# Patient Record
Sex: MALE | Race: OTHER | HISPANIC OR LATINO | Employment: FULL TIME | URBAN - METROPOLITAN AREA
[De-identification: names, ages, dates, MRNs, and addresses within clinical notes are randomized per-mention and may not be internally consistent; named-entity substitution may affect disease eponyms.]

---

## 2017-04-05 PROCEDURE — 99283 EMERGENCY DEPT VISIT LOW MDM: CPT

## 2017-04-06 ENCOUNTER — HOSPITAL ENCOUNTER (EMERGENCY)
Facility: HOSPITAL | Age: 27
Discharge: HOME | End: 2017-04-06
Attending: EMERGENCY MEDICINE
Payer: COMMERCIAL

## 2017-04-06 VITALS
DIASTOLIC BLOOD PRESSURE: 91 MMHG | SYSTOLIC BLOOD PRESSURE: 142 MMHG | TEMPERATURE: 98 F | HEIGHT: 66 IN | RESPIRATION RATE: 18 BRPM | HEART RATE: 102 BPM | WEIGHT: 270 LBS | BODY MASS INDEX: 43.39 KG/M2 | OXYGEN SATURATION: 97 %

## 2017-04-06 DIAGNOSIS — S20.211A RIB CONTUSION, RIGHT, INITIAL ENCOUNTER: Primary | ICD-10-CM

## 2017-04-06 DIAGNOSIS — R52 PAIN: ICD-10-CM

## 2017-04-06 RX ORDER — KETOROLAC TROMETHAMINE 10 MG/1
10 TABLET, FILM COATED ORAL EVERY 12 HOURS PRN
Qty: 6 TABLET | Refills: 0 | Status: SHIPPED | OUTPATIENT
Start: 2017-04-06 | End: 2017-04-09

## 2017-04-06 RX ORDER — MELOXICAM 7.5 MG/1
7.5 TABLET ORAL
Status: DISCONTINUED | OUTPATIENT
Start: 2017-04-06 | End: 2017-04-06 | Stop reason: HOSPADM

## 2017-04-06 NOTE — ED PROVIDER NOTES
"Encounter Date: 4/5/2017    SCRIBE #1 NOTE: I, Rissa Menjivar, am scribing for, and in the presence of,  Daniel Samuel MD. I have scribed the following portions of the note - Other sections scribed: HPI, ROS.       History     Chief Complaint   Patient presents with    Rib Pain     Right sided rib pain after falling down the stairs 1 1/2 weeks ago. Reportedly bruised and pain initially, now pain worsened on right rib side.     Review of patient's allergies indicates:  No Known Allergies  HPI Comments: CC: Chest Wall Pain    HPI: 27 year old male with a PMHx of DM, encounter for blood transfusion, and a perineal abscess presents to the ED c/o acute, severe (8/10) right sided chest wall pain. Patient reports he was carrying a box down the stairs 1 1/2 weeks ago when he fell down the stairs. Patient states his "skin bruised up", but the chest wall pain has not improved. Pain is exacerbated with deep breaths. Patient reports treating with Tylenol Extra Strength. Patient notes he occasionally smokes. Patient otherwise denies headache, nausea and other symptoms.     The history is provided by the patient. No  was used.     Past Medical History:   Diagnosis Date    Diabetes mellitus     Encounter for blood transfusion     Perineal abscess 8/1/2014     Past Surgical History:   Procedure Laterality Date    ORTHOPEDIC SURGERY       Family History   Problem Relation Age of Onset    Diabetes Father     Diabetes Paternal Grandmother     Diabetes Paternal Grandfather      Social History   Substance Use Topics    Smoking status: Former Smoker     Packs/day: 0.50     Years: 9.00     Quit date: 7/1/2013    Smokeless tobacco: None    Alcohol use 1.2 oz/week     2 Cans of beer per week     Review of Systems   Constitutional: Negative for chills and fever.   HENT: Negative for ear pain, rhinorrhea and sore throat.    Eyes: Negative for pain.   Respiratory: Negative for cough.    Cardiovascular: " Negative for chest pain.   Gastrointestinal: Negative for abdominal pain, diarrhea, nausea and vomiting.   Genitourinary: Negative for dysuria.   Musculoskeletal: Negative for back pain.        (+) Chest Wall Pain (right sided)   Skin: Negative for rash.   Neurological: Negative for headaches.       Physical Exam   Initial Vitals   BP Pulse Resp Temp SpO2   04/05/17 2307 04/05/17 2307 04/05/17 2307 04/05/17 2307 04/05/17 2307   161/96 109 20 98.2 °F (36.8 °C) 97 %     Physical Exam    Vitals reviewed.  Constitutional: He appears well-developed and well-nourished.   HENT:   Head: Normocephalic and atraumatic.   Eyes: EOM are normal. Pupils are equal, round, and reactive to light.   Neck: Normal range of motion. Neck supple.   Cardiovascular: Normal rate, regular rhythm, normal heart sounds and intact distal pulses.   Pulmonary/Chest: Breath sounds normal. No respiratory distress. He has no wheezes. He has no rhonchi. He has no rales. He exhibits tenderness and bony tenderness.       Abdominal: Soft. Bowel sounds are normal.   Musculoskeletal: Normal range of motion.   Neurological: He is alert and oriented to person, place, and time.   Skin: Skin is warm and dry.   Psychiatric: He has a normal mood and affect.         ED Course   Procedures  Labs Reviewed - No data to display       X-Rays:   Independently Interpreted Readings:   Other Readings:  No sign of acute fracture. No Pnumothorax.     Medical Decision Making:   History:   Old Medical Records: I decided to obtain old medical records.  Differential Diagnosis:   Rib fracture, contusion, zoster    No sign of fracture of rib. No pneumothorax. Likely rib contusion. Skin is nl and without rash.   Supportive care and PCP follow up.  The results and physical exam findings were reviewed with the patient. Pt agrees with assessment, disposition and treatment plan and has no further questions or complaints at this time.    IVANIA Samuel M.D. 1:18 AM 4/6/2017               Scribe Attestation:   Scribe #1: I performed the above scribed service and the documentation accurately describes the services I performed. I attest to the accuracy of the note.    Attending Attestation:           Physician Attestation for Scribe:  Physician Attestation Statement for Scribe #1: I, Daniel Samuel MD, reviewed documentation, as scribed by Rissa Menjivar in my presence, and it is both accurate and complete.                 ED Course     Clinical Impression:   The primary encounter diagnosis was Rib contusion, right, initial encounter. A diagnosis of Pain was also pertinent to this visit.          Daniel Samuel MD  04/06/17 0118

## 2017-04-06 NOTE — DISCHARGE INSTRUCTIONS
Bruises (Contusions)    A contusion is a bruise. A bruise happens when a blow to your body doesn't break the skin but does break blood vessels beneath the skin. Blood leaking from the broken vessels causes redness and swelling. As it heals, your bruise is likely to turn colors like purple, green, and yellow. This is normal. The bruise should fade in 2 or 3 weeks.  Factors that make you more likely to bruise  Almost everyone bruises now and then. Certain people do bruise more easily than others. You're more prone to bruising as you get older. That's because blood vessels become more fragile with age. You're also more likely to bruise if you have a clotting disorder such as hemophilia or take medications that reduce clotting, including aspirin.  When to go to the emergency room (ER)  Bruises almost always heal on their own without special treatment. But for some people, a bad bruise can be serious. Seek medical care if you:  · Have a clotting disorder such as hemophilia.  · Have cirrhosis or other serious liver disease.  · Take blood-thinning medications such as warfarin (Coumadin).  What to expect in the ER  A doctor will examine your bruise and ask about any health conditions you have. In some cases, you may have a test to check how well your blood clots. Other treatment will depend on your needs.  Follow-up care  Sometimes a bruise gets worse instead of better. It may become larger and more swollen. This can occur when your body walls off a small pool of blood under the skin (hematoma). In very rare cases, your doctor may need to drain excess blood from the area.  Tip:  Apply an ice pack or bag of frozen peas to a bruise (keep a thin cloth between the cold source and your skin). This can help reduce redness and swelling.   Date Last Reviewed: 11/30/2014  © 1744-9570 [employer]. [address] [place] [address], [place] [address]. All rights reserved. This information is not intended as a substitute for  professional medical care. Always follow your healthcare professional's instructions.          Chest Contusion    A contusion is a bruise to the skin, muscle, or ribs. It may cause pain, tenderness, and swelling. It may turn the skin purple until it heals. Contusions take a few days to a few weeks to heal.  Home care  Follow these guidelines when caring for yourself at home:  · Rest. Dont do any heavy lifting or strenuous activity. Dont do any activity that causes pain.  · Put an ice pack on the injured area. Do this for 20 minutes every 1 to 2 hours the first day. You can make an ice pack by wrapping a plastic bag of ice cubes in a thin towel. Continue to use the ice pack 3 to 4 times a day for the next 2 days. Then use the ice pack as needed to ease pain and swelling.  · After 1 to 2 days you may put a warm compress on the area. Do this for 10 minutes several times a day. A warm compress is a clean cloth thats damp with warm water.  · Hold a pillow to the affected area when you cough. This will help ease pain.  · You may use acetaminophen or ibuprofen to control pain, unless another pain medicine was prescribed. If you have chronic liver or kidney disease, talk with your health care provider before using these medicines. Also talk with your provider if youve had a stomach ulcer or GI bleeding.  Follow-up care  Follow up with your health care provider during the next week, or as advised.  When to seek medical advice  Call your health care provider right away if any of these occur:  · Shortness of breath, difficulty breathing, or breathing fast  · Chest pain gets worse when you breathe  · Severe pain that comes on suddenly or lasts more than an hour  · Dizziness, weakness, or fainting  · New abdominal pain or abdominal pain that gets worse  ·  Fever of 101ºF (38.3ºC) or higher, or as directed by your health care provider  Date Last Reviewed: 2/15/2015  © 6819-5715 [employer]. [address] [place] [address]  Road, [place] [address]. All rights reserved. This information is not intended as a substitute for professional medical care. Always follow your healthcare professional's instructions.

## 2017-04-06 NOTE — ED AVS SNAPSHOT
[facility]  [place] [address]               Doe Woodall   2017 12:40 AM   ED    Description:  Male : 1990   Department:  [facility]           Your Care was Coordinated By:     Provider Role From To    Daniel Samuel MD Attending Provider 17 0045 --      Reason for Visit     Rib Pain           Diagnoses this Visit        Comments    Rib contusion, right, initial encounter    -  Primary     Pain           ED Disposition     ED Disposition Condition Comment    Discharge             To Do List           Follow-up Information     Follow up with [facility].    Specialty:  Emergency Medicine    Why:  As needed, If symptoms worsen    Contact information:    [place] [id number] [phone]       These Medications        Disp Refills Start End    ketorolac (TORADOL) 10 mg tablet 6 tablet 0 2017    Take 1 tablet (10 mg total) by mouth every 12 (twelve) hours as needed for Pain. - Oral      [facility]     [facility] Care [facility] -  Assistance  Unless otherwise directed by your provider, please contact [facility], our nurse care line that is available for  assistance.     Registered nurses in the [facility] provide: appointment scheduling, clinical advisement, health education, and other advisory services.  Call: [phone] (toll free)               Medications           START taking these NEW medications        Refills    ketorolac (TORADOL) 10 mg tablet 0    Sig: Take 1 tablet (10 mg total) by mouth every 12 (twelve) hours as needed for Pain.    Class: Print    Route: Oral      These medications were administered today        Dose Freq    meloxicam tablet 7.5 mg 7.5 mg ED 1 Time    Sig: Take 1 tablet (7.5 mg total) by mouth ED 1 Time.    Class: Normal    Route: Oral           Verify that the below list of  "medications is an accurate representation of the medications you are currently taking.  If none reported, the list may be blank. If incorrect, please contact your healthcare provider. Carry this list with you in case of emergency.           Current Medications     blood sugar diagnostic Strp Check blood glucose in AM then three times per day prior to meals.   Can check in evening prior to bed.    blood-glucose meter kit Use as instructed    cyclobenzaprine (FLEXERIL) 10 MG tablet     famotidine (PEPCID) 20 MG tablet     insulin aspart (NOVOLOG) 100 unit/mL injection Inject 10 Units into the skin 3 (three) times daily before meals.    insulin detemir (LEVEMIR) 100 unit/mL (3 mL) InPn pen Inject 30 Units into the skin every evening.    insulin syringe,safetyneedle 1 mL 29 x 1/2" Syrg 1 Syringe by Misc.(Non-Drug; Combo Route) route 4 (four) times daily.    ketorolac (TORADOL) 10 mg tablet Take 1 tablet (10 mg total) by mouth every 12 (twelve) hours as needed for Pain.    lancets 32 gauge Misc 1 lancet by Misc.(Non-Drug; Combo Route) route 4 (four) times daily.    meloxicam (MOBIC) 15 MG tablet     meloxicam tablet 7.5 mg Take 1 tablet (7.5 mg total) by mouth ED 1 Time.    tramadol (ULTRAM) 50 mg tablet Take 1 tablet (50 mg total) by mouth every 6 (six) hours as needed for Pain.           Clinical Reference Information           Your Vitals Were     BP Pulse Temp Resp Height Weight    142/91 (BP Location: Right arm, Patient Position: Sitting, BP Method: Automatic) 102 98.2 °F (36.8 °C) (Oral) 18 5' 6" (1.676 m) 122.5 kg (270 lb)    SpO2 BMI             97% 43.58 kg/m2         Allergies as of 4/6/2017     No Known Allergies      Immunizations Administered on Date of Encounter - 4/6/2017     None      ED Micro, Lab, POCT     None      ED Imaging Orders     Start Ordered       Status Ordering Provider    04/05/17 2991 04/05/17 5182  X-Ray Ribs 2 View Right  1 time imaging      Final result         Discharge Instructions  "        Bruises (Contusions)    A contusion is a bruise. A bruise happens when a blow to your body doesn't break the skin but does break blood vessels beneath the skin. Blood leaking from the broken vessels causes redness and swelling. As it heals, your bruise is likely to turn colors like purple, green, and yellow. This is normal. The bruise should fade in 2 or 3 weeks.  Factors that make you more likely to bruise  Almost everyone bruises now and then. Certain people do bruise more easily than others. You're more prone to bruising as you get older. That's because blood vessels become more fragile with age. You're also more likely to bruise if you have a clotting disorder such as hemophilia or take medications that reduce clotting, including aspirin.  When to go to the emergency room (ER)  Bruises almost always heal on their own without special treatment. But for some people, a bad bruise can be serious. Seek medical care if you:  · Have a clotting disorder such as hemophilia.  · Have cirrhosis or other serious liver disease.  · Take blood-thinning medications such as warfarin (Coumadin).  What to expect in the ER  A doctor will examine your bruise and ask about any health conditions you have. In some cases, you may have a test to check how well your blood clots. Other treatment will depend on your needs.  Follow-up care  Sometimes a bruise gets worse instead of better. It may become larger and more swollen. This can occur when your body walls off a small pool of blood under the skin (hematoma). In very rare cases, your doctor may need to drain excess blood from the area.  Tip:  Apply an ice pack or bag of frozen peas to a bruise (keep a thin cloth between the cold source and your skin). This can help reduce redness and swelling.   Date Last Reviewed: 11/30/2014  © 7298-3425 [employer]. [address] [place] [address], [place] [address]. All rights reserved. This information is not intended as a substitute for  professional medical care. Always follow your healthcare professional's instructions.          Chest Contusion    A contusion is a bruise to the skin, muscle, or ribs. It may cause pain, tenderness, and swelling. It may turn the skin purple until it heals. Contusions take a few days to a few weeks to heal.  Home care  Follow these guidelines when caring for yourself at home:  · Rest. Dont do any heavy lifting or strenuous activity. Dont do any activity that causes pain.  · Put an ice pack on the injured area. Do this for 20 minutes every 1 to 2 hours the first day. You can make an ice pack by wrapping a plastic bag of ice cubes in a thin towel. Continue to use the ice pack 3 to 4 times a day for the next 2 days. Then use the ice pack as needed to ease pain and swelling.  · After 1 to 2 days you may put a warm compress on the area. Do this for 10 minutes several times a day. A warm compress is a clean cloth thats damp with warm water.  · Hold a pillow to the affected area when you cough. This will help ease pain.  · You may use acetaminophen or ibuprofen to control pain, unless another pain medicine was prescribed. If you have chronic liver or kidney disease, talk with your health care provider before using these medicines. Also talk with your provider if youve had a stomach ulcer or GI bleeding.  Follow-up care  Follow up with your health care provider during the next week, or as advised.  When to seek medical advice  Call your health care provider right away if any of these occur:  · Shortness of breath, difficulty breathing, or breathing fast  · Chest pain gets worse when you breathe  · Severe pain that comes on suddenly or lasts more than an hour  · Dizziness, weakness, or fainting  · New abdominal pain or abdominal pain that gets worse  ·  Fever of 101ºF (38.3ºC) or higher, or as directed by your health care provider  Date Last Reviewed: 2/15/2015  © 1268-9636 [employer]. [address] [place] [address]  Road, [place] [address]. All rights reserved. This information is not intended as a substitute for professional medical care. Always follow your healthcare professional's instructions.          [facility] Sign-Up     Activating your [facility] account is as easy as 1-2-3!     1) Visit [email or web address], select Sign Up Now, enter this activation code and your date of birth, then select Next.  [email or web address]  Expires: 5/21/2017 12:49 AM      2) Create a username and password to use when you visit [facility] in the future and select a security question in case you lose your password and select Next.    3) Enter your e-mail address and click Sign Up!    Additional Information  If you have questions, please e-mail [email or web address] or call [phone] to talk to our [facility] staff. Remember, [facility] is NOT to be used for urgent needs. For medical emergencies, dial 911.         Smoking Cessation     If you would like to quit smoking:   You may be eligible for free services if you are a [place] resident and started smoking cigarettes before September 1, 1988.  Call the Smoking Cessation Trust ([facility]) toll free at (806) 903-9240 or (906) 411-9832.   Call 1-800-QUIT-NOW if you do not meet the above criteria.   Contact us via email: [email or web address]   View our website for more information: [email or web address]         [facility] complies with applicable Federal civil rights laws and does not discriminate on the basis of race, color, national origin, age, disability, or sex.        Language Assistance Services     ATTENTION: Language assistance services are available, free of charge. Please call [phone].      ATENCIÓN: Si habla daniel, tiene a wick disposición servicios gratuitos de asistencia lingüística. Llame al [phone].     CHÚ Ý: N?u b?n nói Ti?ng Vi?t, có các d?ch v? h? tr? ngôn ng? mi?n phí dành cho b?n. G?i s? [phone].

## 2017-04-06 NOTE — ED TRIAGE NOTES
Pt presents to ED c/o R side rib pain after falling down stairs over a week ago. Rates pain 5/10. States has been taking Tylenol with no relief.

## 2018-06-24 ENCOUNTER — HOSPITAL ENCOUNTER (EMERGENCY)
Facility: HOSPITAL | Age: 28
Discharge: HOME | End: 2018-06-24
Attending: EMERGENCY MEDICINE
Payer: MEDICAID

## 2018-06-24 VITALS
HEART RATE: 109 BPM | DIASTOLIC BLOOD PRESSURE: 76 MMHG | BODY MASS INDEX: 41.65 KG/M2 | WEIGHT: 250 LBS | SYSTOLIC BLOOD PRESSURE: 138 MMHG | TEMPERATURE: 98 F | HEIGHT: 65 IN | OXYGEN SATURATION: 98 % | RESPIRATION RATE: 16 BRPM

## 2018-06-24 DIAGNOSIS — G89.29 CHRONIC RIGHT-SIDED LOW BACK PAIN WITH RIGHT-SIDED SCIATICA: Primary | ICD-10-CM

## 2018-06-24 DIAGNOSIS — M54.41 CHRONIC RIGHT-SIDED LOW BACK PAIN WITH RIGHT-SIDED SCIATICA: Primary | ICD-10-CM

## 2018-06-24 LAB
BASOPHILS # BLD AUTO: 0.05 K/UL
BASOPHILS NFR BLD: 0.5 %
BUN SERPL-MCNC: 8 MG/DL (ref 6–30)
CHLORIDE SERPL-SCNC: 102 MMOL/L (ref 95–110)
CREAT SERPL-MCNC: 0.7 MG/DL (ref 0.5–1.4)
CRP SERPL-MCNC: 5.5 MG/L
DIFFERENTIAL METHOD: ABNORMAL
EOSINOPHIL # BLD AUTO: 0.1 K/UL
EOSINOPHIL NFR BLD: 0.9 %
ERYTHROCYTE [DISTWIDTH] IN BLOOD BY AUTOMATED COUNT: 12.3 %
ERYTHROCYTE [SEDIMENTATION RATE] IN BLOOD BY WESTERGREN METHOD: 11 MM/HR
GLUCOSE SERPL-MCNC: 240 MG/DL (ref 70–110)
HCT VFR BLD AUTO: 53.2 %
HCT VFR BLD CALC: 54 %PCV (ref 36–54)
HGB BLD-MCNC: 17.6 G/DL
IMM GRANULOCYTES # BLD AUTO: 0.02 K/UL
IMM GRANULOCYTES NFR BLD AUTO: 0.2 %
LYMPHOCYTES # BLD AUTO: 3.7 K/UL
LYMPHOCYTES NFR BLD: 36.5 %
MCH RBC QN AUTO: 31.3 PG
MCHC RBC AUTO-ENTMCNC: 33.1 G/DL
MCV RBC AUTO: 95 FL
MONOCYTES # BLD AUTO: 0.9 K/UL
MONOCYTES NFR BLD: 8.5 %
NEUTROPHILS # BLD AUTO: 5.5 K/UL
NEUTROPHILS NFR BLD: 53.4 %
NRBC BLD-RTO: 0 /100 WBC
PLATELET # BLD AUTO: 257 K/UL
PMV BLD AUTO: 9.6 FL
POC IONIZED CALCIUM: 1.13 MMOL/L (ref 1.06–1.42)
POC TCO2 (MEASURED): 25 MMOL/L (ref 23–29)
POTASSIUM BLD-SCNC: 4.2 MMOL/L (ref 3.5–5.1)
RBC # BLD AUTO: 5.63 M/UL
SAMPLE: ABNORMAL
SODIUM BLD-SCNC: 139 MMOL/L (ref 136–145)
WBC # BLD AUTO: 10.23 K/UL

## 2018-06-24 PROCEDURE — 85025 COMPLETE CBC W/AUTO DIFF WBC: CPT

## 2018-06-24 PROCEDURE — 86140 C-REACTIVE PROTEIN: CPT

## 2018-06-24 PROCEDURE — 99284 EMERGENCY DEPT VISIT MOD MDM: CPT | Mod: 25

## 2018-06-24 PROCEDURE — 85651 RBC SED RATE NONAUTOMATED: CPT

## 2018-06-24 PROCEDURE — 63600175 PHARM REV CODE 636 W HCPCS: Performed by: STUDENT IN AN ORGANIZED HEALTH CARE EDUCATION/TRAINING PROGRAM

## 2018-06-24 PROCEDURE — 96372 THER/PROPH/DIAG INJ SC/IM: CPT

## 2018-06-24 PROCEDURE — 99284 EMERGENCY DEPT VISIT MOD MDM: CPT | Mod: ,,, | Performed by: EMERGENCY MEDICINE

## 2018-06-24 PROCEDURE — 25000003 PHARM REV CODE 250: Performed by: STUDENT IN AN ORGANIZED HEALTH CARE EDUCATION/TRAINING PROGRAM

## 2018-06-24 RX ORDER — KETOROLAC TROMETHAMINE 30 MG/ML
10 INJECTION, SOLUTION INTRAMUSCULAR; INTRAVENOUS
Status: COMPLETED | OUTPATIENT
Start: 2018-06-24 | End: 2018-06-24

## 2018-06-24 RX ORDER — ACETAMINOPHEN 500 MG
1000 TABLET ORAL EVERY 8 HOURS PRN
Refills: 0 | COMMUNITY
Start: 2018-06-24 | End: 2018-06-29

## 2018-06-24 RX ORDER — METFORMIN HYDROCHLORIDE 1000 MG/1
1000 TABLET ORAL 3 TIMES DAILY
COMMUNITY
End: 2018-07-19

## 2018-06-24 RX ORDER — IBUPROFEN 600 MG/1
600 TABLET ORAL EVERY 6 HOURS PRN
Refills: 0
Start: 2018-06-24 | End: 2018-06-29

## 2018-06-24 RX ORDER — LIDOCAINE 50 MG/G
1 PATCH TOPICAL DAILY
Qty: 5 PATCH | Refills: 0 | Status: SHIPPED | OUTPATIENT
Start: 2018-06-24 | End: 2019-10-09

## 2018-06-24 RX ORDER — DEXAMETHASONE SODIUM PHOSPHATE 4 MG/ML
8 INJECTION, SOLUTION INTRA-ARTICULAR; INTRALESIONAL; INTRAMUSCULAR; INTRAVENOUS; SOFT TISSUE
Status: COMPLETED | OUTPATIENT
Start: 2018-06-24 | End: 2018-06-24

## 2018-06-24 RX ORDER — ACETAMINOPHEN 500 MG
1000 TABLET ORAL
Status: COMPLETED | OUTPATIENT
Start: 2018-06-24 | End: 2018-06-24

## 2018-06-24 RX ADMIN — ACETAMINOPHEN 1000 MG: 500 TABLET ORAL at 03:06

## 2018-06-24 RX ADMIN — KETOROLAC TROMETHAMINE 10 MG: 30 INJECTION, SOLUTION INTRAMUSCULAR at 03:06

## 2018-06-24 RX ADMIN — DEXAMETHASONE SODIUM PHOSPHATE 8 MG: 4 INJECTION, SOLUTION INTRAMUSCULAR; INTRAVENOUS at 03:06

## 2018-06-24 NOTE — ED PROVIDER NOTES
Encounter Date: 6/24/2018       History     Chief Complaint   Patient presents with    Back Pain     for 5 days     28-year-old male with past medical history of diabetes as well as chronic intermittent low back pain presents with right-sided low back pain ×5 days.  Patient denies any inciting event or trauma.  Reports that on Wednesday began to have right low back pain radiating to the posterior leg above the knee.  Denies any other pains or injuries.  Denies numbness or paresthesias.  Denies bowel or bladder difficulty, perineal anesthesia.  Has been self treating with over-the-counter analgesics.            Review of patient's allergies indicates:  No Known Allergies  Past Medical History:   Diagnosis Date    Diabetes mellitus     Encounter for blood transfusion     Perineal abscess 8/1/2014     Past Surgical History:   Procedure Laterality Date    ORTHOPEDIC SURGERY       Family History   Problem Relation Age of Onset    Diabetes Father     Diabetes Paternal Grandmother     Diabetes Paternal Grandfather      Social History   Substance Use Topics    Smoking status: Former Smoker     Packs/day: 0.50     Years: 9.00     Quit date: 7/1/2013    Smokeless tobacco: Never Used    Alcohol use 1.2 oz/week     2 Cans of beer per week     Review of Systems   Constitutional: Negative for fever.   HENT: Negative for sore throat.    Respiratory: Negative for shortness of breath.    Cardiovascular: Negative for chest pain.   Gastrointestinal: Negative for nausea.   Genitourinary: Negative for dysuria.   Musculoskeletal: Negative for back pain.   Skin: Negative for rash.   Neurological: Negative for weakness.   Hematological: Does not bruise/bleed easily.       Physical Exam     Initial Vitals [06/24/18 1451]   BP Pulse Resp Temp SpO2   138/76 109 16 98.1 °F (36.7 °C) 98 %      MAP       --         Physical Exam    Constitutional: He appears well-developed and well-nourished.   HENT:   Head: Normocephalic and  atraumatic.   Eyes: Conjunctivae and EOM are normal.   Neck: Normal range of motion. No tracheal deviation present.   Cardiovascular: Normal rate and intact distal pulses.   Pulmonary/Chest: No respiratory distress.   Abdominal: Soft. He exhibits no distension.   Musculoskeletal: Normal range of motion. He exhibits no edema.   Mild right low back tenderness, tender over right buttock  Area of soft tissue over right lateral lumbar spine which could represent fluid collection or local swelling  Strength 5 out of 5 throughout  Sensation intact to light touch throughout  Lower extremity reflexes 2+ throughout  Negative upper motor neuron signs   Neurological: He is alert and oriented to person, place, and time.   Skin: Skin is warm and dry.   Psychiatric: He has a normal mood and affect. His behavior is normal. Judgment and thought content normal.         ED Course   Procedures  Labs Reviewed   CBC W/ AUTO DIFFERENTIAL - Abnormal; Notable for the following:        Result Value    MCH 31.3 (*)     All other components within normal limits   SEDIMENTATION RATE - Abnormal; Notable for the following:     Sed Rate 11 (*)     All other components within normal limits   ISTAT PROCEDURE - Abnormal; Notable for the following:     POC Glucose 240 (*)     All other components within normal limits   C-REACTIVE PROTEIN          Imaging Results          CT Lumbar Spine Without Contrast (Final result)  Result time 06/24/18 18:01:55    Final result by Britton Otero MD (06/24/18 18:01:55)                 Impression:      Congenital lumbar spinal stenosis with moderate spinal canal narrowing from L2-L5.    Lower lumbar minimal spondylosis as above.    Electronically signed by resident: Sen Aranda  Date:    06/24/2018  Time:    17:19    Electronically signed by: Britton Otero MD  Date:    06/24/2018  Time:    18:01             Narrative:    EXAMINATION:  CT LUMBAR SPINE WITHOUT CONTRAST    CLINICAL HISTORY:  low back  pain;    TECHNIQUE:  Low-dose axial, sagittal and coronal reformations are obtained through the lumbar spine.  Contrast was not administered.    COMPARISON:  None.    FINDINGS:  Bones are well mineralized.  Straightening of the usual lumbar lordosis, likely related to positioning or muscle strain.  Vertebral body heights are well-maintained.No fractures are identified.  No significant listhesis or destructive osseous process.  Slight loss of disc height at the with mild endplate changes and facet arthrosis L5-S1 resulting in minimal bilateral neural foraminal narrowing.    Spinal canal is narrowed throughout the lumbar spine ranging from 8-10 mm in AP diameter consistent with congenital lumbar spinal stenosis.  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  There is moderate spinal canal stenosis from L2-L5, with the superior aspect of L3 being the most significant level measuring approximately 8 mm.    The remaining visualized paravertebral structures demonstrate no pathology.                                       APC / Resident Notes:   28-year-old male with right sided low back pain with radicular symptoms, atraumatic.  We'll treat symptomatically with IM dexamethasone, Toradol, as well as oral Tylenol.  As there is an area on the right lateral low back with irregular soft tissues/possible fluid, we will obtain CT lumbar spine.       CT lumbar negative for any acute process.  No concern for Soft tissue fluid collection or mass.  Stable for discharge              Clinical Impression:   The encounter diagnosis was Chronic right-sided low back pain with right-sided sciatica.

## 2018-06-24 NOTE — ED NOTES
Patient identifiers verified and correct for Mr Woodall  C/C: Left buttock pain radiating down leg  APPEARANCE: awake and alert in NAD.  SKIN: warm, dry and intact. No breakdown or bruising.  MUSCULOSKELETAL: Patient moving all extremities spontaneously, no obvious swelling or deformities noted. Ambulates independently.  RESPIRATORY: Denies shortness of breath.Respirations unlabored.   CARDIAC: Denies CP, 2+ distal pulses; no peripheral edema  ABDOMEN: S/ND/NT, Denies nausea  : voids spontaneously, denies difficulty  Neurologic: AAO x 4; follows commands equal strength in all extremities; denies numbness/tingling. Denies dizziness Positive weakness,

## 2018-06-24 NOTE — ED TRIAGE NOTES
Patient states sharp pain to right buttock, no injury. Onset Thursday with gradual worsening. ALso states he fell with pain to left hand 5th finger. Attempted mult pain meds

## 2018-06-26 ENCOUNTER — HOSPITAL ENCOUNTER (EMERGENCY)
Facility: HOSPITAL | Age: 28
Discharge: HOME | End: 2018-06-26
Attending: EMERGENCY MEDICINE
Payer: MEDICAID

## 2018-06-26 VITALS
WEIGHT: 250 LBS | HEIGHT: 65 IN | TEMPERATURE: 98 F | DIASTOLIC BLOOD PRESSURE: 96 MMHG | RESPIRATION RATE: 16 BRPM | SYSTOLIC BLOOD PRESSURE: 134 MMHG | BODY MASS INDEX: 41.65 KG/M2 | OXYGEN SATURATION: 98 % | HEART RATE: 98 BPM

## 2018-06-26 DIAGNOSIS — M79.604 LEG PAIN, POSTERIOR, RIGHT: Primary | ICD-10-CM

## 2018-06-26 PROCEDURE — 63600175 PHARM REV CODE 636 W HCPCS: Performed by: STUDENT IN AN ORGANIZED HEALTH CARE EDUCATION/TRAINING PROGRAM

## 2018-06-26 PROCEDURE — 96372 THER/PROPH/DIAG INJ SC/IM: CPT

## 2018-06-26 PROCEDURE — 99283 EMERGENCY DEPT VISIT LOW MDM: CPT | Mod: 25

## 2018-06-26 PROCEDURE — 99283 EMERGENCY DEPT VISIT LOW MDM: CPT | Mod: ,,, | Performed by: EMERGENCY MEDICINE

## 2018-06-26 PROCEDURE — 25000003 PHARM REV CODE 250: Performed by: STUDENT IN AN ORGANIZED HEALTH CARE EDUCATION/TRAINING PROGRAM

## 2018-06-26 RX ORDER — KETOROLAC TROMETHAMINE 30 MG/ML
30 INJECTION, SOLUTION INTRAMUSCULAR; INTRAVENOUS
Status: COMPLETED | OUTPATIENT
Start: 2018-06-26 | End: 2018-06-26

## 2018-06-26 RX ORDER — CYCLOBENZAPRINE HCL 10 MG
10 TABLET ORAL 3 TIMES DAILY PRN
Qty: 15 TABLET | Refills: 0 | Status: SHIPPED | OUTPATIENT
Start: 2018-06-26 | End: 2018-07-01

## 2018-06-26 RX ORDER — ACETAMINOPHEN 325 MG/1
650 TABLET ORAL
Status: COMPLETED | OUTPATIENT
Start: 2018-06-26 | End: 2018-06-26

## 2018-06-26 RX ORDER — NAPROXEN 500 MG/1
500 TABLET ORAL 2 TIMES DAILY WITH MEALS
Qty: 20 TABLET | Refills: 0 | Status: SHIPPED | OUTPATIENT
Start: 2018-06-26 | End: 2018-11-18

## 2018-06-26 RX ADMIN — KETOROLAC TROMETHAMINE 30 MG: 30 INJECTION, SOLUTION INTRAMUSCULAR at 08:06

## 2018-06-26 RX ADMIN — ACETAMINOPHEN 650 MG: 325 TABLET, FILM COATED ORAL at 08:06

## 2018-06-26 NOTE — ED NOTES
LOC: The patient is awake, alert and aware of environment with an appropriate affect, the patient is oriented x 3 and speaking appropriately.  APPEARANCE: Patient resting comfortably and in no acute distress, patient is clean and well groomed, patient's clothing is properly fastened.  SKIN: The skin is warm and dry, patient has normal skin turgor and moist mucus membranes, skin intact, no breakdown or brusing noted.  MUSKULOSKELETAL: Patient moving all extremities well, no obvious swelling or deformities noted.  RESPIRATORY: Airway is open and patent, respirations are spontaneous, patient has a normal effort and rate. Breath sounds are clear and equal bilaterally.  CARDIAC: Normal heart sounds. No peripheral edema.  ABDOMEN: Soft and non tender to palpation, no distention noted. Bowel sounds present.

## 2018-06-26 NOTE — ED PROVIDER NOTES
Encounter Date: 6/26/2018    SCRIBE #1 NOTE: I, Haley Ahn, am scribing for, and in the presence of,  Dr. Brooks. I have scribed the following portions of the note - the Resident attestation.       History     Chief Complaint   Patient presents with    Leg Pain     patient was here on sunday for same thing, right leg pain from hip to knee     Doe Woodall is a 28 y.o. M with IDDM type II, as well as chronic intermittent low back pain presents with right-sided low back pain ×7 days.      Patient was seen here for the same issue 2 days ago. His account has not changes since that time, he reports persistence of his pain. Patient denies any inciting event or trauma. Reports that on Wednesday of last week he began to have right low back pain radiating to the posterior leg above the knee.  Denies any other pains or injuries.  Denies numbness or paresthesias.  Denies bowel or bladder difficulty, perineal anesthesia.  Has been self treating with over-the-counter analgesics.  \    Patient denies CP/SOB, N/V/D, F/C, denies arthralgia, numbness/tingling/weakness or pain otherwise.             Review of patient's allergies indicates:  No Known Allergies  Past Medical History:   Diagnosis Date    Diabetes mellitus     Encounter for blood transfusion     Perineal abscess 8/1/2014     Past Surgical History:   Procedure Laterality Date    ORTHOPEDIC SURGERY       Family History   Problem Relation Age of Onset    Diabetes Father     Diabetes Paternal Grandmother     Diabetes Paternal Grandfather      Social History   Substance Use Topics    Smoking status: Former Smoker     Packs/day: 0.50     Years: 9.00     Quit date: 7/1/2013    Smokeless tobacco: Never Used    Alcohol use 1.2 oz/week     2 Cans of beer per week     Review of Systems   Constitutional: Negative for activity change.   HENT: Negative for voice change.    Eyes: Negative for visual disturbance.   Respiratory: Negative for cough, chest tightness  and shortness of breath.    Cardiovascular: Negative for chest pain, palpitations and leg swelling.   Gastrointestinal: Negative for abdominal distention, abdominal pain, nausea and vomiting.   Genitourinary: Negative for dysuria, flank pain and hematuria.   Musculoskeletal: Negative for arthralgias.        R leg pain from R thigh to R knee   Skin: Negative for color change.   Neurological: Negative for dizziness, facial asymmetry, weakness, light-headedness and numbness.       Physical Exam     Initial Vitals [06/26/18 0737]   BP Pulse Resp Temp SpO2   (!) 134/96 98 16 98.1 °F (36.7 °C) 98 %      MAP       --         Physical Exam    Vitals reviewed.  Constitutional: He appears well-developed and well-nourished. No distress.   HENT:   Head: Normocephalic and atraumatic.   Eyes: EOM are normal. No scleral icterus.   Neck: Normal range of motion.   Cardiovascular: Normal rate, regular rhythm and normal heart sounds.   No murmur heard.  Pulmonary/Chest: Breath sounds normal. No stridor. No respiratory distress. He has no wheezes. He has no rales.   Abdominal: Soft. Bowel sounds are normal. He exhibits no distension. There is no tenderness.   Musculoskeletal: Normal range of motion. He exhibits no edema or tenderness.   Mild right SI joint tenderness, no midline tenderness  Strength 5 out of 5 throughout  Sensation intact to light touch throughout  Lower extremity reflexes 2+ throughout  Negative upper motor neuron signs    Neurological: He is alert and oriented to person, place, and time. He has normal strength and normal reflexes. He displays normal reflexes. No cranial nerve deficit or sensory deficit.   Skin: Skin is warm and dry.   Psychiatric: He has a normal mood and affect. Thought content normal.         ED Course   Procedures  Labs Reviewed - No data to display       Imaging Results    None                APC / Resident Notes:   28 y.o. presents with acute on chronic R leg and R low back pain. Was treated  here 2 days ago for similar complaint, was given Toradol, dexamethasone, and tylenol which relieved his pain temporarily. He c/o no new symptoms, denies neuro deficits. Exam unremarkable. Will administer Toradol, tylenol, and prescribe flexeril. 8:14 AM        Scribe Attestation:   Scribe #1: I performed the above scribed service and the documentation accurately describes the services I performed. I attest to the accuracy of the note.    Attending Attestation:   Physician Attestation Statement for Resident:  As the supervising MD   Physician Attestation Statement: I have personally seen and examined this patient.   I agree with the above history. -: 28 y.o. male presents with right sided sciatica. No midline lumbar tenderness, fevers, or IVDA, I doubt epidural abscess. Patient's strength intact in bilateral lower extremities, no saddle anesthesia, no urinary or fecal incontinence, I doubt cauda equina. Patient counseled on symptomatic treatment. Scripts provided for NSAIDs and antispasmodics.    As the supervising MD I agree with the above PE.    As the supervising MD I agree with the above treatment, course, plan, and disposition.          Physician Attestation for Scribe:      Comments: I, Dr. Ramses Brooks, personally performed the services described in this documentation. All medical record entries made by the scribe were at my direction and in my presence.  I have reviewed the chart and agree that the record reflects my personal performance and is accurate and complete. Ramses Brooks MD.  1:16 PM 06/26/2018                 Clinical Impression:   The encounter diagnosis was Leg pain, posterior, right.      Disposition:   Disposition: Discharged  Condition: Stable                        Ramses Brooks MD  06/26/18 1316

## 2018-06-26 NOTE — ED TRIAGE NOTES
Patient complains of right leg pain that has been hurting since he was here Sunday, patient states his pain has not been relieved

## 2018-07-04 ENCOUNTER — HOSPITAL ENCOUNTER (OUTPATIENT)
Facility: HOSPITAL | Age: 28
Discharge: HOME | End: 2018-07-07
Attending: EMERGENCY MEDICINE | Admitting: NEUROLOGICAL SURGERY
Payer: MEDICAID

## 2018-07-04 DIAGNOSIS — M48.061 SPINAL STENOSIS OF LUMBAR REGION, UNSPECIFIED WHETHER NEUROGENIC CLAUDICATION PRESENT: ICD-10-CM

## 2018-07-04 DIAGNOSIS — M51.36 BULGE OF LUMBAR DISC WITHOUT MYELOPATHY: ICD-10-CM

## 2018-07-04 DIAGNOSIS — M54.41 LOW BACK PAIN WITH RIGHT-SIDED SCIATICA: Primary | ICD-10-CM

## 2018-07-04 DIAGNOSIS — M54.41 ACUTE RIGHT-SIDED LOW BACK PAIN WITH RIGHT-SIDED SCIATICA: ICD-10-CM

## 2018-07-04 LAB
BUN SERPL-MCNC: 17 MG/DL (ref 6–30)
CHLORIDE SERPL-SCNC: 101 MMOL/L (ref 95–110)
CREAT SERPL-MCNC: 0.5 MG/DL (ref 0.5–1.4)
GLUCOSE SERPL-MCNC: 295 MG/DL (ref 70–110)
HCT VFR BLD CALC: 51 %PCV (ref 36–54)
POC IONIZED CALCIUM: 1.08 MMOL/L (ref 1.06–1.42)
POC TCO2 (MEASURED): 24 MMOL/L (ref 23–29)
POTASSIUM BLD-SCNC: 4.3 MMOL/L (ref 3.5–5.1)
SAMPLE: ABNORMAL
SODIUM BLD-SCNC: 135 MMOL/L (ref 136–145)

## 2018-07-04 PROCEDURE — 82962 GLUCOSE BLOOD TEST: CPT | Mod: 59

## 2018-07-04 PROCEDURE — 63600175 PHARM REV CODE 636 W HCPCS: Performed by: EMERGENCY MEDICINE

## 2018-07-04 PROCEDURE — 96375 TX/PRO/DX INJ NEW DRUG ADDON: CPT

## 2018-07-04 PROCEDURE — 99284 EMERGENCY DEPT VISIT MOD MDM: CPT | Mod: ,,, | Performed by: EMERGENCY MEDICINE

## 2018-07-04 PROCEDURE — 96372 THER/PROPH/DIAG INJ SC/IM: CPT | Mod: 59

## 2018-07-04 PROCEDURE — 99283 EMERGENCY DEPT VISIT LOW MDM: CPT | Mod: 25

## 2018-07-04 PROCEDURE — 96374 THER/PROPH/DIAG INJ IV PUSH: CPT

## 2018-07-04 RX ORDER — KETOROLAC TROMETHAMINE 30 MG/ML
10 INJECTION, SOLUTION INTRAMUSCULAR; INTRAVENOUS
Status: COMPLETED | OUTPATIENT
Start: 2018-07-04 | End: 2018-07-04

## 2018-07-04 RX ORDER — DEXAMETHASONE SODIUM PHOSPHATE 4 MG/ML
8 INJECTION, SOLUTION INTRA-ARTICULAR; INTRALESIONAL; INTRAMUSCULAR; INTRAVENOUS; SOFT TISSUE
Status: COMPLETED | OUTPATIENT
Start: 2018-07-04 | End: 2018-07-04

## 2018-07-04 RX ADMIN — KETOROLAC TROMETHAMINE 10 MG: 30 INJECTION, SOLUTION INTRAMUSCULAR at 11:07

## 2018-07-04 RX ADMIN — DEXAMETHASONE SODIUM PHOSPHATE 8 MG: 4 INJECTION, SOLUTION INTRAMUSCULAR; INTRAVENOUS at 11:07

## 2018-07-05 ENCOUNTER — ANESTHESIA EVENT (OUTPATIENT)
Dept: SURGERY | Facility: HOSPITAL | Age: 28
End: 2018-07-05
Payer: MEDICAID

## 2018-07-05 PROBLEM — M51.36 BULGE OF LUMBAR DISC WITHOUT MYELOPATHY: Status: ACTIVE | Noted: 2018-07-05

## 2018-07-05 LAB
ABO + RH BLD: NORMAL
ANION GAP SERPL CALC-SCNC: 11 MMOL/L
BASOPHILS # BLD AUTO: 0.01 K/UL
BASOPHILS NFR BLD: 0.1 %
BLD GP AB SCN CELLS X3 SERPL QL: NORMAL
BUN SERPL-MCNC: 21 MG/DL
CALCIUM SERPL-MCNC: 9.4 MG/DL
CHLORIDE SERPL-SCNC: 102 MMOL/L
CO2 SERPL-SCNC: 22 MMOL/L
CREAT SERPL-MCNC: 0.8 MG/DL
DIFFERENTIAL METHOD: ABNORMAL
EOSINOPHIL # BLD AUTO: 0 K/UL
EOSINOPHIL NFR BLD: 0 %
ERYTHROCYTE [DISTWIDTH] IN BLOOD BY AUTOMATED COUNT: 12.2 %
EST. GFR  (AFRICAN AMERICAN): >60 ML/MIN/1.73 M^2
EST. GFR  (NON AFRICAN AMERICAN): >60 ML/MIN/1.73 M^2
GLUCOSE SERPL-MCNC: 412 MG/DL
HCT VFR BLD AUTO: 50.1 %
HGB BLD-MCNC: 17.3 G/DL
IMM GRANULOCYTES # BLD AUTO: 0.05 K/UL
IMM GRANULOCYTES NFR BLD AUTO: 0.5 %
INR PPP: 1
LYMPHOCYTES # BLD AUTO: 1.2 K/UL
LYMPHOCYTES NFR BLD: 11.4 %
MCH RBC QN AUTO: 31.8 PG
MCHC RBC AUTO-ENTMCNC: 34.5 G/DL
MCV RBC AUTO: 92 FL
MONOCYTES # BLD AUTO: 0.1 K/UL
MONOCYTES NFR BLD: 0.5 %
NEUTROPHILS # BLD AUTO: 9.6 K/UL
NEUTROPHILS NFR BLD: 87.5 %
NRBC BLD-RTO: 0 /100 WBC
PLATELET # BLD AUTO: 290 K/UL
PMV BLD AUTO: 10 FL
POCT GLUCOSE: 286 MG/DL (ref 70–110)
POCT GLUCOSE: 298 MG/DL (ref 70–110)
POCT GLUCOSE: 338 MG/DL (ref 70–110)
POCT GLUCOSE: 354 MG/DL (ref 70–110)
POCT GLUCOSE: 429 MG/DL (ref 70–110)
POTASSIUM SERPL-SCNC: 4.8 MMOL/L
PROTHROMBIN TIME: 10.7 SEC
RBC # BLD AUTO: 5.44 M/UL
SODIUM SERPL-SCNC: 135 MMOL/L
WBC # BLD AUTO: 10.91 K/UL

## 2018-07-05 PROCEDURE — 25000003 PHARM REV CODE 250: Performed by: PHYSICIAN ASSISTANT

## 2018-07-05 PROCEDURE — 63600175 PHARM REV CODE 636 W HCPCS: Performed by: PHYSICIAN ASSISTANT

## 2018-07-05 PROCEDURE — 99205 OFFICE O/P NEW HI 60 MIN: CPT | Mod: ,,, | Performed by: PHYSICIAN ASSISTANT

## 2018-07-05 PROCEDURE — G0378 HOSPITAL OBSERVATION PER HR: HCPCS

## 2018-07-05 PROCEDURE — 80048 BASIC METABOLIC PNL TOTAL CA: CPT

## 2018-07-05 PROCEDURE — 25000003 PHARM REV CODE 250: Performed by: STUDENT IN AN ORGANIZED HEALTH CARE EDUCATION/TRAINING PROGRAM

## 2018-07-05 PROCEDURE — 86901 BLOOD TYPING SEROLOGIC RH(D): CPT

## 2018-07-05 PROCEDURE — 85025 COMPLETE CBC W/AUTO DIFF WBC: CPT

## 2018-07-05 PROCEDURE — 85610 PROTHROMBIN TIME: CPT

## 2018-07-05 RX ORDER — IBUPROFEN 200 MG
16 TABLET ORAL
Status: DISCONTINUED | OUTPATIENT
Start: 2018-07-05 | End: 2018-07-07 | Stop reason: HOSPADM

## 2018-07-05 RX ORDER — HYDROCODONE BITARTRATE AND ACETAMINOPHEN 5; 325 MG/1; MG/1
1 TABLET ORAL EVERY 4 HOURS PRN
Status: DISCONTINUED | OUTPATIENT
Start: 2018-07-05 | End: 2018-07-07 | Stop reason: HOSPADM

## 2018-07-05 RX ORDER — DOCUSATE SODIUM 100 MG/1
100 CAPSULE, LIQUID FILLED ORAL 2 TIMES DAILY
Status: DISCONTINUED | OUTPATIENT
Start: 2018-07-05 | End: 2018-07-07 | Stop reason: HOSPADM

## 2018-07-05 RX ORDER — SODIUM CHLORIDE 9 MG/ML
INJECTION, SOLUTION INTRAVENOUS CONTINUOUS
Status: DISCONTINUED | OUTPATIENT
Start: 2018-07-05 | End: 2018-07-06

## 2018-07-05 RX ORDER — INSULIN ASPART 100 [IU]/ML
1-10 INJECTION, SOLUTION INTRAVENOUS; SUBCUTANEOUS
Status: DISCONTINUED | OUTPATIENT
Start: 2018-07-05 | End: 2018-07-07 | Stop reason: HOSPADM

## 2018-07-05 RX ORDER — IBUPROFEN 200 MG
24 TABLET ORAL
Status: DISCONTINUED | OUTPATIENT
Start: 2018-07-05 | End: 2018-07-07 | Stop reason: HOSPADM

## 2018-07-05 RX ORDER — SODIUM CHLORIDE 9 MG/ML
INJECTION, SOLUTION INTRAVENOUS CONTINUOUS
Status: DISCONTINUED | OUTPATIENT
Start: 2018-07-05 | End: 2018-07-05

## 2018-07-05 RX ORDER — MORPHINE SULFATE 4 MG/ML
2 INJECTION, SOLUTION INTRAMUSCULAR; INTRAVENOUS EVERY 4 HOURS PRN
Status: DISCONTINUED | OUTPATIENT
Start: 2018-07-05 | End: 2018-07-07 | Stop reason: HOSPADM

## 2018-07-05 RX ORDER — GLUCAGON 1 MG
1 KIT INJECTION
Status: DISCONTINUED | OUTPATIENT
Start: 2018-07-05 | End: 2018-07-07 | Stop reason: HOSPADM

## 2018-07-05 RX ORDER — MIRTAZAPINE 15 MG/1
15 TABLET, ORALLY DISINTEGRATING ORAL NIGHTLY PRN
Status: DISCONTINUED | OUTPATIENT
Start: 2018-07-05 | End: 2018-07-07 | Stop reason: HOSPADM

## 2018-07-05 RX ORDER — METHOCARBAMOL 750 MG/1
750 TABLET, FILM COATED ORAL EVERY 8 HOURS PRN
Status: DISCONTINUED | OUTPATIENT
Start: 2018-07-05 | End: 2018-07-07 | Stop reason: HOSPADM

## 2018-07-05 RX ORDER — PANTOPRAZOLE SODIUM 40 MG/1
40 TABLET, DELAYED RELEASE ORAL DAILY
Status: DISCONTINUED | OUTPATIENT
Start: 2018-07-05 | End: 2018-07-07 | Stop reason: HOSPADM

## 2018-07-05 RX ORDER — CEFAZOLIN SODIUM 1 G/3ML
2 INJECTION, POWDER, FOR SOLUTION INTRAMUSCULAR; INTRAVENOUS
Status: COMPLETED | OUTPATIENT
Start: 2018-07-06 | End: 2018-07-06

## 2018-07-05 RX ADMIN — METHOCARBAMOL 750 MG: 750 TABLET ORAL at 05:07

## 2018-07-05 RX ADMIN — MORPHINE SULFATE 2 MG: 4 INJECTION INTRAVENOUS at 10:07

## 2018-07-05 RX ADMIN — MIRTAZAPINE 15 MG: 15 TABLET, ORALLY DISINTEGRATING ORAL at 11:07

## 2018-07-05 RX ADMIN — HYDROCODONE BITARTRATE AND ACETAMINOPHEN 1 TABLET: 5; 325 TABLET ORAL at 08:07

## 2018-07-05 RX ADMIN — SODIUM CHLORIDE: 0.9 INJECTION, SOLUTION INTRAVENOUS at 03:07

## 2018-07-05 RX ADMIN — HYDROCODONE BITARTRATE AND ACETAMINOPHEN 1 TABLET: 5; 325 TABLET ORAL at 03:07

## 2018-07-05 RX ADMIN — MORPHINE SULFATE 2 MG: 4 INJECTION INTRAVENOUS at 06:07

## 2018-07-05 RX ADMIN — MORPHINE SULFATE 2 MG: 4 INJECTION INTRAVENOUS at 02:07

## 2018-07-05 RX ADMIN — INSULIN ASPART 8 UNITS: 100 INJECTION, SOLUTION INTRAVENOUS; SUBCUTANEOUS at 12:07

## 2018-07-05 RX ADMIN — INSULIN ASPART 5 UNITS: 100 INJECTION, SOLUTION INTRAVENOUS; SUBCUTANEOUS at 02:07

## 2018-07-05 RX ADMIN — DOCUSATE SODIUM 100 MG: 100 CAPSULE, LIQUID FILLED ORAL at 09:07

## 2018-07-05 RX ADMIN — PANTOPRAZOLE SODIUM 40 MG: 40 TABLET, DELAYED RELEASE ORAL at 09:07

## 2018-07-05 RX ADMIN — HYDROCODONE BITARTRATE AND ACETAMINOPHEN 1 TABLET: 5; 325 TABLET ORAL at 09:07

## 2018-07-05 RX ADMIN — SODIUM CHLORIDE: 0.9 INJECTION, SOLUTION INTRAVENOUS at 10:07

## 2018-07-05 RX ADMIN — INSULIN ASPART 3 UNITS: 100 INJECTION, SOLUTION INTRAVENOUS; SUBCUTANEOUS at 10:07

## 2018-07-05 RX ADMIN — MORPHINE SULFATE 2 MG: 4 INJECTION INTRAVENOUS at 12:07

## 2018-07-05 RX ADMIN — DOCUSATE SODIUM 100 MG: 100 CAPSULE, LIQUID FILLED ORAL at 08:07

## 2018-07-05 RX ADMIN — INSULIN ASPART 6 UNITS: 100 INJECTION, SOLUTION INTRAVENOUS; SUBCUTANEOUS at 05:07

## 2018-07-05 NOTE — ED NOTES
Pain: lower back pain 9/10 non radiating x 3 weeks.     Psychosocial: Patient is calm and cooperative. Patients insight and judgement are appropriate to situation. Appears clean, well maintained, with clothing appropriate to environment. No evidence of delusions, hallucinations, or psychosis.    Neuro: Eyes open spontaneously. Awake, alert, oriented x 4. Speech clear and appropriate. Tolerating saliva secretions well. Able to follow commands, demonstrating ability to actively and appropriately communicate within context of current conversation. Symmetrical facial muscles. Moving all extremities well with no noted weakness. Adequate muscle tone present. Movement is purposeful. No evidence of impaired sensation. Responds to external stimuli with appropriate reflexes.     Airway: Bilateral chest rise and fall. RR regular and non-labored. Air entry patent and clear x 5 lobes of the lungs. No crepitus or subcutaneous emphysema noted on palpation.     Circulatory: Skin warm, dry, and pink. Apical and radial pulses strong and regular. Capillary refill/skin blanching less than 3 seconds to distal of 4 extremities. Placed on CM in NSR without ectopy.    Abdomen: Abdomen obese, soft and non-distended. Positive normo-active bowel sounds x 4 quadrants.     Urinary: Patient reports routine urination without pain, frequency, or urgency. Voids independently. Reports urine appears lola/yellow in color.    Extremities: No redness, heat, swelling, deformity, or pain.     Skin: Intact with no bruising/discolorations noted.

## 2018-07-05 NOTE — ED TRIAGE NOTES
28 yr old male pt presents to the ed with lower back pain for 3 weeks. Pt Denies chest pain sob or nausea and is aox3.

## 2018-07-05 NOTE — PLAN OF CARE
FELIZ following for DC needs. SW in communication with CM.    Patient scheduled for the OR on Friday.     Celestina Ocampo LMSW  [facility]  [id number]
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
## 2018-07-05 NOTE — ANESTHESIA PREPROCEDURE EVALUATION
"Pre-operative evaluation for Procedure(s) (LRB):  DECOMPRESSION, SPINE, LUMBAR, MINIMALLY INVASIVE L3-4 (Right)    Doe Woodall is a 28 y.o. male with PMH of poorly controlled insulin-dependent T2DM (last A1c 13.0 in 2014)  who presents with LBP with RLE radiculopathy and weakness. MRI L-spine revealed severe lumbar stenosis L3-L4. Patient scheduled for the above procedure.    The patient complains of progressively worsening back & leg pain for the past two weeks.  He has noticed RLE weakness for approximately one week.  He is able to ambulate, but has had multiple episodes of his RLE giving out.  He denies any saddle anesthesia, B/B dysfunction, or LLE pain or weakness.    LDA: 18G PIV R hand    Prev airway: None documented    Drips: 0.9% NaCl infusion 100ml/hr continuous    Patient Active Problem List   Diagnosis    Diabetes mellitus    Diabetes mellitus type 1 with complications    Perineal abscess    Bulge of lumbar disc without myelopathy    Low back pain with right-sided sciatica       Review of patient's allergies indicates:  No Known Allergies     No current facility-administered medications on file prior to encounter.      Current Outpatient Prescriptions on File Prior to Encounter   Medication Sig Dispense Refill    blood sugar diagnostic Strp Check blood glucose in AM then three times per day prior to meals.   Can check in evening prior to bed. 100 each 3    blood-glucose meter kit Use as instructed 1 each 0    cyclobenzaprine (FLEXERIL) 10 MG tablet   1    famotidine (PEPCID) 20 MG tablet   1    insulin aspart (NOVOLOG) 100 unit/mL injection Inject 10 Units into the skin 3 (three) times daily before meals. 10 mL 6    insulin detemir (LEVEMIR) 100 unit/mL (3 mL) InPn pen Inject 30 Units into the skin every evening. 10 mL 6    insulin syringe,safetyneedle 1 mL 29 x 1/2" Syrg 1 Syringe by Misc.(Non-Drug; Combo Route) route 4 (four) times daily. 100 Syringe 3    lancets 32 gauge Misc 1 " lancet by Misc.(Non-Drug; Combo Route) route 4 (four) times daily. 100 each 3    lidocaine (LIDODERM) 5 % Place 1 patch onto the skin once daily. Remove & Discard patch within 12 hours or as directed by MD.  Apply over right low back 5 patch 0    meloxicam (MOBIC) 15 MG tablet   1    metFORMIN (GLUCOPHAGE) 1000 MG tablet Take 1,000 mg by mouth 3 (three) times daily.      naproxen (NAPROSYN) 500 MG tablet Take 1 tablet (500 mg total) by mouth 2 (two) times daily with meals. 20 tablet 0    tramadol (ULTRAM) 50 mg tablet Take 1 tablet (50 mg total) by mouth every 6 (six) hours as needed for Pain. 24 tablet 1       Past Surgical History:   Procedure Laterality Date    ORTHOPEDIC SURGERY         Social History     Social History    Marital status:      Spouse name: N/A    Number of children: N/A    Years of education: N/A     Occupational History    Not on file.     Social History Main Topics    Smoking status: Former Smoker     Packs/day: 0.50     Years: 9.00     Quit date: 2013    Smokeless tobacco: Never Used    Alcohol use 1.2 oz/week     2 Cans of beer per week    Drug use: No    Sexual activity: Yes     Partners: Female     Birth control/ protection: None     Other Topics Concern    Not on file     Social History Narrative    No narrative on file         Vital Signs Range (Last 24H):  Temp:  [36.6 °C (97.9 °F)-36.9 °C (98.4 °F)]   Pulse:  []   Resp:  [16-18]   BP: (136-165)/(85-92)   SpO2:  [93 %-100 %]       CBC:   Recent Labs      18   2326  18   0608   WBC   --   10.91   RBC   --   5.44   HGB   --   17.3   HCT  51  50.1   PLT   --   290   MCV   --   92   MCH   --   31.8*   MCHC   --   34.5       CMP:   Recent Labs      18   0608   NA  135*   K  4.8   CL  102   CO2  22*   BUN  21*   CREATININE  0.8   GLU  412*   CALCIUM  9.4       INR  Recent Labs      18   0607   INR  1.0           Diagnostic Studies:      EK2014  Vent. Rate : 102 BPM     Atrial  Rate : 102 BPM     P-R Int : 136 ms          QRS Dur : 094 ms      QT Int : 350 ms       P-R-T Axes : 026 017 022 degrees     QTc Int : 456 ms    Sinus tachycardia  Otherwise normal ECG  No previous ECGs available  Confirmed by Andrea Day MD ([id number]) on 8/3/2014 10:48:47 PM    Referred By: JESSIKA MANE           Confirmed By:Andrea Day MD    MRI L spine 7/4/18  Moderate diffuse disc bulge and ligamentum flavum hypertrophy resulting in severe spinal canal stenosis at L3-L4.    Congenital lumbar spinal stenosis with moderate spinal canal narrowing from L2 through L5.      Anesthesia Evaluation    I have reviewed the Patient Summary Reports.    I have reviewed the Nursing Notes.   I have reviewed the Medications.     Review of Systems  Anesthesia Hx:  No problems with previous Anesthesia Denies Hx of Anesthetic complications  History of prior surgery of interest to airway management or planning: (wound debridement) Denies Family Hx of Anesthesia complications.   Denies Personal Hx of Anesthesia complications.   Social:  Former Smoker, Social Alcohol Use 3 py history, quit 5 months ago.    Hematology/Oncology:  Hematology Normal   Oncology Normal     EENT/Dental:EENT/Dental Normal   Cardiovascular:  Cardiovascular Normal     Pulmonary:  Pulmonary Normal    Renal/:  Renal/ Normal     Hepatic/GI:  Hepatic/GI Normal    Musculoskeletal:  Spine Disorders: lumbar    Neurological:   Neuromuscular Disease,    Endocrine:   Diabetes, poorly controlled, type 2, using insulin    Psych:  Psychiatric Normal           Physical Exam  General:  Obesity, Well nourished    Airway/Jaw/Neck:  Airway Findings: Mouth Opening: Normal Tongue: Normal  General Airway Assessment: Adult  Mallampati: II  TM Distance: Normal, at least 6 cm  Jaw/Neck Findings:  Neck ROM: Normal ROM  Neck Findings: Normal    Eyes/Ears/Nose:  EYES/EARS/NOSE FINDINGS: Normal   Dental:  Dental Findings:    Chest/Lungs:  Chest/Lungs Findings: Clear to auscultation      Heart/Vascular:  Heart Findings: Rate: Normal  Rhythm: Regular Rhythm  Sounds: Normal  Heart murmur: negative Vascular Findings: Normal    Abdomen:  Abdomen Findings: Normal    Musculoskeletal:  Musculoskeletal Findings: Normal   Skin:  Skin Findings: Normal    Mental Status:  Mental Status Findings:  Cooperative, Alert and Oriented         Anesthesia Plan  Type of Anesthesia, risks & benefits discussed:  Anesthesia Type:  general  Patient's Preference: General  Intra-op Monitoring Plan: standard ASA monitors  Intra-op Monitoring Plan Comments:   Post Op Pain Control Plan: multimodal analgesia, IV/PO Opioids PRN and per primary service following discharge from PACU  Post Op Pain Control Plan Comments:   Induction:   IV  Beta Blocker:  Patient is not currently on a Beta-Blocker (No further documentation required).       Informed Consent: Patient understands risks and agrees with Anesthesia plan.  Questions answered. Anesthesia consent signed with patient.  ASA Score: 2     Day of Surgery Review of History & Physical:    H&P update referred to the surgeon.     Anesthesia Plan Notes: Discussed plan for general endotracheal anesthesia, pt understands and agrees with plan        Ready For Surgery From Anesthesia Perspective.

## 2018-07-05 NOTE — HPI
Mr. Woodall is a 28yoM with PMHx DMII who presents to the ED for LBP with RLE radiculopathy and weakness.  MRI Lspine was done, which revealed severe lumbar stenosis, for which neurosurgery is consulted.  The patient complains of progressively worsening back & leg pain for the past two weeks.  He has noticed RLE weakness for approximately one week.  He is able to ambulate, but has had multiple episodes of his RLE giving out.  He denies any saddle anesthesia, B/B dysfunction, or LLE pain or weakness.  He has been seen in the ED a few times for this, given flexeril, mobic, lidocaine patches, etc, without any significant relief.  He denies any antiplatelet or anticoagulant use.

## 2018-07-05 NOTE — ASSESSMENT & PLAN NOTE
Mr. Woodall is a 28yoM with PMHx DMII who presents with LBP, RLE weakness & radiculopathy, found to have severe spinal canal stenosis at L3-4  -Admit to observation  -NPO  -q4h neuro checks  -Pain control with PRN norco, morphine for breakthrough, PRN robaxin  -Potential lumbar decompression today  -Will discuss with Dr. Cervantes

## 2018-07-05 NOTE — CONSULTS
"[facility]  Neurosurgery  H&P    Consults  Subjective:     Chief Complaint/Reason for Admission: Lumbar stenosis    History of Present Illness: Mr. Woodall is a 28yoM with PMHx DMII who presents to the ED for LBP with RLE radiculopathy and weakness.  MRI Lspine was done, which revealed severe lumbar stenosis, for which neurosurgery is consulted.  The patient complains of progressively worsening back & leg pain for the past two weeks.  He has noticed RLE weakness for approximately one week.  He is able to ambulate, but has had multiple episodes of his RLE giving out.  He denies any saddle anesthesia, B/B dysfunction, or LLE pain or weakness.  He has been seen in the ED a few times for this, given flexeril, mobic, lidocaine patches, etc, without any significant relief.  He denies any antiplatelet or anticoagulant use.    Prescriptions Prior to Admission   Medication Sig Dispense Refill Last Dose    blood sugar diagnostic Strp Check blood glucose in AM then three times per day prior to meals.   Can check in evening prior to bed. 100 each 3 Unknown    blood-glucose meter kit Use as instructed 1 each 0     cyclobenzaprine (FLEXERIL) 10 MG tablet   1 Unknown    famotidine (PEPCID) 20 MG tablet   1 Unknown    insulin aspart (NOVOLOG) 100 unit/mL injection Inject 10 Units into the skin 3 (three) times daily before meals. 10 mL 6 Past Week    insulin detemir (LEVEMIR) 100 unit/mL (3 mL) InPn pen Inject 30 Units into the skin every evening. 10 mL 6     insulin syringe,safetyneedle 1 mL 29 x 1/2" Syrg 1 Syringe by Misc.(Non-Drug; Combo Route) route 4 (four) times daily. 100 Syringe 3 Past Week    lancets 32 gauge Misc 1 lancet by Misc.(Non-Drug; Combo Route) route 4 (four) times daily. 100 each 3 Unknown    lidocaine (LIDODERM) 5 % Place 1 patch onto the skin once daily. Remove & Discard patch within 12 hours or as directed by MD.  Apply over right low back 5 patch 0 6/26/2018    meloxicam (MOBIC) " 15 MG tablet   1 Unknown    metFORMIN (GLUCOPHAGE) 1000 MG tablet Take 1,000 mg by mouth 3 (three) times daily.   6/24/2018    naproxen (NAPROSYN) 500 MG tablet Take 1 tablet (500 mg total) by mouth 2 (two) times daily with meals. 20 tablet 0     tramadol (ULTRAM) 50 mg tablet Take 1 tablet (50 mg total) by mouth every 6 (six) hours as needed for Pain. 24 tablet 1 Unknown       Review of patient's allergies indicates:  No Known Allergies    Past Medical History:   Diagnosis Date    Diabetes mellitus     Encounter for blood transfusion     Perineal abscess 8/1/2014     Past Surgical History:   Procedure Laterality Date    ORTHOPEDIC SURGERY       Family History     Problem Relation (Age of Onset)    Diabetes Father, Paternal Grandmother, Paternal Grandfather        Social History Main Topics    Smoking status: Former Smoker     Packs/day: 0.50     Years: 9.00     Quit date: 7/1/2013    Smokeless tobacco: Never Used    Alcohol use 1.2 oz/week     2 Cans of beer per week    Drug use: No    Sexual activity: Yes     Partners: Female     Birth control/ protection: None     Review of Systems   Constitutional: no fever or chills  Eyes: no visual changes  ENT: no nasal congestion or sore throat  Respiratory: no cough or shortness of breath  Cardiovascular: no chest pain or palpitations  Gastrointestinal: no nausea or vomiting, tolerating diet  Genitourinary: no hematuria or dysuria  Integument/Breast: no rash or pruritis  Hematologic/Lymphatic: no easy bruising or lymphadenopathy  Musculoskeletal: positive for back & leg pain  Neurological: no seizures or tremors, positive for paresthesias & weakness  Behavioral/Psych: no auditory or visual hallucinations  Endocrine: no heat or cold intolerance    Objective:     Weight: 114 kg (251 lb 5.2 oz)  Body mass index is 41.82 kg/m².  Vital Signs (Most Recent):  Temp: 98.4 °F (36.9 °C) (07/05/18 0332)  Pulse: 95 (07/05/18 0400)  Resp: 18 (07/05/18 0332)  BP: 136/85  (07/05/18 0332)  SpO2: (!) 93 % (07/05/18 0332) Vital Signs (24h Range):  Temp:  [98.4 °F (36.9 °C)] 98.4 °F (36.9 °C)  Pulse:  [] 95  Resp:  [18] 18  SpO2:  [93 %-100 %] 93 %  BP: (136-165)/(85-92) 136/85          Neurosurgery Physical Exam   General: well developed, well nourished, no distress  Head: normocephalic, atraumatic  Neurologic: Alert and oriented. Thought content appropriate  GCS: Motor: 6/Verbal: 5/Eyes: 4 GCS Total: 15  Mental Status: Awake, Alert, Oriented x 4  Language: No aphasia  Speech: No dysarthria  Cranial nerves: face symmetric, tongue midline, CN II-XII grossly intact.   Eyes: pupils equal, round, reactive to light with accommodation, EOMI  Pulmonary: normal respirations, not labored, no accessory muscles used  Abdomen: soft, non-distended, not tender to palpation  Sensory: intact to light touch throughout  Motor Strength: Moves all extremities spontaneously with good tone.  Full strength upper and lower extremities. No abnormal movements seen.     Strength  Deltoids Triceps Biceps Wrist Extension Wrist Flexion Hand    Upper: R 5/5 5/5 5/5 5/5 5/5 5/5    L 5/5 5/5 5/5 5/5 5/5 5/5     Iliopsoas Quadriceps Knee  Flexion Tibialis  anterior Gastro- cnemius EHL   Lower: R 5/5 5/5 5/5 5/5 5/5 5/5    L 5/5 5/5 5/5 5/5 5/5 5/5     Pronator Drift: no drift noted  Finger-to-nose: Intact bilaterally  Lopez: absent  Clonus: absent  Babinski: absent  Pulses: 2+ and symmetric radial and dorsalis pedis  Skin: warm, dry and intact, no rashes        Significant Labs:  No results for input(s): GLU, NA, K, CL, CO2, BUN, CREATININE, CALCIUM, MG in the last 48 hours.    Recent Labs  Lab 07/04/18  2326   HCT 51     No results for input(s): LABPT, INR, APTT in the last 48 hours.  Microbiology Results (last 7 days)     ** No results found for the last 168 hours. **          Significant Diagnostics:  I personally reviewed MRI Lspine & agree with the findings: Moderate diffuse disc bulge and ligamentum  flavum hypertrophy resulting in severe spinal canal stenosis at L3-L4.    Congenital lumbar spinal stenosis with moderate spinal canal narrowing from L2 through L5.    Assessment/Plan:     Bulge of lumbar disc without myelopathy    Mr. Woodall is a 28yoM with PMHx DMII who presents with LBP, RLE weakness & radiculopathy, found to have severe spinal canal stenosis at L3-4  -Admit to observation  -Will plan for MIS Lami L3-4 on Friday  -q4h neuro checks  -Pain control with PRN norco, morphine for breakthrough, PRN robaxin  -Discussed with Dr. Helen Francis PAMarvinC  Neurosurgery  [facility]

## 2018-07-05 NOTE — PLAN OF CARE
Problem: Fall Risk (Adult)  Goal: Identify Related Risk Factors and Signs and Symptoms  Related risk factors and signs and symptoms are identified upon initiation of Human Response Clinical Practice Guideline (CPG)   Outcome: Ongoing (interventions implemented as appropriate)  Fall precautions maintained, call bell within reach, VSS, bed in lowest position, no distress noted, pain management provided, will continue to monitor.

## 2018-07-05 NOTE — PLAN OF CARE
CM met with patient and his girlfriend this am to obtain discharge planning assessment. Patient is scheduled to go to surgery tomorrow 7/6/18. Planned discharge is home with family - Plan (A) or home - Plan (B).    PCP:  Primary Doctor No     Payor:  Payor: MEDICAID / Plan: HE[place]Y BLUE [place] / Product Type: Managed Medicaid /      Pharmacy:    [id number] - [place], [place] - [address] AT [facility]  [address] [place] [address]  Phone: [phone] Fax: [phone]       07/05/18 1110   Discharge Assessment   Assessment Type Discharge Planning Assessment   Confirmed/corrected address and phone number on facesheet? Yes   Assessment information obtained from? Patient   Communicated expected length of stay with patient/caregiver no   Prior to hospitilization cognitive status: Alert/Oriented   Prior to hospitalization functional status: Independent   Current cognitive status: Alert/Oriented   Current Functional Status: Independent   Lives With significant other   Able to Return to Prior Arrangements yes   Is patient able to care for self after discharge? Yes   Who are your caregiver(s) and their phone number(s)? Aminta Prince - girlfriend [phone]   Patient's perception of discharge disposition home or selfcare   Readmission Within The Last 30 Days no previous admission in last 30 days   Patient currently being followed by outpatient case management? No   Patient currently receives any other outside agency services? No   Equipment Currently Used at Home none   Do you have any problems affording any of your prescribed medications? No   Is the patient taking medications as prescribed? yes   Does the patient have transportation home? Yes   Transportation Available family or friend will provide   Does the patient receive services at the [facility]? No   Discharge Plan A Home with family   Discharge Plan B Home   Patient/Family In  Agreement With Plan yes

## 2018-07-05 NOTE — SUBJECTIVE & OBJECTIVE
"Prescriptions Prior to Admission   Medication Sig Dispense Refill Last Dose    blood sugar diagnostic Strp Check blood glucose in AM then three times per day prior to meals.   Can check in evening prior to bed. 100 each 3 Unknown    blood-glucose meter kit Use as instructed 1 each 0     cyclobenzaprine (FLEXERIL) 10 MG tablet   1 Unknown    famotidine (PEPCID) 20 MG tablet   1 Unknown    insulin aspart (NOVOLOG) 100 unit/mL injection Inject 10 Units into the skin 3 (three) times daily before meals. 10 mL 6 Past Week    insulin detemir (LEVEMIR) 100 unit/mL (3 mL) InPn pen Inject 30 Units into the skin every evening. 10 mL 6     insulin syringe,safetyneedle 1 mL 29 x 1/2" Syrg 1 Syringe by Misc.(Non-Drug; Combo Route) route 4 (four) times daily. 100 Syringe 3 Past Week    lancets 32 gauge Misc 1 lancet by Misc.(Non-Drug; Combo Route) route 4 (four) times daily. 100 each 3 Unknown    lidocaine (LIDODERM) 5 % Place 1 patch onto the skin once daily. Remove & Discard patch within 12 hours or as directed by MD.  Apply over right low back 5 patch 0 6/26/2018    meloxicam (MOBIC) 15 MG tablet   1 Unknown    metFORMIN (GLUCOPHAGE) 1000 MG tablet Take 1,000 mg by mouth 3 (three) times daily.   6/24/2018    naproxen (NAPROSYN) 500 MG tablet Take 1 tablet (500 mg total) by mouth 2 (two) times daily with meals. 20 tablet 0     tramadol (ULTRAM) 50 mg tablet Take 1 tablet (50 mg total) by mouth every 6 (six) hours as needed for Pain. 24 tablet 1 Unknown       Review of patient's allergies indicates:  No Known Allergies    Past Medical History:   Diagnosis Date    Diabetes mellitus     Encounter for blood transfusion     Perineal abscess 8/1/2014     Past Surgical History:   Procedure Laterality Date    ORTHOPEDIC SURGERY       Family History     Problem Relation (Age of Onset)    Diabetes Father, Paternal Grandmother, Paternal Grandfather        Social History Main Topics    Smoking status: Former Smoker     " Packs/day: 0.50     Years: 9.00     Quit date: 7/1/2013    Smokeless tobacco: Never Used    Alcohol use 1.2 oz/week     2 Cans of beer per week    Drug use: No    Sexual activity: Yes     Partners: Female     Birth control/ protection: None     Review of Systems   Constitutional: no fever or chills  Eyes: no visual changes  ENT: no nasal congestion or sore throat  Respiratory: no cough or shortness of breath  Cardiovascular: no chest pain or palpitations  Gastrointestinal: no nausea or vomiting, tolerating diet  Genitourinary: no hematuria or dysuria  Integument/Breast: no rash or pruritis  Hematologic/Lymphatic: no easy bruising or lymphadenopathy  Musculoskeletal: positive for back & leg pain  Neurological: no seizures or tremors, positive for paresthesias & weakness  Behavioral/Psych: no auditory or visual hallucinations  Endocrine: no heat or cold intolerance    Objective:     Weight: 114 kg (251 lb 5.2 oz)  Body mass index is 41.82 kg/m².  Vital Signs (Most Recent):  Temp: 98.4 °F (36.9 °C) (07/05/18 0332)  Pulse: 95 (07/05/18 0400)  Resp: 18 (07/05/18 0332)  BP: 136/85 (07/05/18 0332)  SpO2: (!) 93 % (07/05/18 0332) Vital Signs (24h Range):  Temp:  [98.4 °F (36.9 °C)] 98.4 °F (36.9 °C)  Pulse:  [] 95  Resp:  [18] 18  SpO2:  [93 %-100 %] 93 %  BP: (136-165)/(85-92) 136/85          Neurosurgery Physical Exam   General: well developed, well nourished, no distress  Head: normocephalic, atraumatic  Neurologic: Alert and oriented. Thought content appropriate  GCS: Motor: 6/Verbal: 5/Eyes: 4 GCS Total: 15  Mental Status: Awake, Alert, Oriented x 4  Language: No aphasia  Speech: No dysarthria  Cranial nerves: face symmetric, tongue midline, CN II-XII grossly intact.   Eyes: pupils equal, round, reactive to light with accommodation, EOMI  Pulmonary: normal respirations, not labored, no accessory muscles used  Abdomen: soft, non-distended, not tender to palpation  Sensory: intact to light touch  throughout  Motor Strength: Moves all extremities spontaneously with good tone.  Full strength upper and lower extremities. No abnormal movements seen.     Strength  Deltoids Triceps Biceps Wrist Extension Wrist Flexion Hand    Upper: R 5/5 5/5 5/5 5/5 5/5 5/5    L 5/5 5/5 5/5 5/5 5/5 5/5     Iliopsoas Quadriceps Knee  Flexion Tibialis  anterior Gastro- cnemius EHL   Lower: R 5/5 5/5 5/5 5/5 5/5 5/5    L 5/5 5/5 5/5 5/5 5/5 5/5     Pronator Drift: no drift noted  Finger-to-nose: Intact bilaterally  Lopez: absent  Clonus: absent  Babinski: absent  Pulses: 2+ and symmetric radial and dorsalis pedis  Skin: warm, dry and intact, no rashes        Significant Labs:  No results for input(s): GLU, NA, K, CL, CO2, BUN, CREATININE, CALCIUM, MG in the last 48 hours.    Recent Labs  Lab 07/04/18  2326   HCT 51     No results for input(s): LABPT, INR, APTT in the last 48 hours.  Microbiology Results (last 7 days)     ** No results found for the last 168 hours. **          Significant Diagnostics:  I personally reviewed MRI Lspine & agree with the findings: Moderate diffuse disc bulge and ligamentum flavum hypertrophy resulting in severe spinal canal stenosis at L3-L4.    Congenital lumbar spinal stenosis with moderate spinal canal narrowing from L2 through L5.

## 2018-07-06 ENCOUNTER — ANESTHESIA (OUTPATIENT)
Dept: SURGERY | Facility: HOSPITAL | Age: 28
End: 2018-07-06
Payer: MEDICAID

## 2018-07-06 LAB
ANION GAP SERPL CALC-SCNC: 11 MMOL/L
BASOPHILS # BLD AUTO: 0.04 K/UL
BASOPHILS NFR BLD: 0.3 %
BUN SERPL-MCNC: 19 MG/DL
CALCIUM SERPL-MCNC: 8.7 MG/DL
CHLORIDE SERPL-SCNC: 103 MMOL/L
CO2 SERPL-SCNC: 21 MMOL/L
CREAT SERPL-MCNC: 0.7 MG/DL
DIFFERENTIAL METHOD: ABNORMAL
EOSINOPHIL # BLD AUTO: 0.1 K/UL
EOSINOPHIL NFR BLD: 0.4 %
ERYTHROCYTE [DISTWIDTH] IN BLOOD BY AUTOMATED COUNT: 12.3 %
EST. GFR  (AFRICAN AMERICAN): >60 ML/MIN/1.73 M^2
EST. GFR  (NON AFRICAN AMERICAN): >60 ML/MIN/1.73 M^2
GLUCOSE SERPL-MCNC: 324 MG/DL
HCT VFR BLD AUTO: 43.9 %
HGB BLD-MCNC: 15 G/DL
IMM GRANULOCYTES # BLD AUTO: 0.04 K/UL
IMM GRANULOCYTES NFR BLD AUTO: 0.3 %
LYMPHOCYTES # BLD AUTO: 4 K/UL
LYMPHOCYTES NFR BLD: 30.3 %
MCH RBC QN AUTO: 31.7 PG
MCHC RBC AUTO-ENTMCNC: 34.2 G/DL
MCV RBC AUTO: 93 FL
MONOCYTES # BLD AUTO: 1.3 K/UL
MONOCYTES NFR BLD: 10.1 %
NEUTROPHILS # BLD AUTO: 7.7 K/UL
NEUTROPHILS NFR BLD: 58.6 %
NRBC BLD-RTO: 0 /100 WBC
PLATELET # BLD AUTO: 256 K/UL
PMV BLD AUTO: 9.9 FL
POCT GLUCOSE: 176 MG/DL (ref 70–110)
POCT GLUCOSE: 246 MG/DL (ref 70–110)
POTASSIUM SERPL-SCNC: 3.8 MMOL/L
RBC # BLD AUTO: 4.73 M/UL
SODIUM SERPL-SCNC: 135 MMOL/L
WBC # BLD AUTO: 13.12 K/UL

## 2018-07-06 PROCEDURE — 63047 LAM FACETEC & FORAMOT LUMBAR: CPT | Mod: ,,, | Performed by: NEUROLOGICAL SURGERY

## 2018-07-06 PROCEDURE — 82962 GLUCOSE BLOOD TEST: CPT | Performed by: NEUROLOGICAL SURGERY

## 2018-07-06 PROCEDURE — 25000003 PHARM REV CODE 250: Performed by: NURSE ANESTHETIST, CERTIFIED REGISTERED

## 2018-07-06 PROCEDURE — D9220A PRA ANESTHESIA: Mod: CRNA,,, | Performed by: NURSE ANESTHETIST, CERTIFIED REGISTERED

## 2018-07-06 PROCEDURE — 36000710: Performed by: NEUROLOGICAL SURGERY

## 2018-07-06 PROCEDURE — 25000003 PHARM REV CODE 250: Performed by: PHYSICIAN ASSISTANT

## 2018-07-06 PROCEDURE — 63600175 PHARM REV CODE 636 W HCPCS: Performed by: NURSE ANESTHETIST, CERTIFIED REGISTERED

## 2018-07-06 PROCEDURE — C1729 CATH, DRAINAGE: HCPCS | Performed by: NEUROLOGICAL SURGERY

## 2018-07-06 PROCEDURE — 63600175 PHARM REV CODE 636 W HCPCS: Performed by: ANESTHESIOLOGY

## 2018-07-06 PROCEDURE — 63600175 PHARM REV CODE 636 W HCPCS: Performed by: NEUROLOGICAL SURGERY

## 2018-07-06 PROCEDURE — 27201423 OPTIME MED/SURG SUP & DEVICES STERILE SUPPLY: Performed by: NEUROLOGICAL SURGERY

## 2018-07-06 PROCEDURE — 37000009 HC ANESTHESIA EA ADD 15 MINS: Performed by: NEUROLOGICAL SURGERY

## 2018-07-06 PROCEDURE — 36000711: Performed by: NEUROLOGICAL SURGERY

## 2018-07-06 PROCEDURE — 71000033 HC RECOVERY, INTIAL HOUR: Performed by: NEUROLOGICAL SURGERY

## 2018-07-06 PROCEDURE — 63600175 PHARM REV CODE 636 W HCPCS: Performed by: PHYSICIAN ASSISTANT

## 2018-07-06 PROCEDURE — 80048 BASIC METABOLIC PNL TOTAL CA: CPT

## 2018-07-06 PROCEDURE — 85025 COMPLETE CBC W/AUTO DIFF WBC: CPT

## 2018-07-06 PROCEDURE — 25000003 PHARM REV CODE 250: Performed by: NEUROLOGICAL SURGERY

## 2018-07-06 PROCEDURE — 71000039 HC RECOVERY, EACH ADD'L HOUR: Performed by: NEUROLOGICAL SURGERY

## 2018-07-06 PROCEDURE — S0028 INJECTION, FAMOTIDINE, 20 MG: HCPCS | Performed by: NURSE ANESTHETIST, CERTIFIED REGISTERED

## 2018-07-06 PROCEDURE — D9220A PRA ANESTHESIA: Mod: ANES,,, | Performed by: ANESTHESIOLOGY

## 2018-07-06 PROCEDURE — G0378 HOSPITAL OBSERVATION PER HR: HCPCS

## 2018-07-06 PROCEDURE — 36415 COLL VENOUS BLD VENIPUNCTURE: CPT

## 2018-07-06 PROCEDURE — 37000008 HC ANESTHESIA 1ST 15 MINUTES: Performed by: NEUROLOGICAL SURGERY

## 2018-07-06 RX ORDER — ONDANSETRON 2 MG/ML
INJECTION INTRAMUSCULAR; INTRAVENOUS
Status: DISCONTINUED | OUTPATIENT
Start: 2018-07-06 | End: 2018-07-06

## 2018-07-06 RX ORDER — CEFAZOLIN SODIUM 1 G/3ML
INJECTION, POWDER, FOR SOLUTION INTRAMUSCULAR; INTRAVENOUS
Status: DISCONTINUED | OUTPATIENT
Start: 2018-07-06 | End: 2018-07-06

## 2018-07-06 RX ORDER — FAMOTIDINE 10 MG/ML
INJECTION INTRAVENOUS
Status: DISCONTINUED | OUTPATIENT
Start: 2018-07-06 | End: 2018-07-06

## 2018-07-06 RX ORDER — METHYLPREDNISOLONE ACETATE 40 MG/ML
INJECTION, SUSPENSION INTRA-ARTICULAR; INTRALESIONAL; INTRAMUSCULAR; SOFT TISSUE
Status: DISCONTINUED | OUTPATIENT
Start: 2018-07-06 | End: 2018-07-06 | Stop reason: HOSPADM

## 2018-07-06 RX ORDER — ACETAMINOPHEN 10 MG/ML
INJECTION, SOLUTION INTRAVENOUS
Status: DISCONTINUED | OUTPATIENT
Start: 2018-07-06 | End: 2018-07-06

## 2018-07-06 RX ORDER — KETAMINE HCL IN 0.9 % NACL 50 MG/5 ML
SYRINGE (ML) INTRAVENOUS
Status: DISCONTINUED | OUTPATIENT
Start: 2018-07-06 | End: 2018-07-06

## 2018-07-06 RX ORDER — ROCURONIUM BROMIDE 10 MG/ML
INJECTION, SOLUTION INTRAVENOUS
Status: DISCONTINUED | OUTPATIENT
Start: 2018-07-06 | End: 2018-07-06

## 2018-07-06 RX ORDER — HYDROMORPHONE HYDROCHLORIDE 1 MG/ML
0.2 INJECTION, SOLUTION INTRAMUSCULAR; INTRAVENOUS; SUBCUTANEOUS EVERY 5 MIN PRN
Status: DISCONTINUED | OUTPATIENT
Start: 2018-07-06 | End: 2018-07-06 | Stop reason: HOSPADM

## 2018-07-06 RX ORDER — PHENYLEPHRINE HYDROCHLORIDE 10 MG/ML
INJECTION INTRAVENOUS
Status: DISCONTINUED | OUTPATIENT
Start: 2018-07-06 | End: 2018-07-06

## 2018-07-06 RX ORDER — FENTANYL CITRATE 50 UG/ML
INJECTION, SOLUTION INTRAMUSCULAR; INTRAVENOUS
Status: DISCONTINUED | OUTPATIENT
Start: 2018-07-06 | End: 2018-07-06

## 2018-07-06 RX ORDER — LIDOCAINE HCL/PF 100 MG/5ML
SYRINGE (ML) INTRAVENOUS
Status: DISCONTINUED | OUTPATIENT
Start: 2018-07-06 | End: 2018-07-06

## 2018-07-06 RX ORDER — FENTANYL CITRATE 50 UG/ML
25 INJECTION, SOLUTION INTRAMUSCULAR; INTRAVENOUS EVERY 5 MIN PRN
Status: DISCONTINUED | OUTPATIENT
Start: 2018-07-06 | End: 2018-07-06 | Stop reason: HOSPADM

## 2018-07-06 RX ORDER — SODIUM CHLORIDE 0.9 % (FLUSH) 0.9 %
3 SYRINGE (ML) INJECTION
Status: DISCONTINUED | OUTPATIENT
Start: 2018-07-06 | End: 2018-07-07 | Stop reason: HOSPADM

## 2018-07-06 RX ORDER — SODIUM CHLORIDE 9 MG/ML
INJECTION, SOLUTION INTRAVENOUS CONTINUOUS
Status: DISCONTINUED | OUTPATIENT
Start: 2018-07-06 | End: 2018-07-07 | Stop reason: HOSPADM

## 2018-07-06 RX ORDER — LABETALOL HYDROCHLORIDE 5 MG/ML
10 INJECTION, SOLUTION INTRAVENOUS EVERY 10 MIN PRN
Status: DISCONTINUED | OUTPATIENT
Start: 2018-07-06 | End: 2018-07-07 | Stop reason: HOSPADM

## 2018-07-06 RX ORDER — ONDANSETRON 2 MG/ML
4 INJECTION INTRAMUSCULAR; INTRAVENOUS DAILY PRN
Status: DISCONTINUED | OUTPATIENT
Start: 2018-07-06 | End: 2018-07-06 | Stop reason: HOSPADM

## 2018-07-06 RX ORDER — PROPOFOL 10 MG/ML
VIAL (ML) INTRAVENOUS
Status: DISCONTINUED | OUTPATIENT
Start: 2018-07-06 | End: 2018-07-06

## 2018-07-06 RX ORDER — GLYCOPYRROLATE 0.2 MG/ML
INJECTION INTRAMUSCULAR; INTRAVENOUS
Status: DISCONTINUED | OUTPATIENT
Start: 2018-07-06 | End: 2018-07-06

## 2018-07-06 RX ORDER — HEPARIN SODIUM 5000 [USP'U]/ML
5000 INJECTION, SOLUTION INTRAVENOUS; SUBCUTANEOUS EVERY 8 HOURS
Status: DISCONTINUED | OUTPATIENT
Start: 2018-07-07 | End: 2018-07-07 | Stop reason: HOSPADM

## 2018-07-06 RX ORDER — LIDOCAINE HYDROCHLORIDE AND EPINEPHRINE 10; 10 MG/ML; UG/ML
INJECTION, SOLUTION INFILTRATION; PERINEURAL
Status: DISCONTINUED | OUTPATIENT
Start: 2018-07-06 | End: 2018-07-06 | Stop reason: HOSPADM

## 2018-07-06 RX ORDER — MIDAZOLAM HYDROCHLORIDE 1 MG/ML
INJECTION, SOLUTION INTRAMUSCULAR; INTRAVENOUS
Status: DISCONTINUED | OUTPATIENT
Start: 2018-07-06 | End: 2018-07-06

## 2018-07-06 RX ORDER — BACITRACIN 50000 [IU]/1
INJECTION, POWDER, FOR SOLUTION INTRAMUSCULAR
Status: DISCONTINUED | OUTPATIENT
Start: 2018-07-06 | End: 2018-07-06 | Stop reason: HOSPADM

## 2018-07-06 RX ADMIN — HYDROMORPHONE HYDROCHLORIDE 0.2 MG: 1 INJECTION, SOLUTION INTRAMUSCULAR; INTRAVENOUS; SUBCUTANEOUS at 08:07

## 2018-07-06 RX ADMIN — MIDAZOLAM HYDROCHLORIDE 2 MG: 1 INJECTION, SOLUTION INTRAMUSCULAR; INTRAVENOUS at 04:07

## 2018-07-06 RX ADMIN — ONDANSETRON 4 MG: 2 INJECTION INTRAMUSCULAR; INTRAVENOUS at 06:07

## 2018-07-06 RX ADMIN — HYDROCODONE BITARTRATE AND ACETAMINOPHEN 1 TABLET: 5; 325 TABLET ORAL at 08:07

## 2018-07-06 RX ADMIN — MORPHINE SULFATE 2 MG: 4 INJECTION INTRAVENOUS at 12:07

## 2018-07-06 RX ADMIN — METHOCARBAMOL 750 MG: 750 TABLET ORAL at 01:07

## 2018-07-06 RX ADMIN — PHENYLEPHRINE HYDROCHLORIDE 100 MCG: 10 INJECTION INTRAVENOUS at 06:07

## 2018-07-06 RX ADMIN — PROPOFOL 20 MG: 10 INJECTION, EMULSION INTRAVENOUS at 06:07

## 2018-07-06 RX ADMIN — MORPHINE SULFATE 2 MG: 4 INJECTION INTRAVENOUS at 08:07

## 2018-07-06 RX ADMIN — ROCURONIUM BROMIDE 50 MG: 10 INJECTION, SOLUTION INTRAVENOUS at 04:07

## 2018-07-06 RX ADMIN — FENTANYL CITRATE 25 MCG: 50 INJECTION INTRAMUSCULAR; INTRAVENOUS at 09:07

## 2018-07-06 RX ADMIN — ACETAMINOPHEN 1000 MG: 10 INJECTION, SOLUTION INTRAVENOUS at 05:07

## 2018-07-06 RX ADMIN — CEFAZOLIN 1 G: 330 INJECTION, POWDER, FOR SOLUTION INTRAMUSCULAR; INTRAVENOUS at 05:07

## 2018-07-06 RX ADMIN — FENTANYL CITRATE 50 MCG: 50 INJECTION, SOLUTION INTRAMUSCULAR; INTRAVENOUS at 06:07

## 2018-07-06 RX ADMIN — SODIUM CHLORIDE, SODIUM GLUCONATE, SODIUM ACETATE, POTASSIUM CHLORIDE, MAGNESIUM CHLORIDE, SODIUM PHOSPHATE, DIBASIC, AND POTASSIUM PHOSPHATE: .53; .5; .37; .037; .03; .012; .00082 INJECTION, SOLUTION INTRAVENOUS at 05:07

## 2018-07-06 RX ADMIN — FAMOTIDINE 20 MG: 10 INJECTION, SOLUTION INTRAVENOUS at 04:07

## 2018-07-06 RX ADMIN — Medication 20 MG: at 05:07

## 2018-07-06 RX ADMIN — MORPHINE SULFATE 2 MG: 4 INJECTION INTRAVENOUS at 11:07

## 2018-07-06 RX ADMIN — PROPOFOL 50 MG: 10 INJECTION, EMULSION INTRAVENOUS at 06:07

## 2018-07-06 RX ADMIN — DOCUSATE SODIUM 100 MG: 100 CAPSULE, LIQUID FILLED ORAL at 08:07

## 2018-07-06 RX ADMIN — PROPOFOL 50 MG: 10 INJECTION, EMULSION INTRAVENOUS at 04:07

## 2018-07-06 RX ADMIN — LIDOCAINE HYDROCHLORIDE 100 MG: 20 INJECTION, SOLUTION INTRAVENOUS at 06:07

## 2018-07-06 RX ADMIN — LIDOCAINE HYDROCHLORIDE 100 MG: 20 INJECTION, SOLUTION INTRAVENOUS at 04:07

## 2018-07-06 RX ADMIN — Medication 10 MG: at 06:07

## 2018-07-06 RX ADMIN — INSULIN ASPART 2 UNITS: 100 INJECTION, SOLUTION INTRAVENOUS; SUBCUTANEOUS at 08:07

## 2018-07-06 RX ADMIN — ROCURONIUM BROMIDE 20 MG: 10 INJECTION, SOLUTION INTRAVENOUS at 06:07

## 2018-07-06 RX ADMIN — GLYCOPYRROLATE 0.2 MG: 0.2 INJECTION, SOLUTION INTRAMUSCULAR; INTRAVENOUS at 04:07

## 2018-07-06 RX ADMIN — FENTANYL CITRATE 200 MCG: 50 INJECTION, SOLUTION INTRAMUSCULAR; INTRAVENOUS at 04:07

## 2018-07-06 RX ADMIN — PROPOFOL 150 MG: 10 INJECTION, EMULSION INTRAVENOUS at 04:07

## 2018-07-06 RX ADMIN — CEFAZOLIN 2 G: 330 INJECTION, POWDER, FOR SOLUTION INTRAMUSCULAR; INTRAVENOUS at 06:07

## 2018-07-06 NOTE — ASSESSMENT & PLAN NOTE
Mr. Woodall is a 28yoM with PMHx DMII who presents with LBP, RLE weakness & radiculopathy, found to have severe spinal canal stenosis at L3-4  -Admit to observation  -NPO  -q4h neuro checks  -Pain control with PRN norco, morphine for breakthrough, PRN robaxin  -OR today with Dr. Cervantes for L4-5 MIS laminectomy

## 2018-07-06 NOTE — NURSING TRANSFER
Nursing Transfer Note      7/6/2018     Transfer To: Surgery, 2nd floor    Transfer via wheelchair    Transfer with family    Transported by transporter    Medicines sent: n/a    Chart send with patient: Yes    Notified: spouse    Patient reassessed at: 1457    Upon arrival to floor: cardiac monitor applied, patient oriented to room, call bell in reach and bed in lowest position

## 2018-07-06 NOTE — HOSPITAL COURSE
7/4: presented to [facility] ED for RLE weakness, LBP, and RLE pain. MRI showed severe L3-4 stenosis   7/5: Plan for OR tomorrow with Dr. Cervantes for MIS L3-4 laminectomy   7/6: Exam stable. OR today with Dr. Cervantes   7/7: POD1 patient states that radiculopathy improved, wound dry and intact

## 2018-07-06 NOTE — PLAN OF CARE
Problem: Patient Care Overview  Goal: Plan of Care Review  Outcome: Ongoing (interventions implemented as appropriate)  Plan of care reviewed with pt. Pt voiced understanding. Pt AAOx4. Pt denies any c/o during the shift. No apparent distress noted. NPO since midnight. Pain controlled with PRN meds. Fall precautions maintained. Bed in lowest position, and locked. Call light within reach and advised to call for assistance. Side rails x 2 and slip resistant socks on at this time.

## 2018-07-06 NOTE — PLAN OF CARE
SW following for DC needs. SW in communication with CM.    Needs to be determined.    Celestina Ocampo, ZELDA  [facility]  [id number]

## 2018-07-06 NOTE — PROGRESS NOTES
[facility]  Neurosurgery  Progress Note    Subjective:     History of Present Illness: Mr. Woodall is a 28yoM with PMHx DMII who presents to the ED for LBP with RLE radiculopathy and weakness.  MRI Lspine was done, which revealed severe lumbar stenosis, for which neurosurgery is consulted.  The patient complains of progressively worsening back & leg pain for the past two weeks.  He has noticed RLE weakness for approximately one week.  He is able to ambulate, but has had multiple episodes of his RLE giving out.  He denies any saddle anesthesia, B/B dysfunction, or LLE pain or weakness.  He has been seen in the ED a few times for this, given flexeril, mobic, lidocaine patches, etc, without any significant relief.  He denies any antiplatelet or anticoagulant use.    Post-Op Info:  Procedure(s) (LRB):  DECOMPRESSION, SPINE, LUMBAR, MINIMALLY INVASIVE L3-4 (Right)   Day of Surgery     Interval History: Pt resting comfortably in bed. NAEON. Denies F/C, N/V. Further denies any change in his sxs, reporting 5/10 RLE pain. Now NPO awaiting surgery.     Medications:  Continuous Infusions:   sodium chloride 0.9% 100 mL/hr at 07/05/18 6017     Scheduled Meds:   docusate sodium  100 mg Oral BID    pantoprazole  40 mg Oral Daily     PRN Meds:dextrose 50%, dextrose 50%, glucagon (human recombinant), glucose, glucose, glucose, HYDROcodone-acetaminophen, insulin aspart U-100, methocarbamol, mirtazapine, morphine     Review of Systems  Objective:     Weight: 114 kg (251 lb 5.2 oz)  Body mass index is 41.82 kg/m².  Vital Signs (Most Recent):  Temp: 98.1 °F (36.7 °C) (07/06/18 1236)  Pulse: 89 (07/06/18 1236)  Resp: 18 (07/06/18 1236)  BP: 130/80 (07/06/18 1236)  SpO2: 95 % (07/06/18 1236) Vital Signs (24h Range):  Temp:  [97.1 °F (36.2 °C)-98.2 °F (36.8 °C)] 98.1 °F (36.7 °C)  Pulse:  [] 89  Resp:  [16-18] 18  SpO2:  [92 %-95 %] 95 %  BP: (110-143)/(63-94) 130/80        Neurosurgery Physical Exam   General:  well developed, well nourished, no distress  Neurologic: Alert and oriented. Thought content appropriate.  GCS: Motor: 6/Verbal: 5/Eyes: 4 GCS Total: 15  Cranial nerves: II-XII grossly intact  Neck: supple, without obvious masses or lesions  Skin: grossly intact in all 4 extremities without obvious rashes or lesions  Motor Strength: 5/5 BUE, 5/5 LLE, 4/5 RLE  Sensory: intact to light touch B/L UE and LE  Lopez's - Negative       Ankle Clonus - positive on right         Babinski  - Negative       Significant Labs:    Recent Labs  Lab 07/05/18  0608 07/06/18  0430   * 324*   * 135*   K 4.8 3.8    103   CO2 22* 21*   BUN 21* 19   CREATININE 0.8 0.7   CALCIUM 9.4 8.7       Recent Labs  Lab 07/04/18  2326 07/05/18  0608 07/06/18  0430   WBC  --  10.91 13.12*   HGB  --  17.3 15.0   HCT 51 50.1 43.9   PLT  --  290 256       Recent Labs  Lab 07/05/18  0607   INR 1.0     Microbiology Results (last 7 days)     ** No results found for the last 168 hours. **        Significant Diagnostics:  I have personally reviewed imaging and agree with the findings.     MRI lumbar spine (7/4/18)  Severe L3-4 stenosis     Assessment/Plan:     * Bulge of lumbar disc without myelopathy    Mr. Woodall is a 28yoM with PMHx DMII who presents with LBP, RLE weakness & radiculopathy, found to have severe spinal canal stenosis at L3-4  -Admit to observation  -NPO  -q4h neuro checks  -Pain control with PRN norco, morphine for breakthrough, PRN robaxin  -OR today with Dr. Cervantes for L4-5 MIS laminectomy             Holli Shea PAMIKE  Neurosurgery  [facility]

## 2018-07-06 NOTE — SUBJECTIVE & OBJECTIVE
Interval History: Pt resting comfortably in bed. AJAY. Denies F/C, N/V. Further denies any change in his sxs, reporting 5/10 RLE pain. Now NPO awaiting surgery.     Medications:  Continuous Infusions:   sodium chloride 0.9% 100 mL/hr at 07/05/18 2257     Scheduled Meds:   docusate sodium  100 mg Oral BID    pantoprazole  40 mg Oral Daily     PRN Meds:dextrose 50%, dextrose 50%, glucagon (human recombinant), glucose, glucose, glucose, HYDROcodone-acetaminophen, insulin aspart U-100, methocarbamol, mirtazapine, morphine     Review of Systems  Objective:     Weight: 114 kg (251 lb 5.2 oz)  Body mass index is 41.82 kg/m².  Vital Signs (Most Recent):  Temp: 98.1 °F (36.7 °C) (07/06/18 1236)  Pulse: 89 (07/06/18 1236)  Resp: 18 (07/06/18 1236)  BP: 130/80 (07/06/18 1236)  SpO2: 95 % (07/06/18 1236) Vital Signs (24h Range):  Temp:  [97.1 °F (36.2 °C)-98.2 °F (36.8 °C)] 98.1 °F (36.7 °C)  Pulse:  [] 89  Resp:  [16-18] 18  SpO2:  [92 %-95 %] 95 %  BP: (110-143)/(63-94) 130/80        Neurosurgery Physical Exam   General: well developed, well nourished, no distress  Neurologic: Alert and oriented. Thought content appropriate.  GCS: Motor: 6/Verbal: 5/Eyes: 4 GCS Total: 15  Cranial nerves: II-XII grossly intact  Neck: supple, without obvious masses or lesions  Skin: grossly intact in all 4 extremities without obvious rashes or lesions  Motor Strength: 5/5 BUE, 5/5 LLE, 4/5 RLE  Sensory: intact to light touch B/L UE and LE  Lopez's - Negative       Ankle Clonus - positive on right         Babinski  - Negative       Significant Labs:    Recent Labs  Lab 07/05/18  0608 07/06/18  0430   * 324*   * 135*   K 4.8 3.8    103   CO2 22* 21*   BUN 21* 19   CREATININE 0.8 0.7   CALCIUM 9.4 8.7       Recent Labs  Lab 07/04/18  2326 07/05/18  0608 07/06/18  0430   WBC  --  10.91 13.12*   HGB  --  17.3 15.0   HCT 51 50.1 43.9   PLT  --  290 256       Recent Labs  Lab 07/05/18  0607   INR 1.0     Microbiology  Results (last 7 days)     ** No results found for the last 168 hours. **        Significant Diagnostics:  I have personally reviewed imaging and agree with the findings.     MRI lumbar spine (7/4/18)  Severe L3-4 stenosis

## 2018-07-06 NOTE — PLAN OF CARE
Problem: Fall Risk (Adult)  Goal: Identify Related Risk Factors and Signs and Symptoms  Related risk factors and signs and symptoms are identified upon initiation of Human Response Clinical Practice Guideline (CPG)   Outcome: Ongoing (interventions implemented as appropriate)  Fall precautions maintained, call bell within reach, VSS, bed in lowest position, no distress noted. Will continue to monitor.

## 2018-07-07 VITALS
SYSTOLIC BLOOD PRESSURE: 112 MMHG | RESPIRATION RATE: 17 BRPM | DIASTOLIC BLOOD PRESSURE: 78 MMHG | BODY MASS INDEX: 41.87 KG/M2 | HEART RATE: 96 BPM | TEMPERATURE: 99 F | WEIGHT: 251.31 LBS | HEIGHT: 65 IN | OXYGEN SATURATION: 98 %

## 2018-07-07 LAB
ANION GAP SERPL CALC-SCNC: 9 MMOL/L
BASOPHILS # BLD AUTO: 0.03 K/UL
BASOPHILS NFR BLD: 0.2 %
BUN SERPL-MCNC: 15 MG/DL
CALCIUM SERPL-MCNC: 8.1 MG/DL
CHLORIDE SERPL-SCNC: 105 MMOL/L
CO2 SERPL-SCNC: 23 MMOL/L
CREAT SERPL-MCNC: 0.7 MG/DL
DIFFERENTIAL METHOD: ABNORMAL
EOSINOPHIL # BLD AUTO: 0 K/UL
EOSINOPHIL NFR BLD: 0.1 %
ERYTHROCYTE [DISTWIDTH] IN BLOOD BY AUTOMATED COUNT: 12.4 %
EST. GFR  (AFRICAN AMERICAN): >60 ML/MIN/1.73 M^2
EST. GFR  (NON AFRICAN AMERICAN): >60 ML/MIN/1.73 M^2
GLUCOSE SERPL-MCNC: 309 MG/DL
HCT VFR BLD AUTO: 42.8 %
HGB BLD-MCNC: 14.2 G/DL
IMM GRANULOCYTES # BLD AUTO: 0.03 K/UL
IMM GRANULOCYTES NFR BLD AUTO: 0.2 %
LYMPHOCYTES # BLD AUTO: 3 K/UL
LYMPHOCYTES NFR BLD: 23.9 %
MCH RBC QN AUTO: 31.6 PG
MCHC RBC AUTO-ENTMCNC: 33.2 G/DL
MCV RBC AUTO: 95 FL
MONOCYTES # BLD AUTO: 0.9 K/UL
MONOCYTES NFR BLD: 6.9 %
NEUTROPHILS # BLD AUTO: 8.6 K/UL
NEUTROPHILS NFR BLD: 68.7 %
NRBC BLD-RTO: 0 /100 WBC
PLATELET # BLD AUTO: 243 K/UL
PMV BLD AUTO: 10 FL
POCT GLUCOSE: 212 MG/DL (ref 70–110)
POCT GLUCOSE: 214 MG/DL (ref 70–110)
POTASSIUM SERPL-SCNC: 4.1 MMOL/L
RBC # BLD AUTO: 4.49 M/UL
SODIUM SERPL-SCNC: 137 MMOL/L
WBC # BLD AUTO: 12.53 K/UL

## 2018-07-07 PROCEDURE — G8979 MOBILITY GOAL STATUS: HCPCS | Mod: CI

## 2018-07-07 PROCEDURE — 36415 COLL VENOUS BLD VENIPUNCTURE: CPT

## 2018-07-07 PROCEDURE — G8987 SELF CARE CURRENT STATUS: HCPCS | Mod: CJ

## 2018-07-07 PROCEDURE — G0378 HOSPITAL OBSERVATION PER HR: HCPCS

## 2018-07-07 PROCEDURE — 63600175 PHARM REV CODE 636 W HCPCS: Performed by: STUDENT IN AN ORGANIZED HEALTH CARE EDUCATION/TRAINING PROGRAM

## 2018-07-07 PROCEDURE — 63600175 PHARM REV CODE 636 W HCPCS: Performed by: PHYSICIAN ASSISTANT

## 2018-07-07 PROCEDURE — G8980 MOBILITY D/C STATUS: HCPCS | Mod: CK

## 2018-07-07 PROCEDURE — G8988 SELF CARE GOAL STATUS: HCPCS | Mod: CI

## 2018-07-07 PROCEDURE — 85025 COMPLETE CBC W/AUTO DIFF WBC: CPT

## 2018-07-07 PROCEDURE — 97165 OT EVAL LOW COMPLEX 30 MIN: CPT

## 2018-07-07 PROCEDURE — 80048 BASIC METABOLIC PNL TOTAL CA: CPT

## 2018-07-07 PROCEDURE — 25000003 PHARM REV CODE 250: Performed by: PHYSICIAN ASSISTANT

## 2018-07-07 PROCEDURE — 97161 PT EVAL LOW COMPLEX 20 MIN: CPT

## 2018-07-07 PROCEDURE — G8978 MOBILITY CURRENT STATUS: HCPCS | Mod: CK

## 2018-07-07 RX ORDER — METHOCARBAMOL 500 MG/1
500 TABLET, FILM COATED ORAL 4 TIMES DAILY
Qty: 40 TABLET | Refills: 0 | Status: SHIPPED | OUTPATIENT
Start: 2018-07-07 | End: 2018-07-17

## 2018-07-07 RX ORDER — OXYCODONE AND ACETAMINOPHEN 7.5; 325 MG/1; MG/1
1 TABLET ORAL EVERY 6 HOURS PRN
Qty: 30 TABLET | Refills: 0 | Status: SHIPPED | OUTPATIENT
Start: 2018-07-07 | End: 2018-11-18

## 2018-07-07 RX ADMIN — MORPHINE SULFATE 2 MG: 4 INJECTION INTRAVENOUS at 02:07

## 2018-07-07 RX ADMIN — MORPHINE SULFATE 2 MG: 4 INJECTION INTRAVENOUS at 08:07

## 2018-07-07 RX ADMIN — MORPHINE SULFATE 2 MG: 4 INJECTION INTRAVENOUS at 03:07

## 2018-07-07 RX ADMIN — HYDROCODONE BITARTRATE AND ACETAMINOPHEN 1 TABLET: 5; 325 TABLET ORAL at 12:07

## 2018-07-07 RX ADMIN — HYDROCODONE BITARTRATE AND ACETAMINOPHEN 1 TABLET: 5; 325 TABLET ORAL at 05:07

## 2018-07-07 RX ADMIN — HEPARIN SODIUM 5000 UNITS: 5000 INJECTION, SOLUTION INTRAVENOUS; SUBCUTANEOUS at 05:07

## 2018-07-07 RX ADMIN — PANTOPRAZOLE SODIUM 40 MG: 40 TABLET, DELAYED RELEASE ORAL at 08:07

## 2018-07-07 RX ADMIN — INSULIN ASPART 4 UNITS: 100 INJECTION, SOLUTION INTRAVENOUS; SUBCUTANEOUS at 12:07

## 2018-07-07 RX ADMIN — HEPARIN SODIUM 5000 UNITS: 5000 INJECTION, SOLUTION INTRAVENOUS; SUBCUTANEOUS at 01:07

## 2018-07-07 RX ADMIN — DOCUSATE SODIUM 100 MG: 100 CAPSULE, LIQUID FILLED ORAL at 08:07

## 2018-07-07 NOTE — PLAN OF CARE
Problem: Patient Care Overview  Goal: Plan of Care Review  Poc reviewed with pt. And significant other including glycemic management, medication education, pain control, fall/safety precautions, and infection prevention.  Pt. And significant other verbalized understanding.  Pt. Free from falls this shift, vitals remain stable, and all d/c instructions given and explained to pt.

## 2018-07-07 NOTE — PLAN OF CARE
Pt AAOx4. Complaints of moderate pain to right lower back surgical site. PRN PO and IV pain medication administered as ordered. Dressing to back remains CDI. Denies numbness or tingling. Tolerating sips of water. VSS. Safety maintained.

## 2018-07-07 NOTE — PT/OT/SLP EVAL
Physical Therapy Evaluation    Patient Name:  Doe Woodall   MRN:  [id number]    Recommendations:     Discharge Recommendations:  home, outpatient PT   Discharge Equipment Recommendations: none   Barriers to discharge: None    Assessment:     Doe Woodall is a 28 y.o. male admitted with a medical diagnosis of Bulge of lumbar disc without myelopathy, s/p minimally invasive L3-4 decompression.  He presents with the following impairments/functional limitations:  weakness, impaired endurance, impaired functional mobilty, gait instability, impaired self care skills, pain, orthopedic precautions. Pt tolerated OOB activity well, with no adverse effects. Pt able to ambulate ~50 ft with no AD, SBA for safety. Anticipating pt to progress well during therapy course; recommending pt discharge home with OP PT.  Pt would benefit from continued PT intervention to address listed deficits and maximize return to PLOF.     Rehab Prognosis: good; patient would benefit from acute skilled PT services to address these deficits and reach maximum level of function.      Recent Surgery: Procedure(s) (LRB):  DECOMPRESSION, SPINE, LUMBAR, MINIMALLY INVASIVE L3-4 (Right) 1 Day Post-Op    Plan:     During this hospitalization, patient to be seen 3 x/week to address the above listed problems via therapeutic activities, gait training, therapeutic exercises  · Plan of Care Expires:  08/06/18   Plan of Care Reviewed with: patient    Subjective     Communicated with RN prior to session.  Patient found supine upon PT entry to room, agreeable to evaluation.      Chief Complaint: pain  Patient comments/goals: return home  Pain/Comfort:  · Pain Rating 1: 6/10  · Location 1: back  · Pain Addressed 1: Reposition, Distraction  · Pain Rating Post-Intervention 1: 6/10    Patients cultural, spiritual, Worship conflicts given the current situation: no conflicts    Patient History:     Living Environment: Pt lives with girlfriend in first floor apt with  "no LINSEY; bathroom has tub/shower   Prior Level of Function: independent with mobility and ADLs, however, pt reports walking was a "struggle" and distances were limited 2/2 pain.   DME owned: none  Caregiver Assistance: assistance from girlfriend as needed     Additional roles/responsibilities:   · Driving: yes  · Working: works at restaurant     Objective:     Patient found with: telemetry     General Precautions: Standard, fall   Orthopedic Precautions:spinal precautions   Braces: N/A     Exams:  · Postural Exam:  Patient presented with the following abnormalities:    · -       Rounded shoulders  · Sensation:    · -       Intact  · RLE ROM: WFL  · RLE Strength: 4-/5  (pt appeared limited 2/2 pain)   · LLE ROM: WFL  · LLE Strength: WFL    Functional Mobility:       Bed Mobility  · Rolling: to right with SBA, cueing for log rolling   · Supine to sit: SBA with cueing for log rolling     · Sit to supine: SBA       Transfers · Sit <> Stand: SBA from EOB x 1 trial, no AD      Gait  · Distance: ~50 ft   · Assistance level: no AD, SBA for safety    · Gait Deviations: decreased gabriela, decreased step length, decreased weight bearing on RLE, decreased stride length; step-through gait pattern             AM-PAC 6 CLICK MOBILITY  Total Score:19       Therapeutic Activities and Exercises:  Pt educated on:  - role of PT and POC/goals for therapy  - safety with mobility and fall risk   - log rolling   - spinal precautions      Pt verbalized understanding and expressed no further concerns/questions.     Patient left HOB elevated with all lines intact, call button in reach, RN notified and girlfriend present.    GOALS:    Physical Therapy Goals        Problem: Physical Therapy Goal    Goal Priority Disciplines Outcome Goal Variances Interventions   Physical Therapy Goal     PT/OT, PT Ongoing (interventions implemented as appropriate)     Description:  Goals to be met by: 7/14/18    Patient will increase functional independence with " mobility by performin. Supine to sit with Modified [place]  2. Sit to supine with Modified [place]  3. Sit to stand transfer with Modified [place]  4. Gait  x 200 feet with Supervision without AD.   5. Lower extremity exercise program x15 reps per handout, with supervision                      History:     Past Medical History:   Diagnosis Date    Diabetes mellitus     Encounter for blood transfusion     Perineal abscess 2014       Past Surgical History:   Procedure Laterality Date    ORTHOPEDIC SURGERY         Clinical Decision Making:     Low complexity evaluation:   · Stable clinical presentation   · 1-2 personal factors/comorbidities affecting treatment   · Examining and addressing 3+ functional deficits, activity limitations, and participation restrictions    Time Tracking:     PT Received On: 18  PT Start Time: 08     PT Stop Time: 08  PT Total Time (min): 13 min     Billable Minutes: Evaluation 13 min    Molly Blue PT, DPT   2018  Pager: [phone]

## 2018-07-07 NOTE — ANESTHESIA POSTPROCEDURE EVALUATION
"Anesthesia Post Evaluation    Patient: Doe Woodall    Procedure(s) Performed: Procedure(s) (LRB):  DECOMPRESSION, SPINE, LUMBAR, MINIMALLY INVASIVE L3-4 (Right)    Final Anesthesia Type: general  Patient location during evaluation: PACU  Patient participation: Yes- Able to Participate  Level of consciousness: awake and alert  Post-procedure vital signs: reviewed and stable  Pain management: adequate  Airway patency: patent  PONV status at discharge: No PONV  Anesthetic complications: no      Cardiovascular status: blood pressure returned to baseline  Respiratory status: unassisted  Hydration status: euvolemic  Follow-up not needed.        Visit Vitals  /61   Pulse 88   Temp 36.9 °C (98.4 °F) (Temporal)   Resp 16   Ht 5' 5" (1.651 m)   Wt 114 kg (251 lb 5.2 oz)   SpO2 (!) 91%   BMI 41.82 kg/m²       Pain/Joseph Score: Pain Assessment Performed: Yes (7/6/2018  8:45 PM)  Presence of Pain: complains of pain/discomfort (7/6/2018  8:45 PM)  Pain Rating Prior to Med Admin: 9 (7/6/2018  9:30 PM)  Pain Rating Post Med Admin: 4 (7/5/2018 11:50 PM)  Joseph Score: 9 (7/6/2018  8:45 PM)      "

## 2018-07-07 NOTE — PROGRESS NOTES
Update called to ANNA Watts on 7th floor. ANNA Maddox to assume care of patient in PACU (report given) and transfer patient to 728B when appropriate. Safety maintained.

## 2018-07-07 NOTE — PLAN OF CARE
Problem: Physical Therapy Goal  Goal: Physical Therapy Goal  Goals to be met by: 18    Patient will increase functional independence with mobility by performin. Supine to sit with Modified [place]  2. Sit to supine with Modified [place]  3. Sit to stand transfer with Modified [place]  4. Gait  x 200 feet with Supervision without AD.   5. Lower extremity exercise program x15 reps per handout, with supervision    Outcome: Ongoing (interventions implemented as appropriate)  PT evaluation completed- see note for details. Goals established and POC initiated.     Molly Blue, PT, DPT   2018  Pager: [phone]

## 2018-07-07 NOTE — ASSESSMENT & PLAN NOTE
Mr. Woodall is a 28yoM with PMHx DMII who presents with LBP, RLE weakness & radiculopathy, found to have severe spinal canal stenosis at L3-4  -ok for discharge  -will follow up with Dr Cervantse in 2 weeks  -prescription pain medications prescribed  -refrain from heavy lifting until follow up  -all questions answered from patient prior to discharge

## 2018-07-07 NOTE — PROGRESS NOTES
Pt. D/c to home AA&Ox4, nad noted, and denies pain.  All d/c instructions given and explained to pt. Including follow up appt.'s, medication orders, prescriptions, and diabetic education.  Pt. And significant other verbalized understanding.  IV line removed with catheter intact.

## 2018-07-07 NOTE — PLAN OF CARE
Problem: Occupational Therapy Goal  Goal: Occupational Therapy Goal  Outcome: Ongoing (interventions implemented as appropriate)  Initial eval completed   POC implemented and goals established     Rosiesa YANET Bentley, OTR/L  [phone]  7/7/2018

## 2018-07-07 NOTE — PT/OT/SLP EVAL
Occupational Therapy   Evaluation    Name: Doe Woodall  MRN: [id number]  Admitting Diagnosis:  Bulge of lumbar disc without myelopathy 1 Day Post-Op    Recommendations:     Discharge Recommendations: outpatient PT  Discharge Equipment Recommendations:  none  Barriers to discharge:  None    History:     Occupational Profile:  Living Environment: Pt reports he lives with his girlfriend in a 1st floor apartment with no LINSEY. Pt has a tub/shower combo with no DME present. PTA, pt was working in the restaurant business and driving.   Previous level of function: PTA, pt required assistance for LB dressing occasionally and (I) with all other ADLs. PTA, pt was (I) <> mod (I) occasionally using SPC for longer distances as needed.   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     Past Medical History:   Diagnosis Date    Diabetes mellitus     Encounter for blood transfusion     Perineal abscess 8/1/2014       Past Surgical History:   Procedure Laterality Date    ORTHOPEDIC SURGERY         Subjective     Chief Complaint: lower back pain   Patient/Family stated goals: to return home   Communicated with: RN prior to session. Pt found supine in bed with girlfriend present. Pt agreeable to therapy session.   Pain/Comfort:  · Pain Rating 1: 6/10  · Location 1: back  · Pain Addressed 1: Reposition, Distraction  · Pain Rating Post-Intervention 1: 6/10    Patients cultural, spiritual, Amish conflicts given the current situation: none stated     Objective:     Patient found with: telemetry    General Precautions: Standard, fall   Orthopedic Precautions:spinal precautions   Braces: N/A     Occupational Performance:    Bed Mobility:    · Patient completed Supine to Sit with stand by assistance and with min verbal cues for log rolling technique   · Patient completed Sit to Supine with SBA and min verbal  cues for log rolling technique     Functional Mobility/Transfers:  · Patient completed Sit <> Stand Transfer from EOB with stand by assistance  with  no assistive device   · Functional Mobility: Pt performed functional mobility simulating household distances (~50ft) SBA using no AD. See PT note for further assessment of gait.    Activities of Daily Living:  · Upper Body Dressing: minimum assistance for line management to don gown like robe while seated EOB  · Lower Body Dressing: set-up A  Pt able to bring B LEs into figure 4 position while seated EOB in order to don/doff B  socks and maintain spinal precautions    Cognitive/Visual Perceptual:  Cognitive/Psychosocial Skills:     -       Oriented to: Person, Place, Time and Situation   -       Follows Commands/attention:Follows multistep  commands  -       Communication: clear/fluent  -       Memory: No Deficits noted  -       Safety awareness/insight to disability: intact   -       Mood/Affect/Coping skills/emotional control: Appropriate to situation  Visual/Perceptual:      -Impaired  acuity    Physical Exam:  Balance:    - Static sit: (S)  -  Dynamic sit: SBA   - Static standing: SBA   - Dynamic Standing: SBA  <> CGA   *  Postural examination/scapula alignment:    -       No postural abnormalities identified  Skin integrity: Visible skin intact  Edema:  Mild B UEs  Sensation:    -       Intact  light/touch BUEs  Dominant hand:    -       right   Upper Extremity Range of Motion:     -       Right Upper Extremity: WFL  -       Left Upper Extremity: WFL  Upper Extremity Strength:    -       Right Upper Extremity: WFL  -       Left Upper Extremity: WFL   Strength:    -       Right Upper Extremity: WFL  -       Left Upper Extremity: WFL  Fine Motor Coordination:    -       Intact  Left hand thumb/finger opposition skills and Right hand thumb/finger opposition skills  Gross motor coordination:   WFL for B UEs    Patient left HOB elevated with all lines intact,  "call button in reach and girlfriend  present    [facility] 6 Click:  [facility] Total Score: 20    Treatment & Education:  Pt educated by therapist on:   - Pt educated on role of OT, POC, and goals for therapy.    -  Patient educated on log rolling technique and back protection techniques( no bending, lifting, twisting)   - Educated pt on being appropriate to transfer with nsg and PCT for safety   - Time provided for therapeutic counseling and discussion of health disposition.   - Importance of OOB ax's with staff member assistance and sitting OOB majority of day.   - Pt completed ADLs and functional mobility for treatment session as noted above   - Pt verbalized understanding. Pt expressed no further concerns/questions.  - whiteboard updated   Education:    Assessment:     Doe Woodall is a 28 y.o. male with a medical diagnosis of Bulge of lumbar disc without myelopathy.  He presents with performance deficits affecting function are weakness, impaired endurance, impaired self care skills, impaired functional mobilty, gait instability, impaired balance, pain, orthopedic precautions.  Pt will continue to benefit from skilled OT in order to improve return to PLOF.     Rehab Prognosis:  good; patient would benefit from acute skilled OT services to address these deficits and reach maximum level of function.         Clinical Decision Makin.  OT Low:  "Pt evaluation falls under low complexity for evaluation coding due to performance deficits noted in 1-3 areas as stated above and 0 co-morbities affecting current functional status. Data obtained from problem focused assessments. No modifications or assistance was required for completion of evaluation. Only brief occupational profile and history review completed."     Plan:     Patient to be seen 3 x/week to address the above listed problems via self-care/home management, therapeutic activities, therapeutic exercises  · Plan of Care Expires: 18  · Plan of Care " Reviewed with: patient    This Plan of care has been discussed with the patient who was involved in its development and understands and is in agreement with the identified goals and treatment plan    GOALS:    Occupational Therapy Goals        Problem: Occupational Therapy Goal    Goal Priority Disciplines Outcome Interventions   Occupational Therapy Goal     OT, PT/OT Ongoing (interventions implemented as appropriate)    Description:  Goals to be  met by: 7/21/18     Patient will increase functional independence with ADLs by performing:    UE Dressing with Modified [place].  LE Dressing with Modified [place].  Grooming while standing at sink with Supervision.  Toileting from toilet with Supervision for hygiene and clothing management.   Toilet transfer to toilet with Supervision.                      Time Tracking:     OT Date of Treatment: 07/07/18  OT Start Time: 0809  OT Stop Time: 0822  OT Total Time (min): 13 min    Billable Minutes:Evaluation 13    Rosie Bentley OT  7/7/2018

## 2018-07-07 NOTE — NURSING
Rec'd pt back to room 728B. Pt drowsy, but easily aroused. VSS. Incision site c/d/i. Will cont to monitor.

## 2018-07-07 NOTE — DISCHARGE SUMMARY
[facility]  Neurosurgery  Discharge Summary      Patient Name: Doe Woodall  MRN: [id number]  Admission Date: 7/4/2018  Hospital Length of Stay: 0 days  Discharge Date and Time:  07/07/2018 2:49 PM  Attending Physician: Cristian Cervantes MD   Discharging Provider: Hitesh Man MD  Primary Care Provider: Primary Doctor No    HPI:   Mr. Woodall is a 28yoM with PMHx DMII who presents to the ED for LBP with RLE radiculopathy and weakness.  MRI Lspine was done, which revealed severe lumbar stenosis, for which neurosurgery is consulted.  The patient complains of progressively worsening back & leg pain for the past two weeks.  He has noticed RLE weakness for approximately one week.  He is able to ambulate, but has had multiple episodes of his RLE giving out.  He denies any saddle anesthesia, B/B dysfunction, or LLE pain or weakness.  He has been seen in the ED a few times for this, given flexeril, mobic, lidocaine patches, etc, without any significant relief.  He denies any antiplatelet or anticoagulant use.    Procedure(s) (LRB):  DECOMPRESSION, SPINE, LUMBAR, MINIMALLY INVASIVE L3-4 (Right)     Hospital Course: 7/4: presented to [facility] ED for RLE weakness, LBP, and RLE pain. MRI showed severe L3-4 stenosis   7/5: Plan for OR tomorrow with Dr. Cervantes for MIS L3-4 laminectomy   7/6: Exam stable. OR today with Dr. Cervantes   7/7: POD1 patient states that radiculopathy improved, wound dry and intact    Consults:   Consults         Status Ordering Provider     Inpatient consult to Neurosurgery  Once     Provider:  (Not yet assigned)    Acknowledged ELIJAH ROBERT          Significant Diagnostic Studies: Labs:   BMP:     Recent Labs  Lab 07/06/18  0430 07/07/18  0448   * 309*   * 137   K 3.8 4.1    105   CO2 21* 23   BUN 19 15   CREATININE 0.7 0.7   CALCIUM 8.7 8.1*   , CMP     Recent Labs  Lab 07/06/18  0430 07/07/18  0448   * 137   K 3.8 4.1    105   CO2 21* 23    * 309*   BUN 19 15   CREATININE 0.7 0.7   CALCIUM 8.7 8.1*   ANIONGAP 11 9   ESTGFRAFRICA >60.0 >60.0   EGFRNONAA >60.0 >60.0    and CBC     Recent Labs  Lab 07/06/18  0430 07/07/18  0448   WBC 13.12* 12.53   HGB 15.0 14.2   HCT 43.9 42.8    243       Pending Diagnostic Studies:     None        Final Active Diagnoses:    Diagnosis Date Noted POA    PRINCIPAL PROBLEM:  Bulge of lumbar disc without myelopathy [M51.26] 07/05/2018 Yes    Low back pain with right-sided sciatica [M54.41]  Unknown      Problems Resolved During this Admission:    Diagnosis Date Noted Date Resolved POA      Discharged Condition: stable    Disposition: Home or Self Care    Follow Up:  Follow-up Information     [facility] - Neuro[facility] 7th Fl In 2 weeks.    Specialty:  Neurosurgery  Why:  For wound re-check  Contact information:  [address]  [facility] [address] [phone]  Additional information:  7th Floor               Patient Instructions:     Diet Adult Regular     Notify your health care provider if you experience any of the following:  increased confusion or weakness     Notify your health care provider if you experience any of the following:  persistent dizziness, light-headedness, or visual disturbances     Notify your health care provider if you experience any of the following:  worsening rash     Notify your health care provider if you experience any of the following:  severe persistent headache     Notify your health care provider if you experience any of the following:  difficulty breathing or increased cough     Notify your health care provider if you experience any of the following:  redness, tenderness, or signs of infection (pain, swelling, redness, odor or green/yellow discharge around incision site)     Notify your health care provider if you experience any of the following:  severe uncontrolled pain     Notify your health care provider if you experience any of the following:   "persistent nausea and vomiting or diarrhea     Notify your health care provider if you experience any of the following:  temperature >100.4     Remove dressing in 24 hours     Activity as tolerated       Medications:  Reconciled Home Medications:      Medication List      START taking these medications    methocarbamol 500 MG Tab  Commonly known as:  ROBAXIN  Take 1 tablet (500 mg total) by mouth 4 (four) times daily. for 10 days     oxyCODONE-acetaminophen 7.5-325 mg per tablet  Commonly known as:  PERCOCET  Take 1 tablet by mouth every 6 (six) hours as needed for Pain.        CONTINUE taking these medications    blood sugar diagnostic Strp  Check blood glucose in AM then three times per day prior to meals.  Can check in evening prior to bed.     blood-glucose meter kit  Use as instructed     cyclobenzaprine 10 MG tablet  Commonly known as:  FLEXERIL     famotidine 20 MG tablet  Commonly known as:  PEPCID     insulin aspart U-100 100 unit/mL injection  Commonly known as:  NOVOLOG  Inject 10 Units into the skin 3 (three) times daily before meals.     insulin detemir U-100 100 unit/mL (3 mL) Inpn pen  Commonly known as:  LEVEMIR FLEXTOUCH  Inject 30 Units into the skin every evening.     insulin syringe,safetyneedle 1 mL 29 gauge x 1/2" Syrg  1 Syringe by Misc.(Non-Drug; Combo Route) route 4 (four) times daily.     lancets 32 gauge Misc  1 lancet by Misc.(Non-Drug; Combo Route) route 4 (four) times daily.     lidocaine 5 %  Commonly known as:  LIDODERM  Place 1 patch onto the skin once daily. Remove & Discard patch within 12 hours or as directed by MD.  Apply over right low back     meloxicam 15 MG tablet  Commonly known as:  MOBIC     metFORMIN 1000 MG tablet  Commonly known as:  GLUCOPHAGE  Take 1,000 mg by mouth 3 (three) times daily.     naproxen 500 MG tablet  Commonly known as:  NAPROSYN  Take 1 tablet (500 mg total) by mouth 2 (two) times daily with meals.     traMADol 50 mg tablet  Commonly known as:  " ULTRAM  Take 1 tablet (50 mg total) by mouth every 6 (six) hours as needed for Pain.            Hitesh Man MD  Neurosurgery  [facility]

## 2018-07-07 NOTE — TRANSFER OF CARE
"Anesthesia Transfer of Care Note    Patient: Doe Woodall    Procedure(s) Performed: Procedure(s) (LRB):  DECOMPRESSION, SPINE, LUMBAR, MINIMALLY INVASIVE L3-4 (Right)    Patient location: PACU    Anesthesia Type: general    Transport from OR: Transported from OR on 6-10 L/min O2 by face mask with adequate spontaneous ventilation    Post pain: adequate analgesia    Post assessment: no apparent anesthetic complications    Post vital signs: stable    Level of consciousness: awake    Nausea/Vomiting: no nausea/vomiting    Complications: none    Transfer of care protocol was followed      Last vitals:   Visit Vitals  /62 (BP Location: Left arm, Patient Position: Lying)   Pulse 86   Temp 36.6 °C (97.9 °F) (Temporal)   Resp 20   Ht 5' 5" (1.651 m)   Wt 114 kg (251 lb 5.2 oz)   SpO2 98%   BMI 41.82 kg/m²     "

## 2018-07-07 NOTE — PLAN OF CARE
Problem: Occupational Therapy Goal  Goal: Occupational Therapy Goal  Goals to be  met by: 7/21/18     Patient will increase functional independence with ADLs by performing:    UE Dressing with Modified [place].  LE Dressing with Modified [place].  Grooming while standing at sink with Supervision.  Toileting from toilet with Supervision for hygiene and clothing management.   Toilet transfer to toilet with Supervision.    Outcome: Ongoing (interventions implemented as appropriate)  Initial eval completed   POC implemented and goals established     Rosie Bentley, OTR/L  [phone]  7/7/2018

## 2018-07-09 PROBLEM — M54.41 LOW BACK PAIN WITH RIGHT-SIDED SCIATICA: Status: ACTIVE | Noted: 2018-07-09

## 2018-07-09 NOTE — PLAN OF CARE
Patient discharged home. The patient does not have any home needs. Family provided transportation home. Neurosurgery clinic to schedule follow up appointment.     07/09/18 0911   Final Note   Assessment Type Final Discharge Note   Discharge Disposition Home   Hospital Follow Up  Appt(s) scheduled? (Neurosurgery clinic to schedule follow up appointment.)   Discharge plans and expectations educations in teach back method with documentation complete? Yes

## 2018-07-10 NOTE — OP NOTE
DATE OF SURGERY: 7/6/18    PREOPERATIVE DIAGNOSIS:  1. Herniated intervertebral disc, L3-4, with severe central and right lateral recess stenosis  2. Lumbar radiculopathy with right leg weakness and axial back pain    POSTOPERATIVE DIAGNOSIS:  Same.    PROCEDURE PERFORMED:  1. L3 decompressive laminectomy and right L3-4 microdiscectomy from MIS approach  2. Use of intraoperative microscope  3. Use of intraoperative flouroscopy  4. Use of neuromonitoring with free run EMG    SURGEON: Cristian Cervantes M.D.    ASSISTANT: Kenneth Mendez M.D.    ANESTHESIA: GETA    ESTIMATED BLOOD LOSS: Minimal    COMPLICATIONS: None    DRAINS: None    SPECIMENS SENT: None    FINDINGS: Acute disc fragment    INDICATIONS:    28M with acute axial back pain, radicular right leg pain and right leg weakness. Imaging revealed a large right paracentral disc herniation at L3-4 causing severe central and right lateral recess stenosis. Because of his right leg weakness and the degree of stenosis, my recommendation was for a right L3-4 microdiscectomy.    Risks, benefits, alternatives, indications and methods were reviewed in detail and the patient wished to proceed. All questions were answered. No guarantees about the results of the procedure were made.    PROCEDURE:    The patient was brought to the operating room where eh was intubated and placed under general anesthesia without difficulty.  All lines were placed. He was placed prone onto a Rafal frame with appropriate padding of all pressure points.  AP and lateral x-ray were used to localize to the L3-4 disc space and to plan a right paramedian incision. The skin was marked and the area was prepped and draped in the usual sterile fashion. A timeout was performed prior to the procedure. Ten mL of Lidocaine with Epinephrine was injected into the skin.     A linear paramedian incision was made with a 10 blade.  Bovie electric cautery was used to open the fascia. Sequential dilators were docked  onto the right L3 hemilamina.  A tubular retractor was placed and docked onto the operative table.  The microscope was brought into the field for microdissection.  Soft tissues were cleared with Bovie electrocautery to expose the right L3 hemilamina and the medial L3-4 facet.  A bilateral L3 decompressive laminectomy was then performed with a combination of the high-speed drill and Kerrison punches.  The ligamentum flavum was removed with curettes and Kerrison punches to expose the thecal sac and traversing L4 nerve root.  The thecal sac was retracted medially with a nerve root retractor and epidural veins were cauterized and divided.  The L3-4 disc space then came into view and an annulotomy was performed with a 15 blade.  Disc material was removed with pituitary rongeurs.  The plane below the posterior longitudinal ligament was then explored with a down-going curette and a Coin probe to find the sequestered disc fragment.  The disc fragment was identified and mobilized into the L3-4 disc space and removed with a pituitary rongeur.  The shoulder of the L4 nerve root was then explored with a Coin probe and found to be adequately decompressed.      The wound was copiously irrigated with sterile normal saline and a dilute Betadine solution.  Depo-Medrol was placed over the thecal sac.  The microscope was taken out of the field.  Hemostasis was achieved with bipolar electrocautery.  1 g of vancomycin powder was placed into the wound. The fascia was closed in a watertight fashion with a running UR6 needle.  The soft tissues were closed in layers.  A 4-0 Monocryl subcuticular stitch was placed.  Dermabond was placed at the skin.    The patient appeared to tolerate the procedure well from a hemodynamic and neuro monitoring standpoint.  I was present for all critical portions of the case.  At the end of the case all counts are correct.  The patient was repositioned supine onto the hospital bed where he was extubated  and allowed to emerge from anesthesia without difficulty.

## 2018-07-12 NOTE — PT/OT/SLP DISCHARGE
Physical Therapy Discharge Summary    Name: Doe Woodall  MRN: [id number]   Principal Problem: Bulge of lumbar disc without myelopathy     Patient Discharged from acute Physical Therapy on 18.  Please refer to prior PT noted date on 18 for functional status.     Assessment:     Patient has not met goals.    Objective:     GOALS:    Physical Therapy Goals        Problem: Physical Therapy Goal    Goal Priority Disciplines Outcome Goal Variances Interventions   Physical Therapy Goal     PT/OT, PT Ongoing (interventions implemented as appropriate)     Description:  Goals to be met by: 18    Patient will increase functional independence with mobility by performin. Supine to sit with Modified [place]  2. Sit to supine with Modified [place]  3. Sit to stand transfer with Modified [place]  4. Gait  x 200 feet with Supervision without AD.   5. Lower extremity exercise program x15 reps per handout, with supervision                      Reasons for Discontinuation of Therapy Services  Pt discharged home.     Plan:     Patient Discharged to: Home with OP PT recommended.     Molly Blue, PT  2018

## 2018-07-16 ENCOUNTER — TELEPHONE (OUTPATIENT)
Dept: NEUROSURGERY | Facility: CLINIC | Age: 28
End: 2018-07-16

## 2018-07-16 NOTE — TELEPHONE ENCOUNTER
----- Message from Eliezer Aguilar sent at 7/16/2018  1:36 PM CDT -----  Contact: Pt. [phone]  Needs Advice    Reason for call:  The patient would like to speak to someone regarding scheduling his Post-Op appointment     Communication Preference:PHONE     Additional Information:

## 2018-07-16 NOTE — TELEPHONE ENCOUNTER
----- Message from Niki Coelho RN sent at 7/16/2018  1:49 PM CDT -----  Hey yall. So this patient needs a 2 week wound check on 7/19 or 7/20, however there are no PA appointments available until Wednesday the following week. Would either of you be available to see him?

## 2018-07-19 ENCOUNTER — TELEPHONE (OUTPATIENT)
Dept: NEUROSURGERY | Facility: CLINIC | Age: 28
End: 2018-07-19

## 2018-07-19 ENCOUNTER — HOSPITAL ENCOUNTER (EMERGENCY)
Facility: HOSPITAL | Age: 28
Discharge: HOME | End: 2018-07-19
Attending: EMERGENCY MEDICINE
Payer: MEDICAID

## 2018-07-19 VITALS
WEIGHT: 250 LBS | TEMPERATURE: 99 F | HEART RATE: 112 BPM | RESPIRATION RATE: 18 BRPM | DIASTOLIC BLOOD PRESSURE: 79 MMHG | HEIGHT: 65 IN | SYSTOLIC BLOOD PRESSURE: 133 MMHG | BODY MASS INDEX: 41.65 KG/M2 | OXYGEN SATURATION: 95 %

## 2018-07-19 DIAGNOSIS — M54.50 LOW BACK PAIN: ICD-10-CM

## 2018-07-19 PROCEDURE — 99283 EMERGENCY DEPT VISIT LOW MDM: CPT

## 2018-07-19 PROCEDURE — 99283 EMERGENCY DEPT VISIT LOW MDM: CPT | Mod: ,,, | Performed by: PHYSICIAN ASSISTANT

## 2018-07-19 PROCEDURE — 25000003 PHARM REV CODE 250: Performed by: PHYSICIAN ASSISTANT

## 2018-07-19 RX ORDER — DIAZEPAM 5 MG/1
5 TABLET ORAL
Status: COMPLETED | OUTPATIENT
Start: 2018-07-19 | End: 2018-07-19

## 2018-07-19 RX ORDER — METHYLPREDNISOLONE 4 MG/1
TABLET ORAL
Qty: 1 PACKAGE | Refills: 0 | Status: SHIPPED | OUTPATIENT
Start: 2018-07-19 | End: 2018-08-09

## 2018-07-19 RX ORDER — KETOROLAC TROMETHAMINE 30 MG/ML
15 INJECTION, SOLUTION INTRAMUSCULAR; INTRAVENOUS
Status: DISCONTINUED | OUTPATIENT
Start: 2018-07-19 | End: 2018-07-19

## 2018-07-19 RX ORDER — ORPHENADRINE CITRATE 30 MG/ML
60 INJECTION INTRAMUSCULAR; INTRAVENOUS
Status: DISCONTINUED | OUTPATIENT
Start: 2018-07-19 | End: 2018-07-19

## 2018-07-19 RX ORDER — METHOCARBAMOL 750 MG/1
1500 TABLET, FILM COATED ORAL
Status: COMPLETED | OUTPATIENT
Start: 2018-07-19 | End: 2018-07-19

## 2018-07-19 RX ORDER — NAPROXEN 500 MG/1
500 TABLET ORAL
Status: COMPLETED | OUTPATIENT
Start: 2018-07-19 | End: 2018-07-19

## 2018-07-19 RX ORDER — METHOCARBAMOL 500 MG/1
500 TABLET, FILM COATED ORAL 4 TIMES DAILY
COMMUNITY
End: 2019-10-09

## 2018-07-19 RX ADMIN — METHOCARBAMOL 1500 MG: 750 TABLET ORAL at 01:07

## 2018-07-19 RX ADMIN — NAPROXEN 500 MG: 500 TABLET ORAL at 01:07

## 2018-07-19 RX ADMIN — DIAZEPAM 5 MG: 5 TABLET ORAL at 02:07

## 2018-07-19 NOTE — ED PROVIDER NOTES
Encounter Date: 7/19/2018       History     Chief Complaint   Patient presents with    Back Pain     Pt states that he had spinal surgery on July 6. States that he was moving a street barricade and felt pain. Pt states that he spoke with his MD who ktold him to come in and get evaluated     28-year-old male presents to the ER for evaluation of back pain. The patient had an L3-L4 laminectomy performed on July 6 with Neurosurgery.  He was moving some barricade tonight and felt sharp stabbing pain in the lower back with radiation to his lower extremity.  He denies any loss of bowel or bladder.  He denies any saddle anesthesia.           Review of patient's allergies indicates:  No Known Allergies  Past Medical History:   Diagnosis Date    Diabetes mellitus     Encounter for blood transfusion     Perineal abscess 8/1/2014     Past Surgical History:   Procedure Laterality Date    DECOMPRESSION OF LUMBAR SPINE USING MINIMALLY INVASIVE TECHNIQUE Right 7/6/2018    Procedure: DECOMPRESSION, SPINE, LUMBAR, MINIMALLY INVASIVE L3-4;  Surgeon: Cristian Cervantes MD;  Location: [facility] OR [address] [place] [address];  Service: Neurosurgery;  Laterality: Right;    ORTHOPEDIC SURGERY       Family History   Problem Relation Age of Onset    Diabetes Father     Diabetes Paternal Grandmother     Diabetes Paternal Grandfather      Social History   Substance Use Topics    Smoking status: Former Smoker     Packs/day: 0.50     Years: 9.00     Quit date: 7/1/2013    Smokeless tobacco: Never Used    Alcohol use 1.2 oz/week     2 Cans of beer per week     Review of Systems   Constitutional: Negative for fever.   HENT: Negative for sore throat.    Respiratory: Negative for shortness of breath.    Cardiovascular: Negative for chest pain.   Gastrointestinal: Negative for nausea.   Genitourinary: Negative for dysuria.   Musculoskeletal: Positive for back pain.   Skin: Negative for rash.   Neurological: Negative for weakness.   Hematological: Does not  bruise/bleed easily.       Physical Exam     Initial Vitals [07/19/18 0034]   BP Pulse Resp Temp SpO2   133/79 (!) 112 18 99 °F (37.2 °C) 95 %      MAP       --         Physical Exam    Constitutional: Vital signs are normal. He appears well-developed and well-nourished.   HENT:   Head: Normocephalic and atraumatic.   Eyes: Conjunctivae are normal.   Cardiovascular: Normal rate and regular rhythm.   Abdominal: Soft. Normal appearance and bowel sounds are normal.   Musculoskeletal: Normal range of motion.   Stress sensation and range of motion of the lower extremities equal bilaterally   Neurological: He is alert and oriented to person, place, and time.   No acute neuro deficits   Skin: Skin is warm and intact.   Psychiatric: He has a normal mood and affect. His speech is normal and behavior is normal. Cognition and memory are normal.         ED Course   Procedures  Labs Reviewed - No data to display       Imaging Results          X-Ray Lumbar Spine Ap And Lateral (Final result)  Result time 07/19/18 02:09:01    Final result by Alvin Brown MD (07/19/18 02:09:01)                 Impression:      No acute lumbar fracture.    Additional findings as above.      Electronically signed by: Alvin Brown MD  Date:    07/19/2018  Time:    02:09             Narrative:    EXAMINATION:  XR LUMBAR SPINE AP AND LATERAL    CLINICAL HISTORY:  Low back pain, prior surgery, new or progressive sx;Low back pain    TECHNIQUE:  AP, lateral and spot images were performed of the lumbar spine.    COMPARISON:  CT lumbar spine June 24, 2018.  MRI lumbar spine July 4, 2018.    FINDINGS:  Alignment: Straightening of normal lumbar lordosis.    Vertebrae: Vertebral body heights are maintained.  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    Discs and facets: Mild narrowing of L5-S1 disc space, unchanged.  Facet joints are unremarkable.    Miscellaneous: No  additional findings.                                 Medical Decision Making:   ED Management:  27 yo M with low back pain and radiculopathy.   No acute findings on x-ray  Pain not improved with naproxen and Robaxin  I will give p.o. Valium.   Case discussed with neurosurgery on call, given the lack of acute concerning physical exam findings the patient will be discharged home with a Medrol Dosepak and clinic follow-up                      Clinical Impression:   The encounter diagnosis was Low back pain.      Disposition:   Disposition: Discharged  Condition: Stable                        Michael Patel PA-C  07/19/18 0243

## 2018-07-19 NOTE — ED TRIAGE NOTES
Pt states that he had spinal surgery on July 6. States that he was moving a barricade and felt pain. States he spoke with Surgeon who told him to to come to ED to get evaluated. Pt complains of back pain 8/10

## 2018-08-16 ENCOUNTER — OFFICE VISIT (OUTPATIENT)
Dept: NEUROSURGERY | Facility: CLINIC | Age: 28
End: 2018-08-16
Payer: MEDICAID

## 2018-08-16 VITALS
WEIGHT: 255.38 LBS | HEART RATE: 105 BPM | SYSTOLIC BLOOD PRESSURE: 141 MMHG | TEMPERATURE: 99 F | BODY MASS INDEX: 42.5 KG/M2 | DIASTOLIC BLOOD PRESSURE: 71 MMHG

## 2018-08-16 DIAGNOSIS — Z98.890 H/O MICRODISCECTOMY: Primary | ICD-10-CM

## 2018-08-16 PROCEDURE — 99999 PR PBB SHADOW E&M-EST. PATIENT-LVL III: CPT | Mod: PBBFAC,,, | Performed by: NEUROLOGICAL SURGERY

## 2018-08-16 PROCEDURE — 99213 OFFICE O/P EST LOW 20 MIN: CPT | Mod: PBBFAC | Performed by: NEUROLOGICAL SURGERY

## 2018-08-16 PROCEDURE — 99024 POSTOP FOLLOW-UP VISIT: CPT | Mod: ,,, | Performed by: NEUROLOGICAL SURGERY

## 2018-08-16 NOTE — PROGRESS NOTES
"CHIEF COMPLAINT:  Post op evaluation 6 weeks after L3 laminectomy and L3/4 microdiscectomy    I, Marcelo Guardado, attest that this documentation has been prepared under the direction and in the presence of Cristian Cervantes MD.    HPI:  Doe Woodall is a 28 y.o.  male, who presents today for a 6 week post op evaluation. Pt is s/p L3 laminectomy and L3/4 microdiscectomy on 2018. Pt reports no significant back pain. His right leg pain has resolved and his strength is improving. He states that the RLE pain resolved 10 days following surgery. He notes intermittent right leg weakness saying that it "goes dead" intermittently while walking. Pt has been performing physical therapy exercises on his own.       Review of patient's allergies indicates:  No Known Allergies    Past Medical History:   Diagnosis Date    Diabetes mellitus     Encounter for blood transfusion     Perineal abscess 2014     Past Surgical History:   Procedure Laterality Date    ORTHOPEDIC SURGERY       Family History   Problem Relation Age of Onset    Diabetes Father     Diabetes Paternal Grandmother     Diabetes Paternal Grandfather      Social History     Tobacco Use    Smoking status: Former Smoker     Packs/day: 0.50     Years: 9.00     Pack years: 4.50     Last attempt to quit: 2013     Years since quittin.1    Smokeless tobacco: Former User   Substance Use Topics    Alcohol use: Yes     Alcohol/week: 1.2 oz     Types: 2 Cans of beer per week    Drug use: No        Review of Systems   HENT: Negative.    Eyes: Negative.    Respiratory: Negative.    Cardiovascular: Negative.    Gastrointestinal: Negative.    Endocrine: Negative.    Genitourinary: Negative.    Musculoskeletal: Negative for back pain, gait problem and neck pain.   Skin: Negative.    Allergic/Immunologic: Negative.    Neurological: Positive for weakness (intermittent RLE). Negative for light-headedness, numbness and headaches.   Hematological: Negative.  "   Psychiatric/Behavioral: Negative.        OBJECTIVE:   Vital Signs:  Temp: 98.8 °F (37.1 °C) (08/16/18 1536)  Pulse: 105 (08/16/18 1536)  BP: (!) 141/71 (08/16/18 1536)    Physical Exam:    Vital signs: All nursing notes and vital signs reviewed -- afebrile, vital signs stable.  Constitutional: Patient sitting comfortably in chair. Appears well developed and well nourished.  Skin: Exposed areas are intact without abnormal markings, rashes or other lesions. Incision is well healed.   HEENT: Normocephalic. Normal conjunctivae.  Cardiovascular: Normal rate and regular rhythm.  Respiratory: Chest wall rises and falls symmetrically, without signs of respiratory distress.  Abdomen: Soft and non-tender.  Extremities: Warm and without edema. Calves supple, non-tender.  Psych/Behavior: Normal affect.    Neurological:    Mental status: Alert and oriented. Conversational and appropriate.       Cranial Nerves: Grossly intact.     Motor:    Upper:  Deltoids Triceps Biceps WE WF     R 5/5 5/5 5/5 5/5 5/5 5/5    L 5/5 5/5 5/5 5/5 5/5 5/5      Lower:  HF KE KF DF PF EHL    R 5/5 5/5 5/5 5/5 5/5 5/5    L 5/5 5/5 5/5 5/5 5/5 5/5     Sensory: Intact sensation to light touch in all extremities. Romberg negative.    Reflexes:          DTR: 2+ symmetrically throughout.     Lopez's: Negative.     Babinski's: Negative.     Clonus: Negative.    Cerebellar: Finger-to-nose and rapid alternating movements normal. Gait stable, fluid.    Spine:    Posture: Head well aligned over pelvis in front and side views.  No focal or global spinal deformity visible on inspection. Shoulders and hips even. No obvious leg length discrepancy. No scapula winging.    Bending: Full ROM with forward, back and lateral bending. No rib prominence with forward bend.    Cervical:      ROM: Full with flexion, extension, lateral rotation and ear-to-shoulder bend.      Midline TTP: Negative.     Spurling's test: Negative.     Lhermitte's: Negative.    Thoracic:      Midline TTP: Negative    Lumbar:     Midline TTP: Negative     Straight Leg Test: Negative     Crossed Straight Leg Test: Negative     Sciatic notch tenderness: Negative.    Other:     SI joint TTP: Negative.     Greater trochanter TTP: Negative.     Tenderness with external/internal hip rotation: Negative.    Diagnostic Results:  All imaging was independently reviewed by me.    No new imaging for my review.      ASSESSMENT/PLAN:     Doe Woodall is a 29 y/o male s/p right L3/4 microdiscectomy. Pt is clinically improving from surgery. There's resolution of the right lower extremity radiculopathy but he still has mild weakness in the right leg. He is doing physical therapy exercises at home. He does not have any significant back pain and is ambulating without difficulty. I recommend he follow up in 6 months or sooner if symptoms worsen.    The patient understands and agrees with the plan of care. All questions were answered.     1. RTC 6 months       I, Dr. Cristian Cervantes personally performed the services described in this documentation. All medical record entries made by the scribe, Marcelo Guardado, were at my direction and in my presence.  I have reviewed the chart and agree that the record reflects my personal performance and is accurate and complete.      Cristian Cervantes M.D.  Department of Neurosurgery  [facility]      .

## 2018-11-17 ENCOUNTER — HOSPITAL ENCOUNTER (EMERGENCY)
Facility: HOSPITAL | Age: 28
Discharge: HOME | End: 2018-11-17
Attending: EMERGENCY MEDICINE
Payer: MEDICAID

## 2018-11-17 VITALS
HEART RATE: 110 BPM | OXYGEN SATURATION: 95 % | WEIGHT: 253 LBS | RESPIRATION RATE: 18 BRPM | SYSTOLIC BLOOD PRESSURE: 136 MMHG | HEIGHT: 65 IN | BODY MASS INDEX: 42.15 KG/M2 | DIASTOLIC BLOOD PRESSURE: 86 MMHG | TEMPERATURE: 98 F

## 2018-11-17 DIAGNOSIS — Q55.64 CONCEALED PENIS: Primary | ICD-10-CM

## 2018-11-17 DIAGNOSIS — S31.20XA OPEN WOUND OF PENIS, INITIAL ENCOUNTER: ICD-10-CM

## 2018-11-17 LAB
BACTERIA #/AREA URNS AUTO: ABNORMAL /HPF
BILIRUB UR QL STRIP: NEGATIVE
C TRACH DNA SPEC QL NAA+PROBE: NOT DETECTED
CLARITY UR REFRACT.AUTO: CLEAR
COLOR UR AUTO: YELLOW
GLUCOSE UR QL STRIP: ABNORMAL
HGB UR QL STRIP: NEGATIVE
HYALINE CASTS UR QL AUTO: 0 /LPF
KETONES UR QL STRIP: ABNORMAL
LEUKOCYTE ESTERASE UR QL STRIP: ABNORMAL
MICROSCOPIC COMMENT: ABNORMAL
N GONORRHOEA DNA SPEC QL NAA+PROBE: NOT DETECTED
NITRITE UR QL STRIP: NEGATIVE
PH UR STRIP: 5 [PH] (ref 5–8)
PROT UR QL STRIP: ABNORMAL
RBC #/AREA URNS AUTO: 1 /HPF (ref 0–4)
SP GR UR STRIP: >=1.03 (ref 1–1.03)
SQUAMOUS #/AREA URNS AUTO: 1 /HPF
URN SPEC COLLECT METH UR: ABNORMAL
WBC #/AREA URNS AUTO: 6 /HPF (ref 0–5)
YEAST UR QL AUTO: ABNORMAL

## 2018-11-17 PROCEDURE — 99284 EMERGENCY DEPT VISIT MOD MDM: CPT | Mod: ,,, | Performed by: EMERGENCY MEDICINE

## 2018-11-17 PROCEDURE — 99284 EMERGENCY DEPT VISIT MOD MDM: CPT

## 2018-11-17 PROCEDURE — 81001 URINALYSIS AUTO W/SCOPE: CPT

## 2018-11-17 PROCEDURE — 87491 CHLMYD TRACH DNA AMP PROBE: CPT

## 2018-11-17 RX ORDER — NYSTATIN 100000 [USP'U]/G
POWDER TOPICAL 2 TIMES DAILY
Qty: 60 G | Refills: 0 | Status: SHIPPED | OUTPATIENT
Start: 2018-11-17 | End: 2019-10-09

## 2018-11-17 RX ORDER — METFORMIN HYDROCHLORIDE 500 MG/1
500 TABLET ORAL 2 TIMES DAILY WITH MEALS
Status: ON HOLD | COMMUNITY
End: 2020-08-03

## 2018-11-17 NOTE — DISCHARGE INSTRUCTIONS
Please follow up with urology to address chronic issues regarding penile abrasions. Pleased keep the area clean and dry. Apply the Nystatin powder twice daily. Avoid having sex until issue resolves. Return to the ED with any new or worsneing symptoms.

## 2018-11-17 NOTE — ED NOTES
Discharge papers given and explained to patient by MD. All questions answered at this time. Ambulated out of ED without complications. VSS and NAD noted.

## 2018-11-17 NOTE — ED TRIAGE NOTES
29 y/o male c/o cuts on penis after having sex last week.       LOC: The patient is awake, alert, aware of environment with an appropriate affect. Oriented x3, speaking appropriately  APPEARANCE: Pt resting comfortably, in no acute distress, pt is clean and well groomed, clothing properly fastened.  SKIN: Skin warm, dry and intact, normal skin turgor, moist mucus membranes  RESPIRATORY: Airway is open and patent, respirations are spontaneous, even and unlabored, normal effort and rate  MUSCULOSKELETAL: No obvious deformities.

## 2018-11-17 NOTE — ED PROVIDER NOTES
"Encounter Date: 11/17/2018    SCRIBE #1 NOTE: I, Debbie Mccoy, am scribing for, and in the presence of,  Dr. Mai. I have scribed the following portions of the note - the APC attestation.       History     Chief Complaint   Patient presents with    Check Penis     early this week had sex and now developing "cuts" around the foreskin     28 y.o. M with PMH significant with DMII presents to the ED with chief complaint of penile wound x 4 days. He reports intercourse with his wife x 4 days with open wounds of penis following. He states this happens frequently following intercourse but usually resolves with OTC neosporin. He reports today because the wounds have not improved with neosporin. He is monogamous with his wife. He and his wife have both been treated for chlamydia greater than 10 years ago, in previous relationships. Examination reveals a concealed patient with excoriated wounds along shaft of penis and mild erythema. No penile discharge from meatus. He does reports dysuria but only from urine coming in contact with excoriated area along penile shaft. He denies fever, chills, nausea, vomiting,urgency, frequency, penile discharge, CP or SOB          Review of patient's allergies indicates:  No Known Allergies  Past Medical History:   Diagnosis Date    Diabetes mellitus     Encounter for blood transfusion     Perineal abscess 8/1/2014     Past Surgical History:   Procedure Laterality Date    DEBRIDEMENT-WOUND Left 8/1/2014    Performed by Shannon Simpson MD at [facility] OR    DECOMPRESSION OF LUMBAR SPINE USING MINIMALLY INVASIVE TECHNIQUE Right 7/6/2018    Procedure: DECOMPRESSION, SPINE, LUMBAR, MINIMALLY INVASIVE L3-4;  Surgeon: Cristian Cervantes MD;  Location: [facility] OR [address] [place] [address];  Service: Neurosurgery;  Laterality: Right;    DECOMPRESSION, SPINE, LUMBAR, MINIMALLY INVASIVE L3-4 Right 7/6/2018    Performed by Cristian Cervantes MD at [facility] OR [address] [place] [address]    ORTHOPEDIC SURGERY       Family History   Problem " Relation Age of Onset    Diabetes Father     Diabetes Paternal Grandmother     Diabetes Paternal Grandfather      Social History     Tobacco Use    Smoking status: Former Smoker     Packs/day: 0.50     Years: 9.00     Pack years: 4.50     Last attempt to quit: 2013     Years since quittin.3    Smokeless tobacco: Former User   Substance Use Topics    Alcohol use: Yes     Alcohol/week: 1.2 oz     Types: 2 Cans of beer per week    Drug use: No     Review of Systems   Constitutional: Negative for chills, fatigue, fever and unexpected weight change.   HENT: Negative for congestion.    Respiratory: Negative for shortness of breath.    Cardiovascular: Negative for chest pain.   Gastrointestinal: Negative for abdominal distention, abdominal pain and anal bleeding.   Genitourinary: Positive for genital sores (excoriated wounds of penile shaft) and penile pain. Negative for decreased urine volume, difficulty urinating, discharge, dysuria, enuresis, flank pain, frequency, hematuria, penile swelling, scrotal swelling, testicular pain and urgency.   Musculoskeletal: Negative for arthralgias and myalgias.   Skin: Negative for pallor and rash.   Neurological: Positive for weakness. Negative for dizziness, seizures and headaches.   Hematological: Negative for adenopathy.   Psychiatric/Behavioral: The patient is not nervous/anxious.    All other systems reviewed and are negative.      Physical Exam     Initial Vitals [18 0903]   BP Pulse Resp Temp SpO2   136/86 110 18 98 °F (36.7 °C) 95 %      MAP       --         Physical Exam    Nursing note and vitals reviewed.  Constitutional: He appears well-developed and well-nourished. No distress.   HENT:   Head: Normocephalic and atraumatic.   Eyes: Conjunctivae and EOM are normal. Pupils are equal, round, and reactive to light.   Neck: Normal range of motion.   Cardiovascular: Normal rate, regular rhythm and normal heart sounds.   Pulmonary/Chest: Breath sounds normal.  No respiratory distress. He has no wheezes. He has no rales.   Abdominal: Soft. Bowel sounds are normal. There is no tenderness. A hernia is present. Hernia confirmed negative in the right inguinal area and confirmed negative in the left inguinal area.   Genitourinary: Testes normal. Circumcised. Penile erythema and penile tenderness present. No phimosis, paraphimosis or hypospadias. No discharge found.   Genitourinary Comments: Concealed penis with excoriated wound along shaft, circumferentially. White (yeast like)exudate over entire penis.    Musculoskeletal: Normal range of motion.   Lymphadenopathy: No inguinal adenopathy noted on the right or left side.   Neurological: He is alert and oriented to person, place, and time.   Skin: Skin is warm and dry. No rash noted.   Psychiatric: He has a normal mood and affect.         ED Course   Procedures  Labs Reviewed   URINALYSIS, REFLEX TO URINE CULTURE - Abnormal; Notable for the following components:       Result Value    Specific Gravity, UA >=1.030 (*)     Protein, UA 1+ (*)     Glucose, UA 3+ (*)     Ketones, UA 3+ (*)     Leukocytes, UA Trace (*)     All other components within normal limits    Narrative:     Preferred Collection Type->Urine, Clean Catch   URINALYSIS MICROSCOPIC - Abnormal; Notable for the following components:    WBC, UA 6 (*)     All other components within normal limits    Narrative:     Preferred Collection Type->Urine, Clean Catch   C. TRACHOMATIS/N. GONORRHOEAE BY AMP DNA          Imaging Results    None          Medical Decision Making:   Initial Assessment:   28 y.o. M with PMH significant for DM!!. Presents to the ED with chief complaint of penile pain x 4 days followign intercourse with is wife. He reports this issue occurs frequently following intercourse. He denies penile discharge or exposed to STD. Only [id number] active with his wife.     PE reveals a concealed penis with excoriated wounds along shaft, circumferentially.      Urinalysis - trace leukocytes, 1+ protein, 3+ sugar, 3+ ketones. 6 WBC on urine micro.  Given that this is unlikely to be a clean catch secondary to concealed penis, discussed with patient that in the absence of urinary symptoms, we will not treat him for a UTI. Urine GC/Chlamydia sent, patient understands someone will call him with results of abnormal.     Recommend follow up with Urology to discuss chronic issue with painful penile wounds following intercourse. Prescribed Nystatin powder secondary to white yeast like film covering the shaft of the penis. Return to ED protocol reviewed with patient, patient voiced understanding.     I discussed the care of this patient with my supervising MD, Dr Mai.   Differential Diagnosis:   Traumatic wound of penis, STD, UTI  Clinical Tests:   Lab Tests: Reviewed and Ordered            Scribe Attestation:   Scribe #1: I performed the above scribed service and the documentation accurately describes the services I performed. I attest to the accuracy of the note.    Attending Attestation:     Physician Attestation Statement for NP/PA:   I have conducted a face to face encounter with this patient in addition to the NP/PA, due to Medical Complexity    Other NP/PA Attestation Additions:    History of Present Illness: 28 y.o. male with PMHx of type 2 diabetes and buried penis presents with penile pain. Patient reports he has lesions that occurr after intercourse, which typically resolved with neosporin; however, current lesions persist. He reports feeling feverish yesterday, but better today. Denies changes in urination or testicular pain.    Physical Exam: Well appearing. Abdomen soft and nontender. No inguinal lymphadenopathy appreciated, yeast like substance appearance near inguinal folds. Hidden penis is easily retractable: however, region very moist and tender. Excoriations noted near shaft and head of penis with additional yeast like discharge.   Medical Decision  Making: Patient is monogamous with his wife, denies STDs. Excoriations likely exacerbated by moist inguinal region,.Will prescribe nystatin powder and advise prompt follow up with Urology.                       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 A diagnosis of Open wound of penis, initial encounter was also pertinent to this visit.      Disposition:   Disposition: Discharged                        PIETER Leslie  11/17/18 7616

## 2018-11-18 ENCOUNTER — HOSPITAL ENCOUNTER (EMERGENCY)
Facility: HOSPITAL | Age: 28
Discharge: HOME | End: 2018-11-18
Attending: EMERGENCY MEDICINE
Payer: MEDICAID

## 2018-11-18 VITALS
RESPIRATION RATE: 20 BRPM | OXYGEN SATURATION: 98 % | WEIGHT: 253 LBS | TEMPERATURE: 99 F | SYSTOLIC BLOOD PRESSURE: 137 MMHG | HEART RATE: 112 BPM | BODY MASS INDEX: 42.15 KG/M2 | DIASTOLIC BLOOD PRESSURE: 76 MMHG | HEIGHT: 65 IN

## 2018-11-18 DIAGNOSIS — Q55.64 CONCEALED PENIS: ICD-10-CM

## 2018-11-18 DIAGNOSIS — L08.9 SKIN INFECTION: Primary | ICD-10-CM

## 2018-11-18 PROCEDURE — 99284 EMERGENCY DEPT VISIT MOD MDM: CPT | Mod: ,,, | Performed by: PHYSICIAN ASSISTANT

## 2018-11-18 PROCEDURE — 99284 EMERGENCY DEPT VISIT MOD MDM: CPT

## 2018-11-18 RX ORDER — FLUCONAZOLE 150 MG/1
150 TABLET ORAL DAILY
Qty: 1 TABLET | Refills: 0 | Status: SHIPPED | OUTPATIENT
Start: 2018-11-18 | End: 2018-11-19

## 2018-11-18 RX ORDER — CEPHALEXIN 500 MG/1
500 CAPSULE ORAL EVERY 8 HOURS
Qty: 21 CAPSULE | Refills: 0 | Status: SHIPPED | OUTPATIENT
Start: 2018-11-18 | End: 2018-11-25

## 2018-11-18 RX ORDER — HYDROCODONE BITARTRATE AND ACETAMINOPHEN 5; 325 MG/1; MG/1
1 TABLET ORAL
Qty: 5 TABLET | Refills: 0 | Status: SHIPPED | OUTPATIENT
Start: 2018-11-18 | End: 2019-10-09

## 2018-11-18 NOTE — DISCHARGE INSTRUCTIONS
You are being treated for a skin infection of the penis. Please take all medications as prescribed (Keflex and Diflucan). Please follow up with Urology this week. Return to the ED with any new or worsening symptoms.

## 2018-11-18 NOTE — ED TRIAGE NOTES
"Patient states he was seen in ED yesterday for same blisters on penis, was told to clean and apply power, states blisters "much worse" today, unable to clean at home. States fever to 102 last night. Tylenol prior to admit to ED,   "

## 2018-11-18 NOTE — ED NOTES
Patient identifiers verified and correct for Mr Woodall  C/C:Yeast infection penis  APPEARANCE: awake and alert in NAD.  SKIN: warm, dry and intact. No breakdown or bruising.  MUSCULOSKELETAL: Patient moving all extremities spontaneously, no obvious swelling or deformities noted. Ambulates independently.  RESPIRATORY: Denies shortness of breath.Respirations unlabored. Positive temp last night  CARDIAC: Denies CP, 2+ distal pulses; no peripheral edema  ABDOMEN: S/ND/NT, Denies nausea  : voids spontaneously,  white discharge on skin and around penis, pain with urination  Neurologic: AAO x 4; follows commands equal strength in all extremities; denies numbness/tingling. Denies dizziness Denies weakness

## 2018-11-18 NOTE — ED PROVIDER NOTES
Encounter Date: 11/18/2018    SCRIBE #1 NOTE: I, Rob Elam, am scribing for, and in the presence of,  Dr. Pau MD. I have scribed the following portions of the note - the APC attestation.       History     Chief Complaint   Patient presents with    Fever     Seen yesterday for blisters to penis     28 y.o. M with PMH significant for DMII and obesity. He was seen in the ED yesterday for excoriations of the penis. He presents today secondary to fever at home of 102.5F, did not take anything to treat the fever. He is currently afebrile, NAD. Patient states his condition is unchanged other than fever x 1 of 102.5F at home and he has plans to leave for [place] in 3 days and does not want his condition to worsen. On exam, he does have increased erythema and pain with examination. Excoriations still presents. GC/Chlamydia from yesterday was negative. Wife states she is being treated for yeast infection, and secondary to recent sexual activity, will treat patient with Diflucan. Denies chills, N/V, chest pain, dysuria, penile discharge, urgency, frequency, and SOB.           Review of patient's allergies indicates:  No Known Allergies  Past Medical History:   Diagnosis Date    Diabetes mellitus     Encounter for blood transfusion     Perineal abscess 8/1/2014     Past Surgical History:   Procedure Laterality Date    DEBRIDEMENT-WOUND Left 8/1/2014    Performed by [facility] at [facility] OR    DECOMPRESSION OF LUMBAR SPINE USING MINIMALLY INVASIVE TECHNIQUE Right 7/6/2018    Procedure: DECOMPRESSION, SPINE, LUMBAR, MINIMALLY INVASIVE L3-4;  Surgeon: Cristian Cervantes MD;  Location: [facility] OR [address] [place] [address];  Service: Neurosurgery;  Laterality: Right;    DECOMPRESSION, SPINE, LUMBAR, MINIMALLY INVASIVE L3-4 Right 7/6/2018    Performed by Cristian Ceravntes MD at [facility] OR [address] [place] [address]    ORTHOPEDIC SURGERY       Family History   Problem Relation Age of Onset    Diabetes Father     Diabetes Paternal Grandmother     Diabetes  Paternal Grandfather      Social History     Tobacco Use    Smoking status: Former Smoker     Packs/day: 0.50     Years: 9.00     Pack years: 4.50     Last attempt to quit: 2013     Years since quittin.3    Smokeless tobacco: Former User   Substance Use Topics    Alcohol use: Yes     Alcohol/week: 1.2 oz     Types: 2 Cans of beer per week     Comment: daily, 12 pack daily    Drug use: Yes     Types: Marijuana, Cocaine     Review of Systems   Constitutional: Negative for activity change, chills, diaphoresis, fatigue and fever.   HENT: Negative for congestion, ear pain and rhinorrhea.    Eyes: Negative for pain.   Respiratory: Negative for apnea, cough and shortness of breath.    Cardiovascular: Negative for chest pain and palpitations.   Gastrointestinal: Negative for abdominal pain, diarrhea, nausea and vomiting.   Genitourinary: Positive for penile pain. Negative for decreased urine volume, discharge, dysuria, enuresis, flank pain, frequency, hematuria, penile swelling, scrotal swelling and urgency.   Musculoskeletal: Negative for arthralgias, back pain, joint swelling and myalgias.   Skin: Positive for wound (excoriations on shaft of penis). Negative for rash.   Neurological: Negative for dizziness, weakness and headaches.   Hematological: Negative for adenopathy.   Psychiatric/Behavioral: Negative for agitation and confusion. The patient is nervous/anxious.        Physical Exam     Initial Vitals [18 0907]   BP Pulse Resp Temp SpO2   137/76 (!) 112 20 99.2 °F (37.3 °C) 98 %      MAP       --         Physical Exam    Nursing note and vitals reviewed.  Constitutional: He appears well-developed and well-nourished. He is not diaphoretic. No distress.   HENT:   Head: Normocephalic and atraumatic.   Eyes: Conjunctivae and EOM are normal. Pupils are equal, round, and reactive to light.   Neck: Normal range of motion.   Cardiovascular: Normal rate, regular rhythm and normal heart sounds.    Pulmonary/Chest: Breath sounds normal. No respiratory distress. He has no wheezes. He has no rales.   Abdominal: Soft. Bowel sounds are normal. There is no tenderness. Hernia confirmed negative in the right inguinal area and confirmed negative in the left inguinal area.   Genitourinary: Testes normal. Circumcised. Penile erythema present. No phimosis. No discharge found.   Genitourinary Comments: Concealed penis with erythema and excoriations along the shaft, circumferentially. Serous drainage form wounds.    Musculoskeletal: Normal range of motion.   Neurological: He is alert and oriented to person, place, and time.   Skin: Skin is warm and dry. No rash noted.   Psychiatric: He has a normal mood and affect.         ED Course   Procedures  Labs Reviewed - No data to display       Imaging Results    None          Medical Decision Making:   History:   Old Medical Records: I decided to obtain old medical records.  Initial Assessment:   28 y.o. M with PMH of DMII and obesity reports to the ED with chief complaint of fever x 1 at home (102.5F) which he did not treat with medication and the fever resolved. He is currently afebrile, VSS. Examination grossly unchanged from yesterday, other than increased erythema of penis shaft and increased pain with examination. Wife also mentions she is being treated for yeast infection and given recent sexual activity, will treat patient with Diflucan. Denies chills, N/V, chest pain, dysuria, penile discharge, urgency, frequency, and SOB.     Discussed with patient that we will treat him with Keflex and Diflucan. Encourage to take Tylenol for fever. Can continue topical Nystatin powder as prescribed yesterday. Urged patient to schedule follow up with his Urologist ASAP. Return to ED protocol reviewed with the patient, patient voiced understanding.     I discussed the care of this patient with my supervising MD.    Differential Diagnosis:   Cellulitis of penile skin, penile  candidiasis, penile trauma            Scribe Attestation:   Scribe #1: I performed the above scribed service and the documentation accurately describes the services I performed. I attest to the accuracy of the note.    Attending Attestation:     Physician Attestation Statement for NP/PA:   I discussed this assessment and plan of this patient with the NP/PA, but I did not personally examine the patient. The face to face encounter was performed by the NP/PA.    Other NP/PA Attestation Additions:    History of Present Illness: 28 y.o. male seen yesterday in the ED for excoriations surrounding the penis that appeared mildly infected, presenting today with reported fever of 102.5 F. In the ED, patient is afebrile; however, PA reports that his skin is more erythematous. Pt denies cough or other URI sx, denies N/V/abd pain.     Medical Decision Making: UA yesterday is remarkable primarily for trace leukocytes and 3+ ketones. No overwhelming evidence for urinary infection; however, concern for skin infection given exam. Will start Keflex. Wife stated that she also had yeast injection during sexual relations with patient. Will provide diflucan as well.                     Clinical Impression:   The primary encounter diagnosis was Skin infection. A diagnosis of Concealed penis was also pertinent to this visit.      Disposition:   Disposition: Discharged                        PIETER Leslie  11/18/18 3831       Jyoti Mai MD  12/03/18 3702

## 2019-05-11 ENCOUNTER — HOSPITAL ENCOUNTER (EMERGENCY)
Facility: HOSPITAL | Age: 29
Discharge: HOME | End: 2019-05-12
Attending: EMERGENCY MEDICINE
Payer: MEDICAID

## 2019-05-11 DIAGNOSIS — H18.822 CORNEAL ABRASION OF LEFT EYE DUE TO CONTACT LENS: Primary | ICD-10-CM

## 2019-05-11 PROCEDURE — 25000003 PHARM REV CODE 250: Performed by: EMERGENCY MEDICINE

## 2019-05-11 PROCEDURE — 99284 PR EMERGENCY DEPT VISIT,LEVEL IV: ICD-10-PCS | Mod: ,,, | Performed by: EMERGENCY MEDICINE

## 2019-05-11 PROCEDURE — 99284 EMERGENCY DEPT VISIT MOD MDM: CPT

## 2019-05-11 PROCEDURE — 99284 EMERGENCY DEPT VISIT MOD MDM: CPT | Mod: ,,, | Performed by: EMERGENCY MEDICINE

## 2019-05-11 PROCEDURE — 63600175 PHARM REV CODE 636 W HCPCS: Performed by: EMERGENCY MEDICINE

## 2019-05-11 RX ORDER — PROPARACAINE HYDROCHLORIDE 5 MG/ML
1 SOLUTION/ DROPS OPHTHALMIC
Status: COMPLETED | OUTPATIENT
Start: 2019-05-11 | End: 2019-05-11

## 2019-05-11 RX ADMIN — FLUORESCEIN SODIUM AND BENOXINATE HYDROCHLORIDE 1 DROP: 4; 2.5 SOLUTION OPHTHALMIC at 11:05

## 2019-05-11 RX ADMIN — PROPARACAINE HYDROCHLORIDE 1 DROP: 5 SOLUTION/ DROPS OPHTHALMIC at 10:05

## 2019-05-12 VITALS
WEIGHT: 260 LBS | BODY MASS INDEX: 43.32 KG/M2 | HEART RATE: 100 BPM | HEIGHT: 65 IN | SYSTOLIC BLOOD PRESSURE: 125 MMHG | TEMPERATURE: 98 F | OXYGEN SATURATION: 98 % | RESPIRATION RATE: 18 BRPM | DIASTOLIC BLOOD PRESSURE: 77 MMHG

## 2019-05-12 PROBLEM — H02.59 FLOPPY EYELID SYNDROME: Status: ACTIVE | Noted: 2019-05-12

## 2019-05-12 PROBLEM — H57.8A9 SENSATION OF FOREIGN BODY IN EYE: Status: ACTIVE | Noted: 2019-05-12

## 2019-05-12 PROBLEM — E11.3299 MILD NONPROLIFERATIVE DIABETIC RETINOPATHY WITHOUT MACULAR EDEMA ASSOCIATED WITH TYPE 2 DIABETES MELLITUS: Status: ACTIVE | Noted: 2019-05-12

## 2019-05-12 RX ORDER — MOXIFLOXACIN 5 MG/ML
1 SOLUTION/ DROPS OPHTHALMIC 3 TIMES DAILY
Qty: 3 ML | Refills: 0 | Status: SHIPPED | OUTPATIENT
Start: 2019-05-12 | End: 2019-10-09

## 2019-05-12 RX ORDER — TRAMADOL HYDROCHLORIDE 50 MG/1
50 TABLET ORAL EVERY 6 HOURS PRN
Qty: 12 TABLET | Refills: 0 | Status: SHIPPED | OUTPATIENT
Start: 2019-05-12 | End: 2019-05-22

## 2019-05-12 NOTE — ASSESSMENT & PLAN NOTE
-Patient reports CL remain in left eye.   -Eyelids everted, fornix swept with qtip + wek stephy and sprayed with 2 bottles of BSS. Conjunctiva wiped with wek cell thoroughly. fluro placed and no CL staining on exam.   -No CL to be found. No changes in FBS with administration of proparacaine which would be expected.   -Eye may be irritated to excessive rubbing at this time.   -Recommend PFAT PRN.    -CL holiday.   -Will have staff member contact patient on Monday to see if follow up is necessary; otherwise f/u PRN.   -Patient contact: [phone]

## 2019-05-12 NOTE — ED PROVIDER NOTES
"Encounter Date: 5/11/2019    SCRIBE #1 NOTE: I, Sen Early, am scribing for, and in the presence of,  Dr. Marroquin. I have scribed the entire note.     I, Dr. Galdino Marroquin, personally performed the services described in this documentation. All medical record entries made by the scribe were at my direction and in my presence.  I have reviewed the chart and agree that the record reflects my personal performance and is accurate and complete.  Galdino Marroquin MD.  12:32 AM 05/12/2019      History     Chief Complaint   Patient presents with    Eye Pain     Pt reports to ED with c/o contact stuck in L eye. Pt reports pain to L eye and states "It's making my R eye blurry." Pt reports he hasn't removed contacts in months. No redness noted     Time patient was seen by the provider: 11:09 PM      The patient is a 29 y.o. male with co-morbidities including: diabetes mellitus and a perineal abscess, who presents to the ED with a complaint of eye pain. Patient has not removed his contacts for a few months and states his contact is stuck in his left eye. States that his right eye is becoming blurry due to this.       The history is provided by the patient.     Review of patient's allergies indicates:  No Known Allergies  Past Medical History:   Diagnosis Date    Diabetes mellitus     Encounter for blood transfusion     Perineal abscess 8/1/2014     Past Surgical History:   Procedure Laterality Date    DEBRIDEMENT-WOUND Left 8/1/2014    Per[facility] OR    DECOMPRESSION, SPINE, LUMBAR, MINIMALLY INVASIVE L3-4 Right 7/6/2018    Performed by Cristian Cervantes MD at [facility] OR [address] [place] [address]    ORTHOPEDIC SURGERY       Family History   Problem Relation Age of Onset    Diabetes Father     Diabetes Paternal Grandmother     Diabetes Paternal Grandfather      Social History     Tobacco Use    Smoking status: Former Smoker     Packs/day: 0.50     Years: 9.00     Pack years: 4.50     Last attempt to quit: 7/1/2013     " Years since quittin.8    Smokeless tobacco: Former User   Substance Use Topics    Alcohol use: Yes     Alcohol/week: 1.2 oz     Types: 2 Cans of beer per week     Comment: daily, 12 pack daily    Drug use: Yes     Types: Marijuana, Cocaine     Review of Systems   Constitutional: Negative for chills and fever.   HENT: Negative for ear pain and rhinorrhea.    Eyes: Positive for pain and visual disturbance.   Respiratory: Negative for chest tightness and shortness of breath.    Cardiovascular: Negative for chest pain and leg swelling.   Gastrointestinal: Negative for nausea and vomiting.   Genitourinary: Negative for dysuria and hematuria.   Musculoskeletal: Negative for back pain and neck pain.   Neurological: Negative for light-headedness and headaches.   Psychiatric/Behavioral: Negative for behavioral problems and confusion.       Physical Exam     Initial Vitals [19]   BP Pulse Resp Temp SpO2   129/77 103 18 97.5 °F (36.4 °C) 97 %      MAP       --         Physical Exam    Nursing note and vitals reviewed.  Constitutional: He appears well-developed and well-nourished. No distress.   HENT:   Head: Normocephalic and atraumatic.   Nose: Nose normal.   20/40 vision in left and right eyes.    Eyes: Conjunctivae and EOM are normal. Pupils are equal, round, and reactive to light. Right eye exhibits no discharge. Left eye exhibits no discharge. No scleral icterus.   Neck: Normal range of motion. Neck supple.   Cardiovascular: Normal rate, regular rhythm, normal heart sounds and intact distal pulses.   Pulmonary/Chest: Breath sounds normal.   Abdominal: Soft. Bowel sounds are normal. He exhibits no distension and no mass. There is no tenderness. There is no rebound and no guarding.   Genitourinary: Prostate normal and penis normal.   Musculoskeletal: Normal range of motion.   Neurological: He is alert and oriented to person, place, and time. He has normal strength and normal reflexes.   Skin: Skin is warm  and dry. Capillary refill takes less than 2 seconds.   Psychiatric: He has a normal mood and affect. His behavior is normal. Judgment and thought content normal.         ED Course   Procedures  Labs Reviewed - No data to display       Imaging Results    None          Medical Decision Making:   History:   Old Medical Records: I decided to obtain old medical records.  Independently Interpreted Test(s):   I have ordered and independently interpreted X-rays - see prior notes.  I have ordered and independently interpreted EKG Reading(s) - see prior notes  ED Management:  I was unable to visualize any obvious foreign body, and because of patient's persistent claim that he believed contact was in his eye Ophthalmology was consulted.  Ophthalmology ultimately also agreed that there is no sign of foreign body, but also stated that they would follow up with the patient Monday morning to assess for any continued symptoms.  The patient will be started on a short course of ophthalmologic antibiotic eyedrops.  He was given instructions to return for any acute negative worsening symptoms and verbalized understanding of this medical plan.            Scribe Attestation:   Scribe #1: I performed the above scribed service and the documentation accurately describes the services I performed. I attest to the accuracy of the note.               Clinical Impression:       ICD-10-CM ICD-9-CM   1. Corneal abrasion of left eye due to contact lens H18.822 371.82                                Mervin Marroquin MD  06/09/19 0702

## 2019-05-12 NOTE — ASSESSMENT & PLAN NOTE
-Patient reports CL remain in left eye.   -Eyelids everted, fornix swept with qtip + wek stephy and sprayed with 2 bottles of BSS. Conjunctiva wiped with wek cell thoroughly. fluro placed and no CL staining on exam.   -No CL to be found. No changes in FBS with administration of proparacaine which would be expected.   -Eye may be irritated to excessive rubbing at this time.   -Recommend erythromycin QID X 3 days or AT QID PRN.   -CL holiday. CL hygiene education given.   -Will have staff member contact patient on Monday to see if follow up is necessary; otherwise f/u PRN.   -Patient contact: [phone]

## 2019-05-12 NOTE — HPI
Doe Woodall is a 29 y.o. male with     PMH of DM, HTN  Past Ocular History of myopia, none  Past Ocular Surgery of none  Family Ocular History of none    presenting with reported retained CL. Patient report that was rubbing your left eye tonight and felt CL go superior. Tried to wash CL out. Tried multiple times to rub eye as well to get CL eye. Unable to remove CL. Patient with FBS, irritation and redness. however it is not improved with proparacaine administration. Patient denies flashes, floaters, scotomas. Patient denies double vision, pain with eye movement, double vision, photosensitivity. Patient denies burning,  Patient sleeps in CL. Last took out lens 1 month ago. Lens made for weekly use.

## 2019-05-12 NOTE — ED NOTES
Patient identifiers for Doe Woodall checked and correct.  LOC: Patient is awake, alert, and aware of environment with an appropriate affect. Patient is oriented x 3 and speaking appropriately.  APPEARANCE: Patient resting comfortably and in no acute distress. Patient is clean and well groomed, patient's clothing is properly fastened.  SKIN: The skin is warm and dry. Patient has normal skin turgor and moist mucus membrances. Skin is intact; no bruising or breakdown noted.  MUSKULOSKELETAL: Patient is moving all extremities well, no obvious deformities noted. Pulses intact.   RESPIRATORY: Airway is open and patent. Respirations are spontaneous and non-labored with normal effort and rate.  CARDIAC: Patient has a normal rate and rhythm. No peripheral edema noted. Capillary refill < 3 seconds.  ABDOMEN: No distention noted. Bowel sounds active in all 4 quadrants. Soft and non-tender upon palpation.  NEUROLOGICAL: PERRL. Facial expression is symmetrical. Hand grasps are equal bilaterally. Normal sensation in all extremities when touched with finger.  Allergies reported: Review of patient's allergies indicates:  No Known Allergies

## 2019-05-12 NOTE — ED TRIAGE NOTES
Pt states that about an hour ago he was rubbing his left eye and his contact got loose and is stuck in his eye. Pt c/o pain to left eye and blurry vision.

## 2019-05-12 NOTE — CONSULTS
[facility]  Ophthalmology  Consult Note    Patient Name: Doe Woodall  MRN: [id number]  Admission Date: 5/11/2019  Hospital Length of Stay: 0 days  Attending Provider: Mervin Marroquin MD   Primary Care Physician: Primary Doctor No  Principal Problem:<principal problem not specified>    Inpatient consult to Ophthalmology  Consult performed by: Kristin Allen MD  Consult ordered by: Mervin Marroquin MD        Subjective:     Chief Complaint:  FB sensation      HPI:   Doe Woodall is a 29 y.o. male with     PMH of DM, HTN  Past Ocular History of myopia, none  Past Ocular Surgery of none  Family Ocular History of none    presenting with reported retained CL. Patient report that was rubbing your left eye tonight and felt CL go superior. Tried to wash CL out. Tried multiple times to rub eye as well to get CL eye. Unable to remove CL. Patient with FBS, irritation and redness. however it is not improved with proparacaine administration. Patient denies flashes, floaters, scotomas. Patient denies double vision, pain with eye movement, double vision, photosensitivity. Patient denies burning,  Patient sleeps in CL. Last took out lens 1 month ago. Lens made for weekly use.       No new subjective & objective note has been filed under this hospital service since the last note was generated.      Slit Lamp and Fundus Exam     External Exam       Right Left    External Normal Normal          Slit Lamp Exam       Right Left    Lids/Lashes floppy eye lids  floppy eye lids, able to push eyelids back ~ 1 inch.    Conjunctiva/Sclera White and quiet White and quiet    Cornea Clear Clear    Anterior Chamber Deep and quiet Deep and quiet    Iris Round and reactive Round and reactive    Lens Clear Clear    Vitreous Normal Normal    Eyelid everted without FB. Fornix swept multiple times with q tip as well as wek stephy without any CL to be found. Speculum placed in eye, no CL seen on CL exam. BSS sprayed into both  fornix without any changes of eye symptoms.           Fundus Exam       Right Left    Disc Normal Normal    C/D Ratio 0.3 0.3    Macula Normal Normal    Vessels Normal Normal    Periphery one DBH infratemporal one DBH superior               Assessment and Plan:     Floppy eyelid syndrome  -Patient reports that he is supposed to be worked up for DIMA. Recommend to do that.     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   -BS control per PCP.   -Will need a yearly dilated exam.     Sensation of foreign body in eye  -Patient reports CL remain in left eye.   -Eyelids everted, fornix swept with qtip + wek stephy and sprayed with 2 bottles of BSS. Conjunctiva wiped with wek cell thoroughly. fluro placed and no CL staining on exam.   -No CL to be found. No changes in FBS with administration of proparacaine which would be expected.   -Eye may be irritated to excessive rubbing at this time.   -Recommend erythromycin QID X 3 days or AT QID PRN.   -CL holiday. CL hygiene education given.   -Will have staff member contact patient on Monday to see if follow up is necessary; otherwise f/u PRN.   -Patient contact: [phone]      Thank you for your consult. I will sign off. Please contact us if you have any additional questions.    Kristin Allen MD  Ophthalmology  [facility]

## 2019-05-13 ENCOUNTER — TELEPHONE (OUTPATIENT)
Dept: OPHTHALMOLOGY | Facility: CLINIC | Age: 29
End: 2019-05-13

## 2019-05-14 ENCOUNTER — TELEPHONE (OUTPATIENT)
Dept: OPHTHALMOLOGY | Facility: CLINIC | Age: 29
End: 2019-05-14

## 2019-10-09 ENCOUNTER — HOSPITAL ENCOUNTER (EMERGENCY)
Facility: HOSPITAL | Age: 29
Discharge: HOME | End: 2019-10-09
Attending: EMERGENCY MEDICINE
Payer: MEDICAID

## 2019-10-09 VITALS
HEART RATE: 97 BPM | TEMPERATURE: 99 F | HEIGHT: 65 IN | BODY MASS INDEX: 43.32 KG/M2 | DIASTOLIC BLOOD PRESSURE: 85 MMHG | SYSTOLIC BLOOD PRESSURE: 125 MMHG | RESPIRATION RATE: 14 BRPM | WEIGHT: 260 LBS | OXYGEN SATURATION: 97 %

## 2019-10-09 DIAGNOSIS — R05.9 COUGH: ICD-10-CM

## 2019-10-09 PROCEDURE — 27100098 HC SPACER

## 2019-10-09 PROCEDURE — 94640 AIRWAY INHALATION TREATMENT: CPT

## 2019-10-09 PROCEDURE — 99284 EMERGENCY DEPT VISIT MOD MDM: CPT | Mod: 25

## 2019-10-09 PROCEDURE — 25000242 PHARM REV CODE 250 ALT 637 W/ HCPCS: Performed by: EMERGENCY MEDICINE

## 2019-10-09 PROCEDURE — 99284 PR EMERGENCY DEPT VISIT,LEVEL IV: ICD-10-PCS | Mod: ,,, | Performed by: EMERGENCY MEDICINE

## 2019-10-09 PROCEDURE — 99284 EMERGENCY DEPT VISIT MOD MDM: CPT | Mod: ,,, | Performed by: EMERGENCY MEDICINE

## 2019-10-09 RX ORDER — BENZONATATE 100 MG/1
100 CAPSULE ORAL 3 TIMES DAILY PRN
Qty: 20 CAPSULE | Refills: 0 | Status: SHIPPED | OUTPATIENT
Start: 2019-10-09 | End: 2019-10-19

## 2019-10-09 RX ORDER — ALBUTEROL SULFATE 90 UG/1
2 AEROSOL, METERED RESPIRATORY (INHALATION) EVERY 6 HOURS PRN
Status: DISCONTINUED | OUTPATIENT
Start: 2019-10-09 | End: 2019-10-09 | Stop reason: HOSPADM

## 2019-10-09 RX ADMIN — ALBUTEROL SULFATE 2 PUFF: 90 AEROSOL, METERED RESPIRATORY (INHALATION) at 12:10

## 2019-10-09 NOTE — DISCHARGE INSTRUCTIONS
Please try an over-the-counter daily allergy medicine.    Do not drive or operate heavy machinery after taking the codeine-guaifenesin - do not combine this medication with anything else that makes you sleepy. Codeine is chemically addictive - please be careful about how much you are taking and only take it if you need it.    If you experience any new or worsening symptoms, please seek additional medical attention.

## 2019-10-09 NOTE — ED TRIAGE NOTES
To the ED for c/o cough for past 2 months.  Cough is productive, bloddy at times and yellow others.

## 2019-10-09 NOTE — ED PROVIDER NOTES
Encounter Date: 10/9/2019       History     Chief Complaint   Patient presents with    Cough     x 2 months - productive      HPI     Pt is a 28 yo male w/h/o DM-II, who presents for cough.    Duration: 2 months, constant, daily  Quality: yellow-phlegm, non-bloody  Severity: mild  Radiation: n/a  Improved by: nothing  Worsened by: when lays flat at night  Associated with: wheezing   Similar presentation? no  Sick contacts/travel? no    Works in kitchen in restaurant. Used to smoke heavily a few months ago, now just one every few days. Prior marijuana smoker as well. Worked in a [employer] 4-5 yrs ago. No travel. No night sweats. No weight loss. Not on an ACE-I. No incarceration. No orthopnea, MACKENZIE, weight gain, leg swelling or pain.    Denies fever, chills, CP, SOB, myalgias.       Review of patient's allergies indicates:  No Known Allergies  Past Medical History:   Diagnosis Date    Diabetes mellitus     Encounter for blood transfusion     Perineal abscess 2014     Past Surgical History:   Procedure Laterality Date    DECOMPRESSION OF LUMBAR SPINE USING MINIMALLY INVASIVE TECHNIQUE Right 2018    Procedure: DECOMPRESSION, SPINE, LUMBAR, MINIMALLY INVASIVE L3-4;  Surgeon: Cristian Cervantes MD;  Location: [facility] OR [address] [place] [address];  Service: Neurosurgery;  Laterality: Right;    ORTHOPEDIC SURGERY       Family History   Problem Relation Age of Onset    Diabetes Father     Diabetes Paternal Grandmother     Diabetes Paternal Grandfather      Social History     Tobacco Use    Smoking status: Former Smoker     Packs/day: 0.50     Years: 9.00     Pack years: 4.50     Last attempt to quit: 2013     Years since quittin.2    Smokeless tobacco: Former User   Substance Use Topics    Alcohol use: Yes     Alcohol/week: 2.0 standard drinks     Types: 2 Cans of beer per week     Comment: daily, 12 pack daily    Drug use: Yes     Types: Marijuana, Cocaine     Review of Systems   Respiratory: Positive for cough and  wheezing.    All other systems reviewed and are negative.           Physical Exam     Initial Vitals [10/09/19 1053]   BP Pulse Resp Temp SpO2   (!) 133/97 99 18 98.8 °F (37.1 °C) 99 %      MAP       --         Physical Exam     Appearance: No acute distress.  Skin: No rashes seen.  Good turgor.  No abrasions.  No ecchymoses.  Eyes: No conjunctival injection.  EOMI.  PERRL  ENT: Oropharynx clear, except for mild erythema to the posterior oropharynx with overlying draining mucus  Neck: no JVD  Chest: Clear to auscultation bilaterally.  Good air movement.  Slight expiratory wheezes.  No rhonchi.  Cardiovascular: Regular rate and rhythm.  No murmurs. No gallops. No rubs. No LE edema or ttp  Abdomen: Soft.  Not distended.  Nontender.  No guarding.  No rebound. No Masses  Musculoskeletal: Good range of motion all joints.  No deformities.  Neck supple.     Neurologic: Equal strength in upper and lower extremities bilaterally.  Normal sensation.  No facial droop.  Normal speech.    Mental Status:  Alert and oriented x 3.  Appropriate, conversant.        ED Course   Procedures  Labs Reviewed - No data to display       Imaging Results          X-Ray Chest PA And Lateral (Final result)  Result time 10/09/19 11:44:30    Final result by Maria Dolores Lemus MD (10/09/19 11:44:30)                 Impression:      No source for cough identified.      Electronically signed by: Maria Dolores Lemus MD  Date:    10/09/2019  Time:    11:44             Narrative:    EXAMINATION:  XR CHEST PA AND LATERAL    CLINICAL HISTORY:  Cough    TECHNIQUE:  PA and lateral views of the chest were performed.    COMPARISON:  Right rib detail views 04/05/2017.    FINDINGS:  Lung volumes are small with mild elevation of the right hemidiaphragm; appearance is similar to the right rib details on 04/05/2017.  This may reflect the patient's large body habitus.  If there is concern for restrictive lung disease or intra-abdominal process, CT might provide further  information.    I detect no convincing evidence of focal pulmonary disease, pleural fluid, lymph node enlargement, cardiac decompensation, pneumothorax, pneumomediastinum, pneumoperitoneum or significant osseous abnormality.                                 Medical Decision Making:   ED Management:  30 yo male w/h/o DM-II, presents to ED for 2 month h/o cough. No risk factors or s/s for TB. VSS, afeb. Exam most c/w post-nasal drip v possible mild bronchospasm. Gave albuterol HFA in ED. Advised otc allergy med eg zyrtec/allegra. RX for tessalon perles and diabetic tussin for no more than three days. Pt instructed to not drive, operate heavy machinery, or perform any tasks that require unaltered state. Instructed to not combine with anything else that makes the patient drowsy. Advised that opioids are chemically addictive and should only be taken if needed. Pt confirms understanding of all of the above and agrees to comply.    Discussed results, diagnosis, and treatment plan with pt; advised close follow-up with PCP. Pt confirms understanding and ability to comply.                          Clinical Impression:       ICD-10-CM ICD-9-CM   1. Cough R05 786.2                                Kimmy Grover MD  10/09/19 0948

## 2019-10-09 NOTE — ED NOTES
LOC: The patient is awake, alert, and oriented to place, time, situation. Affect is appropriate.  Speech is appropriate and clear.     APPEARANCE: Patient resting uncomfortably, productive cough for 2 months,  in no acute distress.  Patient is clean and well groomed.    SKIN: The skin is warm and dry; color consistent with ethnicity.  Patient has normal skin turgor and moist mucus membranes.  Skin intact; no breakdown or bruising noted.     MUSCULOSKELETAL: Patient moving upper and lower extremities without difficulty.  Denies weakness.     RESPIRATORY: Airway is open and patent. Respirations spontaneous, and non-labored.  Patient has an increased  effort and rate.  No accessory muscle use noted. Productive bloody  cough.     CARDIAC:  No peripheral edema noted. No complaints of chest pain.      ABDOMEN: Soft and non tender to palpation.  No distention noted.     NEUROLOGIC: Eyes open spontaneously.  Behavior appropriate to situation.  Follows commands; facial expression symmetrical.  Purposeful motor response noted; normal sensation in all extremities.

## 2019-10-22 ENCOUNTER — HOSPITAL ENCOUNTER (EMERGENCY)
Facility: HOSPITAL | Age: 29
Discharge: HOME | End: 2019-10-22
Attending: EMERGENCY MEDICINE
Payer: MEDICAID

## 2019-10-22 VITALS
HEIGHT: 65 IN | HEART RATE: 100 BPM | WEIGHT: 254 LBS | BODY MASS INDEX: 42.32 KG/M2 | DIASTOLIC BLOOD PRESSURE: 96 MMHG | RESPIRATION RATE: 18 BRPM | TEMPERATURE: 99 F | SYSTOLIC BLOOD PRESSURE: 159 MMHG | OXYGEN SATURATION: 96 %

## 2019-10-22 DIAGNOSIS — R05.9 COUGH: Primary | ICD-10-CM

## 2019-10-22 DIAGNOSIS — J02.9 SORE THROAT: ICD-10-CM

## 2019-10-22 PROCEDURE — 99284 EMERGENCY DEPT VISIT MOD MDM: CPT

## 2019-10-22 PROCEDURE — 99284 PR EMERGENCY DEPT VISIT,LEVEL IV: ICD-10-PCS | Mod: ,,, | Performed by: PHYSICIAN ASSISTANT

## 2019-10-22 PROCEDURE — 99284 EMERGENCY DEPT VISIT MOD MDM: CPT | Mod: ,,, | Performed by: PHYSICIAN ASSISTANT

## 2019-10-22 RX ORDER — LORATADINE 10 MG/1
10 TABLET ORAL DAILY
Qty: 60 TABLET | Refills: 0 | Status: ON HOLD | OUTPATIENT
Start: 2019-10-22 | End: 2020-08-03

## 2019-10-22 RX ORDER — PROMETHAZINE HYDROCHLORIDE AND DEXTROMETHORPHAN HYDROBROMIDE 6.25; 15 MG/5ML; MG/5ML
5 SYRUP ORAL EVERY 6 HOURS PRN
Qty: 180 ML | Refills: 0 | Status: SHIPPED | OUTPATIENT
Start: 2019-10-22 | End: 2019-11-01

## 2019-10-22 RX ORDER — BENZONATATE 100 MG/1
100 CAPSULE ORAL 3 TIMES DAILY PRN
Qty: 20 CAPSULE | Refills: 0 | Status: SHIPPED | OUTPATIENT
Start: 2019-10-22 | End: 2019-11-01

## 2019-10-22 NOTE — ED TRIAGE NOTES
Pt states he was seen here two weeks ago for sore throat. Pt states it has gotten worse and pt is not coughing up mucus with blood in it. Pt denies any fever. Pt reports night sweats.

## 2019-10-22 NOTE — ED NOTES
Patient identifiers verified and correct for [facility]  LOC: The patient is awake, alert and aware of environment with an appropriate affect, the patient is oriented x 3 and speaking appropriately.   APPEARANCE: Patient appears comfortable and in no acute distress, patient is clean and well groomed.  SKIN: The skin is warm and dry, color consistent with ethnicity, patient has normal skin turgor and moist mucus membranes, skin intact, no breakdown or bruising noted.   MUSCULOSKELETAL: Patient moving all extremities spontaneously, no swelling noted.  RESPIRATORY: Airway is open and patent, respirations are spontaneous, patient has a normal effort and rate, no accessory muscle use noted, Pt states coughing up mucous with blood   CARDIAC: . Patient has a normal rate and regular rhythm, no edema noted, capillary refill < 3 seconds.   GASTRO: Soft and non tender to palpation, no distention noted, normoactive bowel sounds present in all four quadrants. Pt states bowel movements have been regular.  : Pt denies any pain or frequency with urination.  NEURO: Pt opens eyes spontaneously, behavior appropriate to situation, follows commands, facial expression symmetrical, bilateral hand grasp equal and even, purposeful motor response noted, normal sensation in all extremities when touched with a finger.

## 2019-10-22 NOTE — DISCHARGE INSTRUCTIONS
Please take new medication as directed. Please make sure to follow up with your PCP for your scheduled appointment to discuss today's Emergency Department visit and for further evaluation and management. Please return to the Emergency Department if your symptoms worsen or you develop any additional concerning symptoms.

## 2019-10-24 NOTE — ED PROVIDER NOTES
Encounter Date: 10/22/2019       History     Chief Complaint   Patient presents with    URI     coughing up  yellow with blood streaks,    Sore Throat     Mr Woodall is a 28 yo male patient with PMHx of DM that presents to the ED for evaluation of persistent cough and sore throat. Pt was evaluated in ED and on 10/9/19 for same symptoms and discharged with prescriptions of tessalon and guaifenesin codeine syrup. Pt states that symptoms have not improved with medications. Reports associated sore throat and post nasal drip. Cough is productive with yellow sputum and reports that sometimes there are little streaks of red blood. Onset of symptoms was a few months ago. Wife is also being evaluated for same symptoms.         Review of patient's allergies indicates:  No Known Allergies  Past Medical History:   Diagnosis Date    Diabetes mellitus     Encounter for blood transfusion     Perineal abscess 2014     Past Surgical History:   Procedure Laterality Date    DECOMPRESSION OF LUMBAR SPINE USING MINIMALLY INVASIVE TECHNIQUE Right 2018    Procedure: DECOMPRESSION, SPINE, LUMBAR, MINIMALLY INVASIVE L3-4;  Surgeon: Cristian Cervantes MD;  Location: [facility] OR [address] [place] [address];  Service: Neurosurgery;  Laterality: Right;    ORTHOPEDIC SURGERY       Family History   Problem Relation Age of Onset    Diabetes Father     Diabetes Paternal Grandmother     Diabetes Paternal Grandfather      Social History     Tobacco Use    Smoking status: Former Smoker     Packs/day: 0.50     Years: 9.00     Pack years: 4.50     Last attempt to quit: 2013     Years since quittin.3    Smokeless tobacco: Former User   Substance Use Topics    Alcohol use: Yes     Alcohol/week: 2.0 standard drinks     Types: 2 Cans of beer per week     Comment: daily, 12 pack daily    Drug use: Not Currently     Review of Systems   Constitutional: Negative for chills and fever.   HENT: Positive for congestion, postnasal drip and sore throat.    Eyes:  Negative for visual disturbance.   Respiratory: Positive for cough. Negative for shortness of breath.    Cardiovascular: Negative for chest pain.   Gastrointestinal: Negative for abdominal pain, diarrhea, nausea and vomiting.   Genitourinary: Negative for dysuria, flank pain and frequency.   Musculoskeletal: Negative for back pain and neck pain.   Neurological: Negative for dizziness, weakness, light-headedness and headaches.   Hematological: Does not bruise/bleed easily.   Psychiatric/Behavioral: Negative for confusion.       Physical Exam     Initial Vitals [10/22/19 1733]   BP Pulse Resp Temp SpO2   (!) 159/96 100 18 99 °F (37.2 °C) 96 %      MAP       --         Physical Exam    Constitutional: He appears well-developed and well-nourished. He is Obese . He is cooperative.  Non-toxic appearance. No distress.   HENT:   Head: Normocephalic and atraumatic.   Right Ear: Tympanic membrane normal.   Left Ear: Tympanic membrane normal.   Nose: No rhinorrhea. Right sinus exhibits no maxillary sinus tenderness and no frontal sinus tenderness. Left sinus exhibits no maxillary sinus tenderness and no frontal sinus tenderness.   Mouth/Throat: Uvula is midline and mucous membranes are normal. No oropharyngeal exudate, posterior oropharyngeal edema, posterior oropharyngeal erythema or tonsillar abscesses.   Neck: Normal range of motion. Neck supple.   Cardiovascular: Normal rate, S1 normal and S2 normal.   Pulmonary/Chest: Effort normal. No respiratory distress. He has no decreased breath sounds. He has no wheezes. He has no rhonchi. He has no rales.   Abdominal: Soft. There is no tenderness.   Musculoskeletal: Normal range of motion.   Neurological: He is alert and oriented to person, place, and time.   Skin: Skin is warm, dry and intact.         ED Course   Procedures  Labs Reviewed - No data to display       Imaging Results    None          Medical Decision Making:   ED Management:  Hemodynamically stable. Non-toxic and in  no acute distress. Symptoms most likely due to allergies.CXR obtained two weeks ago without acute process. Will discharge with prescriptions of claritin, tessalon, promethazine dm for symptomatic relief. F/u with PCP. Pt verbalizes understanding and is agreeable with plan. Return instructions given.                      Clinical Impression:       ICD-10-CM ICD-9-CM   1. Cough R05 786.2   2. Sore throat J02.9 462         Disposition:   Disposition: Discharged  Condition: Stable                        Andreas Zamudio PA-C  10/24/19 0123

## 2020-02-03 ENCOUNTER — HOSPITAL ENCOUNTER (EMERGENCY)
Facility: HOSPITAL | Age: 30
Discharge: HOME | End: 2020-02-03
Attending: EMERGENCY MEDICINE
Payer: MEDICAID

## 2020-02-03 VITALS
WEIGHT: 250 LBS | HEART RATE: 98 BPM | SYSTOLIC BLOOD PRESSURE: 163 MMHG | HEIGHT: 65 IN | TEMPERATURE: 98 F | RESPIRATION RATE: 16 BRPM | BODY MASS INDEX: 41.65 KG/M2 | DIASTOLIC BLOOD PRESSURE: 96 MMHG | OXYGEN SATURATION: 96 %

## 2020-02-03 DIAGNOSIS — F10.10 ALCOHOL ABUSE: ICD-10-CM

## 2020-02-03 DIAGNOSIS — M25.511 RIGHT SHOULDER PAIN: ICD-10-CM

## 2020-02-03 DIAGNOSIS — S00.83XA CONTUSION OF FACE, INITIAL ENCOUNTER: ICD-10-CM

## 2020-02-03 DIAGNOSIS — S40.011A CONTUSION OF RIGHT SHOULDER, INITIAL ENCOUNTER: Primary | ICD-10-CM

## 2020-02-03 PROCEDURE — 99284 PR EMERGENCY DEPT VISIT,LEVEL IV: ICD-10-PCS | Mod: ,,, | Performed by: EMERGENCY MEDICINE

## 2020-02-03 PROCEDURE — 99284 EMERGENCY DEPT VISIT MOD MDM: CPT | Mod: ,,, | Performed by: EMERGENCY MEDICINE

## 2020-02-03 PROCEDURE — 99284 EMERGENCY DEPT VISIT MOD MDM: CPT | Mod: 25

## 2020-02-03 RX ORDER — NAPROXEN 500 MG/1
500 TABLET ORAL 2 TIMES DAILY WITH MEALS
Qty: 30 TABLET | Refills: 0 | Status: ON HOLD | OUTPATIENT
Start: 2020-02-03 | End: 2020-08-03

## 2020-02-03 NOTE — DISCHARGE INSTRUCTIONS
You need to discuss your alcohol use with your primary care doctor.  There may be medications that need changing because of your drinking.      If your shoulder is still causing the pain in 1 week from now, you should follow-up with an orthopedist.    Return to the ED for worsening in any way.

## 2020-02-03 NOTE — ED NOTES
Patient identifiers for Doe Woodall 29 y.o. male checked and correct.  Chief Complaint   Patient presents with    Fall     was drinking alcohol friday, fell pain to r shoulder , hit r side of head small headache     Past Medical History:   Diagnosis Date    Diabetes mellitus     Encounter for blood transfusion     Perineal abscess 8/1/2014     Allergies reported: Review of patient's allergies indicates:  No Known Allergies      LOC: Patient is awake, alert, and aware of environment with an appropriate affect. Patient is oriented x 4 and speaking appropriately.  APPEARANCE: Patient resting comfortably and in no acute distress. Patient is clean and well groomed, patient's clothing is properly fastened.  HEENT: Denies visual changes. States tenderness to right side of head.   SKIN: The skin is warm and dry. Patient has normal skin turgor and moist mucus membranes.   MUSKULOSKELETAL: Patient is moving all extremities well, no obvious deformities noted. Pulses intact. Reports fall on Friday after drinking. Thinks he fell as he was going up the stairs. Fell on right shoulder and hit side of head. Pain rating to shoulder 7/10. Limited ROM to extremity. Ambulatory by self.  RESPIRATORY: Airway is open and patent. Respirations are spontaneous and non-labored with normal effort and rate. Denies SOB.   CARDIAC: Patient has a normal rate and rhythm. No peripheral edema noted. Denies chest pain.   ABDOMEN: No distention noted. Soft and non-tender upon palpation. Denies nausea and vomiting.   NEUROLOGICAL: PERRL. Facial expression is symmetrical. Hand grasps are equal bilaterally. Normal sensation in all extremities when touched with finger. Reports headache. Pain rating 5/10. States feeling light-headed.

## 2020-02-03 NOTE — ED PROVIDER NOTES
Encounter Date: 2/3/2020    SCRIBE #1 NOTE: I, April Robles, am scribing for, and in the presence of,  Cynthia Whitfield MD. I have scribed the entire note.       History     Chief Complaint   Patient presents with    Fall     was drinking alcohol friday, fell pain to r shoulder , hit r side of head small headache     Time patient was seen by the provider: 1:45 PM      The patient is a 29 y.o. male with co-morbidities including: DM, who presents to the ED with a complaint of right shoulder pain s/p unwitnessed fall 3 days ago. Patient reports he was intoxicated when he was walking up the stairs and he fell. He reports he is unable to recall the event. Endorses light headedness, right sided neck pain and headache. He reports numbness to the right side of his neck with certain movements. Denies decrease in appetite. However, he reports he has not been eating as much secondary to pain when he chews. His wife reports he came into the room crying complaining that his head was hurting. Reports recently seeing bruising to his area of pain. Patient reports he was given naprosyn without relief of symptoms. Patient reports falling before due to intoxication. He reports he drinks ETOH daily. Denies chest pain, shortness of breath, or nausea. Patient is right handed.     The history is provided by the patient and the spouse.     Review of patient's allergies indicates:  No Known Allergies  Past Medical History:   Diagnosis Date    Diabetes mellitus     Encounter for blood transfusion     Perineal abscess 8/1/2014     Past Surgical History:   Procedure Laterality Date    DECOMPRESSION OF LUMBAR SPINE USING MINIMALLY INVASIVE TECHNIQUE Right 7/6/2018    Procedure: DECOMPRESSION, SPINE, LUMBAR, MINIMALLY INVASIVE L3-4;  Surgeon: Cristian Cervantes MD;  Location: [facility] OR [address] [place] [address];  Service: Neurosurgery;  Laterality: Right;    ORTHOPEDIC SURGERY       Family History   Problem Relation Age of Onset    Diabetes Father      Diabetes Paternal Grandmother     Diabetes Paternal Grandfather      Social History     Tobacco Use    Smoking status: Former Smoker     Packs/day: 0.50     Years: 9.00     Pack years: 4.50     Last attempt to quit: 2013     Years since quittin.5    Smokeless tobacco: Former User   Substance Use Topics    Alcohol use: Yes     Alcohol/week: 2.0 standard drinks     Types: 2 Cans of beer per week     Comment: daily, 12 pack daily    Drug use: Not Currently     Review of Systems   Constitutional: Negative for fever.   HENT: Negative for sore throat.    Eyes: Negative for visual disturbance.   Respiratory: Negative for shortness of breath.    Cardiovascular: Negative for chest pain.   Gastrointestinal: Negative for abdominal pain.   Genitourinary: Negative for dysuria.   Musculoskeletal: Positive for neck pain.        +right shoulder pain   Skin: Negative for rash.   Neurological: Positive for headaches.   Hematological: Does not bruise/bleed easily.       Physical Exam     Initial Vitals [20 1316]   BP Pulse Resp Temp SpO2   (!) 163/96 98 16 98.2 °F (36.8 °C) 96 %      MAP       --         Physical Exam    Constitutional: He appears well-developed and well-nourished. No distress.   HENT:   Head: Normocephalic and atraumatic.   Right Ear: External ear normal.   Left Ear: External ear normal.   Mouth/Throat: Oropharynx is clear and moist.   Erythema and edema over the right side of the face, just anteriorly to the ear and over the angle of the mandible, this area was tender to palpation. Clear TM, bilaterally.    Eyes: EOM are normal. Pupils are equal, round, and reactive to light.   Neck: Normal range of motion. Neck supple.   Cardiovascular: Normal rate, regular rhythm and normal heart sounds. Exam reveals no gallop and no friction rub.    No murmur heard.  Pulmonary/Chest: Breath sounds normal. No respiratory distress. He has no wheezes. He has no rhonchi. He has no rales.   Abdominal: Soft. He  exhibits no distension. There is no tenderness.   Musculoskeletal: Normal range of motion. He exhibits tenderness.   No midline, C spine tenderness. There is tenderness of the right upper trapezius. There is ecchymosis over the right shoulder, laterally. Tenderness of the shoulder, superiorly extending to the AC joint. Patient resist AB duction above 90 degrees, secondary to pain. Distally and neurovascularly intact. Back has no vertebral tenderness and no signs of trauma.    Neurological: He is alert and oriented to person, place, and time. He has normal strength. No cranial nerve deficit or sensory deficit.   Skin: Skin is warm and dry.   Psychiatric: His behavior is normal. Thought content normal.         ED Course   Procedures  Labs Reviewed - No data to display       Imaging Results          CT Maxillofacial Without Contrast (Final result)  Result time 02/03/20 15:15:09    Final result by Ilir Avila MD (02/03/20 15:15:09)                 Impression:      No acute abnormality.      Electronically signed by: Ilir Avila  Date:    02/03/2020  Time:    15:15             Narrative:    EXAMINATION:  CT MAXILLOFACIAL WITHOUT CONTRAST    CLINICAL HISTORY:  Facial fracture(s);    TECHNIQUE:  Low dose axial images, sagittal and coronal reformations were obtained through the face.  Contrast was not administered.    COMPARISON:  None    FINDINGS:  No acute facial fractures.  Nasal bones, zygomatic arches, orbits and mandible are adequately maintained.  Paranasal sinuses are clear.    No soft tissue mass or fluid collection.                               X-Ray Shoulder Trauma Right (Final result)  Result time 02/03/20 15:04:56    Final result by Ramiro Michelle Jr., MD (02/03/20 15:04:56)                 Impression:      Mild degenerative change.      Electronically signed by: Ramiro Michelle MD  Date:    02/03/2020  Time:    15:04             Narrative:    EXAMINATION:  XR SHOULDER TRAUMA 3 VIEW RIGHT    CLINICAL  HISTORY:  Pain in right shoulder    TECHNIQUE:  Three or four views of the right shoulder were performed.    COMPARISON:  None    FINDINGS:  Minimal narrowing at the AC joint.  No subluxation.  Lungs are hypoaerated.                              X-Rays:   Independently Interpreted Readings:   Other Readings:  X-ray right shoulder: mild degenerative changes. No acute fracture or dislocation.     Medical Decision Making:   History:   Old Medical Records: I decided to obtain old medical records.  Old Records Summarized: records from clinic visits.       <> Summary of Records: Last visit in our system was at [facility] for follow up for pneumonia in mid November.   Initial Assessment:   29 y.o. M complaining of a fall while intoxicated. Patient reports that he drinks regularly and this has occurred before. I did discuss with the patient that he may have a problem with drinking and to discuss this with his PCP. I am concerned for the possibility of a facial bone fracture. Will obtain CT max face. Also concern about right shoulder fracture vs dislocation vs contusion vs strain. Will xray.   Independently Interpreted Test(s):   I have ordered and independently interpreted X-rays - see prior notes.  Clinical Tests:   Radiological Study: Ordered and Reviewed            Scribe Attestation:   Scribe #1: I performed the above scribed service and the documentation accurately describes the services I performed. I attest to the accuracy of the note.    Attending Attestation:           Physician Attestation for Scribe:      Comments: I, Dr. Cynthia Whitfield, personally performed the services described in this documentation. All medical record entries made by the scribe were at my direction and in my presence.  I have reviewed the chart and agree that the record reflects my personal performance and is accurate and complete. Cynthia Whitfield MD.        Attending ED Notes:   I have independently reviewed the x-ray of his shoulder which  shows no acute fracture dislocation.  CT of the maxillary facial bones shows no fracture.  I do not feel a CT of the head is necessary given the delayed presentation and the normal neurologic exam.  Patient was given instructions that he may need follow-up with Orthopedics should his shoulder continue to have limited range of motion 1 week from now.  He was given a prescription for naproxen.                        Clinical Impression:       ICD-10-CM ICD-9-CM   1. Contusion of right shoulder, initial encounter S40.011A 923.00   2. Right shoulder pain M25.511 719.41   3. Contusion of face, initial encounter S00.83XA 920   4. Alcohol abuse F10.10 305.00         Disposition:   Disposition: Discharged                     Cynthia Whitfield MD  02/04/20 9069

## 2020-08-02 ENCOUNTER — NURSE TRIAGE (OUTPATIENT)
Dept: ADMINISTRATIVE | Facility: CLINIC | Age: 30
End: 2020-08-02

## 2020-08-02 ENCOUNTER — HOSPITAL ENCOUNTER (INPATIENT)
Facility: HOSPITAL | Age: 30
LOS: 3 days | Discharge: HOME | DRG: 638 | End: 2020-08-05
Attending: EMERGENCY MEDICINE | Admitting: EMERGENCY MEDICINE
Payer: MEDICAID

## 2020-08-02 DIAGNOSIS — F10.920 ALCOHOLIC INTOXICATION WITHOUT COMPLICATION: Primary | ICD-10-CM

## 2020-08-02 DIAGNOSIS — F19.94 SUBSTANCE INDUCED MOOD DISORDER: ICD-10-CM

## 2020-08-02 DIAGNOSIS — E10.10 DIABETIC KETOACIDOSIS WITHOUT COMA ASSOCIATED WITH TYPE 1 DIABETES MELLITUS: ICD-10-CM

## 2020-08-02 DIAGNOSIS — E11.10 DKA (DIABETIC KETOACIDOSES): ICD-10-CM

## 2020-08-02 DIAGNOSIS — R07.9 CHEST PAIN: ICD-10-CM

## 2020-08-02 DIAGNOSIS — T14.91XA SUICIDE ATTEMPT: ICD-10-CM

## 2020-08-02 DIAGNOSIS — F41.9 ANXIETY DISORDER, UNSPECIFIED TYPE: ICD-10-CM

## 2020-08-02 DIAGNOSIS — F10.20 ALCOHOL USE DISORDER, SEVERE, DEPENDENCE: ICD-10-CM

## 2020-08-02 PROBLEM — F10.129 ALCOHOL ABUSE WITH INTOXICATION: Status: ACTIVE | Noted: 2020-08-02

## 2020-08-02 LAB
ALBUMIN SERPL BCP-MCNC: 3.3 G/DL (ref 3.5–5.2)
ALLENS TEST: ABNORMAL
ALP SERPL-CCNC: 144 U/L (ref 55–135)
ALT SERPL W/O P-5'-P-CCNC: 163 U/L (ref 10–44)
AMPHET+METHAMPHET UR QL: NEGATIVE
ANION GAP SERPL CALC-SCNC: 14 MMOL/L (ref 8–16)
ANION GAP SERPL CALC-SCNC: 17 MMOL/L (ref 8–16)
ANION GAP SERPL CALC-SCNC: 9 MMOL/L (ref 8–16)
APAP SERPL-MCNC: <3 UG/ML (ref 10–20)
AST SERPL-CCNC: 141 U/L (ref 10–40)
B-OH-BUTYR BLD STRIP-SCNC: 0.6 MMOL/L (ref 0–0.5)
BACTERIA #/AREA URNS AUTO: NORMAL /HPF
BARBITURATES UR QL SCN>200 NG/ML: NEGATIVE
BASOPHILS # BLD AUTO: 0.02 K/UL (ref 0–0.2)
BASOPHILS NFR BLD: 0.4 % (ref 0–1.9)
BENZODIAZ UR QL SCN>200 NG/ML: NEGATIVE
BILIRUB SERPL-MCNC: 0.4 MG/DL (ref 0.1–1)
BILIRUB UR QL STRIP: NEGATIVE
BUN SERPL-MCNC: 6 MG/DL (ref 6–20)
BUN SERPL-MCNC: 7 MG/DL (ref 6–20)
BUN SERPL-MCNC: 8 MG/DL (ref 6–20)
BZE UR QL SCN: NEGATIVE
CALCIUM SERPL-MCNC: 7.7 MG/DL (ref 8.7–10.5)
CALCIUM SERPL-MCNC: 7.7 MG/DL (ref 8.7–10.5)
CALCIUM SERPL-MCNC: 7.9 MG/DL (ref 8.7–10.5)
CANNABINOIDS UR QL SCN: NEGATIVE
CHLORIDE SERPL-SCNC: 102 MMOL/L (ref 95–110)
CHLORIDE SERPL-SCNC: 106 MMOL/L (ref 95–110)
CHLORIDE SERPL-SCNC: 107 MMOL/L (ref 95–110)
CLARITY UR REFRACT.AUTO: CLEAR
CO2 SERPL-SCNC: 18 MMOL/L (ref 23–29)
CO2 SERPL-SCNC: 19 MMOL/L (ref 23–29)
CO2 SERPL-SCNC: 24 MMOL/L (ref 23–29)
COLOR UR AUTO: ABNORMAL
CREAT SERPL-MCNC: 0.6 MG/DL (ref 0.5–1.4)
CREAT SERPL-MCNC: 0.7 MG/DL (ref 0.5–1.4)
CREAT SERPL-MCNC: 0.8 MG/DL (ref 0.5–1.4)
CREAT UR-MCNC: 28 MG/DL (ref 23–375)
DELSYS: ABNORMAL
DIFFERENTIAL METHOD: ABNORMAL
EOSINOPHIL # BLD AUTO: 0 K/UL (ref 0–0.5)
EOSINOPHIL NFR BLD: 0.9 % (ref 0–8)
ERYTHROCYTE [DISTWIDTH] IN BLOOD BY AUTOMATED COUNT: 11.9 % (ref 11.5–14.5)
EST. GFR  (AFRICAN AMERICAN): >60 ML/MIN/1.73 M^2
EST. GFR  (NON AFRICAN AMERICAN): >60 ML/MIN/1.73 M^2
ESTIMATED AVG GLUCOSE: 352 MG/DL (ref 68–131)
ETHANOL SERPL-MCNC: 311 MG/DL
GLUCOSE SERPL-MCNC: 194 MG/DL (ref 70–110)
GLUCOSE SERPL-MCNC: 324 MG/DL (ref 70–110)
GLUCOSE SERPL-MCNC: 527 MG/DL (ref 70–110)
GLUCOSE UR QL STRIP: ABNORMAL
HBA1C MFR BLD HPLC: 13.9 % (ref 4–5.6)
HCO3 UR-SCNC: 26.7 MMOL/L (ref 24–28)
HCT VFR BLD AUTO: 46.1 % (ref 40–54)
HGB BLD-MCNC: 15.7 G/DL (ref 14–18)
HGB UR QL STRIP: NEGATIVE
HYALINE CASTS UR QL AUTO: 0 /LPF
IMM GRANULOCYTES # BLD AUTO: 0.02 K/UL (ref 0–0.04)
IMM GRANULOCYTES NFR BLD AUTO: 0.4 % (ref 0–0.5)
INR PPP: 0.9 (ref 0.8–1.2)
KETONES UR QL STRIP: ABNORMAL
LACTATE SERPL-SCNC: 2.1 MMOL/L (ref 0.5–2.2)
LACTATE SERPL-SCNC: 2.7 MMOL/L (ref 0.5–2.2)
LEUKOCYTE ESTERASE UR QL STRIP: ABNORMAL
LYMPHOCYTES # BLD AUTO: 2.2 K/UL (ref 1–4.8)
LYMPHOCYTES NFR BLD: 46.7 % (ref 18–48)
MAGNESIUM SERPL-MCNC: 2.1 MG/DL (ref 1.6–2.6)
MCH RBC QN AUTO: 33.8 PG (ref 27–31)
MCHC RBC AUTO-ENTMCNC: 34.1 G/DL (ref 32–36)
MCV RBC AUTO: 99 FL (ref 82–98)
METHADONE UR QL SCN>300 NG/ML: NEGATIVE
MICROSCOPIC COMMENT: NORMAL
MODE: ABNORMAL
MONOCYTES # BLD AUTO: 0.4 K/UL (ref 0.3–1)
MONOCYTES NFR BLD: 9 % (ref 4–15)
NEUTROPHILS # BLD AUTO: 2 K/UL (ref 1.8–7.7)
NEUTROPHILS NFR BLD: 42.6 % (ref 38–73)
NITRITE UR QL STRIP: NEGATIVE
NRBC BLD-RTO: 0 /100 WBC
OPIATES UR QL SCN: NEGATIVE
PCO2 BLDA: 47.3 MMHG (ref 35–45)
PCP UR QL SCN>25 NG/ML: NEGATIVE
PH SMN: 7.36 [PH] (ref 7.35–7.45)
PH UR STRIP: 6 [PH] (ref 5–8)
PHOSPHATE SERPL-MCNC: 3.4 MG/DL (ref 2.7–4.5)
PLATELET # BLD AUTO: 192 K/UL (ref 150–350)
PMV BLD AUTO: 10.3 FL (ref 9.2–12.9)
PO2 BLDA: 57 MMHG (ref 40–60)
POC BE: 1 MMOL/L
POC SATURATED O2: 88 % (ref 95–100)
POC TCO2: 28 MMOL/L (ref 24–29)
POCT GLUCOSE: 182 MG/DL (ref 70–110)
POCT GLUCOSE: 184 MG/DL (ref 70–110)
POCT GLUCOSE: 190 MG/DL (ref 70–110)
POCT GLUCOSE: 192 MG/DL (ref 70–110)
POCT GLUCOSE: 193 MG/DL (ref 70–110)
POCT GLUCOSE: 215 MG/DL (ref 70–110)
POCT GLUCOSE: 325 MG/DL (ref 70–110)
POCT GLUCOSE: 330 MG/DL (ref 70–110)
POTASSIUM SERPL-SCNC: 3.4 MMOL/L (ref 3.5–5.1)
POTASSIUM SERPL-SCNC: 3.6 MMOL/L (ref 3.5–5.1)
POTASSIUM SERPL-SCNC: 4 MMOL/L (ref 3.5–5.1)
PROCALCITONIN SERPL IA-MCNC: 0.14 NG/ML
PROT SERPL-MCNC: 6.5 G/DL (ref 6–8.4)
PROT UR QL STRIP: ABNORMAL
PROTHROMBIN TIME: 9.9 SEC (ref 9–12.5)
RBC # BLD AUTO: 4.65 M/UL (ref 4.6–6.2)
RBC #/AREA URNS AUTO: 1 /HPF (ref 0–4)
SAMPLE: ABNORMAL
SARS-COV-2 RDRP RESP QL NAA+PROBE: NEGATIVE
SITE: ABNORMAL
SODIUM SERPL-SCNC: 137 MMOL/L (ref 136–145)
SODIUM SERPL-SCNC: 139 MMOL/L (ref 136–145)
SODIUM SERPL-SCNC: 140 MMOL/L (ref 136–145)
SP GR UR STRIP: 1.03 (ref 1–1.03)
SQUAMOUS #/AREA URNS AUTO: 1 /HPF
TOXICOLOGY INFORMATION: NORMAL
TROPONIN I SERPL DL<=0.01 NG/ML-MCNC: <0.006 NG/ML (ref 0–0.03)
TSH SERPL DL<=0.005 MIU/L-ACNC: 1.39 UIU/ML (ref 0.4–4)
URN SPEC COLLECT METH UR: ABNORMAL
WBC # BLD AUTO: 4.65 K/UL (ref 3.9–12.7)
WBC #/AREA URNS AUTO: 3 /HPF (ref 0–5)
YEAST UR QL AUTO: NORMAL

## 2020-08-02 PROCEDURE — 96361 HYDRATE IV INFUSION ADD-ON: CPT

## 2020-08-02 PROCEDURE — 85025 COMPLETE CBC W/AUTO DIFF WBC: CPT

## 2020-08-02 PROCEDURE — 83605 ASSAY OF LACTIC ACID: CPT | Mod: 91

## 2020-08-02 PROCEDURE — 99223 PR INITIAL HOSPITAL CARE,LEVL III: ICD-10-PCS | Mod: ,,, | Performed by: INTERNAL MEDICINE

## 2020-08-02 PROCEDURE — 80053 COMPREHEN METABOLIC PANEL: CPT

## 2020-08-02 PROCEDURE — 84443 ASSAY THYROID STIM HORMONE: CPT

## 2020-08-02 PROCEDURE — 85610 PROTHROMBIN TIME: CPT

## 2020-08-02 PROCEDURE — 80048 BASIC METABOLIC PNL TOTAL CA: CPT | Mod: 91

## 2020-08-02 PROCEDURE — 81001 URINALYSIS AUTO W/SCOPE: CPT

## 2020-08-02 PROCEDURE — 25000003 PHARM REV CODE 250: Performed by: STUDENT IN AN ORGANIZED HEALTH CARE EDUCATION/TRAINING PROGRAM

## 2020-08-02 PROCEDURE — 99285 EMERGENCY DEPT VISIT HI MDM: CPT | Mod: 25

## 2020-08-02 PROCEDURE — 25000003 PHARM REV CODE 250: Performed by: EMERGENCY MEDICINE

## 2020-08-02 PROCEDURE — 82010 KETONE BODYS QUAN: CPT

## 2020-08-02 PROCEDURE — 80320 DRUG SCREEN QUANTALCOHOLS: CPT

## 2020-08-02 PROCEDURE — 99285 EMERGENCY DEPT VISIT HI MDM: CPT | Mod: ,,, | Performed by: EMERGENCY MEDICINE

## 2020-08-02 PROCEDURE — 96365 THER/PROPH/DIAG IV INF INIT: CPT

## 2020-08-02 PROCEDURE — 99285 PR EMERGENCY DEPT VISIT,LEVEL V: ICD-10-PCS | Mod: ,,, | Performed by: EMERGENCY MEDICINE

## 2020-08-02 PROCEDURE — S5010 5% DEXTROSE AND 0.45% SALINE: HCPCS | Performed by: STUDENT IN AN ORGANIZED HEALTH CARE EDUCATION/TRAINING PROGRAM

## 2020-08-02 PROCEDURE — 96366 THER/PROPH/DIAG IV INF ADDON: CPT

## 2020-08-02 PROCEDURE — 80307 DRUG TEST PRSMV CHEM ANLYZR: CPT

## 2020-08-02 PROCEDURE — 84484 ASSAY OF TROPONIN QUANT: CPT

## 2020-08-02 PROCEDURE — 82803 BLOOD GASES ANY COMBINATION: CPT

## 2020-08-02 PROCEDURE — U0002 COVID-19 LAB TEST NON-CDC: HCPCS

## 2020-08-02 PROCEDURE — 63600175 PHARM REV CODE 636 W HCPCS: Performed by: STUDENT IN AN ORGANIZED HEALTH CARE EDUCATION/TRAINING PROGRAM

## 2020-08-02 PROCEDURE — 99223 1ST HOSP IP/OBS HIGH 75: CPT | Mod: ,,, | Performed by: INTERNAL MEDICINE

## 2020-08-02 PROCEDURE — 84145 PROCALCITONIN (PCT): CPT

## 2020-08-02 PROCEDURE — 80329 ANALGESICS NON-OPIOID 1 OR 2: CPT

## 2020-08-02 PROCEDURE — 83735 ASSAY OF MAGNESIUM: CPT

## 2020-08-02 PROCEDURE — 63600175 PHARM REV CODE 636 W HCPCS: Performed by: EMERGENCY MEDICINE

## 2020-08-02 PROCEDURE — 99900035 HC TECH TIME PER 15 MIN (STAT)

## 2020-08-02 PROCEDURE — 84100 ASSAY OF PHOSPHORUS: CPT

## 2020-08-02 PROCEDURE — 96366 THER/PROPH/DIAG IV INF ADDON: CPT | Performed by: EMERGENCY MEDICINE

## 2020-08-02 PROCEDURE — 82962 GLUCOSE BLOOD TEST: CPT

## 2020-08-02 PROCEDURE — 96365 THER/PROPH/DIAG IV INF INIT: CPT | Performed by: EMERGENCY MEDICINE

## 2020-08-02 PROCEDURE — 20600001 HC STEP DOWN PRIVATE ROOM

## 2020-08-02 PROCEDURE — 36415 COLL VENOUS BLD VENIPUNCTURE: CPT

## 2020-08-02 PROCEDURE — 83036 HEMOGLOBIN GLYCOSYLATED A1C: CPT

## 2020-08-02 RX ORDER — POTASSIUM CHLORIDE 20 MEQ/1
60 TABLET, EXTENDED RELEASE ORAL
Status: COMPLETED | OUTPATIENT
Start: 2020-08-02 | End: 2020-08-02

## 2020-08-02 RX ORDER — THIAMINE HCL 100 MG
100 TABLET ORAL DAILY
Status: DISCONTINUED | OUTPATIENT
Start: 2020-08-02 | End: 2020-08-05 | Stop reason: HOSPADM

## 2020-08-02 RX ORDER — SODIUM CHLORIDE 450 MG/100ML
INJECTION, SOLUTION INTRAVENOUS CONTINUOUS
Status: DISCONTINUED | OUTPATIENT
Start: 2020-08-02 | End: 2020-08-02

## 2020-08-02 RX ORDER — SODIUM CHLORIDE 0.9 % (FLUSH) 0.9 %
10 SYRINGE (ML) INJECTION
Status: DISCONTINUED | OUTPATIENT
Start: 2020-08-02 | End: 2020-08-05 | Stop reason: HOSPADM

## 2020-08-02 RX ORDER — DEXTROSE MONOHYDRATE AND SODIUM CHLORIDE 5; .45 G/100ML; G/100ML
INJECTION, SOLUTION INTRAVENOUS CONTINUOUS
Status: DISCONTINUED | OUTPATIENT
Start: 2020-08-02 | End: 2020-08-03

## 2020-08-02 RX ORDER — SODIUM CHLORIDE AND POTASSIUM CHLORIDE 150; 900 MG/100ML; MG/100ML
1000 INJECTION, SOLUTION INTRAVENOUS
Status: COMPLETED | OUTPATIENT
Start: 2020-08-02 | End: 2020-08-02

## 2020-08-02 RX ORDER — DEXTROSE MONOHYDRATE 100 MG/ML
1000 INJECTION, SOLUTION INTRAVENOUS
Status: DISCONTINUED | OUTPATIENT
Start: 2020-08-02 | End: 2020-08-02

## 2020-08-02 RX ORDER — DEXTROSE MONOHYDRATE 100 MG/ML
1000 INJECTION, SOLUTION INTRAVENOUS
Status: DISCONTINUED | OUTPATIENT
Start: 2020-08-02 | End: 2020-08-05 | Stop reason: HOSPADM

## 2020-08-02 RX ORDER — GLUCAGON 1 MG
1 KIT INJECTION
Status: DISCONTINUED | OUTPATIENT
Start: 2020-08-02 | End: 2020-08-03

## 2020-08-02 RX ORDER — ENOXAPARIN SODIUM 100 MG/ML
40 INJECTION SUBCUTANEOUS EVERY 24 HOURS
Status: DISCONTINUED | OUTPATIENT
Start: 2020-08-02 | End: 2020-08-03

## 2020-08-02 RX ORDER — ONDANSETRON 2 MG/ML
4 INJECTION INTRAMUSCULAR; INTRAVENOUS EVERY 8 HOURS PRN
Status: DISCONTINUED | OUTPATIENT
Start: 2020-08-02 | End: 2020-08-05 | Stop reason: HOSPADM

## 2020-08-02 RX ORDER — PROMETHAZINE HYDROCHLORIDE 25 MG/1
25 TABLET ORAL EVERY 6 HOURS PRN
Status: DISCONTINUED | OUTPATIENT
Start: 2020-08-02 | End: 2020-08-05 | Stop reason: HOSPADM

## 2020-08-02 RX ORDER — FOLIC ACID 1 MG/1
1 TABLET ORAL DAILY
Status: DISCONTINUED | OUTPATIENT
Start: 2020-08-02 | End: 2020-08-05 | Stop reason: HOSPADM

## 2020-08-02 RX ORDER — IPRATROPIUM BROMIDE AND ALBUTEROL SULFATE 2.5; .5 MG/3ML; MG/3ML
3 SOLUTION RESPIRATORY (INHALATION) EVERY 4 HOURS PRN
Status: DISCONTINUED | OUTPATIENT
Start: 2020-08-02 | End: 2020-08-05 | Stop reason: HOSPADM

## 2020-08-02 RX ORDER — IBUPROFEN 200 MG
24 TABLET ORAL
Status: DISCONTINUED | OUTPATIENT
Start: 2020-08-02 | End: 2020-08-03

## 2020-08-02 RX ORDER — ACETAMINOPHEN 325 MG/1
650 TABLET ORAL EVERY 8 HOURS PRN
Status: DISCONTINUED | OUTPATIENT
Start: 2020-08-02 | End: 2020-08-05 | Stop reason: HOSPADM

## 2020-08-02 RX ORDER — IBUPROFEN 200 MG
16 TABLET ORAL
Status: DISCONTINUED | OUTPATIENT
Start: 2020-08-02 | End: 2020-08-03

## 2020-08-02 RX ORDER — DIAZEPAM 5 MG/1
5 TABLET ORAL EVERY 8 HOURS
Status: DISCONTINUED | OUTPATIENT
Start: 2020-08-02 | End: 2020-08-05

## 2020-08-02 RX ORDER — DIAZEPAM 5 MG/1
10 TABLET ORAL EVERY 6 HOURS PRN
Status: DISCONTINUED | OUTPATIENT
Start: 2020-08-02 | End: 2020-08-05 | Stop reason: HOSPADM

## 2020-08-02 RX ADMIN — DEXTROSE MONOHYDRATE AND SODIUM CHLORIDE: 5; .45 INJECTION, SOLUTION INTRAVENOUS at 08:08

## 2020-08-02 RX ADMIN — SODIUM CHLORIDE 5 UNITS/HR  (ORDERING DOSE ONLY,MUST KEEP AS DOSE RANGE.ONLY CHANGE IF INITIAL DOSE EXCEEDS SUGGESTED RANGE): 9 INJECTION, SOLUTION INTRAVENOUS at 05:08

## 2020-08-02 RX ADMIN — THERA TABS 1 TABLET: TAB at 04:08

## 2020-08-02 RX ADMIN — DIAZEPAM 5 MG: 5 TABLET ORAL at 09:08

## 2020-08-02 RX ADMIN — SODIUM CHLORIDE 1000 ML: 0.9 INJECTION, SOLUTION INTRAVENOUS at 06:08

## 2020-08-02 RX ADMIN — SODIUM CHLORIDE: 0.45 INJECTION, SOLUTION INTRAVENOUS at 07:08

## 2020-08-02 RX ADMIN — SODIUM CHLORIDE 10 UNITS/HR  (ORDERING DOSE ONLY,MUST KEEP AS DOSE RANGE.ONLY CHANGE IF INITIAL DOSE EXCEEDS SUGGESTED RANGE): 9 INJECTION, SOLUTION INTRAVENOUS at 03:08

## 2020-08-02 RX ADMIN — SODIUM CHLORIDE AND POTASSIUM CHLORIDE 1000 ML: .9; .15 SOLUTION INTRAVENOUS at 02:08

## 2020-08-02 RX ADMIN — Medication 100 MG: at 04:08

## 2020-08-02 RX ADMIN — ENOXAPARIN SODIUM 40 MG: 40 INJECTION SUBCUTANEOUS at 05:08

## 2020-08-02 RX ADMIN — SODIUM CHLORIDE 2.5 UNITS/HR  (ORDERING DOSE ONLY,MUST KEEP AS DOSE RANGE.ONLY CHANGE IF INITIAL DOSE EXCEEDS SUGGESTED RANGE): 9 INJECTION, SOLUTION INTRAVENOUS at 07:08

## 2020-08-02 RX ADMIN — POTASSIUM CHLORIDE 60 MEQ: 1500 TABLET, EXTENDED RELEASE ORAL at 02:08

## 2020-08-02 RX ADMIN — SODIUM CHLORIDE 1000 ML: 0.9 INJECTION, SOLUTION INTRAVENOUS at 02:08

## 2020-08-02 RX ADMIN — FOLIC ACID 1 MG: 1 TABLET ORAL at 04:08

## 2020-08-02 NOTE — ED NOTES
Pt presented to the ED, calm and cooperative. Pt endorses feelings of depression and SI's with a plan to use a shotgun. Pt checked for contraband and changed into paper scrubs. Pt speech is slow and slurred. Pt denies HI/AVH's. Pt denies any previous psychiatric admissions. Pt belongings 1 shirt, shorts, and sandals secured in belongings closet. 1 wallet with $605.00 dollars ID, and cell phone secured in ED safe. Pt is disheveled and unclean. Pt thought process linear but slow. Pt stated he had 2 tall boy beers and 1 pint of whiskey last night. Pt denies illegal substance use and tobacco use. Pt instructed on the PEC process, he verbalizes understanding. Pt lying on the stretcher, sitter outside of room, DVC maintained.

## 2020-08-02 NOTE — ED PROVIDER NOTES
"Encounter Date: 2020       History     Chief Complaint   Patient presents with    Hyperglycemia      mg/dl per EMS    Suicidal    Alcohol Intoxication     HPI   This is a 30-year-old man with a history of depression, diabetes, and self-reported history of hypertension who presents with acute suicidal ideation, alcohol intoxication and elevated blood sugar.  He reports he does not take any of his medications because they gave him diarrhea, though he would like to try different medications if possible.  He cannot recall the last time he had insulin.  Today he was found by EMS at the wheel of his vehicle in the middle of a residential street, asleep, without any obvious trauma.  He reports drinking alcohol today.  He reports that he has had chronic suicidal ideation, but will not disclose the plan, when ask if he is worried that he would act on his suicidal thoughts, he reports there is a 50/50 chance that I will."  Patient reports increased stress as he is expecting a child this year.    Review of patient's allergies indicates:  No Known Allergies  Past Medical History:   Diagnosis Date    Diabetes mellitus     Encounter for blood transfusion     Perineal abscess 2014     Past Surgical History:   Procedure Laterality Date    DECOMPRESSION OF LUMBAR SPINE USING MINIMALLY INVASIVE TECHNIQUE Right 2018    Procedure: DECOMPRESSION, SPINE, LUMBAR, MINIMALLY INVASIVE L3-4;  Surgeon: Cristian Cervantes MD;  Location: [facility] OR [address] [place] [address];  Service: Neurosurgery;  Laterality: Right;    ORTHOPEDIC SURGERY       Family History   Problem Relation Age of Onset    Diabetes Father     Diabetes Paternal Grandmother     Diabetes Paternal Grandfather      Social History     Tobacco Use    Smoking status: Former Smoker     Packs/day: 0.50     Years: 9.00     Pack years: 4.50     Quit date: 2013     Years since quittin.0    Smokeless tobacco: Former User   Substance Use Topics    Alcohol use: Yes     " "Alcohol/week: 2.0 standard drinks     Types: 2 Cans of beer per week     Comment: daily, 12 pack daily    Drug use: Not Currently     Review of Systems   Constitutional: Negative for chills, diaphoresis, fatigue and fever.   HENT: Negative for congestion, rhinorrhea and sore throat.    Eyes: Negative for visual disturbance.   Respiratory: Negative for cough, chest tightness and shortness of breath.    Cardiovascular: Negative for chest pain.   Gastrointestinal: Negative for abdominal pain, blood in stool, constipation, diarrhea and vomiting.   Endocrine: Positive for polydipsia.   Genitourinary: Negative for dysuria, hematuria and urgency.   Musculoskeletal: Negative for back pain.   Skin: Negative for rash.   Neurological: Negative for seizures and syncope.   Hematological: Does not bruise/bleed easily.   Psychiatric/Behavioral: Positive for dysphoric mood, sleep disturbance and suicidal ideas. Negative for agitation and hallucinations. The patient is nervous/anxious.        Physical Exam     Initial Vitals [08/02/20 1137]   BP Pulse Resp Temp SpO2   130/80 94 15 98.2 °F (36.8 °C) 98 %      MAP       --         Physical Exam  Gen: AxOx3, NAD, well nourished, tearful when discussing his family/expected child  Eye: sclera anicteric, EOMI, no conjunctivitis, no periorbital edema  ENT: NCAT, OP clear, neck supple with FROM, no stridor  CVS: RRR, no m/r/g, distal pulses intact/symmetric  Pulm: CTAB, no wheezes, rales or rhonchi, no increased work of breathing  Abd: soft, nontender, nondistended, no organomegaly, no CVAT  Ext: no edema, lesions, rashes, or deformity  Neuro: GCS15, moving all extremities, gait intact  Psych:  Tearful, well groomed, has a NO" tattoo under right eye  ED Course   Procedures  Labs Reviewed   CBC W/ AUTO DIFFERENTIAL - Abnormal; Notable for the following components:       Result Value    Mean Corpuscular Volume 99 (*)     Mean Corpuscular Hemoglobin 33.8 (*)     All other components within " normal limits   COMPREHENSIVE METABOLIC PANEL - Abnormal; Notable for the following components:    Potassium 3.4 (*)     CO2 18 (*)     Glucose 527 (*)     Calcium 7.9 (*)     Albumin 3.3 (*)     Alkaline Phosphatase 144 (*)      (*)      (*)     Anion Gap 17 (*)     All other components within normal limits   URINALYSIS, REFLEX TO URINE CULTURE - Abnormal; Notable for the following components:    Protein, UA 1+ (*)     Glucose, UA 3+ (*)     Ketones, UA Trace (*)     Leukocytes, UA Trace (*)     All other components within normal limits    Narrative:     Specimen Source->Urine   ALCOHOL,MEDICAL (ETHANOL) - Abnormal; Notable for the following components:    Alcohol, Medical, Serum 311 (*)     All other components within normal limits   ACETAMINOPHEN LEVEL - Abnormal; Notable for the following components:    Acetaminophen (Tylenol), Serum <3.0 (*)     All other components within normal limits   BETA - HYDROXYBUTYRATE, SERUM - Abnormal; Notable for the following components:    Beta-Hydroxybutyrate 0.6 (*)     All other components within normal limits   ISTAT PROCEDURE - Abnormal; Notable for the following components:    POC PCO2 47.3 (*)     POC SATURATED O2 88 (*)     All other components within normal limits   TSH   DRUG SCREEN PANEL, URINE EMERGENCY    Narrative:     Specimen Source->Urine   SARS-COV-2 RNA AMPLIFICATION, QUAL   URINALYSIS MICROSCOPIC    Narrative:     Specimen Source->Urine   POCT GLUCOSE MONITORING CONTINUOUS          Imaging Results    None          Medical Decision Making:   History:   I obtained history from: EMS provider.  Old Medical Records: I decided to obtain old medical records.  Old Records Summarized: records from clinic visits.  Initial Assessment:   This is a 30-year-old man with history of diabetes, hypertension and depression who presents with hyperglycemia, suicidal ideation and alcohol intoxication.  He reports the alcohol is healing he that helps him with his  insomnia.  Differential is broad, concerned about diabetic ketoacidosis [place] is hyperosmolar hyperglycemia, dehydration, acute kidney injury, liver dysfunction/alcoholic hepatitis, and we will obtain labs, including beta hydroxybutyrate and VBG, alcohol level, and given his uncertainty about whether not he can control his suicidal impulses, I have placed a pec.  Clinical Tests:   Lab Tests: Ordered and Reviewed  Radiological Study: Ordered and Reviewed  ED Management:  Patient's labs are consistent with diabetic ketoacidosis, he has an anion gap, low bicarb.  I have initiated insulin drip, he is also relatively hypokalemic, and I have repleted this orally and IV given that he will be on an insulin infusion.  His alcohol level is also profoundly elevated, he is not yet showing signs of alcohol withdrawal syndrome, but we will monitor him closely for development of this.  Given these issues, I do not think the patient can be medically cleared, he will be brought in to the hospital for further management.                                 Clinical Impression:       ICD-10-CM ICD-9-CM   1. Alcoholic intoxication without complication  F10.920 305.00   2. Diabetic ketoacidosis without coma associated with type 1 diabetes mellitus  E10.10 250.11   3. DKA (diabetic ketoacidoses)  E11.10 250.10             ED Disposition Condition    Observation                           Em Song MD  08/02/20 0372
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
## 2020-08-02 NOTE — ED NOTES
Pt placed on tele box [id number] verified NSR 91. PT belongings brought upstairs from ems closet. Valuables remained locked in safe and ticket provided for pt to obtain from ed upon discharge.

## 2020-08-02 NOTE — TELEPHONE ENCOUNTER
Pt's wife calling to see if she can visit  in the hospital. Provided her with the number for [facility].    Reason for Disposition   [1] Caller is not with the adult (patient) AND [2] probable NON-URGENT symptoms    Additional Information   Negative: [1] Caller is not with the adult (patient) AND [2] reporting urgent symptoms   Negative: Lab result questions   Negative: Medication questions   Negative: Caller can't be reached by phone   Negative: Caller has already spoken to PCP or another triager   Negative: RN needs further essential information from caller in order to complete triage   Negative: Requesting regular office appointment   Negative: [1] Caller requesting NON-URGENT health information AND [2] PCP's office is the best resource   Negative: Health Information question, no triage required and triager able to answer question   Negative: General information question, no triage required and triager able to answer question   Negative: Question about upcoming scheduled test, no triage required and triager able to answer question    Protocols used: INFORMATION ONLY CALL-A-
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
## 2020-08-02 NOTE — NURSING
Pt arrived to floor from ED. Pt AAOx4, VSS, denies pain. Pt connected to continuous cardiac monitoring, pulse ox, and BP. PEC form placed in pt's chart. Pt's cash is locked in safe in ED. Ticket placed in pt's chart. Pt denies current suicidal thoughts. Pt states that earlier today he had a panic attack and thought about shooting himself with a gun that he has at home. Pt came to ED instead. Pt states that he is better now and wants to go home to his pregnant wife. Will continue to monitor.

## 2020-08-03 PROBLEM — G47.09 OTHER INSOMNIA: Chronic | Status: ACTIVE | Noted: 2020-08-03

## 2020-08-03 PROBLEM — R45.851 SUICIDAL IDEATION: Status: ACTIVE | Noted: 2020-08-02

## 2020-08-03 PROBLEM — E87.6 HYPOKALEMIA: Status: ACTIVE | Noted: 2020-08-03

## 2020-08-03 PROBLEM — R74.01 ELEVATED TRANSAMINASE LEVEL: Status: ACTIVE | Noted: 2020-08-03

## 2020-08-03 PROBLEM — E66.01 SEVERE OBESITY (BMI >= 40): Status: ACTIVE | Noted: 2020-08-03

## 2020-08-03 PROBLEM — F10.229 ALCOHOL INTOXICATION WITH MODERATE OR SEVERE USE DISORDER: Status: ACTIVE | Noted: 2020-08-02

## 2020-08-03 PROBLEM — E83.51 HYPOCALCEMIA: Status: ACTIVE | Noted: 2020-08-03

## 2020-08-03 LAB
ALBUMIN SERPL BCP-MCNC: 3.1 G/DL (ref 3.5–5.2)
ALP SERPL-CCNC: 131 U/L (ref 55–135)
ALT SERPL W/O P-5'-P-CCNC: 158 U/L (ref 10–44)
ANION GAP SERPL CALC-SCNC: 10 MMOL/L (ref 8–16)
ANION GAP SERPL CALC-SCNC: 7 MMOL/L (ref 8–16)
ANION GAP SERPL CALC-SCNC: 8 MMOL/L (ref 8–16)
ANION GAP SERPL CALC-SCNC: 8 MMOL/L (ref 8–16)
ANION GAP SERPL CALC-SCNC: 9 MMOL/L (ref 8–16)
AST SERPL-CCNC: 140 U/L (ref 10–40)
BASOPHILS # BLD AUTO: 0.03 K/UL (ref 0–0.2)
BASOPHILS NFR BLD: 0.5 % (ref 0–1.9)
BILIRUB SERPL-MCNC: 0.8 MG/DL (ref 0.1–1)
BUN SERPL-MCNC: 5 MG/DL (ref 6–20)
BUN SERPL-MCNC: 5 MG/DL (ref 6–20)
BUN SERPL-MCNC: 6 MG/DL (ref 6–20)
BUN SERPL-MCNC: 7 MG/DL (ref 6–20)
BUN SERPL-MCNC: 7 MG/DL (ref 6–20)
CALCIUM SERPL-MCNC: 7.8 MG/DL (ref 8.7–10.5)
CALCIUM SERPL-MCNC: 7.9 MG/DL (ref 8.7–10.5)
CALCIUM SERPL-MCNC: 8.2 MG/DL (ref 8.7–10.5)
CALCIUM SERPL-MCNC: 8.6 MG/DL (ref 8.7–10.5)
CALCIUM SERPL-MCNC: 9.1 MG/DL (ref 8.7–10.5)
CHLORIDE SERPL-SCNC: 101 MMOL/L (ref 95–110)
CHLORIDE SERPL-SCNC: 101 MMOL/L (ref 95–110)
CHLORIDE SERPL-SCNC: 102 MMOL/L (ref 95–110)
CHLORIDE SERPL-SCNC: 106 MMOL/L (ref 95–110)
CHLORIDE SERPL-SCNC: 99 MMOL/L (ref 95–110)
CO2 SERPL-SCNC: 23 MMOL/L (ref 23–29)
CO2 SERPL-SCNC: 24 MMOL/L (ref 23–29)
CO2 SERPL-SCNC: 25 MMOL/L (ref 23–29)
CO2 SERPL-SCNC: 25 MMOL/L (ref 23–29)
CO2 SERPL-SCNC: 26 MMOL/L (ref 23–29)
CREAT SERPL-MCNC: 0.6 MG/DL (ref 0.5–1.4)
CREAT SERPL-MCNC: 0.7 MG/DL (ref 0.5–1.4)
DIFFERENTIAL METHOD: ABNORMAL
EOSINOPHIL # BLD AUTO: 0 K/UL (ref 0–0.5)
EOSINOPHIL NFR BLD: 0.7 % (ref 0–8)
ERYTHROCYTE [DISTWIDTH] IN BLOOD BY AUTOMATED COUNT: 12.1 % (ref 11.5–14.5)
EST. GFR  (AFRICAN AMERICAN): >60 ML/MIN/1.73 M^2
EST. GFR  (NON AFRICAN AMERICAN): >60 ML/MIN/1.73 M^2
GLUCOSE SERPL-MCNC: 210 MG/DL (ref 70–110)
GLUCOSE SERPL-MCNC: 231 MG/DL (ref 70–110)
GLUCOSE SERPL-MCNC: 231 MG/DL (ref 70–110)
GLUCOSE SERPL-MCNC: 250 MG/DL (ref 70–110)
GLUCOSE SERPL-MCNC: 261 MG/DL (ref 70–110)
GLUCOSE SERPL-MCNC: 282 MG/DL (ref 70–110)
GLUCOSE SERPL-MCNC: 285 MG/DL (ref 70–110)
HCT VFR BLD AUTO: 42.9 % (ref 40–54)
HGB BLD-MCNC: 14.9 G/DL (ref 14–18)
IMM GRANULOCYTES # BLD AUTO: 0.02 K/UL (ref 0–0.04)
IMM GRANULOCYTES NFR BLD AUTO: 0.4 % (ref 0–0.5)
LACTATE SERPL-SCNC: 1 MMOL/L (ref 0.5–2.2)
LACTATE SERPL-SCNC: 1.3 MMOL/L (ref 0.5–2.2)
LYMPHOCYTES # BLD AUTO: 2 K/UL (ref 1–4.8)
LYMPHOCYTES NFR BLD: 34.6 % (ref 18–48)
MAGNESIUM SERPL-MCNC: 1.8 MG/DL (ref 1.6–2.6)
MCH RBC QN AUTO: 33.7 PG (ref 27–31)
MCHC RBC AUTO-ENTMCNC: 34.7 G/DL (ref 32–36)
MCV RBC AUTO: 97 FL (ref 82–98)
MONOCYTES # BLD AUTO: 0.7 K/UL (ref 0.3–1)
MONOCYTES NFR BLD: 13 % (ref 4–15)
NEUTROPHILS # BLD AUTO: 2.9 K/UL (ref 1.8–7.7)
NEUTROPHILS NFR BLD: 50.8 % (ref 38–73)
NRBC BLD-RTO: 0 /100 WBC
PHOSPHATE SERPL-MCNC: 2.3 MG/DL (ref 2.7–4.5)
PLATELET # BLD AUTO: 160 K/UL (ref 150–350)
PMV BLD AUTO: 10.3 FL (ref 9.2–12.9)
POCT GLUCOSE: 179 MG/DL (ref 70–110)
POCT GLUCOSE: 197 MG/DL (ref 70–110)
POCT GLUCOSE: 204 MG/DL (ref 70–110)
POCT GLUCOSE: 205 MG/DL (ref 70–110)
POCT GLUCOSE: 210 MG/DL (ref 70–110)
POCT GLUCOSE: 210 MG/DL (ref 70–110)
POCT GLUCOSE: 219 MG/DL (ref 70–110)
POCT GLUCOSE: 219 MG/DL (ref 70–110)
POCT GLUCOSE: 223 MG/DL (ref 70–110)
POCT GLUCOSE: 236 MG/DL (ref 70–110)
POCT GLUCOSE: 240 MG/DL (ref 70–110)
POCT GLUCOSE: 253 MG/DL (ref 70–110)
POCT GLUCOSE: 271 MG/DL (ref 70–110)
POCT GLUCOSE: 271 MG/DL (ref 70–110)
POCT GLUCOSE: 272 MG/DL (ref 70–110)
POCT GLUCOSE: 276 MG/DL (ref 70–110)
POCT GLUCOSE: 286 MG/DL (ref 70–110)
POCT GLUCOSE: 302 MG/DL (ref 70–110)
POCT GLUCOSE: 304 MG/DL (ref 70–110)
POTASSIUM SERPL-SCNC: 3.4 MMOL/L (ref 3.5–5.1)
POTASSIUM SERPL-SCNC: 3.5 MMOL/L (ref 3.5–5.1)
POTASSIUM SERPL-SCNC: 3.8 MMOL/L (ref 3.5–5.1)
POTASSIUM SERPL-SCNC: 4 MMOL/L (ref 3.5–5.1)
PROT SERPL-MCNC: 6.1 G/DL (ref 6–8.4)
RBC # BLD AUTO: 4.42 M/UL (ref 4.6–6.2)
SODIUM SERPL-SCNC: 132 MMOL/L (ref 136–145)
SODIUM SERPL-SCNC: 135 MMOL/L (ref 136–145)
SODIUM SERPL-SCNC: 136 MMOL/L (ref 136–145)
SODIUM SERPL-SCNC: 136 MMOL/L (ref 136–145)
SODIUM SERPL-SCNC: 139 MMOL/L (ref 136–145)
WBC # BLD AUTO: 5.63 K/UL (ref 3.9–12.7)

## 2020-08-03 PROCEDURE — 80053 COMPREHEN METABOLIC PANEL: CPT

## 2020-08-03 PROCEDURE — 20600001 HC STEP DOWN PRIVATE ROOM

## 2020-08-03 PROCEDURE — 85025 COMPLETE CBC W/AUTO DIFF WBC: CPT

## 2020-08-03 PROCEDURE — 90792 PSYCH DIAG EVAL W/MED SRVCS: CPT | Mod: AF,HB,, | Performed by: PSYCHIATRY & NEUROLOGY

## 2020-08-03 PROCEDURE — 97161 PT EVAL LOW COMPLEX 20 MIN: CPT

## 2020-08-03 PROCEDURE — 36415 COLL VENOUS BLD VENIPUNCTURE: CPT

## 2020-08-03 PROCEDURE — 25000003 PHARM REV CODE 250: Performed by: STUDENT IN AN ORGANIZED HEALTH CARE EDUCATION/TRAINING PROGRAM

## 2020-08-03 PROCEDURE — 90792 PR PSYCHIATRIC DIAGNOSTIC EVALUATION W/MEDICAL SERVICES: ICD-10-PCS | Mod: AF,HB,, | Performed by: PSYCHIATRY & NEUROLOGY

## 2020-08-03 PROCEDURE — 83605 ASSAY OF LACTIC ACID: CPT | Mod: 91

## 2020-08-03 PROCEDURE — 83605 ASSAY OF LACTIC ACID: CPT

## 2020-08-03 PROCEDURE — C9399 UNCLASSIFIED DRUGS OR BIOLOG: HCPCS | Performed by: STUDENT IN AN ORGANIZED HEALTH CARE EDUCATION/TRAINING PROGRAM

## 2020-08-03 PROCEDURE — 63600175 PHARM REV CODE 636 W HCPCS: Performed by: STUDENT IN AN ORGANIZED HEALTH CARE EDUCATION/TRAINING PROGRAM

## 2020-08-03 PROCEDURE — 80048 BASIC METABOLIC PNL TOTAL CA: CPT | Mod: 91

## 2020-08-03 PROCEDURE — 99233 SBSQ HOSP IP/OBS HIGH 50: CPT | Mod: ,,, | Performed by: INTERNAL MEDICINE

## 2020-08-03 PROCEDURE — 99233 PR SUBSEQUENT HOSPITAL CARE,LEVL III: ICD-10-PCS | Mod: ,,, | Performed by: INTERNAL MEDICINE

## 2020-08-03 PROCEDURE — S5010 5% DEXTROSE AND 0.45% SALINE: HCPCS | Performed by: STUDENT IN AN ORGANIZED HEALTH CARE EDUCATION/TRAINING PROGRAM

## 2020-08-03 PROCEDURE — 83735 ASSAY OF MAGNESIUM: CPT

## 2020-08-03 PROCEDURE — 80048 BASIC METABOLIC PNL TOTAL CA: CPT

## 2020-08-03 PROCEDURE — 84100 ASSAY OF PHOSPHORUS: CPT

## 2020-08-03 PROCEDURE — 63600175 PHARM REV CODE 636 W HCPCS: Performed by: INTERNAL MEDICINE

## 2020-08-03 PROCEDURE — 97165 OT EVAL LOW COMPLEX 30 MIN: CPT

## 2020-08-03 RX ORDER — CALCIUM CARBONATE 500(1250)
500 TABLET ORAL ONCE
Status: COMPLETED | OUTPATIENT
Start: 2020-08-03 | End: 2020-08-03

## 2020-08-03 RX ORDER — ENOXAPARIN SODIUM 100 MG/ML
40 INJECTION SUBCUTANEOUS EVERY 12 HOURS
Status: DISCONTINUED | OUTPATIENT
Start: 2020-08-03 | End: 2020-08-05 | Stop reason: HOSPADM

## 2020-08-03 RX ORDER — IBUPROFEN 200 MG
16 TABLET ORAL
Status: DISCONTINUED | OUTPATIENT
Start: 2020-08-03 | End: 2020-08-05 | Stop reason: HOSPADM

## 2020-08-03 RX ORDER — IBUPROFEN 200 MG
24 TABLET ORAL
Status: DISCONTINUED | OUTPATIENT
Start: 2020-08-03 | End: 2020-08-05 | Stop reason: HOSPADM

## 2020-08-03 RX ORDER — POTASSIUM CHLORIDE 20 MEQ/1
40 TABLET, EXTENDED RELEASE ORAL ONCE
Status: COMPLETED | OUTPATIENT
Start: 2020-08-03 | End: 2020-08-03

## 2020-08-03 RX ORDER — INSULIN ASPART 100 [IU]/ML
1-10 INJECTION, SOLUTION INTRAVENOUS; SUBCUTANEOUS
Status: DISCONTINUED | OUTPATIENT
Start: 2020-08-03 | End: 2020-08-05 | Stop reason: HOSPADM

## 2020-08-03 RX ORDER — TALC
9 POWDER (GRAM) TOPICAL NIGHTLY PRN
Status: DISCONTINUED | OUTPATIENT
Start: 2020-08-03 | End: 2020-08-05 | Stop reason: HOSPADM

## 2020-08-03 RX ORDER — POTASSIUM CHLORIDE 750 MG/1
30 CAPSULE, EXTENDED RELEASE ORAL
Status: COMPLETED | OUTPATIENT
Start: 2020-08-03 | End: 2020-08-03

## 2020-08-03 RX ORDER — INSULIN ASPART 100 [IU]/ML
16 INJECTION, SOLUTION INTRAVENOUS; SUBCUTANEOUS
Status: DISCONTINUED | OUTPATIENT
Start: 2020-08-03 | End: 2020-08-05

## 2020-08-03 RX ORDER — GLUCAGON 1 MG
1 KIT INJECTION
Status: DISCONTINUED | OUTPATIENT
Start: 2020-08-03 | End: 2020-08-05 | Stop reason: HOSPADM

## 2020-08-03 RX ORDER — ACETAMINOPHEN 500 MG
1000 TABLET ORAL 3 TIMES DAILY PRN
COMMUNITY
End: 2023-01-01 | Stop reason: CLARIF

## 2020-08-03 RX ADMIN — INSULIN ASPART 16 UNITS: 100 INJECTION, SOLUTION INTRAVENOUS; SUBCUTANEOUS at 05:08

## 2020-08-03 RX ADMIN — DIAZEPAM 10 MG: 5 TABLET ORAL at 12:08

## 2020-08-03 RX ADMIN — POTASSIUM CHLORIDE 30 MEQ: 750 CAPSULE, EXTENDED RELEASE ORAL at 08:08

## 2020-08-03 RX ADMIN — DIAZEPAM 5 MG: 5 TABLET ORAL at 05:08

## 2020-08-03 RX ADMIN — CALCIUM 500 MG: 500 TABLET ORAL at 02:08

## 2020-08-03 RX ADMIN — INSULIN DETEMIR 24 UNITS: 100 INJECTION, SOLUTION SUBCUTANEOUS at 03:08

## 2020-08-03 RX ADMIN — DEXTROSE MONOHYDRATE AND SODIUM CHLORIDE: 5; .45 INJECTION, SOLUTION INTRAVENOUS at 06:08

## 2020-08-03 RX ADMIN — ENOXAPARIN SODIUM 40 MG: 40 INJECTION SUBCUTANEOUS at 09:08

## 2020-08-03 RX ADMIN — Medication 100 MG: at 08:08

## 2020-08-03 RX ADMIN — THERA TABS 1 TABLET: TAB at 08:08

## 2020-08-03 RX ADMIN — DIAZEPAM 5 MG: 5 TABLET ORAL at 09:08

## 2020-08-03 RX ADMIN — DIAZEPAM 5 MG: 5 TABLET ORAL at 02:08

## 2020-08-03 RX ADMIN — INSULIN ASPART 8 UNITS: 100 INJECTION, SOLUTION INTRAVENOUS; SUBCUTANEOUS at 05:08

## 2020-08-03 RX ADMIN — INSULIN ASPART 6 UNITS: 100 INJECTION, SOLUTION INTRAVENOUS; SUBCUTANEOUS at 09:08

## 2020-08-03 RX ADMIN — FOLIC ACID 1 MG: 1 TABLET ORAL at 08:08

## 2020-08-03 RX ADMIN — POTASSIUM CHLORIDE 40 MEQ: 1500 TABLET, EXTENDED RELEASE ORAL at 05:08

## 2020-08-03 NOTE — PROGRESS NOTES
Pharmacist Renal Dose Adjustment Note    Doe Woodall is a 30 y.o. male being treated with the medication enoxaparin     Patient Data:    Vital Signs (Most Recent):  Temp: 98.2 °F (36.8 °C) (08/03/20 1150)  Pulse: 87 (08/03/20 1150)  Resp: 19 (08/03/20 1150)  BP: (!) 169/83 (08/03/20 1150)  SpO2: 96 % (08/03/20 1150)   Vital Signs (72h Range):  Temp:  [98 °F (36.7 °C)-98.8 °F (37.1 °C)]   Pulse:  [86-97]   Resp:  [15-24]   BP: (114-173)/(66-97)   SpO2:  [95 %-98 %]      Recent Labs   Lab 08/03/20  0044 08/03/20  0531 08/03/20  0850   CREATININE 0.6 0.6  0.6 0.7     Serum creatinine: 0.7 mg/dL 08/03/20 0850  Estimated creatinine clearance: 183.6 mL/min    Medication:enoxaparin 40 mg QD will be changed to medication: enoxaparin 40 mg BID   BMI: 44   Pharmacist's Name: Lou Espinal  Pharmacist's Extension: [phone]
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
## 2020-08-03 NOTE — ASSESSMENT & PLAN NOTE
Recently consumed 1 pint whiskey and multiple beers. He says this is his normal consumption and only on the weekends. He also refers that alcohol is the only thing that helps him sleep at [facility].  Not currently exhibiting withdrawal symptoms.  Toxicology: + alcohol 311, drug screen negative.     Plan  - Thiamine 100mg daily  - Folic acid 1mg daily  - Multivitamin tablet daily  - Diazepam 5mg q8  - As no current symptoms, no need for CIWA   - Monitor for symptoms of withdrawal eg tremors, agitation, hallucinations   - Psych consult in AM

## 2020-08-03 NOTE — DISCHARGE INSTRUCTIONS
[facility] Outpatient Clinics  - [facility]: [address], [phone]  - [facility]: [address] [facility], [phone]  - [facility]: [address], [place], [phone]  - [facility]: [address] [facility], [phone]  - [facility] ([facility]): [address], [phone]  - [facility] ([facility]): [address] [facility] [address], [phone]  - [facility] ([place]): [phone]  - [facility] [phone]     \[facility] [place] [facility]\"" and Private Outpatient Clinics  - [facility]: [facility] [address] 4th floor, [phone]  - [facility]: [address] [facility] [facility] ([facility], 3rd Floor), [facility], [phone]  - [facility]: [address] [facility], [phone], [phone]  - Counts include 234 beds at the [facility], [facility]: [address], [address], [phone]    Outpatient Group Therapy  - Cancer Support Group: [facility], [address] [facility], [place] [address], [phone]  - Depression/Bipolar Support [place]: [facility], [address], [place], 7:30pm 1st & 3rd Tuesdays in cafeteria, [phone]  - Heart Transplant Support Group: [facility], 3rd Wednesday, 7th floor conference room, Shanna Luis, [phone]  - [facility] [place] [facility] ([place]): [address], [place], [phone]  - [facility]: [facility] [place] [address], Yamilet Denny, [phone]    Outpatient Drug Rehab   - [facility]: [facility], 4th Floor, [place], [phone]  - Alcoholics Anonymous: [facility], 24 Hr Hotline:910.172.7946, Main: [phone]  - [place] [facility]: [address], [phone], Tuesdays at 10am, Doroteo Dunaway (ext. [phone])  - [facility]: [address], [phone]  - [facility]: [address], [phone]  - [facility]: [address], [phone]    Inpatient Drug Rehab  - [facility]: [address] [facility], [phone]   - [facility]: [address] [facility], [phone]   - [facility]: [address], [place], [phone]  - [facility] Program: [phone]  - [facility] <females only> ([employer]): [address], [phone], ext [phone], Moni Alston  - [facility] <Fee based>: [address] [place], [phone]    Crisis  - [facility] Hotline, [phone]  - National Suicide Prevention Crisis Hotline: 499.855.5619

## 2020-08-03 NOTE — CONSULTS
Nutrition-Related Diabetes Education      Time Spent: 15 minutes     Learners: Patient (sitter at bedside)     Current HbA1c: 13.9    Is patient aware of their A1c and their goal A1c? Yes    Nutrition Education with handouts: MyPlate, CHO counting     Comments: Pt reports he has been educated in the past on diabetic diet. Provided and explained handout detailing sources of carbohydrates, appropriate serving sizes, and the plate method for meal planning. Pt voiced understanding. All questions and concerns answered.     Barriers to Learning: None identified     Follow up: Yes    Please consult as needed.    Thank you!  CORBY Caro, LDN

## 2020-08-03 NOTE — ASSESSMENT & PLAN NOTE
ASSESSMENT     Doe Woodall is a 30 y.o. male with a past psychiatric history of alcohol use disorder, suicidal ideation and self reported depression, who [place] [facility] [place] .    IMPRESSION  Suicidal Ideation, chronic  Alcohol Use Disorder  R/o substance induced mood disorder  R/o panic disorder    RECOMMENDATION(S)      1. Scheduled Medication(s):  --valium taper    2. PRN Medication(s):  --none    3.  Monitor:  Please obtain daily EKG to monitor QTc    4. Legal Status/Precaution(s):  Recommend/continue PEC/CEC as patient is in imminent danger of hurting self or others and is gravely disabled due to a psychiatric illness.  OR   Patient does not meet criteria for PEC or inpatient psychiatric admission at this time. Recommend to rescind PEC if one was placed. Patient is not currently an imminent danger to self or others and is not gravely disabled due to a psychiatric illness.    5. Capacity:  Pt continues to have waxing and waning of symptoms and continues to refuse treatment at times. Therefore pt currently does NOT have medical decision making capacity due to Delirium. Please contact next of kin for making medical decisions currently.    6. Other:  CAM ICU - negative  DELIRIUM BEHAVIOR MANAGEMENT   PLEASE utilize CHEMICAL restraints with PRN meds first for agitation. Minimize use of PHYSICAL restraints   Keep window shades open and room lit during day and room dim at night in order to promote normal sleep-wake cycles   Encourage family at bedside. [place] patient often to situation, location, date   Continue to Limit or Discontinue use of Narcotics, Benzos and Anti-cholinergic medications as they may worsen delirium   Continue medical workup for causative etiology of Delirium

## 2020-08-03 NOTE — ASSESSMENT & PLAN NOTE
On admission, was concerned for DKA due to elevated BG, elevated Anion gap and decreased bicarb.   On VBG ph 7.359, HCO3 wnl, PCO2 47.3  Patient refers that he is not taking any medications for DM due to diarrhea, specially metformin. Poor compliance with medication and f/u. He states that when checks his glucose it usually runs in the 500s. At the moment, DKA seems unlikely.     Labs: Glucose 527 now POCT glucose 190  Serum beta hydroxybutyrate 0.6, anion gap 17 now 14  VBG shows pH 7.369 and bicarb 26    Plan:  - Insulin drip  - Dextrose 5% and NaCl 0.45% infusion  - Replace K+ Mg Phos and other e- as needed  - Monitor glucose, BMP q4
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
## 2020-08-03 NOTE — SUBJECTIVE & OBJECTIVE
"Past Medical History:   Diagnosis Date    Diabetes mellitus     Encounter for blood transfusion     Perineal abscess 2014       Past Surgical History:   Procedure Laterality Date    DECOMPRESSION OF LUMBAR SPINE USING MINIMALLY INVASIVE TECHNIQUE Right 2018    Procedure: DECOMPRESSION, SPINE, LUMBAR, MINIMALLY INVASIVE L3-4;  Surgeon: Cristian Cervantes MD;  Location: [facility] OR [address] [place] [address];  Service: Neurosurgery;  Laterality: Right;    ORTHOPEDIC SURGERY         Review of patient's allergies indicates:  No Known Allergies    No current facility-administered medications on file prior to encounter.      Current Outpatient Medications on File Prior to Encounter   Medication Sig    blood sugar diagnostic Strp Check blood glucose in AM then three times per day prior to meals.   Can check in evening prior to bed.    blood-glucose meter kit Use as instructed    insulin syringe,safetyneedle 1 mL 29 x 1/2" Syrg 1 Syringe by Misc.(Non-Drug; Combo Route) route 4 (four) times daily.    lancets 32 gauge Misc 1 lancet by Misc.(Non-Drug; Combo Route) route 4 (four) times daily.    liraglutide 0.6 mg/0.1 mL, 18 mg/3 mL, subq PNIJ (VICTOZA 3-ROSALINDA) 0.6 mg/0.1 mL (18 mg/3 mL) PnIj Inject 0.6 mg into the skin once daily.    loratadine (CLARITIN) 10 mg tablet Take 1 tablet (10 mg total) by mouth once daily.    metFORMIN (GLUCOPHAGE) 500 MG tablet Take 500 mg by mouth 2 (two) times daily with meals.    naproxen (NAPROSYN) 500 MG tablet Take 1 tablet (500 mg total) by mouth 2 (two) times daily with meals. Take every 12 hours for the first five days around the clock then every 12 hours as needed.     Family History     Problem Relation (Age of Onset)    Diabetes Father, Paternal Grandmother, Paternal Grandfather        Tobacco Use    Smoking status: Former Smoker     Packs/day: 0.50     Years: 9.00     Pack years: 4.50     Quit date: 2013     Years since quittin.0    Smokeless tobacco: Former User   Substance and " Sexual Activity    Alcohol use: Yes     Alcohol/week: 2.0 standard drinks     Types: 2 Cans of beer per week     Comment: use to drink 12 pack daily, but now drinks only on weekends     Drug use: Not Currently    Sexual activity: Yes     Partners: Female     Birth control/protection: None     Review of Systems   Constitutional: Negative.    HENT: Negative.    Eyes: Negative.    Respiratory: Negative.    Cardiovascular: Negative.    Gastrointestinal: Positive for diarrhea.   Endocrine: Negative for polydipsia and polyuria.   Genitourinary: Negative.    Musculoskeletal: Positive for neck pain.   Skin: Negative.    Neurological: Positive for headaches.   Psychiatric/Behavioral: Positive for behavioral problems, confusion and suicidal ideas (Unclear ). The patient is nervous/anxious.      Objective:     Vital Signs (Most Recent):  Temp: 98.2 °F (36.8 °C) (08/02/20 1915)  Pulse: 96 (08/02/20 1915)  Resp: 18 (08/02/20 1915)  BP: 114/77 (08/02/20 1915)  SpO2: 96 % (08/02/20 1915) Vital Signs (24h Range):  Temp:  [98.2 °F (36.8 °C)-98.8 °F (37.1 °C)] 98.2 °F (36.8 °C)  Pulse:  [91-96] 96  Resp:  [15-20] 18  SpO2:  [95 %-98 %] 96 %  BP: (114-130)/(68-80) 114/77     Weight: 120 kg (264 lb 7.1 oz)  Body mass index is 44.01 kg/m².    Physical Exam  Constitutional:       Appearance: Normal appearance. He is obese.   HENT:      Head: Normocephalic.      Nose: Nose normal.      Mouth/Throat:      Mouth: Mucous membranes are moist.   Eyes:      Extraocular Movements: Extraocular movements intact.      Pupils: Pupils are equal, round, and reactive to light.   Cardiovascular:      Rate and Rhythm: Normal rate and regular rhythm.   Pulmonary:      Effort: Pulmonary effort is normal.      Breath sounds: Normal breath sounds.   Abdominal:      General: Bowel sounds are normal.      Palpations: Abdomen is soft.   Skin:     General: Skin is warm.   Neurological:      General: No focal deficit present.      Mental Status: He is alert.    Psychiatric:      Comments: Patient calm and denies suicide ideation on the moment.            CRANIAL NERVES     CN III, IV, VI   Pupils are equal, round, and reactive to light.       Significant Labs: All pertinent labs within the past 24 hours have been reviewed.    Significant Imaging: I have reviewed and interpreted all pertinent imaging results/findings within the past 24 hours.

## 2020-08-03 NOTE — PLAN OF CARE
08/03/20 1421   Post-Acute Status   Post-Acute Authorization Placement  (IP Psych)   Post-Acute Placement Status Awaiting Internal Medical Clearance   Discharge Delays None known at this time
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
## 2020-08-03 NOTE — NURSING
Spoke to team regarding d/c of insulin orders. MD states to allow pt to eat and give detemir, then stop insulin and D5 2 hours after. Pt will then be transitioned to accuchecks ACHS. Pt currently on diabetic diet. Dietary called and lunch order placed. Awaiting food at this time. Will continue to monitor.

## 2020-08-03 NOTE — PT/OT/SLP EVAL
Occupational Therapy   Evaluation and Discharge Note    Name: Doe Woodall  MRN: [id number]  Admitting Diagnosis:  DKA (diabetic ketoacidoses)      Recommendations:     Discharge Recommendations: home  Discharge Equipment Recommendations:  none  Barriers to discharge:  None    Assessment:     Doe Woodall is a 30 y.o. male with a medical diagnosis of DKA (diabetic ketoacidoses). At this time, patient is functioning at their prior level of function and does not require further acute OT services.     Plan:     During this hospitalization, patient does not require further acute OT services.  Please re-consult if situation changes.    · Plan of Care Reviewed with: patient    Subjective     Chief Complaint: Back pain ( at baseline-- hx of back sx)  Patient/Family Comments/goals: Return home     Occupational Profile:  Living Environment: pt lives with wife; 2SH with threshold to enter and 10-12 steps to 2nd floor with B railing; bathroom contains tub-shower combo with no DME   Previous level of function: PTA, pt reports being independent with ADL and functional mobility   Roles and Routines: Home/community dweller, drives, works  Equipment Used at home:  none(owns SPC)  Assistance upon Discharge: Pt will have assistance from family     Pain/Comfort:  · Pain Rating 1: 5/10  · Location - Orientation 1: lower  · Location 1: back  · Pain Addressed 1: Distraction    Patients cultural, spiritual, Hindu conflicts given the current situation: no    Objective:     Communicated with: RN prior to session.  Patient found supine with blood pressure cuff, telemetry, pulse ox (continuous)(sitter at bedside) upon OT entry to room.    General Precautions: Standard,     Orthopedic Precautions:N/A   Braces: N/A     Occupational Performance:    Bed Mobility:    · Patient completed Rolling/Turning to Right with independence  · Patient completed Supine to Sit with independence  · Patient completed Sit to Supine with  independence    Functional Mobility/Transfers:  · Patient completed Sit <> Stand Transfer with independence  with  no assistive device   · Functional Mobility: Pt completed functional mobility within room with independence and no AD. She tolerated well with no LOB or SOB.     Cognitive/Visual Perceptual:  Cognitive/Psychosocial Skills:     -       Oriented to: Person, Place, Time and Situation   -       Follows Commands/attention:Follows multistep  commands  -       Communication: clear/fluent  -       Safety awareness/insight to disability: intact   -       Mood/Affect/Coping skills/emotional control: Appropriate to situation  Visual/Perceptual:      -Intact     Physical Exam:  Postural examination/scapula alignment:    -       Rounded shoulders  Skin integrity: Visible skin intact  Edema:  None noted  Dominant hand:    -       RUE  Upper Extremity Range of Motion:     -       Right Upper Extremity: WFL  -       Left Upper Extremity: WFL  Upper Extremity Strength:    -       Right Upper Extremity: WFL 5/5  -       Left Upper Extremity: WFL  5/5   Strength:    -       Right Upper Extremity: WFL  -       Left Upper Extremity: WFL    AMPAC 6 Click ADL:  AMPAC Total Score: 24    Treatment & Education:  -Pt edu on OT role/POC  -Importance of OOB activity with staff assistance  -Safety during functional t/f and mobility  - White board updated  - Multiple self care tasks completed-- as noted above  - All questions/concerns answered within OT scope of practice  Education:    Patient left supine with all lines intact, call button in reach and RN notified    GOALS:   Multidisciplinary Problems     Occupational Therapy Goals     Not on file          Multidisciplinary Problems (Resolved)        Problem: Occupational Therapy Goal    Goal Priority Disciplines Outcome Interventions   Occupational Therapy Goal   (Resolved)     OT, PT/OT Met    Description:  Pt is currently performing self care tasks, functional mobility & t/fs  at level requiring no physical assist. Functional strength and balance are appropriate. IPOT services are not recommended at this time .                        History:     Past Medical History:   Diagnosis Date    Diabetes mellitus     Encounter for blood transfusion     Perineal abscess 8/1/2014       Past Surgical History:   Procedure Laterality Date    DECOMPRESSION OF LUMBAR SPINE USING MINIMALLY INVASIVE TECHNIQUE Right 7/6/2018    Procedure: DECOMPRESSION, SPINE, LUMBAR, MINIMALLY INVASIVE L3-4;  Surgeon: Cristian Cervantes MD;  Location: [facility] OR [address] [place] [address];  Service: Neurosurgery;  Laterality: Right;    ORTHOPEDIC SURGERY         Time Tracking:     OT Date of Treatment: 08/03/20  OT Start Time: 1048  OT Stop Time: 1102  OT Total Time (min): 14 min    Billable Minutes:Evaluation 14    Isa Jain OT  8/3/2020

## 2020-08-03 NOTE — MEDICAL/APP STUDENT
Subjective:       Patient ID: Doe Woodall is a 30 y.o. male.    Chief Complaint: Hyperglycemia ( mg/dl per EMS), Suicidal, and Alcohol Intoxication    HPI   Doe Woodall is a 30 year old man with uncontrolled T2DM (Hba1c 13.9% on admit), depression, alcohol use disorder, who presented on 8/2 with alcohol intoxication, suicidal ideations, and panic attacks. Day 1.     Per ED notes, the patient was found by EMS at the wheel of his car in the middle of a residential street, intoxicated and asleep. He had drank several beers and 1 pint of whiskey the night before. He disclosed to EMS that he has chronic suicidal ideation, and a 50/50 chance of acting on his plan. However, upon arrival to the ED the patient was calm and cooperative but slurring his words. He was placed under PEC.      In the ED the patient claimed that he had a panic attack overnight and clarified that he does not have any current suicidal ideation, but that in the past he owned a shotgun and had contemplated shooting himself but never did. His recent stressors include expecting a child with his partner later this year.   Vitals were stable, glucose 527, serum beta-hydroxybutyrate 0.6, anion gap 17, K+ 3.4.   Urinalysis 1+ protein, 3+ glucose, trace ketones, trace leukocytes.  VBG showed pH 7.359, pCO2 47.3, HCO3 26.7   The patient was started on an insulin drip, IV fluids, and K+ replacement, admitted to hospital medicine.      Regarding his T2DM, there was initial concern for DKA, however the patient denies symptoms of DKA including increased thirst/urination from baseline, abdominal pain, nausea or vomiting. He reports that he has not taken his diabetes medications (metformin, liraglutide, insulin) in 6 months because they all gave him diarrhea, and that his PCP checks his glucose regularly and it is usually >500.       Hospital Course  8/2: Insulin drip + dextrose, NPO. Started diazepam folate thiamine multivitamin in light of heavy  alcohol use. CIWA q4 but no notes from nursing yet.      Interval History  Patient reports no more suicidal ideations, denies tremors hallucinations or anxiety. He reports insomnia, which is chronic, but worse during hospitalization. He requested a sleep study to assess for sleep apnea, which he claims he has been worked up for before.      Review of Systems   Constitutional: Negative.    Respiratory: Negative.    Cardiovascular: Negative.    Gastrointestinal: Negative.    Genitourinary: Negative.    Musculoskeletal: Positive for neck stiffness (mild neck stiffness). Negative for neck pain.   Neurological: Negative for tremors.   Psychiatric/Behavioral: Negative for decreased concentration and hallucinations. The patient is not nervous/anxious.         Reports chronic insomnia but worse during admission           Objective:       Vital Signs (Most Recent):  Temp: 98.2 °F (36.8 °C) (08/03/20 1150)  Pulse: 87 (08/03/20 1150)  Resp: 19 (08/03/20 1150)  BP: (!) 169/83 (08/03/20 1150)  SpO2: 96 % (08/03/20 1150) Vital Signs (24h Range):  Temp:  [98 °F (36.7 °C)-98.8 °F (37.1 °C)] 98.2 °F (36.8 °C)  Pulse:  [86-97] 87  Resp:  [16-24] 19  SpO2:  [95 %-98 %] 96 %  BP: (114-173)/(66-97) 169/83     Weight: 120 kg (264 lb 7.1 oz)  Body mass index is 44.01 kg/m².    Intake/Output Summary (Last 24 hours) at 8/3/2020 1210  Last data filed at 8/3/2020 0554  Gross per 24 hour   Intake 1266.67 ml   Output 800 ml   Net 466.67 ml      Physical Exam  Constitutional:       General: He is not in acute distress.     Appearance: He is obese.   HENT:      Mouth/Throat:      Mouth: Mucous membranes are moist.   Eyes:      Pupils: Pupils are equal, round, and reactive to light.   Cardiovascular:      Rate and Rhythm: Normal rate and regular rhythm.      Heart sounds: No murmur.   Pulmonary:      Effort: Pulmonary effort is normal.      Breath sounds: Normal breath sounds.   Abdominal:      Palpations: Abdomen is soft.      Tenderness: There  is no abdominal tenderness.   Skin:     General: Skin is warm and dry.   Neurological:      General: No focal deficit present.      Mental Status: He is alert and oriented to person, place, and time.   Psychiatric:         Mood and Affect: Mood normal.         Behavior: Behavior normal.         Thought Content: Thought content normal.         Judgment: Judgment normal.         CBC  Recent Labs     08/02/20  1200 08/03/20  0531   WBC 4.65 5.63   HGB 15.7 14.9   HCT 46.1 42.9    160     Chemistries  Recent Labs     08/02/20  1200 08/03/20  0531    136  136   K 3.4* 3.5  3.5    102  102   CO2 18* 25  25   BUN 8 7  7   CREATININE 0.8 0.6  0.6   CALCIUM 7.9* 7.8*  7.8*   PROT 6.5 6.1   ALBUMIN 3.3* 3.1*   BILITOT 0.4 0.8   ALKPHOS 144* 131   * 158*   * 140*   MG 2.1 1.8   PHOS 3.4 2.3*        Recent Labs     08/03/20  0531 08/03/20  0850   ANIONGAP 9  9 8       Recent Labs     08/03/20  0257 08/03/20  0401 08/03/20  0503 08/03/20  0609 08/03/20  0656 08/03/20  0800 08/03/20  0903 08/03/20  1012 08/03/20  1110 08/03/20  1205   POCTGLUCOSE 204* 205* 210* 219* 223* 219* 240* 236* 253* 286*       Recent Labs     08/02/20  1937 08/03/20  0043 08/03/20  0531   LACTATE 2.1 1.3 1.0       HbA1c 8/2: 13.9%    Alcohol serum 8/2: 311     Urine drug screen 8/2:  Benzodiazepines   Negative    Methadone metabolites   Negative    Cocaine (Metab.)   Negative    Opiate Scrn, Ur   Negative    Barbiturate Screen, Ur   Negative    Amphetamine Screen, Ur   Negative    THC   Negative    Phencyclidine   Negative          Chest Xray 8/2:  Impression:     No convincing radiographic evidence of pneumonia on this limited single view, noting that early/mild viral pneumonia may be radiographically occult.  Specifically, no focal consolidation.      Assessment:       Doe Woodall is a 30 year old male with uncontrolled T2DM, depression, and panic disorder admitted for suicidal ideations and  hyperglycemia.      1. Alcoholic intoxication without complication    2. Diabetic ketoacidosis without coma associated with type 1 diabetes mellitus    3. DKA (diabetic ketoacidoses)    4. Chest pain    5. Suicide attempt        Plan:       Suicidal Ideation  Stable, no current suicidal ideation, no recent suicide attempts, not a harm to self or others, not gravely disabled due to psychiatric illness. In the past reports he had a plan to kill himself with a shotgun but never went through with it.  - PEC order - per psych consult, does not meet criteria for PEC so can be rescinded today  - Psych to provide opt resources for therapy and rehab       Hyperglycemia  On admission, was concerned for DKA, however patient does not meet criteria for DKA or HHS.  Glucose 527 on admit, now >200  Serum beta hydroxybutyrate 0.6, anion gap 17 now 8  VBG on admit pH 7.369 and bicarb 26  - Insulin drip 12U/hr - will start transition to basal bolus today    86 units from drip over 24hrs, more than expected by weight calculation   --> 24hr levemir bid, 16 novolog tid   First dose levemir today with food, turn off drip after 2hrs. Check glucose hourly  - Dextrose 5% and NaCl 0.45% infusion  - Replace K+ Mg Phos and other e- as needed  - Monitor glucose, BMP q4  - Diabetic diet  - Dietician/nutrition consult        Alcohol use disorder  Prior to admission consumed 1 pint whiskey and multiple beers. Not currently exhibiting withdrawal symptoms.  - Thiamine 100mg daily  - Folic acid 1mg daily  - Multivitamin tablet daily  - Diazepam 5mg q8 - per Psych consult, continue diazepam 5pm bid for 8 doses. Patient will receive 4th dose tonight. Dc with remaining doses.  - Monitor for symptoms of withdrawal eg tremors, agitation, hallucinations   - Would benefit from inpatient rehab but not interested -          Insomnia  Patient reports he has longstanding insomnia that has worsened since admission.  - Already on diazepam - add melatonin 9mg  prn  - Outpatient sleep study referral       Discharge planning  - Patient lives with family, likely dc home tomorrow   - dc PEC, discharge only conditional on insuline basal bolus titration        Lamar Nieto  MS4, IM Team 5

## 2020-08-03 NOTE — PLAN OF CARE
[id number] - [place], [place] - [address] AT [facility] [address]  [address]  [facility] [address]  Phone: [phone] Fax: [phone]     Payor: MEDICAID / Plan: [place] / Product Type: Managed Medicaid /           08/03/20 1354   Discharge Assessment   Assessment Type Discharge Planning Assessment   Confirmed/corrected address and phone number on facesheet? Yes   Assessment information obtained from? Patient   Prior to hospitilization cognitive status: Alert/Oriented   Prior to hospitalization functional status: Independent   Current cognitive status: Alert/Oriented   Current Functional Status: Independent   Lives With significant other   Able to Return to Prior Arrangements yes   Is patient able to care for self after discharge? Unable to determine at this time (comments)   Who are your caregiver(s) and their phone number(s)? Aminta Prince (s.o) [phone]   Patient's perception of discharge disposition home or selfcare   Readmission Within the Last 30 Days no previous admission in last 30 days   Patient currently being followed by outpatient case management? No   Patient currently receives any other outside agency services? No   Equipment Currently Used at Home none   Is the patient taking medications as prescribed? yes   Does the patient have transportation home? Yes   Transportation Anticipated family or friend will provide   Does the patient receive services at the [facility]? No   Discharge Plan A Psychiatric hospital   Discharge Plan B Home with family   DME Needed Upon Discharge  none   Patient/Family in Agreement with Plan yes

## 2020-08-03 NOTE — H&P
"[facility] Hospital Medicine  History & Physical    Patient Name: Doe Woodall  MRN: [id number]  Admission Date: 8/2/2020  Attending Physician: Natasha Garza MD   Primary Care Provider: [facility] Medicine Team: [facility] HOSP MED 5 Brenda Barrow MD     Patient information was obtained from patient, EMS personnel and ER records.     Subjective:     Principal Problem:DKA (diabetic ketoacidoses)    Chief Complaint:   Chief Complaint   Patient presents with    Hyperglycemia      mg/dl per EMS    Suicidal    Alcohol Intoxication        HPI: 29 y/o M with history T2DM with medication non compliance and history of alcohol abuse is brought by ED after being pulled by the police for what he describes as a panic attack this morning. As per patient, he was getting breakfast and suddenly started to get anxious, with increased palpitations, mild SOB, lightheadedness and feeling as if he was going to pass out. He does not recall details of what happened but he refers that he was saying "I want to die". When addressed about it, patient denies suicidal ideation, he admits that he is under stress but not plan to harm. He does not own fire weapons currently.     As per ED notes: the patient was found by EMS at the wheel of his car in the middle of a residential street, intoxicated and asleep. He had drank several beers and 1 pint of whiskey the night before. He disclosed to EMS that he has chronic suicidal ideation, and a 50/50 chance of acting on his plan. However, upon arrival to the ED the patient was calm and cooperative but slurring his words. He was placed under PEC.     Upon arrival patient with , AG 17, CO2 18 and ED concerned for DKA. Patient denies HA, nausea, vomiting, polydipsia, polyuria, abdominal pain, weakness, SOB. He was started on Insulin drip with potassium replacement and IVF.   ED also concerned for alcohol withdrawal and monitored closely.  " "        Past Medical History:   Diagnosis Date    Diabetes mellitus     Encounter for blood transfusion     Perineal abscess 2014       Past Surgical History:   Procedure Laterality Date    DECOMPRESSION OF LUMBAR SPINE USING MINIMALLY INVASIVE TECHNIQUE Right 2018    Procedure: DECOMPRESSION, SPINE, LUMBAR, MINIMALLY INVASIVE L3-4;  Surgeon: Cristian Cervantes MD;  Location: [facility] OR [address] [place] [address];  Service: Neurosurgery;  Laterality: Right;    ORTHOPEDIC SURGERY         Review of patient's allergies indicates:  No Known Allergies    No current facility-administered medications on file prior to encounter.      Current Outpatient Medications on File Prior to Encounter   Medication Sig    blood sugar diagnostic Strp Check blood glucose in AM then three times per day prior to meals.   Can check in evening prior to bed.    blood-glucose meter kit Use as instructed    insulin syringe,safetyneedle 1 mL 29 x 1/2" Syrg 1 Syringe by Misc.(Non-Drug; Combo Route) route 4 (four) times daily.    lancets 32 gauge Misc 1 lancet by Misc.(Non-Drug; Combo Route) route 4 (four) times daily.    liraglutide 0.6 mg/0.1 mL, 18 mg/3 mL, subq PNIJ (VICTOZA 3-ROSALINDA) 0.6 mg/0.1 mL (18 mg/3 mL) PnIj Inject 0.6 mg into the skin once daily.    loratadine (CLARITIN) 10 mg tablet Take 1 tablet (10 mg total) by mouth once daily.    metFORMIN (GLUCOPHAGE) 500 MG tablet Take 500 mg by mouth 2 (two) times daily with meals.    naproxen (NAPROSYN) 500 MG tablet Take 1 tablet (500 mg total) by mouth 2 (two) times daily with meals. Take every 12 hours for the first five days around the clock then every 12 hours as needed.     Family History     Problem Relation (Age of Onset)    Diabetes Father, Paternal Grandmother, Paternal Grandfather        Tobacco Use    Smoking status: Former Smoker     Packs/day: 0.50     Years: 9.00     Pack years: 4.50     Quit date: 2013     Years since quittin.0    Smokeless tobacco: Former User   Substance " and Sexual Activity    Alcohol use: Yes     Alcohol/week: 2.0 standard drinks     Types: 2 Cans of beer per week     Comment: use to drink 12 pack daily, but now drinks only on weekends     Drug use: Not Currently    Sexual activity: Yes     Partners: Female     Birth control/protection: None     Review of Systems   Constitutional: Negative.    HENT: Negative.    Eyes: Negative.    Respiratory: Negative.    Cardiovascular: Negative.    Gastrointestinal: Positive for diarrhea.   Endocrine: Negative for polydipsia and polyuria.   Genitourinary: Negative.    Musculoskeletal: Positive for neck pain.   Skin: Negative.    Neurological: Positive for headaches.   Psychiatric/Behavioral: Positive for behavioral problems, confusion and suicidal ideas (Unclear ). The patient is nervous/anxious.      Objective:     Vital Signs (Most Recent):  Temp: 98.2 °F (36.8 °C) (08/02/20 1915)  Pulse: 96 (08/02/20 1915)  Resp: 18 (08/02/20 1915)  BP: 114/77 (08/02/20 1915)  SpO2: 96 % (08/02/20 1915) Vital Signs (24h Range):  Temp:  [98.2 °F (36.8 °C)-98.8 °F (37.1 °C)] 98.2 °F (36.8 °C)  Pulse:  [91-96] 96  Resp:  [15-20] 18  SpO2:  [95 %-98 %] 96 %  BP: (114-130)/(68-80) 114/77     Weight: 120 kg (264 lb 7.1 oz)  Body mass index is 44.01 kg/m².    Physical Exam  Constitutional:       Appearance: Normal appearance. He is obese.   HENT:      Head: Normocephalic.      Nose: Nose normal.      Mouth/Throat:      Mouth: Mucous membranes are moist.   Eyes:      Extraocular Movements: Extraocular movements intact.      Pupils: Pupils are equal, round, and reactive to light.   Cardiovascular:      Rate and Rhythm: Normal rate and regular rhythm.   Pulmonary:      Effort: Pulmonary effort is normal.      Breath sounds: Normal breath sounds.   Abdominal:      General: Bowel sounds are normal.      Palpations: Abdomen is soft.   Skin:     General: Skin is warm.   Neurological:      General: No focal deficit present.      Mental Status: He is  alert.   Psychiatric:      Comments: Patient calm and denies suicide ideation on the moment.            CRANIAL NERVES     CN III, IV, VI   Pupils are equal, round, and reactive to light.       Significant Labs: All pertinent labs within the past 24 hours have been reviewed.    Significant Imaging: I have reviewed and interpreted all pertinent imaging results/findings within the past 24 hours.    Assessment/Plan:     * DKA (diabetic ketoacidoses)  On admission, was concerned for DKA due to elevated BG, elevated Anion gap and decreased bicarb.   On VBG ph 7.359, HCO3 wnl, PCO2 47.3  Patient refers that he is not taking any medications for DM due to diarrhea, specially metformin. Poor compliance with medication and f/u. He states that when checks his glucose it usually runs in the 500s. At the moment, DKA seems unlikely.     Labs: Glucose 527 now POCT glucose 190  Serum beta hydroxybutyrate 0.6, anion gap 17 now 14  VBG shows pH 7.369 and bicarb 26    Plan:  - Insulin drip  - Dextrose 5% and NaCl 0.45% infusion  - Replace K+ Mg Phos and other e- as needed  - Monitor glucose, BMP q4      Suicide attempt  During evaluation patient stable, no current suicidal ideation, no recent suicide attempts. In the past reports he had a plan to kill himself with a shotgun but never went through with it.He currently does not own fire arm.     Plan:   - PEC order   - Inpatient psych consult in AM         Alcohol abuse with intoxication  Recently consumed 1 pint whiskey and multiple beers. He says this is his normal consumption and only on the weekends. He also refers that alcohol is the only thing that helps him sleep at nigh.  Not currently exhibiting withdrawal symptoms.  Toxicology: + alcohol 311, drug screen negative.     Plan  - Thiamine 100mg daily  - Folic acid 1mg daily  - Multivitamin tablet daily  - Diazepam 5mg q8  - As no current symptoms, no need for CIWA   - Monitor for symptoms of withdrawal eg tremors, agitation,  hallucinations   - Psych consult in AM      VTE Risk Mitigation (From admission, onward)         Ordered     enoxaparin injection 40 mg  Every 24 hours      08/02/20 1534     IP VTE HIGH RISK PATIENT  Once      08/02/20 1534     Place sequential compression device  Until discontinued      08/02/20 1534     Place ROBY hose  Until discontinued      08/02/20 1336                   Brenda Barrow MD  Department of Hospital Medicine   [facility]

## 2020-08-03 NOTE — PLAN OF CARE
Pt AAOx4, VSS, denies pain. Pt independent, up to bathroom. On room air, SPO2 WNL. PEC d/c'ed today. Denies SI/HI. Insulin gtt d/c'ed and pt transitioned to novolog and detemir. Pt took shower this evening independently. Belongings returned to pt. Will continue to monitor glucose.

## 2020-08-03 NOTE — PLAN OF CARE
Problem: Adult Inpatient Plan of Care  Goal: Plan of Care Review  Outcome: Ongoing, Progressing     Problem: Diabetes Comorbidity  Goal: Blood Glucose Level Within Desired Range  Outcome: Ongoing, Progressing     POC reviewed at bedside. Questions and concerns addressed. Remains on Insulin gtt with blood sugar checked q1hr. Med with Valium for increased anxiety during night. Safety maintained. Bed in low and locked position. Call light within reach. Side rails up x2. Frequent rounds. Wearing blue paper scrubs. Sitter at bedside at all times.

## 2020-08-03 NOTE — PLAN OF CARE
Problem: Physical Therapy Goal  Goal: Physical Therapy Goal  Description: The patient is near his functional baseline. He is safe to ambulate with supervision due to PEC status. He is safe to return home with no therapy needs. Discharge acute PT at this time.   Mattie Parsons, PT  8/3/2020   Outcome: Met

## 2020-08-03 NOTE — PT/OT/SLP EVAL
Physical Therapy Evaluation and Discharge Note    Patient Name:  Doe Woodall   MRN:  [id number]    Recommendations:     Discharge Recommendations:  home   Discharge Equipment Recommendations: none   Barriers to discharge: None    Assessment:     Doe Woodall is a 30 y.o. male admitted with a medical diagnosis of DKA (diabetic ketoacidoses).  At this time, patient is functioning at their prior level of function and does not require further acute PT services.     Recent Surgery: * No surgery found *      Plan:     During this hospitalization, patient does not require further acute PT services.  Please re-consult if situation changes.      Subjective     Chief Complaint: Soreness low back  Patient/Family Comments/goals: return home  Pain/Comfort:  · Pain Rating 1: (low back pain, chronic, did not rate)  · Location - Orientation 1: lower  · Location 1: back  · Pain Addressed 1: Distraction  · Pain Rating Post-Intervention 1: (remained)    Patients cultural, spiritual, Sikhism conflicts given the current situation: no    Living Environment:  He lives with his wife in a 2 SH (bed and bath on 2nd floor, 10-12 steps JULIO CÉSAR HR); tub-shower combo.  Prior to admission, patients level of function was independent.  Equipment used at home: none(owns SPC).  DME owned (not currently used): single point cane.  Upon discharge, patient will have assistance from wife.    Objective:     Communicated with RN prior to session.  Patient found HOB elevated with blood pressure cuff, bed alarm, peripheral IV, pulse ox (continuous), telemetry upon PT entry to room. Sitter present.      General Precautions: Standard, (PEC)   Orthopedic Precautions:N/A   Braces: N/A       Exams:    Cognitive Exam  Patient is A&O x4 and follows 100% of one -step commands    Fine Motor Coordination   -       WFL     Postural Exam Patient presented with the following abnormalities:    -       Rounded shoulders  -       Forward head  -       Kyphosis    Sensation    -       Light touch intact JULIO CÉSAR LE   Skin Integrity/Edema     -       Skin integrity: visibly intact  -       Edema: NA   R LE ROM WFL   R LE Strength 4/5 hip flexion, knee ext/flex, and ankle DF/PF   L LE ROM WFL   L LE Strength  4/5 hip flexion, knee ext/flex, and ankle DF/PF        Balance          Static Sitting independent    Dynamic Sitting independent    Static Standing independent    Dynamic Standing supervision assistance  Tandem stance JULIO CÉSAR, head turns vertical/horizontal WFL               Functional Mobility:    Bed Mobility  Supine to Sit on the R side:  independent   Sit to supine: independent    Transfers Sit to Stand:  independent   Gait  Gait Distance: 120 ft with no AD  Assistance Level: supervision assistance   Description: decreased R step length, wide JAIDEN, decreased gait speed          AM-PAC 6 CLICK MOBILITY  Total Score:24       Therapeutic Activities and Exercises:   Patient is safe to ambulate with supervision assistance due to PEC.  Patient educated on role of therapy, goals of session, benefits of out of bed mobility. Patient agreeable to mobilize with therapy.  Discussed PT plan of care during hospitalization- discharge acute PT, patient encouraged to ambulate with SV 3x/day, sit up in chair 3x/day to prevent deconditioning. Patient educated that they need to call for assistance to mobilize out of bed. Whiteboard updated as appropriate. Patient educated on how their diagnosis impacts their mobility within PT scope of practice.     AM-PAC 6 CLICK MOBILITY  Total Score:24     Patient left HOB elevated with all lines intact, call button in reach and sitter present.    GOALS:   Multidisciplinary Problems     Physical Therapy Goals     Not on file          Multidisciplinary Problems (Resolved)        Problem: Physical Therapy Goal    Goal Priority Disciplines Outcome Goal Variances Interventions   Physical Therapy Goal   (Resolved)     PT, PT/OT Met     Description: The patient is  near his functional baseline. He is safe to ambulate with supervision due to PEC status. He is safe to return home with no therapy needs. Discharge acute PT at this time.   Mattie Parsons, PT  8/3/2020                    History:     Past Medical History:   Diagnosis Date    Diabetes mellitus     Encounter for blood transfusion     Perineal abscess 8/1/2014       Past Surgical History:   Procedure Laterality Date    DECOMPRESSION OF LUMBAR SPINE USING MINIMALLY INVASIVE TECHNIQUE Right 7/6/2018    Procedure: DECOMPRESSION, SPINE, LUMBAR, MINIMALLY INVASIVE L3-4;  Surgeon: Cristian Cervantes MD;  Location: [address];  Service: Neurosurgery;  Laterality: Right;    ORTHOPEDIC SURGERY         Time Tracking:     PT Received On: 08/03/20  PT Start Time: 1048     PT Stop Time: 1102  PT Total Time (min): 14 min     Billable Minutes: Evaluation 10      Mattie Parsons, PT  08/03/2020

## 2020-08-03 NOTE — PLAN OF CARE
Problem: Occupational Therapy Goal  Goal: Occupational Therapy Goal  Description:  Pt is currently performing self care tasks, functional mobility & t/fs at level requiring no physical assist. Functional strength and balance are appropriate. IPOT services are not recommended at this time .       Outcome: Met   Evaluation completed. Pt with no acute OT needs.  Isa Jain, OT  8/3/2020

## 2020-08-03 NOTE — MEDICAL/APP STUDENT
History & Physical  [facility] MED 5    SUBJECTIVE:   Chief Complaint/Reason for Admission: Panic attacks    History of Present Illness:  Doe Woodall is a 30 year old man with uncontrolled T2DM (Hba1c 13.9% on admit), depression, alcohol use disorder, who presented on 8/2 with alcohol intoxication, suicidal ideations, and panic attacks.    Per ED notes, the patient was found by EMS at the wheel of his car in the middle of a residential street, intoxicated and asleep. He had drank several beers and 1 pint of whiskey the night before. He disclosed to EMS that he has chronic suicidal ideation, and a 50/50 chance of acting on his plan. However, upon arrival to the ED the patient was calm and cooperative but slurring his words. He was placed under PEC.     In the ED the patient claimed that he had a panic attack overnight and clarified that he does not have any current suicidal ideation, but that in the past he owned a shotgun and had contemplated shooting himself but never did. His recent stressors include expecting a child with his partner later this year.   Vitals were stable, glucose 527, serum beta-hydroxybutyrate 0.6, anion gap 17, K+ 3.4.   Urinalysis 1+ protein, 3+ glucose, trace ketones, trace leukocytes.  VBG showed pH 7.359, pCO2 47.3, HCO3 26.7   The patient was started on an insulin drip, IV fluids, and K+ replacement, admitted to hospital medicine.     Regarding his T2DM, there was initial concern for DKA, however the patient does not meet lab criteria for DKA or HHS, and he denies symptoms of DKA including increased thirst/urination from baseline, abdominal pain, nausea or vomiting. He reports that he has not taken his diabetes medications (metformin, liraglutide, insulin) in 6 months because metformin gave him diarrhea, and that his PCP checks his glucose regularly and it is usually >500.       PMHx  T2DM  Stopped taking medications (metformin, liraglutide and insulin 6 months ago, reporting  diarrhea.  Hba1c 13.9%.     Depression  The patient has never been formally diagnosed with depression, anxiety, panic attacks or other psychiatric illnesses, but he states he often gets anxiety/feelings of panic, though never to the extent of this admission. He admits to frequently having suicidal ideations, but has never acted upon them. The currently denies any suicidal ideation.    PSHx  Spinal surgery for bulging disc 2018    Family Hx  Diabetes in father's side of family    Social Hx  Ex smoker, quit many years ago  Currently drinks >2 beers every night, but drinks more on weekends. Prior to presentation, had 1 pint of whiskey on Saturday night  Recreational drug use: marijuana, no IVDU  Has previously been incarcerated for >9 months, currently on probation. Reports that he is HIV HepC TB -ve.         Old chart was reviewed.    Past Medical History:   Diagnosis Date    Diabetes mellitus     Encounter for blood transfusion     Perineal abscess 2014       Past Surgical History:   Procedure Laterality Date    DECOMPRESSION OF LUMBAR SPINE USING MINIMALLY INVASIVE TECHNIQUE Right 2018    Procedure: DECOMPRESSION, SPINE, LUMBAR, MINIMALLY INVASIVE L3-4;  Surgeon: Cristian Cervantes MD;  Location: [facility] OR [address] [place] [address];  Service: Neurosurgery;  Laterality: Right;    ORTHOPEDIC SURGERY        Family History   Problem Relation Age of Onset    Diabetes Father     Diabetes Paternal Grandmother     Diabetes Paternal Grandfather        Social History     Tobacco Use    Smoking status: Former Smoker     Packs/day: 0.50     Years: 9.00     Pack years: 4.50     Quit date: 2013     Years since quittin.0    Smokeless tobacco: Former User   Substance Use Topics    Alcohol use: Yes     Alcohol/week: 2.0 standard drinks     Types: 2 Cans of beer per week     Comment: use to drink 12 pack daily, but now drinks only on weekends     Drug use: Not Currently      Review of patient's allergies indicates:  No Known  "Allergies    No current facility-administered medications on file prior to encounter.      Current Outpatient Medications on File Prior to Encounter   Medication Sig Dispense Refill    blood sugar diagnostic Strp Check blood glucose in AM then three times per day prior to meals.   Can check in evening prior to bed. 100 each 3    blood-glucose meter kit Use as instructed 1 each 0    insulin syringe,safetyneedle 1 mL 29 x 1/2" Syrg 1 Syringe by Misc.(Non-Drug; Combo Route) route 4 (four) times daily. 100 Syringe 3    lancets 32 gauge Misc 1 lancet by Misc.(Non-Drug; Combo Route) route 4 (four) times daily. 100 each 3    liraglutide 0.6 mg/0.1 mL, 18 mg/3 mL, subq PNIJ (VICTOZA 3-ROSALINDA) 0.6 mg/0.1 mL (18 mg/3 mL) PnIj Inject 0.6 mg into the skin once daily.      loratadine (CLARITIN) 10 mg tablet Take 1 tablet (10 mg total) by mouth once daily. 60 tablet 0    metFORMIN (GLUCOPHAGE) 500 MG tablet Take 500 mg by mouth 2 (two) times daily with meals.      naproxen (NAPROSYN) 500 MG tablet Take 1 tablet (500 mg total) by mouth 2 (two) times daily with meals. Take every 12 hours for the first five days around the clock then every 12 hours as needed. 30 tablet 0        Review of Systems:  Review of Systems   Constitutional: Negative for chills, fever and malaise/fatigue.   Eyes: Negative.    Respiratory: Negative.    Cardiovascular: Negative.    Gastrointestinal: Negative for abdominal pain, diarrhea, nausea and vomiting.   Genitourinary: Positive for frequency (polyuria at baseline).   Musculoskeletal: Negative.    Neurological: Negative for tremors.   Endo/Heme/Allergies: Positive for polydipsia (polydipsia at baseline).   Psychiatric/Behavioral: Positive for depression, substance abuse and suicidal ideas. The patient is nervous/anxious.         OBJECTIVE:     Vital Signs (Most Recent)   Temp: 98.8 °F (37.1 °C) (08/02/20 1851)  Pulse: 91 (08/02/20 1851)  Resp: 20 (08/02/20 1851)  BP: 120/70 (08/02/20 1851)  SpO2: 95 " % (08/02/20 1851)  Body mass index is 44.01 kg/m².      Physical Exam:  Physical Exam   Constitutional: He is oriented to person, place, and time. No distress.   Eyes: Pupils are equal, round, and reactive to light.   Cardiovascular: Normal rate and regular rhythm.   Pulmonary/Chest: Breath sounds normal.   Abdominal: Soft. There is no abdominal tenderness.   Musculoskeletal:         General: No edema.   Neurological: He is alert and oriented to person, place, and time.   Skin: Skin is warm and dry.   Psychiatric: Mood and affect normal.       Laboratory:  CBC/Anemia Labs: Coags:    Recent Labs   Lab 08/02/20  1200   WBC 4.65   HGB 15.7   HCT 46.1      MCV 99*   RDW 11.9    Recent Labs   Lab 08/02/20  1200   INR 0.9        Chemistries: VBG:   Recent Labs   Lab 08/02/20  1200 08/02/20  1546    139   K 3.4* 4.0    106   CO2 18* 19*   BUN 8 6   CREATININE 0.8 0.7   CALCIUM 7.9* 7.7*   PROT 6.5  --    BILITOT 0.4  --    ALKPHOS 144*  --    *  --    *  --    MG 2.1  --    PHOS 3.4  --        HbA1c 8/2: 13.9%      Recent Labs     08/02/20  1546   LACTATE 2.7*    Recent Labs   Lab 08/02/20  1417   PH 7.359   PCO2 47.3*   PO2 57   HCO3 26.7   POCSATURATED 88*   BE 1     Recent Labs     08/02/20  1200 08/02/20  1546   ANIONGAP 17* 14            Alcohol serum 8/2: 311    Urine drug screen 8/2:  Benzodiazepines  Negative    Methadone metabolites  Negative    Cocaine (Metab.)  Negative    Opiate Scrn, Ur  Negative    Barbiturate Screen, Ur  Negative    Amphetamine Screen, Ur  Negative    THC  Negative    Phencyclidine  Negative        Chest Xray 8/2:  Impression:     No convincing radiographic evidence of pneumonia on this limited single view, noting that early/mild viral pneumonia may be radiographically occult.  Specifically, no focal consolidation.      ASSESSMENT/PLAN:     Doe Woodall is a 30 year old male with uncontrolled T2DM, depression, and panic disorder admitted for suicidal  ideations and hyperglycemia.    Suicidal Ideation  Stable, no current suicidal ideation, no recent suicide attempts. In the past reports he had a plan to kill himself with a shotgun but never went through with it.  - PEC order   - Inpatient psych consult in AM      Hyperglycemia  On admission, was concerned for DKA, however patient does not meet criteria for DKA or HHS.  Glucose 527 now POCT glucose 190  Serum beta hydroxybutyrate 0.6, anion gap 17 now 14  VBG shows pH 7.369 and bicarb 26  - Insulin drip  - Dextrose 5% and NaCl 0.45% infusion  - Replace K+ Mg Phos and other e- as needed  - Monitor glucose, BMP q4      Alcohol use disorder  Recently consumed 1 pint whiskey and multiple beers. Not currently exhibiting withdrawal symptoms.  - Thiamine 100mg daily  - Folic acid 1mg daily  - Multivitamin tablet daily  - Diazepam 5mg q8  - As no current symptoms, no need for CIWA   - Monitor for symptoms of withdrawal eg tremors, agitation, hallucinations   - Psych consult in AM      Discharge planning  - Patient lives with family, likely dc home      Lamar Nieto  MS4, IM Team 5

## 2020-08-04 PROBLEM — F19.94 SUBSTANCE INDUCED MOOD DISORDER: Status: ACTIVE | Noted: 2020-08-04

## 2020-08-04 PROBLEM — F10.20 ALCOHOL USE DISORDER, SEVERE, DEPENDENCE: Status: ACTIVE | Noted: 2020-08-04

## 2020-08-04 LAB
ALBUMIN SERPL BCP-MCNC: 3 G/DL (ref 3.5–5.2)
ALP SERPL-CCNC: 141 U/L (ref 55–135)
ALT SERPL W/O P-5'-P-CCNC: 199 U/L (ref 10–44)
ANION GAP SERPL CALC-SCNC: 8 MMOL/L (ref 8–16)
AST SERPL-CCNC: 241 U/L (ref 10–40)
BASOPHILS # BLD AUTO: 0.05 K/UL (ref 0–0.2)
BASOPHILS NFR BLD: 1 % (ref 0–1.9)
BILIRUB SERPL-MCNC: 0.9 MG/DL (ref 0.1–1)
BUN SERPL-MCNC: 8 MG/DL (ref 6–20)
CALCIUM SERPL-MCNC: 8.4 MG/DL (ref 8.7–10.5)
CHLORIDE SERPL-SCNC: 104 MMOL/L (ref 95–110)
CO2 SERPL-SCNC: 24 MMOL/L (ref 23–29)
CREAT SERPL-MCNC: 0.6 MG/DL (ref 0.5–1.4)
DIFFERENTIAL METHOD: ABNORMAL
EOSINOPHIL # BLD AUTO: 0.1 K/UL (ref 0–0.5)
EOSINOPHIL NFR BLD: 1.1 % (ref 0–8)
ERYTHROCYTE [DISTWIDTH] IN BLOOD BY AUTOMATED COUNT: 12.1 % (ref 11.5–14.5)
EST. GFR  (AFRICAN AMERICAN): >60 ML/MIN/1.73 M^2
EST. GFR  (NON AFRICAN AMERICAN): >60 ML/MIN/1.73 M^2
GLUCOSE SERPL-MCNC: 251 MG/DL (ref 70–110)
HCT VFR BLD AUTO: 46.8 % (ref 40–54)
HGB BLD-MCNC: 16.2 G/DL (ref 14–18)
IMM GRANULOCYTES # BLD AUTO: 0.01 K/UL (ref 0–0.04)
IMM GRANULOCYTES NFR BLD AUTO: 0.2 % (ref 0–0.5)
LYMPHOCYTES # BLD AUTO: 2 K/UL (ref 1–4.8)
LYMPHOCYTES NFR BLD: 37.7 % (ref 18–48)
MAGNESIUM SERPL-MCNC: 2 MG/DL (ref 1.6–2.6)
MCH RBC QN AUTO: 33.6 PG (ref 27–31)
MCHC RBC AUTO-ENTMCNC: 34.6 G/DL (ref 32–36)
MCV RBC AUTO: 97 FL (ref 82–98)
MONOCYTES # BLD AUTO: 0.6 K/UL (ref 0.3–1)
MONOCYTES NFR BLD: 12.3 % (ref 4–15)
NEUTROPHILS # BLD AUTO: 2.5 K/UL (ref 1.8–7.7)
NEUTROPHILS NFR BLD: 47.7 % (ref 38–73)
NRBC BLD-RTO: 0 /100 WBC
PHOSPHATE SERPL-MCNC: 3.6 MG/DL (ref 2.7–4.5)
PLATELET # BLD AUTO: 172 K/UL (ref 150–350)
PMV BLD AUTO: 10.3 FL (ref 9.2–12.9)
POCT GLUCOSE: 218 MG/DL (ref 70–110)
POCT GLUCOSE: 236 MG/DL (ref 70–110)
POCT GLUCOSE: 320 MG/DL (ref 70–110)
POTASSIUM SERPL-SCNC: 4 MMOL/L (ref 3.5–5.1)
PROT SERPL-MCNC: 6.2 G/DL (ref 6–8.4)
RBC # BLD AUTO: 4.82 M/UL (ref 4.6–6.2)
SODIUM SERPL-SCNC: 136 MMOL/L (ref 136–145)
WBC # BLD AUTO: 5.22 K/UL (ref 3.9–12.7)

## 2020-08-04 PROCEDURE — 83735 ASSAY OF MAGNESIUM: CPT

## 2020-08-04 PROCEDURE — C9399 UNCLASSIFIED DRUGS OR BIOLOG: HCPCS | Performed by: STUDENT IN AN ORGANIZED HEALTH CARE EDUCATION/TRAINING PROGRAM

## 2020-08-04 PROCEDURE — 25000003 PHARM REV CODE 250: Performed by: STUDENT IN AN ORGANIZED HEALTH CARE EDUCATION/TRAINING PROGRAM

## 2020-08-04 PROCEDURE — 99232 SBSQ HOSP IP/OBS MODERATE 35: CPT | Mod: ,,, | Performed by: INTERNAL MEDICINE

## 2020-08-04 PROCEDURE — 99232 PR SUBSEQUENT HOSPITAL CARE,LEVL II: ICD-10-PCS | Mod: ,,, | Performed by: INTERNAL MEDICINE

## 2020-08-04 PROCEDURE — 85025 COMPLETE CBC W/AUTO DIFF WBC: CPT

## 2020-08-04 PROCEDURE — 36415 COLL VENOUS BLD VENIPUNCTURE: CPT

## 2020-08-04 PROCEDURE — 84100 ASSAY OF PHOSPHORUS: CPT

## 2020-08-04 PROCEDURE — 80053 COMPREHEN METABOLIC PANEL: CPT

## 2020-08-04 PROCEDURE — 86706 HEP B SURFACE ANTIBODY: CPT

## 2020-08-04 PROCEDURE — 63600175 PHARM REV CODE 636 W HCPCS: Performed by: INTERNAL MEDICINE

## 2020-08-04 PROCEDURE — 20600001 HC STEP DOWN PRIVATE ROOM

## 2020-08-04 PROCEDURE — 80074 ACUTE HEPATITIS PANEL: CPT

## 2020-08-04 PROCEDURE — 63600175 PHARM REV CODE 636 W HCPCS: Performed by: STUDENT IN AN ORGANIZED HEALTH CARE EDUCATION/TRAINING PROGRAM

## 2020-08-04 RX ADMIN — Medication 100 MG: at 08:08

## 2020-08-04 RX ADMIN — DIAZEPAM 5 MG: 5 TABLET ORAL at 09:08

## 2020-08-04 RX ADMIN — DIAZEPAM 5 MG: 5 TABLET ORAL at 02:08

## 2020-08-04 RX ADMIN — INSULIN DETEMIR 24 UNITS: 100 INJECTION, SOLUTION SUBCUTANEOUS at 08:08

## 2020-08-04 RX ADMIN — FOLIC ACID 1 MG: 1 TABLET ORAL at 08:08

## 2020-08-04 RX ADMIN — INSULIN ASPART 2 UNITS: 100 INJECTION, SOLUTION INTRAVENOUS; SUBCUTANEOUS at 09:08

## 2020-08-04 RX ADMIN — THERA TABS 1 TABLET: TAB at 08:08

## 2020-08-04 RX ADMIN — INSULIN DETEMIR 24 UNITS: 100 INJECTION, SOLUTION SUBCUTANEOUS at 09:08

## 2020-08-04 RX ADMIN — ENOXAPARIN SODIUM 40 MG: 40 INJECTION SUBCUTANEOUS at 09:08

## 2020-08-04 RX ADMIN — INSULIN ASPART 16 UNITS: 100 INJECTION, SOLUTION INTRAVENOUS; SUBCUTANEOUS at 07:08

## 2020-08-04 RX ADMIN — INSULIN ASPART 16 UNITS: 100 INJECTION, SOLUTION INTRAVENOUS; SUBCUTANEOUS at 04:08

## 2020-08-04 RX ADMIN — INSULIN ASPART 4 UNITS: 100 INJECTION, SOLUTION INTRAVENOUS; SUBCUTANEOUS at 04:08

## 2020-08-04 RX ADMIN — INSULIN ASPART 4 UNITS: 100 INJECTION, SOLUTION INTRAVENOUS; SUBCUTANEOUS at 07:08

## 2020-08-04 RX ADMIN — DIAZEPAM 5 MG: 5 TABLET ORAL at 05:08

## 2020-08-04 RX ADMIN — ENOXAPARIN SODIUM 40 MG: 40 INJECTION SUBCUTANEOUS at 08:08

## 2020-08-04 RX ADMIN — INSULIN ASPART 16 UNITS: 100 INJECTION, SOLUTION INTRAVENOUS; SUBCUTANEOUS at 11:08

## 2020-08-04 RX ADMIN — INSULIN ASPART 8 UNITS: 100 INJECTION, SOLUTION INTRAVENOUS; SUBCUTANEOUS at 11:08

## 2020-08-04 NOTE — ASSESSMENT & PLAN NOTE
-Acute hepatitis panel was ordered to R/O any acute etiologies vs chronic alcohol use  -AST/ALT: 241/199 uptrending

## 2020-08-04 NOTE — PLAN OF CARE
POC reviewed with pt. VSS. Afebrile. AAOx4. BG checked before meals. Insulin given as needed. Safety checks performed. Call light and bedside table placed within reach. Pt resting comfortably in chair.

## 2020-08-04 NOTE — PLAN OF CARE
The patient is lying in bed soundly asleep at this time. Easily awakened. No s/s of acute distress. Patient is showing SR on the telemetry monitor. Patient is independent of ADLs and ambulates without problem. BG monitored AC/HS. Call light within reach, bed locked and in low position.

## 2020-08-04 NOTE — ASSESSMENT & PLAN NOTE
Recently consumed 1 pint whiskey and multiple beers. He says this is his normal consumption and only on the weekends. He also refers that alcohol is the only thing that helps him sleep at night. Not currently exhibiting withdrawal symptoms.  Toxicology: + alcohol 311, drug screen negative.     Plan  -Thiamine 100mg daily  -Folic acid 1mg daily  -Multivitamin tablet daily  - Valium taper for alcohol withdrawal: 10mg x4 doses followed by 5mg BID x 8 doses. Will receive 4th dose of 5 mg Valium 4/3/2020.  - As no current symptoms, no need for CIWA   - Monitor for symptoms of withdrawal eg tremors, agitation, hallucinations   -Not interested in inpatient rehab at this time. Psych will provide patient with outpatient resources for rehabilitation.

## 2020-08-04 NOTE — ASSESSMENT & PLAN NOTE
On admission, was concerned for DKA due to elevated BG, elevated Anion gap and decreased bicarb.   On VBG ph 7.359, HCO3 wnl, PCO2 47.3  Patient refers that he is not taking any medications for DM due to diarrhea, specially metformin. Poor compliance with medication and f/u. He states that when checks his glucose it usually runs in the 500s. At the moment, DKA seems unlikely.     Labs: Glucose 527 now POCT glucose 190  Serum beta hydroxybutyrate 0.6, anion gap 17 now 14  VBG shows pH 7.369 and bicarb 26    Plan:  -Replace K+ Mg Phos and other e- as needed  - Monitor glucose, BMP q4  -DC insulin gtt. Will start on levemir 24 units & novolog 16 units tid (7/3/2020)  -Placed on moderate sliding scale  -Placed on diabetic diet  -Dietician/nutrition consult

## 2020-08-04 NOTE — ASSESSMENT & PLAN NOTE
During evaluation patient stable, no current suicidal ideation, no recent suicide attempts. In the past reports he had a plan to kill himself with a shotgun but never went through with it.He currently does not own fire arm.     Plan:   - PEC order: Does not meet criteria for PEC order. Removed 8/3/2020 as per psych recommendations.  -Follow up with Psych outpatient

## 2020-08-04 NOTE — PROGRESS NOTES
"[facility] Hospital Medicine  Progress Note    Patient Name: Doe Woodall  MRN: [id number]  Patient Class: IP- Inpatient   Admission Date: 8/2/2020  Length of Stay: 1 days  Attending Physician: Natasha Garza MD  Primary Care Provider: Primary Doctor [facility] Medicine Team: [facility] [place] HOSP MED 5 Makenzie Urena MD    Subjective:     Principal Problem:DKA (diabetic ketoacidoses)        HPI:  29 y/o M with history T2DM with medication non compliance and history of alcohol abuse is brought by ED after being pulled by the police for what he describes as a panic attack this morning. As per patient, he was getting breakfast and suddenly started to get anxious, with increased palpitations, mild SOB, lightheadedness and feeling as if he was going to pass out. He does not recall details of what happened but he refers that he was saying "I want to die". When addressed about it, patient denies suicidal ideation, he admits that he is under stress but not plan to harm. He does not own fire weapons currently.     As per ED notes: the patient was found by EMS at the wheel of his car in the middle of a residential street, intoxicated and asleep. He had drank several beers and 1 pint of whiskey the night before. He disclosed to EMS that he has chronic suicidal ideation, and a 50/50 chance of acting on his plan. However, upon arrival to the ED the patient was calm and cooperative but slurring his words. He was placed under PEC.     Upon arrival patient with , AG 17, CO2 18 and ED concerned for DKA. Patient denies HA, nausea, vomiting, polydipsia, polyuria, abdominal pain, weakness, SOB. He was started on Insulin drip with potassium replacement and IVF.   ED also concerned for alcohol withdrawal and monitored closely.          Overview/Hospital Course:  8/2: Insulin drip + dextrose, NPO. Started diazepam folate thiamine multivitamin in light of heavy alcohol use. CIWA q4 but no notes from " nursing yet.    8/3: DC insulin gtt. Started on basal bolus. PEC order discontinued.    Interval History:   Pt reports trouble sleeping that has been worsening. Not of acute onset. Requested a sleep study. He denies seizures, hallucinations, anxiety, N &V.       Review of Systems   Constitutional: Negative for chills and fever.   HENT: Negative for congestion.    Respiratory: Negative for choking and shortness of breath.    Cardiovascular: Negative for chest pain.   Neurological: Negative for dizziness.   Psychiatric/Behavioral: Positive for sleep disturbance.     Objective:     Vital Signs (Most Recent):  Temp: 97.4 °F (36.3 °C) (08/03/20 1958)  Pulse: 84 (08/03/20 1958)  Resp: 18 (08/03/20 1958)  BP: 133/79 (08/03/20 1958)  SpO2: 96 % (08/03/20 1958) Vital Signs (24h Range):  Temp:  [97.4 °F (36.3 °C)-98.2 °F (36.8 °C)] 97.4 °F (36.3 °C)  Pulse:  [84-97] 84  Resp:  [16-24] 18  SpO2:  [95 %-99 %] 96 %  BP: (133-173)/(66-97) 133/79     Weight: 120 kg (264 lb 7.1 oz)  Body mass index is 44.01 kg/m².    Intake/Output Summary (Last 24 hours) at 8/3/2020 2010  Last data filed at 8/3/2020 0554  Gross per 24 hour   Intake 1266.67 ml   Output 800 ml   Net 466.67 ml      Physical Exam  Constitutional:       Appearance: Normal appearance.   HENT:      Head: Normocephalic and atraumatic.   Neck:      Musculoskeletal: Neck rigidity present.   Cardiovascular:      Rate and Rhythm: Normal rate and regular rhythm.      Pulses: Normal pulses.      Heart sounds: Normal heart sounds.   Pulmonary:      Effort: Pulmonary effort is normal.      Breath sounds: Normal breath sounds.   Abdominal:      General: Abdomen is flat. Bowel sounds are normal.      Palpations: Abdomen is soft.   Skin:     Comments: No edema present   Neurological:      Mental Status: He is alert and oriented to person, place, and time.         Significant Labs:   BMP:   Recent Labs   Lab 08/03/20  0531  08/03/20  1536   *  231*   < > 282*     136    < > 135*   K 3.5  3.5   < > 3.8     102   < > 102   CO2 25  25   < > 24   BUN 7  7   < > 5*   CREATININE 0.6  0.6   < > 0.7   CALCIUM 7.8*  7.8*   < > 8.6*   MG 1.8  --   --     < > = values in this interval not displayed.     CBC:   Recent Labs   Lab 08/02/20  1200 08/03/20  0531   WBC 4.65 5.63   HGB 15.7 14.9   HCT 46.1 42.9    160     Urine Culture: No results for input(s): LABURIN in the last 48 hours.    Significant Imaging: I have reviewed and interpreted all pertinent imaging results/findings within the past 24 hours.      Assessment/Plan:      * DKA (diabetic ketoacidoses)  On admission, was concerned for DKA due to elevated BG, elevated Anion gap and decreased bicarb.   On VBG ph 7.359, HCO3 wnl, PCO2 47.3  Patient refers that he is not taking any medications for DM due to diarrhea, specially metformin. Poor compliance with medication and f/u. He states that when checks his glucose it usually runs in the 500s. At the moment, DKA seems unlikely.     Labs: Glucose 527 now POCT glucose 190  Serum beta hydroxybutyrate 0.6, anion gap 17 now 14  VBG shows pH 7.369 and bicarb 26    Plan:  -Replace K+ Mg Phos and other e- as needed  - Monitor glucose, BMP q4  -DC insulin gtt. Will start on levemir 24 units & novolog 16 units tid (7/3/2020)  -Placed on moderate sliding scale  -Placed on diabetic diet  -Dietician/nutrition consult        Other insomnia  Has worsened since admission.  - Melatonin 9 mg PRN ordered  - Outpatient sleep study referral       Alcohol intoxication with moderate or severe use disorder  Recently consumed 1 pint whiskey and multiple beers. He says this is his normal consumption and only on the weekends. He also refers that alcohol is the only thing that helps him sleep at night. Not currently exhibiting withdrawal symptoms.  Toxicology: + alcohol 311, drug screen negative.     Plan  -Thiamine 100mg daily  -Folic acid 1mg daily  -Multivitamin tablet daily  - Valium taper for  alcohol withdrawal: 10mg x4 doses followed by 5mg BID x 8 doses. Will receive 4th dose of 5 mg Valium 4/3/2020.  - As no current symptoms, no need for CIWA   - Monitor for symptoms of withdrawal eg tremors, agitation, hallucinations   -Not interested in inpatient rehab at this time. Psych will provide patient with outpatient resources for rehabilitation.      Suicidal ideation  During evaluation patient stable, no current suicidal ideation, no recent suicide attempts. In the past reports he had a plan to kill himself with a shotgun but never went through with it.He currently does not own fire arm.     Plan:   - PEC order: Does not meet criteria for PEC order. Removed 8/3/2020 as per psych recommendations.           VTE Risk Mitigation (From admission, onward)         Ordered     enoxaparin injection 40 mg  Every 12 hours      08/03/20 1153     IP VTE HIGH RISK PATIENT  Once      08/02/20 1534     Place sequential compression device  Until discontinued      08/02/20 1534     Place ROBY hose  Until discontinued      08/02/20 1336                      Makenzie Urena MD  Department of Hospital Medicine   [facility]

## 2020-08-04 NOTE — PROGRESS NOTES
"[facility] Hospital Medicine  Progress Note    Patient Name: Doe Woodall  MRN: [id number]  Patient Class: IP- Inpatient   Admission Date: 8/2/2020  Length of Stay: 2 days  Attending Physician: Natasha Garza MD  Primary Care Provider: Primary Doctor [facility] Medicine Team: [facility] [place] [facility]OSP MED 5 Makenzie Urena MD    Subjective:     Principal Problem:DKA (diabetic ketoacidoses)        HPI:  29 y/o M with history T2DM with medication non compliance and history of alcohol abuse is brought by ED after being pulled by the police for what he describes as a panic attack this morning. As per patient, he was getting breakfast and suddenly started to get anxious, with increased palpitations, mild SOB, lightheadedness and feeling as if he was going to pass out. He does not recall details of what happened but he refers that he was saying "I want to die". When addressed about it, patient denies suicidal ideation, he admits that he is under stress but not plan to harm. He does not own fire weapons currently.     As per ED notes: the patient was found by EMS at the wheel of his car in the middle of a residential street, intoxicated and asleep. He had drank several beers and 1 pint of whiskey the night before. He disclosed to EMS that he has chronic suicidal ideation, and a 50/50 chance of acting on his plan. However, upon arrival to the ED the patient was calm and cooperative but slurring his words. He was placed under PEC.     Upon arrival patient with , AG 17, CO2 18 and ED concerned for DKA. Patient denies HA, nausea, vomiting, polydipsia, polyuria, abdominal pain, weakness, SOB. He was started on Insulin drip with potassium replacement and IVF.   ED also concerned for alcohol withdrawal and monitored closely.          Overview/Hospital Course:  8/2: Insulin drip + dextrose, NPO. Started diazepam folate thiamine multivitamin in light of heavy alcohol use. CIWA q4 but no notes from " nursing yet.    8/3: DC insulin gtt. Started on basal bolus. PEC order discontinued.    Interval History:     Patient denies seizures,tremors, hallucinations, N &V. He was able to sleep last night. Vital signs WNL.  Review of Systems   Constitutional: Negative for chills and fever.   Respiratory: Negative for chest tightness and shortness of breath.    Cardiovascular: Negative for chest pain and leg swelling.   Psychiatric/Behavioral: Negative for agitation, confusion, hallucinations and suicidal ideas. The patient is not nervous/anxious.      Objective:     Vital Signs (Most Recent):  Temp: 98.3 °F (36.8 °C) (08/04/20 1547)  Pulse: 93 (08/04/20 1547)  Resp: (!) 23 (08/04/20 1547)  BP: (!) 147/85 (08/04/20 1547)  SpO2: 95 % (08/04/20 1547) Vital Signs (24h Range):  Temp:  [97.4 °F (36.3 °C)-98.6 °F (37 °C)] 98.3 °F (36.8 °C)  Pulse:  [83-97] 93  Resp:  [18-23] 23  SpO2:  [92 %-96 %] 95 %  BP: (125-153)/(76-91) 147/85     Weight: 120 kg (264 lb 7.1 oz)  Body mass index is 44.01 kg/m².    Intake/Output Summary (Last 24 hours) at 8/4/2020 1814  Last data filed at 8/4/2020 0500  Gross per 24 hour   Intake --   Output 1376 ml   Net -1376 ml      Physical Exam  Vitals signs and nursing note reviewed.   Constitutional:       Appearance: Normal appearance.   HENT:      Head: Normocephalic and atraumatic.   Cardiovascular:      Rate and Rhythm: Normal rate and regular rhythm.      Pulses: Normal pulses.      Heart sounds: Normal heart sounds.   Pulmonary:      Effort: Pulmonary effort is normal.      Breath sounds: Normal breath sounds.   Abdominal:      General: Abdomen is flat. Bowel sounds are normal.      Palpations: Abdomen is soft.   Skin:     Comments: No edema present   Neurological:      Mental Status: He is alert and oriented to person, place, and time.         Significant Labs:   CBC:   Recent Labs   Lab 08/03/20  0531 08/04/20  0722   WBC 5.63 5.22   HGB 14.9 16.2   HCT 42.9 46.8    172     CMP:   Recent  Labs   Lab 08/03/20  0531  08/03/20  1536 08/03/20  1952 08/04/20  0722     136   < > 135* 135* 136   K 3.5  3.5   < > 3.8 4.0 4.0     102   < > 102 101 104   CO2 25  25   < > 24 26 24   *  231*   < > 282* 285* 251*   BUN 7  7   < > 5* 6 8   CREATININE 0.6  0.6   < > 0.7 0.7 0.6   CALCIUM 7.8*  7.8*   < > 8.6* 9.1 8.4*   PROT 6.1  --   --   --  6.2   ALBUMIN 3.1*  --   --   --  3.0*   BILITOT 0.8  --   --   --  0.9   ALKPHOS 131  --   --   --  141*   *  --   --   --  241*   *  --   --   --  199*   ANIONGAP 9  9   < > 9 8 8   EGFRNONAA >60.0  >60.0   < > >60.0 >60.0 >60.0    < > = values in this interval not displayed.     All pertinent labs within the past 24 hours have been reviewed.    Significant Imaging: I have reviewed and interpreted all pertinent imaging results/findings within the past 24 hours.      Assessment/Plan:      * DKA (diabetic ketoacidoses)  On admission, was concerned for DKA due to elevated BG, elevated Anion gap and decreased bicarb.   On VBG ph 7.359, HCO3 wnl, PCO2 47.3  Patient refers that he is not taking any medications for DM due to diarrhea, specially metformin. Poor compliance with medication and f/u. He states that when checks his glucose it usually runs in the 500s. At the moment, DKA seems unlikely.     Labs: Glucose 527 now POCT glucose 190  Serum beta hydroxybutyrate 0.6, anion gap 17 now 14  VBG shows pH 7.369 and bicarb 26    Plan:  - Monitor glucose, BMP q4  -Insulin gtt dc (8/3/2020). Levemir 24 units & novolog 16 units tid were started (7/3/2020). Pt required 3 doses of the moderate sliding scale insulin wt meals.  -BG's yesterday 236-271.   - Continue current insulin regimen and continue monitoring glucose levels  - Will continue to hold  home meds (metformin, liraglutide)                 Other insomnia  Chronic  - Melatonin 9 mg PRN ordered  - Outpatient sleep study referral       Elevated transaminase level    -Acute hepatitis  panel was ordered to R/O any acute etiologies vs chronic alcohol use  -AST/ALT: 241/199 uptrending          Alcohol intoxication with moderate or severe use disorder  Recently consumed 1 pint whiskey and multiple beers. He says this is his normal consumption and only on the weekends. He also refers that alcohol is the only thing that helps him sleep at night. Not currently exhibiting withdrawal symptoms.  Toxicology: + alcohol 311, drug screen negative.     Plan  -Thiamine 100mg daily  -Folic acid 1mg daily  -Multivitamin tablet daily  - Valium taper for alcohol withdrawal: 10mg x4 doses followed by 5mg BID x 8 doses. Last dose 8/8/20  - As no current symptoms, no need for CIWA   - Monitor for symptoms of withdrawal eg tremors, agitation, hallucinations   -Not interested in inpatient rehab at this time. Psych provided patient with outpatient resources for rehabilitation.                    Suicidal ideation  During evaluation patient stable, no current suicidal ideation, no recent suicide attempts. In the past reports he had a plan to kill himself with a shotgun but never went through with it.He currently does not own fire arm.     Plan:   - PEC order: Does not meet criteria for PEC order. Removed 8/3/2020 as per psych recommendations.  -Follow up with Psych outpatient           VTE Risk Mitigation (From admission, onward)         Ordered     enoxaparin injection 40 mg  Every 12 hours      08/03/20 1153     IP VTE HIGH RISK PATIENT  Once      08/02/20 1534     Place sequential compression device  Until discontinued      08/02/20 1534                      Makenzie Urena MD  Department of Hospital Medicine   [facility] [address]

## 2020-08-04 NOTE — HOSPITAL COURSE
8/2: Insulin drip + dextrose, NPO. Started diazepam folate thiamine multivitamin in light of heavy alcohol use. CIWA q4 but no notes from nursing yet.    8/3: DC insulin gtt. Started on basal bolus. PEC order discontinued.

## 2020-08-04 NOTE — ASSESSMENT & PLAN NOTE
On admission, was concerned for DKA due to elevated BG, elevated Anion gap and decreased bicarb.   On VBG ph 7.359, HCO3 wnl, PCO2 47.3  Patient refers that he is not taking any medications for DM due to diarrhea, specially metformin. Poor compliance with medication and f/u. He states that when checks his glucose it usually runs in the 500s. At the moment, DKA seems unlikely.     Labs: Glucose 527 now POCT glucose 190  Serum beta hydroxybutyrate 0.6, anion gap 17 now 14  VBG shows pH 7.369 and bicarb 26    Plan:  - Monitor glucose, BMP q4  -Insulin gtt dc (8/3/2020). Levemir 24 units & novolog 16 units tid were started (7/3/2020). Pt required 3 doses of the moderate sliding scale insulin wt meals.  -BG's yesterday 236-271.   - Continue current insulin regimen and continue monitoring glucose levels  - Will continue to hold  home meds (metformin, liraglutide)

## 2020-08-04 NOTE — MEDICAL/APP STUDENT
Subjective:       Patient ID: Doe Woodall is a 30 y.o. male.    Chief Complaint: Suicidal ideation, alcohol intoxication  HPI   Doe Woodall is a 30 year old man with uncontrolled T2DM (Hba1c 13.9% on admit), depression, alcohol use disorder, who presented on 8/2 with alcohol intoxication, suicidal ideations, and panic attacks. Day 2.     Per ED notes, the patient was found by EMS at the wheel of his car in the middle of a residential street, intoxicated and asleep. He had several beers and 1 pint of whiskey the night before. He disclosed to EMS that he has chronic suicidal ideation, and a 50/50 chance of acting on his plan. However, upon arrival to the ED the patient was calm and cooperative but slurring his words. He was placed under PEC.      In the ED the patient claimed that he had a panic attack overnight and clarified that he does not have any current suicidal ideation, but that in the past he owned a shotgun and had contemplated shooting himself but never did. His recent stressors include expecting a child with his partner later this year.   Vitals were stable, glucose 527, serum beta-hydroxybutyrate 0.6, anion gap 17, K+ 3.4.   Urinalysis 1+ protein, 3+ glucose, trace ketones, trace leukocytes.  VBG showed pH 7.359, pCO2 47.3, HCO3 26.7   The patient was started on an insulin drip, IV fluids, and K+ replacement, admitted to hospital medicine. Watched closely due to concern for alcohol intoxication vs withdrawal.      Regarding his T2DM, there was initial concern for DKA, however the patient denies symptoms of DKA including increased thirst/urination from baseline, abdominal pain, nausea or vomiting. He reports that he has not taken his diabetes medications (metformin, liraglutide, insulin) in 6 months because they all gave him diarrhea, and that his PCP checks his glucose regularly and it is usually >500.         Hospital Course  Psych consulted, patient deemed no longer dangerous to himself or  others, PEC removed 8/3. No signs of alcohol withdrawal however throughout admission on diazepam 5mg tid.         Interval History  Patient reports he feels the same as yesterday. Insomnia improved, no withdrawal symptoms.      Review of Systems   Constitutional: Negative.    HENT: Negative.    Eyes: Negative.    Respiratory: Negative.    Cardiovascular: Negative.    Gastrointestinal: Negative.    Genitourinary: Negative.    Neurological: Negative.  Negative for tremors.   Psychiatric/Behavioral: Negative for agitation, behavioral problems, hallucinations and suicidal ideas. The patient is not nervous/anxious.          Objective:       Vital Signs (Most Recent):  Temp: 98.4 °F (36.9 °C) (08/04/20 1100)  Pulse: 97 (08/04/20 1100)  Resp: 18 (08/04/20 1100)  BP: (!) 149/84 (08/04/20 1100)  SpO2: 95 % (08/04/20 1100) Vital Signs (24h Range):  Temp:  [97.4 °F (36.3 °C)-98.6 °F (37 °C)] 98.4 °F (36.9 °C)  Pulse:  [83-97] 97  Resp:  [18-20] 18  SpO2:  [92 %-99 %] 95 %  BP: (125-153)/(76-97) 149/84     Weight: 120 kg (264 lb 7.1 oz)  Body mass index is 44.01 kg/m².    Intake/Output Summary (Last 24 hours) at 8/4/2020 1254  Last data filed at 8/4/2020 0500  Gross per 24 hour   Intake --   Output 1376 ml   Net -1376 ml     Physical Exam  Cardiovascular:      Rate and Rhythm: Normal rate and regular rhythm.      Heart sounds: Normal heart sounds.   Abdominal:      Palpations: Abdomen is soft.   Neurological:      Mental Status: He is alert and oriented to person, place, and time.   Psychiatric:         Mood and Affect: Mood normal.         Behavior: Behavior normal.         Thought Content: Thought content normal.         Judgment: Judgment normal.         CBC  Recent Labs     08/02/20  1200 08/03/20  0531 08/04/20  0722   WBC 4.65 5.63 5.22   HGB 15.7 14.9 16.2   HCT 46.1 42.9 46.8    160 172     CMP  Recent Labs     08/03/20  1536 08/03/20  1952 08/04/20  0722   * 135* 136   K 3.8 4.0 4.0    101 104   MG   --   --  2.0   PHOS  --   --  3.6   CALCIUM 8.6* 9.1 8.4*   BUN 5* 6 8   CREATININE 0.7 0.7 0.6   ALBUMIN  --   --  3.0*   BILITOT  --   --  0.9   ALKPHOS  --   --  141*   ALT  --   --  199*   AST  --   --  241*       Recent Labs     08/03/20  0609 08/03/20  0656 08/03/20  0800 08/03/20  0903 08/03/20  1012 08/03/20  1110 08/03/20  1205 08/03/20  1301 08/03/20  1417 08/03/20  1505 08/03/20  1606 08/03/20  1706 08/03/20  2043 08/04/20  0707 08/04/20  1130   POCTGLUCOSE 219* 223* 219* 240* 236* 253* 286* 302* 276* 272* 271* 304* 271* 236* 320*       Recent Labs     08/02/20  1937 08/03/20  0043 08/03/20  0531   LACTATE 2.1 1.3 1.0        HbA1c 8/2: 13.9%     Alcohol serum 8/2: 311         Assessment:         Doe Woodall is a 30 year old male with uncontrolled T2DM, depression, and panic disorder admitted for suicidal ideations and hyperglycemia.    1. Alcoholic intoxication without complication    2. Diabetic ketoacidosis without coma associated with type 1 diabetes mellitus    3. DKA (diabetic ketoacidoses)    4. Chest pain    5. Suicide attempt    6. Substance induced mood disorder    7. Alcohol use disorder, severe, dependence        Plan:       Suicidal ideation  Resolved, PEC removed 8/3  - Follow up with psych opt resources    Hyperglycemia  Improving.  8/3 stopped insulin gtt, started detemir 24U bid (one dose only) and aspart 16U tid, + SSI 4-8U with meals  Glucose consistently >200, this morning 236.  - Continue current insulin regimen, monitor glucose hourly  - q4 BMP  - No plan to resume home meds (metformin, liraglutide) as patient complains of diarrhea    Alcohol use disorder  Stable, no signs of withdrawal  - Finish diazepam dose 5mg tid, last 8/5 morning  - Follow up with pysch opt resources for alcohol dependence    Elevated liver function panel  AST ALT yesterday 140 158, today 241 199.  DDx chronic alcohol use, delayed response to alcohol dale, NAFLD, hepatitis. Patient has a history of  incarceration, though claims he has been tested for HIV TB HCV and was negative.  - Acute hepatitis panel + Hep B surface Ab     Discharge planning  - Dc home, wife will pick him up tomorrow      Lamarjazmín Nieto  ]MS4, IM Team 5

## 2020-08-04 NOTE — SUBJECTIVE & OBJECTIVE
Interval History:   Pt reports trouble sleeping that has been worsening. Not of acute onset. Requested a sleep study. He denies seizures, hallucinations, anxiety, N &V.       Review of Systems   Constitutional: Negative for chills and fever.   HENT: Negative for congestion.    Respiratory: Negative for choking and shortness of breath.    Cardiovascular: Negative for chest pain.   Neurological: Negative for dizziness.   Psychiatric/Behavioral: Positive for sleep disturbance.     Objective:     Vital Signs (Most Recent):  Temp: 97.4 °F (36.3 °C) (08/03/20 1958)  Pulse: 84 (08/03/20 1958)  Resp: 18 (08/03/20 1958)  BP: 133/79 (08/03/20 1958)  SpO2: 96 % (08/03/20 1958) Vital Signs (24h Range):  Temp:  [97.4 °F (36.3 °C)-98.2 °F (36.8 °C)] 97.4 °F (36.3 °C)  Pulse:  [84-97] 84  Resp:  [16-24] 18  SpO2:  [95 %-99 %] 96 %  BP: (133-173)/(66-97) 133/79     Weight: 120 kg (264 lb 7.1 oz)  Body mass index is 44.01 kg/m².    Intake/Output Summary (Last 24 hours) at 8/3/2020 2010  Last data filed at 8/3/2020 0554  Gross per 24 hour   Intake 1266.67 ml   Output 800 ml   Net 466.67 ml      Physical Exam  Constitutional:       Appearance: Normal appearance.   HENT:      Head: Normocephalic and atraumatic.   Neck:      Musculoskeletal: Neck rigidity present.   Cardiovascular:      Rate and Rhythm: Normal rate and regular rhythm.      Pulses: Normal pulses.      Heart sounds: Normal heart sounds.   Pulmonary:      Effort: Pulmonary effort is normal.      Breath sounds: Normal breath sounds.   Abdominal:      General: Abdomen is flat. Bowel sounds are normal.      Palpations: Abdomen is soft.   Skin:     Comments: No edema present   Neurological:      Mental Status: He is alert and oriented to person, place, and time.         Significant Labs:   BMP:   Recent Labs   Lab 08/03/20  0531  08/03/20  1536   *  231*   < > 282*     136   < > 135*   K 3.5  3.5   < > 3.8     102   < > 102   CO2 25  25   < > 24   BUN 7   7   < > 5*   CREATININE 0.6  0.6   < > 0.7   CALCIUM 7.8*  7.8*   < > 8.6*   MG 1.8  --   --     < > = values in this interval not displayed.     CBC:   Recent Labs   Lab 08/02/20  1200 08/03/20  0531   WBC 4.65 5.63   HGB 15.7 14.9   HCT 46.1 42.9    160     Urine Culture: No results for input(s): LABURIN in the last 48 hours.    Significant Imaging: I have reviewed and interpreted all pertinent imaging results/findings within the past 24 hours.

## 2020-08-04 NOTE — PHARMACY MED REC
"Admission Medication Reconciliation - Pharmacy Consult Note    The home medication history was taken by Liz Marks, pharmacy technician.  Based on information gathered and subsequent review by the clinical pharmacist, the items below may need attention.    You may go to "Admission" then "Reconcile Home Medications" tabs to review and/or act upon these items.        No issues noted with the medication reconciliation.        Potential issues to be addressed PRIOR TO DISCHARG  o Please provide new Rx's  for all medications you are planing to d/c him on. I have set up bedside delivery.   o Patient lacks understanding of therapy- non compliant with home medications. States doesn't take anything for his DM.     Please address this information as you see fit.  Feel free to contact us if you have any questions or require assistance.  Lou Espinal  EXT [phone]   .    .            "

## 2020-08-04 NOTE — ASSESSMENT & PLAN NOTE
Recently consumed 1 pint whiskey and multiple beers. He says this is his normal consumption and only on the weekends. He also refers that alcohol is the only thing that helps him sleep at night. Not currently exhibiting withdrawal symptoms.  Toxicology: + alcohol 311, drug screen negative.     Plan  -Thiamine 100mg daily  -Folic acid 1mg daily  -Multivitamin tablet daily  - Valium taper for alcohol withdrawal: 10mg x4 doses followed by 5mg BID x 8 doses. Last dose 8/8/20  - As no current symptoms, no need for CIWA   - Monitor for symptoms of withdrawal eg tremors, agitation, hallucinations   -Not interested in inpatient rehab at this time. Psych provided patient with outpatient resources for rehabilitation.

## 2020-08-04 NOTE — SUBJECTIVE & OBJECTIVE
Interval History:     Patient denies seizures,tremors, hallucinations, N &V. He was able to sleep last night. Vital signs WNL.  Review of Systems   Constitutional: Negative for chills and fever.   Respiratory: Negative for chest tightness and shortness of breath.    Cardiovascular: Negative for chest pain and leg swelling.   Psychiatric/Behavioral: Negative for agitation, confusion, hallucinations and suicidal ideas. The patient is not nervous/anxious.      Objective:     Vital Signs (Most Recent):  Temp: 98.3 °F (36.8 °C) (08/04/20 1547)  Pulse: 93 (08/04/20 1547)  Resp: (!) 23 (08/04/20 1547)  BP: (!) 147/85 (08/04/20 1547)  SpO2: 95 % (08/04/20 1547) Vital Signs (24h Range):  Temp:  [97.4 °F (36.3 °C)-98.6 °F (37 °C)] 98.3 °F (36.8 °C)  Pulse:  [83-97] 93  Resp:  [18-23] 23  SpO2:  [92 %-96 %] 95 %  BP: (125-153)/(76-91) 147/85     Weight: 120 kg (264 lb 7.1 oz)  Body mass index is 44.01 kg/m².    Intake/Output Summary (Last 24 hours) at 8/4/2020 1814  Last data filed at 8/4/2020 0500  Gross per 24 hour   Intake --   Output 1376 ml   Net -1376 ml      Physical Exam  Vitals signs and nursing note reviewed.   Constitutional:       Appearance: Normal appearance.   HENT:      Head: Normocephalic and atraumatic.   Cardiovascular:      Rate and Rhythm: Normal rate and regular rhythm.      Pulses: Normal pulses.      Heart sounds: Normal heart sounds.   Pulmonary:      Effort: Pulmonary effort is normal.      Breath sounds: Normal breath sounds.   Abdominal:      General: Abdomen is flat. Bowel sounds are normal.      Palpations: Abdomen is soft.   Skin:     Comments: No edema present   Neurological:      Mental Status: He is alert and oriented to person, place, and time.         Significant Labs:   CBC:   Recent Labs   Lab 08/03/20  0531 08/04/20  0722   WBC 5.63 5.22   HGB 14.9 16.2   HCT 42.9 46.8    172     CMP:   Recent Labs   Lab 08/03/20  0531  08/03/20  1536 08/03/20  1952 08/04/20  0722     136    < > 135* 135* 136   K 3.5  3.5   < > 3.8 4.0 4.0     102   < > 102 101 104   CO2 25  25   < > 24 26 24   *  231*   < > 282* 285* 251*   BUN 7  7   < > 5* 6 8   CREATININE 0.6  0.6   < > 0.7 0.7 0.6   CALCIUM 7.8*  7.8*   < > 8.6* 9.1 8.4*   PROT 6.1  --   --   --  6.2   ALBUMIN 3.1*  --   --   --  3.0*   BILITOT 0.8  --   --   --  0.9   ALKPHOS 131  --   --   --  141*   *  --   --   --  241*   *  --   --   --  199*   ANIONGAP 9  9   < > 9 8 8   EGFRNONAA >60.0  >60.0   < > >60.0 >60.0 >60.0    < > = values in this interval not displayed.     All pertinent labs within the past 24 hours have been reviewed.    Significant Imaging: I have reviewed and interpreted all pertinent imaging results/findings within the past 24 hours.

## 2020-08-04 NOTE — ASSESSMENT & PLAN NOTE
During evaluation patient stable, no current suicidal ideation, no recent suicide attempts. In the past reports he had a plan to kill himself with a shotgun but never went through with it.He currently does not own fire arm.     Plan:   - PEC order: Does not meet criteria for PEC order. Removed 8/3/2020 as per psych recommendations.

## 2020-08-05 VITALS
RESPIRATION RATE: 19 BRPM | BODY MASS INDEX: 44.06 KG/M2 | OXYGEN SATURATION: 97 % | HEIGHT: 65 IN | SYSTOLIC BLOOD PRESSURE: 123 MMHG | TEMPERATURE: 98 F | DIASTOLIC BLOOD PRESSURE: 80 MMHG | WEIGHT: 264.44 LBS | HEART RATE: 93 BPM

## 2020-08-05 PROBLEM — E87.6 HYPOKALEMIA: Status: RESOLVED | Noted: 2020-08-03 | Resolved: 2020-08-05

## 2020-08-05 PROBLEM — E83.51 HYPOCALCEMIA: Status: RESOLVED | Noted: 2020-08-03 | Resolved: 2020-08-05

## 2020-08-05 PROBLEM — F10.229 ALCOHOL INTOXICATION WITH MODERATE OR SEVERE USE DISORDER: Status: RESOLVED | Noted: 2020-08-02 | Resolved: 2020-08-05

## 2020-08-05 PROBLEM — E11.10 DKA (DIABETIC KETOACIDOSES): Status: RESOLVED | Noted: 2020-08-02 | Resolved: 2020-08-05

## 2020-08-05 PROBLEM — R45.851 SUICIDAL IDEATION: Status: RESOLVED | Noted: 2020-08-02 | Resolved: 2020-08-05

## 2020-08-05 LAB
ALBUMIN SERPL BCP-MCNC: 3.1 G/DL (ref 3.5–5.2)
ALP SERPL-CCNC: 149 U/L (ref 55–135)
ALT SERPL W/O P-5'-P-CCNC: 280 U/L (ref 10–44)
ANION GAP SERPL CALC-SCNC: 8 MMOL/L (ref 8–16)
AST SERPL-CCNC: 312 U/L (ref 10–40)
BASOPHILS # BLD AUTO: 0.04 K/UL (ref 0–0.2)
BASOPHILS NFR BLD: 0.6 % (ref 0–1.9)
BILIRUB SERPL-MCNC: 0.9 MG/DL (ref 0.1–1)
BUN SERPL-MCNC: 9 MG/DL (ref 6–20)
CALCIUM SERPL-MCNC: 9 MG/DL (ref 8.7–10.5)
CHLORIDE SERPL-SCNC: 102 MMOL/L (ref 95–110)
CO2 SERPL-SCNC: 28 MMOL/L (ref 23–29)
CREAT SERPL-MCNC: 0.8 MG/DL (ref 0.5–1.4)
DIFFERENTIAL METHOD: ABNORMAL
EOSINOPHIL # BLD AUTO: 0.1 K/UL (ref 0–0.5)
EOSINOPHIL NFR BLD: 1.3 % (ref 0–8)
ERYTHROCYTE [DISTWIDTH] IN BLOOD BY AUTOMATED COUNT: 12 % (ref 11.5–14.5)
EST. GFR  (AFRICAN AMERICAN): >60 ML/MIN/1.73 M^2
EST. GFR  (NON AFRICAN AMERICAN): >60 ML/MIN/1.73 M^2
GLUCOSE SERPL-MCNC: 199 MG/DL (ref 70–110)
HAV IGM SERPL QL IA: NEGATIVE
HBV CORE IGM SERPL QL IA: NEGATIVE
HBV SURFACE AB SER-ACNC: NEGATIVE M[IU]/ML
HBV SURFACE AG SERPL QL IA: NEGATIVE
HCT VFR BLD AUTO: 49.4 % (ref 40–54)
HCV AB SERPL QL IA: NEGATIVE
HGB BLD-MCNC: 16.9 G/DL (ref 14–18)
IMM GRANULOCYTES # BLD AUTO: 0.03 K/UL (ref 0–0.04)
IMM GRANULOCYTES NFR BLD AUTO: 0.5 % (ref 0–0.5)
LYMPHOCYTES # BLD AUTO: 2.8 K/UL (ref 1–4.8)
LYMPHOCYTES NFR BLD: 43.9 % (ref 18–48)
MAGNESIUM SERPL-MCNC: 2 MG/DL (ref 1.6–2.6)
MCH RBC QN AUTO: 34.1 PG (ref 27–31)
MCHC RBC AUTO-ENTMCNC: 34.2 G/DL (ref 32–36)
MCV RBC AUTO: 100 FL (ref 82–98)
MONOCYTES # BLD AUTO: 0.7 K/UL (ref 0.3–1)
MONOCYTES NFR BLD: 11.3 % (ref 4–15)
NEUTROPHILS # BLD AUTO: 2.7 K/UL (ref 1.8–7.7)
NEUTROPHILS NFR BLD: 42.4 % (ref 38–73)
NRBC BLD-RTO: 0 /100 WBC
PHOSPHATE SERPL-MCNC: 5.1 MG/DL (ref 2.7–4.5)
PLATELET # BLD AUTO: 181 K/UL (ref 150–350)
PMV BLD AUTO: 10.2 FL (ref 9.2–12.9)
POCT GLUCOSE: 201 MG/DL (ref 70–110)
POCT GLUCOSE: 309 MG/DL (ref 70–110)
POTASSIUM SERPL-SCNC: 4.2 MMOL/L (ref 3.5–5.1)
PROT SERPL-MCNC: 6.5 G/DL (ref 6–8.4)
RBC # BLD AUTO: 4.96 M/UL (ref 4.6–6.2)
SODIUM SERPL-SCNC: 138 MMOL/L (ref 136–145)
WBC # BLD AUTO: 6.38 K/UL (ref 3.9–12.7)

## 2020-08-05 PROCEDURE — 25000003 PHARM REV CODE 250: Performed by: STUDENT IN AN ORGANIZED HEALTH CARE EDUCATION/TRAINING PROGRAM

## 2020-08-05 PROCEDURE — 63600175 PHARM REV CODE 636 W HCPCS: Performed by: STUDENT IN AN ORGANIZED HEALTH CARE EDUCATION/TRAINING PROGRAM

## 2020-08-05 PROCEDURE — 84100 ASSAY OF PHOSPHORUS: CPT

## 2020-08-05 PROCEDURE — 63600175 PHARM REV CODE 636 W HCPCS: Performed by: INTERNAL MEDICINE

## 2020-08-05 PROCEDURE — 99239 HOSP IP/OBS DSCHRG MGMT >30: CPT | Mod: ,,, | Performed by: INTERNAL MEDICINE

## 2020-08-05 PROCEDURE — 36415 COLL VENOUS BLD VENIPUNCTURE: CPT

## 2020-08-05 PROCEDURE — 83735 ASSAY OF MAGNESIUM: CPT

## 2020-08-05 PROCEDURE — 80053 COMPREHEN METABOLIC PANEL: CPT

## 2020-08-05 PROCEDURE — 85025 COMPLETE CBC W/AUTO DIFF WBC: CPT

## 2020-08-05 PROCEDURE — 99239 PR HOSPITAL DISCHARGE DAY,>30 MIN: ICD-10-PCS | Mod: ,,, | Performed by: INTERNAL MEDICINE

## 2020-08-05 RX ORDER — LANCING DEVICE
EACH MISCELLANEOUS
Qty: 1 EACH | Refills: 0 | Status: ON HOLD | OUTPATIENT
Start: 2020-08-05 | End: 2022-01-01 | Stop reason: HOSPADM

## 2020-08-05 RX ORDER — INSULIN ASPART 100 [IU]/ML
16 INJECTION, SOLUTION INTRAVENOUS; SUBCUTANEOUS 3 TIMES DAILY
Qty: 32 SYRINGE | Refills: 0 | Status: CANCELLED | OUTPATIENT
Start: 2020-08-05 | End: 2021-08-05

## 2020-08-05 RX ORDER — LANOLIN ALCOHOL/MO/W.PET/CERES
100 CREAM (GRAM) TOPICAL DAILY
Qty: 14 TABLET | Refills: 1 | Status: CANCELLED | OUTPATIENT
Start: 2020-08-06

## 2020-08-05 RX ORDER — INSULIN ASPART 100 [IU]/ML
18 INJECTION, SOLUTION INTRAVENOUS; SUBCUTANEOUS
Qty: 15 ML | Refills: 3 | Status: SHIPPED | OUTPATIENT
Start: 2020-08-05 | End: 2021-02-23 | Stop reason: SDUPTHER

## 2020-08-05 RX ORDER — PEN NEEDLE, DIABETIC 30 GX3/16"
1 NEEDLE, DISPOSABLE MISCELLANEOUS
Qty: 200 EACH | Refills: 11 | Status: ON HOLD | OUTPATIENT
Start: 2020-08-05 | End: 2022-01-01 | Stop reason: HOSPADM

## 2020-08-05 RX ORDER — INSULIN ASPART 100 [IU]/ML
18 INJECTION, SOLUTION INTRAVENOUS; SUBCUTANEOUS
Status: DISCONTINUED | OUTPATIENT
Start: 2020-08-05 | End: 2020-08-05 | Stop reason: HOSPADM

## 2020-08-05 RX ORDER — LANOLIN ALCOHOL/MO/W.PET/CERES
100 CREAM (GRAM) TOPICAL DAILY
Qty: 30 TABLET | Refills: 2 | Status: SHIPPED | OUTPATIENT
Start: 2020-08-06 | End: 2020-11-04

## 2020-08-05 RX ORDER — FOLIC ACID 1 MG/1
1 TABLET ORAL DAILY
Qty: 14 TABLET | Refills: 1 | Status: CANCELLED | OUTPATIENT
Start: 2020-08-06 | End: 2021-08-06

## 2020-08-05 RX ORDER — BLOOD-GLUCOSE CONTROL, NORMAL
1 EACH MISCELLANEOUS
Qty: 200 EACH | Refills: 11 | Status: ON HOLD | OUTPATIENT
Start: 2020-08-05 | End: 2022-01-01 | Stop reason: HOSPADM

## 2020-08-05 RX ORDER — DEXTROSE 4 G
TABLET,CHEWABLE ORAL
Qty: 1 EACH | Refills: 0 | Status: SHIPPED | OUTPATIENT
Start: 2020-08-05 | End: 2023-01-01 | Stop reason: CLARIF

## 2020-08-05 RX ADMIN — INSULIN DETEMIR 24 UNITS: 100 INJECTION, SOLUTION SUBCUTANEOUS at 08:08

## 2020-08-05 RX ADMIN — THERA TABS 1 TABLET: TAB at 08:08

## 2020-08-05 RX ADMIN — INSULIN ASPART 16 UNITS: 100 INJECTION, SOLUTION INTRAVENOUS; SUBCUTANEOUS at 07:08

## 2020-08-05 RX ADMIN — FOLIC ACID 1 MG: 1 TABLET ORAL at 08:08

## 2020-08-05 RX ADMIN — INSULIN ASPART 8 UNITS: 100 INJECTION, SOLUTION INTRAVENOUS; SUBCUTANEOUS at 11:08

## 2020-08-05 RX ADMIN — ENOXAPARIN SODIUM 40 MG: 40 INJECTION SUBCUTANEOUS at 08:08

## 2020-08-05 RX ADMIN — DIAZEPAM 5 MG: 5 TABLET ORAL at 05:08

## 2020-08-05 RX ADMIN — INSULIN ASPART 2 UNITS: 100 INJECTION, SOLUTION INTRAVENOUS; SUBCUTANEOUS at 07:08

## 2020-08-05 RX ADMIN — Medication 100 MG: at 08:08

## 2020-08-05 RX ADMIN — INSULIN ASPART 18 UNITS: 100 INJECTION, SOLUTION INTRAVENOUS; SUBCUTANEOUS at 11:08

## 2020-08-05 NOTE — PLAN OF CARE
08/05/20 1531   Final Note   Assessment Type Final Discharge Note   Anticipated Discharge Disposition Home   What phone number can be called within the next 1-3 days to see how you are doing after discharge? [phone]   Hospital Follow Up  Appt(s) scheduled?   (Patient was given a list of [facility] locations where he can establish care with a PCP.)   Right Care Referral Info   Post Acute Recommendation No Care   Post-Acute Status   Post-Acute Authorization Other   Other Status No Post-Acute Service Needs   Discharge Delays None known at this time
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
## 2020-08-05 NOTE — PLAN OF CARE
Reviewed plan of care with patient. Pt repeated understanding. AAOx4. Calm and cooperative. Ciway negative. Pt resting comfortably with no issue during the night See doc flow sheets for further information. Will continue to monitor.

## 2020-08-06 ENCOUNTER — PATIENT OUTREACH (OUTPATIENT)
Dept: ADMINISTRATIVE | Facility: CLINIC | Age: 30
End: 2020-08-06

## 2020-08-06 DIAGNOSIS — E10.8 DIABETES MELLITUS TYPE 1 WITH COMPLICATIONS: Primary | ICD-10-CM

## 2020-08-06 LAB — POCT GLUCOSE: 222 MG/DL (ref 70–110)

## 2020-08-06 NOTE — PATIENT INSTRUCTIONS
Type 1 Diabetes and Your Child: Preventing Diabetic Ketoacidosis (DKA)     Know when to call your child's healthcare provider.     Diabetic ketoacidosis (DKA) is a serious complication of diabetes. It can lead to coma or death. A child with DKA has:  · High blood sugar (hyperglycemia)  · An imbalance of chemicals in the blood (metabolic acidosis)  · High levels of ketones in the blood and urine  Ketones are the waste product when the body breaks down fat for energy. This happens when there isn't enough insulin and the body isn't able to use sugar (glucose). Ketones can build up in the blood and then in the urine. Ketosis is a warning sign of DKA.  DKA is more common in children with type 1 diabetes. But, it can also occur in children with type 2 diabetes. DKA is a medical emergency.  If your child has high ketones and symptoms of DKA described below, call 911 or take him or her to the hospital emergency department.  What are the causes of DKA?  The most common causes of DKA are:  · Missing a dose of insulin  · Illness (flu, cold, or infection)  · An insulin pump that is not working properly  · Insulin that has  or has not been stored properly  What are the symptoms of DKA?  If your child has high ketones in the blood or urine and symptoms of DKA, call 911 or go to the hospital emergency department. Symptoms of DKA include:  · Nausea  · Vomiting  · Fruity-smelling breath  · Stomach cramps  · Very dark urine or no urine in 6 hours  · Fast breathing  · Thirst or very dry mouth  · Drowsiness, confusion, or unresponsiveness   When to check for ketones  Check for ketones in your child's urine or blood as instructed by his or her healthcare provider. In general, check for ketones when your child has any of the above symptoms, or has:  · Blood sugar above 250 mg/dL.  · Diarrhea or vomiting.  · Fever of 100.4°F (38°C) or higher, or as directed by your child's healthcare provider.  How to check for ketones  Ask your  childs healthcare provider to show you how to check for ketones at home. Ketone testing is most often done with urine test strips. Some blood glucose meters may also be used to check for ketones in the blood. Ask your child's healthcare provider for more information.  · For babies or toddlers. You can put a cotton ball in your child's diaper to absorb urine. Then, put the moist cotton ball onto a test strip to check for ketones. Follow the directions on the test strip package.  · For older children. Follow the directions on the test strip package.  Preventing DKA  DKA can usually be prevented. The best way to do this is to give your child insulin as directed. Be sure to follow your childs treatment plan as given to you by the healthcare provider. When your childs blood sugar is high, treat him or her right away. Remember that your childs blood sugar can be harder to manage when he or she is sick. To be safe, check your childs blood sugar more often when he or she is sick. Ask the healthcare provider for sick-day guidelines. This includes learning to adjust your childs insulin dose safely. And always keep a sick-day box available. This box should include:  · Ketone strips  · Thermometer  · Cans of soup  · Crackers  · Juice with or without sugar  · Flavored gelatin with and without sugar  · Frozen juice bars with and without sugar (in the freezer)  Be sure to check the expiration dates sick-day box items once a month. Replace items as needed.  Call your childs healthcare provider  Contact your child's healthcare provider right away if:  · You're not sure how much insulin to give when your child is sick.  · Your child's blood sugar is higher than usual or over 250 mg/dL and doesn't go down after getting insulin.  · Your child's blood sugar level is lower than usual or less than 70 mg/dL.  · Your child's blood or urine has ketones.  If you test your child for ketones and think he or she has ketoacidosis, call  911 or take him or her to the hospital emergency department right away.   Resources  For more information about diabetes, visit these websites:  · American Diabetes Association www.diabetes.org  · Children with Diabetes www.childrenwithdiabetes.com  · Juvenile Diabetes Research Foundation www.jdrf.org  · American Association of Diabetes Educators www.aadenet.org  · American Association of Clinical Endocrinologists www.aace.com  · National Diabetes Information Clearinghouse www.diabetes.niddk.nih.gov    © 2000-2020 [employer]. [address] [place] [address]. All rights reserved. This information is not intended as a substitute for professional medical care. Always follow your healthcare professional's instructions.        Diabetic Ketoacidosis  Diabetic ketoacidosis (DKA) is a serious problem that can happen in people with diabetes. DKA should be treated as a medical emergency. This is because it can lead to coma or death. If you have the symptoms of DKA, get medical help right away.  DKA happens more often in people with type 1 diabetes. But it can happen in people with type 2 diabetes. It can also happen in women with diabetes during pregnancy. This is often known as gestational diabetes.  DKA happens when insulin levels are too low. Without enough insulin, sugar (glucose) cant get to the cells of your body. The glucose stays in the blood. This causes high blood glucose (hyperglycemia). Without glucose, your body breaks down stored fat for energy. When this happens, acids called ketones are released into the blood. This is known as ketosis. High levels of ketones (ketoacidosis) can be harmful to you. Hyperglycemia and ketoacidosis can also cause serious problems in the blood and your body, such as:  · Low levels of potassium (hypokalemia)  · Swelling inside the brain (cerebral edema)  · Fluid in the lungs (pulmonary edema)  · Damage to kidneys or other organs  What causes diabetic  ketoacidosis?  In people with diabetes, DKA is most often caused by too little insulin in the body. It is also caused by:  · Poor management of diabetes  · Infections like a urinary tract infection or pneumonia  · Serious health problems, such as a heart attack  · Reactions to certain prescribed medicines and illicit drugs  Symptoms of diabetic ketoacidosis  DKA most often happens slowly over time. But it can worsen in a few hours if you are vomiting. The first symptoms are:  · Thirst and dry mouth  · Urinating a lot  · Belly pain  · Nausea or vomiting  · Breath that smells fruity (from the ketones)  Over time, these symptoms may happen:  · Dry or flushed skin  · Nausea and vomiting  · Loss of appetite  · Weight loss  · Belly pain  · Trouble breathing  · Trouble thinking or confusion  · Feeling very tired or weak. This can lead to coma.  How is diabetic ketoacidosis diagnosed?  Your healthcare provider will ask about your medical history. He or she will give you a physical exam. You may also have these tests:  · Blood tests to check your glucose levels  · Blood tests to check your electrolytes, such as potassium and sodium  · Urine test to check for ketones  These tests are done to check for DKA, and monitor it over time.  How is diabetic ketoacidosis treated?  DKA needs treatment right away in the hospital. Treatment includes:  · Insulin. This is the main type of treatment. Insulin allows the cells to use the glucose in the blood. This lowers the levels of both blood glucose and ketones.  · Fluids and electrolytes. These are given through a vein (IV). Fluids are replaced and abnormal electrolyte levels are corrected.  · Other medicines. These may be given to treat an illness that caused DKA. For example, antibiotics may be given to treat a urinary tract infection that caused DKA.  Preventing diabetic ketoacidosis  To help prevent DKA, make sure you:  · Take all of your medicines for diabetes exactly as prescribed.  This includes insulin.  · Check your blood glucose levels exactly as instructed.  · Be especially careful when you are sick with an illness or an infection. Take extra care to follow diabetes care instructions. Check your blood glucose more often.  · Do not exercise when your blood sugar is high and you have ketones in your urine.   · Check your urine ketone levels if told to do so. This is done with a urine test strip. Ask your healthcare provider how often to check your urine.     When to call your healthcare provider  Call your healthcare provider right away if you:  · Have symptoms of DKA  · Have very high blood glucose levels  · Are getting sick with another illness     © 4341-9296 [employer]. [address] [place] [address], [place] [address]. All rights reserved. This information is not intended as a substitute for professional medical care. Always follow your healthcare professional's instructions.

## 2020-08-13 ENCOUNTER — CARE AT HOME (OUTPATIENT)
Dept: HOME HEALTH SERVICES | Facility: CLINIC | Age: 30
End: 2020-08-13
Payer: MEDICAID

## 2020-08-13 VITALS
HEART RATE: 68 BPM | TEMPERATURE: 97 F | SYSTOLIC BLOOD PRESSURE: 111 MMHG | DIASTOLIC BLOOD PRESSURE: 70 MMHG | RESPIRATION RATE: 16 BRPM

## 2020-08-13 DIAGNOSIS — E10.8 DIABETES MELLITUS TYPE 1 WITH COMPLICATIONS: ICD-10-CM

## 2020-08-13 PROCEDURE — 99495 TCM SERVICES (MODERATE COMPLEXITY): ICD-10-PCS | Mod: S$GLB,,, | Performed by: NURSE PRACTITIONER

## 2020-08-13 PROCEDURE — 99495 TRANSJ CARE MGMT MOD F2F 14D: CPT | Mod: S$GLB,,, | Performed by: NURSE PRACTITIONER

## 2020-08-13 NOTE — PATIENT INSTRUCTIONS
[facility] to schedule follow-up visit with patient as needed.  - Continue all medications, treatments and therapies as ordered.   - Follow all instructions, recommendations as discussed.  - Maintain Safety Precautions at all times.  - Attend all medical appointments as scheduled.  - For worsening symptoms: call Primary Care Physician or Nurse Practitioner.  - For emergencies, call 911 or immediately report to the nearest emergency room.  - Limit Risks of environmental exposure to coronavirus as discussed including: social distancing, hand hygiene, and limiting departures from the home for necessities only.

## 2020-08-13 NOTE — PROGRESS NOTES
"[facility]  Transition of Care Home Visit    Visit Date: 8/13/2020  Encounter Provider: Elio Joseph NP  PCP:  Primary Doctor No    PRESENTING HISTORY      Patient ID: Doe Woodall 30 y.o. male.    Consult Requested By:  Dr. Makenzie Pabon Va*  Reason for Consult:  Transitional Care Coordination    Chief Complaint: Transitional Care     HPI:  29 y/o M with history T2DM with medication non compliance and history of alcohol abuse is brought by ED after being pulled by the police for what he describes as a panic attack this morning. As per patient, he was getting breakfast and suddenly started to get anxious, with increased palpitations, mild SOB, lightheadedness and feeling as if he was going to pass out. He does not recall details of what happened but he refers that he was saying "I want to die". When addressed about it, patient denies suicidal ideation, he admits that he is under stress but not plan to harm. He does not own fire weapons currently.      As per ED notes: the patient was found by EMS at the wheel of his car in the middle of a residential street, intoxicated and asleep. He had drank several beers and 1 pint of whiskey the night before. He disclosed to EMS that he has chronic suicidal ideation, and a 50/50 chance of acting on his plan. However, upon arrival to the ED the patient was calm and cooperative but slurring his words. He was placed under PEC.      Upon arrival patient with , AG 17, CO2 18 and ED concerned for DKA. Patient denies HA, nausea, vomiting, polydipsia, polyuria, abdominal pain, weakness, SOB. He was started on Insulin drip with potassium replacement and IVF.   ED also concerned for alcohol withdrawal and monitored closely.       Overview/Hospital Course:  8/2: Insulin drip + dextrose, NPO. Started diazepam folate thiamine multivitamin in light of heavy alcohol use. CIWA q4 but no notes from nursing yet.     8/3: DC insulin gtt. Started on basal bolus. " "PEC order discontinued    Admit Date: 08/02/20  Discharge Date: 08/05/20  TCC Referral Date: 08/06/20  _____________________________________________    Today:  Mr. Doe Woodall is a 30 y.o. male is being seen and examined at home today for transitional care visit to the home environment post-discharge from inpatient hospitalization encounter described above. Doe presents at less than baseline state of health as reported by patient and caregiver. Upon my arrival he was in bed asleep on the second floor of his townhouse, wif is in accompaniment. She reports that he had "too much to drink last night". Post-discharge, he had been "doing good for about a week." The patient is aroused from his sleep after multiple attempts by his wife. VSS. Denies any acute issues, concerns or complaints to address on today's visit. Reports taking all medications as prescribed. He has no upcoming appointments scheduled in the [facility] system. I assisted his wife to make an appointment to establish care with the [facility] due to his Medicaid provider status. She has ample transportation to get his there as scheduled. No other needs identified at this time. Risks of environmental exposure to coronavirus discussed including: social distancing, hand hygiene, and limiting departures from the home for necessities only. Reports understanding and willingness to comply.      Review of Systems   Unable to perform ROS: Psychiatric disorder (collateral info from spouse)   Psychiatric/Behavioral: Positive for behavioral problems (per wife see HPI). Negative for hallucinations (denies), self-injury (denies intent) and suicidal ideas (denies ).     Assessments:  · Environmental: single story home, no steps to enter, adequate lighting and temeprture control  · Functional Status: Independent with ADL's/IADL's, ambulates with assistance of a cane/walker, continent of bowel and bladder  · Safety: Fall Precautions, COVID " Precautions/Social Distancing/Mask Use  · Nutritional: Adequate  · Home Health: NA  · DME/Supplies: none    Ethical / Legal: Advance Care Planning   Capacity to make medical decisions:  yes, Conflict no  · Surrogate decision maker:  Name Aminta Woodall, Relationship: wife  · Advance Directives:  non  · HCPOA: none  · [place]:  Signed this visit  · Code Status:  full    Advanced Care Directives, HCPOA and [place]Post forms left in the home for family review, discussion and signing with instructions to return upon their next provider encounter for inclusion to the medical record.     PAST HISTORY:     Past Medical History:   Diagnosis Date    Diabetes mellitus     Encounter for blood transfusion     Other insomnia 8/3/2020    Perineal abscess 2014       Past Surgical History:   Procedure Laterality Date    DECOMPRESSION OF LUMBAR SPINE USING MINIMALLY INVASIVE TECHNIQUE Right 2018    Procedure: DECOMPRESSION, SPINE, LUMBAR, MINIMALLY INVASIVE L3-4;  Surgeon: Cristian Cervantes MD;  Location: [facility] OR [address] [place] [address];  Service: Neurosurgery;  Laterality: Right;    ORTHOPEDIC SURGERY         Family History   Problem Relation Age of Onset    Diabetes Father     Diabetes Paternal Grandmother     Diabetes Paternal Grandfather        Social History     Socioeconomic History    Marital status:      Spouse name: Not on file    Number of children: Not on file    Years of education: Not on file    Highest education level: Not on file   Occupational History    Not on file   Social Needs    Financial resource strain: Not on file    Food insecurity     Worry: Not on file     Inability: Not on file    Transportation needs     Medical: Not on file     Non-medical: Not on file   Tobacco Use    Smoking status: Former Smoker     Packs/day: 0.50     Years: 9.00     Pack years: 4.50     Quit date: 2013     Years since quittin.1    Smokeless tobacco: Former User   Substance and Sexual Activity    Alcohol use: Yes  "    Alcohol/week: 2.0 standard drinks     Types: 2 Cans of beer per week     Comment: use to drink 12 pack daily, but now drinks only on weekends     Drug use: Not Currently    Sexual activity: Yes     Partners: Female     Birth control/protection: None   Lifestyle    Physical activity     Days per week: Not on file     Minutes per session: Not on file    Stress: Not on file   Relationships    Social connections     Talks on phone: Not on file     Gets together: Not on file     Attends Hoahaoism service: Not on file     Active member of club or organization: Not on file     Attends meetings of clubs or organizations: Not on file     Relationship status: Not on file   Other Topics Concern    Not on file   Social History Narrative    Not on file       MEDICATIONS & ALLERGIES:     Current Outpatient Medications on File Prior to Visit   Medication Sig Dispense Refill    acetaminophen (TYLENOL) 500 MG tablet Take 1,000 mg by mouth 3 (three) times daily as needed for Pain.      blood sugar diagnostic Strp 1 strip by Misc.(Non-Drug; Combo Route) route 4 (four) times daily with meals and nightly. 200 strip 11    blood-glucose meter Misc Use as instructed 1 each 0    diphenhydramine HCl (UNISOM SLEEPGELS ORAL) Take 1 capsule by mouth every evening.      insulin aspart U-100 (NOVOLOG) 100 unit/mL (3 mL) InPn pen Inject 18 Units into the skin 3 (three) times daily with meals. 15 mL 3    insulin detemir U-100 (LEVEMIR FLEXTOUCH) 100 unit/mL (3 mL) SubQ InPn pen Inject 26 Units into the skin 2 (two) times daily. 15 mL 3    lancets 30 gauge Misc 1 lancet by Misc.(Non-Drug; Combo Route) route 4 (four) times daily with meals and nightly. 200 each 11    lancing device Misc Use as instructed 1 each 0    pen needle, diabetic 31 gauge x 5/16" Ndle 1 each by Misc.(Non-Drug; Combo Route) route 4 (four) times daily with meals and nightly. 200 each 11    thiamine 100 MG tablet Take 1 tablet (100 mg total) by mouth once " daily. 30 tablet 2     No current facility-administered medications on file prior to visit.         Review of patient's allergies indicates:  No Known Allergies    OBJECTIVE:     Vital Signs:  There were no vitals filed for this visit.  There is no height or weight on file to calculate BMI.       Physical Exam  Constitutional:       General: He is sleeping. He is not in acute distress.     Appearance: He is well-developed and well-groomed. He is obese. He is ill-appearing. He is not toxic-appearing.   Neck:      Musculoskeletal: Full passive range of motion without pain.   Cardiovascular:      Rate and Rhythm: Normal rate and regular rhythm.      Heart sounds: Normal heart sounds.   Pulmonary:      Effort: Pulmonary effort is normal. No accessory muscle usage or respiratory distress.      Breath sounds: Normal breath sounds.   Abdominal:      General: Bowel sounds are normal.      Palpations: Abdomen is soft.   Musculoskeletal:      Right lower leg: No edema.      Left lower leg: No edema.   Skin:     General: Skin is warm.      Capillary Refill: Capillary refill takes less than 2 seconds.   Neurological:      Mental Status: He is oriented to person, place, and time and easily aroused.   Psychiatric:         Attention and Perception: He is inattentive.         Behavior: Behavior is uncooperative.         Thought Content: Thought content does not include homicidal or suicidal ideation. Thought content does not include homicidal or suicidal plan.         Cognition and Memory: Cognition is impaired. Memory is impaired. He exhibits impaired recent memory and impaired remote memory.         Judgment: Judgment is inappropriate.      Comments: Intoxicated/hungover  Denies SI/HI  Full Code per inquiry         Laboratory  Lab Results   Component Value Date    WBC 6.38 08/05/2020    HGB 16.9 08/05/2020    HCT 49.4 08/05/2020     (H) 08/05/2020     08/05/2020     Lab Results   Component Value Date    INR 0.9  08/02/2020    INR 1.0 07/05/2018     Lab Results   Component Value Date    HGBA1C 13.9 (H) 08/02/2020     No results for input(s): POCTGLUCOSE in the last 72 hours.    TRANSITION OF CARE:     Family and/or Caretaker present at visit?  Yes.  Diagnostic tests reviewed/disposition: No diagnosic tests pending after this hospitalization.  Disease/illness education: Importance of Compliance with all prescribed medications and treatments, COVID Precautions/Social Distancing/Mask Use  Home health/community services discussion/referrals: Patient does not have home health established from hospital visit.  They do not need home health.  If needed, we will set up home health for the patient.   Establishment or re-establishment of referral orders for community resources: No other necessary community resources.   Discussion with other health care providers: No discussion with other health care providers necessary.     Transition of Care Visit:  I have reviewed and updated the history and problem list. I have reconciled the medication list. I have discussed the hospitalization and current medical issues, prognosis and plans with the patient/family.     I spent more than 50% of time discussing the care with the patient/family. Total Face-to-Face Encounter: 60 minutes.    Medications Reconciliation:   I have reconciled the patient's home medications and discharge medications with the patient/family. I have updated all changes. Refer to After-Visit Medication List.    Discharge plans, follow-up instructions, future appointments, provider contact information, indicators to seek emergency treatment and encouragement to call for any questions, concerns or clarification of the patient's plan of care explained to patient and/or caregiver(s), whom confirm understanding of provided information and endorse willingness to comply.     ASSESSMENT & PLAN:     HIGH RISK CONDITION(S):  Patient has a condition that poses threat to life and bodily  "function: Psychiatric Illness with threat to self and others    There are no diagnoses linked to this encounter.    Encounter for Support and Coordination of Transition of Care   - [facility] Nurse Practitioner to schedule home visit with patient PRN.    Were controlled substances prescribed?  No    Instructions for the patient:    Scheduled Follow-up :  Future Appointments   Date Time Provider Department Center   8/13/2020  1:00 PM Elio Abrams NP [id number]       After Visit Medication List :     Medication List          Accurate as of August 13, 2020  9:46 AM. If you have any questions, ask your nurse or doctor.            CONTINUE taking these medications    acetaminophen 500 MG tablet  Commonly known as: TYLENOL     BD ULTRA-FINE SHORT PEN NEEDLE 31 gauge x 5/16" Ndle  Generic drug: pen needle, diabetic  1 each by Misc.(Non-Drug; Combo Route) route 4 (four) times daily with meals and nightly.     lancing device Misc  Use as instructed     LEVEMIR FLEXTOUCH U-100 INSULN 100 unit/mL (3 mL) Inpn pen  Generic drug: insulin detemir U-100  Inject 26 Units into the skin 2 (two) times daily.     NovoLOG Flexpen U-100 Insulin 100 unit/mL (3 mL) Inpn pen  Generic drug: insulin aspart U-100  Inject 18 Units into the skin 3 (three) times daily with meals.     thiamine 100 MG tablet  Take 1 tablet (100 mg total) by mouth once daily.     TRUE METRIX GLUCOSE METER Misc  Generic drug: blood-glucose meter  Use as instructed     TRUE METRIX GLUCOSE TEST STRIP Strp  Generic drug: blood sugar diagnostic  1 strip by Misc.(Non-Drug; Combo Route) route 4 (four) times daily with meals and nightly.     TRUEPLUS LANCETS 30 gauge Misc  Generic drug: lancets  1 lancet by Misc.(Non-Drug; Combo Route) route 4 (four) times daily with meals and nightly.     UNISOM SLEEPGELS ORAL            Patient consent was obtained prior to treatment on this visit.    Attestation: Screening criteria to assess the level of the " patient's risk for infection with COVID-19 as recommended by the CDC at the time of the above documented home visit concluded appropriateness to proceed. Universal precautions were maintained at all times, including provider use of >60% alcohol gel hand  immediately prior to entry and upon departing the patient's home as well as cleaning of equipment used in home visit with antibacterial/germicidal disposable wipes.     Signature:      Elio Joseph, MSN, APRN, FNP-C  [facility]    With the consent of the patient and/or caregiver(s), an additional 20 minutes included a comprehensive discussion regarding Advanced Care Planning. Sources of emotional and spiritual support were identified and encouraged for involvement to assist in finalization of decisions regarding the patient's goals of care (Living Will). Topics discussed include statutory guidelines of the [facility] to determine/delineate the legal surrogate medical decision maker should the patient be deemed incompetent to self-direct medical care (next of kin vs.POA) and the required documentation to ensure legal validity thereof (Advanced Directives/[place] Post).

## 2020-08-23 PROBLEM — F41.9 ANXIETY DISORDER: Status: ACTIVE | Noted: 2020-08-23

## 2020-08-27 NOTE — SUBJECTIVE & OBJECTIVE
Review of Systems  Objective:     Vital Signs (Most Recent):  Temp: 98.3 °F (36.8 °C) (08/05/20 1613)  Pulse: 93 (08/05/20 1613)  Resp: 19 (08/05/20 1613)  BP: 123/80 (08/05/20 1613)  SpO2: 97 % (08/05/20 1613) Vital Signs (24h Range):        Weight: 120 kg (264 lb 7.1 oz)  Body mass index is 44.01 kg/m².  No intake or output data in the 24 hours ending 08/27/20 0748   Physical Exam  Constitutional:       General: He is not in acute distress.     Appearance: He is well-developed. He is obese.   HENT:      Head: Normocephalic and atraumatic.      Mouth/Throat:      Mouth: Mucous membranes are moist.   Eyes:      Conjunctiva/sclera: Conjunctivae normal.      Pupils: Pupils are equal, round, and reactive to light.   Neck:      Musculoskeletal: Normal range of motion and neck supple.   Cardiovascular:      Rate and Rhythm: Normal rate and regular rhythm.      Heart sounds: Normal heart sounds. No murmur. No friction rub. No gallop.    Pulmonary:      Effort: Pulmonary effort is normal.      Breath sounds: Normal breath sounds. No wheezing or rales.   Abdominal:      General: Bowel sounds are normal.      Palpations: Abdomen is soft. There is no mass.      Tenderness: There is no abdominal tenderness. There is no guarding.   Musculoskeletal: Normal range of motion.   Lymphadenopathy:      Cervical: No cervical adenopathy.   Neurological:      Mental Status: He is alert and oriented to person, place, and time.   Psychiatric:         Mood and Affect: Mood normal.         Behavior: Behavior normal.         Thought Content: Thought content normal.         Judgment: Judgment normal.

## 2020-08-27 NOTE — DISCHARGE SUMMARY
"[facility] Hospital Medicine  Discharge Summary      Patient Name: Doe Woodall  MRN: [id number]  Admission Date: 8/2/2020  Hospital Length of Stay: 3 days  Discharge Date and Time: 8/5/2020  4:46 PM  Attending Physician: Katie att. providers found   Discharging Provider: Rajiv Hull MD  Primary Care Provider: Primary Doctor No  [facility] Medicine Team: [facility] HOSP MED 5 Rajiv Hull MD    HPI:   29 y/o M with history T2DM with medication non compliance and history of alcohol abuse is brought by ED after being pulled by the police for what he describes as a panic attack this morning. As per patient, he was getting breakfast and suddenly started to get anxious, with increased palpitations, mild SOB, lightheadedness and feeling as if he was going to pass out. He does not recall details of what happened but he refers that he was saying "I want to die". When addressed about it, patient denies suicidal ideation, he admits that he is under stress but not plan to harm. He does not own fire weapons currently.     As per ED notes: the patient was found by EMS at the wheel of his car in the middle of a residential street, intoxicated and asleep. He had drank several beers and 1 pint of whiskey the night before. He disclosed to EMS that he has chronic suicidal ideation, and a 50/50 chance of acting on his plan. However, upon arrival to the ED the patient was calm and cooperative but slurring his words. He was placed under PEC.     Upon arrival patient with , AG 17, CO2 18 and ED concerned for DKA. Patient denies HA, nausea, vomiting, polydipsia, polyuria, abdominal pain, weakness, SOB. He was started on Insulin drip with potassium replacement and IVF.   ED also concerned for alcohol withdrawal and monitored closely.          * No surgery found *      Hospital Course:   Patient admitted to hospital medicine for DKA. PEC'ed in the ED due to some expressed suicidal ideation. Started on an " insulin gtt and a benzo taper for withdrawal symptoms. Transitioned to basal-bolus insulin on hospital day 2 and PEC was rescinded. Patient no longer endorsed suicidal ideation. Discussed community resources regarding treatment for alcohol abuse with patient. Insulin needs titrated while inpatient. Hemoglobin A1c on admit 13. Price checked and patient's insulin was free of charge, supplies and insulin delivered to bedside on day of discharge. Patient completed benzo taper prior to discharge. Follow up arranged at [facility] prior to discharge. He was discharged on 8/5/2020 in good condition.      Review of Systems  Objective:     Vital Signs (Most Recent):  Temp: 98.3 °F (36.8 °C) (08/05/20 1613)  Pulse: 93 (08/05/20 1613)  Resp: 19 (08/05/20 1613)  BP: 123/80 (08/05/20 1613)  SpO2: 97 % (08/05/20 1613) Vital Signs (24h Range):        Weight: 120 kg (264 lb 7.1 oz)  Body mass index is 44.01 kg/m².  No intake or output data in the 24 hours ending 08/27/20 0748   Physical Exam  Constitutional:       General: He is not in acute distress.     Appearance: He is well-developed. He is obese.   HENT:      Head: Normocephalic and atraumatic.      Mouth/Throat:      Mouth: Mucous membranes are moist.   Eyes:      Conjunctiva/sclera: Conjunctivae normal.      Pupils: Pupils are equal, round, and reactive to light.   Neck:      Musculoskeletal: Normal range of motion and neck supple.   Cardiovascular:      Rate and Rhythm: Normal rate and regular rhythm.      Heart sounds: Normal heart sounds. No murmur. No friction rub. No gallop.    Pulmonary:      Effort: Pulmonary effort is normal.      Breath sounds: Normal breath sounds. No wheezing or rales.   Abdominal:      General: Bowel sounds are normal.      Palpations: Abdomen is soft. There is no mass.      Tenderness: There is no abdominal tenderness. There is no guarding.   Musculoskeletal: Normal range of motion.   Lymphadenopathy:      Cervical: No cervical  adenopathy.   Neurological:      Mental Status: He is alert and oriented to person, place, and time.   Psychiatric:         Mood and Affect: Mood normal.         Behavior: Behavior normal.         Thought Content: Thought content normal.         Judgment: Judgment normal.           Consults:   Consults (From admission, onward)        Status Ordering Provider     Inpatient consult to Psychiatry  Once     Provider:  (Not yet assigned)    Completed DELMAR MORTON     Inpatient consult to Psychiatry  Once     Provider:  (Not yet assigned)    Completed CESAR ZHANG     Inpatient consult to Registered Dietitian/Nutritionist  Once     Provider:  (Not yet assigned)    Completed DELMAR MORTON          No new Assessment & Plan notes have been filed under this hospital service since the last note was generated.  Service: Hospital Medicine    Final Active Diagnoses:    Diagnosis Date Noted POA    Anxiety disorder [F41.9] 08/23/2020 Yes    Substance induced mood disorder [F19.94] 08/04/2020 Yes    Alcohol use disorder, severe, dependence [F10.20] 08/04/2020 Yes    Elevated transaminase level [R74.0] 08/03/2020 Yes    Severe obesity (BMI >= 40) [E66.01] 08/03/2020 Yes    Other insomnia [G47.09] 08/03/2020 Yes     Chronic    Type 2 diabetes mellitus with hyperglycemia [E11.65] 08/01/2014 Yes      Problems Resolved During this Admission:    Diagnosis Date Noted Date Resolved POA    PRINCIPAL PROBLEM:  DKA (diabetic ketoacidoses) [E11.10] 08/02/2020 08/05/2020 Yes    Hypokalemia [E87.6] 08/03/2020 08/05/2020 Yes    Hypocalcemia [E83.51] 08/03/2020 08/05/2020 Yes    Suicidal ideation [R45.851] 08/02/2020 08/05/2020 Not Applicable    Alcohol intoxication with moderate or severe use disorder [F10.229] 08/02/2020 08/05/2020 Yes       Discharged Condition: good    Disposition: Home or Self Care    Follow Up:  Follow-up Information     [facility]    Why: To establish with Primary Care  provider  Contact information:  [place] [id number]  [phone]                 Patient Instructions:      Diet diabetic     Notify your health care provider if you experience any of the following:  temperature >100.4     Notify your health care provider if you experience any of the following:  persistent nausea and vomiting or diarrhea     Activity as tolerated       Significant Diagnostic Studies: Labs:   CMP  Sodium   Date Value Ref Range Status   08/05/2020 138 136 - 145 mmol/L Final     Potassium   Date Value Ref Range Status   08/05/2020 4.2 3.5 - 5.1 mmol/L Final     Chloride   Date Value Ref Range Status   08/05/2020 102 95 - 110 mmol/L Final     CO2   Date Value Ref Range Status   08/05/2020 28 23 - 29 mmol/L Final     Glucose   Date Value Ref Range Status   08/05/2020 199 (H) 70 - 110 mg/dL Final     BUN, Bld   Date Value Ref Range Status   08/05/2020 9 6 - 20 mg/dL Final     Creatinine   Date Value Ref Range Status   08/05/2020 0.8 0.5 - 1.4 mg/dL Final     Calcium   Date Value Ref Range Status   08/05/2020 9.0 8.7 - 10.5 mg/dL Final     Total Protein   Date Value Ref Range Status   08/05/2020 6.5 6.0 - 8.4 g/dL Final     Albumin   Date Value Ref Range Status   08/05/2020 3.1 (L) 3.5 - 5.2 g/dL Final     Total Bilirubin   Date Value Ref Range Status   08/05/2020 0.9 0.1 - 1.0 mg/dL Final     Comment:     For infants and newborns, interpretation of results should be based  on gestational age, weight and in agreement with clinical  observations.  Premature Infant recommended reference ranges:  Up to 24 hours.............<8.0 mg/dL  Up to 48 hours............<12.0 mg/dL  3-5 days..................<15.0 mg/dL  6-29 days.................<15.0 mg/dL       Alkaline Phosphatase   Date Value Ref Range Status   08/05/2020 149 (H) 55 - 135 U/L Final     AST   Date Value Ref Range Status   08/05/2020 312 (H) 10 - 40 U/L Final     ALT   Date Value Ref Range Status   08/05/2020 280 (H) 10 - 44 U/L  "Final     Anion Gap   Date Value Ref Range Status   08/05/2020 8 8 - 16 mmol/L Final     eGFR if    Date Value Ref Range Status   08/05/2020 >60.0 >60 mL/min/1.73 m^2 Final     eGFR if non    Date Value Ref Range Status   08/05/2020 >60.0 >60 mL/min/1.73 m^2 Final     Comment:     Calculation used to obtain the estimated glomerular filtration  rate (eGFR) is the CKD-EPI equation.           Recent Labs   Lab 08/02/20  1200   HGBA1C 13.9*       Pending Diagnostic Studies:     None         Medications:  Reconciled Home Medications:      Medication List      START taking these medications    BD ULTRA-FINE SHORT PEN NEEDLE 31 gauge x 5/16" Ndle  Generic drug: pen needle, diabetic  1 each by Misc.(Non-Drug; Combo Route) route 4 (four) times daily with meals and nightly.     lancing device Misc  Use as instructed     LEVEMIR FLEXTOUCH U-100 INSULN 100 unit/mL (3 mL) Inpn pen  Generic drug: insulin detemir U-100  Inject 26 Units into the skin 2 (two) times daily.     NovoLOG Flexpen U-100 Insulin 100 unit/mL (3 mL) Inpn pen  Generic drug: insulin aspart U-100  Inject 18 Units into the skin 3 (three) times daily with meals.     thiamine 100 MG tablet  Take 1 tablet (100 mg total) by mouth once daily.     TRUE METRIX GLUCOSE METER Misc  Generic drug: blood-glucose meter  Use as instructed     TRUE METRIX GLUCOSE TEST STRIP Strp  Generic drug: blood sugar diagnostic  1 strip by Misc.(Non-Drug; Combo Route) route 4 (four) times daily with meals and nightly.     TRUEPLUS LANCETS 30 gauge Misc  Generic drug: lancets  1 lancet by Misc.(Non-Drug; Combo Route) route 4 (four) times daily with meals and nightly.        CONTINUE taking these medications    acetaminophen 500 MG tablet  Commonly known as: TYLENOL  Take 1,000 mg by mouth 3 (three) times daily as needed for Pain.     UNISOM SLEEPGELS ORAL  Take 1 capsule by mouth every evening.            Indwelling Lines/Drains at time of discharge: "   Lines/Drains/Airways     None                 Time spent on the discharge of patient: 45 minutes  Patient was seen and examined on the date of discharge and determined to be suitable for discharge.          Rajiv Hull MD  Department of Hospital Medicine  [facility]

## 2020-12-30 NOTE — PLAN OF CARE
Pt to be d/c home with family. Medications brought to bedside. D/c instructions given to Pt. PIV removed. Personal belongings with Pt. All questions answered. Will continue to monitor.    Griseofulvin Pregnancy And Lactation Text: This medication is Pregnancy Category X and is known to cause serious birth defects. It is unknown if this medication is excreted in breast milk but breast feeding should be avoided.

## 2021-02-23 ENCOUNTER — HOSPITAL ENCOUNTER (EMERGENCY)
Facility: HOSPITAL | Age: 31
Discharge: HOME | End: 2021-02-23
Attending: EMERGENCY MEDICINE
Payer: MEDICAID

## 2021-02-23 VITALS
TEMPERATURE: 98 F | WEIGHT: 250 LBS | DIASTOLIC BLOOD PRESSURE: 99 MMHG | BODY MASS INDEX: 40.18 KG/M2 | OXYGEN SATURATION: 96 % | RESPIRATION RATE: 18 BRPM | HEIGHT: 66 IN | SYSTOLIC BLOOD PRESSURE: 162 MMHG | HEART RATE: 97 BPM

## 2021-02-23 DIAGNOSIS — R73.9 HYPERGLYCEMIA: ICD-10-CM

## 2021-02-23 DIAGNOSIS — L03.031 PARONYCHIA OF GREAT TOE, RIGHT: ICD-10-CM

## 2021-02-23 DIAGNOSIS — B30.9 ACUTE VIRAL CONJUNCTIVITIS OF LEFT EYE: Primary | ICD-10-CM

## 2021-02-23 LAB
ALBUMIN SERPL BCP-MCNC: 3.2 G/DL (ref 3.5–5.2)
ALLENS TEST: ABNORMAL
ALP SERPL-CCNC: 227 U/L (ref 55–135)
ALT SERPL W/O P-5'-P-CCNC: 152 U/L (ref 10–44)
ANION GAP SERPL CALC-SCNC: 12 MMOL/L (ref 8–16)
AST SERPL-CCNC: 88 U/L (ref 10–40)
B-OH-BUTYR BLD STRIP-SCNC: 1 MMOL/L (ref 0–0.5)
BACTERIA #/AREA URNS AUTO: NORMAL /HPF
BASOPHILS # BLD AUTO: 0.04 K/UL (ref 0–0.2)
BASOPHILS NFR BLD: 0.6 % (ref 0–1.9)
BILIRUB SERPL-MCNC: 1 MG/DL (ref 0.1–1)
BILIRUB UR QL STRIP: NEGATIVE
BUN SERPL-MCNC: 13 MG/DL (ref 6–20)
CALCIUM SERPL-MCNC: 8.6 MG/DL (ref 8.7–10.5)
CHLORIDE SERPL-SCNC: 96 MMOL/L (ref 95–110)
CLARITY UR REFRACT.AUTO: CLEAR
CO2 SERPL-SCNC: 24 MMOL/L (ref 23–29)
COLOR UR AUTO: YELLOW
CREAT SERPL-MCNC: 0.8 MG/DL (ref 0.5–1.4)
DELSYS: ABNORMAL
DIFFERENTIAL METHOD: ABNORMAL
EOSINOPHIL # BLD AUTO: 0.1 K/UL (ref 0–0.5)
EOSINOPHIL NFR BLD: 1.2 % (ref 0–8)
ERYTHROCYTE [DISTWIDTH] IN BLOOD BY AUTOMATED COUNT: 11.9 % (ref 11.5–14.5)
EST. GFR  (AFRICAN AMERICAN): >60 ML/MIN/1.73 M^2
EST. GFR  (NON AFRICAN AMERICAN): >60 ML/MIN/1.73 M^2
GLUCOSE SERPL-MCNC: 593 MG/DL (ref 70–110)
GLUCOSE UR QL STRIP: ABNORMAL
HCO3 UR-SCNC: 30 MMOL/L (ref 24–28)
HCT VFR BLD AUTO: 44.2 % (ref 40–54)
HGB BLD-MCNC: 15.6 G/DL (ref 14–18)
HGB UR QL STRIP: NEGATIVE
HYALINE CASTS UR QL AUTO: 0 /LPF
IMM GRANULOCYTES # BLD AUTO: 0.03 K/UL (ref 0–0.04)
IMM GRANULOCYTES NFR BLD AUTO: 0.4 % (ref 0–0.5)
KETONES UR QL STRIP: ABNORMAL
LEUKOCYTE ESTERASE UR QL STRIP: NEGATIVE
LYMPHOCYTES # BLD AUTO: 2.1 K/UL (ref 1–4.8)
LYMPHOCYTES NFR BLD: 30.4 % (ref 18–48)
MCH RBC QN AUTO: 34.4 PG (ref 27–31)
MCHC RBC AUTO-ENTMCNC: 35.3 G/DL (ref 32–36)
MCV RBC AUTO: 97 FL (ref 82–98)
MICROSCOPIC COMMENT: NORMAL
MODE: ABNORMAL
MONOCYTES # BLD AUTO: 0.8 K/UL (ref 0.3–1)
MONOCYTES NFR BLD: 11.1 % (ref 4–15)
NEUTROPHILS # BLD AUTO: 3.8 K/UL (ref 1.8–7.7)
NEUTROPHILS NFR BLD: 56.3 % (ref 38–73)
NITRITE UR QL STRIP: NEGATIVE
NRBC BLD-RTO: 0 /100 WBC
PCO2 BLDA: 46.9 MMHG (ref 35–45)
PH SMN: 7.42 [PH] (ref 7.35–7.45)
PH UR STRIP: 6 [PH] (ref 5–8)
PLATELET # BLD AUTO: 166 K/UL (ref 150–350)
PMV BLD AUTO: 10.8 FL (ref 9.2–12.9)
PO2 BLDA: 45 MMHG (ref 40–60)
POC BE: 5 MMOL/L
POC SATURATED O2: 81 % (ref 95–100)
POC TCO2: 31 MMOL/L (ref 24–29)
POCT GLUCOSE: 365 MG/DL (ref 70–110)
POCT GLUCOSE: >500 MG/DL (ref 70–110)
POTASSIUM SERPL-SCNC: 4.1 MMOL/L (ref 3.5–5.1)
PROT SERPL-MCNC: 6.8 G/DL (ref 6–8.4)
PROT UR QL STRIP: ABNORMAL
RBC # BLD AUTO: 4.54 M/UL (ref 4.6–6.2)
RBC #/AREA URNS AUTO: 1 /HPF (ref 0–4)
SAMPLE: ABNORMAL
SITE: ABNORMAL
SODIUM SERPL-SCNC: 132 MMOL/L (ref 136–145)
SP GR UR STRIP: >=1.03 (ref 1–1.03)
SQUAMOUS #/AREA URNS AUTO: 0 /HPF
URN SPEC COLLECT METH UR: ABNORMAL
WBC # BLD AUTO: 6.78 K/UL (ref 3.9–12.7)
WBC #/AREA URNS AUTO: 2 /HPF (ref 0–5)
YEAST UR QL AUTO: NORMAL

## 2021-02-23 PROCEDURE — 86703 HIV-1/HIV-2 1 RESULT ANTBDY: CPT

## 2021-02-23 PROCEDURE — 99900035 HC TECH TIME PER 15 MIN (STAT)

## 2021-02-23 PROCEDURE — 86803 HEPATITIS C AB TEST: CPT

## 2021-02-23 PROCEDURE — 99284 PR EMERGENCY DEPT VISIT,LEVEL IV: ICD-10-PCS | Mod: 25,,, | Performed by: PHYSICIAN ASSISTANT

## 2021-02-23 PROCEDURE — 10060 I&D ABSCESS SIMPLE/SINGLE: CPT | Mod: T5

## 2021-02-23 PROCEDURE — 10060 PR DRAIN SKIN ABSCESS SIMPLE: ICD-10-PCS | Mod: ,,, | Performed by: PHYSICIAN ASSISTANT

## 2021-02-23 PROCEDURE — 96361 HYDRATE IV INFUSION ADD-ON: CPT

## 2021-02-23 PROCEDURE — 85025 COMPLETE CBC W/AUTO DIFF WBC: CPT

## 2021-02-23 PROCEDURE — 82803 BLOOD GASES ANY COMBINATION: CPT

## 2021-02-23 PROCEDURE — 96360 HYDRATION IV INFUSION INIT: CPT | Mod: 59

## 2021-02-23 PROCEDURE — 80053 COMPREHEN METABOLIC PANEL: CPT

## 2021-02-23 PROCEDURE — 10060 I&D ABSCESS SIMPLE/SINGLE: CPT | Mod: ,,, | Performed by: PHYSICIAN ASSISTANT

## 2021-02-23 PROCEDURE — 99284 EMERGENCY DEPT VISIT MOD MDM: CPT | Mod: 25,,, | Performed by: PHYSICIAN ASSISTANT

## 2021-02-23 PROCEDURE — 82962 GLUCOSE BLOOD TEST: CPT

## 2021-02-23 PROCEDURE — 25000003 PHARM REV CODE 250: Performed by: PHYSICIAN ASSISTANT

## 2021-02-23 PROCEDURE — 81001 URINALYSIS AUTO W/SCOPE: CPT

## 2021-02-23 PROCEDURE — 82010 KETONE BODYS QUAN: CPT

## 2021-02-23 PROCEDURE — 99284 EMERGENCY DEPT VISIT MOD MDM: CPT | Mod: 25

## 2021-02-23 RX ORDER — LIDOCAINE HYDROCHLORIDE 10 MG/ML
10 INJECTION INFILTRATION; PERINEURAL ONCE
Status: COMPLETED | OUTPATIENT
Start: 2021-02-23 | End: 2021-02-23

## 2021-02-23 RX ORDER — INSULIN ASPART 100 [IU]/ML
18 INJECTION, SOLUTION INTRAVENOUS; SUBCUTANEOUS
Qty: 15 ML | Refills: 3 | Status: ON HOLD | OUTPATIENT
Start: 2021-02-23 | End: 2022-01-08 | Stop reason: HOSPADM

## 2021-02-23 RX ORDER — SULFAMETHOXAZOLE AND TRIMETHOPRIM 800; 160 MG/1; MG/1
1 TABLET ORAL 2 TIMES DAILY
Qty: 14 TABLET | Refills: 0 | Status: SHIPPED | OUTPATIENT
Start: 2021-02-23 | End: 2021-03-02

## 2021-02-23 RX ADMIN — LIDOCAINE HYDROCHLORIDE 10 ML: 10 INJECTION, SOLUTION INFILTRATION; PERINEURAL at 06:02

## 2021-02-23 RX ADMIN — SODIUM CHLORIDE 1000 ML: 0.9 INJECTION, SOLUTION INTRAVENOUS at 06:02

## 2021-02-24 LAB
HCV AB SERPL QL IA: NEGATIVE
HIV 1+2 AB+HIV1 P24 AG SERPL QL IA: NEGATIVE

## 2021-04-12 ENCOUNTER — PATIENT MESSAGE (OUTPATIENT)
Dept: RESEARCH | Facility: HOSPITAL | Age: 31
End: 2021-04-12

## 2021-07-01 ENCOUNTER — PATIENT MESSAGE (OUTPATIENT)
Dept: ADMINISTRATIVE | Facility: OTHER | Age: 31
End: 2021-07-01

## 2021-07-21 NOTE — NURSING
POCT Glucose 192.    Start antibiotic  Give probiotic  Tylenol or Motrin as needed for pain or fever  Encourage fluids  Follow up with PCP if no improvement  Go to ER with worsening symptoms  Otitis Media in Children   WHAT YOU NEED TO KNOW:   Otitis media is an ear infection  Your child may have an ear infection in one or both ears  Your child may get an ear infection when his eustachian tubes become swollen or blocked  Eustachian tubes drain fluid away from the middle ear  Your child may have a buildup of fluid and pressure in his ear when he has an ear infection  The ear may become infected by germs, which grow easily in the fluid trapped behind the eardrum  DISCHARGE INSTRUCTIONS:   Return to the emergency department if:   · You see blood or pus draining from your child's ear  · Your child seems confused or cannot stay awake  · Your child has a stiff neck, headache, and a fever  Contact your child's healthcare provider if:   · Your child has a fever  · Your child is still not eating or drinking 24 hours after he takes his medicine  · Your child has pain behind his ear or when you move his earlobe  · Your child's ear is sticking out from his head  · Your child still has signs and symptoms of an ear infection 48 hours after he takes his medicine  · You have questions or concerns about your child's condition or care  Medicines:   · Medicines  may be given to decrease your child's pain or fever, or to treat an infection caused by bacteria  · Do not give aspirin to children under 25years of age  Your child could develop Reye syndrome if he takes aspirin  Reye syndrome can cause life-threatening brain and liver damage  Check your child's medicine labels for aspirin, salicylates, or oil of wintergreen  · Give your child's medicine as directed  Contact your child's healthcare provider if you think the medicine is not working as expected   Tell him or her if your child is allergic to any medicine  Keep a current list of the medicines, vitamins, and herbs your child takes  Include the amounts, and when, how, and why they are taken  Bring the list or the medicines in their containers to follow-up visits  Carry your child's medicine list with you in case of an emergency  Care for your child at home:   · Prop your child's head and chest up  while he sleeps  This may decrease his ear pressure and pain  Ask your child's healthcare provider how to safely prop your child's head and chest up  · Have your child lie with his infected ear facing down  to allow excess fluid to drain from his ear  · Use ice or heat  to help decrease your child's ear pain  Ask which of these is best for your child, and use as directed  · Ask about ways to keep water out of your child's ears  when he bathes or swims  Prevent otitis media:   · Wash your and your child's hands often  to help prevent the spread of germs  Encourage everyone in your house to wash their hands with soap and water after they use the bathroom, after they change a diaper, and before they prepare or eat food  · Keep your child away from people who are ill, such as sick playmates  Germs spread easily and quickly in  centers  · If possible, breastfeed your baby  Your baby may be less likely to get an ear infection if he is   · Do not give your child a bottle while he is lying down  This may cause liquid from his sinuses to leak into his eustachian tube  · Keep your child away from people who smoke  · Vaccinate your child  Ask your child's healthcare provider about the shots your child needs  Follow up with your child's healthcare provider as directed:  Write down your questions so you remember to ask them during your child's visits  © 2017 [address] Information is for End User's use only and may not be sold, redistributed or otherwise used for commercial purposes   All illustrations and images included in CareNotes® are the copyrighted property of A [employer] M [employer]  or [employer]  The above information is an  only  It is not intended as medical advice for individual conditions or treatments  Talk to your doctor, nurse or pharmacist before following any medical regimen to see if it is safe and effective for you

## 2021-11-16 ENCOUNTER — HOSPITAL ENCOUNTER (EMERGENCY)
Facility: HOSPITAL | Age: 31
Discharge: HOME | End: 2021-11-16
Attending: EMERGENCY MEDICINE
Payer: MEDICAID

## 2021-11-16 VITALS
RESPIRATION RATE: 16 BRPM | HEIGHT: 65 IN | SYSTOLIC BLOOD PRESSURE: 148 MMHG | HEART RATE: 96 BPM | TEMPERATURE: 99 F | OXYGEN SATURATION: 95 % | BODY MASS INDEX: 41.65 KG/M2 | DIASTOLIC BLOOD PRESSURE: 94 MMHG | WEIGHT: 250 LBS

## 2021-11-16 DIAGNOSIS — L02.91 ABSCESS: Primary | ICD-10-CM

## 2021-11-16 DIAGNOSIS — L03.90 CELLULITIS, UNSPECIFIED CELLULITIS SITE: ICD-10-CM

## 2021-11-16 PROCEDURE — 25000003 PHARM REV CODE 250: Performed by: PHYSICIAN ASSISTANT

## 2021-11-16 PROCEDURE — 99283 EMERGENCY DEPT VISIT LOW MDM: CPT

## 2021-11-16 RX ORDER — CEPHALEXIN 250 MG/1
250 CAPSULE ORAL 4 TIMES DAILY
Qty: 28 CAPSULE | Refills: 0 | Status: SHIPPED | OUTPATIENT
Start: 2021-11-16 | End: 2021-11-23

## 2021-11-16 RX ORDER — MUPIROCIN 20 MG/G
1 OINTMENT TOPICAL
Status: COMPLETED | OUTPATIENT
Start: 2021-11-16 | End: 2021-11-16

## 2021-11-16 RX ORDER — CEPHALEXIN 500 MG/1
500 CAPSULE ORAL
Status: COMPLETED | OUTPATIENT
Start: 2021-11-16 | End: 2021-11-16

## 2021-11-16 RX ADMIN — MUPIROCIN 22 G: 20 OINTMENT TOPICAL at 11:11

## 2021-11-16 RX ADMIN — CEPHALEXIN 500 MG: 500 CAPSULE ORAL at 11:11

## 2021-11-17 ENCOUNTER — HOSPITAL ENCOUNTER (EMERGENCY)
Facility: HOSPITAL | Age: 31
Discharge: HOME | End: 2021-11-17
Attending: EMERGENCY MEDICINE
Payer: MEDICAID

## 2021-11-17 VITALS
SYSTOLIC BLOOD PRESSURE: 152 MMHG | DIASTOLIC BLOOD PRESSURE: 104 MMHG | WEIGHT: 250 LBS | HEART RATE: 89 BPM | RESPIRATION RATE: 16 BRPM | HEIGHT: 65 IN | OXYGEN SATURATION: 96 % | BODY MASS INDEX: 41.65 KG/M2 | TEMPERATURE: 98 F

## 2021-11-17 DIAGNOSIS — R60.0 FACIAL EDEMA: ICD-10-CM

## 2021-11-17 DIAGNOSIS — L02.91 ABSCESS: Primary | ICD-10-CM

## 2021-11-17 PROCEDURE — 99283 EMERGENCY DEPT VISIT LOW MDM: CPT

## 2021-11-17 RX ORDER — CLINDAMYCIN HYDROCHLORIDE 150 MG/1
300 CAPSULE ORAL 4 TIMES DAILY
Qty: 56 CAPSULE | Refills: 0 | Status: SHIPPED | OUTPATIENT
Start: 2021-11-17 | End: 2021-11-24

## 2022-01-01 ENCOUNTER — PATIENT OUTREACH (OUTPATIENT)
Dept: ADMINISTRATIVE | Facility: OTHER | Age: 32
End: 2022-01-01
Payer: MEDICAID

## 2022-01-01 ENCOUNTER — NURSE TRIAGE (OUTPATIENT)
Dept: ADMINISTRATIVE | Facility: CLINIC | Age: 32
End: 2022-01-01
Payer: MEDICAID

## 2022-01-01 ENCOUNTER — HOSPITAL ENCOUNTER (EMERGENCY)
Facility: HOSPITAL | Age: 32
Discharge: PSYCHIATRIC OR SUBSTANCE USE FACILITY | End: 2022-11-20
Attending: EMERGENCY MEDICINE
Payer: MEDICAID

## 2022-01-01 ENCOUNTER — PATIENT MESSAGE (OUTPATIENT)
Dept: ADMINISTRATIVE | Facility: CLINIC | Age: 32
End: 2022-01-01
Payer: MEDICAID

## 2022-01-01 ENCOUNTER — PATIENT OUTREACH (OUTPATIENT)
Dept: ADMINISTRATIVE | Facility: CLINIC | Age: 32
End: 2022-01-01
Payer: MEDICAID

## 2022-01-01 ENCOUNTER — ANESTHESIA (OUTPATIENT)
Dept: SURGERY | Facility: HOSPITAL | Age: 32
DRG: 853 | End: 2022-01-01
Payer: MEDICAID

## 2022-01-01 ENCOUNTER — TELEPHONE (OUTPATIENT)
Dept: UROLOGY | Facility: CLINIC | Age: 32
End: 2022-01-01
Payer: MEDICAID

## 2022-01-01 ENCOUNTER — TELEPHONE (OUTPATIENT)
Dept: UROLOGY | Facility: CLINIC | Age: 32
End: 2022-01-01

## 2022-01-01 ENCOUNTER — PATIENT MESSAGE (OUTPATIENT)
Dept: SURGERY | Facility: HOSPITAL | Age: 32
End: 2022-01-01
Payer: MEDICAID

## 2022-01-01 ENCOUNTER — HOSPITAL ENCOUNTER (INPATIENT)
Facility: HOSPITAL | Age: 32
LOS: 4 days | Discharge: HOME | DRG: 871 | End: 2022-10-06
Attending: EMERGENCY MEDICINE | Admitting: INTERNAL MEDICINE
Payer: MEDICAID

## 2022-01-01 ENCOUNTER — HOSPITAL ENCOUNTER (EMERGENCY)
Facility: HOSPITAL | Age: 32
Discharge: HOME | End: 2022-12-07
Attending: INTERNAL MEDICINE
Payer: MEDICAID

## 2022-01-01 ENCOUNTER — TELEPHONE (OUTPATIENT)
Dept: UROLOGY | Facility: HOSPITAL | Age: 32
End: 2022-01-01
Payer: MEDICAID

## 2022-01-01 ENCOUNTER — OFFICE VISIT (OUTPATIENT)
Dept: UROLOGY | Facility: CLINIC | Age: 32
End: 2022-01-01
Payer: MEDICAID

## 2022-01-01 ENCOUNTER — ANESTHESIA EVENT (OUTPATIENT)
Dept: SURGERY | Facility: HOSPITAL | Age: 32
DRG: 853 | End: 2022-01-01
Payer: MEDICAID

## 2022-01-01 ENCOUNTER — HOSPITAL ENCOUNTER (INPATIENT)
Facility: HOSPITAL | Age: 32
LOS: 15 days | Discharge: HOME HEALTH | DRG: 853 | End: 2022-09-16
Attending: EMERGENCY MEDICINE | Admitting: EMERGENCY MEDICINE
Payer: MEDICAID

## 2022-01-01 VITALS
HEART RATE: 90 BPM | OXYGEN SATURATION: 100 % | DIASTOLIC BLOOD PRESSURE: 91 MMHG | RESPIRATION RATE: 18 BRPM | WEIGHT: 216 LBS | TEMPERATURE: 99 F | BODY MASS INDEX: 35.94 KG/M2 | SYSTOLIC BLOOD PRESSURE: 154 MMHG

## 2022-01-01 VITALS
TEMPERATURE: 98 F | HEIGHT: 65 IN | SYSTOLIC BLOOD PRESSURE: 144 MMHG | OXYGEN SATURATION: 99 % | BODY MASS INDEX: 36.1 KG/M2 | DIASTOLIC BLOOD PRESSURE: 90 MMHG | WEIGHT: 216.69 LBS | HEART RATE: 80 BPM | RESPIRATION RATE: 18 BRPM

## 2022-01-01 VITALS
OXYGEN SATURATION: 94 % | WEIGHT: 261 LBS | HEART RATE: 81 BPM | SYSTOLIC BLOOD PRESSURE: 125 MMHG | BODY MASS INDEX: 41.95 KG/M2 | HEIGHT: 66 IN | TEMPERATURE: 98 F | DIASTOLIC BLOOD PRESSURE: 84 MMHG | RESPIRATION RATE: 18 BRPM

## 2022-01-01 VITALS
WEIGHT: 203 LBS | HEART RATE: 98 BPM | SYSTOLIC BLOOD PRESSURE: 123 MMHG | DIASTOLIC BLOOD PRESSURE: 71 MMHG | OXYGEN SATURATION: 100 % | HEIGHT: 66 IN | BODY MASS INDEX: 32.62 KG/M2 | RESPIRATION RATE: 17 BRPM | TEMPERATURE: 98 F

## 2022-01-01 VITALS — BODY MASS INDEX: 34.87 KG/M2 | HEIGHT: 66 IN | WEIGHT: 217 LBS

## 2022-01-01 DIAGNOSIS — L03.314 CELLULITIS OF GROIN: ICD-10-CM

## 2022-01-01 DIAGNOSIS — L03.314 CELLULITIS OF GROIN: Primary | ICD-10-CM

## 2022-01-01 DIAGNOSIS — N49.2 SCROTAL ABSCESS: Primary | ICD-10-CM

## 2022-01-01 DIAGNOSIS — R45.851 SUICIDAL IDEATION: Primary | ICD-10-CM

## 2022-01-01 DIAGNOSIS — E13.65 OTHER SPECIFIED DIABETES MELLITUS WITH HYPERGLYCEMIA, WITH LONG-TERM CURRENT USE OF INSULIN: ICD-10-CM

## 2022-01-01 DIAGNOSIS — F32.A DEPRESSION WITH SUICIDAL IDEATION: ICD-10-CM

## 2022-01-01 DIAGNOSIS — E11.69 TYPE 2 DIABETES MELLITUS WITH OTHER SPECIFIED COMPLICATION, UNSPECIFIED WHETHER LONG TERM INSULIN USE: ICD-10-CM

## 2022-01-01 DIAGNOSIS — A41.9 SEPSIS, DUE TO UNSPECIFIED ORGANISM, UNSPECIFIED WHETHER ACUTE ORGAN DYSFUNCTION PRESENT: ICD-10-CM

## 2022-01-01 DIAGNOSIS — K85.90 ACUTE PANCREATITIS, UNSPECIFIED COMPLICATION STATUS, UNSPECIFIED PANCREATITIS TYPE: ICD-10-CM

## 2022-01-01 DIAGNOSIS — Z79.4 OTHER SPECIFIED DIABETES MELLITUS WITH HYPERGLYCEMIA, WITH LONG-TERM CURRENT USE OF INSULIN: ICD-10-CM

## 2022-01-01 DIAGNOSIS — E87.6 HYPOKALEMIA: ICD-10-CM

## 2022-01-01 DIAGNOSIS — L02.91 ABSCESS: ICD-10-CM

## 2022-01-01 DIAGNOSIS — E83.42 HYPOMAGNESEMIA: ICD-10-CM

## 2022-01-01 DIAGNOSIS — R42 ORTHOSTATIC DIZZINESS: ICD-10-CM

## 2022-01-01 DIAGNOSIS — R42 LIGHTHEADED: ICD-10-CM

## 2022-01-01 DIAGNOSIS — R45.851 DEPRESSION WITH SUICIDAL IDEATION: ICD-10-CM

## 2022-01-01 DIAGNOSIS — R79.89 HIGH SERUM LACTIC ACID: ICD-10-CM

## 2022-01-01 DIAGNOSIS — F10.929 ALCOHOLIC INTOXICATION WITH COMPLICATION: ICD-10-CM

## 2022-01-01 DIAGNOSIS — R07.9 CHEST PAIN: ICD-10-CM

## 2022-01-01 DIAGNOSIS — E66.01 SEVERE OBESITY WITH BODY MASS INDEX (BMI) OF 36.0 TO 36.9 WITH SERIOUS COMORBIDITY: Primary | ICD-10-CM

## 2022-01-01 DIAGNOSIS — B99.9 INFECTION: ICD-10-CM

## 2022-01-01 DIAGNOSIS — I10 PRIMARY HYPERTENSION: ICD-10-CM

## 2022-01-01 DIAGNOSIS — R06.02 SHORTNESS OF BREATH: ICD-10-CM

## 2022-01-01 DIAGNOSIS — Z00.8 MEDICAL CLEARANCE FOR PSYCHIATRIC ADMISSION: ICD-10-CM

## 2022-01-01 DIAGNOSIS — E86.0 DEHYDRATION: Primary | ICD-10-CM

## 2022-01-01 DIAGNOSIS — D64.9 ANEMIA, UNSPECIFIED TYPE: ICD-10-CM

## 2022-01-01 DIAGNOSIS — R73.9 HYPERGLYCEMIA: ICD-10-CM

## 2022-01-01 DIAGNOSIS — N49.2 ABSCESS OF SCROTUM: ICD-10-CM

## 2022-01-01 LAB
ABO + RH BLD: NORMAL
ALBUMIN SERPL BCP-MCNC: 1.7 G/DL (ref 3.5–5.2)
ALBUMIN SERPL BCP-MCNC: 1.8 G/DL (ref 3.5–5.2)
ALBUMIN SERPL BCP-MCNC: 1.9 G/DL (ref 3.5–5.2)
ALBUMIN SERPL BCP-MCNC: 2 G/DL (ref 3.5–5.2)
ALBUMIN SERPL BCP-MCNC: 2.2 G/DL (ref 3.5–5.2)
ALBUMIN SERPL BCP-MCNC: 2.2 G/DL (ref 3.5–5.2)
ALBUMIN SERPL BCP-MCNC: 2.3 G/DL (ref 3.5–5.2)
ALBUMIN SERPL BCP-MCNC: 2.5 G/DL (ref 3.5–5.2)
ALBUMIN SERPL BCP-MCNC: 2.5 G/DL (ref 3.5–5.2)
ALBUMIN SERPL BCP-MCNC: 2.6 G/DL (ref 3.5–5.2)
ALBUMIN SERPL BCP-MCNC: 2.7 G/DL (ref 3.5–5.2)
ALBUMIN SERPL BCP-MCNC: 2.8 G/DL (ref 3.5–5.2)
ALBUMIN SERPL BCP-MCNC: 2.8 G/DL (ref 3.5–5.2)
ALBUMIN SERPL BCP-MCNC: 3.1 G/DL (ref 3.5–5.2)
ALLENS TEST: ABNORMAL
ALLENS TEST: NORMAL
ALP SERPL-CCNC: 294 U/L (ref 55–135)
ALP SERPL-CCNC: 297 U/L (ref 55–135)
ALP SERPL-CCNC: 311 U/L (ref 55–135)
ALP SERPL-CCNC: 312 U/L (ref 55–135)
ALP SERPL-CCNC: 323 U/L (ref 55–135)
ALP SERPL-CCNC: 327 U/L (ref 55–135)
ALP SERPL-CCNC: 328 U/L (ref 55–135)
ALP SERPL-CCNC: 337 U/L (ref 55–135)
ALP SERPL-CCNC: 349 U/L (ref 55–135)
ALP SERPL-CCNC: 361 U/L (ref 55–135)
ALP SERPL-CCNC: 370 U/L (ref 55–135)
ALP SERPL-CCNC: 370 U/L (ref 55–135)
ALP SERPL-CCNC: 384 U/L (ref 55–135)
ALP SERPL-CCNC: 407 U/L (ref 55–135)
ALP SERPL-CCNC: 411 U/L (ref 55–135)
ALP SERPL-CCNC: 423 U/L (ref 55–135)
ALT SERPL W/O P-5'-P-CCNC: 19 U/L (ref 10–44)
ALT SERPL W/O P-5'-P-CCNC: 24 U/L (ref 10–44)
ALT SERPL W/O P-5'-P-CCNC: 26 U/L (ref 10–44)
ALT SERPL W/O P-5'-P-CCNC: 27 U/L (ref 10–44)
ALT SERPL W/O P-5'-P-CCNC: 32 U/L (ref 10–44)
ALT SERPL W/O P-5'-P-CCNC: 32 U/L (ref 10–44)
ALT SERPL W/O P-5'-P-CCNC: 35 U/L (ref 10–44)
ALT SERPL W/O P-5'-P-CCNC: 35 U/L (ref 10–44)
ALT SERPL W/O P-5'-P-CCNC: 38 U/L (ref 10–44)
ALT SERPL W/O P-5'-P-CCNC: 42 U/L (ref 10–44)
ALT SERPL W/O P-5'-P-CCNC: 43 U/L (ref 10–44)
ALT SERPL W/O P-5'-P-CCNC: 45 U/L (ref 10–44)
ALT SERPL W/O P-5'-P-CCNC: 47 U/L (ref 10–44)
ALT SERPL W/O P-5'-P-CCNC: 61 U/L (ref 10–44)
AMPHET+METHAMPHET UR QL: NEGATIVE
AMPHET+METHAMPHET UR QL: NEGATIVE
ANION GAP SERPL CALC-SCNC: 10 MMOL/L (ref 8–16)
ANION GAP SERPL CALC-SCNC: 11 MMOL/L (ref 8–16)
ANION GAP SERPL CALC-SCNC: 12 MMOL/L (ref 8–16)
ANION GAP SERPL CALC-SCNC: 13 MMOL/L (ref 8–16)
ANION GAP SERPL CALC-SCNC: 14 MMOL/L (ref 8–16)
ANION GAP SERPL CALC-SCNC: 17 MMOL/L (ref 8–16)
ANION GAP SERPL CALC-SCNC: 23 MMOL/L (ref 8–16)
ANION GAP SERPL CALC-SCNC: 5 MMOL/L (ref 8–16)
ANION GAP SERPL CALC-SCNC: 7 MMOL/L (ref 8–16)
ANION GAP SERPL CALC-SCNC: 7 MMOL/L (ref 8–16)
ANION GAP SERPL CALC-SCNC: 8 MMOL/L (ref 8–16)
ANION GAP SERPL CALC-SCNC: 8 MMOL/L (ref 8–16)
ANION GAP SERPL CALC-SCNC: 9 MMOL/L (ref 8–16)
APAP SERPL-MCNC: <3 UG/ML (ref 10–20)
APTT BLDCRRT: 26.8 SEC (ref 21–32)
AST SERPL-CCNC: 22 U/L (ref 10–40)
AST SERPL-CCNC: 26 U/L (ref 10–40)
AST SERPL-CCNC: 27 U/L (ref 10–40)
AST SERPL-CCNC: 31 U/L (ref 10–40)
AST SERPL-CCNC: 38 U/L (ref 10–40)
AST SERPL-CCNC: 42 U/L (ref 10–40)
AST SERPL-CCNC: 46 U/L (ref 10–40)
AST SERPL-CCNC: 52 U/L (ref 10–40)
AST SERPL-CCNC: 54 U/L (ref 10–40)
AST SERPL-CCNC: 57 U/L (ref 10–40)
AST SERPL-CCNC: 59 U/L (ref 10–40)
AST SERPL-CCNC: 60 U/L (ref 10–40)
AST SERPL-CCNC: 64 U/L (ref 10–40)
AST SERPL-CCNC: 66 U/L (ref 10–40)
AST SERPL-CCNC: 68 U/L (ref 10–40)
AST SERPL-CCNC: 72 U/L (ref 10–40)
B-OH-BUTYR BLD STRIP-SCNC: 0.2 MMOL/L (ref 0–0.5)
B-OH-BUTYR BLD STRIP-SCNC: 1.5 MMOL/L (ref 0–0.5)
BACTERIA #/AREA URNS HPF: ABNORMAL /HPF
BACTERIA #/AREA URNS HPF: ABNORMAL /HPF
BACTERIA #/AREA URNS HPF: NORMAL /HPF
BACTERIA BLD CULT: NORMAL
BACTERIA SPEC AEROBE CULT: ABNORMAL
BACTERIA SPEC ANAEROBE CULT: NORMAL
BACTERIA UR CULT: NO GROWTH
BARBITURATES UR QL SCN>200 NG/ML: NEGATIVE
BARBITURATES UR QL SCN>200 NG/ML: NEGATIVE
BASOPHILS # BLD AUTO: 0.02 K/UL (ref 0–0.2)
BASOPHILS # BLD AUTO: 0.04 K/UL (ref 0–0.2)
BASOPHILS # BLD AUTO: 0.05 K/UL (ref 0–0.2)
BASOPHILS # BLD AUTO: 0.06 K/UL (ref 0–0.2)
BASOPHILS # BLD AUTO: 0.07 K/UL (ref 0–0.2)
BASOPHILS # BLD AUTO: 0.07 K/UL (ref 0–0.2)
BASOPHILS # BLD AUTO: 0.08 K/UL (ref 0–0.2)
BASOPHILS # BLD AUTO: 0.09 K/UL (ref 0–0.2)
BASOPHILS # BLD AUTO: 0.09 K/UL (ref 0–0.2)
BASOPHILS # BLD AUTO: 0.11 K/UL (ref 0–0.2)
BASOPHILS NFR BLD: 0.2 % (ref 0–1.9)
BASOPHILS NFR BLD: 0.2 % (ref 0–1.9)
BASOPHILS NFR BLD: 0.3 % (ref 0–1.9)
BASOPHILS NFR BLD: 0.4 % (ref 0–1.9)
BASOPHILS NFR BLD: 0.5 % (ref 0–1.9)
BASOPHILS NFR BLD: 0.6 % (ref 0–1.9)
BASOPHILS NFR BLD: 0.7 % (ref 0–1.9)
BASOPHILS NFR BLD: 0.8 % (ref 0–1.9)
BASOPHILS NFR BLD: 1 % (ref 0–1.9)
BENZODIAZ UR QL SCN>200 NG/ML: NEGATIVE
BENZODIAZ UR QL SCN>200 NG/ML: NEGATIVE
BILIRUB SERPL-MCNC: 0.3 MG/DL (ref 0.1–1)
BILIRUB SERPL-MCNC: 0.4 MG/DL (ref 0.1–1)
BILIRUB SERPL-MCNC: 0.5 MG/DL (ref 0.1–1)
BILIRUB SERPL-MCNC: 0.6 MG/DL (ref 0.1–1)
BILIRUB SERPL-MCNC: 1.2 MG/DL (ref 0.1–1)
BILIRUB SERPL-MCNC: 1.3 MG/DL (ref 0.1–1)
BILIRUB SERPL-MCNC: 1.4 MG/DL (ref 0.1–1)
BILIRUB SERPL-MCNC: 2.1 MG/DL (ref 0.1–1)
BILIRUB UR QL STRIP: NEGATIVE
BLD GP AB SCN CELLS X3 SERPL QL: NORMAL
BNP SERPL-MCNC: 41 PG/ML (ref 0–99)
BNP SERPL-MCNC: <10 PG/ML (ref 0–99)
BUN SERPL-MCNC: 11 MG/DL (ref 6–20)
BUN SERPL-MCNC: 12 MG/DL (ref 6–20)
BUN SERPL-MCNC: 13 MG/DL (ref 6–20)
BUN SERPL-MCNC: 15 MG/DL (ref 6–20)
BUN SERPL-MCNC: 15 MG/DL (ref 6–20)
BUN SERPL-MCNC: 17 MG/DL (ref 6–20)
BUN SERPL-MCNC: 17 MG/DL (ref 6–20)
BUN SERPL-MCNC: 18 MG/DL (ref 6–20)
BUN SERPL-MCNC: 21 MG/DL (ref 6–20)
BUN SERPL-MCNC: 21 MG/DL (ref 6–20)
BUN SERPL-MCNC: 22 MG/DL (ref 6–20)
BUN SERPL-MCNC: 22 MG/DL (ref 6–20)
BUN SERPL-MCNC: 23 MG/DL (ref 6–20)
BUN SERPL-MCNC: 24 MG/DL (ref 6–20)
BUN SERPL-MCNC: 24 MG/DL (ref 6–20)
BUN SERPL-MCNC: 6 MG/DL (ref 6–20)
BUN SERPL-MCNC: 7 MG/DL (ref 6–20)
BUN SERPL-MCNC: 8 MG/DL (ref 6–20)
BUN SERPL-MCNC: 9 MG/DL (ref 6–20)
BZE UR QL SCN: NEGATIVE
BZE UR QL SCN: NEGATIVE
CALCIUM SERPL-MCNC: 7.9 MG/DL (ref 8.7–10.5)
CALCIUM SERPL-MCNC: 8 MG/DL (ref 8.7–10.5)
CALCIUM SERPL-MCNC: 8.2 MG/DL (ref 8.7–10.5)
CALCIUM SERPL-MCNC: 8.3 MG/DL (ref 8.7–10.5)
CALCIUM SERPL-MCNC: 8.3 MG/DL (ref 8.7–10.5)
CALCIUM SERPL-MCNC: 8.4 MG/DL (ref 8.7–10.5)
CALCIUM SERPL-MCNC: 8.5 MG/DL (ref 8.7–10.5)
CALCIUM SERPL-MCNC: 8.5 MG/DL (ref 8.7–10.5)
CALCIUM SERPL-MCNC: 8.6 MG/DL (ref 8.7–10.5)
CALCIUM SERPL-MCNC: 8.7 MG/DL (ref 8.7–10.5)
CALCIUM SERPL-MCNC: 8.8 MG/DL (ref 8.7–10.5)
CALCIUM SERPL-MCNC: 9.1 MG/DL (ref 8.7–10.5)
CALCIUM SERPL-MCNC: 9.1 MG/DL (ref 8.7–10.5)
CANNABINOIDS UR QL SCN: NEGATIVE
CANNABINOIDS UR QL SCN: NEGATIVE
CHLORIDE SERPL-SCNC: 100 MMOL/L (ref 95–110)
CHLORIDE SERPL-SCNC: 101 MMOL/L (ref 95–110)
CHLORIDE SERPL-SCNC: 103 MMOL/L (ref 95–110)
CHLORIDE SERPL-SCNC: 104 MMOL/L (ref 95–110)
CHLORIDE SERPL-SCNC: 104 MMOL/L (ref 95–110)
CHLORIDE SERPL-SCNC: 105 MMOL/L (ref 95–110)
CHLORIDE SERPL-SCNC: 105 MMOL/L (ref 95–110)
CHLORIDE SERPL-SCNC: 106 MMOL/L (ref 95–110)
CHLORIDE SERPL-SCNC: 107 MMOL/L (ref 95–110)
CHLORIDE SERPL-SCNC: 107 MMOL/L (ref 95–110)
CHLORIDE SERPL-SCNC: 108 MMOL/L (ref 95–110)
CHLORIDE SERPL-SCNC: 110 MMOL/L (ref 95–110)
CHLORIDE SERPL-SCNC: 110 MMOL/L (ref 95–110)
CHLORIDE SERPL-SCNC: 111 MMOL/L (ref 95–110)
CHLORIDE SERPL-SCNC: 112 MMOL/L (ref 95–110)
CHLORIDE SERPL-SCNC: 90 MMOL/L (ref 95–110)
CHLORIDE SERPL-SCNC: 93 MMOL/L (ref 95–110)
CHLORIDE SERPL-SCNC: 96 MMOL/L (ref 95–110)
CHLORIDE SERPL-SCNC: 97 MMOL/L (ref 95–110)
CHLORIDE SERPL-SCNC: 97 MMOL/L (ref 95–110)
CHLORIDE SERPL-SCNC: 99 MMOL/L (ref 95–110)
CK SERPL-CCNC: 143 U/L (ref 20–200)
CK SERPL-CCNC: 48 U/L (ref 20–200)
CLARITY UR: ABNORMAL
CLARITY UR: CLEAR
CLARITY UR: CLEAR
CO2 SERPL-SCNC: 15 MMOL/L (ref 23–29)
CO2 SERPL-SCNC: 16 MMOL/L (ref 23–29)
CO2 SERPL-SCNC: 17 MMOL/L (ref 23–29)
CO2 SERPL-SCNC: 18 MMOL/L (ref 23–29)
CO2 SERPL-SCNC: 18 MMOL/L (ref 23–29)
CO2 SERPL-SCNC: 19 MMOL/L (ref 23–29)
CO2 SERPL-SCNC: 19 MMOL/L (ref 23–29)
CO2 SERPL-SCNC: 20 MMOL/L (ref 23–29)
CO2 SERPL-SCNC: 21 MMOL/L (ref 23–29)
CO2 SERPL-SCNC: 22 MMOL/L (ref 23–29)
CO2 SERPL-SCNC: 22 MMOL/L (ref 23–29)
CO2 SERPL-SCNC: 23 MMOL/L (ref 23–29)
CO2 SERPL-SCNC: 24 MMOL/L (ref 23–29)
CO2 SERPL-SCNC: 24 MMOL/L (ref 23–29)
CO2 SERPL-SCNC: 25 MMOL/L (ref 23–29)
CO2 SERPL-SCNC: 28 MMOL/L (ref 23–29)
CO2 SERPL-SCNC: 29 MMOL/L (ref 23–29)
COLOR UR: COLORLESS
COLOR UR: YELLOW
COLOR UR: YELLOW
CREAT SERPL-MCNC: 0.7 MG/DL (ref 0.5–1.4)
CREAT SERPL-MCNC: 1 MG/DL (ref 0.5–1.4)
CREAT SERPL-MCNC: 1.1 MG/DL (ref 0.5–1.4)
CREAT SERPL-MCNC: 1.2 MG/DL (ref 0.5–1.4)
CREAT SERPL-MCNC: 1.4 MG/DL (ref 0.5–1.4)
CREAT SERPL-MCNC: 1.5 MG/DL (ref 0.5–1.4)
CREAT SERPL-MCNC: 1.5 MG/DL (ref 0.5–1.4)
CREAT SERPL-MCNC: 1.7 MG/DL (ref 0.5–1.4)
CREAT SERPL-MCNC: 1.7 MG/DL (ref 0.5–1.4)
CREAT SERPL-MCNC: 1.9 MG/DL (ref 0.5–1.4)
CREAT SERPL-MCNC: 2.1 MG/DL (ref 0.5–1.4)
CREAT SERPL-MCNC: 2.4 MG/DL (ref 0.5–1.4)
CREAT SERPL-MCNC: 2.9 MG/DL (ref 0.5–1.4)
CREAT SERPL-MCNC: 3.1 MG/DL (ref 0.5–1.4)
CREAT SERPL-MCNC: 3.2 MG/DL (ref 0.5–1.4)
CREAT SERPL-MCNC: 3.3 MG/DL (ref 0.5–1.4)
CREAT SERPL-MCNC: 3.5 MG/DL (ref 0.5–1.4)
CREAT SERPL-MCNC: 3.6 MG/DL (ref 0.5–1.4)
CREAT UR-MCNC: 114.7 MG/DL (ref 23–375)
CREAT UR-MCNC: 19.2 MG/DL (ref 23–375)
CREAT UR-MCNC: 24.6 MG/DL (ref 23–375)
CTP QC/QA: YES
D DIMER PPP IA.FEU-MCNC: 0.79 MG/L FEU
DELSYS: ABNORMAL
DELSYS: NORMAL
DIFFERENTIAL METHOD: ABNORMAL
EOSINOPHIL # BLD AUTO: 0 K/UL (ref 0–0.5)
EOSINOPHIL # BLD AUTO: 0.1 K/UL (ref 0–0.5)
EOSINOPHIL # BLD AUTO: 0.2 K/UL (ref 0–0.5)
EOSINOPHIL # BLD AUTO: 0.3 K/UL (ref 0–0.5)
EOSINOPHIL # BLD AUTO: 0.3 K/UL (ref 0–0.5)
EOSINOPHIL NFR BLD: 0.1 % (ref 0–8)
EOSINOPHIL NFR BLD: 0.2 % (ref 0–8)
EOSINOPHIL NFR BLD: 0.6 % (ref 0–8)
EOSINOPHIL NFR BLD: 0.6 % (ref 0–8)
EOSINOPHIL NFR BLD: 0.7 % (ref 0–8)
EOSINOPHIL NFR BLD: 0.8 % (ref 0–8)
EOSINOPHIL NFR BLD: 1 % (ref 0–8)
EOSINOPHIL NFR BLD: 1 % (ref 0–8)
EOSINOPHIL NFR BLD: 1.1 % (ref 0–8)
EOSINOPHIL NFR BLD: 1.2 % (ref 0–8)
EOSINOPHIL NFR BLD: 1.3 % (ref 0–8)
EOSINOPHIL NFR BLD: 1.4 % (ref 0–8)
EOSINOPHIL NFR BLD: 1.7 % (ref 0–8)
EOSINOPHIL NFR BLD: 1.7 % (ref 0–8)
EOSINOPHIL NFR BLD: 1.9 % (ref 0–8)
EOSINOPHIL NFR BLD: 2.2 % (ref 0–8)
EOSINOPHIL NFR BLD: 2.4 % (ref 0–8)
EOSINOPHIL NFR BLD: 2.5 % (ref 0–8)
EOSINOPHIL NFR BLD: 2.6 % (ref 0–8)
EOSINOPHIL NFR BLD: 2.9 % (ref 0–8)
ERYTHROCYTE [DISTWIDTH] IN BLOOD BY AUTOMATED COUNT: 11.3 % (ref 11.5–14.5)
ERYTHROCYTE [DISTWIDTH] IN BLOOD BY AUTOMATED COUNT: 11.3 % (ref 11.5–14.5)
ERYTHROCYTE [DISTWIDTH] IN BLOOD BY AUTOMATED COUNT: 11.5 % (ref 11.5–14.5)
ERYTHROCYTE [DISTWIDTH] IN BLOOD BY AUTOMATED COUNT: 11.7 % (ref 11.5–14.5)
ERYTHROCYTE [DISTWIDTH] IN BLOOD BY AUTOMATED COUNT: 11.9 % (ref 11.5–14.5)
ERYTHROCYTE [DISTWIDTH] IN BLOOD BY AUTOMATED COUNT: 12 % (ref 11.5–14.5)
ERYTHROCYTE [DISTWIDTH] IN BLOOD BY AUTOMATED COUNT: 12.1 % (ref 11.5–14.5)
ERYTHROCYTE [DISTWIDTH] IN BLOOD BY AUTOMATED COUNT: 12.1 % (ref 11.5–14.5)
ERYTHROCYTE [DISTWIDTH] IN BLOOD BY AUTOMATED COUNT: 12.2 % (ref 11.5–14.5)
ERYTHROCYTE [DISTWIDTH] IN BLOOD BY AUTOMATED COUNT: 12.2 % (ref 11.5–14.5)
ERYTHROCYTE [DISTWIDTH] IN BLOOD BY AUTOMATED COUNT: 12.3 % (ref 11.5–14.5)
ERYTHROCYTE [DISTWIDTH] IN BLOOD BY AUTOMATED COUNT: 12.4 % (ref 11.5–14.5)
ERYTHROCYTE [DISTWIDTH] IN BLOOD BY AUTOMATED COUNT: 12.5 % (ref 11.5–14.5)
ERYTHROCYTE [DISTWIDTH] IN BLOOD BY AUTOMATED COUNT: 12.8 % (ref 11.5–14.5)
ERYTHROCYTE [DISTWIDTH] IN BLOOD BY AUTOMATED COUNT: 12.9 % (ref 11.5–14.5)
ERYTHROCYTE [DISTWIDTH] IN BLOOD BY AUTOMATED COUNT: 13.2 % (ref 11.5–14.5)
EST. GFR  (NO RACE VARIABLE): 22 ML/MIN/1.73 M^2
EST. GFR  (NO RACE VARIABLE): 23 ML/MIN/1.73 M^2
EST. GFR  (NO RACE VARIABLE): 24 ML/MIN/1.73 M^2
EST. GFR  (NO RACE VARIABLE): 25 ML/MIN/1.73 M^2
EST. GFR  (NO RACE VARIABLE): 26 ML/MIN/1.73 M^2
EST. GFR  (NO RACE VARIABLE): 28 ML/MIN/1.73 M^2
EST. GFR  (NO RACE VARIABLE): 36 ML/MIN/1.73 M^2
EST. GFR  (NO RACE VARIABLE): 42 ML/MIN/1.73 M^2
EST. GFR  (NO RACE VARIABLE): 47 ML/MIN/1.73 M^2
EST. GFR  (NO RACE VARIABLE): 54 ML/MIN/1.73 M^2
EST. GFR  (NO RACE VARIABLE): 54 ML/MIN/1.73 M^2
EST. GFR  (NO RACE VARIABLE): >60 ML/MIN/1.73 M^2
ESTIMATED AVG GLUCOSE: 223 MG/DL (ref 68–131)
ETHANOL SERPL-MCNC: 198 MG/DL
ETHANOL SERPL-MCNC: <10 MG/DL
FOLATE SERPL-MCNC: 8.6 NG/ML (ref 4–24)
GLUCOSE SERPL-MCNC: 103 MG/DL (ref 70–110)
GLUCOSE SERPL-MCNC: 107 MG/DL (ref 70–110)
GLUCOSE SERPL-MCNC: 117 MG/DL (ref 70–110)
GLUCOSE SERPL-MCNC: 136 MG/DL (ref 70–110)
GLUCOSE SERPL-MCNC: 154 MG/DL (ref 70–110)
GLUCOSE SERPL-MCNC: 163 MG/DL (ref 70–110)
GLUCOSE SERPL-MCNC: 175 MG/DL (ref 70–110)
GLUCOSE SERPL-MCNC: 186 MG/DL (ref 70–110)
GLUCOSE SERPL-MCNC: 275 MG/DL (ref 70–110)
GLUCOSE SERPL-MCNC: 283 MG/DL (ref 70–110)
GLUCOSE SERPL-MCNC: 289 MG/DL (ref 70–110)
GLUCOSE SERPL-MCNC: 295 MG/DL (ref 70–110)
GLUCOSE SERPL-MCNC: 354 MG/DL (ref 70–110)
GLUCOSE SERPL-MCNC: 358 MG/DL (ref 70–110)
GLUCOSE SERPL-MCNC: 385 MG/DL (ref 70–110)
GLUCOSE SERPL-MCNC: 467 MG/DL (ref 70–110)
GLUCOSE SERPL-MCNC: 492 MG/DL (ref 70–110)
GLUCOSE SERPL-MCNC: 554 MG/DL (ref 70–110)
GLUCOSE SERPL-MCNC: 73 MG/DL (ref 70–110)
GLUCOSE SERPL-MCNC: 79 MG/DL (ref 70–110)
GLUCOSE SERPL-MCNC: 84 MG/DL (ref 70–110)
GLUCOSE SERPL-MCNC: 85 MG/DL (ref 70–110)
GLUCOSE SERPL-MCNC: 87 MG/DL (ref 70–110)
GLUCOSE SERPL-MCNC: 95 MG/DL (ref 70–110)
GLUCOSE SERPL-MCNC: 98 MG/DL (ref 70–110)
GLUCOSE UR QL STRIP: ABNORMAL
GRAM STN SPEC: NORMAL
GRAM STN SPEC: NORMAL
HBA1C MFR BLD: 9.4 % (ref 4–5.6)
HCO3 UR-SCNC: 20.6 MMOL/L (ref 24–28)
HCO3 UR-SCNC: 24.4 MMOL/L (ref 24–28)
HCO3 UR-SCNC: 28.7 MMOL/L (ref 24–28)
HCO3 UR-SCNC: 29 MMOL/L (ref 24–28)
HCT VFR BLD AUTO: 25.6 % (ref 40–54)
HCT VFR BLD AUTO: 27.4 % (ref 40–54)
HCT VFR BLD AUTO: 27.6 % (ref 40–54)
HCT VFR BLD AUTO: 27.9 % (ref 40–54)
HCT VFR BLD AUTO: 28.2 % (ref 40–54)
HCT VFR BLD AUTO: 28.4 % (ref 40–54)
HCT VFR BLD AUTO: 29 % (ref 40–54)
HCT VFR BLD AUTO: 29.5 % (ref 40–54)
HCT VFR BLD AUTO: 29.9 % (ref 40–54)
HCT VFR BLD AUTO: 30.7 % (ref 40–54)
HCT VFR BLD AUTO: 31.1 % (ref 40–54)
HCT VFR BLD AUTO: 31.6 % (ref 40–54)
HCT VFR BLD AUTO: 33.9 % (ref 40–54)
HCT VFR BLD AUTO: 34.2 % (ref 40–54)
HCT VFR BLD AUTO: 34.6 % (ref 40–54)
HCT VFR BLD AUTO: 35.2 % (ref 40–54)
HCT VFR BLD AUTO: 36.6 % (ref 40–54)
HCT VFR BLD AUTO: 36.9 % (ref 40–54)
HCT VFR BLD AUTO: 37 % (ref 40–54)
HCT VFR BLD AUTO: 38.2 % (ref 40–54)
HCT VFR BLD AUTO: 38.7 % (ref 40–54)
HCT VFR BLD AUTO: 42.8 % (ref 40–54)
HGB BLD-MCNC: 10 G/DL (ref 14–18)
HGB BLD-MCNC: 10 G/DL (ref 14–18)
HGB BLD-MCNC: 10.2 G/DL (ref 14–18)
HGB BLD-MCNC: 10.2 G/DL (ref 14–18)
HGB BLD-MCNC: 10.3 G/DL (ref 14–18)
HGB BLD-MCNC: 10.5 G/DL (ref 14–18)
HGB BLD-MCNC: 10.6 G/DL (ref 14–18)
HGB BLD-MCNC: 10.8 G/DL (ref 14–18)
HGB BLD-MCNC: 11.7 G/DL (ref 14–18)
HGB BLD-MCNC: 11.7 G/DL (ref 14–18)
HGB BLD-MCNC: 11.9 G/DL (ref 14–18)
HGB BLD-MCNC: 12.5 G/DL (ref 14–18)
HGB BLD-MCNC: 12.7 G/DL (ref 14–18)
HGB BLD-MCNC: 13.1 G/DL (ref 14–18)
HGB BLD-MCNC: 13.4 G/DL (ref 14–18)
HGB BLD-MCNC: 15.6 G/DL (ref 14–18)
HGB BLD-MCNC: 8.9 G/DL (ref 14–18)
HGB BLD-MCNC: 9.6 G/DL (ref 14–18)
HGB BLD-MCNC: 9.8 G/DL (ref 14–18)
HGB BLD-MCNC: 9.9 G/DL (ref 14–18)
HGB UR QL STRIP: ABNORMAL
HGB UR QL STRIP: ABNORMAL
HGB UR QL STRIP: NEGATIVE
HYALINE CASTS #/AREA URNS LPF: 0 /LPF
HYALINE CASTS #/AREA URNS LPF: 0 /LPF
HYALINE CASTS #/AREA URNS LPF: 4 /LPF
IMM GRANULOCYTES # BLD AUTO: 0.03 K/UL (ref 0–0.04)
IMM GRANULOCYTES # BLD AUTO: 0.04 K/UL (ref 0–0.04)
IMM GRANULOCYTES # BLD AUTO: 0.04 K/UL (ref 0–0.04)
IMM GRANULOCYTES # BLD AUTO: 0.05 K/UL (ref 0–0.04)
IMM GRANULOCYTES # BLD AUTO: 0.08 K/UL (ref 0–0.04)
IMM GRANULOCYTES # BLD AUTO: 0.13 K/UL (ref 0–0.04)
IMM GRANULOCYTES # BLD AUTO: 0.15 K/UL (ref 0–0.04)
IMM GRANULOCYTES # BLD AUTO: 0.15 K/UL (ref 0–0.04)
IMM GRANULOCYTES # BLD AUTO: 0.16 K/UL (ref 0–0.04)
IMM GRANULOCYTES # BLD AUTO: 0.18 K/UL (ref 0–0.04)
IMM GRANULOCYTES # BLD AUTO: 0.21 K/UL (ref 0–0.04)
IMM GRANULOCYTES # BLD AUTO: 0.22 K/UL (ref 0–0.04)
IMM GRANULOCYTES # BLD AUTO: 0.23 K/UL (ref 0–0.04)
IMM GRANULOCYTES # BLD AUTO: 0.23 K/UL (ref 0–0.04)
IMM GRANULOCYTES # BLD AUTO: 0.24 K/UL (ref 0–0.04)
IMM GRANULOCYTES # BLD AUTO: 0.25 K/UL (ref 0–0.04)
IMM GRANULOCYTES # BLD AUTO: 0.3 K/UL (ref 0–0.04)
IMM GRANULOCYTES # BLD AUTO: 0.33 K/UL (ref 0–0.04)
IMM GRANULOCYTES # BLD AUTO: 0.33 K/UL (ref 0–0.04)
IMM GRANULOCYTES # BLD AUTO: 0.39 K/UL (ref 0–0.04)
IMM GRANULOCYTES NFR BLD AUTO: 0.3 % (ref 0–0.5)
IMM GRANULOCYTES NFR BLD AUTO: 0.4 % (ref 0–0.5)
IMM GRANULOCYTES NFR BLD AUTO: 0.5 % (ref 0–0.5)
IMM GRANULOCYTES NFR BLD AUTO: 0.5 % (ref 0–0.5)
IMM GRANULOCYTES NFR BLD AUTO: 0.6 % (ref 0–0.5)
IMM GRANULOCYTES NFR BLD AUTO: 0.9 % (ref 0–0.5)
IMM GRANULOCYTES NFR BLD AUTO: 0.9 % (ref 0–0.5)
IMM GRANULOCYTES NFR BLD AUTO: 1 % (ref 0–0.5)
IMM GRANULOCYTES NFR BLD AUTO: 1.3 % (ref 0–0.5)
IMM GRANULOCYTES NFR BLD AUTO: 1.6 % (ref 0–0.5)
IMM GRANULOCYTES NFR BLD AUTO: 1.7 % (ref 0–0.5)
IMM GRANULOCYTES NFR BLD AUTO: 1.8 % (ref 0–0.5)
IMM GRANULOCYTES NFR BLD AUTO: 2.1 % (ref 0–0.5)
IMM GRANULOCYTES NFR BLD AUTO: 2.2 % (ref 0–0.5)
IMM GRANULOCYTES NFR BLD AUTO: 2.4 % (ref 0–0.5)
IMM GRANULOCYTES NFR BLD AUTO: 2.7 % (ref 0–0.5)
INR PPP: 1.1 (ref 0.8–1.2)
IRON SERPL-MCNC: 66 UG/DL (ref 45–160)
KETONES UR QL STRIP: NEGATIVE
LACTATE SERPL-SCNC: 1.3 MMOL/L (ref 0.5–2.2)
LACTATE SERPL-SCNC: 2.1 MMOL/L (ref 0.5–2.2)
LACTATE SERPL-SCNC: 2.2 MMOL/L (ref 0.5–2.2)
LACTATE SERPL-SCNC: 2.7 MMOL/L (ref 0.5–2.2)
LACTATE SERPL-SCNC: 4 MMOL/L (ref 0.5–2.2)
LACTATE SERPL-SCNC: 9.6 MMOL/L (ref 0.5–2.2)
LDH SERPL L TO P-CCNC: 1.79 MMOL/L (ref 0.5–2.2)
LDH SERPL L TO P-CCNC: 9.11 MMOL/L (ref 0.5–2.2)
LEUKOCYTE ESTERASE UR QL STRIP: ABNORMAL
LEUKOCYTE ESTERASE UR QL STRIP: ABNORMAL
LEUKOCYTE ESTERASE UR QL STRIP: NEGATIVE
LIPASE SERPL-CCNC: 36 U/L (ref 4–60)
LIPASE SERPL-CCNC: 98 U/L (ref 4–60)
LYMPHOCYTES # BLD AUTO: 1.5 K/UL (ref 1–4.8)
LYMPHOCYTES # BLD AUTO: 1.8 K/UL (ref 1–4.8)
LYMPHOCYTES # BLD AUTO: 1.9 K/UL (ref 1–4.8)
LYMPHOCYTES # BLD AUTO: 1.9 K/UL (ref 1–4.8)
LYMPHOCYTES # BLD AUTO: 2 K/UL (ref 1–4.8)
LYMPHOCYTES # BLD AUTO: 2.1 K/UL (ref 1–4.8)
LYMPHOCYTES # BLD AUTO: 2.1 K/UL (ref 1–4.8)
LYMPHOCYTES # BLD AUTO: 2.2 K/UL (ref 1–4.8)
LYMPHOCYTES # BLD AUTO: 2.3 K/UL (ref 1–4.8)
LYMPHOCYTES # BLD AUTO: 2.3 K/UL (ref 1–4.8)
LYMPHOCYTES # BLD AUTO: 2.4 K/UL (ref 1–4.8)
LYMPHOCYTES # BLD AUTO: 2.7 K/UL (ref 1–4.8)
LYMPHOCYTES # BLD AUTO: 2.8 K/UL (ref 1–4.8)
LYMPHOCYTES # BLD AUTO: 3 K/UL (ref 1–4.8)
LYMPHOCYTES # BLD AUTO: 3.2 K/UL (ref 1–4.8)
LYMPHOCYTES NFR BLD: 10.7 % (ref 18–48)
LYMPHOCYTES NFR BLD: 11 % (ref 18–48)
LYMPHOCYTES NFR BLD: 11.5 % (ref 18–48)
LYMPHOCYTES NFR BLD: 13.9 % (ref 18–48)
LYMPHOCYTES NFR BLD: 14.8 % (ref 18–48)
LYMPHOCYTES NFR BLD: 15 % (ref 18–48)
LYMPHOCYTES NFR BLD: 15.2 % (ref 18–48)
LYMPHOCYTES NFR BLD: 15.7 % (ref 18–48)
LYMPHOCYTES NFR BLD: 16.3 % (ref 18–48)
LYMPHOCYTES NFR BLD: 16.7 % (ref 18–48)
LYMPHOCYTES NFR BLD: 17.5 % (ref 18–48)
LYMPHOCYTES NFR BLD: 17.6 % (ref 18–48)
LYMPHOCYTES NFR BLD: 19.3 % (ref 18–48)
LYMPHOCYTES NFR BLD: 19.6 % (ref 18–48)
LYMPHOCYTES NFR BLD: 19.9 % (ref 18–48)
LYMPHOCYTES NFR BLD: 22.6 % (ref 18–48)
LYMPHOCYTES NFR BLD: 24.9 % (ref 18–48)
LYMPHOCYTES NFR BLD: 25.1 % (ref 18–48)
LYMPHOCYTES NFR BLD: 28.4 % (ref 18–48)
LYMPHOCYTES NFR BLD: 29.9 % (ref 18–48)
LYMPHOCYTES NFR BLD: 31.3 % (ref 18–48)
LYMPHOCYTES NFR BLD: 37 % (ref 18–48)
MAGNESIUM SERPL-MCNC: 1.5 MG/DL (ref 1.6–2.6)
MAGNESIUM SERPL-MCNC: 1.6 MG/DL (ref 1.6–2.6)
MAGNESIUM SERPL-MCNC: 1.7 MG/DL (ref 1.6–2.6)
MAGNESIUM SERPL-MCNC: 1.8 MG/DL (ref 1.6–2.6)
MCH RBC QN AUTO: 31.6 PG (ref 27–31)
MCH RBC QN AUTO: 31.9 PG (ref 27–31)
MCH RBC QN AUTO: 33.8 PG (ref 27–31)
MCH RBC QN AUTO: 33.8 PG (ref 27–31)
MCH RBC QN AUTO: 34 PG (ref 27–31)
MCH RBC QN AUTO: 34.1 PG (ref 27–31)
MCH RBC QN AUTO: 34.2 PG (ref 27–31)
MCH RBC QN AUTO: 34.7 PG (ref 27–31)
MCH RBC QN AUTO: 34.9 PG (ref 27–31)
MCH RBC QN AUTO: 35 PG (ref 27–31)
MCH RBC QN AUTO: 35 PG (ref 27–31)
MCH RBC QN AUTO: 35.1 PG (ref 27–31)
MCH RBC QN AUTO: 35.1 PG (ref 27–31)
MCH RBC QN AUTO: 35.2 PG (ref 27–31)
MCH RBC QN AUTO: 35.2 PG (ref 27–31)
MCH RBC QN AUTO: 35.3 PG (ref 27–31)
MCHC RBC AUTO-ENTMCNC: 33.2 G/DL (ref 32–36)
MCHC RBC AUTO-ENTMCNC: 33.8 G/DL (ref 32–36)
MCHC RBC AUTO-ENTMCNC: 33.9 G/DL (ref 32–36)
MCHC RBC AUTO-ENTMCNC: 34.1 G/DL (ref 32–36)
MCHC RBC AUTO-ENTMCNC: 34.2 G/DL (ref 32–36)
MCHC RBC AUTO-ENTMCNC: 34.4 G/DL (ref 32–36)
MCHC RBC AUTO-ENTMCNC: 34.5 G/DL (ref 32–36)
MCHC RBC AUTO-ENTMCNC: 34.5 G/DL (ref 32–36)
MCHC RBC AUTO-ENTMCNC: 34.8 G/DL (ref 32–36)
MCHC RBC AUTO-ENTMCNC: 34.9 G/DL (ref 32–36)
MCHC RBC AUTO-ENTMCNC: 35.1 G/DL (ref 32–36)
MCHC RBC AUTO-ENTMCNC: 35.4 G/DL (ref 32–36)
MCHC RBC AUTO-ENTMCNC: 35.5 G/DL (ref 32–36)
MCHC RBC AUTO-ENTMCNC: 35.8 G/DL (ref 32–36)
MCHC RBC AUTO-ENTMCNC: 36.1 G/DL (ref 32–36)
MCHC RBC AUTO-ENTMCNC: 36.4 G/DL (ref 32–36)
MCHC RBC AUTO-ENTMCNC: 36.5 G/DL (ref 32–36)
MCHC RBC AUTO-ENTMCNC: 37 G/DL (ref 32–36)
MCV RBC AUTO: 100 FL (ref 82–98)
MCV RBC AUTO: 100 FL (ref 82–98)
MCV RBC AUTO: 101 FL (ref 82–98)
MCV RBC AUTO: 102 FL (ref 82–98)
MCV RBC AUTO: 103 FL (ref 82–98)
MCV RBC AUTO: 103 FL (ref 82–98)
MCV RBC AUTO: 87 FL (ref 82–98)
MCV RBC AUTO: 89 FL (ref 82–98)
MCV RBC AUTO: 95 FL (ref 82–98)
MCV RBC AUTO: 96 FL (ref 82–98)
MCV RBC AUTO: 97 FL (ref 82–98)
MCV RBC AUTO: 98 FL (ref 82–98)
MCV RBC AUTO: 99 FL (ref 82–98)
METHADONE UR QL SCN>300 NG/ML: NEGATIVE
METHADONE UR QL SCN>300 NG/ML: NEGATIVE
MICROSCOPIC COMMENT: ABNORMAL
MICROSCOPIC COMMENT: ABNORMAL
MICROSCOPIC COMMENT: NORMAL
MONOCYTES # BLD AUTO: 0.7 K/UL (ref 0.3–1)
MONOCYTES # BLD AUTO: 0.8 K/UL (ref 0.3–1)
MONOCYTES # BLD AUTO: 0.8 K/UL (ref 0.3–1)
MONOCYTES # BLD AUTO: 1 K/UL (ref 0.3–1)
MONOCYTES # BLD AUTO: 1.1 K/UL (ref 0.3–1)
MONOCYTES # BLD AUTO: 1.2 K/UL (ref 0.3–1)
MONOCYTES # BLD AUTO: 1.3 K/UL (ref 0.3–1)
MONOCYTES # BLD AUTO: 1.7 K/UL (ref 0.3–1)
MONOCYTES # BLD AUTO: 1.8 K/UL (ref 0.3–1)
MONOCYTES # BLD AUTO: 1.9 K/UL (ref 0.3–1)
MONOCYTES # BLD AUTO: 2 K/UL (ref 0.3–1)
MONOCYTES # BLD AUTO: 2.5 K/UL (ref 0.3–1)
MONOCYTES NFR BLD: 10 % (ref 4–15)
MONOCYTES NFR BLD: 10.3 % (ref 4–15)
MONOCYTES NFR BLD: 10.4 % (ref 4–15)
MONOCYTES NFR BLD: 10.7 % (ref 4–15)
MONOCYTES NFR BLD: 11.8 % (ref 4–15)
MONOCYTES NFR BLD: 12.1 % (ref 4–15)
MONOCYTES NFR BLD: 12.3 % (ref 4–15)
MONOCYTES NFR BLD: 12.8 % (ref 4–15)
MONOCYTES NFR BLD: 14.3 % (ref 4–15)
MONOCYTES NFR BLD: 14.3 % (ref 4–15)
MONOCYTES NFR BLD: 14.4 % (ref 4–15)
MONOCYTES NFR BLD: 15.2 % (ref 4–15)
MONOCYTES NFR BLD: 15.5 % (ref 4–15)
MONOCYTES NFR BLD: 16.8 % (ref 4–15)
MONOCYTES NFR BLD: 17.6 % (ref 4–15)
MONOCYTES NFR BLD: 5.7 % (ref 4–15)
MONOCYTES NFR BLD: 8.4 % (ref 4–15)
MONOCYTES NFR BLD: 8.6 % (ref 4–15)
MONOCYTES NFR BLD: 8.7 % (ref 4–15)
MONOCYTES NFR BLD: 9 % (ref 4–15)
MONOCYTES NFR BLD: 9.4 % (ref 4–15)
MONOCYTES NFR BLD: 9.4 % (ref 4–15)
NEUTROPHILS # BLD AUTO: 10 K/UL (ref 1.8–7.7)
NEUTROPHILS # BLD AUTO: 10.4 K/UL (ref 1.8–7.7)
NEUTROPHILS # BLD AUTO: 10.9 K/UL (ref 1.8–7.7)
NEUTROPHILS # BLD AUTO: 11.1 K/UL (ref 1.8–7.7)
NEUTROPHILS # BLD AUTO: 11.2 K/UL (ref 1.8–7.7)
NEUTROPHILS # BLD AUTO: 12.6 K/UL (ref 1.8–7.7)
NEUTROPHILS # BLD AUTO: 14.1 K/UL (ref 1.8–7.7)
NEUTROPHILS # BLD AUTO: 3.3 K/UL (ref 1.8–7.7)
NEUTROPHILS # BLD AUTO: 3.7 K/UL (ref 1.8–7.7)
NEUTROPHILS # BLD AUTO: 4.7 K/UL (ref 1.8–7.7)
NEUTROPHILS # BLD AUTO: 5.1 K/UL (ref 1.8–7.7)
NEUTROPHILS # BLD AUTO: 6.1 K/UL (ref 1.8–7.7)
NEUTROPHILS # BLD AUTO: 6.2 K/UL (ref 1.8–7.7)
NEUTROPHILS # BLD AUTO: 6.8 K/UL (ref 1.8–7.7)
NEUTROPHILS # BLD AUTO: 6.9 K/UL (ref 1.8–7.7)
NEUTROPHILS # BLD AUTO: 7 K/UL (ref 1.8–7.7)
NEUTROPHILS # BLD AUTO: 7.2 K/UL (ref 1.8–7.7)
NEUTROPHILS # BLD AUTO: 7.3 K/UL (ref 1.8–7.7)
NEUTROPHILS # BLD AUTO: 7.5 K/UL (ref 1.8–7.7)
NEUTROPHILS # BLD AUTO: 7.8 K/UL (ref 1.8–7.7)
NEUTROPHILS # BLD AUTO: 8.9 K/UL (ref 1.8–7.7)
NEUTROPHILS # BLD AUTO: 9.7 K/UL (ref 1.8–7.7)
NEUTROPHILS NFR BLD: 45.4 % (ref 38–73)
NEUTROPHILS NFR BLD: 52 % (ref 38–73)
NEUTROPHILS NFR BLD: 52.2 % (ref 38–73)
NEUTROPHILS NFR BLD: 55.5 % (ref 38–73)
NEUTROPHILS NFR BLD: 60.5 % (ref 38–73)
NEUTROPHILS NFR BLD: 62.8 % (ref 38–73)
NEUTROPHILS NFR BLD: 63.2 % (ref 38–73)
NEUTROPHILS NFR BLD: 63.6 % (ref 38–73)
NEUTROPHILS NFR BLD: 63.8 % (ref 38–73)
NEUTROPHILS NFR BLD: 65 % (ref 38–73)
NEUTROPHILS NFR BLD: 65.5 % (ref 38–73)
NEUTROPHILS NFR BLD: 67.2 % (ref 38–73)
NEUTROPHILS NFR BLD: 67.6 % (ref 38–73)
NEUTROPHILS NFR BLD: 68.9 % (ref 38–73)
NEUTROPHILS NFR BLD: 70.4 % (ref 38–73)
NEUTROPHILS NFR BLD: 70.6 % (ref 38–73)
NEUTROPHILS NFR BLD: 72.5 % (ref 38–73)
NEUTROPHILS NFR BLD: 72.6 % (ref 38–73)
NEUTROPHILS NFR BLD: 73 % (ref 38–73)
NEUTROPHILS NFR BLD: 73.1 % (ref 38–73)
NEUTROPHILS NFR BLD: 76.3 % (ref 38–73)
NEUTROPHILS NFR BLD: 76.8 % (ref 38–73)
NITRITE UR QL STRIP: NEGATIVE
NRBC BLD-RTO: 0 /100 WBC
OPIATES UR QL SCN: ABNORMAL
OPIATES UR QL SCN: NEGATIVE
PCO2 BLDA: 37.3 MMHG (ref 35–45)
PCO2 BLDA: 38.3 MMHG (ref 35–45)
PCO2 BLDA: 41.2 MMHG (ref 35–45)
PCO2 BLDA: 42.7 MMHG (ref 35–45)
PCP UR QL SCN>25 NG/ML: NEGATIVE
PCP UR QL SCN>25 NG/ML: NEGATIVE
PH SMN: 7.35 [PH] (ref 7.35–7.45)
PH SMN: 7.41 [PH] (ref 7.35–7.45)
PH SMN: 7.43 [PH] (ref 7.35–7.45)
PH SMN: 7.46 [PH] (ref 7.35–7.45)
PH UR STRIP: 6 [PH] (ref 5–8)
PH UR STRIP: 7 [PH] (ref 5–8)
PH UR STRIP: 7 [PH] (ref 5–8)
PHOSPHATE SERPL-MCNC: 1.7 MG/DL (ref 2.7–4.5)
PHOSPHATE SERPL-MCNC: 2.4 MG/DL (ref 2.7–4.5)
PHOSPHATE SERPL-MCNC: 2.8 MG/DL (ref 2.7–4.5)
PHOSPHATE SERPL-MCNC: 3.1 MG/DL (ref 2.7–4.5)
PHOSPHATE SERPL-MCNC: 3.2 MG/DL (ref 2.7–4.5)
PLATELET # BLD AUTO: 102 K/UL (ref 150–450)
PLATELET # BLD AUTO: 127 K/UL (ref 150–450)
PLATELET # BLD AUTO: 132 K/UL (ref 150–450)
PLATELET # BLD AUTO: 145 K/UL (ref 150–450)
PLATELET # BLD AUTO: 207 K/UL (ref 150–450)
PLATELET # BLD AUTO: 209 K/UL (ref 150–450)
PLATELET # BLD AUTO: 219 K/UL (ref 150–450)
PLATELET # BLD AUTO: 227 K/UL (ref 150–450)
PLATELET # BLD AUTO: 231 K/UL (ref 150–450)
PLATELET # BLD AUTO: 236 K/UL (ref 150–450)
PLATELET # BLD AUTO: 265 K/UL (ref 150–450)
PLATELET # BLD AUTO: 279 K/UL (ref 150–450)
PLATELET # BLD AUTO: 281 K/UL (ref 150–450)
PLATELET # BLD AUTO: 282 K/UL (ref 150–450)
PLATELET # BLD AUTO: 284 K/UL (ref 150–450)
PLATELET # BLD AUTO: 299 K/UL (ref 150–450)
PLATELET # BLD AUTO: 300 K/UL (ref 150–450)
PLATELET # BLD AUTO: 340 K/UL (ref 150–450)
PLATELET # BLD AUTO: 342 K/UL (ref 150–450)
PLATELET # BLD AUTO: 364 K/UL (ref 150–450)
PLATELET # BLD AUTO: 371 K/UL (ref 150–450)
PLATELET # BLD AUTO: 419 K/UL (ref 150–450)
PLATELET BLD QL SMEAR: ABNORMAL
PMV BLD AUTO: 10 FL (ref 9.2–12.9)
PMV BLD AUTO: 10.3 FL (ref 9.2–12.9)
PMV BLD AUTO: 10.4 FL (ref 9.2–12.9)
PMV BLD AUTO: 10.9 FL (ref 9.2–12.9)
PMV BLD AUTO: 8.9 FL (ref 9.2–12.9)
PMV BLD AUTO: 9.2 FL (ref 9.2–12.9)
PMV BLD AUTO: 9.3 FL (ref 9.2–12.9)
PMV BLD AUTO: 9.3 FL (ref 9.2–12.9)
PMV BLD AUTO: 9.4 FL (ref 9.2–12.9)
PMV BLD AUTO: 9.5 FL (ref 9.2–12.9)
PMV BLD AUTO: 9.5 FL (ref 9.2–12.9)
PMV BLD AUTO: 9.6 FL (ref 9.2–12.9)
PMV BLD AUTO: 9.7 FL (ref 9.2–12.9)
PMV BLD AUTO: 9.7 FL (ref 9.2–12.9)
PMV BLD AUTO: 9.8 FL (ref 9.2–12.9)
PMV BLD AUTO: 9.9 FL (ref 9.2–12.9)
PMV BLD AUTO: 9.9 FL (ref 9.2–12.9)
PO2 BLDA: 29 MMHG (ref 40–60)
PO2 BLDA: 38 MMHG (ref 40–60)
PO2 BLDA: 42 MMHG (ref 40–60)
PO2 BLDA: 65 MMHG (ref 40–60)
POC BE: -4 MMOL/L
POC BE: 0 MMOL/L
POC BE: 4 MMOL/L
POC BE: 5 MMOL/L
POC MOLECULAR INFLUENZA A AGN: NEGATIVE
POC MOLECULAR INFLUENZA B AGN: NEGATIVE
POC SATURATED O2: 58 % (ref 95–100)
POC SATURATED O2: 70 % (ref 95–100)
POC SATURATED O2: 79 % (ref 95–100)
POC SATURATED O2: 93 % (ref 95–100)
POC TCO2: 22 MMOL/L (ref 24–29)
POC TCO2: 26 MMOL/L (ref 24–29)
POC TCO2: 30 MMOL/L (ref 24–29)
POC TCO2: 30 MMOL/L (ref 24–29)
POCT GLUCOSE: 101 MG/DL (ref 70–110)
POCT GLUCOSE: 103 MG/DL (ref 70–110)
POCT GLUCOSE: 104 MG/DL (ref 70–110)
POCT GLUCOSE: 105 MG/DL (ref 70–110)
POCT GLUCOSE: 106 MG/DL (ref 70–110)
POCT GLUCOSE: 109 MG/DL (ref 70–110)
POCT GLUCOSE: 110 MG/DL (ref 70–110)
POCT GLUCOSE: 111 MG/DL (ref 70–110)
POCT GLUCOSE: 117 MG/DL (ref 70–110)
POCT GLUCOSE: 119 MG/DL (ref 70–110)
POCT GLUCOSE: 120 MG/DL (ref 70–110)
POCT GLUCOSE: 121 MG/DL (ref 70–110)
POCT GLUCOSE: 131 MG/DL (ref 70–110)
POCT GLUCOSE: 132 MG/DL (ref 70–110)
POCT GLUCOSE: 133 MG/DL (ref 70–110)
POCT GLUCOSE: 137 MG/DL (ref 70–110)
POCT GLUCOSE: 137 MG/DL (ref 70–110)
POCT GLUCOSE: 143 MG/DL (ref 70–110)
POCT GLUCOSE: 145 MG/DL (ref 70–110)
POCT GLUCOSE: 147 MG/DL (ref 70–110)
POCT GLUCOSE: 148 MG/DL (ref 70–110)
POCT GLUCOSE: 150 MG/DL (ref 70–110)
POCT GLUCOSE: 155 MG/DL (ref 70–110)
POCT GLUCOSE: 155 MG/DL (ref 70–110)
POCT GLUCOSE: 158 MG/DL (ref 70–110)
POCT GLUCOSE: 162 MG/DL (ref 70–110)
POCT GLUCOSE: 169 MG/DL (ref 70–110)
POCT GLUCOSE: 170 MG/DL (ref 70–110)
POCT GLUCOSE: 179 MG/DL (ref 70–110)
POCT GLUCOSE: 184 MG/DL (ref 70–110)
POCT GLUCOSE: 184 MG/DL (ref 70–110)
POCT GLUCOSE: 185 MG/DL (ref 70–110)
POCT GLUCOSE: 187 MG/DL (ref 70–110)
POCT GLUCOSE: 187 MG/DL (ref 70–110)
POCT GLUCOSE: 188 MG/DL (ref 70–110)
POCT GLUCOSE: 191 MG/DL (ref 70–110)
POCT GLUCOSE: 194 MG/DL (ref 70–110)
POCT GLUCOSE: 198 MG/DL (ref 70–110)
POCT GLUCOSE: 198 MG/DL (ref 70–110)
POCT GLUCOSE: 207 MG/DL (ref 70–110)
POCT GLUCOSE: 208 MG/DL (ref 70–110)
POCT GLUCOSE: 208 MG/DL (ref 70–110)
POCT GLUCOSE: 209 MG/DL (ref 70–110)
POCT GLUCOSE: 216 MG/DL (ref 70–110)
POCT GLUCOSE: 222 MG/DL (ref 70–110)
POCT GLUCOSE: 223 MG/DL (ref 70–110)
POCT GLUCOSE: 224 MG/DL (ref 70–110)
POCT GLUCOSE: 226 MG/DL (ref 70–110)
POCT GLUCOSE: 229 MG/DL (ref 70–110)
POCT GLUCOSE: 236 MG/DL (ref 70–110)
POCT GLUCOSE: 243 MG/DL (ref 70–110)
POCT GLUCOSE: 261 MG/DL (ref 70–110)
POCT GLUCOSE: 264 MG/DL (ref 70–110)
POCT GLUCOSE: 266 MG/DL (ref 70–110)
POCT GLUCOSE: 267 MG/DL (ref 70–110)
POCT GLUCOSE: 268 MG/DL (ref 70–110)
POCT GLUCOSE: 270 MG/DL (ref 70–110)
POCT GLUCOSE: 272 MG/DL (ref 70–110)
POCT GLUCOSE: 276 MG/DL (ref 70–110)
POCT GLUCOSE: 279 MG/DL (ref 70–110)
POCT GLUCOSE: 280 MG/DL (ref 70–110)
POCT GLUCOSE: 282 MG/DL (ref 70–110)
POCT GLUCOSE: 285 MG/DL (ref 70–110)
POCT GLUCOSE: 289 MG/DL (ref 70–110)
POCT GLUCOSE: 293 MG/DL (ref 70–110)
POCT GLUCOSE: 300 MG/DL (ref 70–110)
POCT GLUCOSE: 312 MG/DL (ref 70–110)
POCT GLUCOSE: 320 MG/DL (ref 70–110)
POCT GLUCOSE: 324 MG/DL (ref 70–110)
POCT GLUCOSE: 327 MG/DL (ref 70–110)
POCT GLUCOSE: 335 MG/DL (ref 70–110)
POCT GLUCOSE: 364 MG/DL (ref 70–110)
POCT GLUCOSE: 371 MG/DL (ref 70–110)
POCT GLUCOSE: 376 MG/DL (ref 70–110)
POCT GLUCOSE: 393 MG/DL (ref 70–110)
POCT GLUCOSE: 438 MG/DL (ref 70–110)
POCT GLUCOSE: 462 MG/DL (ref 70–110)
POCT GLUCOSE: 463 MG/DL (ref 70–110)
POCT GLUCOSE: 49 MG/DL (ref 70–110)
POCT GLUCOSE: 50 MG/DL (ref 70–110)
POCT GLUCOSE: 53 MG/DL (ref 70–110)
POCT GLUCOSE: 65 MG/DL (ref 70–110)
POCT GLUCOSE: 66 MG/DL (ref 70–110)
POCT GLUCOSE: 68 MG/DL (ref 70–110)
POCT GLUCOSE: 71 MG/DL (ref 70–110)
POCT GLUCOSE: 79 MG/DL (ref 70–110)
POCT GLUCOSE: 82 MG/DL (ref 70–110)
POCT GLUCOSE: 84 MG/DL (ref 70–110)
POCT GLUCOSE: 86 MG/DL (ref 70–110)
POCT GLUCOSE: 93 MG/DL (ref 70–110)
POCT GLUCOSE: 95 MG/DL (ref 70–110)
POCT GLUCOSE: 96 MG/DL (ref 70–110)
POCT GLUCOSE: >500 MG/DL (ref 70–110)
POTASSIUM SERPL-SCNC: 2.2 MMOL/L (ref 3.5–5.1)
POTASSIUM SERPL-SCNC: 2.6 MMOL/L (ref 3.5–5.1)
POTASSIUM SERPL-SCNC: 2.9 MMOL/L (ref 3.5–5.1)
POTASSIUM SERPL-SCNC: 3 MMOL/L (ref 3.5–5.1)
POTASSIUM SERPL-SCNC: 3.1 MMOL/L (ref 3.5–5.1)
POTASSIUM SERPL-SCNC: 3.2 MMOL/L (ref 3.5–5.1)
POTASSIUM SERPL-SCNC: 3.3 MMOL/L (ref 3.5–5.1)
POTASSIUM SERPL-SCNC: 3.5 MMOL/L (ref 3.5–5.1)
POTASSIUM SERPL-SCNC: 3.6 MMOL/L (ref 3.5–5.1)
POTASSIUM SERPL-SCNC: 3.7 MMOL/L (ref 3.5–5.1)
POTASSIUM SERPL-SCNC: 3.8 MMOL/L (ref 3.5–5.1)
POTASSIUM SERPL-SCNC: 3.8 MMOL/L (ref 3.5–5.1)
POTASSIUM SERPL-SCNC: 4 MMOL/L (ref 3.5–5.1)
POTASSIUM SERPL-SCNC: 4.2 MMOL/L (ref 3.5–5.1)
POTASSIUM SERPL-SCNC: 4.5 MMOL/L (ref 3.5–5.1)
PROCALCITONIN SERPL IA-MCNC: 0.21 NG/ML
PROCALCITONIN SERPL IA-MCNC: 5.7 NG/ML
PROT SERPL-MCNC: 5.4 G/DL (ref 6–8.4)
PROT SERPL-MCNC: 5.5 G/DL (ref 6–8.4)
PROT SERPL-MCNC: 5.8 G/DL (ref 6–8.4)
PROT SERPL-MCNC: 5.8 G/DL (ref 6–8.4)
PROT SERPL-MCNC: 6.1 G/DL (ref 6–8.4)
PROT SERPL-MCNC: 6.4 G/DL (ref 6–8.4)
PROT SERPL-MCNC: 6.4 G/DL (ref 6–8.4)
PROT SERPL-MCNC: 6.5 G/DL (ref 6–8.4)
PROT SERPL-MCNC: 6.5 G/DL (ref 6–8.4)
PROT SERPL-MCNC: 6.6 G/DL (ref 6–8.4)
PROT SERPL-MCNC: 6.8 G/DL (ref 6–8.4)
PROT SERPL-MCNC: 6.9 G/DL (ref 6–8.4)
PROT SERPL-MCNC: 7 G/DL (ref 6–8.4)
PROT SERPL-MCNC: 7.8 G/DL (ref 6–8.4)
PROT UR QL STRIP: ABNORMAL
PROTHROMBIN TIME: 11.5 SEC (ref 9–12.5)
RBC # BLD AUTO: 2.55 M/UL (ref 4.6–6.2)
RBC # BLD AUTO: 2.75 M/UL (ref 4.6–6.2)
RBC # BLD AUTO: 2.8 M/UL (ref 4.6–6.2)
RBC # BLD AUTO: 2.83 M/UL (ref 4.6–6.2)
RBC # BLD AUTO: 2.85 M/UL (ref 4.6–6.2)
RBC # BLD AUTO: 2.9 M/UL (ref 4.6–6.2)
RBC # BLD AUTO: 2.96 M/UL (ref 4.6–6.2)
RBC # BLD AUTO: 2.97 M/UL (ref 4.6–6.2)
RBC # BLD AUTO: 3.02 M/UL (ref 4.6–6.2)
RBC # BLD AUTO: 3.07 M/UL (ref 4.6–6.2)
RBC # BLD AUTO: 3.09 M/UL (ref 4.6–6.2)
RBC # BLD AUTO: 3.11 M/UL (ref 4.6–6.2)
RBC # BLD AUTO: 3.43 M/UL (ref 4.6–6.2)
RBC # BLD AUTO: 3.44 M/UL (ref 4.6–6.2)
RBC # BLD AUTO: 3.44 M/UL (ref 4.6–6.2)
RBC # BLD AUTO: 3.64 M/UL (ref 4.6–6.2)
RBC # BLD AUTO: 3.71 M/UL (ref 4.6–6.2)
RBC # BLD AUTO: 3.83 M/UL (ref 4.6–6.2)
RBC # BLD AUTO: 3.86 M/UL (ref 4.6–6.2)
RBC # BLD AUTO: 3.92 M/UL (ref 4.6–6.2)
RBC # BLD AUTO: 4.14 M/UL (ref 4.6–6.2)
RBC # BLD AUTO: 4.44 M/UL (ref 4.6–6.2)
RBC #/AREA URNS HPF: 1 /HPF (ref 0–4)
RBC #/AREA URNS HPF: 5 /HPF (ref 0–4)
RBC #/AREA URNS HPF: 8 /HPF (ref 0–4)
SAMPLE: ABNORMAL
SAMPLE: NORMAL
SARS-COV-2 RDRP RESP QL NAA+PROBE: NEGATIVE
SATURATED IRON: 21 % (ref 20–50)
SITE: ABNORMAL
SITE: NORMAL
SODIUM SERPL-SCNC: 128 MMOL/L (ref 136–145)
SODIUM SERPL-SCNC: 129 MMOL/L (ref 136–145)
SODIUM SERPL-SCNC: 131 MMOL/L (ref 136–145)
SODIUM SERPL-SCNC: 133 MMOL/L (ref 136–145)
SODIUM SERPL-SCNC: 133 MMOL/L (ref 136–145)
SODIUM SERPL-SCNC: 134 MMOL/L (ref 136–145)
SODIUM SERPL-SCNC: 135 MMOL/L (ref 136–145)
SODIUM SERPL-SCNC: 136 MMOL/L (ref 136–145)
SODIUM SERPL-SCNC: 136 MMOL/L (ref 136–145)
SODIUM SERPL-SCNC: 137 MMOL/L (ref 136–145)
SODIUM SERPL-SCNC: 138 MMOL/L (ref 136–145)
SODIUM SERPL-SCNC: 139 MMOL/L (ref 136–145)
SODIUM SERPL-SCNC: 140 MMOL/L (ref 136–145)
SODIUM SERPL-SCNC: 140 MMOL/L (ref 136–145)
SODIUM SERPL-SCNC: 141 MMOL/L (ref 136–145)
SODIUM UR-SCNC: 46 MMOL/L (ref 20–250)
SP GR UR STRIP: 1.01 (ref 1–1.03)
SP GR UR STRIP: 1.02 (ref 1–1.03)
SP GR UR STRIP: 1.02 (ref 1–1.03)
SQUAMOUS #/AREA URNS HPF: 0 /HPF
SQUAMOUS #/AREA URNS HPF: 6 /HPF
TOTAL IRON BINDING CAPACITY: 308 UG/DL (ref 250–450)
TOXICOLOGY INFORMATION: ABNORMAL
TOXICOLOGY INFORMATION: NORMAL
TRANSFERRIN SERPL-MCNC: 208 MG/DL (ref 200–375)
TROPONIN I SERPL DL<=0.01 NG/ML-MCNC: 0.01 NG/ML (ref 0–0.03)
TROPONIN I SERPL DL<=0.01 NG/ML-MCNC: <0.006 NG/ML (ref 0–0.03)
TSH SERPL DL<=0.005 MIU/L-ACNC: 0.76 UIU/ML (ref 0.4–4)
URN SPEC COLLECT METH UR: ABNORMAL
UROBILINOGEN UR STRIP-ACNC: NEGATIVE EU/DL
VANCOMYCIN SERPL-MCNC: 11.3 UG/ML
VANCOMYCIN SERPL-MCNC: 19.1 UG/ML
VANCOMYCIN SERPL-MCNC: 22.4 UG/ML
VANCOMYCIN SERPL-MCNC: 7.6 UG/ML
VANCOMYCIN TROUGH SERPL-MCNC: 19.1 UG/ML (ref 10–22)
VANCOMYCIN TROUGH SERPL-MCNC: 25.7 UG/ML (ref 10–22)
VIT B12 SERPL-MCNC: 806 PG/ML (ref 210–950)
WBC # BLD AUTO: 10.29 K/UL (ref 3.9–12.7)
WBC # BLD AUTO: 10.76 K/UL (ref 3.9–12.7)
WBC # BLD AUTO: 11.08 K/UL (ref 3.9–12.7)
WBC # BLD AUTO: 11.12 K/UL (ref 3.9–12.7)
WBC # BLD AUTO: 11.29 K/UL (ref 3.9–12.7)
WBC # BLD AUTO: 11.31 K/UL (ref 3.9–12.7)
WBC # BLD AUTO: 11.36 K/UL (ref 3.9–12.7)
WBC # BLD AUTO: 12.41 K/UL (ref 3.9–12.7)
WBC # BLD AUTO: 12.7 K/UL (ref 3.9–12.7)
WBC # BLD AUTO: 13.33 K/UL (ref 3.9–12.7)
WBC # BLD AUTO: 14.15 K/UL (ref 3.9–12.7)
WBC # BLD AUTO: 14.53 K/UL (ref 3.9–12.7)
WBC # BLD AUTO: 14.66 K/UL (ref 3.9–12.7)
WBC # BLD AUTO: 15.3 K/UL (ref 3.9–12.7)
WBC # BLD AUTO: 15.33 K/UL (ref 3.9–12.7)
WBC # BLD AUTO: 17.41 K/UL (ref 3.9–12.7)
WBC # BLD AUTO: 18.46 K/UL (ref 3.9–12.7)
WBC # BLD AUTO: 6.26 K/UL (ref 3.9–12.7)
WBC # BLD AUTO: 8.08 K/UL (ref 3.9–12.7)
WBC # BLD AUTO: 8.09 K/UL (ref 3.9–12.7)
WBC # BLD AUTO: 9.09 K/UL (ref 3.9–12.7)
WBC # BLD AUTO: 9.44 K/UL (ref 3.9–12.7)
WBC #/AREA URNS HPF: 10 /HPF (ref 0–5)
WBC #/AREA URNS HPF: 2 /HPF (ref 0–5)
WBC #/AREA URNS HPF: 28 /HPF (ref 0–5)
WBC CLUMPS URNS QL MICRO: ABNORMAL
YEAST URNS QL MICRO: ABNORMAL
YEAST URNS QL MICRO: ABNORMAL
YEAST URNS QL MICRO: NORMAL

## 2022-01-01 PROCEDURE — 80048 BASIC METABOLIC PNL TOTAL CA: CPT | Performed by: STUDENT IN AN ORGANIZED HEALTH CARE EDUCATION/TRAINING PROGRAM

## 2022-01-01 PROCEDURE — 63600175 PHARM REV CODE 636 W HCPCS: Performed by: UROLOGY

## 2022-01-01 PROCEDURE — 63600175 PHARM REV CODE 636 W HCPCS: Performed by: INTERNAL MEDICINE

## 2022-01-01 PROCEDURE — 63600175 PHARM REV CODE 636 W HCPCS

## 2022-01-01 PROCEDURE — 99232 SBSQ HOSP IP/OBS MODERATE 35: CPT | Mod: ,,, | Performed by: UROLOGY

## 2022-01-01 PROCEDURE — 84100 ASSAY OF PHOSPHORUS: CPT | Performed by: INTERNAL MEDICINE

## 2022-01-01 PROCEDURE — 99024 POSTOP FOLLOW-UP VISIT: CPT | Mod: ,,, | Performed by: UROLOGY

## 2022-01-01 PROCEDURE — 25000003 PHARM REV CODE 250: Performed by: UROLOGY

## 2022-01-01 PROCEDURE — 36415 COLL VENOUS BLD VENIPUNCTURE: CPT | Performed by: HOSPITALIST

## 2022-01-01 PROCEDURE — 93005 ELECTROCARDIOGRAM TRACING: CPT

## 2022-01-01 PROCEDURE — 99900035 HC TECH TIME PER 15 MIN (STAT)

## 2022-01-01 PROCEDURE — 21400001 HC TELEMETRY ROOM

## 2022-01-01 PROCEDURE — 83605 ASSAY OF LACTIC ACID: CPT | Performed by: HOSPITALIST

## 2022-01-01 PROCEDURE — 80053 COMPREHEN METABOLIC PANEL: CPT | Performed by: HOSPITALIST

## 2022-01-01 PROCEDURE — 36415 COLL VENOUS BLD VENIPUNCTURE: CPT | Performed by: STUDENT IN AN ORGANIZED HEALTH CARE EDUCATION/TRAINING PROGRAM

## 2022-01-01 PROCEDURE — 25000003 PHARM REV CODE 250: Performed by: STUDENT IN AN ORGANIZED HEALTH CARE EDUCATION/TRAINING PROGRAM

## 2022-01-01 PROCEDURE — 25000003 PHARM REV CODE 250: Performed by: HOSPITALIST

## 2022-01-01 PROCEDURE — S0030 INJECTION, METRONIDAZOLE: HCPCS | Performed by: STUDENT IN AN ORGANIZED HEALTH CARE EDUCATION/TRAINING PROGRAM

## 2022-01-01 PROCEDURE — 80202 ASSAY OF VANCOMYCIN: CPT | Performed by: INTERNAL MEDICINE

## 2022-01-01 PROCEDURE — 93010 ELECTROCARDIOGRAM REPORT: CPT | Mod: ,,, | Performed by: INTERNAL MEDICINE

## 2022-01-01 PROCEDURE — 63600175 PHARM REV CODE 636 W HCPCS: Performed by: HOSPITALIST

## 2022-01-01 PROCEDURE — 63600175 PHARM REV CODE 636 W HCPCS: Performed by: EMERGENCY MEDICINE

## 2022-01-01 PROCEDURE — 99232 PR SUBSEQUENT HOSPITAL CARE,LEVL II: ICD-10-PCS | Mod: ,,, | Performed by: UROLOGY

## 2022-01-01 PROCEDURE — 87040 BLOOD CULTURE FOR BACTERIA: CPT

## 2022-01-01 PROCEDURE — 25000003 PHARM REV CODE 250: Performed by: PHYSICIAN ASSISTANT

## 2022-01-01 PROCEDURE — 99024 PR POST-OP FOLLOW-UP VISIT: ICD-10-PCS | Mod: ,,, | Performed by: UROLOGY

## 2022-01-01 PROCEDURE — 36000707: Performed by: UROLOGY

## 2022-01-01 PROCEDURE — 80202 ASSAY OF VANCOMYCIN: CPT | Performed by: HOSPITALIST

## 2022-01-01 PROCEDURE — 96365 THER/PROPH/DIAG IV INF INIT: CPT

## 2022-01-01 PROCEDURE — 99213 OFFICE O/P EST LOW 20 MIN: CPT | Mod: PBBFAC | Performed by: UROLOGY

## 2022-01-01 PROCEDURE — 94761 N-INVAS EAR/PLS OXIMETRY MLT: CPT

## 2022-01-01 PROCEDURE — 83605 ASSAY OF LACTIC ACID: CPT | Performed by: STUDENT IN AN ORGANIZED HEALTH CARE EDUCATION/TRAINING PROGRAM

## 2022-01-01 PROCEDURE — 85025 COMPLETE CBC W/AUTO DIFF WBC: CPT | Performed by: STUDENT IN AN ORGANIZED HEALTH CARE EDUCATION/TRAINING PROGRAM

## 2022-01-01 PROCEDURE — 63700000 PHARM REV CODE 250 ALT 637 W/O HCPCS: Performed by: UROLOGY

## 2022-01-01 PROCEDURE — 25000003 PHARM REV CODE 250

## 2022-01-01 PROCEDURE — 99233 PR SUBSEQUENT HOSPITAL CARE,LEVL III: ICD-10-PCS | Mod: ,,, | Performed by: UROLOGY

## 2022-01-01 PROCEDURE — 63600175 PHARM REV CODE 636 W HCPCS: Performed by: STUDENT IN AN ORGANIZED HEALTH CARE EDUCATION/TRAINING PROGRAM

## 2022-01-01 PROCEDURE — 84466 ASSAY OF TRANSFERRIN: CPT | Performed by: INTERNAL MEDICINE

## 2022-01-01 PROCEDURE — 85025 COMPLETE CBC W/AUTO DIFF WBC: CPT | Performed by: INTERNAL MEDICINE

## 2022-01-01 PROCEDURE — 83605 ASSAY OF LACTIC ACID: CPT | Mod: 91

## 2022-01-01 PROCEDURE — 99233 SBSQ HOSP IP/OBS HIGH 50: CPT | Mod: ,,, | Performed by: INTERNAL MEDICINE

## 2022-01-01 PROCEDURE — 80053 COMPREHEN METABOLIC PANEL: CPT

## 2022-01-01 PROCEDURE — 96375 TX/PRO/DX INJ NEW DRUG ADDON: CPT

## 2022-01-01 PROCEDURE — 99223 PR INITIAL HOSPITAL CARE,LEVL III: ICD-10-PCS | Mod: ,,, | Performed by: INTERNAL MEDICINE

## 2022-01-01 PROCEDURE — 99233 PR SUBSEQUENT HOSPITAL CARE,LEVL III: ICD-10-PCS | Mod: ,,, | Performed by: INTERNAL MEDICINE

## 2022-01-01 PROCEDURE — 99291 CRITICAL CARE FIRST HOUR: CPT

## 2022-01-01 PROCEDURE — 25000003 PHARM REV CODE 250: Performed by: NURSE PRACTITIONER

## 2022-01-01 PROCEDURE — 84484 ASSAY OF TROPONIN QUANT: CPT | Performed by: EMERGENCY MEDICINE

## 2022-01-01 PROCEDURE — 81000 URINALYSIS NONAUTO W/SCOPE: CPT

## 2022-01-01 PROCEDURE — 25000003 PHARM REV CODE 250: Performed by: INTERNAL MEDICINE

## 2022-01-01 PROCEDURE — 36415 COLL VENOUS BLD VENIPUNCTURE: CPT | Performed by: INTERNAL MEDICINE

## 2022-01-01 PROCEDURE — 99223 PR INITIAL HOSPITAL CARE,LEVL III: ICD-10-PCS | Mod: ,,, | Performed by: STUDENT IN AN ORGANIZED HEALTH CARE EDUCATION/TRAINING PROGRAM

## 2022-01-01 PROCEDURE — 99024 POSTOP FOLLOW-UP VISIT: CPT | Mod: ,,, | Performed by: STUDENT IN AN ORGANIZED HEALTH CARE EDUCATION/TRAINING PROGRAM

## 2022-01-01 PROCEDURE — 36415 COLL VENOUS BLD VENIPUNCTURE: CPT | Performed by: UROLOGY

## 2022-01-01 PROCEDURE — 1160F PR REVIEW ALL MEDS BY PRESCRIBER/CLIN PHARMACIST DOCUMENTED: ICD-10-PCS | Mod: CPTII,,, | Performed by: UROLOGY

## 2022-01-01 PROCEDURE — 83605 ASSAY OF LACTIC ACID: CPT

## 2022-01-01 PROCEDURE — 25500020 PHARM REV CODE 255: Performed by: EMERGENCY MEDICINE

## 2022-01-01 PROCEDURE — 63700000 PHARM REV CODE 250 ALT 637 W/O HCPCS: Performed by: STUDENT IN AN ORGANIZED HEALTH CARE EDUCATION/TRAINING PROGRAM

## 2022-01-01 PROCEDURE — 11000001 HC ACUTE MED/SURG PRIVATE ROOM

## 2022-01-01 PROCEDURE — 3008F BODY MASS INDEX DOCD: CPT | Mod: CPTII,,, | Performed by: UROLOGY

## 2022-01-01 PROCEDURE — 27000221 HC OXYGEN, UP TO 24 HOURS

## 2022-01-01 PROCEDURE — 80053 COMPREHEN METABOLIC PANEL: CPT | Performed by: INTERNAL MEDICINE

## 2022-01-01 PROCEDURE — 96365 THER/PROPH/DIAG IV INF INIT: CPT | Mod: 59

## 2022-01-01 PROCEDURE — 83735 ASSAY OF MAGNESIUM: CPT | Mod: 91 | Performed by: INTERNAL MEDICINE

## 2022-01-01 PROCEDURE — 83880 ASSAY OF NATRIURETIC PEPTIDE: CPT | Performed by: EMERGENCY MEDICINE

## 2022-01-01 PROCEDURE — 85025 COMPLETE CBC W/AUTO DIFF WBC: CPT | Performed by: HOSPITALIST

## 2022-01-01 PROCEDURE — 99223 1ST HOSP IP/OBS HIGH 75: CPT | Mod: ,,, | Performed by: UROLOGY

## 2022-01-01 PROCEDURE — 87075 CULTR BACTERIA EXCEPT BLOOD: CPT | Performed by: UROLOGY

## 2022-01-01 PROCEDURE — 99223 1ST HOSP IP/OBS HIGH 75: CPT | Mod: ,,, | Performed by: STUDENT IN AN ORGANIZED HEALTH CARE EDUCATION/TRAINING PROGRAM

## 2022-01-01 PROCEDURE — 81000 URINALYSIS NONAUTO W/SCOPE: CPT | Mod: 59 | Performed by: EMERGENCY MEDICINE

## 2022-01-01 PROCEDURE — 1160F RVW MEDS BY RX/DR IN RCRD: CPT | Mod: CPTII,,, | Performed by: UROLOGY

## 2022-01-01 PROCEDURE — 83735 ASSAY OF MAGNESIUM: CPT | Performed by: INTERNAL MEDICINE

## 2022-01-01 PROCEDURE — 96366 THER/PROPH/DIAG IV INF ADDON: CPT | Mod: 59

## 2022-01-01 PROCEDURE — 99285 EMERGENCY DEPT VISIT HI MDM: CPT | Mod: 25

## 2022-01-01 PROCEDURE — 82962 GLUCOSE BLOOD TEST: CPT

## 2022-01-01 PROCEDURE — 83735 ASSAY OF MAGNESIUM: CPT | Performed by: EMERGENCY MEDICINE

## 2022-01-01 PROCEDURE — 85025 COMPLETE CBC W/AUTO DIFF WBC: CPT | Performed by: UROLOGY

## 2022-01-01 PROCEDURE — 96360 HYDRATION IV INFUSION INIT: CPT

## 2022-01-01 PROCEDURE — 87502 INFLUENZA DNA AMP PROBE: CPT

## 2022-01-01 PROCEDURE — 93010 EKG 12-LEAD: ICD-10-PCS | Mod: ,,, | Performed by: INTERNAL MEDICINE

## 2022-01-01 PROCEDURE — 85610 PROTHROMBIN TIME: CPT | Performed by: EMERGENCY MEDICINE

## 2022-01-01 PROCEDURE — 80053 COMPREHEN METABOLIC PANEL: CPT | Performed by: EMERGENCY MEDICINE

## 2022-01-01 PROCEDURE — 82550 ASSAY OF CK (CPK): CPT | Performed by: INTERNAL MEDICINE

## 2022-01-01 PROCEDURE — 3008F PR BODY MASS INDEX (BMI) DOCUMENTED: ICD-10-PCS | Mod: CPTII,,, | Performed by: UROLOGY

## 2022-01-01 PROCEDURE — 83735 ASSAY OF MAGNESIUM: CPT | Performed by: STUDENT IN AN ORGANIZED HEALTH CARE EDUCATION/TRAINING PROGRAM

## 2022-01-01 PROCEDURE — 25000003 PHARM REV CODE 250: Performed by: EMERGENCY MEDICINE

## 2022-01-01 PROCEDURE — 87147 CULTURE TYPE IMMUNOLOGIC: CPT | Performed by: UROLOGY

## 2022-01-01 PROCEDURE — 99205 PR OFFICE/OUTPT VISIT, NEW, LEVL V, 60-74 MIN: ICD-10-PCS | Mod: AF,HB,95, | Performed by: PSYCHIATRY & NEUROLOGY

## 2022-01-01 PROCEDURE — 82607 VITAMIN B-12: CPT | Performed by: INTERNAL MEDICINE

## 2022-01-01 PROCEDURE — 96367 TX/PROPH/DG ADDL SEQ IV INF: CPT

## 2022-01-01 PROCEDURE — 82077 ASSAY SPEC XCP UR&BREATH IA: CPT | Performed by: EMERGENCY MEDICINE

## 2022-01-01 PROCEDURE — 82010 KETONE BODYS QUAN: CPT | Performed by: EMERGENCY MEDICINE

## 2022-01-01 PROCEDURE — U0002 COVID-19 LAB TEST NON-CDC: HCPCS | Performed by: EMERGENCY MEDICINE

## 2022-01-01 PROCEDURE — 99999 PR PBB SHADOW E&M-EST. PATIENT-LVL III: CPT | Mod: PBBFAC,,, | Performed by: UROLOGY

## 2022-01-01 PROCEDURE — 20000000 HC ICU ROOM

## 2022-01-01 PROCEDURE — 96361 HYDRATE IV INFUSION ADD-ON: CPT

## 2022-01-01 PROCEDURE — 36000706: Performed by: UROLOGY

## 2022-01-01 PROCEDURE — 94760 N-INVAS EAR/PLS OXIMETRY 1: CPT

## 2022-01-01 PROCEDURE — 84300 ASSAY OF URINE SODIUM: CPT | Performed by: STUDENT IN AN ORGANIZED HEALTH CARE EDUCATION/TRAINING PROGRAM

## 2022-01-01 PROCEDURE — 82570 ASSAY OF URINE CREATININE: CPT | Performed by: STUDENT IN AN ORGANIZED HEALTH CARE EDUCATION/TRAINING PROGRAM

## 2022-01-01 PROCEDURE — 1159F PR MEDICATION LIST DOCUMENTED IN MEDICAL RECORD: ICD-10-PCS | Mod: CPTII,,, | Performed by: UROLOGY

## 2022-01-01 PROCEDURE — 82550 ASSAY OF CK (CPK): CPT | Performed by: HOSPITALIST

## 2022-01-01 PROCEDURE — 80048 BASIC METABOLIC PNL TOTAL CA: CPT | Performed by: INTERNAL MEDICINE

## 2022-01-01 PROCEDURE — 87635 SARS-COV-2 COVID-19 AMP PRB: CPT | Performed by: EMERGENCY MEDICINE

## 2022-01-01 PROCEDURE — 83690 ASSAY OF LIPASE: CPT | Performed by: EMERGENCY MEDICINE

## 2022-01-01 PROCEDURE — 3046F PR MOST RECENT HEMOGLOBIN A1C LEVEL > 9.0%: ICD-10-PCS | Mod: CPTII,,, | Performed by: UROLOGY

## 2022-01-01 PROCEDURE — 99024 PR POST-OP FOLLOW-UP VISIT: ICD-10-PCS | Mod: ,,, | Performed by: STUDENT IN AN ORGANIZED HEALTH CARE EDUCATION/TRAINING PROGRAM

## 2022-01-01 PROCEDURE — 99233 SBSQ HOSP IP/OBS HIGH 50: CPT | Mod: ,,, | Performed by: UROLOGY

## 2022-01-01 PROCEDURE — 80307 DRUG TEST PRSMV CHEM ANLYZR: CPT | Performed by: EMERGENCY MEDICINE

## 2022-01-01 PROCEDURE — 82800 BLOOD PH: CPT

## 2022-01-01 PROCEDURE — 84100 ASSAY OF PHOSPHORUS: CPT | Performed by: EMERGENCY MEDICINE

## 2022-01-01 PROCEDURE — 80048 BASIC METABOLIC PNL TOTAL CA: CPT | Mod: XB | Performed by: STUDENT IN AN ORGANIZED HEALTH CARE EDUCATION/TRAINING PROGRAM

## 2022-01-01 PROCEDURE — 3046F HEMOGLOBIN A1C LEVEL >9.0%: CPT | Mod: CPTII,,, | Performed by: UROLOGY

## 2022-01-01 PROCEDURE — 87086 URINE CULTURE/COLONY COUNT: CPT

## 2022-01-01 PROCEDURE — 63600175 PHARM REV CODE 636 W HCPCS: Performed by: ANESTHESIOLOGY

## 2022-01-01 PROCEDURE — 82803 BLOOD GASES ANY COMBINATION: CPT

## 2022-01-01 PROCEDURE — 80048 BASIC METABOLIC PNL TOTAL CA: CPT | Performed by: UROLOGY

## 2022-01-01 PROCEDURE — 84443 ASSAY THYROID STIM HORMONE: CPT | Performed by: EMERGENCY MEDICINE

## 2022-01-01 PROCEDURE — 83735 ASSAY OF MAGNESIUM: CPT | Mod: 91 | Performed by: EMERGENCY MEDICINE

## 2022-01-01 PROCEDURE — 96366 THER/PROPH/DIAG IV INF ADDON: CPT

## 2022-01-01 PROCEDURE — 99233 PR SUBSEQUENT HOSPITAL CARE,LEVL III: ICD-10-PCS | Mod: ,,, | Performed by: STUDENT IN AN ORGANIZED HEALTH CARE EDUCATION/TRAINING PROGRAM

## 2022-01-01 PROCEDURE — 83036 HEMOGLOBIN GLYCOSYLATED A1C: CPT | Performed by: INTERNAL MEDICINE

## 2022-01-01 PROCEDURE — 86850 RBC ANTIBODY SCREEN: CPT | Performed by: EMERGENCY MEDICINE

## 2022-01-01 PROCEDURE — 99232 SBSQ HOSP IP/OBS MODERATE 35: CPT | Mod: ,,, | Performed by: INTERNAL MEDICINE

## 2022-01-01 PROCEDURE — 96374 THER/PROPH/DIAG INJ IV PUSH: CPT

## 2022-01-01 PROCEDURE — 99999 PR PBB SHADOW E&M-EST. PATIENT-LVL III: ICD-10-PCS | Mod: PBBFAC,,, | Performed by: UROLOGY

## 2022-01-01 PROCEDURE — 80143 DRUG ASSAY ACETAMINOPHEN: CPT | Performed by: EMERGENCY MEDICINE

## 2022-01-01 PROCEDURE — 87040 BLOOD CULTURE FOR BACTERIA: CPT | Performed by: EMERGENCY MEDICINE

## 2022-01-01 PROCEDURE — 82010 KETONE BODYS QUAN: CPT

## 2022-01-01 PROCEDURE — 85025 COMPLETE CBC W/AUTO DIFF WBC: CPT | Performed by: EMERGENCY MEDICINE

## 2022-01-01 PROCEDURE — 63600175 PHARM REV CODE 636 W HCPCS: Performed by: NURSE PRACTITIONER

## 2022-01-01 PROCEDURE — 85025 COMPLETE CBC W/AUTO DIFF WBC: CPT

## 2022-01-01 PROCEDURE — D9220A PRA ANESTHESIA: Mod: CRNA,,, | Performed by: STUDENT IN AN ORGANIZED HEALTH CARE EDUCATION/TRAINING PROGRAM

## 2022-01-01 PROCEDURE — 99232 PR SUBSEQUENT HOSPITAL CARE,LEVL II: ICD-10-PCS | Mod: ,,, | Performed by: INTERNAL MEDICINE

## 2022-01-01 PROCEDURE — 83605 ASSAY OF LACTIC ACID: CPT | Mod: 91 | Performed by: EMERGENCY MEDICINE

## 2022-01-01 PROCEDURE — 96376 TX/PRO/DX INJ SAME DRUG ADON: CPT

## 2022-01-01 PROCEDURE — 55100 DRAINAGE OF SCROTUM ABSCESS: CPT | Mod: ,,, | Performed by: UROLOGY

## 2022-01-01 PROCEDURE — 99291 CRITICAL CARE FIRST HOUR: CPT | Mod: 25

## 2022-01-01 PROCEDURE — 85379 FIBRIN DEGRADATION QUANT: CPT | Performed by: EMERGENCY MEDICINE

## 2022-01-01 PROCEDURE — 71000033 HC RECOVERY, INTIAL HOUR: Performed by: UROLOGY

## 2022-01-01 PROCEDURE — 82746 ASSAY OF FOLIC ACID SERUM: CPT | Performed by: INTERNAL MEDICINE

## 2022-01-01 PROCEDURE — 4010F ACE/ARB THERAPY RXD/TAKEN: CPT | Mod: CPTII,,, | Performed by: UROLOGY

## 2022-01-01 PROCEDURE — 37000008 HC ANESTHESIA 1ST 15 MINUTES: Performed by: UROLOGY

## 2022-01-01 PROCEDURE — D9220A PRA ANESTHESIA: ICD-10-PCS | Mod: CRNA,,, | Performed by: STUDENT IN AN ORGANIZED HEALTH CARE EDUCATION/TRAINING PROGRAM

## 2022-01-01 PROCEDURE — 96372 THER/PROPH/DIAG INJ SC/IM: CPT | Performed by: EMERGENCY MEDICINE

## 2022-01-01 PROCEDURE — 85730 THROMBOPLASTIN TIME PARTIAL: CPT | Performed by: EMERGENCY MEDICINE

## 2022-01-01 PROCEDURE — 99205 OFFICE O/P NEW HI 60 MIN: CPT | Mod: AF,HB,95, | Performed by: PSYCHIATRY & NEUROLOGY

## 2022-01-01 PROCEDURE — D9220A PRA ANESTHESIA: Mod: ANES,,, | Performed by: ANESTHESIOLOGY

## 2022-01-01 PROCEDURE — 55100 PR DRAINAGE SCROTAL WALL ABSCESS: ICD-10-PCS | Mod: ,,, | Performed by: UROLOGY

## 2022-01-01 PROCEDURE — 84145 PROCALCITONIN (PCT): CPT

## 2022-01-01 PROCEDURE — D9220A PRA ANESTHESIA: ICD-10-PCS | Mod: ANES,,, | Performed by: ANESTHESIOLOGY

## 2022-01-01 PROCEDURE — 1159F MED LIST DOCD IN RCRD: CPT | Mod: CPTII,,, | Performed by: UROLOGY

## 2022-01-01 PROCEDURE — 82010 KETONE BODYS QUAN: CPT | Performed by: PHYSICIAN ASSISTANT

## 2022-01-01 PROCEDURE — 99223 PR INITIAL HOSPITAL CARE,LEVL III: ICD-10-PCS | Mod: ,,, | Performed by: UROLOGY

## 2022-01-01 PROCEDURE — 71000039 HC RECOVERY, EACH ADD'L HOUR: Performed by: UROLOGY

## 2022-01-01 PROCEDURE — 99223 1ST HOSP IP/OBS HIGH 75: CPT | Mod: ,,, | Performed by: INTERNAL MEDICINE

## 2022-01-01 PROCEDURE — 84145 PROCALCITONIN (PCT): CPT | Performed by: EMERGENCY MEDICINE

## 2022-01-01 PROCEDURE — 80048 BASIC METABOLIC PNL TOTAL CA: CPT | Mod: XB | Performed by: EMERGENCY MEDICINE

## 2022-01-01 PROCEDURE — 99233 SBSQ HOSP IP/OBS HIGH 50: CPT | Mod: ,,, | Performed by: STUDENT IN AN ORGANIZED HEALTH CARE EDUCATION/TRAINING PROGRAM

## 2022-01-01 PROCEDURE — 96368 THER/DIAG CONCURRENT INF: CPT | Mod: 59

## 2022-01-01 PROCEDURE — 4010F PR ACE/ARB THEARPY RXD/TAKEN: ICD-10-PCS | Mod: CPTII,,, | Performed by: UROLOGY

## 2022-01-01 PROCEDURE — 84100 ASSAY OF PHOSPHORUS: CPT | Mod: 91 | Performed by: EMERGENCY MEDICINE

## 2022-01-01 PROCEDURE — 37000009 HC ANESTHESIA EA ADD 15 MINS: Performed by: UROLOGY

## 2022-01-01 PROCEDURE — 51798 US URINE CAPACITY MEASURE: CPT

## 2022-01-01 PROCEDURE — 87205 SMEAR GRAM STAIN: CPT | Performed by: UROLOGY

## 2022-01-01 PROCEDURE — 81001 URINALYSIS AUTO W/SCOPE: CPT | Mod: PBBFAC | Performed by: UROLOGY

## 2022-01-01 PROCEDURE — 87070 CULTURE OTHR SPECIMN AEROBIC: CPT | Performed by: UROLOGY

## 2022-01-01 RX ORDER — METRONIDAZOLE 500 MG/1
500 TABLET ORAL EVERY 8 HOURS
Status: DISCONTINUED | OUTPATIENT
Start: 2022-01-01 | End: 2022-01-01

## 2022-01-01 RX ORDER — SODIUM CHLORIDE 9 MG/ML
INJECTION, SOLUTION INTRAVENOUS CONTINUOUS
Status: DISCONTINUED | OUTPATIENT
Start: 2022-01-01 | End: 2022-01-01

## 2022-01-01 RX ORDER — LORAZEPAM 2 MG/ML
2 INJECTION INTRAMUSCULAR EVERY 4 HOURS PRN
Status: DISCONTINUED | OUTPATIENT
Start: 2022-01-01 | End: 2022-01-01 | Stop reason: HOSPADM

## 2022-01-01 RX ORDER — ACETAMINOPHEN 325 MG/1
650 TABLET ORAL EVERY 4 HOURS PRN
Status: DISCONTINUED | OUTPATIENT
Start: 2022-01-01 | End: 2022-01-01 | Stop reason: HOSPADM

## 2022-01-01 RX ORDER — ENOXAPARIN SODIUM 100 MG/ML
30 INJECTION SUBCUTANEOUS EVERY 24 HOURS
Status: DISCONTINUED | OUTPATIENT
Start: 2022-01-01 | End: 2022-01-01

## 2022-01-01 RX ORDER — MUPIROCIN 20 MG/G
OINTMENT TOPICAL 2 TIMES DAILY
Status: DISPENSED | OUTPATIENT
Start: 2022-01-01 | End: 2022-01-01

## 2022-01-01 RX ORDER — ENOXAPARIN SODIUM 100 MG/ML
40 INJECTION SUBCUTANEOUS EVERY 24 HOURS
Status: DISCONTINUED | OUTPATIENT
Start: 2022-01-01 | End: 2022-01-01 | Stop reason: HOSPADM

## 2022-01-01 RX ORDER — HYDROCODONE BITARTRATE AND ACETAMINOPHEN 7.5; 325 MG/1; MG/1
1 TABLET ORAL EVERY 6 HOURS PRN
Status: DISCONTINUED | OUTPATIENT
Start: 2022-01-01 | End: 2022-01-01 | Stop reason: HOSPADM

## 2022-01-01 RX ORDER — HEPARIN SODIUM 5000 [USP'U]/ML
5000 INJECTION, SOLUTION INTRAVENOUS; SUBCUTANEOUS EVERY 8 HOURS
Status: DISCONTINUED | OUTPATIENT
Start: 2022-01-01 | End: 2022-01-01

## 2022-01-01 RX ORDER — LANOLIN ALCOHOL/MO/W.PET/CERES
100 CREAM (GRAM) TOPICAL DAILY
Status: DISCONTINUED | OUTPATIENT
Start: 2022-01-01 | End: 2022-01-01 | Stop reason: HOSPADM

## 2022-01-01 RX ORDER — TALC
6 POWDER (GRAM) TOPICAL NIGHTLY PRN
Status: DISCONTINUED | OUTPATIENT
Start: 2022-01-01 | End: 2022-01-01 | Stop reason: HOSPADM

## 2022-01-01 RX ORDER — ONDANSETRON 2 MG/ML
4 INJECTION INTRAMUSCULAR; INTRAVENOUS
Status: COMPLETED | OUTPATIENT
Start: 2022-01-01 | End: 2022-01-01

## 2022-01-01 RX ORDER — MAG HYDROX/ALUMINUM HYD/SIMETH 200-200-20
30 SUSPENSION, ORAL (FINAL DOSE FORM) ORAL 4 TIMES DAILY PRN
Status: DISCONTINUED | OUTPATIENT
Start: 2022-01-01 | End: 2022-01-01 | Stop reason: HOSPADM

## 2022-01-01 RX ORDER — MORPHINE SULFATE 4 MG/ML
6 INJECTION, SOLUTION INTRAMUSCULAR; INTRAVENOUS
Status: DISCONTINUED | OUTPATIENT
Start: 2022-01-01 | End: 2022-01-01

## 2022-01-01 RX ORDER — POTASSIUM CHLORIDE 20 MEQ/1
40 TABLET, EXTENDED RELEASE ORAL ONCE
Status: COMPLETED | OUTPATIENT
Start: 2022-01-01 | End: 2022-01-01

## 2022-01-01 RX ORDER — IPRATROPIUM BROMIDE AND ALBUTEROL SULFATE 2.5; .5 MG/3ML; MG/3ML
3 SOLUTION RESPIRATORY (INHALATION) EVERY 4 HOURS PRN
Status: DISCONTINUED | OUTPATIENT
Start: 2022-01-01 | End: 2022-01-01 | Stop reason: HOSPADM

## 2022-01-01 RX ORDER — FOLIC ACID 1 MG/1
1 TABLET ORAL DAILY
Status: DISCONTINUED | OUTPATIENT
Start: 2022-01-01 | End: 2022-01-01 | Stop reason: HOSPADM

## 2022-01-01 RX ORDER — OXYCODONE HYDROCHLORIDE 10 MG/1
10 TABLET ORAL EVERY 6 HOURS PRN
Qty: 16 TABLET | Refills: 0 | Status: ON HOLD | OUTPATIENT
Start: 2022-01-01 | End: 2022-01-01 | Stop reason: HOSPADM

## 2022-01-01 RX ORDER — SIMETHICONE 80 MG
1 TABLET,CHEWABLE ORAL 4 TIMES DAILY PRN
Status: DISCONTINUED | OUTPATIENT
Start: 2022-01-01 | End: 2022-01-01 | Stop reason: HOSPADM

## 2022-01-01 RX ORDER — DIAZEPAM 5 MG/1
5 TABLET ORAL NIGHTLY
Status: COMPLETED | OUTPATIENT
Start: 2022-01-01 | End: 2022-01-01

## 2022-01-01 RX ORDER — MORPHINE SULFATE 4 MG/ML
2 INJECTION, SOLUTION INTRAMUSCULAR; INTRAVENOUS ONCE
Status: COMPLETED | OUTPATIENT
Start: 2022-01-01 | End: 2022-01-01

## 2022-01-01 RX ORDER — ONDANSETRON 2 MG/ML
4 INJECTION INTRAMUSCULAR; INTRAVENOUS EVERY 8 HOURS PRN
Status: DISCONTINUED | OUTPATIENT
Start: 2022-01-01 | End: 2022-01-01 | Stop reason: HOSPADM

## 2022-01-01 RX ORDER — GLUCAGON 1 MG
1 KIT INJECTION
Status: DISCONTINUED | OUTPATIENT
Start: 2022-01-01 | End: 2022-01-01 | Stop reason: HOSPADM

## 2022-01-01 RX ORDER — SODIUM CHLORIDE 0.9 % (FLUSH) 0.9 %
10 SYRINGE (ML) INJECTION EVERY 8 HOURS PRN
Status: DISCONTINUED | OUTPATIENT
Start: 2022-01-01 | End: 2022-01-01 | Stop reason: HOSPADM

## 2022-01-01 RX ORDER — MORPHINE SULFATE 4 MG/ML
4 INJECTION, SOLUTION INTRAMUSCULAR; INTRAVENOUS EVERY 4 HOURS PRN
Status: COMPLETED | OUTPATIENT
Start: 2022-01-01 | End: 2022-01-01

## 2022-01-01 RX ORDER — SODIUM CHLORIDE 9 MG/ML
1000 INJECTION, SOLUTION INTRAVENOUS
Status: COMPLETED | OUTPATIENT
Start: 2022-01-01 | End: 2022-01-01

## 2022-01-01 RX ORDER — POTASSIUM CHLORIDE 7.45 MG/ML
10 INJECTION INTRAVENOUS
Status: COMPLETED | OUTPATIENT
Start: 2022-01-01 | End: 2022-01-01

## 2022-01-01 RX ORDER — AMLODIPINE BESYLATE 10 MG/1
10 TABLET ORAL DAILY
Qty: 30 TABLET | Refills: 0 | Status: SHIPPED | OUTPATIENT
Start: 2022-01-01 | End: 2023-01-01 | Stop reason: CLARIF

## 2022-01-01 RX ORDER — CLONIDINE HYDROCHLORIDE 0.1 MG/1
0.1 TABLET ORAL EVERY 4 HOURS PRN
Status: DISCONTINUED | OUTPATIENT
Start: 2022-01-01 | End: 2022-01-01 | Stop reason: HOSPADM

## 2022-01-01 RX ORDER — CLINDAMYCIN PHOSPHATE 150 MG/ML
600 INJECTION, SOLUTION INTRAVENOUS
Status: DISCONTINUED | OUTPATIENT
Start: 2022-01-01 | End: 2022-01-01 | Stop reason: DRUGHIGH

## 2022-01-01 RX ORDER — PROCHLORPERAZINE EDISYLATE 5 MG/ML
5 INJECTION INTRAMUSCULAR; INTRAVENOUS EVERY 6 HOURS PRN
Status: DISCONTINUED | OUTPATIENT
Start: 2022-01-01 | End: 2022-01-01 | Stop reason: HOSPADM

## 2022-01-01 RX ORDER — SODIUM CHLORIDE AND POTASSIUM CHLORIDE 150; 900 MG/100ML; MG/100ML
INJECTION, SOLUTION INTRAVENOUS CONTINUOUS
Status: DISCONTINUED | OUTPATIENT
Start: 2022-01-01 | End: 2022-01-01 | Stop reason: HOSPADM

## 2022-01-01 RX ORDER — CLINDAMYCIN HYDROCHLORIDE 150 MG/1
300 CAPSULE ORAL EVERY 6 HOURS
Status: DISCONTINUED | OUTPATIENT
Start: 2022-01-01 | End: 2022-01-01

## 2022-01-01 RX ORDER — MICONAZOLE NITRATE 2 %
POWDER (GRAM) TOPICAL 2 TIMES DAILY
Status: DISCONTINUED | OUTPATIENT
Start: 2022-01-01 | End: 2022-01-01

## 2022-01-01 RX ORDER — INSULIN ASPART 100 [IU]/ML
0-5 INJECTION, SOLUTION INTRAVENOUS; SUBCUTANEOUS
Status: DISCONTINUED | OUTPATIENT
Start: 2022-01-01 | End: 2022-01-01 | Stop reason: HOSPADM

## 2022-01-01 RX ORDER — INSULIN ASPART 100 [IU]/ML
5 INJECTION, SOLUTION INTRAVENOUS; SUBCUTANEOUS
Status: DISCONTINUED | OUTPATIENT
Start: 2022-01-01 | End: 2022-01-01

## 2022-01-01 RX ORDER — CEFEPIME HYDROCHLORIDE 1 G/50ML
2 INJECTION, SOLUTION INTRAVENOUS
Status: DISCONTINUED | OUTPATIENT
Start: 2022-01-01 | End: 2022-01-01

## 2022-01-01 RX ORDER — FENTANYL CITRATE 50 UG/ML
25 INJECTION, SOLUTION INTRAMUSCULAR; INTRAVENOUS EVERY 5 MIN PRN
Status: COMPLETED | OUTPATIENT
Start: 2022-01-01 | End: 2022-01-01

## 2022-01-01 RX ORDER — HYDROMORPHONE HYDROCHLORIDE 1 MG/ML
0.5 INJECTION, SOLUTION INTRAMUSCULAR; INTRAVENOUS; SUBCUTANEOUS EVERY 4 HOURS PRN
Status: DISCONTINUED | OUTPATIENT
Start: 2022-01-01 | End: 2022-01-01

## 2022-01-01 RX ORDER — OXYCODONE AND ACETAMINOPHEN 5; 325 MG/1; MG/1
1 TABLET ORAL
Status: COMPLETED | OUTPATIENT
Start: 2022-01-01 | End: 2022-01-01

## 2022-01-01 RX ORDER — DIPHENHYDRAMINE HYDROCHLORIDE 50 MG/ML
25 INJECTION INTRAMUSCULAR; INTRAVENOUS EVERY 6 HOURS PRN
Status: DISCONTINUED | OUTPATIENT
Start: 2022-01-01 | End: 2022-01-01 | Stop reason: HOSPADM

## 2022-01-01 RX ORDER — ACETAMINOPHEN 325 MG/1
650 TABLET ORAL EVERY 6 HOURS PRN
Status: DISCONTINUED | OUTPATIENT
Start: 2022-01-01 | End: 2022-01-01 | Stop reason: HOSPADM

## 2022-01-01 RX ORDER — IBUPROFEN 200 MG
16 TABLET ORAL
Status: DISCONTINUED | OUTPATIENT
Start: 2022-01-01 | End: 2022-01-01 | Stop reason: HOSPADM

## 2022-01-01 RX ORDER — SODIUM CHLORIDE 0.9 % (FLUSH) 0.9 %
10 SYRINGE (ML) INJECTION EVERY 12 HOURS PRN
Status: DISCONTINUED | OUTPATIENT
Start: 2022-01-01 | End: 2022-01-01 | Stop reason: HOSPADM

## 2022-01-01 RX ORDER — FENTANYL CITRATE 50 UG/ML
INJECTION, SOLUTION INTRAMUSCULAR; INTRAVENOUS
Status: DISCONTINUED | OUTPATIENT
Start: 2022-01-01 | End: 2022-01-01

## 2022-01-01 RX ORDER — AMLODIPINE BESYLATE 5 MG/1
10 TABLET ORAL DAILY
Status: DISCONTINUED | OUTPATIENT
Start: 2022-01-01 | End: 2022-01-01 | Stop reason: HOSPADM

## 2022-01-01 RX ORDER — MIDAZOLAM HYDROCHLORIDE 1 MG/ML
INJECTION, SOLUTION INTRAMUSCULAR; INTRAVENOUS
Status: DISCONTINUED | OUTPATIENT
Start: 2022-01-01 | End: 2022-01-01

## 2022-01-01 RX ORDER — CLINDAMYCIN PHOSPHATE 600 MG/50ML
600 INJECTION, SOLUTION INTRAVENOUS
Status: DISCONTINUED | OUTPATIENT
Start: 2022-01-01 | End: 2022-01-01

## 2022-01-01 RX ORDER — POTASSIUM CHLORIDE 20 MEQ/1
60 TABLET, EXTENDED RELEASE ORAL ONCE
Status: COMPLETED | OUTPATIENT
Start: 2022-01-01 | End: 2022-01-01

## 2022-01-01 RX ORDER — LIDOCAINE HYDROCHLORIDE 10 MG/ML
10 INJECTION INFILTRATION; PERINEURAL
Status: COMPLETED | OUTPATIENT
Start: 2022-01-01 | End: 2022-01-01

## 2022-01-01 RX ORDER — AMOXICILLIN AND CLAVULANATE POTASSIUM 875; 125 MG/1; MG/1
1 TABLET, FILM COATED ORAL EVERY 12 HOURS
Qty: 28 TABLET | Refills: 0 | Status: SHIPPED | OUTPATIENT
Start: 2022-01-01 | End: 2022-01-01

## 2022-01-01 RX ORDER — HYDROMORPHONE HYDROCHLORIDE 2 MG/ML
0.5 INJECTION, SOLUTION INTRAMUSCULAR; INTRAVENOUS; SUBCUTANEOUS EVERY 6 HOURS PRN
Status: DISCONTINUED | OUTPATIENT
Start: 2022-01-01 | End: 2022-01-01

## 2022-01-01 RX ORDER — FENTANYL CITRATE 50 UG/ML
25 INJECTION, SOLUTION INTRAMUSCULAR; INTRAVENOUS EVERY 5 MIN PRN
Status: DISCONTINUED | OUTPATIENT
Start: 2022-01-01 | End: 2022-01-01 | Stop reason: HOSPADM

## 2022-01-01 RX ORDER — IBUPROFEN 200 MG
24 TABLET ORAL
Status: DISCONTINUED | OUTPATIENT
Start: 2022-01-01 | End: 2022-01-01 | Stop reason: HOSPADM

## 2022-01-01 RX ORDER — HYDROCODONE BITARTRATE AND ACETAMINOPHEN 5; 325 MG/1; MG/1
1 TABLET ORAL EVERY 6 HOURS PRN
Qty: 20 TABLET | Refills: 0 | Status: SHIPPED | OUTPATIENT
Start: 2022-01-01 | End: 2022-01-01 | Stop reason: SDUPTHER

## 2022-01-01 RX ORDER — INSULIN ASPART 100 [IU]/ML
1-10 INJECTION, SOLUTION INTRAVENOUS; SUBCUTANEOUS
Status: DISCONTINUED | OUTPATIENT
Start: 2022-01-01 | End: 2022-01-01 | Stop reason: HOSPADM

## 2022-01-01 RX ORDER — MORPHINE SULFATE 4 MG/ML
4 INJECTION, SOLUTION INTRAMUSCULAR; INTRAVENOUS
Status: COMPLETED | OUTPATIENT
Start: 2022-01-01 | End: 2022-01-01

## 2022-01-01 RX ORDER — SODIUM CHLORIDE 0.9 % (FLUSH) 0.9 %
3 SYRINGE (ML) INJECTION
Status: DISCONTINUED | OUTPATIENT
Start: 2022-01-01 | End: 2022-01-01 | Stop reason: HOSPADM

## 2022-01-01 RX ORDER — MORPHINE SULFATE 4 MG/ML
2 INJECTION, SOLUTION INTRAMUSCULAR; INTRAVENOUS EVERY 4 HOURS PRN
Status: DISCONTINUED | OUTPATIENT
Start: 2022-01-01 | End: 2022-01-01

## 2022-01-01 RX ORDER — HYDROMORPHONE HYDROCHLORIDE 2 MG/ML
0.2 INJECTION, SOLUTION INTRAMUSCULAR; INTRAVENOUS; SUBCUTANEOUS EVERY 5 MIN PRN
Status: DISCONTINUED | OUTPATIENT
Start: 2022-01-01 | End: 2022-01-01 | Stop reason: HOSPADM

## 2022-01-01 RX ORDER — NALOXONE HCL 0.4 MG/ML
0.02 VIAL (ML) INJECTION
Status: DISCONTINUED | OUTPATIENT
Start: 2022-01-01 | End: 2022-01-01 | Stop reason: HOSPADM

## 2022-01-01 RX ORDER — MAGNESIUM SULFATE 1 G/100ML
1 INJECTION INTRAVENOUS ONCE
Status: COMPLETED | OUTPATIENT
Start: 2022-01-01 | End: 2022-01-01

## 2022-01-01 RX ORDER — MUPIROCIN 20 MG/G
OINTMENT TOPICAL 2 TIMES DAILY
Status: DISCONTINUED | OUTPATIENT
Start: 2022-01-01 | End: 2022-01-01 | Stop reason: HOSPADM

## 2022-01-01 RX ORDER — HYDROCODONE BITARTRATE AND ACETAMINOPHEN 5; 325 MG/1; MG/1
1 TABLET ORAL EVERY 6 HOURS PRN
Qty: 28 TABLET | Refills: 0 | Status: ON HOLD | OUTPATIENT
Start: 2022-01-01 | End: 2022-01-01 | Stop reason: HOSPADM

## 2022-01-01 RX ORDER — CLONIDINE HYDROCHLORIDE 0.1 MG/1
0.1 TABLET ORAL EVERY 4 HOURS PRN
Status: DISCONTINUED | OUTPATIENT
Start: 2022-01-01 | End: 2022-01-01

## 2022-01-01 RX ORDER — FLUCONAZOLE 100 MG/1
100 TABLET ORAL DAILY
Status: DISCONTINUED | OUTPATIENT
Start: 2022-01-01 | End: 2022-01-01

## 2022-01-01 RX ORDER — HYDROCODONE BITARTRATE AND ACETAMINOPHEN 5; 325 MG/1; MG/1
1 TABLET ORAL EVERY 6 HOURS PRN
Status: DISCONTINUED | OUTPATIENT
Start: 2022-01-01 | End: 2022-01-01

## 2022-01-01 RX ORDER — HYDROCODONE BITARTRATE AND ACETAMINOPHEN 10; 325 MG/1; MG/1
1 TABLET ORAL EVERY 6 HOURS PRN
Status: DISCONTINUED | OUTPATIENT
Start: 2022-01-01 | End: 2022-01-01

## 2022-01-01 RX ORDER — HYDROCODONE BITARTRATE AND ACETAMINOPHEN 5; 325 MG/1; MG/1
1 TABLET ORAL EVERY 4 HOURS PRN
Status: DISCONTINUED | OUTPATIENT
Start: 2022-01-01 | End: 2022-01-01 | Stop reason: HOSPADM

## 2022-01-01 RX ORDER — AMOXICILLIN 250 MG
1 CAPSULE ORAL 2 TIMES DAILY PRN
Status: DISCONTINUED | OUTPATIENT
Start: 2022-01-01 | End: 2022-01-01 | Stop reason: HOSPADM

## 2022-01-01 RX ORDER — ENOXAPARIN SODIUM 100 MG/ML
30 INJECTION SUBCUTANEOUS EVERY 24 HOURS
Status: DISCONTINUED | OUTPATIENT
Start: 2022-01-01 | End: 2022-01-01 | Stop reason: HOSPADM

## 2022-01-01 RX ORDER — POLYETHYLENE GLYCOL 3350 17 G/17G
17 POWDER, FOR SOLUTION ORAL DAILY
Status: DISCONTINUED | OUTPATIENT
Start: 2022-01-01 | End: 2022-01-01 | Stop reason: HOSPADM

## 2022-01-01 RX ORDER — METHOCARBAMOL 500 MG/1
1000 TABLET, FILM COATED ORAL
Status: COMPLETED | OUTPATIENT
Start: 2022-01-01 | End: 2022-01-01

## 2022-01-01 RX ORDER — OXYCODONE HYDROCHLORIDE 5 MG/1
10 TABLET ORAL EVERY 6 HOURS PRN
Status: DISCONTINUED | OUTPATIENT
Start: 2022-01-01 | End: 2022-01-01 | Stop reason: HOSPADM

## 2022-01-01 RX ORDER — DIAZEPAM 5 MG/1
5 TABLET ORAL EVERY 8 HOURS
Status: DISCONTINUED | OUTPATIENT
Start: 2022-01-01 | End: 2022-01-01

## 2022-01-01 RX ORDER — MAGNESIUM SULFATE HEPTAHYDRATE 40 MG/ML
2 INJECTION, SOLUTION INTRAVENOUS ONCE
Status: COMPLETED | OUTPATIENT
Start: 2022-01-01 | End: 2022-01-01

## 2022-01-01 RX ORDER — INSULIN ASPART 100 [IU]/ML
8 INJECTION, SOLUTION INTRAVENOUS; SUBCUTANEOUS
Status: DISCONTINUED | OUTPATIENT
Start: 2022-01-01 | End: 2022-01-01 | Stop reason: HOSPADM

## 2022-01-01 RX ORDER — METRONIDAZOLE 500 MG/100ML
500 INJECTION, SOLUTION INTRAVENOUS
Status: DISCONTINUED | OUTPATIENT
Start: 2022-01-01 | End: 2022-01-01 | Stop reason: HOSPADM

## 2022-01-01 RX ORDER — MORPHINE SULFATE 4 MG/ML
1 INJECTION, SOLUTION INTRAMUSCULAR; INTRAVENOUS EVERY 4 HOURS PRN
Status: DISCONTINUED | OUTPATIENT
Start: 2022-01-01 | End: 2022-01-01

## 2022-01-01 RX ORDER — INSULIN ASPART 100 [IU]/ML
7 INJECTION, SOLUTION INTRAVENOUS; SUBCUTANEOUS 3 TIMES DAILY
Qty: 15 ML | Refills: 0 | Status: SHIPPED | OUTPATIENT
Start: 2022-01-01 | End: 2023-01-01 | Stop reason: CLARIF

## 2022-01-01 RX ORDER — LANOLIN ALCOHOL/MO/W.PET/CERES
400 CREAM (GRAM) TOPICAL ONCE
Status: COMPLETED | OUTPATIENT
Start: 2022-01-01 | End: 2022-01-01

## 2022-01-01 RX ORDER — HYDROMORPHONE HYDROCHLORIDE 1 MG/ML
1 INJECTION, SOLUTION INTRAMUSCULAR; INTRAVENOUS; SUBCUTANEOUS EVERY 4 HOURS PRN
Status: DISCONTINUED | OUTPATIENT
Start: 2022-01-01 | End: 2022-01-01

## 2022-01-01 RX ORDER — LIDOCAINE HYDROCHLORIDE 20 MG/ML
INJECTION INTRAVENOUS
Status: DISCONTINUED | OUTPATIENT
Start: 2022-01-01 | End: 2022-01-01

## 2022-01-01 RX ORDER — HYDROMORPHONE HYDROCHLORIDE 2 MG/ML
1 INJECTION, SOLUTION INTRAMUSCULAR; INTRAVENOUS; SUBCUTANEOUS
Status: DISCONTINUED | OUTPATIENT
Start: 2022-01-01 | End: 2022-01-01 | Stop reason: HOSPADM

## 2022-01-01 RX ORDER — INSULIN ASPART 100 [IU]/ML
2 INJECTION, SOLUTION INTRAVENOUS; SUBCUTANEOUS
Status: DISCONTINUED | OUTPATIENT
Start: 2022-01-01 | End: 2022-01-01

## 2022-01-01 RX ORDER — SODIUM CHLORIDE 0.9 % (FLUSH) 0.9 %
10 SYRINGE (ML) INJECTION
Status: DISCONTINUED | OUTPATIENT
Start: 2022-01-01 | End: 2022-01-01 | Stop reason: HOSPADM

## 2022-01-01 RX ORDER — KETAMINE HYDROCHLORIDE 100 MG/ML
INJECTION, SOLUTION INTRAMUSCULAR; INTRAVENOUS
Status: DISCONTINUED | OUTPATIENT
Start: 2022-01-01 | End: 2022-01-01

## 2022-01-01 RX ORDER — ENOXAPARIN SODIUM 100 MG/ML
40 INJECTION SUBCUTANEOUS EVERY 24 HOURS
Status: DISCONTINUED | OUTPATIENT
Start: 2022-01-01 | End: 2022-01-01

## 2022-01-01 RX ORDER — AMOXICILLIN AND CLAVULANATE POTASSIUM 875; 125 MG/1; MG/1
1 TABLET, FILM COATED ORAL EVERY 12 HOURS
Qty: 60 TABLET | Refills: 0 | Status: SHIPPED | OUTPATIENT
Start: 2022-01-01 | End: 2023-01-01 | Stop reason: CLARIF

## 2022-01-01 RX ORDER — OXYCODONE HYDROCHLORIDE 5 MG/1
10 TABLET ORAL EVERY 6 HOURS PRN
Status: DISCONTINUED | OUTPATIENT
Start: 2022-01-01 | End: 2022-01-01

## 2022-01-01 RX ORDER — HYDROMORPHONE HYDROCHLORIDE 1 MG/ML
1 INJECTION, SOLUTION INTRAMUSCULAR; INTRAVENOUS; SUBCUTANEOUS EVERY 6 HOURS PRN
Status: DISCONTINUED | OUTPATIENT
Start: 2022-01-01 | End: 2022-01-01

## 2022-01-01 RX ORDER — PROPOFOL 10 MG/ML
VIAL (ML) INTRAVENOUS
Status: DISCONTINUED | OUTPATIENT
Start: 2022-01-01 | End: 2022-01-01

## 2022-01-01 RX ADMIN — SODIUM CHLORIDE 1000 ML: 0.9 INJECTION, SOLUTION INTRAVENOUS at 06:12

## 2022-01-01 RX ADMIN — HYDROMORPHONE HYDROCHLORIDE 1 MG: 2 INJECTION, SOLUTION INTRAMUSCULAR; INTRAVENOUS; SUBCUTANEOUS at 09:09

## 2022-01-01 RX ADMIN — MORPHINE SULFATE 2 MG: 4 INJECTION INTRAVENOUS at 05:09

## 2022-01-01 RX ADMIN — AMPICILLIN SODIUM AND SULBACTAM SODIUM 3 G: 2; 1 INJECTION, POWDER, FOR SOLUTION INTRAMUSCULAR; INTRAVENOUS at 11:09

## 2022-01-01 RX ADMIN — CLINDAMYCIN IN 5 PERCENT DEXTROSE 600 MG: 12 INJECTION, SOLUTION INTRAVENOUS at 05:09

## 2022-01-01 RX ADMIN — INSULIN ASPART 2 UNITS: 100 INJECTION, SOLUTION INTRAVENOUS; SUBCUTANEOUS at 06:11

## 2022-01-01 RX ADMIN — INSULIN ASPART 1 UNITS: 100 INJECTION, SOLUTION INTRAVENOUS; SUBCUTANEOUS at 08:09

## 2022-01-01 RX ADMIN — POTASSIUM CHLORIDE 40 MEQ: 1500 TABLET, EXTENDED RELEASE ORAL at 09:09

## 2022-01-01 RX ADMIN — MORPHINE SULFATE 2 MG: 4 INJECTION INTRAVENOUS at 12:09

## 2022-01-01 RX ADMIN — INSULIN DETEMIR 40 UNITS: 100 INJECTION, SOLUTION SUBCUTANEOUS at 08:09

## 2022-01-01 RX ADMIN — METHOCARBAMOL 1000 MG: 500 TABLET ORAL at 11:11

## 2022-01-01 RX ADMIN — METRONIDAZOLE 500 MG: 500 INJECTION, SOLUTION INTRAVENOUS at 12:10

## 2022-01-01 RX ADMIN — MAGNESIUM SULFATE HEPTAHYDRATE 2 G: 40 INJECTION, SOLUTION INTRAVENOUS at 05:10

## 2022-01-01 RX ADMIN — MUPIROCIN: 20 OINTMENT TOPICAL at 09:09

## 2022-01-01 RX ADMIN — AMLODIPINE BESYLATE 10 MG: 5 TABLET ORAL at 08:10

## 2022-01-01 RX ADMIN — THIAMINE HCL TAB 100 MG 100 MG: 100 TAB at 08:09

## 2022-01-01 RX ADMIN — FOLIC ACID 1 MG: 1 TABLET ORAL at 09:09

## 2022-01-01 RX ADMIN — CLINDAMYCIN IN 5 PERCENT DEXTROSE 600 MG: 12 INJECTION, SOLUTION INTRAVENOUS at 06:09

## 2022-01-01 RX ADMIN — INSULIN ASPART 4 UNITS: 100 INJECTION, SOLUTION INTRAVENOUS; SUBCUTANEOUS at 04:09

## 2022-01-01 RX ADMIN — THERA TABS 1 TABLET: TAB at 08:09

## 2022-01-01 RX ADMIN — MUPIROCIN: 20 OINTMENT TOPICAL at 08:09

## 2022-01-01 RX ADMIN — CEFTRIAXONE 2 G: 2 INJECTION, POWDER, FOR SOLUTION INTRAMUSCULAR; INTRAVENOUS at 05:10

## 2022-01-01 RX ADMIN — CLINDAMYCIN IN 5 PERCENT DEXTROSE 600 MG: 12 INJECTION, SOLUTION INTRAVENOUS at 02:09

## 2022-01-01 RX ADMIN — CLINDAMYCIN HYDROCHLORIDE 300 MG: 150 CAPSULE ORAL at 05:09

## 2022-01-01 RX ADMIN — METRONIDAZOLE 500 MG: 500 INJECTION, SOLUTION INTRAVENOUS at 04:10

## 2022-01-01 RX ADMIN — SODIUM CHLORIDE: 0.9 INJECTION, SOLUTION INTRAVENOUS at 12:09

## 2022-01-01 RX ADMIN — INSULIN ASPART 2 UNITS: 100 INJECTION, SOLUTION INTRAVENOUS; SUBCUTANEOUS at 12:09

## 2022-01-01 RX ADMIN — MAGNESIUM SULFATE HEPTAHYDRATE 1 G: 10 INJECTION, SOLUTION INTRAVENOUS at 06:10

## 2022-01-01 RX ADMIN — HYDROCODONE BITARTRATE AND ACETAMINOPHEN 1 TABLET: 5; 325 TABLET ORAL at 07:09

## 2022-01-01 RX ADMIN — AMLODIPINE BESYLATE 10 MG: 5 TABLET ORAL at 11:09

## 2022-01-01 RX ADMIN — MORPHINE SULFATE 2 MG: 4 INJECTION INTRAVENOUS at 09:09

## 2022-01-01 RX ADMIN — INSULIN ASPART 2 UNITS: 100 INJECTION, SOLUTION INTRAVENOUS; SUBCUTANEOUS at 04:10

## 2022-01-01 RX ADMIN — Medication 6 MG: at 11:09

## 2022-01-01 RX ADMIN — MORPHINE SULFATE 2 MG: 4 INJECTION INTRAVENOUS at 06:09

## 2022-01-01 RX ADMIN — CEFEPIME HYDROCHLORIDE 2 G: 2 INJECTION, SOLUTION INTRAVENOUS at 05:09

## 2022-01-01 RX ADMIN — FENTANYL CITRATE 50 MCG: 50 INJECTION, SOLUTION INTRAMUSCULAR; INTRAVENOUS at 11:09

## 2022-01-01 RX ADMIN — MORPHINE SULFATE 2 MG: 4 INJECTION INTRAVENOUS at 03:09

## 2022-01-01 RX ADMIN — SODIUM CHLORIDE: 0.9 INJECTION, SOLUTION INTRAVENOUS at 09:09

## 2022-01-01 RX ADMIN — HYDROMORPHONE HYDROCHLORIDE 0.5 MG: 2 INJECTION INTRAMUSCULAR; INTRAVENOUS; SUBCUTANEOUS at 06:09

## 2022-01-01 RX ADMIN — SODIUM CHLORIDE: 0.9 INJECTION, SOLUTION INTRAVENOUS at 07:09

## 2022-01-01 RX ADMIN — CEFTRIAXONE 2 G: 2 INJECTION, SOLUTION INTRAVENOUS at 03:09

## 2022-01-01 RX ADMIN — CEFTRIAXONE 2 G: 2 INJECTION, POWDER, FOR SOLUTION INTRAMUSCULAR; INTRAVENOUS at 06:10

## 2022-01-01 RX ADMIN — INSULIN ASPART 2 UNITS: 100 INJECTION, SOLUTION INTRAVENOUS; SUBCUTANEOUS at 08:10

## 2022-01-01 RX ADMIN — HYDROMORPHONE HYDROCHLORIDE 1 MG: 1 INJECTION, SOLUTION INTRAMUSCULAR; INTRAVENOUS; SUBCUTANEOUS at 03:10

## 2022-01-01 RX ADMIN — HYDROMORPHONE HYDROCHLORIDE 1 MG: 2 INJECTION, SOLUTION INTRAMUSCULAR; INTRAVENOUS; SUBCUTANEOUS at 10:09

## 2022-01-01 RX ADMIN — MICONAZOLE NITRATE: 20 POWDER TOPICAL at 08:09

## 2022-01-01 RX ADMIN — SODIUM CHLORIDE: 0.9 INJECTION, SOLUTION INTRAVENOUS at 01:09

## 2022-01-01 RX ADMIN — ENOXAPARIN SODIUM 40 MG: 100 INJECTION SUBCUTANEOUS at 04:10

## 2022-01-01 RX ADMIN — MICONAZOLE NITRATE: 20 POWDER TOPICAL at 09:09

## 2022-01-01 RX ADMIN — SODIUM CHLORIDE AND POTASSIUM CHLORIDE: 9; 1.49 INJECTION, SOLUTION INTRAVENOUS at 02:10

## 2022-01-01 RX ADMIN — ENOXAPARIN SODIUM 30 MG: 30 INJECTION SUBCUTANEOUS at 04:09

## 2022-01-01 RX ADMIN — THIAMINE HCL TAB 100 MG 100 MG: 100 TAB at 09:10

## 2022-01-01 RX ADMIN — MUPIROCIN: 20 OINTMENT TOPICAL at 08:10

## 2022-01-01 RX ADMIN — METRONIDAZOLE 500 MG: 500 TABLET ORAL at 02:09

## 2022-01-01 RX ADMIN — SODIUM CHLORIDE 1000 ML: 0.9 INJECTION, SOLUTION INTRAVENOUS at 09:11

## 2022-01-01 RX ADMIN — VANCOMYCIN HYDROCHLORIDE 2500 MG: 10 INJECTION, POWDER, LYOPHILIZED, FOR SOLUTION INTRAVENOUS at 06:10

## 2022-01-01 RX ADMIN — AMLODIPINE BESYLATE 10 MG: 5 TABLET ORAL at 09:09

## 2022-01-01 RX ADMIN — POTASSIUM BICARBONATE 20 MEQ: 391 TABLET, EFFERVESCENT ORAL at 10:10

## 2022-01-01 RX ADMIN — IOHEXOL 100 ML: 350 INJECTION, SOLUTION INTRAVENOUS at 06:10

## 2022-01-01 RX ADMIN — MAGNESIUM SULFATE HEPTAHYDRATE 1 G: 10 INJECTION, SOLUTION INTRAVENOUS at 10:10

## 2022-01-01 RX ADMIN — INSULIN ASPART 1 UNITS: 100 INJECTION, SOLUTION INTRAVENOUS; SUBCUTANEOUS at 10:10

## 2022-01-01 RX ADMIN — THERA TABS 1 TABLET: TAB at 08:10

## 2022-01-01 RX ADMIN — MORPHINE SULFATE 1 MG: 4 INJECTION INTRAVENOUS at 05:09

## 2022-01-01 RX ADMIN — CEFTRIAXONE 2 G: 2 INJECTION, SOLUTION INTRAVENOUS at 05:09

## 2022-01-01 RX ADMIN — FLUCONAZOLE 100 MG: 100 TABLET ORAL at 05:09

## 2022-01-01 RX ADMIN — DAPTOMYCIN 630 MG: 350 INJECTION, POWDER, LYOPHILIZED, FOR SOLUTION INTRAVENOUS at 08:09

## 2022-01-01 RX ADMIN — FOLIC ACID 1 MG: 1 TABLET ORAL at 09:10

## 2022-01-01 RX ADMIN — PROPOFOL 150 MCG/KG/MIN: 10 INJECTION, EMULSION INTRAVENOUS at 11:09

## 2022-01-01 RX ADMIN — HYDROMORPHONE HYDROCHLORIDE 1 MG: 1 INJECTION, SOLUTION INTRAMUSCULAR; INTRAVENOUS; SUBCUTANEOUS at 10:10

## 2022-01-01 RX ADMIN — HYDROMORPHONE HYDROCHLORIDE 0.2 MG: 2 INJECTION INTRAMUSCULAR; INTRAVENOUS; SUBCUTANEOUS at 02:09

## 2022-01-01 RX ADMIN — INSULIN ASPART 5 UNITS: 100 INJECTION, SOLUTION INTRAVENOUS; SUBCUTANEOUS at 05:10

## 2022-01-01 RX ADMIN — VANCOMYCIN HYDROCHLORIDE 1750 MG: 500 INJECTION, POWDER, LYOPHILIZED, FOR SOLUTION INTRAVENOUS at 12:09

## 2022-01-01 RX ADMIN — FLUCONAZOLE 100 MG: 100 TABLET ORAL at 08:09

## 2022-01-01 RX ADMIN — MORPHINE SULFATE 1 MG: 4 INJECTION INTRAVENOUS at 01:09

## 2022-01-01 RX ADMIN — POTASSIUM BICARBONATE 40 MEQ: 391 TABLET, EFFERVESCENT ORAL at 08:12

## 2022-01-01 RX ADMIN — Medication 400 MG: at 05:10

## 2022-01-01 RX ADMIN — FOLIC ACID 1 MG: 1 TABLET ORAL at 10:10

## 2022-01-01 RX ADMIN — HYDROCODONE BITARTRATE AND ACETAMINOPHEN 1 TABLET: 5; 325 TABLET ORAL at 05:09

## 2022-01-01 RX ADMIN — SODIUM CHLORIDE 1000 ML: 0.9 INJECTION, SOLUTION INTRAVENOUS at 07:11

## 2022-01-01 RX ADMIN — INSULIN ASPART 8 UNITS: 100 INJECTION, SOLUTION INTRAVENOUS; SUBCUTANEOUS at 08:10

## 2022-01-01 RX ADMIN — SODIUM CHLORIDE AND POTASSIUM CHLORIDE: 9; 1.49 INJECTION, SOLUTION INTRAVENOUS at 09:10

## 2022-01-01 RX ADMIN — POTASSIUM CHLORIDE 60 MEQ: 1500 TABLET, EXTENDED RELEASE ORAL at 01:09

## 2022-01-01 RX ADMIN — HYDROMORPHONE HYDROCHLORIDE 0.5 MG: 2 INJECTION INTRAMUSCULAR; INTRAVENOUS; SUBCUTANEOUS at 01:09

## 2022-01-01 RX ADMIN — AMPICILLIN SODIUM AND SULBACTAM SODIUM 3 G: 2; 1 INJECTION, POWDER, FOR SOLUTION INTRAMUSCULAR; INTRAVENOUS at 05:09

## 2022-01-01 RX ADMIN — POTASSIUM CHLORIDE 10 MEQ: 7.46 INJECTION, SOLUTION INTRAVENOUS at 11:10

## 2022-01-01 RX ADMIN — THIAMINE HCL TAB 100 MG 100 MG: 100 TAB at 09:09

## 2022-01-01 RX ADMIN — INSULIN DETEMIR 30 UNITS: 100 INJECTION, SOLUTION SUBCUTANEOUS at 08:09

## 2022-01-01 RX ADMIN — METRONIDAZOLE 500 MG: 500 INJECTION, SOLUTION INTRAVENOUS at 03:10

## 2022-01-01 RX ADMIN — MORPHINE SULFATE 2 MG: 4 INJECTION INTRAVENOUS at 02:09

## 2022-01-01 RX ADMIN — ENOXAPARIN SODIUM 40 MG: 100 INJECTION SUBCUTANEOUS at 05:10

## 2022-01-01 RX ADMIN — FOLIC ACID 1 MG: 1 TABLET ORAL at 08:09

## 2022-01-01 RX ADMIN — HYDROMORPHONE HYDROCHLORIDE 1 MG: 1 INJECTION, SOLUTION INTRAMUSCULAR; INTRAVENOUS; SUBCUTANEOUS at 01:10

## 2022-01-01 RX ADMIN — SODIUM CHLORIDE 1000 ML: 0.9 INJECTION, SOLUTION INTRAVENOUS at 07:10

## 2022-01-01 RX ADMIN — INSULIN ASPART 3 UNITS: 100 INJECTION, SOLUTION INTRAVENOUS; SUBCUTANEOUS at 08:09

## 2022-01-01 RX ADMIN — HYDROCODONE BITARTRATE AND ACETAMINOPHEN 1 TABLET: 5; 325 TABLET ORAL at 08:09

## 2022-01-01 RX ADMIN — INSULIN DETEMIR 10 UNITS: 100 INJECTION, SOLUTION SUBCUTANEOUS at 09:10

## 2022-01-01 RX ADMIN — FENTANYL CITRATE 25 MCG: 50 INJECTION, SOLUTION INTRAMUSCULAR; INTRAVENOUS at 12:09

## 2022-01-01 RX ADMIN — METRONIDAZOLE 500 MG: 500 TABLET ORAL at 05:09

## 2022-01-01 RX ADMIN — SODIUM CHLORIDE: 0.9 INJECTION, SOLUTION INTRAVENOUS at 02:09

## 2022-01-01 RX ADMIN — FLUCONAZOLE 100 MG: 100 TABLET ORAL at 09:09

## 2022-01-01 RX ADMIN — INSULIN ASPART 5 UNITS: 100 INJECTION, SOLUTION INTRAVENOUS; SUBCUTANEOUS at 12:10

## 2022-01-01 RX ADMIN — HYDROCODONE BITARTRATE AND ACETAMINOPHEN 1 TABLET: 5; 325 TABLET ORAL at 03:09

## 2022-01-01 RX ADMIN — HYDROMORPHONE HYDROCHLORIDE 1 MG: 1 INJECTION, SOLUTION INTRAMUSCULAR; INTRAVENOUS; SUBCUTANEOUS at 08:10

## 2022-01-01 RX ADMIN — HYDROMORPHONE HYDROCHLORIDE 1 MG: 1 INJECTION, SOLUTION INTRAMUSCULAR; INTRAVENOUS; SUBCUTANEOUS at 11:10

## 2022-01-01 RX ADMIN — ONDANSETRON 4 MG: 2 INJECTION INTRAMUSCULAR; INTRAVENOUS at 08:11

## 2022-01-01 RX ADMIN — INSULIN DETEMIR 40 UNITS: 100 INJECTION, SOLUTION SUBCUTANEOUS at 09:09

## 2022-01-01 RX ADMIN — MORPHINE SULFATE 1 MG: 4 INJECTION INTRAVENOUS at 10:09

## 2022-01-01 RX ADMIN — CLINDAMYCIN HYDROCHLORIDE 300 MG: 150 CAPSULE ORAL at 12:09

## 2022-01-01 RX ADMIN — THERA TABS 1 TABLET: TAB at 09:09

## 2022-01-01 RX ADMIN — MORPHINE SULFATE 4 MG: 4 INJECTION INTRAVENOUS at 12:09

## 2022-01-01 RX ADMIN — HYDROMORPHONE HYDROCHLORIDE 1 MG: 1 INJECTION, SOLUTION INTRAMUSCULAR; INTRAVENOUS; SUBCUTANEOUS at 07:10

## 2022-01-01 RX ADMIN — HYDROCODONE BITARTRATE AND ACETAMINOPHEN 1 TABLET: 5; 325 TABLET ORAL at 09:09

## 2022-01-01 RX ADMIN — MORPHINE SULFATE 2 MG: 4 INJECTION INTRAVENOUS at 10:09

## 2022-01-01 RX ADMIN — OXYCODONE AND ACETAMINOPHEN 1 TABLET: 5; 325 TABLET ORAL at 08:11

## 2022-01-01 RX ADMIN — THIAMINE HCL TAB 100 MG 100 MG: 100 TAB at 08:10

## 2022-01-01 RX ADMIN — THERA TABS 1 TABLET: TAB at 09:10

## 2022-01-01 RX ADMIN — CEFTRIAXONE 2 G: 2 INJECTION, SOLUTION INTRAVENOUS at 04:09

## 2022-01-01 RX ADMIN — OXYCODONE 10 MG: 5 TABLET ORAL at 08:10

## 2022-01-01 RX ADMIN — SODIUM CHLORIDE AND POTASSIUM CHLORIDE: 9; 1.49 INJECTION, SOLUTION INTRAVENOUS at 11:10

## 2022-01-01 RX ADMIN — MORPHINE SULFATE 4 MG: 4 INJECTION INTRAVENOUS at 02:09

## 2022-01-01 RX ADMIN — SODIUM CHLORIDE: 0.9 INJECTION, SOLUTION INTRAVENOUS at 11:09

## 2022-01-01 RX ADMIN — HYDROMORPHONE HYDROCHLORIDE 1 MG: 2 INJECTION, SOLUTION INTRAMUSCULAR; INTRAVENOUS; SUBCUTANEOUS at 07:09

## 2022-01-01 RX ADMIN — HYDROCODONE BITARTRATE AND ACETAMINOPHEN 1 TABLET: 5; 325 TABLET ORAL at 06:09

## 2022-01-01 RX ADMIN — INSULIN ASPART 3 UNITS: 100 INJECTION, SOLUTION INTRAVENOUS; SUBCUTANEOUS at 12:10

## 2022-01-01 RX ADMIN — CEFEPIME HYDROCHLORIDE 2 G: 2 INJECTION, SOLUTION INTRAVENOUS at 07:09

## 2022-01-01 RX ADMIN — INSULIN ASPART 2 UNITS: 100 INJECTION, SOLUTION INTRAVENOUS; SUBCUTANEOUS at 05:10

## 2022-01-01 RX ADMIN — FOLIC ACID 1 MG: 1 TABLET ORAL at 08:10

## 2022-01-01 RX ADMIN — INSULIN ASPART 4 UNITS: 100 INJECTION, SOLUTION INTRAVENOUS; SUBCUTANEOUS at 08:10

## 2022-01-01 RX ADMIN — SODIUM CHLORIDE, SODIUM LACTATE, POTASSIUM CHLORIDE, AND CALCIUM CHLORIDE 3402 ML: .6; .31; .03; .02 INJECTION, SOLUTION INTRAVENOUS at 11:09

## 2022-01-01 RX ADMIN — CLINDAMYCIN IN 5 PERCENT DEXTROSE 600 MG: 12 INJECTION, SOLUTION INTRAVENOUS at 10:09

## 2022-01-01 RX ADMIN — THIAMINE HCL TAB 100 MG 100 MG: 100 TAB at 10:10

## 2022-01-01 RX ADMIN — MORPHINE SULFATE 1 MG: 4 INJECTION INTRAVENOUS at 09:09

## 2022-01-01 RX ADMIN — OXYCODONE 10 MG: 5 TABLET ORAL at 11:10

## 2022-01-01 RX ADMIN — AMLODIPINE BESYLATE 10 MG: 5 TABLET ORAL at 05:10

## 2022-01-01 RX ADMIN — HYDROMORPHONE HYDROCHLORIDE 0.5 MG: 2 INJECTION INTRAMUSCULAR; INTRAVENOUS; SUBCUTANEOUS at 12:09

## 2022-01-01 RX ADMIN — INSULIN ASPART 1 UNITS: 100 INJECTION, SOLUTION INTRAVENOUS; SUBCUTANEOUS at 09:09

## 2022-01-01 RX ADMIN — POTASSIUM CHLORIDE 10 MEQ: 7.46 INJECTION, SOLUTION INTRAVENOUS at 06:10

## 2022-01-01 RX ADMIN — DAPTOMYCIN 630 MG: 350 INJECTION, POWDER, LYOPHILIZED, FOR SOLUTION INTRAVENOUS at 07:09

## 2022-01-01 RX ADMIN — INSULIN ASPART 5 UNITS: 100 INJECTION, SOLUTION INTRAVENOUS; SUBCUTANEOUS at 08:10

## 2022-01-01 RX ADMIN — HYDROMORPHONE HYDROCHLORIDE 1 MG: 1 INJECTION, SOLUTION INTRAMUSCULAR; INTRAVENOUS; SUBCUTANEOUS at 02:10

## 2022-01-01 RX ADMIN — AMPICILLIN SODIUM AND SULBACTAM SODIUM 3 G: 2; 1 INJECTION, POWDER, FOR SOLUTION INTRAMUSCULAR; INTRAVENOUS at 04:09

## 2022-01-01 RX ADMIN — CEFEPIME HYDROCHLORIDE 2 G: 2 INJECTION, SOLUTION INTRAVENOUS at 06:09

## 2022-01-01 RX ADMIN — HYDROMORPHONE HYDROCHLORIDE 1 MG: 2 INJECTION, SOLUTION INTRAMUSCULAR; INTRAVENOUS; SUBCUTANEOUS at 03:09

## 2022-01-01 RX ADMIN — SODIUM CHLORIDE: 0.9 INJECTION, SOLUTION INTRAVENOUS at 05:09

## 2022-01-01 RX ADMIN — ENOXAPARIN SODIUM 30 MG: 30 INJECTION SUBCUTANEOUS at 05:09

## 2022-01-01 RX ADMIN — LIDOCAINE HYDROCHLORIDE 10 ML: 10 INJECTION, SOLUTION INFILTRATION; PERINEURAL at 10:09

## 2022-01-01 RX ADMIN — Medication 6 MG: at 10:09

## 2022-01-01 RX ADMIN — HYDROMORPHONE HYDROCHLORIDE 1 MG: 2 INJECTION, SOLUTION INTRAMUSCULAR; INTRAVENOUS; SUBCUTANEOUS at 05:09

## 2022-01-01 RX ADMIN — HYDROMORPHONE HYDROCHLORIDE 1 MG: 2 INJECTION, SOLUTION INTRAMUSCULAR; INTRAVENOUS; SUBCUTANEOUS at 11:09

## 2022-01-01 RX ADMIN — POTASSIUM BICARBONATE 20 MEQ: 391 TABLET, EFFERVESCENT ORAL at 06:10

## 2022-01-01 RX ADMIN — HYDROCODONE BITARTRATE AND ACETAMINOPHEN 1 TABLET: 5; 325 TABLET ORAL at 02:09

## 2022-01-01 RX ADMIN — KETAMINE HYDROCHLORIDE 20 MG: 100 INJECTION, SOLUTION, CONCENTRATE INTRAMUSCULAR; INTRAVENOUS at 12:09

## 2022-01-01 RX ADMIN — METRONIDAZOLE 500 MG: 500 INJECTION, SOLUTION INTRAVENOUS at 09:10

## 2022-01-01 RX ADMIN — CEFTRIAXONE 2 G: 2 INJECTION, SOLUTION INTRAVENOUS at 02:09

## 2022-01-01 RX ADMIN — SODIUM CHLORIDE: 0.9 INJECTION, SOLUTION INTRAVENOUS at 03:09

## 2022-01-01 RX ADMIN — LIDOCAINE HYDROCHLORIDE 100 MG: 20 INJECTION, SOLUTION INTRAVENOUS at 11:09

## 2022-01-01 RX ADMIN — INSULIN ASPART 3 UNITS: 100 INJECTION, SOLUTION INTRAVENOUS; SUBCUTANEOUS at 09:09

## 2022-01-01 RX ADMIN — INSULIN ASPART 6 UNITS: 100 INJECTION, SOLUTION INTRAVENOUS; SUBCUTANEOUS at 06:09

## 2022-01-01 RX ADMIN — POTASSIUM BICARBONATE 20 MEQ: 391 TABLET, EFFERVESCENT ORAL at 04:09

## 2022-01-01 RX ADMIN — DIAZEPAM 5 MG: 5 TABLET ORAL at 08:09

## 2022-01-01 RX ADMIN — HYDROCODONE BITARTRATE AND ACETAMINOPHEN 1 TABLET: 5; 325 TABLET ORAL at 04:09

## 2022-01-01 RX ADMIN — ENOXAPARIN SODIUM 40 MG: 100 INJECTION SUBCUTANEOUS at 05:09

## 2022-01-01 RX ADMIN — MORPHINE SULFATE 1 MG: 4 INJECTION INTRAVENOUS at 06:09

## 2022-01-01 RX ADMIN — POTASSIUM CHLORIDE 40 MEQ: 1500 TABLET, EXTENDED RELEASE ORAL at 08:10

## 2022-01-01 RX ADMIN — CLINDAMYCIN HYDROCHLORIDE 300 MG: 150 CAPSULE ORAL at 11:09

## 2022-01-01 RX ADMIN — MUPIROCIN: 20 OINTMENT TOPICAL at 09:10

## 2022-01-01 RX ADMIN — POTASSIUM CHLORIDE 10 MEQ: 7.46 INJECTION, SOLUTION INTRAVENOUS at 03:10

## 2022-01-01 RX ADMIN — SODIUM CHLORIDE AND POTASSIUM CHLORIDE: 9; 1.49 INJECTION, SOLUTION INTRAVENOUS at 12:10

## 2022-01-01 RX ADMIN — MIDAZOLAM HYDROCHLORIDE 2 MG: 1 INJECTION, SOLUTION INTRAMUSCULAR; INTRAVENOUS at 11:09

## 2022-01-01 RX ADMIN — MORPHINE SULFATE 1 MG: 4 INJECTION INTRAVENOUS at 08:09

## 2022-01-01 RX ADMIN — INSULIN ASPART 8 UNITS: 100 INJECTION, SOLUTION INTRAVENOUS; SUBCUTANEOUS at 04:10

## 2022-01-01 RX ADMIN — VANCOMYCIN HYDROCHLORIDE 1750 MG: 500 INJECTION, POWDER, LYOPHILIZED, FOR SOLUTION INTRAVENOUS at 11:09

## 2022-01-01 RX ADMIN — MORPHINE SULFATE 1 MG: 4 INJECTION INTRAVENOUS at 11:09

## 2022-01-01 RX ADMIN — Medication 6 MG: at 09:09

## 2022-01-01 RX ADMIN — ACETAMINOPHEN 650 MG: 325 TABLET ORAL at 04:09

## 2022-01-01 RX ADMIN — POTASSIUM CHLORIDE 40 MEQ: 1500 TABLET, EXTENDED RELEASE ORAL at 05:10

## 2022-01-01 RX ADMIN — SODIUM CHLORIDE, SODIUM LACTATE, POTASSIUM CHLORIDE, AND CALCIUM CHLORIDE: .6; .31; .03; .02 INJECTION, SOLUTION INTRAVENOUS at 11:09

## 2022-01-01 RX ADMIN — ENOXAPARIN SODIUM 30 MG: 30 INJECTION SUBCUTANEOUS at 06:09

## 2022-01-01 RX ADMIN — DIPHENHYDRAMINE HYDROCHLORIDE 25 MG: 50 INJECTION INTRAMUSCULAR; INTRAVENOUS at 09:09

## 2022-01-01 RX ADMIN — Medication 6 MG: at 12:10

## 2022-01-01 RX ADMIN — HYDROMORPHONE HYDROCHLORIDE 1 MG: 2 INJECTION, SOLUTION INTRAMUSCULAR; INTRAVENOUS; SUBCUTANEOUS at 01:09

## 2022-01-01 RX ADMIN — MORPHINE SULFATE 2 MG: 4 INJECTION INTRAVENOUS at 08:09

## 2022-01-01 RX ADMIN — HYDROMORPHONE HYDROCHLORIDE 1 MG: 2 INJECTION, SOLUTION INTRAMUSCULAR; INTRAVENOUS; SUBCUTANEOUS at 02:09

## 2022-01-01 RX ADMIN — CLINDAMYCIN IN 5 PERCENT DEXTROSE 600 MG: 12 INJECTION, SOLUTION INTRAVENOUS at 04:09

## 2022-01-01 RX ADMIN — SODIUM CHLORIDE: 0.9 INJECTION, SOLUTION INTRAVENOUS at 10:09

## 2022-01-01 RX ADMIN — HYDROMORPHONE HYDROCHLORIDE 0.2 MG: 2 INJECTION INTRAMUSCULAR; INTRAVENOUS; SUBCUTANEOUS at 01:09

## 2022-01-01 RX ADMIN — AMPICILLIN SODIUM AND SULBACTAM SODIUM 3 G: 2; 1 INJECTION, POWDER, FOR SOLUTION INTRAMUSCULAR; INTRAVENOUS at 12:09

## 2022-01-01 RX ADMIN — INSULIN ASPART 4 UNITS: 100 INJECTION, SOLUTION INTRAVENOUS; SUBCUTANEOUS at 09:10

## 2022-01-01 RX ADMIN — INSULIN ASPART 4 UNITS: 100 INJECTION, SOLUTION INTRAVENOUS; SUBCUTANEOUS at 09:09

## 2022-01-01 RX ADMIN — THERA TABS 1 TABLET: TAB at 10:10

## 2022-01-01 RX ADMIN — VANCOMYCIN HYDROCHLORIDE 2000 MG: 500 INJECTION, POWDER, LYOPHILIZED, FOR SOLUTION INTRAVENOUS at 12:09

## 2022-01-01 RX ADMIN — SODIUM CHLORIDE: 0.9 INJECTION, SOLUTION INTRAVENOUS at 04:09

## 2022-01-01 RX ADMIN — Medication 16 G: at 12:09

## 2022-01-01 RX ADMIN — HYDROMORPHONE HYDROCHLORIDE 0.5 MG: 2 INJECTION INTRAMUSCULAR; INTRAVENOUS; SUBCUTANEOUS at 03:09

## 2022-01-01 RX ADMIN — SODIUM CHLORIDE 1914 ML: 0.9 INJECTION, SOLUTION INTRAVENOUS at 05:10

## 2022-01-01 RX ADMIN — INSULIN DETEMIR 30 UNITS: 100 INJECTION, SOLUTION SUBCUTANEOUS at 09:09

## 2022-01-01 RX ADMIN — PIPERACILLIN AND TAZOBACTAM 4.5 G: 4; .5 INJECTION, POWDER, LYOPHILIZED, FOR SOLUTION INTRAVENOUS; PARENTERAL at 04:09

## 2022-01-01 RX ADMIN — MORPHINE SULFATE 1 MG: 4 INJECTION INTRAVENOUS at 03:09

## 2022-01-01 RX ADMIN — Medication 6 MG: at 08:10

## 2022-01-01 RX ADMIN — MORPHINE SULFATE 4 MG: 4 INJECTION INTRAVENOUS at 04:09

## 2022-01-01 RX ADMIN — MORPHINE SULFATE 1 MG: 4 INJECTION INTRAVENOUS at 04:09

## 2022-01-01 RX ADMIN — OXYCODONE 10 MG: 5 TABLET ORAL at 06:10

## 2022-01-01 RX ADMIN — INSULIN ASPART 2 UNITS: 100 INJECTION, SOLUTION INTRAVENOUS; SUBCUTANEOUS at 12:10

## 2022-01-01 RX ADMIN — METRONIDAZOLE 500 MG: 500 INJECTION, SOLUTION INTRAVENOUS at 08:10

## 2022-01-01 RX ADMIN — INSULIN ASPART 2 UNITS: 100 INJECTION, SOLUTION INTRAVENOUS; SUBCUTANEOUS at 05:09

## 2022-01-01 RX ADMIN — INSULIN ASPART 8 UNITS: 100 INJECTION, SOLUTION INTRAVENOUS; SUBCUTANEOUS at 11:10

## 2022-01-01 RX ADMIN — HYDROMORPHONE HYDROCHLORIDE 1 MG: 2 INJECTION, SOLUTION INTRAMUSCULAR; INTRAVENOUS; SUBCUTANEOUS at 06:09

## 2022-01-01 RX ADMIN — HEPARIN SODIUM 5000 UNITS: 5000 INJECTION INTRAVENOUS; SUBCUTANEOUS at 06:10

## 2022-01-01 RX ADMIN — IOHEXOL 100 ML: 350 INJECTION, SOLUTION INTRAVENOUS at 01:09

## 2022-01-01 RX ADMIN — HYDROMORPHONE HYDROCHLORIDE 1 MG: 1 INJECTION, SOLUTION INTRAMUSCULAR; INTRAVENOUS; SUBCUTANEOUS at 04:10

## 2022-01-01 RX ADMIN — SODIUM CHLORIDE: 0.9 INJECTION, SOLUTION INTRAVENOUS at 06:09

## 2022-01-01 RX ADMIN — CLONIDINE HYDROCHLORIDE 0.1 MG: 0.1 TABLET ORAL at 11:09

## 2022-01-01 RX ADMIN — Medication 6 MG: at 10:10

## 2022-01-01 RX ADMIN — Medication 16 G: at 07:09

## 2022-01-01 RX ADMIN — SODIUM CHLORIDE AND POTASSIUM CHLORIDE: 9; 1.49 INJECTION, SOLUTION INTRAVENOUS at 08:10

## 2022-01-01 RX ADMIN — MICONAZOLE NITRATE: 20 POWDER TOPICAL at 10:09

## 2022-01-01 RX ADMIN — ENOXAPARIN SODIUM 40 MG: 100 INJECTION SUBCUTANEOUS at 04:09

## 2022-01-01 RX ADMIN — HYDROMORPHONE HYDROCHLORIDE 1 MG: 1 INJECTION, SOLUTION INTRAMUSCULAR; INTRAVENOUS; SUBCUTANEOUS at 06:10

## 2022-01-01 RX ADMIN — VANCOMYCIN HYDROCHLORIDE 2000 MG: 500 INJECTION, POWDER, LYOPHILIZED, FOR SOLUTION INTRAVENOUS at 06:10

## 2022-01-01 RX ADMIN — HYDROMORPHONE HYDROCHLORIDE 1 MG: 2 INJECTION, SOLUTION INTRAMUSCULAR; INTRAVENOUS; SUBCUTANEOUS at 12:09

## 2022-01-01 RX ADMIN — METRONIDAZOLE 500 MG: 500 TABLET ORAL at 06:09

## 2022-01-01 RX ADMIN — MORPHINE SULFATE 4 MG: 4 INJECTION INTRAVENOUS at 07:10

## 2022-01-01 RX ADMIN — POTASSIUM CHLORIDE 10 MEQ: 7.46 INJECTION, SOLUTION INTRAVENOUS at 10:10

## 2022-01-01 RX ADMIN — POTASSIUM CHLORIDE 10 MEQ: 7.46 INJECTION, SOLUTION INTRAVENOUS at 01:10

## 2022-01-01 RX ADMIN — INSULIN ASPART 8 UNITS: 100 INJECTION, SOLUTION INTRAVENOUS; SUBCUTANEOUS at 12:10

## 2022-01-01 RX ADMIN — INSULIN HUMAN 6 UNITS: 100 INJECTION, SOLUTION PARENTERAL at 10:11

## 2022-01-01 RX ADMIN — INSULIN ASPART 4 UNITS: 100 INJECTION, SOLUTION INTRAVENOUS; SUBCUTANEOUS at 05:09

## 2022-01-01 RX ADMIN — HYDROCODONE BITARTRATE AND ACETAMINOPHEN 1 TABLET: 5; 325 TABLET ORAL at 11:09

## 2022-01-01 RX ADMIN — PIPERACILLIN AND TAZOBACTAM 4.5 G: 4; .5 INJECTION, POWDER, LYOPHILIZED, FOR SOLUTION INTRAVENOUS; PARENTERAL at 11:09

## 2022-01-01 RX ADMIN — POLYETHYLENE GLYCOL 3350 17 G: 17 POWDER, FOR SOLUTION ORAL at 12:09

## 2022-01-01 RX ADMIN — SODIUM CHLORIDE: 0.9 INJECTION, SOLUTION INTRAVENOUS at 08:09

## 2022-01-01 RX ADMIN — INSULIN HUMAN 6 UNITS: 100 INJECTION, SOLUTION PARENTERAL at 09:11

## 2022-01-01 RX ADMIN — HYDROCODONE BITARTRATE AND ACETAMINOPHEN 1 TABLET: 7.5; 325 TABLET ORAL at 12:10

## 2022-01-01 RX ADMIN — INSULIN ASPART 1 UNITS: 100 INJECTION, SOLUTION INTRAVENOUS; SUBCUTANEOUS at 09:10

## 2022-01-01 RX ADMIN — INSULIN HUMAN 4 UNITS: 100 INJECTION, SOLUTION PARENTERAL at 02:09

## 2022-01-01 RX ADMIN — AMLODIPINE BESYLATE 10 MG: 5 TABLET ORAL at 08:09

## 2022-01-01 RX ADMIN — CEFTRIAXONE 2 G: 2 INJECTION, SOLUTION INTRAVENOUS at 09:09

## 2022-01-01 RX ADMIN — METRONIDAZOLE 500 MG: 500 TABLET ORAL at 10:09

## 2022-01-01 RX ADMIN — POLYETHYLENE GLYCOL 3350 17 G: 17 POWDER, FOR SOLUTION ORAL at 08:10

## 2022-01-01 RX ADMIN — METRONIDAZOLE 500 MG: 500 INJECTION, SOLUTION INTRAVENOUS at 11:10

## 2022-01-01 RX ADMIN — PIPERACILLIN AND TAZOBACTAM 4.5 G: 4; .5 INJECTION, POWDER, LYOPHILIZED, FOR SOLUTION INTRAVENOUS; PARENTERAL at 05:10

## 2022-01-01 RX ADMIN — MORPHINE SULFATE 4 MG: 4 INJECTION INTRAVENOUS at 08:09

## 2022-01-01 RX ADMIN — INSULIN ASPART 2 UNITS: 100 INJECTION, SOLUTION INTRAVENOUS; SUBCUTANEOUS at 08:09

## 2022-01-01 RX ADMIN — PIPERACILLIN AND TAZOBACTAM 4.5 G: 4; .5 INJECTION, POWDER, LYOPHILIZED, FOR SOLUTION INTRAVENOUS; PARENTERAL at 06:09

## 2022-01-01 RX ADMIN — ONDANSETRON 4 MG: 2 INJECTION INTRAMUSCULAR; INTRAVENOUS at 11:09

## 2022-01-01 RX ADMIN — ONDANSETRON 4 MG: 2 INJECTION INTRAMUSCULAR; INTRAVENOUS at 02:09

## 2022-01-01 RX ADMIN — MORPHINE SULFATE 4 MG: 4 INJECTION INTRAVENOUS at 11:09

## 2022-01-01 RX ADMIN — ACETAMINOPHEN 650 MG: 325 TABLET ORAL at 01:09

## 2022-01-01 RX ADMIN — SODIUM CHLORIDE 4 UNITS/HR: 9 INJECTION, SOLUTION INTRAVENOUS at 11:10

## 2022-01-01 RX ADMIN — METRONIDAZOLE 500 MG: 500 TABLET ORAL at 09:09

## 2022-01-01 RX ADMIN — HYDROMORPHONE HYDROCHLORIDE 1 MG: 2 INJECTION, SOLUTION INTRAMUSCULAR; INTRAVENOUS; SUBCUTANEOUS at 08:09

## 2022-01-01 RX ADMIN — HYDROMORPHONE HYDROCHLORIDE 0.5 MG: 2 INJECTION INTRAMUSCULAR; INTRAVENOUS; SUBCUTANEOUS at 11:09

## 2022-01-01 RX ADMIN — SODIUM CHLORIDE 500 ML: 0.9 INJECTION, SOLUTION INTRAVENOUS at 10:10

## 2022-01-01 RX ADMIN — INSULIN HUMAN 3 UNITS: 100 INJECTION, SOLUTION PARENTERAL at 03:09

## 2022-01-01 RX ADMIN — ONDANSETRON 4 MG: 2 INJECTION INTRAMUSCULAR; INTRAVENOUS at 05:10

## 2022-01-01 RX ADMIN — MUPIROCIN: 20 OINTMENT TOPICAL at 10:10

## 2022-01-01 RX ADMIN — HYDROCODONE BITARTRATE AND ACETAMINOPHEN 1 TABLET: 10; 325 TABLET ORAL at 05:10

## 2022-01-01 RX ADMIN — ACETAMINOPHEN 650 MG: 325 TABLET ORAL at 06:10

## 2022-01-01 RX ADMIN — INSULIN DETEMIR 8 UNITS: 100 INJECTION, SOLUTION SUBCUTANEOUS at 10:10

## 2022-01-01 RX ADMIN — MORPHINE SULFATE 1 MG: 4 INJECTION INTRAVENOUS at 07:09

## 2022-01-01 RX ADMIN — CLINDAMYCIN IN 5 PERCENT DEXTROSE 600 MG: 12 INJECTION, SOLUTION INTRAVENOUS at 09:09

## 2022-01-04 ENCOUNTER — HOSPITAL ENCOUNTER (INPATIENT)
Facility: HOSPITAL | Age: 32
LOS: 4 days | Discharge: HOME | DRG: 177 | End: 2022-01-08
Attending: EMERGENCY MEDICINE | Admitting: EMERGENCY MEDICINE
Payer: MEDICAID

## 2022-01-04 ENCOUNTER — NURSE TRIAGE (OUTPATIENT)
Dept: ADMINISTRATIVE | Facility: CLINIC | Age: 32
End: 2022-01-04
Payer: MEDICAID

## 2022-01-04 DIAGNOSIS — R09.02 HYPOXIA: ICD-10-CM

## 2022-01-04 DIAGNOSIS — U07.1 COVID-19: ICD-10-CM

## 2022-01-04 DIAGNOSIS — E11.649 TYPE 2 DIABETES MELLITUS WITH HYPOGLYCEMIA WITHOUT COMA, UNSPECIFIED WHETHER LONG TERM INSULIN USE: Chronic | ICD-10-CM

## 2022-01-04 DIAGNOSIS — J96.01 ACUTE HYPOXEMIC RESPIRATORY FAILURE DUE TO COVID-19: Primary | ICD-10-CM

## 2022-01-04 DIAGNOSIS — F10.20 ALCOHOL USE DISORDER, SEVERE, DEPENDENCE: Chronic | ICD-10-CM

## 2022-01-04 DIAGNOSIS — U07.1 ACUTE HYPOXEMIC RESPIRATORY FAILURE DUE TO COVID-19: Primary | ICD-10-CM

## 2022-01-04 PROBLEM — F41.9 ANXIETY DISORDER: Chronic | Status: ACTIVE | Noted: 2020-08-23

## 2022-01-04 PROBLEM — E87.1 HYPONATREMIA: Status: ACTIVE | Noted: 2022-01-04

## 2022-01-04 LAB
ALBUMIN SERPL BCP-MCNC: 2.3 G/DL (ref 3.5–5.2)
ALP SERPL-CCNC: 285 U/L (ref 55–135)
ALT SERPL W/O P-5'-P-CCNC: 88 U/L (ref 10–44)
ANION GAP SERPL CALC-SCNC: 11 MMOL/L (ref 8–16)
APTT BLDCRRT: 30.5 SEC (ref 21–32)
AST SERPL-CCNC: 99 U/L (ref 10–40)
BASOPHILS # BLD AUTO: 0.02 K/UL (ref 0–0.2)
BASOPHILS NFR BLD: 0.3 % (ref 0–1.9)
BILIRUB SERPL-MCNC: 1 MG/DL (ref 0.1–1)
BNP SERPL-MCNC: 53 PG/ML (ref 0–99)
BUN SERPL-MCNC: 7 MG/DL (ref 6–20)
CALCIUM SERPL-MCNC: 8 MG/DL (ref 8.7–10.5)
CHLORIDE SERPL-SCNC: 95 MMOL/L (ref 95–110)
CHOLEST SERPL-MCNC: 114 MG/DL (ref 120–199)
CHOLEST/HDLC SERPL: 7.1 {RATIO} (ref 2–5)
CK SERPL-CCNC: 65 U/L (ref 20–200)
CO2 SERPL-SCNC: 21 MMOL/L (ref 23–29)
CREAT SERPL-MCNC: 0.7 MG/DL (ref 0.5–1.4)
CRP SERPL-MCNC: 113.9 MG/L (ref 0–8.2)
CTP QC/QA: YES
D DIMER PPP IA.FEU-MCNC: 0.58 MG/L FEU
DIFFERENTIAL METHOD: ABNORMAL
EOSINOPHIL # BLD AUTO: 0 K/UL (ref 0–0.5)
EOSINOPHIL NFR BLD: 0.2 % (ref 0–8)
ERYTHROCYTE [DISTWIDTH] IN BLOOD BY AUTOMATED COUNT: 11.9 % (ref 11.5–14.5)
EST. GFR  (AFRICAN AMERICAN): >60 ML/MIN/1.73 M^2
EST. GFR  (NON AFRICAN AMERICAN): >60 ML/MIN/1.73 M^2
ESTIMATED AVG GLUCOSE: 315 MG/DL (ref 68–131)
FERRITIN SERPL-MCNC: 1854 NG/ML (ref 20–300)
GLUCOSE SERPL-MCNC: 308 MG/DL (ref 70–110)
HBA1C MFR BLD: 12.6 % (ref 4–5.6)
HCT VFR BLD AUTO: 45.5 % (ref 40–54)
HCV AB SERPL QL IA: NEGATIVE
HDLC SERPL-MCNC: 16 MG/DL (ref 40–75)
HDLC SERPL: 14 % (ref 20–50)
HGB BLD-MCNC: 16.4 G/DL (ref 14–18)
HIV 1+2 AB+HIV1 P24 AG SERPL QL IA: NEGATIVE
IMM GRANULOCYTES # BLD AUTO: 0.02 K/UL (ref 0–0.04)
IMM GRANULOCYTES NFR BLD AUTO: 0.3 % (ref 0–0.5)
INR PPP: 1.1 (ref 0.8–1.2)
LACTATE SERPL-SCNC: 1.7 MMOL/L (ref 0.5–2.2)
LDH SERPL L TO P-CCNC: 466 U/L (ref 110–260)
LDLC SERPL CALC-MCNC: 60.6 MG/DL (ref 63–159)
LYMPHOCYTES # BLD AUTO: 1.9 K/UL (ref 1–4.8)
LYMPHOCYTES NFR BLD: 29.9 % (ref 18–48)
MCH RBC QN AUTO: 35.2 PG (ref 27–31)
MCHC RBC AUTO-ENTMCNC: 36 G/DL (ref 32–36)
MCV RBC AUTO: 98 FL (ref 82–98)
MONOCYTES # BLD AUTO: 0.6 K/UL (ref 0.3–1)
MONOCYTES NFR BLD: 8.9 % (ref 4–15)
NEUTROPHILS # BLD AUTO: 3.9 K/UL (ref 1.8–7.7)
NEUTROPHILS NFR BLD: 60.4 % (ref 38–73)
NONHDLC SERPL-MCNC: 98 MG/DL
NRBC BLD-RTO: 0 /100 WBC
PLATELET # BLD AUTO: 189 K/UL (ref 150–450)
PMV BLD AUTO: 12.8 FL (ref 9.2–12.9)
POCT GLUCOSE: 363 MG/DL (ref 70–110)
POCT GLUCOSE: 374 MG/DL (ref 70–110)
POTASSIUM SERPL-SCNC: 4.5 MMOL/L (ref 3.5–5.1)
PROCALCITONIN SERPL IA-MCNC: 0.65 NG/ML
PROT SERPL-MCNC: 6.7 G/DL (ref 6–8.4)
PROTHROMBIN TIME: 11.5 SEC (ref 9–12.5)
RBC # BLD AUTO: 4.66 M/UL (ref 4.6–6.2)
SARS-COV-2 RDRP RESP QL NAA+PROBE: POSITIVE
SODIUM SERPL-SCNC: 127 MMOL/L (ref 136–145)
TRIGL SERPL-MCNC: 187 MG/DL (ref 30–150)
TROPONIN I SERPL DL<=0.01 NG/ML-MCNC: <0.006 NG/ML (ref 0–0.03)
TSH SERPL DL<=0.005 MIU/L-ACNC: 0.95 UIU/ML (ref 0.4–4)
WBC # BLD AUTO: 6.39 K/UL (ref 3.9–12.7)

## 2022-01-04 PROCEDURE — 86140 C-REACTIVE PROTEIN: CPT | Performed by: EMERGENCY MEDICINE

## 2022-01-04 PROCEDURE — 82728 ASSAY OF FERRITIN: CPT | Performed by: EMERGENCY MEDICINE

## 2022-01-04 PROCEDURE — 85730 THROMBOPLASTIN TIME PARTIAL: CPT | Performed by: INTERNAL MEDICINE

## 2022-01-04 PROCEDURE — 25000242 PHARM REV CODE 250 ALT 637 W/ HCPCS: Performed by: INTERNAL MEDICINE

## 2022-01-04 PROCEDURE — 63600175 PHARM REV CODE 636 W HCPCS: Performed by: STUDENT IN AN ORGANIZED HEALTH CARE EDUCATION/TRAINING PROGRAM

## 2022-01-04 PROCEDURE — 83935 ASSAY OF URINE OSMOLALITY: CPT | Performed by: INTERNAL MEDICINE

## 2022-01-04 PROCEDURE — 96374 THER/PROPH/DIAG INJ IV PUSH: CPT

## 2022-01-04 PROCEDURE — 80061 LIPID PANEL: CPT | Performed by: INTERNAL MEDICINE

## 2022-01-04 PROCEDURE — 27000207 HC ISOLATION

## 2022-01-04 PROCEDURE — 85025 COMPLETE CBC W/AUTO DIFF WBC: CPT | Performed by: EMERGENCY MEDICINE

## 2022-01-04 PROCEDURE — 12000002 HC ACUTE/MED SURGE SEMI-PRIVATE ROOM

## 2022-01-04 PROCEDURE — 99285 EMERGENCY DEPT VISIT HI MDM: CPT | Mod: 25

## 2022-01-04 PROCEDURE — 99223 PR INITIAL HOSPITAL CARE,LEVL III: ICD-10-PCS | Mod: CR,,, | Performed by: INTERNAL MEDICINE

## 2022-01-04 PROCEDURE — 94640 AIRWAY INHALATION TREATMENT: CPT

## 2022-01-04 PROCEDURE — 80053 COMPREHEN METABOLIC PANEL: CPT | Performed by: EMERGENCY MEDICINE

## 2022-01-04 PROCEDURE — 93010 EKG 12-LEAD: ICD-10-PCS | Mod: ,,, | Performed by: INTERNAL MEDICINE

## 2022-01-04 PROCEDURE — 83036 HEMOGLOBIN GLYCOSYLATED A1C: CPT | Performed by: INTERNAL MEDICINE

## 2022-01-04 PROCEDURE — C9399 UNCLASSIFIED DRUGS OR BIOLOG: HCPCS | Performed by: INTERNAL MEDICINE

## 2022-01-04 PROCEDURE — 83615 LACTATE (LD) (LDH) ENZYME: CPT | Performed by: INTERNAL MEDICINE

## 2022-01-04 PROCEDURE — 83880 ASSAY OF NATRIURETIC PEPTIDE: CPT | Performed by: INTERNAL MEDICINE

## 2022-01-04 PROCEDURE — 99285 PR EMERGENCY DEPT VISIT,LEVEL V: ICD-10-PCS | Mod: CS,,, | Performed by: EMERGENCY MEDICINE

## 2022-01-04 PROCEDURE — 99285 EMERGENCY DEPT VISIT HI MDM: CPT | Mod: CS,,, | Performed by: EMERGENCY MEDICINE

## 2022-01-04 PROCEDURE — 80321 ALCOHOLS BIOMARKERS 1OR 2: CPT | Performed by: INTERNAL MEDICINE

## 2022-01-04 PROCEDURE — 99223 1ST HOSP IP/OBS HIGH 75: CPT | Mod: CR,,, | Performed by: INTERNAL MEDICINE

## 2022-01-04 PROCEDURE — 82550 ASSAY OF CK (CPK): CPT | Performed by: EMERGENCY MEDICINE

## 2022-01-04 PROCEDURE — 63600175 PHARM REV CODE 636 W HCPCS: Performed by: INTERNAL MEDICINE

## 2022-01-04 PROCEDURE — 83605 ASSAY OF LACTIC ACID: CPT | Performed by: INTERNAL MEDICINE

## 2022-01-04 PROCEDURE — 93010 ELECTROCARDIOGRAM REPORT: CPT | Mod: ,,, | Performed by: INTERNAL MEDICINE

## 2022-01-04 PROCEDURE — 84484 ASSAY OF TROPONIN QUANT: CPT | Performed by: EMERGENCY MEDICINE

## 2022-01-04 PROCEDURE — 99900035 HC TECH TIME PER 15 MIN (STAT)

## 2022-01-04 PROCEDURE — 25000003 PHARM REV CODE 250: Performed by: INTERNAL MEDICINE

## 2022-01-04 PROCEDURE — 27100098 HC SPACER

## 2022-01-04 PROCEDURE — 84145 PROCALCITONIN (PCT): CPT | Performed by: INTERNAL MEDICINE

## 2022-01-04 PROCEDURE — 85610 PROTHROMBIN TIME: CPT | Performed by: INTERNAL MEDICINE

## 2022-01-04 PROCEDURE — 85379 FIBRIN DEGRADATION QUANT: CPT | Performed by: INTERNAL MEDICINE

## 2022-01-04 PROCEDURE — U0002 COVID-19 LAB TEST NON-CDC: HCPCS | Performed by: STUDENT IN AN ORGANIZED HEALTH CARE EDUCATION/TRAINING PROGRAM

## 2022-01-04 PROCEDURE — 93005 ELECTROCARDIOGRAM TRACING: CPT

## 2022-01-04 PROCEDURE — 86803 HEPATITIS C AB TEST: CPT | Performed by: EMERGENCY MEDICINE

## 2022-01-04 PROCEDURE — 84443 ASSAY THYROID STIM HORMONE: CPT | Performed by: INTERNAL MEDICINE

## 2022-01-04 PROCEDURE — 87389 HIV-1 AG W/HIV-1&-2 AB AG IA: CPT | Performed by: EMERGENCY MEDICINE

## 2022-01-04 RX ORDER — ASCORBIC ACID 500 MG
500 TABLET ORAL 2 TIMES DAILY
Status: DISCONTINUED | OUTPATIENT
Start: 2022-01-04 | End: 2022-01-08 | Stop reason: HOSPADM

## 2022-01-04 RX ORDER — SODIUM CHLORIDE 0.9 % (FLUSH) 0.9 %
10 SYRINGE (ML) INJECTION
Status: DISCONTINUED | OUTPATIENT
Start: 2022-01-04 | End: 2022-01-08 | Stop reason: HOSPADM

## 2022-01-04 RX ORDER — PROMETHAZINE HYDROCHLORIDE AND CODEINE PHOSPHATE 6.25; 1 MG/5ML; MG/5ML
5 SOLUTION ORAL NIGHTLY PRN
Status: DISCONTINUED | OUTPATIENT
Start: 2022-01-04 | End: 2022-01-08 | Stop reason: HOSPADM

## 2022-01-04 RX ORDER — SODIUM CHLORIDE 9 MG/ML
INJECTION, SOLUTION INTRAVENOUS CONTINUOUS
Status: ACTIVE | OUTPATIENT
Start: 2022-01-04 | End: 2022-01-05

## 2022-01-04 RX ORDER — DIPHENHYDRAMINE HCL 25 MG
25 CAPSULE ORAL NIGHTLY
Status: DISCONTINUED | OUTPATIENT
Start: 2022-01-04 | End: 2022-01-08 | Stop reason: HOSPADM

## 2022-01-04 RX ORDER — ALBUTEROL SULFATE 90 UG/1
2 AEROSOL, METERED RESPIRATORY (INHALATION) EVERY 6 HOURS
Status: DISCONTINUED | OUTPATIENT
Start: 2022-01-04 | End: 2022-01-07

## 2022-01-04 RX ORDER — PROCHLORPERAZINE EDISYLATE 5 MG/ML
5 INJECTION INTRAMUSCULAR; INTRAVENOUS EVERY 6 HOURS PRN
Status: DISCONTINUED | OUTPATIENT
Start: 2022-01-04 | End: 2022-01-08 | Stop reason: HOSPADM

## 2022-01-04 RX ORDER — INSULIN ASPART 100 [IU]/ML
1-10 INJECTION, SOLUTION INTRAVENOUS; SUBCUTANEOUS
Status: DISCONTINUED | OUTPATIENT
Start: 2022-01-04 | End: 2022-01-08 | Stop reason: HOSPADM

## 2022-01-04 RX ORDER — TALC
6 POWDER (GRAM) TOPICAL NIGHTLY PRN
Status: CANCELLED | OUTPATIENT
Start: 2022-01-04

## 2022-01-04 RX ORDER — GUAIFENESIN/DEXTROMETHORPHAN 100-10MG/5
10 SYRUP ORAL EVERY 6 HOURS
Status: DISCONTINUED | OUTPATIENT
Start: 2022-01-04 | End: 2022-01-05

## 2022-01-04 RX ORDER — ENOXAPARIN SODIUM 100 MG/ML
1 INJECTION SUBCUTANEOUS 2 TIMES DAILY
Status: DISCONTINUED | OUTPATIENT
Start: 2022-01-04 | End: 2022-01-08 | Stop reason: HOSPADM

## 2022-01-04 RX ORDER — BENZONATATE 100 MG/1
200 CAPSULE ORAL 3 TIMES DAILY PRN
Status: DISCONTINUED | OUTPATIENT
Start: 2022-01-04 | End: 2022-01-05

## 2022-01-04 RX ORDER — TALC
6 POWDER (GRAM) TOPICAL NIGHTLY PRN
Status: DISCONTINUED | OUTPATIENT
Start: 2022-01-04 | End: 2022-01-08 | Stop reason: HOSPADM

## 2022-01-04 RX ORDER — DEXAMETHASONE SODIUM PHOSPHATE 4 MG/ML
6 INJECTION, SOLUTION INTRA-ARTICULAR; INTRALESIONAL; INTRAMUSCULAR; INTRAVENOUS; SOFT TISSUE
Status: COMPLETED | OUTPATIENT
Start: 2022-01-04 | End: 2022-01-04

## 2022-01-04 RX ORDER — ONDANSETRON 2 MG/ML
4 INJECTION INTRAMUSCULAR; INTRAVENOUS EVERY 6 HOURS PRN
Status: DISCONTINUED | OUTPATIENT
Start: 2022-01-04 | End: 2022-01-08 | Stop reason: HOSPADM

## 2022-01-04 RX ORDER — IPRATROPIUM BROMIDE AND ALBUTEROL SULFATE 2.5; .5 MG/3ML; MG/3ML
3 SOLUTION RESPIRATORY (INHALATION)
Status: DISCONTINUED | OUTPATIENT
Start: 2022-01-04 | End: 2022-01-04

## 2022-01-04 RX ORDER — AMOXICILLIN 250 MG
1 CAPSULE ORAL 2 TIMES DAILY PRN
Status: DISCONTINUED | OUTPATIENT
Start: 2022-01-04 | End: 2022-01-08 | Stop reason: HOSPADM

## 2022-01-04 RX ORDER — LOPERAMIDE HYDROCHLORIDE 2 MG/1
2 CAPSULE ORAL EVERY 6 HOURS PRN
Status: DISCONTINUED | OUTPATIENT
Start: 2022-01-04 | End: 2022-01-08 | Stop reason: HOSPADM

## 2022-01-04 RX ORDER — INSULIN ASPART 100 [IU]/ML
12 INJECTION, SOLUTION INTRAVENOUS; SUBCUTANEOUS
Status: DISCONTINUED | OUTPATIENT
Start: 2022-01-04 | End: 2022-01-05

## 2022-01-04 RX ADMIN — INSULIN ASPART 12 UNITS: 100 INJECTION, SOLUTION INTRAVENOUS; SUBCUTANEOUS at 05:01

## 2022-01-04 RX ADMIN — GUAIFENESIN AND DEXTROMETHORPHAN 10 ML: 100; 10 SYRUP ORAL at 05:01

## 2022-01-04 RX ADMIN — DEXAMETHASONE SODIUM PHOSPHATE 6 MG: 4 INJECTION INTRA-ARTICULAR; INTRALESIONAL; INTRAMUSCULAR; INTRAVENOUS; SOFT TISSUE at 03:01

## 2022-01-04 RX ADMIN — REMDESIVIR 200 MG: 100 INJECTION, POWDER, LYOPHILIZED, FOR SOLUTION INTRAVENOUS at 06:01

## 2022-01-04 RX ADMIN — ALBUTEROL SULFATE 2 PUFF: 108 INHALANT RESPIRATORY (INHALATION) at 07:01

## 2022-01-04 RX ADMIN — Medication 500 MG: at 09:01

## 2022-01-04 RX ADMIN — GUAIFENESIN AND DEXTROMETHORPHAN 10 ML: 100; 10 SYRUP ORAL at 11:01

## 2022-01-04 RX ADMIN — INSULIN DETEMIR 20 UNITS: 100 INJECTION, SOLUTION SUBCUTANEOUS at 10:01

## 2022-01-04 RX ADMIN — ENOXAPARIN SODIUM 90 MG: 100 INJECTION SUBCUTANEOUS at 10:01

## 2022-01-04 RX ADMIN — SODIUM CHLORIDE: 0.9 INJECTION, SOLUTION INTRAVENOUS at 05:01

## 2022-01-04 RX ADMIN — INSULIN ASPART 5 UNITS: 100 INJECTION, SOLUTION INTRAVENOUS; SUBCUTANEOUS at 10:01

## 2022-01-04 RX ADMIN — DIPHENHYDRAMINE HYDROCHLORIDE 25 MG: 25 CAPSULE ORAL at 09:01

## 2022-01-04 NOTE — ASSESSMENT & PLAN NOTE
Patient with Hypoxic Respiratory failure which is Acute.  he is not on home oxygen. Supplemental oxygen was provided and noted-  .   Signs/symptoms of respiratory failure include- tachypnea, increased work of breathing and respiratory distress. Contributing diagnoses includes - CHF and Pneumonia Labs and images were reviewed. Patient Has not had a recent ABG. Will treat underlying causes and adjust management of respiratory failure as follows-       COVID-19 Virus Infection  Viral Pneumonia due to COVID-19  - COVID-19 testing: positive on 1/4/22  Patient is identified as Severe COVID-19 based on hypoxemia with O2 saturations <94% on room air or on ambulation   Initiate standard COVID protocols; COVID-19 testing ,Infection Control notification  and isolation- respiratory, contact and droplet per protocol    Diagnostics: (leukopenia, hyponatremia, hyperferritinemia, elevated troponin, elevated d-dimer, age, and comorbidities are significant predictors of poor clinical outcome)  CBC, CMP, Procalcitonin, Ferritin, CRP, LDH, Troponin, ECG, Portable CXR and UA and culture    Management: Initiate targeted therapy with Remdesivir, 200mg IV x1, followed by 100mg IV daily x5 days total and Dexamethasone PO/IV 6mg daily x10 days, Maintain oxygen saturations 92-96% via Nasal Cannula 2 LPM and monitor with pulse oximetry. , Inhaled bronchodilators as needed for shortness of breath., Continuous cardiac monitoring. and Manage respiratory failure (O2 sats <91% on room air with respiratory symptoms or needing NIPPV/Mechanical ventilation) and/or Pneumonia (active chest infiltrates) separately as described below.    Advance Care Planning  Current advance care plan has been discussed with patient/family/POA and patient currently wishes Full Code.         Acute Hypoxemic Respiratory Failure  - currently on 2 L NC    - Order RT consult via Respiratory Communication for COVID Protocols    - Incentive Spirometer Q4h, Flutter Valve Q4h     - Continuous Pulse Oximetry, goal SpO2 92-96%    - Supplemental O2 via LFNC, VentiMask, or HFNC (    - If wheezing, albuterol INH Q6h scheduled & PRN    - Proning Protocol if patient is a candidate    - GCS >13, able to self-prone    - If deterioration, may warrant trial of NIPPV in neg pressure room or immediate ICU consult

## 2022-01-04 NOTE — HPI
Mr. Woodall is a 31 year old gentleman with PMH of DM2 with medication non-compliance, h/o DKA (8/2020) and h/o alcohol abuse who presented [place] [facility] [place] for covid symptoms. Patient reports that him and his wife got tested on Monday 12/27/21 in preparation for a dinner outing and were found to be COVID positive. They quarantined since then and patient's COVID symptoms continued to worsen. Patient reports fever, chest pain, coughing, dizziness and generalized weakness. Patient was advised by triage nurse to call EMS and patient was transported to [facility].    In the ED: afebrile, BP 100s-140s/60s-80s, O@ sat 85% on room air and patient placed on 2L NC with improvement in O2 sat to 97%. CBC wnl and CMP significant for Na 127, AST/ALT 99/88, alk phos 285, . Glucose 308. COVID test positive. Patient was given one dose of dexamethasone 6 mg. CXR showed multifocal pneumonia. He was admitted to hospital medicine service for further evaluation and management of acute hypoxic respiratory failure due to COVID PNA.

## 2022-01-04 NOTE — TELEPHONE ENCOUNTER
"Aminta, pt's wife calling states pt is day 9 of having covid, Symptoms worsening including fever, chest pain, coughing, dizziness and pt answers "yes" to "feels too weak to stand up" triage question. Advised pt per triage protocol to call ems 911 now. Verbalized understanding.   Reason for Disposition   Shock suspected (e.g., cold/pale/clammy skin, too weak to stand, low BP, rapid pulse)    Additional Information   Negative: SEVERE difficulty breathing (e.g., struggling for each breath, speaks in single words)   Negative: Difficult to awaken or acting confused (e.g., disoriented, slurred speech)   Negative: Bluish (or gray) lips or face now    Protocols used: CORONAVIRUS (COVID-19) DIAGNOSED OR [id number]      "

## 2022-01-04 NOTE — SUBJECTIVE & OBJECTIVE
"Past Medical History:   Diagnosis Date    Diabetes mellitus     Encounter for blood transfusion     Other insomnia 8/3/2020    Perineal abscess 8/1/2014       Past Surgical History:   Procedure Laterality Date    DECOMPRESSION OF LUMBAR SPINE USING MINIMALLY INVASIVE TECHNIQUE Right 7/6/2018    Procedure: DECOMPRESSION, SPINE, LUMBAR, MINIMALLY INVASIVE L3-4;  Surgeon: Cristian Cervantes MD;  Location: [facility] OR [address] [place] [address];  Service: Neurosurgery;  Laterality: Right;    ORTHOPEDIC SURGERY         Review of patient's allergies indicates:  No Known Allergies    No current facility-administered medications on file prior to encounter.     Current Outpatient Medications on File Prior to Encounter   Medication Sig    acetaminophen (TYLENOL) 500 MG tablet Take 1,000 mg by mouth 3 (three) times daily as needed for Pain.    blood sugar diagnostic Strp 1 strip by Misc.(Non-Drug; Combo Route) route 4 (four) times daily with meals and nightly.    blood-glucose meter Misc Use as instructed (Patient taking differently: Use as instructed)    diphenhydramine HCl (UNISOM SLEEPGELS ORAL) Take 1 capsule by mouth every evening.    insulin aspart U-100 (NOVOLOG) 100 unit/mL (3 mL) InPn pen Inject 18 Units into the skin 3 (three) times daily with meals.    insulin detemir U-100 (LEVEMIR FLEXTOUCH) 100 unit/mL (3 mL) SubQ InPn pen Inject 26 Units into the skin 2 (two) times daily.    lancets 30 gauge Misc 1 lancet by Misc.(Non-Drug; Combo Route) route 4 (four) times daily with meals and nightly.    lancing device Misc Use as instructed    pen needle, diabetic 31 gauge x 5/16" Ndle 1 each by Misc.(Non-Drug; Combo Route) route 4 (four) times daily with meals and nightly.     Family History     Problem Relation (Age of Onset)    Diabetes Father, Paternal Grandmother, Paternal Grandfather        Tobacco Use    Smoking status: Former Smoker     Packs/day: 0.50     Years: 9.00     Pack years: 4.50     Quit date: 7/1/2013     Years since " quittin.5    Smokeless tobacco: Former User   Substance and Sexual Activity    Alcohol use: Yes     Alcohol/week: 2.0 standard drinks     Types: 2 Cans of beer per week     Comment: daily ETOH, 48 beers per day, none today    Drug use: Not Currently    Sexual activity: Yes     Partners: Female     Birth control/protection: None     Review of Systems   Constitutional: Positive for activity change, chills, fatigue and fever.   HENT: Positive for sore throat. Negative for congestion and trouble swallowing.    Eyes: Negative for visual disturbance.   Respiratory: Positive for cough and shortness of breath.    Cardiovascular: Negative for chest pain and leg swelling.   Gastrointestinal: Negative for abdominal distention, abdominal pain, nausea and vomiting.   Endocrine: Negative for cold intolerance, heat intolerance, polydipsia and polyuria.   Genitourinary: Negative for dysuria.   Musculoskeletal: Positive for myalgias. Negative for back pain.   Neurological: Positive for dizziness and weakness. Negative for light-headedness.   Psychiatric/Behavioral: Negative for confusion and sleep disturbance.     Objective:     Vital Signs (Most Recent):  Temp: 98.6 °F (37 °C) (22 1206)  Pulse: 83 (22 1532)  Resp: 20 (22 1433)  BP: 117/61 (22 1532)  SpO2: 97 % (22 1532) Vital Signs (24h Range):  Temp:  [98.6 °F (37 °C)] 98.6 °F (37 °C)  Pulse:  [83-95] 83  Resp:  [16-28] 20  SpO2:  [85 %-97 %] 97 %  BP: (109-144)/(61-84) 117/61     Weight: 90 kg (198 lb 6.6 oz)  Body mass index is 33.02 kg/m².    Physical Exam  HENT:      Head: Normocephalic.      Mouth/Throat:      Mouth: Mucous membranes are moist.   Eyes:      Extraocular Movements: Extraocular movements intact.      Pupils: Pupils are equal, round, and reactive to light.   Cardiovascular:      Rate and Rhythm: Normal rate and regular rhythm.      Pulses: Normal pulses.      Heart sounds: Normal heart sounds.   Pulmonary:      Effort:  Respiratory distress present.      Breath sounds: Decreased air movement present. Rhonchi and rales present.   Abdominal:      General: Bowel sounds are normal. There is no distension.      Palpations: Abdomen is soft.      Tenderness: There is no abdominal tenderness.   Musculoskeletal:         General: Normal range of motion.      Cervical back: Normal range of motion.   Skin:     General: Skin is warm.   Neurological:      General: No focal deficit present.      Mental Status: He is alert and oriented to person, place, and time.   Psychiatric:         Mood and Affect: Mood normal.           CRANIAL NERVES     CN III, IV, VI   Pupils are equal, round, and reactive to light.       Significant Labs:   A1C:   Recent Labs   Lab 08/25/21  1245   HGBA1C 13.7*     CBC:   Recent Labs   Lab 01/04/22  1401   WBC 6.39   HGB 16.4   HCT 45.5        CMP:   Recent Labs   Lab 01/04/22  1401   *   K 4.5   CL 95   CO2 21*   *   BUN 7   CREATININE 0.7   CALCIUM 8.0*   PROT 6.7   ALBUMIN 2.3*   BILITOT 1.0   ALKPHOS 285*   AST 99*   ALT 88*   ANIONGAP 11   EGFRNONAA >60.0     Troponin:   Recent Labs   Lab 01/04/22  1401   TROPONINI <0.006       Significant Imaging: I have reviewed all pertinent imaging results/findings within the past 24 hours.

## 2022-01-04 NOTE — ASSESSMENT & PLAN NOTE
Patient with Hypoxic Respiratory failure which is Acute.  he is not on home oxygen. Supplemental oxygen was provided and noted-  .   Signs/symptoms of respiratory failure include- tachypnea, increased work of breathing and respiratory distress. Contributing diagnoses includes - CHF and Pneumonia Labs and images were reviewed. Patient Has not had a recent ABG. Will treat underlying causes and adjust management of respiratory failure as follows-       COVID-19 Virus Infection  Viral Pneumonia due to COVID-19  - COVID-19 testing: positive on 12/27/21 (prior to admission) and 1/4/22  Patient is identified as Severe COVID-19 based on hypoxemia with O2 saturations <94% on room air or on ambulation   Initiate standard COVID protocols; COVID-19 testing ,Infection Control notification  and isolation- respiratory, contact and droplet per protocol    Diagnostics: (leukopenia, hyponatremia, hyperferritinemia, elevated troponin, elevated d-dimer, age, and comorbidities are significant predictors of poor clinical outcome)  CBC, CMP, Procalcitonin, Ferritin, CRP, LDH, Troponin, ECG, Portable CXR and UA and culture    Management: Initiate targeted therapy with Remdesivir, 200mg IV x1, followed by 100mg IV daily x5 days total and Dexamethasone PO/IV 6mg daily x10 days, Maintain oxygen saturations 92-96% via Nasal Cannula 2 LPM and monitor with pulse oximetry. , Inhaled bronchodilators as needed for shortness of breath., Continuous cardiac monitoring. and Manage respiratory failure (O2 sats <91% on room air with respiratory symptoms or needing NIPPV/Mechanical ventilation) and/or Pneumonia (active chest infiltrates) separately as described below.    Advance Care Planning  Current advance care plan has been discussed with patient/family/POA and patient currently wishes Full Code.         Acute Hypoxemic Respiratory Failure- resolved  - weaned from 2 L NC to room air    - Order RT consult via Respiratory Communication for COVID  Protocols    - Incentive Spirometer Q4h, Flutter Valve Q4h    - Continuous Pulse Oximetry, goal SpO2 92-96%    - Supplemental O2 via LFNC, VentiMask, or HFNC (    - If wheezing, albuterol INH Q6h scheduled & PRN    - Proning Protocol if patient is a candidate    - GCS >13, able to self-prone    - If deterioration, may warrant trial of NIPPV in neg pressure room or immediate ICU consult

## 2022-01-04 NOTE — ASSESSMENT & PLAN NOTE
- home DM regimen: determir 26 units BID and aspart 18 units TIDWM  - last Ha1c (8/2021): 13.7  - followup repeat HA1c  - hold home oral hypoglycemic agents while inpatient  - started on determir 20 units BID and aspart 12 units TIDWM  - LDSSI  - goal -180  - diabetic diet

## 2022-01-04 NOTE — H&P
[facility]  [facility]  History & Physical    Patient Name: Doe Woodall  MRN: [id number]  Patient Class: IP- Inpatient  Admission Date: 1/4/2022  Attending Physician: Filiberto Baig MD   Primary Care Provider: Primary Doctor No         Patient information was obtained from patient and ER records.     Subjective:     Principal Problem:Acute hypoxemic respiratory failure due to COVID-19    Chief Complaint:   Chief Complaint   Patient presents with    Covid Positive     85 % room air per EMS, on 2 L NC at this time, chest pain, sob, cough          HPI: Mr. Woodall is a 31 year old gentleman with PMH of DM2 with medication non-compliance, h/o DKA (8/2020) and h/o alcohol abuse who [place] [facility] [place] for covid symptoms. Patient reports that him and his wife got tested on Monday 12/27/21 in preparation for a dinner outing and were found to be COVID positive. They quarantined since then and patient's COVID symptoms continued to worsen. Patient reports fever, chest pain, coughing, dizziness and generalized weakness. Patient was advised by triage nurse to call EMS and patient was transported [place] [facility].    In the ED: afebrile, BP 100s-140s/60s-80s, O@ sat 85% on room air and patient placed on 2L NC with improvement in O2 sat to 97%. CBC wnl and CMP significant for Na 127, AST/ALT 99/88, alk phos 285, . Glucose 308. COVID test positive. Patient was given one dose of dexamethasone 6 mg. CXR showed multifocal pneumonia. He was admitted to hospital medicine service for further evaluation and management of acute hypoxic respiratory failure due to COVID PNA.        No new subjective & objective note has been filed under this hospital service since the last note was generated.    Assessment/Plan:     * Acute hypoxemic respiratory failure due to COVID-19  Patient with Hypoxic Respiratory failure which is Acute.  he is not on home oxygen. Supplemental oxygen was provided and noted-  .    Signs/symptoms of respiratory failure include- tachypnea, increased work of breathing and respiratory distress. Contributing diagnoses includes - CHF and Pneumonia Labs and images were reviewed. Patient Has not had a recent ABG. Will treat underlying causes and adjust management of respiratory failure as follows-       COVID-19 Virus Infection  Viral Pneumonia due to COVID-19  - COVID-19 testing: positive on 12/27/21 (prior to admission) and 1/4/22  Patient is identified as Severe COVID-19 based on hypoxemia with O2 saturations <94% on room air or on ambulation   Initiate standard COVID protocols; COVID-19 testing ,Infection Control notification  and isolation- respiratory, contact and droplet per protocol    Diagnostics: (leukopenia, hyponatremia, hyperferritinemia, elevated troponin, elevated d-dimer, age, and comorbidities are significant predictors of poor clinical outcome)  CBC, CMP, Procalcitonin, Ferritin, CRP, LDH, Troponin, ECG, Portable CXR and UA and culture    Management: Initiate targeted therapy with Remdesivir, 200mg IV x1, followed by 100mg IV daily x5 days total and Dexamethasone PO/IV 6mg daily x10 days, Maintain oxygen saturations 92-96% via Nasal Cannula 2 LPM and monitor with pulse oximetry. , Inhaled bronchodilators as needed for shortness of breath., Continuous cardiac monitoring. and Manage respiratory failure (O2 sats <91% on room air with respiratory symptoms or needing NIPPV/Mechanical ventilation) and/or Pneumonia (active chest infiltrates) separately as described below.    Advance Care Planning  Current advance care plan has been discussed with patient/family/POA and patient currently wishes Full Code.        Acute Hypoxemic Respiratory Failure  - currently on 2 L NC    - Order RT consult via Respiratory Communication for COVID Protocols    - Incentive Spirometer Q4h, Flutter Valve Q4h    - Continuous Pulse Oximetry, goal SpO2 92-96%    - Supplemental O2 via LFNC, VentiMask, or HFNC  (    - If wheezing, albuterol INH Q6h scheduled & PRN    - Proning Protocol if patient is a candidate    - GCS >13, able to self-prone    - If deterioration, may warrant trial of NIPPV in neg pressure room or immediate ICU consult        Hyponatremia  - Na on admit, 127  - likely 2/2 covid infection and dehydration   - continue IVF  - BMP daily       Anxiety disorder  - prn vistaril       Alcohol use disorder, severe, dependence  - not showing evidence of alcohol withdrawal   - continue to monitor   - followup PETH      Other insomnia  - melatonin prn nightly  - continue home unisom      Elevated transaminase level  - likely 2/2 covid infection  - hepatitis panel         Bulge of lumbar disc without myelopathy  - prn tylenol       Diabetes mellitus  - home DM regimen: determir 26 units BID and aspart 18 units TIDWM  - last Ha1c (8/2021): 13.7  - followup repeat HA1c  - hold home oral hypoglycemic agents while inpatient  - started on determir 20 units BID and aspart 12 units TIDWM  - LDSSI  - goal -180  - diabetic diet         VTE Risk Mitigation (From admission, onward)         Ordered     enoxaparin injection 90 mg  2 times daily         01/04/22 1637     IP VTE LOW RISK PATIENT  Once         01/04/22 1637     Place sequential compression device  Until discontinued         01/04/22 1634              Time spent in care of patient: > 45 minutes       Filiberto Baig MD  Department of Hospital Medicine   [facility]

## 2022-01-04 NOTE — ED PROVIDER NOTES
Encounter Date: 2022       History     Chief Complaint   Patient presents with    Covid Positive     85 % room air per EMS, on 2 L NC at this time, chest pain, sob, cough       31-year-old male with PMH of diabetes and a home COVID positive test presents with shortness of breath.  Shortness of breath is new for the past few days, progressive, worse with exertion, no relieving factors, and associated with a productive cough and generalized chest pain.  Patient denies fever, abdominal pain, nausea, vomiting, dysuria, hematuria, diarrhea, constipation, and black/bloody stools.  Per EMS, the patient was 85% on room air and placed on 2 L nasal cannula.  Patient did not receive his COVID vaccinations.        Review of patient's allergies indicates:  No Known Allergies  Past Medical History:   Diagnosis Date    Diabetes mellitus     Encounter for blood transfusion     Other insomnia 8/3/2020    Perineal abscess 2014     Past Surgical History:   Procedure Laterality Date    DECOMPRESSION OF LUMBAR SPINE USING MINIMALLY INVASIVE TECHNIQUE Right 2018    Procedure: DECOMPRESSION, SPINE, LUMBAR, MINIMALLY INVASIVE L3-4;  Surgeon: Cristian Cervantes MD;  Location: [facility] OR [address] [place] [address];  Service: Neurosurgery;  Laterality: Right;    ORTHOPEDIC SURGERY       Family History   Problem Relation Age of Onset    Diabetes Father     Diabetes Paternal Grandmother     Diabetes Paternal Grandfather      Social History     Tobacco Use    Smoking status: Former Smoker     Packs/day: 0.50     Years: 9.00     Pack years: 4.50     Quit date: 2013     Years since quittin.5    Smokeless tobacco: Former User   Substance Use Topics    Alcohol use: Yes     Alcohol/week: 2.0 standard drinks     Types: 2 Cans of beer per week     Comment: daily ETOH, 48 beers per day, none today    Drug use: Not Currently     Review of Systems   Constitutional: Negative for chills and fever.   HENT: Positive for congestion. Negative for sore  throat.    Eyes: Negative for pain and visual disturbance.   Respiratory: Positive for cough and shortness of breath.    Cardiovascular: Negative for chest pain and leg swelling.   Gastrointestinal: Negative for abdominal pain, constipation, diarrhea, nausea and vomiting.   Endocrine: Negative for polydipsia and polyuria.   Genitourinary: Negative for dysuria and hematuria.   Musculoskeletal: Negative for arthralgias and back pain.   Skin: Negative for color change and rash.   Neurological: Negative for dizziness and syncope.       Physical Exam     Initial Vitals [01/04/22 1206]   BP Pulse Resp Temp SpO2   (!) 144/84 95 (S) (!) 28 98.6 °F (37 °C) (S) (!) 85 %      MAP       --         Physical Exam    Nursing note and vitals reviewed.  Constitutional: He appears well-developed and well-nourished. He is not diaphoretic. No distress.   Obese male resting in bed in no acute distress   HENT:   Head: Normocephalic and atraumatic.   Eyes: Conjunctivae and EOM are normal. Pupils are equal, round, and reactive to light.   Neck: Neck supple.   Normal range of motion.  Cardiovascular: Normal rate, regular rhythm, normal heart sounds and intact distal pulses.   No murmur heard.  Pulmonary/Chest:   Slight increased work of breathing without tachypnea.  Coarse breath sounds bilaterally without wheezes   Abdominal: Abdomen is soft. He exhibits no distension. There is no abdominal tenderness. There is no rebound and no guarding.   Musculoskeletal:         General: No tenderness or edema.      Cervical back: Normal range of motion and neck supple.      Comments: No peripheral edema to bilateral lower extremities     Neurological: He is alert and oriented to person, place, and time.   Skin: Skin is warm and dry. Capillary refill takes less than 2 seconds.         ED Course   Procedures  Labs Reviewed   CBC W/ AUTO DIFFERENTIAL - Abnormal; Notable for the following components:       Result Value    MCH 35.2 (*)     All other  components within normal limits   COMPREHENSIVE METABOLIC PANEL - Abnormal; Notable for the following components:    Sodium 127 (*)     CO2 21 (*)     Glucose 308 (*)     Calcium 8.0 (*)     Albumin 2.3 (*)     Alkaline Phosphatase 285 (*)     AST 99 (*)     ALT 88 (*)     All other components within normal limits   C-REACTIVE PROTEIN - Abnormal; Notable for the following components:    .9 (*)     All other components within normal limits   FERRITIN - Abnormal; Notable for the following components:    Ferritin 1,854 (*)     All other components within normal limits   SARS-COV-2 RDRP GENE - Abnormal; Notable for the following components:    POC Rapid COVID Positive (*)     All other components within normal limits    Narrative:     This test utilizes isothermal nucleic acid amplification   technology to detect the SARS-CoV-2 RdRp nucleic acid segment.   The analytical sensitivity (limit of detection) is 125 genome   equivalents/mL.   A POSITIVE result implies infection with the SARS-CoV-2 virus;   the patient is presumed to be contagious.     A NEGATIVE result means that SARS-CoV-2 nucleic acids are not   present above the limit of detection. A NEGATIVE result should be   treated as presumptive. It does not rule out the possibility of   COVID-19 and should not be the sole basis for treatment decisions.   If COVID-19 is strongly suspected based on clinical and exposure   history, re-testing using an alternate molecular assay should be   considered.   This test is only for use under the Food and Drug   Administration s Emergency Use Authorization (EUA).   Commercial kits are provided by [employer].   Performance characteristics of the EUA have been independently   verified by [facility] Department of   Pathology and Laboratory Medicine.   _________________________________________________________________   The authorized Fact Sheet for Healthcare Providers and the authorized Fact   Sheet for  Patients of the ID NOW COVID-19 are available on the FDA   website:     https://www.fda.gov/media/420001/download  https://www.fda.gov/media/328214/download       HIV 1 / 2 ANTIBODY    Narrative:     Release to patient->Immediate   HEPATITIS C ANTIBODY    Narrative:     Release to patient->Immediate   CK   TROPONIN I   LACTATE DEHYDROGENASE   LACTIC ACID, PLASMA        ECG Results          EKG 12-lead (Final result)  Result time 01/04/22 15:43:05    Final result by Interface, Lab In [facility] (01/04/22 15:43:05)                 Narrative:    Test Reason : U07.1,    Vent. Rate : 086 BPM     Atrial Rate : 086 BPM     P-R Int : 118 ms          QRS Dur : 092 ms      QT Int : 398 ms       P-R-T Axes : -26 028 023 degrees     QTc Int : 476 ms    Normal sinus rhythm  Nonspecific ST abnormality  Abnormal ECG  When compared with ECG of 01-AUG-2014 05:30,  No significant change was found  Confirmed by ABDIRAHMAN MARSHALL MD ([id number]) on 1/4/2022 3:43:00 PM    Referred By: OUSMANE   SELF           Confirmed By:ABDIRAHMAN MARSHALL MD                            Imaging Results          X-Ray Chest AP Portable (Final result)  Result time 01/04/22 14:31:11    Final result by Nabeel Fuller III, MD (01/04/22 14:31:11)                 Impression:    Impression: Multifocal pneumonia.      Electronically signed by: Nabeel Fuller MD  Date:    01/04/2022  Time:    14:31             Narrative:    EXAMINATION:  XR CHEST AP PORTABLE    CLINICAL HISTORY:  COVID-19;    FINDINGS:  Chest one view: Heart size is normal.  There are patchy infiltrates bilaterally.  This is worse from the prior study.                                 Medications   dexamethasone injection 6 mg (6 mg Intravenous Given 1/4/22 1521)     Medical Decision Making:   Initial Assessment:   31-year-old male with PMH of diabetes and positive home COVID test presents COVID positive with hypoxia to 85% on room air, otherwise hemodynamically stable but tachypneic and borderline  tachycardic, physical exam remarkable for coarse breath sounds bilaterally but no acute distress.  Differential Diagnosis:   COVID, pneumonia, ARDS, CHF, asthma, pneumothorax  Clinical Tests:   Lab Tests: Ordered and Reviewed  Radiological Study: Ordered and Reviewed  Medical Tests: Ordered and Reviewed  ED Management:  CBC unremarkable without leukocytosis, anemia, or decreased platelets.  Troponin negative, so ACS is unlikely.  Multiple elevated inflammatory markers including ferritin at 6:54 p.m. and CRP at 1:13 a.m..  CMP remarkable for hyponatremia of 127 and hyperglycemia of 308. Normal renal function.  Chest x-ray shows multifocal pneumonia.  Given the patient's hypoxia to 85% on room air, the patient will be admitted to Hospital Medicine.  He agrees with and is comfortable with the plan for admission.  He remains hemodynamically stable at this time.  He was given a dose of dexamethasone.            Attending Attestation:   Physician Attestation Statement for Resident:  As the supervising MD   Physician Attestation Statement: I have personally seen and examined this patient.   I agree with the above history.  - with the following exceptions: This is a 30 yo male who presents to the ED with SOB, cough, UR symptoms, and chest congestion for the past few days. He has no gi symptoms - no n/v/d/abdominal pain. He is not vaccinated against covid. He took a home covid test that was positive. He arrives today due to the fact that the SOB is escalating to the point that it is severe, particularly with exertion.    As the supervising MD I agree with the above PE.   - with the following exceptions: VS upon arrival: 144/84; 95; 25; 85% on RA and mid 90s on 2L NC; AF.   Consititutional: Pt is awake, alert, and communicative, answering questions. He does get winded with long sentences and has an occassional cough.   HEENT: EOMI.   Neck: Supple with good ROM.  CV: Normal rate; regular rhythm; no mrg. Heart sounds normal. No  peripheral edema. 2+ radials bilateral and symmetric.  Respiratory: Tachypneic particularly with any sort of movement. Coarse breath sounds throughout the lung fields. No accessory muscle use and no mayo distress.   GI: Abdomen soft, NTND. No rebound. No guarding. BS normal.  MSK: No long bone deformities; no focal joint swellings.  Neuro: MAEW.   Skin: No skin lesions or rash.    As the supervising MD I agree with the above treatment, course, plan, and disposition.   - with the following exceptions: Patient will require admission. He is hypoxic and requiring O2 with a confirmed diagnosis of COVID here. His CXR, independently reviewed and interpreted by me and interpreted by radiology, reveals multifocal pneumonia. Other interesting findings include transaminitis and elevated LDH. EKG, independently reviewed and interpreted by me, reveals sinus rhythm with no acute st/t wave changes that are not concerning for ischemia or infarction. We have given him steroids and will admit him.   ED Diagnosis:  1. Acute COVID-19 pneumonia.   2. Acute hypoxia, requiring O2 supplementation.   I have reviewed and agree with the residents interpretation of the following: EKG, x-rays and lab data.  I have reviewed the following: old records at this facility.                         Clinical Impression:   Final diagnoses:  [U07.1] COVID-19  [U07.1, J96.01] Acute hypoxemic respiratory failure due to COVID-19 (Primary)          ED Disposition Condition    Admit               Jimmie Nolasco MD  Resident  01/04/22 1610       Sarah Rosario MD  01/04/22 5408

## 2022-01-05 LAB
ALBUMIN SERPL BCP-MCNC: 2 G/DL (ref 3.5–5.2)
ALP SERPL-CCNC: 269 U/L (ref 55–135)
ALT SERPL W/O P-5'-P-CCNC: 75 U/L (ref 10–44)
ANION GAP SERPL CALC-SCNC: 10 MMOL/L (ref 8–16)
ASCENDING AORTA: 3.09 CM
AST SERPL-CCNC: 73 U/L (ref 10–40)
AV INDEX (PROSTH): 0.81
AV MEAN GRADIENT: 5 MMHG
AV PEAK GRADIENT: 8 MMHG
AV VALVE AREA: 3.38 CM2
AV VELOCITY RATIO: 0.72
BASOPHILS # BLD AUTO: 0.01 K/UL (ref 0–0.2)
BASOPHILS NFR BLD: 0.2 % (ref 0–1.9)
BILIRUB SERPL-MCNC: 0.6 MG/DL (ref 0.1–1)
BSA FOR ECHO PROCEDURE: 2.03 M2
BUN SERPL-MCNC: 11 MG/DL (ref 6–20)
CALCIUM SERPL-MCNC: 7.9 MG/DL (ref 8.7–10.5)
CHLORIDE SERPL-SCNC: 99 MMOL/L (ref 95–110)
CO2 SERPL-SCNC: 22 MMOL/L (ref 23–29)
CREAT SERPL-MCNC: 0.6 MG/DL (ref 0.5–1.4)
CV ECHO LV RWT: 0.33 CM
DIFFERENTIAL METHOD: ABNORMAL
DOP CALC AO PEAK VEL: 1.45 M/S
DOP CALC AO VTI: 29.47 CM
DOP CALC LVOT AREA: 4.2 CM2
DOP CALC LVOT DIAMETER: 2.3 CM
DOP CALC LVOT PEAK VEL: 1.04 M/S
DOP CALC LVOT STROKE VOLUME: 99.54 CM3
DOP CALCLVOT PEAK VEL VTI: 23.97 CM
E WAVE DECELERATION TIME: 229.44 MSEC
E/A RATIO: 0.76
E/E' RATIO: 6.22 M/S
ECHO LV POSTERIOR WALL: 0.87 CM (ref 0.6–1.1)
EJECTION FRACTION: 60 %
EOSINOPHIL # BLD AUTO: 0 K/UL (ref 0–0.5)
EOSINOPHIL NFR BLD: 0 % (ref 0–8)
ERYTHROCYTE [DISTWIDTH] IN BLOOD BY AUTOMATED COUNT: 11.8 % (ref 11.5–14.5)
EST. GFR  (AFRICAN AMERICAN): >60 ML/MIN/1.73 M^2
EST. GFR  (NON AFRICAN AMERICAN): >60 ML/MIN/1.73 M^2
FRACTIONAL SHORTENING: 32 % (ref 28–44)
GLUCOSE SERPL-MCNC: 320 MG/DL (ref 70–110)
HCT VFR BLD AUTO: 42.2 % (ref 40–54)
HGB BLD-MCNC: 15.2 G/DL (ref 14–18)
IMM GRANULOCYTES # BLD AUTO: 0.03 K/UL (ref 0–0.04)
IMM GRANULOCYTES NFR BLD AUTO: 0.7 % (ref 0–0.5)
INTERVENTRICULAR SEPTUM: 1.12 CM (ref 0.6–1.1)
LA MAJOR: 4.83 CM
LA MINOR: 4.92 CM
LA WIDTH: 4.51 CM
LEFT ATRIUM SIZE: 4.33 CM
LEFT ATRIUM VOLUME INDEX MOD: 36.3 ML/M2
LEFT ATRIUM VOLUME INDEX: 41.1 ML/M2
LEFT ATRIUM VOLUME MOD: 71.54 CM3
LEFT ATRIUM VOLUME: 80.91 CM3
LEFT INTERNAL DIMENSION IN SYSTOLE: 3.54 CM (ref 2.1–4)
LEFT VENTRICLE DIASTOLIC VOLUME INDEX: 66.46 ML/M2
LEFT VENTRICLE DIASTOLIC VOLUME: 130.92 ML
LEFT VENTRICLE MASS INDEX: 99 G/M2
LEFT VENTRICLE SYSTOLIC VOLUME INDEX: 26.5 ML/M2
LEFT VENTRICLE SYSTOLIC VOLUME: 52.12 ML
LEFT VENTRICULAR INTERNAL DIMENSION IN DIASTOLE: 5.22 CM (ref 3.5–6)
LEFT VENTRICULAR MASS: 194.1 G
LV LATERAL E/E' RATIO: 4.67 M/S
LV SEPTAL E/E' RATIO: 9.33 M/S
LYMPHOCYTES # BLD AUTO: 1.3 K/UL (ref 1–4.8)
LYMPHOCYTES NFR BLD: 29.1 % (ref 18–48)
MAGNESIUM SERPL-MCNC: 1.9 MG/DL (ref 1.6–2.6)
MCH RBC QN AUTO: 35.3 PG (ref 27–31)
MCHC RBC AUTO-ENTMCNC: 36 G/DL (ref 32–36)
MCV RBC AUTO: 98 FL (ref 82–98)
MONOCYTES # BLD AUTO: 0.4 K/UL (ref 0.3–1)
MONOCYTES NFR BLD: 9.3 % (ref 4–15)
MV A" WAVE DURATION": 6.85 MSEC
MV PEAK A VEL: 0.74 M/S
MV PEAK E VEL: 0.56 M/S
MV STENOSIS PRESSURE HALF TIME: 66.54 MS
MV VALVE AREA P 1/2 METHOD: 3.31 CM2
NEUTROPHILS # BLD AUTO: 2.7 K/UL (ref 1.8–7.7)
NEUTROPHILS NFR BLD: 60.7 % (ref 38–73)
NRBC BLD-RTO: 0 /100 WBC
OSMOLALITY UR: 711 MOSM/KG (ref 50–1200)
PHOSPHATE SERPL-MCNC: 3.6 MG/DL (ref 2.7–4.5)
PLATELET # BLD AUTO: 238 K/UL (ref 150–450)
PMV BLD AUTO: 11.9 FL (ref 9.2–12.9)
POCT GLUCOSE: 192 MG/DL (ref 70–110)
POCT GLUCOSE: 261 MG/DL (ref 70–110)
POCT GLUCOSE: 270 MG/DL (ref 70–110)
POCT GLUCOSE: 291 MG/DL (ref 70–110)
POCT GLUCOSE: 315 MG/DL (ref 70–110)
POCT GLUCOSE: 417 MG/DL (ref 70–110)
POTASSIUM SERPL-SCNC: 4.4 MMOL/L (ref 3.5–5.1)
PROT SERPL-MCNC: 6.3 G/DL (ref 6–8.4)
PULM VEIN S/D RATIO: 1.97
PV PEAK D VEL: 0.37 M/S
PV PEAK S VEL: 0.73 M/S
RA MAJOR: 4.93 CM
RA WIDTH: 3.24 CM
RBC # BLD AUTO: 4.3 M/UL (ref 4.6–6.2)
RIGHT VENTRICULAR END-DIASTOLIC DIMENSION: 3.33 CM
RV TISSUE DOPPLER FREE WALL SYSTOLIC VELOCITY 1 (APICAL 4 CHAMBER VIEW): 14.46 CM/S
SINUS: 3.31 CM
SODIUM SERPL-SCNC: 131 MMOL/L (ref 136–145)
STJ: 2.72 CM
TDI LATERAL: 0.12 M/S
TDI SEPTAL: 0.06 M/S
TDI: 0.09 M/S
TRICUSPID ANNULAR PLANE SYSTOLIC EXCURSION: 1.87 CM
WBC # BLD AUTO: 4.4 K/UL (ref 3.9–12.7)

## 2022-01-05 PROCEDURE — 27000207 HC ISOLATION

## 2022-01-05 PROCEDURE — 99900035 HC TECH TIME PER 15 MIN (STAT)

## 2022-01-05 PROCEDURE — C9399 UNCLASSIFIED DRUGS OR BIOLOG: HCPCS | Performed by: NURSE PRACTITIONER

## 2022-01-05 PROCEDURE — 94761 N-INVAS EAR/PLS OXIMETRY MLT: CPT

## 2022-01-05 PROCEDURE — 85025 COMPLETE CBC W/AUTO DIFF WBC: CPT | Performed by: INTERNAL MEDICINE

## 2022-01-05 PROCEDURE — 63600175 PHARM REV CODE 636 W HCPCS: Performed by: INTERNAL MEDICINE

## 2022-01-05 PROCEDURE — 99222 1ST HOSP IP/OBS MODERATE 55: CPT | Mod: ,,, | Performed by: NURSE PRACTITIONER

## 2022-01-05 PROCEDURE — 12000002 HC ACUTE/MED SURGE SEMI-PRIVATE ROOM

## 2022-01-05 PROCEDURE — 99233 SBSQ HOSP IP/OBS HIGH 50: CPT | Mod: CR,,, | Performed by: INTERNAL MEDICINE

## 2022-01-05 PROCEDURE — 94640 AIRWAY INHALATION TREATMENT: CPT

## 2022-01-05 PROCEDURE — 27000221 HC OXYGEN, UP TO 24 HOURS

## 2022-01-05 PROCEDURE — 84100 ASSAY OF PHOSPHORUS: CPT | Performed by: INTERNAL MEDICINE

## 2022-01-05 PROCEDURE — 25000003 PHARM REV CODE 250: Performed by: NURSE PRACTITIONER

## 2022-01-05 PROCEDURE — C9399 UNCLASSIFIED DRUGS OR BIOLOG: HCPCS | Performed by: INTERNAL MEDICINE

## 2022-01-05 PROCEDURE — 99222 PR INITIAL HOSPITAL CARE,LEVL II: ICD-10-PCS | Mod: ,,, | Performed by: NURSE PRACTITIONER

## 2022-01-05 PROCEDURE — 80053 COMPREHEN METABOLIC PANEL: CPT | Performed by: INTERNAL MEDICINE

## 2022-01-05 PROCEDURE — 99233 PR SUBSEQUENT HOSPITAL CARE,LEVL III: ICD-10-PCS | Mod: CR,,, | Performed by: INTERNAL MEDICINE

## 2022-01-05 PROCEDURE — 25000003 PHARM REV CODE 250: Performed by: INTERNAL MEDICINE

## 2022-01-05 PROCEDURE — 83735 ASSAY OF MAGNESIUM: CPT | Performed by: INTERNAL MEDICINE

## 2022-01-05 RX ORDER — BENZONATATE 100 MG/1
200 CAPSULE ORAL 3 TIMES DAILY
Status: DISCONTINUED | OUTPATIENT
Start: 2022-01-05 | End: 2022-01-08 | Stop reason: HOSPADM

## 2022-01-05 RX ORDER — CARVEDILOL 12.5 MG/1
12.5 TABLET ORAL 2 TIMES DAILY
Status: DISCONTINUED | OUTPATIENT
Start: 2022-01-05 | End: 2022-01-08 | Stop reason: HOSPADM

## 2022-01-05 RX ORDER — INSULIN ASPART 100 [IU]/ML
15 INJECTION, SOLUTION INTRAVENOUS; SUBCUTANEOUS
Status: DISCONTINUED | OUTPATIENT
Start: 2022-01-05 | End: 2022-01-06

## 2022-01-05 RX ORDER — LISINOPRIL 10 MG/1
10 TABLET ORAL DAILY
Status: DISCONTINUED | OUTPATIENT
Start: 2022-01-06 | End: 2022-01-08 | Stop reason: HOSPADM

## 2022-01-05 RX ORDER — SODIUM CHLORIDE 9 MG/ML
INJECTION, SOLUTION INTRAVENOUS CONTINUOUS
Status: ACTIVE | OUTPATIENT
Start: 2022-01-05 | End: 2022-01-06

## 2022-01-05 RX ADMIN — INSULIN ASPART 15 UNITS: 100 INJECTION, SOLUTION INTRAVENOUS; SUBCUTANEOUS at 01:01

## 2022-01-05 RX ADMIN — Medication 500 MG: at 09:01

## 2022-01-05 RX ADMIN — MULTIPLE VITAMINS W/ MINERALS TAB 1 TABLET: TAB at 09:01

## 2022-01-05 RX ADMIN — DIPHENHYDRAMINE HYDROCHLORIDE 25 MG: 25 CAPSULE ORAL at 09:01

## 2022-01-05 RX ADMIN — INSULIN ASPART 6 UNITS: 100 INJECTION, SOLUTION INTRAVENOUS; SUBCUTANEOUS at 09:01

## 2022-01-05 RX ADMIN — ENOXAPARIN SODIUM 90 MG: 100 INJECTION SUBCUTANEOUS at 09:01

## 2022-01-05 RX ADMIN — INSULIN ASPART 15 UNITS: 100 INJECTION, SOLUTION INTRAVENOUS; SUBCUTANEOUS at 06:01

## 2022-01-05 RX ADMIN — SODIUM CHLORIDE: 0.9 INJECTION, SOLUTION INTRAVENOUS at 11:01

## 2022-01-05 RX ADMIN — DEXAMETHASONE 6 MG: 4 TABLET ORAL at 09:01

## 2022-01-05 RX ADMIN — ALBUTEROL SULFATE 2 PUFF: 108 INHALANT RESPIRATORY (INHALATION) at 01:01

## 2022-01-05 RX ADMIN — INSULIN DETEMIR 26 UNITS: 100 INJECTION, SOLUTION SUBCUTANEOUS at 09:01

## 2022-01-05 RX ADMIN — BENZONATATE 200 MG: 100 CAPSULE ORAL at 09:01

## 2022-01-05 RX ADMIN — INSULIN ASPART 10 UNITS: 100 INJECTION, SOLUTION INTRAVENOUS; SUBCUTANEOUS at 06:01

## 2022-01-05 RX ADMIN — GUAIFENESIN AND DEXTROMETHORPHAN 10 ML: 100; 10 SYRUP ORAL at 06:01

## 2022-01-05 RX ADMIN — BENZONATATE 200 MG: 100 CAPSULE ORAL at 06:01

## 2022-01-05 RX ADMIN — ALBUTEROL SULFATE 2 PUFF: 108 INHALANT RESPIRATORY (INHALATION) at 07:01

## 2022-01-05 RX ADMIN — CARVEDILOL 12.5 MG: 12.5 TABLET, FILM COATED ORAL at 09:01

## 2022-01-05 RX ADMIN — INSULIN DETEMIR 20 UNITS: 100 INJECTION, SOLUTION SUBCUTANEOUS at 09:01

## 2022-01-05 RX ADMIN — REMDESIVIR 100 MG: 100 INJECTION, POWDER, LYOPHILIZED, FOR SOLUTION INTRAVENOUS at 09:01

## 2022-01-05 RX ADMIN — INSULIN ASPART 12 UNITS: 100 INJECTION, SOLUTION INTRAVENOUS; SUBCUTANEOUS at 09:01

## 2022-01-05 RX ADMIN — ALBUTEROL SULFATE 2 PUFF: 108 INHALANT RESPIRATORY (INHALATION) at 09:01

## 2022-01-05 NOTE — ED NOTES
Assumed care of patient. Pt remains on cardiac monitor, continuous pulse ox, and cycling blood pressures. Bed placed in low locked position, side rails up x2, call bell is within reach. Will continue to monitor. Denies any pain.  Denies any other needs at this time.  Pitcher of water provided upon request.  Will order dietary tray

## 2022-01-05 NOTE — HPI
Reason for Consult: Management of T2DM, Hyperglycemia     Diabetes diagnosis year: 2008    Home Diabetes Medications: Trulicity 1.5 mg (Previously taking, but stopped by new PCP Metformin 1000 mg BID; Jardiance 10 mg; Novolog 18 units TIDWM; Lantus 26 units BID)    How often checking glucose at home?  Once or twice a month    BG readings on regimen: 300-400's  Hypoglycemia on the regimen?  No  Missed doses on regimen?  Yes (Insurance approval difficulties)     Diabetes Complications include:     Hyperglycemia, Diabetic retinopathy , and Diabetic peripheral neuropathy     Complicating diabetes co morbidities:   DIMA    Hemoglobin A1C   Date Value Ref Range Status   01/04/2022 12.6 (H) 4.0 - 5.6 % Final     Comment:     ADA Screening Guidelines:  5.7-6.4%  Consistent with prediabetes  >or=6.5%  Consistent with diabetes    High levels of fetal hemoglobin interfere with the HbA1C  assay. Heterozygous hemoglobin variants (HbS, HgC, etc)do  not significantly interfere with this assay.   However, presence of multiple variants may affect accuracy.     08/25/2021 13.7 (H) 4.7 - 5.6 % Final   08/02/2020 13.9 (H) 4.0 - 5.6 % Final     Comment:     ADA Screening Guidelines:  5.7-6.4%  Consistent with prediabetes  >or=6.5%  Consistent with diabetes  High levels of fetal hemoglobin interfere with the HbA1C  assay. Heterozygous hemoglobin variants (HbS, HgC, etc)do  not significantly interfere with this assay.   However, presence of multiple variants may affect accuracy.     08/03/2014 13.0 (H) 4.5 - 6.2 % Final         HPI:   Mr. Woodall is a 31 year old gentleman with PMH of DM2 with medication non-compliance, h/o DKA (8/2020) and h/o alcohol abuse who [place] [facility]. Patient reports that him and his wife got tested on Monday 12/27/21 in preparation for a dinner outing and were found to be COVID positive. They quarantined since then and patient's COVID symptoms continued to worsen. Patient reports  fever, chest pain, coughing, dizziness and generalized weakness. Patient was advised by triage nurse to call EMS and patient was transported to [facility] [place]. In the ED: afebrile, BP 100s-140s/60s-80s, O@ sat 85% on room air and patient placed on 2L NC with improvement in O2 sat to 97%. CBC wnl and CMP significant for Na 127, AST/ALT 99/88, alk phos 285, . Glucose 308. COVID test positive. Patient was given one dose of dexamethasone 6 mg. CXR showed multifocal pneumonia. He was admitted to hospital medicine service for further evaluation and management of acute hypoxic respiratory failure due to COVID PNA. Endocrine consulted to manage hyperglycemia and type 2 diabetes. Per patient he had recently seen a healthcare provider who took him off all of his medications (listed above); and just started Trulicity. Patient will need major adjustments at discharge.

## 2022-01-05 NOTE — CONSULTS
[facility] - Emergency Dept  Endocrinology  Diabetes Consult Note    Consult Requested by: Filiberto Baig MD   Reason for admit: Acute hypoxemic respiratory failure due to COVID-19    HISTORY OF PRESENT ILLNESS:  Reason for Consult: Management of T2DM, Hyperglycemia     Diabetes diagnosis year: 2008    Home Diabetes Medications: Trulicity 1.5 mg (Previously taking, but stopped by new PCP Metformin 1000 mg BID; Jardiance 10 mg; Novolog 18 units TIDWM; Lantus 26 units BID)    How often checking glucose at home?  Once or twice a month    BG readings on regimen: 300-400's  Hypoglycemia on the regimen?  No  Missed doses on regimen?  Yes (Insurance approval difficulties)     Diabetes Complications include:     Hyperglycemia, Diabetic retinopathy , and Diabetic peripheral neuropathy     Complicating diabetes co morbidities:   DIMA    Hemoglobin A1C   Date Value Ref Range Status   01/04/2022 12.6 (H) 4.0 - 5.6 % Final     Comment:     ADA Screening Guidelines:  5.7-6.4%  Consistent with prediabetes  >or=6.5%  Consistent with diabetes    High levels of fetal hemoglobin interfere with the HbA1C  assay. Heterozygous hemoglobin variants (HbS, HgC, etc)do  not significantly interfere with this assay.   However, presence of multiple variants may affect accuracy.     08/25/2021 13.7 (H) 4.7 - 5.6 % Final   08/02/2020 13.9 (H) 4.0 - 5.6 % Final     Comment:     ADA Screening Guidelines:  5.7-6.4%  Consistent with prediabetes  >or=6.5%  Consistent with diabetes  High levels of fetal hemoglobin interfere with the HbA1C  assay. Heterozygous hemoglobin variants (HbS, HgC, etc)do  not significantly interfere with this assay.   However, presence of multiple variants may affect accuracy.     08/03/2014 13.0 (H) 4.5 - 6.2 % Final         HPI:   Mr. Woodall is a 31 year old gentleman with PMH of DM2 with medication non-compliance, h/o DKA (8/2020) and h/o alcohol abuse who [place] [facility] for covid symptoms. Patient reports that  him and his wife got tested on 21 in preparation for a dinner outing and were found to be COVID positive. They quarantined since then and patient's COVID symptoms continued to worsen. Patient reports fever, chest pain, coughing, dizziness and generalized weakness. Patient was advised by triage nurse to call EMS and [place] [facility] [place] In the ED: afebrile, BP 100s-140s/60s-80s, O@ sat 85% on room air and patient placed on 2L NC with improvement in O2 sat to 97%. CBC wnl and CMP significant for Na 127, AST/ALT 99/88, alk phos 285, . Glucose 308. COVID test positive. Patient was given one dose of dexamethasone 6 mg. CXR showed multifocal pneumonia. He was admitted to hospital medicine service for further evaluation and management of acute hypoxic respiratory failure due to COVID PNA. Endocrine consulted to manage hyperglycemia and type 2 diabetes. Per patient he had recently seen a healthcare provider who took him off all of his medications (listed above); and just started Trulicity. Patient will need major adjustments at discharge.             Interval HPI:   Overnight events: No acute events overnight. Patient on the OBS unit in room OBS08/EDOU08. Blood glucose improving. BG above goal on current insulin regimen. Steroid use- dexamethasone 6 mg.    Renal function- Normal  Vasopressors-  None      Diet diabetic [facility];  Calorie     Eatin%  Nausea: No  Hypoglycemia and intervention: No  Fever: No  TPN and/or TF: No    PMH, PSH, FH, SH updated and reviewed     ROS:    Constitutional: Negative for weight changes.  Eyes: Negative for visual disturbance.  Respiratory: Negative for cough.   Cardiovascular: Negative for chest pain.  Gastrointestinal: Negative for nausea.  Endocrine: Positive for polyuria, polydipsia (2 Liters of water per day).  Musculoskeletal: Negative for back pain.  Skin: Negative for rash.  Neurological: Negative for  syncope.  Psychiatric/Behavioral: Negative for depression.    Review of Systems    Current Medications and/or Treatments Impacting Glycemic Control  Immunotherapy:    Immunosuppressants     None        Steroids:   Hormones (From admission, onward)            Start     Stop Route Frequency Ordered    01/05/22 0900  dexAMETHasone tablet 6 mg         01/15 0859 Oral Daily 01/04/22 1637    01/04/22 1641  melatonin tablet 6 mg         -- Oral Nightly PRN 01/04/22 1634        Pressors:    Autonomic Drugs (From admission, onward)            None        Hyperglycemia/Diabetes Medications:   Antihyperglycemics (From admission, onward)            Start     Stop Route Frequency Ordered    01/05/22 2100  insulin detemir U-100 pen 26 Units         -- SubQ 2 times daily 01/05/22 1032    01/05/22 1130  insulin aspart U-100 pen 15 Units         -- SubQ 3 times daily with meals 01/05/22 1034    01/04/22 1731  insulin aspart U-100 pen 1-10 Units         -- SubQ Before meals & nightly PRN 01/04/22 1637             PHYSICAL EXAMINATION:  Vitals:    01/05/22 1300   BP:    Pulse: 100   Resp: 18   Temp:      Body mass index is 32.95 kg/m².    Physical Exam   Physical exam deferred due to COVID-19.           Labs Reviewed and Include   Recent Labs   Lab 01/05/22  0606   *   CALCIUM 7.9*   ALBUMIN 2.0*   PROT 6.3   *   K 4.4   CO2 22*   CL 99   BUN 11   CREATININE 0.6   ALKPHOS 269*   ALT 75*   AST 73*   BILITOT 0.6     Lab Results   Component Value Date    WBC 4.40 01/05/2022    HGB 15.2 01/05/2022    HCT 42.2 01/05/2022    MCV 98 01/05/2022     01/05/2022     Recent Labs   Lab 01/04/22  1734   TSH 0.947     Lab Results   Component Value Date    HGBA1C 12.6 (H) 01/04/2022       Nutritional status:   Body mass index is 32.95 kg/m².  Lab Results   Component Value Date    ALBUMIN 2.0 (L) 01/05/2022    ALBUMIN 2.3 (L) 01/04/2022    ALBUMIN 3.2 (L) 02/23/2021     No results found for: PREALBUMIN    Estimated Creatinine  Clearance: 182 mL/min (based on SCr of 0.6 mg/dL).    Accu-Checks  Recent Labs     01/04/22  1723 01/04/22  2157 01/05/22  0452 01/05/22  0726 01/05/22  0937   POCTGLUCOSE 363* 374* 315* 291* 261*        ASSESSMENT and PLAN    * Acute hypoxemic respiratory failure due to COVID-19  Infection may elevate BG readings  On IV antiviral  Managed per primary team  Avoid hypoglycemia        Diabetes mellitus  Endocrinology consulted for BG management.   BG goal 140-180    - Levemir Flex Pen 26 units BID (weight-based at 0.8 units/kg/day given steroids)  - Novolog (aspart) insulin 18 Units SQ TIDWM and prn for BG excursions [facility] (150/25).   - BG checks AC/HS/0200      ** Please notify Endocrine for any change and/or advance in diet**  ** Please call Endocrine for any BG related issues **    Discharge Planning:   TBD. Please notify endocrinology prior to discharge.        Alcohol use disorder, severe, dependence  Managed per primary team  Avoid hypoglycemia            Plan discussed with patient, family, and RN at bedside.      Pedro Leal, DNP, FNP  Endocrinology  [facility] - Emergency Dept

## 2022-01-05 NOTE — ED NOTES
The patient is awake, alert and acting age appropriately.    No apparent distress noted. Airway is open and patent.  Respirations with normal effort and rate noted. Explanation of care provided to patient. Pt eating breakfast. No needs at this time. Will continue to monitor.

## 2022-01-05 NOTE — SUBJECTIVE & OBJECTIVE
Interval HPI:   Overnight events: No acute events overnight. Patient on the OBS unit in room OBS08/EDOU08. Blood glucose improving. BG above goal on current insulin regimen. Steroid use- dexamethasone 6 mg.    Renal function- Normal  Vasopressors-  None      Diet diabetic [facility];  Calorie     Eatin%  Nausea: No  Hypoglycemia and intervention: No  Fever: No  TPN and/or TF: No    PMH, PSH, FH, SH updated and reviewed     ROS:    Constitutional: Negative for weight changes.  Eyes: Negative for visual disturbance.  Respiratory: Negative for cough.   Cardiovascular: Negative for chest pain.  Gastrointestinal: Negative for nausea.  Endocrine: Positive for polyuria, polydipsia (2 Liters of water per day).  Musculoskeletal: Negative for back pain.  Skin: Negative for rash.  Neurological: Negative for syncope.  Psychiatric/Behavioral: Negative for depression.    Review of Systems    Current Medications and/or Treatments Impacting Glycemic Control  Immunotherapy:    Immunosuppressants     None        Steroids:   Hormones (From admission, onward)            Start     Stop Route Frequency Ordered    22 0900  dexAMETHasone tablet 6 mg         01/15 0859 Oral Daily 22 1637    22 1641  melatonin tablet 6 mg         -- Oral Nightly PRN 22 1634        Pressors:    Autonomic Drugs (From admission, onward)            None        Hyperglycemia/Diabetes Medications:   Antihyperglycemics (From admission, onward)            Start     Stop Route Frequency Ordered    22 2100  insulin detemir U-100 pen 26 Units         -- SubQ 2 times daily 22 1032    22 1130  insulin aspart U-100 pen 15 Units         -- SubQ 3 times daily with meals 22 1034    22 1731  insulin aspart U-100 pen 1-10 Units         -- SubQ Before meals & nightly PRN 22 1637             PHYSICAL EXAMINATION:  Vitals:    22 1300   BP:    Pulse: 100   Resp: 18   Temp:      Body mass index is  32.95 kg/m².    Physical Exam   Physical exam deferred due to COVID-19.

## 2022-01-05 NOTE — ED NOTES
The patient is awake, alert and acting age appropriately. Family at bedside.    No apparent distress noted. Airway is open and patent.  Respirations with normal effort and rate noted. Explanation of care provided to patient.  Bedside commode set up for pt use. Pt ate 100% of breakfast. Will continue to monitor.

## 2022-01-05 NOTE — ASSESSMENT & PLAN NOTE
Endocrinology consulted for BG management.   BG goal 140-180    - Levemir Flex Pen 26 units BID (weight-based at 0.8 units/kg/day given steroids)  - Novolog (aspart) insulin 18 Units SQ TIDWM and prn [place] [facility] [place]   - BG checks /HS/0200      ** Please notify Endocrine for any change and/or advance in diet**  ** Please call Endocrine for any BG related issues **    Discharge Planning:   TBD. Please notify endocrinology prior to discharge.

## 2022-01-05 NOTE — ASSESSMENT & PLAN NOTE
Infection may elevate BG readings  On IV antiviral  Managed per primary team  Avoid hypoglycemia

## 2022-01-06 PROBLEM — I50.31 ACUTE DIASTOLIC HEART FAILURE: Status: ACTIVE | Noted: 2022-01-06

## 2022-01-06 PROBLEM — G47.09 OTHER INSOMNIA: Status: ACTIVE | Noted: 2020-08-03

## 2022-01-06 LAB
ALBUMIN SERPL BCP-MCNC: 2.1 G/DL (ref 3.5–5.2)
ALP SERPL-CCNC: 308 U/L (ref 55–135)
ALT SERPL W/O P-5'-P-CCNC: 69 U/L (ref 10–44)
ANION GAP SERPL CALC-SCNC: 10 MMOL/L (ref 8–16)
AST SERPL-CCNC: 82 U/L (ref 10–40)
BASOPHILS # BLD AUTO: 0.01 K/UL (ref 0–0.2)
BASOPHILS NFR BLD: 0.2 % (ref 0–1.9)
BILIRUB SERPL-MCNC: 0.6 MG/DL (ref 0.1–1)
BUN SERPL-MCNC: 21 MG/DL (ref 6–20)
CALCIUM SERPL-MCNC: 8.2 MG/DL (ref 8.7–10.5)
CHLORIDE SERPL-SCNC: 101 MMOL/L (ref 95–110)
CO2 SERPL-SCNC: 21 MMOL/L (ref 23–29)
CREAT SERPL-MCNC: 0.7 MG/DL (ref 0.5–1.4)
CRP SERPL-MCNC: 48.1 MG/L (ref 0–8.2)
D DIMER PPP IA.FEU-MCNC: 0.26 MG/L FEU
D DIMER PPP IA.FEU-MCNC: 0.32 MG/L FEU
DIFFERENTIAL METHOD: ABNORMAL
EOSINOPHIL # BLD AUTO: 0 K/UL (ref 0–0.5)
EOSINOPHIL NFR BLD: 0 % (ref 0–8)
ERYTHROCYTE [DISTWIDTH] IN BLOOD BY AUTOMATED COUNT: 11.5 % (ref 11.5–14.5)
EST. GFR  (AFRICAN AMERICAN): >60 ML/MIN/1.73 M^2
EST. GFR  (NON AFRICAN AMERICAN): >60 ML/MIN/1.73 M^2
FERRITIN SERPL-MCNC: 1008 NG/ML (ref 20–300)
GLUCOSE SERPL-MCNC: 293 MG/DL (ref 70–110)
HCT VFR BLD AUTO: 40.1 % (ref 40–54)
HGB BLD-MCNC: 14.7 G/DL (ref 14–18)
IMM GRANULOCYTES # BLD AUTO: 0.03 K/UL (ref 0–0.04)
IMM GRANULOCYTES NFR BLD AUTO: 0.5 % (ref 0–0.5)
LDH SERPL L TO P-CCNC: 363 U/L (ref 110–260)
LYMPHOCYTES # BLD AUTO: 1.9 K/UL (ref 1–4.8)
LYMPHOCYTES NFR BLD: 28.4 % (ref 18–48)
MAGNESIUM SERPL-MCNC: 1.8 MG/DL (ref 1.6–2.6)
MCH RBC QN AUTO: 36.8 PG (ref 27–31)
MCHC RBC AUTO-ENTMCNC: 36.7 G/DL (ref 32–36)
MCV RBC AUTO: 101 FL (ref 82–98)
MONOCYTES # BLD AUTO: 0.6 K/UL (ref 0.3–1)
MONOCYTES NFR BLD: 8.6 % (ref 4–15)
NEUTROPHILS # BLD AUTO: 4.1 K/UL (ref 1.8–7.7)
NEUTROPHILS NFR BLD: 62.3 % (ref 38–73)
NRBC BLD-RTO: 0 /100 WBC
PHOSPHATE SERPL-MCNC: 3.4 MG/DL (ref 2.7–4.5)
PLATELET # BLD AUTO: 204 K/UL (ref 150–450)
PLATELET BLD QL SMEAR: ABNORMAL
PMV BLD AUTO: 10.9 FL (ref 9.2–12.9)
POCT GLUCOSE: 273 MG/DL (ref 70–110)
POCT GLUCOSE: 281 MG/DL (ref 70–110)
POCT GLUCOSE: 398 MG/DL (ref 70–110)
POCT GLUCOSE: 400 MG/DL (ref 70–110)
POTASSIUM SERPL-SCNC: 3.8 MMOL/L (ref 3.5–5.1)
PROT SERPL-MCNC: 5.9 G/DL (ref 6–8.4)
RBC # BLD AUTO: 3.99 M/UL (ref 4.6–6.2)
SODIUM SERPL-SCNC: 132 MMOL/L (ref 136–145)
WBC # BLD AUTO: 6.54 K/UL (ref 3.9–12.7)

## 2022-01-06 PROCEDURE — 99900035 HC TECH TIME PER 15 MIN (STAT)

## 2022-01-06 PROCEDURE — 94640 AIRWAY INHALATION TREATMENT: CPT

## 2022-01-06 PROCEDURE — 63600175 PHARM REV CODE 636 W HCPCS: Performed by: INTERNAL MEDICINE

## 2022-01-06 PROCEDURE — C9399 UNCLASSIFIED DRUGS OR BIOLOG: HCPCS | Performed by: INTERNAL MEDICINE

## 2022-01-06 PROCEDURE — 27000207 HC ISOLATION

## 2022-01-06 PROCEDURE — 82728 ASSAY OF FERRITIN: CPT | Performed by: INTERNAL MEDICINE

## 2022-01-06 PROCEDURE — 85379 FIBRIN DEGRADATION QUANT: CPT | Performed by: INTERNAL MEDICINE

## 2022-01-06 PROCEDURE — 83615 LACTATE (LD) (LDH) ENZYME: CPT | Performed by: INTERNAL MEDICINE

## 2022-01-06 PROCEDURE — 85025 COMPLETE CBC W/AUTO DIFF WBC: CPT | Performed by: INTERNAL MEDICINE

## 2022-01-06 PROCEDURE — 25000003 PHARM REV CODE 250: Performed by: INTERNAL MEDICINE

## 2022-01-06 PROCEDURE — 86140 C-REACTIVE PROTEIN: CPT | Performed by: INTERNAL MEDICINE

## 2022-01-06 PROCEDURE — 94761 N-INVAS EAR/PLS OXIMETRY MLT: CPT

## 2022-01-06 PROCEDURE — 12000002 HC ACUTE/MED SURGE SEMI-PRIVATE ROOM

## 2022-01-06 PROCEDURE — 25000003 PHARM REV CODE 250: Performed by: STUDENT IN AN ORGANIZED HEALTH CARE EDUCATION/TRAINING PROGRAM

## 2022-01-06 PROCEDURE — 99232 SBSQ HOSP IP/OBS MODERATE 35: CPT | Mod: ,,, | Performed by: NURSE PRACTITIONER

## 2022-01-06 PROCEDURE — 83735 ASSAY OF MAGNESIUM: CPT | Performed by: INTERNAL MEDICINE

## 2022-01-06 PROCEDURE — 27000221 HC OXYGEN, UP TO 24 HOURS

## 2022-01-06 PROCEDURE — 99232 SBSQ HOSP IP/OBS MODERATE 35: CPT | Mod: CR,,, | Performed by: STUDENT IN AN ORGANIZED HEALTH CARE EDUCATION/TRAINING PROGRAM

## 2022-01-06 PROCEDURE — 80053 COMPREHEN METABOLIC PANEL: CPT | Performed by: INTERNAL MEDICINE

## 2022-01-06 PROCEDURE — 84100 ASSAY OF PHOSPHORUS: CPT | Performed by: INTERNAL MEDICINE

## 2022-01-06 PROCEDURE — 99232 PR SUBSEQUENT HOSPITAL CARE,LEVL II: ICD-10-PCS | Mod: CR,,, | Performed by: STUDENT IN AN ORGANIZED HEALTH CARE EDUCATION/TRAINING PROGRAM

## 2022-01-06 PROCEDURE — 36415 COLL VENOUS BLD VENIPUNCTURE: CPT | Performed by: INTERNAL MEDICINE

## 2022-01-06 PROCEDURE — 99232 PR SUBSEQUENT HOSPITAL CARE,LEVL II: ICD-10-PCS | Mod: ,,, | Performed by: NURSE PRACTITIONER

## 2022-01-06 RX ORDER — INSULIN ASPART 100 [IU]/ML
20 INJECTION, SOLUTION INTRAVENOUS; SUBCUTANEOUS
Status: DISCONTINUED | OUTPATIENT
Start: 2022-01-06 | End: 2022-01-07

## 2022-01-06 RX ORDER — GUAIFENESIN 600 MG/1
600 TABLET, EXTENDED RELEASE ORAL 2 TIMES DAILY
Status: DISCONTINUED | OUTPATIENT
Start: 2022-01-06 | End: 2022-01-08 | Stop reason: HOSPADM

## 2022-01-06 RX ORDER — INSULIN ASPART 100 [IU]/ML
18 INJECTION, SOLUTION INTRAVENOUS; SUBCUTANEOUS
Status: DISCONTINUED | OUTPATIENT
Start: 2022-01-06 | End: 2022-01-06

## 2022-01-06 RX ORDER — MUPIROCIN 20 MG/G
OINTMENT TOPICAL 2 TIMES DAILY
Status: DISCONTINUED | OUTPATIENT
Start: 2022-01-06 | End: 2022-01-08 | Stop reason: HOSPADM

## 2022-01-06 RX ADMIN — INSULIN ASPART 20 UNITS: 100 INJECTION, SOLUTION INTRAVENOUS; SUBCUTANEOUS at 04:01

## 2022-01-06 RX ADMIN — Medication 500 MG: at 10:01

## 2022-01-06 RX ADMIN — DIPHENHYDRAMINE HYDROCHLORIDE 25 MG: 25 CAPSULE ORAL at 08:01

## 2022-01-06 RX ADMIN — GUAIFENESIN 600 MG: 600 TABLET, EXTENDED RELEASE ORAL at 08:01

## 2022-01-06 RX ADMIN — INSULIN ASPART 20 UNITS: 100 INJECTION, SOLUTION INTRAVENOUS; SUBCUTANEOUS at 02:01

## 2022-01-06 RX ADMIN — INSULIN ASPART 3 UNITS: 100 INJECTION, SOLUTION INTRAVENOUS; SUBCUTANEOUS at 09:01

## 2022-01-06 RX ADMIN — INSULIN ASPART 2 UNITS: 100 INJECTION, SOLUTION INTRAVENOUS; SUBCUTANEOUS at 10:01

## 2022-01-06 RX ADMIN — LISINOPRIL 10 MG: 10 TABLET ORAL at 10:01

## 2022-01-06 RX ADMIN — MULTIPLE VITAMINS W/ MINERALS TAB 1 TABLET: TAB at 10:01

## 2022-01-06 RX ADMIN — MUPIROCIN: 20 OINTMENT TOPICAL at 08:01

## 2022-01-06 RX ADMIN — INSULIN ASPART 10 UNITS: 100 INJECTION, SOLUTION INTRAVENOUS; SUBCUTANEOUS at 07:01

## 2022-01-06 RX ADMIN — ENOXAPARIN SODIUM 90 MG: 100 INJECTION SUBCUTANEOUS at 08:01

## 2022-01-06 RX ADMIN — ALBUTEROL SULFATE 2 PUFF: 108 INHALANT RESPIRATORY (INHALATION) at 07:01

## 2022-01-06 RX ADMIN — SODIUM CHLORIDE: 0.9 INJECTION, SOLUTION INTRAVENOUS at 08:01

## 2022-01-06 RX ADMIN — ALBUTEROL SULFATE 2 PUFF: 108 INHALANT RESPIRATORY (INHALATION) at 12:01

## 2022-01-06 RX ADMIN — Medication 500 MG: at 08:01

## 2022-01-06 RX ADMIN — CARVEDILOL 12.5 MG: 12.5 TABLET, FILM COATED ORAL at 08:01

## 2022-01-06 RX ADMIN — SODIUM CHLORIDE: 0.9 INJECTION, SOLUTION INTRAVENOUS at 02:01

## 2022-01-06 RX ADMIN — BENZONATATE 200 MG: 100 CAPSULE ORAL at 10:01

## 2022-01-06 RX ADMIN — BENZONATATE 200 MG: 100 CAPSULE ORAL at 02:01

## 2022-01-06 RX ADMIN — CARVEDILOL 12.5 MG: 12.5 TABLET, FILM COATED ORAL at 10:01

## 2022-01-06 RX ADMIN — REMDESIVIR 100 MG: 100 INJECTION, POWDER, LYOPHILIZED, FOR SOLUTION INTRAVENOUS at 10:01

## 2022-01-06 RX ADMIN — DEXAMETHASONE 6 MG: 4 TABLET ORAL at 10:01

## 2022-01-06 RX ADMIN — ENOXAPARIN SODIUM 90 MG: 100 INJECTION SUBCUTANEOUS at 10:01

## 2022-01-06 NOTE — PROGRESS NOTES
Patient triggered for elevated HgA1c of 12.6. Patient is not appropriate for education at this time. Will follow.

## 2022-01-06 NOTE — ASSESSMENT & PLAN NOTE
- not showing evidence of alcohol withdrawal   - has been cutting down. Last drink for LOIS and had a few beers  - continue to monitor   - followup CORTES

## 2022-01-06 NOTE — ASSESSMENT & PLAN NOTE
- Na on admit, 127 --> 131  - Likely 2/2 covid infection and dehydration   - Continue IVF  - BMP daily   - Improving

## 2022-01-06 NOTE — ASSESSMENT & PLAN NOTE
- TTE showed EF 60% and diastolic dysfunction  - started coreg and lisinopril   - not in decompensated state  - no need for diuresis at this time

## 2022-01-06 NOTE — PROGRESS NOTES
Home Oxygen Evaluation    Date Performed: 1/6/2022    1) Patient's Home O2 Sat on room air, while at rest: 95%        If O2 sats on room air at rest are 88% or below, patient qualifies. No additional testing needed. Document N/A in steps 2 and 3. If 89% or above, complete steps 2.      2) Patient's O2 Sat on room air while exercising: n/a 93%        If O2 sats on room air while exercising remain 89% or above patient does not qualify, no further testing needed Document N/A in step 3. If O2 sats on room air while exercising are 88% or below, continue to step 3.      3) Patient's O2 Sat while exercising on O2: n/a 94% at 2 LPM         (Must show improvement from #2 for patients to qualify)    If O2 sats improve on oxygen, patient qualifies for portable oxygen. If not, the patient does not qualify.

## 2022-01-06 NOTE — ASSESSMENT & PLAN NOTE
Patient with Hypoxic Respiratory failure which is Acute.  he is not on home oxygen. Supplemental oxygen was provided and noted-  .   Signs/symptoms of respiratory failure include- tachypnea, increased work of breathing and respiratory distress. Contributing diagnoses includes - CHF and Pneumonia Labs and images were reviewed. Patient Has not had a recent ABG. Will treat underlying causes and adjust management of respiratory failure as follows-       COVID-19 Virus Infection  Viral Pneumonia due to COVID-19  - COVID-19 testing: positive on 12/27/21 (prior to admission) and 1/4/22  Patient is identified as Severe COVID-19 based on hypoxemia with O2 saturations <94% on room air or on ambulation   Initiate standard COVID protocols; COVID-19 testing ,Infection Control notification  and isolation- respiratory, contact and droplet per protocol    Diagnostics: (leukopenia, hyponatremia, hyperferritinemia, elevated troponin, elevated d-dimer, age, and comorbidities are significant predictors of poor clinical outcome)  CBC, CMP, Procalcitonin, Ferritin, CRP, LDH, Troponin, ECG, Portable CXR and UA and culture    Management: Initiate targeted therapy with Remdesivir, 200mg IV x1, followed by 100mg IV daily x5 days total and Dexamethasone PO/IV 6mg daily x10 days, Maintain oxygen saturations 92-96% via Nasal Cannula 2 LPM and monitor with pulse oximetry. , Inhaled bronchodilators as needed for shortness of breath., Continuous cardiac monitoring. and Manage respiratory failure (O2 sats <91% on room air with respiratory symptoms or needing NIPPV/Mechanical ventilation) and/or Pneumonia (active chest infiltrates) separately as described below.    Advance Care Planning  Current advance care plan has been discussed with patient/family/POA and patient currently wishes Full Code.         Acute Hypoxemic Respiratory Failure- resolved    - weaned from 2 L NC to room air    - Did not qualify for O2 on 6 min walk test 1/6    - Order RT  consult via Respiratory Communication for COVID Protocols    - Incentive Spirometer Q4h, Flutter Valve Q4h    - Continuous Pulse Oximetry, goal SpO2 92-96%    - Supplemental O2 via LFNC, VentiMask, or HFNC    - If wheezing, albuterol INH Q6h scheduled & PRN    - Proning Protocol if patient is a candidate    - GCS >13, able to self-prone    - If deterioration, may warrant trial of NIPPV in neg pressure room or immediate ICU consult

## 2022-01-06 NOTE — PROGRESS NOTES
[facility] ([address] Medicine  Progress Note    Patient Name: Doe Woodall  MRN: [id number]  Patient Class: IP- Inpatient   Admission Date: 1/4/2022  Length of Stay: 2 days  Attending Physician: Ming Potts MD  Primary Care Provider: Primary Doctor No        Subjective:     Principal Problem:Acute hypoxemic respiratory failure due to COVID-19        HPI:  Mr. Woodall is a 31 year old gentleman with PMH of DM2 with medication non-compliance, h/o DKA (8/2020) and h/o alcohol abuse who presented to [facility] [place] via [place] for covid symptoms. Patient reports that him and his wife got tested on Monday 12/27/21 in preparation for a dinner outing and were found to be COVID positive. They quarantined since then and patient's COVID symptoms continued to worsen. Patient reports fever, chest pain, coughing, dizziness and generalized weakness. Patient was advised by triage nurse to call EMS and patient was transported to [facility] [place].    In the ED: afebrile, BP 100s-140s/60s-80s, O@ sat 85% on room air and patient placed on 2L NC with improvement in O2 sat to 97%. CBC wnl and CMP significant for Na 127, AST/ALT 99/88, alk phos 285, . Glucose 308. COVID test positive. Patient was given one dose of dexamethasone 6 mg. CXR showed multifocal pneumonia. He was admitted to hospital medicine service for further evaluation and management of acute hypoxic respiratory failure due to COVID PNA.        Overview/Hospital Course:  No notes on file    Interval History: Patient noted to be hemodynamically stable. Overnight, no acute events Patient continues to complain of shortness of breath, but is improving and weaned to room air. Patient continues to have cough with minimal sputum production.  Appetite has been low, but patient has been able to tolerate p.o. intake without any nausea, vomiting. Encourage use of incentive spirometer, albuterol inhaler, p.r.n. antitussives.      Review of Systems    Constitutional: Positive for fatigue. Negative for activity change, chills and fever.   HENT: Negative for congestion and trouble swallowing.    Eyes: Negative for visual disturbance.   Respiratory: Positive for cough and shortness of breath.    Cardiovascular: Positive for leg swelling. Negative for chest pain.   Gastrointestinal: Negative for abdominal distention, abdominal pain, nausea and vomiting.   Genitourinary: Negative for difficulty urinating and dysuria.   Musculoskeletal: Negative for back pain and myalgias.   Skin: Negative for rash and wound.   Neurological: Negative for dizziness, weakness and light-headedness.   Psychiatric/Behavioral: Negative for confusion and sleep disturbance.        Objective:     Vital Signs (Most Recent):  Temp: 96.5 °F (35.8 °C) (01/06/22 0900)  Pulse: 79 (01/06/22 0900)  Resp: 16 (01/06/22 0900)  BP: (!) 150/89 (01/06/22 0900)  SpO2: 97 % (01/06/22 0900) Vital Signs (24h Range):  Temp:  [96.5 °F (35.8 °C)-98.1 °F (36.7 °C)] 96.5 °F (35.8 °C)  Pulse:  [] 79  Resp:  [16-22] 16  SpO2:  [90 %-99 %] 97 %  BP: (127-150)/(79-91) 150/89     Weight: 89.8 kg (198 lb)  Body mass index is 32.95 kg/m².    Intake/Output Summary (Last 24 hours) at 1/6/2022 0931  Last data filed at 1/5/2022 1118  Gross per 24 hour   Intake 259.32 ml   Output --   Net 259.32 ml      Physical Exam  Constitutional:       Appearance: He is well-developed and well-nourished.   HENT:      Head: Normocephalic and atraumatic.   Eyes:      Extraocular Movements: Extraocular movements intact and EOM normal.      Pupils: Pupils are equal, round, and reactive to light.   Neck:      Vascular: No JVD.   Cardiovascular:      Rate and Rhythm: Normal rate and regular rhythm.      Pulses: Normal pulses and intact distal pulses.      Heart sounds: Normal heart sounds.   Pulmonary:      Effort: Pulmonary effort is normal. No respiratory distress.      Breath sounds: Normal breath sounds.   Abdominal:      General:  Bowel sounds are normal. There is no distension.      Palpations: Abdomen is soft.      Tenderness: There is no abdominal tenderness.   Musculoskeletal:         General: Normal range of motion.      Cervical back: Normal range of motion and neck supple.      Right lower leg: Edema (trace) present.      Left lower leg: Edema (trace) present.   Skin:     General: Skin is warm and dry.      Capillary Refill: Capillary refill takes less than 2 seconds.   Neurological:      General: No focal deficit present.      Mental Status: He is alert and oriented to person, place, and time.      Cranial Nerves: No cranial nerve deficit.      Sensory: No sensory deficit.   Psychiatric:         Mood and Affect: Mood and affect and mood normal.         Significant Labs:   A1C:   Recent Labs   Lab 08/25/21  1245 01/04/22  1734   HGBA1C 13.7* 12.6*     CBC:   Recent Labs   Lab 01/04/22  1401 01/05/22  0423 01/06/22  0306   WBC 6.39 4.40 6.54   HGB 16.4 15.2 14.7   HCT 45.5 42.2 40.1    238 204     CMP:   Recent Labs   Lab 01/04/22  1401 01/05/22  0606 01/06/22  0306   * 131* 132*   K 4.5 4.4 3.8   CL 95 99 101   CO2 21* 22* 21*   * 320* 293*   BUN 7 11 21*   CREATININE 0.7 0.6 0.7   CALCIUM 8.0* 7.9* 8.2*   PROT 6.7 6.3 5.9*   ALBUMIN 2.3* 2.0* 2.1*   BILITOT 1.0 0.6 0.6   ALKPHOS 285* 269* 308*   AST 99* 73* 82*   ALT 88* 75* 69*   ANIONGAP 11 10 10   EGFRNONAA >60.0 >60.0 >60.0     Coagulation:   Recent Labs   Lab 01/04/22  1734   INR 1.1   APTT 30.5     Magnesium:   Recent Labs   Lab 01/05/22  0606 01/06/22  0306   MG 1.9 1.8     POCT Glucose:   Recent Labs   Lab 01/05/22  1817 01/05/22  2301 01/06/22  0737   POCTGLUCOSE 417* 270* 281*     Troponin:   Recent Labs   Lab 01/04/22  1401   TROPONINI <0.006     TSH:   Recent Labs   Lab 01/04/22  1734   TSH 0.947       Significant Imaging: I have reviewed all pertinent imaging results/findings within the past 24 hours.      Assessment/Plan:      * Acute hypoxemic  respiratory failure due to COVID-19  Patient with Hypoxic Respiratory failure which is Acute.  he is not on home oxygen. Supplemental oxygen was provided and noted-  .   Signs/symptoms of respiratory failure include- tachypnea, increased work of breathing and respiratory distress. Contributing diagnoses includes - CHF and Pneumonia Labs and images were reviewed. Patient Has not had a recent ABG. Will treat underlying causes and adjust management of respiratory failure as follows-       COVID-19 Virus Infection  Viral Pneumonia due to COVID-19  - COVID-19 testing: positive on 12/27/21 (prior to admission) and 1/4/22  Patient is identified as Severe COVID-19 based on hypoxemia with O2 saturations <94% on room air or on ambulation   Initiate standard COVID protocols; COVID-19 testing ,Infection Control notification  and isolation- respiratory, contact and droplet per protocol    Diagnostics: (leukopenia, hyponatremia, hyperferritinemia, elevated troponin, elevated d-dimer, age, and comorbidities are significant predictors of poor clinical outcome)  CBC, CMP, Procalcitonin, Ferritin, CRP, LDH, Troponin, ECG, Portable CXR and UA and culture    Management: Initiate targeted therapy with Remdesivir, 200mg IV x1, followed by 100mg IV daily x5 days total and Dexamethasone PO/IV 6mg daily x10 days, Maintain oxygen saturations 92-96% via Nasal Cannula 2 LPM and monitor with pulse oximetry. , Inhaled bronchodilators as needed for shortness of breath., Continuous cardiac monitoring. and Manage respiratory failure (O2 sats <91% on room air with respiratory symptoms or needing NIPPV/Mechanical ventilation) and/or Pneumonia (active chest infiltrates) separately as described below.    Advance Care Planning  Current advance care plan has been discussed with patient/family/POA and patient currently wishes Full Code.        Acute Hypoxemic Respiratory Failure- resolved  - weaned from 2 L NC to room air    - Order RT consult via  Respiratory Communication for COVID Protocols    - Incentive Spirometer Q4h, Flutter Valve Q4h    - Continuous Pulse Oximetry, goal SpO2 92-96%    - Supplemental O2 via LFNC, VentiMask, or HFNC (    - If wheezing, albuterol INH Q6h scheduled & PRN    - Proning Protocol if patient is a candidate    - GCS >13, able to self-prone    - If deterioration, may warrant trial of NIPPV in neg pressure room or immediate ICU consult        Hyponatremia  - Na on admit, 127 --> 131  - likely 2/2 covid infection and dehydration   - continue IVF  - BMP daily   - IMPROVING      Type 2 diabetes mellitus with hyperglycemia  - diagnosed in 2008  - Home Diabetes Medications: Trulicity 1.5 mg (Previously taking, but stopped by new PCP Metformin 1000 mg BID; Jardiance 10 mg; Novolog 18 units TIDWM; Lantus 26 units BID)  - last Ha1c (8/2021): 13.7  - endocrinology consulted, apprec recs  -- BG goal 140-180  -- Levemir Flex Pen 26 units BID (weight-based at 0.8 units/kg/day given steroids)  -- Novolog (aspart) insulin 18 Units SQ TIDWM and prn for BG excursions [facility] [place] (150/25).   - BG checks AC/HS/0200      Elevated transaminase level  - likely 2/2 covid infection  - hepatitis panel         Other insomnia  - melatonin prn nightly      Acute diastolic heart failure  - TTE showed EF 60% and diastolic dysfunction  - started coreg and lisinopril   - not in decompensated state  - no need for diuresis at this time          Anxiety disorder  - prn vistaril       Alcohol use disorder, severe, dependence  - not showing evidence of alcohol withdrawal   - has been cutting down. Last drink for LOIS and had a few beers  - continue to monitor   - followup PETH      Bulge of lumbar disc without myelopathy  - prn tylenol         VTE Risk Mitigation (From admission, onward)         Ordered     enoxaparin injection 90 mg  2 times daily         01/04/22 1637     IP VTE LOW RISK PATIENT  Once         01/04/22 1637     Place sequential compression device  Until  discontinued         01/04/22 1634                Discharge Planning   SUSAN: 1/8/2022     Code Status: Full Code   Is the patient medically ready for discharge?: No    Reason for patient still in hospital (select all that apply): Patient unstable, Patient trending condition, Laboratory test, Treatment and Consult recommendations                     Filiberto Baig MD  Department of Hospital Medicine   [facility] - Intensive Care ([place]-16)

## 2022-01-06 NOTE — PROGRESS NOTES
[facility] - [facility] ([address] Medicine  Progress Note    Patient Name: Doe Woodall  MRN: [id number]  Patient Class: IP- Inpatient   Admission Date: 1/4/2022  Length of Stay: 2 days  Attending Physician: Ming Potts MD  Primary Care Provider: Nakul Jackson MD        Subjective:     Principal Problem:Acute hypoxemic respiratory failure due to COVID-19        HPI:  Mr. Woodall is a 31 year old gentleman with PMH of DM2 with medication non-compliance, h/o DKA (8/2020) and h/o alcohol abuse who presented to [facility] via EMS for covid symptoms. Patient reports that him and his wife got tested on Monday 12/27/21 in preparation for a dinner outing and were found to be COVID positive. They quarantined since then and patient's COVID symptoms continued to worsen. Patient reports fever, chest pain, coughing, dizziness and generalized weakness. Patient was advised by triage nurse to call EMS and patient was transported to [facility].    In the ED: afebrile, BP 100s-140s/60s-80s, O@ sat 85% on room air and patient placed on 2L NC with improvement in O2 sat to 97%. CBC wnl and CMP significant for Na 127, AST/ALT 99/88, alk phos 285, . Glucose 308. COVID test positive. Patient was given one dose of dexamethasone 6 mg. CXR showed multifocal pneumonia. He was admitted to hospital medicine service for further evaluation and management of acute hypoxic respiratory failure due to COVID PNA.        Overview/Hospital Course:  No notes on file    Interval History:   - No events overnight  - Patient notes occasional dyspnea with coughing  - Reports improved breathing    Review of Systems   Constitutional: Positive for fatigue. Negative for activity change, chills and fever.   HENT: Negative for congestion and trouble swallowing.    Eyes: Negative for visual disturbance.   Respiratory: Positive for cough and shortness of breath.    Cardiovascular: Negative for chest pain and leg swelling.   Gastrointestinal:  Negative for abdominal distention, abdominal pain, nausea and vomiting.   Genitourinary: Negative for difficulty urinating and dysuria.   Musculoskeletal: Negative for back pain and myalgias.   Skin: Negative for rash and wound.   Neurological: Negative for dizziness, weakness and light-headedness.   Psychiatric/Behavioral: Negative for confusion and sleep disturbance.     Objective:     Vital Signs (Most Recent):  Temp: 97.9 °F (36.6 °C) (01/06/22 1118)  Pulse: 80 (01/06/22 1238)  Resp: (!) 22 (01/06/22 1238)  BP: 137/87 (01/06/22 1118)  SpO2: 97 % (01/06/22 1238) Vital Signs (24h Range):  Temp:  [96.5 °F (35.8 °C)-98.1 °F (36.7 °C)] 97.9 °F (36.6 °C)  Pulse:  [79-87] 80  Resp:  [16-22] 22  SpO2:  [90 %-99 %] 97 %  BP: (127-150)/(79-91) 137/87     Weight: 89.8 kg (198 lb)  Body mass index is 32.95 kg/m².  No intake or output data in the 24 hours ending 01/06/22 1449   Physical Exam  Constitutional:       Appearance: He is well-developed.   HENT:      Head: Normocephalic and atraumatic.   Eyes:      Extraocular Movements: Extraocular movements intact.      Pupils: Pupils are equal, round, and reactive to light.   Neck:      Vascular: No JVD.   Cardiovascular:      Rate and Rhythm: Normal rate and regular rhythm.      Pulses: Normal pulses.      Heart sounds: Normal heart sounds.   Pulmonary:      Effort: Pulmonary effort is normal. No respiratory distress.      Breath sounds: Normal breath sounds.   Abdominal:      General: Bowel sounds are normal. There is no distension.      Palpations: Abdomen is soft.      Tenderness: There is no abdominal tenderness.   Musculoskeletal:         General: Normal range of motion.      Cervical back: Normal range of motion and neck supple.      Right lower leg: Edema (trace) present.      Left lower leg: Edema (trace) present.   Skin:     General: Skin is warm and dry.      Capillary Refill: Capillary refill takes less than 2 seconds.   Neurological:      General: No focal deficit  present.      Mental Status: He is alert and oriented to person, place, and time.      Cranial Nerves: No cranial nerve deficit.      Sensory: No sensory deficit.   Psychiatric:         Mood and Affect: Mood normal.         Significant Labs: All pertinent labs within the past 24 hours have been reviewed.    Significant Imaging: I have reviewed all pertinent imaging results/findings within the past 24 hours.      Assessment/Plan:      * Acute hypoxemic respiratory failure due to COVID-19  Patient with Hypoxic Respiratory failure which is Acute.  he is not on home oxygen. Supplemental oxygen was provided and noted-  .   Signs/symptoms of respiratory failure include- tachypnea, increased work of breathing and respiratory distress. Contributing diagnoses includes - CHF and Pneumonia Labs and images were reviewed. Patient Has not had a recent ABG. Will treat underlying causes and adjust management of respiratory failure as follows-       COVID-19 Virus Infection  Viral Pneumonia due to COVID-19  - COVID-19 testing: positive on 12/27/21 (prior to admission) and 1/4/22  Patient is identified as Severe COVID-19 based on hypoxemia with O2 saturations <94% on room air or on ambulation   Initiate standard COVID protocols; COVID-19 testing ,Infection Control notification  and isolation- respiratory, contact and droplet per protocol    Diagnostics: (leukopenia, hyponatremia, hyperferritinemia, elevated troponin, elevated d-dimer, age, and comorbidities are significant predictors of poor clinical outcome)  CBC, CMP, Procalcitonin, Ferritin, CRP, LDH, Troponin, ECG, Portable CXR and UA and culture    Management: Initiate targeted therapy with Remdesivir, 200mg IV x1, followed by 100mg IV daily x5 days total and Dexamethasone PO/IV 6mg daily x10 days, Maintain oxygen saturations 92-96% via Nasal Cannula 2 LPM and monitor with pulse oximetry. , Inhaled bronchodilators as needed for shortness of breath., Continuous cardiac  monitoring. and Manage respiratory failure (O2 sats <91% on room air with respiratory symptoms or needing NIPPV/Mechanical ventilation) and/or Pneumonia (active chest infiltrates) separately as described below.    Advance Care Planning  Current advance care plan has been discussed with patient/family/POA and patient currently wishes Full Code.        Acute Hypoxemic Respiratory Failure- resolved    - weaned from 2 L NC to room air    - Did not qualify for O2 on 6 min walk test 1/6    - Order RT consult via Respiratory Communication for COVID Protocols    - Incentive Spirometer Q4h, Flutter Valve Q4h    - Continuous Pulse Oximetry, goal SpO2 92-96%    - Supplemental O2 via LFNC, VentiMask, or HFNC    - If wheezing, albuterol INH Q6h scheduled & PRN    - Proning Protocol if patient is a candidate    - GCS >13, able to self-prone    - If deterioration, may warrant trial of NIPPV in neg pressure room or immediate ICU consult        Acute diastolic heart failure  - TTE showed EF 60% and diastolic dysfunction  - Started coreg and lisinopril   - Not in decompensated state  - No need for diuresis at this time          Hyponatremia  - Na on admit, 127 --> 131  - Likely 2/2 covid infection and dehydration   - Continue IVF  - BMP daily   - Improving      Anxiety disorder  - prn vistaril       Alcohol use disorder, severe, dependence  - not showing evidence of alcohol withdrawal   - has been cutting down. Last drink for LOIS and had a few beers  - continue to monitor   - followup PETH      Other insomnia  - melatonin prn nightly      Elevated transaminase level  - likely 2/2 covid infection  - hepatitis panel         Bulge of lumbar disc without myelopathy  - prn tylenol       Type 2 diabetes mellitus with hyperglycemia  - Diagnosed in 2008  - Home Diabetes Medications: Trulicity 1.5 mg (Previously taking, but stopped by new PCP Metformin 1000 mg BID; Jardiance 10 mg; Novolog 18 units TIDWM; Lantus 26 units BID)  - Last Ha1c  (8/2021): 13.7  - Endocrinology consulted, apprec recs  -- BG goal 140-180  -- Levemir Flex Pen 30 units BID (weight-based at 0.8 units/kg/day given steroids)  -- Novolog (aspart) insulin 20 Units SQ TIDWM and prn for BG excursions [facility] (150/25).   - BG checks AC/HS/0200        VTE Risk Mitigation (From admission, onward)         Ordered     enoxaparin injection 90 mg  2 times daily         01/04/22 1637     IP VTE LOW RISK PATIENT  Once         01/04/22 1637     Place sequential compression device  Until discontinued         01/04/22 1634                Discharge Planning   SUSAN: 1/8/2022     Code Status: Full Code   Is the patient medically ready for discharge?: No    Reason for patient still in hospital (select all that apply): Patient trending condition, Treatment and Pending disposition  Discharge Plan A: Home with family                  Ming Potts MD  Department of Hospital Medicine   [facility] - Intensive Care ([place]-16)

## 2022-01-06 NOTE — ASSESSMENT & PLAN NOTE
- diagnosed in 2008  - Home Diabetes Medications: Trulicity 1.5 mg (Previously taking, but stopped by new PCP Metformin 1000 mg BID; Jardiance 10 mg; Novolog 18 units TIDWM; Lantus 26 units BID)  - last Ha1c (8/2021): 13.7  - endocrinology consulted, apprec recs  -- BG goal 140-180  -- Levemir Flex Pen 26 units BID (weight-based at 0.8 units/kg/day given steroids)  -- Novolog (aspart) insulin 18 Units SQ TIDWM and prn for BG excursions [facility] (150/25).   - BG checks AC/HS/0200

## 2022-01-06 NOTE — SUBJECTIVE & OBJECTIVE
Interval History:   - No events overnight  - Patient notes occasional dyspnea with coughing  - Reports improved breathing    Review of Systems   Constitutional: Positive for fatigue. Negative for activity change, chills and fever.   HENT: Negative for congestion and trouble swallowing.    Eyes: Negative for visual disturbance.   Respiratory: Positive for cough and shortness of breath.    Cardiovascular: Negative for chest pain and leg swelling.   Gastrointestinal: Negative for abdominal distention, abdominal pain, nausea and vomiting.   Genitourinary: Negative for difficulty urinating and dysuria.   Musculoskeletal: Negative for back pain and myalgias.   Skin: Negative for rash and wound.   Neurological: Negative for dizziness, weakness and light-headedness.   Psychiatric/Behavioral: Negative for confusion and sleep disturbance.     Objective:     Vital Signs (Most Recent):  Temp: 97.9 °F (36.6 °C) (01/06/22 1118)  Pulse: 80 (01/06/22 1238)  Resp: (!) 22 (01/06/22 1238)  BP: 137/87 (01/06/22 1118)  SpO2: 97 % (01/06/22 1238) Vital Signs (24h Range):  Temp:  [96.5 °F (35.8 °C)-98.1 °F (36.7 °C)] 97.9 °F (36.6 °C)  Pulse:  [79-87] 80  Resp:  [16-22] 22  SpO2:  [90 %-99 %] 97 %  BP: (127-150)/(79-91) 137/87     Weight: 89.8 kg (198 lb)  Body mass index is 32.95 kg/m².  No intake or output data in the 24 hours ending 01/06/22 1449   Physical Exam  Constitutional:       Appearance: He is well-developed.   HENT:      Head: Normocephalic and atraumatic.   Eyes:      Extraocular Movements: Extraocular movements intact.      Pupils: Pupils are equal, round, and reactive to light.   Neck:      Vascular: No JVD.   Cardiovascular:      Rate and Rhythm: Normal rate and regular rhythm.      Pulses: Normal pulses.      Heart sounds: Normal heart sounds.   Pulmonary:      Effort: Pulmonary effort is normal. No respiratory distress.      Breath sounds: Normal breath sounds.   Abdominal:      General: Bowel sounds are normal. There  is no distension.      Palpations: Abdomen is soft.      Tenderness: There is no abdominal tenderness.   Musculoskeletal:         General: Normal range of motion.      Cervical back: Normal range of motion and neck supple.      Right lower leg: Edema (trace) present.      Left lower leg: Edema (trace) present.   Skin:     General: Skin is warm and dry.      Capillary Refill: Capillary refill takes less than 2 seconds.   Neurological:      General: No focal deficit present.      Mental Status: He is alert and oriented to person, place, and time.      Cranial Nerves: No cranial nerve deficit.      Sensory: No sensory deficit.   Psychiatric:         Mood and Affect: Mood normal.         Significant Labs: All pertinent labs within the past 24 hours have been reviewed.    Significant Imaging: I have reviewed all pertinent imaging results/findings within the past 24 hours.

## 2022-01-06 NOTE — PLAN OF CARE
[facility] - [facility] ([place] [address])  Initial Discharge Assessment       Primary Care Provider: Primary Doctor No    Admission Diagnosis: Hypoxia [R09.02]  Alcohol use disorder, severe, dependence [F10.20]  Type 2 diabetes mellitus with hypoglycemia without coma, unspecified whether long term insulin use [E11.649]  Acute hypoxemic respiratory failure due to COVID-19 [U07.1, J96.01]  COVID-19 [U07.1]    Admission Date: 1/4/2022  Expected Discharge Date: 1/8/2022         Payor: MEDICAID / Plan: [place] / Product Type: Managed Medicaid /     Extended Emergency Contact Information  Primary Emergency Contact: Ej Woodall   [facility]  Home Phone: [phone]  Mobile Phone: [phone]  Relation: Father  Preferred language: English  Secondary Emergency Contact: Aminta Prince   [place]  Mobile Phone: [phone]  Relation: Significant other    Discharge Plan A: Home with family  Discharge Plan B: Home with family      [id number] - [place], [place] - [address] AT [address] [place] [address]  Phone: [phone] Fax: [phone]      Initial Assessment (most recent)     Adult Discharge Assessment - 01/06/22 1251        Discharge Assessment    Assessment Type Discharge Planning Assessment     Confirmed/corrected address, phone number and insurance Yes     Confirmed Demographics Correct on Facesheet     Source of Information patient     If unable to respond/provide information was family/caregiver contacted? Yes     Contact Name/Number Aminta Woodall (Spouse) [phone]     Does patient/caregiver understand observation status Yes     Communicated SUSAN with patient/caregiver Yes     Lives With spouse;child(michael), dependent     Do you expect to return to your current living situation? Yes     Do you have help at home or someone to help you manage your care at home? Yes     Who are  your caregiver(s) and their phone number(s)? Aminta-Spouse     Prior to hospitilization cognitive status: Alert/Oriented     Current cognitive status: Alert/Oriented     Walking or Climbing Stairs Difficulty none     Dressing/Bathing Difficulty none     Equipment Currently Used at Home none     Readmission within 30 days? No     Patient currently being followed by outpatient case management? No     Do you currently have service(s) that help you manage your care at home? No     Do you take prescription medications? Yes     Do you have prescription coverage? Yes     Do you have any problems affording any of your prescribed medications? No     Is the patient taking medications as prescribed? yes     Who is going to help you get home at discharge? Spouse-Aminta     How do you get to doctors appointments? family or friend will provide     Are you on dialysis? No     Do you take coumadin? No     Discharge Plan A Home with family     Discharge Plan B Home with family     DME Needed Upon Discharge  none               Spoke with patient on phone to complete discharge planning assessment as he is in Isolation for Covid-19. Patient lives with spouse and 2 daughters in a single story home with one step to enter. Patient is Independent, drives and maintains full-time employment. PCP and Pharmacy verified. Will return home to family when medically stable. Wife to provide transportation home at discharge. Primary CM/SW will follow for needs.

## 2022-01-06 NOTE — ASSESSMENT & PLAN NOTE
- Diagnosed in 2008  - Home Diabetes Medications: Trulicity 1.5 mg (Previously taking, but stopped by new PCP Metformin 1000 mg BID; Jardiance 10 mg; Novolog 18 units TIDWM; Lantus 26 units BID)  - Last Ha1c (8/2021): 13.7  - Endocrinology consulted, apprec recs  -- BG goal 140-180  -- Levemir Flex Pen 30 units BID (weight-based at 0.8 units/kg/day given steroids)  -- Novolog (aspart) insulin 20 Units SQ TIDWM and prn for BG excursions [facility] (150/25).   - BG checks AC/HS/0200

## 2022-01-07 LAB
ALBUMIN SERPL BCP-MCNC: 2.1 G/DL (ref 3.5–5.2)
ALBUMIN SERPL BCP-MCNC: 2.3 G/DL (ref 3.5–5.2)
ALP SERPL-CCNC: 299 U/L (ref 55–135)
ALP SERPL-CCNC: 331 U/L (ref 55–135)
ALT SERPL W/O P-5'-P-CCNC: 134 U/L (ref 10–44)
ALT SERPL W/O P-5'-P-CCNC: 92 U/L (ref 10–44)
ANION GAP SERPL CALC-SCNC: 7 MMOL/L (ref 8–16)
ANION GAP SERPL CALC-SCNC: 8 MMOL/L (ref 8–16)
AST SERPL-CCNC: 139 U/L (ref 10–40)
AST SERPL-CCNC: 229 U/L (ref 10–40)
BASOPHILS # BLD AUTO: 0.02 K/UL (ref 0–0.2)
BASOPHILS NFR BLD: 0.3 % (ref 0–1.9)
BILIRUB SERPL-MCNC: 0.7 MG/DL (ref 0.1–1)
BILIRUB SERPL-MCNC: 0.9 MG/DL (ref 0.1–1)
BUN SERPL-MCNC: 13 MG/DL (ref 6–20)
BUN SERPL-MCNC: 14 MG/DL (ref 6–20)
CALCIUM SERPL-MCNC: 8.2 MG/DL (ref 8.7–10.5)
CALCIUM SERPL-MCNC: 8.4 MG/DL (ref 8.7–10.5)
CHLORIDE SERPL-SCNC: 102 MMOL/L (ref 95–110)
CHLORIDE SERPL-SCNC: 104 MMOL/L (ref 95–110)
CO2 SERPL-SCNC: 24 MMOL/L (ref 23–29)
CO2 SERPL-SCNC: 25 MMOL/L (ref 23–29)
CREAT SERPL-MCNC: 0.6 MG/DL (ref 0.5–1.4)
CREAT SERPL-MCNC: 0.7 MG/DL (ref 0.5–1.4)
DIFFERENTIAL METHOD: ABNORMAL
EOSINOPHIL # BLD AUTO: 0 K/UL (ref 0–0.5)
EOSINOPHIL NFR BLD: 0.2 % (ref 0–8)
ERYTHROCYTE [DISTWIDTH] IN BLOOD BY AUTOMATED COUNT: 11.4 % (ref 11.5–14.5)
EST. GFR  (AFRICAN AMERICAN): >60 ML/MIN/1.73 M^2
EST. GFR  (AFRICAN AMERICAN): >60 ML/MIN/1.73 M^2
EST. GFR  (NON AFRICAN AMERICAN): >60 ML/MIN/1.73 M^2
EST. GFR  (NON AFRICAN AMERICAN): >60 ML/MIN/1.73 M^2
GLUCOSE SERPL-MCNC: 145 MG/DL (ref 70–110)
GLUCOSE SERPL-MCNC: 302 MG/DL (ref 70–110)
HCT VFR BLD AUTO: 41.5 % (ref 40–54)
HGB BLD-MCNC: 14.6 G/DL (ref 14–18)
IMM GRANULOCYTES # BLD AUTO: 0.04 K/UL (ref 0–0.04)
IMM GRANULOCYTES NFR BLD AUTO: 0.6 % (ref 0–0.5)
LYMPHOCYTES # BLD AUTO: 2.4 K/UL (ref 1–4.8)
LYMPHOCYTES NFR BLD: 37.2 % (ref 18–48)
MAGNESIUM SERPL-MCNC: 1.7 MG/DL (ref 1.6–2.6)
MCH RBC QN AUTO: 34.8 PG (ref 27–31)
MCHC RBC AUTO-ENTMCNC: 35.2 G/DL (ref 32–36)
MCV RBC AUTO: 99 FL (ref 82–98)
MONOCYTES # BLD AUTO: 0.8 K/UL (ref 0.3–1)
MONOCYTES NFR BLD: 12.3 % (ref 4–15)
NEUTROPHILS # BLD AUTO: 3.1 K/UL (ref 1.8–7.7)
NEUTROPHILS NFR BLD: 49.4 % (ref 38–73)
NRBC BLD-RTO: 0 /100 WBC
PHOSPHATE SERPL-MCNC: 3.7 MG/DL (ref 2.7–4.5)
PLATELET # BLD AUTO: 235 K/UL (ref 150–450)
PMV BLD AUTO: 11 FL (ref 9.2–12.9)
POCT GLUCOSE: 137 MG/DL (ref 70–110)
POCT GLUCOSE: 228 MG/DL (ref 70–110)
POCT GLUCOSE: 306 MG/DL (ref 70–110)
POTASSIUM SERPL-SCNC: 3.5 MMOL/L (ref 3.5–5.1)
POTASSIUM SERPL-SCNC: 3.9 MMOL/L (ref 3.5–5.1)
PROT SERPL-MCNC: 5.7 G/DL (ref 6–8.4)
PROT SERPL-MCNC: 6.2 G/DL (ref 6–8.4)
RBC # BLD AUTO: 4.19 M/UL (ref 4.6–6.2)
SODIUM SERPL-SCNC: 134 MMOL/L (ref 136–145)
SODIUM SERPL-SCNC: 136 MMOL/L (ref 136–145)
WBC # BLD AUTO: 6.34 K/UL (ref 3.9–12.7)

## 2022-01-07 PROCEDURE — 94761 N-INVAS EAR/PLS OXIMETRY MLT: CPT

## 2022-01-07 PROCEDURE — 99232 PR SUBSEQUENT HOSPITAL CARE,LEVL II: ICD-10-PCS | Mod: ,,, | Performed by: NURSE PRACTITIONER

## 2022-01-07 PROCEDURE — 80053 COMPREHEN METABOLIC PANEL: CPT | Performed by: INTERNAL MEDICINE

## 2022-01-07 PROCEDURE — 36415 COLL VENOUS BLD VENIPUNCTURE: CPT | Performed by: INTERNAL MEDICINE

## 2022-01-07 PROCEDURE — 63600175 PHARM REV CODE 636 W HCPCS: Performed by: INTERNAL MEDICINE

## 2022-01-07 PROCEDURE — 25000003 PHARM REV CODE 250: Performed by: NURSE PRACTITIONER

## 2022-01-07 PROCEDURE — 99232 SBSQ HOSP IP/OBS MODERATE 35: CPT | Mod: ,,, | Performed by: NURSE PRACTITIONER

## 2022-01-07 PROCEDURE — 80053 COMPREHEN METABOLIC PANEL: CPT | Mod: 91 | Performed by: STUDENT IN AN ORGANIZED HEALTH CARE EDUCATION/TRAINING PROGRAM

## 2022-01-07 PROCEDURE — 99900035 HC TECH TIME PER 15 MIN (STAT)

## 2022-01-07 PROCEDURE — 85025 COMPLETE CBC W/AUTO DIFF WBC: CPT | Performed by: INTERNAL MEDICINE

## 2022-01-07 PROCEDURE — 94640 AIRWAY INHALATION TREATMENT: CPT

## 2022-01-07 PROCEDURE — C9399 UNCLASSIFIED DRUGS OR BIOLOG: HCPCS | Performed by: INTERNAL MEDICINE

## 2022-01-07 PROCEDURE — 25000003 PHARM REV CODE 250: Performed by: STUDENT IN AN ORGANIZED HEALTH CARE EDUCATION/TRAINING PROGRAM

## 2022-01-07 PROCEDURE — 84100 ASSAY OF PHOSPHORUS: CPT | Performed by: INTERNAL MEDICINE

## 2022-01-07 PROCEDURE — 99232 SBSQ HOSP IP/OBS MODERATE 35: CPT | Mod: CR,,, | Performed by: STUDENT IN AN ORGANIZED HEALTH CARE EDUCATION/TRAINING PROGRAM

## 2022-01-07 PROCEDURE — 27000221 HC OXYGEN, UP TO 24 HOURS

## 2022-01-07 PROCEDURE — C9399 UNCLASSIFIED DRUGS OR BIOLOG: HCPCS | Performed by: NURSE PRACTITIONER

## 2022-01-07 PROCEDURE — 99232 PR SUBSEQUENT HOSPITAL CARE,LEVL II: ICD-10-PCS | Mod: CR,,, | Performed by: STUDENT IN AN ORGANIZED HEALTH CARE EDUCATION/TRAINING PROGRAM

## 2022-01-07 PROCEDURE — 25000003 PHARM REV CODE 250: Performed by: INTERNAL MEDICINE

## 2022-01-07 PROCEDURE — 83735 ASSAY OF MAGNESIUM: CPT | Performed by: INTERNAL MEDICINE

## 2022-01-07 PROCEDURE — 12000002 HC ACUTE/MED SURGE SEMI-PRIVATE ROOM

## 2022-01-07 PROCEDURE — 27000207 HC ISOLATION

## 2022-01-07 PROCEDURE — 36415 COLL VENOUS BLD VENIPUNCTURE: CPT | Performed by: STUDENT IN AN ORGANIZED HEALTH CARE EDUCATION/TRAINING PROGRAM

## 2022-01-07 RX ORDER — INSULIN ASPART 100 [IU]/ML
24 INJECTION, SOLUTION INTRAVENOUS; SUBCUTANEOUS
Status: DISCONTINUED | OUTPATIENT
Start: 2022-01-07 | End: 2022-01-08 | Stop reason: HOSPADM

## 2022-01-07 RX ORDER — ALBUTEROL SULFATE 90 UG/1
2 AEROSOL, METERED RESPIRATORY (INHALATION) EVERY 6 HOURS PRN
Status: DISCONTINUED | OUTPATIENT
Start: 2022-01-07 | End: 2022-01-08

## 2022-01-07 RX ADMIN — INSULIN ASPART 24 UNITS: 100 INJECTION, SOLUTION INTRAVENOUS; SUBCUTANEOUS at 12:01

## 2022-01-07 RX ADMIN — CARVEDILOL 12.5 MG: 12.5 TABLET, FILM COATED ORAL at 09:01

## 2022-01-07 RX ADMIN — BENZONATATE 200 MG: 100 CAPSULE ORAL at 04:01

## 2022-01-07 RX ADMIN — INSULIN DETEMIR 36 UNITS: 100 INJECTION, SOLUTION SUBCUTANEOUS at 09:01

## 2022-01-07 RX ADMIN — INSULIN ASPART 6 UNITS: 100 INJECTION, SOLUTION INTRAVENOUS; SUBCUTANEOUS at 09:01

## 2022-01-07 RX ADMIN — Medication 500 MG: at 09:01

## 2022-01-07 RX ADMIN — ENOXAPARIN SODIUM 90 MG: 100 INJECTION SUBCUTANEOUS at 09:01

## 2022-01-07 RX ADMIN — DEXAMETHASONE 6 MG: 4 TABLET ORAL at 09:01

## 2022-01-07 RX ADMIN — INSULIN ASPART 1 UNITS: 100 INJECTION, SOLUTION INTRAVENOUS; SUBCUTANEOUS at 09:01

## 2022-01-07 RX ADMIN — ALBUTEROL SULFATE 2 PUFF: 108 INHALANT RESPIRATORY (INHALATION) at 01:01

## 2022-01-07 RX ADMIN — ALBUTEROL SULFATE 2 PUFF: 108 INHALANT RESPIRATORY (INHALATION) at 08:01

## 2022-01-07 RX ADMIN — DIPHENHYDRAMINE HYDROCHLORIDE 25 MG: 25 CAPSULE ORAL at 09:01

## 2022-01-07 RX ADMIN — MUPIROCIN: 20 OINTMENT TOPICAL at 09:01

## 2022-01-07 RX ADMIN — ALBUTEROL SULFATE 2 PUFF: 108 INHALANT RESPIRATORY (INHALATION) at 02:01

## 2022-01-07 RX ADMIN — BENZONATATE 200 MG: 100 CAPSULE ORAL at 09:01

## 2022-01-07 RX ADMIN — INSULIN ASPART 4 UNITS: 100 INJECTION, SOLUTION INTRAVENOUS; SUBCUTANEOUS at 12:01

## 2022-01-07 RX ADMIN — LISINOPRIL 10 MG: 10 TABLET ORAL at 09:01

## 2022-01-07 RX ADMIN — INSULIN ASPART 24 UNITS: 100 INJECTION, SOLUTION INTRAVENOUS; SUBCUTANEOUS at 09:01

## 2022-01-07 RX ADMIN — GUAIFENESIN 600 MG: 600 TABLET, EXTENDED RELEASE ORAL at 09:01

## 2022-01-07 RX ADMIN — MULTIPLE VITAMINS W/ MINERALS TAB 1 TABLET: TAB at 09:01

## 2022-01-07 RX ADMIN — REMDESIVIR 100 MG: 100 INJECTION, POWDER, LYOPHILIZED, FOR SOLUTION INTRAVENOUS at 09:01

## 2022-01-07 RX ADMIN — INSULIN ASPART 8 UNITS: 100 INJECTION, SOLUTION INTRAVENOUS; SUBCUTANEOUS at 04:01

## 2022-01-07 RX ADMIN — INSULIN ASPART 24 UNITS: 100 INJECTION, SOLUTION INTRAVENOUS; SUBCUTANEOUS at 04:01

## 2022-01-07 NOTE — PLAN OF CARE
Problem: Adult Inpatient Plan of Care  Goal: Plan of Care Review  Outcome: Ongoing, Not Progressing  Goal: Optimal Comfort and Wellbeing  Outcome: Ongoing, Not Progressing     Problem: Gas Exchange Impaired  Goal: Optimal Gas Exchange  Outcome: Ongoing, Progressing     AAOx4. Six minute walk eval completed. 02 sats wnl. Continues ivf. No s/s of respiratory distress.

## 2022-01-07 NOTE — SUBJECTIVE & OBJECTIVE
"Interval HPI:   Overnight events: No acute events overnight. Patient on the IMTA unit in room 98731/97472 A. Blood glucose improving. BG at and above goal on current insulin regimen. Steroid use- dexamethasone 6 mg.    Renal function- Normal  Vasopressors-  None      Diet diabetic [facility];  Calorie     Eatin%  Nausea: No  Hypoglycemia and intervention: No  Fever: No  TPN and/or TF: No      BP (!) 142/89 (BP Location: Left arm, Patient Position: Lying)   Pulse 80   Temp 96 °F (35.6 °C) (Axillary)   Resp 16   Ht 5' 5" (1.651 m)   Wt 89.8 kg (198 lb)   SpO2 (!) 92%   BMI 32.95 kg/m²     Labs Reviewed and Include    Recent Labs   Lab 22   *   CALCIUM 8.2*   ALBUMIN 2.1*   PROT 5.7*      K 3.5   CO2 25      BUN 14   CREATININE 0.6   ALKPHOS 299*   ALT 92*   *   BILITOT 0.7     Lab Results   Component Value Date    WBC 6.34 2022    HGB 14.6 2022    HCT 41.5 2022    MCV 99 (H) 2022     2022     Recent Labs   Lab 22  1734   TSH 0.947     Lab Results   Component Value Date    HGBA1C 12.6 (H) 2022       Nutritional status:   Body mass index is 32.95 kg/m².  Lab Results   Component Value Date    ALBUMIN 2.1 (L) 2022    ALBUMIN 2.1 (L) 2022    ALBUMIN 2.0 (L) 2022     No results found for: PREALBUMIN    Estimated Creatinine Clearance: 182 mL/min (based on SCr of 0.6 mg/dL).    Accu-Checks  Recent Labs     22  0726 22  0937 22  1309 22  1817 22  2301 22  0737 22  1237 22  1749 22  2056 22  0833   POCTGLUCOSE 291* 261* 192* 417* 270* 281* 398* 400* 273* 137*       Current Medications and/or Treatments Impacting Glycemic Control  Immunotherapy:    Immunosuppressants     None        Steroids:   Hormones (From admission, onward)            Start     Stop Route Frequency Ordered    22 0900  dexAMETHasone tablet 6 mg         01/15 0859 " Oral Daily 01/04/22 1637    01/04/22 1641  melatonin tablet 6 mg         -- Oral Nightly PRN 01/04/22 1634        Pressors:    Autonomic Drugs (From admission, onward)            None        Hyperglycemia/Diabetes Medications:   Antihyperglycemics (From admission, onward)            Start     Stop Route Frequency Ordered    01/07/22 0900  insulin detemir U-100 pen 36 Units         -- SubQ 2 times daily 01/07/22 0856    01/07/22 0900  insulin aspart U-100 pen 24 Units         -- SubQ 3 times daily with meals 01/07/22 0856    01/04/22 1731  insulin aspart U-100 pen 1-10 Units         -- SubQ Before meals & nightly PRN 01/04/22 1637

## 2022-01-07 NOTE — PROGRESS NOTES
[facility] ([place] [address])  Endocrinology  Progress Note    Admit Date: 1/4/2022     Reason for Consult: Management of T2DM, Hyperglycemia     Diabetes diagnosis year: 2008    Home Diabetes Medications: Trulicity 1.5 mg (Previously taking, but stopped by new PCP Metformin 1000 mg BID; Jardiance 10 mg; Novolog 18 units TIDWM; Lantus 26 units BID)    How often checking glucose at home?  Once or twice a month    BG readings on regimen: 300-400's  Hypoglycemia on the regimen?  No  Missed doses on regimen?  Yes (Insurance approval difficulties)     Diabetes Complications include:     Hyperglycemia, Diabetic retinopathy , and Diabetic peripheral neuropathy     Complicating diabetes co morbidities:   DIMA    Hemoglobin A1C   Date Value Ref Range Status   01/04/2022 12.6 (H) 4.0 - 5.6 % Final     Comment:     ADA Screening Guidelines:  5.7-6.4%  Consistent with prediabetes  >or=6.5%  Consistent with diabetes    High levels of fetal hemoglobin interfere with the HbA1C  assay. Heterozygous hemoglobin variants (HbS, HgC, etc)do  not significantly interfere with this assay.   However, presence of multiple variants may affect accuracy.     08/25/2021 13.7 (H) 4.7 - 5.6 % Final   08/02/2020 13.9 (H) 4.0 - 5.6 % Final     Comment:     ADA Screening Guidelines:  5.7-6.4%  Consistent with prediabetes  >or=6.5%  Consistent with diabetes  High levels of fetal hemoglobin interfere with the HbA1C  assay. Heterozygous hemoglobin variants (HbS, HgC, etc)do  not significantly interfere with this assay.   However, presence of multiple variants may affect accuracy.     08/03/2014 13.0 (H) 4.5 - 6.2 % Final         HPI:   Mr. Woodall is a 31 year old gentleman with PMH of DM2 with medication non-compliance, h/o DKA (8/2020) and h/o alcohol abuse who presented to [facility] via EMS for covid symptoms. Patient reports that him and his wife got tested on Monday 12/27/21 in preparation for a dinner outing and were found to be COVID  "positive. They quarantined since then and patient's COVID symptoms continued to worsen. Patient reports fever, chest pain, coughing, dizziness and generalized weakness. Patient was advised by triage nurse to call EMS and patient was transported to [facility]. In the ED: afebrile, BP 100s-140s/60s-80s, O@ sat 85% on room air and patient placed on 2L NC with improvement in O2 sat to 97%. CBC wnl and CMP significant for Na 127, AST/ALT 99/88, alk phos 285, . Glucose 308. COVID test positive. Patient was given one dose of dexamethasone 6 mg. CXR showed multifocal pneumonia. He was admitted to hospital medicine service for further evaluation and management of acute hypoxic respiratory failure due to COVID PNA. Endocrine consulted to manage hyperglycemia and type 2 diabetes. Per patient he had recently seen a healthcare provider who took him off all of his medications (listed above); and just started Trulicity. Patient will need major adjustments at discharge.             Interval HPI:   Overnight events: No acute events overnight. Patient on the IMTA unit in room 69371/74650 A. Blood glucose improving. BG at and above goal on current insulin regimen. Steroid use- dexamethasone 6 mg.    Renal function- Normal  Vasopressors-  None      Diet diabetic [facility];  Calorie     Eatin%  Nausea: No  Hypoglycemia and intervention: No  Fever: No  TPN and/or TF: No      BP (!) 142/89 (BP Location: Left arm, Patient Position: Lying)   Pulse 80   Temp 96 °F (35.6 °C) (Axillary)   Resp 16   Ht 5' 5" (1.651 m)   Wt 89.8 kg (198 lb)   SpO2 (!) 92%   BMI 32.95 kg/m²     Labs Reviewed and Include    Recent Labs   Lab 22  0402   *   CALCIUM 8.2*   ALBUMIN 2.1*   PROT 5.7*      K 3.5   CO2 25      BUN 14   CREATININE 0.6   ALKPHOS 299*   ALT 92*   *   BILITOT 0.7     Lab Results   Component Value Date    WBC 6.34 2022    HGB 14.6 2022    HCT 41.5 2022    MCV 99 " (H) 01/07/2022     01/07/2022     Recent Labs   Lab 01/04/22  1734   TSH 0.947     Lab Results   Component Value Date    HGBA1C 12.6 (H) 01/04/2022       Nutritional status:   Body mass index is 32.95 kg/m².  Lab Results   Component Value Date    ALBUMIN 2.1 (L) 01/07/2022    ALBUMIN 2.1 (L) 01/06/2022    ALBUMIN 2.0 (L) 01/05/2022     No results found for: PREALBUMIN    Estimated Creatinine Clearance: 182 mL/min (based on SCr of 0.6 mg/dL).    Accu-Checks  Recent Labs     01/05/22  0726 01/05/22  0937 01/05/22  1309 01/05/22  1817 01/05/22  2301 01/06/22  0737 01/06/22  1237 01/06/22  1749 01/06/22  2056 01/07/22  0833   POCTGLUCOSE 291* 261* 192* 417* 270* 281* 398* 400* 273* 137*       Current Medications and/or Treatments Impacting Glycemic Control  Immunotherapy:    Immunosuppressants     None        Steroids:   Hormones (From admission, onward)            Start     Stop Route Frequency Ordered    01/05/22 0900  dexAMETHasone tablet 6 mg         01/15 0859 Oral Daily 01/04/22 1637    01/04/22 1641  melatonin tablet 6 mg         -- Oral Nightly PRN 01/04/22 1634        Pressors:    Autonomic Drugs (From admission, onward)            None        Hyperglycemia/Diabetes Medications:   Antihyperglycemics (From admission, onward)            Start     Stop Route Frequency Ordered    01/07/22 0900  insulin detemir U-100 pen 36 Units         -- SubQ 2 times daily 01/07/22 0856    01/07/22 0900  insulin aspart U-100 pen 24 Units         -- SubQ 3 times daily with meals 01/07/22 0856    01/04/22 1731  insulin aspart U-100 pen 1-10 Units         -- SubQ Before meals & nightly PRN 01/04/22 1637          ASSESSMENT and PLAN    * Acute hypoxemic respiratory failure due to COVID-19  Infection may elevate BG readings  On IV antiviral  Managed per primary team  Avoid hypoglycemia        Type 2 diabetes mellitus with hyperglycemia  Endocrinology consulted for BG management.   BG goal 140-180    - Levemir Flex Pen 36 units  BID (20% increase due to FBG above goal)  - Novolog (aspart) insulin 24 Units SQ TIDWM and prn for BG excursions MDC (150/25) (20% increase due to prandial BG excursions).   - BG checks AC/HS      ** Please notify Endocrine for any change and/or advance in diet**  ** Please call Endocrine for any BG related issues **    Discharge Planning:   TBD. Please notify endocrinology prior to discharge.        Alcohol use disorder, severe, dependence  Managed per primary team  Avoid hypoglycemia             Pedro Leal, DNP, FNP  Endocrinology  [facility] ([place])

## 2022-01-07 NOTE — PROGRESS NOTES
[facility] ([place] [address])  Endocrinology  Progress Note    Admit Date: 1/4/2022     Reason for Consult: Management of T2DM, Hyperglycemia     Diabetes diagnosis year: 2008    Home Diabetes Medications: Trulicity 1.5 mg (Previously taking, but stopped by new PCP Metformin 1000 mg BID; Jardiance 10 mg; Novolog 18 units TIDWM; Lantus 26 units BID)    How often checking glucose at home?  Once or twice a month    BG readings on regimen: 300-400's  Hypoglycemia on the regimen?  No  Missed doses on regimen?  Yes (Insurance approval difficulties)     Diabetes Complications include:     Hyperglycemia, Diabetic retinopathy , and Diabetic peripheral neuropathy     Complicating diabetes co morbidities:   DIMA    Hemoglobin A1C   Date Value Ref Range Status   01/04/2022 12.6 (H) 4.0 - 5.6 % Final     Comment:     ADA Screening Guidelines:  5.7-6.4%  Consistent with prediabetes  >or=6.5%  Consistent with diabetes    High levels of fetal hemoglobin interfere with the HbA1C  assay. Heterozygous hemoglobin variants (HbS, HgC, etc)do  not significantly interfere with this assay.   However, presence of multiple variants may affect accuracy.     08/25/2021 13.7 (H) 4.7 - 5.6 % Final   08/02/2020 13.9 (H) 4.0 - 5.6 % Final     Comment:     ADA Screening Guidelines:  5.7-6.4%  Consistent with prediabetes  >or=6.5%  Consistent with diabetes  High levels of fetal hemoglobin interfere with the HbA1C  assay. Heterozygous hemoglobin variants (HbS, HgC, etc)do  not significantly interfere with this assay.   However, presence of multiple variants may affect accuracy.     08/03/2014 13.0 (H) 4.5 - 6.2 % Final         HPI:   Mr. Woodall is a 31 year old gentleman with PMH of DM2 with medication non-compliance, h/o DKA (8/2020) and h/o alcohol abuse who presented to [facility]ED via [place] for covid symptoms. Patient reports that him and his wife got tested on Monday 12/27/21 in preparation for a dinner outing and were found to be COVID  "positive. They quarantined since then and patient's COVID symptoms continued to worsen. Patient reports fever, chest pain, coughing, dizziness and generalized weakness. Patient was advised by triage nurse to call EMS and patient was transported to [facility]. In the ED: afebrile, BP 100s-140s/60s-80s, O@ sat 85% on room air and patient placed on 2L NC with improvement in O2 sat to 97%. CBC wnl and CMP significant for Na 127, AST/ALT 99/88, alk phos 285, . Glucose 308. COVID test positive. Patient was given one dose of dexamethasone 6 mg. CXR showed multifocal pneumonia. He was admitted to hospital medicine service for further evaluation and management of acute hypoxic respiratory failure due to COVID PNA. Endocrine consulted to manage hyperglycemia and type 2 diabetes. Per patient he had recently seen a healthcare provider who took him off all of his medications (listed above); and just started Trulicity. Patient will need major adjustments at discharge.             Interval HPI:   Overnight events: No acute events overnight. Patient on the IMTA unit in room 30057/36777 A. Blood glucose worsening. BG above goal on current insulin regimen. Steroid use- Dexamethasone 6 mg qd.    Renal function- Normal  Vasopressors-  None      Diet diabetic [facility];  Calorie     Eatin%  Nausea: No  Hypoglycemia and intervention: No  Fever: No  TPN and/or TF: No      BP (!) 161/99 (BP Location: Left arm, Patient Position: Lying)   Pulse 80   Temp 97.7 °F (36.5 °C) (Oral)   Resp 18   Ht 5' 5" (1.651 m)   Wt 89.8 kg (198 lb)   SpO2 95%   BMI 32.95 kg/m²     Labs Reviewed and Include    Recent Labs   Lab 22  0306   *   CALCIUM 8.2*   ALBUMIN 2.1*   PROT 5.9*   *   K 3.8   CO2 21*      BUN 21*   CREATININE 0.7   ALKPHOS 308*   ALT 69*   AST 82*   BILITOT 0.6     Lab Results   Component Value Date    WBC 6.54 2022    HGB 14.7 2022    HCT 40.1 2022     (H) " 01/06/2022     01/06/2022     Recent Labs   Lab 01/04/22  1734   TSH 0.947     Lab Results   Component Value Date    HGBA1C 12.6 (H) 01/04/2022       Nutritional status:   Body mass index is 32.95 kg/m².  Lab Results   Component Value Date    ALBUMIN 2.1 (L) 01/06/2022    ALBUMIN 2.0 (L) 01/05/2022    ALBUMIN 2.3 (L) 01/04/2022     No results found for: PREALBUMIN    Estimated Creatinine Clearance: 156 mL/min (based on SCr of 0.7 mg/dL).    Accu-Checks  Recent Labs     01/04/22  2157 01/05/22  0452 01/05/22  0726 01/05/22  0937 01/05/22  1309 01/05/22  1817 01/05/22  2301 01/06/22  0737 01/06/22  1237 01/06/22  1749   POCTGLUCOSE 374* 315* 291* 261* 192* 417* 270* 281* 398* 400*       Current Medications and/or Treatments Impacting Glycemic Control  Immunotherapy:    Immunosuppressants     None        Steroids:   Hormones (From admission, onward)            Start     Stop Route Frequency Ordered    01/05/22 0900  dexAMETHasone tablet 6 mg         01/15 0859 Oral Daily 01/04/22 1637    01/04/22 1641  melatonin tablet 6 mg         -- Oral Nightly PRN 01/04/22 1634        Pressors:    Autonomic Drugs (From admission, onward)            None        Hyperglycemia/Diabetes Medications:   Antihyperglycemics (From admission, onward)            Start     Stop Route Frequency Ordered    01/06/22 1130  insulin aspart U-100 pen 20 Units         -- SubQ 3 times daily with meals 01/06/22 0930    01/06/22 0945  insulin detemir U-100 pen 30 Units         -- SubQ 2 times daily 01/06/22 0930    01/04/22 1731  insulin aspart U-100 pen 1-10 Units         -- SubQ Before meals & nightly PRN 01/04/22 1637          ASSESSMENT and PLAN    * Acute hypoxemic respiratory failure due to COVID-19  Infection may elevate BG readings  On IV antiviral  Managed per primary team  Avoid hypoglycemia        Type 2 diabetes mellitus with hyperglycemia  Endocrinology consulted for BG management.   BG goal 140-180    - Levemir Flex Pen 30 units BID  (20% increase due to FBG above goal)  - Novolog (aspart) insulin 20 Units SQ TIDWM and prn for BG excursions MDC (150/25)  - BG checks AC/HS      ** Please notify Endocrine for any change and/or advance in diet**  ** Please call Endocrine for any BG related issues **    Discharge Planning:   TBD. Please notify endocrinology prior to discharge.        Alcohol use disorder, severe, dependence  Managed per primary team  Avoid hypoglycemia             Pedro Leal, DNP, FNP  Endocrinology  [facility] - [facility] ([facility]

## 2022-01-07 NOTE — ASSESSMENT & PLAN NOTE
Endocrinology consulted for BG management.   BG goal 140-180    - Levemir Flex Pen 30 units BID (20% increase due to FBG above goal)  - Novolog (aspart) insulin 20 Units SQ TIDWM and prn for BG excursions [facility] (150/25)  - BG checks AC/HS      ** Please notify Endocrine for any change and/or advance in diet**  ** Please call Endocrine for any BG related issues **    Discharge Planning:   TBD. Please notify endocrinology prior to discharge.

## 2022-01-07 NOTE — SUBJECTIVE & OBJECTIVE
"Interval HPI:   Overnight events: No acute events overnight. Patient on the IMTA unit in room 56523/65686 A. Blood glucose worsening. BG above goal on current insulin regimen. Steroid use- Dexamethasone 6 mg qd.    Renal function- Normal  Vasopressors-  None      Diet diabetic [facility];  Calorie     Eatin%  Nausea: No  Hypoglycemia and intervention: No  Fever: No  TPN and/or TF: No      BP (!) 161/99 (BP Location: Left arm, Patient Position: Lying)   Pulse 80   Temp 97.7 °F (36.5 °C) (Oral)   Resp 18   Ht 5' 5" (1.651 m)   Wt 89.8 kg (198 lb)   SpO2 95%   BMI 32.95 kg/m²     Labs Reviewed and Include    Recent Labs   Lab 22  0306   *   CALCIUM 8.2*   ALBUMIN 2.1*   PROT 5.9*   *   K 3.8   CO2 21*      BUN 21*   CREATININE 0.7   ALKPHOS 308*   ALT 69*   AST 82*   BILITOT 0.6     Lab Results   Component Value Date    WBC 6.54 2022    HGB 14.7 2022    HCT 40.1 2022     (H) 2022     2022     Recent Labs   Lab 22  1734   TSH 0.947     Lab Results   Component Value Date    HGBA1C 12.6 (H) 2022       Nutritional status:   Body mass index is 32.95 kg/m².  Lab Results   Component Value Date    ALBUMIN 2.1 (L) 2022    ALBUMIN 2.0 (L) 2022    ALBUMIN 2.3 (L) 2022     No results found for: PREALBUMIN    Estimated Creatinine Clearance: 156 mL/min (based on SCr of 0.7 mg/dL).    Accu-Checks  Recent Labs     22  2157 22  0452 22  0726 22  0937 22  1309 22  1817 22  2301 22  0737 22  1237 22  1749   POCTGLUCOSE 374* 315* 291* 261* 192* 417* 270* 281* 398* 400*       Current Medications and/or Treatments Impacting Glycemic Control  Immunotherapy:    Immunosuppressants     None        Steroids:   Hormones (From admission, onward)            Start     Stop Route Frequency Ordered    22 0900  dexAMETHasone tablet 6 mg         01/15 0859 Oral Daily " 01/04/22 1637    01/04/22 1641  melatonin tablet 6 mg         -- Oral Nightly PRN 01/04/22 1634        Pressors:    Autonomic Drugs (From admission, onward)            None        Hyperglycemia/Diabetes Medications:   Antihyperglycemics (From admission, onward)            Start     Stop Route Frequency Ordered    01/06/22 1130  insulin aspart U-100 pen 20 Units         -- SubQ 3 times daily with meals 01/06/22 0930    01/06/22 0945  insulin detemir U-100 pen 30 Units         -- SubQ 2 times daily 01/06/22 0930    01/04/22 1731  insulin aspart U-100 pen 1-10 Units         -- SubQ Before meals & nightly PRN 01/04/22 1637

## 2022-01-07 NOTE — PLAN OF CARE
CM was informed by Dr. Potts that the patient may be medically stable to discharge home this afternoon if LFTs improve. Patient did not qualify for home O2.     Hospital follow up appointment scheduled for the patient with Dr. Nakul Jackson (PCP at [facility]) on 1/12/2022 at 0930.      Will continue to follow.

## 2022-01-07 NOTE — PLAN OF CARE
Problem: Adult Inpatient Plan of Care  Goal: Plan of Care Review  Outcome: Ongoing, Progressing  Goal: Optimal Comfort and Wellbeing  Outcome: Ongoing, Progressing  Goal: Readiness for Transition of Care  Outcome: Ongoing, Progressing     Problem: Diabetes Comorbidity  Goal: Blood Glucose Level Within Targeted Range  Outcome: Ongoing, Progressing

## 2022-01-07 NOTE — ASSESSMENT & PLAN NOTE
Endocrinology consulted for BG management.   BG goal 140-180    - Levemir Flex Pen 36 units BID (20% increase due to FBG above goal)  - Novolog (aspart) insulin 24 Units SQ TIDWM and prn for BG excursions MDC (150/25) (20% increase due to prandial BG excursions).   - BG checks AC/HS      ** Please notify Endocrine for any change and/or advance in diet**  ** Please call Endocrine for any BG related issues **    Discharge Planning:   TBD. Please notify endocrinology prior to discharge.

## 2022-01-08 ENCOUNTER — TELEPHONE (OUTPATIENT)
Dept: ENDOCRINOLOGY | Facility: HOSPITAL | Age: 32
End: 2022-01-08

## 2022-01-08 ENCOUNTER — PATIENT MESSAGE (OUTPATIENT)
Dept: ENDOCRINOLOGY | Facility: HOSPITAL | Age: 32
End: 2022-01-08
Payer: MEDICAID

## 2022-01-08 VITALS
TEMPERATURE: 98 F | WEIGHT: 198 LBS | RESPIRATION RATE: 19 BRPM | BODY MASS INDEX: 32.99 KG/M2 | HEIGHT: 65 IN | OXYGEN SATURATION: 86 % | DIASTOLIC BLOOD PRESSURE: 70 MMHG | HEART RATE: 78 BPM | SYSTOLIC BLOOD PRESSURE: 147 MMHG

## 2022-01-08 DIAGNOSIS — Z79.4 TYPE 2 DIABETES MELLITUS WITH HYPERGLYCEMIA, WITH LONG-TERM CURRENT USE OF INSULIN: Primary | ICD-10-CM

## 2022-01-08 DIAGNOSIS — E11.65 TYPE 2 DIABETES MELLITUS WITH HYPERGLYCEMIA, WITH LONG-TERM CURRENT USE OF INSULIN: Primary | ICD-10-CM

## 2022-01-08 LAB
ALBUMIN SERPL BCP-MCNC: 2.2 G/DL (ref 3.5–5.2)
ALP SERPL-CCNC: 279 U/L (ref 55–135)
ALT SERPL W/O P-5'-P-CCNC: 137 U/L (ref 10–44)
ANION GAP SERPL CALC-SCNC: 7 MMOL/L (ref 8–16)
AST SERPL-CCNC: 185 U/L (ref 10–40)
BILIRUB SERPL-MCNC: 0.9 MG/DL (ref 0.1–1)
BUN SERPL-MCNC: 9 MG/DL (ref 6–20)
CALCIUM SERPL-MCNC: 8.1 MG/DL (ref 8.7–10.5)
CHLORIDE SERPL-SCNC: 102 MMOL/L (ref 95–110)
CO2 SERPL-SCNC: 26 MMOL/L (ref 23–29)
CREAT SERPL-MCNC: 0.5 MG/DL (ref 0.5–1.4)
EST. GFR  (AFRICAN AMERICAN): >60 ML/MIN/1.73 M^2
EST. GFR  (NON AFRICAN AMERICAN): >60 ML/MIN/1.73 M^2
GLUCOSE SERPL-MCNC: 89 MG/DL (ref 70–110)
POCT GLUCOSE: 101 MG/DL (ref 70–110)
POCT GLUCOSE: 200 MG/DL (ref 70–110)
POCT GLUCOSE: 228 MG/DL (ref 70–110)
POTASSIUM SERPL-SCNC: 3.4 MMOL/L (ref 3.5–5.1)
PROT SERPL-MCNC: 5.7 G/DL (ref 6–8.4)
SODIUM SERPL-SCNC: 135 MMOL/L (ref 136–145)

## 2022-01-08 PROCEDURE — 99900035 HC TECH TIME PER 15 MIN (STAT)

## 2022-01-08 PROCEDURE — 86704 HEP B CORE ANTIBODY TOTAL: CPT | Performed by: STUDENT IN AN ORGANIZED HEALTH CARE EDUCATION/TRAINING PROGRAM

## 2022-01-08 PROCEDURE — 63600175 PHARM REV CODE 636 W HCPCS: Performed by: INTERNAL MEDICINE

## 2022-01-08 PROCEDURE — 87340 HEPATITIS B SURFACE AG IA: CPT | Performed by: STUDENT IN AN ORGANIZED HEALTH CARE EDUCATION/TRAINING PROGRAM

## 2022-01-08 PROCEDURE — 80053 COMPREHEN METABOLIC PANEL: CPT | Performed by: STUDENT IN AN ORGANIZED HEALTH CARE EDUCATION/TRAINING PROGRAM

## 2022-01-08 PROCEDURE — 25000003 PHARM REV CODE 250: Performed by: INTERNAL MEDICINE

## 2022-01-08 PROCEDURE — 99239 PR HOSPITAL DISCHARGE DAY,>30 MIN: ICD-10-PCS | Mod: CR,,, | Performed by: STUDENT IN AN ORGANIZED HEALTH CARE EDUCATION/TRAINING PROGRAM

## 2022-01-08 PROCEDURE — 94761 N-INVAS EAR/PLS OXIMETRY MLT: CPT

## 2022-01-08 PROCEDURE — 36415 COLL VENOUS BLD VENIPUNCTURE: CPT | Performed by: STUDENT IN AN ORGANIZED HEALTH CARE EDUCATION/TRAINING PROGRAM

## 2022-01-08 PROCEDURE — 99239 HOSP IP/OBS DSCHRG MGMT >30: CPT | Mod: CR,,, | Performed by: STUDENT IN AN ORGANIZED HEALTH CARE EDUCATION/TRAINING PROGRAM

## 2022-01-08 PROCEDURE — 99232 SBSQ HOSP IP/OBS MODERATE 35: CPT | Mod: ,,, | Performed by: NURSE PRACTITIONER

## 2022-01-08 PROCEDURE — 99232 PR SUBSEQUENT HOSPITAL CARE,LEVL II: ICD-10-PCS | Mod: ,,, | Performed by: NURSE PRACTITIONER

## 2022-01-08 PROCEDURE — 25000003 PHARM REV CODE 250: Performed by: STUDENT IN AN ORGANIZED HEALTH CARE EDUCATION/TRAINING PROGRAM

## 2022-01-08 PROCEDURE — 86706 HEP B SURFACE ANTIBODY: CPT | Performed by: STUDENT IN AN ORGANIZED HEALTH CARE EDUCATION/TRAINING PROGRAM

## 2022-01-08 RX ORDER — ALBUTEROL SULFATE 90 UG/1
2 AEROSOL, METERED RESPIRATORY (INHALATION) EVERY 6 HOURS PRN
Status: DISCONTINUED | OUTPATIENT
Start: 2022-01-08 | End: 2022-01-08 | Stop reason: HOSPADM

## 2022-01-08 RX ORDER — INSULIN ASPART 100 [IU]/ML
1-10 INJECTION, SOLUTION INTRAVENOUS; SUBCUTANEOUS
Qty: 15 ML | Refills: 11 | Status: ON HOLD | OUTPATIENT
Start: 2022-01-08 | End: 2022-03-23 | Stop reason: HOSPADM

## 2022-01-08 RX ORDER — GUAIFENESIN 600 MG/1
600 TABLET, EXTENDED RELEASE ORAL 2 TIMES DAILY
Qty: 20 TABLET | Refills: 0 | Status: SHIPPED | OUTPATIENT
Start: 2022-01-08 | End: 2022-01-18

## 2022-01-08 RX ORDER — GLUCAGON HYDROCHLORIDE 1 MG
1 KIT INJECTION
Qty: 1 EACH | Refills: 11 | Status: SHIPPED | OUTPATIENT
Start: 2022-01-08 | End: 2023-01-01 | Stop reason: CLARIF

## 2022-01-08 RX ORDER — MULTIVITAMIN
1 TABLET ORAL DAILY
Qty: 30 TABLET | Refills: 0 | Status: SHIPPED | OUTPATIENT
Start: 2022-01-09 | End: 2022-03-22

## 2022-01-08 RX ORDER — INSULIN ASPART 100 [IU]/ML
INJECTION, SOLUTION INTRAVENOUS; SUBCUTANEOUS
Qty: 10 ML | Refills: 11 | Status: SHIPPED | OUTPATIENT
Start: 2022-01-08 | End: 2022-01-08 | Stop reason: SDUPTHER

## 2022-01-08 RX ADMIN — MUPIROCIN: 20 OINTMENT TOPICAL at 10:01

## 2022-01-08 RX ADMIN — BENZONATATE 200 MG: 100 CAPSULE ORAL at 10:01

## 2022-01-08 RX ADMIN — INSULIN ASPART 4 UNITS: 100 INJECTION, SOLUTION INTRAVENOUS; SUBCUTANEOUS at 11:01

## 2022-01-08 RX ADMIN — DEXAMETHASONE 6 MG: 4 TABLET ORAL at 10:01

## 2022-01-08 RX ADMIN — BENZONATATE 200 MG: 100 CAPSULE ORAL at 03:01

## 2022-01-08 RX ADMIN — GUAIFENESIN 600 MG: 600 TABLET, EXTENDED RELEASE ORAL at 10:01

## 2022-01-08 RX ADMIN — Medication 500 MG: at 10:01

## 2022-01-08 RX ADMIN — INSULIN ASPART 24 UNITS: 100 INJECTION, SOLUTION INTRAVENOUS; SUBCUTANEOUS at 11:01

## 2022-01-08 RX ADMIN — REMDESIVIR 100 MG: 100 INJECTION, POWDER, LYOPHILIZED, FOR SOLUTION INTRAVENOUS at 10:01

## 2022-01-08 RX ADMIN — MULTIPLE VITAMINS W/ MINERALS TAB 1 TABLET: TAB at 10:01

## 2022-01-08 RX ADMIN — CARVEDILOL 12.5 MG: 12.5 TABLET, FILM COATED ORAL at 10:01

## 2022-01-08 RX ADMIN — ENOXAPARIN SODIUM 90 MG: 100 INJECTION SUBCUTANEOUS at 10:01

## 2022-01-08 RX ADMIN — LISINOPRIL 10 MG: 10 TABLET ORAL at 10:01

## 2022-01-08 NOTE — TELEPHONE ENCOUNTER
Patient needs a follow-up appointment in the discharge clinic in 1-2 weeks.    Lab Results   Component Value Date    HGBA1C 12.6 (H) 01/04/2022       POCT Glucose   Date Value Ref Range Status   01/08/2022 228 (H) 70 - 110 mg/dL Final   01/08/2022 101 70 - 110 mg/dL Final   01/07/2022 200 (H) 70 - 110 mg/dL Final   01/07/2022 306 (H) 70 - 110 mg/dL Final   01/07/2022 228 (H) 70 - 110 mg/dL Final   01/07/2022 137 (H) 70 - 110 mg/dL Final   01/06/2022 273 (H) 70 - 110 mg/dL Final   01/06/2022 400 (H) 70 - 110 mg/dL Final   01/06/2022 398 (H) 70 - 110 mg/dL Final   01/06/2022 281 (H) 70 - 110 mg/dL Final   01/05/2022 270 (H) 70 - 110 mg/dL Final   01/05/2022 417 (H) 70 - 110 mg/dL Final       Diabetes Medications             insulin aspart U-100 (NOVOLOG) 100 unit/mL (3 mL) InPn pen Inject 18 Units into the skin 3 (three) times daily with meals.    insulin detemir U-100 (LEVEMIR FLEXTOUCH) 100 unit/mL (3 mL) SubQ InPn pen Inject 26 Units into the skin 2 (two) times daily.      Hospital Medications             insulin aspart U-100 pen 1-10 Units 1-10 Units, Subcutaneous, Before meals &amp; nightly PRN, **MODERATE CORRECTION DOSE**<BR>Blood Glucose<BR>mg/dL                  Pre-meal                2200<BR>151-200                2 units                    1 unit<BR>201-250                4 units                    2 units  <BR>251-300                6 units                    3 units  <BR>301-350                8 units                    4 units <BR>&gt;350                     10 units                  5 units<BR>Administer subcutaneously if needed at times designated by monitoring schedule. <BR>DO NOT HOLD correction dose insulin for patients who are  NPO.<BR>&quot;HIGH ALERT MEDICATION&quot; - Administer with meals or TF/TPN.    insulin aspart U-100 pen 24 Units 24 Units, Subcutaneous, 3 times daily with meals, Administer subcutaneously with meals. HOLD prandial (mealtime) insulin if patient is NPO, unable to eat, or if  Blood Glucose less than 70 mg/dL.<BR><BR>If patient ate prior to BG check, administer scheduled Novolog only (do not cover with correction dose at this time).<BR>&quot;HIGH ALERT MEDICATION&quot; - Administer with meals or TF/TPN.    insulin detemir U-100 pen 30 Units 30 Units, Subcutaneous, 2 times daily, DO NOT HOLD basal insulin when patient is NPO.<BR>&quot;HIGH ALERT MEDICATION&quot;          Thanks,    Pedro Leal DNP, FNP-C  Department of Endocrinology  Inpatient Glycemic Management

## 2022-01-08 NOTE — SUBJECTIVE & OBJECTIVE
Interval History:   - No events overnight  - No complaints this morning  - LFTs uptrending this morning      Review of Systems   Constitutional: Negative for activity change, chills, fatigue and fever.   HENT: Negative for congestion and trouble swallowing.    Eyes: Negative for visual disturbance.   Respiratory: Negative for cough and shortness of breath.    Cardiovascular: Negative for chest pain and leg swelling.   Gastrointestinal: Negative for abdominal distention, abdominal pain, nausea and vomiting.   Genitourinary: Negative for difficulty urinating and dysuria.   Musculoskeletal: Negative for back pain and myalgias.   Skin: Negative for rash and wound.   Neurological: Negative for dizziness, weakness and light-headedness.   Psychiatric/Behavioral: Negative for confusion and sleep disturbance.     Objective:     Vital Signs (Most Recent):  Temp: 97.8 °F (36.6 °C) (01/07/22 1516)  Pulse: 78 (01/07/22 2021)  Resp: 20 (01/07/22 2021)  BP: 129/81 (01/07/22 1645)  SpO2: (!) 94 % (01/07/22 2021) Vital Signs (24h Range):  Temp:  [96 °F (35.6 °C)-98 °F (36.7 °C)] 97.8 °F (36.6 °C)  Pulse:  [76-86] 78  Resp:  [8-22] 20  SpO2:  [87 %-97 %] 94 %  BP: (129-170)/(81-94) 129/81     Weight: 89.8 kg (198 lb)  Body mass index is 32.95 kg/m².    Intake/Output Summary (Last 24 hours) at 1/7/2022 2049  Last data filed at 1/7/2022 1400  Gross per 24 hour   Intake 340 ml   Output --   Net 340 ml      Physical Exam  Constitutional:       Appearance: He is well-developed.   HENT:      Head: Normocephalic and atraumatic.   Eyes:      Extraocular Movements: Extraocular movements intact.      Pupils: Pupils are equal, round, and reactive to light.   Neck:      Vascular: No JVD.   Cardiovascular:      Rate and Rhythm: Normal rate and regular rhythm.      Pulses: Normal pulses.      Heart sounds: Normal heart sounds.   Pulmonary:      Effort: Pulmonary effort is normal. No respiratory distress.      Breath sounds: Normal breath sounds.    Abdominal:      General: Bowel sounds are normal. There is no distension.      Palpations: Abdomen is soft.      Tenderness: There is no abdominal tenderness.   Musculoskeletal:         General: Normal range of motion.      Cervical back: Normal range of motion and neck supple.      Right lower leg: No edema.      Left lower leg: No edema.   Skin:     General: Skin is warm and dry.      Capillary Refill: Capillary refill takes less than 2 seconds.   Neurological:      General: No focal deficit present.      Mental Status: He is alert and oriented to person, place, and time.      Cranial Nerves: No cranial nerve deficit.      Sensory: No sensory deficit.   Psychiatric:         Mood and Affect: Mood normal.         Significant Labs: All pertinent labs within the past 24 hours have been reviewed.    Significant Imaging: I have reviewed all pertinent imaging results/findings within the past 24 hours.

## 2022-01-08 NOTE — SUBJECTIVE & OBJECTIVE
"Interval HPI:   Overnight events: No acute events overnight. Patient on the IMTA unit in room 55027/78283 A. Blood glucose improving. BG above goal on current insulin regimen. Steroid use- Dexamethasone 6 mg.    Renal function- Normal  Vasopressors-  None      Diet diabetic [facility];  Calorie     Eatin%  Nausea: No  Hypoglycemia and intervention: No  Fever: No  TPN and/or TF: No      /65 (BP Location: Left arm, Patient Position: Lying)   Pulse 75   Temp 98.2 °F (36.8 °C) (Oral)   Resp 17   Ht 5' 5" (1.651 m)   Wt 89.8 kg (198 lb)   SpO2 95%   BMI 32.95 kg/m²     Labs Reviewed and Include    Recent Labs   Lab 22  0944   GLU 89   CALCIUM 8.1*   ALBUMIN 2.2*   PROT 5.7*   *   K 3.4*   CO2 26      BUN 9   CREATININE 0.5   ALKPHOS 279*   *   *   BILITOT 0.9     Lab Results   Component Value Date    WBC 6.34 2022    HGB 14.6 2022    HCT 41.5 2022    MCV 99 (H) 2022     2022     Recent Labs   Lab 22  1734   TSH 0.947     Lab Results   Component Value Date    HGBA1C 12.6 (H) 2022       Nutritional status:   Body mass index is 32.95 kg/m².  Lab Results   Component Value Date    ALBUMIN 2.2 (L) 2022    ALBUMIN 2.3 (L) 2022    ALBUMIN 2.1 (L) 2022     No results found for: PREALBUMIN    Estimated Creatinine Clearance: 218.4 mL/min (based on SCr of 0.5 mg/dL).    Accu-Checks  Recent Labs     22  0737 22  1237 22  1749 22  2056 22  0833 22  1154 22  1518 22  2112 22  0806 22  1141   POCTGLUCOSE 281* 398* 400* 273* 137* 228* 306* 200* 101 228*       Current Medications and/or Treatments Impacting Glycemic Control  Immunotherapy:    Immunosuppressants     None        Steroids:   Hormones (From admission, onward)            Start     Stop Route Frequency Ordered    22 0900  dexAMETHasone tablet 6 mg         01/15 0859 Oral Daily 22 " 1637    01/04/22 1641  melatonin tablet 6 mg         -- Oral Nightly PRN 01/04/22 1634        Pressors:    Autonomic Drugs (From admission, onward)            None        Hyperglycemia/Diabetes Medications:   Antihyperglycemics (From admission, onward)            Start     Stop Route Frequency Ordered    01/08/22 1000  insulin detemir U-100 pen 30 Units         -- SubQ 2 times daily 01/08/22 0948    01/07/22 0900  insulin aspart U-100 pen 24 Units         -- SubQ 3 times daily with meals 01/07/22 0856    01/04/22 1731  insulin aspart U-100 pen 1-10 Units         -- SubQ Before meals & nightly PRN 01/04/22 1632

## 2022-01-08 NOTE — PROGRESS NOTES
[facility] ([place] [address])  Endocrinology  Progress Note    Admit Date: 1/4/2022     Reason for Consult: Management of T2DM, Hyperglycemia     Diabetes diagnosis year: 2008    Home Diabetes Medications: Trulicity 1.5 mg (Previously taking, but stopped by new PCP Metformin 1000 mg BID; Jardiance 10 mg; Novolog 18 units TIDWM; Lantus 26 units BID)    How often checking glucose at home?  Once or twice a month    BG readings on regimen: 300-400's  Hypoglycemia on the regimen?  No  Missed doses on regimen?  Yes (Insurance approval difficulties)     Diabetes Complications include:     Hyperglycemia, Diabetic retinopathy , and Diabetic peripheral neuropathy     Complicating diabetes co morbidities:   DIMA    Hemoglobin A1C   Date Value Ref Range Status   01/04/2022 12.6 (H) 4.0 - 5.6 % Final     Comment:     ADA Screening Guidelines:  5.7-6.4%  Consistent with prediabetes  >or=6.5%  Consistent with diabetes    High levels of fetal hemoglobin interfere with the HbA1C  assay. Heterozygous hemoglobin variants (HbS, HgC, etc)do  not significantly interfere with this assay.   However, presence of multiple variants may affect accuracy.     08/25/2021 13.7 (H) 4.7 - 5.6 % Final   08/02/2020 13.9 (H) 4.0 - 5.6 % Final     Comment:     ADA Screening Guidelines:  5.7-6.4%  Consistent with prediabetes  >or=6.5%  Consistent with diabetes  High levels of fetal hemoglobin interfere with the HbA1C  assay. Heterozygous hemoglobin variants (HbS, HgC, etc)do  not significantly interfere with this assay.   However, presence of multiple variants may affect accuracy.     08/03/2014 13.0 (H) 4.5 - 6.2 % Final         HPI:   Mr. Woodall is a 31 year old gentleman with PMH of DM2 with medication non-compliance, h/o DKA (8/2020) and h/o alcohol abuse who presented [place] [facility] via [place] for covid symptoms. Patient reports that him and his wife got tested on Monday 12/27/21 in preparation for a dinner outing and were found to be COVID  "positive. They quarantined since then and patient's COVID symptoms continued to worsen. Patient reports fever, chest pain, coughing, dizziness and generalized weakness. Patient was advised by triage nurse to call EMS and patient was transported to [facility]. In the ED: afebrile, BP 100s-140s/60s-80s, O@ sat 85% on room air and patient placed on 2L NC with improvement in O2 sat to 97%. CBC wnl and CMP significant for Na 127, AST/ALT 99/88, alk phos 285, . Glucose 308. COVID test positive. Patient was given one dose of dexamethasone 6 mg. CXR showed multifocal pneumonia. He was admitted to hospital medicine service for further evaluation and management of acute hypoxic respiratory failure due to COVID PNA. Endocrine consulted to manage hyperglycemia and type 2 diabetes. Per patient he had recently seen a healthcare provider who took him off all of his medications (listed above); and just started Trulicity. Patient will need major adjustments at discharge.             Interval HPI:   Overnight events: No acute events overnight. Patient on the IMTA unit in room 24444/16744 A. Blood glucose improving. BG above goal on current insulin regimen. Steroid use- Dexamethasone 6 mg.    Renal function- Normal  Vasopressors-  None      Diet diabetic [facility];  Calorie     Eatin%  Nausea: No  Hypoglycemia and intervention: No  Fever: No  TPN and/or TF: No      /65 (BP Location: Left arm, Patient Position: Lying)   Pulse 75   Temp 98.2 °F (36.8 °C) (Oral)   Resp 17   Ht 5' 5" (1.651 m)   Wt 89.8 kg (198 lb)   SpO2 95%   BMI 32.95 kg/m²     Labs Reviewed and Include    Recent Labs   Lab 22  0944   GLU 89   CALCIUM 8.1*   ALBUMIN 2.2*   PROT 5.7*   *   K 3.4*   CO2 26      BUN 9   CREATININE 0.5   ALKPHOS 279*   *   *   BILITOT 0.9     Lab Results   Component Value Date    WBC 6.34 2022    HGB 14.6 2022    HCT 41.5 2022    MCV 99 (H) 2022    "  01/07/2022     Recent Labs   Lab 01/04/22  1734   TSH 0.947     Lab Results   Component Value Date    HGBA1C 12.6 (H) 01/04/2022       Nutritional status:   Body mass index is 32.95 kg/m².  Lab Results   Component Value Date    ALBUMIN 2.2 (L) 01/08/2022    ALBUMIN 2.3 (L) 01/07/2022    ALBUMIN 2.1 (L) 01/07/2022     No results found for: PREALBUMIN    Estimated Creatinine Clearance: 218.4 mL/min (based on SCr of 0.5 mg/dL).    Accu-Checks  Recent Labs     01/06/22  0737 01/06/22  1237 01/06/22  1749 01/06/22  2056 01/07/22  0833 01/07/22  1154 01/07/22  1518 01/07/22  2112 01/08/22  0806 01/08/22  1141   POCTGLUCOSE 281* 398* 400* 273* 137* 228* 306* 200* 101 228*       Current Medications and/or Treatments Impacting Glycemic Control  Immunotherapy:    Immunosuppressants     None        Steroids:   Hormones (From admission, onward)            Start     Stop Route Frequency Ordered    01/05/22 0900  dexAMETHasone tablet 6 mg         01/15 0859 Oral Daily 01/04/22 1637    01/04/22 1641  melatonin tablet 6 mg         -- Oral Nightly PRN 01/04/22 1634        Pressors:    Autonomic Drugs (From admission, onward)            None        Hyperglycemia/Diabetes Medications:   Antihyperglycemics (From admission, onward)            Start     Stop Route Frequency Ordered    01/08/22 1000  insulin detemir U-100 pen 30 Units         -- SubQ 2 times daily 01/08/22 0948    01/07/22 0900  insulin aspart U-100 pen 24 Units         -- SubQ 3 times daily with meals 01/07/22 0856    01/04/22 1731  insulin aspart U-100 pen 1-10 Units         -- SubQ Before meals & nightly PRN 01/04/22 1637          ASSESSMENT and PLAN    * Acute hypoxemic respiratory failure due to COVID-19  Infection may elevate BG readings  On IV antiviral  Managed per primary team  Avoid hypoglycemia        Type 2 diabetes mellitus with hyperglycemia  Endocrinology consulted for BG management.   BG goal 140-180    - Levemir Flex Pen 30 units BID (20%  redcution due to FBG below goal)  - Novolog (aspart) insulin 24 Units SQ TIDWM and prn for BG excursions [facility] (150/25)   - BG checks AC/HS      ** Please notify Endocrine for any change and/or advance in diet**  ** Please call Endocrine for any BG related issues **    Discharge Planning:   Discharge Planning:   - Continue Trulicity 1.5 mg weekly.   - Tresiba 60 units qd (Eat a snack before bed if BG is < 100 mg/dl)  - Novolog 10-20 units TIDWM (Hold if BG < 100 mg/dL) Administer 10 units with a small meal; 15 units with an average sized meal; 20 units with a large meal.   - Novolog SSI for BG excursions:  150 - 200 + 2 unit    201 - 250 + 4 units    251 - 300 + 6 units    301 - 350 + 8 units     > 350   + 10 units  - Insurance approved diabetes testing supplies to check blood sugar 4 times a day (Before meals and at bedtime) and as needed.  - BD pen needles   - Insurance approved glucagon for extreme hypoglycemia (Baqsimi or Zegalogue if insurance approved)  - Send BG logs in 4 days  - Follow-up in discharge clinic in 2 weeks.     Education:  Discharge Teaching:    Reviewed topics related to DM including: the need for insulin, how insulin works, what makes it a high risk medication, the importance of immediate follow up with either PCP or endocrine provider, importance of and how to check BG, how to record BG on logs, how to administer insulin, appropriate insulin administration sites, importance of rotating injection sites, hyper/hypoglycemia, how and when to treat hypoglycemia, when to hold insulin, how the correction scale works, importance of storing unused insulin in the refrigerator, and when to seek medical attention.  Patient verbalized understanding, answered all questions to patient's satisfaction. Blood sugar logs given to patient.     Hypoglycemia (Low Blood Sugar)  Too little glucose (sugar) in your blood is called hypoglycemia or low blood sugar. Diabetes itself doesnt cause low blood sugar. But some  of the treatments for diabetes, such as pills or insulin, may increase your risk for it. Low blood sugar may cause you to lose consciousness or have a seizure. So always treat low blood sugar right away.    Special note: Always carry a source of fast-acting sugar and a snack in case of hypoglycemia     What You May Notice  If you have low blood sugar, you may have these symptoms:   Shakiness or dizziness   Cold, clammy skin or sweating   Feelings of hunger   Headache   Nervousness   A hard, fast heartbeat   Weakness   Confusion or irritability   Blurred vision  What You Should Do   First, check your blood sugar. If it is too low (out of your target range), eat or drink 15 to 20 grams of fast-acting sugar. This may be 3 to 4 glucose tablets, 4 oz (half a cup) fruit juice or regular (non-diet) soda, 8 oz (one cup ) fat-free milk, or 1 tablespoon of honey. Dont take more than this, or your blood sugar may go too high.   Wait 15 minutes. Then recheck your blood sugar if you can.   If your blood sugar is still too low, repeat the steps above and check your blood sugar again. If your blood sugar still has not returned to your target range, contact your healthcare provider or seek emergency care.   Once your blood sugar returns to target range, eat a snack or meal.  Preventing Low Blood Sugar   Eat your meals and snacks at the same times each day. Dont skip meals!   Ask your healthcare provider if it is safe for you to drink alcohol. Never drink on an empty stomach.   Take your medication at the prescribed times.   Always carry a source of fast-acting sugar and a snack when youre away from home.  Other Things to Do   Carry a medical ID card or wear a medical alert bracelet or necklace. It should say that you have diabetes. It should also say what to do if you pass out or have a seizure.   Make sure your family, friends, and coworkers know the signs of low blood sugar. Tell them what to do if your  blood sugar falls very low and you cant treat yourself.   Keep a glucagon emergency kit handy. Be sure your family, friends, and coworkers know how and when to use it. Check it regularly and replace the glucagon before it expires.   Talk to your healthcare team about other things you can do to prevent low blood sugar.     If you experience hypoglycemia several times, call your doctor.   © [id number] [facility], [address] [place] [address], [place] [address]. All rights reserved. This information is not intended as a substitute for professional medical care. Always follow your healthcare professional's instructions.           Alcohol use disorder, severe, dependence  Managed per primary team  Avoid hypoglycemia             Pedro Leal, RICHARD, FNP  Endocrinology  [facility] - Intensive Care ([place]-[facility])

## 2022-01-08 NOTE — ASSESSMENT & PLAN NOTE
- Diagnosed in 2008  - Home Diabetes Medications: Trulicity 1.5 mg (Previously taking, but stopped by new PCP Metformin 1000 mg BID; Jardiance 10 mg; Novolog 18 units TIDWM; Lantus 26 units BID)  - Last Ha1c (8/2021): 13.7  - Endocrinology consulted, apprec recs  -- BG goal 140-180  -- Levemir Flex Pen 306units BID  -- Novolog (aspart) insulin 24 Units SQ TIDWM and prn for BG excursions [facility] [place]   - BG checks /HS/0200

## 2022-01-08 NOTE — ASSESSMENT & PLAN NOTE
Endocrinology consulted for BG management.   BG goal 140-180    - Levemir Flex Pen 30 units BID (20% redcution due to FBG below goal)  - Novolog (aspart) insulin 24 Units SQ TIDWM and prn for BG excursions [facility] (150/25)   - BG checks AC/HS      ** Please notify Endocrine for any change and/or advance in diet**  ** Please call Endocrine for any BG related issues **    Discharge Planning:   TBD. Please notify endocrinology prior to discharge.

## 2022-01-08 NOTE — PLAN OF CARE
Problem: Adult Inpatient Plan of Care  Goal: Plan of Care Review  Outcome: Met  Goal: Patient-Specific Goal (Individualized)  Outcome: Met  Goal: Absence of Hospital-Acquired Illness or Injury  Outcome: Met  Goal: Optimal Comfort and Wellbeing  Outcome: Met  Goal: Readiness for Transition of Care  Outcome: Met     Problem: Diabetes Comorbidity  Goal: Blood Glucose Level Within Targeted Range  Outcome: Met     Problem: Gas Exchange Impaired  Goal: Optimal Gas Exchange  Outcome: Met     Problem: Fall Injury Risk  Goal: Absence of Fall and Fall-Related Injury  Outcome: Met    Patient alert and oriented. Discharged to home. Discharge papers reviewed with patient . Patient verbalized understanding. Iv removed with tip intact. Tele removed. Patient awaiting medications from pharmacy.

## 2022-01-08 NOTE — DISCHARGE INSTRUCTIONS
You were admitted for COVID. You symptoms improved and you did not need home oxygen.     Your liver enzymes were slightly elevated. Ultrasound of your liver showed hepatic steatosis but otherwise normal.     Follow up requested with primary care and hepatology.    You will be enrolled in COVID surveillance program.      Diabetes Recommendations from Endocrinology   - Continue Trulicity 1.5 mg weekly.     - Tresiba 60 units qd (Eat a snack before bed if BG is < 100 mg/dl)     - Novolog 10-20 units TIDWM (Hold if BG < 100 mg/dL) Administer 10 units with a small meal; 15 units with an average sized meal; 20 units with a large meal.     - Novolog SSI for BG excursions:       150 - 200 + 2 unit       201 - 250 + 4 units       251 - 300 + 6 units       301 - 350 + 8 units        > 350 + 10 units   - Insurance approved diabetes testing supplies to check blood sugar 4 times a day (Before meals and at bedtime) and as needed.     - BD pen needles     - Insurance approved glucagon for extreme hypoglycemia (Baqsimi or Zegalogue if insurance approved)     - Send BG logs in 4 days     - Follow-up in discharge clinic in 2 weeks.

## 2022-01-08 NOTE — ASSESSMENT & PLAN NOTE
Patient with Hypoxic Respiratory failure which is Acute.  he is not on home oxygen. Supplemental oxygen was provided and noted-  .   Signs/symptoms of respiratory failure include- tachypnea, increased work of breathing and respiratory distress. Contributing diagnoses includes - CHF and Pneumonia Labs and images were reviewed. Patient Has not had a recent ABG. Will treat underlying causes and adjust management of respiratory failure as follows-       COVID-19 Virus Infection  Viral Pneumonia due to COVID-19  - COVID-19 testing: positive on 12/27/21 (prior to admission) and 1/4/22  Patient is identified as Severe COVID-19 based on hypoxemia with O2 saturations <94% on room air or on ambulation   Initiate standard COVID protocols; COVID-19 testing ,Infection Control notification  and isolation- respiratory, contact and droplet per protocol    Diagnostics: (leukopenia, hyponatremia, hyperferritinemia, elevated troponin, elevated d-dimer, age, and comorbidities are significant predictors of poor clinical outcome)  CBC, CMP, Procalcitonin, Ferritin, CRP, LDH, Troponin, ECG, Portable CXR and UA and culture    Management: Initiate targeted therapy with Remdesivir, 200mg IV x1, followed by 100mg IV daily x5 days total and Dexamethasone PO/IV 6mg daily x10 days, Maintain oxygen saturations 92-96% via Nasal Cannula 2 LPM and monitor with pulse oximetry. , Inhaled bronchodilators as needed for shortness of breath., Continuous cardiac monitoring. and Manage respiratory failure (O2 sats <91% on room air with respiratory symptoms or needing NIPPV/Mechanical ventilation) and/or Pneumonia (active chest infiltrates) separately as described below.    Advance Care Planning  Current advance care plan has been discussed with patient/family/POA and patient currently wishes Full Code.         Acute Hypoxemic Respiratory Failure- resolved    - On room air    - Did not qualify for O2 on 6 min walk test 1/6    - Order RT consult via  Respiratory Communication for COVID Protocols    - Incentive Spirometer Q4h, Flutter Valve Q4h    - Continuous Pulse Oximetry, goal SpO2 92-96%    - Supplemental O2 via LFNC, VentiMask, or HFNC    - If wheezing, albuterol INH Q6h scheduled & PRN    - Proning Protocol if patient is a candidate    - GCS >13, able to self-prone    - If deterioration, may warrant trial of NIPPV in neg pressure room or immediate ICU consult

## 2022-01-08 NOTE — PROGRESS NOTES
[facility] - Intensive Care ([address] Medicine  Progress Note    Patient Name: Doe Woodall  MRN: [id number]  Patient Class: IP- Inpatient   Admission Date: 1/4/2022  Length of Stay: 3 days  Attending Physician: Ming Potts MD  Primary Care Provider: [facility]        Subjective:     Principal Problem:Acute hypoxemic respiratory failure due to COVID-19        HPI:  Mr. Woodall is a 31 year old gentleman with PMH of DM2 with medication non-compliance, h/o DKA (8/2020) and h/o alcohol abuse who presented to [facility] via EMS for covid symptoms. Patient reports that him and his wife got tested on Monday 12/27/21 in preparation for a dinner outing and were found to be COVID positive. They quarantined since then and patient's COVID symptoms continued to worsen. Patient reports fever, chest pain, coughing, dizziness and generalized weakness. Patient was advised by triage nurse to call EMS and patient was transported to [facility].    In the ED: afebrile, BP 100s-140s/60s-80s, O@ sat 85% on room air and patient placed on 2L NC with improvement in O2 sat to 97%. CBC wnl and CMP significant for Na 127, AST/ALT 99/88, alk phos 285, . Glucose 308. COVID test positive. Patient was given one dose of dexamethasone 6 mg. CXR showed multifocal pneumonia. He was admitted to hospital medicine service for further evaluation and management of acute hypoxic respiratory failure due to COVID PNA.        Overview/Hospital Course:  No notes on file    Interval History:   - No events overnight  - No complaints this morning  - LFTs uptrending this morning      Review of Systems   Constitutional: Negative for activity change, chills, fatigue and fever.   HENT: Negative for congestion and trouble swallowing.    Eyes: Negative for visual disturbance.   Respiratory: Negative for cough and shortness of breath.    Cardiovascular: Negative for chest pain and leg swelling.   Gastrointestinal:  Negative for abdominal distention, abdominal pain, nausea and vomiting.   Genitourinary: Negative for difficulty urinating and dysuria.   Musculoskeletal: Negative for back pain and myalgias.   Skin: Negative for rash and wound.   Neurological: Negative for dizziness, weakness and light-headedness.   Psychiatric/Behavioral: Negative for confusion and sleep disturbance.     Objective:     Vital Signs (Most Recent):  Temp: 97.8 °F (36.6 °C) (01/07/22 1516)  Pulse: 78 (01/07/22 2021)  Resp: 20 (01/07/22 2021)  BP: 129/81 (01/07/22 1645)  SpO2: (!) 94 % (01/07/22 2021) Vital Signs (24h Range):  Temp:  [96 °F (35.6 °C)-98 °F (36.7 °C)] 97.8 °F (36.6 °C)  Pulse:  [76-86] 78  Resp:  [8-22] 20  SpO2:  [87 %-97 %] 94 %  BP: (129-170)/(81-94) 129/81     Weight: 89.8 kg (198 lb)  Body mass index is 32.95 kg/m².    Intake/Output Summary (Last 24 hours) at 1/7/2022 2049  Last data filed at 1/7/2022 1400  Gross per 24 hour   Intake 340 ml   Output --   Net 340 ml      Physical Exam  Constitutional:       Appearance: He is well-developed.   HENT:      Head: Normocephalic and atraumatic.   Eyes:      Extraocular Movements: Extraocular movements intact.      Pupils: Pupils are equal, round, and reactive to light.   Neck:      Vascular: No JVD.   Cardiovascular:      Rate and Rhythm: Normal rate and regular rhythm.      Pulses: Normal pulses.      Heart sounds: Normal heart sounds.   Pulmonary:      Effort: Pulmonary effort is normal. No respiratory distress.      Breath sounds: Normal breath sounds.   Abdominal:      General: Bowel sounds are normal. There is no distension.      Palpations: Abdomen is soft.      Tenderness: There is no abdominal tenderness.   Musculoskeletal:         General: Normal range of motion.      Cervical back: Normal range of motion and neck supple.      Right lower leg: No edema.      Left lower leg: No edema.   Skin:     General: Skin is warm and dry.      Capillary Refill: Capillary refill takes less  than 2 seconds.   Neurological:      General: No focal deficit present.      Mental Status: He is alert and oriented to person, place, and time.      Cranial Nerves: No cranial nerve deficit.      Sensory: No sensory deficit.   Psychiatric:         Mood and Affect: Mood normal.         Significant Labs: All pertinent labs within the past 24 hours have been reviewed.    Significant Imaging: I have reviewed all pertinent imaging results/findings within the past 24 hours.      Assessment/Plan:      * Acute hypoxemic respiratory failure due to COVID-19  Patient with Hypoxic Respiratory failure which is Acute.  he is not on home oxygen. Supplemental oxygen was provided and noted-  .   Signs/symptoms of respiratory failure include- tachypnea, increased work of breathing and respiratory distress. Contributing diagnoses includes - CHF and Pneumonia Labs and images were reviewed. Patient Has not had a recent ABG. Will treat underlying causes and adjust management of respiratory failure as follows-       COVID-19 Virus Infection  Viral Pneumonia due to COVID-19  - COVID-19 testing: positive on 12/27/21 (prior to admission) and 1/4/22  Patient is identified as Severe COVID-19 based on hypoxemia with O2 saturations <94% on room air or on ambulation   Initiate standard COVID protocols; COVID-19 testing ,Infection Control notification  and isolation- respiratory, contact and droplet per protocol    Diagnostics: (leukopenia, hyponatremia, hyperferritinemia, elevated troponin, elevated d-dimer, age, and comorbidities are significant predictors of poor clinical outcome)  CBC, CMP, Procalcitonin, Ferritin, CRP, LDH, Troponin, ECG, Portable CXR and UA and culture    Management: Initiate targeted therapy with Remdesivir, 200mg IV x1, followed by 100mg IV daily x5 days total and Dexamethasone PO/IV 6mg daily x10 days, Maintain oxygen saturations 92-96% via Nasal Cannula 2 LPM and monitor with pulse oximetry. , Inhaled bronchodilators as  needed for shortness of breath., Continuous cardiac monitoring. and Manage respiratory failure (O2 sats <91% on room air with respiratory symptoms or needing NIPPV/Mechanical ventilation) and/or Pneumonia (active chest infiltrates) separately as described below.    Advance Care Planning  Current advance care plan has been discussed with patient/family/POA and patient currently wishes Full Code.        Acute Hypoxemic Respiratory Failure- resolved    - On room air    - Did not qualify for O2 on 6 min walk test 1/6    - Order RT consult via Respiratory Communication for COVID Protocols    - Incentive Spirometer Q4h, Flutter Valve Q4h    - Continuous Pulse Oximetry, goal SpO2 92-96%    - Supplemental O2 via LFNC, VentiMask, or HFNC    - If wheezing, albuterol INH Q6h scheduled & PRN    - Proning Protocol if patient is a candidate    - GCS >13, able to self-prone    - If deterioration, may warrant trial of NIPPV in neg pressure room or immediate ICU consult        Elevated transaminase level  - likely 2/2 covid infection  - LFTs continue to uptrend  - Liver US  - Hepatitis labs         Acute diastolic heart failure  - TTE showed EF 60% and diastolic dysfunction  - Started coreg and lisinopril   - Not in decompensated state  - No need for diuresis at this time          Hyponatremia  - Na on admit, 127 --> 131  - Likely 2/2 covid infection and dehydration   - Continue IVF  - BMP daily   - Improving      Anxiety disorder  - prn vistaril       Alcohol use disorder, severe, dependence  - not showing evidence of alcohol withdrawal   - has been cutting down. Last drink for LOIS and had a few beers  - continue to monitor   - followup PETH      Other insomnia  - melatonin prn nightly      Bulge of lumbar disc without myelopathy  - PRN tylenol       Type 2 diabetes mellitus with hyperglycemia  - Diagnosed in 2008  - Home Diabetes Medications: Trulicity 1.5 mg (Previously taking, but stopped by new PCP Metformin 1000 mg BID;  Jardiance 10 mg; Novolog 18 units TIDWM; Lantus 26 units BID)  - Last Ha1c (8/2021): 13.7  - Endocrinology consulted, apprec recs  -- BG goal 140-180  -- Levemir Flex Pen 306units BID  -- Novolog (aspart) insulin 24 Units SQ TIDWM and prn for BG excursions [facility] (150/25).   - BG checks AC/HS/0200        VTE Risk Mitigation (From admission, onward)         Ordered     enoxaparin injection 90 mg  2 times daily         01/04/22 1637     IP VTE LOW RISK PATIENT  Once         01/04/22 1637     Place sequential compression device  Until discontinued         01/04/22 1634                Discharge Planning   SUSAN: 1/8/2022     Code Status: Full Code   Is the patient medically ready for discharge?: No    Reason for patient still in hospital (select all that apply): Patient trending condition, Laboratory test and Pending disposition  Discharge Plan A: Home with family                  Ming Potts MD  Department of Hospital Medicine   [facility] - Intensive Care ([place]-16)

## 2022-01-08 NOTE — PLAN OF CARE
Problem: Adult Inpatient Plan of Care  Goal: Plan of Care Review  Outcome: Ongoing, Not Progressing  Goal: Optimal Comfort and Wellbeing  Outcome: Ongoing, Not Progressing     Problem: Gas Exchange Impaired  Goal: Optimal Gas Exchange  Outcome: Ongoing, Progressing     AAOx4. To be npo after midnight planning for u/s of abd. 02 sats wnl to ra. No complaints voiced at present time.

## 2022-01-09 ENCOUNTER — PATIENT MESSAGE (OUTPATIENT)
Dept: ADMINISTRATIVE | Facility: CLINIC | Age: 32
End: 2022-01-09
Payer: MEDICAID

## 2022-01-09 LAB
PETH 16:0/18.1 (POPETH): 437 NG/ML
PETH 16:0/18.2 (PLPETH): 502 NG/ML

## 2022-01-10 ENCOUNTER — PATIENT MESSAGE (OUTPATIENT)
Dept: ADMINISTRATIVE | Facility: OTHER | Age: 32
End: 2022-01-10
Payer: MEDICAID

## 2022-01-10 ENCOUNTER — TELEPHONE (OUTPATIENT)
Dept: ADMINISTRATIVE | Facility: CLINIC | Age: 32
End: 2022-01-10
Payer: MEDICAID

## 2022-01-10 LAB
HBV CORE AB SERPL QL IA: NEGATIVE
HBV SURFACE AB SER-ACNC: NEGATIVE M[IU]/ML
HBV SURFACE AG SERPL QL IA: NEGATIVE

## 2022-01-10 NOTE — PLAN OF CARE
[facility] - Intensive Care ([facility]-16)  Discharge Final Note    Primary Care Provider: [facility] - Family Medicine    Expected Discharge Date: 1/8/2022    Final Discharge Note (most recent)       Final Note - 01/10/22 0723          Final Note    Assessment Type Final Discharge Note (P)      Anticipated Discharge Disposition Home or Self Care (P)                      Important Message from Medicare             Contact Info       [facility]   Specialty: Family Medicine   Relationship: PCP - General    [address] [facility] [address]   Phone: [phone]       Next Steps: Follow up on 1/12/2022    Instructions: at 9:30 AM; PCP hospital follow up appointment (MD only has AM appts)

## 2022-01-11 ENCOUNTER — PATIENT MESSAGE (OUTPATIENT)
Dept: ADMINISTRATIVE | Facility: OTHER | Age: 32
End: 2022-01-11
Payer: MEDICAID

## 2022-01-11 ENCOUNTER — PATIENT MESSAGE (OUTPATIENT)
Dept: ADMINISTRATIVE | Facility: CLINIC | Age: 32
End: 2022-01-11
Payer: MEDICAID

## 2022-01-11 NOTE — HOSPITAL COURSE
Patient treated with dexamethasone, remdesivir, and supportive treatment. Patient had improvement of symptoms the first two days of admission and weaned to room air. It is noted that patient has chronic elevation of liver enzymes that slightly ubaldo during admission. Liver ultrasound and hepatitis serologies were unremarkable; LFTs returned to patient's baseline. Patient did not require home oxygen on assessment. Enrolled in Covid surveillance program. PCP and hepatology follow up requested.

## 2022-01-11 NOTE — PLAN OF CARE
[facility] - Intensive Care ([facility])  Discharge Final Note    Primary Care Provider: [facility] - Family Medicine    Expected Discharge Date: 1/8/2022    Final Discharge Note (most recent)       Final Note - 01/11/22 0728          Final Note    Assessment Type Final Discharge Note (P)      Anticipated Discharge Disposition Home or Self Care (P)                      Important Message from Medicare             Contact Info       [facility]   Specialty: Family Medicine   Relationship: PCP - General    [address] [facility] [address]   Phone: [phone]       Next Steps: Follow up on 1/12/2022    Instructions: at 9:30 AM; PCP hospital follow up appointment (MD only has AM appts)          Discharge summary faxed to Dr. Nakul Jackson (PCP at [facility]) f [phone].

## 2022-01-11 NOTE — DISCHARGE SUMMARY
[facility] - Intensive Care ([place] [address])  [facility] Medicine  Discharge Summary      Patient Name: Doe Woodall  MRN: [id number]  Patient Class: IP- Inpatient  Admission Date: 1/4/2022  Hospital Length of Stay: 4 days  Discharge Date and Time: 1/8/2022  4:46 PM  Attending Physician: No att. providers found   Discharging Provider: Ming Potts MD  Primary Care Provider: [facility]      HPI:   Mr. Woodall is a 31 year old gentleman with PMH of DM2 with medication non-compliance, h/o DKA (8/2020) and h/o alcohol abuse who presented to [facility] via EMS for covid symptoms. Patient reports that him and his wife got tested on Monday 12/27/21 in preparation for a dinner outing and were found to be COVID positive. They quarantined since then and patient's COVID symptoms continued to worsen. Patient reports fever, chest pain, coughing, dizziness and generalized weakness. Patient was advised by triage nurse to call EMS and patient was transported to [facility].    In the ED: afebrile, BP 100s-140s/60s-80s, O@ sat 85% on room air and patient placed on 2L NC with improvement in O2 sat to 97%. CBC wnl and CMP significant for Na 127, AST/ALT 99/88, alk phos 285, . Glucose 308. COVID test positive. Patient was given one dose of dexamethasone 6 mg. CXR showed multifocal pneumonia. He was admitted to hospital medicine service for further evaluation and management of acute hypoxic respiratory failure due to COVID PNA.        * No surgery found *      Hospital Course:   Patient treated with dexamethasone, remdesivir, and supportive treatment. Patient had improvement of symptoms the first two days of admission and weaned to room air. It is noted that patient has chronic elevation of liver enzymes that slightly ubaldo during admission. Liver ultrasound and hepatitis serologies were unremarkable; LFTs returned to patient's baseline. Patient did not require home oxygen on assessment. Enrolled in Covid  surveillance program. PCP and hepatology follow up requested.        Goals of Care Treatment Preferences:  Code Status: Full Code      Consults:   Consults (From admission, onward)        Status Ordering Provider     Inpatient consult to Endocrinology  Once        Provider:  (Not yet assigned)    Completed CLAY KHAN          No new Assessment & Plan notes have been filed under this hospital service since the last note was generated.  Service: Hospital Medicine    Final Active Diagnoses:    Diagnosis Date Noted POA    PRINCIPAL PROBLEM:  Acute hypoxemic respiratory failure due to COVID-19 [U07.1, J96.01] 01/04/2022 Yes    Elevated transaminase level [R74.01] 08/03/2020 Yes    Acute diastolic heart failure [I50.31] 01/06/2022 Yes    Type 2 diabetes mellitus with hypoglycemia without coma [E11.649]  Unknown    Hyponatremia [E87.1] 01/04/2022 Yes    Anxiety disorder [F41.9] 08/23/2020 Yes    Alcohol use disorder, severe, dependence [F10.20] 08/04/2020 Yes    Other insomnia [G47.09] 08/03/2020 Yes    Bulge of lumbar disc without myelopathy [M51.26] 07/05/2018 Yes    Type 2 diabetes mellitus with hyperglycemia [E11.65] 08/01/2014 Yes      Problems Resolved During this Admission:       Discharged Condition: stable    Disposition: Home or Self Care    Follow Up:   Follow-up Information     [facility] - Family Medicine On 1/12/2022.    Specialty: Family Medicine  Why: at 9:30 AM; PCP hospital follow up appointment (MD only has AM appts)  Contact information:  [address] [place] [id number]  [phone]                       Patient Instructions:      Ambulatory referral/consult to Gastroenterology   Standing Status: Future   Referral Priority: Routine Referral Type: Consultation   Referral Reason: Specialty Services Required   Requested Specialty: Gastroenterology   Number of Visits Requested: 1     Ambulatory referral/consult to Internal Medicine   Standing Status: Future   Referral  Priority: Routine Referral Type: Consultation   Referral Reason: Specialty Services Required   Requested Specialty: Internal Medicine   Number of Visits Requested: 1     Diet Adult Regular     COVID-19 Surveillance Program     Order Specific Question Answer Comments   Does patient have a smartphone? Yes    Does patient have the [facility]ino on their smartphone? No    While in surveillance program, will patient be using home oxygen? No      Notify your health care provider if you experience any of the following:  increased confusion or weakness     Notify your health care provider if you experience any of the following:  persistent dizziness, light-headedness, or visual disturbances     Notify your health care provider if you experience any of the following:  worsening rash     Notify your health care provider if you experience any of the following:  severe persistent headache     Notify your health care provider if you experience any of the following:  difficulty breathing or increased cough     Notify your health care provider if you experience any of the following:  redness, tenderness, or signs of infection (pain, swelling, redness, odor or green/yellow discharge around incision site)     Notify your health care provider if you experience any of the following:  severe uncontrolled pain     Notify your health care provider if you experience any of the following:  persistent nausea and vomiting or diarrhea     Notify your health care provider if you experience any of the following:  temperature >100.4     Activity as tolerated       Significant Diagnostic Studies: Labs: CMP No results for input(s): NA, K, CL, CO2, GLU, BUN, CREATININE, CALCIUM, PROT, ALBUMIN, BILITOT, ALKPHOS, AST, ALT, ANIONGAP, ESTGFRAFRICA, EGFRNONAA in the last 48 hours. and CBC No results for input(s): WBC, HGB, HCT, PLT in the last 48 hours.    Pending Diagnostic Studies:     None         Medications:  Reconciled Home Medications:     "  Medication List      START taking these medications    DAILY-SINCERE per tablet  Generic drug: multivitamin  Take 1 tablet by mouth once daily.     GLUCAGEN HYPOKIT 1 mg Solr  Generic drug: glucagon  Inject 1 mg into the muscle as needed (Hypoglycemia).     guaiFENesin 600 mg 12 hr tablet  Commonly known as: MUCINEX  Take 1 tablet (600 mg total) by mouth 2 (two) times daily. for 10 days     pulse oximeter device  Commonly known as: pulse oximeter  by Apply Externally route 2 (two) times a day. Use twice daily at 8 AM and 3 PM and [facility] as [facility].     TRULICITY 1.5 mg/0.5 mL pen injector  Generic drug: dulaglutide  Inject 1.5 mg into the skin every 7 days.        CHANGE how you take these medications    LEVEMIR FLEXTOUCH U-100 INSULN 100 unit/mL (3 mL) Inpn pen  Generic drug: insulin detemir U-100  Inject 60 Units into the skin once daily.  What changed:   · how much to take  · when to take this     NovoLOG Flexpen U-100 Insulin 100 unit/mL (3 mL) Inpn pen  Generic drug: insulin aspart U-100  Inject 10-20 units Three times daily with meals (Hold if BG < 100 mg/dL) Administer 10 units with a small meal; 15 units with an average sized meal; 20 units with a large meal. - Novolog SSI for BG excursions: 150 - 200 + 2 unit 201 - 250 + 4 units 251 - 300 + 6 units 301 - 350 + 8 units > 350 + 10 units  What changed: how much to take        CONTINUE taking these medications    acetaminophen 500 MG tablet  Commonly known as: TYLENOL  Take 1,000 mg by mouth 3 (three) times daily as needed for Pain.     BD ULTRA-FINE SHORT PEN NEEDLE 31 gauge x 5/16" Ndle  Generic drug: pen needle, diabetic  1 each by Misc.(Non-Drug; Combo Route) route 4 (four) times daily with meals and nightly.     lancing device Misc  Use as instructed     TRUE METRIX GLUCOSE METER Misc  Generic drug: blood-glucose meter  Use as instructed     TRUE METRIX GLUCOSE TEST STRIP Strp  Generic drug: blood sugar diagnostic  1 strip by " Misc.(Non-Drug; Combo Route) route 4 (four) times daily with meals and nightly.     TRUEPLUS LANCETS 30 gauge Misc  Generic drug: lancets  1 lancet by Misc.(Non-Drug; Combo Route) route 4 (four) times daily with meals and nightly.     UNISOM SLEEPGELS ORAL  Take 1 capsule by mouth every evening.            Indwelling Lines/Drains at time of discharge:   Lines/Drains/Airways     None                 Time spent on the discharge of patient: 35 minutes         Ming Potts MD  Department of Hospital Medicine  [facility] - Intensive Care ([place]-16)

## 2022-01-12 ENCOUNTER — PATIENT MESSAGE (OUTPATIENT)
Dept: ADMINISTRATIVE | Facility: OTHER | Age: 32
End: 2022-01-12
Payer: MEDICAID

## 2022-01-12 ENCOUNTER — PATIENT MESSAGE (OUTPATIENT)
Dept: ADMINISTRATIVE | Facility: CLINIC | Age: 32
End: 2022-01-12
Payer: MEDICAID

## 2022-01-13 ENCOUNTER — PATIENT MESSAGE (OUTPATIENT)
Dept: ADMINISTRATIVE | Facility: OTHER | Age: 32
End: 2022-01-13
Payer: MEDICAID

## 2022-01-14 ENCOUNTER — PATIENT MESSAGE (OUTPATIENT)
Dept: ADMINISTRATIVE | Facility: OTHER | Age: 32
End: 2022-01-14
Payer: MEDICAID

## 2022-01-15 ENCOUNTER — PATIENT MESSAGE (OUTPATIENT)
Dept: ADMINISTRATIVE | Facility: OTHER | Age: 32
End: 2022-01-15
Payer: MEDICAID

## 2022-01-15 ENCOUNTER — NURSE TRIAGE (OUTPATIENT)
Dept: ADMINISTRATIVE | Facility: CLINIC | Age: 32
End: 2022-01-15
Payer: MEDICAID

## 2022-01-15 NOTE — TELEPHONE ENCOUNTER
Pt escalated for HR of 950. Pt states he meant to enter 95. He is doing well, no issues. All VS and symptoms WNL. No triage required. Pt has number to OOC for further concerns. Will continue to monitor.    Reason for Disposition   [1] Follow-up call to recent contact AND [2] information only call, no triage required    Protocols used: INFORMATION ONLY CALL - NO TRIAGE-A-

## 2022-01-16 ENCOUNTER — PATIENT MESSAGE (OUTPATIENT)
Dept: ADMINISTRATIVE | Facility: OTHER | Age: 32
End: 2022-01-16
Payer: MEDICAID

## 2022-01-17 ENCOUNTER — PATIENT MESSAGE (OUTPATIENT)
Dept: ADMINISTRATIVE | Facility: OTHER | Age: 32
End: 2022-01-17
Payer: MEDICAID

## 2022-01-18 ENCOUNTER — PATIENT MESSAGE (OUTPATIENT)
Dept: ADMINISTRATIVE | Facility: OTHER | Age: 32
End: 2022-01-18
Payer: MEDICAID

## 2022-01-18 ENCOUNTER — PATIENT MESSAGE (OUTPATIENT)
Dept: ADMINISTRATIVE | Facility: CLINIC | Age: 32
End: 2022-01-18
Payer: MEDICAID

## 2022-01-19 ENCOUNTER — PATIENT MESSAGE (OUTPATIENT)
Dept: ADMINISTRATIVE | Facility: OTHER | Age: 32
End: 2022-01-19
Payer: MEDICAID

## 2022-01-20 ENCOUNTER — PATIENT MESSAGE (OUTPATIENT)
Dept: ADMINISTRATIVE | Facility: OTHER | Age: 32
End: 2022-01-20
Payer: MEDICAID

## 2022-01-21 ENCOUNTER — PATIENT MESSAGE (OUTPATIENT)
Dept: ADMINISTRATIVE | Facility: OTHER | Age: 32
End: 2022-01-21
Payer: MEDICAID

## 2022-01-22 ENCOUNTER — PATIENT MESSAGE (OUTPATIENT)
Dept: ADMINISTRATIVE | Facility: OTHER | Age: 32
End: 2022-01-22
Payer: MEDICAID

## 2022-03-12 ENCOUNTER — HOSPITAL ENCOUNTER (EMERGENCY)
Facility: HOSPITAL | Age: 32
Discharge: HOME | End: 2022-03-12
Attending: EMERGENCY MEDICINE
Payer: MEDICAID

## 2022-03-12 VITALS
OXYGEN SATURATION: 99 % | TEMPERATURE: 99 F | SYSTOLIC BLOOD PRESSURE: 137 MMHG | RESPIRATION RATE: 18 BRPM | DIASTOLIC BLOOD PRESSURE: 64 MMHG | HEART RATE: 87 BPM

## 2022-03-12 DIAGNOSIS — M79.89 LEG SWELLING: Primary | ICD-10-CM

## 2022-03-12 DIAGNOSIS — R80.9 PROTEINURIA, UNSPECIFIED TYPE: ICD-10-CM

## 2022-03-12 DIAGNOSIS — R79.89 ELEVATED LFTS: ICD-10-CM

## 2022-03-12 DIAGNOSIS — R73.9 HYPERGLYCEMIA: ICD-10-CM

## 2022-03-12 DIAGNOSIS — N30.01 ACUTE CYSTITIS WITH HEMATURIA: ICD-10-CM

## 2022-03-12 LAB
ALBUMIN SERPL BCP-MCNC: 2.2 G/DL (ref 3.5–5.2)
ALP SERPL-CCNC: 759 U/L (ref 55–135)
ALT SERPL W/O P-5'-P-CCNC: 50 U/L (ref 10–44)
ANION GAP SERPL CALC-SCNC: 10 MMOL/L (ref 8–16)
AST SERPL-CCNC: 70 U/L (ref 10–40)
BACTERIA #/AREA URNS AUTO: ABNORMAL /HPF
BASOPHILS # BLD AUTO: 0.05 K/UL (ref 0–0.2)
BASOPHILS NFR BLD: 0.6 % (ref 0–1.9)
BILIRUB SERPL-MCNC: 2.1 MG/DL (ref 0.1–1)
BILIRUB UR QL STRIP: NEGATIVE
BNP SERPL-MCNC: 138 PG/ML (ref 0–99)
BUN SERPL-MCNC: 7 MG/DL (ref 6–20)
CALCIUM SERPL-MCNC: 9 MG/DL (ref 8.7–10.5)
CHLORIDE SERPL-SCNC: 97 MMOL/L (ref 95–110)
CLARITY UR REFRACT.AUTO: ABNORMAL
CO2 SERPL-SCNC: 23 MMOL/L (ref 23–29)
COLOR UR AUTO: YELLOW
CREAT SERPL-MCNC: 0.7 MG/DL (ref 0.5–1.4)
DIFFERENTIAL METHOD: ABNORMAL
EOSINOPHIL # BLD AUTO: 0.1 K/UL (ref 0–0.5)
EOSINOPHIL NFR BLD: 0.7 % (ref 0–8)
ERYTHROCYTE [DISTWIDTH] IN BLOOD BY AUTOMATED COUNT: 13 % (ref 11.5–14.5)
EST. GFR  (AFRICAN AMERICAN): >60 ML/MIN/1.73 M^2
EST. GFR  (NON AFRICAN AMERICAN): >60 ML/MIN/1.73 M^2
GLUCOSE SERPL-MCNC: 459 MG/DL (ref 70–110)
GLUCOSE UR QL STRIP: ABNORMAL
HCT VFR BLD AUTO: 33.6 % (ref 40–54)
HGB BLD-MCNC: 11.3 G/DL (ref 14–18)
HGB UR QL STRIP: ABNORMAL
HYALINE CASTS UR QL AUTO: 0 /LPF
IMM GRANULOCYTES # BLD AUTO: 0.03 K/UL (ref 0–0.04)
IMM GRANULOCYTES NFR BLD AUTO: 0.4 % (ref 0–0.5)
KETONES UR QL STRIP: NEGATIVE
LEUKOCYTE ESTERASE UR QL STRIP: ABNORMAL
LYMPHOCYTES # BLD AUTO: 1.7 K/UL (ref 1–4.8)
LYMPHOCYTES NFR BLD: 21.3 % (ref 18–48)
MCH RBC QN AUTO: 35.9 PG (ref 27–31)
MCHC RBC AUTO-ENTMCNC: 33.6 G/DL (ref 32–36)
MCV RBC AUTO: 107 FL (ref 82–98)
MICROSCOPIC COMMENT: ABNORMAL
MONOCYTES # BLD AUTO: 0.8 K/UL (ref 0.3–1)
MONOCYTES NFR BLD: 9.6 % (ref 4–15)
NEUTROPHILS # BLD AUTO: 5.4 K/UL (ref 1.8–7.7)
NEUTROPHILS NFR BLD: 67.4 % (ref 38–73)
NITRITE UR QL STRIP: NEGATIVE
NRBC BLD-RTO: 0 /100 WBC
PH UR STRIP: 7 [PH] (ref 5–8)
PLATELET # BLD AUTO: 295 K/UL (ref 150–450)
PMV BLD AUTO: 10.3 FL (ref 9.2–12.9)
POCT GLUCOSE: 391 MG/DL (ref 70–110)
POCT GLUCOSE: 402 MG/DL (ref 70–110)
POCT GLUCOSE: 434 MG/DL (ref 70–110)
POTASSIUM SERPL-SCNC: 4.1 MMOL/L (ref 3.5–5.1)
PROT SERPL-MCNC: 6.8 G/DL (ref 6–8.4)
PROT UR QL STRIP: ABNORMAL
RBC # BLD AUTO: 3.15 M/UL (ref 4.6–6.2)
RBC #/AREA URNS AUTO: 87 /HPF (ref 0–4)
SODIUM SERPL-SCNC: 130 MMOL/L (ref 136–145)
SP GR UR STRIP: >=1.03 (ref 1–1.03)
SQUAMOUS #/AREA URNS AUTO: 2 /HPF
TROPONIN I SERPL DL<=0.01 NG/ML-MCNC: <0.006 NG/ML (ref 0–0.03)
URN SPEC COLLECT METH UR: ABNORMAL
WBC # BLD AUTO: 8.01 K/UL (ref 3.9–12.7)
WBC #/AREA URNS AUTO: 25 /HPF (ref 0–5)
YEAST UR QL AUTO: ABNORMAL

## 2022-03-12 PROCEDURE — 84484 ASSAY OF TROPONIN QUANT: CPT | Performed by: PHYSICIAN ASSISTANT

## 2022-03-12 PROCEDURE — 93010 EKG 12-LEAD: ICD-10-PCS | Mod: ,,, | Performed by: INTERNAL MEDICINE

## 2022-03-12 PROCEDURE — 63600175 PHARM REV CODE 636 W HCPCS: Performed by: PHYSICIAN ASSISTANT

## 2022-03-12 PROCEDURE — 82962 GLUCOSE BLOOD TEST: CPT

## 2022-03-12 PROCEDURE — 93005 ELECTROCARDIOGRAM TRACING: CPT

## 2022-03-12 PROCEDURE — 99285 PR EMERGENCY DEPT VISIT,LEVEL V: ICD-10-PCS | Mod: CR,,, | Performed by: PHYSICIAN ASSISTANT

## 2022-03-12 PROCEDURE — 96374 THER/PROPH/DIAG INJ IV PUSH: CPT

## 2022-03-12 PROCEDURE — 80053 COMPREHEN METABOLIC PANEL: CPT | Performed by: EMERGENCY MEDICINE

## 2022-03-12 PROCEDURE — 86592 SYPHILIS TEST NON-TREP QUAL: CPT | Performed by: PHYSICIAN ASSISTANT

## 2022-03-12 PROCEDURE — 81001 URINALYSIS AUTO W/SCOPE: CPT | Performed by: PHYSICIAN ASSISTANT

## 2022-03-12 PROCEDURE — 86593 SYPHILIS TEST NON-TREP QUANT: CPT | Performed by: PHYSICIAN ASSISTANT

## 2022-03-12 PROCEDURE — 99285 EMERGENCY DEPT VISIT HI MDM: CPT | Mod: 25

## 2022-03-12 PROCEDURE — 87086 URINE CULTURE/COLONY COUNT: CPT | Performed by: PHYSICIAN ASSISTANT

## 2022-03-12 PROCEDURE — 86780 TREPONEMA PALLIDUM: CPT | Performed by: PHYSICIAN ASSISTANT

## 2022-03-12 PROCEDURE — 99285 EMERGENCY DEPT VISIT HI MDM: CPT | Mod: CR,,, | Performed by: PHYSICIAN ASSISTANT

## 2022-03-12 PROCEDURE — 25000003 PHARM REV CODE 250: Performed by: PHYSICIAN ASSISTANT

## 2022-03-12 PROCEDURE — 93010 ELECTROCARDIOGRAM REPORT: CPT | Mod: ,,, | Performed by: INTERNAL MEDICINE

## 2022-03-12 PROCEDURE — 85025 COMPLETE CBC W/AUTO DIFF WBC: CPT | Performed by: PHYSICIAN ASSISTANT

## 2022-03-12 PROCEDURE — 83880 ASSAY OF NATRIURETIC PEPTIDE: CPT | Performed by: PHYSICIAN ASSISTANT

## 2022-03-12 RX ORDER — FUROSEMIDE 20 MG/1
20 TABLET ORAL
Status: COMPLETED | OUTPATIENT
Start: 2022-03-12 | End: 2022-03-12

## 2022-03-12 RX ORDER — CEPHALEXIN 500 MG/1
500 CAPSULE ORAL
Status: COMPLETED | OUTPATIENT
Start: 2022-03-12 | End: 2022-03-12

## 2022-03-12 RX ORDER — CEPHALEXIN 500 MG/1
500 CAPSULE ORAL 4 TIMES DAILY
Qty: 28 CAPSULE | Refills: 0 | Status: SHIPPED | OUTPATIENT
Start: 2022-03-12 | End: 2022-03-12 | Stop reason: SDUPTHER

## 2022-03-12 RX ORDER — CEPHALEXIN 500 MG/1
500 CAPSULE ORAL 4 TIMES DAILY
Qty: 28 CAPSULE | Refills: 0 | Status: SHIPPED | OUTPATIENT
Start: 2022-03-12 | End: 2022-03-19

## 2022-03-12 RX ADMIN — FUROSEMIDE 20 MG: 20 TABLET ORAL at 08:03

## 2022-03-12 RX ADMIN — CEPHALEXIN 500 MG: 500 CAPSULE ORAL at 08:03

## 2022-03-12 RX ADMIN — INSULIN HUMAN 10 UNITS: 100 INJECTION, SOLUTION PARENTERAL at 08:03

## 2022-03-12 NOTE — ED NOTES
LOC: The patient is awake, alert, and oriented to self, place, time, and situation. Pt is calm and cooperative. Affect is appropriate.  Speech is appropriate and clear.     APPEARANCE: Patient resting uncomfortably, reporting bilateral lower extremity swelling.  Patient is clean and well groomed.    SKIN: The skin is warm and dry; color consistent with ethnicity.  Patient has normal skin turgor and moist mucus membranes.  Skin intact; no breakdown or bruising noted.     MUSCULOSKELETAL: Patient moving lower extremities with difficulty;  Reporting pain in the extremitie  Denies weakness.     RESPIRATORY: Airway is open and patent. Respirations spontaneous, and non-labored.  Patient has a normal effort and rate.  No accessory muscle use noted. Denies cough.     CARDIAC:   peripheral edema noted. No complaints of chest pain.      ABDOMEN: Soft and non tender to palpation.  Obese.  No distention noted. Pt denies abdominal pain; denies nausea, vomiting, diarrhea, or constipation.    NEUROLOGIC: Eyes open spontaneously.  Behavior appropriate to situation.  Follows commands; facial expression symmetrical.  Purposeful motor response noted; normal sensation in all extremities. Pt denies headache; denies lightheadedness or dizziness; denies visual disturbances; denies loss of balance; denies unilateral weakness.

## 2022-03-12 NOTE — ED PROVIDER NOTES
Encounter Date: 3/12/2022       History     Chief Complaint   Patient presents with    Leg Swelling     Bilateral leg swelling and painful x 1 month denies hx of CHF      32-year-old male with DM2, alcohol abuse, CHF presents for bilateral leg swelling for about a month.  He cannot identify any provoking factors, denies standing or walking frequently for work.  He denies any palliating factors, not prescribed a diuretic.  He reports some hematuria last week which has since resolved and also has noted a a rash with patches around his ears, chest and hands which is neither itchy nor painful.  He reports chronic urinary frequency which is unchanged, denies dysuria, shortness of breath, chest pain, orthopnea, abdominal pain, nausea/vomiting/diarrhea or fever/chills.  Of note, although he has CHF listed in his chart though he was unaware of this diagnosis.  His wife reports that he drinks a large amount of alcohol.        Review of patient's allergies indicates:  No Known Allergies  Past Medical History:   Diagnosis Date    Diabetes mellitus     Encounter for blood transfusion     Other insomnia 8/3/2020    Perineal abscess 2014     Past Surgical History:   Procedure Laterality Date    DECOMPRESSION OF LUMBAR SPINE USING MINIMALLY INVASIVE TECHNIQUE Right 2018    Procedure: DECOMPRESSION, SPINE, LUMBAR, MINIMALLY INVASIVE L3-4;  Surgeon: Cristian Cervantes MD;  Location: [facility] OR [address] [place] [address];  Service: Neurosurgery;  Laterality: Right;    ORTHOPEDIC SURGERY       Family History   Problem Relation Age of Onset    Diabetes Father     Diabetes Paternal Grandmother     Diabetes Paternal Grandfather      Social History     Tobacco Use    Smoking status: Former Smoker     Packs/day: 0.50     Years: 9.00     Pack years: 4.50     Quit date: 2013     Years since quittin.7    Smokeless tobacco: Former User   Substance Use Topics    Alcohol use: Yes     Alcohol/week: 2.0 standard drinks     Types: 2 Cans of beer  per week     Comment: daily ETOH, 48 beers per day, none today    Drug use: Not Currently     Review of Systems   Constitutional: Negative for fatigue and fever.   HENT: Negative for sore throat.    Respiratory: Negative for cough and shortness of breath.    Cardiovascular: Positive for leg swelling. Negative for chest pain and palpitations.   Gastrointestinal: Negative for abdominal pain, nausea and vomiting.   Endocrine: Positive for polyuria.   Genitourinary: Positive for frequency and hematuria. Negative for dysuria, flank pain, genital sores, penile discharge and penile pain.   Musculoskeletal: Negative for back pain.   Skin: Positive for rash.   Neurological: Negative for weakness.   Hematological: Does not bruise/bleed easily.       Physical Exam     Initial Vitals [03/12/22 1658]   BP Pulse Resp Temp SpO2   (!) 166/87 92 16 98.9 °F (37.2 °C) 97 %      MAP       --         Physical Exam    Nursing note and vitals reviewed.  Constitutional: He appears well-developed and well-nourished. He is not diaphoretic. He is Obese . No distress.   HENT:   Head: Normocephalic and atraumatic.   Eyes: EOM are normal. Pupils are equal, round, and reactive to light.   Neck: Neck supple.   Normal range of motion.  Cardiovascular: Normal rate, regular rhythm, normal heart sounds and intact distal pulses. Exam reveals no gallop and no friction rub.    No murmur heard.  2+ pitting edema bilateral lower legs.  No erythema or tenderness.   Pulmonary/Chest: Breath sounds normal. No respiratory distress. He has no wheezes. He has no rhonchi. He has no rales. He exhibits no tenderness.   Abdominal: Abdomen is soft. Bowel sounds are normal. He exhibits no distension and no mass. There is no abdominal tenderness. There is no rebound and no guarding.   Musculoskeletal:         General: Normal range of motion.      Cervical back: Normal range of motion and neck supple.     Neurological: He is alert and oriented to person, place, and  time.   Skin: Skin is warm and dry. Rash noted.   Few erythematous patches around bilateral ears.  Round, scattered macules on the palms.  Please see photos below.   Psychiatric: He has a normal mood and affect.                     ED Course   Procedures  Labs Reviewed   URINALYSIS, REFLEX TO URINE CULTURE - Abnormal; Notable for the following components:       Result Value    Appearance, UA Hazy (*)     Specific Gravity, UA >=1.030 (*)     Protein, UA 2+ (*)     Glucose, UA 3+ (*)     Occult Blood UA 2+ (*)     Leukocytes, UA 3+ (*)     All other components within normal limits    Narrative:     Specimen Source->Urine   B-TYPE NATRIURETIC PEPTIDE - Abnormal; Notable for the following components:     (*)     All other components within normal limits    Narrative:     Release to patient->Immediate   CBC W/ AUTO DIFFERENTIAL - Abnormal; Notable for the following components:    RBC 3.15 (*)     Hemoglobin 11.3 (*)     Hematocrit 33.6 (*)      (*)     MCH 35.9 (*)     All other components within normal limits    Narrative:     Release to patient->Immediate   URINALYSIS MICROSCOPIC - Abnormal; Notable for the following components:    RBC, UA 87 (*)     WBC, UA 25 (*)     Yeast, UA Few (*)     All other components within normal limits    Narrative:     Specimen Source->Urine   COMPREHENSIVE METABOLIC PANEL - Abnormal; Notable for the following components:    Sodium 130 (*)     Glucose 459 (*)     Albumin 2.2 (*)     Total Bilirubin 2.1 (*)     Alkaline Phosphatase 759 (*)     AST 70 (*)     ALT 50 (*)     All other components within normal limits   POCT GLUCOSE - Abnormal; Notable for the following components:    POCT Glucose 391 (*)     All other components within normal limits   POCT GLUCOSE - Abnormal; Notable for the following components:    POCT Glucose 434 (*)     All other components within normal limits   POCT GLUCOSE - Abnormal; Notable for the following components:    POCT Glucose 402 (*)     All  other components within normal limits   CULTURE, URINE   TROPONIN I    Narrative:     Release to patient->Immediate   RPR   POCT GLUCOSE MONITORING CONTINUOUS   POCT GLUCOSE MONITORING CONTINUOUS     EKG Readings: (Independently Interpreted)   Initial Reading: No STEMI. Previous EKG: Compared with most recent EKG Previous EKG Date: 1/4/2022. Rhythm: Normal Sinus Rhythm. Heart Rate: 96. Ectopy: No Ectopy. ST Segments: Normal ST Segments. T Waves Flipped: III and AVF. Clinical Impression: Normal Sinus Rhythm Other Impression: T-waves in 3 and AVF, more pronounced when compared to prior       Imaging Results          X-Ray Chest PA And Lateral (Final result)  Result time 03/12/22 18:45:08    Final result by Pacheco Munoz MD (03/12/22 18:45:08)                 Impression:      Suspect right basilar atelectasis.  No convincing evidence of acute cardiopulmonary disease.      Electronically signed by: Pacheco Munoz  Date:    03/12/2022  Time:    18:45             Narrative:    EXAMINATION:  XR CHEST PA AND LATERAL    CLINICAL HISTORY:  Other specified soft tissue disorders    TECHNIQUE:  PA and lateral views of the chest were performed.    COMPARISON:  Chest radiograph performed 01/04/2022    FINDINGS:  Grossly unchanged cardiomediastinal contours.  Chronic elevation right hemidiaphragm.  Platelike opacity right lung base favored to represent atelectasis.  No definite pneumothorax or large volume pleural effusion.  No acute findings identified in the visualized abdomen.  Osseous and soft tissue structures appear without definite acute abnormality.                                 Medications   insulin regular injection 10 Units (10 Units Intravenous Given 3/12/22 2001)   cephALEXin capsule 500 mg (500 mg Oral Given 3/12/22 2000)   furosemide tablet 20 mg (20 mg Oral Given 3/12/22 2000)     Medical Decision Making:   History:   Old Medical Records: I decided to obtain old medical records.  Old Records Summarized:  records from previous admission(s).       <> Summary of Records: Patient discharged 01/08/2022 after a 4 day admission for acute respiratory failure secondary to COVID-19.  At that time his LFTs were elevated which they attributed to his COVID infection.  He had an echo performed 01/05/2022 which showed indeterminate diastolic function but preserved EF.  Initial Assessment:   32-year-old male presenting for bilateral leg swelling.  He is hypertensive 166/86 with otherwise normal vitals.  Differential Diagnosis:   CHF  I do also have some concern for renal dysfunction/nephrotic syndrome  Hypoalbuminemia secondary to liver disease  I am unclear about the connection with his rash but will sign RPR given palm involvement  Independently Interpreted Test(s):   I have ordered and independently interpreted X-rays - see summary below.       <> Summary of X-Ray Reading(s): No consolidation or pulmonary edema  I have ordered and independently interpreted EKG Reading(s) - see prior notes  Clinical Tests:   Lab Tests: Ordered and Reviewed  Radiological Study: Ordered and Reviewed  Medical Tests: Ordered and Reviewed  ED Management:  Will check labs, chest x-ray, EKG and reassess.    Labs are notable for hyperglycemia without acidosis, UTI, elevated LFTs.  I discussed these findings with the patient.  Hyperglycemia improved with insulin.  Patient given 1 does Lasix in the ED as well as Keflex for UTI, will discharge with instructions to follow up with PCP regarding leg swelling, referral placed to hepatology for elevated LFTs. Stressed the importance of follow-up, strict ED return precautions given.  Patient voiced understanding and is comfortable with discharge.             ED Course as of 03/12/22 2238   Sat Mar 12, 2022   1750 Hemoglobin(!): 11.3  Mild anemia, new when compared to prior [CC]   1822 Troponin I: <0.006 [CC]   1823 BNP(!): 138  Minimally elevated [CC]   1833 Protein, UA(!): 2+ [CC]   1833 Occult Blood UA(!): 2+  [CC]   1833 Leukocytes, UA(!): 3+ [CC]   1934 Glucose(!!): 459  Will give insulin [CC]   1935 Albumin(!): 2.2  Hypoalbuminemia, similar when compared to prior [CC]   1935 CO2: 23  No acidosis [CC]   1935 Alkaline Phosphatase(!): 759  Elevated, worse when compared to prior [CC]   1935 Creatinine: 0.7 [CC]   2000 POCT Glucose(!): 391 [CC]      ED Course User Index  [CC] Tiffany Raymond PA-C             Clinical Impression:   Final diagnoses:  [M79.89] Leg swelling (Primary)  [R73.9] Hyperglycemia  [R79.89] Elevated LFTs  [N30.01] Acute cystitis with hematuria  [R80.9] Proteinuria, unspecified type          ED Disposition Condition    Discharge Stable        ED Prescriptions     Medication Sig Dispense Start Date End Date Auth. Provider    cephALEXin (KEFLEX) 500 MG capsule  (Status: Discontinued) Take 1 capsule (500 mg total) by mouth 4 (four) times daily. for 7 days 28 capsule 3/12/2022 3/12/2022 PIETER Larson-JIMBO    cephALEXin (KEFLEX) 500 MG capsule  (Status: Discontinued) Take 1 capsule (500 mg total) by mouth 4 (four) times daily. for 7 days 28 capsule 3/12/2022 3/12/2022 PIETER Larson-C    cephALEXin (KEFLEX) 500 MG capsule  (Status: Discontinued) Take 1 capsule (500 mg total) by mouth 4 (four) times daily. for 7 days 28 capsule 3/12/2022 3/12/2022 Tiffany Etienne-Dino PA-C    cephALEXin (KEFLEX) 500 MG capsule  (Status: Discontinued) Take 1 capsule (500 mg total) by mouth 4 (four) times daily. for 7 days 28 capsule 3/12/2022 3/12/2022 Tiffany Shepparde-Link, PA-C    cephALEXin (KEFLEX) 500 MG capsule Take 1 capsule (500 mg total) by mouth 4 (four) times daily. for 7 days 28 capsule 3/12/2022 3/19/2022 Tiffany Raymond PA-C        Follow-up Information     Follow up With Specialties Details Why Contact Info    [facility] - Family Medicine Family Medicine Schedule an appointment as soon as possible for a visit in 1 week  [address] [facility] [address]  [phone]       [facility] HEPATOLOGY Hepatology Schedule an appointment as soon as possible for a visit in 1 week  [address]  [facility] [address]  [phone]    [facility] - Emergency Dept Emergency Medicine Go to  If symptoms worsen [address]  [facility] [id number] [phone]           Tiffany Raymond PA-C  03/12/22 2233

## 2022-03-13 ENCOUNTER — DOCUMENTATION ONLY (OUTPATIENT)
Dept: TRANSPLANT | Facility: CLINIC | Age: 32
End: 2022-03-13
Payer: MEDICAID

## 2022-03-13 LAB
BACTERIA UR CULT: NORMAL
BACTERIA UR CULT: NORMAL

## 2022-03-13 NOTE — NURSING
Pt records reviewed.   Pt will be referred to Hepatology for elevated lfts    Initial referral received  from the workque.   Referring provider  Tiffany Etienne-GALA Sun    Referral letter sent to patient.

## 2022-03-13 NOTE — LETTER
March 13, 2022    Doe Woodall  [address] [place] [address]  [place] [address]      Dear Doe Woodall:    Your doctor has referred you to the [facility]. We are sending this letter to remind you to make an appointment with us to complete the referral process.     Please call us at [phone] to schedule an appointment. We look forward to seeing you soon.     If you received a call and have been scheduled, please disregard this letter.       Sincerely,        [facility]   [facility] [place] [address]  [phone]

## 2022-03-13 NOTE — DISCHARGE INSTRUCTIONS
Diagnosis:  Leg swelling, UTI, elevated liver tests, elevated blood sugar    I am not sure what is causing your leg swelling.  Your lab tests showed a urinary tract infection as well as some protein in your urine and elevated liver tests.  Your BNP which measures heart stretch was only slightly elevated.  I am prescribing an antibiotic to help clear urine infection.    I sent a referral to our hepatology or liver doctors for follow-up.  Please call to schedule a follow-up appointment.  Please also schedule follow-up appointment with your primary care doctor discuss your leg swelling and elevated blood sugar.  If you start to have any worsening symptoms, please return to the emergency department.

## 2022-03-14 LAB
RPR SER QL: REACTIVE
RPR SER-TITR: ABNORMAL {TITER}

## 2022-03-16 ENCOUNTER — HOSPITAL ENCOUNTER (EMERGENCY)
Facility: HOSPITAL | Age: 32
Discharge: HOME | End: 2022-03-16
Attending: EMERGENCY MEDICINE
Payer: MEDICAID

## 2022-03-16 VITALS
TEMPERATURE: 99 F | BODY MASS INDEX: 32.95 KG/M2 | WEIGHT: 198 LBS | SYSTOLIC BLOOD PRESSURE: 161 MMHG | OXYGEN SATURATION: 97 % | HEART RATE: 96 BPM | RESPIRATION RATE: 18 BRPM | DIASTOLIC BLOOD PRESSURE: 98 MMHG

## 2022-03-16 DIAGNOSIS — A51.49 SECONDARY SYPHILIS: Primary | ICD-10-CM

## 2022-03-16 LAB — T PALLIDUM AB SER QL IF: REACTIVE

## 2022-03-16 PROCEDURE — 63600175 PHARM REV CODE 636 W HCPCS: Mod: JG | Performed by: EMERGENCY MEDICINE

## 2022-03-16 PROCEDURE — 99284 EMERGENCY DEPT VISIT MOD MDM: CPT | Mod: ,,, | Performed by: EMERGENCY MEDICINE

## 2022-03-16 PROCEDURE — 96372 THER/PROPH/DIAG INJ SC/IM: CPT | Performed by: EMERGENCY MEDICINE

## 2022-03-16 PROCEDURE — 99284 EMERGENCY DEPT VISIT MOD MDM: CPT

## 2022-03-16 PROCEDURE — 99284 PR EMERGENCY DEPT VISIT,LEVEL IV: ICD-10-PCS | Mod: ,,, | Performed by: EMERGENCY MEDICINE

## 2022-03-16 RX ADMIN — PENICILLIN G BENZATHINE 2.4 MILLION UNITS: 2400000 INJECTION, SUSPENSION INTRAMUSCULAR at 06:03

## 2022-03-16 NOTE — ED TRIAGE NOTES
"Doe Woodall, a 32 y.o. male presents to the ED w/ complaint of "bumps on hands." Pt showed me his test results and just was notified that he tested positive for syphilis. Pt reports that bumps have been on his hands for a month. Pt has no other complaints at this time.     Triage note:  Chief Complaint   Patient presents with    Rash     Pt. States was here Saturday. Had "bumps on hands" was told to back today to receive "shot" for the "bumps"     Review of patient's allergies indicates:  No Known Allergies  Past Medical History:   Diagnosis Date    Diabetes mellitus     Encounter for blood transfusion     Other insomnia 8/3/2020    Perineal abscess 8/1/2014     LOC: The patient is awake, alert, and oriented to self, place, time, and situation. Pt is calm and cooperative. Affect is appropriate.  Speech is appropriate and clear.     APPEARANCE: Patient resting comfortably in no acute distress.  Patient is clean and well groomed.    SKIN: The skin is warm and dry; color consistent with ethnicity.  Patient has normal skin turgor and moist mucus membranes.  Pt has blisters present to palms bilaterally.     MUSCULOSKELETAL: Patient moving upper and lower extremities without difficulty; denies pain in the extremities or back.  Denies weakness.     RESPIRATORY: Airway is open and patent. Respirations spontaneous, even, easy, and non-labored.  Patient has a normal effort and rate.  No accessory muscle use noted. Denies cough.     CARDIAC:  Normal rate noted.  No peripheral edema noted. No complaints of chest pain.      ABDOMEN: Soft and non tender to palpation.  No distention noted. Pt denies abdominal pain; denies nausea, vomiting, diarrhea, or constipation.    NEUROLOGIC: Eyes open spontaneously.  Behavior appropriate to situation.  Follows commands; facial expression symmetrical.  Purposeful motor response noted; normal sensation in all extremities. Pt denies headache; denies lightheadedness or dizziness; " denies visual disturbances; denies loss of balance; denies unilateral weakness.

## 2022-03-16 NOTE — ED NOTES
Discharge home, states understanding to follow up as directed. Ambulates out of ED without difficulty. Tolerated shot time in lobby with no concerns

## 2022-03-16 NOTE — ED PROVIDER NOTES
"Encounter Date: 3/16/2022    SCRIBE #1 NOTE: I, Jenny Perez, am scribing for, and in the presence of,  Penelope Shepherd MD. I have scribed the following portions of the note - Other sections scribed: HPI, ROS, PE.       History     Chief Complaint   Patient presents with    Rash     Pt. States was here Saturday. Had "bumps on hands" was told to back today to receive "shot" for the "bumps"     Time patient was seen by the provider: 5:43 PM      The patient is a 32 y.o. male with co-morbidities including: Perineal abscess, DM, encounter for blood transfusion who presents to the ED with a complaint of rash to hands bilaterally. Patient reports that his symptoms started x 1 month ago. He was seen here at the ED Saturday for the bumps on his hands and swelling of the legs bilaterally and was told to come back to receive to be treated for syphilis. Patient denies any other lesion, ulcers, fever, chills, chest pain, shortness of breath, confusion or itchiness to his rash. He also repots having leg swelling bilaterally that also started x 1 month ago. Patient denies being currently sexually active.       The history is provided by the patient and medical records. No  was used.     Review of patient's allergies indicates:  No Known Allergies  Past Medical History:   Diagnosis Date    Diabetes mellitus     Encounter for blood transfusion     Other insomnia 8/3/2020    Perineal abscess 8/1/2014     Past Surgical History:   Procedure Laterality Date    DECOMPRESSION OF LUMBAR SPINE USING MINIMALLY INVASIVE TECHNIQUE Right 7/6/2018    Procedure: DECOMPRESSION, SPINE, LUMBAR, MINIMALLY INVASIVE L3-4;  Surgeon: Cristian Cervantes MD;  Location: [facility] OR [address] [place] [address];  Service: Neurosurgery;  Laterality: Right;    ORTHOPEDIC SURGERY       Family History   Problem Relation Age of Onset    Diabetes Father     Diabetes Paternal Grandmother     Diabetes Paternal Grandfather      Social History     Tobacco Use    " Smoking status: Former Smoker     Packs/day: 0.50     Years: 9.00     Pack years: 4.50     Quit date: 2013     Years since quittin.7    Smokeless tobacco: Former User   Substance Use Topics    Alcohol use: Yes     Alcohol/week: 2.0 standard drinks     Types: 2 Cans of beer per week     Comment: daily ETOH, 48 beers per day, none today    Drug use: Not Currently     Review of Systems   Constitutional: Negative for chills and fever.   HENT: Negative for sore throat.    Respiratory: Negative for shortness of breath.    Cardiovascular: Positive for leg swelling. Negative for chest pain.   Gastrointestinal: Negative for nausea.   Genitourinary: Negative for dysuria.   Musculoskeletal: Negative for back pain.   Skin: Positive for rash. Negative for wound.   Neurological: Negative for weakness.   Hematological: Does not bruise/bleed easily.   Psychiatric/Behavioral: Negative for confusion.       Physical Exam     Initial Vitals [22 1647]   BP Pulse Resp Temp SpO2   (!) 161/98 96 18 98.8 °F (37.1 °C) 97 %      MAP       --         Physical Exam    Nursing note and vitals reviewed.  Constitutional: Vital signs are normal. He appears well-developed and well-nourished. He is not diaphoretic.  Non-toxic appearance. He does not appear ill. No distress.   HENT:   Head: Normocephalic and atraumatic.   Nose: Nose normal.   Mouth/Throat: Oropharynx is clear and moist and mucous membranes are normal.   Eyes: Conjunctivae and lids are normal.   Neck: Neck supple.   Cardiovascular: Normal rate, regular rhythm, normal heart sounds and intact distal pulses.   No murmur heard.  Pulmonary/Chest: Breath sounds normal.   Abdominal: Abdomen is soft. Bowel sounds are normal. There is no abdominal tenderness.   Musculoskeletal:         General: Edema present.      Cervical back: Neck supple.      Comments: Pending edema to legs bilaterally.     Neurological: He is alert and oriented to person, place, and time. He has normal  strength. No cranial nerve deficit.   Skin: Skin is warm, dry and intact. Rash noted. No cyanosis. No pallor.   Rash to palms and soles of feet bilaterally.   Psychiatric: He has a normal mood and affect. His speech is normal and behavior is normal. He is not actively hallucinating. He is attentive.         ED Course   Procedures  Labs Reviewed - No data to display       Imaging Results    None          Medications   penicillin G benzathine (BICILLIN [place]) injection 2.4 Million Units (2.4 Million Units Intramuscular Given 3/16/22 1811)     Medical Decision Making:   History:   Old Medical Records: I decided to obtain old medical records.  Initial Assessment:   Urgent evaluation of 32 F here for positive syphilis.    No other complaints at this time.  Differential Diagnosis:   Secondary syphilis  ED Management:  - abx  - infectious disease referral            Scribe Attestation:   Scribe #1: I performed the above scribed service and the documentation accurately describes the services I performed. I attest to the accuracy of the note.    Attending Attestation:           Physician Attestation for Scribe:      Comments: I Penelope Shepherd have personally performed the services described in this documentation by the scribe, in my presence, and it is both accurate and complete.          ED Course as of 03/16/22 2001   Wed Mar 16, 2022   1806 Penicillin 2.4 million units ordered.    Recommend patient follow-up with Infectious Disease, also discussed with sexual partners that the immediate testing and treatment. 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

## 2022-03-16 NOTE — DISCHARGE INSTRUCTIONS
You have been treated for secondary syphilis.  Please follow-up with your primary doctor or make an appointment with infectious disease.  Contact information noted above.  Please contact your sexual partners for test

## 2022-03-17 ENCOUNTER — PATIENT MESSAGE (OUTPATIENT)
Dept: INFECTIOUS DISEASES | Facility: CLINIC | Age: 32
End: 2022-03-17
Payer: MEDICAID

## 2022-03-22 ENCOUNTER — HOSPITAL ENCOUNTER (OUTPATIENT)
Facility: HOSPITAL | Age: 32
Discharge: HOME | End: 2022-03-23
Attending: EMERGENCY MEDICINE | Admitting: INTERNAL MEDICINE
Payer: MEDICAID

## 2022-03-22 DIAGNOSIS — M79.89 LEG SWELLING: ICD-10-CM

## 2022-03-22 DIAGNOSIS — E11.65 TYPE 2 DIABETES MELLITUS WITH HYPERGLYCEMIA, WITH LONG-TERM CURRENT USE OF INSULIN: ICD-10-CM

## 2022-03-22 DIAGNOSIS — R73.9 HYPERGLYCEMIA: Primary | ICD-10-CM

## 2022-03-22 DIAGNOSIS — Z79.4 TYPE 2 DIABETES MELLITUS WITH HYPERGLYCEMIA, WITH LONG-TERM CURRENT USE OF INSULIN: ICD-10-CM

## 2022-03-22 DIAGNOSIS — R07.9 CHEST PAIN: ICD-10-CM

## 2022-03-22 DIAGNOSIS — R11.10 VOMITING: ICD-10-CM

## 2022-03-22 PROBLEM — I50.33 ACUTE ON CHRONIC DIASTOLIC HEART FAILURE: Status: ACTIVE | Noted: 2022-01-06

## 2022-03-22 LAB
ALBUMIN SERPL BCP-MCNC: 2.4 G/DL (ref 3.5–5.2)
ALLENS TEST: ABNORMAL
ALP SERPL-CCNC: 900 U/L (ref 55–135)
ALT SERPL W/O P-5'-P-CCNC: 53 U/L (ref 10–44)
AMPHET+METHAMPHET UR QL: NEGATIVE
ANION GAP SERPL CALC-SCNC: 13 MMOL/L (ref 8–16)
AST SERPL-CCNC: 109 U/L (ref 10–40)
B-OH-BUTYR BLD STRIP-SCNC: 0.1 MMOL/L (ref 0–0.5)
BACTERIA #/AREA URNS HPF: ABNORMAL /HPF
BARBITURATES UR QL SCN>200 NG/ML: NEGATIVE
BASOPHILS # BLD AUTO: 0.04 K/UL (ref 0–0.2)
BASOPHILS NFR BLD: 0.6 % (ref 0–1.9)
BENZODIAZ UR QL SCN>200 NG/ML: NEGATIVE
BILIRUB SERPL-MCNC: 1 MG/DL (ref 0.1–1)
BILIRUB UR QL STRIP: NEGATIVE
BNP SERPL-MCNC: 178 PG/ML (ref 0–99)
BUN SERPL-MCNC: 4 MG/DL (ref 6–20)
BZE UR QL SCN: NEGATIVE
CALCIUM SERPL-MCNC: 8.4 MG/DL (ref 8.7–10.5)
CANNABINOIDS UR QL SCN: NEGATIVE
CHLORIDE SERPL-SCNC: 95 MMOL/L (ref 95–110)
CLARITY UR: CLEAR
CO2 SERPL-SCNC: 23 MMOL/L (ref 23–29)
COLOR UR: YELLOW
CREAT SERPL-MCNC: 1 MG/DL (ref 0.5–1.4)
CREAT UR-MCNC: 19.7 MG/DL (ref 23–375)
CREAT UR-MCNC: 19.7 MG/DL (ref 23–375)
CTP QC/QA: YES
DELSYS: ABNORMAL
DIFFERENTIAL METHOD: ABNORMAL
EOSINOPHIL # BLD AUTO: 0 K/UL (ref 0–0.5)
EOSINOPHIL NFR BLD: 0.6 % (ref 0–8)
ERYTHROCYTE [DISTWIDTH] IN BLOOD BY AUTOMATED COUNT: 12.5 % (ref 11.5–14.5)
EST. GFR  (AFRICAN AMERICAN): >60 ML/MIN/1.73 M^2
EST. GFR  (NON AFRICAN AMERICAN): >60 ML/MIN/1.73 M^2
ETHANOL SERPL-MCNC: 390 MG/DL
GLUCOSE SERPL-MCNC: 459 MG/DL (ref 70–110)
GLUCOSE SERPL-MCNC: 463 MG/DL (ref 70–110)
GLUCOSE SERPL-MCNC: 716 MG/DL (ref 70–110)
GLUCOSE SERPL-MCNC: >500 MG/DL (ref 70–110)
GLUCOSE SERPL-MCNC: >500 MG/DL (ref 70–110)
GLUCOSE UR QL STRIP: ABNORMAL
HCO3 UR-SCNC: 27.6 MMOL/L (ref 24–28)
HCT VFR BLD AUTO: 38.7 % (ref 40–54)
HGB BLD-MCNC: 12.5 G/DL (ref 14–18)
HGB UR QL STRIP: ABNORMAL
HYALINE CASTS #/AREA URNS LPF: 0 /LPF
IMM GRANULOCYTES # BLD AUTO: 0.02 K/UL (ref 0–0.04)
IMM GRANULOCYTES NFR BLD AUTO: 0.3 % (ref 0–0.5)
KETONES UR QL STRIP: NEGATIVE
LACTATE SERPL-SCNC: 3.9 MMOL/L (ref 0.5–2.2)
LACTATE SERPL-SCNC: 3.9 MMOL/L (ref 0.5–2.2)
LACTATE SERPL-SCNC: 4.7 MMOL/L (ref 0.5–2.2)
LEUKOCYTE ESTERASE UR QL STRIP: ABNORMAL
LIPASE SERPL-CCNC: 85 U/L (ref 4–60)
LYMPHOCYTES # BLD AUTO: 2.1 K/UL (ref 1–4.8)
LYMPHOCYTES NFR BLD: 32.6 % (ref 18–48)
MAGNESIUM SERPL-MCNC: 2.2 MG/DL (ref 1.6–2.6)
MCH RBC QN AUTO: 35 PG (ref 27–31)
MCHC RBC AUTO-ENTMCNC: 32.3 G/DL (ref 32–36)
MCV RBC AUTO: 108 FL (ref 82–98)
METHADONE UR QL SCN>300 NG/ML: NEGATIVE
MICROSCOPIC COMMENT: ABNORMAL
MONOCYTES # BLD AUTO: 0.8 K/UL (ref 0.3–1)
MONOCYTES NFR BLD: 12.1 % (ref 4–15)
NEUTROPHILS # BLD AUTO: 3.5 K/UL (ref 1.8–7.7)
NEUTROPHILS NFR BLD: 53.8 % (ref 38–73)
NITRITE UR QL STRIP: NEGATIVE
NRBC BLD-RTO: 0 /100 WBC
OPIATES UR QL SCN: NEGATIVE
PCO2 BLDA: 48 MMHG (ref 35–45)
PCP UR QL SCN>25 NG/ML: NEGATIVE
PH SMN: 7.37 [PH] (ref 7.35–7.45)
PH UR STRIP: 7 [PH] (ref 5–8)
PHOSPHATE SERPL-MCNC: 4.5 MG/DL (ref 2.7–4.5)
PLATELET # BLD AUTO: 200 K/UL (ref 150–450)
PMV BLD AUTO: 9.9 FL (ref 9.2–12.9)
PO2 BLDA: 62 MMHG (ref 40–60)
POC BE: 2 MMOL/L
POC SATURATED O2: 90 % (ref 95–100)
POC TCO2: 29 MMOL/L (ref 24–29)
POCT GLUCOSE: 433 MG/DL (ref 70–110)
POCT GLUCOSE: 459 MG/DL (ref 70–110)
POCT GLUCOSE: 463 MG/DL (ref 70–110)
POCT GLUCOSE: >500 MG/DL (ref 70–110)
POTASSIUM SERPL-SCNC: 4.1 MMOL/L (ref 3.5–5.1)
POTASSIUM SERPL-SCNC: 5.7 MMOL/L (ref 3.5–5.1)
PROT SERPL-MCNC: 7.4 G/DL (ref 6–8.4)
PROT UR QL STRIP: ABNORMAL
PROT UR-MCNC: 49 MG/DL
PROT/CREAT UR: 2.49 MG/G{CREAT} (ref 0–0.2)
RBC # BLD AUTO: 3.57 M/UL (ref 4.6–6.2)
RBC #/AREA URNS HPF: 13 /HPF (ref 0–4)
SAMPLE: ABNORMAL
SARS-COV-2 RDRP RESP QL NAA+PROBE: NEGATIVE
SITE: ABNORMAL
SODIUM SERPL-SCNC: 131 MMOL/L (ref 136–145)
SP GR UR STRIP: 1.03 (ref 1–1.03)
SQUAMOUS #/AREA URNS HPF: 2 /HPF
TOXICOLOGY INFORMATION: ABNORMAL
TROPONIN I SERPL DL<=0.01 NG/ML-MCNC: 0.03 NG/ML (ref 0–0.03)
TROPONIN I SERPL DL<=0.01 NG/ML-MCNC: <0.006 NG/ML (ref 0–0.03)
URN SPEC COLLECT METH UR: ABNORMAL
UROBILINOGEN UR STRIP-ACNC: NEGATIVE EU/DL
WBC # BLD AUTO: 6.44 K/UL (ref 3.9–12.7)
WBC #/AREA URNS HPF: 5 /HPF (ref 0–5)
YEAST URNS QL MICRO: ABNORMAL

## 2022-03-22 PROCEDURE — 99900035 HC TECH TIME PER 15 MIN (STAT)

## 2022-03-22 PROCEDURE — G0378 HOSPITAL OBSERVATION PER HR: HCPCS

## 2022-03-22 PROCEDURE — 96375 TX/PRO/DX INJ NEW DRUG ADDON: CPT

## 2022-03-22 PROCEDURE — 80053 COMPREHEN METABOLIC PANEL: CPT | Performed by: EMERGENCY MEDICINE

## 2022-03-22 PROCEDURE — 84132 ASSAY OF SERUM POTASSIUM: CPT | Mod: 91 | Performed by: PHYSICIAN ASSISTANT

## 2022-03-22 PROCEDURE — 80307 DRUG TEST PRSMV CHEM ANLYZR: CPT | Performed by: EMERGENCY MEDICINE

## 2022-03-22 PROCEDURE — 96372 THER/PROPH/DIAG INJ SC/IM: CPT | Performed by: PHYSICIAN ASSISTANT

## 2022-03-22 PROCEDURE — 93010 EKG 12-LEAD: ICD-10-PCS | Mod: ,,, | Performed by: INTERNAL MEDICINE

## 2022-03-22 PROCEDURE — 82962 GLUCOSE BLOOD TEST: CPT

## 2022-03-22 PROCEDURE — 63600175 PHARM REV CODE 636 W HCPCS: Performed by: EMERGENCY MEDICINE

## 2022-03-22 PROCEDURE — 96374 THER/PROPH/DIAG INJ IV PUSH: CPT

## 2022-03-22 PROCEDURE — 82010 KETONE BODYS QUAN: CPT | Performed by: EMERGENCY MEDICINE

## 2022-03-22 PROCEDURE — 96361 HYDRATE IV INFUSION ADD-ON: CPT

## 2022-03-22 PROCEDURE — 83735 ASSAY OF MAGNESIUM: CPT | Performed by: EMERGENCY MEDICINE

## 2022-03-22 PROCEDURE — 83605 ASSAY OF LACTIC ACID: CPT | Mod: 91 | Performed by: EMERGENCY MEDICINE

## 2022-03-22 PROCEDURE — 83690 ASSAY OF LIPASE: CPT | Performed by: EMERGENCY MEDICINE

## 2022-03-22 PROCEDURE — 63600175 PHARM REV CODE 636 W HCPCS: Performed by: PHYSICIAN ASSISTANT

## 2022-03-22 PROCEDURE — 93005 ELECTROCARDIOGRAM TRACING: CPT

## 2022-03-22 PROCEDURE — 25000003 PHARM REV CODE 250: Performed by: PHYSICIAN ASSISTANT

## 2022-03-22 PROCEDURE — 85025 COMPLETE CBC W/AUTO DIFF WBC: CPT | Performed by: EMERGENCY MEDICINE

## 2022-03-22 PROCEDURE — U0002 COVID-19 LAB TEST NON-CDC: HCPCS | Performed by: EMERGENCY MEDICINE

## 2022-03-22 PROCEDURE — 83880 ASSAY OF NATRIURETIC PEPTIDE: CPT | Performed by: EMERGENCY MEDICINE

## 2022-03-22 PROCEDURE — 93010 ELECTROCARDIOGRAM REPORT: CPT | Mod: ,,, | Performed by: INTERNAL MEDICINE

## 2022-03-22 PROCEDURE — 96376 TX/PRO/DX INJ SAME DRUG ADON: CPT

## 2022-03-22 PROCEDURE — 99285 EMERGENCY DEPT VISIT HI MDM: CPT | Mod: 25

## 2022-03-22 PROCEDURE — 84484 ASSAY OF TROPONIN QUANT: CPT | Mod: 91 | Performed by: PHYSICIAN ASSISTANT

## 2022-03-22 PROCEDURE — 82803 BLOOD GASES ANY COMBINATION: CPT

## 2022-03-22 PROCEDURE — C9399 UNCLASSIFIED DRUGS OR BIOLOG: HCPCS | Performed by: PHYSICIAN ASSISTANT

## 2022-03-22 PROCEDURE — 84484 ASSAY OF TROPONIN QUANT: CPT | Performed by: EMERGENCY MEDICINE

## 2022-03-22 PROCEDURE — 81000 URINALYSIS NONAUTO W/SCOPE: CPT | Mod: 59 | Performed by: EMERGENCY MEDICINE

## 2022-03-22 PROCEDURE — 82077 ASSAY SPEC XCP UR&BREATH IA: CPT | Performed by: EMERGENCY MEDICINE

## 2022-03-22 PROCEDURE — 36415 COLL VENOUS BLD VENIPUNCTURE: CPT | Performed by: PHYSICIAN ASSISTANT

## 2022-03-22 PROCEDURE — 84156 ASSAY OF PROTEIN URINE: CPT | Performed by: EMERGENCY MEDICINE

## 2022-03-22 PROCEDURE — 84100 ASSAY OF PHOSPHORUS: CPT | Performed by: EMERGENCY MEDICINE

## 2022-03-22 PROCEDURE — 83605 ASSAY OF LACTIC ACID: CPT | Mod: 91 | Performed by: PHYSICIAN ASSISTANT

## 2022-03-22 RX ORDER — INSULIN ASPART 100 [IU]/ML
1-10 INJECTION, SOLUTION INTRAVENOUS; SUBCUTANEOUS
Status: DISCONTINUED | OUTPATIENT
Start: 2022-03-22 | End: 2022-03-23 | Stop reason: HOSPADM

## 2022-03-22 RX ORDER — ONDANSETRON 2 MG/ML
4 INJECTION INTRAMUSCULAR; INTRAVENOUS
Status: COMPLETED | OUTPATIENT
Start: 2022-03-22 | End: 2022-03-22

## 2022-03-22 RX ORDER — MORPHINE SULFATE 4 MG/ML
4 INJECTION, SOLUTION INTRAMUSCULAR; INTRAVENOUS
Status: DISCONTINUED | OUTPATIENT
Start: 2022-03-22 | End: 2022-03-22

## 2022-03-22 RX ORDER — TALC
6 POWDER (GRAM) TOPICAL NIGHTLY PRN
Status: DISCONTINUED | OUTPATIENT
Start: 2022-03-22 | End: 2022-03-23 | Stop reason: HOSPADM

## 2022-03-22 RX ORDER — GLUCAGON 1 MG
1 KIT INJECTION
Status: DISCONTINUED | OUTPATIENT
Start: 2022-03-22 | End: 2022-03-23 | Stop reason: HOSPADM

## 2022-03-22 RX ORDER — NALOXONE HCL 0.4 MG/ML
0.02 VIAL (ML) INJECTION
Status: DISCONTINUED | OUTPATIENT
Start: 2022-03-22 | End: 2022-03-23 | Stop reason: HOSPADM

## 2022-03-22 RX ORDER — AMOXICILLIN 250 MG
1 CAPSULE ORAL DAILY PRN
Status: DISCONTINUED | OUTPATIENT
Start: 2022-03-22 | End: 2022-03-23 | Stop reason: HOSPADM

## 2022-03-22 RX ORDER — FOLIC ACID 1 MG/1
1 TABLET ORAL DAILY
Status: DISCONTINUED | OUTPATIENT
Start: 2022-03-23 | End: 2022-03-23 | Stop reason: HOSPADM

## 2022-03-22 RX ORDER — LANOLIN ALCOHOL/MO/W.PET/CERES
800 CREAM (GRAM) TOPICAL
Status: DISCONTINUED | OUTPATIENT
Start: 2022-03-22 | End: 2022-03-23 | Stop reason: HOSPADM

## 2022-03-22 RX ORDER — AMOXICILLIN 250 MG
1 CAPSULE ORAL 2 TIMES DAILY
Status: DISCONTINUED | OUTPATIENT
Start: 2022-03-22 | End: 2022-03-22

## 2022-03-22 RX ORDER — IBUPROFEN 200 MG
24 TABLET ORAL
Status: DISCONTINUED | OUTPATIENT
Start: 2022-03-22 | End: 2022-03-23 | Stop reason: HOSPADM

## 2022-03-22 RX ORDER — INSULIN ASPART 100 [IU]/ML
10 INJECTION, SOLUTION INTRAVENOUS; SUBCUTANEOUS
Status: DISCONTINUED | OUTPATIENT
Start: 2022-03-23 | End: 2022-03-23 | Stop reason: HOSPADM

## 2022-03-22 RX ORDER — ACETAMINOPHEN 325 MG/1
650 TABLET ORAL EVERY 4 HOURS PRN
Status: DISCONTINUED | OUTPATIENT
Start: 2022-03-22 | End: 2022-03-23 | Stop reason: HOSPADM

## 2022-03-22 RX ORDER — IBUPROFEN 200 MG
16 TABLET ORAL
Status: DISCONTINUED | OUTPATIENT
Start: 2022-03-22 | End: 2022-03-23 | Stop reason: HOSPADM

## 2022-03-22 RX ORDER — SODIUM CHLORIDE 0.9 % (FLUSH) 0.9 %
10 SYRINGE (ML) INJECTION
Status: DISCONTINUED | OUTPATIENT
Start: 2022-03-22 | End: 2022-03-23 | Stop reason: HOSPADM

## 2022-03-22 RX ORDER — SODIUM CHLORIDE 0.9 % (FLUSH) 0.9 %
10 SYRINGE (ML) INJECTION EVERY 8 HOURS
Status: DISCONTINUED | OUTPATIENT
Start: 2022-03-22 | End: 2022-03-22

## 2022-03-22 RX ORDER — FUROSEMIDE 10 MG/ML
20 INJECTION INTRAMUSCULAR; INTRAVENOUS ONCE
Status: COMPLETED | OUTPATIENT
Start: 2022-03-22 | End: 2022-03-22

## 2022-03-22 RX ORDER — LANOLIN ALCOHOL/MO/W.PET/CERES
100 CREAM (GRAM) TOPICAL DAILY
Status: DISCONTINUED | OUTPATIENT
Start: 2022-03-23 | End: 2022-03-23 | Stop reason: HOSPADM

## 2022-03-22 RX ADMIN — Medication 6 MG: at 08:03

## 2022-03-22 RX ADMIN — SODIUM CHLORIDE, SODIUM LACTATE, POTASSIUM CHLORIDE, AND CALCIUM CHLORIDE 500 ML: .6; .31; .03; .02 INJECTION, SOLUTION INTRAVENOUS at 11:03

## 2022-03-22 RX ADMIN — INSULIN HUMAN 10 UNITS: 100 INJECTION, SOLUTION PARENTERAL at 12:03

## 2022-03-22 RX ADMIN — FUROSEMIDE 20 MG: 10 INJECTION, SOLUTION INTRAMUSCULAR; INTRAVENOUS at 10:03

## 2022-03-22 RX ADMIN — INSULIN DETEMIR 30 UNITS: 100 INJECTION, SOLUTION SUBCUTANEOUS at 08:03

## 2022-03-22 RX ADMIN — ONDANSETRON 4 MG: 2 INJECTION INTRAMUSCULAR; INTRAVENOUS at 11:03

## 2022-03-22 RX ADMIN — INSULIN ASPART 5 UNITS: 100 INJECTION, SOLUTION INTRAVENOUS; SUBCUTANEOUS at 08:03

## 2022-03-22 RX ADMIN — INSULIN HUMAN 10 UNITS: 100 INJECTION, SOLUTION PARENTERAL at 03:03

## 2022-03-22 RX ADMIN — INSULIN HUMAN 10 UNITS: 100 INJECTION, SOLUTION PARENTERAL at 01:03

## 2022-03-22 NOTE — H&P
[facility]  [facility] Medicine  History & Physical    Patient Name: Doe Woodall  MRN: [id number]  Patient Class: OP- Observation  Admission Date: 3/22/2022  Attending Physician: Gerardo Castillo MD   Primary Care Provider: [facility]         Patient information was obtained from patient, past medical records and ER records.     Subjective:     Principal Problem:Type 2 diabetes mellitus with hyperglycemia    Chief Complaint:   Chief Complaint   Patient presents with    Hyperglycemia     Pt chief complaint is hyperglycemia, per pt wife pt drinks beer everyday and does not monitor his blood sugar properly.         HPI: Doe Woodall 32 y.o. male with alcohol abuse, obesity, and diabetes mellitus on insulin [facility] with chief complaint of lower extremity edema.  He reports lower extremity edema and shortness of breath that began today.  He chronically experiences intermittent lower extremity edema that resolved without intervention, but it persisted today.  His wife also found his blood glucose to be severely elevated and directed him to emergency room.  He meds he is not taking his insulin in the last 3 days.  He drinks daily, and his last drink was this morning.  He denies fever chest pain nausea vomiting abdominal pain syncope dizziness dysuria melena hematuria he has not attempted any treatment at home.    In the ED, chest x-ray with pulmonary vascular congestion, initial blood glucose 716 with a normal anion gap, AST and  in 53, , troponin 0.031, lactic acid 4.1, ethanol 390.       Past Medical History:   Diagnosis Date    Diabetes mellitus     Encounter for blood transfusion     Obese     Other insomnia 08/03/2020    Perineal abscess 08/01/2014       Past Surgical History:   Procedure Laterality Date    DECOMPRESSION OF LUMBAR SPINE USING MINIMALLY INVASIVE TECHNIQUE Right 7/6/2018    Procedure: DECOMPRESSION, SPINE, LUMBAR,  "MINIMALLY INVASIVE L3-4;  Surgeon: Cristian Cervantes MD;  Location: [facility] OR [address] [place] [address];  Service: Neurosurgery;  Laterality: Right;    ORTHOPEDIC SURGERY         Review of patient's allergies indicates:  No Known Allergies    No current facility-administered medications on file prior to encounter.     Current Outpatient Medications on File Prior to Encounter   Medication Sig    acetaminophen (TYLENOL) 500 MG tablet Take 1,000 mg by mouth 3 (three) times daily as needed for Pain.    blood sugar diagnostic Strp 1 strip by Misc.(Non-Drug; Combo Route) route 4 (four) times daily with meals and nightly.    blood-glucose meter Misc Use as instructed (Patient taking differently: Use as instructed)    diphenhydramine HCl (UNISOM SLEEPGELS ORAL) Take 1 capsule by mouth every evening.    dulaglutide (TRULICITY) 1.5 mg/0.5 mL pen injector Inject 1.5 mg into the skin every 7 days. (Patient taking differently: Inject 1.5 mg into the skin every 7 days. Tuesdays)    insulin aspart U-100 (NOVOLOG) 100 unit/mL (3 mL) InPn pen Inject 10-20 units Three times daily with meals (Hold if BG < 100 mg/dL) Administer 10 units with a small meal; 15 units with an average sized meal; 20 units with a large meal. - Novolog SSI for BG excursions: 150 - 200 + 2 unit 201 - 250 + 4 units 251 - 300 + 6 units 301 - 350 + 8 units > 350 + 10 units    insulin detemir U-100 (LEVEMIR FLEXTOUCH) 100 unit/mL (3 mL) SubQ InPn pen Inject 60 Units into the skin once daily.    lancets 30 gauge Misc 1 lancet by Misc.(Non-Drug; Combo Route) route 4 (four) times daily with meals and nightly.    lancing device Misc Use as instructed    pen needle, diabetic 31 gauge x 5/16" Ndle 1 each by Misc.(Non-Drug; Combo Route) route 4 (four) times daily with meals and nightly.    glucagon (GLUCAGEN HYPOKIT) 1 mg SolR Inject 1 mg into the muscle as needed (Hypoglycemia).    pulse oximeter (PULSE OXIMETER) device by Apply Externally route 2 (two) times a day. Use twice " daily at 8 AM and 3 PM and record the value in MyChart as directed.    [DISCONTINUED] multivitamin (THERAGRAN) per tablet Take 1 tablet by mouth once daily.     Family History       Problem Relation (Age of Onset)    Diabetes Father, Paternal Grandmother, Paternal Grandfather          Tobacco Use    Smoking status: Former Smoker     Packs/day: 0.50     Years: 9.00     Pack years: 4.50     Quit date: 2013     Years since quittin.7    Smokeless tobacco: Former User   Substance and Sexual Activity    Alcohol use: Yes     Alcohol/week: 2.0 standard drinks     Types: 2 Cans of beer per week     Comment: daily ETOH, 48 beers per day, none today    Drug use: Not Currently    Sexual activity: Yes     Partners: Female     Birth control/protection: None     Review of Systems   Constitutional:  Negative for chills and fever.   HENT:  Negative for nosebleeds and tinnitus.    Eyes:  Negative for photophobia and visual disturbance.   Respiratory:  Positive for shortness of breath. Negative for wheezing.    Cardiovascular:  Positive for leg swelling. Negative for chest pain and palpitations.   Gastrointestinal:  Negative for abdominal distention, nausea and vomiting.   Genitourinary:  Negative for dysuria, flank pain and hematuria.   Musculoskeletal:  Negative for gait problem and joint swelling.   Skin:  Negative for rash and wound.   Neurological:  Negative for seizures and syncope.   Objective:     Vital Signs (Most Recent):  Temp: 97.8 °F (36.6 °C) (22 1132)  Pulse: 91 (22 1603)  Resp: 16 (22 1629)  BP: 136/74 (22 1603)  SpO2: 97 % (22 1603) Vital Signs (24h Range):  Temp:  [97.8 °F (36.6 °C)] 97.8 °F (36.6 °C)  Pulse:  [89-93] 91  Resp:  [15-22] 16  SpO2:  [91 %-99 %] 97 %  BP: (101-136)/(55-77) 136/74     Weight: 113.4 kg (250 lb)  Body mass index is 41.6 kg/m².    Physical Exam  Vitals and nursing note reviewed.   Constitutional:       General: He is not in acute distress.      Appearance: He is well-developed.   HENT:      Head: Normocephalic and atraumatic.      Right Ear: External ear normal.      Left Ear: External ear normal.   Eyes:      General:         Right eye: No discharge.         Left eye: No discharge.      Conjunctiva/sclera: Conjunctivae normal.   Neck:      Thyroid: No thyromegaly.   Cardiovascular:      Rate and Rhythm: Normal rate and regular rhythm.      Heart sounds: No murmur heard.  Pulmonary:      Effort: Pulmonary effort is normal. No respiratory distress.      Breath sounds: Normal breath sounds.   Abdominal:      General: Bowel sounds are normal. There is no distension.      Palpations: Abdomen is soft. There is no mass.      Tenderness: There is no abdominal tenderness.   Musculoskeletal:         General: No deformity.      Cervical back: Normal range of motion and neck supple.      Right lower leg: Edema present.      Left lower leg: Edema present.   Skin:     General: Skin is warm and dry.   Neurological:      Mental Status: He is alert and oriented to person, place, and time.      Sensory: No sensory deficit.      Comments: No tremor on finger to nose.    Psychiatric:         Mood and Affect: Mood normal.         Behavior: Behavior normal.           Significant Labs: CBC:   Recent Labs   Lab 03/22/22  1150   WBC 6.44   HGB 12.5*   HCT 38.7*        CMP:   Recent Labs   Lab 03/22/22  1150   *   K 5.7*   CL 95   CO2 23   *   BUN 4*   CREATININE 1.0   CALCIUM 8.4*   PROT 7.4   ALBUMIN 2.4*   BILITOT 1.0   ALKPHOS 900*   *   ALT 53*   ANIONGAP 13   EGFRNONAA >60     Cardiac Markers:   Recent Labs   Lab 03/22/22  1150   *     Lactic Acid:   Recent Labs   Lab 03/22/22  1150   LACTATE 4.7*     Troponin:   Recent Labs   Lab 03/22/22  1150   TROPONINI 0.031*     Urine Studies:   Recent Labs   Lab 03/22/22  1418   COLORU Yellow   APPEARANCEUA Clear   PHUR 7.0   SPECGRAV 1.030   PROTEINUA 1+*   GLUCUA 4+*   KETONESU Negative    BILIRUBINUA Negative   OCCULTUA Trace*   NITRITE Negative   UROBILINOGEN Negative   LEUKOCYTESUR 1+*   RBCUA 13*   WBCUA 5   BACTERIA None   SQUAMEPITHEL 2   HYALINECASTS 0       Significant Imaging:   Imaging Results              US Lower Extremity Veins Bilateral (In process)                      X-Ray Chest 1 View (Final result)  Result time 03/22/22 12:10:22      Final result by Santi Porter MD (03/22/22 12:10:22)                   Impression:      Stable examination.  No acute process.      Electronically signed by: Santi Porter MD  Date:    03/22/2022  Time:    12:10               Narrative:    EXAMINATION:  XR CHEST 1 VIEW    CLINICAL HISTORY:  Vomiting, unspecified    TECHNIQUE:  Single frontal view of the chest was performed.    COMPARISON:  03/12/2022.    FINDINGS:  Monitoring EKG leads are present.  The trachea is unremarkable.  The cardiomediastinal silhouette is stable.  There is no evidence of free air beneath the hemidiaphragms.  There is chronic elevation of the right hemidiaphragm.  There are no pleural effusions.  There is no evidence of a pneumothorax.  There is no evidence of pneumomediastinum.  There is pulmonary vascular congestion.  There is no focal consolidation.  The osseous structures are unremarkable.                                        Assessment/Plan:     * Type 2 diabetes mellitus with hyperglycemia  Presented with BG of 731 after reporting non-compliance with home insulin regimen. Improved to 431 in ED without evidence of DKA  Will start 30 units basal, moderate sliding scale, sugar free clear liquid diet  Will check urine protein/creatinine ratio as complains of lower extremity edema and protein in UA  Potassium 5.7 on ED arrival though hemolyzed will repeat after insulin    As BG improved to 433 and urine output decreased will give IV lasix for lower extremity edema and SOB.     Acute on chronic diastolic heart failure  Echo 1/2022 with EF of 60% and indeterminate diastolic  dysfunction. With complaints of SOB, , and chest x-ray with mild pulmonary vascular congestion  Has had significant urine output in ED likely due to osmotic diuresis from hyperglycemia  Will hold starting lasix currently and begin if urine output decreases with control of BG  Daily weights/strict intake and output/telemetry  DVT US pending given recent hospitalization for COVID  Troponin of 0.031 on arrival. He denies any chest pain and EKG without acute ischemia. Will continue to trend    Hyponatremia  Pseudohyponatremia due to hyperglycemia.  Corrects to 141 from 131 for BG of 731    Alcohol use disorder, severe, dependence  US 1/2022 with steatosis. Reports drinking daily last drink this AM. Arrives with ethanol level of 390. Without signs of withdrawal  Started on thiamine/folic acid/multivitamin supplementation  CIWA trend  Lactic acid of 4.1 on arrival. Suspect related to alcohol use. Will repeat lactic acid    Severe obesity (BMI >= 40)  Body mass index is 41.6 kg/m². Morbid obesity complicates all aspects of disease management from diagnostic modalities to treatment. Weight loss encouraged and health benefits explained to patient.     Elevated transaminase level  AST and ALT of 109/53 on arrival. Previously elevated. Suspect related to alcohol use. Hepatitis panel negative in 1/2022 and US at that time with hepatic steatatosis    VTE Risk Mitigation (From admission, onward)         Ordered     IP VTE HIGH RISK PATIENT  Once         03/22/22 1650     Place sequential compression device  Until discontinued         03/22/22 1650     Place ROBY hose  Until discontinued         03/22/22 1650              VTE: ROBY/SCD  Code: Full  Diet: clear liquid, no sugar  Dispo: pending improvement in BG and diuresis  As clarification, on 3/22/2022, patient should be admitted for hospital observation services under my care in collaboration with Rissa Carmona MD. Ramiro Hernandez,  GALA  Department of Hospital Medicine   [facility] [place] - Emergency Dept

## 2022-03-22 NOTE — ED PROVIDER NOTES
Encounter Date: 3/22/2022    SCRIBE #1 NOTE: I, León Washington, am scribing for, and in the presence of,  Gerardo Castillo MD. I have scribed the following portions of the note - Other sections scribed: HPI, CHI.       History     Chief Complaint   Patient presents with    Hyperglycemia     Pt chief complaint is hyperglycemia, per pt wife pt drinks beer everyday and does not monitor his blood sugar properly.      32 y.o. male with a PMHx of DM II presents to the ED for hyperglycemia. Per EMS personnel, the patient was found earlier today by his wife. Patient was vomiting upon EMS arrival on scene. The patient currently reports 2 days of abdominal pain and notes he ran out of insulin yesterday. States he was drinking EtOH earlier today and admits to every day use of liquor (1 bottle/day) and beer. Patient notes he was in DKA during last hospitalization on 2/16/2022. Reports current symptoms are identical to DKA. Patient does not monitor his blood glucose. He notes nausea and emesis have resolved upon arrival to the ED. Denies fever. No other exacerbating or alleviating factors. No other associated symptoms.    The history is provided by the patient and the EMS personnel.     Review of patient's allergies indicates:  No Known Allergies  Past Medical History:   Diagnosis Date    Diabetes mellitus     Encounter for blood transfusion     Obese     Other insomnia 08/03/2020    Perineal abscess 08/01/2014     Past Surgical History:   Procedure Laterality Date    DECOMPRESSION OF LUMBAR SPINE USING MINIMALLY INVASIVE TECHNIQUE Right 7/6/2018    Procedure: DECOMPRESSION, SPINE, LUMBAR, MINIMALLY INVASIVE L3-4;  Surgeon: Cristian Cervantes MD;  Location: [facility] OR [address] [place] [address];  Service: Neurosurgery;  Laterality: Right;    ORTHOPEDIC SURGERY       Family History   Problem Relation Age of Onset    Diabetes Father     Diabetes Paternal Grandmother     Diabetes Paternal Grandfather      Social History     Tobacco Use    Smoking  status: Former Smoker     Packs/day: 0.50     Years: 9.00     Pack years: 4.50     Quit date: 2013     Years since quittin.7    Smokeless tobacco: Former User   Substance Use Topics    Alcohol use: Yes     Alcohol/week: 2.0 standard drinks     Types: 2 Cans of beer per week     Comment: daily ETOH, 48 beers per day    Drug use: Not Currently     Review of Systems   Constitutional: Negative.  Negative for fever.   HENT: Negative.    Eyes: Negative.    Respiratory: Negative.    Cardiovascular: Negative.    Gastrointestinal: Positive for abdominal pain, nausea (currently resolved) and vomiting (currently resolved).   Genitourinary: Negative.    Musculoskeletal: Negative.    Skin: Negative.    Neurological: Negative.        Physical Exam     Initial Vitals [22 1114]   BP Pulse Resp Temp SpO2   118/77 93 20 97.8 °F (36.6 °C) 95 %      MAP       --         Physical Exam    Nursing note and vitals reviewed.  Constitutional: He is not diaphoretic. He appears distressed.   HENT:   Head: Normocephalic and atraumatic.   Nose: Nose normal.   Mouth/Throat: No oropharyngeal exudate.   Eyes: EOM are normal. Pupils are equal, round, and reactive to light.   Scleral injection bilaterally   Neck: Neck supple. No tracheal deviation present. No JVD present.   Normal range of motion.  Cardiovascular: Regular rhythm, normal heart sounds and intact distal pulses.   Tachycardic   Pulmonary/Chest: Breath sounds normal. No respiratory distress. He has no wheezes. He has no rhonchi. He has no rales.   Abdominal: Abdomen is soft. Bowel sounds are normal. He exhibits no distension. There is no abdominal tenderness. There is no rebound and no guarding.   Musculoskeletal:         General: Edema (2+ pitting edema bilateral lower extremity) present. No tenderness.      Cervical back: Normal range of motion and neck supple.     Neurological: He is alert and oriented to person, place, and time. He has normal strength.   Skin: Skin is  warm and dry. Capillary refill takes less than 2 seconds. No rash noted. No erythema.         ED Course   Critical Care    Date/Time: 3/22/2022 11:40 AM  Performed by: Gerardo Castillo MD  Authorized by: Gerardo Castillo MD   Direct patient critical care time: 15 minutes  Additional history critical care time: 5 minutes  Ordering / reviewing critical care time: 5 minutes  Documentation critical care time: 5 minutes  Consulting other physicians critical care time: 5 minutes  Total critical care time (exclusive of procedural time) : 35 minutes  Critical care time was exclusive of separately billable procedures and treating other patients and teaching time.  Critical care was necessary to treat or prevent imminent or life-threatening deterioration of the following conditions: endocrine crisis and metabolic crisis.  Critical care was time spent personally by me on the following activities: development of treatment plan with patient or surrogate, discussions with consultants, evaluation of patient's response to treatment, examination of patient, obtaining history from patient or surrogate, ordering and performing treatments and interventions, ordering and review of laboratory studies, ordering and review of radiographic studies, re-evaluation of patient's condition and review of old charts.        Labs Reviewed   CBC W/ AUTO DIFFERENTIAL - Abnormal; Notable for the following components:       Result Value    RBC 3.57 (*)     Hemoglobin 12.5 (*)     Hematocrit 38.7 (*)      (*)     MCH 35.0 (*)     All other components within normal limits   COMPREHENSIVE METABOLIC PANEL - Abnormal; Notable for the following components:    Sodium 131 (*)     Potassium 5.7 (*)     Glucose 716 (*)     BUN 4 (*)     Calcium 8.4 (*)     Albumin 2.4 (*)     Alkaline Phosphatase 900 (*)      (*)     ALT 53 (*)     All other components within normal limits    Narrative:        Glucose critical result(s) called and verbal  readback obtained   from [employer]. by [facility] 03/22/2022 12:31  Glucose   critical result(s) called and verbal readback obtained from   [employer]. by [facility] 03/22/2022 12:30   ALCOHOL,MEDICAL (ETHANOL) - Abnormal; Notable for the following components:    Alcohol, Serum 390 (*)     All other components within normal limits    Narrative:       Alcohol critical result(s) called and verbal readback obtained from   [employer]. by [facility] 03/22/2022 12:28   DRUG SCREEN PANEL, URINE EMERGENCY - Abnormal; Notable for the following components:    Creatinine, Urine 19.7 (*)     All other components within normal limits    Narrative:     Specimen Source->Urine   URINALYSIS, REFLEX TO URINE CULTURE - Abnormal; Notable for the following components:    Protein, UA 1+ (*)     Glucose, UA 4+ (*)     Occult Blood UA Trace (*)     Leukocytes, UA 1+ (*)     All other components within normal limits    Narrative:     Specimen Source->Urine   LIPASE - Abnormal; Notable for the following components:    Lipase 85 (*)     All other components within normal limits   LACTIC ACID, PLASMA - Abnormal; Notable for the following components:    Lactate (Lactic Acid) 4.7 (*)     All other components within normal limits    Narrative:       Lactic Acid  critical result(s) called and verbal readback obtained   from [employer]. by [facility] 03/22/2022 12:21   B-TYPE NATRIURETIC PEPTIDE - Abnormal; Notable for the following components:     (*)     All other components within normal limits   TROPONIN I - Abnormal; Notable for the following components:    Troponin I 0.031 (*)     All other components within normal limits   URINALYSIS MICROSCOPIC - Abnormal; Notable for the following components:    RBC, UA 13 (*)     All other components within normal limits    Narrative:     Specimen Source->Urine   PROTEIN / CREATININE RATIO, URINE - Abnormal; Notable for the following components:    Creatinine, Urine 19.7 (*)     Prot/Creat Ratio,  Urine 2.49 (*)     All other components within normal limits    Narrative:     Specimen Source->Urine   ISTAT PROCEDURE - Abnormal; Notable for the following components:    POC PCO2 48.0 (*)     POC PO2 62 (*)     POC SATURATED O2 90 (*)     All other components within normal limits   MAGNESIUM   PHOSPHORUS   BETA - HYDROXYBUTYRATE, SERUM   POTASSIUM   PROTEIN / CREATININE RATIO, URINE   SARS-COV-2 RDRP GENE        ECG Results          EKG 12-lead (Final result)  Result time 03/23/22 18:07:49    Final result by Interface, Lab In [facility] (03/23/22 18:07:49)                 Narrative:    Test Reason : R11.10,    Vent. Rate : 091 BPM     Atrial Rate : 091 BPM     P-R Int : 102 ms          QRS Dur : 100 ms      QT Int : 396 ms       P-R-T Axes : -06 097 -01 degrees     QTc Int : 487 ms    Sinus rhythm with short LA  Rightward axis  Abnormal QRS-T angle, consider primary T wave abnormality  Prolonged QT  Abnormal ECG  When compared with ECG of 12-MAR-2022 17:27,  Significant changes have occurred  Confirmed by Tony Meyers MD ([id number]) on 3/23/2022 6:07:36 PM    Referred By: [email or web address]   SELF           Confirmed By:Tony Meyers MD                             EKG 12-LEAD (Final result)  Result time 03/30/22 11:26:44    Final result by Unknown User (03/30/22 11:26:44)                                Imaging Results          US Lower Extremity Veins Bilateral (Final result)  Result time 03/22/22 17:34:22    Final result by Rhett Mendenhall MD (03/22/22 17:34:22)                 Impression:      No evidence of lower extremity deep venous thrombosis.      Electronically signed by: Rhett Mendenhall MD  Date:    03/22/2022  Time:    17:34             Narrative:    EXAMINATION:  US LOWER EXTREMITY VEINS BILATERAL    CLINICAL HISTORY:  Other specified soft tissue disorders    TECHNIQUE:  Duplex and color flow Doppler evaluation of the bilateral lower extremity veins was performed.    COMPARISON:  None    FINDINGS:  No evidence of clot  involving the bilateral common femoral, greater saphenous, femoral, popliteal, peroneal, anterior and posterior tibial veins.  All venous structures demonstrate normal respiratory phasicity and augment adequately.  No evidence of soft tissue mass or Baker's cyst.                               X-Ray Chest 1 View (Final result)  Result time 03/22/22 12:10:22    Final result by Santi Porter MD (03/22/22 12:10:22)                 Impression:      Stable examination.  No acute process.      Electronically signed by: Santi Porter MD  Date:    03/22/2022  Time:    12:10             Narrative:    EXAMINATION:  XR CHEST 1 VIEW    CLINICAL HISTORY:  Vomiting, unspecified    TECHNIQUE:  Single frontal view of the chest was performed.    COMPARISON:  03/12/2022.    FINDINGS:  Monitoring EKG leads are present.  The trachea is unremarkable.  The cardiomediastinal silhouette is stable.  There is no evidence of free air beneath the hemidiaphragms.  There is chronic elevation of the right hemidiaphragm.  There are no pleural effusions.  There is no evidence of a pneumothorax.  There is no evidence of pneumomediastinum.  There is pulmonary vascular congestion.  There is no focal consolidation.  The osseous structures are unremarkable.                                 Medications   ondansetron injection 4 mg (4 mg Intravenous Given 3/22/22 1159)   lactated ringers bolus 500 mL (0 mLs Intravenous Stopped 3/22/22 1303)   insulin regular injection 10 Units (10 Units Intravenous Given 3/22/22 1259)   insulin regular injection 10 Units (10 Units Intravenous Given 3/22/22 1357)   insulin regular injection 10 Units (10 Units Intravenous Given 3/22/22 1511)   furosemide injection 20 mg (20 mg Intravenous Given 3/22/22 2201)     MDM:    32-year-old male with past medical history as noted above presenting with intoxication, hyperglycemia, weakness.  Physical exam as noted above.  ED workup notable for EKG-normal sinus rhythm rate of 91 beats per  minute, nonspecific ST and T-wave changes noted inferiorly, similar to prior EKG, no STEMI.  Glucose 716, anion gap 13, bicarb 23, potassium 5.7, alk-phos 900, AST//53, alcohol 390, lipase 85, lactate 4.7, Mag 2.2, pH 7.368.  Patient presentation consistent with hyperglycemia, no evidence of DKA or HHS at this point, patient also intoxicated and volume overloaded suspected secondary to hypoalbuminemia at 2.4 likely from his contributing longstanding alcohol abuse history.  Will not be able to give IV fluids at this time as patient appears volume overloaded, IV insulin ordered x3 in total 30 units of IV insulin given with improvement in glucose to 463. Discussed further with observation given his marked hyperglycemia, acute intoxication and likelihood that he will be able to take care of himself at this point. Discussed diagnosis and further treatment with patient and family at bedside. All questions answered, patient transferred to floor improved and stable.             Scribe Attestation:   Scribe #1: I performed the above scribed service and the documentation accurately describes the services I performed. I attest to the accuracy of the note.                 Clinical Impression:   Final diagnoses:  [R11.10] Vomiting  [R73.9] Hyperglycemia (Primary)  [M79.89] Leg swelling  [R07.9] Chest pain          ED Disposition Condition    Observation           I, Gerardo Castillo M.D., personally performed the services described in this documentation. All medical record entries made by the scribe were at my direction and in my presence. I have reviewed the chart and agree that the record reflects my personal performance and is accurate and complete.       Gerardo Castillo MD  03/22/22 5703       Gerardo Castillo MD  04/02/22 2353

## 2022-03-22 NOTE — ED NOTES
Pt's O2 sat noted to be 90-92% on room air. Placed on 2L O2 via nasal cannula, pt's O2 sat now 97%.

## 2022-03-22 NOTE — ASSESSMENT & PLAN NOTE
Echo 1/2022 with EF of 60% and indeterminate diastolic dysfunction. With complaints of SOB, , and chest x-ray with mild pulmonary vascular congestion  Has had significant urine output in ED likely due to osmotic diuresis from hyperglycemia  Will hold starting lasix currently and begin if urine output decreases with control of BG  Daily weights/strict intake and output/telemetry  DVT US pending given recent hospitalization for COVID  Troponin of 0.031 on arrival. He denies any chest pain and EKG without acute ischemia. Will continue to trend

## 2022-03-22 NOTE — ASSESSMENT & PLAN NOTE
Presented with BG of 731 after reporting non-compliance with home insulin regimen. Improved to 431 in ED without evidence of DKA  Will start 30 units basal, moderate sliding scale, sugar free clear liquid diet  Will check urine protein/creatinine ratio as complains of lower extremity edema and protein in UA  Potassium 5.7 on ED arrival though hemolyzed will repeat after insulin

## 2022-03-22 NOTE — NURSING
Pt arrived from ED. Critical lactic acid called from lab. MD Pietro notified. Report given to next shift RN.

## 2022-03-22 NOTE — SUBJECTIVE & OBJECTIVE
Past Medical History:   Diagnosis Date    Diabetes mellitus     Encounter for blood transfusion     Obese     Other insomnia 08/03/2020    Perineal abscess 08/01/2014       Past Surgical History:   Procedure Laterality Date    DECOMPRESSION OF LUMBAR SPINE USING MINIMALLY INVASIVE TECHNIQUE Right 7/6/2018    Procedure: DECOMPRESSION, SPINE, LUMBAR, MINIMALLY INVASIVE L3-4;  Surgeon: Cristian Cervantes MD;  Location: [facility] OR [address] [place] [address];  Service: Neurosurgery;  Laterality: Right;    ORTHOPEDIC SURGERY         Review of patient's allergies indicates:  No Known Allergies    No current facility-administered medications on file prior to encounter.     Current Outpatient Medications on File Prior to Encounter   Medication Sig    acetaminophen (TYLENOL) 500 MG tablet Take 1,000 mg by mouth 3 (three) times daily as needed for Pain.    blood sugar diagnostic Strp 1 strip by Misc.(Non-Drug; Combo Route) route 4 (four) times daily with meals and nightly.    blood-glucose meter Misc Use as instructed (Patient taking differently: Use as instructed)    diphenhydramine HCl (UNISOM SLEEPGELS ORAL) Take 1 capsule by mouth every evening.    dulaglutide (TRULICITY) 1.5 mg/0.5 mL pen injector Inject 1.5 mg into the skin every 7 days. (Patient taking differently: Inject 1.5 mg into the skin every 7 days. Tuesdays)    insulin aspart U-100 (NOVOLOG) 100 unit/mL (3 mL) InPn pen Inject 10-20 units Three times daily with meals (Hold if BG < 100 mg/dL) Administer 10 units with a small meal; 15 units with an average sized meal; 20 units with a large meal. - Novolog SSI for BG excursions: 150 - 200 + 2 unit 201 - 250 + 4 units 251 - 300 + 6 units 301 - 350 + 8 units > 350 + 10 units    insulin detemir U-100 (LEVEMIR FLEXTOUCH) 100 unit/mL (3 mL) SubQ InPn pen Inject 60 Units into the skin once daily.    lancets 30 gauge Misc 1 lancet by Misc.(Non-Drug; Combo Route) route 4 (four) times daily with meals and nightly.    lancing device Misc Use as  "instructed    pen needle, diabetic 31 gauge x " Ndle 1 each by Misc.(Non-Drug; Combo Route) route 4 (four) times daily with meals and nightly.    glucagon (GLUCAGEN HYPOKIT) 1 mg SolR Inject 1 mg into the muscle as needed (Hypoglycemia).    pulse oximeter (PULSE OXIMETER) device by Apply Externally route 2 (two) times a day. Use twice daily at 8 AM and 3 PM and record the value in MyChart as directed.    [DISCONTINUED] multivitamin (THERAGRAN) per tablet Take 1 tablet by mouth once daily.     Family History       Problem Relation (Age of Onset)    Diabetes Father, Paternal Grandmother, Paternal Grandfather          Tobacco Use    Smoking status: Former Smoker     Packs/day: 0.50     Years: 9.00     Pack years: 4.50     Quit date: 2013     Years since quittin.7    Smokeless tobacco: Former User   Substance and Sexual Activity    Alcohol use: Yes     Alcohol/week: 2.0 standard drinks     Types: 2 Cans of beer per week     Comment: daily ETOH, 48 beers per day, none today    Drug use: Not Currently    Sexual activity: Yes     Partners: Female     Birth control/protection: None     Review of Systems   Constitutional:  Negative for chills and fever.   HENT:  Negative for nosebleeds and tinnitus.    Eyes:  Negative for photophobia and visual disturbance.   Respiratory:  Positive for shortness of breath. Negative for wheezing.    Cardiovascular:  Positive for leg swelling. Negative for chest pain and palpitations.   Gastrointestinal:  Negative for abdominal distention, nausea and vomiting.   Genitourinary:  Negative for dysuria, flank pain and hematuria.   Musculoskeletal:  Negative for gait problem and joint swelling.   Skin:  Negative for rash and wound.   Neurological:  Negative for seizures and syncope.   Objective:     Vital Signs (Most Recent):  Temp: 97.8 °F (36.6 °C) (22 1132)  Pulse: 91 (22 1603)  Resp: 16 (22 1629)  BP: 136/74 (22 1603)  SpO2: 97 % (22 1603) Vital Signs " (24h Range):  Temp:  [97.8 °F (36.6 °C)] 97.8 °F (36.6 °C)  Pulse:  [89-93] 91  Resp:  [15-22] 16  SpO2:  [91 %-99 %] 97 %  BP: (101-136)/(55-77) 136/74     Weight: 113.4 kg (250 lb)  Body mass index is 41.6 kg/m².    Physical Exam  Vitals and nursing note reviewed.   Constitutional:       General: He is not in acute distress.     Appearance: He is well-developed.   HENT:      Head: Normocephalic and atraumatic.      Right Ear: External ear normal.      Left Ear: External ear normal.   Eyes:      General:         Right eye: No discharge.         Left eye: No discharge.      Conjunctiva/sclera: Conjunctivae normal.   Neck:      Thyroid: No thyromegaly.   Cardiovascular:      Rate and Rhythm: Normal rate and regular rhythm.      Heart sounds: No murmur heard.  Pulmonary:      Effort: Pulmonary effort is normal. No respiratory distress.      Breath sounds: Normal breath sounds.   Abdominal:      General: Bowel sounds are normal. There is no distension.      Palpations: Abdomen is soft. There is no mass.      Tenderness: There is no abdominal tenderness.   Musculoskeletal:         General: No deformity.      Cervical back: Normal range of motion and neck supple.      Right lower leg: Edema present.      Left lower leg: Edema present.   Skin:     General: Skin is warm and dry.   Neurological:      Mental Status: He is alert and oriented to person, place, and time.      Sensory: No sensory deficit.      Comments: No tremor on finger to nose.    Psychiatric:         Mood and Affect: Mood normal.         Behavior: Behavior normal.           Significant Labs: CBC:   Recent Labs   Lab 03/22/22  1150   WBC 6.44   HGB 12.5*   HCT 38.7*        CMP:   Recent Labs   Lab 03/22/22  1150   *   K 5.7*   CL 95   CO2 23   *   BUN 4*   CREATININE 1.0   CALCIUM 8.4*   PROT 7.4   ALBUMIN 2.4*   BILITOT 1.0   ALKPHOS 900*   *   ALT 53*   ANIONGAP 13   EGFRNONAA >60     Cardiac Markers:   Recent Labs   Lab  03/22/22  1150   *     Lactic Acid:   Recent Labs   Lab 03/22/22  1150   LACTATE 4.7*     Troponin:   Recent Labs   Lab 03/22/22  1150   TROPONINI 0.031*     Urine Studies:   Recent Labs   Lab 03/22/22  1418   COLORU Yellow   APPEARANCEUA Clear   PHUR 7.0   SPECGRAV 1.030   PROTEINUA 1+*   GLUCUA 4+*   KETONESU Negative   BILIRUBINUA Negative   OCCULTUA Trace*   NITRITE Negative   UROBILINOGEN Negative   LEUKOCYTESUR 1+*   RBCUA 13*   WBCUA 5   BACTERIA None   SQUAMEPITHEL 2   HYALINECASTS 0       Significant Imaging:   Imaging Results              US Lower Extremity Veins Bilateral (In process)                      X-Ray Chest 1 View (Final result)  Result time 03/22/22 12:10:22      Final result by Santi Porter MD (03/22/22 12:10:22)                   Impression:      Stable examination.  No acute process.      Electronically signed by: Santi Porter MD  Date:    03/22/2022  Time:    12:10               Narrative:    EXAMINATION:  XR CHEST 1 VIEW    CLINICAL HISTORY:  Vomiting, unspecified    TECHNIQUE:  Single frontal view of the chest was performed.    COMPARISON:  03/12/2022.    FINDINGS:  Monitoring EKG leads are present.  The trachea is unremarkable.  The cardiomediastinal silhouette is stable.  There is no evidence of free air beneath the hemidiaphragms.  There is chronic elevation of the right hemidiaphragm.  There are no pleural effusions.  There is no evidence of a pneumothorax.  There is no evidence of pneumomediastinum.  There is pulmonary vascular congestion.  There is no focal consolidation.  The osseous structures are unremarkable.

## 2022-03-22 NOTE — HPI
Doe Woodall 32 y.o. male with alcohol abuse, obesity, and diabetes mellitus [facility] with chief complaint of lower extremity edema.  He reports lower extremity edema and shortness of breath that began today.  He chronically experiences intermittent lower extremity edema that resolved without intervention, but it persisted today.  His wife also found his blood glucose to be severely elevated and directed him to emergency room.  He meds he is not taking his insulin in the last 3 days.  He drinks daily, and his last drink was this morning.  He denies fever chest pain nausea vomiting abdominal pain syncope dizziness dysuria melena hematuria he has not attempted any treatment at home.    In the ED, chest x-ray with pulmonary vascular congestion, initial blood glucose 716 with a normal anion gap, AST and  in 53, , troponin 0.031, lactic acid 4.1, ethanol 390.

## 2022-03-22 NOTE — ASSESSMENT & PLAN NOTE
US 1/2022 with steatosis. Reports drinking daily last drink this AM. Arrives with ethanol level of 390. Without signs of withdrawal  Started on thiamine/folic acid/multivitamin supplementation  CIWA trend  Lactic acid of 4.1 on arrival. Suspect related to alcohol use. Will repeat lactic acid

## 2022-03-22 NOTE — ASSESSMENT & PLAN NOTE
Body mass index is 41.6 kg/m². Morbid obesity complicates all aspects of disease management from diagnostic modalities to treatment. Weight loss encouraged and health benefits explained to patient.

## 2022-03-22 NOTE — ASSESSMENT & PLAN NOTE
AST and ALT of 109/53 on arrival. Previously elevated. Suspect related to alcohol use. Hepatitis panel negative in 1/2022 and US at that time with hepatic steatatosis

## 2022-03-22 NOTE — PHARMACY MED REC
"Admission Medication History     The home medication history was taken by Aminta Espinoza CPhT.      You may go to "Admission" then "Reconcile Home Medications" tabs to review and/or act upon these items.      The home medication list has been updated by the Pharmacy department.    Please read ALL comments highlighted in yellow.    Please address this information as you see fit.     Feel free to contact us if you have any questions or require assistance.      The medications listed below were removed from the home medication list. Please reorder if appropriate:  Patient reports no longer taking the following medication(s):   multivitamin      Medications listed below were obtained from: Patient/family and Analytic software- [employer]  (Not in a hospital admission)    Patient was prescribed cephalexin 500 mg tablets dispensed on 3-13-22 and his last dose was 2 days ago.  Patient stated that he did not complete therapy on this medication.    Aminta Espinoza CPhT.  [phone]                    .          "

## 2022-03-22 NOTE — ED TRIAGE NOTES
"Pt to the ED via EMS with complaints of nausea and vomiting. Pt states his wife called EMS because he was vomiting but he "doesn't know how long its been going on for". Pt presents to the ED with yellow vomit on his shirt. Pt's CBG on arrival was greater than 500, hx of diabetes. Per EMS, pt's wife states that pt drinks beer everyday and doesn't check his blood sugar. Per pt, he drank one beer this morning. Pt has slow, slurred speech and falls asleep while speaking to him. Pt denies abdominal pain, chest pain, dysuria, shortness of breath.  "

## 2022-03-23 VITALS
DIASTOLIC BLOOD PRESSURE: 97 MMHG | BODY MASS INDEX: 44.26 KG/M2 | OXYGEN SATURATION: 100 % | HEART RATE: 86 BPM | TEMPERATURE: 98 F | RESPIRATION RATE: 18 BRPM | WEIGHT: 265.69 LBS | SYSTOLIC BLOOD PRESSURE: 161 MMHG | HEIGHT: 65 IN

## 2022-03-23 LAB
ALBUMIN SERPL BCP-MCNC: 2.3 G/DL (ref 3.5–5.2)
ALP SERPL-CCNC: 806 U/L (ref 55–135)
ALT SERPL W/O P-5'-P-CCNC: 43 U/L (ref 10–44)
ANION GAP SERPL CALC-SCNC: 9 MMOL/L (ref 8–16)
AST SERPL-CCNC: 70 U/L (ref 10–40)
BASOPHILS # BLD AUTO: 0.04 K/UL (ref 0–0.2)
BASOPHILS NFR BLD: 0.6 % (ref 0–1.9)
BILIRUB SERPL-MCNC: 1.2 MG/DL (ref 0.1–1)
BUN SERPL-MCNC: 4 MG/DL (ref 6–20)
CALCIUM SERPL-MCNC: 8.5 MG/DL (ref 8.7–10.5)
CHLORIDE SERPL-SCNC: 96 MMOL/L (ref 95–110)
CO2 SERPL-SCNC: 28 MMOL/L (ref 23–29)
CREAT SERPL-MCNC: 0.6 MG/DL (ref 0.5–1.4)
DIFFERENTIAL METHOD: ABNORMAL
EOSINOPHIL # BLD AUTO: 0.1 K/UL (ref 0–0.5)
EOSINOPHIL NFR BLD: 0.9 % (ref 0–8)
ERYTHROCYTE [DISTWIDTH] IN BLOOD BY AUTOMATED COUNT: 12.5 % (ref 11.5–14.5)
EST. GFR  (AFRICAN AMERICAN): >60 ML/MIN/1.73 M^2
EST. GFR  (NON AFRICAN AMERICAN): >60 ML/MIN/1.73 M^2
GLUCOSE SERPL-MCNC: 319 MG/DL (ref 70–110)
HCT VFR BLD AUTO: 40.1 % (ref 40–54)
HGB BLD-MCNC: 13.3 G/DL (ref 14–18)
IMM GRANULOCYTES # BLD AUTO: 0.02 K/UL (ref 0–0.04)
IMM GRANULOCYTES NFR BLD AUTO: 0.3 % (ref 0–0.5)
INR PPP: 1 (ref 0.8–1.2)
LACTATE SERPL-SCNC: 1.8 MMOL/L (ref 0.5–2.2)
LYMPHOCYTES # BLD AUTO: 1.8 K/UL (ref 1–4.8)
LYMPHOCYTES NFR BLD: 27.3 % (ref 18–48)
MCH RBC QN AUTO: 35.3 PG (ref 27–31)
MCHC RBC AUTO-ENTMCNC: 33.2 G/DL (ref 32–36)
MCV RBC AUTO: 106 FL (ref 82–98)
MONOCYTES # BLD AUTO: 0.8 K/UL (ref 0.3–1)
MONOCYTES NFR BLD: 11.7 % (ref 4–15)
NEUTROPHILS # BLD AUTO: 3.9 K/UL (ref 1.8–7.7)
NEUTROPHILS NFR BLD: 59.2 % (ref 38–73)
NRBC BLD-RTO: 0 /100 WBC
PLATELET # BLD AUTO: 180 K/UL (ref 150–450)
PMV BLD AUTO: 10.2 FL (ref 9.2–12.9)
POCT GLUCOSE: 197 MG/DL (ref 70–110)
POCT GLUCOSE: 268 MG/DL (ref 70–110)
POTASSIUM SERPL-SCNC: 3.9 MMOL/L (ref 3.5–5.1)
PROT SERPL-MCNC: 6.8 G/DL (ref 6–8.4)
PROTHROMBIN TIME: 11 SEC (ref 9–12.5)
RBC # BLD AUTO: 3.77 M/UL (ref 4.6–6.2)
SODIUM SERPL-SCNC: 133 MMOL/L (ref 136–145)
TROPONIN I SERPL DL<=0.01 NG/ML-MCNC: <0.006 NG/ML (ref 0–0.03)
VIT B12 SERPL-MCNC: 650 PG/ML (ref 210–950)
WBC # BLD AUTO: 6.6 K/UL (ref 3.9–12.7)

## 2022-03-23 PROCEDURE — 96372 THER/PROPH/DIAG INJ SC/IM: CPT | Performed by: PHYSICIAN ASSISTANT

## 2022-03-23 PROCEDURE — G0378 HOSPITAL OBSERVATION PER HR: HCPCS

## 2022-03-23 PROCEDURE — 82607 VITAMIN B-12: CPT | Performed by: NURSE PRACTITIONER

## 2022-03-23 PROCEDURE — 36415 COLL VENOUS BLD VENIPUNCTURE: CPT | Performed by: PHYSICIAN ASSISTANT

## 2022-03-23 PROCEDURE — 84484 ASSAY OF TROPONIN QUANT: CPT | Performed by: PHYSICIAN ASSISTANT

## 2022-03-23 PROCEDURE — 80053 COMPREHEN METABOLIC PANEL: CPT | Performed by: PHYSICIAN ASSISTANT

## 2022-03-23 PROCEDURE — 96372 THER/PROPH/DIAG INJ SC/IM: CPT | Performed by: NURSE PRACTITIONER

## 2022-03-23 PROCEDURE — 85025 COMPLETE CBC W/AUTO DIFF WBC: CPT | Performed by: PHYSICIAN ASSISTANT

## 2022-03-23 PROCEDURE — 85610 PROTHROMBIN TIME: CPT | Performed by: NURSE PRACTITIONER

## 2022-03-23 PROCEDURE — 63600175 PHARM REV CODE 636 W HCPCS: Performed by: PHYSICIAN ASSISTANT

## 2022-03-23 PROCEDURE — 25000003 PHARM REV CODE 250: Performed by: PHYSICIAN ASSISTANT

## 2022-03-23 PROCEDURE — 63600175 PHARM REV CODE 636 W HCPCS: Performed by: NURSE PRACTITIONER

## 2022-03-23 PROCEDURE — 36415 COLL VENOUS BLD VENIPUNCTURE: CPT | Performed by: NURSE PRACTITIONER

## 2022-03-23 RX ORDER — LANOLIN ALCOHOL/MO/W.PET/CERES
100 CREAM (GRAM) TOPICAL DAILY
Qty: 30 TABLET | Refills: 3 | Status: SHIPPED | OUTPATIENT
Start: 2022-03-24 | End: 2023-01-01 | Stop reason: CLARIF

## 2022-03-23 RX ORDER — INSULIN ASPART 100 [IU]/ML
14 INJECTION, SOLUTION INTRAVENOUS; SUBCUTANEOUS 3 TIMES DAILY
Qty: 15 ML | Refills: 3 | Status: ON HOLD | OUTPATIENT
Start: 2022-03-23 | End: 2022-01-01 | Stop reason: SDUPTHER

## 2022-03-23 RX ORDER — FUROSEMIDE 10 MG/ML
20 INJECTION INTRAMUSCULAR; INTRAVENOUS ONCE
Status: DISCONTINUED | OUTPATIENT
Start: 2022-03-23 | End: 2022-03-23

## 2022-03-23 RX ORDER — FOLIC ACID 1 MG/1
1 TABLET ORAL DAILY
Qty: 30 TABLET | Refills: 3 | Status: SHIPPED | OUTPATIENT
Start: 2022-03-24 | End: 2023-01-01 | Stop reason: CLARIF

## 2022-03-23 RX ADMIN — THERA TABS 1 TABLET: TAB at 08:03

## 2022-03-23 RX ADMIN — FOLIC ACID 1 MG: 1 TABLET ORAL at 08:03

## 2022-03-23 RX ADMIN — INSULIN ASPART 6 UNITS: 100 INJECTION, SOLUTION INTRAVENOUS; SUBCUTANEOUS at 08:03

## 2022-03-23 RX ADMIN — THIAMINE HCL TAB 100 MG 100 MG: 100 TAB at 08:03

## 2022-03-23 RX ADMIN — INSULIN ASPART 10 UNITS: 100 INJECTION, SOLUTION INTRAVENOUS; SUBCUTANEOUS at 08:03

## 2022-03-23 NOTE — PLAN OF CARE
Problem: Adult Inpatient Plan of Care  Goal: Plan of Care Review  Outcome: Ongoing, Progressing  Goal: Patient-Specific Goal (Individualized)  Outcome: Ongoing, Progressing  Goal: Absence of Hospital-Acquired Illness or Injury  Outcome: Ongoing, Progressing  Goal: Optimal Comfort and Wellbeing  Outcome: Ongoing, Progressing  Goal: Readiness for Transition of Care  Outcome: Ongoing, Progressing     Problem: Bariatric Environmental Safety  Goal: Safety Maintained with Care  Outcome: Ongoing, Progressing     Problem: Fall Injury Risk  Goal: Absence of Fall and Fall-Related Injury  Outcome: Ongoing, Progressing     Problem: Diabetes Comorbidity  Goal: Blood Glucose Level Within Targeted Range  Outcome: Ongoing, Progressing     Problem: Impaired Wound Healing  Goal: Optimal Wound Healing  Outcome: Ongoing, Progressing

## 2022-03-23 NOTE — PROGRESS NOTES
WRITTEN HEALTHCARE DISCHARGE INFORMATION      Things that YOU are RESPONSIBLE for to Manage Your Care At Home:     1. Getting your prescriptions filled.  2. Taking you medications as directed. DO NOT MISS ANY DOSES!  3. Going to your follow-up doctor appointments. This is important because it allows the doctor to monitor your progress and to determine if any changes need to be made to your treatment plan.     If you are unable to make your follow up appointments, please call the number listed and reschedule this appointment.      ____________HELP AT HOME____________________     Experiencing any SIGNS or SYMPTOMS: YOU CAN     Schedule a same day appopintment with your Primary Care Doctor or  you can call [facility] On Call Nurse Care Line for 24/7 assistance at [phone]     If you are experience any signs or symptoms that have become severe, Call 911 and come to your nearest Emergency Room.     Thank you for choosing [facility] and allowing us to care for you.   From your care management team:      You should receive a call from [facility] Discharge Department within 48-72 hours to help manage your care after discharge. Please try to make sure that you answer your phone for this important phone call.     Follow-up Information     [facility].    Why: please call at time of discharge to schedule followup appointment and establish care for future medical needs  Contact information:  [address] [place] [address]  [phone]

## 2022-03-23 NOTE — PLAN OF CARE
03/23/22 1159   Final Note   Assessment Type Final Discharge Note   Anticipated Discharge Disposition Home   Hospital Resources/Appts/Education Provided Appointments scheduled and added to AVS;Community resources provided   Post-Acute Status   Post-Acute Authorization Other   Other Status No Post-Acute Service Needs   Pts nurse Jason notified that the pt can d/c from CM standpoint

## 2022-03-23 NOTE — PLAN OF CARE
03/23/22 0851   Discharge Planning   Assessment Type Discharge Planning Brief Assessment   Resource/Environmental Concerns none   Support Systems Spouse/significant other   Equipment Currently Used at Home glucometer   Current Living Arrangements home/apartment/condo   Patient/Family Anticipates Transition to home with family   Patient/Family Anticipated Services at Transition none   DME Needed Upon Discharge  none   Discharge Plan A Home with family  (with [facility] infromation)     [id number] - [place], [place] - [address] AT [address] [place] [address]  Phone: [phone] Fax: [phone]    [id number] - [place] - [address] AT [facility] [address]  [address]  [place] [address]  Phone: [phone] Fax: [phone]

## 2022-03-23 NOTE — ASSESSMENT & PLAN NOTE
Presented with BG of 731 after reporting non-compliance with home insulin regimen. Improved to 431 in ED without evidence of DKA  Will start 30 units basal, moderate sliding scale, sugar free clear liquid diet  Will check urine protein/creatinine ratio as complains of lower extremity edema and protein in UA  Potassium 5.7 on ED arrival though hemolyzed will repeat after insulin    As BG improved to 433 and urine output decreased will give IV lasix for lower extremity edema and SOB.

## 2022-03-24 NOTE — DISCHARGE SUMMARY
[facility]  Discharge Summary      Patient Name: Doe Woodall  MRN: [id number]  Patient Class: OP- Observation  Admission Date: 3/22/2022  Hospital Length of Stay: 0 days  Discharge Date and Time: 3/23/22  Attending Physician: Rissa Westbrook MD   Discharging Provider: Nati Soria NP  Primary Care Provider: [facility] - Family [facility]      HPI:   Doe Woodall 32 y.o. male with alcohol abuse, obesity, and diabetes mellitus on insulin [facility] with chief complaint of lower extremity edema.  He reports lower extremity edema and shortness of breath that began today.  He chronically experiences intermittent lower extremity edema that resolved without intervention, but it persisted today.  His wife also found his blood glucose to be severely elevated and directed him to emergency room.  He meds he is not taking his insulin in the last 3 days.  He drinks daily, and his last drink was this morning.  He denies fever chest pain nausea vomiting abdominal pain syncope dizziness dysuria melena hematuria he has not attempted any treatment at home.    In the ED, chest x-ray with pulmonary vascular congestion, initial blood glucose 716 with a normal anion gap, AST and  in 53, , troponin 0.031, lactic acid 4.1, ethanol 390.       * No surgery found *      Hospital Course:   Patient placed in observation for hyperglycemia and BLE edema. Patient reported not taking his insulin for few days. Patient also came in with elevated alcohol level.  Noted LFT's due to alcohol. US showed hepatospenomegaly.  UPCR 2.4 g likely due uncontrolled DM. Discussed with patient stop drinking alcohol and tight control of blood sugar to prevent progression on proteinuria. Will need close follow up PCP to obtain referral to Nephrology.        Goals of Care Treatment Preferences:  Code Status: Full Code      Consults:   Consults (From admission, onward)        Status Ordering  Provider     Case Management/  Once        Provider:  (Not yet assigned)    Completed PETE LAWTON          * Type 2 diabetes mellitus with hyperglycemia  Presented with BG of 731 after reporting non-compliance with home insulin regimen. Improved to 431 in ED without evidence of DKA  Will start 30 units basal, moderate sliding scale, sugar free clear liquid diet  Will check urine protein/creatinine ratio as complains of lower extremity edema and protein in UA  Potassium 5.7 on ED arrival though hemolyzed will repeat after insulin    As BG improved to 433 and urine output decreased will give IV lasix for lower extremity edema and SOB.       Final Active Diagnoses:    Diagnosis Date Noted POA    PRINCIPAL PROBLEM:  Type 2 diabetes mellitus with hyperglycemia [E11.65] 08/01/2014 Yes    Acute on chronic diastolic heart failure [I50.33] 01/06/2022 Yes    Hyponatremia [E87.1] 01/04/2022 Yes    Alcohol use disorder, severe, dependence [F10.20] 08/04/2020 Yes    Severe obesity (BMI >= 40) [E66.01] 08/03/2020 Yes    Elevated transaminase level [R74.01] 08/03/2020 Yes      Problems Resolved During this Admission:       Discharged Condition: stable    Disposition: Home or Self Care    Follow Up:   Follow-up Information     [facility].    Why: please call at time of discharge to schedule followup appointment and establish care for future medical needs  Contact information:  [address] [place] [id number] [phone]                       Patient Instructions:      Diet Cardiac     Diet diabetic     Notify your health care provider if you experience any of the following:  temperature >100.4     Notify your health care provider if you experience any of the following:  difficulty breathing or increased cough     Notify your health care provider if you experience any of the following:  increased confusion or weakness     Activity as tolerated       Significant Diagnostic  Studies: Labs: All labs within the past 24 hours have been reviewed    Pending Diagnostic Studies:     None         Medications:  Reconciled Home Medications:      Medication List      START taking these medications    folic acid 1 MG tablet  Commonly known as: FOLVITE  Take 1 tablet (1 mg total) by mouth once daily.     thiamine 100 MG tablet  Take 1 tablet (100 mg total) by mouth once daily.        CHANGE how you take these medications    NovoLOG Flexpen U-100 Insulin 100 unit/mL (3 mL) Inpn pen  Generic drug: insulin aspart U-100  Inject 14 Units into the skin 3 (three) times daily.  What changed:   · how much to take  · when to take this     TRULICITY 1.5 mg/0.5 mL pen injector  Generic drug: dulaglutide  Inject 1.5 mg into the skin every 7 days.  What changed: additional instructions        CONTINUE taking these medications    acetaminophen 500 MG tablet  Commonly known as: TYLENOL  Take 1,000 mg by mouth 3 (three) times daily as needed for Pain.     GLUCAGEN HYPOKIT 1 mg Solr  Generic drug: glucagon  Inject 1 mg into the muscle as needed (Hypoglycemia).     lancing device Misc  Use as instructed     LEVEMIR FLEXTOUCH U-100 INSULN 100 unit/mL (3 mL) Inpn pen  Generic drug: insulin detemir U-100  Inject 60 Units into the skin once daily.     pulse oximeter device  Commonly known as: pulse oximeter  by [facility] Externally route 2 (two) times a day. Use twice daily at 8 AM and 3 PM and [facility] as directed.     TRUE METRIX GLUCOSE METER Misc  Generic drug: blood-glucose meter  Use as instructed     TRUE METRIX GLUCOSE TEST STRIP Strp  Generic drug: blood sugar diagnostic  1 strip by Misc.(Non-Drug; Combo Route) route 4 (four) times daily with meals and nightly.     TRUEPLUS LANCETS 30 gauge Misc  Generic drug: lancets  1 lancet by Misc.(Non-Drug; Combo Route) route 4 (four) times daily with meals and nightly.     UNISOM SLEEPGELS ORAL  Take 1 capsule by mouth every evening.        ASK your doctor  "about these medications    * BD ULTRA-FINE SHORT PEN NEEDLE 31 gauge x 5/16" Ndle  Generic drug: pen needle, diabetic  1 each by Misc.(Non-Drug; Combo Route) route 4 (four) times daily with meals and nightly.  Ask about: Which instructions should I use?     * BD ULTRA-FINE TJ PEN NEEDLE 32 gauge x 5/32" Ndle  Generic drug: pen needle, diabetic  USE TO INJECT INSULIN FOUR TIMES DAILY  Ask about: Which instructions should I use?         * This list has 2 medication(s) that are the same as other medications prescribed for you. Read the directions carefully, and ask your doctor or other care provider to review them with you.                Indwelling Lines/Drains at time of discharge:   Lines/Drains/Airways     None                 Time spent on the discharge of patient: 35 minutes         Nati Soria NP  Department of Hospital Medicine  [facility] - Telemetry  "

## 2022-03-24 NOTE — HOSPITAL COURSE
Patient placed in observation for hyperglycemia and BLE edema. Patient reported not taking his insulin for few days. Patient also came in with elevated alcohol level.  Noted LFT's due to alcohol. US showed hepatospenomegaly.  UPCR 2.4 g likely due uncontrolled DM. Discussed with patient stop drinking alcohol and tight control of blood sugar to prevent progression on proteinuria. Will need close follow up PCP to obtain referral to Nephrology.

## 2022-05-12 ENCOUNTER — TELEPHONE (OUTPATIENT)
Dept: HEPATOLOGY | Facility: CLINIC | Age: 32
End: 2022-05-12
Payer: MEDICAID

## 2022-05-12 NOTE — TELEPHONE ENCOUNTER
Call Back  Received: Today  Nickolas Ramirez sent to [facility] Staff  Caller: Unspecified (Today,  8:07 AM)  Name of Who is Calling:LU PERERA [[id number]]     What is the request in detail: Patient requesting a appointment. Please assist     Can the clinic reply by [facility]: Yes   [facility][phone]     Returned the call and had to leave a voice message asking for some days and times that would assist him in making an appt with Dr. Light

## 2022-05-26 ENCOUNTER — HOSPITAL ENCOUNTER (EMERGENCY)
Facility: HOSPITAL | Age: 32
Discharge: HOME | End: 2022-05-26
Attending: EMERGENCY MEDICINE
Payer: MEDICAID

## 2022-05-26 VITALS
TEMPERATURE: 98 F | SYSTOLIC BLOOD PRESSURE: 163 MMHG | HEIGHT: 65 IN | BODY MASS INDEX: 36.73 KG/M2 | DIASTOLIC BLOOD PRESSURE: 96 MMHG | WEIGHT: 220.44 LBS | RESPIRATION RATE: 19 BRPM | HEART RATE: 90 BPM | OXYGEN SATURATION: 94 %

## 2022-05-26 DIAGNOSIS — R73.9 HYPERGLYCEMIA: ICD-10-CM

## 2022-05-26 DIAGNOSIS — F10.920 ALCOHOLIC INTOXICATION WITHOUT COMPLICATION: Primary | ICD-10-CM

## 2022-05-26 LAB
ALBUMIN SERPL BCP-MCNC: 2.8 G/DL (ref 3.5–5.2)
ALLENS TEST: ABNORMAL
ALP SERPL-CCNC: 245 U/L (ref 55–135)
ALT SERPL W/O P-5'-P-CCNC: 80 U/L (ref 10–44)
ANION GAP SERPL CALC-SCNC: 16 MMOL/L (ref 8–16)
AST SERPL-CCNC: 67 U/L (ref 10–40)
B-OH-BUTYR BLD STRIP-SCNC: 0.2 MMOL/L (ref 0–0.5)
BACTERIA #/AREA URNS AUTO: NORMAL /HPF
BASOPHILS # BLD AUTO: 0.04 K/UL (ref 0–0.2)
BASOPHILS NFR BLD: 0.6 % (ref 0–1.9)
BILIRUB SERPL-MCNC: 0.4 MG/DL (ref 0.1–1)
BILIRUB UR QL STRIP: NEGATIVE
BUN SERPL-MCNC: 4 MG/DL (ref 6–20)
CALCIUM SERPL-MCNC: 8.2 MG/DL (ref 8.7–10.5)
CHLORIDE SERPL-SCNC: 97 MMOL/L (ref 95–110)
CLARITY UR REFRACT.AUTO: CLEAR
CO2 SERPL-SCNC: 20 MMOL/L (ref 23–29)
COLOR UR AUTO: ABNORMAL
CREAT SERPL-MCNC: 0.9 MG/DL (ref 0.5–1.4)
DELSYS: ABNORMAL
DIFFERENTIAL METHOD: ABNORMAL
EOSINOPHIL # BLD AUTO: 0.1 K/UL (ref 0–0.5)
EOSINOPHIL NFR BLD: 0.7 % (ref 0–8)
ERYTHROCYTE [DISTWIDTH] IN BLOOD BY AUTOMATED COUNT: 10.8 % (ref 11.5–14.5)
EST. GFR  (AFRICAN AMERICAN): >60 ML/MIN/1.73 M^2
EST. GFR  (NON AFRICAN AMERICAN): >60 ML/MIN/1.73 M^2
ETHANOL SERPL-MCNC: 295 MG/DL
GLUCOSE SERPL-MCNC: 564 MG/DL (ref 70–110)
GLUCOSE UR QL STRIP: ABNORMAL
HCO3 UR-SCNC: 25.4 MMOL/L (ref 24–28)
HCT VFR BLD AUTO: 40.7 % (ref 40–54)
HGB BLD-MCNC: 14.9 G/DL (ref 14–18)
HGB UR QL STRIP: ABNORMAL
HYALINE CASTS UR QL AUTO: 0 /LPF
IMM GRANULOCYTES # BLD AUTO: 0.02 K/UL (ref 0–0.04)
IMM GRANULOCYTES NFR BLD AUTO: 0.3 % (ref 0–0.5)
KETONES UR QL STRIP: NEGATIVE
LEUKOCYTE ESTERASE UR QL STRIP: ABNORMAL
LYMPHOCYTES # BLD AUTO: 3.1 K/UL (ref 1–4.8)
LYMPHOCYTES NFR BLD: 43.8 % (ref 18–48)
MCH RBC QN AUTO: 33.7 PG (ref 27–31)
MCHC RBC AUTO-ENTMCNC: 36.6 G/DL (ref 32–36)
MCV RBC AUTO: 92 FL (ref 82–98)
MICROSCOPIC COMMENT: NORMAL
MODE: ABNORMAL
MONOCYTES # BLD AUTO: 0.5 K/UL (ref 0.3–1)
MONOCYTES NFR BLD: 7.5 % (ref 4–15)
NEUTROPHILS # BLD AUTO: 3.3 K/UL (ref 1.8–7.7)
NEUTROPHILS NFR BLD: 47.1 % (ref 38–73)
NITRITE UR QL STRIP: NEGATIVE
NRBC BLD-RTO: 0 /100 WBC
PCO2 BLDA: 44.7 MMHG (ref 35–45)
PH SMN: 7.36 [PH] (ref 7.35–7.45)
PH UR STRIP: 6 [PH] (ref 5–8)
PLATELET # BLD AUTO: 156 K/UL (ref 150–450)
PMV BLD AUTO: 10.3 FL (ref 9.2–12.9)
PO2 BLDA: 60 MMHG (ref 40–60)
POC BE: 0 MMOL/L
POC SATURATED O2: 90 % (ref 95–100)
POC TCO2: 27 MMOL/L (ref 24–29)
POCT GLUCOSE: 450 MG/DL (ref 70–110)
POTASSIUM SERPL-SCNC: 3.8 MMOL/L (ref 3.5–5.1)
PROT SERPL-MCNC: 6.2 G/DL (ref 6–8.4)
PROT UR QL STRIP: ABNORMAL
RBC # BLD AUTO: 4.42 M/UL (ref 4.6–6.2)
RBC #/AREA URNS AUTO: 3 /HPF (ref 0–4)
SAMPLE: ABNORMAL
SITE: ABNORMAL
SODIUM SERPL-SCNC: 133 MMOL/L (ref 136–145)
SP GR UR STRIP: 1.02 (ref 1–1.03)
SQUAMOUS #/AREA URNS AUTO: 2 /HPF
URN SPEC COLLECT METH UR: ABNORMAL
WBC # BLD AUTO: 6.96 K/UL (ref 3.9–12.7)
WBC #/AREA URNS AUTO: 4 /HPF (ref 0–5)
YEAST UR QL AUTO: NORMAL

## 2022-05-26 PROCEDURE — 87389 HIV-1 AG W/HIV-1&-2 AB AG IA: CPT | Performed by: EMERGENCY MEDICINE

## 2022-05-26 PROCEDURE — 82803 BLOOD GASES ANY COMBINATION: CPT

## 2022-05-26 PROCEDURE — 82962 GLUCOSE BLOOD TEST: CPT

## 2022-05-26 PROCEDURE — 85025 COMPLETE CBC W/AUTO DIFF WBC: CPT | Performed by: PHYSICIAN ASSISTANT

## 2022-05-26 PROCEDURE — 82077 ASSAY SPEC XCP UR&BREATH IA: CPT | Performed by: PHYSICIAN ASSISTANT

## 2022-05-26 PROCEDURE — 99900035 HC TECH TIME PER 15 MIN (STAT)

## 2022-05-26 PROCEDURE — 99285 PR EMERGENCY DEPT VISIT,LEVEL V: ICD-10-PCS | Mod: ,,, | Performed by: EMERGENCY MEDICINE

## 2022-05-26 PROCEDURE — 86803 HEPATITIS C AB TEST: CPT | Performed by: EMERGENCY MEDICINE

## 2022-05-26 PROCEDURE — 93005 ELECTROCARDIOGRAM TRACING: CPT

## 2022-05-26 PROCEDURE — 93010 ELECTROCARDIOGRAM REPORT: CPT | Mod: ,,, | Performed by: INTERNAL MEDICINE

## 2022-05-26 PROCEDURE — 81001 URINALYSIS AUTO W/SCOPE: CPT | Mod: 59 | Performed by: PHYSICIAN ASSISTANT

## 2022-05-26 PROCEDURE — 82010 KETONE BODYS QUAN: CPT | Performed by: EMERGENCY MEDICINE

## 2022-05-26 PROCEDURE — 99284 EMERGENCY DEPT VISIT MOD MDM: CPT | Mod: 25

## 2022-05-26 PROCEDURE — 63600175 PHARM REV CODE 636 W HCPCS: Performed by: PHYSICIAN ASSISTANT

## 2022-05-26 PROCEDURE — 80053 COMPREHEN METABOLIC PANEL: CPT | Performed by: PHYSICIAN ASSISTANT

## 2022-05-26 PROCEDURE — 99285 EMERGENCY DEPT VISIT HI MDM: CPT | Mod: ,,, | Performed by: EMERGENCY MEDICINE

## 2022-05-26 PROCEDURE — 93010 EKG 12-LEAD: ICD-10-PCS | Mod: ,,, | Performed by: INTERNAL MEDICINE

## 2022-05-26 RX ADMIN — SODIUM CHLORIDE, SODIUM LACTATE, POTASSIUM CHLORIDE, AND CALCIUM CHLORIDE 1000 ML: .6; .31; .03; .02 INJECTION, SOLUTION INTRAVENOUS at 09:05

## 2022-05-26 NOTE — ED PROVIDER NOTES
Encounter Date: 2022       History     Chief Complaint   Patient presents with    Hyperglycemia     Pt states he does not know how much alcohol he drank today  EMS states they saw one emtpy bottle of Fireball  Pt has not taken his medication in two weeks CBG done by EMS was greater than 600      31 y/o M with history of DM, alcohol abuse presents to the ED via EMS c/o hyperglycemia. Patient states that his wife called EMS - he is unsure why, but he assumes that its because his sugar is elevated.  He is prescribed trulicity but states he has not taken it in 2 weeks. He does reports polydipsia but denies polyuria.  He drinks alcohol daily - 1 pint liquor/day, last drink this morning. He denies any history of alcohol withdrawal. He denies f/c, chest pain, SOB, abdominal pain, nausea, dysuria.     The history is provided by the patient.     Review of patient's allergies indicates:  No Known Allergies  Past Medical History:   Diagnosis Date    Diabetes mellitus     Encounter for blood transfusion     Obese     Other insomnia 2020    Perineal abscess 2014     Past Surgical History:   Procedure Laterality Date    DECOMPRESSION OF LUMBAR SPINE USING MINIMALLY INVASIVE TECHNIQUE Right 2018    Procedure: DECOMPRESSION, SPINE, LUMBAR, MINIMALLY INVASIVE L3-4;  Surgeon: Cristian Cervantes MD;  Location: [facility] OR [address] [place] [address];  Service: Neurosurgery;  Laterality: Right;    ORTHOPEDIC SURGERY       Family History   Problem Relation Age of Onset    Diabetes Father     Diabetes Paternal Grandmother     Diabetes Paternal Grandfather      Social History     Tobacco Use    Smoking status: Former Smoker     Packs/day: 0.50     Years: 9.00     Pack years: 4.50     Quit date: 2013     Years since quittin.9    Smokeless tobacco: Former User   Substance Use Topics    Alcohol use: Yes     Alcohol/week: 2.0 standard drinks     Types: 2 Cans of beer per week     Comment: daily ETOH, 48 beers per day    Drug  use: Not Currently     Review of Systems   Constitutional: Negative for chills and fever.   HENT: Negative for congestion, postnasal drip and sore throat.    Eyes: Negative for photophobia and visual disturbance.   Respiratory: Negative for cough and shortness of breath.    Cardiovascular: Negative for chest pain.   Gastrointestinal: Negative for abdominal pain, constipation, diarrhea, nausea and vomiting.   Endocrine: Positive for polydipsia. Negative for polyuria.   Genitourinary: Negative for dysuria and hematuria.   Musculoskeletal: Negative for back pain.   Skin: Negative for rash.   Neurological: Negative for syncope and headaches.   Psychiatric/Behavioral: Negative for confusion.       Physical Exam     Initial Vitals [05/26/22 1630]   BP Pulse Resp Temp SpO2   120/80 90 16 98.5 °F (36.9 °C) 96 %      MAP       --         Physical Exam    Nursing note and vitals reviewed.  Constitutional: He appears well-developed and well-nourished. He is not diaphoretic. No distress.   HENT:   Head: Normocephalic and atraumatic.   Neck: Neck supple.   Normal range of motion.  Cardiovascular: Normal rate, regular rhythm and normal heart sounds. Exam reveals no gallop and no friction rub.    No murmur heard.  Pulmonary/Chest: Breath sounds normal. He has no wheezes. He has no rhonchi. He has no rales.   Abdominal: Abdomen is soft. Bowel sounds are normal. There is no abdominal tenderness. There is no rebound and no guarding.   Musculoskeletal:         General: No tenderness. Normal range of motion.      Cervical back: Normal range of motion and neck supple.     Neurological: He is alert and oriented to person, place, and time.   Appears intoxicated   Skin: Skin is warm and dry.         ED Course   Procedures  Labs Reviewed   CBC W/ AUTO DIFFERENTIAL - Abnormal; Notable for the following components:       Result Value    RBC 4.42 (*)     MCH 33.7 (*)     MCHC 36.6 (*)     RDW 10.8 (*)     All other components within normal  limits   COMPREHENSIVE METABOLIC PANEL - Abnormal; Notable for the following components:    Sodium 133 (*)     CO2 20 (*)     Glucose 564 (*)     BUN 4 (*)     Calcium 8.2 (*)     Albumin 2.8 (*)     Alkaline Phosphatase 245 (*)     AST 67 (*)     ALT 80 (*)     All other components within normal limits   ALCOHOL,MEDICAL (ETHANOL) - Abnormal; Notable for the following components:    Alcohol, Serum 295 (*)     All other components within normal limits   URINALYSIS, REFLEX TO URINE CULTURE - Abnormal; Notable for the following components:    Protein, UA 2+ (*)     Glucose, UA 3+ (*)     Occult Blood UA 1+ (*)     Leukocytes, UA Trace (*)     All other components within normal limits    Narrative:     Specimen Source->Urine   ISTAT PROCEDURE - Abnormal; Notable for the following components:    POC SATURATED O2 90 (*)     All other components within normal limits   POCT GLUCOSE - Abnormal; Notable for the following components:    POCT Glucose 450 (*)     All other components within normal limits   BETA - HYDROXYBUTYRATE, SERUM   URINALYSIS MICROSCOPIC    Narrative:     Specimen Source->Urine   HIV 1 / 2 ANTIBODY   HEPATITIS C ANTIBODY   POCT GLUCOSE MONITORING CONTINUOUS   POCT GLUCOSE MONITORING CONTINUOUS          Imaging Results    None          Medications   sodium chloride 0.9% bolus 1,000 mL (1,000 mLs Intravenous Not Given 5/26/22 1745)   lactated ringers bolus 1,000 mL (1,000 mLs Intravenous New Bag 5/26/22 7139)     Medical Decision Making:   History:   Old Medical Records: I decided to obtain old medical records.  Clinical Tests:   Lab Tests: Ordered and Reviewed       APC / Resident Notes:   33 y/o M with history of DM, alcohol abuse presents to the ED via EMS c/o hyperglycemia. VSS. Appears intoxicated but is awake, alert and oriented. RRR. Lungs clear. Abdomen soft and nontender. DDx includes but is not limited to DKA, HHS, hyperglycemia, intoxication, UTI, pneumonia. Will get labs, CXR. Will give  IVF.    Beta hydroxybutyrate normal. No leukocytosis or anemia. Hyperglycemic, bicarb 20, normal anion gap. Cr normal. LFTs mildly elevated. Ethanol 295.    After 1L bolus, repeat glucose 450.    Patient is sleeping, he has not attempted to get out of bed yet. Unable to assess for sobriety.  Another 1L bolus ordered.    He was able to ambulate unassisted to the restroom with a steady gait. Alert, oriented. He called his wife and she will come pick him up.     I do not feel that he needs any further labs or imaging at this time. Stable for discharge.    He was discharged without any new prescriptions.  Stressed importance of medication compliance and he expressed understanding.  He will follow up with his PCP.  Strict ED return precautions given.  All of the patient's questions were answered.  I reviewed the patient's chart and labs and discussed the case with my supervising physician.           ED Course as of 05/26/22 2157   Thu May 26, 2022   1738 31 yo M pmhx of DM noncompliant with medications and alcoholism BIBEMS for hyperglycemia and ETOH. Per EMS Glc >500. AF. VSS.  [BD]   2123 Patient hyperglycemic to 564 but no evidence of DKA with CO2 of 20 and normal anion gap with normal beta hydroxybutyrate and negative urine ketones.  Patient also found to have alcohol level of 295.  [BD]      ED Course User Index  [BD] Tony Atkinson MD             Clinical Impression:   Final diagnoses:  [R73.9] Hyperglycemia  [F10.920] Alcoholic intoxication without complication (Primary)          ED Disposition Condition    Discharge Stable        ED Prescriptions     None        Follow-up Information     Follow up With Specialties Details Why Contact [facility] Family Medicine Schedule an appointment as soon as possible for a visit   [address] [facility] [address]  [phone]             Slime Long PA-C  05/26/22 2157

## 2022-05-26 NOTE — ED NOTES
Patient identifiers verified and correct for [facility]  LOC: The patient is awake, alert and aware of environment with an appropriate affect, the patient is oriented x 3 and speaking appropriately.   APPEARANCE: Patient appears comfortable and in no acute distress, patient is clean and well groomed.  SKIN: The skin is warm and dry, color consistent with ethnicity, patient has normal skin turgor and moist mucus membranes, skin intact, no breakdown or bruising noted.   MUSCULOSKELETAL: Patient moving all extremities spontaneously, no swelling noted.  RESPIRATORY: Airway is open and patent, respirations are spontaneous, patient has a normal effort and rate, no accessory muscle use noted, pt placed on continuous pulse ox with O2 sats noted at 97% on room air.  CARDIAC: . Patient has a normal rate and regular rhythm, no edema noted, capillary refill < 3 seconds.   GASTRO: Soft and non tender to palpation, no distention noted, normoactive bowel sounds present in all four quadrants. Pt states bowel movements have been regular.  : Pt denies any pain or frequency with urination.  NEURO: Pt opens eyes spontaneously, behavior appropriate to situation, follows commands, facial expression symmetrical, bilateral hand grasp equal and even, purposeful motor response noted, normal sensation in all extremities when touched with a finger.

## 2022-05-27 LAB
HCV AB SERPL QL IA: NEGATIVE
HIV 1+2 AB+HIV1 P24 AG SERPL QL IA: NEGATIVE

## 2022-06-01 ENCOUNTER — HOSPITAL ENCOUNTER (EMERGENCY)
Facility: HOSPITAL | Age: 32
Discharge: HOME | End: 2022-06-01
Attending: EMERGENCY MEDICINE
Payer: MEDICAID

## 2022-06-01 VITALS
SYSTOLIC BLOOD PRESSURE: 138 MMHG | BODY MASS INDEX: 41.65 KG/M2 | HEIGHT: 65 IN | OXYGEN SATURATION: 97 % | DIASTOLIC BLOOD PRESSURE: 98 MMHG | WEIGHT: 250 LBS | TEMPERATURE: 98 F | RESPIRATION RATE: 17 BRPM | HEART RATE: 84 BPM

## 2022-06-01 DIAGNOSIS — S32.009A CLOSED FRACTURE OF TRANSVERSE PROCESS OF LUMBAR VERTEBRA, INITIAL ENCOUNTER: Primary | ICD-10-CM
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
## 2022-06-01 DIAGNOSIS — M54.50 RIGHT LOW BACK PAIN: ICD-10-CM

## 2022-06-01 LAB
ALBUMIN SERPL BCP-MCNC: 3.2 G/DL (ref 3.5–5.2)
ALP SERPL-CCNC: 327 U/L (ref 55–135)
ALT SERPL W/O P-5'-P-CCNC: 81 U/L (ref 10–44)
ANION GAP SERPL CALC-SCNC: 12 MMOL/L (ref 8–16)
AST SERPL-CCNC: 93 U/L (ref 10–40)
BACTERIA #/AREA URNS HPF: ABNORMAL /HPF
BASOPHILS # BLD AUTO: 0.03 K/UL (ref 0–0.2)
BASOPHILS NFR BLD: 0.4 % (ref 0–1.9)
BILIRUB SERPL-MCNC: 1.5 MG/DL (ref 0.1–1)
BILIRUB UR QL STRIP: NEGATIVE
BUN SERPL-MCNC: 14 MG/DL (ref 6–20)
CALCIUM SERPL-MCNC: 9.6 MG/DL (ref 8.7–10.5)
CHLORIDE SERPL-SCNC: 97 MMOL/L (ref 95–110)
CLARITY UR: CLEAR
CO2 SERPL-SCNC: 27 MMOL/L (ref 23–29)
COLOR UR: YELLOW
CREAT SERPL-MCNC: 0.8 MG/DL (ref 0.5–1.4)
CRP SERPL-MCNC: 14.7 MG/L (ref 0–8.2)
DIFFERENTIAL METHOD: ABNORMAL
EOSINOPHIL # BLD AUTO: 0 K/UL (ref 0–0.5)
EOSINOPHIL NFR BLD: 0.2 % (ref 0–8)
ERYTHROCYTE [DISTWIDTH] IN BLOOD BY AUTOMATED COUNT: 11.1 % (ref 11.5–14.5)
ERYTHROCYTE [SEDIMENTATION RATE] IN BLOOD BY WESTERGREN METHOD: 18 MM/HR (ref 0–10)
EST. GFR  (AFRICAN AMERICAN): >60 ML/MIN/1.73 M^2
EST. GFR  (NON AFRICAN AMERICAN): >60 ML/MIN/1.73 M^2
GLUCOSE SERPL-MCNC: 498 MG/DL (ref 70–110)
GLUCOSE UR QL STRIP: ABNORMAL
HCT VFR BLD AUTO: 43.5 % (ref 40–54)
HGB BLD-MCNC: 15.8 G/DL (ref 14–18)
HGB UR QL STRIP: ABNORMAL
HYALINE CASTS #/AREA URNS LPF: 0 /LPF
IMM GRANULOCYTES # BLD AUTO: 0.03 K/UL (ref 0–0.04)
IMM GRANULOCYTES NFR BLD AUTO: 0.4 % (ref 0–0.5)
KETONES UR QL STRIP: NEGATIVE
LEUKOCYTE ESTERASE UR QL STRIP: ABNORMAL
LYMPHOCYTES # BLD AUTO: 2.1 K/UL (ref 1–4.8)
LYMPHOCYTES NFR BLD: 25.3 % (ref 18–48)
MCH RBC QN AUTO: 34.6 PG (ref 27–31)
MCHC RBC AUTO-ENTMCNC: 36.3 G/DL (ref 32–36)
MCV RBC AUTO: 95 FL (ref 82–98)
MICROSCOPIC COMMENT: ABNORMAL
MONOCYTES # BLD AUTO: 0.7 K/UL (ref 0.3–1)
MONOCYTES NFR BLD: 8.7 % (ref 4–15)
NEUTROPHILS # BLD AUTO: 5.5 K/UL (ref 1.8–7.7)
NEUTROPHILS NFR BLD: 65 % (ref 38–73)
NITRITE UR QL STRIP: NEGATIVE
NRBC BLD-RTO: 0 /100 WBC
PH UR STRIP: 6 [PH] (ref 5–8)
PLATELET # BLD AUTO: 121 K/UL (ref 150–450)
PMV BLD AUTO: 10.3 FL (ref 9.2–12.9)
POTASSIUM SERPL-SCNC: 4.5 MMOL/L (ref 3.5–5.1)
PROT SERPL-MCNC: 6.9 G/DL (ref 6–8.4)
PROT UR QL STRIP: ABNORMAL
RBC # BLD AUTO: 4.57 M/UL (ref 4.6–6.2)
RBC #/AREA URNS HPF: 5 /HPF (ref 0–4)
SODIUM SERPL-SCNC: 136 MMOL/L (ref 136–145)
SP GR UR STRIP: >1.03 (ref 1–1.03)
SQUAMOUS #/AREA URNS HPF: 1 /HPF
URN SPEC COLLECT METH UR: ABNORMAL
UROBILINOGEN UR STRIP-ACNC: NEGATIVE EU/DL
WBC # BLD AUTO: 8.43 K/UL (ref 3.9–12.7)
WBC #/AREA URNS HPF: 10 /HPF (ref 0–5)
YEAST URNS QL MICRO: ABNORMAL

## 2022-06-01 PROCEDURE — 81000 URINALYSIS NONAUTO W/SCOPE: CPT | Performed by: EMERGENCY MEDICINE

## 2022-06-01 PROCEDURE — 63600175 PHARM REV CODE 636 W HCPCS: Performed by: EMERGENCY MEDICINE

## 2022-06-01 PROCEDURE — 99285 EMERGENCY DEPT VISIT HI MDM: CPT | Mod: 25

## 2022-06-01 PROCEDURE — 96361 HYDRATE IV INFUSION ADD-ON: CPT

## 2022-06-01 PROCEDURE — 96372 THER/PROPH/DIAG INJ SC/IM: CPT | Mod: 59 | Performed by: EMERGENCY MEDICINE

## 2022-06-01 PROCEDURE — 85652 RBC SED RATE AUTOMATED: CPT | Performed by: EMERGENCY MEDICINE

## 2022-06-01 PROCEDURE — 86140 C-REACTIVE PROTEIN: CPT | Performed by: EMERGENCY MEDICINE

## 2022-06-01 PROCEDURE — 25000003 PHARM REV CODE 250: Performed by: EMERGENCY MEDICINE

## 2022-06-01 PROCEDURE — 96375 TX/PRO/DX INJ NEW DRUG ADDON: CPT

## 2022-06-01 PROCEDURE — 85025 COMPLETE CBC W/AUTO DIFF WBC: CPT | Performed by: EMERGENCY MEDICINE

## 2022-06-01 PROCEDURE — 80053 COMPREHEN METABOLIC PANEL: CPT | Performed by: EMERGENCY MEDICINE

## 2022-06-01 PROCEDURE — 82962 GLUCOSE BLOOD TEST: CPT

## 2022-06-01 PROCEDURE — 96374 THER/PROPH/DIAG INJ IV PUSH: CPT

## 2022-06-01 RX ORDER — KETOROLAC TROMETHAMINE 30 MG/ML
30 INJECTION, SOLUTION INTRAMUSCULAR; INTRAVENOUS
Status: COMPLETED | OUTPATIENT
Start: 2022-06-01 | End: 2022-06-01

## 2022-06-01 RX ORDER — MORPHINE SULFATE 4 MG/ML
2 INJECTION, SOLUTION INTRAMUSCULAR; INTRAVENOUS
Status: COMPLETED | OUTPATIENT
Start: 2022-06-01 | End: 2022-06-01

## 2022-06-01 RX ORDER — DOCUSATE SODIUM 100 MG/1
100 CAPSULE, LIQUID FILLED ORAL 3 TIMES DAILY PRN
Qty: 20 CAPSULE | Refills: 0 | Status: SHIPPED | OUTPATIENT
Start: 2022-06-01 | End: 2023-01-01 | Stop reason: CLARIF

## 2022-06-01 RX ORDER — HYDROCODONE BITARTRATE AND ACETAMINOPHEN 5; 325 MG/1; MG/1
1 TABLET ORAL EVERY 6 HOURS PRN
Qty: 12 TABLET | Refills: 0 | Status: ON HOLD | OUTPATIENT
Start: 2022-06-01 | End: 2022-01-01 | Stop reason: HOSPADM

## 2022-06-01 RX ORDER — IBUPROFEN 600 MG/1
600 TABLET ORAL EVERY 8 HOURS PRN
Qty: 15 TABLET | Refills: 0 | Status: ON HOLD | OUTPATIENT
Start: 2022-06-01 | End: 2022-01-01 | Stop reason: HOSPADM

## 2022-06-01 RX ADMIN — MORPHINE SULFATE 2 MG: 4 INJECTION INTRAVENOUS at 06:06

## 2022-06-01 RX ADMIN — INSULIN HUMAN 4 UNITS: 100 INJECTION, SOLUTION PARENTERAL at 06:06

## 2022-06-01 RX ADMIN — KETOROLAC TROMETHAMINE 30 MG: 30 INJECTION, SOLUTION INTRAMUSCULAR; INTRAVENOUS at 03:06

## 2022-06-01 RX ADMIN — SODIUM CHLORIDE 1000 ML: 9 INJECTION, SOLUTION INTRAVENOUS at 05:06

## 2022-06-01 RX ADMIN — SODIUM CHLORIDE 1000 ML: 0.9 INJECTION, SOLUTION INTRAVENOUS at 06:06

## 2022-06-01 NOTE — ED PROVIDER NOTES
"Encounter Date: 6/1/2022    SCRIBE #1 NOTE: I, Becky Sauer, am scribing for, and in the presence of,  Shruthi Weathers MD. I have scribed the following portions of the note - Other sections scribed: HPI, ROS, PE.       History     Chief Complaint   Patient presents with    Fall     The patient reports a slip and fall in the bathroom, hitting his back on a step 4 days ago. Denies hitting head, loc. Patient states that he is now having lower back pain since the fall. Reports hx of back surgery in 2018.     Mr Woodall is a 32 y.o. male, with a PMHx of DM (intermittently compliant with Trulicity, not compliant w metformin), EtOH abuse, who presents to the ED with back pain s/p mechanical fall. Patient reports he slipped and fell while getting out of the shower, hitting his right lower back, 3 days ago. he denies any other injuries or pain. Didn't hit head. Denies neck pain.  He states his pain has been mild and tolerable since the incident until this AM, when he woke up to persistent pain, noting trouble ambulating and getting out of bed secondary to pain on R low back radiating to right hip. Notes worsening pain with movement but he denies any weakness. He has been taking Tylenol and Ibuprofen without relief. No other exacerbating or alleviating factors. Denies fever, chills, weakness, numbness, abnormal gait, urinary or bowel incontinence, diarrhea, constipation, or other associated symptoms. Of note, patient admits to frequent EtOH use. Denies any frequent falls; last fall prior to this event was "a couple of months" ago when he was under the influence of EtOH. This is the extent of the patient's complaints today in the Emergency Department.     The history is provided by the patient.     Review of patient's allergies indicates:  No Known Allergies  Past Medical History:   Diagnosis Date    Diabetes mellitus     Encounter for blood transfusion     Obese     Other insomnia 08/03/2020    Perineal abscess 08/01/2014 "     Past Surgical History:   Procedure Laterality Date    DECOMPRESSION OF LUMBAR SPINE USING MINIMALLY INVASIVE TECHNIQUE Right 2018    Procedure: DECOMPRESSION, SPINE, LUMBAR, MINIMALLY INVASIVE L3-4;  Surgeon: Cristian Cervantes MD;  Location: [facility] OR [address] [place] [address];  Service: Neurosurgery;  Laterality: Right;    ORTHOPEDIC SURGERY       Family History   Problem Relation Age of Onset    Diabetes Father     Diabetes Paternal Grandmother     Diabetes Paternal Grandfather      Social History     Tobacco Use    Smoking status: Former Smoker     Packs/day: 0.50     Years: 9.00     Pack years: 4.50     Quit date: 2013     Years since quittin.9    Smokeless tobacco: Former User   Substance Use Topics    Alcohol use: Yes     Alcohol/week: 2.0 standard drinks     Types: 2 Cans of beer per week     Comment: daily ETOH, 48 beers per day    Drug use: Not Currently     Review of Systems   Constitutional: Negative for chills and fever.   Gastrointestinal: Negative for constipation and diarrhea.   Musculoskeletal: Positive for back pain. Negative for gait problem.   Neurological: Negative for weakness and numbness.        (-) urinary or bowel incontinence.    All other systems reviewed and are negative.      Physical Exam     Initial Vitals [22 1357]   BP Pulse Resp Temp SpO2   121/75 109 16 98.4 °F (36.9 °C) 98 %      MAP       --         Physical Exam    Nursing note and vitals reviewed.  Constitutional: He appears well-developed and well-nourished. He is not diaphoretic. No distress.   HENT:   Head: Normocephalic and atraumatic.   Eyes: EOM are normal. Pupils are equal, round, and reactive to light.   Cardiovascular: Normal rate, regular rhythm and normal heart sounds.   Pulmonary/Chest: Breath sounds normal. No respiratory distress. He has no wheezes.   Abdominal: Abdomen is soft. He exhibits no distension. There is no abdominal tenderness.   Musculoskeletal:         General: No edema.      Comments: Able to  stand with full strength and bear weight on each leg independently. No midline tenderness to palpation. Right lower back focal tenderness to palpation. Patient did not want to lean forward for straight leg testing secondary to pain.      Neurological: He is alert and oriented to person, place, and time.   1+ bilateral patellar reflex. Mildly antalgic gait.    Skin: Skin is warm and dry.   Psychiatric: He has a normal mood and affect. His behavior is normal. Thought content normal.         ED Course   Procedures  Labs Reviewed   CBC W/ AUTO DIFFERENTIAL - Abnormal; Notable for the following components:       Result Value    RBC 4.57 (*)     MCH 34.6 (*)     MCHC 36.3 (*)     RDW 11.1 (*)     Platelets 121 (*)     All other components within normal limits   COMPREHENSIVE METABOLIC PANEL - Abnormal; Notable for the following components:    Glucose 498 (*)     Albumin 3.2 (*)     Total Bilirubin 1.5 (*)     Alkaline Phosphatase 327 (*)     AST 93 (*)     ALT 81 (*)     All other components within normal limits    Narrative:      Glucose  critical result(s) called and verbal readback obtained from   Ayana Garcia. by YULISSA 06/01/2022 18:02   SEDIMENTATION RATE - Abnormal; Notable for the following components:    Sed Rate 18 (*)     All other components within normal limits   C-REACTIVE PROTEIN - Abnormal; Notable for the following components:    CRP 14.7 (*)     All other components within normal limits   URINALYSIS, REFLEX TO URINE CULTURE - Abnormal; Notable for the following components:    Specific Gravity, UA >1.030 (*)     Protein, UA 2+ (*)     Glucose, UA 4+ (*)     Occult Blood UA Trace (*)     Leukocytes, UA Trace (*)     All other components within normal limits    Narrative:     Specimen Source->Urine   URINALYSIS MICROSCOPIC - Abnormal; Notable for the following components:    RBC, UA 5 (*)     WBC, UA 10 (*)     Yeast, UA Rare (*)     All other components within normal limits    Narrative:     Specimen  Source->Urine   POCT GLUCOSE - Abnormal; Notable for the following components:    POCT Glucose 306 (*)     All other components within normal limits          Imaging Results          MRI Lumbar Spine Without Contrast (Final result)  Result time 06/01/22 21:54:20    Final result by Ilir Avila MD (06/01/22 21:54:20)                 Impression:      1. No acute abnormality.  2. Multilevel chronic degenerative changes primarily at L3-4 and L4-5.  3. Acute fractures of the right L2 and L3 transverse processes noted on earlier CT are not well visualized on MRI.      Electronically signed by: Ilir Avila  Date:    06/01/2022  Time:    21:54             Narrative:    EXAMINATION:  MRI LUMBAR SPINE WITHOUT CONTRAST    CLINICAL HISTORY:  Low back pain, prior surgery, new symptoms;fall, L spine fxs, uncontrolled DM, R leg pain, R back pain;    TECHNIQUE:  Multiplanar, multisequence MR images were acquired from the thoracolumbar junction to the sacrum without the administration of contrast.    COMPARISON:  Correlation with CT 06/01/2022    FINDINGS:  Please note the earlier CT report with acute fractures of right L2 and L3 transverse processes.    Motion artifact which diminishes the sensitivity.    Alignment: Normal.    Vertebrae: Normal marrow signal. No visible fracture.    Fractures of the right L2 and L3 transverse processes noted on prior CT are not well visualized on MRI.    Discs: Mild disc space narrowing and disc desiccation at L3-4, L4-5 and L5-S1.  Mild degenerative endplate changes at these levels also.    Cord: Normal.  Conus terminates at approximately L1.    Degenerative findings:    T12-L1: No significant abnormality.    L1-L2: No significant abnormality.    L2-L3: No significant abnormality.    L3-L4: Mild diffuse posterior disc osteophyte complex.  Moderate to severe central canal narrowing.  Motion artifact limits visualization.  Neural foramen appear adequately maintained.    L4-L5: Mild diffuse  posterior disc osteophyte complex.  Moderate-severe central canal narrowing.  Motion artifact limits visualization.  Neural foramen appear adequately maintained.    L5-S1: Minimal disc bulge with no significant central canal narrowing.  Mild bilateral foraminal narrowing..    Paraspinal muscles & soft tissues: Unremarkable.                               CT Lumbar Spine Without Contrast (Final result)  Result time 06/01/22 18:08:41    Final result by Rhett Mendenhall MD (06/01/22 18:08:41)                 Impression:      Acute fractures of the L2 and L3 right transverse processes.      Electronically signed by: Rhett Mendenhall MD  Date:    06/01/2022  Time:    18:08             Narrative:    EXAMINATION:  CT THORACIC SPINE WITHOUT CONTRAST; CT LUMBAR SPINE WITHOUT CONTRAST    CLINICAL HISTORY:  Mid-back pain, compression fracture suspected;right low back/mid back after fall;; Low back pain, increased fracture risk;    TECHNIQUE:  CT thoracic spine and lumbar spine were obtained without the use of IV contrast.  Sagittal coronal reformats were obtained.    COMPARISON:  None    FINDINGS:  Thoracic spine and lumbar spine alignment appear within normal limits.  Acute mild displaced fractures are seen of the L2 and L3 right transverse processes.  Vertebral body heights of the thoracic and lumbar spine are maintained with no evidence of fracture.    Mild subcutaneous soft tissue stranding and edema are seen involving the right lower back region, presumably posttraumatic.  Visualized lungs are essentially clear.  Partially visualized intra-abdominal and pelvic structures show no acute abnormalities.                               CT Thoracic Spine Without Contrast (Final result)  Result time 06/01/22 18:08:41    Final result by Rhett Mendenhall MD (06/01/22 18:08:41)                 Impression:      Acute fractures of the L2 and L3 right transverse processes.      Electronically signed by: Rhett Mendenhall  MD  Date:    06/01/2022  Time:    18:08             Narrative:    EXAMINATION:  CT THORACIC SPINE WITHOUT CONTRAST; CT LUMBAR SPINE WITHOUT CONTRAST    CLINICAL HISTORY:  Mid-back pain, compression fracture suspected;right low back/mid back after fall;; Low back pain, increased fracture risk;    TECHNIQUE:  CT thoracic spine and lumbar spine were obtained without the use of IV contrast.  Sagittal coronal reformats were obtained.    COMPARISON:  None    FINDINGS:  Thoracic spine and lumbar spine alignment appear within normal limits.  Acute mild displaced fractures are seen of the L2 and L3 right transverse processes.  Vertebral body heights of the thoracic and lumbar spine are maintained with no evidence of fracture.    Mild subcutaneous soft tissue stranding and edema are seen involving the right lower back region, presumably posttraumatic.  Visualized lungs are essentially clear.  Partially visualized intra-abdominal and pelvic structures show no acute abnormalities.                               X-Ray Lumbar Spine Ap And Lateral (Final result)  Result time 06/01/22 15:04:58    Final result by Andrea Hdez Jr., MD (06/01/22 15:04:58)                 Impression:      Normal      Electronically signed by: Andrea Marin Jr  Date:    06/01/2022  Time:    15:04             Narrative:    EXAMINATION:  XR LUMBAR SPINE AP AND LATERAL    CLINICAL HISTORY:  right low back pain;    TECHNIQUE:  Three views of the lumbar spine were performed.    COMPARISON:  None    FINDINGS:  Alignment: Alignment is maintained.    Vertebrae: Vertebral body heights are maintained.  No suspicious appearing lytic or blastic lesions.    Discs and facets: Intervertebral disc spaces are preserved.  Posterior elements intact.    Miscellaneous: None                               X-Ray Thoracic Spine AP Lateral (Final result)  Result time 06/01/22 15:06:08    Final result by Andrea Hdez Jr., MD (06/01/22 15:06:08)                  Impression:      Mild scoliosis versus spasm      Electronically signed by: Andrea Marin Jr  Date:    06/01/2022  Time:    15:06             Narrative:    EXAMINATION:  XR THORACIC SPINE AP LATERAL    CLINICAL HISTORY:  Low back pain, unspecified    TECHNIQUE:  AP and lateral views of the thoracic spine were performed.    COMPARISON:  None    FINDINGS:  Thoracic vertebral body heights and disc spaces are preserved.  Gentle rightward curvature may reflect an element of spasm or minimal scoliosis.    The posterior elements are grossly intact.                                 Medications   ketorolac injection 30 mg (30 mg Intramuscular Given 6/1/22 1517)   sodium chloride 0.9% bolus 1,000 mL (0 mLs Intravenous Stopped 6/1/22 1835)   insulin regular injection 4 Units 0.04 mL (4 Units Intravenous Given 6/1/22 1844)   sodium chloride 0.9% bolus 1,000 mL (1,000 mLs Intravenous New Bag 6/1/22 1847)   morphine injection 2 mg (2 mg Intravenous Given 6/1/22 1847)     Medical Decision Making:   History:   Old Medical Records: I decided to obtain old medical records.  Clinical Tests:   Lab Tests: Ordered and Reviewed  Radiological Study: Ordered and Reviewed  ED Management:  Prior records reviewed. Glucose in mid 400s. Pt given NS bolus and insulin iv.   He admits to not checking his sugar or taking meds with compliance. Reports he hates needles.   We discussed the dangers of uncontrolled dm. He voiced understanding.   xrays negative.   CT L/T spine with acute R transverse process fx L2 and L3 w stranding and edema presumably from trauma.   Given immunocompromised state due to little DM control and pain that has worsened 3 d after event, pt sent for MRI L spine to rule out other pathology not seen on CT. No acute abnl seen on MRI that was not seen on CT. Spoke w NSG on call. Pt need muscle relaxation, pain control and f/u. Brace not recommended at this time. Pt is stable for d/c. Has dm meds at home. We discussed home care  and return precautions. There is no indication for further emergent intervention or evaluation at this time.             Scribe Attestation:   Scribe #1: I performed the above scribed service and the documentation accurately describes the services I performed. I attest to the accuracy of the note.                 Clinical Impression:   Final diagnoses:  [M54.50] Right low back pain  [W19.XXXA] Fall  [S32.009A] Closed fracture of transverse process of lumbar vertebra, initial encounter - L2 and L3 (Primary)          ED Disposition Condition    Discharge Stable       I, Shruthi Weathers MD, personally performed the services described in this documentation. All medical record entries made by the scribe were at my direction and in my presence. I have reviewed the chart and agree that the record reflects my personal performance and is accurate and complete.    ED Prescriptions     Medication Sig Dispense Start Date End Date Auth. Provider    ibuprofen (ADVIL,MOTRIN) 600 MG tablet Take 1 tablet (600 mg total) by mouth every 8 (eight) hours as needed for Pain. 15 tablet 6/1/2022  Shruthi Weathers MD    HYDROcodone-acetaminophen (NORCO) 5-325 mg per tablet Take 1 tablet by mouth every 6 (six) hours as needed for Pain. 12 tablet 6/1/2022  Shruthi Weathers MD    docusate sodium (COLACE) 100 MG capsule Take 1 capsule (100 mg total) by mouth 3 (three) times daily as needed for Constipation. 20 capsule 6/1/2022  Shruthi Weathers MD        Follow-up Information     Follow up With Specialties Details Why Contact Info    Hitesh Carter, DO Neurosurgery  for follow up [address]  [address]  [facility] [address]  [phone]             Shruthi Weathers MD  06/02/22 5432

## 2022-06-02 LAB — POCT GLUCOSE: 306 MG/DL (ref 70–110)

## 2022-06-02 NOTE — DISCHARGE INSTRUCTIONS
Rest. Drink plenty of fluids.   Control your diabetes by taking your medications daily and checking your sugars.   Follow up with the spine doctors as discussed.   I'm glad you're feeling better.   Return for any new or acute problems or concerns.

## 2022-06-02 NOTE — ED NOTES
Resting on side of bed, respirations even and unlabored, NAD. Awaiting MRI. Denies needs or complaints

## 2022-06-20 ENCOUNTER — PATIENT MESSAGE (OUTPATIENT)
Dept: NEUROSURGERY | Facility: CLINIC | Age: 32
End: 2022-06-20
Payer: MEDICAID

## 2022-09-01 PROBLEM — U07.1 ACUTE HYPOXEMIC RESPIRATORY FAILURE DUE TO COVID-19: Status: RESOLVED | Noted: 2022-01-04 | Resolved: 2022-01-01

## 2022-09-01 PROBLEM — L03.314 CELLULITIS OF GROIN: Status: ACTIVE | Noted: 2022-01-01

## 2022-09-01 PROBLEM — J96.01 ACUTE HYPOXEMIC RESPIRATORY FAILURE DUE TO COVID-19: Status: RESOLVED | Noted: 2022-01-04 | Resolved: 2022-01-01

## 2022-09-01 PROBLEM — H57.8A9 SENSATION OF FOREIGN BODY IN EYE: Status: RESOLVED | Noted: 2019-05-12 | Resolved: 2022-01-01

## 2022-09-01 PROBLEM — E66.01 SEVERE OBESITY (BMI >= 40): Chronic | Status: ACTIVE | Noted: 2020-08-03

## 2022-09-01 PROBLEM — I50.32 CHRONIC DIASTOLIC HEART FAILURE: Chronic | Status: ACTIVE | Noted: 2022-01-06

## 2022-09-01 PROBLEM — R74.01 ELEVATED TRANSAMINASE LEVEL: Chronic | Status: ACTIVE | Noted: 2020-08-03

## 2022-09-01 PROBLEM — A41.9 SEPSIS: Status: ACTIVE | Noted: 2022-01-01

## 2022-09-01 PROBLEM — M54.41 LOW BACK PAIN WITH RIGHT-SIDED SCIATICA: Status: RESOLVED | Noted: 2018-07-09 | Resolved: 2022-01-01

## 2022-09-01 NOTE — PLAN OF CARE
Problem: Infection  Goal: Absence of Infection Signs and Symptoms  Outcome: Ongoing, Progressing     Problem: Diabetes Comorbidity  Goal: Blood Glucose Level Within Targeted Range  Outcome: Ongoing, Progressing     Problem: Adjustment to Illness (Sepsis/Septic Shock)  Goal: Optimal Coping  Outcome: Ongoing, Progressing     Problem: Glycemic Control Impaired (Sepsis/Septic Shock)  Goal: Blood Glucose Level Within Desired Range  Outcome: Ongoing, Progressing     Problem: Infection Progression (Sepsis/Septic Shock)  Goal: Absence of Infection Signs and Symptoms  Outcome: Ongoing, Progressing     Problem: Nutrition Impaired (Sepsis/Septic Shock)  Goal: Optimal Nutrition Intake  9/1/2022 1744 by Jake Delacruz RN  Outcome: Ongoing, Progressing  9/1/2022 1744 by Jake Delacruz RN  Outcome: Ongoing, Progressing

## 2022-09-01 NOTE — ASSESSMENT & PLAN NOTE
Tachycardia, tachypnea, and leukocytosis with elevated lactic acid.  This patient does have evidence of infective focus  My overall impression is sepsis. Vital signs were reviewed and noted in progress note.  Antibiotics given-   Antibiotics (From admission, onward)    Start     Stop Route Frequency Ordered    09/02/22 0000  vancomycin (VANCOCIN) 1,750 mg in dextrose 5 % 500 mL IVPB         -- IV Every 12 hours (non-standard times) 09/01/22 1546    09/01/22 1606  vancomycin - pharmacy to dose  (vancomycin IVPB)        See Hyperspace for full Linked Orders Report.    -- IV pharmacy to manage frequency 09/01/22 1507    09/01/22 1045  piperacillin-tazobactam 4.5 g in dextrose 5 % 100 mL IVPB (ready to mix system)  (Sepsis Workup)         09/02 1044 IV Every 8 hours (non-standard times) 09/01/22 1039        Cultures were taken-   Microbiology Results (last 7 days)     Procedure Component Value Units Date/Time    Blood culture x two cultures. Draw prior to antibiotics. [[id number]] Collected: 09/01/22 1100    Order Status: Sent Specimen: Blood from Peripheral, Forearm, Left Updated: 09/01/22 1136    Blood culture x two cultures. Draw prior to antibiotics. [[id number]] Collected: 09/01/22 1100    Order Status: Sent Specimen: Blood from Peripheral, Wrist, Right Updated: 09/01/22 1136        Latest lactate reviewed, they are-  Recent Labs   Lab 09/01/22  1112 09/01/22  1441   LACTATE 2.7* 1.3       Organ dysfunction indicated by N/a  Source- cellulitis    Source control Achieved by- urology consulted

## 2022-09-01 NOTE — HPI
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  He reports acute onset of erythema, duration 3 days, has progressed and is now swollen and painful, no known exacerbating or alleviating factors, no attempted self treatment.  Denies fever, chills, cough, chest pain, SOB, dizziness, syncope, nausea, vomiting, diarrhea, abdominal pain, or dysuria.  In the ED, found to be tachycardic and tachypneic with leukocytosis.  CT abdomen pelvis and scrotal ultrasound revealed extensive cellulitis.  Procalcitonin 5.7.  Initial lactic acid 2.7.  Also with mild hyponatremia and mildly elevated liver enzymes.  Blood cultures obtained, IV fluid resuscitation and IV antibiotics initiated.

## 2022-09-01 NOTE — ASSESSMENT & PLAN NOTE
No evidence of acute decompensation or fluid overload, echo earlier this year showed preserved EF with indeterminate LV diastolic function, and is, daily weights.

## 2022-09-01 NOTE — SUBJECTIVE & OBJECTIVE
Past Medical History:   Diagnosis Date    Abscess of right groin 09/01/2022    Diabetes mellitus     Encounter for blood transfusion     Loud snoring     Obese     Other insomnia 08/03/2020    Perineal abscess 08/01/2014       Past Surgical History:   Procedure Laterality Date    ABCESS DRAINAGE  2014    PERIRECTAL    DECOMPRESSION OF LUMBAR SPINE USING MINIMALLY INVASIVE TECHNIQUE Right 07/06/2018    Procedure: DECOMPRESSION, SPINE, LUMBAR, MINIMALLY INVASIVE L3-4;  Surgeon: Cristian Cervantes MD;  Location: [facility] OR [address] [place] [address];  Service: Neurosurgery;  Laterality: Right;    ORTHOPEDIC SURGERY         Review of patient's allergies indicates:   Allergen Reactions    Metformin Diarrhea       No current facility-administered medications on file prior to encounter.     Current Outpatient Medications on File Prior to Encounter   Medication Sig    acetaminophen (TYLENOL) 500 MG tablet Take 1,000 mg by mouth 3 (three) times daily as needed for Pain.    dulaglutide (TRULICITY) 1.5 mg/0.5 mL pen injector Inject 1.5 mg into the skin every 7 days. (Patient taking differently: Inject 1.5 mg into the skin every 7 days. Tuesdays)    blood sugar diagnostic Strp 1 strip by Misc.(Non-Drug; Combo Route) route 4 (four) times daily with meals and nightly. (Patient not taking: Reported on 9/1/2022)    blood-glucose meter Misc Use as instructed (Patient not taking: Reported on 9/1/2022)    diphenhydramine HCl (UNISOM SLEEPGELS ORAL) Take 1 capsule by mouth every evening.    docusate sodium (COLACE) 100 MG capsule Take 1 capsule (100 mg total) by mouth 3 (three) times daily as needed for Constipation. (Patient not taking: Reported on 9/1/2022)    folic acid (FOLVITE) 1 MG tablet Take 1 tablet (1 mg total) by mouth once daily. (Patient not taking: Reported on 9/1/2022)    glucagon (GLUCAGEN HYPOKIT) 1 mg SolR Inject 1 mg into the muscle as needed (Hypoglycemia). (Patient not taking: Reported on 9/1/2022)    HYDROcodone-acetaminophen (NORCO) 5-325  "mg per tablet Take 1 tablet by mouth every 6 (six) hours as needed for Pain. (Patient not taking: Reported on 2022)    ibuprofen (ADVIL,MOTRIN) 600 MG tablet Take 1 tablet (600 mg total) by mouth every 8 (eight) hours as needed for Pain. (Patient not taking: Reported on 2022)    insulin aspart U-100 (NOVOLOG) 100 unit/mL (3 mL) InPn pen Inject 14 Units into the skin 3 (three) times daily.    insulin detemir U-100 (LEVEMIR FLEXTOUCH) 100 unit/mL (3 mL) SubQ InPn pen Inject 60 Units into the skin once daily.    lancets 30 gauge Misc 1 lancet by Misc.(Non-Drug; Combo Route) route 4 (four) times daily with meals and nightly. (Patient not taking: Reported on 2022)    lancing device Misc Use as instructed (Patient not taking: Reported on 2022)    pen needle, diabetic 31 gauge x 5/16" Ndle 1 each by Misc.(Non-Drug; Combo Route) route 4 (four) times daily with meals and nightly. (Patient not taking: Reported on 2022)    pen needle, diabetic 32 gauge x 5/32" Ndle USE TO INJECT INSULIN FOUR TIMES DAILY (Patient not taking: Reported on 2022)    pulse oximeter (PULSE OXIMETER) device by Apply Externally route 2 (two) times a day. Use twice daily at 8 AM and 3 PM and [facility] as [facility]. (Patient not taking: Reported on 2022)    thiamine 100 MG tablet Take 1 tablet (100 mg total) by mouth once daily. (Patient not taking: Reported on 2022)     Family History       Problem Relation (Age of Onset)    Diabetes Father, Paternal Grandmother, Paternal Grandfather          Tobacco Use    Smoking status: Former     Packs/day: 0.50     Years: 9.00     Pack years: 4.50     Types: Cigarettes     Quit date: 2013     Years since quittin.1    Smokeless tobacco: Former   Substance and Sexual Activity    Alcohol use: Not Currently     Comment: DAILY PINT OF LIQUOR, HX OF 1 "TALL BOY" OF BEER DAILY    Drug use: Not Currently    Sexual activity: Yes     Partners: Female     Birth " control/protection: None     Review of Systems   Constitutional:  Negative for chills and fever.   Eyes:  Negative for photophobia and visual disturbance.   Respiratory:  Negative for cough and shortness of breath.    Cardiovascular:  Negative for chest pain, palpitations and leg swelling.   Gastrointestinal:  Negative for abdominal pain, diarrhea, nausea and vomiting.   Genitourinary:  Negative for frequency, hematuria and urgency.   Skin:  Positive for color change, rash and wound. Negative for pallor.   Neurological:  Negative for light-headedness and headaches.   Psychiatric/Behavioral:  Negative for confusion and decreased concentration.    Objective:     Vital Signs (Most Recent):  Temp: 99.2 °F (37.3 °C) (09/01/22 0856)  Pulse: 95 (09/01/22 1505)  Resp: (!) 21 (09/01/22 1505)  BP: 133/85 (09/01/22 1505)  SpO2: 96 % (09/01/22 1505)   Vital Signs (24h Range):  Temp:  [99.2 °F (37.3 °C)] 99.2 °F (37.3 °C)  Pulse:  [] 95  Resp:  [13-21] 21  SpO2:  [92 %-100 %] 96 %  BP: (102-151)/(68-86) 133/85     Weight: 113.4 kg (250 lb)  Body mass index is 40.35 kg/m².    Physical Exam  Vitals and nursing note reviewed.   Constitutional:       General: He is not in acute distress.     Appearance: He is well-developed.   HENT:      Head: Normocephalic and atraumatic.      Right Ear: External ear normal.      Left Ear: External ear normal.      Nose: Nose normal.   Eyes:      Conjunctiva/sclera: Conjunctivae normal.      Pupils: Pupils are equal, round, and reactive to light.   Cardiovascular:      Rate and Rhythm: Normal rate and regular rhythm.   Pulmonary:      Effort: Pulmonary effort is normal. No respiratory distress.      Breath sounds: Normal breath sounds. No wheezing or rales.   Abdominal:      General: Bowel sounds are normal. There is no distension.      Palpations: Abdomen is soft.      Tenderness: There is no abdominal tenderness.      Comments: No palpable hepatomegaly or splenomegaly   Musculoskeletal:          General: No tenderness. Normal range of motion.      Cervical back: Normal range of motion and neck supple.   Skin:     General: Skin is warm and dry.      Findings: Erythema present.   Neurological:      Mental Status: He is alert and oriented to person, place, and time.   Psychiatric:         Thought Content: Thought content normal.         CRANIAL NERVES     CN III, IV, VI   Pupils are equal, round, and reactive to light.     Significant Labs: All pertinent labs within the past 24 hours have been reviewed.    Significant Imaging: I have reviewed all pertinent imaging results/findings within the past 24 hours.

## 2022-09-01 NOTE — ASSESSMENT & PLAN NOTE
Patient's FSGs are uncontrolled due to hyperglycemia on current medication regimen.  Last A1c reviewed-   Lab Results   Component Value Date    HGBA1C 12.6 (H) 01/04/2022     Most recent fingerstick glucose reviewed-   Recent Labs   Lab 09/01/22  1050 09/01/22  1509   POCTGLUCOSE 364* 327*     Current correctional scale  Medium  Maintain anti-hyperglycemic dose as follows-   Antihyperglycemics (From admission, onward)    Start     Stop Route Frequency Ordered    09/01/22 2100  insulin detemir U-100 pen 40 Units         -- SubQ Nightly 09/01/22 1506    09/01/22 1605  insulin aspart U-100 pen 1-10 Units         -- SubQ Before meals & nightly PRN 09/01/22 1506        Hold Oral hypoglycemics while patient is in the hospital.

## 2022-09-01 NOTE — PROGRESS NOTES
Pharmacokinetic Initial Assessment: IV Vancomycin    Assessment/Plan:    Initiate intravenous vancomycin with loading dose of 2000 mg once followed by a maintenance dose of vancomycin 1750 mg IV every 12 hours  Desired empiric serum trough concentration is 10 to 20 mcg/mL  Draw vancomycin trough level 60 min prior to fourth dose on 9/2/22 at approximately 23:00  Pharmacy will continue to follow and monitor vancomycin.      Please contact pharmacy at extension [phone] with any questions regarding this assessment.     Thank you for the consult,   Beverley Soria       Patient brief summary:  Doe Woodall is a 32 y.o. male initiated on antimicrobial therapy with IV Vancomycin for treatment of suspected skin & soft tissue infection    Drug Allergies:   Review of patient's allergies indicates:   Allergen Reactions    Metformin Diarrhea       Actual Body Weight:   113.4 kg    Renal Function:   Estimated Creatinine Clearance: 124.2 mL/min (based on SCr of 1 mg/dL).,     Dialysis Method (if applicable):  N/A    CBC (last 72 hours):  Recent Labs   Lab Result Units 09/01/22  1112   WBC K/uL 15.30*   Hemoglobin g/dL 15.6   Hematocrit % 42.8   Platelets K/uL 145*   Gran % % 72.6   Lymph % % 13.9*   Mono % % 12.1   Eosinophil % % 0.1   Basophil % % 0.3   Differential Method  Automated       Metabolic Panel (last 72 hours):  Recent Labs   Lab Result Units 09/01/22  1112   Sodium mmol/L 129*   Potassium mmol/L 3.3*   Chloride mmol/L 90*   CO2 mmol/L 25   Glucose mg/dL 354*   BUN mg/dL 7   Creatinine mg/dL 1.0   Albumin g/dL 2.7*   Total Bilirubin mg/dL 2.1*   Alkaline Phosphatase U/L 423*   AST U/L 64*   ALT U/L 61*       Drug levels (last 3 results):  No results for input(s): VANCOMYCINRA, VANCORANDOM, VANCOMYCINPE, VANCOPEAK, VANCOMYCINTR, VANCOTROUGH in the last 72 hours.    Microbiologic Results:  Microbiology Results (last 7 days)       Procedure Component Value Units Date/Time    Blood culture x two cultures. Draw prior  to antibiotics. [[id number]] Collected: 09/01/22 1100    Order Status: Sent Specimen: Blood from Peripheral, Forearm, Left Updated: 09/01/22 1136    Blood culture x two cultures. Draw prior to antibiotics. [[id number]] Collected: 09/01/22 1100    Order Status: Sent Specimen: Blood from Peripheral, Wrist, Right Updated: 09/01/22 1136

## 2022-09-01 NOTE — CLINICAL REVIEW
Sepsis Screen (most recent)       Sepsis Screen (IP) - 09/01/22 5104       Is the patient's history or complaint suggestive of a possible infection? Yes  -    Are there at least two of the following signs and symptoms present? Yes  -    Sepsis signs/symptoms - Tachycardia Tachycardia     >90  -    Sepsis signs/symptoms - WBC WBC < 4,000 or WBC > 12,000  -    Are any of the following organ dysfunction criteria present and not considered to be due to a chronic condition? Yes  -    Organ Dysfunction Criteria Total Bilirubin > 2.0 Platelet count < 100,000  -    Organ Dysfunction Criteria Lactate > 2.0  -    Initiate Sepsis Protocol No  -    Reason sepsis not considered Pt. receiving appropriate management  -              User Key  (r) = Recorded By, (t) = Taken By, (c) = Cosigned By      Initials Name     Julisa Chang RN

## 2022-09-01 NOTE — PHARMACY MED REC
"Admission Medication History     The home medication history was taken by Aminta Espinoza CPhT.      You may go to "Admission" then "Reconcile Home Medications" tabs to review and/or act upon these items.     The home medication list has been updated by the Pharmacy department.   Please read ALL comments highlighted in yellow.   Please address this information as you see fit.    Feel free to contact us if you have any questions or require assistance.          Medications listed below were obtained from: Patient/family and Analytic software- [employer]  (Not in a hospital admission)      Potential issues to be addressed PRIOR TO DISCHARGE  Patient reported not taking the following medications: (blood sugar diagnostic strips; blood glucose meter misc; unisome sleepgels; folic acid 1 mg tab; docusate sodium 100 mg tab; glucagen hypokit 1mg; NORCO 5-325 mg tab; Advil 600 mg; lancets 30 gauge misc; lancing device; pen needle, diabetic 31 gauge;  pulse oxitmeter). These medications remain on the home medication list. Please address accordingly.   Please discuss with the patient barriers to adherence with medication treatment plans  Patient requires education regarding drug therapies       Aminta Espinoza CPhT.  [phone]                          .        "

## 2022-09-01 NOTE — ED TRIAGE NOTES
Pt. Came in complaining of an abscess on his left groin. Pt. States that it was initially a rash. No drainage at this time.

## 2022-09-01 NOTE — ASSESSMENT & PLAN NOTE
Body mass index is 40.35 kg/m². Morbid obesity complicates all aspects of disease management from diagnostic modalities to treatment. Weight loss encouraged and health benefits explained to patient.

## 2022-09-01 NOTE — ED PROVIDER NOTES
"Encounter Date: 2022       History     Chief Complaint   Patient presents with    Abscess     Pt reports to the ED with C/O boil/ abscess to the left groin. Pt reports "I had a rash there but now it is swollen and painful." Pt denies any drainage at this time. Pt AAOx4, RESP easy and unlabored. Pt awaiting QT bed.      33 y/o male with past medical history of diabetes type 2 presents to ED complaining of acute onset abscess to right testicle that started earlier this week.  Patient reports 10 in 10 pain.  He is also complaining of associated nausea and chills.  Patient denies any associated drainage, fever, cough, chest pain, shortness of breath, nausea, vomiting, diarrhea, abdominal pain, dysuria, or hematuria.  No other symptoms reported.    The history is provided by the patient. No  was used.   Review of patient's allergies indicates:  No Known Allergies  Past Medical History:   Diagnosis Date    Diabetes mellitus     Encounter for blood transfusion     Obese     Other insomnia 2020    Perineal abscess 2014     Past Surgical History:   Procedure Laterality Date    DECOMPRESSION OF LUMBAR SPINE USING MINIMALLY INVASIVE TECHNIQUE Right 2018    Procedure: DECOMPRESSION, SPINE, LUMBAR, MINIMALLY INVASIVE L3-4;  Surgeon: Cristian Cervantes MD;  Location: [facility] OR [address] [place] [address];  Service: Neurosurgery;  Laterality: Right;    ORTHOPEDIC SURGERY       Family History   Problem Relation Age of Onset    Diabetes Father     Diabetes Paternal Grandmother     Diabetes Paternal Grandfather      Social History     Tobacco Use    Smoking status: Former     Packs/day: 0.50     Years: 9.00     Pack years: 4.50     Types: Cigarettes     Quit date: 2013     Years since quittin.1    Smokeless tobacco: Former   Substance Use Topics    Alcohol use: Yes     Alcohol/week: 2.0 standard drinks     Types: 2 Cans of beer per week     Comment: daily ETOH, 48 beers per day    Drug use: Not Currently "     Review of Systems   Constitutional:  Positive for chills. Negative for fever.   HENT:  Negative for congestion, ear pain, rhinorrhea and sore throat.    Eyes:  Negative for redness.   Respiratory:  Negative for cough and shortness of breath.    Cardiovascular:  Negative for chest pain.   Gastrointestinal:  Positive for nausea. Negative for abdominal pain, diarrhea and vomiting.   Genitourinary:  Negative for decreased urine volume, difficulty urinating, dysuria, frequency, hematuria and urgency.   Musculoskeletal:  Negative for back pain and neck pain.   Skin:  Negative for rash.        (+) abscess   Neurological:  Negative for headaches.   Psychiatric/Behavioral:  Negative for confusion.      Physical Exam     Initial Vitals [09/01/22 0856]   BP Pulse Resp Temp SpO2   102/68 105 18 99.2 °F (37.3 °C) 97 %      MAP       --         Physical Exam    Nursing note and vitals reviewed.  Constitutional: Vital signs are normal. He appears well-developed and well-nourished. He is active.  Non-toxic appearance. No distress.   HENT:   Head: Normocephalic and atraumatic.   Mouth/Throat: Mucous membranes are normal.   Eyes: EOM are normal.   Neck: Trachea normal. Neck supple.   Cardiovascular:  Normal rate and regular rhythm.           Pulmonary/Chest: Breath sounds normal. No respiratory distress.   Abdominal: Abdomen is soft. Bowel sounds are normal. He exhibits no distension. There is no abdominal tenderness.   Musculoskeletal:         General: No edema. Normal range of motion.      Cervical back: Neck supple.     Neurological: He is alert.   Skin: Skin is warm, dry and intact.   6x4cm abscess to R testicle with surrounding erythema.   Psychiatric: He has a normal mood and affect.       ED Course   Procedures  Labs Reviewed   CBC W/ AUTO DIFFERENTIAL - Abnormal; Notable for the following components:       Result Value    WBC 15.30 (*)     RBC 4.44 (*)     MCH 35.1 (*)     MCHC 36.4 (*)     Platelets 145 (*)     Immature  Granulocytes 1.0 (*)     Gran # (ANC) 11.1 (*)     Immature Grans (Abs) 0.15 (*)     Mono # 1.9 (*)     Lymph % 13.9 (*)     All other components within normal limits   COMPREHENSIVE METABOLIC PANEL - Abnormal; Notable for the following components:    Sodium 129 (*)     Potassium 3.3 (*)     Chloride 90 (*)     Glucose 354 (*)     Albumin 2.7 (*)     Total Bilirubin 2.1 (*)     Alkaline Phosphatase 423 (*)     AST 64 (*)     ALT 61 (*)     All other components within normal limits   BETA - HYDROXYBUTYRATE, SERUM - Abnormal; Notable for the following components:    Beta-Hydroxybutyrate 1.5 (*)     All other components within normal limits   LACTIC ACID, PLASMA - Abnormal; Notable for the following components:    Lactate (Lactic Acid) 2.7 (*)     All other components within normal limits   PROCALCITONIN - Abnormal; Notable for the following components:    Procalcitonin 5.70 (*)     All other components within normal limits   POCT GLUCOSE - Abnormal; Notable for the following components:    POCT Glucose 364 (*)     All other components within normal limits   ISTAT PROCEDURE - Abnormal; Notable for the following components:    POC PO2 29 (*)     POC HCO3 28.7 (*)     POC SATURATED O2 58 (*)     POC TCO2 30 (*)     All other components within normal limits   CULTURE, BLOOD   CULTURE, BLOOD   APTT   PROTIME-INR   URINALYSIS, REFLEX TO URINE CULTURE   LACTIC ACID, PLASMA   SARS-COV-2 RDRP GENE   TYPE & SCREEN   ISTAT LACTATE   POCT GLUCOSE MONITORING CONTINUOUS     EKG Readings: (Independently Interpreted)   Initial Reading: No STEMI. Rhythm: Sinus Tachycardia. Heart Rate: 122.     Imaging Results              CT Abdomen Pelvis With Contrast (Final result)  Result time 09/01/22 13:59:48      Final result by Michael Amin MD (09/01/22 13:59:48)                   Impression:      1. Diffuse induration and disorganized fluid throughout the right aspect of the perineum extending to the scrotal wall.  There is overlying  skin thickening.  No convincing focal organized fluid collection at this time however please see previous ultrasound for more detailed evaluation.  Findings are concerning for infectious process.  There is reactive bilateral inguinal lymphadenopathy right greater than left.  2. Findings suggesting hepatic steatosis, correlation with LFTs recommended.  3. Bilateral perinephric fat stranding, nonspecific, correlation with urinalysis recommended to exclude sequela of infection.  4. Please see above for several additional findings.      Electronically signed by: Michael Amin MD  Date:    09/01/2022  Time:    13:59               Narrative:    EXAMINATION:  CT ABDOMEN PELVIS WITH CONTRAST    CLINICAL HISTORY:  testicular abscess;    TECHNIQUE:  Low dose axial images, sagittal and coronal reformations were obtained from the lung bases to the pubic symphysis following the IV administration of 100 mL of Omnipaque 350 .  Oral contrast was not given.    COMPARISON:  Testicular ultrasound 09/01/2022    FINDINGS:  Images of the lower thorax are remarkable for bilateral dependent atelectasis, right greater than left.    The liver is hypoattenuating suggesting steatosis, correlation with LFTs recommended.  The spleen, pancreas and adrenal glands are unremarkable.  There is cholelithiasis without secondary findings to suggest acute cholecystitis.  The portal vein, splenic vein, SMV, celiac axis and SMA all are patent.  No significant abdominal lymphadenopathy.    The kidneys enhance symmetrically without hydronephrosis or nephrolithiasis.  There is a punctate focus of low attenuation within the interpolar region of the left kidney, too small for characterization.  There is mild bilateral perinephric fat stranding.  The bilateral ureters are unremarkable without calculi seen.  The urinary bladder is distended without wall thickening.  The prostate is not enlarged.    There are a few scattered colonic diverticula without  inflammation.  The terminal ileum and appendix are unremarkable.  The small bowel is grossly unremarkable.  There are several scattered shotty periaortic and paracaval lymph nodes.  No focal organized pelvic fluid collection.    Degenerative changes are noted of the spine.  There is bilateral inguinal lymphadenopathy, right greater than left.    There is diffuse induration within the right peroneal soft tissues extending to the scrotum.  No clearly identified rim enhancing fluid collection in the region.  There is scrotal wall thickening.  This region is better evaluated on previous ultrasound.                                       US Scrotum And Testicles (Final result)  Result time 09/01/22 13:41:11      Final result by Andrea Hdez Jr., MD (09/01/22 13:41:11)                   Impression:      Scrotal cellulitis and probable cutaneous boil.  No extension/involvement of the testicle.      Electronically signed by: Andrea Marin Jr  Date:    09/01/2022  Time:    13:41               Narrative:    EXAMINATION:  US SCROTUM AND TESTICLES    CLINICAL HISTORY:  Cutaneous abscess, unspecified    TECHNIQUE:  Sonography of the scrotum and testes.    COMPARISON:  None.    FINDINGS:  Right Testicle:    *Size: 4.4 cm  *Appearance: Normal.  *Flow: Normal arterial and venous flow  *Epididymis: Normal.  *Hydrocele: None.  *Varicocele: None.  .    Left Testicle:    *Size: 3.8 cm  *Appearance: Normal.  *Flow: Normal arterial and venous flow  *Epididymis: Normal.  *Hydrocele: None.  *Varicocele: None.  .    Other findings: There is soft tissue edema corresponding to the area of clinical concern which is marked as the right inguinal region anterior to the right testicle.  There is some decreased echogenicity extending to the skin surface which may reflect a cutaneous boil, but no well-defined subcutaneous fluid collection is seen.  There is surrounding scrotal soft tissue swelling..                                        X-Ray Chest AP Portable (Final result)  Result time 09/01/22 11:28:07      Final result by Rey Ray MD (09/01/22 11:28:07)                   Impression:      1. No appreciable new acute cardiopulmonary process appreciated.      Electronically signed by: Rey Ray  Date:    09/01/2022  Time:    11:28               Narrative:    EXAMINATION:  XR CHEST AP PORTABLE    CLINICAL HISTORY:  infection;    TECHNIQUE:  Single frontal portable view of the chest was performed.    COMPARISON:  Chest radiograph 03/22/2022    FINDINGS:  Cardiomediastinal silhouette is within normal limits.    Persistent right greater than left markedly low lung volumes associated with bibasilar right greater than left airspace opacities likely reflecting compressive atelectasis, not significantly changed.  Otherwise, no appreciable new focal consolidation, overt interstitial edema, sizable pleural effusion or pneumothorax.    Multilevel degenerative changes of the imaged spine.                                       Medications   piperacillin-tazobactam 4.5 g in dextrose 5 % 100 mL IVPB (ready to mix system) (0 g Intravenous Stopped 9/1/22 1208)   LIDOcaine HCL 10 mg/ml (1%) injection 10 mL (10 mLs Infiltration Given 9/1/22 1027)   vancomycin (VANCOCIN) 2,000 mg in dextrose 5 % 500 mL IVPB (2,000 mg Intravenous New Bag 9/1/22 1209)   ondansetron injection 4 mg (4 mg Intravenous Given 9/1/22 1106)   morphine injection 4 mg (4 mg Intravenous Given 9/1/22 1108)   lactated ringers bolus 3,402 mL (3,402 mLs Intravenous New Bag 9/1/22 1117)   insulin regular injection 4 Units 0.04 mL (4 Units Intravenous Given 9/1/22 1430)   iohexoL (OMNIPAQUE 350) injection 100 mL (100 mLs Intravenous Given 9/1/22 1333)   morphine injection 4 mg (4 mg Intravenous Given 9/1/22 1438)   ondansetron injection 4 mg (4 mg Intravenous Given 9/1/22 1436)     Medical Decision Making:   Clinical Tests:   Lab Tests: Ordered and Reviewed  Radiological Study: Reviewed and  Ordered  Medical Tests: Ordered and Reviewed  ED Management:  This is a 33 y/o male with past medical history of diabetes type 2 presents to ED complaining of acute onset abscess to right testicle that started earlier this week.  On physical exam, patient is in no acute distress.  6 x 4 cm abscess to right testicle with surrounding erythema.  Sepsis workup ordered.  Vanc and Zosyn ordered.  Morphine and Zofran given for pain and nausea.  Will reassess.  COVID negative.  Point of care glucose 364. CBC revealed leukocytosis of 15.30.  CMP revealed sodium 129, glucose 354, total bili 2.1, alk-phos 423, AST 64, and ALT 61.  Anion gap 14. I-STAT lactate unremarkable.  PH 7.435.  Doubt DKA or HHS.  4 units of insulin ordered.  Beta hydroxybutyrate 1.5.  Procalcitonin is  5.70.  Lactic acid #1 is 2.7.  PTT and PT INR within normal limits.  Patient is type and screen is A positive.  Chest x-ray revealed no acute cardiopulmonary processes.  No pleural effusion, pneumothorax, or focal consolidations.  CT abdomen and pelvis revealed :Diffuse induration and disorganized fluid throughout the right aspect of the perineum extending to the scrotal wall.  There is overlying skin thickening.  No convincing focal organized fluid collection at this time however please see previous ultrasound for more detailed evaluation.  Findings are concerning for infectious process.  There is reactive bilateral inguinal lymphadenopathy right greater than left.  There is also hepatic steatosis.  Ultrasound of scrotum and testicles revealed : Scrotal cellulitis and probable cutaneous boil.  No extension/involvement of the testicle.  No evidence of testicular torsion.  Upon reassessment at 2:30 p.m., patient reports pain after testicular ultrasound.  Ordered another dose of morphine and Zofran.    Dr. Burgess consulted with hospital medicine, Dr. Salazar, who has accepted the patient and will resume further care of the patient upon admission.    Case  discussed with and care coordinated in conjunction with my attending, Dr. Burgess.                          Clinical Impression:   Final diagnoses:  [L02.91] Abscess  [B99.9] Infection  [L03.314] Cellulitis of groin (Primary)        ED Disposition Condition    Admit                 Silverio Arboleda PA-C  09/01/22 8218

## 2022-09-02 NOTE — PLAN OF CARE
Problem: Diabetes Comorbidity  Goal: Blood Glucose Level Within Targeted Range  Outcome: Ongoing, Progressing     Problem: Adjustment to Illness (Sepsis/Septic Shock)  Goal: Optimal Coping  Outcome: Ongoing, Progressing     Problem: Glycemic Control Impaired (Sepsis/Septic Shock)  Goal: Blood Glucose Level Within Desired Range  Outcome: Ongoing, Progressing     Problem: Infection Progression (Sepsis/Septic Shock)  Goal: Absence of Infection Signs and Symptoms  Outcome: Ongoing, Progressing     Problem: Nutrition Impaired (Sepsis/Septic Shock)  Goal: Optimal Nutrition Intake  Outcome: Ongoing, Progressing     Problem: Pain Acute  Goal: Acceptable Pain Control and Functional Ability  Outcome: Ongoing, Progressing   Pt instructed to remain NPO at midnight. Pt instructed to utilize cleansing wipes in peritoneal areas as instructed by MD.

## 2022-09-02 NOTE — NURSING
RAPID RESPONSE NURSE PROACTIVE ROUNDING NOTE       Time of Visit: 0115    Admit Date: 2022  LOS: 1  Code Status: Full Code   Date of Visit: 2022  : 1990  Age: 32 y.o.  Sex: male  Race: Other  Bed: [facility]/W302 A:   MRN: [id number]  Was the patient discharged from an ICU this admission? No   Was the patient discharged from a PACU within last 24 hours? No   Did the patient receive conscious sedation/general anesthesia in last 24 hours? No   Was the patient in the ED within the past 24 hours? Yes   Was the patient on NIPPV within the past 24 hours? No   Attending Physician: Madi Salazar MD  Primary Service: Hospitalist   Time spent at the bedside: < 15 min    SITUATION    Notified by Epic patient alert  Reason for alert: HR elevated    Diagnosis: Sepsis   has a past medical history of Abscess of right groin, Diabetes mellitus, Encounter for blood transfusion, Loud snoring, Obese, Other insomnia, and Perineal abscess.    Last Vitals:  Temp: 100.1 °F (37.8 °C) (2332)  Pulse: 107 ( 0047)  Resp: 20 ( 0034)  BP: 142/93 (2332)  SpO2: 95 % ( 0115)    24 Hour Vitals Range:  Temp:  [98.5 °F (36.9 °C)-100.1 °F (37.8 °C)]   Pulse:  []   Resp:  [13-21]   BP: (102-151)/(68-93)   SpO2:  [92 %-100 %]     Clinical Issues:  infection    ASSESSMENT/INTERVENTIONS  Upon monitor review pt HR had improved to low 100s, however continuous pulse ox was reading in 80s. Primary RN and Charge RN notified. When this RN arrived to bedside, pt had been repositioned in bed by primary RN, 2 L NC applied, and pulse ox probed replaced. Now sats 92-93%. Pt sitting up in bed, awake and alert. No complaints of SOB or CP. Just notes pain to groin.     RECOMMENDATIONS  Continue to monitor. Treat pain per PRN meds.     Discussed plan of care with bedside RNWanda.    PROVIDER ESCALATION    Physician escalation: No    Orders received and case discussed with NA.    Disposition:Remain in room 302    FOLLOW  UP    Call back the Rapid Response NurseDanielle at [phone] for additional questions or concerns.

## 2022-09-02 NOTE — ASSESSMENT & PLAN NOTE
No clinically apparent abscess on today's examination  Monitor for development of abscess/ necrotizing soft tissue infection for which patient is at high risk for with uncontrolled DM  Continue abx  Added diflucan and nystatin for fungal coverage which uncontrolled diabetics are at risk for  Erythema has not advanced from previous marking  Recommend repeating imaging CT pelvis with contrast   NPO at midnight for possible incision/drainage/debridement tomorrow morning

## 2022-09-02 NOTE — HOSPITAL COURSE
Mr Doe Woodall was admitted with severe sepsis due to right sided groin/perineal cellulitis with MARISSA. Started IVF and broad spectrum antibiotics. Blood cultures no growth. Urology consulted. Nephrology consulted for MARISSA. ID consulted for antibiotic management. Symptoms worsened. He developed abscess. Urology performed I&D on 9/9 and noted abscess cavity tracking up into the groin and down into the scrotum. Cultures were sent. He was transferred to ICU post operatively due to excessive venous oozing from the surgical site. He remained hemodynamically stable, no need for transfusion, and bleeding controlled. Stepped down to floor.  intraoperative cultures grew GBS. ID recommended CTX transitioned to augmentin.  MARISSA with IVF gradually improved.local wound care was done.  Patient was discharged home with  for wound care ,PO Augmentin and follow up with PCP and Urology as out patient.

## 2022-09-02 NOTE — PLAN OF CARE
Problem: Adult Inpatient Plan of Care  Goal: Plan of Care Review  Outcome: Ongoing, Progressing     Problem: Infection  Goal: Absence of Infection Signs and Symptoms  Outcome: Ongoing, Progressing     Problem: Adjustment to Illness (Sepsis/Septic Shock)  Goal: Optimal Coping  Outcome: Ongoing, Progressing

## 2022-09-02 NOTE — PLAN OF CARE
[facility] - [place] - Telemetry  Initial Discharge Assessment       Primary Care Provider: [facility] - Family Medicine    Admission Diagnosis: Cellulitis of groin [L03.314]  Abscess [L02.91]  Infection [B99.9]  Chest pain [R07.9]    Admission Date: 9/1/2022  Expected Discharge Date:     Discharge Barriers Identified: None    Payor: MEDICAID / Plan: [place] / Product Type: Managed Medicaid /     Extended Emergency Contact Information  Primary Emergency Contact: Aminta Woodall   [place]  Mobile Phone: [phone]  Relation: Spouse  Secondary Emergency Contact: Ej Woodall   [facility]  Home Phone: [phone]  Mobile Phone: [phone]  Relation: Father  Preferred language: English    Discharge Plan A: Home with family  Discharge Plan B: Home with family      [id number] - [place] - [address] AT [facility] [address]  [address]  [place] [address]  Phone: [phone] Fax: [phone]      Initial Assessment (most recent)       Adult Discharge Assessment - 09/02/22 1235          Discharge Assessment    Assessment Type Discharge Planning Assessment     Confirmed/corrected address, phone number and insurance Yes     Confirmed Demographics Correct on Facesheet     Source of Information patient     If unable to respond/provide information was family/caregiver contacted? No Contact Information Available     When was your last doctors appointment? --   Patient stated his last PCP appointment was in Feb. of this year.    Communicated SUSAN with patient/caregiver Date not available/Unable to determine     Reason For Admission Medicaid     Lives With spouse     Facility Arrived From: Home     Do you expect to return to your current living situation? Yes     Do you have help at home or someone to help you manage your care at home? Yes     Who are your caregiver(s) and their phone number(s)? Aminta Woodall (Spouse)    [phone]     Prior to hospitilization cognitive status: Alert/Oriented     Current cognitive status: Alert/Oriented     Equipment Currently Used at Home cane, straight     Readmission within 30 days? No     Patient currently being followed by outpatient case management? No     Do you currently have service(s) that help you manage your care at home? No     Do you take prescription medications? Yes     Do you have prescription coverage? Yes     Coverage Medicaid     Do you have any problems affording any of your prescribed medications? No     Is the patient taking medications as prescribed? yes     Who is going to help you get home at discharge? Aminta Woodall (Spouse)   [phone]     How do you get to doctors appointments? car, drives self;family or friend will provide     Are you on dialysis? No     Do you take coumadin? No     Discharge Plan A Home with family     Discharge Plan B Home with family     DME Needed Upon Discharge  other (see comments)   TBD    Discharge Plan discussed with: Patient     Discharge Barriers Identified None        Relationship/Environment    Name(s) of Who Lives With Patient Aminta Woodall (Spouse)   [phone]                      Patient stated he plans to return home, and his spouse will assist with transportation at discharge.

## 2022-09-02 NOTE — ASSESSMENT & PLAN NOTE
Tachycardia, tachypnea, and leukocytosis with elevated lactic acid.  This patient does have evidence of infective focus  My overall impression is sepsis. Vital signs were reviewed and noted in progress note.  Antibiotics given-   Antibiotics (From admission, onward)    Start     Stop Route Frequency Ordered    09/02/22 0000  vancomycin (VANCOCIN) 1,750 mg in dextrose 5 % 500 mL IVPB         -- IV Every 12 hours (non-standard times) 09/01/22 1546    09/01/22 1606  vancomycin - pharmacy to dose  (vancomycin IVPB)        See Hyperspace for full Linked Orders Report.    -- IV pharmacy to manage frequency 09/01/22 1507        Cultures were taken-   Microbiology Results (last 7 days)     Procedure Component Value Units Date/Time    Blood culture x two cultures. Draw prior to antibiotics. [[id number]] Collected: 09/01/22 1100    Order Status: Completed Specimen: Blood from Peripheral, Forearm, Left Updated: 09/01/22 1912     Blood Culture, Routine No Growth to date    Narrative:      Aerobic and anaerobic    Blood culture x two cultures. Draw prior to antibiotics. [[id number]] Collected: 09/01/22 1100    Order Status: Completed Specimen: Blood from Peripheral, Wrist, Right Updated: 09/01/22 1912     Blood Culture, Routine No Growth to date    Narrative:      Aerobic and anaerobic        Latest lactate reviewed, they are-  Recent Labs   Lab 09/01/22  1112 09/01/22  1441 09/01/22  1618   LACTATE 2.7* 1.3 2.1       Organ dysfunction indicated by N/a  Source- cellulitis    Source control Achieved by- urology consulted

## 2022-09-02 NOTE — SUBJECTIVE & OBJECTIVE
"Past Medical History:   Diagnosis Date    Abscess of right groin 2022    Diabetes mellitus     Encounter for blood transfusion     Loud snoring     Obese     Other insomnia 2020    Perineal abscess 2014       Past Surgical History:   Procedure Laterality Date    ABCESS DRAINAGE  2014    PERIRECTAL    DECOMPRESSION OF LUMBAR SPINE USING MINIMALLY INVASIVE TECHNIQUE Right 2018    Procedure: DECOMPRESSION, SPINE, LUMBAR, MINIMALLY INVASIVE L3-4;  Surgeon: Cristian Cervantes MD;  Location: [facility] OR [address] [place] [address];  Service: Neurosurgery;  Laterality: Right;    ORTHOPEDIC SURGERY         Review of patient's allergies indicates:   Allergen Reactions    Metformin Diarrhea       Family History       Problem Relation (Age of Onset)    Diabetes Father, Paternal Grandmother, Paternal Grandfather            Tobacco Use    Smoking status: Former     Packs/day: 0.50     Years: 9.00     Pack years: 4.50     Types: Cigarettes     Quit date: 2013     Years since quittin.1    Smokeless tobacco: Former   Substance and Sexual Activity    Alcohol use: Not Currently     Comment: DAILY PINT OF LIQUOR, HX OF 1 "TALL BOY" OF BEER DAILY    Drug use: Not Currently    Sexual activity: Yes     Partners: Female     Birth control/protection: None       Review of Systems   Constitutional:  Negative for activity change, appetite change, chills, diaphoresis and fever.   HENT:  Negative for congestion and rhinorrhea.    Eyes:  Negative for visual disturbance.   Respiratory:  Negative for choking and shortness of breath.    Gastrointestinal:  Negative for abdominal distention, abdominal pain, constipation, diarrhea, nausea and vomiting.   Genitourinary:  Positive for scrotal swelling. Negative for difficulty urinating, dysuria, flank pain, hematuria, testicular pain and urgency.   Skin:  Positive for color change. Negative for pallor and wound.   Neurological:  Negative for dizziness and syncope.   Psychiatric/Behavioral:  Negative " for confusion and hallucinations.      Objective:     Temp:  [98.5 °F (36.9 °C)-100.1 °F (37.8 °C)] 99.3 °F (37.4 °C)  Pulse:  [] 94  Resp:  [16-20] 18  SpO2:  [95 %-97 %] 95 %  BP: (116-142)/(73-93) 135/78     Body mass index is 37.43 kg/m².    Date 09/02/22 0700 - 09/03/22 0659   Shift 7376-3424 7390-7048 4599-7451 24 Hour Total   INTAKE   Shift Total(mL/kg)       OUTPUT   Urine(mL/kg/hr) 700(0.8)   700   Shift Total(mL/kg) 700(6.7)   700(6.7)   Weight (kg) 105.2 105.2 105.2 105.2          Drains       None                   Physical Exam  Constitutional:       General: He is not in acute distress.     Appearance: He is well-developed. He is obese. He is not ill-appearing, toxic-appearing or diaphoretic.   HENT:      Head: Normocephalic and atraumatic.      Mouth/Throat:      Mouth: Mucous membranes are moist.   Eyes:      Conjunctiva/sclera: Conjunctivae normal.   Pulmonary:      Effort: Pulmonary effort is normal. No respiratory distress.   Abdominal:      General: Abdomen is flat. There is no distension.      Palpations: Abdomen is soft. There is no mass.      Tenderness: There is no abdominal tenderness. There is no right CVA tenderness, left CVA tenderness or guarding.   Genitourinary:     Penis: Uncircumcised.       Testes:         Right: Tenderness and swelling present. Mass not present.         Left: Tenderness and swelling present. Mass not present.          Comments: [place] of erythema at patient's groin, scrotum  Buried penis, yeast like discharge around skin covering penis  Scrotal wall edema diffusely present, tender, no crepitus, odor  No areas of necrosis   No area of fluctuance  Perineum examined, no evidence of palpable induration    Musculoskeletal:         General: No swelling or deformity.      Cervical back: Neck supple.   Skin:     General: Skin is warm.      Capillary Refill: Capillary refill takes less than 2 seconds.      Findings: No rash.   Neurological:      Mental Status: He  is alert and oriented to person, place, and time.      Gait: Gait normal.   Psychiatric:         Mood and Affect: Mood normal.         Thought Content: Thought content normal.         Judgment: Judgment normal.       Significant Labs:    BMP:  Recent Labs   Lab 09/01/22  1112 09/02/22  0423   * 128*   K 3.3* 3.2*   CL 90* 96   CO2 25 25   BUN 7 8   CREATININE 1.0 0.7   CALCIUM 9.1 8.0*       CBC:  Recent Labs   Lab 09/01/22  1112 09/02/22  0423   WBC 15.30* 14.66*   HGB 15.6 11.9*   HCT 42.8 33.9*   * 102*         Significant Imaging:  EXAMINATION:  CT ABDOMEN PELVIS WITH CONTRAST     CLINICAL HISTORY:  testicular abscess;     TECHNIQUE:  Low dose axial images, sagittal and coronal reformations were obtained from the lung bases to the pubic symphysis following the IV administration of 100 mL of Omnipaque 350 .  Oral contrast was not given.     COMPARISON:  Testicular ultrasound 09/01/2022     FINDINGS:  Images of the lower thorax are remarkable for bilateral dependent atelectasis, right greater than left.     The liver is hypoattenuating suggesting steatosis, correlation with LFTs recommended.  The spleen, pancreas and adrenal glands are unremarkable.  There is cholelithiasis without secondary findings to suggest acute cholecystitis.  The portal vein, splenic vein, SMV, celiac axis and SMA all are patent.  No significant abdominal lymphadenopathy.     The kidneys enhance symmetrically without hydronephrosis or nephrolithiasis.  There is a punctate focus of low attenuation within the interpolar region of the left kidney, too small for characterization.  There is mild bilateral perinephric fat stranding.  The bilateral ureters are unremarkable without calculi seen.  The urinary bladder is distended without wall thickening.  The prostate is not enlarged.     There are a few scattered colonic diverticula without inflammation.  The terminal ileum and appendix are unremarkable.  The small bowel is grossly  unremarkable.  There are several scattered shotty periaortic and paracaval lymph nodes.  No focal organized pelvic fluid collection.     Degenerative changes are noted of the spine.  There is bilateral inguinal lymphadenopathy, right greater than left.     There is diffuse induration within the right peroneal soft tissues extending to the scrotum.  No clearly identified rim enhancing fluid collection in the region.  There is scrotal wall thickening.  This region is better evaluated on previous ultrasound.     Impression:     1. Diffuse induration and disorganized fluid throughout the right aspect of the perineum extending to the scrotal wall.  There is overlying skin thickening.  No convincing focal organized fluid collection at this time however please see previous ultrasound for more detailed evaluation.  Findings are concerning for infectious process.  There is reactive bilateral inguinal lymphadenopathy right greater than left.  2. Findings suggesting hepatic steatosis, correlation with LFTs recommended.  3. Bilateral perinephric fat stranding, nonspecific, correlation with urinalysis recommended to exclude sequela of infection.  4. Please see above for several additional findings.        Electronically signed by: Michael Amin MD  Date:                                            09/01/2022  Time:                                           13:59      EXAMINATION:  US SCROTUM AND TESTICLES     CLINICAL HISTORY:  Cutaneous abscess, unspecified     TECHNIQUE:  Sonography of the scrotum and testes.     COMPARISON:  None.     FINDINGS:  Right Testicle:     *Size: 4.4 cm  *Appearance: Normal.  *Flow: Normal arterial and venous flow  *Epididymis: Normal.  *Hydrocele: None.  *Varicocele: None.  .     Left Testicle:     *Size: 3.8 cm  *Appearance: Normal.  *Flow: Normal arterial and venous flow  *Epididymis: Normal.  *Hydrocele: None.  *Varicocele: None.  .     Other findings: There is soft tissue edema corresponding to  the area of clinical concern which is marked as the right inguinal region anterior to the right testicle.  There is some decreased echogenicity extending to the skin surface which may reflect a cutaneous boil, but no well-defined subcutaneous fluid collection is seen.  There is surrounding scrotal soft tissue swelling..     Impression:     Scrotal cellulitis and probable cutaneous boil.  No extension/involvement of the testicle.        Electronically signed by: Andrea Marin Jr  Date:                                            09/01/2022  Time:                                           13:41

## 2022-09-02 NOTE — NURSING
Pt requesting something for itching and pain. MD alerted. Benadryl 25 mg q 6 and morphine 1mg q 4 ordered.

## 2022-09-02 NOTE — PROGRESS NOTES
[facility]  Progress Note    Patient Name: Doe Woodall  MRN: [id number]  Patient Class: IP- Inpatient   Admission Date: 9/1/2022  Length of Stay: 1 days  Attending Physician: Giovanny Lynch MD  Primary Care Provider: [facility]        Subjective:     Principal Problem:Sepsis        HPI:  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  He reports acute onset of erythema, duration 3 days, has progressed and is now swollen and painful, no known exacerbating or alleviating factors, no attempted self treatment.  Denies fever, chills, cough, chest pain, SOB, dizziness, syncope, nausea, vomiting, diarrhea, abdominal pain, or dysuria.  In the ED, found to be tachycardic and tachypneic with leukocytosis.  CT abdomen pelvis and scrotal ultrasound revealed extensive cellulitis.  Procalcitonin 5.7.  Initial lactic acid 2.7.  Also with mild hyponatremia and mildly elevated liver enzymes.  Blood cultures obtained, IV fluid resuscitation and IV antibiotics initiated.      Overview/Hospital Course:  Patient admitted for groin abscess of perineum.  Started on Van and Zosyn. Blood cultures were NG. Urology consulted.       Interval History:  No new issues     Review of Systems   Constitutional:  Negative for activity change, appetite change and chills.   HENT:  Negative for congestion and dental problem.    Eyes:  Negative for discharge and itching.   Respiratory:  Negative for apnea and chest tightness.    Cardiovascular:  Negative for chest pain.   Gastrointestinal:  Negative for abdominal distention and abdominal pain.   Endocrine: Negative for cold intolerance and heat intolerance.   Genitourinary:  Negative for difficulty urinating.   Musculoskeletal:  Negative for  arthralgias.   Neurological:  Negative for dizziness and facial asymmetry.   Hematological:  Negative for adenopathy.   Psychiatric/Behavioral:  Negative for agitation.    Objective:     Vital Signs (Most Recent):  Temp: 99.3 °F (37.4 °C) (09/02/22 1024)  Pulse: 94 (09/02/22 1024)  Resp: 18 (09/02/22 1024)  BP: 135/78 (09/02/22 1024)  SpO2: 96 % (09/02/22 1024) Vital Signs (24h Range):  Temp:  [98.5 °F (36.9 °C)-100.1 °F (37.8 °C)] 99.3 °F (37.4 °C)  Pulse:  [] 94  Resp:  [13-21] 18  SpO2:  [92 %-100 %] 96 %  BP: (116-151)/(73-93) 135/78     Weight: 105.2 kg (231 lb 14.8 oz)  Body mass index is 37.43 kg/m².    Intake/Output Summary (Last 24 hours) at 9/2/2022 1024  Last data filed at 9/1/2022 1515  Gross per 24 hour   Intake 3501 ml   Output --   Net 3501 ml      Physical Exam  Vitals and nursing note reviewed.   Constitutional:       General: He is not in acute distress.     Appearance: He is obese. He is not ill-appearing, toxic-appearing or diaphoretic.   HENT:      Head: Normocephalic and atraumatic.   Eyes:      Conjunctiva/sclera: Conjunctivae normal.   Cardiovascular:      Rate and Rhythm: Normal rate and regular rhythm.   Pulmonary:      Effort: Pulmonary effort is normal. No respiratory distress.   Abdominal:      General: There is no distension.   Skin:     General: Skin is warm and dry.      Comments: Abscess groin   Neurological:      Mental Status: He is alert and oriented to person, place, and time.   Psychiatric:         Mood and Affect: Mood normal.         Behavior: Behavior normal.         Thought Content: Thought content normal.       Significant Labs: All pertinent labs within the past 24 hours have been reviewed.  CBC:   Recent Labs   Lab 09/01/22  1112 09/02/22  0423   WBC 15.30* 14.66*   HGB 15.6 11.9*   HCT 42.8 33.9*   * 102*     CMP:   Recent Labs   Lab 09/01/22  1112 09/02/22  0423   * 128*   K 3.3* 3.2*   CL 90* 96   CO2 25 25   * 283*   BUN 7 8   CREATININE 1.0  0.7   CALCIUM 9.1 8.0*   PROT 7.0 5.4*   ALBUMIN 2.7* 1.9*   BILITOT 2.1* 1.3*   ALKPHOS 423* 294*   AST 64* 46*   ALT 61* 45*   ANIONGAP 14 7*       Significant Imaging:       Assessment/Plan:      * Sepsis  Tachycardia, tachypnea, and leukocytosis with elevated lactic acid.  This patient does have evidence of infective focus  My overall impression is sepsis. Vital signs were reviewed and noted in progress note.  Antibiotics given-   Antibiotics (From admission, onward)    Start     Stop Route Frequency Ordered    09/02/22 0000  vancomycin (VANCOCIN) 1,750 mg in dextrose 5 % 500 mL IVPB         -- IV Every 12 hours (non-standard times) 09/01/22 1546    09/01/22 1606  vancomycin - pharmacy to dose  (vancomycin IVPB)        See Hyperspace for full Linked Orders Report.    -- IV pharmacy to manage frequency 09/01/22 1507        Cultures were taken-   Microbiology Results (last 7 days)     Procedure Component Value Units Date/Time    Blood culture x two cultures. Draw prior to antibiotics. [[id number]] Collected: 09/01/22 1100    Order Status: Completed Specimen: Blood from Peripheral, Forearm, Left Updated: 09/01/22 1912     Blood Culture, Routine No Growth to date    Narrative:      Aerobic and anaerobic    Blood culture x two cultures. Draw prior to antibiotics. [[id number]] Collected: 09/01/22 1100    Order Status: Completed Specimen: Blood from Peripheral, Wrist, Right Updated: 09/01/22 1912     Blood Culture, Routine No Growth to date    Narrative:      Aerobic and anaerobic        Latest lactate reviewed, they are-  Recent Labs   Lab 09/01/22  1112 09/01/22  1441 09/01/22  1618   LACTATE 2.7* 1.3 2.1       Organ dysfunction indicated by N/a  Source- cellulitis    Source control Achieved by- urology consulted      Cellulitis of groin  As above    Chronic diastolic heart failure  No evidence of acute decompensation or fluid overload, echo earlier this year showed preserved EF with indeterminate LV diastolic  function, and is, daily weights.    Hyponatremia  Mild, asymptomatic, repeat CMP in a.m.    Alcohol use disorder, severe, dependence  Counseled, reports stopping a week or two ago, continue MVI/thiamine/folate    Severe obesity (BMI >= 40)  Body mass index is 40.35 kg/m². Morbid obesity complicates all aspects of disease management from diagnostic modalities to treatment. Weight loss encouraged and health benefits explained to patient.     Elevated transaminase level  Consistent with baseline, likely due to heavy alcohol use.    Type 2 diabetes mellitus with hyperglycemia  Patient's FSGs are uncontrolled due to hyperglycemia on current medication regimen.  Last A1c reviewed-   Lab Results   Component Value Date    HGBA1C 12.6 (H) 01/04/2022     Most recent fingerstick glucose reviewed-   Recent Labs   Lab 09/01/22  1050 09/01/22  1509   POCTGLUCOSE 364* 327*     Current correctional scale  Medium  Maintain anti-hyperglycemic dose as follows-   Antihyperglycemics (From admission, onward)    Start     Stop Route Frequency Ordered    09/01/22 2100  insulin detemir U-100 pen 40 Units         -- SubQ Nightly 09/01/22 1506    09/01/22 1605  insulin aspart U-100 pen 1-10 Units         -- SubQ Before meals & nightly PRN 09/01/22 1506        Hold Oral hypoglycemics while patient is in the hospital.    Hypokalemia- will replace     VTE Risk Mitigation (From admission, onward)         Ordered     enoxaparin injection 40 mg  Daily         09/01/22 1506     IP VTE HIGH RISK PATIENT  Once         09/01/22 1506     Place sequential compression device  Until discontinued         09/01/22 1506                Discharge Planning   SUSAN:      Code Status: Full Code   Is the patient medically ready for discharge?:     Reason for patient still in hospital (select all that apply): Patient unstable                     Giovanny Mooney MD  Department of Hospital Medicine   [facility]

## 2022-09-02 NOTE — HPI
31 yo man presented to the hospital with progressive scrotal pain which started off as a boil on his right scrotum. Patient with hx of uncontrolled DM. Patient's imaging on arrival did not demonstrate presence of abscess. Urology consulted for further evaluation. Denies dysuria, fevers, chills, chest pain, shortness of breath. Had breakfast this morning.

## 2022-09-02 NOTE — CONSULTS
[facility] [place]  Urology  Consult Note    Patient Name: Doe Woodall  MRN: [id number]  Admission Date: 2022  Hospital Length of Stay: 1   Code Status: Full Code   Attending Provider: Giovanny Lynch MD   Consulting Provider: Amber Contreras MD  Primary Care Physician: [facility]  Principal Problem:Sepsis    Inpatient consult to Urology  Consult performed by: Amber Contreras MD  Consult ordered by: Giovanny Lynch MD  Reason for consult: scrotal cellulitis          Subjective:     HPI:  33 yo man presented to the hospital with progressive scrotal pain which started off as a boil on his right scrotum. Patient with hx of uncontrolled DM. Patient's imaging on arrival did not demonstrate presence of abscess. Urology consulted for further evaluation. Denies dysuria, fevers, chills, chest pain, shortness of breath. Had breakfast this morning.       Past Medical History:   Diagnosis Date    Abscess of right groin 2022    Diabetes mellitus     Encounter for blood transfusion     Loud snoring     Obese     Other insomnia 2020    Perineal abscess 2014       Past Surgical History:   Procedure Laterality Date    ABCESS DRAINAGE  2014    PERIRECTAL    DECOMPRESSION OF LUMBAR SPINE USING MINIMALLY INVASIVE TECHNIQUE Right 2018    Procedure: DECOMPRESSION, SPINE, LUMBAR, MINIMALLY INVASIVE L3-4;  Surgeon: Cristian Cervantes MD;  Location: [facility] OR [address] [place] [address];  Service: Neurosurgery;  Laterality: Right;    ORTHOPEDIC SURGERY         Review of patient's allergies indicates:   Allergen Reactions    Metformin Diarrhea       Family History       Problem Relation (Age of Onset)    Diabetes Father, Paternal Grandmother, Paternal Grandfather            Tobacco Use    Smoking status: Former     Packs/day: 0.50     Years: 9.00     Pack years: 4.50     Types: Cigarettes     Quit date: 2013     Years since quittin.1    Smokeless tobacco: Former  "  Substance and Sexual Activity    Alcohol use: Not Currently     Comment: DAILY PINT OF LIQUOR, HX OF 1 "TALL BOY" OF BEER DAILY    Drug use: Not Currently    Sexual activity: Yes     Partners: Female     Birth control/protection: None       Review of Systems   Constitutional:  Negative for activity change, appetite change, chills, diaphoresis and fever.   HENT:  Negative for congestion and rhinorrhea.    Eyes:  Negative for visual disturbance.   Respiratory:  Negative for choking and shortness of breath.    Gastrointestinal:  Negative for abdominal distention, abdominal pain, constipation, diarrhea, nausea and vomiting.   Genitourinary:  Positive for scrotal swelling. Negative for difficulty urinating, dysuria, flank pain, hematuria, testicular pain and urgency.   Skin:  Positive for color change. Negative for pallor and wound.   Neurological:  Negative for dizziness and syncope.   Psychiatric/Behavioral:  Negative for confusion and hallucinations.      Objective:     Temp:  [98.5 °F (36.9 °C)-100.1 °F (37.8 °C)] 99.3 °F (37.4 °C)  Pulse:  [] 94  Resp:  [16-20] 18  SpO2:  [95 %-97 %] 95 %  BP: (116-142)/(73-93) 135/78     Body mass index is 37.43 kg/m².    Date 09/02/22 0700 - 09/03/22 0659   Shift 3405-5837 0600-1604 5643-5862 24 Hour Total   INTAKE   Shift Total(mL/kg)       OUTPUT   Urine(mL/kg/hr) 700(0.8)   700   Shift Total(mL/kg) 700(6.7)   700(6.7)   Weight (kg) 105.2 105.2 105.2 105.2          Drains       None                   Physical Exam  Constitutional:       General: He is not in acute distress.     Appearance: He is well-developed. He is obese. He is not ill-appearing, toxic-appearing or diaphoretic.   HENT:      Head: Normocephalic and atraumatic.      Mouth/Throat:      Mouth: Mucous membranes are moist.   Eyes:      Conjunctiva/sclera: Conjunctivae normal.   Pulmonary:      Effort: Pulmonary effort is normal. No respiratory distress.   Abdominal:      General: Abdomen is flat. There is " no distension.      Palpations: Abdomen is soft. There is no mass.      Tenderness: There is no abdominal tenderness. There is no right CVA tenderness, left CVA tenderness or guarding.   Genitourinary:     Penis: Uncircumcised.       Testes:         Right: Tenderness and swelling present. Mass not present.         Left: Tenderness and swelling present. Mass not present.          Comments: [place] of erythema at patient's groin, scrotum  Buried penis, yeast like discharge around skin covering penis  Scrotal wall edema diffusely present, tender, no crepitus, odor  No areas of necrosis   No area of fluctuance  Perineum examined, no evidence of palpable induration    Musculoskeletal:         General: No swelling or deformity.      Cervical back: Neck supple.   Skin:     General: Skin is warm.      Capillary Refill: Capillary refill takes less than 2 seconds.      Findings: No rash.   Neurological:      Mental Status: He is alert and oriented to person, place, and time.      Gait: Gait normal.   Psychiatric:         Mood and Affect: Mood normal.         Thought Content: Thought content normal.         Judgment: Judgment normal.       Significant Labs:    BMP:  Recent Labs   Lab 09/01/22  1112 09/02/22  0423   * 128*   K 3.3* 3.2*   CL 90* 96   CO2 25 25   BUN 7 8   CREATININE 1.0 0.7   CALCIUM 9.1 8.0*       CBC:  Recent Labs   Lab 09/01/22  1112 09/02/22  0423   WBC 15.30* 14.66*   HGB 15.6 11.9*   HCT 42.8 33.9*   * 102*         Significant Imaging:  EXAMINATION:  CT ABDOMEN PELVIS WITH CONTRAST     CLINICAL HISTORY:  testicular abscess;     TECHNIQUE:  Low dose axial images, sagittal and coronal reformations were obtained from the lung bases to the pubic symphysis following the IV administration of 100 mL of Omnipaque 350 .  Oral contrast was not given.     COMPARISON:  Testicular ultrasound 09/01/2022     FINDINGS:  Images of the lower thorax are remarkable for bilateral dependent atelectasis, right  greater than left.     The liver is hypoattenuating suggesting steatosis, correlation with LFTs recommended.  The spleen, pancreas and adrenal glands are unremarkable.  There is cholelithiasis without secondary findings to suggest acute cholecystitis.  The portal vein, splenic vein, SMV, celiac axis and SMA all are patent.  No significant abdominal lymphadenopathy.     The kidneys enhance symmetrically without hydronephrosis or nephrolithiasis.  There is a punctate focus of low attenuation within the interpolar region of the left kidney, too small for characterization.  There is mild bilateral perinephric fat stranding.  The bilateral ureters are unremarkable without calculi seen.  The urinary bladder is distended without wall thickening.  The prostate is not enlarged.     There are a few scattered colonic diverticula without inflammation.  The terminal ileum and appendix are unremarkable.  The small bowel is grossly unremarkable.  There are several scattered shotty periaortic and paracaval lymph nodes.  No focal organized pelvic fluid collection.     Degenerative changes are noted of the spine.  There is bilateral inguinal lymphadenopathy, right greater than left.     There is diffuse induration within the right peroneal soft tissues extending to the scrotum.  No clearly identified rim enhancing fluid collection in the region.  There is scrotal wall thickening.  This region is better evaluated on previous ultrasound.     Impression:     1. Diffuse induration and disorganized fluid throughout the right aspect of the perineum extending to the scrotal wall.  There is overlying skin thickening.  No convincing focal organized fluid collection at this time however please see previous ultrasound for more detailed evaluation.  Findings are concerning for infectious process.  There is reactive bilateral inguinal lymphadenopathy right greater than left.  2. Findings suggesting hepatic steatosis, correlation with LFTs  recommended.  3. Bilateral perinephric fat stranding, nonspecific, correlation with urinalysis recommended to exclude sequela of infection.  4. Please see above for several additional findings.        Electronically signed by: Michael Amin MD  Date:                                            09/01/2022  Time:                                           13:59      EXAMINATION:  US SCROTUM AND TESTICLES     CLINICAL HISTORY:  Cutaneous abscess, unspecified     TECHNIQUE:  Sonography of the scrotum and testes.     COMPARISON:  None.     FINDINGS:  Right Testicle:     *Size: 4.4 cm  *Appearance: Normal.  *Flow: Normal arterial and venous flow  *Epididymis: Normal.  *Hydrocele: None.  *Varicocele: None.  .     Left Testicle:     *Size: 3.8 cm  *Appearance: Normal.  *Flow: Normal arterial and venous flow  *Epididymis: Normal.  *Hydrocele: None.  *Varicocele: None.  .     Other findings: There is soft tissue edema corresponding to the area of clinical concern which is marked as the right inguinal region anterior to the right testicle.  There is some decreased echogenicity extending to the skin surface which may reflect a cutaneous boil, but no well-defined subcutaneous fluid collection is seen.  There is surrounding scrotal soft tissue swelling..     Impression:     Scrotal cellulitis and probable cutaneous boil.  No extension/involvement of the testicle.        Electronically signed by: Andrea Marin Jr  Date:                                            09/01/2022  Time:                                           13:41                Assessment and Plan:     Cellulitis of groin  No clinically apparent abscess on today's examination  Monitor for development of abscess/ necrotizing soft tissue infection for which patient is at high risk for with uncontrolled DM  Continue abx  Added diflucan and nystatin for fungal coverage which uncontrolled diabetics are at risk for  Erythema has not advanced from previous  marking  Recommend repeating imaging CT pelvis with contrast   NPO at midnight for possible incision/drainage/debridement tomorrow morning        VTE Risk Mitigation (From admission, onward)         Ordered     enoxaparin injection 40 mg  Daily         09/01/22 1506     IP VTE HIGH RISK PATIENT  Once         09/01/22 1506     Place sequential compression device  Until discontinued         09/01/22 1506                Thank you for your consult. I will follow-up with patient. Please contact us if you have any additional questions.    Amber Contreras MD  Urology  [facility] [place] [facility]

## 2022-09-02 NOTE — SUBJECTIVE & OBJECTIVE
Interval History:  No new issues     Review of Systems   Constitutional:  Negative for activity change, appetite change and chills.   HENT:  Negative for congestion and dental problem.    Eyes:  Negative for discharge and itching.   Respiratory:  Negative for apnea and chest tightness.    Cardiovascular:  Negative for chest pain.   Gastrointestinal:  Negative for abdominal distention and abdominal pain.   Endocrine: Negative for cold intolerance and heat intolerance.   Genitourinary:  Negative for difficulty urinating.   Musculoskeletal:  Negative for arthralgias.   Neurological:  Negative for dizziness and facial asymmetry.   Hematological:  Negative for adenopathy.   Psychiatric/Behavioral:  Negative for agitation.    Objective:     Vital Signs (Most Recent):  Temp: 99.3 °F (37.4 °C) (09/02/22 1024)  Pulse: 94 (09/02/22 1024)  Resp: 18 (09/02/22 1024)  BP: 135/78 (09/02/22 1024)  SpO2: 96 % (09/02/22 1024) Vital Signs (24h Range):  Temp:  [98.5 °F (36.9 °C)-100.1 °F (37.8 °C)] 99.3 °F (37.4 °C)  Pulse:  [] 94  Resp:  [13-21] 18  SpO2:  [92 %-100 %] 96 %  BP: (116-151)/(73-93) 135/78     Weight: 105.2 kg (231 lb 14.8 oz)  Body mass index is 37.43 kg/m².    Intake/Output Summary (Last 24 hours) at 9/2/2022 1024  Last data filed at 9/1/2022 1515  Gross per 24 hour   Intake 3501 ml   Output --   Net 3501 ml      Physical Exam  Vitals and nursing note reviewed.   Constitutional:       General: He is not in acute distress.     Appearance: He is obese. He is not ill-appearing, toxic-appearing or diaphoretic.   HENT:      Head: Normocephalic and atraumatic.   Eyes:      Conjunctiva/sclera: Conjunctivae normal.   Cardiovascular:      Rate and Rhythm: Normal rate and regular rhythm.   Pulmonary:      Effort: Pulmonary effort is normal. No respiratory distress.   Abdominal:      General: There is no distension.   Skin:     General: Skin is warm and dry.      Comments: Abscess groin   Neurological:      Mental Status:  He is alert and oriented to person, place, and time.   Psychiatric:         Mood and Affect: Mood normal.         Behavior: Behavior normal.         Thought Content: Thought content normal.       Significant Labs: All pertinent labs within the past 24 hours have been reviewed.  CBC:   Recent Labs   Lab 09/01/22  1112 09/02/22 0423   WBC 15.30* 14.66*   HGB 15.6 11.9*   HCT 42.8 33.9*   * 102*     CMP:   Recent Labs   Lab 09/01/22  1112 09/02/22 0423   * 128*   K 3.3* 3.2*   CL 90* 96   CO2 25 25   * 283*   BUN 7 8   CREATININE 1.0 0.7   CALCIUM 9.1 8.0*   PROT 7.0 5.4*   ALBUMIN 2.7* 1.9*   BILITOT 2.1* 1.3*   ALKPHOS 423* 294*   AST 64* 46*   ALT 61* 45*   ANIONGAP 14 7*       Significant Imaging:

## 2022-09-02 NOTE — H&P
"[facility] Medicine  History & Physical    Patient Name: Doe Woodall  MRN: [id number]  Patient Class: IP- Inpatient  Admission Date: 9/1/2022  Attending Physician: Madi Salazar MD   Primary Care Provider: [facility]         Patient information was obtained from patient, past medical records and ER records.     Subjective:     Principal Problem:Sepsis    Chief Complaint:   Chief Complaint   Patient presents with    Abscess     Pt reports to the ED with C/O boil/ abscess to the left groin. Pt reports "I had a rash there but now it is swollen and painful." Pt denies any drainage at this time. Pt AAOx4, RESP easy and unlabored. Pt awaiting QT bed.         HPI: 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  He reports acute onset of erythema, duration 3 days, has progressed and is now swollen and painful, no known exacerbating or alleviating factors, no attempted self treatment.  Denies fever, chills, cough, chest pain, SOB, dizziness, syncope, nausea, vomiting, diarrhea, abdominal pain, or dysuria.  In the ED, found to be tachycardic and tachypneic with leukocytosis.  CT abdomen pelvis and scrotal ultrasound revealed extensive cellulitis.  Procalcitonin 5.7.  Initial lactic acid 2.7.  Also with mild hyponatremia and mildly elevated liver enzymes.  Blood cultures obtained, IV fluid resuscitation and IV antibiotics initiated.      Past Medical History:   Diagnosis Date    Abscess of right groin 09/01/2022    Diabetes mellitus     Encounter for blood transfusion     Loud snoring     Obese     Other insomnia 08/03/2020    Perineal abscess 08/01/2014       Past Surgical History:   Procedure Laterality Date    ABCESS DRAINAGE  2014    PERIRECTAL    DECOMPRESSION OF " LUMBAR SPINE USING MINIMALLY INVASIVE TECHNIQUE Right 07/06/2018    Procedure: DECOMPRESSION, SPINE, LUMBAR, MINIMALLY INVASIVE L3-4;  Surgeon: Cristian Cervantes MD;  Location: [facility] OR [address] [place] [address];  Service: Neurosurgery;  Laterality: Right;    ORTHOPEDIC SURGERY         Review of patient's allergies indicates:   Allergen Reactions    Metformin Diarrhea       No current facility-administered medications on file prior to encounter.     Current Outpatient Medications on File Prior to Encounter   Medication Sig    acetaminophen (TYLENOL) 500 MG tablet Take 1,000 mg by mouth 3 (three) times daily as needed for Pain.    dulaglutide (TRULICITY) 1.5 mg/0.5 mL pen injector Inject 1.5 mg into the skin every 7 days. (Patient taking differently: Inject 1.5 mg into the skin every 7 days. Tuesdays)    blood sugar diagnostic Strp 1 strip by Misc.(Non-Drug; Combo Route) route 4 (four) times daily with meals and nightly. (Patient not taking: Reported on 9/1/2022)    blood-glucose meter Misc Use as instructed (Patient not taking: Reported on 9/1/2022)    diphenhydramine HCl (UNISOM SLEEPGELS ORAL) Take 1 capsule by mouth every evening.    docusate sodium (COLACE) 100 MG capsule Take 1 capsule (100 mg total) by mouth 3 (three) times daily as needed for Constipation. (Patient not taking: Reported on 9/1/2022)    folic acid (FOLVITE) 1 MG tablet Take 1 tablet (1 mg total) by mouth once daily. (Patient not taking: Reported on 9/1/2022)    glucagon (GLUCAGEN HYPOKIT) 1 mg SolR Inject 1 mg into the muscle as needed (Hypoglycemia). (Patient not taking: Reported on 9/1/2022)    HYDROcodone-acetaminophen (NORCO) 5-325 mg per tablet Take 1 tablet by mouth every 6 (six) hours as needed for Pain. (Patient not taking: Reported on 9/1/2022)    ibuprofen (ADVIL,MOTRIN) 600 MG tablet Take 1 tablet (600 mg total) by mouth every 8 (eight) hours as needed for Pain. (Patient not taking: Reported on 9/1/2022)    insulin aspart U-100 (NOVOLOG) 100  "unit/mL (3 mL) InPn pen Inject 14 Units into the skin 3 (three) times daily.    insulin detemir U-100 (LEVEMIR FLEXTOUCH) 100 unit/mL (3 mL) SubQ InPn pen Inject 60 Units into the skin once daily.    lancets 30 gauge Misc 1 lancet by Misc.(Non-Drug; Combo Route) route 4 (four) times daily with meals and nightly. (Patient not taking: Reported on 2022)    lancing device Misc Use as instructed (Patient not taking: Reported on 2022)    pen needle, diabetic 31 gauge x 5/16" Ndle 1 each by Misc.(Non-Drug; Combo Route) route 4 (four) times daily with meals and nightly. (Patient not taking: Reported on 2022)    pen needle, diabetic 32 gauge x 5/32" Ndle USE TO INJECT INSULIN FOUR TIMES DAILY (Patient not taking: Reported on 2022)    pulse oximeter (PULSE OXIMETER) device by Apply Externally route 2 (two) times a day. Use twice daily at 8 AM and 3 PM and [facility]. (Patient not taking: Reported on 2022)    thiamine 100 MG tablet Take 1 tablet (100 mg total) by mouth once daily. (Patient not taking: Reported on 2022)     Family History       Problem Relation (Age of Onset)    Diabetes Father, Paternal Grandmother, Paternal Grandfather          Tobacco Use    Smoking status: Former     Packs/day: 0.50     Years: 9.00     Pack years: 4.50     Types: Cigarettes     Quit date: 2013     Years since quittin.1    Smokeless tobacco: Former   Substance and Sexual Activity    Alcohol use: Not Currently     Comment: DAILY PINT OF LIQUOR, HX OF 1 "TALL BOY" OF BEER DAILY    Drug use: Not Currently    Sexual activity: Yes     Partners: Female     Birth control/protection: None     Review of Systems   Constitutional:  Negative for chills and fever.   Eyes:  Negative for photophobia and visual disturbance.   Respiratory:  Negative for cough and shortness of breath.    Cardiovascular:  Negative for chest pain, palpitations and leg swelling.   Gastrointestinal:  " Negative for abdominal pain, diarrhea, nausea and vomiting.   Genitourinary:  Negative for frequency, hematuria and urgency.   Skin:  Positive for color change, rash and wound. Negative for pallor.   Neurological:  Negative for light-headedness and headaches.   Psychiatric/Behavioral:  Negative for confusion and decreased concentration.    Objective:     Vital Signs (Most Recent):  Temp: 99.2 °F (37.3 °C) (09/01/22 0856)  Pulse: 95 (09/01/22 1505)  Resp: (!) 21 (09/01/22 1505)  BP: 133/85 (09/01/22 1505)  SpO2: 96 % (09/01/22 1505)   Vital Signs (24h Range):  Temp:  [99.2 °F (37.3 °C)] 99.2 °F (37.3 °C)  Pulse:  [] 95  Resp:  [13-21] 21  SpO2:  [92 %-100 %] 96 %  BP: (102-151)/(68-86) 133/85     Weight: 113.4 kg (250 lb)  Body mass index is 40.35 kg/m².    Physical Exam  Vitals and nursing note reviewed.   Constitutional:       General: He is not in acute distress.     Appearance: He is well-developed.   HENT:      Head: Normocephalic and atraumatic.      Right Ear: External ear normal.      Left Ear: External ear normal.      Nose: Nose normal.   Eyes:      Conjunctiva/sclera: Conjunctivae normal.      Pupils: Pupils are equal, round, and reactive to light.   Cardiovascular:      Rate and Rhythm: Normal rate and regular rhythm.   Pulmonary:      Effort: Pulmonary effort is normal. No respiratory distress.      Breath sounds: Normal breath sounds. No wheezing or rales.   Abdominal:      General: Bowel sounds are normal. There is no distension.      Palpations: Abdomen is soft.      Tenderness: There is no abdominal tenderness.      Comments: No palpable hepatomegaly or splenomegaly   Musculoskeletal:         General: No tenderness. Normal range of motion.      Cervical back: Normal range of motion and neck supple.   Skin:     General: Skin is warm and dry.      Findings: Erythema present.   Neurological:      Mental Status: He is alert and oriented to person, place, and time.   Psychiatric:         Thought  Content: Thought content normal.         CRANIAL NERVES     CN III, IV, VI   Pupils are equal, round, and reactive to light.     Significant Labs: All pertinent labs within the past 24 hours have been reviewed.    Significant Imaging: I have reviewed all pertinent imaging results/findings within the past 24 hours.    Assessment/Plan:     * Sepsis  Tachycardia, tachypnea, and leukocytosis with elevated lactic acid.  This patient does have evidence of infective focus  My overall impression is sepsis. Vital signs were reviewed and noted in progress note.  Antibiotics given-   Antibiotics (From admission, onward)    Start     Stop Route Frequency Ordered    09/02/22 0000  vancomycin (VANCOCIN) 1,750 mg in dextrose 5 % 500 mL IVPB         -- IV Every 12 hours (non-standard times) 09/01/22 1546    09/01/22 1606  vancomycin - pharmacy to dose  (vancomycin IVPB)        See Hyperspace for full Linked Orders Report.    -- IV pharmacy to manage frequency 09/01/22 1507    09/01/22 1045  piperacillin-tazobactam 4.5 g in dextrose 5 % 100 mL IVPB (ready to mix system)  (Sepsis Workup)         09/02 1044 IV Every 8 hours (non-standard times) 09/01/22 1039        Cultures were taken-   Microbiology Results (last 7 days)     Procedure Component Value Units Date/Time    Blood culture x two cultures. Draw prior to antibiotics. [[id number]] Collected: 09/01/22 1100    Order Status: Sent Specimen: Blood from Peripheral, Forearm, Left Updated: 09/01/22 1136    Blood culture x two cultures. Draw prior to antibiotics. [[id number]] Collected: 09/01/22 1100    Order Status: Sent Specimen: Blood from Peripheral, Wrist, Right Updated: 09/01/22 1136        Latest lactate reviewed, they are-  Recent Labs   Lab 09/01/22  1112 09/01/22  1441   LACTATE 2.7* 1.3       Organ dysfunction indicated by N/a  Source- cellulitis    Source control Achieved by- urology consulted      Cellulitis of groin  As above    Chronic diastolic heart failure  No evidence  of acute decompensation or fluid overload, echo earlier this year showed preserved EF with indeterminate LV diastolic function, and is, daily weights.    Hyponatremia  Mild, asymptomatic, repeat CMP in a.m.    Alcohol use disorder, severe, dependence  Counseled, reports stopping a week or two ago, continue MVI/thiamine/folate    Severe obesity (BMI >= 40)  Body mass index is 40.35 kg/m². Morbid obesity complicates all aspects of disease management from diagnostic modalities to treatment. Weight loss encouraged and health benefits explained to patient.     Elevated transaminase level  Consistent with baseline, likely due to heavy alcohol use.    Type 2 diabetes mellitus with hyperglycemia  Patient's FSGs are uncontrolled due to hyperglycemia on current medication regimen.  Last A1c reviewed-   Lab Results   Component Value Date    HGBA1C 12.6 (H) 01/04/2022     Most recent fingerstick glucose reviewed-   Recent Labs   Lab 09/01/22  1050 09/01/22  1509   POCTGLUCOSE 364* 327*     Current correctional scale  Medium  Maintain anti-hyperglycemic dose as follows-   Antihyperglycemics (From admission, onward)    Start     Stop Route Frequency Ordered    09/01/22 2100  insulin detemir U-100 pen 40 Units         -- SubQ Nightly 09/01/22 1506    09/01/22 1605  insulin aspart U-100 pen 1-10 Units         -- SubQ Before meals & nightly PRN 09/01/22 1506        Hold Oral hypoglycemics while patient is in the hospital.      VTE Risk Mitigation (From admission, onward)         Ordered     enoxaparin injection 40 mg  Daily         09/01/22 1506     IP VTE HIGH RISK PATIENT  Once         09/01/22 1506     Place sequential compression device  Until discontinued         09/01/22 1506              Celso Ley Jr., APRN, AGACNP-BC  Hospitalist - Department of Hospital Medicine  [facility] [address] [place] [address]  Office #: [phone]; Pager #: [phone]

## 2022-09-03 NOTE — ASSESSMENT & PLAN NOTE
Tachycardia, tachypnea, and leukocytosis with elevated lactic acid.  This patient does have evidence of infective focus  My overall impression is sepsis. Vital signs were reviewed and noted in progress note.  Antibiotics given-   Antibiotics (From admission, onward)    Start     Stop Route Frequency Ordered    09/01/22 1606  vancomycin - pharmacy to dose  (vancomycin IVPB)        See Hyperspace for full Linked Orders Report.    -- IV pharmacy to manage frequency 09/01/22 1507        Cultures were taken-   Microbiology Results (last 7 days)     Procedure Component Value Units Date/Time    Urine culture [[id number]] Collected: 09/02/22 1341    Order Status: Completed Specimen: Urine Updated: 09/03/22 0938     Urine Culture, Routine No growth to date    Narrative:      Specimen Source->Urine    Blood culture x two cultures. Draw prior to antibiotics. [[id number]] Collected: 09/01/22 1100    Order Status: Completed Specimen: Blood from Peripheral, Forearm, Left Updated: 09/02/22 1303     Blood Culture, Routine No Growth to date      No Growth to date    Narrative:      Aerobic and anaerobic    Blood culture x two cultures. Draw prior to antibiotics. [[id number]] Collected: 09/01/22 1100    Order Status: Completed Specimen: Blood from Peripheral, Wrist, Right Updated: 09/02/22 1303     Blood Culture, Routine No Growth to date      No Growth to date    Narrative:      Aerobic and anaerobic        Latest lactate reviewed, they are-  Recent Labs   Lab 09/01/22  1112 09/01/22  1441 09/01/22  1618   LACTATE 2.7* 1.3 2.1       Organ dysfunction indicated by N/a  Source- cellulitis    Source control Achieved by- urology consulted

## 2022-09-03 NOTE — PROGRESS NOTES
Pharmacokinetic Assessment Follow Up: IV Vancomycin    Vancomycin serum concentration assessment(s):    The random level was drawn correctly and can be used to guide therapy at this time. The measurement is above the desired definitive target range of 10 to 20 mcg/mL.    Vancomycin Regimen Plan:    Re-dose when the random level is less than 20 mcg/mL, next level to be drawn at 0400 on 09-04-22    Drug levels (last 3 results):  Recent Labs   Lab Result Units 09/02/22  2307 09/03/22  1111   Vancomycin, Random ug/mL  --  22.4   Vancomycin-Trough ug/mL 25.7*  --        Pharmacy will continue to follow and monitor vancomycin.    Please contact pharmacy at extension [phone] for questions regarding this assessment.    Thank you for the consult,   Meseret Tran       Patient brief summary:  Doe Woodall is a 32 y.o. male initiated on antimicrobial therapy with IV Vancomycin for treatment of skin & soft tissue infection    The patient's current regimen is PULSE  Dosing based on daily random vanc level. Re- ose when level is less than 20 mcg /ml    Drug Allergies:   Review of patient's allergies indicates:   Allergen Reactions    Metformin Diarrhea       Actual Body Weight:   105.2 kg    Renal Function:   Estimated Creatinine Clearance: 56.9 mL/min (A) (based on SCr of 2.1 mg/dL (H)).,     Dialysis Method (if applicable):  N/A    CBC (last 72 hours):  Recent Labs   Lab Result Units 09/01/22  1112 09/02/22  0423 09/03/22  0433   WBC K/uL 15.30* 14.66* 17.41*   Hemoglobin g/dL 15.6 11.9* 13.4*   Hematocrit % 42.8 33.9* 38.2*   Platelets K/uL 145* 102* 127*   Gran % % 72.6 70.6 72.5   Lymph % % 13.9* 15.2* 11.5*   Mono % % 12.1 12.8 14.3   Eosinophil % % 0.1 0.2 0.6   Basophil % % 0.3 0.3 0.2   Differential Method  Automated Automated Automated       Metabolic Panel (last 72 hours):  Recent Labs   Lab Result Units 09/01/22  1112 09/02/22  0423 09/02/22  1341 09/03/22  0433   Sodium mmol/L 129* 128*  --  134*   Potassium  mmol/L 3.3* 3.2*  --  3.8   Chloride mmol/L 90* 96  --  104   CO2 mmol/L 25 25  --  20*   Glucose mg/dL 354* 283*  --  87   Glucose, UA   --   --  4+*  --    BUN mg/dL 7 8  --  15   Creatinine mg/dL 1.0 0.7  --  2.1*   Albumin g/dL 2.7* 1.9*  --  1.9*   Total Bilirubin mg/dL 2.1* 1.3*  --  1.4*   Alkaline Phosphatase U/L 423* 294*  --  311*   AST U/L 64* 46*  --  60*   ALT U/L 61* 45*  --  38       Vancomycin Administrations:  vancomycin given in the last 96 hours                     vancomycin (VANCOCIN) 1,750 mg in dextrose 5 % 500 mL IVPB (mg) 1,750 mg New Bag 09/02/22 1119     1,750 mg New Bag  0035    vancomycin (VANCOCIN) 2,000 mg in dextrose 5 % 500 mL IVPB (mg) 2,000 mg New Bag 09/01/22 1209                    Microbiologic Results:  Microbiology Results (last 7 days)       Procedure Component Value Units Date/Time    Blood culture x two cultures. Draw prior to antibiotics. [[id number]] Collected: 09/01/22 1100    Order Status: Completed Specimen: Blood from Peripheral, Forearm, Left Updated: 09/03/22 1303     Blood Culture, Routine No Growth to date      No Growth to date      No Growth to date    Narrative:      Aerobic and anaerobic    Blood culture x two cultures. Draw prior to antibiotics. [[id number]] Collected: 09/01/22 1100    Order Status: Completed Specimen: Blood from Peripheral, Wrist, Right Updated: 09/03/22 1303     Blood Culture, Routine No Growth to date      No Growth to date      No Growth to date    Narrative:      Aerobic and anaerobic    Urine culture [[id number]] Collected: 09/02/22 1341    Order Status: Completed Specimen: Urine Updated: 09/03/22 0938     Urine Culture, Routine No growth to date    Narrative:      Specimen Source->Urine

## 2022-09-03 NOTE — PROGRESS NOTES
Pharmacokinetic Assessment Follow Up: IV Vancomycin    Vancomycin serum concentration assessment(s):    The trough level was drawn correctly and can be used to guide therapy at this time. The measurement is above the desired definitive target range of 10 to 20 mcg/mL.    Vancomycin Regimen Plan:    Discontinue the scheduled vancomycin regimen and re-dose when the random level is less than 20 mcg/mL, next level to be drawn at 1100 on 9/3/22.    Drug levels (last 3 results):  Recent Labs   Lab Result Units 09/02/22  2307   Vancomycin-Trough ug/mL 25.7*       Pharmacy will continue to follow and monitor vancomycin.    Please contact pharmacy at extension [phone] for questions regarding this assessment.    Thank you for the consult,   Jae Whyte       Patient brief summary:  Doe Woodall is a 32 y.o. male initiated on antimicrobial therapy with IV Vancomycin for treatment of skin & soft tissue infection    The patient's current regimen is random pulse dosing    Drug Allergies:   Review of patient's allergies indicates:   Allergen Reactions    Metformin Diarrhea       Actual Body Weight:   105.2 kg    Renal Function:   Estimated Creatinine Clearance: 170.7 mL/min (based on SCr of 0.7 mg/dL).,     Dialysis Method (if applicable):  N/A    CBC (last 72 hours):  Recent Labs   Lab Result Units 09/01/22  1112 09/02/22  0423   WBC K/uL 15.30* 14.66*   Hemoglobin g/dL 15.6 11.9*   Hematocrit % 42.8 33.9*   Platelets K/uL 145* 102*   Gran % % 72.6 70.6   Lymph % % 13.9* 15.2*   Mono % % 12.1 12.8   Eosinophil % % 0.1 0.2   Basophil % % 0.3 0.3   Differential Method  Automated Automated       Metabolic Panel (last 72 hours):  Recent Labs   Lab Result Units 09/01/22  1112 09/02/22  0423 09/02/22  1341   Sodium mmol/L 129* 128*  --    Potassium mmol/L 3.3* 3.2*  --    Chloride mmol/L 90* 96  --    CO2 mmol/L 25 25  --    Glucose mg/dL 354* 283*  --    Glucose, UA   --   --  4+*   BUN mg/dL 7 8  --    Creatinine mg/dL 1.0  0.7  --    Albumin g/dL 2.7* 1.9*  --    Total Bilirubin mg/dL 2.1* 1.3*  --    Alkaline Phosphatase U/L 423* 294*  --    AST U/L 64* 46*  --    ALT U/L 61* 45*  --        Vancomycin Administrations:  vancomycin given in the last 96 hours                     vancomycin (VANCOCIN) 1,750 mg in dextrose 5 % 500 mL IVPB (mg) 1,750 mg New Bag 09/02/22 1119     1,750 mg New Bag  0035    vancomycin (VANCOCIN) 2,000 mg in dextrose 5 % 500 mL IVPB (mg) 2,000 mg New Bag 09/01/22 1209                    Microbiologic Results:  Microbiology Results (last 7 days)       Procedure Component Value Units Date/Time    Urine culture [[id number]] Collected: 09/02/22 1341    Order Status: No result Specimen: Urine Updated: 09/02/22 1428    Blood culture x two cultures. Draw prior to antibiotics. [[id number]] Collected: 09/01/22 1100    Order Status: Completed Specimen: Blood from Peripheral, Forearm, Left Updated: 09/02/22 1303     Blood Culture, Routine No Growth to date      No Growth to date    Narrative:      Aerobic and anaerobic    Blood culture x two cultures. Draw prior to antibiotics. [[id number]] Collected: 09/01/22 1100    Order Status: Completed Specimen: Blood from Peripheral, Wrist, Right Updated: 09/02/22 1303     Blood Culture, Routine No Growth to date      No Growth to date    Narrative:      Aerobic and anaerobic

## 2022-09-03 NOTE — PROGRESS NOTES
[place] - Telemetry  Urology  Progress Note    Patient Name: Doe Woodall  MRN: [id number]  Admission Date: 9/1/2022  Hospital Length of Stay: 2 days  Code Status: Full Code   Attending Provider: Giovanny Lynch MD   Primary Care Physician: [facility] - Family Medicine    Subjective:     HPI:  33 yo man presented to the hospital with progressive scrotal pain which started off as a boil on his right scrotum. Patient with hx of uncontrolled DM. Patient's imaging on arrival did not demonstrate presence of abscess. Urology consulted for further evaluation. Denies dysuria, fevers, chills, chest pain, shortness of breath. Had breakfast this morning.       Interval History: Denies any significant changes overnight. Pain stable. No increased swelling or skin changes noted. BS better controlled.    Review of Systems  Objective:     Temp:  [98.4 °F (36.9 °C)-99.9 °F (37.7 °C)] 98.7 °F (37.1 °C)  Pulse:  [] 79  Resp:  [16-20] 18  SpO2:  [94 %-97 %] 95 %  BP: (124-153)/(71-88) 124/75     Body mass index is 37.43 kg/m².           Drains       None                   Physical Exam  Constitutional:       General: He is not in acute distress.     Appearance: He is well-developed.   HENT:      Head: Normocephalic and atraumatic.   Eyes:      General:         Right eye: No discharge.         Left eye: No discharge.      Conjunctiva/sclera: Conjunctivae normal.      Pupils: Pupils are equal, round, and reactive to light.   Neck:      Thyroid: No thyromegaly.      Trachea: No tracheal deviation.   Cardiovascular:      Rate and Rhythm: Normal rate and regular rhythm.      Chest Wall: PMI is not displaced.   Pulmonary:      Effort: Pulmonary effort is normal. No accessory muscle usage or respiratory distress.   Chest:      Chest wall: No mass, tenderness or crepitus.   Abdominal:      General: There is no distension.      Palpations: Abdomen is soft. There is no mass.      Tenderness: There is no abdominal  tenderness.      Hernia: No hernia is present.   Genitourinary:     Comments: [place] of erythema at patient's groin, scrotum. Has not extended beyond marked area and may have retracted some.  Buried penis, yeast like discharge around skin covering penis  Scrotal wall edema diffusely present, tender, no crepitus, odor, no areas of necrosis, and no area of fluctuance  Musculoskeletal:      Cervical back: Normal range of motion and neck supple.   Skin:     General: Skin is warm and dry.      Findings: No erythema or rash.   Neurological:      Cranial Nerves: No cranial nerve deficit.      Sensory: No sensory deficit.   Psychiatric:         Mood and Affect: Mood is not anxious or depressed.         Speech: Speech normal.         Behavior: Behavior normal.         Thought Content: Thought content normal.         Judgment: Judgment normal.       Significant Labs:    BMP:  Recent Labs   Lab 09/01/22 1112 09/02/22 0423 09/03/22  0433   * 128* 134*   K 3.3* 3.2* 3.8   CL 90* 96 104   CO2 25 25 20*   BUN 7 8 15   CREATININE 1.0 0.7 2.1*   CALCIUM 9.1 8.0* 8.3*       CBC:   Recent Labs   Lab 09/01/22 1112 09/02/22 0423 09/03/22  0433   WBC 15.30* 14.66* 17.41*   HGB 15.6 11.9* 13.4*   HCT 42.8 33.9* 38.2*   * 102* 127*       Assessment/Plan:     Cellulitis of groin  · No clinically apparent abscess or sign of necrosis again on today's examination  · Will continue to monitor for development of abscess/ necrotizing soft tissue infection for which patient is at high risk for with uncontrolled DM  · Continue abx - consider expanding coverage given increased WBC today  · OK to resume diet today; NPO again at midnight for possible incision/drainage/debridement tomorrow morning        VTE Risk Mitigation (From admission, onward)         Ordered     enoxaparin injection 40 mg  Daily         09/01/22 1506     IP VTE HIGH RISK PATIENT  Once         09/01/22 1506     Place sequential compression device  Until  discontinued         09/01/22 1506                Tavares Fitch MD  Urology  [facility] - Telemetry

## 2022-09-03 NOTE — PROGRESS NOTES
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  He reports acute onset of erythema, duration 3 days, has progressed and is now swollen and painful, no known exacerbating or alleviating factors, no attempted self treatment.  Denies fever, chills, cough, chest pain, SOB, dizziness, syncope, nausea, vomiting, diarrhea, abdominal pain, or dysuria.  In the ED, found to be tachycardic and tachypneic with leukocytosis.  CT abdomen pelvis and scrotal ultrasound revealed extensive cellulitis.  Procalcitonin 5.7.  Initial lactic acid 2.7.  Also with mild hyponatremia and mildly elevated liver enzymes.  Blood cultures obtained, IV fluid resuscitation and IV antibiotics initiated.      Overview/Hospital Course:  Patient admitted for groin abscess of perineum.  Started on Van and Zosyn. Blood cultures were NG. Urology consulted.       Interval History:  No new issues     Review of Systems   Constitutional:  Negative for activity change, appetite change and chills.   HENT:  Negative for congestion and dental problem.    Eyes:  Negative for discharge and itching.   Respiratory:  Negative for apnea and chest tightness.    Cardiovascular:  Negative for chest pain.   Gastrointestinal:  Negative for abdominal distention and abdominal pain.   Endocrine: Negative for cold intolerance and heat intolerance.   Genitourinary:  Negative for difficulty urinating.   Musculoskeletal:  Negative for  arthralgias.   Neurological:  Negative for dizziness and facial asymmetry.   Hematological:  Negative for adenopathy.   Psychiatric/Behavioral:  Negative for agitation.    Objective:     Vital Signs (Most Recent):  Temp: 98.4 °F (36.9 °C) (09/03/22 0723)  Pulse: 92 (09/03/22 0723)  Resp: 16 (09/03/22 0849)  BP: 126/75 (09/03/22 0723)  SpO2: (!) 94 % (09/03/22 0723)   Vital Signs (24h Range):  Temp:  [98.4 °F (36.9 °C)-99.9 °F (37.7 °C)] 98.4 °F (36.9 °C)  Pulse:  [] 92  Resp:  [16-20] 16  SpO2:  [94 %-97 %] 94 %  BP: (126-153)/(71-88) 126/75     Weight: 105.2 kg (231 lb 14.8 oz)  Body mass index is 37.43 kg/m².    Intake/Output Summary (Last 24 hours) at 9/3/2022 1016  Last data filed at 9/2/2022 1319  Gross per 24 hour   Intake --   Output 700 ml   Net -700 ml      Physical Exam  Vitals and nursing note reviewed.   Constitutional:       General: He is not in acute distress.     Appearance: He is obese. He is not ill-appearing, toxic-appearing or diaphoretic.   HENT:      Head: Normocephalic and atraumatic.   Eyes:      Conjunctiva/sclera: Conjunctivae normal.   Cardiovascular:      Rate and Rhythm: Normal rate and regular rhythm.   Pulmonary:      Effort: Pulmonary effort is normal. No respiratory distress.   Abdominal:      General: There is no distension.   Skin:     General: Skin is warm and dry.      Comments: Abscess groin   Neurological:      Mental Status: He is alert and oriented to person, place, and time.   Psychiatric:         Mood and Affect: Mood normal.         Behavior: Behavior normal.         Thought Content: Thought content normal.       Significant Labs: All pertinent labs within the past 24 hours have been reviewed.  BMP:   Recent Labs   Lab 09/03/22  0433   GLU 87   *   K 3.8      CO2 20*   BUN 15   CREATININE 2.1*   CALCIUM 8.3*     CBC:   Recent Labs   Lab 09/01/22  1112 09/02/22  0423 09/03/22  0433   WBC 15.30* 14.66* 17.41*   HGB 15.6 11.9* 13.4*   HCT 42.8 33.9* 38.2*    * 102* 127*       Significant Imaging:       Assessment/Plan:      * Sepsis  Tachycardia, tachypnea, and leukocytosis with elevated lactic acid.  This patient does have evidence of infective focus  My overall impression is sepsis. Vital signs were reviewed and noted in progress note.  Antibiotics given-   Antibiotics (From admission, onward)    Start     Stop Route Frequency Ordered    09/01/22 1606  vancomycin - pharmacy to dose  (vancomycin IVPB)        See Hyperspace for full Linked Orders Report.    -- IV pharmacy to manage frequency 09/01/22 1507        Cultures were taken-   Microbiology Results (last 7 days)     Procedure Component Value Units Date/Time    Urine culture [[id number]] Collected: 09/02/22 1341    Order Status: Completed Specimen: Urine Updated: 09/03/22 0938     Urine Culture, Routine No growth to date    Narrative:      Specimen Source->Urine    Blood culture x two cultures. Draw prior to antibiotics. [[id number]] Collected: 09/01/22 1100    Order Status: Completed Specimen: Blood from Peripheral, Forearm, Left Updated: 09/02/22 1303     Blood Culture, Routine No Growth to date      No Growth to date    Narrative:      Aerobic and anaerobic    Blood culture x two cultures. Draw prior to antibiotics. [[id number]] Collected: 09/01/22 1100    Order Status: Completed Specimen: Blood from Peripheral, Wrist, Right Updated: 09/02/22 1303     Blood Culture, Routine No Growth to date      No Growth to date    Narrative:      Aerobic and anaerobic        Latest lactate reviewed, they are-  Recent Labs   Lab 09/01/22  1112 09/01/22  1441 09/01/22  1618   LACTATE 2.7* 1.3 2.1       Organ dysfunction indicated by N/a  Source- cellulitis    Source control Achieved by- urology consulted      Cellulitis of groin  As above    Chronic diastolic heart failure  No evidence of acute decompensation or fluid overload, echo earlier this year showed preserved EF with indeterminate LV diastolic function, and is,  daily weights.    Hyponatremia  Mild, asymptomatic, repeat CMP in a.m.    Alcohol use disorder, severe, dependence  Counseled, reports stopping a week or two ago, continue MVI/thiamine/folate    Severe obesity (BMI >= 40)  Body mass index is 40.35 kg/m². Morbid obesity complicates all aspects of disease management from diagnostic modalities to treatment. Weight loss encouraged and health benefits explained to patient.     Elevated transaminase level  Consistent with baseline, likely due to heavy alcohol use.    Type 2 diabetes mellitus with hyperglycemia  Patient's FSGs are uncontrolled due to hyperglycemia on current medication regimen.  Last A1c reviewed-   Lab Results   Component Value Date    HGBA1C 12.6 (H) 01/04/2022     Most recent fingerstick glucose reviewed-   Recent Labs   Lab 09/01/22  1050 09/01/22  1509   POCTGLUCOSE 364* 327*     Current correctional scale  Medium  Maintain anti-hyperglycemic dose as follows-   Antihyperglycemics (From admission, onward)    Start     Stop Route Frequency Ordered    09/01/22 2100  insulin detemir U-100 pen 40 Units         -- SubQ Nightly 09/01/22 1506    09/01/22 1605  insulin aspart U-100 pen 1-10 Units         -- SubQ Before meals & nightly PRN 09/01/22 1506        Hold Oral hypoglycemics while patient is in the hospital.    Acute renal failure- increase IV fluids. If worse tomorrow- will have to d/c vanc.  VTE Risk Mitigation (From admission, onward)         Ordered     enoxaparin injection 40 mg  Daily         09/01/22 1506     IP VTE HIGH RISK PATIENT  Once         09/01/22 1506     Place sequential compression device  Until discontinued         09/01/22 1506                Discharge Planning   SUSAN:      Code Status: Full Code   Is the patient medically ready for discharge?:     Reason for patient still in hospital (select all that apply): Patient unstable  Discharge Plan A: Home with family                  Giovanny Mooney MD  Department of Hospital Medicine    [facility] - Telemetry

## 2022-09-03 NOTE — SUBJECTIVE & OBJECTIVE
Interval History:  No new issues     Review of Systems   Constitutional:  Negative for activity change, appetite change and chills.   HENT:  Negative for congestion and dental problem.    Eyes:  Negative for discharge and itching.   Respiratory:  Negative for apnea and chest tightness.    Cardiovascular:  Negative for chest pain.   Gastrointestinal:  Negative for abdominal distention and abdominal pain.   Endocrine: Negative for cold intolerance and heat intolerance.   Genitourinary:  Negative for difficulty urinating.   Musculoskeletal:  Negative for arthralgias.   Neurological:  Negative for dizziness and facial asymmetry.   Hematological:  Negative for adenopathy.   Psychiatric/Behavioral:  Negative for agitation.    Objective:     Vital Signs (Most Recent):  Temp: 98.4 °F (36.9 °C) (09/03/22 0723)  Pulse: 92 (09/03/22 0723)  Resp: 16 (09/03/22 0849)  BP: 126/75 (09/03/22 0723)  SpO2: (!) 94 % (09/03/22 0723)   Vital Signs (24h Range):  Temp:  [98.4 °F (36.9 °C)-99.9 °F (37.7 °C)] 98.4 °F (36.9 °C)  Pulse:  [] 92  Resp:  [16-20] 16  SpO2:  [94 %-97 %] 94 %  BP: (126-153)/(71-88) 126/75     Weight: 105.2 kg (231 lb 14.8 oz)  Body mass index is 37.43 kg/m².    Intake/Output Summary (Last 24 hours) at 9/3/2022 1016  Last data filed at 9/2/2022 1319  Gross per 24 hour   Intake --   Output 700 ml   Net -700 ml      Physical Exam  Vitals and nursing note reviewed.   Constitutional:       General: He is not in acute distress.     Appearance: He is obese. He is not ill-appearing, toxic-appearing or diaphoretic.   HENT:      Head: Normocephalic and atraumatic.   Eyes:      Conjunctiva/sclera: Conjunctivae normal.   Cardiovascular:      Rate and Rhythm: Normal rate and regular rhythm.   Pulmonary:      Effort: Pulmonary effort is normal. No respiratory distress.   Abdominal:      General: There is no distension.   Skin:     General: Skin is warm and dry.      Comments: Abscess groin   Neurological:      Mental  Status: He is alert and oriented to person, place, and time.   Psychiatric:         Mood and Affect: Mood normal.         Behavior: Behavior normal.         Thought Content: Thought content normal.       Significant Labs: All pertinent labs within the past 24 hours have been reviewed.  BMP:   Recent Labs   Lab 09/03/22  0433   GLU 87   *   K 3.8      CO2 20*   BUN 15   CREATININE 2.1*   CALCIUM 8.3*     CBC:   Recent Labs   Lab 09/01/22  1112 09/02/22  0423 09/03/22  0433   WBC 15.30* 14.66* 17.41*   HGB 15.6 11.9* 13.4*   HCT 42.8 33.9* 38.2*   * 102* 127*       Significant Imaging:

## 2022-09-03 NOTE — SUBJECTIVE & OBJECTIVE
Interval History: Denies any significant changes overnight. Pain stable. No increased swelling or skin changes noted. BS better controlled.    Review of Systems  Objective:     Temp:  [98.4 °F (36.9 °C)-99.9 °F (37.7 °C)] 98.7 °F (37.1 °C)  Pulse:  [] 79  Resp:  [16-20] 18  SpO2:  [94 %-97 %] 95 %  BP: (124-153)/(71-88) 124/75     Body mass index is 37.43 kg/m².           Drains       None                   Physical Exam  Constitutional:       General: He is not in acute distress.     Appearance: He is well-developed.   HENT:      Head: Normocephalic and atraumatic.   Eyes:      General:         Right eye: No discharge.         Left eye: No discharge.      Conjunctiva/sclera: Conjunctivae normal.      Pupils: Pupils are equal, round, and reactive to light.   Neck:      Thyroid: No thyromegaly.      Trachea: No tracheal deviation.   Cardiovascular:      Rate and Rhythm: Normal rate and regular rhythm.      Chest Wall: PMI is not displaced.   Pulmonary:      Effort: Pulmonary effort is normal. No accessory muscle usage or respiratory distress.   Chest:      Chest wall: No mass, tenderness or crepitus.   Abdominal:      General: There is no distension.      Palpations: Abdomen is soft. There is no mass.      Tenderness: There is no abdominal tenderness.      Hernia: No hernia is present.   Genitourinary:     Comments: [place] of erythema at patient's groin, scrotum. Has not extended beyond marked area and may have retracted some.  Buried penis, yeast like discharge around skin covering penis  Scrotal wall edema diffusely present, tender, no crepitus, odor, no areas of necrosis, and no area of fluctuance  Musculoskeletal:      Cervical back: Normal range of motion and neck supple.   Skin:     General: Skin is warm and dry.      Findings: No erythema or rash.   Neurological:      Cranial Nerves: No cranial nerve deficit.      Sensory: No sensory deficit.   Psychiatric:         Mood and Affect: Mood is not  anxious or depressed.         Speech: Speech normal.         Behavior: Behavior normal.         Thought Content: Thought content normal.         Judgment: Judgment normal.       Significant Labs:    BMP:  Recent Labs   Lab 09/01/22  1112 09/02/22 0423 09/03/22 0433   * 128* 134*   K 3.3* 3.2* 3.8   CL 90* 96 104   CO2 25 25 20*   BUN 7 8 15   CREATININE 1.0 0.7 2.1*   CALCIUM 9.1 8.0* 8.3*       CBC:   Recent Labs   Lab 09/01/22 1112 09/02/22 0423 09/03/22 0433   WBC 15.30* 14.66* 17.41*   HGB 15.6 11.9* 13.4*   HCT 42.8 33.9* 38.2*   * 102* 127*

## 2022-09-03 NOTE — ASSESSMENT & PLAN NOTE
· No clinically apparent abscess or sign of necrosis again on today's examination  · Will continue to monitor for development of abscess/ necrotizing soft tissue infection for which patient is at high risk for with uncontrolled DM  · Continue abx - consider expanding coverage given increased WBC today  · OK to resume diet today; NPO again at midnight for possible incision/drainage/debridement tomorrow morning

## 2022-09-04 PROBLEM — N17.9 AKI (ACUTE KIDNEY INJURY): Status: ACTIVE | Noted: 2022-01-01

## 2022-09-04 NOTE — SUBJECTIVE & OBJECTIVE
Interval History:  No new issues     Review of Systems   Constitutional:  Negative for activity change, appetite change and chills.   HENT:  Negative for congestion and dental problem.    Eyes:  Negative for discharge and itching.   Respiratory:  Negative for apnea and chest tightness.    Cardiovascular:  Negative for chest pain.   Gastrointestinal:  Negative for abdominal distention and abdominal pain.   Endocrine: Negative for cold intolerance and heat intolerance.   Genitourinary:  Negative for difficulty urinating.   Musculoskeletal:  Negative for arthralgias.   Neurological:  Negative for dizziness and facial asymmetry.   Hematological:  Negative for adenopathy.   Psychiatric/Behavioral:  Negative for agitation.    Objective:     Vital Signs (Most Recent):  Temp: 98 °F (36.7 °C) (09/04/22 0409)  Pulse: 94 (09/04/22 0415)  Resp: 18 (09/04/22 0607)  BP: (!) 145/87 (09/04/22 0409)  SpO2: 95 % (09/04/22 0415)   Vital Signs (24h Range):  Temp:  [98 °F (36.7 °C)-99.1 °F (37.3 °C)] 98 °F (36.7 °C)  Pulse:  [] 94  Resp:  [16-20] 18  SpO2:  [94 %-99 %] 95 %  BP: (124-149)/(75-99) 145/87     Weight: 105.2 kg (231 lb 14.8 oz)  Body mass index is 37.43 kg/m².    Intake/Output Summary (Last 24 hours) at 9/4/2022 0710  Last data filed at 9/3/2022 2321  Gross per 24 hour   Intake --   Output 1 ml   Net -1 ml      Physical Exam  Vitals and nursing note reviewed.   Constitutional:       General: He is not in acute distress.     Appearance: He is obese. He is not ill-appearing, toxic-appearing or diaphoretic.   HENT:      Head: Normocephalic and atraumatic.   Eyes:      Conjunctiva/sclera: Conjunctivae normal.   Cardiovascular:      Rate and Rhythm: Normal rate and regular rhythm.   Pulmonary:      Effort: Pulmonary effort is normal. No respiratory distress.   Abdominal:      General: There is no distension.   Skin:     General: Skin is warm and dry.      Comments: Abscess groin   Neurological:      Mental Status: He is  alert and oriented to person, place, and time.   Psychiatric:         Mood and Affect: Mood normal.         Behavior: Behavior normal.         Thought Content: Thought content normal.       Significant Labs: All pertinent labs within the past 24 hours have been reviewed.  BMP:   Recent Labs   Lab 09/04/22 0244   GLU 79      K 3.7      CO2 22*   BUN 17   CREATININE 2.9*   CALCIUM 8.0*     CBC:   Recent Labs   Lab 09/03/22  0433 09/04/22  0244   WBC 17.41* 13.33*   HGB 13.4* 13.1*   HCT 38.2* 36.6*   * 132*       Significant Imaging:

## 2022-09-04 NOTE — ASSESSMENT & PLAN NOTE
Tachycardia, tachypnea, and leukocytosis with elevated lactic acid.  This patient does have evidence of infective focus  My overall impression is sepsis. Vital signs were reviewed and noted in progress note.  Antibiotics given-   Antibiotics (From admission, onward)    Start     Stop Route Frequency Ordered    09/04/22 0815  clindamycin injection 600 mg         -- IM Every 8 hours (non-standard times) 09/04/22 0709        Cultures were taken-   Microbiology Results (last 7 days)     Procedure Component Value Units Date/Time    Urine culture [[id number]] Collected: 09/02/22 1341    Order Status: Completed Specimen: Urine Updated: 09/04/22 0558     Urine Culture, Routine No growth    Narrative:      Specimen Source->Urine    Blood culture x two cultures. Draw prior to antibiotics. [[id number]] Collected: 09/01/22 1100    Order Status: Completed Specimen: Blood from Peripheral, Forearm, Left Updated: 09/03/22 1303     Blood Culture, Routine No Growth to date      No Growth to date      No Growth to date    Narrative:      Aerobic and anaerobic    Blood culture x two cultures. Draw prior to antibiotics. [[id number]] Collected: 09/01/22 1100    Order Status: Completed Specimen: Blood from Peripheral, Wrist, Right Updated: 09/03/22 1303     Blood Culture, Routine No Growth to date      No Growth to date      No Growth to date    Narrative:      Aerobic and anaerobic        Latest lactate reviewed, they are-  Recent Labs   Lab 09/01/22  1112 09/01/22  1441 09/01/22  1618   LACTATE 2.7* 1.3 2.1       Organ dysfunction indicated by N/a  Source- cellulitis     Source control Achieved by- urology consulted

## 2022-09-04 NOTE — PROGRESS NOTES
[facility]  Progress Note    Patient Name: Doe Woodall  MRN: [id number]  Patient Class: IP- Inpatient   Admission Date: 9/1/2022  Length of Stay: 3 days  Attending Physician: Giovanny Lynch MD  Primary Care Provider: [facility] - Family Medicine        Subjective:     Principal Problem:Sepsis        HPI:  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  He reports acute onset of erythema, duration 3 days, has progressed and is now swollen and painful, no known exacerbating or alleviating factors, no attempted self treatment.  Denies fever, chills, cough, chest pain, SOB, dizziness, syncope, nausea, vomiting, diarrhea, abdominal pain, or dysuria.  In the ED, found to be tachycardic and tachypneic with leukocytosis.  CT abdomen pelvis and scrotal ultrasound revealed extensive cellulitis.  Procalcitonin 5.7.  Initial lactic acid 2.7.  Also with mild hyponatremia and mildly elevated liver enzymes.  Blood cultures obtained, IV fluid resuscitation and IV antibiotics initiated.      Overview/Hospital Course:  Patient admitted for groin abscess of perineum.  Started on Van and Zosyn. Blood cultures were NG. Urology consulted. Vanc was stopped due to acute renal failure. Nephrology was consulted. Patient strted on Clindamycin.  Blood cultures were NG.        Interval History:  No new issues     Review of Systems   Constitutional:  Negative for activity change, appetite change and chills.   HENT:  Negative for congestion and dental problem.    Eyes:  Negative for discharge and itching.   Respiratory:  Negative for apnea and chest tightness.    Cardiovascular:  Negative for chest pain.   Gastrointestinal:  Negative for abdominal distention and abdominal pain.   Endocrine: Negative for cold  intolerance and heat intolerance.   Genitourinary:  Negative for difficulty urinating.   Musculoskeletal:  Negative for arthralgias.   Neurological:  Negative for dizziness and facial asymmetry.   Hematological:  Negative for adenopathy.   Psychiatric/Behavioral:  Negative for agitation.    Objective:     Vital Signs (Most Recent):  Temp: 98 °F (36.7 °C) (09/04/22 0409)  Pulse: 94 (09/04/22 0415)  Resp: 18 (09/04/22 0607)  BP: (!) 145/87 (09/04/22 0409)  SpO2: 95 % (09/04/22 0415)   Vital Signs (24h Range):  Temp:  [98 °F (36.7 °C)-99.1 °F (37.3 °C)] 98 °F (36.7 °C)  Pulse:  [] 94  Resp:  [16-20] 18  SpO2:  [94 %-99 %] 95 %  BP: (124-149)/(75-99) 145/87     Weight: 105.2 kg (231 lb 14.8 oz)  Body mass index is 37.43 kg/m².    Intake/Output Summary (Last 24 hours) at 9/4/2022 0710  Last data filed at 9/3/2022 2321  Gross per 24 hour   Intake --   Output 1 ml   Net -1 ml      Physical Exam  Vitals and nursing note reviewed.   Constitutional:       General: He is not in acute distress.     Appearance: He is obese. He is not ill-appearing, toxic-appearing or diaphoretic.   HENT:      Head: Normocephalic and atraumatic.   Eyes:      Conjunctiva/sclera: Conjunctivae normal.   Cardiovascular:      Rate and Rhythm: Normal rate and regular rhythm.   Pulmonary:      Effort: Pulmonary effort is normal. No respiratory distress.   Abdominal:      General: There is no distension.   Skin:     General: Skin is warm and dry.      Comments: Abscess groin   Neurological:      Mental Status: He is alert and oriented to person, place, and time.   Psychiatric:         Mood and Affect: Mood normal.         Behavior: Behavior normal.         Thought Content: Thought content normal.       Significant Labs: All pertinent labs within the past 24 hours have been reviewed.  BMP:   Recent Labs   Lab 09/04/22  0244   GLU 79      K 3.7      CO2 22*   BUN 17   CREATININE 2.9*   CALCIUM 8.0*     CBC:   Recent Labs   Lab  09/03/22  0433 09/04/22  0244   WBC 17.41* 13.33*   HGB 13.4* 13.1*   HCT 38.2* 36.6*   * 132*       Significant Imaging:       Assessment/Plan:      * Sepsis  Tachycardia, tachypnea, and leukocytosis with elevated lactic acid.  This patient does have evidence of infective focus  My overall impression is sepsis. Vital signs were reviewed and noted in progress note.  Antibiotics given-   Antibiotics (From admission, onward)      Start     Stop Route Frequency Ordered    09/04/22 0815  clindamycin injection 600 mg         -- IM Every 8 hours (non-standard times) 09/04/22 0709          Cultures were taken-   Microbiology Results (last 7 days)       Procedure Component Value Units Date/Time    Urine culture [[id number]] Collected: 09/02/22 1341    Order Status: Completed Specimen: Urine Updated: 09/04/22 0558     Urine Culture, Routine No growth    Narrative:      Specimen Source->Urine    Blood culture x two cultures. Draw prior to antibiotics. [[id number]] Collected: 09/01/22 1100    Order Status: Completed Specimen: Blood from Peripheral, Forearm, Left Updated: 09/03/22 1303     Blood Culture, Routine No Growth to date      No Growth to date      No Growth to date    Narrative:      Aerobic and anaerobic    Blood culture x two cultures. Draw prior to antibiotics. [[id number]] Collected: 09/01/22 1100    Order Status: Completed Specimen: Blood from Peripheral, Wrist, Right Updated: 09/03/22 1303     Blood Culture, Routine No Growth to date      No Growth to date      No Growth to date    Narrative:      Aerobic and anaerobic          Latest lactate reviewed, they are-  Recent Labs   Lab 09/01/22  1112 09/01/22  1441 09/01/22  1618   LACTATE 2.7* 1.3 2.1       Organ dysfunction indicated by N/a  Source- cellulitis     Source control Achieved by- urology consulted      Cellulitis of groin  As above  Now on Clinda    Chronic diastolic heart failure  No evidence of acute decompensation or fluid overload, echo earlier  this year showed preserved EF with indeterminate LV diastolic function, and is, daily weights.    Hyponatremia  Mild, asymptomatic, repeat CMP in a.m.    Alcohol use disorder, severe, dependence  Counseled, reports stopping a week or two ago, continue MVI/thiamine/folate    Severe obesity (BMI >= 40)  Body mass index is 40.35 kg/m². Morbid obesity complicates all aspects of disease management from diagnostic modalities to treatment. Weight loss encouraged and health benefits explained to patient.     Elevated transaminase level  Consistent with baseline, likely due to heavy alcohol use.    Type 2 diabetes mellitus with hyperglycemia  Patient's FSGs are uncontrolled due to hyperglycemia on current medication regimen.  Last A1c reviewed-   Lab Results   Component Value Date    HGBA1C 12.6 (H) 01/04/2022     Most recent fingerstick glucose reviewed-   Recent Labs   Lab 09/01/22  1050 09/01/22  1509   POCTGLUCOSE 364* 327*     Current correctional scale  Medium  Maintain anti-hyperglycemic dose as follows-   Antihyperglycemics (From admission, onward)      Start     Stop Route Frequency Ordered    09/01/22 2100  insulin detemir U-100 pen 40 Units         -- SubQ Nightly 09/01/22 1506    09/01/22 1605  insulin aspart U-100 pen 1-10 Units         -- SubQ Before meals & nightly PRN 09/01/22 1506          Hold Oral hypoglycemics while patient is in the hospital.    Moderate Malnutrition- will discuss with patient     VTE Risk Mitigation (From admission, onward)           Ordered     enoxaparin injection 40 mg  Daily         09/01/22 1506     IP VTE HIGH RISK PATIENT  Once         09/01/22 1506     Place sequential compression device  Until discontinued         09/01/22 1506                    Discharge Planning   SUSAN:      Code Status: Full Code   Is the patient medically ready for discharge?:     Reason for patient still in hospital (select all that apply): Patient unstable  Discharge Plan A: Home with family                   Giovanny Mooney MD  Department of Hospital Medicine   [facility] - Telemetry

## 2022-09-04 NOTE — PROGRESS NOTES
[facility]  [facility]  Progress Note    Patient Name: Doe Woodall  MRN: [id number]  Admission Date: 9/1/2022  Hospital Length of Stay: 3 days  Code Status: Full Code   Attending Provider: Giovanny Lynch MD   Primary Care Physician: [facility] - Family Medicine    Subjective:     HPI:  31 yo man presented to the hospital with progressive scrotal pain which started off as a boil on his right scrotum. Patient with hx of uncontrolled DM. Patient's imaging on arrival did not demonstrate presence of abscess. Urology consulted for further evaluation. Denies dysuria, fevers, chills, chest pain, shortness of breath. Had breakfast this morning.       Interval History: Pain improved some this AM. Able to ambulate to go to bathroom. Voiding w/o difficulty.    Review of Systems  Objective:     Temp:  [98 °F (36.7 °C)-99.1 °F (37.3 °C)] 98.5 °F (36.9 °C)  Pulse:  [] 94  Resp:  [18-20] 18  SpO2:  [94 %-99 %] 95 %  BP: (124-172)/() 172/100     Body mass index is 37.43 kg/m².           Drains       None                   Physical Exam  Constitutional:       General: He is not in acute distress.     Appearance: He is well-developed.   HENT:      Head: Normocephalic and atraumatic.   Eyes:      Pupils: Pupils are equal, round, and reactive to light.   Pulmonary:      Effort: Pulmonary effort is normal. No respiratory distress.   Abdominal:      General: There is no distension.   Genitourinary:     Comments: [place] of erythema at patient's groin, scrotum. Has receded significantly from outline.  Buried penis, yeast like discharge around skin covering penis  Scrotal wall edema diffusely present but notably reduced, remains tender, still with no crepitus, odor, areas of necrosis, or areas of fluctuance  Musculoskeletal:      Cervical back: Normal range of motion and neck supple.   Neurological:      Mental Status: He is alert and oriented to person, place, and time.   Psychiatric:          Behavior: Behavior normal.       Significant Labs:    BMP:  Recent Labs   Lab 09/02/22 0423 09/03/22  0433 09/04/22  0244   * 134* 138   K 3.2* 3.8 3.7   CL 96 104 107   CO2 25 20* 22*   BUN 8 15 17   CREATININE 0.7 2.1* 2.9*   CALCIUM 8.0* 8.3* 8.0*         CBC:   Recent Labs   Lab 09/02/22 0423 09/03/22 0433 09/04/22  0244   WBC 14.66* 17.41* 13.33*   HGB 11.9* 13.4* 13.1*   HCT 33.9* 38.2* 36.6*   * 127* 132*           Assessment/Plan:     MARISSA (acute kidney injury)  -- Uncertain etiology  -- No evidence of urinary retention  -- Further management per primary    Cellulitis of groin  · No clinically apparent abscess or sign of necrosis again on today's examination  · Will continue to monitor for development of abscess/necrotizing soft tissue infection   · Maintain tight BS control  · Continue abx - consider expanding coverage given increased WBC today  · OK to resume diet today; NPO again at midnight for possible incision/drainage/debridement tomorrow morning        VTE Risk Mitigation (From admission, onward)         Ordered     enoxaparin injection 30 mg  Daily         09/04/22 0713     IP VTE HIGH RISK PATIENT  Once         09/01/22 1506     Place sequential compression device  Until discontinued         09/01/22 1506                Tavares Fitch MD  Urology  [facility] [place] - Telemetry

## 2022-09-04 NOTE — CLINICAL REVIEW
IP Sepsis Screen (most recent)       Sepsis Screen (IP) - 09/04/22 0852       Is the patient's history or complaint suggestive of a possible infection? Yes  -    Are there at least two of the following signs and symptoms present? Yes  -    Sepsis signs/symptoms - Tachycardia Tachycardia     >90  -    Sepsis signs/symptoms - WBC WBC < 4,000 or WBC > 12,000  -    Are any of the following organ dysfunction criteria present and not considered to be due to a chronic condition? Yes  -    Organ Dysfunction Criteria Creatinine > 2.0  -    Organ Dysfunction Criteria Lactate > 2.0  -    Initiate Sepsis Protocol No  -    Reason sepsis not considered Pt. receiving appropriate management  -              User Key  (r) = Recorded By, (t) = Taken By, (c) = Cosigned By      Initials Name     Julisa Chang RN

## 2022-09-04 NOTE — NURSING
0700-Bedside shift report received from ANNA Lopez. Patient laying in bed resting quietly with eyes closed. NAD noted. No needs voiced. Call light in reach. Will continue to monitor.

## 2022-09-04 NOTE — CONSULTS
"Nephrology Consult Note    Date of Admit: 9/1/2022    Chief Complaint/Reason for Consult     Abscess (Pt reports to the ED with C/O boil/ abscess to the left groin. Pt reports "I had a rash there but now it is swollen and painful." Pt denies any drainage at this time. Pt AAOx4, RESP easy and unlabored. Pt awaiting QT bed. )   for MARISSA     Nephrology team consulted for ESRD/ HD   Subjective:      History of Present Illness:  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  He reports acute onset of erythema, duration 3 days, has progressed and is now swollen and painful, no known exacerbating or alleviating factors, no attempted self treatment.  Denies fever, chills, cough, chest pain, SOB, dizziness, syncope, nausea, vomiting, diarrhea, abdominal pain, or dysuria.  In the ED, found to be tachycardic and tachypneic with leukocytosis.  CT abdomen pelvis and scrotal ultrasound revealed extensive cellulitis.  Procalcitonin 5.7.  Initial lactic acid 2.7.  Also with mild hyponatremia and mildly elevated liver enzymes.  Blood cultures obtained, IV fluid resuscitation and IV antibiotics initiated.        Overview/Hospital Course:  Patient admitted for groin abscess of perineum.  Started on Van and Zosyn. Blood cultures were NG. Urology consulted. Vanc was stopped due to acute renal failure. Nephrology was consulted. Patient strted on Clindamycin.  Blood cultures were NG.      Past Medical History:  Past Medical History:   Diagnosis Date    Abscess of right groin 09/01/2022    Diabetes mellitus     Encounter for blood transfusion     Loud snoring     Obese     Other insomnia 08/03/2020    Perineal abscess 08/01/2014       Past Surgical History:  Past Surgical History:   Procedure Laterality Date    ABCESS DRAINAGE  2014    PERIRECTAL    " DECOMPRESSION OF LUMBAR SPINE USING MINIMALLY INVASIVE TECHNIQUE Right 07/06/2018    Procedure: DECOMPRESSION, SPINE, LUMBAR, MINIMALLY INVASIVE L3-4;  Surgeon: Cristian Cervantes MD;  Location: [facility] OR [address] [place] [address];  Service: Neurosurgery;  Laterality: Right;    ORTHOPEDIC SURGERY         Allergies:  Review of patient's allergies indicates:   Allergen Reactions    Metformin Diarrhea       Home Medications:  Prior to Admission medications    Medication Sig Start Date End Date Taking? Authorizing Provider   acetaminophen (TYLENOL) 500 MG tablet Take 1,000 mg by mouth 3 (three) times daily as needed for Pain.   Yes Historical Provider   dulaglutide (TRULICITY) 1.5 mg/0.5 mL pen injector Inject 1.5 mg into the skin every 7 days.  Patient taking differently: Inject 1.5 mg into the skin every 7 days. Tuesdays 1/8/22 1/8/23 Yes Ming Potts MD   blood sugar diagnostic Strp 1 strip by Misc.(Non-Drug; Combo Route) route 4 (four) times daily with meals and nightly.  Patient not taking: Reported on 9/1/2022 8/5/20   Natasha Garza MD   blood-glucose meter Misc Use as instructed  Patient not taking: Reported on 9/1/2022 8/5/20   Natasha Garza MD   diphenhydramine HCl (UNISOM SLEEPGELS ORAL) Take 1 capsule by mouth every evening.    Historical Provider   docusate sodium (COLACE) 100 MG capsule Take 1 capsule (100 mg total) by mouth 3 (three) times daily as needed for Constipation.  Patient not taking: Reported on 9/1/2022 6/1/22   Shruthi Weathers MD   folic acid (FOLVITE) 1 MG tablet Take 1 tablet (1 mg total) by mouth once daily.  Patient not taking: Reported on 9/1/2022 3/24/22 3/24/23  Nati Soria NP   glucagon (GLUCAGEN HYPOKIT) 1 mg SolR Inject 1 mg into the muscle as needed (Hypoglycemia).  Patient not taking: Reported on 9/1/2022 1/8/22   Ming Potts MD   HYDROcodone-acetaminophen (NORCO) 5-325 mg per tablet Take 1 tablet by mouth every 6 (six) hours as needed for Pain.  Patient not taking:  "Reported on 9/1/2022 6/1/22   Shruthi Weathers MD   ibuprofen (ADVIL,MOTRIN) 600 MG tablet Take 1 tablet (600 mg total) by mouth every 8 (eight) hours as needed for Pain.  Patient not taking: Reported on 9/1/2022 6/1/22   Shruthi Weathers MD   insulin aspart U-100 (NOVOLOG) 100 unit/mL (3 mL) InPn pen Inject 14 Units into the skin 3 (three) times daily. 3/23/22 3/23/23  Nati Soria NP   insulin detemir U-100 (LEVEMIR FLEXTOUCH) 100 unit/mL (3 mL) SubQ InPn pen Inject 60 Units into the skin once daily. 3/23/22 3/23/23  Nati Soria NP   lancets 30 gauge Misc 1 lancet by Misc.(Non-Drug; Combo Route) route 4 (four) times daily with meals and nightly.  Patient not taking: Reported on 9/1/2022 8/5/20   Natasha Garza MD   lancing device Misc Use as instructed  Patient not taking: Reported on 9/1/2022 8/5/20   Natasha Garza MD   pen needle, diabetic 31 gauge x 5/16" Ndle 1 each by Misc.(Non-Drug; Combo Route) route 4 (four) times daily with meals and nightly.  Patient not taking: Reported on 9/1/2022 8/5/20   Natasha Garza MD   pen needle, diabetic 32 gauge x 5/32" Ndle USE TO INJECT INSULIN FOUR TIMES DAILY  Patient not taking: Reported on 9/1/2022 3/23/22   Nati oSria NP   pulse oximeter (PULSE OXIMETER) device by Apply Externally route 2 (two) times a day. Use twice daily at 8 AM and 3 PM and record the value in MyChart as directed.  Patient not taking: Reported on 9/1/2022 1/8/22   Ming Potts MD   thiamine 100 MG tablet Take 1 tablet (100 mg total) by mouth once daily.  Patient not taking: Reported on 9/1/2022 3/24/22   Nati Soria NP       Family History:  Family History   Problem Relation Age of Onset    Diabetes Father     Diabetes Paternal Grandmother     Diabetes Paternal Grandfather        Social History:  Social History     Tobacco Use    Smoking status: Former     Packs/day: 0.50     Years: 9.00     Pack years: 4.50     Types: Cigarettes     Quit date: " "2013     Years since quittin.1    Smokeless tobacco: Former   Substance Use Topics    Alcohol use: Not Currently     Comment: DAILY PINT OF LIQUOR, HX OF 1 "TALL BOY" OF BEER DAILY    Drug use: Not Currently       Review of Systems:  Pertinent positives and negatives are listed in HPI.  All other systems are reviewed and are negative.     Objective:   Last 24 Hour Vital Signs:  BP  Min: 145/87  Max: 172/100  Temp  Av.5 °F (36.9 °C)  Min: 98 °F (36.7 °C)  Max: 99.1 °F (37.3 °C)  Pulse  Av.1  Min: 94  Max: 107  Resp  Av.2  Min: 18  Max: 20  SpO2  Av.4 %  Min: 94 %  Max: 99 %  Body mass index is 37.43 kg/m².  I/O last 3 completed shifts:  In: -   Out: 1 [Urine:1]    Physical Examination:  General: No acute distress,   HEENT: EOMI, PERRL, MMM, OP clear and without exudate or erythema  Cardiovascular:  NRRR, without appreciated murmurs or rubs, without extra heart sounds, peripheral pulses 2+ throughout  Chest/Pulmonary: CTABL, normal work of breathing without accessory muscle use,   Abdomen:  soft, nontender, nondistended, bowel sounds heard in all four quadrants and normo-active   MSKL: without gross deformity, active FROM  Lymphatic: no appreciated LAD  Skin: without cyanosis or clubbing, without  peripheral edema   Neurologic: AAOx3, sensation intact to light palpation throughout extremities,  strength 5/5 of gross flexor and extensor groups of extremities  Access:     Laboratory:  Most Recent Data:  CBC:   Lab Results   Component Value Date    WBC 13.33 (H) 2022    HGB 13.1 (L) 2022    HCT 36.6 (L) 2022     (L) 2022    MCV 99 (H) 2022    RDW 11.3 (L) 2022     BMP:   Lab Results   Component Value Date     2022    K 3.7 2022     2022    CO2 22 (L) 2022    BUN 17 2022    CREATININE 2.9 (H) 2022    GLU 79 2022    CALCIUM 8.0 (L) 2022    MG 2.2 2022    PHOS 4.5 2022     LFTs: "   Lab Results   Component Value Date    PROT 5.5 (L) 09/04/2022    ALBUMIN 1.7 (L) 09/04/2022    BILITOT 1.2 (H) 09/04/2022    AST 68 (H) 09/04/2022    ALKPHOS 337 (H) 09/04/2022    ALT 35 09/04/2022     Coags:   Lab Results   Component Value Date    INR 1.1 09/01/2022     Urinalysis:   Lab Results   Component Value Date    LABURIN No growth 09/02/2022    COLORU Yellow 09/02/2022    SPECGRAV 1.025 09/02/2022    NITRITE Negative 09/02/2022    KETONESU Negative 09/02/2022    UROBILINOGEN Negative 09/02/2022    WBCUA 28 (H) 09/02/2022       Trended Lab Data:  Recent Labs   Lab 09/02/22  0423 09/03/22  0433 09/04/22  0244   WBC 14.66* 17.41* 13.33*   HGB 11.9* 13.4* 13.1*   HCT 33.9* 38.2* 36.6*   * 127* 132*   MCV 99* 99* 99*   RDW 11.5 11.3* 11.3*   * 134* 138   K 3.2* 3.8 3.7   CL 96 104 107   CO2 25 20* 22*   BUN 8 15 17   CREATININE 0.7 2.1* 2.9*   * 87 79   PROT 5.4* 5.8* 5.5*   ALBUMIN 1.9* 1.9* 1.7*   BILITOT 1.3* 1.4* 1.2*   AST 46* 60* 68*   ALKPHOS 294* 311* 337*   ALT 45* 38 35       Trended Cardiac Data:  No results for input(s): TROPONINI, CKTOTAL, CKMB, BNP in the last 168 hours.      Radiology:  Imaging Results              CT Abdomen Pelvis With Contrast (Final result)  Result time 09/01/22 13:59:48      Final result by Michael Amin MD (09/01/22 13:59:48)                   Impression:      1. Diffuse induration and disorganized fluid throughout the right aspect of the perineum extending to the scrotal wall.  There is overlying skin thickening.  No convincing focal organized fluid collection at this time however please see previous ultrasound for more detailed evaluation.  Findings are concerning for infectious process.  There is reactive bilateral inguinal lymphadenopathy right greater than left.  2. Findings suggesting hepatic steatosis, correlation with LFTs recommended.  3. Bilateral perinephric fat stranding, nonspecific, correlation with urinalysis recommended to exclude  sequela of infection.  4. Please see above for several additional findings.      Electronically signed by: Michael Amin MD  Date:    09/01/2022  Time:    13:59               Narrative:    EXAMINATION:  CT ABDOMEN PELVIS WITH CONTRAST    CLINICAL HISTORY:  testicular abscess;    TECHNIQUE:  Low dose axial images, sagittal and coronal reformations were obtained from the lung bases to the pubic symphysis following the IV administration of 100 mL of Omnipaque 350 .  Oral contrast was not given.    COMPARISON:  Testicular ultrasound 09/01/2022    FINDINGS:  Images of the lower thorax are remarkable for bilateral dependent atelectasis, right greater than left.    The liver is hypoattenuating suggesting steatosis, correlation with LFTs recommended.  The spleen, pancreas and adrenal glands are unremarkable.  There is cholelithiasis without secondary findings to suggest acute cholecystitis.  The portal vein, splenic vein, SMV, celiac axis and SMA all are patent.  No significant abdominal lymphadenopathy.    The kidneys enhance symmetrically without hydronephrosis or nephrolithiasis.  There is a punctate focus of low attenuation within the interpolar region of the left kidney, too small for characterization.  There is mild bilateral perinephric fat stranding.  The bilateral ureters are unremarkable without calculi seen.  The urinary bladder is distended without wall thickening.  The prostate is not enlarged.    There are a few scattered colonic diverticula without inflammation.  The terminal ileum and appendix are unremarkable.  The small bowel is grossly unremarkable.  There are several scattered shotty periaortic and paracaval lymph nodes.  No focal organized pelvic fluid collection.    Degenerative changes are noted of the spine.  There is bilateral inguinal lymphadenopathy, right greater than left.    There is diffuse induration within the right peroneal soft tissues extending to the scrotum.  No clearly identified rim  enhancing fluid collection in the region.  There is scrotal wall thickening.  This region is better evaluated on previous ultrasound.                                       US Scrotum And Testicles (Final result)  Result time 09/01/22 13:41:11      Final result by Andrea Hdez Jr., MD (09/01/22 13:41:11)                   Impression:      Scrotal cellulitis and probable cutaneous boil.  No extension/involvement of the testicle.      Electronically signed by: Andrea Marin Jr  Date:    09/01/2022  Time:    13:41               Narrative:    EXAMINATION:  US SCROTUM AND TESTICLES    CLINICAL HISTORY:  Cutaneous abscess, unspecified    TECHNIQUE:  Sonography of the scrotum and testes.    COMPARISON:  None.    FINDINGS:  Right Testicle:    *Size: 4.4 cm  *Appearance: Normal.  *Flow: Normal arterial and venous flow  *Epididymis: Normal.  *Hydrocele: None.  *Varicocele: None.  .    Left Testicle:    *Size: 3.8 cm  *Appearance: Normal.  *Flow: Normal arterial and venous flow  *Epididymis: Normal.  *Hydrocele: None.  *Varicocele: None.  .    Other findings: There is soft tissue edema corresponding to the area of clinical concern which is marked as the right inguinal region anterior to the right testicle.  There is some decreased echogenicity extending to the skin surface which may reflect a cutaneous boil, but no well-defined subcutaneous fluid collection is seen.  There is surrounding scrotal soft tissue swelling..                                       X-Ray Chest AP Portable (Final result)  Result time 09/01/22 11:28:07      Final result by Rey Ray MD (09/01/22 11:28:07)                   Impression:      1. No appreciable new acute cardiopulmonary process appreciated.      Electronically signed by: Rey Ray  Date:    09/01/2022  Time:    11:28               Narrative:    EXAMINATION:  XR CHEST AP PORTABLE    CLINICAL HISTORY:  infection;    TECHNIQUE:  Single frontal portable view of the chest was  performed.    COMPARISON:  Chest radiograph 03/22/2022    FINDINGS:  Cardiomediastinal silhouette is within normal limits.    Persistent right greater than left markedly low lung volumes associated with bibasilar right greater than left airspace opacities likely reflecting compressive atelectasis, not significantly changed.  Otherwise, no appreciable new focal consolidation, overt interstitial edema, sizable pleural effusion or pneumothorax.    Multilevel degenerative changes of the imaged spine.                                         Assessment and Plan:      Doe Woodall is a 32 y.o.male with  Patient Active Problem List    Diagnosis Date Noted    MARISSA (acute kidney injury) 09/04/2022    Cellulitis of groin 09/01/2022    Sepsis 09/01/2022    Chronic diastolic heart failure 01/06/2022    Type 2 diabetes mellitus with hypoglycemia without coma     Hyponatremia 01/04/2022    Anxiety disorder 08/23/2020    Substance induced mood disorder 08/04/2020    Alcohol use disorder, severe, dependence 08/04/2020    Elevated transaminase level 08/03/2020    Severe obesity (BMI >= 40) 08/03/2020    Other insomnia 08/03/2020    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 05/12/2019    Floppy eyelid syndrome 05/12/2019    Bulge of lumbar disc without myelopathy 07/05/2018    Type 2 diabetes mellitus with hyperglycemia 08/01/2014        MARISSA mostly multifactorial septic , toxic less likely ,pre-renal     Antibiotic as per primary   Cellulitis of groin as per urology/primary   - IV hydration and encourage P.O intake   - Strict I/O and chart  - Avoid nephrotoxic medications, NSAIDs, IV contrast, etc.  - Daily weights and chart  - Medication doses adjusted to GFR  - Maintain MAP > 65  - Hb > 7 gm/dL  - Will follow closely

## 2022-09-04 NOTE — SUBJECTIVE & OBJECTIVE
Interval History: Pain improved some this AM. Able to ambulate to go to bathroom. Voiding w/o difficulty.    Review of Systems  Objective:     Temp:  [98 °F (36.7 °C)-99.1 °F (37.3 °C)] 98.5 °F (36.9 °C)  Pulse:  [] 94  Resp:  [18-20] 18  SpO2:  [94 %-99 %] 95 %  BP: (124-172)/() 172/100     Body mass index is 37.43 kg/m².           Drains       None                   Physical Exam  Constitutional:       General: He is not in acute distress.     Appearance: He is well-developed.   HENT:      Head: Normocephalic and atraumatic.   Eyes:      Pupils: Pupils are equal, round, and reactive to light.   Pulmonary:      Effort: Pulmonary effort is normal. No respiratory distress.   Abdominal:      General: There is no distension.   Genitourinary:     Comments: [place] of erythema at patient's groin, scrotum. Has receded significantly from outline.  Buried penis, yeast like discharge around skin covering penis  Scrotal wall edema diffusely present but notably reduced, remains tender, still with no crepitus, odor, areas of necrosis, or areas of fluctuance  Musculoskeletal:      Cervical back: Normal range of motion and neck supple.   Neurological:      Mental Status: He is alert and oriented to person, place, and time.   Psychiatric:         Behavior: Behavior normal.       Significant Labs:    BMP:  Recent Labs   Lab 09/02/22 0423 09/03/22 0433 09/04/22  0244   * 134* 138   K 3.2* 3.8 3.7   CL 96 104 107   CO2 25 20* 22*   BUN 8 15 17   CREATININE 0.7 2.1* 2.9*   CALCIUM 8.0* 8.3* 8.0*         CBC:   Recent Labs   Lab 09/02/22 0423 09/03/22 0433 09/04/22  0244   WBC 14.66* 17.41* 13.33*   HGB 11.9* 13.4* 13.1*   HCT 33.9* 38.2* 36.6*   * 127* 132*

## 2022-09-04 NOTE — PROGRESS NOTES
Pharmacokinetic Assessment Follow Up: IV Vancomycin    Vancomycin serum concentration assessment(s):    The random level was drawn correctly and can be used to guide therapy at this time. The measurement is within the desired definitive target range of 10 to 20 mcg/mL.    Vancomycin Regimen Plan:    Re-dose when the random level is less than 20 mcg/mL, next level to be drawn at 0400 on 9/5/22    Drug levels (last 3 results):  Recent Labs   Lab Result Units 09/02/22  2307 09/03/22  1111 09/04/22  0244   Vancomycin, Random ug/mL  --  22.4 19.1   Vancomycin-Trough ug/mL 25.7*  --   --        Pharmacy will continue to follow and monitor vancomycin.    Please contact pharmacy at extension [phone] for questions regarding this assessment.    Thank you for the consult,   Jae Whyte       Patient brief summary:  Doe Woodall is a 32 y.o. male initiated on antimicrobial therapy with IV Vancomycin for treatment of skin & soft tissue infection    The patient's current regimen is random pulse dosing    Drug Allergies:   Review of patient's allergies indicates:   Allergen Reactions    Metformin Diarrhea       Actual Body Weight:   105.2 kg    Renal Function:   Estimated Creatinine Clearance: 41.2 mL/min (A) (based on SCr of 2.9 mg/dL (H)).,     Dialysis Method (if applicable):  N/A    CBC (last 72 hours):  Recent Labs   Lab Result Units 09/01/22  1112 09/02/22  0423 09/03/22  0433 09/04/22  0244   WBC K/uL 15.30* 14.66* 17.41* 13.33*   Hemoglobin g/dL 15.6 11.9* 13.4* 13.1*   Hematocrit % 42.8 33.9* 38.2* 36.6*   Platelets K/uL 145* 102* 127* 132*   Gran % % 72.6 70.6 72.5 73.0   Lymph % % 13.9* 15.2* 11.5* 11.0*   Mono % % 12.1 12.8 14.3 14.4   Eosinophil % % 0.1 0.2 0.6 0.7   Basophil % % 0.3 0.3 0.2 0.3   Differential Method  Automated Automated Automated Automated       Metabolic Panel (last 72 hours):  Recent Labs   Lab Result Units 09/01/22  1112 09/02/22  0423 09/02/22  1341 09/03/22  0433 09/04/22  0244    Sodium mmol/L 129* 128*  --  134* 138   Potassium mmol/L 3.3* 3.2*  --  3.8 3.7   Chloride mmol/L 90* 96  --  104 107   CO2 mmol/L 25 25  --  20* 22*   Glucose mg/dL 354* 283*  --  87 79   Glucose, UA   --   --  4+*  --   --    BUN mg/dL 7 8  --  15 17   Creatinine mg/dL 1.0 0.7  --  2.1* 2.9*   Albumin g/dL 2.7* 1.9*  --  1.9* 1.7*   Total Bilirubin mg/dL 2.1* 1.3*  --  1.4* 1.2*   Alkaline Phosphatase U/L 423* 294*  --  311* 337*   AST U/L 64* 46*  --  60* 68*   ALT U/L 61* 45*  --  38 35       Vancomycin Administrations:  vancomycin given in the last 96 hours                     vancomycin (VANCOCIN) 1,750 mg in dextrose 5 % 500 mL IVPB (mg) 1,750 mg New Bag 09/02/22 1119     1,750 mg New Bag  0035    vancomycin (VANCOCIN) 2,000 mg in dextrose 5 % 500 mL IVPB (mg) 2,000 mg New Bag 09/01/22 1209                    Microbiologic Results:  Microbiology Results (last 7 days)       Procedure Component Value Units Date/Time    Blood culture x two cultures. Draw prior to antibiotics. [[id number]] Collected: 09/01/22 1100    Order Status: Completed Specimen: Blood from Peripheral, Forearm, Left Updated: 09/03/22 1303     Blood Culture, Routine No Growth to date      No Growth to date      No Growth to date    Narrative:      Aerobic and anaerobic    Blood culture x two cultures. Draw prior to antibiotics. [[id number]] Collected: 09/01/22 1100    Order Status: Completed Specimen: Blood from Peripheral, Wrist, Right Updated: 09/03/22 1303     Blood Culture, Routine No Growth to date      No Growth to date      No Growth to date    Narrative:      Aerobic and anaerobic    Urine culture [[id number]] Collected: 09/02/22 1341    Order Status: Completed Specimen: Urine Updated: 09/03/22 0938     Urine Culture, Routine No growth to date    Narrative:      Specimen Source->Urine

## 2022-09-04 NOTE — PLAN OF CARE
Problem: Adult Inpatient Plan of Care  Goal: Plan of Care Review  Outcome: Ongoing, Progressing  Goal: Patient-Specific Goal (Individualized)  Outcome: Ongoing, Progressing  Goal: Absence of Hospital-Acquired Illness or Injury  Outcome: Ongoing, Progressing  Goal: Optimal Comfort and Wellbeing  Outcome: Ongoing, Progressing  Goal: Readiness for Transition of Care  Outcome: Ongoing, Progressing     Problem: Bariatric Environmental Safety  Goal: Safety Maintained with Care  Outcome: Ongoing, Progressing     Problem: Infection  Goal: Absence of Infection Signs and Symptoms  Outcome: Ongoing, Progressing     Problem: Diabetes Comorbidity  Goal: Blood Glucose Level Within Targeted Range  Outcome: Ongoing, Progressing     Problem: Adjustment to Illness (Sepsis/Septic Shock)  Goal: Optimal Coping  Outcome: Ongoing, Progressing     Problem: Bleeding (Sepsis/Septic Shock)  Goal: Absence of Bleeding  Outcome: Ongoing, Progressing     Problem: Glycemic Control Impaired (Sepsis/Septic Shock)  Goal: Blood Glucose Level Within Desired Range  Outcome: Ongoing, Progressing     Problem: Infection Progression (Sepsis/Septic Shock)  Goal: Absence of Infection Signs and Symptoms  Outcome: Ongoing, Progressing     Problem: Nutrition Impaired (Sepsis/Septic Shock)  Goal: Optimal Nutrition Intake  Outcome: Ongoing, Progressing   Remain SR on telemetry monitor. PRN medication effective. Explained plan of care, verbalized understanding. No injury during shift, Side rails up x 2, call light by bedside.

## 2022-09-04 NOTE — ASSESSMENT & PLAN NOTE
· No clinically apparent abscess or sign of necrosis again on today's examination  · Will continue to monitor for development of abscess/necrotizing soft tissue infection   · Maintain tight BS control  · Continue abx - consider expanding coverage given increased WBC today  · OK to resume diet today; NPO again at midnight for possible incision/drainage/debridement tomorrow morning

## 2022-09-05 NOTE — NURSING
Report received . Patient supine in bed awake nelia alert with pain level at 3. Scrotum is red and swollen no drainage noted. NS @ 150 infusing to LFT AC no redness no swelling. Plan of care reviewed. Bed low call light I reach AM assessment completed.

## 2022-09-05 NOTE — SUBJECTIVE & OBJECTIVE
Interval History: Pain unchanged this AM. Again with no other c/o.    Review of Systems  Objective:     Temp:  [96.1 °F (35.6 °C)-98.8 °F (37.1 °C)] 97.9 °F (36.6 °C)  Pulse:  [] 86  Resp:  [18-20] 20  SpO2:  [93 %-97 %] 97 %  BP: (134-175)/(84-99) 142/89     Body mass index is 37.43 kg/m².           Drains       None                   Physical Exam  Constitutional:       General: He is not in acute distress.     Appearance: He is well-developed.   HENT:      Head: Normocephalic and atraumatic.   Eyes:      Pupils: Pupils are equal, round, and reactive to light.   Pulmonary:      Effort: Pulmonary effort is normal. No respiratory distress.   Abdominal:      General: There is no distension.   Genitourinary:     Comments: [place] of erythema at patient's groin, scrotum. Has receded significantly from outline placed on 9/2.  Buried penis, yeast like discharge around skin covering penis  Scrotal wall edema diffusely present but notably reduced, remains tender, still with no crepitus, odor, areas of necrosis, or areas of fluctuance  Musculoskeletal:      Cervical back: Normal range of motion and neck supple.   Neurological:      Mental Status: He is alert and oriented to person, place, and time.   Psychiatric:         Behavior: Behavior normal.       Significant Labs:    BMP:  Recent Labs   Lab 09/02/22 0423 09/03/22 0433 09/04/22  0244   * 134* 138   K 3.2* 3.8 3.7   CL 96 104 107   CO2 25 20* 22*   BUN 8 15 17   CREATININE 0.7 2.1* 2.9*   CALCIUM 8.0* 8.3* 8.0*         CBC:   Recent Labs   Lab 09/02/22 0423 09/03/22 0433 09/04/22  0244   WBC 14.66* 17.41* 13.33*   HGB 11.9* 13.4* 13.1*   HCT 33.9* 38.2* 36.6*   * 127* 132*

## 2022-09-05 NOTE — PLAN OF CARE
Problem: Adult Inpatient Plan of Care  Goal: Plan of Care Review  Outcome: Ongoing, Progressing  Goal: Patient-Specific Goal (Individualized)  Outcome: Ongoing, Progressing  Goal: Absence of Hospital-Acquired Illness or Injury  Outcome: Ongoing, Progressing  Goal: Optimal Comfort and Wellbeing  Outcome: Ongoing, Progressing  Goal: Readiness for Transition of Care  Outcome: Ongoing, Progressing     Problem: Bariatric Environmental Safety  Goal: Safety Maintained with Care  Outcome: Ongoing, Progressing     Problem: Infection  Goal: Absence of Infection Signs and Symptoms  Outcome: Ongoing, Progressing     Problem: Diabetes Comorbidity  Goal: Blood Glucose Level Within Targeted Range  Outcome: Ongoing, Progressing     Problem: Adjustment to Illness (Sepsis/Septic Shock)  Goal: Optimal Coping  Outcome: Ongoing, Progressing     Problem: Bleeding (Sepsis/Septic Shock)  Goal: Absence of Bleeding  Outcome: Ongoing, Progressing     Problem: Glycemic Control Impaired (Sepsis/Septic Shock)  Goal: Blood Glucose Level Within Desired Range  Outcome: Ongoing, Progressing     Problem: Infection Progression (Sepsis/Septic Shock)  Goal: Absence of Infection Signs and Symptoms  Outcome: Ongoing, Progressing  Remain SR on telemetry monitor. PRN pain medication effective. Explained plan of care, verbalized understanding. Educated pt .on new medicine . No injury during shift, Side rails up x 2, call light by bedside.

## 2022-09-05 NOTE — PROGRESS NOTES
[facility]  Progress Note    Patient Name: Doe Woodall  MRN: [id number]  Patient Class: IP- Inpatient   Admission Date: 9/1/2022  Length of Stay: 4 days  Attending Physician: Giovanny Lynch MD  Primary Care Provider: [facility]        Subjective:     Principal Problem:Sepsis        HPI:  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  He reports acute onset of erythema, duration 3 days, has progressed and is now swollen and painful, no known exacerbating or alleviating factors, no attempted self treatment.  Denies fever, chills, cough, chest pain, SOB, dizziness, syncope, nausea, vomiting, diarrhea, abdominal pain, or dysuria.  In the ED, found to be tachycardic and tachypneic with leukocytosis.  CT abdomen pelvis and scrotal ultrasound revealed extensive cellulitis.  Procalcitonin 5.7.  Initial lactic acid 2.7.  Also with mild hyponatremia and mildly elevated liver enzymes.  Blood cultures obtained, IV fluid resuscitation and IV antibiotics initiated.      Overview/Hospital Course:  Patient admitted for groin abscess of perineum.  Started on Van and Zosyn. Blood cultures were NG. Urology consulted. Vanc was stopped due to acute renal failure. Nephrology was consulted. Patient started on Clindamycin.  Blood cultures were NG.  Patient's cellulitis clinically improved. There was no surgical intervention.      Interval History:  No new issues     Review of Systems   Constitutional:  Negative for activity change, appetite change and chills.   HENT:  Negative for congestion and dental problem.    Eyes:  Negative for discharge and itching.   Respiratory:  Negative for apnea and chest tightness.    Cardiovascular:  Negative for chest pain.   Gastrointestinal:   Negative for abdominal distention and abdominal pain.   Endocrine: Negative for cold intolerance and heat intolerance.   Genitourinary:  Negative for difficulty urinating.   Musculoskeletal:  Negative for arthralgias.   Neurological:  Negative for dizziness and facial asymmetry.   Hematological:  Negative for adenopathy.   Psychiatric/Behavioral:  Negative for agitation.    Objective:     Vital Signs (Most Recent):  Temp: 98 °F (36.7 °C) (09/05/22 0720)  Pulse: 90 (09/05/22 0720)  Resp: 18 (09/05/22 0901)  BP: (!) 152/85 (09/05/22 0720)  SpO2: 95 % (09/05/22 0720)   Vital Signs (24h Range):  Temp:  [96.1 °F (35.6 °C)-98.8 °F (37.1 °C)] 98 °F (36.7 °C)  Pulse:  [] 90  Resp:  [18-20] 18  SpO2:  [93 %-96 %] 95 %  BP: (134-175)/() 152/85     Weight: 105.2 kg (231 lb 14.8 oz)  Body mass index is 37.43 kg/m².  No intake or output data in the 24 hours ending 09/05/22 1020   Physical Exam  Vitals and nursing note reviewed.   Constitutional:       General: He is not in acute distress.     Appearance: He is obese. He is not ill-appearing, toxic-appearing or diaphoretic.   HENT:      Head: Normocephalic and atraumatic.   Eyes:      Conjunctiva/sclera: Conjunctivae normal.   Cardiovascular:      Rate and Rhythm: Normal rate and regular rhythm.   Pulmonary:      Effort: Pulmonary effort is normal. No respiratory distress.   Abdominal:      General: There is no distension.   Skin:     General: Skin is warm and dry.      Comments: Abscess groin   Neurological:      Mental Status: He is alert and oriented to person, place, and time.   Psychiatric:         Mood and Affect: Mood normal.         Behavior: Behavior normal.         Thought Content: Thought content normal.       Significant Labs: All pertinent labs within the past 24 hours have been reviewed.  CBC:   Recent Labs   Lab 09/04/22  0244   WBC 13.33*   HGB 13.1*   HCT 36.6*   *     CMP:   Recent Labs   Lab 09/04/22  0244      K 3.7      CO2 22*    GLU 79   BUN 17   CREATININE 2.9*   CALCIUM 8.0*   PROT 5.5*   ALBUMIN 1.7*   BILITOT 1.2*   ALKPHOS 337*   AST 68*   ALT 35   ANIONGAP 9       Significant Imaging:       Assessment/Plan:      * Sepsis  Tachycardia, tachypnea, and leukocytosis with elevated lactic acid.  This patient does have evidence of infective focus  My overall impression is sepsis. Vital signs were reviewed and noted in progress note.  Antibiotics given-   Antibiotics (From admission, onward)    Start     Stop Route Frequency Ordered    09/04/22 1000  clindamycin in D5W 600 mg/50 mL IVPB 600 mg         -- IV Every 8 hours (non-standard times) 09/04/22 0718        Cultures were taken-   Microbiology Results (last 7 days)     Procedure Component Value Units Date/Time    Blood culture x two cultures. Draw prior to antibiotics. [[id number]] Collected: 09/01/22 1100    Order Status: Completed Specimen: Blood from Peripheral, Forearm, Left Updated: 09/04/22 1303     Blood Culture, Routine No Growth to date      No Growth to date      No Growth to date      No Growth to date    Narrative:      Aerobic and anaerobic    Blood culture x two cultures. Draw prior to antibiotics. [[id number]] Collected: 09/01/22 1100    Order Status: Completed Specimen: Blood from Peripheral, Wrist, Right Updated: 09/04/22 1303     Blood Culture, Routine No Growth to date      No Growth to date      No Growth to date      No Growth to date    Narrative:      Aerobic and anaerobic    Urine culture [[id number]] Collected: 09/02/22 1341    Order Status: Completed Specimen: Urine Updated: 09/04/22 0558     Urine Culture, Routine No growth    Narrative:      Specimen Source->Urine        Latest lactate reviewed, they are-  No results for input(s): LACTATE in the last 72 hours.    Organ dysfunction indicated by N/a  Source- cellulitis     Source control Achieved by- urology consulted  Continue Clinda. Blood cultures NG.      MARISSA (acute kidney injury)  Patient with acute kidney  injury  Vanc stopped. IV fluids. Nephrology.  BMP pending.       Cellulitis of groin  As above  Now on Clinda    Chronic diastolic heart failure  No evidence of acute decompensation or fluid overload, echo earlier this year showed preserved EF with indeterminate LV diastolic function, and is, daily weights.    Hyponatremia  Mild, asymptomatic, repeat CMP in a.m.    Alcohol use disorder, severe, dependence  Counseled, reports stopping a week or two ago, continue MVI/thiamine/folate    Severe obesity (BMI >= 40)  Body mass index is 40.35 kg/m². Morbid obesity complicates all aspects of disease management from diagnostic modalities to treatment. Weight loss encouraged and health benefits explained to patient.     Elevated transaminase level  Consistent with baseline, likely due to heavy alcohol use.    Type 2 diabetes mellitus with hyperglycemia  Patient's FSGs are uncontrolled due to hyperglycemia on current medication regimen.  Last A1c reviewed-   Lab Results   Component Value Date    HGBA1C 12.6 (H) 01/04/2022     Most recent fingerstick glucose reviewed-   Recent Labs   Lab 09/01/22  1050 09/01/22  1509   POCTGLUCOSE 364* 327*     Current correctional scale  Medium  Maintain anti-hyperglycemic dose as follows-   Antihyperglycemics (From admission, onward)    Start     Stop Route Frequency Ordered    09/01/22 2100  insulin detemir U-100 pen 40 Units         -- SubQ Nightly 09/01/22 1506    09/01/22 1605  insulin aspart U-100 pen 1-10 Units         -- SubQ Before meals & nightly PRN 09/01/22 1506        Hold Oral hypoglycemics while patient is in the hospital.      VTE Risk Mitigation (From admission, onward)         Ordered     enoxaparin injection 30 mg  Daily         09/04/22 0713     IP VTE HIGH RISK PATIENT  Once         09/01/22 1506     Place sequential compression device  Until discontinued         09/01/22 1506                Discharge Planning   SUSAN:      Code Status: Full Code   Is the patient medically  ready for discharge?:     Reason for patient still in hospital (select all that apply): Patient unstable  Discharge Plan A: Home with family                  Giovanny Mooney MD  Department of Hospital Medicine   [facility]

## 2022-09-05 NOTE — PLAN OF CARE
Problem: Adult Inpatient Plan of Care  Goal: Plan of Care Review  Outcome: Ongoing, Progressing  Goal: Patient-Specific Goal (Individualized)  Outcome: Ongoing, Progressing  Goal: Absence of Hospital-Acquired Illness or Injury  Outcome: Ongoing, Progressing  Goal: Optimal Comfort and Wellbeing  Outcome: Ongoing, Progressing  Goal: Readiness for Transition of Care  Outcome: Ongoing, Progressing     Problem: Bariatric Environmental Safety  Goal: Safety Maintained with Care  Outcome: Ongoing, Progressing     Problem: Infection  Goal: Absence of Infection Signs and Symptoms  Outcome: Ongoing, Progressing     Problem: Diabetes Comorbidity  Goal: Blood Glucose Level Within Targeted Range  Outcome: Ongoing, Progressing     Problem: Adjustment to Illness (Sepsis/Septic Shock)  Goal: Optimal Coping  Outcome: Ongoing, Progressing     Problem: Bleeding (Sepsis/Septic Shock)  Goal: Absence of Bleeding  Outcome: Ongoing, Progressing     Problem: Glycemic Control Impaired (Sepsis/Septic Shock)  Goal: Blood Glucose Level Within Desired Range  Outcome: Ongoing, Progressing     Problem: Infection Progression (Sepsis/Septic Shock)  Goal: Absence of Infection Signs and Symptoms  Outcome: Ongoing, Progressing     Problem: Nutrition Impaired (Sepsis/Septic Shock)  Goal: Optimal Nutrition Intake  Outcome: Ongoing, Progressing     Problem: Pain Acute  Goal: Acceptable Pain Control and Functional Ability  Outcome: Ongoing, Progressing     Problem: Fluid and Electrolyte Imbalance (Acute Kidney Injury/Impairment)  Goal: Fluid and Electrolyte Balance  Outcome: Ongoing, Progressing     Problem: Oral Intake Inadequate (Acute Kidney Injury/Impairment)  Goal: Optimal Nutrition Intake  Outcome: Ongoing, Progressing     Problem: Renal Function Impairment (Acute Kidney Injury/Impairment)  Goal: Effective Renal Function  Outcome: Ongoing, Progressing

## 2022-09-05 NOTE — SUBJECTIVE & OBJECTIVE
Interval History:  No new issues     Review of Systems   Constitutional:  Negative for activity change, appetite change and chills.   HENT:  Negative for congestion and dental problem.    Eyes:  Negative for discharge and itching.   Respiratory:  Negative for apnea and chest tightness.    Cardiovascular:  Negative for chest pain.   Gastrointestinal:  Negative for abdominal distention and abdominal pain.   Endocrine: Negative for cold intolerance and heat intolerance.   Genitourinary:  Negative for difficulty urinating.   Musculoskeletal:  Negative for arthralgias.   Neurological:  Negative for dizziness and facial asymmetry.   Hematological:  Negative for adenopathy.   Psychiatric/Behavioral:  Negative for agitation.    Objective:     Vital Signs (Most Recent):  Temp: 98 °F (36.7 °C) (09/05/22 0720)  Pulse: 90 (09/05/22 0720)  Resp: 18 (09/05/22 0901)  BP: (!) 152/85 (09/05/22 0720)  SpO2: 95 % (09/05/22 0720)   Vital Signs (24h Range):  Temp:  [96.1 °F (35.6 °C)-98.8 °F (37.1 °C)] 98 °F (36.7 °C)  Pulse:  [] 90  Resp:  [18-20] 18  SpO2:  [93 %-96 %] 95 %  BP: (134-175)/() 152/85     Weight: 105.2 kg (231 lb 14.8 oz)  Body mass index is 37.43 kg/m².  No intake or output data in the 24 hours ending 09/05/22 1020   Physical Exam  Vitals and nursing note reviewed.   Constitutional:       General: He is not in acute distress.     Appearance: He is obese. He is not ill-appearing, toxic-appearing or diaphoretic.   HENT:      Head: Normocephalic and atraumatic.   Eyes:      Conjunctiva/sclera: Conjunctivae normal.   Cardiovascular:      Rate and Rhythm: Normal rate and regular rhythm.   Pulmonary:      Effort: Pulmonary effort is normal. No respiratory distress.   Abdominal:      General: There is no distension.   Skin:     General: Skin is warm and dry.      Comments: Abscess groin   Neurological:      Mental Status: He is alert and oriented to person, place, and time.   Psychiatric:         Mood and Affect:  Mood normal.         Behavior: Behavior normal.         Thought Content: Thought content normal.       Significant Labs: All pertinent labs within the past 24 hours have been reviewed.  CBC:   Recent Labs   Lab 09/04/22 0244   WBC 13.33*   HGB 13.1*   HCT 36.6*   *     CMP:   Recent Labs   Lab 09/04/22 0244      K 3.7      CO2 22*   GLU 79   BUN 17   CREATININE 2.9*   CALCIUM 8.0*   PROT 5.5*   ALBUMIN 1.7*   BILITOT 1.2*   ALKPHOS 337*   AST 68*   ALT 35   ANIONGAP 9       Significant Imaging:

## 2022-09-05 NOTE — PROGRESS NOTES
"Nephrology Consult Note    Date of Admit: 9/1/2022    Chief Complaint/Reason for Consult     Abscess (Pt reports to the ED with C/O boil/ abscess to the left groin. Pt reports "I had a rash there but now it is swollen and painful." Pt denies any drainage at this time. Pt AAOx4, RESP easy and unlabored. Pt awaiting QT bed. )   for MARISSA     Nephrology team consulted for ESRD/ HD   Subjective:      History of Present Illness:  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  He reports acute onset of erythema, duration 3 days, has progressed and is now swollen and painful, no known exacerbating or alleviating factors, no attempted self treatment.  Denies fever, chills, cough, chest pain, SOB, dizziness, syncope, nausea, vomiting, diarrhea, abdominal pain, or dysuria.  In the ED, found to be tachycardic and tachypneic with leukocytosis.  CT abdomen pelvis and scrotal ultrasound revealed extensive cellulitis.  Procalcitonin 5.7.  Initial lactic acid 2.7.  Also with mild hyponatremia and mildly elevated liver enzymes.  Blood cultures obtained, IV fluid resuscitation and IV antibiotics initiated.        Overview/Hospital Course:  Patient admitted for groin abscess of perineum.  Started on Van and Zosyn. Blood cultures were NG. Urology consulted. Vanc was stopped due to acute renal failure. Nephrology was consulted. Patient strted on Clindamycin.  Blood cultures were NG.      Past Medical History:  Past Medical History:   Diagnosis Date    Abscess of right groin 09/01/2022    Diabetes mellitus     Encounter for blood transfusion     Loud snoring     Obese     Other insomnia 08/03/2020    Perineal abscess 08/01/2014       Past Surgical History:  Past Surgical History:   Procedure Laterality Date    ABCESS DRAINAGE  2014    PERIRECTAL    " DECOMPRESSION OF LUMBAR SPINE USING MINIMALLY INVASIVE TECHNIQUE Right 07/06/2018    Procedure: DECOMPRESSION, SPINE, LUMBAR, MINIMALLY INVASIVE L3-4;  Surgeon: Cristian Cervantes MD;  Location: [facility] OR [address] [place] [address];  Service: Neurosurgery;  Laterality: Right;    ORTHOPEDIC SURGERY         Allergies:  Review of patient's allergies indicates:   Allergen Reactions    Metformin Diarrhea       Home Medications:  Prior to Admission medications    Medication Sig Start Date End Date Taking? Authorizing Provider   acetaminophen (TYLENOL) 500 MG tablet Take 1,000 mg by mouth 3 (three) times daily as needed for Pain.   Yes Historical Provider   dulaglutide (TRULICITY) 1.5 mg/0.5 mL pen injector Inject 1.5 mg into the skin every 7 days.  Patient taking differently: Inject 1.5 mg into the skin every 7 days. Tuesdays 1/8/22 1/8/23 Yes Ming Potts MD   blood sugar diagnostic Strp 1 strip by Misc.(Non-Drug; Combo Route) route 4 (four) times daily with meals and nightly.  Patient not taking: Reported on 9/1/2022 8/5/20   Natasha Garza MD   blood-glucose meter Misc Use as instructed  Patient not taking: Reported on 9/1/2022 8/5/20   Natasha Garza MD   diphenhydramine HCl (UNISOM SLEEPGELS ORAL) Take 1 capsule by mouth every evening.    Historical Provider   docusate sodium (COLACE) 100 MG capsule Take 1 capsule (100 mg total) by mouth 3 (three) times daily as needed for Constipation.  Patient not taking: Reported on 9/1/2022 6/1/22   Shruthi Weathers MD   folic acid (FOLVITE) 1 MG tablet Take 1 tablet (1 mg total) by mouth once daily.  Patient not taking: Reported on 9/1/2022 3/24/22 3/24/23  Nati Soria NP   glucagon (GLUCAGEN HYPOKIT) 1 mg SolR Inject 1 mg into the muscle as needed (Hypoglycemia).  Patient not taking: Reported on 9/1/2022 1/8/22   Ming Potts MD   HYDROcodone-acetaminophen (NORCO) 5-325 mg per tablet Take 1 tablet by mouth every 6 (six) hours as needed for Pain.  Patient not taking:  "Reported on 9/1/2022 6/1/22   Shruthi Weathers MD   ibuprofen (ADVIL,MOTRIN) 600 MG tablet Take 1 tablet (600 mg total) by mouth every 8 (eight) hours as needed for Pain.  Patient not taking: Reported on 9/1/2022 6/1/22   Shruthi Weathers MD   insulin aspart U-100 (NOVOLOG) 100 unit/mL (3 mL) InPn pen Inject 14 Units into the skin 3 (three) times daily. 3/23/22 3/23/23  Nati Soria NP   insulin detemir U-100 (LEVEMIR FLEXTOUCH) 100 unit/mL (3 mL) SubQ InPn pen Inject 60 Units into the skin once daily. 3/23/22 3/23/23  Nati Soria NP   lancets 30 gauge Misc 1 lancet by Misc.(Non-Drug; Combo Route) route 4 (four) times daily with meals and nightly.  Patient not taking: Reported on 9/1/2022 8/5/20   Natasha Garza MD   lancing device Misc Use as instructed  Patient not taking: Reported on 9/1/2022 8/5/20   Natasha Garza MD   pen needle, diabetic 31 gauge x 5/16" Ndle 1 each by Misc.(Non-Drug; Combo Route) route 4 (four) times daily with meals and nightly.  Patient not taking: Reported on 9/1/2022 8/5/20   Natasha Garza MD   pen needle, diabetic 32 gauge x 5/32" Ndle USE TO INJECT INSULIN FOUR TIMES DAILY  Patient not taking: Reported on 9/1/2022 3/23/22   Nati Soria NP   pulse oximeter (PULSE OXIMETER) device by Apply Externally route 2 (two) times a day. Use twice daily at 8 AM and 3 PM and record the value in MyChart as directed.  Patient not taking: Reported on 9/1/2022 1/8/22   Ming Potts MD   thiamine 100 MG tablet Take 1 tablet (100 mg total) by mouth once daily.  Patient not taking: Reported on 9/1/2022 3/24/22   Nati oSria NP       Family History:  Family History   Problem Relation Age of Onset    Diabetes Father     Diabetes Paternal Grandmother     Diabetes Paternal Grandfather        Social History:  Social History     Tobacco Use    Smoking status: Former     Packs/day: 0.50     Years: 9.00     Pack years: 4.50     Types: Cigarettes     Quit date: " "2013     Years since quittin.1    Smokeless tobacco: Former   Substance Use Topics    Alcohol use: Not Currently     Comment: DAILY PINT OF LIQUOR, HX OF 1 "TALL BOY" OF BEER DAILY    Drug use: Not Currently       Review of Systems:  Pertinent positives and negatives are listed in HPI.  All other systems are reviewed and are negative.     Objective:   Last 24 Hour Vital Signs:  BP  Min: 134/97  Max: 175/99  Temp  Av.1 °F (36.7 °C)  Min: 96.1 °F (35.6 °C)  Max: 98.8 °F (37.1 °C)  Pulse  Av.8  Min: 90  Max: 107  Resp  Av.1  Min: 18  Max: 20  SpO2  Av.8 %  Min: 93 %  Max: 96 %  Body mass index is 37.43 kg/m².  I/O last 3 completed shifts:  In: -   Out: 1 [Urine:1]    Physical Examination:  General: No acute distress,   HEENT: EOMI, PERRL, MMM, OP clear and without exudate or erythema  Cardiovascular:  NRRR, without appreciated murmurs or rubs, without extra heart sounds, peripheral pulses 2+ throughout  Chest/Pulmonary: CTABL, normal work of breathing without accessory muscle use,   Abdomen:  soft, nontender, nondistended, bowel sounds heard in all four quadrants and normo-active   MSKL: without gross deformity, active FROM  Lymphatic: no appreciated LAD  Skin: without cyanosis or clubbing, without  peripheral edema   Neurologic: AAOx3, sensation intact to light palpation throughout extremities,  strength 5/5 of gross flexor and extensor groups of extremities  Access:     Laboratory:  Most Recent Data:  CBC:   Lab Results   Component Value Date    WBC 13.33 (H) 2022    HGB 13.1 (L) 2022    HCT 36.6 (L) 2022     (L) 2022    MCV 99 (H) 2022    RDW 11.3 (L) 2022     BMP:   Lab Results   Component Value Date     2022    K 3.7 2022     2022    CO2 22 (L) 2022    BUN 17 2022    CREATININE 2.9 (H) 2022    GLU 79 2022    CALCIUM 8.0 (L) 2022    MG 2.2 2022    PHOS 4.5 2022     LFTs: "   Lab Results   Component Value Date    PROT 5.5 (L) 09/04/2022    ALBUMIN 1.7 (L) 09/04/2022    BILITOT 1.2 (H) 09/04/2022    AST 68 (H) 09/04/2022    ALKPHOS 337 (H) 09/04/2022    ALT 35 09/04/2022     Coags:   Lab Results   Component Value Date    INR 1.1 09/01/2022     Urinalysis:   Lab Results   Component Value Date    LABURIN No growth 09/02/2022    COLORU Yellow 09/02/2022    SPECGRAV 1.025 09/02/2022    NITRITE Negative 09/02/2022    KETONESU Negative 09/02/2022    UROBILINOGEN Negative 09/02/2022    WBCUA 28 (H) 09/02/2022       Trended Lab Data:  Recent Labs   Lab 09/02/22  0423 09/03/22  0433 09/04/22  0244   WBC 14.66* 17.41* 13.33*   HGB 11.9* 13.4* 13.1*   HCT 33.9* 38.2* 36.6*   * 127* 132*   MCV 99* 99* 99*   RDW 11.5 11.3* 11.3*   * 134* 138   K 3.2* 3.8 3.7   CL 96 104 107   CO2 25 20* 22*   BUN 8 15 17   CREATININE 0.7 2.1* 2.9*   * 87 79   PROT 5.4* 5.8* 5.5*   ALBUMIN 1.9* 1.9* 1.7*   BILITOT 1.3* 1.4* 1.2*   AST 46* 60* 68*   ALKPHOS 294* 311* 337*   ALT 45* 38 35       Trended Cardiac Data:  No results for input(s): TROPONINI, CKTOTAL, CKMB, BNP in the last 168 hours.      Radiology:  Imaging Results              CT Abdomen Pelvis With Contrast (Final result)  Result time 09/01/22 13:59:48      Final result by Michael Amin MD (09/01/22 13:59:48)                   Impression:      1. Diffuse induration and disorganized fluid throughout the right aspect of the perineum extending to the scrotal wall.  There is overlying skin thickening.  No convincing focal organized fluid collection at this time however please see previous ultrasound for more detailed evaluation.  Findings are concerning for infectious process.  There is reactive bilateral inguinal lymphadenopathy right greater than left.  2. Findings suggesting hepatic steatosis, correlation with LFTs recommended.  3. Bilateral perinephric fat stranding, nonspecific, correlation with urinalysis recommended to exclude  sequela of infection.  4. Please see above for several additional findings.      Electronically signed by: Michael Amin MD  Date:    09/01/2022  Time:    13:59               Narrative:    EXAMINATION:  CT ABDOMEN PELVIS WITH CONTRAST    CLINICAL HISTORY:  testicular abscess;    TECHNIQUE:  Low dose axial images, sagittal and coronal reformations were obtained from the lung bases to the pubic symphysis following the IV administration of 100 mL of Omnipaque 350 .  Oral contrast was not given.    COMPARISON:  Testicular ultrasound 09/01/2022    FINDINGS:  Images of the lower thorax are remarkable for bilateral dependent atelectasis, right greater than left.    The liver is hypoattenuating suggesting steatosis, correlation with LFTs recommended.  The spleen, pancreas and adrenal glands are unremarkable.  There is cholelithiasis without secondary findings to suggest acute cholecystitis.  The portal vein, splenic vein, SMV, celiac axis and SMA all are patent.  No significant abdominal lymphadenopathy.    The kidneys enhance symmetrically without hydronephrosis or nephrolithiasis.  There is a punctate focus of low attenuation within the interpolar region of the left kidney, too small for characterization.  There is mild bilateral perinephric fat stranding.  The bilateral ureters are unremarkable without calculi seen.  The urinary bladder is distended without wall thickening.  The prostate is not enlarged.    There are a few scattered colonic diverticula without inflammation.  The terminal ileum and appendix are unremarkable.  The small bowel is grossly unremarkable.  There are several scattered shotty periaortic and paracaval lymph nodes.  No focal organized pelvic fluid collection.    Degenerative changes are noted of the spine.  There is bilateral inguinal lymphadenopathy, right greater than left.    There is diffuse induration within the right peroneal soft tissues extending to the scrotum.  No clearly identified rim  enhancing fluid collection in the region.  There is scrotal wall thickening.  This region is better evaluated on previous ultrasound.                                       US Scrotum And Testicles (Final result)  Result time 09/01/22 13:41:11      Final result by Andrea Hdez Jr., MD (09/01/22 13:41:11)                   Impression:      Scrotal cellulitis and probable cutaneous boil.  No extension/involvement of the testicle.      Electronically signed by: Andrea Marin Jr  Date:    09/01/2022  Time:    13:41               Narrative:    EXAMINATION:  US SCROTUM AND TESTICLES    CLINICAL HISTORY:  Cutaneous abscess, unspecified    TECHNIQUE:  Sonography of the scrotum and testes.    COMPARISON:  None.    FINDINGS:  Right Testicle:    *Size: 4.4 cm  *Appearance: Normal.  *Flow: Normal arterial and venous flow  *Epididymis: Normal.  *Hydrocele: None.  *Varicocele: None.  .    Left Testicle:    *Size: 3.8 cm  *Appearance: Normal.  *Flow: Normal arterial and venous flow  *Epididymis: Normal.  *Hydrocele: None.  *Varicocele: None.  .    Other findings: There is soft tissue edema corresponding to the area of clinical concern which is marked as the right inguinal region anterior to the right testicle.  There is some decreased echogenicity extending to the skin surface which may reflect a cutaneous boil, but no well-defined subcutaneous fluid collection is seen.  There is surrounding scrotal soft tissue swelling..                                       X-Ray Chest AP Portable (Final result)  Result time 09/01/22 11:28:07      Final result by Rey Ray MD (09/01/22 11:28:07)                   Impression:      1. No appreciable new acute cardiopulmonary process appreciated.      Electronically signed by: Rey Ray  Date:    09/01/2022  Time:    11:28               Narrative:    EXAMINATION:  XR CHEST AP PORTABLE    CLINICAL HISTORY:  infection;    TECHNIQUE:  Single frontal portable view of the chest was  performed.    COMPARISON:  Chest radiograph 03/22/2022    FINDINGS:  Cardiomediastinal silhouette is within normal limits.    Persistent right greater than left markedly low lung volumes associated with bibasilar right greater than left airspace opacities likely reflecting compressive atelectasis, not significantly changed.  Otherwise, no appreciable new focal consolidation, overt interstitial edema, sizable pleural effusion or pneumothorax.    Multilevel degenerative changes of the imaged spine.                                         Assessment and Plan:      Doe Woodall is a 32 y.o.male with  Patient Active Problem List    Diagnosis Date Noted    MARISSA (acute kidney injury) 09/04/2022    Cellulitis of groin 09/01/2022    Sepsis 09/01/2022    Chronic diastolic heart failure 01/06/2022    Type 2 diabetes mellitus with hypoglycemia without coma     Hyponatremia 01/04/2022    Anxiety disorder 08/23/2020    Substance induced mood disorder 08/04/2020    Alcohol use disorder, severe, dependence 08/04/2020    Elevated transaminase level 08/03/2020    Severe obesity (BMI >= 40) 08/03/2020    Other insomnia 08/03/2020    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  - Daily weights and chart  - Medication doses adjusted to GFR  - Maintain MAP > 65  - Hb > 7 gm/dL  - Will follow closely

## 2022-09-05 NOTE — ASSESSMENT & PLAN NOTE
Tachycardia, tachypnea, and leukocytosis with elevated lactic acid.  This patient does have evidence of infective focus  My overall impression is sepsis. Vital signs were reviewed and noted in progress note.  Antibiotics given-   Antibiotics (From admission, onward)    Start     Stop Route Frequency Ordered    09/04/22 1000  clindamycin in D5W 600 mg/50 mL IVPB 600 mg         -- IV Every 8 hours (non-standard times) 09/04/22 0718        Cultures were taken-   Microbiology Results (last 7 days)     Procedure Component Value Units Date/Time    Blood culture x two cultures. Draw prior to antibiotics. [[id number]] Collected: 09/01/22 1100    Order Status: Completed Specimen: Blood from Peripheral, Forearm, Left Updated: 09/04/22 1303     Blood Culture, Routine No Growth to date      No Growth to date      No Growth to date      No Growth to date    Narrative:      Aerobic and anaerobic    Blood culture x two cultures. Draw prior to antibiotics. [[id number]] Collected: 09/01/22 1100    Order Status: Completed Specimen: Blood from Peripheral, Wrist, Right Updated: 09/04/22 1303     Blood Culture, Routine No Growth to date      No Growth to date      No Growth to date      No Growth to date    Narrative:      Aerobic and anaerobic    Urine culture [[id number]] Collected: 09/02/22 1341    Order Status: Completed Specimen: Urine Updated: 09/04/22 0558     Urine Culture, Routine No growth    Narrative:      Specimen Source->Urine        Latest lactate reviewed, they are-  No results for input(s): LACTATE in the last 72 hours.    Organ dysfunction indicated by N/a  Source- cellulitis     Source control Achieved by- urology consulted  Continue Clinda. Blood cultures NG.

## 2022-09-05 NOTE — PLAN OF CARE
09/05/22 0951   Discharge Planning   Assessment Type Discharge Planning Reassessment   Resource/Environmental Concerns none   Support Systems Spouse/significant other   Equipment Currently Used at Home cane, straight   Current Living Arrangements home/apartment/condo   Patient/Family Anticipates Transition to home with family   Patient/Family Anticipated Services at Transition none   DME Needed Upon Discharge  none   Discharge Plan A Home with family

## 2022-09-05 NOTE — PROGRESS NOTES
[place] [facility]  [facility]rology  Progress Note    Patient Name: Doe Woodall  MRN: [id number]  Admission Date: 9/1/2022  Hospital Length of Stay: 4 days  Code Status: Full Code   Attending Provider: Giovanny Lynch MD   Primary Care Physician: [facility] - Family Medicine    Subjective:     HPI:  31 yo man presented to the hospital with progressive scrotal pain which started off as a boil on his right scrotum. Patient with hx of uncontrolled DM. Patient's imaging on arrival did not demonstrate presence of abscess. Urology consulted for further evaluation. Denies dysuria, fevers, chills, chest pain, shortness of breath. Had breakfast this morning.       Interval History: Pain unchanged this AM. Again with no other c/o.    Review of Systems  Objective:     Temp:  [96.1 °F (35.6 °C)-98.8 °F (37.1 °C)] 97.9 °F (36.6 °C)  Pulse:  [] 86  Resp:  [18-20] 20  SpO2:  [93 %-97 %] 97 %  BP: (134-175)/(84-99) 142/89     Body mass index is 37.43 kg/m².           Drains       None                   Physical Exam  Constitutional:       General: He is not in acute distress.     Appearance: He is well-developed.   HENT:      Head: Normocephalic and atraumatic.   Eyes:      Pupils: Pupils are equal, round, and reactive to light.   Pulmonary:      Effort: Pulmonary effort is normal. No respiratory distress.   Abdominal:      General: There is no distension.   Genitourinary:     Comments: Bradley of erythema at patient's groin, scrotum. Has receded significantly from outline placed on 9/2.  Buried penis, yeast like discharge around skin covering penis  Scrotal wall edema diffusely present but notably reduced, remains tender, still with no crepitus, odor, areas of necrosis, or areas of fluctuance  Musculoskeletal:      Cervical back: Normal range of motion and neck supple.   Neurological:      Mental Status: He is alert and oriented to person, place, and time.   Psychiatric:         Behavior: Behavior normal.        Significant Labs:    BMP:  Recent Labs   Lab 09/02/22  0423 09/03/22  0433 09/04/22  0244   * 134* 138   K 3.2* 3.8 3.7   CL 96 104 107   CO2 25 20* 22*   BUN 8 15 17   CREATININE 0.7 2.1* 2.9*   CALCIUM 8.0* 8.3* 8.0*         CBC:   Recent Labs   Lab 09/02/22  0423 09/03/22  0433 09/04/22  0244   WBC 14.66* 17.41* 13.33*   HGB 11.9* 13.4* 13.1*   HCT 33.9* 38.2* 36.6*   * 127* 132*           Assessment/Plan:     MARISSA (acute kidney injury)  -- Uncertain etiology  -- No evidence of urinary retention  -- Further management per primary    Cellulitis of groin  · No clinically apparent abscess or sign of necrosis again on today's examination  · Will continue to monitor for development of abscess/necrotizing soft tissue infection   · Maintain tight BS control  · Continue abx - consider expanding coverage given increased WBC today  · OK to resume diet today; NPO again at midnight for possible incision/drainage/debridement tomorrow morning        VTE Risk Mitigation (From admission, onward)         Ordered     enoxaparin injection 30 mg  Daily         09/04/22 0713     IP VTE HIGH RISK PATIENT  Once         09/01/22 1506     Place sequential compression device  Until discontinued         09/01/22 1506                Tavares Fitch MD  Urology  [facility] - Telemetry

## 2022-09-06 NOTE — PROGRESS NOTES
Awake alert oriented NAD    Denies CNS ENT CP GI  RHEUM OR DERM SX  Past Medical History:   Diagnosis Date    Abscess of right groin 09/01/2022    Diabetes mellitus     Encounter for blood transfusion     Loud snoring     Obese     Other insomnia 08/03/2020    Perineal abscess 08/01/2014     Past Surgical History:   Procedure Laterality Date    ABCESS DRAINAGE  2014    PERIRECTAL    DECOMPRESSION OF LUMBAR SPINE USING MINIMALLY INVASIVE TECHNIQUE Right 07/06/2018    Procedure: DECOMPRESSION, SPINE, LUMBAR, MINIMALLY INVASIVE L3-4;  Surgeon: Cristian Cervantes MD;  Location: [facility] OR [address] [place] [address];  Service: Neurosurgery;  Laterality: Right;    ORTHOPEDIC SURGERY       Review of patient's allergies indicates:   Allergen Reactions    Metformin Diarrhea       Current Facility-Administered Medications   Medication    0.9%  NaCl infusion    acetaminophen tablet 650 mg    albuterol-ipratropium 2.5 mg-0.5 mg/3 mL nebulizer solution 3 mL    aluminum-magnesium hydroxide-simethicone 200-200-20 mg/5 mL suspension 30 mL    ampicillin-sulbactam 3 g in sodium chloride 0.9 % 100 mL IVPB (ready to mix system)    clindamycin in D5W 600 mg/50 mL IVPB 600 mg    COVID-19 vac, pablo(Pfizer)(PF) (Pfizer COVID-19) 30 mcg/0.3 mL injection 0.3 mL    dextrose 10% bolus 125 mL    dextrose 10% bolus 250 mL    diphenhydrAMINE injection 25 mg    enoxaparin injection 30 mg    fluconazole tablet 100 mg    folic acid tablet 1 mg    glucagon (human recombinant) injection 1 mg    glucose chewable tablet 16 g    glucose chewable tablet 24 g    HYDROcodone-acetaminophen 5-325 mg per tablet 1 tablet    insulin aspart U-100 pen 1-10 Units    insulin detemir U-100 pen 20 Units    insulin detemir U-100 pen 60 Units    melatonin tablet 6 mg    miconazole NITRATE 2 % top powder    morphine injection 2 mg    multivitamin tablet    naloxone 0.4 mg/mL injection 0.02 mg    ondansetron injection 4 mg    polyethylene glycol packet 17 g    prochlorperazine injection Soln 5  mg    simethicone chewable tablet 80 mg    sodium chloride 0.9% flush 10 mL    thiamine tablet 100 mg       LABS    Recent Results (from the past 24 hour(s))   POCT glucose    Collection Time: 09/05/22  4:13 PM   Result Value Ref Range    POCT Glucose 96 70 - 110 mg/dL   POCT glucose    Collection Time: 09/05/22  7:45 PM   Result Value Ref Range    POCT Glucose 131 (H) 70 - 110 mg/dL   Vancomycin, random    Collection Time: 09/06/22  4:53 AM   Result Value Ref Range    Vancomycin, Random 7.6 Not established ug/mL   CBC auto differential    Collection Time: 09/06/22  4:53 AM   Result Value Ref Range    WBC 11.36 3.90 - 12.70 K/uL    RBC 3.44 (L) 4.60 - 6.20 M/uL    Hemoglobin 11.7 (L) 14.0 - 18.0 g/dL    Hematocrit 35.2 (L) 40.0 - 54.0 %     (H) 82 - 98 fL    MCH 34.0 (H) 27.0 - 31.0 pg    MCHC 33.2 32.0 - 36.0 g/dL    RDW 11.9 11.5 - 14.5 %    Platelets 219 150 - 450 K/uL    MPV 9.2 9.2 - 12.9 fL    Immature Granulocytes 1.3 (H) 0.0 - 0.5 %    Gran # (ANC) 7.3 1.8 - 7.7 K/uL    Immature Grans (Abs) 0.15 (H) 0.00 - 0.04 K/uL    Lymph # 1.9 1.0 - 4.8 K/uL    Mono # 1.9 (H) 0.3 - 1.0 K/uL    Eos # 0.2 0.0 - 0.5 K/uL    Baso # 0.04 0.00 - 0.20 K/uL    nRBC 0 0 /100 WBC    Gran % 63.8 38.0 - 73.0 %    Lymph % 16.3 (L) 18.0 - 48.0 %    Mono % 16.8 (H) 4.0 - 15.0 %    Eosinophil % 1.4 0.0 - 8.0 %    Basophil % 0.4 0.0 - 1.9 %    Platelet Estimate Appears normal     Differential Method Automated    Basic Metabolic Panel    Collection Time: 09/06/22  4:53 AM   Result Value Ref Range    Sodium 134 (L) 136 - 145 mmol/L    Potassium 4.0 3.5 - 5.1 mmol/L    Chloride 106 95 - 110 mmol/L    CO2 17 (L) 23 - 29 mmol/L    Glucose 136 (H) 70 - 110 mg/dL    BUN 24 (H) 6 - 20 mg/dL    Creatinine 3.1 (H) 0.5 - 1.4 mg/dL    Calcium 7.9 (L) 8.7 - 10.5 mg/dL    Anion Gap 11 8 - 16 mmol/L    eGFR 26 (A) >60 mL/min/1.73 m^2   POCT glucose    Collection Time: 09/06/22  8:01 AM   Result Value Ref Range    POCT Glucose 133 (H) 70 - 110  mg/dL   POCT glucose    Collection Time: 09/06/22 11:14 AM   Result Value Ref Range    POCT Glucose 187 (H) 70 - 110 mg/dL   ]    No intake/output data recorded.    Vitals:    09/06/22 0800 09/06/22 0843 09/06/22 1024 09/06/22 1113   BP: 122/88   (!) 141/87   Pulse: 92   87   Resp: 20  18 19   Temp: 98.5 °F (36.9 °C)   98.3 °F (36.8 °C)   TempSrc:    Oral   SpO2: 98% 98%  (!) 94%   Weight:       Height:           No Jvd, Thyromegaly or Lymphadenopathy  Lungs: Fairly clear anteriorly and laterally  Cor: RRR no G or rubs  Abd: Soft benign good bowel sounds non tender  Ext: Pos edema     A)  MARISSA  cera seems to be peaking   Cellulitis of his groin   Sepsis  DM  CHF (d) retinopathy  ETOH user with elevated LFT's     P)    Renal Diet  IVF's   Home meds  Adjust all meds to the degree of renal fx  Close follow up I/O and weights  Maintain Hydration

## 2022-09-06 NOTE — PROGRESS NOTES
[facility]  [facility] Note    Patient Name: Doe Woodall  MRN: [id number]  Admission Date: 9/1/2022  Hospital Length of Stay: 5 days  Code Status: Full Code   Attending Provider: Sal Baldwin MD   Primary Care Physician: [facility]    Subjective:     HPI:  31 yo man presented to the hospital with progressive scrotal pain which started off as a boil on his right scrotum. Patient with hx of uncontrolled DM. Patient's imaging on arrival did not demonstrate presence of abscess. Urology consulted for further evaluation. Denies dysuria, fevers, chills, chest pain, shortness of breath. Had breakfast this morning.       Interval History: pain present, voiding well    Review of Systems   Constitutional:  Negative for activity change, appetite change, chills, diaphoresis and fever.   HENT:  Negative for congestion and rhinorrhea.    Eyes:  Negative for visual disturbance.   Respiratory:  Negative for choking and shortness of breath.    Gastrointestinal:  Negative for abdominal distention, abdominal pain, constipation, diarrhea, nausea and vomiting.   Genitourinary:  Positive for scrotal swelling and testicular pain. Negative for difficulty urinating, dysuria, flank pain, hematuria and urgency.   Skin:  Positive for color change. Negative for pallor and wound.   Neurological:  Negative for dizziness and syncope.   Psychiatric/Behavioral:  Negative for confusion and hallucinations.    Objective:     Temp:  [97.6 °F (36.4 °C)-98.5 °F (36.9 °C)] 98.5 °F (36.9 °C)  Pulse:  [86-92] 92  Resp:  [16-20] 20  SpO2:  [97 %-98 %] 98 %  BP: (122-152)/(76-97) 122/88     Body mass index is 37.43 kg/m².           Drains       None                   Physical Exam  Nursing note reviewed.   Constitutional:       General: He is not in acute distress.     Appearance: He is well-developed. He is not ill-appearing, toxic-appearing or diaphoretic.   HENT:      Head: Normocephalic and  atraumatic.      Mouth/Throat:      Mouth: Mucous membranes are moist.   Eyes:      Conjunctiva/sclera: Conjunctivae normal.   Pulmonary:      Effort: Pulmonary effort is normal. No respiratory distress.   Abdominal:      General: Abdomen is flat. There is no distension.      Palpations: Abdomen is soft. There is no mass.      Tenderness: There is no abdominal tenderness. There is no guarding.   Genitourinary:     Testes:         Right: Tenderness and swelling present. Mass not present.         Left: Tenderness and swelling present. Mass not present.          Comments: No necrosis  No crepitus  No fluctuance  Scrotal edema noted   Buried penis noted  Retraction of erythema continues  Musculoskeletal:         General: No swelling or deformity.      Cervical back: Neck supple.   Skin:     General: Skin is warm.      Capillary Refill: Capillary refill takes less than 2 seconds.      Findings: No rash.   Neurological:      Mental Status: He is alert and oriented to person, place, and time.      Gait: Gait normal.   Psychiatric:         Mood and Affect: Mood normal.         Thought Content: Thought content normal.         Judgment: Judgment normal.       Significant Labs:    BMP:  Recent Labs   Lab 09/04/22  0244 09/05/22  1029 09/06/22  0453    135* 134*   K 3.7 4.2 4.0    108 106   CO2 22* 15* 17*   BUN 17 21* 24*   CREATININE 2.9* 3.1* 3.1*   CALCIUM 8.0* 8.0* 7.9*       CBC:   Recent Labs   Lab 09/03/22  0433 09/04/22  0244 09/06/22  0453   WBC 17.41* 13.33* 11.36   HGB 13.4* 13.1* 11.7*   HCT 38.2* 36.6* 35.2*   * 132* 219                       Assessment/Plan:     MARISSA (acute kidney injury)  -- Uncertain etiology  -- No evidence of urinary retention  -- Further management per primary    Cellulitis of groin  No clinically apparent abscess or sign of necrosis again on today's examination  Will continue to monitor for development of abscess/necrotizing soft tissue infection   Maintain tight BS  control  Continue abx per primary- leukocytosis improving daily which is reassuring.   OK to resume diet today; NPO again at midnight for possible incision/drainage/debridement tomorrow morning        VTE Risk Mitigation (From admission, onward)         Ordered     enoxaparin injection 30 mg  Daily         09/04/22 0713     IP VTE HIGH RISK PATIENT  Once         09/01/22 1506     Place sequential compression device  Until discontinued         09/01/22 1506                Amber Contreras MD  Urology  [facility] [place] [facility] - Telemetry

## 2022-09-06 NOTE — SUBJECTIVE & OBJECTIVE
Interval History: No events overnight. Persistent pain.     Review of Systems   Constitutional:  Negative for chills and fever.   Respiratory:  Negative for cough and shortness of breath.    Cardiovascular:  Negative for chest pain and leg swelling.   Gastrointestinal:  Negative for abdominal distention and abdominal pain.   Genitourinary:  Positive for penile swelling, scrotal swelling and testicular pain.   Objective:     Vital Signs (Most Recent):  Temp: 98.5 °F (36.9 °C) (09/06/22 0800)  Pulse: 92 (09/06/22 0800)  Resp: 18 (09/06/22 1024)  BP: 122/88 (09/06/22 0800)  SpO2: 98 % (09/06/22 0843) Vital Signs (24h Range):  Temp:  [97.6 °F (36.4 °C)-98.5 °F (36.9 °C)] 98.5 °F (36.9 °C)  Pulse:  [86-92] 92  Resp:  [16-20] 18  SpO2:  [97 %-98 %] 98 %  BP: (122-152)/(76-97) 122/88     Weight: 105.2 kg (231 lb 14.8 oz)  Body mass index is 37.43 kg/m².    Intake/Output Summary (Last 24 hours) at 9/6/2022 1031  Last data filed at 9/6/2022 0800  Gross per 24 hour   Intake 240 ml   Output --   Net 240 ml      Physical Exam  Constitutional:       General: He is not in acute distress.     Appearance: He is ill-appearing. He is not toxic-appearing or diaphoretic.   Cardiovascular:      Rate and Rhythm: Normal rate and regular rhythm.      Heart sounds: No murmur heard.    No gallop.   Pulmonary:      Effort: Pulmonary effort is normal. No respiratory distress.      Breath sounds: Normal breath sounds. No wheezing.   Abdominal:      General: Bowel sounds are normal. There is no distension.      Palpations: Abdomen is soft.      Tenderness: There is no abdominal tenderness.   Genitourinary:     Comments: Erythema and swelling of b/l groin, testes, and penis. Tender to palpation. No abscess palpated.      Significant Labs: All pertinent labs within the past 24 hours have been reviewed.    Significant Imaging: I have reviewed all pertinent imaging results/findings within the past 24 hours.

## 2022-09-06 NOTE — ASSESSMENT & PLAN NOTE
Patient's FSGs are uncontrolled due to hyperglycemia on current medication regimen.  Last A1c reviewed-   Lab Results   Component Value Date    HGBA1C 12.6 (H) 01/04/2022     Most recent fingerstick glucose reviewed-   Recent Labs   Lab 09/05/22  1124 09/05/22  1613 09/05/22  1945 09/06/22  0801   POCTGLUCOSE 109 96 131* 133*     Current correctional scale  Medium  Maintain anti-hyperglycemic dose as follows-   Antihyperglycemics (From admission, onward)    Start     Stop Route Frequency Ordered    09/04/22 2100  insulin detemir U-100 pen 60 Units         -- SubQ Nightly 09/04/22 0712    09/04/22 0815  insulin detemir U-100 pen 20 Units         -- SubQ Once 09/04/22 0712    09/01/22 1605  insulin aspart U-100 pen 1-10 Units         -- SubQ Before meals & nightly PRN 09/01/22 1506        Hold Oral hypoglycemics while patient is in the hospital.

## 2022-09-06 NOTE — ASSESSMENT & PLAN NOTE
Sepsis due to groin/scrotal cellulitis. Appreciate urology involvement. Clinically improving on clindamycin monotherapy, however will add broader coverage given location of infection. Developed ATN after vanc/zosyn, both now held.

## 2022-09-06 NOTE — SUBJECTIVE & OBJECTIVE
Interval History: pain present, voiding well    Review of Systems   Constitutional:  Negative for activity change, appetite change, chills, diaphoresis and fever.   HENT:  Negative for congestion and rhinorrhea.    Eyes:  Negative for visual disturbance.   Respiratory:  Negative for choking and shortness of breath.    Gastrointestinal:  Negative for abdominal distention, abdominal pain, constipation, diarrhea, nausea and vomiting.   Genitourinary:  Positive for scrotal swelling and testicular pain. Negative for difficulty urinating, dysuria, flank pain, hematuria and urgency.   Skin:  Positive for color change. Negative for pallor and wound.   Neurological:  Negative for dizziness and syncope.   Psychiatric/Behavioral:  Negative for confusion and hallucinations.    Objective:     Temp:  [97.6 °F (36.4 °C)-98.5 °F (36.9 °C)] 98.5 °F (36.9 °C)  Pulse:  [86-92] 92  Resp:  [16-20] 20  SpO2:  [97 %-98 %] 98 %  BP: (122-152)/(76-97) 122/88     Body mass index is 37.43 kg/m².           Drains       None                   Physical Exam  Nursing note reviewed.   Constitutional:       General: He is not in acute distress.     Appearance: He is well-developed. He is not ill-appearing, toxic-appearing or diaphoretic.   HENT:      Head: Normocephalic and atraumatic.      Mouth/Throat:      Mouth: Mucous membranes are moist.   Eyes:      Conjunctiva/sclera: Conjunctivae normal.   Pulmonary:      Effort: Pulmonary effort is normal. No respiratory distress.   Abdominal:      General: Abdomen is flat. There is no distension.      Palpations: Abdomen is soft. There is no mass.      Tenderness: There is no abdominal tenderness. There is no guarding.   Genitourinary:     Testes:         Right: Tenderness and swelling present. Mass not present.         Left: Tenderness and swelling present. Mass not present.          Comments: No necrosis  No crepitus  No fluctuance  Scrotal edema noted   Buried penis noted  Retraction of erythema  continues  Musculoskeletal:         General: No swelling or deformity.      Cervical back: Neck supple.   Skin:     General: Skin is warm.      Capillary Refill: Capillary refill takes less than 2 seconds.      Findings: No rash.   Neurological:      Mental Status: He is alert and oriented to person, place, and time.      Gait: Gait normal.   Psychiatric:         Mood and Affect: Mood normal.         Thought Content: Thought content normal.         Judgment: Judgment normal.       Significant Labs:    BMP:  Recent Labs   Lab 09/04/22  0244 09/05/22  1029 09/06/22  0453    135* 134*   K 3.7 4.2 4.0    108 106   CO2 22* 15* 17*   BUN 17 21* 24*   CREATININE 2.9* 3.1* 3.1*   CALCIUM 8.0* 8.0* 7.9*       CBC:   Recent Labs   Lab 09/03/22  0433 09/04/22  0244 09/06/22  0453   WBC 17.41* 13.33* 11.36   HGB 13.4* 13.1* 11.7*   HCT 38.2* 36.6* 35.2*   * 132* 219

## 2022-09-06 NOTE — PROGRESS NOTES
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  He reports acute onset of erythema, duration 3 days, has progressed and is now swollen and painful, no known exacerbating or alleviating factors, no attempted self treatment.  Denies fever, chills, cough, chest pain, SOB, dizziness, syncope, nausea, vomiting, diarrhea, abdominal pain, or dysuria.  In the ED, found to be tachycardic and tachypneic with leukocytosis.  CT abdomen pelvis and scrotal ultrasound revealed extensive cellulitis.  Procalcitonin 5.7.  Initial lactic acid 2.7.  Also with mild hyponatremia and mildly elevated liver enzymes.  Blood cultures obtained, IV fluid resuscitation and IV antibiotics initiated.      Overview/Hospital Course:  Patient admitted for groin abscess of perineum.  Started on Van and Zosyn. Blood cultures were NG. Urology consulted. Vanc was stopped due to acute renal failure. Nephrology was consulted. Patient started on Clindamycin.  Blood cultures were NG.  Patient's cellulitis clinically improved. There was no surgical intervention.      Interval History: No events overnight. Persistent pain.     Review of Systems   Constitutional:  Negative for chills and fever.   Respiratory:  Negative for cough and shortness of breath.    Cardiovascular:  Negative for chest pain and leg swelling.   Gastrointestinal:  Negative for abdominal distention and abdominal pain.   Genitourinary:  Positive for penile  swelling, scrotal swelling and testicular pain.   Objective:     Vital Signs (Most Recent):  Temp: 98.5 °F (36.9 °C) (09/06/22 0800)  Pulse: 92 (09/06/22 0800)  Resp: 18 (09/06/22 1024)  BP: 122/88 (09/06/22 0800)  SpO2: 98 % (09/06/22 0843) Vital Signs (24h Range):  Temp:  [97.6 °F (36.4 °C)-98.5 °F (36.9 °C)] 98.5 °F (36.9 °C)  Pulse:  [86-92] 92  Resp:  [16-20] 18  SpO2:  [97 %-98 %] 98 %  BP: (122-152)/(76-97) 122/88     Weight: 105.2 kg (231 lb 14.8 oz)  Body mass index is 37.43 kg/m².    Intake/Output Summary (Last 24 hours) at 9/6/2022 1031  Last data filed at 9/6/2022 0800  Gross per 24 hour   Intake 240 ml   Output --   Net 240 ml      Physical Exam  Constitutional:       General: He is not in acute distress.     Appearance: He is ill-appearing. He is not toxic-appearing or diaphoretic.   Cardiovascular:      Rate and Rhythm: Normal rate and regular rhythm.      Heart sounds: No murmur heard.    No gallop.   Pulmonary:      Effort: Pulmonary effort is normal. No respiratory distress.      Breath sounds: Normal breath sounds. No wheezing.   Abdominal:      General: Bowel sounds are normal. There is no distension.      Palpations: Abdomen is soft.      Tenderness: There is no abdominal tenderness.   Genitourinary:     Comments: Erythema and swelling of b/l groin, testes, and penis. Tender to palpation. No abscess palpated.      Significant Labs: All pertinent labs within the past 24 hours have been reviewed.    Significant Imaging: I have reviewed all pertinent imaging results/findings within the past 24 hours.      Assessment/Plan:      * Sepsis  Sepsis due to groin/scrotal cellulitis. Appreciate urology involvement. Clinically improving on clindamycin monotherapy, however will add broader coverage given location of infection. Developed ATN after vanc/zosyn, both now held.      MARISSA (acute kidney injury)  Likely ATN, creatinine appears to have peaked  - appreciate nephrology involvement      Cellulitis of  groin  As above      Chronic diastolic heart failure  No evidence of acute decompensation or fluid overload, echo earlier this year showed preserved EF with indeterminate LV diastolic function, and is, daily weights.    Hyponatremia  Mild, asymptomatic, repeat CMP in a.m.    Alcohol use disorder, severe, dependence  Counseled, reports stopping a week or two ago, continue MVI/thiamine/folate    Severe obesity (BMI >= 40)  Body mass index is 37.43 kg/m². Morbid obesity complicates all aspects of disease management from diagnostic modalities to treatment. Weight loss encouraged and health benefits explained to patient.     Elevated transaminase level  Consistent with baseline, likely due to heavy alcohol use.    Type 2 diabetes mellitus with hyperglycemia  Patient's FSGs are uncontrolled due to hyperglycemia on current medication regimen.  Last A1c reviewed-   Lab Results   Component Value Date    HGBA1C 12.6 (H) 01/04/2022     Most recent fingerstick glucose reviewed-   Recent Labs   Lab 09/05/22  1124 09/05/22  1613 09/05/22  1945 09/06/22  0801   POCTGLUCOSE 109 96 131* 133*     Current correctional scale  Medium  Maintain anti-hyperglycemic dose as follows-   Antihyperglycemics (From admission, onward)    Start     Stop Route Frequency Ordered    09/04/22 2100  insulin detemir U-100 pen 60 Units         -- SubQ Nightly 09/04/22 0712    09/04/22 0815  insulin detemir U-100 pen 20 Units         -- SubQ Once 09/04/22 0712    09/01/22 1605  insulin aspart U-100 pen 1-10 Units         -- SubQ Before meals & nightly PRN 09/01/22 1506        Hold Oral hypoglycemics while patient is in the hospital.      VTE Risk Mitigation (From admission, onward)         Ordered     enoxaparin injection 30 mg  Daily         09/04/22 0713     IP VTE HIGH RISK PATIENT  Once         09/01/22 1506     Place sequential compression device  Until discontinued         09/01/22 1506                Discharge Planning   SUSAN:      Code Status: Full  Code   Is the patient medically ready for discharge?:     Reason for patient still in hospital (select all that apply): Patient trending condition, Laboratory test, Treatment, Consult recommendations and Pending disposition  Discharge Plan A: Home with family                  Sal Baldwin MD  Department of [facility]   [facility]

## 2022-09-06 NOTE — ASSESSMENT & PLAN NOTE
No clinically apparent abscess or sign of necrosis again on today's examination  Will continue to monitor for development of abscess/necrotizing soft tissue infection   Maintain tight BS control  Continue abx per primary- leukocytosis improving daily which is reassuring.   OK to resume diet today; NPO again at midnight for possible incision/drainage/debridement tomorrow morning

## 2022-09-06 NOTE — ASSESSMENT & PLAN NOTE
Body mass index is 37.43 kg/m². Morbid obesity complicates all aspects of disease management from diagnostic modalities to treatment. Weight loss encouraged and health benefits explained to patient.

## 2022-09-06 NOTE — PLAN OF CARE
Problem: Adult Inpatient Plan of Care  Goal: Plan of Care Review  Outcome: Ongoing, Progressing  Goal: Patient-Specific Goal (Individualized)  Outcome: Ongoing, Progressing  Goal: Absence of Hospital-Acquired Illness or Injury  Outcome: Ongoing, Progressing  Goal: Optimal Comfort and Wellbeing  Outcome: Ongoing, Progressing  Goal: Readiness for Transition of Care  Outcome: Ongoing, Progressing     Problem: Bariatric Environmental Safety  Goal: Safety Maintained with Care  Outcome: Ongoing, Progressing     Problem: Infection  Goal: Absence of Infection Signs and Symptoms  Outcome: Ongoing, Progressing     Problem: Diabetes Comorbidity  Goal: Blood Glucose Level Within Targeted Range  Outcome: Ongoing, Progressing     Problem: Adjustment to Illness (Sepsis/Septic Shock)  Goal: Optimal Coping  Outcome: Ongoing, Progressing     Problem: Bleeding (Sepsis/Septic Shock)  Goal: Absence of Bleeding  Outcome: Ongoing, Progressing     Problem: Glycemic Control Impaired (Sepsis/Septic Shock)  Goal: Blood Glucose Level Within Desired Range  Outcome: Ongoing, Progressing     Problem: Infection Progression (Sepsis/Septic Shock)  Goal: Absence of Infection Signs and Symptoms  Outcome: Ongoing, Progressing     Problem: Pain Acute  Goal: Acceptable Pain Control and Functional Ability  Outcome: Ongoing, Progressing     Problem: Fluid and Electrolyte Imbalance (Acute Kidney Injury/Impairment)  Goal: Fluid and Electrolyte Balance  Outcome: Ongoing, Progressing     Problem: Oral Intake Inadequate (Acute Kidney Injury/Impairment)  Goal: Optimal Nutrition Intake  Outcome: Ongoing, Progressing

## 2022-09-06 NOTE — PLAN OF CARE
Problem: Diabetes Comorbidity  Goal: Blood Glucose Level Within Targeted Range  Outcome: Ongoing, Progressing  Intervention: Monitor and Manage Glycemia  Flowsheets (Taken 9/6/2022 1720)  Glycemic Management:   blood glucose monitored   oral hydration promoted   supplemental insulin given

## 2022-09-07 NOTE — SUBJECTIVE & OBJECTIVE
Interval History: Pt w/ fall overnight leading to worsening pain    Review of Systems   Constitutional:  Negative for chills and fever.   Respiratory:  Negative for cough and shortness of breath.    Cardiovascular:  Negative for chest pain and leg swelling.   Gastrointestinal:  Negative for abdominal distention and abdominal pain.   Genitourinary:  Positive for penile swelling, scrotal swelling and testicular pain.   Objective:     Vital Signs (Most Recent):  Temp: 98.3 °F (36.8 °C) (09/07/22 1545)  Pulse: 89 (09/07/22 1545)  Resp: 18 (09/07/22 1559)  BP: (!) 152/85 (09/07/22 1545)  SpO2: 96 % (09/07/22 1545) Vital Signs (24h Range):  Temp:  [97.9 °F (36.6 °C)-98.3 °F (36.8 °C)] 98.3 °F (36.8 °C)  Pulse:  [86-95] 89  Resp:  [18-20] 18  SpO2:  [95 %-99 %] 96 %  BP: (140-172)/(79-97) 152/85     Weight: 105.2 kg (231 lb 14.8 oz)  Body mass index is 37.43 kg/m².    Intake/Output Summary (Last 24 hours) at 9/7/2022 1632  Last data filed at 9/7/2022 1200  Gross per 24 hour   Intake 720 ml   Output --   Net 720 ml        Physical Exam  Constitutional:       General: He is not in acute distress.     Appearance: He is ill-appearing. He is not toxic-appearing or diaphoretic.   Cardiovascular:      Rate and Rhythm: Normal rate and regular rhythm.      Heart sounds: No murmur heard.    No gallop.   Pulmonary:      Effort: Pulmonary effort is normal. No respiratory distress.      Breath sounds: Normal breath sounds. No wheezing.   Abdominal:      General: Bowel sounds are normal. There is no distension.      Palpations: Abdomen is soft.      Tenderness: There is no abdominal tenderness.   Genitourinary:     Comments: Erythema and swelling of b/l groin, testes, and penis. Tender to palpation. No abscess palpated.      Significant Labs: All pertinent labs within the past 24 hours have been reviewed.    Significant Imaging: I have reviewed all pertinent imaging results/findings within the past 24 hours.

## 2022-09-07 NOTE — SUBJECTIVE & OBJECTIVE
Interval History: he feels about the same, still with some pain in the scrotum    Review of Systems   Constitutional: Negative.  Negative for fatigue.   HENT: Negative.     Eyes: Negative.    Respiratory:  Negative for cough, chest tightness and shortness of breath.    Cardiovascular:  Negative for chest pain.   Gastrointestinal: Negative.  Negative for constipation, diarrhea and nausea.   Genitourinary:  Positive for scrotal swelling. Negative for difficulty urinating.   Musculoskeletal: Negative.    Neurological: Negative.    Psychiatric/Behavioral: Negative.     Objective:     Temp:  [98.1 °F (36.7 °C)-98.5 °F (36.9 °C)] 98.2 °F (36.8 °C)  Pulse:  [87-95] 91  Resp:  [18-20] 18  SpO2:  [94 %-99 %] 96 %  BP: (122-172)/(79-97) 140/88     Body mass index is 37.43 kg/m².           Drains       None                   Physical Exam  Vitals and nursing note reviewed.   Constitutional:       Appearance: He is well-developed.   HENT:      Head: Normocephalic.   Eyes:      Conjunctiva/sclera: Conjunctivae normal.   Neck:      Thyroid: No thyromegaly.      Trachea: No tracheal deviation.   Cardiovascular:      Rate and Rhythm: Normal rate.      Heart sounds: Normal heart sounds.   Pulmonary:      Effort: Pulmonary effort is normal. No respiratory distress.      Breath sounds: Normal breath sounds. No wheezing.   Abdominal:      General: Bowel sounds are normal.      Palpations: Abdomen is soft.      Tenderness: There is no abdominal tenderness. There is no rebound.      Hernia: No hernia is present.   Genitourinary:     Comments: Buried penis  Erythema marked and appears improved in the inguinal region  Right hemiscrotum tender, no crepitus, skin is indurated.  No palpable area of fluctuance.   Musculoskeletal:         General: No tenderness. Normal range of motion.      Cervical back: Normal range of motion and neck supple.   Lymphadenopathy:      Cervical: No cervical adenopathy.   Skin:     General: Skin is warm and dry.       Findings: No erythema or rash.   Neurological:      Mental Status: He is alert and oriented to person, place, and time.   Psychiatric:         Behavior: Behavior normal.         Thought Content: Thought content normal.         Judgment: Judgment normal.       Significant Labs:    BMP:  Recent Labs   Lab 09/04/22  0244 09/05/22  1029 09/06/22  0453    135* 134*   K 3.7 4.2 4.0    108 106   CO2 22* 15* 17*   BUN 17 21* 24*   CREATININE 2.9* 3.1* 3.1*   CALCIUM 8.0* 8.0* 7.9*       CBC:   Recent Labs   Lab 09/03/22  0433 09/04/22  0244 09/06/22  0453   WBC 17.41* 13.33* 11.36   HGB 13.4* 13.1* 11.7*   HCT 38.2* 36.6* 35.2*   * 132* 219       Blood Culture:   Recent Labs   Lab 09/01/22  1100   LABBLOO No Growth after 4 days.  No Growth after 4 days.     Urine Culture:   Recent Labs   Lab 09/02/22  1341   LABURIN No growth       Significant Imaging:

## 2022-09-07 NOTE — NURSING
Notified by patient that he fell in the bathroom last night hitting his left hip/thigh. No complaints of pain or injury to left leg. No bruising or swelling to site upon assessment.   Dr. Baldwin notified of patient fall.   Charge nurse Nicole notified of patient fall.   Patient educated on importance of calling before getting up. Will continue to monitor patient.

## 2022-09-07 NOTE — ASSESSMENT & PLAN NOTE
Sepsis due to groin/scrotal cellulitis. Appreciate urology involvement. Clinically improving on clindamycin monotherapy, however added broader coverage given location of infection. Developed ATN after vanc/zosyn, both now held.

## 2022-09-07 NOTE — PROGRESS NOTES
Pharmacist Intervention IV to PO Note    Doe Woodall is a 32 y.o. male being treated with IV medication clindamycin    Patient Data:    Vital Signs (Most Recent):  Temp: 97.9 °F (36.6 °C) (09/07/22 0808)  Pulse: 94 (09/07/22 0808)  Resp: 18 (09/07/22 0818)  BP: (!) 157/94 (09/07/22 0808)  SpO2: 99 % (09/07/22 0808)   Vital Signs (72h Range):  Temp:  [96.1 °F (35.6 °C)-98.8 °F (37.1 °C)]   Pulse:  []   Resp:  [16-20]   BP: (122-175)/()   SpO2:  [93 %-99 %]      CBC:  Recent Labs   Lab 09/04/22  0244 09/06/22  0453 09/07/22  0815   WBC 13.33* 11.36 11.31   RBC 3.71* 3.44* 3.83*   HGB 13.1* 11.7* 13.1*   HCT 36.6* 35.2* 38.7*   * 219 236   MCV 99* 102* 101*   MCH 35.3* 34.0* 34.2*   MCHC 35.8 33.2 33.9     CMP:     Recent Labs   Lab 09/02/22  0423 09/03/22  0433 09/04/22  0244 09/05/22  1029 09/06/22  0453 09/07/22  0815   * 87 79 107 136* 103   CALCIUM 8.0* 8.3* 8.0* 8.0* 7.9* 8.5*   ALBUMIN 1.9* 1.9* 1.7*  --   --   --    PROT 5.4* 5.8* 5.5*  --   --   --    * 134* 138 135* 134* 136   K 3.2* 3.8 3.7 4.2 4.0 4.0   CO2 25 20* 22* 15* 17* 18*   CL 96 104 107 108 106 108   BUN 8 15 17 21* 24* 24*   CREATININE 0.7 2.1* 2.9* 3.1* 3.1* 3.5*   ALKPHOS 294* 311* 337*  --   --   --    ALT 45* 38 35  --   --   --    AST 46* 60* 68*  --   --   --    BILITOT 1.3* 1.4* 1.2*  --   --   --        Dietary Orders:  Diet Orders            Diet diabetic [facility]; 2000 Calorie: Diabetic starting at 09/06 0809            Based on the following criteria, this patient qualifies for intravenous to oral conversion:  [x] The patients gastrointestinal tract is functioning (tolerating medications via oral or enteral route for 24 hours and tolerating food or enteral feeds for 24 hours).  [x] The patient is hemodynamically stable for 24 hours (heart rate <100 beats per minute, systolic blood pressure >99 mm Hg, and respiratory rate <20 breaths per minute).  [x] The patient shows clinical improvement  (afebrile for at least 24 hours and white blood cell count downtrending or normalized). Additionally, the patient must be non-neutropenic (absolute neutrophil count >500 cells/mm3).  [x] For antimicrobials, the patient has received IV therapy for at least 24 hours.    IV medication Clindamycin 600 mg IV every 8 hours will be changed to Clindamycin 300 mg every 6 hours    Pharmacist's Name: Hussain Connor Jr  Pharmacist's Extension: [phone]

## 2022-09-07 NOTE — ASSESSMENT & PLAN NOTE
Patient's FSGs are uncontrolled due to hyperglycemia on current medication regimen.  Last A1c reviewed-   Lab Results   Component Value Date    HGBA1C 12.6 (H) 01/04/2022     Most recent fingerstick glucose reviewed-   Recent Labs   Lab 09/06/22  1950 09/07/22  0807 09/07/22  1104 09/07/22  1544   POCTGLUCOSE 169* 101 132* 143*     Current correctional scale  Medium  Maintain anti-hyperglycemic dose as follows-   Antihyperglycemics (From admission, onward)    Start     Stop Route Frequency Ordered    09/04/22 2100  insulin detemir U-100 pen 60 Units         -- SubQ Nightly 09/04/22 0712    09/04/22 0815  insulin detemir U-100 pen 20 Units         -- SubQ Once 09/04/22 0712    09/01/22 1605  insulin aspart U-100 pen 1-10 Units         -- SubQ Before meals & nightly PRN 09/01/22 1506        Hold Oral hypoglycemics while patient is in the hospital.

## 2022-09-07 NOTE — NURSING
Patient reported to primary nurse, Milena that he had a fall last night while ambulating to the restroom. Patient stated he did not call for help, he awoke from sleep and needed to void. Upon walking back from the restroom his pain in his leg became worse causing him to slip. He stated he did not hit his head, and denies any injury or pain as a result of the fall. We reviewed safety precautions and fall precautions. Patient verbalized understanding. MD notified by primary nurse. Patient appears stable and in no acute distress at this time. Will continue to monitor.

## 2022-09-07 NOTE — PROGRESS NOTES
Awake alert oriented NAD    Denies CNS ENT CP GI  RHEUM OR DERM SX  Past Medical History:   Diagnosis Date    Abscess of right groin 09/01/2022    Diabetes mellitus     Encounter for blood transfusion     Loud snoring     Obese     Other insomnia 08/03/2020    Perineal abscess 08/01/2014     Past Surgical History:   Procedure Laterality Date    ABCESS DRAINAGE  2014    PERIRECTAL    DECOMPRESSION OF LUMBAR SPINE USING MINIMALLY INVASIVE TECHNIQUE Right 07/06/2018    Procedure: DECOMPRESSION, SPINE, LUMBAR, MINIMALLY INVASIVE L3-4;  Surgeon: Cristian Cervantes MD;  Location: [facility] OR [address] [place] [address];  Service: Neurosurgery;  Laterality: Right;    ORTHOPEDIC SURGERY       Review of patient's allergies indicates:   Allergen Reactions    Metformin Diarrhea       Current Facility-Administered Medications   Medication    0.9%  NaCl infusion    acetaminophen tablet 650 mg    albuterol-ipratropium 2.5 mg-0.5 mg/3 mL nebulizer solution 3 mL    aluminum-magnesium hydroxide-simethicone 200-200-20 mg/5 mL suspension 30 mL    ampicillin-sulbactam 3 g in sodium chloride 0.9 % 100 mL IVPB (ready to mix system)    clindamycin capsule 300 mg    COVID-19 vac, pablo(Pfizer)(PF) (Pfizer COVID-19) 30 mcg/0.3 mL injection 0.3 mL    dextrose 10% bolus 125 mL    dextrose 10% bolus 250 mL    diphenhydrAMINE injection 25 mg    enoxaparin injection 30 mg    fluconazole tablet 100 mg    folic acid tablet 1 mg    glucagon (human recombinant) injection 1 mg    glucose chewable tablet 16 g    glucose chewable tablet 24 g    HYDROcodone-acetaminophen 5-325 mg per tablet 1 tablet    HYDROmorphone (PF) injection 0.5 mg    insulin aspart U-100 pen 1-10 Units    insulin detemir U-100 pen 20 Units    insulin detemir U-100 pen 60 Units    melatonin tablet 6 mg    miconazole NITRATE 2 % top powder    morphine injection 2 mg    multivitamin tablet    naloxone 0.4 mg/mL injection 0.02 mg    ondansetron injection 4 mg    polyethylene glycol packet 17 g     prochlorperazine injection Soln 5 mg    simethicone chewable tablet 80 mg    sodium chloride 0.9% flush 10 mL    thiamine tablet 100 mg       LABS    Recent Results (from the past 24 hour(s))   POCT glucose    Collection Time: 09/06/22  4:07 PM   Result Value Ref Range    POCT Glucose 188 (H) 70 - 110 mg/dL   POCT glucose    Collection Time: 09/06/22  7:50 PM   Result Value Ref Range    POCT Glucose 169 (H) 70 - 110 mg/dL   POCT glucose    Collection Time: 09/07/22  8:07 AM   Result Value Ref Range    POCT Glucose 101 70 - 110 mg/dL   Basic metabolic panel    Collection Time: 09/07/22  8:15 AM   Result Value Ref Range    Sodium 136 136 - 145 mmol/L    Potassium 4.0 3.5 - 5.1 mmol/L    Chloride 108 95 - 110 mmol/L    CO2 18 (L) 23 - 29 mmol/L    Glucose 103 70 - 110 mg/dL    BUN 24 (H) 6 - 20 mg/dL    Creatinine 3.5 (H) 0.5 - 1.4 mg/dL    Calcium 8.5 (L) 8.7 - 10.5 mg/dL    Anion Gap 10 8 - 16 mmol/L    eGFR 23 (A) >60 mL/min/1.73 m^2   CBC Auto Differential    Collection Time: 09/07/22  8:15 AM   Result Value Ref Range    WBC 11.31 3.90 - 12.70 K/uL    RBC 3.83 (L) 4.60 - 6.20 M/uL    Hemoglobin 13.1 (L) 14.0 - 18.0 g/dL    Hematocrit 38.7 (L) 40.0 - 54.0 %     (H) 82 - 98 fL    MCH 34.2 (H) 27.0 - 31.0 pg    MCHC 33.9 32.0 - 36.0 g/dL    RDW 12.0 11.5 - 14.5 %    Platelets 236 150 - 450 K/uL    MPV 9.3 9.2 - 12.9 fL    Immature Granulocytes 1.6 (H) 0.0 - 0.5 %    Gran # (ANC) 7.2 1.8 - 7.7 K/uL    Immature Grans (Abs) 0.18 (H) 0.00 - 0.04 K/uL    Lymph # 1.8 1.0 - 4.8 K/uL    Mono # 2.0 (H) 0.3 - 1.0 K/uL    Eos # 0.1 0.0 - 0.5 K/uL    Baso # 0.05 0.00 - 0.20 K/uL    nRBC 0 0 /100 WBC    Gran % 63.6 38.0 - 73.0 %    Lymph % 15.7 (L) 18.0 - 48.0 %    Mono % 17.6 (H) 4.0 - 15.0 %    Eosinophil % 1.1 0.0 - 8.0 %    Basophil % 0.4 0.0 - 1.9 %    Differential Method Automated    POCT glucose    Collection Time: 09/07/22 11:04 AM   Result Value Ref Range    POCT Glucose 132 (H) 70 - 110 mg/dL   ]    I/O last 3  completed shifts:  In: 720 [P.O.:720]  Out: -     Vitals:    09/07/22 0808 09/07/22 0818 09/07/22 1107 09/07/22 1113   BP: (!) 157/94  (!) 161/95    Pulse: 94  86    Resp: 20 18 19 18   Temp: 97.9 °F (36.6 °C)  97.9 °F (36.6 °C)    TempSrc:   Oral    SpO2: 99%  95%    Weight:       Height:           No Jvd, Thyromegaly or Lymphadenopathy  Lungs: Fairly clear anteriorly and laterally  Cor: RRR no G or rubs  Abd: Soft benign good bowel sounds non tender  Ext: Pos edema     A)  MARISSA  creat up was on vanco   Cellulitis of his groin   Sepsis  DM  CHF (d) retinopathy  ETOH user with elevated LFT's     P)    Renal Diet  IVF's   Home meds  Adjust all meds to the degree of renal fx  Close follow up I/O and weights  Maintain Hydration

## 2022-09-07 NOTE — PLAN OF CARE
Problem: Diabetes Comorbidity  Goal: Blood Glucose Level Within Targeted Range  Outcome: Ongoing, Progressing  Intervention: Monitor and Manage Glycemia  Flowsheets (Taken 9/7/2022 4890)  Glycemic Management:   blood glucose monitored   oral hydration promoted   supplemental insulin given

## 2022-09-07 NOTE — ASSESSMENT & PLAN NOTE
No clinically apparent abscess or sign of necrosis again on today's examination  Will continue to monitor for development of abscess/necrotizing soft tissue infection   Maintain tight BS control  Continue abx per primary- leukocytosis improving daily which is reassuring.   Will consider repeating scrotal US

## 2022-09-07 NOTE — PLAN OF CARE
Problem: Adult Inpatient Plan of Care  Goal: Plan of Care Review  Outcome: Ongoing, Progressing  Goal: Patient-Specific Goal (Individualized)  Outcome: Ongoing, Progressing  Goal: Absence of Hospital-Acquired Illness or Injury  Outcome: Ongoing, Progressing  Goal: Optimal Comfort and Wellbeing  Outcome: Ongoing, Progressing  Goal: Readiness for Transition of Care  Outcome: Ongoing, Progressing     Problem: Bariatric Environmental Safety  Goal: Safety Maintained with Care  Outcome: Ongoing, Progressing     Problem: Infection  Goal: Absence of Infection Signs and Symptoms  Outcome: Ongoing, Progressing     Problem: Diabetes Comorbidity  Goal: Blood Glucose Level Within Targeted Range  Outcome: Ongoing, Progressing     Problem: Bleeding (Sepsis/Septic Shock)  Goal: Absence of Bleeding  Outcome: Ongoing, Progressing     Problem: Glycemic Control Impaired (Sepsis/Septic Shock)  Goal: Blood Glucose Level Within Desired Range  Outcome: Ongoing, Progressing     Problem: Nutrition Impaired (Sepsis/Septic Shock)  Goal: Optimal Nutrition Intake  Outcome: Ongoing, Progressing

## 2022-09-07 NOTE — ASSESSMENT & PLAN NOTE
Likely ATN, however continued uptrend of creatinine concerning for other process. No hydro on renal US  - appreciate nephrology involvement  - urine lytes     no

## 2022-09-07 NOTE — PROGRESS NOTES
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  He reports acute onset of erythema, duration 3 days, has progressed and is now swollen and painful, no known exacerbating or alleviating factors, no attempted self treatment.  Denies fever, chills, cough, chest pain, SOB, dizziness, syncope, nausea, vomiting, diarrhea, abdominal pain, or dysuria.  In the ED, found to be tachycardic and tachypneic with leukocytosis.  CT abdomen pelvis and scrotal ultrasound revealed extensive cellulitis.  Procalcitonin 5.7.  Initial lactic acid 2.7.  Also with mild hyponatremia and mildly elevated liver enzymes.  Blood cultures obtained, IV fluid resuscitation and IV antibiotics initiated.      Overview/Hospital Course:  Patient admitted for groin abscess of perineum.  Started on Van and Zosyn. Blood cultures were NG. Urology consulted. Vanc was stopped due to acute renal failure. Nephrology was consulted. Patient started on Clindamycin.  Blood cultures were NG.  Patient's cellulitis clinically improved. There was no surgical intervention.      Interval History: Pt w/ fall overnight leading to worsening pain    Review of Systems   Constitutional:  Negative for chills and fever.   Respiratory:  Negative for cough and shortness of breath.    Cardiovascular:  Negative for chest pain and leg swelling.   Gastrointestinal:  Negative for abdominal distention and abdominal pain.   Genitourinary:  Positive for  penile swelling, scrotal swelling and testicular pain.   Objective:     Vital Signs (Most Recent):  Temp: 98.3 °F (36.8 °C) (09/07/22 1545)  Pulse: 89 (09/07/22 1545)  Resp: 18 (09/07/22 1559)  BP: (!) 152/85 (09/07/22 1545)  SpO2: 96 % (09/07/22 1545) Vital Signs (24h Range):  Temp:  [97.9 °F (36.6 °C)-98.3 °F (36.8 °C)] 98.3 °F (36.8 °C)  Pulse:  [86-95] 89  Resp:  [18-20] 18  SpO2:  [95 %-99 %] 96 %  BP: (140-172)/(79-97) 152/85     Weight: 105.2 kg (231 lb 14.8 oz)  Body mass index is 37.43 kg/m².    Intake/Output Summary (Last 24 hours) at 9/7/2022 1632  Last data filed at 9/7/2022 1200  Gross per 24 hour   Intake 720 ml   Output --   Net 720 ml        Physical Exam  Constitutional:       General: He is not in acute distress.     Appearance: He is ill-appearing. He is not toxic-appearing or diaphoretic.   Cardiovascular:      Rate and Rhythm: Normal rate and regular rhythm.      Heart sounds: No murmur heard.    No gallop.   Pulmonary:      Effort: Pulmonary effort is normal. No respiratory distress.      Breath sounds: Normal breath sounds. No wheezing.   Abdominal:      General: Bowel sounds are normal. There is no distension.      Palpations: Abdomen is soft.      Tenderness: There is no abdominal tenderness.   Genitourinary:     Comments: Erythema and swelling of b/l groin, testes, and penis. Tender to palpation. No abscess palpated.      Significant Labs: All pertinent labs within the past 24 hours have been reviewed.    Significant Imaging: I have reviewed all pertinent imaging results/findings within the past 24 hours.      Assessment/Plan:      * Sepsis  Sepsis due to groin/scrotal cellulitis. Appreciate urology involvement. Clinically improving on clindamycin monotherapy, however added broader coverage given location of infection. Developed ATN after vanc/zosyn, both now held.      MARISSA (acute kidney injury)  Likely ATN, however continued uptrend of creatinine concerning for other process. No hydro on  renal US  - appreciate nephrology involvement  - urine lytes      Cellulitis of groin  As above      Chronic diastolic heart failure  No evidence of acute decompensation or fluid overload, echo earlier this year showed preserved EF with indeterminate LV diastolic function.   - continue to monitor while on IVF    Hyponatremia  Resolved without intervention    Alcohol use disorder, severe, dependence  Counseled, reports stopping a week or two ago, continue MVI/thiamine/folate    Severe obesity (BMI >= 40)  Body mass index is 37.43 kg/m². Morbid obesity complicates all aspects of disease management from diagnostic modalities to treatment. Weight loss encouraged and health benefits explained to patient.     Elevated transaminase level  Consistent with baseline, likely due to heavy alcohol use.    Type 2 diabetes mellitus with hyperglycemia  Patient's FSGs are uncontrolled due to hyperglycemia on current medication regimen.  Last A1c reviewed-   Lab Results   Component Value Date    HGBA1C 12.6 (H) 01/04/2022     Most recent fingerstick glucose reviewed-   Recent Labs   Lab 09/06/22  1950 09/07/22  0807 09/07/22  1104 09/07/22  1544   POCTGLUCOSE 169* 101 132* 143*     Current correctional scale  Medium  Maintain anti-hyperglycemic dose as follows-   Antihyperglycemics (From admission, onward)    Start     Stop Route Frequency Ordered    09/04/22 2100  insulin detemir U-100 pen 60 Units         -- SubQ Nightly 09/04/22 0712    09/04/22 0815  insulin detemir U-100 pen 20 Units         -- SubQ Once 09/04/22 0712    09/01/22 1605  insulin aspart U-100 pen 1-10 Units         -- SubQ Before meals & nightly PRN 09/01/22 1506        Hold Oral hypoglycemics while patient is in the hospital.      VTE Risk Mitigation (From admission, onward)         Ordered     enoxaparin injection 30 mg  Daily         09/04/22 0713     IP VTE HIGH RISK PATIENT  Once         09/01/22 1506     Place sequential compression device  Until discontinued          09/01/22 1506                Discharge Planning   SUSAN:      Code Status: Full Code   Is the patient medically ready for discharge?:     Reason for patient still in hospital (select all that apply): Patient trending condition, Laboratory test, Treatment, Consult recommendations and Pending disposition  Discharge Plan A: Home with family                  Sal Baldwin MD  Department of [facility]   [facility]

## 2022-09-07 NOTE — NURSING
[facility]-  Rapid Response Nurse Intervention/ Task    Date of Visit: 09/07/2022  Time of Visit: 2045       INTERVENTIONS/ TASK Completed:     PIV placed using US guidance. 20g 1 3/4in to L UA. Pt tolerated well. Notified ANNA Lopez and WILFRIDO placed in chart.

## 2022-09-07 NOTE — PROGRESS NOTES
[facility] [place]  Urology  Progress Note    Patient Name: Doe Woodall  MRN: [id number]  Admission Date: 9/1/2022  Hospital Length of Stay: 6 days  Code Status: Full Code   Attending Provider: Sal Baldwin MD   Primary Care Physician: [facility]    Subjective:     HPI:  33 yo man presented to the hospital with progressive scrotal pain which started off as a boil on his right scrotum. Patient with hx of uncontrolled DM. Patient's imaging on arrival did not demonstrate presence of abscess. Urology consulted for further evaluation. Denies dysuria, fevers, chills, chest pain, shortness of breath. Had breakfast this morning.       Interval History: he feels about the same, still with some pain in the scrotum    Review of Systems   Constitutional: Negative.  Negative for fatigue.   HENT: Negative.     Eyes: Negative.    Respiratory:  Negative for cough, chest tightness and shortness of breath.    Cardiovascular:  Negative for chest pain.   Gastrointestinal: Negative.  Negative for constipation, diarrhea and nausea.   Genitourinary:  Positive for scrotal swelling. Negative for difficulty urinating.   Musculoskeletal: Negative.    Neurological: Negative.    Psychiatric/Behavioral: Negative.     Objective:     Temp:  [98.1 °F (36.7 °C)-98.5 °F (36.9 °C)] 98.2 °F (36.8 °C)  Pulse:  [87-95] 91  Resp:  [18-20] 18  SpO2:  [94 %-99 %] 96 %  BP: (122-172)/(79-97) 140/88     Body mass index is 37.43 kg/m².           Drains       None                   Physical Exam  Vitals and nursing note reviewed.   Constitutional:       Appearance: He is well-developed.   HENT:      Head: Normocephalic.   Eyes:      Conjunctiva/sclera: Conjunctivae normal.   Neck:      Thyroid: No thyromegaly.      Trachea: No tracheal deviation.   Cardiovascular:      Rate and Rhythm: Normal rate.      Heart sounds: Normal heart sounds.   Pulmonary:      Effort: Pulmonary effort is normal. No respiratory distress.       Breath sounds: Normal breath sounds. No wheezing.   Abdominal:      General: Bowel sounds are normal.      Palpations: Abdomen is soft.      Tenderness: There is no abdominal tenderness. There is no rebound.      Hernia: No hernia is present.   Genitourinary:     Comments: Buried penis  Erythema marked and appears improved in the inguinal region  Right hemiscrotum tender, no crepitus, skin is indurated.  No palpable area of fluctuance.   Musculoskeletal:         General: No tenderness. Normal range of motion.      Cervical back: Normal range of motion and neck supple.   Lymphadenopathy:      Cervical: No cervical adenopathy.   Skin:     General: Skin is warm and dry.      Findings: No erythema or rash.   Neurological:      Mental Status: He is alert and oriented to person, place, and time.   Psychiatric:         Behavior: Behavior normal.         Thought Content: Thought content normal.         Judgment: Judgment normal.       Significant Labs:    BMP:  Recent Labs   Lab 09/04/22  0244 09/05/22  1029 09/06/22  0453    135* 134*   K 3.7 4.2 4.0    108 106   CO2 22* 15* 17*   BUN 17 21* 24*   CREATININE 2.9* 3.1* 3.1*   CALCIUM 8.0* 8.0* 7.9*       CBC:   Recent Labs   Lab 09/03/22  0433 09/04/22  0244 09/06/22  0453   WBC 17.41* 13.33* 11.36   HGB 13.4* 13.1* 11.7*   HCT 38.2* 36.6* 35.2*   * 132* 219       Blood Culture:   Recent Labs   Lab 09/01/22  1100   LABBLOO No Growth after 4 days.  No Growth after 4 days.     Urine Culture:   Recent Labs   Lab 09/02/22  1341   LABURIN No growth       Significant Imaging:                      Assessment/Plan:     MARISSA (acute kidney injury)  -- Uncertain etiology  -- No evidence of urinary retention  -- Further management per primary    Cellulitis of groin  No clinically apparent abscess or sign of necrosis again on today's examination  Will continue to monitor for development of abscess/necrotizing soft tissue infection   Maintain tight BS control  Continue  abx per primary- leukocytosis improving daily which is reassuring.   Will consider repeating scrotal US         VTE Risk Mitigation (From admission, onward)         Ordered     enoxaparin injection 30 mg  Daily         09/04/22 0713     IP VTE HIGH RISK PATIENT  Once         09/01/22 1506     Place sequential compression device  Until discontinued         09/01/22 1506                W Tuan Aguilar MD  Urology  [facility] - Telemetry

## 2022-09-07 NOTE — ASSESSMENT & PLAN NOTE
No evidence of acute decompensation or fluid overload, echo earlier this year showed preserved EF with indeterminate LV diastolic function.   - continue to monitor while on IVF

## 2022-09-08 PROBLEM — L02.214 INGUINAL ABSCESS: Status: ACTIVE | Noted: 2022-01-01

## 2022-09-08 NOTE — ASSESSMENT & PLAN NOTE
No clinically apparent abscess or sign of necrosis again on today's examination  Will continue to monitor for development of abscess/necrotizing soft tissue infection   Maintain tight BS control  Continue abx per primary- leukocytosis improving daily which is reassuring.   Repeat scrotal US today

## 2022-09-08 NOTE — ASSESSMENT & PLAN NOTE
Likely ATN, appears to be plateauing. No hydro on renal US  - appreciate nephrology involvement

## 2022-09-08 NOTE — PLAN OF CARE
Problem: Adult Inpatient Plan of Care  Goal: Plan of Care Review  Flowsheets (Taken 9/8/2022 0325)  Plan of Care Reviewed With: patient  Goal: Absence of Hospital-Acquired Illness or Injury  Intervention: Identify and Manage Fall Risk  Flowsheets (Taken 9/8/2022 0325)  Safety Promotion/Fall Prevention:   room near unit station   Fall Risk reviewed with patient/family  Intervention: Prevent Skin Injury  Flowsheets (Taken 9/8/2022 0325)  Body Position: position maintained  Intervention: Prevent and Manage VTE (Venous Thromboembolism) Risk  Flowsheets (Taken 9/8/2022 0325)  Range of Motion:   active ROM (range of motion) encouraged   ROM (range of motion) performed  Goal: Optimal Comfort and Wellbeing  Intervention: Monitor Pain and Promote Comfort  Flowsheets (Taken 9/8/2022 0325)  Pain Management Interventions: pain management plan reviewed with patient/caregiver  Intervention: Provide Person-Centered Care  Flowsheets (Taken 9/8/2022 0325)  Trust Relationship/Rapport: care explained     Problem: Diabetes Comorbidity  Goal: Blood Glucose Level Within Targeted Range  Intervention: Monitor and Manage Glycemia  Flowsheets (Taken 9/8/2022 0325)  Glycemic Management: blood glucose monitored     Problem: Pain Acute  Goal: Acceptable Pain Control and Functional Ability  Intervention: Develop Pain Management Plan  Flowsheets (Taken 9/8/2022 0325)  Pain Management Interventions: pain management plan reviewed with patient/caregiver  Intervention: Prevent or Manage Pain  Flowsheets (Taken 9/8/2022 0325)  Medication Review/Management: medications reviewed

## 2022-09-08 NOTE — PROGRESS NOTES
Awake alert oriented NAD    Denies CNS ENT CP GI  RHEUM OR DERM SX  Past Medical History:   Diagnosis Date    Abscess of right groin 09/01/2022    Diabetes mellitus     Encounter for blood transfusion     Loud snoring     Obese     Other insomnia 08/03/2020    Perineal abscess 08/01/2014     Past Surgical History:   Procedure Laterality Date    ABCESS DRAINAGE  2014    PERIRECTAL    DECOMPRESSION OF LUMBAR SPINE USING MINIMALLY INVASIVE TECHNIQUE Right 07/06/2018    Procedure: DECOMPRESSION, SPINE, LUMBAR, MINIMALLY INVASIVE L3-4;  Surgeon: Cristian Cervantes MD;  Location: [facility] OR [address] [place] [address];  Service: Neurosurgery;  Laterality: Right;    ORTHOPEDIC SURGERY       Review of patient's allergies indicates:   Allergen Reactions    Metformin Diarrhea       Current Facility-Administered Medications   Medication    0.9%  NaCl infusion    acetaminophen tablet 650 mg    albuterol-ipratropium 2.5 mg-0.5 mg/3 mL nebulizer solution 3 mL    aluminum-magnesium hydroxide-simethicone 200-200-20 mg/5 mL suspension 30 mL    ampicillin-sulbactam 3 g in sodium chloride 0.9 % 100 mL IVPB (ready to mix system)    clindamycin in D5W 600 mg/50 mL IVPB 600 mg    COVID-19 vac, pablo(Pfizer)(PF) (Pfizer COVID-19) 30 mcg/0.3 mL injection 0.3 mL    dextrose 10% bolus 125 mL    dextrose 10% bolus 250 mL    diphenhydrAMINE injection 25 mg    enoxaparin injection 30 mg    fluconazole tablet 100 mg    folic acid tablet 1 mg    glucagon (human recombinant) injection 1 mg    glucose chewable tablet 16 g    glucose chewable tablet 24 g    HYDROcodone-acetaminophen 5-325 mg per tablet 1 tablet    HYDROmorphone (PF) injection 0.5 mg    insulin aspart U-100 pen 1-10 Units    insulin detemir U-100 pen 20 Units    insulin detemir U-100 pen 60 Units    melatonin tablet 6 mg    miconazole NITRATE 2 % top powder    morphine injection 2 mg    multivitamin tablet    naloxone 0.4 mg/mL injection 0.02 mg    ondansetron injection 4 mg    polyethylene glycol packet 17 g     prochlorperazine injection Soln 5 mg    simethicone chewable tablet 80 mg    sodium chloride 0.9% flush 10 mL    thiamine tablet 100 mg       LABS    Recent Results (from the past 24 hour(s))   POCT glucose    Collection Time: 09/07/22  3:44 PM   Result Value Ref Range    POCT Glucose 143 (H) 70 - 110 mg/dL   Sodium, urine, random    Collection Time: 09/07/22  5:58 PM   Result Value Ref Range    Sodium, Urine 46 20 - 250 mmol/L   Creatinine, urine, random    Collection Time: 09/07/22  5:58 PM   Result Value Ref Range    Creatinine, Urine 24.6 23.0 - 375.0 mg/dL   POCT glucose    Collection Time: 09/07/22  7:37 PM   Result Value Ref Range    POCT Glucose 162 (H) 70 - 110 mg/dL   CBC Auto Differential    Collection Time: 09/08/22  2:36 AM   Result Value Ref Range    WBC 12.41 3.90 - 12.70 K/uL    RBC 3.64 (L) 4.60 - 6.20 M/uL    Hemoglobin 12.7 (L) 14.0 - 18.0 g/dL    Hematocrit 36.9 (L) 40.0 - 54.0 %     (H) 82 - 98 fL    MCH 34.9 (H) 27.0 - 31.0 pg    MCHC 34.4 32.0 - 36.0 g/dL    RDW 12.0 11.5 - 14.5 %    Platelets 265 150 - 450 K/uL    MPV 9.4 9.2 - 12.9 fL    Immature Granulocytes 2.4 (H) 0.0 - 0.5 %    Gran # (ANC) 7.8 (H) 1.8 - 7.7 K/uL    Immature Grans (Abs) 0.30 (H) 0.00 - 0.04 K/uL    Lymph # 2.2 1.0 - 4.8 K/uL    Mono # 1.9 (H) 0.3 - 1.0 K/uL    Eos # 0.1 0.0 - 0.5 K/uL    Baso # 0.09 0.00 - 0.20 K/uL    nRBC 0 0 /100 WBC    Gran % 63.2 38.0 - 73.0 %    Lymph % 17.5 (L) 18.0 - 48.0 %    Mono % 15.2 (H) 4.0 - 15.0 %    Eosinophil % 1.0 0.0 - 8.0 %    Basophil % 0.7 0.0 - 1.9 %    Differential Method Automated    POCT glucose    Collection Time: 09/08/22  7:07 AM   Result Value Ref Range    POCT Glucose 65 (L) 70 - 110 mg/dL   POCT glucose    Collection Time: 09/08/22  7:26 AM   Result Value Ref Range    POCT Glucose 66 (L) 70 - 110 mg/dL   Basic metabolic panel    Collection Time: 09/08/22  8:10 AM   Result Value Ref Range    Sodium 135 (L) 136 - 145 mmol/L    Potassium 3.8 3.5 - 5.1 mmol/L     Chloride 106 95 - 110 mmol/L    CO2 21 (L) 23 - 29 mmol/L    Glucose 84 70 - 110 mg/dL    BUN 22 (H) 6 - 20 mg/dL    Creatinine 3.6 (H) 0.5 - 1.4 mg/dL    Calcium 8.4 (L) 8.7 - 10.5 mg/dL    Anion Gap 8 8 - 16 mmol/L    eGFR 22 (A) >60 mL/min/1.73 m^2   POCT glucose    Collection Time: 09/08/22  9:13 AM   Result Value Ref Range    POCT Glucose 120 (H) 70 - 110 mg/dL   POCT glucose    Collection Time: 09/08/22 10:53 AM   Result Value Ref Range    POCT Glucose 110 70 - 110 mg/dL   ]    I/O last 3 completed shifts:  In: 3920 [P.O.:1920; I.V.:2000]  Out: 500 [Urine:500]    Vitals:    09/08/22 0437 09/08/22 0645 09/08/22 0706 09/08/22 1051   BP: (!) 158/75  112/72 138/82   Pulse: 89  79 82   Resp: 18 17 20 20   Temp: 97.9 °F (36.6 °C)  97.6 °F (36.4 °C) 97.9 °F (36.6 °C)   TempSrc: Oral  Oral Oral   SpO2: 97%  (!) 93% 97%   Weight:       Height:           No Jvd, Thyromegaly or Lymphadenopathy  Lungs: Fairly clear anteriorly and laterally  Cor: RRR no G or rubs  Abd: Soft benign good bowel sounds non tender  Ext: Pos edema     A)  MARISSA  creat up again but seems to be peaking (was on vanco) poor uo   Cellulitis of his groin   Sepsis  DM  CHF (d) retinopathy  ETOH user with elevated LFT's     P)    Renal Diet  IVF's  recommended   Home meds  Adjust all meds to the degree of renal fx  Close follow up I/O and weights  Maintain Hydration   No Hd for now

## 2022-09-08 NOTE — ASSESSMENT & PLAN NOTE
Sepsis due to groin/scrotal cellulitis. Appreciate urology involvement. Initially given vanc/zosyn, which were stopped after kidney failure and patient was switched to clindamycin monotherapy. He appeared to improve on this for multiple days w/ normalizing WBC count. On 9/8 w/ worsening exam and repeat US showing large inguinal abscess.   - urology consulted; needs source control of infection  - ID consulted  - daptomycin   - pt w/ MARISSA which nephrology attributes to vancomcin, will avoid vanco pending ID input.   - cefepime  - metronidazole

## 2022-09-08 NOTE — SUBJECTIVE & OBJECTIVE
Interval History: Continued severe pain    Review of Systems   Constitutional:  Negative for chills and fever.   Respiratory:  Negative for cough and shortness of breath.    Cardiovascular:  Negative for chest pain and leg swelling.   Gastrointestinal:  Negative for abdominal distention and abdominal pain.   Genitourinary:  Positive for penile swelling, scrotal swelling and testicular pain.   Objective:     Vital Signs (Most Recent):  Temp: 98.1 °F (36.7 °C) (09/08/22 1640)  Pulse: 88 (09/08/22 1640)  Resp: 18 (09/08/22 1802)  BP: (!) 158/80 (09/08/22 1640)  SpO2: (!) 94 % (09/08/22 1640) Vital Signs (24h Range):  Temp:  [97.6 °F (36.4 °C)-98.7 °F (37.1 °C)] 98.1 °F (36.7 °C)  Pulse:  [79-91] 88  Resp:  [10-20] 18  SpO2:  [93 %-97 %] 94 %  BP: (112-167)/() 158/80     Weight: 105.2 kg (231 lb 14.8 oz)  Body mass index is 37.43 kg/m².    Intake/Output Summary (Last 24 hours) at 9/8/2022 1821  Last data filed at 9/8/2022 1730  Gross per 24 hour   Intake 3440 ml   Output --   Net 3440 ml        Physical Exam  Constitutional:       General: He is not in acute distress.     Appearance: He is ill-appearing. He is not toxic-appearing or diaphoretic.   Cardiovascular:      Rate and Rhythm: Normal rate and regular rhythm.      Heart sounds: No murmur heard.    No gallop.   Pulmonary:      Effort: Pulmonary effort is normal. No respiratory distress.      Breath sounds: Normal breath sounds. No wheezing.   Abdominal:      General: Bowel sounds are normal. There is no distension.      Palpations: Abdomen is soft.      Tenderness: There is no abdominal tenderness.   Genitourinary:     Comments: Erythema and swelling of b/l groin, testes, and penis. Tender to palpation. No abscess palpated.      Significant Labs: All pertinent labs within the past 24 hours have been reviewed.    Significant Imaging: I have reviewed all pertinent imaging results/findings within the past 24 hours.

## 2022-09-08 NOTE — SUBJECTIVE & OBJECTIVE
Interval History: Exam seems stable. Still with pain.     Review of Systems   Constitutional: Negative.  Negative for fatigue.   HENT: Negative.     Eyes: Negative.    Respiratory:  Negative for cough, chest tightness and shortness of breath.    Cardiovascular:  Negative for chest pain.   Gastrointestinal: Negative.  Negative for constipation, diarrhea and nausea.   Genitourinary:  Positive for scrotal swelling. Negative for difficulty urinating.   Musculoskeletal: Negative.    Neurological: Negative.    Psychiatric/Behavioral: Negative.     Objective:     Temp:  [97.6 °F (36.4 °C)-98.7 °F (37.1 °C)] 97.6 °F (36.4 °C)  Pulse:  [79-91] 79  Resp:  [10-20] 20  SpO2:  [93 %-97 %] 93 %  BP: (112-167)/() 112/72     Body mass index is 37.43 kg/m².           Drains       None                   Physical Exam  Vitals and nursing note reviewed.   Constitutional:       Appearance: He is well-developed.   HENT:      Head: Normocephalic.   Eyes:      Conjunctiva/sclera: Conjunctivae normal.   Neck:      Thyroid: No thyromegaly.      Trachea: No tracheal deviation.   Cardiovascular:      Rate and Rhythm: Normal rate.      Heart sounds: Normal heart sounds.   Pulmonary:      Effort: Pulmonary effort is normal. No respiratory distress.      Breath sounds: Normal breath sounds. No wheezing.   Abdominal:      General: Bowel sounds are normal.      Palpations: Abdomen is soft.      Tenderness: There is no abdominal tenderness. There is no rebound.      Hernia: No hernia is present.   Genitourinary:     Comments: Buried penis  Erythema marked and appears improved in the inguinal region  Right hemiscrotum tender, no crepitus, skin is indurated.  No palpable area of fluctuance.   Musculoskeletal:         General: No tenderness. Normal range of motion.      Cervical back: Normal range of motion and neck supple.   Lymphadenopathy:      Cervical: No cervical adenopathy.   Skin:     General: Skin is warm and dry.      Findings: No  erythema or rash.   Neurological:      Mental Status: He is alert and oriented to person, place, and time.   Psychiatric:         Behavior: Behavior normal.         Thought Content: Thought content normal.         Judgment: Judgment normal.       Significant Labs:    BMP:  Recent Labs   Lab 09/06/22  0453 09/07/22  0815 09/08/22  0810   * 136 135*   K 4.0 4.0 3.8    108 106   CO2 17* 18* 21*   BUN 24* 24* 22*   CREATININE 3.1* 3.5* 3.6*   CALCIUM 7.9* 8.5* 8.4*       CBC:   Recent Labs   Lab 09/06/22  0453 09/07/22  0815 09/08/22  0236   WBC 11.36 11.31 12.41   HGB 11.7* 13.1* 12.7*   HCT 35.2* 38.7* 36.9*    236 265       Blood Culture:   Recent Labs   Lab 09/01/22  1100   LABBLOO No Growth after 4 days.  No Growth after 4 days.     Urine Culture:   Recent Labs   Lab 09/02/22  1341   LABURIN No growth     Urine Studies:   Recent Labs   Lab 09/02/22  1341   COLORU Yellow   APPEARANCEUA Clear   PHUR 7.0   SPECGRAV 1.025   PROTEINUA 2+*   GLUCUA 4+*   KETONESU Negative   BILIRUBINUA Negative   OCCULTUA Trace*   NITRITE Negative   UROBILINOGEN Negative   LEUKOCYTESUR 3+*   RBCUA 5*   WBCUA 28*   BACTERIA Rare   SQUAMEPITHEL 0   HYALINECASTS 0       Significant Imaging:  All pertinent imaging results/findings from the past 24 hours have been reviewed.

## 2022-09-08 NOTE — ASSESSMENT & PLAN NOTE
Patient's FSGs are uncontrolled due to hyperglycemia on current medication regimen.  Last A1c reviewed-   Lab Results   Component Value Date    HGBA1C 12.6 (H) 01/04/2022     Most recent fingerstick glucose reviewed-   Recent Labs   Lab 09/08/22  0726 09/08/22  0913 09/08/22  1053 09/08/22  1642   POCTGLUCOSE 66* 120* 110 131*     Current correctional scale  Medium  Maintain anti-hyperglycemic dose as follows-   Antihyperglycemics (From admission, onward)    Start     Stop Route Frequency Ordered    09/04/22 2100  insulin detemir U-100 pen 60 Units         -- SubQ Nightly 09/04/22 0712    09/04/22 0815  insulin detemir U-100 pen 20 Units         -- SubQ Once 09/04/22 0712    09/01/22 1605  insulin aspart U-100 pen 1-10 Units         -- SubQ Before meals & nightly PRN 09/01/22 1506        Hold Oral hypoglycemics while patient is in the hospital.

## 2022-09-08 NOTE — PROGRESS NOTES
[facility]  Progress Note    Patient Name: Doe Woodall  MRN: [id number]  Patient Class: IP- Inpatient   Admission Date: 9/1/2022  Length of Stay: 7 days  Attending Physician: Sal Baldwin MD  Primary Care Provider: [facility]        Subjective:     Principal Problem:Sepsis        HPI:  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  He reports acute onset of erythema, duration 3 days, has progressed and is now swollen and painful, no known exacerbating or alleviating factors, no attempted self treatment.  Denies fever, chills, cough, chest pain, SOB, dizziness, syncope, nausea, vomiting, diarrhea, abdominal pain, or dysuria.  In the ED, found to be tachycardic and tachypneic with leukocytosis.  CT abdomen pelvis and scrotal ultrasound revealed extensive cellulitis.  Procalcitonin 5.7.  Initial lactic acid 2.7.  Also with mild hyponatremia and mildly elevated liver enzymes.  Blood cultures obtained, IV fluid resuscitation and IV antibiotics initiated.      Overview/Hospital Course:  Patient admitted for groin abscess of perineum.  Started on Van and Zosyn. Blood cultures were NG. Urology consulted. Vanc was stopped due to acute renal failure. Nephrology was consulted. Patient started on Clindamycin.  Blood cultures were NG.  Patient's cellulitis clinically improved. There was no surgical intervention.    Pt w/ worsening symptoms. Repeat ultrasound revealed large inguinal abscess. NPO for surgery. Antibiotic therapy escalated.       Interval History: Continued severe pain    Review of Systems   Constitutional:  Negative for chills and fever.   Respiratory:  Negative for cough and shortness of breath.    Cardiovascular:  Negative for chest pain and leg swelling.    Gastrointestinal:  Negative for abdominal distention and abdominal pain.   Genitourinary:  Positive for penile swelling, scrotal swelling and testicular pain.   Objective:     Vital Signs (Most Recent):  Temp: 98.1 °F (36.7 °C) (09/08/22 1640)  Pulse: 88 (09/08/22 1640)  Resp: 18 (09/08/22 1802)  BP: (!) 158/80 (09/08/22 1640)  SpO2: (!) 94 % (09/08/22 1640) Vital Signs (24h Range):  Temp:  [97.6 °F (36.4 °C)-98.7 °F (37.1 °C)] 98.1 °F (36.7 °C)  Pulse:  [79-91] 88  Resp:  [10-20] 18  SpO2:  [93 %-97 %] 94 %  BP: (112-167)/() 158/80     Weight: 105.2 kg (231 lb 14.8 oz)  Body mass index is 37.43 kg/m².    Intake/Output Summary (Last 24 hours) at 9/8/2022 1821  Last data filed at 9/8/2022 1730  Gross per 24 hour   Intake 3440 ml   Output --   Net 3440 ml        Physical Exam  Constitutional:       General: He is not in acute distress.     Appearance: He is ill-appearing. He is not toxic-appearing or diaphoretic.   Cardiovascular:      Rate and Rhythm: Normal rate and regular rhythm.      Heart sounds: No murmur heard.    No gallop.   Pulmonary:      Effort: Pulmonary effort is normal. No respiratory distress.      Breath sounds: Normal breath sounds. No wheezing.   Abdominal:      General: Bowel sounds are normal. There is no distension.      Palpations: Abdomen is soft.      Tenderness: There is no abdominal tenderness.   Genitourinary:     Comments: Erythema and swelling of b/l groin, testes, and penis. Tender to palpation. No abscess palpated.      Significant Labs: All pertinent labs within the past 24 hours have been reviewed.    Significant Imaging: I have reviewed all pertinent imaging results/findings within the past 24 hours.      Assessment/Plan:      * Sepsis  Sepsis due to groin/scrotal cellulitis. Appreciate urology involvement. Initially given vanc/zosyn, which were stopped after kidney failure and patient was switched to clindamycin monotherapy. He appeared to improve on this for multiple days  w/ normalizing WBC count. On 9/8 w/ worsening exam and repeat US showing large inguinal abscess.   - urology consulted; needs source control of infection  - ID consulted  - daptomycin   - pt w/ MARISSA which nephrology attributes to vancomcin, will avoid vanco pending ID input.   - cefepime  - metronidazole            Inguinal abscess  See sepsis      MARISSA (acute kidney injury)  Likely ATN, appears to be plateauing. No hydro on renal US  - appreciate nephrology involvement        Cellulitis of groin  As above      Chronic diastolic heart failure  No evidence of acute decompensation or fluid overload, echo earlier this year showed preserved EF with indeterminate LV diastolic function.   - continue to monitor while on IVF    Hyponatremia  Resolved without intervention    Alcohol use disorder, severe, dependence  Counseled, reports stopping a week or two ago, continue MVI/thiamine/folate    Severe obesity (BMI >= 40)  Body mass index is 37.43 kg/m². Morbid obesity complicates all aspects of disease management from diagnostic modalities to treatment. Weight loss encouraged and health benefits explained to patient.     Elevated transaminase level  Consistent with baseline, likely due to heavy alcohol use.    Type 2 diabetes mellitus with hyperglycemia  Patient's FSGs are uncontrolled due to hyperglycemia on current medication regimen.  Last A1c reviewed-   Lab Results   Component Value Date    HGBA1C 12.6 (H) 01/04/2022     Most recent fingerstick glucose reviewed-   Recent Labs   Lab 09/08/22  0726 09/08/22  0913 09/08/22  1053 09/08/22  1642   POCTGLUCOSE 66* 120* 110 131*     Current correctional scale  Medium  Maintain anti-hyperglycemic dose as follows-   Antihyperglycemics (From admission, onward)    Start     Stop Route Frequency Ordered    09/04/22 2100  insulin detemir U-100 pen 60 Units         -- SubQ Nightly 09/04/22 0712    09/04/22 0815  insulin detemir U-100 pen 20 Units         -- SubQ Once 09/04/22 0712     09/01/22 1605  insulin aspart U-100 pen 1-10 Units         -- SubQ Before meals & nightly PRN 09/01/22 1506        Hold Oral hypoglycemics while patient is in the hospital.      VTE Risk Mitigation (From admission, onward)         Ordered     enoxaparin injection 30 mg  Daily         09/04/22 0713     IP VTE HIGH RISK PATIENT  Once         09/01/22 1506     Place sequential compression device  Until discontinued         09/01/22 1506                Discharge Planning   SUSAN:      Code Status: Full Code   Is the patient medically ready for discharge?:     Reason for patient still in hospital (select all that apply): Patient trending condition, Laboratory test, Treatment, Consult recommendations and Pending disposition  Discharge Plan A: Home with family                  Sal Baldwin MD  Department of [facility]   [facility]

## 2022-09-09 NOTE — SUBJECTIVE & OBJECTIVE
Interval History: still with pain.    Review of Systems   Constitutional: Negative.  Negative for fever.   HENT: Negative.     Eyes: Negative.    Respiratory:  Negative for cough, chest tightness and shortness of breath.    Cardiovascular:  Negative for chest pain.   Gastrointestinal: Negative.  Negative for constipation, diarrhea and nausea.   Genitourinary:  Positive for scrotal swelling and testicular pain. Negative for difficulty urinating.   Musculoskeletal: Negative.    Neurological: Negative.    Psychiatric/Behavioral: Negative.     Objective:     Temp:  [97.1 °F (36.2 °C)-98.5 °F (36.9 °C)] 97.1 °F (36.2 °C)  Pulse:  [82-99] 97  Resp:  [16-20] 18  SpO2:  [94 %-98 %] 96 %  BP: (132-158)/(80-87) 134/87     Body mass index is 37.43 kg/m².           Drains       None                   Physical Exam  Vitals and nursing note reviewed.   Constitutional:       Appearance: He is well-developed.   HENT:      Head: Normocephalic.   Eyes:      Conjunctiva/sclera: Conjunctivae normal.   Neck:      Thyroid: No thyromegaly.      Trachea: No tracheal deviation.   Cardiovascular:      Rate and Rhythm: Normal rate.      Heart sounds: Normal heart sounds.   Pulmonary:      Effort: Pulmonary effort is normal. No respiratory distress.      Breath sounds: Normal breath sounds. No wheezing.   Abdominal:      General: Bowel sounds are normal.      Palpations: Abdomen is soft.      Tenderness: There is no abdominal tenderness. There is no rebound.      Hernia: No hernia is present.   Genitourinary:     Comments: Buried penis  Right hemiscrotum and right mons pubis tender, indurated.  No clear area of fluctuance.  Musculoskeletal:         General: No tenderness. Normal range of motion.      Cervical back: Normal range of motion and neck supple.   Lymphadenopathy:      Cervical: No cervical adenopathy.   Skin:     General: Skin is warm and dry.      Findings: No erythema or rash.   Neurological:      Mental Status: He is alert and  oriented to person, place, and time.   Psychiatric:         Behavior: Behavior normal.         Thought Content: Thought content normal.         Judgment: Judgment normal.       Significant Labs:    BMP:  Recent Labs   Lab 09/07/22  0815 09/08/22  0810 09/09/22  0404    135* 137   K 4.0 3.8 4.2    106 110   CO2 18* 21* 18*   BUN 24* 22* 22*   CREATININE 3.5* 3.6* 3.3*   CALCIUM 8.5* 8.4* 8.0*       CBC:   Recent Labs   Lab 09/07/22  0815 09/08/22  0236 09/09/22  0404   WBC 11.31 12.41 14.53*   HGB 13.1* 12.7* 11.7*   HCT 38.7* 36.9* 34.6*    265 284       Blood Culture: No results for input(s): LABBLOO in the last 168 hours.  Urine Culture:   Recent Labs   Lab 09/02/22  1341   LABURIN No growth       Significant Imaging:  U/S: I have reviewed all results within the past 24 hours and my personal findings are:  fluid collection noted right inguinal canal.  No obvious scrotal fluid collections.

## 2022-09-09 NOTE — CONSULTS
"[facility]  Infectious Disease  Consult Note    Patient Name: Doe Woodall  MRN: [id number]  Admission Date: 9/1/2022  Hospital Length of Stay: 8 days  Attending Physician: Sal Baldwin MD  Primary Care Provider: [facility]     Isolation Status: No active isolations        Inpatient consult to Infectious Diseases  Consult performed by: Comfort Hurtado MD  Consult ordered by: Sal Baldwin MD        Attempted to see consult, but he's in surgery for I&D of scrotal abscess. Discussed case with hospitalist. Pt currently on daptomycin/cefepime/metronidazole. Continue antibiotics and will de-escalate based on OR culture results              Comfort Hurtado MD  Infectious Disease  [facility]    Subjective:     Principal Problem: Sepsis    HPI: 32M with h/o t2dm, obesity (BMI 37)  admitted 9/1 with scrotal pain and erythema           9/1 scrotal US  Scrotal cellulitis and probable cutaneous boil.  No extension/involvement of the testicle.    9/8 scrotal US  Large (11.7 x 4.4 x 2.9 cm) thick complex fluid collection in the right inguinal location, concerning for abscess.    Bcx 9/1 ngtd    Pt going to OR today 9/9    ID consulted for "inguinal abscess/ cellulitis, has MARISSA attributed to previous vanc/zosyn"      No new subjective & objective note has been filed under this hospital service since the last note was generated.              "

## 2022-09-09 NOTE — SUBJECTIVE & OBJECTIVE
Interval History: Continued pain. Went for I&D today.    Review of Systems   Constitutional:  Negative for chills and fever.   Respiratory:  Negative for cough and shortness of breath.    Cardiovascular:  Negative for chest pain and leg swelling.   Gastrointestinal:  Negative for abdominal distention and abdominal pain.   Genitourinary:  Positive for penile swelling, scrotal swelling and testicular pain.   Objective:     Vital Signs (Most Recent):  Temp: 98.8 °F (37.1 °C) (09/09/22 1558)  Pulse: 93 (09/09/22 1558)  Resp: 16 (09/09/22 1558)  BP: 121/75 (09/09/22 1558)  SpO2: 100 % (09/09/22 1558) Vital Signs (24h Range):  Temp:  [97.1 °F (36.2 °C)-98.8 °F (37.1 °C)] 98.8 °F (37.1 °C)  Pulse:  [83-99] 93  Resp:  [10-20] 16  SpO2:  [94 %-100 %] 100 %  BP: (113-143)/(57-87) 121/75     Weight: 105.2 kg (231 lb 14.8 oz)  Body mass index is 37.43 kg/m².    Intake/Output Summary (Last 24 hours) at 9/9/2022 1646  Last data filed at 9/9/2022 1215  Gross per 24 hour   Intake 970 ml   Output --   Net 970 ml        Physical Exam  Constitutional:       General: He is not in acute distress.     Appearance: He is ill-appearing. He is not toxic-appearing or diaphoretic.   Cardiovascular:      Rate and Rhythm: Normal rate and regular rhythm.      Heart sounds: No murmur heard.    No gallop.   Pulmonary:      Effort: Pulmonary effort is normal. No respiratory distress.      Breath sounds: Normal breath sounds. No wheezing.   Abdominal:      General: Bowel sounds are normal. There is no distension.      Palpations: Abdomen is soft.      Tenderness: There is no abdominal tenderness.   Genitourinary:     Comments: Erythema and swelling of b/l groin, testes, and penis. Surgical site w/ oozing blood, dressing applied      Significant Labs: All pertinent labs within the past 24 hours have been reviewed.    Significant Imaging: I have reviewed all pertinent imaging results/findings within the past 24 hours.

## 2022-09-09 NOTE — NURSING TRANSFER
Nursing Transfer Note      9/9/2022     Reason patient is being transferred: Bleeding Uncontrolled     Transfer To: ICU Room 264     Transfer via wheelchair    Transfer with cardiac monitoring    Transported by Transport, Charge Nurse      Medicines sent: Miconazole Powder, NS IV Fluids    Any special needs or follow-up needed: N/A    Chart send with patient: Yes    Notified: spouse, friend

## 2022-09-09 NOTE — PROGRESS NOTES
Pt seen at bedside due to concern for post-operative bleeding. Pt had been transferred to ICU. Pressure dressing removed that had been in placed for reported 45 minutes with mild blood on 4x4 gauze, not saturation. Wound observed. Minimal venous ooze from skin edges of superior I&D incision. Inferior incision hemostatic. Packing remains in place - not to be removed at this time. Pressure dressing reapplied. Okay to remove and replace dressing PRN.     Adjusted pain medication.

## 2022-09-09 NOTE — SUBJECTIVE & OBJECTIVE
Interval History: s/p I&D today around noon.  Asked to the bedside to evaluate for bleeding.  Pain controlled.    Review of Systems   Constitutional: Negative.  Negative for fever.   HENT: Negative.     Eyes: Negative.    Respiratory:  Negative for cough, chest tightness and shortness of breath.    Cardiovascular:  Negative for chest pain.   Gastrointestinal: Negative.  Negative for constipation, diarrhea and nausea.   Genitourinary:  Positive for scrotal swelling and testicular pain.   Musculoskeletal: Negative.    Neurological: Negative.    Psychiatric/Behavioral: Negative.     Objective:     Temp:  [97.1 °F (36.2 °C)-98.5 °F (36.9 °C)] 97.9 °F (36.6 °C)  Pulse:  [83-99] 90  Resp:  [10-20] 16  SpO2:  [94 %-98 %] 97 %  BP: (113-158)/(57-87) 113/59     Body mass index is 37.43 kg/m².    Date 09/09/22 0700 - 09/10/22 0659   Shift 7822-3313 1566-0640 3417-5481 24 Hour Total   INTAKE   IV Piggyback 850   850   Shift Total(mL/kg) 850(8.1)   850(8.1)   OUTPUT   Shift Total(mL/kg)       Weight (kg) 105.2 105.2 105.2 105.2          Drains       None                   Physical Exam  Genitourinary:         Comments: Dressing removed  Packing in place with blood soaked kerlix  Slight blood ooze from superior incision    Pressure dressing applied with RN assistance.       Significant Labs:    BMP:  Recent Labs   Lab 09/07/22  0815 09/08/22  0810 09/09/22  0404    135* 137   K 4.0 3.8 4.2    106 110   CO2 18* 21* 18*   BUN 24* 22* 22*   CREATININE 3.5* 3.6* 3.3*   CALCIUM 8.5* 8.4* 8.0*       CBC:   Recent Labs   Lab 09/07/22  0815 09/08/22  0236 09/09/22  0404   WBC 11.31 12.41 14.53*   HGB 13.1* 12.7* 11.7*   HCT 38.7* 36.9* 34.6*    265 284           Significant Imaging:

## 2022-09-09 NOTE — NURSING
Report given Mehnaz CASTILLO in ICU, Patient is Awake and Alert, on room air. Transferring to ICU room 264. Girlfriend alongside. Dressing reinforced.

## 2022-09-09 NOTE — PLAN OF CARE
Pt states pain has come down to 4/10. Pt states readiness to go back to room. Vitals remain stable, on RA. Report called to ANNA Almeida, who will assume care. Pt meets PACU discharge criteria.

## 2022-09-09 NOTE — ASSESSMENT & PLAN NOTE
Sepsis due to groin/scrotal cellulitis. Appreciate urology involvement. Initially given vanc/zosyn, which were stopped after kidney failure and patient was switched to clindamycin monotherapy. He appeared to improve on this for multiple days w/ normalizing WBC count. On 9/8 w/ worsening exam and repeat US showing large inguinal abscess.   - urology consulted   - s/p I&D  - ID consulted  - daptomycin   - pt w/ MARISSA which nephrology attributes to vancomcin, will avoid vanco   - cefepime  - metronidazole

## 2022-09-09 NOTE — TRANSFER OF CARE
"Anesthesia Transfer of Care Note    Patient: Doe Woodall    Procedure(s) Performed: Procedure(s) (LRB):  INCISION AND DRAINAGE GROIN (N/A)    Patient location: PACU    Anesthesia Type: general    Transport from OR: Transported from OR on room air with adequate spontaneous ventilation    Post pain: adequate analgesia    Post assessment: no apparent anesthetic complications    Post vital signs: stable    Level of consciousness: alert, responds to stimulation and awake    Nausea/Vomiting: no nausea/vomiting    Complications: none    Transfer of care protocol was followed      Last vitals:   Visit Vitals  /78 (BP Location: Right arm, Patient Position: Lying)   Pulse 85   Temp 36.6 °C (97.9 °F) (Skin)   Resp 17   Ht 5' 6" (1.676 m)   Wt 105.2 kg (231 lb 14.8 oz)   SpO2 97%   BMI 37.43 kg/m²     "

## 2022-09-09 NOTE — HPI
"32M with h/o t2dm, obesity (BMI 37)  admitted 9/1 with scrotal pain and erythema. Says it started off as a rash on b/l inner thighs. Several days later, wound/scrotum became hard and pain worsened so came to hospital. Also notes shaking and cold sweats before arrival    9/1 scrotal US  Scrotal cellulitis and probable cutaneous boil.  No extension/involvement of the testicle.    9/8 scrotal US  Large (11.7 x 4.4 x 2.9 cm) thick complex fluid collection in the right inguinal location, concerning for abscess.    Bcx 9/1 ngtd    Went to OR  9/9 with urology. Cultures sent and growing:  Aerobic Bacterial Culture  Abnormal   STREPTOCOCCUS AGALACTIAE (GROUP B)   Moderate   Beta-hemolytic streptococci are routinely susceptible to   penicillins,cephalosporins and carbapenems.       ID consulted for "inguinal abscess/ cellulitis, has MARISSA attributed to previous vanc/zosyn"  "

## 2022-09-09 NOTE — OP NOTE
Date of Procedure: 09/09/2022     Preoperative Diagnosis:  Right groin/scrotal abscess    Postoperative Diagnosis:  Right groin/scrotal abscess    Primary Surgeon: KAITY Aguilar MD    Anesthesia:  General    Procedure Performed:  Incision and drainage of groin abscess    Estimated Blood Loss:  10 mL    Drains:  Kerlix packing    Specimens Removed:  Aerobic and anaerobic cultures    Complications:  None    Indications: Doe Woodall is a 32-year-old diabetic male who presented with right scrotal swelling, erythema, and pain.  Initial imaging showed only induration no abscess.  Subsequent imaging shows the development of an abscess.  He was recommended that he undergo incision and drainage of the abscess.    Procedure in Detail:    Doe Woodall was taken to the operating room where he was positively identified by peyman.  He has placed supine the operating room table.  Following induction of adequate general anesthesia he was placed in the dorsal lithotomy position and his groin and external genitalia were prepped and draped in usual sterile fashion.      A preoperative time-out was performed as well as confirmation of preoperative antibiotics.      The area of flexion was was better identified in the dorsal lithotomy position.  It was seen to be inferior groin lateral right scrotal wall.  I made a 3 cm incision using a 15 blade scalpel.  A large amount of purulence material strain.  Cultures were taken.      I then digitally probed the wound.  The wound tracked superiorly into the groin and inferiorly down to the scrotum.  There was a separate pocket in most inferior scrotum.  I made a 2nd incision over this area.      The wound was irrigated with tobramycin irrigation.      Hemostasis was achieved using electrocautery.      The wound was packed with Kerlix soaked in tobramycin solution.      Sponge counts needle counts instrument counts reported correct.      His anesthesia was reversed was taken to  recovery room in stable condition.

## 2022-09-09 NOTE — PROGRESS NOTES
[facility]  Urology  Progress Note    Patient Name: Doe Woodall  MRN: [id number]  Admission Date: 9/1/2022  Hospital Length of Stay: 8 days  Code Status: Full Code   Attending Provider: Sal Baldwin MD   Primary Care Physician: [facility]    Subjective:     HPI:  31 yo man presented to the hospital with progressive scrotal pain which started off as a boil on his right scrotum. Patient with hx of uncontrolled DM. Patient's imaging on arrival did not demonstrate presence of abscess. Urology consulted for further evaluation. Denies dysuria, fevers, chills, chest pain, shortness of breath. Had breakfast this morning.       Interval History: s/p I&D today around noon.  Asked to the bedside to evaluate for bleeding.  Pain controlled.    Review of Systems   Constitutional: Negative.  Negative for fever.   HENT: Negative.     Eyes: Negative.    Respiratory:  Negative for cough, chest tightness and shortness of breath.    Cardiovascular:  Negative for chest pain.   Gastrointestinal: Negative.  Negative for constipation, diarrhea and nausea.   Genitourinary:  Positive for scrotal swelling and testicular pain.   Musculoskeletal: Negative.    Neurological: Negative.    Psychiatric/Behavioral: Negative.     Objective:     Temp:  [97.1 °F (36.2 °C)-98.5 °F (36.9 °C)] 97.9 °F (36.6 °C)  Pulse:  [83-99] 90  Resp:  [10-20] 16  SpO2:  [94 %-98 %] 97 %  BP: (113-158)/(57-87) 113/59     Body mass index is 37.43 kg/m².    Date 09/09/22 0700 - 09/10/22 0659   Shift 1069-8155 9799-8093 0385-8947 24 Hour Total   INTAKE   IV Piggyback 850   850   Shift Total(mL/kg) 850(8.1)   850(8.1)   OUTPUT   Shift Total(mL/kg)       Weight (kg) 105.2 105.2 105.2 105.2          Drains       None                   Physical Exam  Genitourinary:         Comments: Dressing removed  Packing in place with blood soaked kerlix  Slight blood ooze from superior incision    Pressure dressing applied with RN  assistance.       Significant Labs:    BMP:  Recent Labs   Lab 09/07/22  0815 09/08/22  0810 09/09/22  0404    135* 137   K 4.0 3.8 4.2    106 110   CO2 18* 21* 18*   BUN 24* 22* 22*   CREATININE 3.5* 3.6* 3.3*   CALCIUM 8.5* 8.4* 8.0*       CBC:   Recent Labs   Lab 09/07/22  0815 09/08/22  0236 09/09/22  0404   WBC 11.31 12.41 14.53*   HGB 13.1* 12.7* 11.7*   HCT 38.7* 36.9* 34.6*    265 284           Significant Imaging:                      Assessment/Plan:     MARISSA (acute kidney injury)  -- Uncertain etiology  -- No evidence of urinary retention  -- Further management per primary    Cellulitis of groin  S/p I&D of scrotal/inguinal abscess  Keep pressure dressing on wound this evening.  Dr. Arriaga will evaluate and change dressing in AM          VTE Risk Mitigation (From admission, onward)         Ordered     enoxaparin injection 30 mg  Daily         09/04/22 0713     IP VTE HIGH RISK PATIENT  Once         09/01/22 1506     Place sequential compression device  Until discontinued         09/01/22 1506                W Tuan Aguilar MD  Urology  [facility] - Telemetry

## 2022-09-09 NOTE — ASSESSMENT & PLAN NOTE
Patient's FSGs are uncontrolled due to hyperglycemia on current medication regimen.  Last A1c reviewed-   Lab Results   Component Value Date    HGBA1C 12.6 (H) 01/04/2022     Most recent fingerstick glucose reviewed-   Recent Labs   Lab 09/08/22  1920 09/09/22  0702 09/09/22  1624   POCTGLUCOSE 121* 82 79     Current correctional scale  Medium  Maintain anti-hyperglycemic dose as follows-   Antihyperglycemics (From admission, onward)    Start     Stop Route Frequency Ordered    09/08/22 2100  insulin detemir U-100 pen 30 Units         -- SubQ Nightly 09/08/22 1828    09/04/22 0815  insulin detemir U-100 pen 20 Units         -- SubQ Once 09/04/22 0712    09/01/22 1605  insulin aspart U-100 pen 1-10 Units         -- SubQ Before meals & nightly PRN 09/01/22 1506        Hold Oral hypoglycemics while patient is in the hospital.

## 2022-09-09 NOTE — BRIEF OP NOTE
[facility] - Surgery  Brief Operative Note    SUMMARY     Surgery Date: 9/9/2022     Surgeon(s) and Role:     * KAITY Aguilar MD - Primary    Assisting Surgeon: None    Pre-op Diagnosis:  Cellulitis of groin [L03.314]    Post-op Diagnosis:  Post-Op Diagnosis Codes:     * Cellulitis of groin [L03.314]    Procedure(s) (LRB):  INCISION AND DRAINAGE GROIN (N/A)    Anesthesia: Choice    Operative Findings: right groin/scrotal abscess.  Opened in groin and inferior scrotum.  Irrigated and packed with kerlix gauze     Estimated Blood Loss: * No values recorded between 9/9/2022 12:07 PM and 9/9/2022 12:22 PM *    Estimated Blood Loss has not been documented. EBL = 10 mL.         Specimens:   Specimen (24h ago, onward)      None            [id number]

## 2022-09-09 NOTE — PLAN OF CARE
Problem: Adult Inpatient Plan of Care  Goal: Plan of Care Review  Outcome: Ongoing, Progressing     Problem: Bariatric Environmental Safety  Goal: Safety Maintained with Care  Outcome: Ongoing, Progressing     Problem: Infection  Goal: Absence of Infection Signs and Symptoms  Outcome: Ongoing, Progressing     Problem: Diabetes Comorbidity  Goal: Blood Glucose Level Within Targeted Range  Outcome: Ongoing, Progressing     Problem: Adjustment to Illness (Sepsis/Septic Shock)  Goal: Optimal Coping  Outcome: Ongoing, Progressing     Problem: Bleeding (Sepsis/Septic Shock)  Goal: Absence of Bleeding  Outcome: Ongoing, Progressing     Problem: Glycemic Control Impaired (Sepsis/Septic Shock)  Goal: Blood Glucose Level Within Desired Range  Outcome: Ongoing, Progressing     Problem: Infection Progression (Sepsis/Septic Shock)  Goal: Absence of Infection Signs and Symptoms  Outcome: Ongoing, Progressing     Problem: Nutrition Impaired (Sepsis/Septic Shock)  Goal: Optimal Nutrition Intake  Outcome: Ongoing, Progressing     Problem: Pain Acute  Goal: Acceptable Pain Control and Functional Ability  Outcome: Ongoing, Progressing     Problem: Fluid and Electrolyte Imbalance (Acute Kidney Injury/Impairment)  Goal: Fluid and Electrolyte Balance  Outcome: Ongoing, Progressing     Problem: Oral Intake Inadequate (Acute Kidney Injury/Impairment)  Goal: Optimal Nutrition Intake  Outcome: Ongoing, Progressing     Problem: Renal Function Impairment (Acute Kidney Injury/Impairment)  Goal: Effective Renal Function  Outcome: Ongoing, Progressing     Problem: Skin Injury Risk Increased  Goal: Skin Health and Integrity  Outcome: Ongoing, Progressing

## 2022-09-09 NOTE — ASSESSMENT & PLAN NOTE
Will continue to monitor for development of abscess/necrotizing soft tissue infection   Maintain tight BS control    Plan on incision and drainage of abscess in right inguinal canal  He will need wound packing daily, home health vs snf afterwards  Consented and marked  Keep NPO  Continue cefepime and flagyl

## 2022-09-09 NOTE — PROGRESS NOTES
Awake alert oriented NAD    Denies CNS ENT CP GI  RHEUM OR DERM SX  Past Medical History:   Diagnosis Date    Abscess of right groin 09/01/2022    Diabetes mellitus     Encounter for blood transfusion     Loud snoring     Obese     Other insomnia 08/03/2020    Perineal abscess 08/01/2014     Past Surgical History:   Procedure Laterality Date    ABCESS DRAINAGE  2014    PERIRECTAL    DECOMPRESSION OF LUMBAR SPINE USING MINIMALLY INVASIVE TECHNIQUE Right 07/06/2018    Procedure: DECOMPRESSION, SPINE, LUMBAR, MINIMALLY INVASIVE L3-4;  Surgeon: Cristian Cervantes MD;  Location: [facility] OR [address] [place] [address];  Service: Neurosurgery;  Laterality: Right;    ORTHOPEDIC SURGERY       Review of patient's allergies indicates:   Allergen Reactions    Metformin Diarrhea       Current Facility-Administered Medications   Medication    0.9%  NaCl infusion    acetaminophen tablet 650 mg    albuterol-ipratropium 2.5 mg-0.5 mg/3 mL nebulizer solution 3 mL    aluminum-magnesium hydroxide-simethicone 200-200-20 mg/5 mL suspension 30 mL    cefepime in dextrose 5 % IVPB 2 g    COVID-19 vac, pablo(Pfizer)(PF) (Pfizer COVID-19) 30 mcg/0.3 mL injection 0.3 mL    DAPTOmycin (CUBICIN) 630 mg in sodium chloride 0.9% 50 mL IVPB    dextrose 10% bolus 125 mL    dextrose 10% bolus 250 mL    diphenhydrAMINE injection 25 mg    enoxaparin injection 30 mg    fluconazole tablet 100 mg    folic acid tablet 1 mg    glucagon (human recombinant) injection 1 mg    glucose chewable tablet 16 g    glucose chewable tablet 24 g    HYDROcodone-acetaminophen 5-325 mg per tablet 1 tablet    HYDROmorphone (PF) injection 0.5 mg    insulin aspart U-100 pen 1-10 Units    insulin detemir U-100 pen 20 Units    insulin detemir U-100 pen 30 Units    melatonin tablet 6 mg    metroNIDAZOLE tablet 500 mg    miconazole NITRATE 2 % top powder    morphine injection 2 mg    multivitamin tablet    naloxone 0.4 mg/mL injection 0.02 mg    ondansetron injection 4 mg    polyethylene glycol packet 17 g     prochlorperazine injection Soln 5 mg    simethicone chewable tablet 80 mg    sodium chloride 0.9% flush 10 mL    sodium chloride 0.9% flush 10 mL    thiamine tablet 100 mg       LABS    Recent Results (from the past 24 hour(s))   POCT glucose    Collection Time: 09/08/22 10:53 AM   Result Value Ref Range    POCT Glucose 110 70 - 110 mg/dL   POCT glucose    Collection Time: 09/08/22  4:42 PM   Result Value Ref Range    POCT Glucose 131 (H) 70 - 110 mg/dL   POCT glucose    Collection Time: 09/08/22  7:20 PM   Result Value Ref Range    POCT Glucose 121 (H) 70 - 110 mg/dL   CBC Auto Differential    Collection Time: 09/09/22  4:04 AM   Result Value Ref Range    WBC 14.53 (H) 3.90 - 12.70 K/uL    RBC 3.44 (L) 4.60 - 6.20 M/uL    Hemoglobin 11.7 (L) 14.0 - 18.0 g/dL    Hematocrit 34.6 (L) 40.0 - 54.0 %     (H) 82 - 98 fL    MCH 34.0 (H) 27.0 - 31.0 pg    MCHC 33.8 32.0 - 36.0 g/dL    RDW 12.0 11.5 - 14.5 %    Platelets 284 150 - 450 K/uL    MPV 9.5 9.2 - 12.9 fL    Immature Granulocytes 2.7 (H) 0.0 - 0.5 %    Gran # (ANC) 10.0 (H) 1.8 - 7.7 K/uL    Immature Grans (Abs) 0.39 (H) 0.00 - 0.04 K/uL    Lymph # 2.2 1.0 - 4.8 K/uL    Mono # 1.7 (H) 0.3 - 1.0 K/uL    Eos # 0.1 0.0 - 0.5 K/uL    Baso # 0.08 0.00 - 0.20 K/uL    nRBC 0 0 /100 WBC    Gran % 68.9 38.0 - 73.0 %    Lymph % 15.0 (L) 18.0 - 48.0 %    Mono % 11.8 4.0 - 15.0 %    Eosinophil % 1.0 0.0 - 8.0 %    Basophil % 0.6 0.0 - 1.9 %    Differential Method Automated    Basic metabolic panel    Collection Time: 09/09/22  4:04 AM   Result Value Ref Range    Sodium 137 136 - 145 mmol/L    Potassium 4.2 3.5 - 5.1 mmol/L    Chloride 110 95 - 110 mmol/L    CO2 18 (L) 23 - 29 mmol/L    Glucose 95 70 - 110 mg/dL    BUN 22 (H) 6 - 20 mg/dL    Creatinine 3.3 (H) 0.5 - 1.4 mg/dL    Calcium 8.0 (L) 8.7 - 10.5 mg/dL    Anion Gap 9 8 - 16 mmol/L    eGFR 24 (A) >60 mL/min/1.73 m^2   POCT glucose    Collection Time: 09/09/22  7:02 AM   Result Value Ref Range    POCT Glucose 82  70 - 110 mg/dL   ]    I/O last 3 completed shifts:  In: 3440 [P.O.:1440; I.V.:2000]  Out: -     Vitals:    09/09/22 0001 09/09/22 0418 09/09/22 0601 09/09/22 0727   BP:  132/80  134/87   Pulse:  83  97   Resp: 16 18 16 18   Temp:  97.7 °F (36.5 °C)  97.1 °F (36.2 °C)   TempSrc:  Oral  Oral   SpO2:  98%  96%   Weight:       Height:           No Jvd, Thyromegaly or Lymphadenopathy  Lungs: Fairly clear anteriorly and laterally  Cor: RRR no G or rubs  Abd: Soft benign good bowel sounds non tender  Ext: Pos edema     A)  MARISSA  creat down   Cellulitis of his groin for surgery today   Sepsis  DM  CHF (d) retinopathy  ETOH user with elevated LFT's     P)    Renal Diet  IVF's  recommended   Home meds  Adjust all meds to the degree of renal fx  Close follow up I/O and weights  Maintain Hydration   No Hd   Avoid NSAID's

## 2022-09-09 NOTE — ASSESSMENT & PLAN NOTE
S/p I&D of scrotal/inguinal abscess  Keep pressure dressing on wound this evening.  Dr. Arriaga will evaluate and change dressing in AM

## 2022-09-09 NOTE — PROGRESS NOTES
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  He reports acute onset of erythema, duration 3 days, has progressed and is now swollen and painful, no known exacerbating or alleviating factors, no attempted self treatment.  Denies fever, chills, cough, chest pain, SOB, dizziness, syncope, nausea, vomiting, diarrhea, abdominal pain, or dysuria.  In the ED, found to be tachycardic and tachypneic with leukocytosis.  CT abdomen pelvis and scrotal ultrasound revealed extensive cellulitis.  Procalcitonin 5.7.  Initial lactic acid 2.7.  Also with mild hyponatremia and mildly elevated liver enzymes.  Blood cultures obtained, IV fluid resuscitation and IV antibiotics initiated.      Overview/Hospital Course:  Patient admitted for groin abscess of perineum.  Started on Van and Zosyn. Blood cultures were NG. Urology consulted. Vanc was stopped due to acute renal failure. Nephrology was consulted. Patient started on Clindamycin.  Blood cultures were NG.  Patient's cellulitis clinically improved. There was no surgical intervention.    Pt w/ worsening symptoms. Repeat ultrasound revealed large inguinal abscess. NPO for surgery. Antibiotic therapy escalated. Pt went for I&D w/ urology. Post-operative course c/b bleeding.      Interval History: Continued pain. Went for I&D today.    Review of Systems   Constitutional:  Negative for chills and fever.   Respiratory:  Negative for cough and  shortness of breath.    Cardiovascular:  Negative for chest pain and leg swelling.   Gastrointestinal:  Negative for abdominal distention and abdominal pain.   Genitourinary:  Positive for penile swelling, scrotal swelling and testicular pain.   Objective:     Vital Signs (Most Recent):  Temp: 98.8 °F (37.1 °C) (09/09/22 1558)  Pulse: 93 (09/09/22 1558)  Resp: 16 (09/09/22 1558)  BP: 121/75 (09/09/22 1558)  SpO2: 100 % (09/09/22 1558) Vital Signs (24h Range):  Temp:  [97.1 °F (36.2 °C)-98.8 °F (37.1 °C)] 98.8 °F (37.1 °C)  Pulse:  [83-99] 93  Resp:  [10-20] 16  SpO2:  [94 %-100 %] 100 %  BP: (113-143)/(57-87) 121/75     Weight: 105.2 kg (231 lb 14.8 oz)  Body mass index is 37.43 kg/m².    Intake/Output Summary (Last 24 hours) at 9/9/2022 1646  Last data filed at 9/9/2022 1215  Gross per 24 hour   Intake 970 ml   Output --   Net 970 ml        Physical Exam  Constitutional:       General: He is not in acute distress.     Appearance: He is ill-appearing. He is not toxic-appearing or diaphoretic.   Cardiovascular:      Rate and Rhythm: Normal rate and regular rhythm.      Heart sounds: No murmur heard.    No gallop.   Pulmonary:      Effort: Pulmonary effort is normal. No respiratory distress.      Breath sounds: Normal breath sounds. No wheezing.   Abdominal:      General: Bowel sounds are normal. There is no distension.      Palpations: Abdomen is soft.      Tenderness: There is no abdominal tenderness.   Genitourinary:     Comments: Erythema and swelling of b/l groin, testes, and penis. Surgical site w/ oozing blood, dressing applied      Significant Labs: All pertinent labs within the past 24 hours have been reviewed.    Significant Imaging: I have reviewed all pertinent imaging results/findings within the past 24 hours.      Assessment/Plan:      * Sepsis  Sepsis due to groin/scrotal cellulitis. Appreciate urology involvement. Initially given vanc/zosyn, which were stopped after kidney failure and patient was  switched to clindamycin monotherapy. He appeared to improve on this for multiple days w/ normalizing WBC count. On 9/8 w/ worsening exam and repeat US showing large inguinal abscess.   - urology consulted   - s/p I&D  - ID consulted  - daptomycin   - pt w/ MARISSA which nephrology attributes to vancomcin, will avoid vanco   - cefepime  - metronidazole            Inguinal abscess  See sepsis      MARISSA (acute kidney injury)  Likely ATN, appears to be plateauing. No hydro on renal US  - appreciate nephrology involvement        Cellulitis of groin  As above      Chronic diastolic heart failure  No evidence of acute decompensation or fluid overload, echo earlier this year showed preserved EF with indeterminate LV diastolic function.   - continue to monitor while on IVF    Hyponatremia  Resolved without intervention    Alcohol use disorder, severe, dependence  Counseled, reports stopping a week or two ago, continue MVI/thiamine/folate    Severe obesity (BMI >= 40)  Body mass index is 37.43 kg/m². Morbid obesity complicates all aspects of disease management from diagnostic modalities to treatment. Weight loss encouraged and health benefits explained to patient.     Elevated transaminase level  Consistent with baseline, likely due to heavy alcohol use.    Type 2 diabetes mellitus with hyperglycemia  Patient's FSGs are uncontrolled due to hyperglycemia on current medication regimen.  Last A1c reviewed-   Lab Results   Component Value Date    HGBA1C 12.6 (H) 01/04/2022     Most recent fingerstick glucose reviewed-   Recent Labs   Lab 09/08/22  1920 09/09/22  0702 09/09/22  1624   POCTGLUCOSE 121* 82 79     Current correctional scale  Medium  Maintain anti-hyperglycemic dose as follows-   Antihyperglycemics (From admission, onward)    Start     Stop Route Frequency Ordered    09/08/22 2100  insulin detemir U-100 pen 30 Units         -- SubQ Nightly 09/08/22 1828    09/04/22 0815  insulin detemir U-100 pen 20 Units         -- SubQ  Once 09/04/22 0712    09/01/22 1605  insulin aspart U-100 pen 1-10 Units         -- SubQ Before meals & nightly PRN 09/01/22 1506        Hold Oral hypoglycemics while patient is in the hospital.      VTE Risk Mitigation (From admission, onward)         Ordered     enoxaparin injection 30 mg  Daily         09/04/22 0713     IP VTE HIGH RISK PATIENT  Once         09/01/22 1506     Place sequential compression device  Until discontinued         09/01/22 1506                Discharge Planning   SUSAN:      Code Status: Full Code   Is the patient medically ready for discharge?:     Reason for patient still in hospital (select all that apply): Patient trending condition, Laboratory test, Treatment, Consult recommendations and Pending disposition  Discharge Plan A: Home with family                  Sal Baldwin MD  Department of [facility] Medicine   [facility]

## 2022-09-09 NOTE — NURSING
Patient has arrived back on the unit on room air, c/o pain 5/10, Patient dressing saturated with blood. Will reinforce dressing

## 2022-09-09 NOTE — PROGRESS NOTES
[facility]-  Rapid Response Nurse Intervention/ Task    Date of Visit: 09/09/2022  Time of Visit: 5:29 PM       INTERVENTIONS/ TASK Completed:     Assist holding pressure and contacting MD again      Pressure dressing re applied , old gauze removed, packing left in place     Pt to come to ICU for closer monitoring

## 2022-09-09 NOTE — PLAN OF CARE
1420: Pt's ABD pads became saturated again. ABD pads & mesh underwear changed at this time by RN's x2.     1431: Pt leaving PACU at this time via transport. Chart on pt's cart. Tele box on pt. No visible signs of distress noted.

## 2022-09-09 NOTE — PLAN OF CARE
1330: Pt saturated through groin dressings. Cleaned pt, replaced ABD pads & secured w/ new mesh underwear.     1345: Called Dr. Aguilar to notify of pt's amount of drainage at groin site. Dr. Aguilar states that drainage amount should start to decrease & to continue to monitor. Pt states pain is starting to decrease a little. Vitals remain stable.

## 2022-09-09 NOTE — ANESTHESIA PREPROCEDURE EVALUATION
09/09/2022  Doe Woodall is a 32 y.o., male.      Pre-op Assessment    I have reviewed the Patient Summary Reports.     I have reviewed the Nursing Notes.    I have reviewed the Medications.     Review of Systems  Anesthesia Hx:  No problems with previous Anesthesia Denies Hx of Anesthetic complications  Neg history of prior surgery. Denies Family Hx of Anesthesia complications.   Denies Personal Hx of Anesthesia complications.   Social:  Alcohol Use, Former Smoker Alcohol abuse   Hematology/Oncology:  Hematology Normal   Oncology Normal     EENT/Dental:EENT/Dental Normal   Cardiovascular:   Exercise tolerance: good Denies Pacemaker.  Denies Hypertension.  Denies Valvular problems/Murmurs.  Denies MI.  Denies CAD.    Denies CABG/stent.  Denies Dysrhythmias.  CHF Echo 1/2022:  ? The left ventricle is normal in size with normal systolic function. The estimated ejection fraction is 60%.  ? Indeterminate left ventricular diastolic function.  ? Normal right ventricular size with normal right ventricular systolic function.  ? Mild left atrial enlargement.  ? Low central venous pressure.   Pulmonary:  Pulmonary Normal Denies Pneumonia  Denies COPD.  Denies Asthma.  Denies Shortness of breath.  Denies Recent URI.  Denies Sleep Apnea.    Renal/:   Chronic Renal Disease, ARF    Hepatic/GI:  Hepatic/GI Normal Denies PUD.  Denies Hiatal Hernia.  Denies GERD. Denies Liver Disease.  Denies Hepatitis.    Musculoskeletal:  Musculoskeletal Normal    Neurological:  Neurology Normal Denies TIA.  Denies CVA. Denies Neuromuscular Disease.   Denies Headaches.   Denies Peripheral Neuropathy    Endocrine:   Diabetes, poorly controlled, type 2    Dermatological:  Skin Normal    Psych:   anxiety        Lab Results   Component Value Date    WBC 14.53 (H) 09/09/2022    HGB 11.7 (L) 09/09/2022    HCT 34.6 (L) 09/09/2022      (H) 09/09/2022     09/09/2022         Chemistry        Component Value Date/Time     09/09/2022 0404    K 4.2 09/09/2022 0404     09/09/2022 0404    CO2 18 (L) 09/09/2022 0404    BUN 22 (H) 09/09/2022 0404    CREATININE 3.3 (H) 09/09/2022 0404    GLU 95 09/09/2022 0404        Component Value Date/Time    CALCIUM 8.0 (L) 09/09/2022 0404    ALKPHOS 337 (H) 09/04/2022 0244    AST 68 (H) 09/04/2022 0244    ALT 35 09/04/2022 0244    BILITOT 1.2 (H) 09/04/2022 0244    ESTGFRAFRICA >60 06/01/2022 1720    EGFRNONAA >60 06/01/2022 1720               Anesthesia Plan  Type of Anesthesia, risks & benefits discussed:    Anesthesia Type: Gen Natural Airway  Intra-op Monitoring Plan: Standard ASA Monitors  Induction:  IV  Informed Consent: Informed consent signed with the Patient and all parties understand the risks and agree with anesthesia plan.  All questions answered. Patient consented to blood products? Yes  ASA Score: 3    Ready For Surgery From Anesthesia Perspective.     .

## 2022-09-09 NOTE — PROGRESS NOTES
[facility]  Rapid Response Nurse Intervention/ Task    Date of Visit: 09/09/2022  Time of Visit: 1505       INTERVENTIONS/ TASK Completed:     Assist holding pressure and contacting MD

## 2022-09-09 NOTE — PROGRESS NOTES
[place] [facility]  Urology  Progress Note    Patient Name: Doe Woodall  MRN: [id number]  Admission Date: 9/1/2022  Hospital Length of Stay: 8 days  Code Status: Full Code   Attending Provider: Sal Baldwin MD   Primary Care Physician: [facility] - Family Medicine    Subjective:     HPI:  31 yo man presented to the hospital with progressive scrotal pain which started off as a boil on his right scrotum. Patient with hx of uncontrolled DM. Patient's imaging on arrival did not demonstrate presence of abscess. Urology consulted for further evaluation. Denies dysuria, fevers, chills, chest pain, shortness of breath. Had breakfast this morning.       Interval History: still with pain.    Review of Systems   Constitutional: Negative.  Negative for fever.   HENT: Negative.     Eyes: Negative.    Respiratory:  Negative for cough, chest tightness and shortness of breath.    Cardiovascular:  Negative for chest pain.   Gastrointestinal: Negative.  Negative for constipation, diarrhea and nausea.   Genitourinary:  Positive for scrotal swelling and testicular pain. Negative for difficulty urinating.   Musculoskeletal: Negative.    Neurological: Negative.    Psychiatric/Behavioral: Negative.     Objective:     Temp:  [97.1 °F (36.2 °C)-98.5 °F (36.9 °C)] 97.1 °F (36.2 °C)  Pulse:  [82-99] 97  Resp:  [16-20] 18  SpO2:  [94 %-98 %] 96 %  BP: (132-158)/(80-87) 134/87     Body mass index is 37.43 kg/m².           Drains       None                   Physical Exam  Vitals and nursing note reviewed.   Constitutional:       Appearance: He is well-developed.   HENT:      Head: Normocephalic.   Eyes:      Conjunctiva/sclera: Conjunctivae normal.   Neck:      Thyroid: No thyromegaly.      Trachea: No tracheal deviation.   Cardiovascular:      Rate and Rhythm: Normal rate.      Heart sounds: Normal heart sounds.   Pulmonary:      Effort: Pulmonary effort is normal. No respiratory distress.      Breath sounds: Normal  breath sounds. No wheezing.   Abdominal:      General: Bowel sounds are normal.      Palpations: Abdomen is soft.      Tenderness: There is no abdominal tenderness. There is no rebound.      Hernia: No hernia is present.   Genitourinary:     Comments: Buried penis  Right hemiscrotum and right mons pubis tender, indurated.  No clear area of fluctuance.  Musculoskeletal:         General: No tenderness. Normal range of motion.      Cervical back: Normal range of motion and neck supple.   Lymphadenopathy:      Cervical: No cervical adenopathy.   Skin:     General: Skin is warm and dry.      Findings: No erythema or rash.   Neurological:      Mental Status: He is alert and oriented to person, place, and time.   Psychiatric:         Behavior: Behavior normal.         Thought Content: Thought content normal.         Judgment: Judgment normal.       Significant Labs:    BMP:  Recent Labs   Lab 09/07/22  0815 09/08/22  0810 09/09/22  0404    135* 137   K 4.0 3.8 4.2    106 110   CO2 18* 21* 18*   BUN 24* 22* 22*   CREATININE 3.5* 3.6* 3.3*   CALCIUM 8.5* 8.4* 8.0*       CBC:   Recent Labs   Lab 09/07/22  0815 09/08/22  0236 09/09/22  0404   WBC 11.31 12.41 14.53*   HGB 13.1* 12.7* 11.7*   HCT 38.7* 36.9* 34.6*    265 284       Blood Culture: No results for input(s): LABBLOO in the last 168 hours.  Urine Culture:   Recent Labs   Lab 09/02/22  1341   LABURIN No growth       Significant Imaging:  U/S: I have reviewed all results within the past 24 hours and my personal findings are:  fluid collection noted right inguinal canal.  No obvious scrotal fluid collections.                    Assessment/Plan:     MARISSA (acute kidney injury)  -- Uncertain etiology  -- No evidence of urinary retention  -- Further management per primary    Cellulitis of groin    Will continue to monitor for development of abscess/necrotizing soft tissue infection   Maintain tight BS control    Plan on incision and drainage of abscess in  right inguinal canal  He will need wound packing daily, home health vs snf afterwards  Consented and marked  Keep NPO  Continue cefepime and flagyl        VTE Risk Mitigation (From admission, onward)         Ordered     enoxaparin injection 30 mg  Daily         09/04/22 0713     IP VTE HIGH RISK PATIENT  Once         09/01/22 1506     Place sequential compression device  Until discontinued         09/01/22 1506                W Tuan Aguilar MD  Urology  [facility] - Telemetry

## 2022-09-09 NOTE — PLAN OF CARE
Problem: Adult Inpatient Plan of Care  Goal: Plan of Care Review  Flowsheets (Taken 9/9/2022 1800)  Plan of Care Reviewed With: patient     Problem: Diabetes Comorbidity  Goal: Blood Glucose Level Within Targeted Range  Intervention: Monitor and Manage Glycemia  Flowsheets (Taken 9/9/2022 1830)  Glycemic Management: blood glucose monitored     Problem: Pain Acute  Goal: Acceptable Pain Control and Functional Ability  Intervention: Develop Pain Management Plan  Flowsheets (Taken 9/9/2022 1830)  Pain Management Interventions:   pain management plan reviewed with patient/caregiver   pillow support provided  Intervention: Prevent or Manage Pain  Flowsheets (Taken 9/9/2022 1830)  Sleep/Rest Enhancement: awakenings minimized  Medication Review/Management: medications reviewed

## 2022-09-10 PROBLEM — N17.0 ATN (ACUTE TUBULAR NECROSIS): Status: ACTIVE | Noted: 2022-01-01

## 2022-09-10 PROBLEM — E87.1 HYPONATREMIA: Status: RESOLVED | Noted: 2022-01-04 | Resolved: 2022-01-01

## 2022-09-10 PROBLEM — D53.9 MACROCYTIC ANEMIA: Status: ACTIVE | Noted: 2022-01-01

## 2022-09-10 PROBLEM — R65.20 SEVERE SEPSIS: Status: ACTIVE | Noted: 2022-01-01

## 2022-09-10 NOTE — ASSESSMENT & PLAN NOTE
S/p I&D of scrotal/inguinal abscess 9/9/2022 (Dr Aguilar)  Packing changed. Wound clean, dry. No bleeding.   Will plan to change packing again tomorrow. If needed, RN to change packing prior.  Abx per primary    Agree with transfer to floor. Keep dressing supplies at bedside.

## 2022-09-10 NOTE — ASSESSMENT & PLAN NOTE
Severe sepsis with MARISSA due to groin/scrotal cellulitis. Appreciate urology involvement. Initially given vanc/zosyn, which were stopped after kidney failure and patient was switched to clindamycin monotherapy. He appeared to improve on this for multiple days w/ normalizing WBC count. On 9/8 w/ worsening exam and repeat US showing large inguinal abscess.   - Urology consulted. s/p I&D on 9/9. Cultures are pending  - ID consulted- currently on daptomycin, cefepime, flagyl-- check CK in AM and weekly while on daptomycin

## 2022-09-10 NOTE — NURSING
Pt arrived to unit from telemetry. Dr. Arriaga came to bedside pressure dressing removed, packing remained in place to both incision sites, and new 4x4 and pressure tape applied. Plans to change out packing 9/0/22 AM per Dr. Arriaga. Pt c/o 8/10 pain to right scrotum. Dr. Arriaga updated PRN dilaudid pain medication 1 mg Q2hr for severe pain. Pt Aaox4. VS set for Q15min, NSR on RA. Lab at bedside to obtain CBC. Bilateral pedial pulses palpable and marked.     0705 - Dr. Arriaga back at bedside and look at dressing, minimal blood noted. Wife at bedside.

## 2022-09-10 NOTE — PROGRESS NOTES
Awake alert oriented NAD    Denies CNS ENT CP GI  RHEUM OR DERM SX  Past Medical History:   Diagnosis Date    Abscess of right groin 09/01/2022    Diabetes mellitus     Encounter for blood transfusion     Loud snoring     Obese     Other insomnia 08/03/2020    Perineal abscess 08/01/2014     Past Surgical History:   Procedure Laterality Date    ABCESS DRAINAGE  2014    PERIRECTAL    DECOMPRESSION OF LUMBAR SPINE USING MINIMALLY INVASIVE TECHNIQUE Right 07/06/2018    Procedure: DECOMPRESSION, SPINE, LUMBAR, MINIMALLY INVASIVE L3-4;  Surgeon: Cristian Cervantes MD;  Location: [facility] OR [address] [place] [address];  Service: Neurosurgery;  Laterality: Right;    ORTHOPEDIC SURGERY       Review of patient's allergies indicates:   Allergen Reactions    Metformin Diarrhea       Current Facility-Administered Medications   Medication    acetaminophen tablet 650 mg    albuterol-ipratropium 2.5 mg-0.5 mg/3 mL nebulizer solution 3 mL    aluminum-magnesium hydroxide-simethicone 200-200-20 mg/5 mL suspension 30 mL    cefepime in dextrose 5 % IVPB 2 g    COVID-19 vac, pablo(Pfizer)(PF) (Pfizer COVID-19) 30 mcg/0.3 mL injection 0.3 mL    DAPTOmycin (CUBICIN) 630 mg in sodium chloride 0.9% 50 mL IVPB    dextrose 10% bolus 125 mL    dextrose 10% bolus 250 mL    diphenhydrAMINE injection 25 mg    [START ON 9/11/2022] enoxaparin injection 30 mg    fluconazole tablet 100 mg    folic acid tablet 1 mg    glucagon (human recombinant) injection 1 mg    glucose chewable tablet 16 g    glucose chewable tablet 24 g    HYDROcodone-acetaminophen 5-325 mg per tablet 1 tablet    HYDROmorphone (PF) injection 1 mg    insulin aspart U-100 pen 1-10 Units    insulin detemir U-100 pen 30 Units    melatonin tablet 6 mg    metroNIDAZOLE tablet 500 mg    multivitamin tablet    mupirocin 2 % ointment    naloxone 0.4 mg/mL injection 0.02 mg    ondansetron injection 4 mg    polyethylene glycol packet 17 g    prochlorperazine injection Soln 5 mg    simethicone chewable tablet 80 mg     sodium chloride 0.9% flush 10 mL    sodium chloride 0.9% flush 10 mL    thiamine tablet 100 mg       LABS    Recent Results (from the past 24 hour(s))   POCT glucose    Collection Time: 09/09/22  4:24 PM   Result Value Ref Range    POCT Glucose 79 70 - 110 mg/dL   CBC auto differential    Collection Time: 09/09/22  6:45 PM   Result Value Ref Range    WBC 18.46 (H) 3.90 - 12.70 K/uL    RBC 3.07 (L) 4.60 - 6.20 M/uL    Hemoglobin 10.8 (L) 14.0 - 18.0 g/dL    Hematocrit 31.6 (L) 40.0 - 54.0 %     (H) 82 - 98 fL    MCH 35.2 (H) 27.0 - 31.0 pg    MCHC 34.2 32.0 - 36.0 g/dL    RDW 12.5 11.5 - 14.5 %    Platelets 299 150 - 450 K/uL    MPV 9.4 9.2 - 12.9 fL    Immature Granulocytes 1.8 (H) 0.0 - 0.5 %    Gran # (ANC) 14.1 (H) 1.8 - 7.7 K/uL    Immature Grans (Abs) 0.33 (H) 0.00 - 0.04 K/uL    Lymph # 2.0 1.0 - 4.8 K/uL    Mono # 1.8 (H) 0.3 - 1.0 K/uL    Eos # 0.1 0.0 - 0.5 K/uL    Baso # 0.11 0.00 - 0.20 K/uL    nRBC 0 0 /100 WBC    Gran % 76.3 (H) 38.0 - 73.0 %    Lymph % 10.7 (L) 18.0 - 48.0 %    Mono % 10.0 4.0 - 15.0 %    Eosinophil % 0.6 0.0 - 8.0 %    Basophil % 0.6 0.0 - 1.9 %    Differential Method Automated    POCT glucose    Collection Time: 09/09/22 10:07 PM   Result Value Ref Range    POCT Glucose 86 70 - 110 mg/dL   CBC Auto Differential    Collection Time: 09/10/22  4:00 AM   Result Value Ref Range    WBC 15.33 (H) 3.90 - 12.70 K/uL    RBC 2.75 (L) 4.60 - 6.20 M/uL    Hemoglobin 9.6 (L) 14.0 - 18.0 g/dL    Hematocrit 28.4 (L) 40.0 - 54.0 %     (H) 82 - 98 fL    MCH 34.9 (H) 27.0 - 31.0 pg    MCHC 33.8 32.0 - 36.0 g/dL    RDW 12.4 11.5 - 14.5 %    Platelets 281 150 - 450 K/uL    MPV 9.4 9.2 - 12.9 fL    Immature Granulocytes 2.2 (H) 0.0 - 0.5 %    Gran # (ANC) 11.2 (H) 1.8 - 7.7 K/uL    Immature Grans (Abs) 0.33 (H) 0.00 - 0.04 K/uL    Lymph # 2.3 1.0 - 4.8 K/uL    Mono # 1.3 (H) 0.3 - 1.0 K/uL    Eos # 0.1 0.0 - 0.5 K/uL    Baso # 0.07 0.00 - 0.20 K/uL    nRBC 0 0 /100 WBC    Gran % 73.1 (H)  38.0 - 73.0 %    Lymph % 14.8 (L) 18.0 - 48.0 %    Mono % 8.6 4.0 - 15.0 %    Eosinophil % 0.8 0.0 - 8.0 %    Basophil % 0.5 0.0 - 1.9 %    Differential Method Automated    Basic metabolic panel    Collection Time: 09/10/22  4:00 AM   Result Value Ref Range    Sodium 139 136 - 145 mmol/L    Potassium 4.2 3.5 - 5.1 mmol/L    Chloride 112 (H) 95 - 110 mmol/L    CO2 17 (L) 23 - 29 mmol/L    Glucose 85 70 - 110 mg/dL    BUN 23 (H) 6 - 20 mg/dL    Creatinine 3.2 (H) 0.5 - 1.4 mg/dL    Calcium 8.0 (L) 8.7 - 10.5 mg/dL    Anion Gap 10 8 - 16 mmol/L    eGFR 25 (A) >60 mL/min/1.73 m^2   POCT glucose    Collection Time: 09/10/22  9:03 AM   Result Value Ref Range    POCT Glucose 110 70 - 110 mg/dL   POCT glucose    Collection Time: 09/10/22 11:37 AM   Result Value Ref Range    POCT Glucose 111 (H) 70 - 110 mg/dL   ]    I/O last 3 completed shifts:  In: 3038.5 [P.O.:240; I.V.:1800; IV Piggyback:998.5]  Out: 1900 [Urine:1900]    Vitals:    09/10/22 1000 09/10/22 1100 09/10/22 1218 09/10/22 1300   BP: 118/69 128/79 (!) 143/85 126/69   Pulse: 91 85 94 88   Resp: (!) 21 11 14 (!) 9   Temp:   98 °F (36.7 °C)    TempSrc:   Oral    SpO2: 96% 96% 98% 96%   Weight:       Height:           No Jvd, Thyromegaly or Lymphadenopathy  Lungs: Fairly clear anteriorly and laterally  Cor: RRR no G or rubs  Abd: Soft benign good bowel sounds non tender  Ext: Pos edema     A)  MARISSA  creat down again good uo   Cellulitis of his groin post  surgery   Sepsis  DM  CHF (d) retinopathy  ETOH user with elevated LFT's     P)    Renal Diet  IVF's  recommended   Home meds  Adjust all meds to the degree of renal fx  Close follow up I/O and weights  Maintain Hydration   No Hd   Avoid NSAID's

## 2022-09-10 NOTE — PROGRESS NOTES
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  He reports acute onset of erythema, duration 3 days, has progressed and is now swollen and painful, no known exacerbating or alleviating factors, no attempted self treatment.  Denies fever, chills, cough, chest pain, SOB, dizziness, syncope, nausea, vomiting, diarrhea, abdominal pain, or dysuria.  In the ED, found to be tachycardic and tachypneic with leukocytosis.  CT abdomen pelvis and scrotal ultrasound revealed extensive cellulitis.  Procalcitonin 5.7.  Initial lactic acid 2.7.  Also with mild hyponatremia and mildly elevated liver enzymes.  Blood cultures obtained, IV fluid resuscitation and IV antibiotics initiated.      Overview/Hospital Course:  Mr Doe Woodall was admitted with severe sepsis due to right sided groin/perineal cellulitis with MARISSA. Started IVF and broad spectrum antibiotics. Blood cultures no growth. Urology consulted. Nephrology consulted for MARISSA. ID consulted for antibiotic management. Symptoms worsened. He developed abscess. Urology performed I&D on 9/9 and noted abscess cavity tracking up into the groin and down into the scrotum. Cultures were sent. He was transferred to ICU post operatively due to excessive venous oozing from the surgical site. He remained hemodynamically stable, no need for transfusion, and bleeding controlled. Stepped down to floor.      Interval History: Feeling well  this morning. Only complaint is right groin pain. He did not rest well. No shortness of breath, nausea, vomiting. He has had BM post operatively. Parents are at bedside.     Review of Systems   Constitutional:  Negative for chills and fever.   Respiratory:  Negative for shortness of breath.    Cardiovascular:  Negative for chest pain.   Gastrointestinal:  Negative for abdominal pain, constipation, diarrhea, nausea and vomiting.   Genitourinary:  Negative for difficulty urinating.   Musculoskeletal:  Negative for arthralgias and myalgias.   Skin:  Positive for rash and wound.   Neurological:  Negative for weakness and numbness.   Psychiatric/Behavioral:  Negative for confusion.    Objective:     Vital Signs (Most Recent):  Temp: 98.1 °F (36.7 °C) (09/09/22 2000)  Pulse: 87 (09/10/22 0700)  Resp: 12 (09/10/22 0925)  BP: 117/83 (09/10/22 0700)  SpO2: 98 % (09/10/22 0700) Vital Signs (24h Range):  Temp:  [97.5 °F (36.4 °C)-98.8 °F (37.1 °C)] 98.1 °F (36.7 °C)  Pulse:  [83-96] 87  Resp:  [6-23] 12  SpO2:  [92 %-100 %] 98 %  BP: (106-143)/(57-85) 117/83     Weight: 121.8 kg (268 lb 8.3 oz)  Body mass index is 43.34 kg/m².    Intake/Output Summary (Last 24 hours) at 9/10/2022 1037  Last data filed at 9/10/2022 0926  Gross per 24 hour   Intake 3551.26 ml   Output 600 ml   Net 2951.26 ml      Physical Exam  Vitals and nursing note reviewed.   Constitutional:       General: He is not in acute distress.     Appearance: He is obese. He is ill-appearing. He is not toxic-appearing or diaphoretic.   HENT:      Head: Normocephalic and atraumatic.      Nose: Nose normal.      Mouth/Throat:      Mouth: Mucous membranes are moist.   Cardiovascular:      Rate and Rhythm: Normal rate and regular rhythm.      Heart sounds: Normal heart sounds. No murmur heard.    No gallop.      Comments: Pulses dopplerable  Pulmonary:      Effort: Pulmonary effort is normal. No respiratory distress.      Breath sounds: Normal breath sounds. No wheezing  or rales.      Comments: Room air  Abdominal:      General: Bowel sounds are normal. There is no distension.      Palpations: Abdomen is soft.      Tenderness: There is no abdominal tenderness. There is no guarding.   Genitourinary:     Comments: R groin pressure dressing in place  Musculoskeletal:      Right lower leg: No edema.      Left lower leg: No edema.   Skin:     General: Skin is warm and dry.   Neurological:      Mental Status: He is alert and oriented to person, place, and time.       Significant Labs: All pertinent labs within the past 24 hours have been reviewed.    Significant Imaging: I have reviewed all pertinent imaging results/findings within the past 24 hours.      Assessment/Plan:      * Severe sepsis  Severe sepsis with MARISSA due to groin/scrotal cellulitis. Appreciate urology involvement. Initially given vanc/zosyn, which were stopped after kidney failure and patient was switched to clindamycin monotherapy. He appeared to improve on this for multiple days w/ normalizing WBC count. On 9/8 w/ worsening exam and repeat US showing large inguinal abscess.   - Urology consulted. s/p I&D on 9/9. Cultures are pending  - ID consulted- currently on daptomycin, cefepime, flagyl-- check CK in AM and weekly while on daptomycin     Macrocytic anemia  Suspect alcohol related. He did have surgical blood loss which is now controlled  - CBC in AM      Inguinal abscess  See sepsis      ATN (acute tubular necrosis)  Baseline Cr 0.7. ATN is due to severe sepsis. This is slowly improving.   - DC IVF  - CMP in AM    Cellulitis of groin  See sepsis    Chronic diastolic heart failure  No evidence of acute decompensation or fluid overload, echo earlier this year showed preserved EF with indeterminate LV diastolic function.   - continue to monitor volume status    Alcohol use disorder, severe, dependence  Counseled, reports stopping a week or two ago, continue MVI/thiamine/folate    Severe obesity (BMI >= 40)  Body mass  index is 43.34 kg/m². Morbid obesity complicates all aspects of disease management from diagnostic modalities to treatment. Weight loss encouraged and health benefits explained to patient.   - nursing had concerns overnight for sleep apnea--- needs outpatient sleep study     Elevated transaminase level  Consistent with baseline, likely due to heavy alcohol use.  - CMP in AM    Type 2 diabetes mellitus with hyperglycemia  A1c:   Lab Results   Component Value Date    HGBA1C 12.6 (H) 01/04/2022   Glucose is well controlled as inpatient     Meds: detemir 30 + SSI PRN to maintain goal 140-180  ADA diet, accuchecks, hypoglycemic protocol        VTE Risk Mitigation (From admission, onward)         Ordered     enoxaparin injection 30 mg  Daily         09/10/22 0823     IP VTE HIGH RISK PATIENT  Once         09/01/22 1506     Place sequential compression device  Until discontinued         09/01/22 1506                Discharge Planning   SUSAN:      Code Status: Full Code   Is the patient medically ready for discharge?:     Reason for patient still in hospital (select all that apply): Patient trending condition  Discharge Plan A: Home with family            Critical care time spent on the evaluation and treatment of severe organ dysfunction, review of pertinent labs and imaging studies, discussions with consulting providers and discussions with patient/family: 30 minutes.      Swathi Alegria MD  Department of Hospital Medicine   [facility] - Intensive Care

## 2022-09-10 NOTE — PROGRESS NOTES
[facility]  Urology  Progress Note    Patient Name: Doe Woodall  MRN: [id number]  Admission Date: 9/1/2022  Hospital Length of Stay: 9 days  Code Status: Full Code   Attending Provider: Swathi Alegria MD   Primary Care Physician: [facility]    Subjective:     HPI:  31 yo man presented to the hospital with progressive scrotal pain which started off as a boil on his right scrotum. Patient with hx of uncontrolled DM. Patient's imaging on arrival did not demonstrate presence of abscess. Urology consulted for further evaluation. Denies dysuria, fevers, chills, chest pain, shortness of breath. Had breakfast this morning.       Interval History: Dressing changed this AM due to BM. No bleeding issues overnight. Pt voided overnight after some initial difficulty.     Review of Systems   Constitutional: Negative.  Negative for fever.   HENT: Negative.     Eyes: Negative.    Respiratory:  Negative for cough, chest tightness and shortness of breath.    Cardiovascular:  Negative for chest pain.   Gastrointestinal: Negative.  Negative for constipation, diarrhea and nausea.   Genitourinary:  Positive for scrotal swelling and testicular pain.   Musculoskeletal: Negative.    Neurological: Negative.    Psychiatric/Behavioral: Negative.     Objective:     Temp:  [97.5 °F (36.4 °C)-98.8 °F (37.1 °C)] 97.7 °F (36.5 °C)  Pulse:  [83-96] 85  Resp:  [6-23] 11  SpO2:  [92 %-100 %] 96 %  BP: (106-143)/(57-85) 128/79     Body mass index is 43.34 kg/m².    Date 09/10/22 0700 - 09/11/22 0659   Shift 6972-9716 5852-8949 8442-0997 24 Hour Total   INTAKE   I.V.(mL/kg) 464.2(3.8)   464.2(3.8)   IV Piggyback 48.5   48.5   Shift Total(mL/kg) 512.7(4.2)   512.7(4.2)   OUTPUT   Shift Total(mL/kg)       Weight (kg) 121.8 121.8 121.8 121.8     Bladder Scan Volume (mL): 573 mL (09/09/22 2000)    Drains       None                   Physical Exam  Vitals reviewed.   Constitutional:       General: He is  not in acute distress.     Appearance: He is well-developed. He is not diaphoretic.   HENT:      Head: Normocephalic and atraumatic.   Eyes:      General: No scleral icterus.        Right eye: No discharge.         Left eye: No discharge.   Cardiovascular:      Rate and Rhythm: Normal rate.   Pulmonary:      Effort: Pulmonary effort is normal. No respiratory distress.      Breath sounds: Normal breath sounds.   Abdominal:      General: Bowel sounds are normal. There is no distension.      Palpations: Abdomen is soft.      Tenderness: There is no abdominal tenderness. There is no guarding or rebound.   Genitourinary:         Comments: Dressing and packing removed. Hemostatic. Packing replaced (saline dampened Kerlix) into both incisions. Dressing reapplied. Mesh underwear placed. Pressure dressing not replaced.   Musculoskeletal:         General: Normal range of motion.      Cervical back: Normal range of motion and neck supple.   Skin:     General: Skin is warm and dry.      Findings: No erythema.   Neurological:      Mental Status: He is alert and oriented to person, place, and time.       Significant Labs:    BMP:  Recent Labs   Lab 09/08/22  0810 09/09/22  0404 09/10/22  0400   * 137 139   K 3.8 4.2 4.2    110 112*   CO2 21* 18* 17*   BUN 22* 22* 23*   CREATININE 3.6* 3.3* 3.2*   CALCIUM 8.4* 8.0* 8.0*       CBC:   Recent Labs   Lab 09/09/22  0404 09/09/22  1845 09/10/22  0400   WBC 14.53* 18.46* 15.33*   HGB 11.7* 10.8* 9.6*   HCT 34.6* 31.6* 28.4*    299 281       Blood Culture: No results for input(s): LABBLOO in the last 168 hours.  Urine Culture: No results for input(s): LABURIN in the last 168 hours.  Urine Studies: No results for input(s): COLORU, APPEARANCEUA, PHUR, SPECGRAV, PROTEINUA, GLUCUA, KETONESU, BILIRUBINUA, OCCULTUA, NITRITE, UROBILINOGEN, LEUKOCYTESUR, RBCUA, WBCUA, BACTERIA, SQUAMEPITHEL, HYALINECASTS in the last 168 hours.    Invalid input(s): WRIGHTSUR    Significant  Imaging:  All pertinent imaging results/findings from the past 24 hours have been reviewed.                    Assessment/Plan:     Inguinal abscess  S/p I&D of R hemiscrotum    ATN (acute tubular necrosis)  -- Uncertain etiology  -- No evidence of urinary retention  -- Further management per primary    Cellulitis of groin  S/p I&D of scrotal/inguinal abscess 9/9/2022 (Dr Aguilar)  Packing changed. Wound clean, dry. No bleeding.   Will plan to change packing again tomorrow. If needed, RN to change packing prior.  Abx per primary    Agree with transfer to floor. Keep dressing supplies at bedside.         VTE Risk Mitigation (From admission, onward)         Ordered     enoxaparin injection 30 mg  Daily         09/10/22 0823     IP VTE HIGH RISK PATIENT  Once         09/01/22 1506     Place sequential compression device  Until discontinued         09/01/22 1506                Kimmy Arriaga MD  Urology  [facility] - Intensive Care

## 2022-09-10 NOTE — NURSING
[facility] Intensive Care  ICU Shift Summary  Date: 9/10/2022      Prehospitalization: Home  Admit Date / LOS : 9/1/2022/ 9 days    Diagnosis:   Sepsis    Consults:        Active: Nephro and Urology       Needed: N/A     Code Status: Full Code   Advanced Directive: <no information>    LDA:  Lines/Drains/Airways       Peripheral Intravenous Line  Duration                  Peripheral IV - Single Lumen 09/06/22 2109 1 3/4 in;20 G Anterior;Left Upper Arm 3 days                  Central Lines/Site/Justification:Patient Does Not Have Central Line  Urinary Cath/Order/Justification:Patient Does Not Have Urinary Catheter    Vasopressors/Infusions:    sodium chloride 0.9% 150 mL/hr at 09/10/22 0133          GOALS: Volume/ Hemodynamic: N/A                     RASS: 0  alert and calm    Pain Management: IV       Pain Controlled: yes     Rhythm: NSR    Respiratory Device: Room Air                  Most Recent SBT/ SAT: Did not perform       MOVE Screen: PASS  ICU Liberation: yes    VTE Prophylaxis: Ambulation  Mobility: Ambulatory  Stress Ulcer Prophylaxis: Yes    Isolation: No active isolations    Dietary: diabetic, 1500 marcelle  Current Diet Order   Procedures    Diet diabetic [facility]; 1500 Calorie; Isolation Tray - Regular [place]     Order Specific Question:   Indicate patient location for additional diet options:     Answer:   [facility]     Order Specific Question:   Total calories:     Answer:   1500 Calorie     Order Specific Question:   Tray type:     Answer:   Isolation Tray - Regular [place]      Tolerance: yes  Advancement: no    I & O (24h):    Intake/Output Summary (Last 24 hours) at 9/10/2022 0537  Last data filed at 9/10/2022 0042  Gross per 24 hour   Intake 850 ml   Output 900 ml   Net -50 ml        Restraints: No    Significant Dates:  Post Op Date:  9/9/22  Rescue Date: N/A  Imaging/ Diagnostics: N/A    Noteworthy Labs:  WBC 15.33, Cr. 3.2    COVID Test: (--)  CBC/Anemia Labs: Coags:    Recent Labs    Lab 09/09/22  1845 09/10/22  0400   WBC 18.46* 15.33*   HGB 10.8* 9.6*   HCT 31.6* 28.4*    281   * 103*   RDW 12.5 12.4    No results for input(s): PT, INR, APTT in the last 168 hours.     Chemistries:   Recent Labs   Lab 09/04/22  0244 09/05/22  1029 09/09/22  0404 09/10/22  0400      < > 137 139   K 3.7   < > 4.2 4.2      < > 110 112*   CO2 22*   < > 18* 17*   BUN 17   < > 22* 23*   CREATININE 2.9*   < > 3.3* 3.2*   CALCIUM 8.0*   < > 8.0* 8.0*   PROT 5.5*  --   --   --    BILITOT 1.2*  --   --   --    ALKPHOS 337*  --   --   --    ALT 35  --   --   --    AST 68*  --   --   --     < > = values in this interval not displayed.        Cardiac Enzymes: Ejection Fractions:    No results for input(s): CPK, CPKMB, MB, TROPONINI in the last 72 hours. EF   Date Value Ref Range Status   01/05/2022 60 % Final        POCT Glucose: HbA1c:    Recent Labs   Lab 09/09/22  0702 09/09/22  1624 09/09/22  2207   POCTGLUCOSE 82 79 86    Hemoglobin A1C   Date Value Ref Range Status   01/04/2022 12.6 (H) 4.0 - 5.6 % Final     Comment:     ADA Screening Guidelines:  5.7-6.4%  Consistent with prediabetes  >or=6.5%  Consistent with diabetes    High levels of fetal hemoglobin interfere with the HbA1C  assay. Heterozygous hemoglobin variants (HbS, HgC, etc)do  not significantly interfere with this assay.   However, presence of multiple variants may affect accuracy.             ICU LOS 11h  Level of Care: Critical Care    Chart Check: 24 HR Done  Shift Summary/Plan for the shift: HS accu check done. Levemir insulin held. Patient felt like he had to void but could not. MD notified. Patient urinated in the bed. Pad changed.

## 2022-09-10 NOTE — NURSING
Dr. Arriaga at bedside. Pressure dressing removed (pressure tape, abd pad, 4x4 dry gauze, and packing [rolled gauze - from each incision x2]). Incision marks not measured at this time. Approximately 25% of new NS soaked rolled gauze packed back into each incision site x2. Dry 4x4 gauze and abd pad placed with no tape and mesh underwear. Per Dr. Arriaga, change dressing if necessary and/or replace packing if necessary, but she will perform a dressing change tomorrow 9/11/22 when she rounds.

## 2022-09-10 NOTE — ASSESSMENT & PLAN NOTE
A1c:   Lab Results   Component Value Date    HGBA1C 12.6 (H) 01/04/2022   Glucose is well controlled as inpatient     Meds: detemir 30 + SSI PRN to maintain goal 140-180  ADA diet, accuchecks, hypoglycemic protocol

## 2022-09-10 NOTE — SUBJECTIVE & OBJECTIVE
Interval History: Dressing changed this AM due to BM. No bleeding issues overnight. Pt voided overnight after some initial difficulty.     Review of Systems   Constitutional: Negative.  Negative for fever.   HENT: Negative.     Eyes: Negative.    Respiratory:  Negative for cough, chest tightness and shortness of breath.    Cardiovascular:  Negative for chest pain.   Gastrointestinal: Negative.  Negative for constipation, diarrhea and nausea.   Genitourinary:  Positive for scrotal swelling and testicular pain.   Musculoskeletal: Negative.    Neurological: Negative.    Psychiatric/Behavioral: Negative.     Objective:     Temp:  [97.5 °F (36.4 °C)-98.8 °F (37.1 °C)] 97.7 °F (36.5 °C)  Pulse:  [83-96] 85  Resp:  [6-23] 11  SpO2:  [92 %-100 %] 96 %  BP: (106-143)/(57-85) 128/79     Body mass index is 43.34 kg/m².    Date 09/10/22 0700 - 09/11/22 0659   Shift 5699-6644 2611-9265 1080-8862 24 Hour Total   INTAKE   I.V.(mL/kg) 464.2(3.8)   464.2(3.8)   IV Piggyback 48.5   48.5   Shift Total(mL/kg) 512.7(4.2)   512.7(4.2)   OUTPUT   Shift Total(mL/kg)       Weight (kg) 121.8 121.8 121.8 121.8     Bladder Scan Volume (mL): 573 mL (09/09/22 2000)    Drains       None                   Physical Exam  Vitals reviewed.   Constitutional:       General: He is not in acute distress.     Appearance: He is well-developed. He is not diaphoretic.   HENT:      Head: Normocephalic and atraumatic.   Eyes:      General: No scleral icterus.        Right eye: No discharge.         Left eye: No discharge.   Cardiovascular:      Rate and Rhythm: Normal rate.   Pulmonary:      Effort: Pulmonary effort is normal. No respiratory distress.      Breath sounds: Normal breath sounds.   Abdominal:      General: Bowel sounds are normal. There is no distension.      Palpations: Abdomen is soft.      Tenderness: There is no abdominal tenderness. There is no guarding or rebound.   Genitourinary:         Comments: Dressing and packing removed. Hemostatic.  Packing replaced (saline dampened Kerlix) into both incisions. Dressing reapplied. Mesh underwear placed. Pressure dressing not replaced.   Musculoskeletal:         General: Normal range of motion.      Cervical back: Normal range of motion and neck supple.   Skin:     General: Skin is warm and dry.      Findings: No erythema.   Neurological:      Mental Status: He is alert and oriented to person, place, and time.       Significant Labs:    BMP:  Recent Labs   Lab 09/08/22  0810 09/09/22  0404 09/10/22  0400   * 137 139   K 3.8 4.2 4.2    110 112*   CO2 21* 18* 17*   BUN 22* 22* 23*   CREATININE 3.6* 3.3* 3.2*   CALCIUM 8.4* 8.0* 8.0*       CBC:   Recent Labs   Lab 09/09/22  0404 09/09/22  1845 09/10/22  0400   WBC 14.53* 18.46* 15.33*   HGB 11.7* 10.8* 9.6*   HCT 34.6* 31.6* 28.4*    299 281       Blood Culture: No results for input(s): LABBLOO in the last 168 hours.  Urine Culture: No results for input(s): LABURIN in the last 168 hours.  Urine Studies: No results for input(s): COLORU, APPEARANCEUA, PHUR, SPECGRAV, PROTEINUA, GLUCUA, KETONESU, BILIRUBINUA, OCCULTUA, NITRITE, UROBILINOGEN, LEUKOCYTESUR, RBCUA, WBCUA, BACTERIA, SQUAMEPITHEL, HYALINECASTS in the last 168 hours.    Invalid input(s): WRIGHTSUR    Significant Imaging:  All pertinent imaging results/findings from the past 24 hours have been reviewed.

## 2022-09-10 NOTE — ASSESSMENT & PLAN NOTE
No evidence of acute decompensation or fluid overload, echo earlier this year showed preserved EF with indeterminate LV diastolic function.   - continue to monitor volume status

## 2022-09-10 NOTE — SUBJECTIVE & OBJECTIVE
Interval History: Feeling well this morning. Only complaint is right groin pain. He did not rest well. No shortness of breath, nausea, vomiting. He has had BM post operatively. Parents are at bedside.     Review of Systems   Constitutional:  Negative for chills and fever.   Respiratory:  Negative for shortness of breath.    Cardiovascular:  Negative for chest pain.   Gastrointestinal:  Negative for abdominal pain, constipation, diarrhea, nausea and vomiting.   Genitourinary:  Negative for difficulty urinating.   Musculoskeletal:  Negative for arthralgias and myalgias.   Skin:  Positive for rash and wound.   Neurological:  Negative for weakness and numbness.   Psychiatric/Behavioral:  Negative for confusion.    Objective:     Vital Signs (Most Recent):  Temp: 98.1 °F (36.7 °C) (09/09/22 2000)  Pulse: 87 (09/10/22 0700)  Resp: 12 (09/10/22 0925)  BP: 117/83 (09/10/22 0700)  SpO2: 98 % (09/10/22 0700) Vital Signs (24h Range):  Temp:  [97.5 °F (36.4 °C)-98.8 °F (37.1 °C)] 98.1 °F (36.7 °C)  Pulse:  [83-96] 87  Resp:  [6-23] 12  SpO2:  [92 %-100 %] 98 %  BP: (106-143)/(57-85) 117/83     Weight: 121.8 kg (268 lb 8.3 oz)  Body mass index is 43.34 kg/m².    Intake/Output Summary (Last 24 hours) at 9/10/2022 1037  Last data filed at 9/10/2022 0926  Gross per 24 hour   Intake 3551.26 ml   Output 600 ml   Net 2951.26 ml      Physical Exam  Vitals and nursing note reviewed.   Constitutional:       General: He is not in acute distress.     Appearance: He is obese. He is ill-appearing. He is not toxic-appearing or diaphoretic.   HENT:      Head: Normocephalic and atraumatic.      Nose: Nose normal.      Mouth/Throat:      Mouth: Mucous membranes are moist.   Cardiovascular:      Rate and Rhythm: Normal rate and regular rhythm.      Heart sounds: Normal heart sounds. No murmur heard.    No gallop.      Comments: Pulses dopplerable  Pulmonary:      Effort: Pulmonary effort is normal. No respiratory distress.      Breath sounds:  Normal breath sounds. No wheezing or rales.      Comments: Room air  Abdominal:      General: Bowel sounds are normal. There is no distension.      Palpations: Abdomen is soft.      Tenderness: There is no abdominal tenderness. There is no guarding.   Genitourinary:     Comments: R groin pressure dressing in place  Musculoskeletal:      Right lower leg: No edema.      Left lower leg: No edema.   Skin:     General: Skin is warm and dry.   Neurological:      Mental Status: He is alert and oriented to person, place, and time.       Significant Labs: All pertinent labs within the past 24 hours have been reviewed.    Significant Imaging: I have reviewed all pertinent imaging results/findings within the past 24 hours.

## 2022-09-10 NOTE — PLAN OF CARE
Problem: Adult Inpatient Plan of Care  Goal: Plan of Care Review  Outcome: Ongoing, Progressing  Goal: Patient-Specific Goal (Individualized)  Outcome: Ongoing, Progressing  Goal: Absence of Hospital-Acquired Illness or Injury  Outcome: Ongoing, Progressing  Goal: Optimal Comfort and Wellbeing  Outcome: Ongoing, Progressing  Goal: Readiness for Transition of Care  Outcome: Ongoing, Progressing     Problem: Bariatric Environmental Safety  Goal: Safety Maintained with Care  Outcome: Ongoing, Progressing     Problem: Infection  Goal: Absence of Infection Signs and Symptoms  Outcome: Ongoing, Progressing     Problem: Diabetes Comorbidity  Goal: Blood Glucose Level Within Targeted Range  Outcome: Ongoing, Progressing     Problem: Adjustment to Illness (Sepsis/Septic Shock)  Goal: Optimal Coping  Outcome: Ongoing, Progressing     Problem: Bleeding (Sepsis/Septic Shock)  Goal: Absence of Bleeding  Outcome: Ongoing, Progressing     Problem: Glycemic Control Impaired (Sepsis/Septic Shock)  Goal: Blood Glucose Level Within Desired Range  Outcome: Ongoing, Progressing     Problem: Infection Progression (Sepsis/Septic Shock)  Goal: Absence of Infection Signs and Symptoms  Outcome: Ongoing, Progressing     Problem: Nutrition Impaired (Sepsis/Septic Shock)  Goal: Optimal Nutrition Intake  Outcome: Ongoing, Progressing     Problem: Pain Acute  Goal: Acceptable Pain Control and Functional Ability  Outcome: Ongoing, Progressing     Problem: Fluid and Electrolyte Imbalance (Acute Kidney Injury/Impairment)  Goal: Fluid and Electrolyte Balance  Outcome: Ongoing, Progressing     Problem: Oral Intake Inadequate (Acute Kidney Injury/Impairment)  Goal: Optimal Nutrition Intake  Outcome: Ongoing, Progressing     Problem: Renal Function Impairment (Acute Kidney Injury/Impairment)  Goal: Effective Renal Function  Outcome: Ongoing, Progressing     Problem: Skin Injury Risk Increased  Goal: Skin Health and Integrity  Outcome: Ongoing,  Progressing     Problem: Fall Injury Risk  Goal: Absence of Fall and Fall-Related Injury  Outcome: Ongoing, Progressing

## 2022-09-10 NOTE — ASSESSMENT & PLAN NOTE
Body mass index is 43.34 kg/m². Morbid obesity complicates all aspects of disease management from diagnostic modalities to treatment. Weight loss encouraged and health benefits explained to patient.   - nursing had concerns overnight for sleep apnea--- needs outpatient sleep study

## 2022-09-10 NOTE — CARE UPDATE
[facility] Intensive Care  ICU Shift Summary  Date: 9/10/2022      Prehospitalization: Home  Admit Date / LOS : 9/1/2022/ 9 days    Diagnosis:   Severe sepsis    Consults:        Active: ID, Nephro, SW, and Urology       Needed: N/A     Code Status: Full Code   Advanced Directive: <no information>    LDA:  Lines/Drains/Airways       Peripheral Intravenous Line  Duration                  Peripheral IV - Single Lumen 09/06/22 2109 1 3/4 in;20 G Anterior;Left Upper Arm 3 days                  Central Lines/Site/Justification:Patient Does Not Have Central Line  Urinary Cath/Order/Justification:Patient Does Not Have Urinary Catheter    Vasopressors/Infusions:          GOALS: Volume/ Hemodynamic: N/A                     RASS: N/A    Pain Management: IV       Pain Controlled: yes     Rhythm: NSR    Respiratory Device: Room Air                  Most Recent SBT/ SAT: N/A       MOVE Screen: PASS  ICU Liberation: not applicable    VTE Prophylaxis: Pharm and Ambulation  Mobility: Ambulatory, S: Self, and A: Assist  Stress Ulcer Prophylaxis: No    Isolation: No active isolations    Dietary:   Current Diet Order   Procedures    Diet diabetic [facility]; 1500 Calorie; Renal; Isolation Tray - Regular [place]     Order Specific Question:   Indicate patient location for additional diet options:     Answer:   [facility]     Order Specific Question:   Total calories:     Answer:   1500 Calorie     Order Specific Question:   Additional Diet Options:     Answer:   Renal     Order Specific Question:   Tray type:     Answer:   Isolation Tray - Regular [place]      Tolerance: yes  Advancement: @ goal    I & O (24h):    Intake/Output Summary (Last 24 hours) at 9/10/2022 1209  Last data filed at 9/10/2022 1152  Gross per 24 hour   Intake 4151.26 ml   Output 2500 ml   Net 1651.26 ml        Restraints: No    Significant Dates:  Post Op Date:  9/9 - I&D R scrotum  Rescue Date:  9/9 - uncontrolled bleeding to surgical site post  surgery  Imaging/ Diagnostics: N/A    Noteworthy Labs:  see below    COVID Test: (--)  CBC/Anemia Labs: Coags:    Recent Labs   Lab 09/09/22  1845 09/10/22  0400   WBC 18.46* 15.33*   HGB 10.8* 9.6*   HCT 31.6* 28.4*    281   * 103*   RDW 12.5 12.4    No results for input(s): PT, INR, APTT in the last 168 hours.     Chemistries:   Recent Labs   Lab 09/04/22  0244 09/05/22  1029 09/09/22  0404 09/10/22  0400      < > 137 139   K 3.7   < > 4.2 4.2      < > 110 112*   CO2 22*   < > 18* 17*   BUN 17   < > 22* 23*   CREATININE 2.9*   < > 3.3* 3.2*   CALCIUM 8.0*   < > 8.0* 8.0*   PROT 5.5*  --   --   --    BILITOT 1.2*  --   --   --    ALKPHOS 337*  --   --   --    ALT 35  --   --   --    AST 68*  --   --   --     < > = values in this interval not displayed.        Cardiac Enzymes: Ejection Fractions:    No results for input(s): CPK, CPKMB, MB, TROPONINI in the last 72 hours. EF   Date Value Ref Range Status   01/05/2022 60 % Final        POCT Glucose: HbA1c:    Recent Labs   Lab 09/09/22  2207 09/10/22  0903 09/10/22  1137   POCTGLUCOSE 86 110 111*    Hemoglobin A1C   Date Value Ref Range Status   01/04/2022 12.6 (H) 4.0 - 5.6 % Final     Comment:     ADA Screening Guidelines:  5.7-6.4%  Consistent with prediabetes  >or=6.5%  Consistent with diabetes    High levels of fetal hemoglobin interfere with the HbA1C  assay. Heterozygous hemoglobin variants (HbS, HgC, etc)do  not significantly interfere with this assay.   However, presence of multiple variants may affect accuracy.             ICU LOS 18h  Level of Care: OK to Transfer    Chart Check: 12 HR Done  Shift Summary/Plan for the shift: Pt medicated with PRN pain medication to pain 7/0 to right scrotum/groin area (surgical site). At beginning of shift, pt had an episode of urine and bowel incontinence, linen changed, and bath performed. Then, pt ambulated to bathroom x2 during shift. Pt a little weak, but able to ambulate with supervision. Pt  sitting at edge of bed for lunch. Dr. Arriaga performed dressing change (read previous note) and intermittent dressing change performed at 0900. Pt remains free from injury. Vs and assessment per flow sheet. Pt transferred out of ICU r/t minimal bleeding noted to surgical site and stable for transfer out of ICU.

## 2022-09-10 NOTE — NURSING TRANSFER
Nursing Transfer Note      9/10/2022     Reason patient is being transferred: downgrade from ICU; surgical site bleeding controlled    Transfer From:     Transfer via wheelchair    Transfer with N/A    Transported by Hitesh    Medicines sent: BACTROBAN    Any special needs or follow-up needed: MONITOR SURGICAL SITE     Chart send with patient: Yes    Notified: MOTHER AND FATHER    Patient reassessed at: 9/10/22 1115 (date, time)    1317 - Report given to Raghu Calderon RN

## 2022-09-10 NOTE — NURSING TRANSFER
Nursing Transfer Note      9/10/2022     Report provided via phone by ANNA Darby on ICU unit     Reason patient is being transferred: [facility] ICU    Transfer To: Medical surgical telemetry unit     Transfer via bed    Transfer with cardiac monitoring    Transported by Transport team     Medicines sent: N/A    Any special needs or follow-up needed: No    Chart send with patient: Yes    Notified: parents notified in ICU about pt transfer     Patient reassessed at: (09/10/2022, 1509)    Upon arrival to floor: cardiac monitor applied, patient oriented to room, call bell in reach, and bed in lowest position

## 2022-09-10 NOTE — NURSING
[facility]  Nurses Note -- 4 Eyes      9/10/2022       Skin assessed on: Q Shift      [x] No Pressure Injuries Present    [x]Prevention Measures Documented    [] Yes LDA  for Pressure Injury Previously documented     [] Yes New Pressure Injury Discovered   [] LDA for New Pressure Injury Added      Attending RN:  Mehnaz Umana RN     Second RN:  Nimo Johnson RN

## 2022-09-11 PROBLEM — N49.2 SCROTAL ABSCESS: Status: ACTIVE | Noted: 2022-01-01

## 2022-09-11 NOTE — PROGRESS NOTES
[facility] Medicine  Progress Note    Patient Name: Doe Woodall  MRN: [id number]  Patient Class: IP- Inpatient   Admission Date: 9/1/2022  Length of Stay: 10 days  Attending Physician: Sal Baldwin MD  Primary Care Provider: [facility]        Subjective:     Principal Problem:Severe sepsis        HPI:  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  He reports acute onset of erythema, duration 3 days, has progressed and is now swollen and painful, no known exacerbating or alleviating factors, no attempted self treatment.  Denies fever, chills, cough, chest pain, SOB, dizziness, syncope, nausea, vomiting, diarrhea, abdominal pain, or dysuria.  In the ED, found to be tachycardic and tachypneic with leukocytosis.  CT abdomen pelvis and scrotal ultrasound revealed extensive cellulitis.  Procalcitonin 5.7.  Initial lactic acid 2.7.  Also with mild hyponatremia and mildly elevated liver enzymes.  Blood cultures obtained, IV fluid resuscitation and IV antibiotics initiated.      Overview/Hospital Course:  Mr Doe Woodall was admitted with severe sepsis due to right sided groin/perineal cellulitis with MARISSA. Started IVF and broad spectrum antibiotics. Blood cultures no growth. Urology consulted. Nephrology consulted for MARISSA. ID consulted for antibiotic management. Symptoms worsened. He developed abscess. Urology performed I&D on 9/9 and noted abscess cavity tracking up into the groin and down into the scrotum. Cultures were sent. He was transferred to ICU post operatively due to excessive venous oozing from the surgical site. He remained hemodynamically stable, no need for transfusion, and bleeding controlled. Stepped down to floor.    Operative cultures grew  GBS.      Interval History: Feeling well this morning. Only complaint is right groin pain. He did not rest well. No shortness of breath, nausea, vomiting. He has had BM post operatively. Parents are at bedside.     Review of Systems   Constitutional:  Negative for chills and fever.   Respiratory:  Negative for shortness of breath.    Cardiovascular:  Negative for chest pain.   Gastrointestinal:  Negative for abdominal pain, constipation, diarrhea, nausea and vomiting.   Genitourinary:  Negative for difficulty urinating.   Musculoskeletal:  Negative for arthralgias and myalgias.   Skin:  Positive for rash and wound.   Neurological:  Negative for weakness and numbness.   Psychiatric/Behavioral:  Negative for confusion.    Objective:     Vital Signs (Most Recent):  Temp: 97.5 °F (36.4 °C) (09/11/22 0753)  Pulse: 83 (09/11/22 0753)  Resp: 20 (09/11/22 0753)  BP: 137/80 (09/11/22 0753)  SpO2: 97 % (09/11/22 0753) Vital Signs (24h Range):  Temp:  [97.5 °F (36.4 °C)-98.4 °F (36.9 °C)] 97.5 °F (36.4 °C)  Pulse:  [83-99] 83  Resp:  [9-20] 20  SpO2:  [95 %-98 %] 97 %  BP: (126-155)/(69-90) 137/80     Weight: 121.8 kg (268 lb 8.3 oz)  Body mass index is 43.34 kg/m².    Intake/Output Summary (Last 24 hours) at 9/11/2022 1053  Last data filed at 9/11/2022 1000  Gross per 24 hour   Intake 600 ml   Output 900 ml   Net -300 ml        Physical Exam  Vitals and nursing note reviewed.   Constitutional:       General: He is not in acute distress.     Appearance: He is obese. He is ill-appearing. He is not toxic-appearing or diaphoretic.   HENT:      Head: Normocephalic and atraumatic.      Nose: Nose normal.      Mouth/Throat:      Mouth: Mucous membranes are moist.   Cardiovascular:      Rate and Rhythm: Normal rate and regular rhythm.      Heart sounds: Normal heart sounds. No murmur heard.    No gallop.   Pulmonary:      Effort: Pulmonary effort is normal. No respiratory distress.      Breath sounds: Normal breath sounds. No  wheezing or rales.      Comments: Room air  Abdominal:      General: Bowel sounds are normal. There is no distension.      Palpations: Abdomen is soft.      Tenderness: There is no abdominal tenderness. There is no guarding.   Genitourinary:     Comments: R groin pressure dressing in place  Musculoskeletal:      Right lower leg: No edema.      Left lower leg: No edema.   Skin:     General: Skin is warm and dry.   Neurological:      Mental Status: He is alert and oriented to person, place, and time.       Significant Labs: All pertinent labs within the past 24 hours have been reviewed.    Significant Imaging: I have reviewed all pertinent imaging results/findings within the past 24 hours.      Assessment/Plan:      * Severe sepsis  Severe sepsis with MARISSA due to groin/scrotal cellulitis. Appreciate urology involvement. Initially given vanc/zosyn, which were stopped after kidney failure and patient was switched to clindamycin monotherapy. He appeared to improve on this for multiple days w/ normalizing WBC count. On 9/8 w/ worsening exam and repeat US showing large inguinal abscess.   - Urology consulted. s/p I&D on 9/9.    - cultures growing GBS  - ID consulted- currently on daptomycin, cefepime, flagy  - check CK weekly while on daptomycin     Macrocytic anemia  Suspect alcohol related. He did have surgical blood loss which is now controlled  - CBC in AM      Inguinal abscess  See sepsis      ATN (acute tubular necrosis)  Baseline Cr 0.7. ATN. This is slowly improving.   - DC IVF  - CMP in AM    Cellulitis of groin  See sepsis    Chronic diastolic heart failure  No evidence of acute decompensation or fluid overload, echo earlier this year showed preserved EF with indeterminate LV diastolic function.   - continue to monitor volume status    Alcohol use disorder, severe, dependence  Counseled, reports stopping a week or two ago, continue MVI/thiamine/folate    Severe obesity (BMI >= 40)  Body mass index is 43.34 kg/m².  Morbid obesity complicates all aspects of disease management from diagnostic modalities to treatment. Weight loss encouraged and health benefits explained to patient.   - nursing had concerns overnight for sleep apnea--- needs outpatient sleep study     Elevated transaminase level  Consistent with baseline, likely due to heavy alcohol use.  - CMP in AM    Type 2 diabetes mellitus with hyperglycemia  A1c:   Lab Results   Component Value Date    HGBA1C 12.6 (H) 01/04/2022   Glucose is well controlled as inpatient     Meds: detemir 30 + SSI PRN to maintain goal 140-180  ADA diet, accuchecks, hypoglycemic protocol        VTE Risk Mitigation (From admission, onward)         Ordered     enoxaparin injection 30 mg  Daily         09/10/22 0823     IP VTE HIGH RISK PATIENT  Once         09/01/22 1506     Place sequential compression device  Until discontinued         09/01/22 1506                Discharge Planning   SUSAN:      Code Status: Full Code   Is the patient medically ready for discharge?:     Reason for patient still in hospital (select all that apply): Patient trending condition, Laboratory test, Treatment, Consult recommendations and Pending disposition  Discharge Plan A: Home with family                  Sal Baldwin MD  Department of Hospital Medicine   [facility] [place]

## 2022-09-11 NOTE — CONSULTS
"[facility]  Infectious Disease  Consult Note    Patient Name: Doe Woodall  MRN: [id number]  Admission Date: 9/1/2022  Hospital Length of Stay: 10 days  Attending Physician: Sal Baldwin MD  Primary Care Provider: [facility]     Isolation Status: No active isolations    Patient information was obtained from patient and ER records.      Consults  Assessment/Plan:     Scrotal abscess  32M with h/o t2dm, obesity (BMI 37)  admitted 9/1 with scrotal pain and shakes/chills. Initial scrotal ultrasound with cellulitis. bcx 9/1 NGTD.  Was on clindamycin. Scrotal us repeated several days later and noted to have large (11.7 x 4.4cm abscess). Now s/p urology washout, cultures GBS. ID consulted for "inguinal abscess/ cellulitis, has MARISSA attributed to previous vanc/zosyn". On dapto/cefepime/metronidazole/fluconazole    Appreciate urology help with source control of infection/washout of asbcess    Recommendations:   - de-escalate iv ceftriaxone based on culture results. Stop fluconazole, metronidazole, daptomycin  - f/u final cultures  - would plan on de-escalating to po antibiotics on d/c (possibly cefadroxil vs augmentin) x 10-14 days from washout and wound epithelizes  (est end date 9/22)          Thank you for your consult. I will follow-up with patient. Please contact us if you have any additional questions.    Comfort Hurtado MD  Infectious Disease  [facility]    Subjective:     Principal Problem: Severe sepsis    HPI:   32M with h/o t2dm, obesity (BMI 37)  admitted 9/1 with scrotal pain and erythema. Says it started off as a rash on b/l inner thighs. Several days later, wound/scrotum became hard and pain worsened so came to hospital. Also notes shaking and cold sweats before arrival    9/1 scrotal US  Scrotal cellulitis and probable cutaneous boil.  No extension/involvement of the testicle.    9/8 scrotal US  Large (11.7 x 4.4 x 2.9 cm) thick complex fluid " "collection in the right inguinal location, concerning for abscess.    Bcx 9/1 ngtd    Went to OR  9/9 with urology. Cultures sent and growing:  Aerobic Bacterial Culture  Abnormal   STREPTOCOCCUS AGALACTIAE (GROUP B)   Moderate   Beta-hemolytic streptococci are routinely susceptible to   penicillins,cephalosporins and carbapenems.       ID consulted for "inguinal abscess/ cellulitis, has MARISSA attributed to previous vanc/zosyn"      Past Medical History:   Diagnosis Date    Abscess of right groin 09/01/2022    Diabetes mellitus     Encounter for blood transfusion     Loud snoring     Obese     Other insomnia 08/03/2020    Perineal abscess 08/01/2014       Past Surgical History:   Procedure Laterality Date    ABCESS DRAINAGE  2014    PERIRECTAL    DECOMPRESSION OF LUMBAR SPINE USING MINIMALLY INVASIVE TECHNIQUE Right 07/06/2018    Procedure: DECOMPRESSION, SPINE, LUMBAR, MINIMALLY INVASIVE L3-4;  Surgeon: Cristian Cervantes MD;  Location: [facility] OR [address] [place] [address];  Service: Neurosurgery;  Laterality: Right;    ORTHOPEDIC SURGERY         Review of patient's allergies indicates:   Allergen Reactions    Metformin Diarrhea       No current facility-administered medications on file prior to encounter.     Current Outpatient Medications on File Prior to Encounter   Medication Sig    acetaminophen (TYLENOL) 500 MG tablet Take 1,000 mg by mouth 3 (three) times daily as needed for Pain.    dulaglutide (TRULICITY) 1.5 mg/0.5 mL pen injector Inject 1.5 mg into the skin every 7 days. (Patient taking differently: Inject 1.5 mg into the skin every 7 days. Tuesdays)    blood sugar diagnostic Strp 1 strip by Misc.(Non-Drug; Combo Route) route 4 (four) times daily with meals and nightly. (Patient not taking: Reported on 9/1/2022)    blood-glucose meter Misc Use as instructed (Patient not taking: Reported on 9/1/2022)    diphenhydramine HCl (UNISOM SLEEPGELS ORAL) Take 1 capsule by mouth every evening.    docusate sodium (COLACE) " "100 MG capsule Take 1 capsule (100 mg total) by mouth 3 (three) times daily as needed for Constipation. (Patient not taking: Reported on 9/1/2022)    folic acid (FOLVITE) 1 MG tablet Take 1 tablet (1 mg total) by mouth once daily. (Patient not taking: Reported on 9/1/2022)    glucagon (GLUCAGEN HYPOKIT) 1 mg SolR Inject 1 mg into the muscle as needed (Hypoglycemia). (Patient not taking: Reported on 9/1/2022)    HYDROcodone-acetaminophen (NORCO) 5-325 mg per tablet Take 1 tablet by mouth every 6 (six) hours as needed for Pain. (Patient not taking: Reported on 9/1/2022)    ibuprofen (ADVIL,MOTRIN) 600 MG tablet Take 1 tablet (600 mg total) by mouth every 8 (eight) hours as needed for Pain. (Patient not taking: Reported on 9/1/2022)    insulin aspart U-100 (NOVOLOG) 100 unit/mL (3 mL) InPn pen Inject 14 Units into the skin 3 (three) times daily.    insulin detemir U-100 (LEVEMIR FLEXTOUCH) 100 unit/mL (3 mL) SubQ InPn pen Inject 60 Units into the skin once daily.    lancets 30 gauge Misc 1 lancet by Misc.(Non-Drug; Combo Route) route 4 (four) times daily with meals and nightly. (Patient not taking: Reported on 9/1/2022)    lancing device Misc Use as instructed (Patient not taking: Reported on 9/1/2022)    pen needle, diabetic 31 gauge x 5/16" Ndle 1 each by Misc.(Non-Drug; Combo Route) route 4 (four) times daily with meals and nightly. (Patient not taking: Reported on 9/1/2022)    pen needle, diabetic 32 gauge x 5/32" Ndle USE TO INJECT INSULIN FOUR TIMES DAILY (Patient not taking: Reported on 9/1/2022)    pulse oximeter (PULSE OXIMETER) device by Apply Externally route 2 (two) times a day. Use twice daily at 8 AM and 3 PM and [facility]. (Patient not taking: Reported on 9/1/2022)    thiamine 100 MG tablet Take 1 tablet (100 mg total) by mouth once daily. (Patient not taking: Reported on 9/1/2022)     Family History       Problem Relation (Age of Onset)    Diabetes Father, " "Paternal Grandmother, Paternal Grandfather          Tobacco Use    Smoking status: Former     Packs/day: 0.50     Years: 9.00     Pack years: 4.50     Types: Cigarettes     Quit date: 2013     Years since quittin.2    Smokeless tobacco: Former   Substance and Sexual Activity    Alcohol use: Not Currently     Comment: DAILY PINT OF LIQUOR, HX OF 1 "TALL BOY" OF BEER DAILY    Drug use: Not Currently    Sexual activity: Yes     Partners: Female     Birth control/protection: None     Review of Systems   Constitutional:  Negative for chills and fever.   Eyes:  Negative for photophobia and visual disturbance.   Respiratory:  Negative for cough and shortness of breath.    Cardiovascular:  Negative for chest pain, palpitations and leg swelling.   Gastrointestinal:  Negative for abdominal pain, diarrhea, nausea and vomiting.   Genitourinary:  Negative for frequency, hematuria and urgency.   Skin:  Positive for color change, rash and wound. Negative for pallor.   Neurological:  Negative for light-headedness and headaches.   Psychiatric/Behavioral:  Negative for confusion and decreased concentration.    Objective:     Vital Signs (Most Recent):  Temp: 98.7 °F (37.1 °C) (22 1152)  Pulse: 84 (22 1152)  Resp: 20 (22 115)  BP: (!) 165/94 (22 115)  SpO2: 96 % (22)   Vital Signs (24h Range):  Temp:  [97.5 °F (36.4 °C)-98.7 °F (37.1 °C)] 98.7 °F (37.1 °C)  Pulse:  [83-99] 84  Resp:  [16-20] 20  SpO2:  [95 %-98 %] 96 %  BP: (134-165)/(77-94) 165/94     Weight: 121.8 kg (268 lb 8.3 oz)  Body mass index is 43.34 kg/m².    Physical Exam  Vitals and nursing note reviewed.   Constitutional:       General: He is not in acute distress.     Appearance: He is well-developed.   HENT:      Head: Normocephalic and atraumatic.      Right Ear: External ear normal.      Left Ear: External ear normal.      Nose: Nose normal.   Eyes:      Conjunctiva/sclera: Conjunctivae normal.      Pupils: Pupils are " equal, round, and reactive to light.   Cardiovascular:      Rate and Rhythm: Normal rate and regular rhythm.   Pulmonary:      Effort: Pulmonary effort is normal. No respiratory distress.      Breath sounds: Normal breath sounds. No wheezing or rales.   Abdominal:      General: Bowel sounds are normal. There is no distension.      Palpations: Abdomen is soft.      Tenderness: There is no abdominal tenderness.      Comments: No palpable hepatomegaly or splenomegaly   Musculoskeletal:         General: No tenderness. Normal range of motion.      Cervical back: Normal range of motion and neck supple.   Skin:     General: Skin is warm and dry.      Findings: Erythema present.   Neurological:      Mental Status: He is alert and oriented to person, place, and time.   Psychiatric:         Thought Content: Thought content normal.         CRANIAL NERVES     CN III, IV, VI   Pupils are equal, round, and reactive to light.     Significant Labs: All pertinent labs within the past 24 hours have been reviewed.    Significant Imaging: I have reviewed all pertinent imaging results/findings within the past 24 hours.

## 2022-09-11 NOTE — ASSESSMENT & PLAN NOTE
Severe sepsis with MARISSA due to groin/scrotal cellulitis. Appreciate urology involvement. Initially given vanc/zosyn, which were stopped after kidney failure and patient was switched to clindamycin monotherapy. He appeared to improve on this for multiple days w/ normalizing WBC count. On 9/8 w/ worsening exam and repeat US showing large inguinal abscess.   - Urology consulted. s/p I&D on 9/9.    - cultures growing GBS  - ID consulted- currently on daptomycin, cefepime, flagy  - check CK weekly while on daptomycin

## 2022-09-11 NOTE — ASSESSMENT & PLAN NOTE
S/p I&D of scrotal/inguinal abscess 9/9/2022 (Dr Aguilar)  Packing changed today by MD. Wound clean, dry. No bleeding.   Start RN packing changes daily.   Abx per primary

## 2022-09-11 NOTE — SUBJECTIVE & OBJECTIVE
Interval History: Feeling well this morning. Only complaint is right groin pain. He did not rest well. No shortness of breath, nausea, vomiting. He has had BM post operatively. Parents are at bedside.     Review of Systems   Constitutional:  Negative for chills and fever.   Respiratory:  Negative for shortness of breath.    Cardiovascular:  Negative for chest pain.   Gastrointestinal:  Negative for abdominal pain, constipation, diarrhea, nausea and vomiting.   Genitourinary:  Negative for difficulty urinating.   Musculoskeletal:  Negative for arthralgias and myalgias.   Skin:  Positive for rash and wound.   Neurological:  Negative for weakness and numbness.   Psychiatric/Behavioral:  Negative for confusion.    Objective:     Vital Signs (Most Recent):  Temp: 97.5 °F (36.4 °C) (09/11/22 0753)  Pulse: 83 (09/11/22 0753)  Resp: 20 (09/11/22 0753)  BP: 137/80 (09/11/22 0753)  SpO2: 97 % (09/11/22 0753) Vital Signs (24h Range):  Temp:  [97.5 °F (36.4 °C)-98.4 °F (36.9 °C)] 97.5 °F (36.4 °C)  Pulse:  [83-99] 83  Resp:  [9-20] 20  SpO2:  [95 %-98 %] 97 %  BP: (126-155)/(69-90) 137/80     Weight: 121.8 kg (268 lb 8.3 oz)  Body mass index is 43.34 kg/m².    Intake/Output Summary (Last 24 hours) at 9/11/2022 1053  Last data filed at 9/11/2022 1000  Gross per 24 hour   Intake 600 ml   Output 900 ml   Net -300 ml        Physical Exam  Vitals and nursing note reviewed.   Constitutional:       General: He is not in acute distress.     Appearance: He is obese. He is ill-appearing. He is not toxic-appearing or diaphoretic.   HENT:      Head: Normocephalic and atraumatic.      Nose: Nose normal.      Mouth/Throat:      Mouth: Mucous membranes are moist.   Cardiovascular:      Rate and Rhythm: Normal rate and regular rhythm.      Heart sounds: Normal heart sounds. No murmur heard.    No gallop.   Pulmonary:      Effort: Pulmonary effort is normal. No respiratory distress.      Breath sounds: Normal breath sounds. No wheezing or  rales.      Comments: Room air  Abdominal:      General: Bowel sounds are normal. There is no distension.      Palpations: Abdomen is soft.      Tenderness: There is no abdominal tenderness. There is no guarding.   Genitourinary:     Comments: R groin pressure dressing in place  Musculoskeletal:      Right lower leg: No edema.      Left lower leg: No edema.   Skin:     General: Skin is warm and dry.   Neurological:      Mental Status: He is alert and oriented to person, place, and time.       Significant Labs: All pertinent labs within the past 24 hours have been reviewed.    Significant Imaging: I have reviewed all pertinent imaging results/findings within the past 24 hours.

## 2022-09-11 NOTE — PROGRESS NOTES
Awake alert oriented NAD    Denies CNS ENT CP GI  RHEUM OR DERM SX  Past Medical History:   Diagnosis Date    Abscess of right groin 09/01/2022    Diabetes mellitus     Encounter for blood transfusion     Loud snoring     Obese     Other insomnia 08/03/2020    Perineal abscess 08/01/2014     Past Surgical History:   Procedure Laterality Date    ABCESS DRAINAGE  2014    PERIRECTAL    DECOMPRESSION OF LUMBAR SPINE USING MINIMALLY INVASIVE TECHNIQUE Right 07/06/2018    Procedure: DECOMPRESSION, SPINE, LUMBAR, MINIMALLY INVASIVE L3-4;  Surgeon: Cristian Cervantes MD;  Location: [facility] OR [address] [place] [address];  Service: Neurosurgery;  Laterality: Right;    ORTHOPEDIC SURGERY       Review of patient's allergies indicates:   Allergen Reactions    Metformin Diarrhea       Current Facility-Administered Medications   Medication    acetaminophen tablet 650 mg    albuterol-ipratropium 2.5 mg-0.5 mg/3 mL nebulizer solution 3 mL    aluminum-magnesium hydroxide-simethicone 200-200-20 mg/5 mL suspension 30 mL    cefTRIAXone (ROCEPHIN) 2 g/50 mL D5W IVPB    COVID-19 vac, pablo(Pfizer)(PF) (Pfizer COVID-19) 30 mcg/0.3 mL injection 0.3 mL    dextrose 10% bolus 125 mL    dextrose 10% bolus 250 mL    diphenhydrAMINE injection 25 mg    enoxaparin injection 30 mg    folic acid tablet 1 mg    glucagon (human recombinant) injection 1 mg    glucose chewable tablet 16 g    glucose chewable tablet 24 g    HYDROcodone-acetaminophen 5-325 mg per tablet 1 tablet    HYDROmorphone (PF) injection 1 mg    insulin aspart U-100 pen 1-10 Units    insulin detemir U-100 pen 30 Units    melatonin tablet 6 mg    multivitamin tablet    mupirocin 2 % ointment    naloxone 0.4 mg/mL injection 0.02 mg    ondansetron injection 4 mg    polyethylene glycol packet 17 g    prochlorperazine injection Soln 5 mg    simethicone chewable tablet 80 mg    sodium chloride 0.9% flush 10 mL    sodium chloride 0.9% flush 10 mL    thiamine tablet 100 mg       LABS    Recent Results (from the past  24 hour(s))   POCT glucose    Collection Time: 09/10/22  4:57 PM   Result Value Ref Range    POCT Glucose 155 (H) 70 - 110 mg/dL   POCT glucose    Collection Time: 09/10/22  7:31 PM   Result Value Ref Range    POCT Glucose 179 (H) 70 - 110 mg/dL   POCT glucose    Collection Time: 09/10/22  8:29 PM   Result Value Ref Range    POCT Glucose 194 (H) 70 - 110 mg/dL   CBC Auto Differential    Collection Time: 09/11/22  5:22 AM   Result Value Ref Range    WBC 12.70 3.90 - 12.70 K/uL    RBC 3.09 (L) 4.60 - 6.20 M/uL    Hemoglobin 10.5 (L) 14.0 - 18.0 g/dL    Hematocrit 31.1 (L) 40.0 - 54.0 %     (H) 82 - 98 fL    MCH 34.0 (H) 27.0 - 31.0 pg    MCHC 33.8 32.0 - 36.0 g/dL    RDW 12.3 11.5 - 14.5 %    Platelets 340 150 - 450 K/uL    MPV 9.3 9.2 - 12.9 fL    Immature Granulocytes 1.7 (H) 0.0 - 0.5 %    Gran # (ANC) 8.9 (H) 1.8 - 7.7 K/uL    Immature Grans (Abs) 0.22 (H) 0.00 - 0.04 K/uL    Lymph # 2.2 1.0 - 4.8 K/uL    Mono # 1.1 (H) 0.3 - 1.0 K/uL    Eos # 0.2 0.0 - 0.5 K/uL    Baso # 0.08 0.00 - 0.20 K/uL    nRBC 0 0 /100 WBC    Gran % 70.4 38.0 - 73.0 %    Lymph % 17.6 (L) 18.0 - 48.0 %    Mono % 8.4 4.0 - 15.0 %    Eosinophil % 1.3 0.0 - 8.0 %    Basophil % 0.6 0.0 - 1.9 %    Differential Method Automated    Comprehensive Metabolic Panel    Collection Time: 09/11/22  5:22 AM   Result Value Ref Range    Sodium 140 136 - 145 mmol/L    Potassium 4.0 3.5 - 5.1 mmol/L    Chloride 111 (H) 95 - 110 mmol/L    CO2 16 (L) 23 - 29 mmol/L    Glucose 117 (H) 70 - 110 mg/dL    BUN 21 (H) 6 - 20 mg/dL    Creatinine 3.1 (H) 0.5 - 1.4 mg/dL    Calcium 8.7 8.7 - 10.5 mg/dL    Total Protein 6.6 6.0 - 8.4 g/dL    Albumin 2.0 (L) 3.5 - 5.2 g/dL    Total Bilirubin 0.6 0.1 - 1.0 mg/dL    Alkaline Phosphatase 384 (H) 55 - 135 U/L    AST 59 (H) 10 - 40 U/L    ALT 42 10 - 44 U/L    Anion Gap 13 8 - 16 mmol/L    eGFR 26 (A) >60 mL/min/1.73 m^2   CK    Collection Time: 09/11/22  5:22 AM   Result Value Ref Range     20 - 200 U/L   POCT  glucose    Collection Time: 09/11/22  7:51 AM   Result Value Ref Range    POCT Glucose 117 (H) 70 - 110 mg/dL   POCT glucose    Collection Time: 09/11/22 11:51 AM   Result Value Ref Range    POCT Glucose 106 70 - 110 mg/dL   ]    I/O last 3 completed shifts:  In: 3421.3 [P.O.:960; I.V.:2264.2; IV Piggyback:197.1]  Out: 2500 [Urine:2500]    Vitals:    09/11/22 0611 09/11/22 0753 09/11/22 1141 09/11/22 1152   BP:  137/80  (!) 165/94   Pulse:  83  84   Resp: 20 20 18 20   Temp:  97.5 °F (36.4 °C)  98.7 °F (37.1 °C)   TempSrc:  Oral     SpO2:  97%  96%   Weight:       Height:           No Jvd, Thyromegaly or Lymphadenopathy  Lungs: Fairly clear anteriorly and laterally  Cor: RRR no G or rubs  Abd: Soft benign good bowel sounds non tender  Ext: Pos edema     A)  MARISSA  creat down again good uo   Cellulitis of his groin post  surgery pain better   Sepsis  DM  CHF (d) retinopathy  ETOH user with elevated LFT's     P)    Renal Diet  IVF's  recommended   Home meds  Adjust all meds to the degree of renal fx  Close follow up I/O and weights  Maintain Hydration   No Hd   Avoid NSAID's

## 2022-09-11 NOTE — PLAN OF CARE
Problem: Skin Injury Risk Increased  Goal: Skin Health and Integrity  Intervention: Optimize Skin Protection  Flowsheets (Taken 9/11/2022 1753)  Pressure Reduction Techniques: positioned off wounds  Pressure Reduction Devices: positioning supports utilized  Skin Protection: protective footwear used  Head of Bed (HOB) Positioning: HOB at 45 degrees  Intervention: Promote and Optimize Oral Intake  Flowsheets (Taken 9/11/2022 1753)  Oral Nutrition Promotion: rest periods promoted

## 2022-09-11 NOTE — PLAN OF CARE
Problem: Adult Inpatient Plan of Care  Goal: Plan of Care Review  Outcome: Ongoing, Progressing     Problem: Adult Inpatient Plan of Care  Goal: Absence of Hospital-Acquired Illness or Injury  Outcome: Ongoing, Progressing     Problem: Infection  Goal: Absence of Infection Signs and Symptoms  Outcome: Ongoing, Progressing

## 2022-09-11 NOTE — SUBJECTIVE & OBJECTIVE
Interval History: No fevers. No bleeding.     Review of Systems   Constitutional: Negative.  Negative for fever.   HENT: Negative.     Eyes: Negative.    Respiratory:  Negative for cough, chest tightness and shortness of breath.    Cardiovascular:  Negative for chest pain.   Gastrointestinal: Negative.  Negative for constipation, diarrhea and nausea.   Genitourinary:  Positive for scrotal swelling and testicular pain.   Musculoskeletal: Negative.    Neurological: Negative.    Psychiatric/Behavioral: Negative.     Objective:     Temp:  [97.5 °F (36.4 °C)-98.7 °F (37.1 °C)] 98.7 °F (37.1 °C)  Pulse:  [83-99] 84  Resp:  [9-20] 20  SpO2:  [95 %-98 %] 96 %  BP: (126-165)/(69-94) 165/94     Body mass index is 43.34 kg/m².    Date 09/11/22 0700 - 09/12/22 0659   Shift 3329-6016 5620-6678 6765-4173 24 Hour Total   INTAKE   P.O. 240   240   Shift Total(mL/kg) 240(2)   240(2)   OUTPUT   Shift Total(mL/kg)       Weight (kg) 121.8 121.8 121.8 121.8     Bladder Scan Volume (mL): 573 mL (09/09/22 2000)    Drains       None                   Physical Exam  Vitals reviewed.   Constitutional:       General: He is not in acute distress.     Appearance: He is well-developed. He is not diaphoretic.   HENT:      Head: Normocephalic and atraumatic.   Eyes:      General: No scleral icterus.        Right eye: No discharge.         Left eye: No discharge.   Cardiovascular:      Rate and Rhythm: Normal rate.   Pulmonary:      Effort: Pulmonary effort is normal. No respiratory distress.      Breath sounds: Normal breath sounds.   Abdominal:      General: Bowel sounds are normal. There is no distension.      Palpations: Abdomen is soft.      Tenderness: There is no abdominal tenderness. There is no guarding or rebound.   Genitourinary:         Comments: Dressing and packing removed. Hemostatic. Packing replaced (Vashe dampened Kerlix) into both incisions. ABD pad placed. Mesh underwear placed.   Musculoskeletal:         General: Normal range  of motion.      Cervical back: Normal range of motion and neck supple.   Skin:     General: Skin is warm and dry.      Findings: No erythema.   Neurological:      Mental Status: He is alert and oriented to person, place, and time.       Significant Labs:    BMP:  Recent Labs   Lab 09/09/22  0404 09/10/22  0400 09/11/22  0522    139 140   K 4.2 4.2 4.0    112* 111*   CO2 18* 17* 16*   BUN 22* 23* 21*   CREATININE 3.3* 3.2* 3.1*   CALCIUM 8.0* 8.0* 8.7       CBC:   Recent Labs   Lab 09/09/22  1845 09/10/22  0400 09/11/22  0522   WBC 18.46* 15.33* 12.70   HGB 10.8* 9.6* 10.5*   HCT 31.6* 28.4* 31.1*    281 340       Blood Culture: No results for input(s): LABBLOO in the last 168 hours.  Urine Culture: No results for input(s): LABURIN in the last 168 hours.  Urine Studies: No results for input(s): COLORU, APPEARANCEUA, PHUR, SPECGRAV, PROTEINUA, GLUCUA, KETONESU, BILIRUBINUA, OCCULTUA, NITRITE, UROBILINOGEN, LEUKOCYTESUR, RBCUA, WBCUA, BACTERIA, SQUAMEPITHEL, HYALINECASTS in the last 168 hours.    Invalid input(s): WRIGHTSUR    Significant Imaging:  All pertinent imaging results/findings from the past 24 hours have been reviewed.

## 2022-09-11 NOTE — NURSING
Report received from off going nurse, ANNA Muñoz. Patient AAO. No signs of distress noted. Call light in reach. Bed low and locked. Will continue plan of care.

## 2022-09-11 NOTE — SUBJECTIVE & OBJECTIVE
Past Medical History:   Diagnosis Date    Abscess of right groin 09/01/2022    Diabetes mellitus     Encounter for blood transfusion     Loud snoring     Obese     Other insomnia 08/03/2020    Perineal abscess 08/01/2014       Past Surgical History:   Procedure Laterality Date    ABCESS DRAINAGE  2014    PERIRECTAL    DECOMPRESSION OF LUMBAR SPINE USING MINIMALLY INVASIVE TECHNIQUE Right 07/06/2018    Procedure: DECOMPRESSION, SPINE, LUMBAR, MINIMALLY INVASIVE L3-4;  Surgeon: Cristian Cervantes MD;  Location: [facility] OR [address] [place] [address];  Service: Neurosurgery;  Laterality: Right;    ORTHOPEDIC SURGERY         Review of patient's allergies indicates:   Allergen Reactions    Metformin Diarrhea       No current facility-administered medications on file prior to encounter.     Current Outpatient Medications on File Prior to Encounter   Medication Sig    acetaminophen (TYLENOL) 500 MG tablet Take 1,000 mg by mouth 3 (three) times daily as needed for Pain.    dulaglutide (TRULICITY) 1.5 mg/0.5 mL pen injector Inject 1.5 mg into the skin every 7 days. (Patient taking differently: Inject 1.5 mg into the skin every 7 days. Tuesdays)    blood sugar diagnostic Strp 1 strip by Misc.(Non-Drug; Combo Route) route 4 (four) times daily with meals and nightly. (Patient not taking: Reported on 9/1/2022)    blood-glucose meter Misc Use as instructed (Patient not taking: Reported on 9/1/2022)    diphenhydramine HCl (UNISOM SLEEPGELS ORAL) Take 1 capsule by mouth every evening.    docusate sodium (COLACE) 100 MG capsule Take 1 capsule (100 mg total) by mouth 3 (three) times daily as needed for Constipation. (Patient not taking: Reported on 9/1/2022)    folic acid (FOLVITE) 1 MG tablet Take 1 tablet (1 mg total) by mouth once daily. (Patient not taking: Reported on 9/1/2022)    glucagon (GLUCAGEN HYPOKIT) 1 mg SolR Inject 1 mg into the muscle as needed (Hypoglycemia). (Patient not taking: Reported on 9/1/2022)    HYDROcodone-acetaminophen (NORCO) 5-325  "mg per tablet Take 1 tablet by mouth every 6 (six) hours as needed for Pain. (Patient not taking: Reported on 2022)    ibuprofen (ADVIL,MOTRIN) 600 MG tablet Take 1 tablet (600 mg total) by mouth every 8 (eight) hours as needed for Pain. (Patient not taking: Reported on 2022)    insulin aspart U-100 (NOVOLOG) 100 unit/mL (3 mL) InPn pen Inject 14 Units into the skin 3 (three) times daily.    insulin detemir U-100 (LEVEMIR FLEXTOUCH) 100 unit/mL (3 mL) SubQ InPn pen Inject 60 Units into the skin once daily.    lancets 30 gauge Misc 1 lancet by Misc.(Non-Drug; Combo Route) route 4 (four) times daily with meals and nightly. (Patient not taking: Reported on 2022)    lancing device Misc Use as instructed (Patient not taking: Reported on 2022)    pen needle, diabetic 31 gauge x 5/16" Ndle 1 each by Misc.(Non-Drug; Combo Route) route 4 (four) times daily with meals and nightly. (Patient not taking: Reported on 2022)    pen needle, diabetic 32 gauge x 5/32" Ndle USE TO INJECT INSULIN FOUR TIMES DAILY (Patient not taking: Reported on 2022)    pulse oximeter (PULSE OXIMETER) device by Apply Externally route 2 (two) times a day. Use twice daily at 8 AM and 3 PM and [facility] the value in [facility] as directed. (Patient not taking: Reported on 2022)    thiamine 100 MG tablet Take 1 tablet (100 mg total) by mouth once daily. (Patient not taking: Reported on 2022)     Family History       Problem Relation (Age of Onset)    Diabetes Father, Paternal Grandmother, Paternal Grandfather          Tobacco Use    Smoking status: Former     Packs/day: 0.50     Years: 9.00     Pack years: 4.50     Types: Cigarettes     Quit date: 2013     Years since quittin.2    Smokeless tobacco: Former   Substance and Sexual Activity    Alcohol use: Not Currently     Comment: DAILY PINT OF LIQUOR, HX OF 1 "TALL BOY" OF BEER DAILY    Drug use: Not Currently    Sexual activity: Yes     Partners: Female     Birth " control/protection: None     Review of Systems   Constitutional:  Negative for chills and fever.   Eyes:  Negative for photophobia and visual disturbance.   Respiratory:  Negative for cough and shortness of breath.    Cardiovascular:  Negative for chest pain, palpitations and leg swelling.   Gastrointestinal:  Negative for abdominal pain, diarrhea, nausea and vomiting.   Genitourinary:  Negative for frequency, hematuria and urgency.   Skin:  Positive for color change, rash and wound. Negative for pallor.   Neurological:  Negative for light-headedness and headaches.   Psychiatric/Behavioral:  Negative for confusion and decreased concentration.    Objective:     Vital Signs (Most Recent):  Temp: 98.7 °F (37.1 °C) (09/11/22 1152)  Pulse: 84 (09/11/22 1152)  Resp: 20 (09/11/22 1152)  BP: (!) 165/94 (09/11/22 1152)  SpO2: 96 % (09/11/22 1152)   Vital Signs (24h Range):  Temp:  [97.5 °F (36.4 °C)-98.7 °F (37.1 °C)] 98.7 °F (37.1 °C)  Pulse:  [83-99] 84  Resp:  [16-20] 20  SpO2:  [95 %-98 %] 96 %  BP: (134-165)/(77-94) 165/94     Weight: 121.8 kg (268 lb 8.3 oz)  Body mass index is 43.34 kg/m².    Physical Exam  Vitals and nursing note reviewed.   Constitutional:       General: He is not in acute distress.     Appearance: He is well-developed.   HENT:      Head: Normocephalic and atraumatic.      Right Ear: External ear normal.      Left Ear: External ear normal.      Nose: Nose normal.   Eyes:      Conjunctiva/sclera: Conjunctivae normal.      Pupils: Pupils are equal, round, and reactive to light.   Cardiovascular:      Rate and Rhythm: Normal rate and regular rhythm.   Pulmonary:      Effort: Pulmonary effort is normal. No respiratory distress.      Breath sounds: Normal breath sounds. No wheezing or rales.   Abdominal:      General: Bowel sounds are normal. There is no distension.      Palpations: Abdomen is soft.      Tenderness: There is no abdominal tenderness.      Comments: No palpable hepatomegaly or splenomegaly    Musculoskeletal:         General: No tenderness. Normal range of motion.      Cervical back: Normal range of motion and neck supple.   Skin:     General: Skin is warm and dry.      Findings: Erythema present.   Neurological:      Mental Status: He is alert and oriented to person, place, and time.   Psychiatric:         Thought Content: Thought content normal.         CRANIAL NERVES     CN III, IV, VI   Pupils are equal, round, and reactive to light.     Significant Labs: All pertinent labs within the past 24 hours have been reviewed.    Significant Imaging: I have reviewed all pertinent imaging results/findings within the past 24 hours.

## 2022-09-11 NOTE — PROGRESS NOTES
[facility]  [facility]rology  Progress Note    Patient Name: Doe Woodall  MRN: [id number]  Admission Date: 9/1/2022  Hospital Length of Stay: 10 days  Code Status: Full Code   Attending Provider: Sal Baldwin MD   Primary Care Physician: [facility] - [facility]    Subjective:     HPI:  33 yo man presented to the hospital with progressive scrotal pain which started off as a boil on his right scrotum. Patient with hx of uncontrolled DM. Patient's imaging on arrival did not demonstrate presence of abscess. Urology consulted for further evaluation. Denies dysuria, fevers, chills, chest pain, shortness of breath. Had breakfast this morning.       Interval History: No fevers. No bleeding.     Review of Systems   Constitutional: Negative.  Negative for fever.   HENT: Negative.     Eyes: Negative.    Respiratory:  Negative for cough, chest tightness and shortness of breath.    Cardiovascular:  Negative for chest pain.   Gastrointestinal: Negative.  Negative for constipation, diarrhea and nausea.   Genitourinary:  Positive for scrotal swelling and testicular pain.   Musculoskeletal: Negative.    Neurological: Negative.    Psychiatric/Behavioral: Negative.     Objective:     Temp:  [97.5 °F (36.4 °C)-98.7 °F (37.1 °C)] 98.7 °F (37.1 °C)  Pulse:  [83-99] 84  Resp:  [9-20] 20  SpO2:  [95 %-98 %] 96 %  BP: (126-165)/(69-94) 165/94     Body mass index is 43.34 kg/m².    Date 09/11/22 0700 - 09/12/22 0659   Shift 1395-1039 8277-4606 7279-9648 24 Hour Total   INTAKE   P.O. 240   240   Shift Total(mL/kg) 240(2)   240(2)   OUTPUT   Shift Total(mL/kg)       Weight (kg) 121.8 121.8 121.8 121.8     Bladder Scan Volume (mL): 573 mL (09/09/22 2000)    Drains       None                   Physical Exam  Vitals reviewed.   Constitutional:       General: He is not in acute distress.     Appearance: He is well-developed. He is not diaphoretic.   HENT:      Head: Normocephalic and atraumatic.   Eyes:      General: No  scleral icterus.        Right eye: No discharge.         Left eye: No discharge.   Cardiovascular:      Rate and Rhythm: Normal rate.   Pulmonary:      Effort: Pulmonary effort is normal. No respiratory distress.      Breath sounds: Normal breath sounds.   Abdominal:      General: Bowel sounds are normal. There is no distension.      Palpations: Abdomen is soft.      Tenderness: There is no abdominal tenderness. There is no guarding or rebound.   Genitourinary:         Comments: Dressing and packing removed. Hemostatic. Packing replaced (Vashe dampened Kerlix) into both incisions. ABD pad placed. Mesh underwear placed.   Musculoskeletal:         General: Normal range of motion.      Cervical back: Normal range of motion and neck supple.   Skin:     General: Skin is warm and dry.      Findings: No erythema.   Neurological:      Mental Status: He is alert and oriented to person, place, and time.       Significant Labs:    BMP:  Recent Labs   Lab 09/09/22  0404 09/10/22  0400 09/11/22  0522    139 140   K 4.2 4.2 4.0    112* 111*   CO2 18* 17* 16*   BUN 22* 23* 21*   CREATININE 3.3* 3.2* 3.1*   CALCIUM 8.0* 8.0* 8.7       CBC:   Recent Labs   Lab 09/09/22  1845 09/10/22  0400 09/11/22  0522   WBC 18.46* 15.33* 12.70   HGB 10.8* 9.6* 10.5*   HCT 31.6* 28.4* 31.1*    281 340       Blood Culture: No results for input(s): LABBLOO in the last 168 hours.  Urine Culture: No results for input(s): LABURIN in the last 168 hours.  Urine Studies: No results for input(s): COLORU, APPEARANCEUA, PHUR, SPECGRAV, PROTEINUA, GLUCUA, KETONESU, BILIRUBINUA, OCCULTUA, NITRITE, UROBILINOGEN, LEUKOCYTESUR, RBCUA, WBCUA, BACTERIA, SQUAMEPITHEL, HYALINECASTS in the last 168 hours.    Invalid input(s): WRIGHTSUR    Significant Imaging:  All pertinent imaging results/findings from the past 24 hours have been reviewed.                    Assessment/Plan:     Inguinal abscess  S/p I&D of R hemiscrotum    ATN (acute tubular  necrosis)  -- Uncertain etiology  -- No evidence of urinary retention  -- Further management per primary  -- Nephrology following    Cellulitis of groin  S/p I&D of scrotal/inguinal abscess 9/9/2022 (Dr Aguilar)  Packing changed today by MD. Wound clean, dry. No bleeding.   Start RN packing changes daily.   Abx per primary        VTE Risk Mitigation (From admission, onward)         Ordered     enoxaparin injection 30 mg  Daily         09/10/22 0823     IP VTE HIGH RISK PATIENT  Once         09/01/22 1506     Place sequential compression device  Until discontinued         09/01/22 1506                Kimmy Arriaga MD  Urology  [facility] [place] - Telemetry

## 2022-09-11 NOTE — PLAN OF CARE
Problem: Adult Inpatient Plan of Care  Goal: Plan of Care Review  Outcome: Ongoing, Progressing  Flowsheets (Taken 9/10/2022 2008)  Plan of Care Reviewed With: patient  Goal: Patient-Specific Goal (Individualized)  Outcome: Ongoing, Progressing  Flowsheets (Taken 9/10/2022 2008)  Anxieties, Fears or Concerns: concern about groin pain  Patient-Specific Goals (Include Timeframe): IV dilaudid administered for groin pain  Goal: Optimal Comfort and Wellbeing  Outcome: Ongoing, Progressing     Problem: Bariatric Environmental Safety  Goal: Safety Maintained with Care  Outcome: Ongoing, Progressing     Problem: Diabetes Comorbidity  Goal: Blood Glucose Level Within Targeted Range  Outcome: Ongoing, Progressing

## 2022-09-11 NOTE — ASSESSMENT & PLAN NOTE
-- Uncertain etiology  -- No evidence of urinary retention  -- Further management per primary  -- Nephrology following

## 2022-09-11 NOTE — ASSESSMENT & PLAN NOTE
"32M with h/o t2dm, obesity (BMI 37)  admitted 9/1 with scrotal pain and shakes/chills. Initial scrotal ultrasound with cellulitis. bcx 9/1 NGTD.  Was on clindamycin. Scrotal us repeated several days later and noted to have large (11.7 x 4.4cm abscess). Now s/p urology washout, cultures GBS. ID consulted for "inguinal abscess/ cellulitis, has MARISSA attributed to previous vanc/zosyn". On dapto/cefepime/metronidazole/fluconazole    Appreciate urology help with source control of infection/washout of asbcess    Recommendations:   - de-escalate iv ceftriaxone based on culture results. Stop fluconazole, metronidazole, daptomycin  - f/u final cultures  - would plan on de-escalating to po antibiotics on d/c (possibly cefadroxil vs augmentin) x 10-14 days from washout and wound epithelizes  (est end date 9/22)    "

## 2022-09-12 NOTE — ASSESSMENT & PLAN NOTE
S/p I&D of scrotal/inguinal abscess 9/9/2022 (Dr Aguilar)   RN packing changes daily.   Abx per primary

## 2022-09-12 NOTE — PLAN OF CARE
Problem: Infection  Goal: Absence of Infection Signs and Symptoms  Outcome: Ongoing, Progressing     Problem: Adjustment to Illness (Sepsis/Septic Shock)  Goal: Optimal Coping  Outcome: Ongoing, Progressing

## 2022-09-12 NOTE — NURSING
Patient awake, alert, oriented resting comfortably. No signs of distress observed. bed low and locked. Call light in reach. Report given to oncoming nurse, MARIOLA barbosa. 12hour chart check complete. Will continue plan of care.

## 2022-09-12 NOTE — PROGRESS NOTES
[facility] [place]  Urology  Progress Note    Patient Name: Doe Woodall  MRN: [id number]  Admission Date: 9/1/2022  Hospital Length of Stay: 11 days  Code Status: Full Code   Attending Provider: Sal Baldwin MD   Primary Care Physician: [facility] - Family Medicine    Subjective:     HPI:  33 yo man presented to the hospital with progressive scrotal pain which started off as a boil on his right scrotum. Patient with hx of uncontrolled DM. Patient's imaging on arrival did not demonstrate presence of abscess. Urology consulted for further evaluation. Denies dysuria, fevers, chills, chest pain, shortness of breath. Had breakfast this morning.       Interval History: afebrile, pain control improved ( during rounds at 9 am)     Review of Systems   Constitutional:  Negative for activity change, appetite change, chills, diaphoresis and fever.   HENT:  Negative for congestion and rhinorrhea.    Eyes:  Negative for visual disturbance.   Respiratory:  Negative for choking and shortness of breath.    Gastrointestinal:  Negative for abdominal distention, abdominal pain, constipation, diarrhea, nausea and vomiting.   Genitourinary:  Positive for scrotal swelling and testicular pain. Negative for difficulty urinating, dysuria, flank pain, hematuria and urgency.   Skin:  Positive for wound. Negative for color change and pallor.   Neurological:  Negative for dizziness and syncope.   Psychiatric/Behavioral:  Negative for confusion and hallucinations.    Objective:     Temp:  [97 °F (36.1 °C)-98.3 °F (36.8 °C)] 97.2 °F (36.2 °C)  Pulse:  [] 86  Resp:  [14-20] 18  SpO2:  [93 %-98 %] 93 %  BP: (126-166)/(77-93) 126/89     Body mass index is 42.13 kg/m².      Bladder Scan Volume (mL): 573 mL (09/09/22 2000)    Drains       None                   Physical Exam  Constitutional:       General: He is not in acute distress.     Appearance: He is well-developed. He is not ill-appearing, toxic-appearing or  diaphoretic.   HENT:      Head: Normocephalic and atraumatic.      Mouth/Throat:      Mouth: Mucous membranes are moist.   Eyes:      Conjunctiva/sclera: Conjunctivae normal.   Cardiovascular:      Rate and Rhythm: Normal rate and regular rhythm.   Pulmonary:      Effort: Pulmonary effort is normal. No respiratory distress.   Abdominal:      General: There is no distension.      Palpations: Abdomen is soft. There is no mass.      Tenderness: There is no abdominal tenderness. There is no guarding.   Genitourinary:     Testes:         Right: Tenderness and swelling present. Mass not present.         Left: Tenderness and swelling present. Mass not present.          Comments: Viable wound edges  No signs of purulence, crepitus  Musculoskeletal:         General: No swelling or deformity.      Cervical back: Neck supple.   Skin:     General: Skin is warm.      Capillary Refill: Capillary refill takes less than 2 seconds.      Findings: No rash.   Neurological:      Mental Status: He is alert and oriented to person, place, and time.      Gait: Gait normal.   Psychiatric:         Mood and Affect: Mood normal.         Thought Content: Thought content normal.         Judgment: Judgment normal.       Significant Labs:    BMP:  Recent Labs   Lab 09/10/22  0400 09/11/22 0522 09/12/22  0514    140 138   K 4.2 4.0 3.3*   * 111* 110   CO2 17* 16* 19*   BUN 23* 21* 18   CREATININE 3.2* 3.1* 2.4*   CALCIUM 8.0* 8.7 8.2*       CBC:   Recent Labs   Lab 09/10/22  0400 09/11/22 0522 09/12/22  0514   WBC 15.33* 12.70 9.44   HGB 9.6* 10.5* 8.9*   HCT 28.4* 31.1* 25.6*    340 300                 Assessment/Plan:     Inguinal abscess  S/p I&D of R hemiscrotum  Will need home wound care instructions regarding dressing  Abx per ID    ATN (acute tubular necrosis)  -- Uncertain etiology  -- No evidence of urinary retention  -- Further management per primary  -- Nephrology following    Cellulitis of groin  S/p I&D of  scrotal/inguinal abscess 9/9/2022 (Dr Aguilar)   RN packing changes daily.   Abx per primary        VTE Risk Mitigation (From admission, onward)         Ordered     enoxaparin injection 30 mg  Daily         09/10/22 0823     IP VTE HIGH RISK PATIENT  Once         09/01/22 1506     Place sequential compression device  Until discontinued         09/01/22 1506                Amber Contreras MD  Urology  [facility] [place] - Telemetry   Statement Selected

## 2022-09-12 NOTE — ASSESSMENT & PLAN NOTE
Body mass index is 42.13 kg/m². Morbid obesity complicates all aspects of disease management from diagnostic modalities to treatment. Weight loss encouraged and health benefits explained to patient.   - nursing had concerns overnight for sleep apnea--- needs outpatient sleep study

## 2022-09-12 NOTE — PROGRESS NOTES
[facility] Medicine  Progress Note    Patient Name: Doe Woodall  MRN: [id number]  Patient Class: IP- Inpatient   Admission Date: 9/1/2022  Length of Stay: 11 days  Attending Physician: Sal Baldwin MD  Primary Care Provider: [facility]        Subjective:     Principal Problem:Severe sepsis        HPI:  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  He reports acute onset of erythema, duration 3 days, has progressed and is now swollen and painful, no known exacerbating or alleviating factors, no attempted self treatment.  Denies fever, chills, cough, chest pain, SOB, dizziness, syncope, nausea, vomiting, diarrhea, abdominal pain, or dysuria.  In the ED, found to be tachycardic and tachypneic with leukocytosis.  CT abdomen pelvis and scrotal ultrasound revealed extensive cellulitis.  Procalcitonin 5.7.  Initial lactic acid 2.7.  Also with mild hyponatremia and mildly elevated liver enzymes.  Blood cultures obtained, IV fluid resuscitation and IV antibiotics initiated.      Overview/Hospital Course:  Mr Doe Woodall was admitted with severe sepsis due to right sided groin/perineal cellulitis with MARISSA. Started IVF and broad spectrum antibiotics. Blood cultures no growth. Urology consulted. Nephrology consulted for MARISSA. ID consulted for antibiotic management. Symptoms worsened. He developed abscess. Urology performed I&D on 9/9 and noted abscess cavity tracking up into the groin and down into the scrotum. Cultures were sent. He was transferred to ICU post operatively due to excessive venous oozing from the surgical site. He remained hemodynamically stable, no need for transfusion, and bleeding controlled. Stepped down to floor.    Operative cultures grew GBS. ID  recommended CTX transitioned to augmentin.      Interval History: No events overnight. In pain due to dressing change.    Review of Systems   Constitutional:  Negative for chills and fever.   Respiratory:  Negative for shortness of breath.    Cardiovascular:  Negative for chest pain.   Gastrointestinal:  Negative for abdominal pain, constipation, diarrhea, nausea and vomiting.   Genitourinary:  Negative for difficulty urinating.   Musculoskeletal:  Negative for arthralgias and myalgias.   Skin:  Positive for rash and wound.   Neurological:  Negative for weakness and numbness.   Psychiatric/Behavioral:  Negative for confusion.    Objective:     Vital Signs (Most Recent):  Temp: 97.2 °F (36.2 °C) (09/12/22 1224)  Pulse: 86 (09/12/22 1224)  Resp: 16 (09/12/22 1335)  BP: 126/89 (09/12/22 1224)  SpO2: (!) 93 % (09/12/22 1224) Vital Signs (24h Range):  Temp:  [97 °F (36.1 °C)-98.3 °F (36.8 °C)] 97.2 °F (36.2 °C)  Pulse:  [] 86  Resp:  [14-20] 16  SpO2:  [93 %-98 %] 93 %  BP: (126-166)/(77-93) 126/89     Weight: 118.4 kg (261 lb 0.4 oz)  Body mass index is 42.13 kg/m².  No intake or output data in the 24 hours ending 09/12/22 1357     Physical Exam  Vitals and nursing note reviewed.   Constitutional:       General: He is not in acute distress.     Appearance: He is obese. He is ill-appearing. He is not toxic-appearing or diaphoretic.   HENT:      Head: Normocephalic and atraumatic.      Nose: Nose normal.      Mouth/Throat:      Mouth: Mucous membranes are moist.   Cardiovascular:      Rate and Rhythm: Normal rate and regular rhythm.      Heart sounds: Normal heart sounds. No murmur heard.    No gallop.   Pulmonary:      Effort: Pulmonary effort is normal. No respiratory distress.      Breath sounds: Normal breath sounds. No wheezing or rales.      Comments: Room air  Abdominal:      General: Bowel sounds are normal. There is no distension.      Palpations: Abdomen is soft.      Tenderness: There is no abdominal  tenderness. There is no guarding.   Genitourinary:     Comments: R groin pressure dressing in place  Musculoskeletal:      Right lower leg: No edema.      Left lower leg: No edema.   Skin:     General: Skin is warm and dry.   Neurological:      Mental Status: He is alert and oriented to person, place, and time.       Significant Labs: All pertinent labs within the past 24 hours have been reviewed.    Significant Imaging: I have reviewed all pertinent imaging results/findings within the past 24 hours.      Assessment/Plan:      * Severe sepsis  Severe sepsis with MARISSA due to groin/scrotal cellulitis. Appreciate urology involvement. Initially given vanc/zosyn, which were stopped after kidney failure and patient was switched to clindamycin monotherapy. He appeared to improve on this for multiple days w/ normalizing WBC count. On 9/8 w/ worsening exam and repeat US showing large inguinal abscess.   - Urology consulted. s/p I&D on 9/9.    - cultures growing GBS  - ID consulted- currently on ctx    Scrotal abscess        Macrocytic anemia  Suspect alcohol related. He did have surgical blood loss which is now controlled  - CBC in AM      Inguinal abscess  See sepsis      ATN (acute tubular necrosis)  Baseline Cr 0.7. ATN. This is slowly improving.   - DC IVF  - CMP in AM    Cellulitis of groin  See sepsis    Chronic diastolic heart failure  No evidence of acute decompensation or fluid overload, echo earlier this year showed preserved EF with indeterminate LV diastolic function.   - continue to monitor volume status    Alcohol use disorder, severe, dependence  Counseled, reports stopping a week or two ago, continue MVI/thiamine/folate    Severe obesity (BMI >= 40)  Body mass index is 42.13 kg/m². Morbid obesity complicates all aspects of disease management from diagnostic modalities to treatment. Weight loss encouraged and health benefits explained to patient.   - nursing had concerns overnight for sleep apnea--- needs  outpatient sleep study     Elevated transaminase level  Consistent with baseline, likely due to heavy alcohol use.  - CMP in AM    Type 2 diabetes mellitus with hyperglycemia  A1c:   Lab Results   Component Value Date    HGBA1C 12.6 (H) 01/04/2022   Glucose is well controlled as inpatient     Meds: detemir 30 + SSI PRN to maintain goal 140-180  ADA diet, accuchecks, hypoglycemic protocol        VTE Risk Mitigation (From admission, onward)         Ordered     enoxaparin injection 30 mg  Daily         09/10/22 0823     IP VTE HIGH RISK PATIENT  Once         09/01/22 1506     Place sequential compression device  Until discontinued         09/01/22 1506                Discharge Planning   SUSAN:      Code Status: Full Code   Is the patient medically ready for discharge?:     Reason for patient still in hospital (select all that apply): Patient trending condition, Laboratory test, Treatment, Consult recommendations and Pending disposition  Discharge Plan A: Home with family                  Sal Baldwin MD  Department of Hospital Medicine   [facility] - Telemetry

## 2022-09-12 NOTE — SUBJECTIVE & OBJECTIVE
Interval History: afebrile, pain control improved ( during rounds at 9 am)     Review of Systems   Constitutional:  Negative for activity change, appetite change, chills, diaphoresis and fever.   HENT:  Negative for congestion and rhinorrhea.    Eyes:  Negative for visual disturbance.   Respiratory:  Negative for choking and shortness of breath.    Gastrointestinal:  Negative for abdominal distention, abdominal pain, constipation, diarrhea, nausea and vomiting.   Genitourinary:  Positive for scrotal swelling and testicular pain. Negative for difficulty urinating, dysuria, flank pain, hematuria and urgency.   Skin:  Positive for wound. Negative for color change and pallor.   Neurological:  Negative for dizziness and syncope.   Psychiatric/Behavioral:  Negative for confusion and hallucinations.    Objective:     Temp:  [97 °F (36.1 °C)-98.3 °F (36.8 °C)] 97.2 °F (36.2 °C)  Pulse:  [] 86  Resp:  [14-20] 18  SpO2:  [93 %-98 %] 93 %  BP: (126-166)/(77-93) 126/89     Body mass index is 42.13 kg/m².      Bladder Scan Volume (mL): 573 mL (09/09/22 2000)    Drains       None                   Physical Exam  Constitutional:       General: He is not in acute distress.     Appearance: He is well-developed. He is not ill-appearing, toxic-appearing or diaphoretic.   HENT:      Head: Normocephalic and atraumatic.      Mouth/Throat:      Mouth: Mucous membranes are moist.   Eyes:      Conjunctiva/sclera: Conjunctivae normal.   Cardiovascular:      Rate and Rhythm: Normal rate and regular rhythm.   Pulmonary:      Effort: Pulmonary effort is normal. No respiratory distress.   Abdominal:      General: There is no distension.      Palpations: Abdomen is soft. There is no mass.      Tenderness: There is no abdominal tenderness. There is no guarding.   Genitourinary:     Testes:         Right: Tenderness and swelling present. Mass not present.         Left: Tenderness and swelling present. Mass not present.          Comments:  Viable wound edges  No signs of purulence, crepitus  Musculoskeletal:         General: No swelling or deformity.      Cervical back: Neck supple.   Skin:     General: Skin is warm.      Capillary Refill: Capillary refill takes less than 2 seconds.      Findings: No rash.   Neurological:      Mental Status: He is alert and oriented to person, place, and time.      Gait: Gait normal.   Psychiatric:         Mood and Affect: Mood normal.         Thought Content: Thought content normal.         Judgment: Judgment normal.       Significant Labs:    BMP:  Recent Labs   Lab 09/10/22  0400 09/11/22 0522 09/12/22 0514    140 138   K 4.2 4.0 3.3*   * 111* 110   CO2 17* 16* 19*   BUN 23* 21* 18   CREATININE 3.2* 3.1* 2.4*   CALCIUM 8.0* 8.7 8.2*       CBC:   Recent Labs   Lab 09/10/22  0400 09/11/22 0522 09/12/22  0514   WBC 15.33* 12.70 9.44   HGB 9.6* 10.5* 8.9*   HCT 28.4* 31.1* 25.6*    340 300

## 2022-09-12 NOTE — ANESTHESIA POSTPROCEDURE EVALUATION
Anesthesia Post Evaluation    Patient: Doe Woodall    Procedure(s) Performed: Procedure(s) (LRB):  INCISION AND DRAINAGE GROIN (N/A)    Final Anesthesia Type: general      Patient location during evaluation: PACU  Patient participation: Yes- Able to Participate  Level of consciousness: awake and alert, oriented and awake  Post-procedure vital signs: reviewed and stable  Airway patency: patent    PONV status at discharge: No PONV  Anesthetic complications: no      Cardiovascular status: blood pressure returned to baseline  Respiratory status: unassisted, spontaneous ventilation and room air  Hydration status: euvolemic  Follow-up not needed.          Vitals Value Taken Time   /89 09/12/22 1224   Temp 36.2 °C (97.2 °F) 09/12/22 1224   Pulse 86 09/12/22 1224   Resp 16 09/12/22 1335   SpO2 93 % 09/12/22 1224         Event Time   Out of Recovery 09/09/2022 14:31:00         Pain/Joseph Score: Pain Rating Prior to Med Admin: 6 (9/12/2022  1:35 PM)  Pain Rating Post Med Admin: 0 (9/12/2022  1:00 AM)

## 2022-09-12 NOTE — SUBJECTIVE & OBJECTIVE
Interval History: No events overnight. In pain due to dressing change.    Review of Systems   Constitutional:  Negative for chills and fever.   Respiratory:  Negative for shortness of breath.    Cardiovascular:  Negative for chest pain.   Gastrointestinal:  Negative for abdominal pain, constipation, diarrhea, nausea and vomiting.   Genitourinary:  Negative for difficulty urinating.   Musculoskeletal:  Negative for arthralgias and myalgias.   Skin:  Positive for rash and wound.   Neurological:  Negative for weakness and numbness.   Psychiatric/Behavioral:  Negative for confusion.    Objective:     Vital Signs (Most Recent):  Temp: 97.2 °F (36.2 °C) (09/12/22 1224)  Pulse: 86 (09/12/22 1224)  Resp: 16 (09/12/22 1335)  BP: 126/89 (09/12/22 1224)  SpO2: (!) 93 % (09/12/22 1224) Vital Signs (24h Range):  Temp:  [97 °F (36.1 °C)-98.3 °F (36.8 °C)] 97.2 °F (36.2 °C)  Pulse:  [] 86  Resp:  [14-20] 16  SpO2:  [93 %-98 %] 93 %  BP: (126-166)/(77-93) 126/89     Weight: 118.4 kg (261 lb 0.4 oz)  Body mass index is 42.13 kg/m².  No intake or output data in the 24 hours ending 09/12/22 1357     Physical Exam  Vitals and nursing note reviewed.   Constitutional:       General: He is not in acute distress.     Appearance: He is obese. He is ill-appearing. He is not toxic-appearing or diaphoretic.   HENT:      Head: Normocephalic and atraumatic.      Nose: Nose normal.      Mouth/Throat:      Mouth: Mucous membranes are moist.   Cardiovascular:      Rate and Rhythm: Normal rate and regular rhythm.      Heart sounds: Normal heart sounds. No murmur heard.    No gallop.   Pulmonary:      Effort: Pulmonary effort is normal. No respiratory distress.      Breath sounds: Normal breath sounds. No wheezing or rales.      Comments: Room air  Abdominal:      General: Bowel sounds are normal. There is no distension.      Palpations: Abdomen is soft.      Tenderness: There is no abdominal tenderness. There is no guarding.   Genitourinary:      Comments: R groin pressure dressing in place  Musculoskeletal:      Right lower leg: No edema.      Left lower leg: No edema.   Skin:     General: Skin is warm and dry.   Neurological:      Mental Status: He is alert and oriented to person, place, and time.       Significant Labs: All pertinent labs within the past 24 hours have been reviewed.    Significant Imaging: I have reviewed all pertinent imaging results/findings within the past 24 hours.

## 2022-09-12 NOTE — NURSING
Bedside shift report received from Holli CASTILLO.  Nurse will be taking over the care of the patient at this time.  Patient complains of pain, see mar.  Patient requested ice and water, nurse accommodated.  Patient denies any further needs or wants at this time. Call light within reach.  Will continue to monitor.

## 2022-09-12 NOTE — PROGRESS NOTES
Date of Admission:9/1/2022    SUBJECTIVE:notes feeling better    Current Facility-Administered Medications   Medication    acetaminophen tablet 650 mg    albuterol-ipratropium 2.5 mg-0.5 mg/3 mL nebulizer solution 3 mL    aluminum-magnesium hydroxide-simethicone 200-200-20 mg/5 mL suspension 30 mL    cefTRIAXone (ROCEPHIN) 2 g/50 mL D5W IVPB    COVID-19 vac, pablo(Pfizer)(PF) (Pfizer COVID-19) 30 mcg/0.3 mL injection 0.3 mL    dextrose 10% bolus 125 mL    dextrose 10% bolus 250 mL    diphenhydrAMINE injection 25 mg    enoxaparin injection 30 mg    folic acid tablet 1 mg    glucagon (human recombinant) injection 1 mg    glucose chewable tablet 16 g    glucose chewable tablet 24 g    HYDROcodone-acetaminophen 5-325 mg per tablet 1 tablet    HYDROmorphone (PF) injection 1 mg    insulin aspart U-100 pen 1-10 Units    insulin detemir U-100 pen 30 Units    melatonin tablet 6 mg    multivitamin tablet    mupirocin 2 % ointment    naloxone 0.4 mg/mL injection 0.02 mg    ondansetron injection 4 mg    polyethylene glycol packet 17 g    prochlorperazine injection Soln 5 mg    simethicone chewable tablet 80 mg    sodium chloride 0.9% flush 10 mL    sodium chloride 0.9% flush 10 mL    thiamine tablet 100 mg       Wt Readings from Last 3 Encounters:   09/11/22 118.4 kg (261 lb 0.4 oz)   06/01/22 113.4 kg (250 lb)   05/26/22 100 kg (220 lb 7.4 oz)     Temp Readings from Last 3 Encounters:   09/12/22 97.2 °F (36.2 °C) (Oral)   06/01/22 97.9 °F (36.6 °C)   05/26/22 98.2 °F (36.8 °C) (Oral)     BP Readings from Last 3 Encounters:   09/12/22 126/89   06/01/22 (!) 138/98   05/26/22 (!) 163/96     Pulse Readings from Last 3 Encounters:   09/12/22 86   06/01/22 84   05/26/22 90     No intake or output data in the 24 hours ending 09/12/22 1338    PE:  GEN:wd male nad  HEENT:ncat,eomi,mm  CVS:s1s2 regular  PULM:ctab  ABD:bs,soft  EXT:no leg edema  NEURO:awake    Recent Labs   Lab 09/12/22  0514   GLU 73      K 3.3*      CO2 19*    BUN 18   CREATININE 2.4*   CALCIUM 8.2*       Lab Results   Component Value Date    CALCIUM 8.2 (L) 09/12/2022    PHOS 4.5 03/22/2022       Recent Labs   Lab 09/12/22  0514   WBC 9.44   RBC 2.55*   HGB 8.9*   HCT 25.6*      *   MCH 34.9*   MCHC 34.8           A/P:  1.luke. creatinine is better.   2.proteinuria. ck ratio.  3.group b strep abscess. Cont.  4.dm2. following tx.  5.hypokalemia.replete.  Pt can see [facility]roughs after discharge for f/u. Renal wise is better. WIll sign off. Please reconsult as needed. Thanks.

## 2022-09-12 NOTE — ASSESSMENT & PLAN NOTE
Severe sepsis with MARISSA due to groin/scrotal cellulitis. Appreciate urology involvement. Initially given vanc/zosyn, which were stopped after kidney failure and patient was switched to clindamycin monotherapy. He appeared to improve on this for multiple days w/ normalizing WBC count. On 9/8 w/ worsening exam and repeat US showing large inguinal abscess.   - Urology consulted. s/p I&D on 9/9.    - cultures growing GBS  - ID consulted- currently on ctx

## 2022-09-12 NOTE — NURSING
Report received . Pt asleep easily aroused. Dressing intact to right groin area . Denies pain or discomfort at this time.

## 2022-09-12 NOTE — NURSING
Pt had a quiet night  . Telemetry SR 62 . Dressing to right groin remains intact . Nothing for pain . Denies discomfort at this time. Bed down SR up x 2 . Call bell in reach.

## 2022-09-13 NOTE — PLAN OF CARE
Problem: Bleeding (Sepsis/Septic Shock)  Goal: Absence of Bleeding  Outcome: Ongoing, Progressing     Problem: Pain Acute  Goal: Acceptable Pain Control and Functional Ability  Outcome: Ongoing, Progressing

## 2022-09-13 NOTE — PROGRESS NOTES
[facility] [place]  Urology  Progress Note    Patient Name: Doe Woodall  MRN: [id number]  Admission Date: 9/1/2022  Hospital Length of Stay: 12 days  Code Status: Full Code   Attending Provider: Hollie Johnson MD   Primary Care Physician: [facility] - Family Medicine    Subjective:     HPI:  31 yo man presented to the hospital with progressive scrotal pain which started off as a boil on his right scrotum. Patient with hx of uncontrolled DM. Patient's imaging on arrival did not demonstrate presence of abscess. Urology consulted for further evaluation. Denies dysuria, fevers, chills, chest pain, shortness of breath. Had breakfast this morning.       Interval History: Overall feeling, better.  He has some pain with dressing changes.    Review of Systems   Constitutional: Negative.  Negative for fever.   HENT: Negative.     Eyes: Negative.    Respiratory:  Negative for cough, chest tightness and shortness of breath.    Cardiovascular:  Negative for chest pain.   Gastrointestinal: Negative.  Negative for constipation, diarrhea and nausea.   Genitourinary:  Positive for scrotal swelling.   Musculoskeletal: Negative.    Neurological: Negative.    Psychiatric/Behavioral: Negative.     Objective:     Temp:  [97.2 °F (36.2 °C)-98.3 °F (36.8 °C)] 98.1 °F (36.7 °C)  Pulse:  [81-89] 81  Resp:  [16-18] 16  SpO2:  [91 %-99 %] 95 %  BP: (126-171)/(89-97) 163/93     Body mass index is 42.13 kg/m².      Bladder Scan Volume (mL): 573 mL (09/09/22 2000)    Drains       None                   Physical Exam  Vitals and nursing note reviewed.   Constitutional:       Appearance: He is well-developed.   HENT:      Head: Normocephalic.   Eyes:      Conjunctiva/sclera: Conjunctivae normal.   Neck:      Thyroid: No thyromegaly.      Trachea: No tracheal deviation.   Cardiovascular:      Rate and Rhythm: Normal rate.      Heart sounds: Normal heart sounds.   Pulmonary:      Effort: Pulmonary effort is normal. No  respiratory distress.      Breath sounds: Normal breath sounds. No wheezing.   Abdominal:      General: Bowel sounds are normal.      Palpations: Abdomen is soft.      Tenderness: There is no abdominal tenderness. There is no rebound.      Hernia: No hernia is present.   Musculoskeletal:         General: No tenderness. Normal range of motion.      Cervical back: Normal range of motion and neck supple.   Lymphadenopathy:      Cervical: No cervical adenopathy.   Skin:     General: Skin is warm and dry.      Findings: No erythema or rash.   Neurological:      Mental Status: He is alert and oriented to person, place, and time.   Psychiatric:         Behavior: Behavior normal.         Thought Content: Thought content normal.         Judgment: Judgment normal.       Significant Labs:    BMP:  Recent Labs   Lab 09/11/22 0522 09/12/22  0514 09/13/22  0401    138 139   K 4.0 3.3* 3.3*   * 110 107   CO2 16* 19* 23   BUN 21* 18 15   CREATININE 3.1* 2.4* 2.1*   CALCIUM 8.7 8.2* 8.4*       CBC:   Recent Labs   Lab 09/11/22 0522 09/12/22  0514 09/13/22  0401   WBC 12.70 9.44 10.76   HGB 10.5* 8.9* 9.8*   HCT 31.1* 25.6* 27.9*    300 342       Blood Culture: No results for input(s): LABBLOO in the last 168 hours.  Urine Culture: No results for input(s): LABURIN in the last 168 hours.    Significant Imaging:                      Assessment/Plan:     Inguinal abscess  S/p I&D of R hemiscrotum  Will need home wound care instructions regarding dressing  Abx per ID    ATN (acute tubular necrosis)  -- Uncertain etiology  -- No evidence of urinary retention  -- Further management per primary  -- Nephrology following    Cellulitis of groin  S/p I&D of scrotal/inguinal abscess 9/9/2022 (Dr Aguilar)   RN packing changes daily.   Abx per primary        VTE Risk Mitigation (From admission, onward)         Ordered     enoxaparin injection 30 mg  Daily         09/10/22 0823     IP VTE HIGH RISK PATIENT  Once          09/01/22 1506     Place sequential compression device  Until discontinued         09/01/22 1506                W Tuan Aguilar MD  Urology  [facility] [place] - Telemetry

## 2022-09-13 NOTE — SUBJECTIVE & OBJECTIVE
Interval History: Overall feeling, better.  He has some pain with dressing changes.    Review of Systems   Constitutional: Negative.  Negative for fever.   HENT: Negative.     Eyes: Negative.    Respiratory:  Negative for cough, chest tightness and shortness of breath.    Cardiovascular:  Negative for chest pain.   Gastrointestinal: Negative.  Negative for constipation, diarrhea and nausea.   Genitourinary:  Positive for scrotal swelling.   Musculoskeletal: Negative.    Neurological: Negative.    Psychiatric/Behavioral: Negative.     Objective:     Temp:  [97.2 °F (36.2 °C)-98.3 °F (36.8 °C)] 98.1 °F (36.7 °C)  Pulse:  [81-89] 81  Resp:  [16-18] 16  SpO2:  [91 %-99 %] 95 %  BP: (126-171)/(89-97) 163/93     Body mass index is 42.13 kg/m².      Bladder Scan Volume (mL): 573 mL (09/09/22 2000)    Drains       None                   Physical Exam  Vitals and nursing note reviewed.   Constitutional:       Appearance: He is well-developed.   HENT:      Head: Normocephalic.   Eyes:      Conjunctiva/sclera: Conjunctivae normal.   Neck:      Thyroid: No thyromegaly.      Trachea: No tracheal deviation.   Cardiovascular:      Rate and Rhythm: Normal rate.      Heart sounds: Normal heart sounds.   Pulmonary:      Effort: Pulmonary effort is normal. No respiratory distress.      Breath sounds: Normal breath sounds. No wheezing.   Abdominal:      General: Bowel sounds are normal.      Palpations: Abdomen is soft.      Tenderness: There is no abdominal tenderness. There is no rebound.      Hernia: No hernia is present.   Musculoskeletal:         General: No tenderness. Normal range of motion.      Cervical back: Normal range of motion and neck supple.   Lymphadenopathy:      Cervical: No cervical adenopathy.   Skin:     General: Skin is warm and dry.      Findings: No erythema or rash.   Neurological:      Mental Status: He is alert and oriented to person, place, and time.   Psychiatric:         Behavior: Behavior normal.          Thought Content: Thought content normal.         Judgment: Judgment normal.       Significant Labs:    BMP:  Recent Labs   Lab 09/11/22  0522 09/12/22  0514 09/13/22  0401    138 139   K 4.0 3.3* 3.3*   * 110 107   CO2 16* 19* 23   BUN 21* 18 15   CREATININE 3.1* 2.4* 2.1*   CALCIUM 8.7 8.2* 8.4*       CBC:   Recent Labs   Lab 09/11/22 0522 09/12/22  0514 09/13/22  0401   WBC 12.70 9.44 10.76   HGB 10.5* 8.9* 9.8*   HCT 31.1* 25.6* 27.9*    300 342       Blood Culture: No results for input(s): LABBLOO in the last 168 hours.  Urine Culture: No results for input(s): LABURIN in the last 168 hours.    Significant Imaging:

## 2022-09-13 NOTE — PROGRESS NOTES
[facility] Medicine  Progress Note    Patient Name: Doe Woodall  MRN: [id number]  Patient Class: IP- Inpatient   Admission Date: 9/1/2022  Length of Stay: 12 days  Attending Physician: Hollie Johnson MD  Primary Care Provider: [facility] - Family Medicine        Subjective:     Principal Problem:Severe sepsis        HPI:  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  He reports acute onset of erythema, duration 3 days, has progressed and is now swollen and painful, no known exacerbating or alleviating factors, no attempted self treatment.  Denies fever, chills, cough, chest pain, SOB, dizziness, syncope, nausea, vomiting, diarrhea, abdominal pain, or dysuria.  In the ED, found to be tachycardic and tachypneic with leukocytosis.  CT abdomen pelvis and scrotal ultrasound revealed extensive cellulitis.  Procalcitonin 5.7.  Initial lactic acid 2.7.  Also with mild hyponatremia and mildly elevated liver enzymes.  Blood cultures obtained, IV fluid resuscitation and IV antibiotics initiated.      Overview/Hospital Course:  Mr Doe Woodall was admitted with severe sepsis due to right sided groin/perineal cellulitis with MARISSA. Started IVF and broad spectrum antibiotics. Blood cultures no growth. Urology consulted. Nephrology consulted for MARISSA. ID consulted for antibiotic management. Symptoms worsened. He developed abscess. Urology performed I&D on 9/9 and noted abscess cavity tracking up into the groin and down into the scrotum. Cultures were sent. He was transferred to ICU post operatively due to excessive venous oozing from the surgical site. He remained hemodynamically stable, no need for transfusion, and bleeding controlled. Stepped down to floor.    Operative cultures grew GBS. ID  recommended CTX transitioned to augmentin.  MARISSA did not resolved yet.  CC is groin pain.      Interval History: No events overnight. In pain due to dressing change.  CC is groin pain.    Review of Systems   Constitutional:  Negative for chills and fever.   Respiratory:  Negative for shortness of breath.    Cardiovascular:  Negative for chest pain.   Gastrointestinal:  Negative for abdominal pain, constipation, diarrhea, nausea and vomiting.   Genitourinary:  Negative for difficulty urinating.   Musculoskeletal:  Negative for arthralgias and myalgias.   Skin:  Positive for rash and wound.   Neurological:  Negative for weakness and numbness.   Psychiatric/Behavioral:  Negative for confusion.    Objective:     Vital Signs (Most Recent):  Temp: 98.4 °F (36.9 °C) (09/13/22 1044)  Pulse: 96 (09/13/22 1044)  Resp: 18 (09/13/22 1044)  BP: (!) 170/92 (09/13/22 1044)  SpO2: 96 % (09/13/22 1044) Vital Signs (24h Range):  Temp:  [97.2 °F (36.2 °C)-98.4 °F (36.9 °C)] 98.4 °F (36.9 °C)  Pulse:  [81-96] 96  Resp:  [16-18] 18  SpO2:  [89 %-99 %] 96 %  BP: (126-171)/(89-97) 170/92     Weight: 118.4 kg (261 lb 0.4 oz)  Body mass index is 42.13 kg/m².    Intake/Output Summary (Last 24 hours) at 9/13/2022 1122  Last data filed at 9/13/2022 0600  Gross per 24 hour   Intake 600 ml   Output --   Net 600 ml        Physical Exam  Vitals and nursing note reviewed.   Constitutional:       General: He is not in acute distress.     Appearance: He is obese. He is ill-appearing. He is not toxic-appearing or diaphoretic.   HENT:      Head: Normocephalic and atraumatic.      Nose: Nose normal.      Mouth/Throat:      Mouth: Mucous membranes are moist.   Cardiovascular:      Rate and Rhythm: Normal rate and regular rhythm.      Heart sounds: Normal heart sounds. No murmur heard.    No gallop.   Pulmonary:      Effort: Pulmonary effort is normal. No respiratory distress.      Breath sounds: Normal breath sounds. No wheezing or rales.      Comments:  Room air  Abdominal:      General: Bowel sounds are normal. There is no distension.      Palpations: Abdomen is soft.      Tenderness: There is no abdominal tenderness. There is no guarding.   Genitourinary:     Comments: R groin pressure dressing in place  Musculoskeletal:      Right lower leg: No edema.      Left lower leg: No edema.   Skin:     General: Skin is warm and dry.   Neurological:      Mental Status: He is alert and oriented to person, place, and time.       Significant Labs: All pertinent labs within the past 24 hours have been reviewed.    Significant Imaging: I have reviewed all pertinent imaging results/findings within the past 24 hours.    CC is groin pain.  Assessment/Plan:      * Severe sepsis  Severe sepsis with MARISSA due to groin/scrotal cellulitis. Appreciate urology involvement. Initially given vanc/zosyn, which were stopped after kidney failure and patient was switched to clindamycin monotherapy. He appeared to improve on this for multiple days w/ normalizing WBC count. On 9/8 w/ worsening exam and repeat US showing large inguinal abscess.   - Urology consulted. s/p I&D on 9/9.    - cultures growing GBS  - ID consulted- currently on ctx    Scrotal abscess        Macrocytic anemia  Suspect alcohol related. He did have surgical blood loss which is now controlled  - CBC in AM      Inguinal abscess  See sepsis      ATN (acute tubular necrosis)  Baseline Cr 0.7. ATN. .   On IVF   - CMP in AM    Cellulitis of groin  See sepsis    Chronic diastolic heart failure  No evidence of acute decompensation or fluid overload, echo earlier this year showed preserved EF with indeterminate LV diastolic function.   - continue to monitor volume status    Alcohol use disorder, severe, dependence  Counseled, reports stopping a week or two ago, continue MVI/thiamine/folate    Severe obesity (BMI >= 40)  Body mass index is 42.13 kg/m². Morbid obesity complicates all aspects of disease management from diagnostic  modalities to treatment. Weight loss encouraged and health benefits explained to patient.   - nursing had concerns overnight for sleep apnea--- needs outpatient sleep study     Elevated transaminase level  Consistent with baseline, likely due to heavy alcohol use.  - CMP in AM    Type 2 diabetes mellitus with hyperglycemia  A1c:   Lab Results   Component Value Date    HGBA1C 12.6 (H) 01/04/2022   Glucose is well controlled as inpatient     Meds: detemir 30 + SSI PRN to maintain goal 140-180  ADA diet, accuchecks, hypoglycemic protocol        VTE Risk Mitigation (From admission, onward)         Ordered     enoxaparin injection 30 mg  Daily         09/10/22 0823     IP VTE HIGH RISK PATIENT  Once         09/01/22 1506     Place sequential compression device  Until discontinued         09/01/22 1506                Discharge Planning   SUSAN:      Code Status: Full Code   Is the patient medically ready for discharge?:     Reason for patient still in hospital (select all that apply): Patient trending condition  Discharge Plan A: Home with family   Discharge Delays: None known at this time              Hollie Johnson MD  Department of Hospital Medicine   [facility] - Telemetry

## 2022-09-13 NOTE — NURSING
"Bedside shift report received from Johanna CASTILLO.  Patient complains of pain "5" out of 10 on the pain scale.  PRN pain medication on MAR.  Patient denies any further needs or wants at this time.  Call light within reach.  Will continue to monitor.    "

## 2022-09-13 NOTE — SUBJECTIVE & OBJECTIVE
Interval History: No events overnight. In pain due to dressing change.  CC is groin pain.    Review of Systems   Constitutional:  Negative for chills and fever.   Respiratory:  Negative for shortness of breath.    Cardiovascular:  Negative for chest pain.   Gastrointestinal:  Negative for abdominal pain, constipation, diarrhea, nausea and vomiting.   Genitourinary:  Negative for difficulty urinating.   Musculoskeletal:  Negative for arthralgias and myalgias.   Skin:  Positive for rash and wound.   Neurological:  Negative for weakness and numbness.   Psychiatric/Behavioral:  Negative for confusion.    Objective:     Vital Signs (Most Recent):  Temp: 98.4 °F (36.9 °C) (09/13/22 1044)  Pulse: 96 (09/13/22 1044)  Resp: 18 (09/13/22 1044)  BP: (!) 170/92 (09/13/22 1044)  SpO2: 96 % (09/13/22 1044) Vital Signs (24h Range):  Temp:  [97.2 °F (36.2 °C)-98.4 °F (36.9 °C)] 98.4 °F (36.9 °C)  Pulse:  [81-96] 96  Resp:  [16-18] 18  SpO2:  [89 %-99 %] 96 %  BP: (126-171)/(89-97) 170/92     Weight: 118.4 kg (261 lb 0.4 oz)  Body mass index is 42.13 kg/m².    Intake/Output Summary (Last 24 hours) at 9/13/2022 1122  Last data filed at 9/13/2022 0600  Gross per 24 hour   Intake 600 ml   Output --   Net 600 ml        Physical Exam  Vitals and nursing note reviewed.   Constitutional:       General: He is not in acute distress.     Appearance: He is obese. He is ill-appearing. He is not toxic-appearing or diaphoretic.   HENT:      Head: Normocephalic and atraumatic.      Nose: Nose normal.      Mouth/Throat:      Mouth: Mucous membranes are moist.   Cardiovascular:      Rate and Rhythm: Normal rate and regular rhythm.      Heart sounds: Normal heart sounds. No murmur heard.    No gallop.   Pulmonary:      Effort: Pulmonary effort is normal. No respiratory distress.      Breath sounds: Normal breath sounds. No wheezing or rales.      Comments: Room air  Abdominal:      General: Bowel sounds are normal. There is no distension.       Palpations: Abdomen is soft.      Tenderness: There is no abdominal tenderness. There is no guarding.   Genitourinary:     Comments: R groin pressure dressing in place  Musculoskeletal:      Right lower leg: No edema.      Left lower leg: No edema.   Skin:     General: Skin is warm and dry.   Neurological:      Mental Status: He is alert and oriented to person, place, and time.       Significant Labs: All pertinent labs within the past 24 hours have been reviewed.    Significant Imaging: I have reviewed all pertinent imaging results/findings within the past 24 hours.

## 2022-09-14 PROBLEM — N17.9 AKI (ACUTE KIDNEY INJURY): Status: ACTIVE | Noted: 2022-01-01

## 2022-09-14 NOTE — PROGRESS NOTES
[facility]  Progress Note    Patient Name: Doe Woodall  MRN: [id number]  Patient Class: IP- Inpatient   Admission Date: 9/1/2022  Length of Stay: 13 days  Attending Physician: Hollie Johnson MD  Primary Care Provider: [facility] - Family Medicine        Subjective:     Principal Problem:Severe sepsis        HPI:  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  He reports acute onset of erythema, duration 3 days, has progressed and is now swollen and painful, no known exacerbating or alleviating factors, no attempted self treatment.  Denies fever, chills, cough, chest pain, SOB, dizziness, syncope, nausea, vomiting, diarrhea, abdominal pain, or dysuria.  In the ED, found to be tachycardic and tachypneic with leukocytosis.  CT abdomen pelvis and scrotal ultrasound revealed extensive cellulitis.  Procalcitonin 5.7.  Initial lactic acid 2.7.  Also with mild hyponatremia and mildly elevated liver enzymes.  Blood cultures obtained, IV fluid resuscitation and IV antibiotics initiated.      Overview/Hospital Course:  Mr Doe Woodall was admitted with severe sepsis due to right sided groin/perineal cellulitis with MARISSA. Started IVF and broad spectrum antibiotics. Blood cultures no growth. Urology consulted. Nephrology consulted for MARISSA. ID consulted for antibiotic management. Symptoms worsened. He developed abscess. Urology performed I&D on 9/9 and noted abscess cavity tracking up into the groin and down into the scrotum. Cultures were sent. He was transferred to ICU post operatively due to excessive venous oozing from the surgical site. He remained hemodynamically stable, no need for transfusion, and bleeding controlled. Stepped down to floor.    Operative cultures grew GBS. ID  recommended CTX transitioned to augmentin.  MARISSA did not resolved yet.but stable.  CC is groin pain.      Interval History: No events overnight. In pain due to dressing change.  CC is groin pain.    Review of Systems   Constitutional:  Negative for chills and fever.   Respiratory:  Negative for shortness of breath.    Cardiovascular:  Negative for chest pain.   Gastrointestinal:  Negative for abdominal pain, constipation, diarrhea, nausea and vomiting.   Genitourinary:  Negative for difficulty urinating.   Musculoskeletal:  Negative for arthralgias and myalgias.   Skin:  Positive for rash and wound.   Neurological:  Negative for weakness and numbness.   Psychiatric/Behavioral:  Negative for confusion.    Objective:     Vital Signs (Most Recent):  Temp: 97.9 °F (36.6 °C) (09/14/22 0743)  Pulse: 80 (09/14/22 0750)  Resp: 20 (09/14/22 0750)  BP: (!) 156/93 (09/14/22 0743)  SpO2: 97 % (09/14/22 0750) Vital Signs (24h Range):  Temp:  [97.9 °F (36.6 °C)-98.6 °F (37 °C)] 97.9 °F (36.6 °C)  Pulse:  [] 80  Resp:  [16-20] 20  SpO2:  [92 %-98 %] 97 %  BP: (121-170)/(86-97) 156/93     Weight: 118.4 kg (261 lb 0.4 oz)  Body mass index is 42.13 kg/m².    Intake/Output Summary (Last 24 hours) at 9/14/2022 1011  Last data filed at 9/14/2022 0914  Gross per 24 hour   Intake 1200 ml   Output --   Net 1200 ml        Physical Exam  Vitals and nursing note reviewed.   Constitutional:       General: He is not in acute distress.     Appearance: He is obese. He is ill-appearing. He is not toxic-appearing or diaphoretic.   HENT:      Head: Normocephalic and atraumatic.      Nose: Nose normal.      Mouth/Throat:      Mouth: Mucous membranes are moist.   Cardiovascular:      Rate and Rhythm: Normal rate and regular rhythm.      Heart sounds: Normal heart sounds. No murmur heard.    No gallop.   Pulmonary:      Effort: Pulmonary effort is normal. No respiratory distress.      Breath sounds: Normal breath sounds. No wheezing or rales.       Comments: Room air  Abdominal:      General: Bowel sounds are normal. There is no distension.      Palpations: Abdomen is soft.      Tenderness: There is no abdominal tenderness. There is no guarding.   Genitourinary:     Comments: R groin pressure dressing in place  Musculoskeletal:      Right lower leg: No edema.      Left lower leg: No edema.   Skin:     General: Skin is warm and dry.   Neurological:      Mental Status: He is alert and oriented to person, place, and time.       Significant Labs: All pertinent labs within the past 24 hours have been reviewed.    Significant Imaging: I have reviewed all pertinent imaging results/findings within the past 24 hours.    CC is groin pain.  Assessment/Plan:      * Severe sepsis  Severe sepsis with MARISSA due to groin/scrotal cellulitis. Appreciate urology involvement. Initially given vanc/zosyn, which were stopped after kidney failure and patient was switched to clindamycin monotherapy. He appeared to improve on this for multiple days w/ normalizing WBC count. On 9/8 w/ worsening exam and repeat US showing large inguinal abscess.   - Urology consulted. s/p I&D on 9/9.    - cultures growing GBS  - ID consulted- currently on ctx    MARISSA (acute kidney injury)  Patient with acute kidney injury likely due to acute tubular necrosis MARISSA is currently stable. Labs reviewed- Renal function/electrolytes with Estimated Creatinine Clearance: 60.6 mL/min (A) (based on SCr of 2.1 mg/dL (H)). according to latest data. Monitor urine output and serial BMP and adjust therapy as needed. Avoid nephrotoxins and renally dose meds for GFR listed above.         Scrotal abscess        Macrocytic anemia  Suspect alcohol related. He did have surgical blood loss which is now controlled  - CBC in AM      Inguinal abscess  See sepsis      ATN (acute tubular necrosis)  Baseline Cr 0.7. ATN. .   On IVF   - CMP in AM,  MARISSA did not resolved,but stable.    Cellulitis of groin  See sepsis    Chronic diastolic  heart failure  No evidence of acute decompensation or fluid overload, echo earlier this year showed preserved EF with indeterminate LV diastolic function.   - continue to monitor volume status    Alcohol use disorder, severe, dependence  Counseled, reports stopping a week or two ago, continue MVI/thiamine/folate    Severe obesity (BMI >= 40)  Body mass index is 42.13 kg/m². Morbid obesity complicates all aspects of disease management from diagnostic modalities to treatment. Weight loss encouraged and health benefits explained to patient.   - nursing had concerns overnight for sleep apnea--- needs outpatient sleep study     Elevated transaminase level  Consistent with baseline, likely due to heavy alcohol use.  - CMP in AM    Type 2 diabetes mellitus with hyperglycemia  A1c:   Lab Results   Component Value Date    HGBA1C 12.6 (H) 01/04/2022   Glucose is well controlled as inpatient     Meds: detemir 30 + SSI PRN to maintain goal 140-180  ADA diet, accuchecks, hypoglycemic protocol        VTE Risk Mitigation (From admission, onward)         Ordered     enoxaparin injection 30 mg  Daily         09/10/22 0823     IP VTE HIGH RISK PATIENT  Once         09/01/22 1506     Place sequential compression device  Until discontinued         09/01/22 1506                Discharge Planning   SUSAN:      Code Status: Full Code   Is the patient medically ready for discharge?:     Reason for patient still in hospital (select all that apply): Patient trending condition  Discharge Plan A: Home with family   Discharge Delays: None known at this time              Hollie Johnson MD  Department of Hospital Medicine   [facility]

## 2022-09-14 NOTE — ASSESSMENT & PLAN NOTE
Patient with acute kidney injury likely due to acute tubular necrosis MRAISSA is currently stable. Labs reviewed- Renal function/electrolytes with Estimated Creatinine Clearance: 60.6 mL/min (A) (based on SCr of 2.1 mg/dL (H)). according to latest data. Monitor urine output and serial BMP and adjust therapy as needed. Avoid nephrotoxins and renally dose meds for GFR listed above.

## 2022-09-14 NOTE — SUBJECTIVE & OBJECTIVE
Interval History: during AM rounds at 9am patient reports his dressing fell out at 3am, he was awaiting dressing change, RN aware    Review of Systems   Constitutional:  Negative for activity change, appetite change, chills, diaphoresis and fever.   HENT:  Negative for congestion and rhinorrhea.    Eyes:  Negative for visual disturbance.   Respiratory:  Negative for choking and shortness of breath.    Gastrointestinal:  Negative for abdominal distention, abdominal pain, constipation, diarrhea, nausea and vomiting.   Genitourinary:  Positive for scrotal swelling and testicular pain. Negative for difficulty urinating, dysuria, flank pain, hematuria and urgency.   Skin:  Negative for color change, pallor and wound.   Neurological:  Negative for dizziness and syncope.   Psychiatric/Behavioral:  Negative for confusion and hallucinations.    Objective:     Temp:  [97.9 °F (36.6 °C)-98.6 °F (37 °C)] 98.1 °F (36.7 °C)  Pulse:  [] 93  Resp:  [16-20] 19  SpO2:  [92 %-98 %] 93 %  BP: (121-159)/(86-97) 139/87     Body mass index is 42.13 kg/m².    Date 09/14/22 0700 - 09/15/22 0659   Shift 3598-9978 1972-3413 4483-8209 24 Hour Total   INTAKE   P.O. 480   480   Shift Total(mL/kg) 480(4.1)   480(4.1)   OUTPUT   Shift Total(mL/kg)       Weight (kg) 118.4 118.4 118.4 118.4     Bladder Scan Volume (mL): 573 mL (09/09/22 2000)    Drains       None                   Physical Exam  Nursing note reviewed.   Constitutional:       General: He is not in acute distress.     Appearance: He is well-developed. He is not ill-appearing, toxic-appearing or diaphoretic.   HENT:      Head: Normocephalic and atraumatic.      Mouth/Throat:      Mouth: Mucous membranes are moist.   Eyes:      Conjunctiva/sclera: Conjunctivae normal.   Cardiovascular:      Rate and Rhythm: Normal rate.   Pulmonary:      Effort: Pulmonary effort is normal. No respiratory distress.   Abdominal:      General: Abdomen is flat. There is no distension.      Palpations:  Abdomen is soft. There is no mass.      Tenderness: There is no abdominal tenderness. There is no guarding.   Genitourinary:     Testes:         Right: Tenderness and swelling present. Mass not present.         Left: Tenderness and swelling present. Mass not present.          Comments: Packing not in place, no purulence, wound edges viable, no areas of necrosis  Musculoskeletal:         General: No swelling or deformity.      Cervical back: Neck supple.   Skin:     General: Skin is warm.      Findings: No rash.   Neurological:      Mental Status: He is alert and oriented to person, place, and time.      Gait: Gait normal.   Psychiatric:         Mood and Affect: Mood normal.         Thought Content: Thought content normal.         Judgment: Judgment normal.       Significant Labs:    BMP:  Recent Labs   Lab 09/12/22 0514 09/13/22  0401 09/14/22  0423    139 138   K 3.3* 3.3* 3.6    107 105   CO2 19* 23 23   BUN 18 15 13   CREATININE 2.4* 2.1* 2.1*   CALCIUM 8.2* 8.4* 8.5*       CBC:   Recent Labs   Lab 09/12/22 0514 09/13/22  0401 09/14/22  0423   WBC 9.44 10.76 11.12   HGB 8.9* 9.8* 10.2*   HCT 25.6* 27.9* 29.9*    342 371

## 2022-09-14 NOTE — PLAN OF CARE
Recommendations    1) Continue Diabetic Renal Diet as tolerated, encourage PO intake   - Recommend advancing to 2000 kcal to better meet pt needs  2) RD addition of Suplena 1.8 BID to assist in meeting needs, promote wound healing.  3) Continue CIWA-Ar Supplementation protocol per ETOH abuse hx and recent reported cessation.   4) Monitor Nutrition related Labs    Goals:   1) Patient to consistently meet > 75% EEN/EPN via PO/ONS intake  2) Monitored labs to trend toward target ranges    Nutrition Goal Status: new  Communication of RD Recs:  (POC)

## 2022-09-14 NOTE — PLAN OF CARE
Problem: Adult Inpatient Plan of Care  Goal: Plan of Care Review  Outcome: Ongoing, Progressing  Goal: Optimal Comfort and Wellbeing  Outcome: Ongoing, Progressing  Intervention: Monitor Pain and Promote Comfort  Flowsheets (Taken 9/14/2022 1812)  Pain Management Interventions:   care clustered   medication offered   pillow support provided   position adjusted   quiet environment facilitated

## 2022-09-14 NOTE — ASSESSMENT & PLAN NOTE
S/p I&D of scrotal/inguinal abscess 9/9/2022 (Dr Aguilar)   RN packing changes daily. RN aware this AM to reapply dressing  Abx per primary

## 2022-09-14 NOTE — NURSING
Report received from night nurse ANNA Otero. Visualized patient and assessed patient's overall condition and appearance. No acute distress noted. Will continue to monitor

## 2022-09-14 NOTE — PROGRESS NOTES
[facility]  [facility]rology  Progress Note    Patient Name: Doe Woodall  MRN: [id number]  Admission Date: 9/1/2022  Hospital Length of Stay: 13 days  Code Status: Full Code   Attending Provider: Hollie Johnson MD   Primary Care Physician: [facility] - Family Medicine    Subjective:     HPI:  31 yo man presented to the hospital with progressive scrotal pain which started off as a boil on his right scrotum. Patient with hx of uncontrolled DM. Patient's imaging on arrival did not demonstrate presence of abscess. Urology consulted for further evaluation. Denies dysuria, fevers, chills, chest pain, shortness of breath. Had breakfast this morning.       Interval History: during AM rounds at 9am patient reports his dressing fell out at 3am, he was awaiting dressing change, RN aware    Review of Systems   Constitutional:  Negative for activity change, appetite change, chills, diaphoresis and fever.   HENT:  Negative for congestion and rhinorrhea.    Eyes:  Negative for visual disturbance.   Respiratory:  Negative for choking and shortness of breath.    Gastrointestinal:  Negative for abdominal distention, abdominal pain, constipation, diarrhea, nausea and vomiting.   Genitourinary:  Positive for scrotal swelling and testicular pain. Negative for difficulty urinating, dysuria, flank pain, hematuria and urgency.   Skin:  Negative for color change, pallor and wound.   Neurological:  Negative for dizziness and syncope.   Psychiatric/Behavioral:  Negative for confusion and hallucinations.    Objective:     Temp:  [97.9 °F (36.6 °C)-98.6 °F (37 °C)] 98.1 °F (36.7 °C)  Pulse:  [] 93  Resp:  [16-20] 19  SpO2:  [92 %-98 %] 93 %  BP: (121-159)/(86-97) 139/87     Body mass index is 42.13 kg/m².    Date 09/14/22 0700 - 09/15/22 0659   Shift 0103-5825 9566-2491 0824-3485 24 Hour Total   INTAKE   P.O. 480   480   Shift Total(mL/kg) 480(4.1)   480(4.1)   OUTPUT   Shift Total(mL/kg)       Weight (kg) 118.4  118.4 118.4 118.4     Bladder Scan Volume (mL): 573 mL (09/09/22 2000)    Drains       None                   Physical Exam  Nursing note reviewed.   Constitutional:       General: He is not in acute distress.     Appearance: He is well-developed. He is not ill-appearing, toxic-appearing or diaphoretic.   HENT:      Head: Normocephalic and atraumatic.      Mouth/Throat:      Mouth: Mucous membranes are moist.   Eyes:      Conjunctiva/sclera: Conjunctivae normal.   Cardiovascular:      Rate and Rhythm: Normal rate.   Pulmonary:      Effort: Pulmonary effort is normal. No respiratory distress.   Abdominal:      General: Abdomen is flat. There is no distension.      Palpations: Abdomen is soft. There is no mass.      Tenderness: There is no abdominal tenderness. There is no guarding.   Genitourinary:     Testes:         Right: Tenderness and swelling present. Mass not present.         Left: Tenderness and swelling present. Mass not present.          Comments: Packing not in place, no purulence, wound edges viable, no areas of necrosis  Musculoskeletal:         General: No swelling or deformity.      Cervical back: Neck supple.   Skin:     General: Skin is warm.      Findings: No rash.   Neurological:      Mental Status: He is alert and oriented to person, place, and time.      Gait: Gait normal.   Psychiatric:         Mood and Affect: Mood normal.         Thought Content: Thought content normal.         Judgment: Judgment normal.       Significant Labs:    BMP:  Recent Labs   Lab 09/12/22  0514 09/13/22  0401 09/14/22  0423    139 138   K 3.3* 3.3* 3.6    107 105   CO2 19* 23 23   BUN 18 15 13   CREATININE 2.4* 2.1* 2.1*   CALCIUM 8.2* 8.4* 8.5*       CBC:   Recent Labs   Lab 09/12/22  0514 09/13/22  0401 09/14/22  0423   WBC 9.44 10.76 11.12   HGB 8.9* 9.8* 10.2*   HCT 25.6* 27.9* 29.9*    342 371                   Assessment/Plan:     Inguinal abscess  S/p I&D of R hemiscrotum  Will need home wound  care instructions regarding dressing  Abx per ID    ATN (acute tubular necrosis)  -- Uncertain etiology  -- No evidence of urinary retention  -- Further management per primary  -- Nephrology following    Cellulitis of groin  S/p I&D of scrotal/inguinal abscess 9/9/2022 (Dr Aguilar)   RN packing changes daily. RN aware this AM to reapply dressing  Abx per primary        VTE Risk Mitigation (From admission, onward)         Ordered     enoxaparin injection 30 mg  Daily         09/10/22 0823     IP VTE HIGH RISK PATIENT  Once         09/01/22 1506     Place sequential compression device  Until discontinued         09/01/22 1506                Amber Contreras MD  Urology  [facility] [place] - Telemetry

## 2022-09-14 NOTE — SUBJECTIVE & OBJECTIVE
Interval History: No events overnight. In pain due to dressing change.  CC is groin pain.    Review of Systems   Constitutional:  Negative for chills and fever.   Respiratory:  Negative for shortness of breath.    Cardiovascular:  Negative for chest pain.   Gastrointestinal:  Negative for abdominal pain, constipation, diarrhea, nausea and vomiting.   Genitourinary:  Negative for difficulty urinating.   Musculoskeletal:  Negative for arthralgias and myalgias.   Skin:  Positive for rash and wound.   Neurological:  Negative for weakness and numbness.   Psychiatric/Behavioral:  Negative for confusion.    Objective:     Vital Signs (Most Recent):  Temp: 97.9 °F (36.6 °C) (09/14/22 0743)  Pulse: 80 (09/14/22 0750)  Resp: 20 (09/14/22 0750)  BP: (!) 156/93 (09/14/22 0743)  SpO2: 97 % (09/14/22 0750) Vital Signs (24h Range):  Temp:  [97.9 °F (36.6 °C)-98.6 °F (37 °C)] 97.9 °F (36.6 °C)  Pulse:  [] 80  Resp:  [16-20] 20  SpO2:  [92 %-98 %] 97 %  BP: (121-170)/(86-97) 156/93     Weight: 118.4 kg (261 lb 0.4 oz)  Body mass index is 42.13 kg/m².    Intake/Output Summary (Last 24 hours) at 9/14/2022 1011  Last data filed at 9/14/2022 0914  Gross per 24 hour   Intake 1200 ml   Output --   Net 1200 ml        Physical Exam  Vitals and nursing note reviewed.   Constitutional:       General: He is not in acute distress.     Appearance: He is obese. He is ill-appearing. He is not toxic-appearing or diaphoretic.   HENT:      Head: Normocephalic and atraumatic.      Nose: Nose normal.      Mouth/Throat:      Mouth: Mucous membranes are moist.   Cardiovascular:      Rate and Rhythm: Normal rate and regular rhythm.      Heart sounds: Normal heart sounds. No murmur heard.    No gallop.   Pulmonary:      Effort: Pulmonary effort is normal. No respiratory distress.      Breath sounds: Normal breath sounds. No wheezing or rales.      Comments: Room air  Abdominal:      General: Bowel sounds are normal. There is no distension.       Palpations: Abdomen is soft.      Tenderness: There is no abdominal tenderness. There is no guarding.   Genitourinary:     Comments: R groin pressure dressing in place  Musculoskeletal:      Right lower leg: No edema.      Left lower leg: No edema.   Skin:     General: Skin is warm and dry.   Neurological:      Mental Status: He is alert and oriented to person, place, and time.       Significant Labs: All pertinent labs within the past 24 hours have been reviewed.    Significant Imaging: I have reviewed all pertinent imaging results/findings within the past 24 hours.

## 2022-09-15 NOTE — PROGRESS NOTES
[facility]  Urology  Progress Note    Patient Name: Doe Woodall  MRN: [id number]  Admission Date: 9/1/2022  Hospital Length of Stay: 14 days  Code Status: Full Code   Attending Provider: Hollie Johnson MD   Primary Care Physician: [facility] - Family Medicine    Subjective:     HPI:  31 yo man presented to the hospital with progressive scrotal pain which started off as a boil on his right scrotum. Patient with hx of uncontrolled DM. Patient's imaging on arrival did not demonstrate presence of abscess. Urology consulted for further evaluation. Denies dysuria, fevers, chills, chest pain, shortness of breath. Had breakfast this morning.       Interval History: pain improving. Dressing/packing changed this AM. He has not participated in wound care.     Review of Systems   Constitutional:  Negative for activity change, appetite change, chills, diaphoresis and fever.   HENT:  Negative for congestion and rhinorrhea.    Eyes:  Negative for visual disturbance.   Respiratory:  Negative for choking and shortness of breath.    Gastrointestinal:  Negative for abdominal distention, abdominal pain, constipation, diarrhea, nausea and vomiting.   Genitourinary:  Positive for scrotal swelling and testicular pain. Negative for difficulty urinating, dysuria, flank pain, hematuria and urgency.   Skin:  Negative for color change, pallor and wound.   Neurological:  Negative for dizziness and syncope.   Psychiatric/Behavioral:  Negative for confusion and hallucinations.    Objective:     Temp:  [97.7 °F (36.5 °C)-98.4 °F (36.9 °C)] 98.4 °F (36.9 °C)  Pulse:  [] 81  Resp:  [16-20] 16  SpO2:  [93 %-99 %] 93 %  BP: (131-161)/(72-87) 161/86     Body mass index is 42.13 kg/m².    Date 09/15/22 0700 - 09/16/22 0659   Shift 9032-5883 3242-6552 9251-5604 24 Hour Total   INTAKE   P.O. 600   600   Shift Total(mL/kg) 600(5.1)   600(5.1)   OUTPUT   Shift Total(mL/kg)       Weight (kg) 118.4 118.4 118.4 118.4      Bladder Scan Volume (mL): 573 mL (09/09/22 2000)    Drains       None                   Physical Exam  Nursing note reviewed.   Constitutional:       General: He is not in acute distress.     Appearance: He is well-developed. He is not ill-appearing, toxic-appearing or diaphoretic.   HENT:      Head: Normocephalic and atraumatic.      Mouth/Throat:      Mouth: Mucous membranes are moist.   Eyes:      Conjunctiva/sclera: Conjunctivae normal.   Cardiovascular:      Rate and Rhythm: Normal rate.   Pulmonary:      Effort: Pulmonary effort is normal. No respiratory distress.   Abdominal:      General: Abdomen is flat. There is no distension.      Palpations: Abdomen is soft. There is no mass.      Tenderness: There is no abdominal tenderness. There is no guarding.   Genitourinary:     Testes:         Right: Tenderness and swelling present. Mass not present.         Left: Tenderness and swelling present. Mass not present.          Comments: Induration much improved around thigh and inguinal area. Fresh packing in place in two incisions. Skin edges clean and viable. No fluctuance. Much less TTP.   Musculoskeletal:         General: No swelling or deformity.      Cervical back: Neck supple.   Skin:     General: Skin is warm.      Findings: No rash.   Neurological:      Mental Status: He is alert and oriented to person, place, and time.      Gait: Gait normal.   Psychiatric:         Mood and Affect: Mood normal.         Thought Content: Thought content normal.         Judgment: Judgment normal.       Significant Labs:    BMP:  Recent Labs   Lab 09/13/22  0401 09/14/22  0423 09/15/22  0526    138 141   K 3.3* 3.6 3.1*    105 103   CO2 23 23 28   BUN 15 13 11   CREATININE 2.1* 2.1* 1.5*   CALCIUM 8.4* 8.5* 8.4*       CBC:   Recent Labs   Lab 09/13/22  0401 09/14/22  0423 09/15/22  0526   WBC 10.76 11.12 11.29   HGB 9.8* 10.2* 10.0*   HCT 27.9* 29.9* 28.2*    371 419       Blood Culture: No results for  input(s): LABBLOO in the last 168 hours.  Urine Culture: No results for input(s): LABURIN in the last 168 hours.  Urine Studies: No results for input(s): COLORU, APPEARANCEUA, PHUR, SPECGRAV, PROTEINUA, GLUCUA, KETONESU, BILIRUBINUA, OCCULTUA, NITRITE, UROBILINOGEN, LEUKOCYTESUR, RBCUA, WBCUA, BACTERIA, SQUAMEPITHEL, HYALINECASTS in the last 168 hours.    Invalid input(s): WRIGHTSUR    Significant Imaging:  All pertinent imaging results/findings from the past 24 hours have been reviewed.                    Assessment/Plan:     Inguinal abscess  S/p I&D of R hemiscrotum  Will need home wound care instructions regarding dressing - HHRN vs pt directed wound care. Needs teaching.   Abx per ID    Once wound care needs for home addressed, he is okay for d/c home from  standpoint.   F/u in 1-2 weeks after discharge for wound check    ATN (acute tubular necrosis)  -- Uncertain etiology  -- No evidence of urinary retention  -- Further management per primary  -- Nephrology following  -- Cr improving    Cellulitis of groin  S/p I&D of scrotal/inguinal abscess 9/9/2022 (Dr Aguilar)  RN packing changes daily.  Abx per primary  Wound care teaching        VTE Risk Mitigation (From admission, onward)         Ordered     enoxaparin injection 30 mg  Daily         09/10/22 0823     IP VTE HIGH RISK PATIENT  Once         09/01/22 1506     Place sequential compression device  Until discontinued         09/01/22 1506                Kimmy Arriaga MD  Urology  [facility] - Telemetry

## 2022-09-15 NOTE — ASSESSMENT & PLAN NOTE
S/p I&D of R hemiscrotum  Will need home wound care instructions regarding dressing - HHRN vs pt directed wound care. Needs teaching.   Abx per ID    Once wound care needs for home addressed, he is okay for d/c home from  standpoint.   F/u in 1-2 weeks after discharge for wound check

## 2022-09-15 NOTE — SUBJECTIVE & OBJECTIVE
Interval History: pain improving. Dressing/packing changed this AM. He has not participated in wound care.     Review of Systems   Constitutional:  Negative for activity change, appetite change, chills, diaphoresis and fever.   HENT:  Negative for congestion and rhinorrhea.    Eyes:  Negative for visual disturbance.   Respiratory:  Negative for choking and shortness of breath.    Gastrointestinal:  Negative for abdominal distention, abdominal pain, constipation, diarrhea, nausea and vomiting.   Genitourinary:  Positive for scrotal swelling and testicular pain. Negative for difficulty urinating, dysuria, flank pain, hematuria and urgency.   Skin:  Negative for color change, pallor and wound.   Neurological:  Negative for dizziness and syncope.   Psychiatric/Behavioral:  Negative for confusion and hallucinations.    Objective:     Temp:  [97.7 °F (36.5 °C)-98.4 °F (36.9 °C)] 98.4 °F (36.9 °C)  Pulse:  [] 81  Resp:  [16-20] 16  SpO2:  [93 %-99 %] 93 %  BP: (131-161)/(72-87) 161/86     Body mass index is 42.13 kg/m².    Date 09/15/22 0700 - 09/16/22 0659   Shift 1141-2089 1185-3470 0320-0634 24 Hour Total   INTAKE   P.O. 600   600   Shift Total(mL/kg) 600(5.1)   600(5.1)   OUTPUT   Shift Total(mL/kg)       Weight (kg) 118.4 118.4 118.4 118.4     Bladder Scan Volume (mL): 573 mL (09/09/22 2000)    Drains       None                   Physical Exam  Nursing note reviewed.   Constitutional:       General: He is not in acute distress.     Appearance: He is well-developed. He is not ill-appearing, toxic-appearing or diaphoretic.   HENT:      Head: Normocephalic and atraumatic.      Mouth/Throat:      Mouth: Mucous membranes are moist.   Eyes:      Conjunctiva/sclera: Conjunctivae normal.   Cardiovascular:      Rate and Rhythm: Normal rate.   Pulmonary:      Effort: Pulmonary effort is normal. No respiratory distress.   Abdominal:      General: Abdomen is flat. There is no distension.      Palpations: Abdomen is soft.  There is no mass.      Tenderness: There is no abdominal tenderness. There is no guarding.   Genitourinary:     Testes:         Right: Tenderness and swelling present. Mass not present.         Left: Tenderness and swelling present. Mass not present.          Comments: Induration much improved around thigh and inguinal area. Fresh packing in place in two incisions. Skin edges clean and viable. No fluctuance. Much less TTP.   Musculoskeletal:         General: No swelling or deformity.      Cervical back: Neck supple.   Skin:     General: Skin is warm.      Findings: No rash.   Neurological:      Mental Status: He is alert and oriented to person, place, and time.      Gait: Gait normal.   Psychiatric:         Mood and Affect: Mood normal.         Thought Content: Thought content normal.         Judgment: Judgment normal.       Significant Labs:    BMP:  Recent Labs   Lab 09/13/22  0401 09/14/22  0423 09/15/22  0526    138 141   K 3.3* 3.6 3.1*    105 103   CO2 23 23 28   BUN 15 13 11   CREATININE 2.1* 2.1* 1.5*   CALCIUM 8.4* 8.5* 8.4*       CBC:   Recent Labs   Lab 09/13/22  0401 09/14/22  0423 09/15/22  0526   WBC 10.76 11.12 11.29   HGB 9.8* 10.2* 10.0*   HCT 27.9* 29.9* 28.2*    371 419       Blood Culture: No results for input(s): LABBLOO in the last 168 hours.  Urine Culture: No results for input(s): LABURIN in the last 168 hours.  Urine Studies: No results for input(s): COLORU, APPEARANCEUA, PHUR, SPECGRAV, PROTEINUA, GLUCUA, KETONESU, BILIRUBINUA, OCCULTUA, NITRITE, UROBILINOGEN, LEUKOCYTESUR, RBCUA, WBCUA, BACTERIA, SQUAMEPITHEL, HYALINECASTS in the last 168 hours.    Invalid input(s): WRIGHTSUR    Significant Imaging:  All pertinent imaging results/findings from the past 24 hours have been reviewed.

## 2022-09-15 NOTE — NURSING
Bedside report given to night nurse ANNA Ng. Walking rounds completed. Visualized and assessed patient. NAD noted. Safety precautions maintained and call light within reach.    Chart check completed.

## 2022-09-15 NOTE — ASSESSMENT & PLAN NOTE
S/p I&D of scrotal/inguinal abscess 9/9/2022 (Dr Aguilar)  RN packing changes daily.  Abx per primary  Wound care teaching

## 2022-09-15 NOTE — PLAN OF CARE
Problem: Adult Inpatient Plan of Care  Goal: Plan of Care Review  Outcome: Ongoing, Progressing     Problem: Bariatric Environmental Safety  Goal: Safety Maintained with Care  Outcome: Ongoing, Progressing     Problem: Infection  Goal: Absence of Infection Signs and Symptoms  Outcome: Ongoing, Progressing     Problem: Diabetes Comorbidity  Goal: Blood Glucose Level Within Targeted Range  Outcome: Ongoing, Progressing  Intervention: Monitor and Manage Glycemia  Flowsheets (Taken 9/15/2022 5219)  Glycemic Management: supplemental insulin given

## 2022-09-15 NOTE — ASSESSMENT & PLAN NOTE
Patient with acute kidney injury likely due to acute tubular necrosis MARISSA is currently stable. Labs reviewed- Renal function/electrolytes with Estimated Creatinine Clearance: 84.8 mL/min (A) (based on SCr of 1.5 mg/dL (H)). according to latest data. Monitor urine output and serial BMP and adjust therapy as needed. Avoid nephrotoxins and renally dose meds for GFR listed above.     MARISSA is much improved today.

## 2022-09-15 NOTE — PROGRESS NOTES
[facility] Medicine  Progress Note    Patient Name: Doe Woodall  MRN: [id number]  Patient Class: IP- Inpatient   Admission Date: 9/1/2022  Length of Stay: 14 days  Attending Physician: Hollie Johnson MD  Primary Care Provider: [facility] - Family Medicine        Subjective:     Principal Problem:Severe sepsis        HPI:  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  He reports acute onset of erythema, duration 3 days, has progressed and is now swollen and painful, no known exacerbating or alleviating factors, no attempted self treatment.  Denies fever, chills, cough, chest pain, SOB, dizziness, syncope, nausea, vomiting, diarrhea, abdominal pain, or dysuria.  In the ED, found to be tachycardic and tachypneic with leukocytosis.  CT abdomen pelvis and scrotal ultrasound revealed extensive cellulitis.  Procalcitonin 5.7.  Initial lactic acid 2.7.  Also with mild hyponatremia and mildly elevated liver enzymes.  Blood cultures obtained, IV fluid resuscitation and IV antibiotics initiated.      Overview/Hospital Course:  Mr Doe Woodall was admitted with severe sepsis due to right sided groin/perineal cellulitis with MARISSA. Started IVF and broad spectrum antibiotics. Blood cultures no growth. Urology consulted. Nephrology consulted for MARISSA. ID consulted for antibiotic management. Symptoms worsened. He developed abscess. Urology performed I&D on 9/9 and noted abscess cavity tracking up into the groin and down into the scrotum. Cultures were sent. He was transferred to ICU post operatively due to excessive venous oozing from the surgical site. He remained hemodynamically stable, no need for transfusion, and bleeding controlled. Stepped down to floor.    Operative cultures grew GBS. ID  recommended CTX transitioned to augmentin.  MARISSA is much improved today.  CC is groin pain.      Interval History: No events overnight. In pain due to dressing change.  CC is groin pain.    Review of Systems   Constitutional:  Negative for chills and fever.   Respiratory:  Negative for shortness of breath.    Cardiovascular:  Negative for chest pain.   Gastrointestinal:  Negative for abdominal pain, constipation, diarrhea, nausea and vomiting.   Genitourinary:  Negative for difficulty urinating.   Musculoskeletal:  Negative for arthralgias and myalgias.   Skin:  Positive for rash and wound.   Neurological:  Negative for weakness and numbness.   Psychiatric/Behavioral:  Negative for confusion.    Objective:     Vital Signs (Most Recent):  Temp: 98.4 °F (36.9 °C) (09/15/22 0741)  Pulse: 81 (09/15/22 0741)  Resp: 16 (09/15/22 0854)  BP: (!) 161/86 (09/15/22 0741)  SpO2: (!) 93 % (09/15/22 0741) Vital Signs (24h Range):  Temp:  [97.7 °F (36.5 °C)-98.4 °F (36.9 °C)] 98.4 °F (36.9 °C)  Pulse:  [] 81  Resp:  [16-20] 16  SpO2:  [93 %-99 %] 93 %  BP: (131-161)/(72-87) 161/86     Weight: 118.4 kg (261 lb 0.4 oz)  Body mass index is 42.13 kg/m².    Intake/Output Summary (Last 24 hours) at 9/15/2022 1017  Last data filed at 9/15/2022 0844  Gross per 24 hour   Intake 960 ml   Output --   Net 960 ml        Physical Exam  Vitals and nursing note reviewed.   Constitutional:       General: He is not in acute distress.     Appearance: He is obese. He is ill-appearing. He is not toxic-appearing or diaphoretic.   HENT:      Head: Normocephalic and atraumatic.      Nose: Nose normal.      Mouth/Throat:      Mouth: Mucous membranes are moist.   Cardiovascular:      Rate and Rhythm: Normal rate and regular rhythm.      Heart sounds: Normal heart sounds. No murmur heard.    No gallop.   Pulmonary:      Effort: Pulmonary effort is normal. No respiratory distress.      Breath sounds: Normal breath sounds. No wheezing or rales.       Comments: Room air  Abdominal:      General: Bowel sounds are normal. There is no distension.      Palpations: Abdomen is soft.      Tenderness: There is no abdominal tenderness. There is no guarding.   Genitourinary:     Comments: R groin pressure dressing in place  Musculoskeletal:      Right lower leg: No edema.      Left lower leg: No edema.   Skin:     General: Skin is warm and dry.   Neurological:      Mental Status: He is alert and oriented to person, place, and time.       Significant Labs: All pertinent labs within the past 24 hours have been reviewed.    Significant Imaging: I have reviewed all pertinent imaging results/findings within the past 24 hours.    CC is groin pain.  Assessment/Plan:      * Severe sepsis  Severe sepsis with MARISSA due to groin/scrotal cellulitis. Appreciate urology involvement. Initially given vanc/zosyn, which were stopped after kidney failure and patient was switched to clindamycin monotherapy. He appeared to improve on this for multiple days w/ normalizing WBC count. On 9/8 w/ worsening exam and repeat US showing large inguinal abscess.   - Urology consulted. s/p I&D on 9/9.    - cultures growing GBS  - ID consulted- currently on ctx    MARISSA (acute kidney injury)  Patient with acute kidney injury likely due to acute tubular necrosis MARISSA is currently stable. Labs reviewed- Renal function/electrolytes with Estimated Creatinine Clearance: 84.8 mL/min (A) (based on SCr of 1.5 mg/dL (H)). according to latest data. Monitor urine output and serial BMP and adjust therapy as needed. Avoid nephrotoxins and renally dose meds for GFR listed above.     MARISSA is much improved today.    Scrotal abscess        Macrocytic anemia  Suspect alcohol related. He did have surgical blood loss which is now controlled  - CBC in AM      Inguinal abscess  See sepsis      ATN (acute tubular necrosis)  Baseline Cr 0.7. ATN. .   On IVF   - CMP in AM,  MARISSA did not resolved,but stable.    Cellulitis of groin  See  sepsis    Chronic diastolic heart failure  No evidence of acute decompensation or fluid overload, echo earlier this year showed preserved EF with indeterminate LV diastolic function.   - continue to monitor volume status    Alcohol use disorder, severe, dependence  Counseled, reports stopping a week or two ago, continue MVI/thiamine/folate    Severe obesity (BMI >= 40)  Body mass index is 42.13 kg/m². Morbid obesity complicates all aspects of disease management from diagnostic modalities to treatment. Weight loss encouraged and health benefits explained to patient.   - nursing had concerns overnight for sleep apnea--- needs outpatient sleep study     Elevated transaminase level  Consistent with baseline, likely due to heavy alcohol use.  - CMP in AM    Type 2 diabetes mellitus with hyperglycemia  A1c:   Lab Results   Component Value Date    HGBA1C 12.6 (H) 01/04/2022   Glucose is well controlled as inpatient     Meds: detemir 30 + SSI PRN to maintain goal 140-180  ADA diet, accuchecks, hypoglycemic protocol        VTE Risk Mitigation (From admission, onward)         Ordered     enoxaparin injection 30 mg  Daily         09/10/22 0823     IP VTE HIGH RISK PATIENT  Once         09/01/22 1506     Place sequential compression device  Until discontinued         09/01/22 1506                Discharge Planning   SUSAN:      Code Status: Full Code   Is the patient medically ready for discharge?:     Reason for patient still in hospital (select all that apply): Patient trending condition  Discharge Plan A: Home with family   Discharge Delays: None known at this time              Hollie Johnson MD  Department of Hospital Medicine   [facility]

## 2022-09-15 NOTE — ASSESSMENT & PLAN NOTE
-- Uncertain etiology  -- No evidence of urinary retention  -- Further management per primary  -- Nephrology following  -- Cr improving

## 2022-09-15 NOTE — SUBJECTIVE & OBJECTIVE
Interval History: No events overnight. In pain due to dressing change.  CC is groin pain.    Review of Systems   Constitutional:  Negative for chills and fever.   Respiratory:  Negative for shortness of breath.    Cardiovascular:  Negative for chest pain.   Gastrointestinal:  Negative for abdominal pain, constipation, diarrhea, nausea and vomiting.   Genitourinary:  Negative for difficulty urinating.   Musculoskeletal:  Negative for arthralgias and myalgias.   Skin:  Positive for rash and wound.   Neurological:  Negative for weakness and numbness.   Psychiatric/Behavioral:  Negative for confusion.    Objective:     Vital Signs (Most Recent):  Temp: 98.4 °F (36.9 °C) (09/15/22 0741)  Pulse: 81 (09/15/22 0741)  Resp: 16 (09/15/22 0854)  BP: (!) 161/86 (09/15/22 0741)  SpO2: (!) 93 % (09/15/22 0741) Vital Signs (24h Range):  Temp:  [97.7 °F (36.5 °C)-98.4 °F (36.9 °C)] 98.4 °F (36.9 °C)  Pulse:  [] 81  Resp:  [16-20] 16  SpO2:  [93 %-99 %] 93 %  BP: (131-161)/(72-87) 161/86     Weight: 118.4 kg (261 lb 0.4 oz)  Body mass index is 42.13 kg/m².    Intake/Output Summary (Last 24 hours) at 9/15/2022 1017  Last data filed at 9/15/2022 0844  Gross per 24 hour   Intake 960 ml   Output --   Net 960 ml        Physical Exam  Vitals and nursing note reviewed.   Constitutional:       General: He is not in acute distress.     Appearance: He is obese. He is ill-appearing. He is not toxic-appearing or diaphoretic.   HENT:      Head: Normocephalic and atraumatic.      Nose: Nose normal.      Mouth/Throat:      Mouth: Mucous membranes are moist.   Cardiovascular:      Rate and Rhythm: Normal rate and regular rhythm.      Heart sounds: Normal heart sounds. No murmur heard.    No gallop.   Pulmonary:      Effort: Pulmonary effort is normal. No respiratory distress.      Breath sounds: Normal breath sounds. No wheezing or rales.      Comments: Room air  Abdominal:      General: Bowel sounds are normal. There is no distension.       Palpations: Abdomen is soft.      Tenderness: There is no abdominal tenderness. There is no guarding.   Genitourinary:     Comments: R groin pressure dressing in place  Musculoskeletal:      Right lower leg: No edema.      Left lower leg: No edema.   Skin:     General: Skin is warm and dry.   Neurological:      Mental Status: He is alert and oriented to person, place, and time.       Significant Labs: All pertinent labs within the past 24 hours have been reviewed.    Significant Imaging: I have reviewed all pertinent imaging results/findings within the past 24 hours.

## 2022-09-16 NOTE — PROGRESS NOTES
"   Medication List        START taking these medications      amLODIPine 10 MG tablet  Commonly known as: NORVASC  Take 1 tablet (10 mg total) by mouth once daily.     amoxicillin-clavulanate 875-125mg 875-125 mg per tablet  Commonly known as: AUGMENTIN  Take 1 tablet by mouth every 12 (twelve) hours. for 14 days     * BD ULTRA-FINE SHORT PEN NEEDLE 31 gauge x 5/16" Ndle  Generic drug: pen needle, diabetic  1 each by Misc.(Non-Drug; Combo Route) route 4 (four) times daily with meals and nightly.     * BD ULTRA-FINE TJ PEN NEEDLE 32 gauge x 5/32" Ndle  Generic drug: pen needle, diabetic  USE TO INJECT INSULIN FOUR TIMES DAILY     docusate sodium 100 MG capsule  Commonly known as: COLACE  Take 1 capsule (100 mg total) by mouth 3 (three) times daily as needed for Constipation.     folic acid 1 MG tablet  Commonly known as: FOLVITE  Take 1 tablet (1 mg total) by mouth once daily.     GLUCAGEN HYPOKIT 1 mg Solr  Generic drug: glucagon  Inject 1 mg into the muscle as needed (Hypoglycemia).     lancing device Misc  Use as instructed     pulse oximeter device  Commonly known as: pulse oximeter  by Apply Externally route 2 (two) times a day. Use twice daily at 8 AM and 3 PM and record the value in [facility] as directed.     thiamine 100 MG tablet  Take 1 tablet (100 mg total) by mouth once daily.     TRUE METRIX GLUCOSE METER Misc  Generic drug: blood-glucose meter  Use as instructed     TRUE METRIX GLUCOSE TEST STRIP Strp  Generic drug: blood sugar diagnostic  1 strip by Misc.(Non-Drug; Combo Route) route 4 (four) times daily with meals and nightly.     TRUEPLUS LANCETS 30 gauge Misc  Generic drug: lancets  1 lancet by Misc.(Non-Drug; Combo Route) route 4 (four) times daily with meals and nightly.           * This list has 2 medication(s) that are the same as other medications prescribed for you. Read the directions carefully, and ask your doctor or other care provider to review them with you.                CHANGE how you " take these medications      HYDROcodone-acetaminophen 5-325 mg per tablet  Commonly known as: NORCO  Take 1 tablet by mouth every 6 (six) hours as needed (dressing changes).  What changed: reasons to take this     insulin aspart U-100 100 unit/mL (3 mL) Inpn pen  Commonly known as: NovoLOG  Inject 7 Units into the skin 3 (three) times daily.  What changed: how much to take     insulin detemir U-100 100 unit/mL (3 mL) Inpn pen  Commonly known as: Levemir FLEXTOUCH  Inject 30 Units into the skin once daily.  What changed: how much to take            CONTINUE taking these medications      acetaminophen 500 MG tablet  Commonly known as: TYLENOL     UNISOM SLEEPGELS ORAL            STOP taking these medications      ibuprofen 600 MG tablet  Commonly known as: ADVIL,MOTRIN     TRULICITY 1.5 mg/0.5 mL pen injector  Generic drug: dulaglutide               Where to Get Your Medications        These medications were sent to [facility] [id number] [address]      Hours: Mon-Fri, 8a-5:30p Phone: [phone]   amLODIPine 10 MG tablet  amoxicillin-clavulanate 875-125mg 875-125 mg per tablet  HYDROcodone-acetaminophen 5-325 mg per tablet  insulin aspart U-100 100 unit/mL (3 mL) Inpn pen  insulin detemir U-100 100 unit/mL (3 mL) Inpn pen

## 2022-09-16 NOTE — SUBJECTIVE & OBJECTIVE
Interval History: Packing fell out this morning.  Pain controlled.  He is needing Novelty for packing changes    Review of Systems   Constitutional: Negative.    HENT: Negative.     Eyes: Negative.    Respiratory:  Negative for cough, chest tightness and shortness of breath.    Cardiovascular:  Negative for chest pain.   Gastrointestinal: Negative.  Negative for constipation, diarrhea and nausea.   Genitourinary:  Positive for testicular pain.   Musculoskeletal: Negative.    Neurological: Negative.    Psychiatric/Behavioral: Negative.     Objective:     Temp:  [97.8 °F (36.6 °C)-99.1 °F (37.3 °C)] 98.3 °F (36.8 °C)  Pulse:  [80-87] 81  Resp:  [16-20] 18  SpO2:  [95 %-97 %] 97 %  BP: (132-159)/(86-94) 159/94     Body mass index is 42.13 kg/m².    Date 09/16/22 0700 - 09/17/22 0659   Shift 7013-3910 0840-6642 1549-4187 24 Hour Total   INTAKE   P.O. 120   120   Shift Total(mL/kg) 120(1)   120(1)   OUTPUT   Shift Total(mL/kg)       Weight (kg) 118.4 118.4 118.4 118.4     Bladder Scan Volume (mL): 573 mL (09/09/22 2000)    Drains       None                   Physical Exam  Vitals and nursing note reviewed.   Constitutional:       Appearance: He is well-developed.   HENT:      Head: Normocephalic.   Eyes:      Conjunctiva/sclera: Conjunctivae normal.   Neck:      Thyroid: No thyromegaly.      Trachea: No tracheal deviation.   Cardiovascular:      Rate and Rhythm: Normal rate.      Heart sounds: Normal heart sounds.   Pulmonary:      Effort: Pulmonary effort is normal. No respiratory distress.      Breath sounds: Normal breath sounds. No wheezing.   Abdominal:      General: Bowel sounds are normal.      Palpations: Abdomen is soft.      Tenderness: There is no abdominal tenderness. There is no rebound.      Hernia: No hernia is present.   Genitourinary:         Comments: Skin edges clean  Musculoskeletal:         General: No tenderness. Normal range of motion.      Cervical back: Normal range of motion and neck supple.    Lymphadenopathy:      Cervical: No cervical adenopathy.   Skin:     General: Skin is warm and dry.      Findings: No erythema or rash.   Neurological:      Mental Status: He is alert and oriented to person, place, and time.   Psychiatric:         Behavior: Behavior normal.         Thought Content: Thought content normal.         Judgment: Judgment normal.       Significant Labs:    BMP:  Recent Labs   Lab 09/14/22  0423 09/15/22  0526 09/16/22  0413    141 138   K 3.6 3.1* 3.2*    103 104   CO2 23 28 29   BUN 13 11 11   CREATININE 2.1* 1.5* 1.2   CALCIUM 8.5* 8.4* 8.3*       CBC:   Recent Labs   Lab 09/14/22  0423 09/15/22  0526 09/16/22  0413   WBC 11.12 11.29 11.08   HGB 10.2* 10.0* 10.0*   HCT 29.9* 28.2* 29.0*    419 364       Blood Culture: No results for input(s): LABBLOO in the last 168 hours.  Urine Culture: No results for input(s): LABURIN in the last 168 hours.    Significant Imaging:

## 2022-09-16 NOTE — NURSING
9/16/22 1500 Patient provided discharge packet at this time. Nurse provided follow up appointments and medication list. Nurse provided education in regard to wound and wound care. Patient verbalizes understanding and was able to repeat wound care instructions back to nurse. Nurse also provided education in regard to blood glucose monitoring and insulin. Patient verbalizes understanding. Patient IV removed, tip intact, dry dressing applied.

## 2022-09-16 NOTE — PROGRESS NOTES
PCP appointment scheduled. Unable to place patient on wait list. Clinic no longer has a wait list. Advise patient to call periodically to check for cancellations / sooner appointment

## 2022-09-16 NOTE — ASSESSMENT & PLAN NOTE
S/p I&D of scrotal/inguinal abscess 9/9/2022 (Dr Aguilar)  RN packing changes daily.  I spoke to Cherise CASTILLO about repacking  [place] sent to pharmacy for dressing change    Abx per primary  Wound care teaching

## 2022-09-16 NOTE — DISCHARGE SUMMARY
[facility]  Discharge Summary      Patient Name: Doe Woodall  MRN: [id number]  Patient Class: IP- Inpatient  Admission Date: 9/1/2022  Hospital Length of Stay: 15 days  Discharge Date and Time:  09/16/2022 11:20 AM  Attending Physician: Hollie Johnson MD   Discharging Provider: Hollie Johnson MD  Primary Care Provider: [facility]      HPI:   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  He reports acute onset of erythema, duration 3 days, has progressed and is now swollen and painful, no known exacerbating or alleviating factors, no attempted self treatment.  Denies fever, chills, cough, chest pain, SOB, dizziness, syncope, nausea, vomiting, diarrhea, abdominal pain, or dysuria.  In the ED, found to be tachycardic and tachypneic with leukocytosis.  CT abdomen pelvis and scrotal ultrasound revealed extensive cellulitis.  Procalcitonin 5.7.  Initial lactic acid 2.7.  Also with mild hyponatremia and mildly elevated liver enzymes.  Blood cultures obtained, IV fluid resuscitation and IV antibiotics initiated.      Procedure(s) (LRB):  INCISION AND DRAINAGE GROIN (N/A)      Hospital Course:   Mr Doe Woodall was admitted with severe sepsis due to right sided groin/perineal cellulitis with MARISSA. Started IVF and broad spectrum antibiotics. Blood cultures no growth. Urology consulted. Nephrology consulted for MARISSA. ID consulted for antibiotic management. Symptoms worsened. He developed abscess. Urology performed I&D on 9/9 and noted abscess cavity tracking up into the groin and down into the scrotum. Cultures were sent. He was transferred to ICU post operatively due to excessive venous oozing from the surgical site. He remained hemodynamically stable, no  need for transfusion, and bleeding controlled. Stepped down to floor.  intraoperative cultures grew GBS. ID recommended CTX transitioned to augmentin.  MARISSA with IVF gradually improved.local wound care was done.  Patient was discharged home with HH for wound care ,PO Augmentin and follow up with PCP and Urology as out patient.       Goals of Care Treatment Preferences:  Code Status: Full Code      Consults:   Consults (From admission, onward)        Status Ordering Provider     Inpatient consult to Infectious Diseases  Once        Provider:  Comfort Hurtado MD    Completed SILVANO PATIÑO     Inpatient consult to Nephrology  Once        Provider:  Omaira Ruiz MD    Completed ABDIRAHMAN XIE     Inpatient consult to Urology  Once        Provider:  Amber Contreras MD    Completed ABDIRAHMAN XIE          No new Assessment & Plan notes have been filed under this hospital service since the last note was generated.  Service: Hospital Medicine    Final Active Diagnoses:    Diagnosis Date Noted POA    PRINCIPAL PROBLEM:  Severe sepsis [A41.9, R65.20] 09/01/2022 Yes    MARISSA (acute kidney injury) [N17.9] 09/14/2022 No    Scrotal abscess [N49.2] 09/11/2022 No    Macrocytic anemia [D53.9] 09/10/2022 Yes    Inguinal abscess [L02.214] 09/08/2022 Yes    ATN (acute tubular necrosis) [N17.0] 09/04/2022 No    Cellulitis of groin [L03.314] 09/01/2022 Yes    Chronic diastolic heart failure [I50.32] 01/06/2022 Yes     Chronic    Alcohol use disorder, severe, dependence [F10.20] 08/04/2020 Yes     Chronic    Elevated transaminase level [R74.01] 08/03/2020 Yes     Chronic    Severe obesity (BMI >= 40) [E66.01] 08/03/2020 Yes     Chronic    Type 2 diabetes mellitus with hyperglycemia [E11.65] 08/01/2014 Yes     Chronic      Problems Resolved During this Admission:    Diagnosis Date Noted Date Resolved POA    Hyponatremia [E87.1] 01/04/2022 09/10/2022 Yes       Discharged Condition: stable    [place]  [place] [facility]    [place]:   Follow-up Information     [facility] - Family Medicine Follow up on 9/30/2022.    Specialty: Family Medicine  Why: Appointment scheduled for 10:45am; Please call periodically for cancellations to get a sooner appointment  Contact information:  [address]  [place] [address]  [phone]                       Patient Instructions:      Ambulatory referral/consult to Urology   Standing Status: Future   Referral Priority: Routine Referral Type: Consultation   Referral Reason: Specialty Services Required   Requested Specialty: Urology   Number of Visits Requested: 1     Activity as tolerated       Significant Diagnostic Studies: Labs:   BMP:   Recent Labs   Lab 09/15/22  0526 09/16/22  0413   GLU 98 154*    138   K 3.1* 3.2*    104   CO2 28 29   BUN 11 11   CREATININE 1.5* 1.2   CALCIUM 8.4* 8.3*   , CMP   Recent Labs   Lab 09/15/22  0526 09/16/22  0413    138   K 3.1* 3.2*    104   CO2 28 29   GLU 98 154*   BUN 11 11   CREATININE 1.5* 1.2   CALCIUM 8.4* 8.3*   PROT 6.9 6.4   ALBUMIN 2.3* 2.2*   BILITOT 0.5 0.4   ALKPHOS 349* 323*   AST 66* 72*   ALT 43 47*   ANIONGAP 10 5*    and CBC   Recent Labs   Lab 09/15/22  0526 09/16/22  0413   WBC 11.29 11.08   HGB 10.0* 10.0*   HCT 28.2* 29.0*    364     Microbiology:   Blood Culture   Lab Results   Component Value Date    LABBLOO No Growth after 4 days. 09/01/2022    LABBLOO No Growth after 4 days. 09/01/2022    and Wound Culture: positive  Radiology: X-Ray: CXR: X-Ray Chest 1 View (CXR): No results found for this visit on 09/01/22. and X-Ray Chest PA and Lateral (CXR): No results found for this visit on 09/01/22.    Pending Diagnostic Studies:     None         Medications:  Reconciled Home Medications:      Medication List      START taking these medications    amLODIPine 10 MG tablet  Commonly known as: NORVASC  Take 1 tablet (10 mg total) by mouth once daily.    "  amoxicillin-clavulanate 875-125mg 875-125 mg per tablet  Commonly known as: AUGMENTIN  Take 1 tablet by mouth every 12 (twelve) hours. for 14 days     * BD ULTRA-FINE SHORT PEN NEEDLE 31 gauge x 5/16" Ndle  Generic drug: pen needle, diabetic  1 each by Misc.(Non-Drug; Combo Route) route 4 (four) times daily with meals and nightly.     * BD ULTRA-FINE TJ PEN NEEDLE 32 gauge x 5/32" Ndle  Generic drug: pen needle, diabetic  USE TO INJECT INSULIN FOUR TIMES DAILY     docusate sodium 100 MG capsule  Commonly known as: COLACE  Take 1 capsule (100 mg total) by mouth 3 (three) times daily as needed for Constipation.     folic acid 1 MG tablet  Commonly known as: FOLVITE  Take 1 tablet (1 mg total) by mouth once daily.     GLUCAGEN HYPOKIT 1 mg Solr  Generic drug: glucagon  Inject 1 mg into the muscle as needed (Hypoglycemia).     lancing device Misc  Use as instructed     pulse oximeter device  Commonly known as: pulse oximeter  by Apply Externally route 2 (two) times a day. Use twice daily at 8 AM and 3 PM and record the value in [facility] as directed.     thiamine 100 MG tablet  Take 1 tablet (100 mg total) by mouth once daily.     TRUE METRIX GLUCOSE METER Misc  Generic drug: blood-glucose meter  Use as instructed     TRUE METRIX GLUCOSE TEST STRIP Strp  Generic drug: blood sugar diagnostic  1 strip by Misc.(Non-Drug; Combo Route) route 4 (four) times daily with meals and nightly.     TRUEPLUS LANCETS 30 gauge Misc  Generic drug: lancets  1 lancet by Misc.(Non-Drug; Combo Route) route 4 (four) times daily with meals and nightly.         * This list has 2 medication(s) that are the same as other medications prescribed for you. Read the directions carefully, and ask your doctor or other care provider to review them with you.            CHANGE how you take these medications    HYDROcodone-acetaminophen 5-325 mg per tablet  Commonly known as: NORCO  Take 1 tablet by mouth every 6 (six) hours as needed (dressing " changes).  What changed: reasons to take this     insulin detemir U-100 100 unit/mL (3 mL) Inpn pen  Commonly known as: Levemir FLEXTOUCH  Inject 30 Units into the skin once daily.  What changed: how much to take     TRULICITY 1.5 mg/0.5 mL pen injector  Generic drug: dulaglutide  Inject 1.5 mg into the skin every 7 days.  What changed: additional instructions        CONTINUE taking these medications    acetaminophen 500 MG tablet  Commonly known as: TYLENOL  Take 1,000 mg by mouth 3 (three) times daily as needed for Pain.     NovoLOG Flexpen U-100 Insulin 100 unit/mL (3 mL) Inpn pen  Generic drug: insulin aspart U-100  Inject 14 Units into the skin 3 (three) times daily.     UNISOM SLEEPGELS ORAL  Take 1 capsule by mouth every evening.        STOP taking these medications    ibuprofen 600 MG tablet  Commonly known as: ADVIL,MOTRIN            Indwelling Lines/Drains at time of discharge:   Lines/Drains/Airways     None                 Time spent on the discharge of patient: over 30  minutes         Hollie Johnson MD  Department of Hospital Medicine  [facility] [place] - Telemetry

## 2022-09-16 NOTE — NURSING
Bedside report given to day nurse. Pt has no sign of distress. Safety precautions are maintained with bed alarm set, bed in lowest position, bed wheels locked, and call light in reach. Chart check completed.

## 2022-09-16 NOTE — PLAN OF CARE
[facility] - [place] Telemetry      HOME HEALTH ORDERS  FACE TO FACE ENCOUNTER    Patient Name: Doe Woodall  YOB: 1990    PCP: [facility] - Family Medicine   PCP Address: [address] [place] [address]  PCP Phone Number: [phone]  PCP Fax: [phone]    Encounter Date: 9/1/22    Admit to Home Health    Diagnoses:  Active Hospital Problems    Diagnosis  POA    *Severe sepsis [A41.9, R65.20]  Yes    MARISSA (acute kidney injury) [N17.9]  No    Scrotal abscess [N49.2]  No    Macrocytic anemia [D53.9]  Yes    Inguinal abscess [L02.214]  Yes    ATN (acute tubular necrosis) [N17.0]  No    Cellulitis of groin [L03.314]  Yes    Chronic diastolic heart failure [I50.32]  Yes     Chronic    Alcohol use disorder, severe, dependence [F10.20]  Yes     Chronic    Elevated transaminase level [R74.01]  Yes     Chronic    Severe obesity (BMI >= 40) [E66.01]  Yes     Chronic    Type 2 diabetes mellitus with hyperglycemia [E11.65]  Yes     Chronic      Resolved Hospital Problems    Diagnosis Date Resolved POA    Hyponatremia [E87.1] 09/10/2022 Yes       Follow Up Appointments:  No future appointments.    Allergies:  Review of patient's allergies indicates:   Allergen Reactions    Metformin Diarrhea       Medications: Review discharge medications with patient and family and provide education.    Current Facility-Administered Medications   Medication Dose Route Frequency Provider Last Rate Last Admin    acetaminophen tablet 650 mg  650 mg Oral Q6H PRN Swathi Alegria MD   650 mg at 09/02/22 0129    albuterol-ipratropium 2.5 mg-0.5 mg/3 mL nebulizer solution 3 mL  3 mL Nebulization Q4H PRN Swathi Alegria MD        aluminum-magnesium hydroxide-simethicone 200-200-20 mg/5 mL suspension 30 mL  30 mL Oral QID PRN Swathi Alegria MD        amLODIPine tablet 10 mg  10 mg Oral Daily Hollie Johnson MD   10 mg at 09/15/22 0937    cefTRIAXone (ROCEPHIN) 2 g/50 mL D5W IVPB  2 g Intravenous Q24H  Hollie Johnson MD        cloNIDine tablet 0.1 mg  0.1 mg Oral Q4H PRN Hollie Johnson MD   0.1 mg at 09/15/22 1117    COVID-19 vac, pablo(Pfizer)(PF) (Pfizer COVID-19) 30 mcg/0.3 mL injection 0.3 mL  0.3 mL Intramuscular vaccine x 1 dose Swathi Alegria MD        dextrose 10% bolus 125 mL  12.5 g Intravenous PRN Swathi Alegria MD        dextrose 10% bolus 250 mL  25 g Intravenous PRN Swathi Alegria MD        diphenhydrAMINE injection 25 mg  25 mg Intravenous Q6H PRN Swathi Alegria MD   25 mg at 09/02/22 0900    enoxaparin injection 30 mg  30 mg Subcutaneous Daily Swathi Alegria MD   30 mg at 09/15/22 1740    folic acid tablet 1 mg  1 mg Oral Daily Swathi Alegria MD   1 mg at 09/15/22 0937    glucagon (human recombinant) injection 1 mg  1 mg Intramuscular PRN Swathi Alegria MD        glucose chewable tablet 16 g  16 g Oral PRN Swathi Alegria MD   16 g at 09/08/22 0729    glucose chewable tablet 24 g  24 g Oral PRN Swathi Alegria MD        HYDROcodone-acetaminophen 5-325 mg per tablet 1 tablet  1 tablet Oral Q4H PRN Swathi Alegria MD   1 tablet at 09/16/22 0436    HYDROmorphone (PF) injection 1 mg  1 mg Intravenous Q2H PRN Swathi Alegria MD   1 mg at 09/16/22 0553    insulin aspart U-100 pen 1-10 Units  1-10 Units Subcutaneous QID (AC + HS) PRN Swathi Alegria MD   2 Units at 09/14/22 2048    insulin detemir U-100 pen 30 Units  30 Units Subcutaneous QHS Swathi Alegria MD   30 Units at 09/15/22 2025    melatonin tablet 6 mg  6 mg Oral Nightly PRN Swathi Alegria MD   6 mg at 09/15/22 2252    multivitamin tablet  1 tablet Oral Daily Swathi Alegria MD   1 tablet at 09/15/22 0937    naloxone 0.4 mg/mL injection 0.02 mg  0.02 mg Intravenous PRN Swathi Alegria MD        ondansetron injection 4 mg  4 mg Intravenous Q8H PRN wSathi Alegria MD        polyethylene glycol packet 17 g  17 g Oral Daily Swathi Alegria MD   17 g at 09/03/22 1202    potassium chloride SA  CR tablet 40 mEq  40 mEq Oral Once Hollie Johnson MD        prochlorperazine injection Soln 5 mg  5 mg Intravenous Q6H PRN Swathi Alegria MD        simethicone chewable tablet 80 mg  1 tablet Oral QID PRN Swathi Alegria MD        sodium chloride 0.9% flush 10 mL  10 mL Intravenous Q8H PRN Swathi Alegria MD        sodium chloride 0.9% flush 10 mL  10 mL Intravenous PRN Swathi Alegria MD        thiamine tablet 100 mg  100 mg Oral Daily Swathi Alegria MD   100 mg at 09/15/22 0937     Current Discharge Medication List        START taking these medications    Details   amLODIPine (NORVASC) 10 MG tablet Take 1 tablet (10 mg total) by mouth once daily.  Qty: 30 tablet, Refills: 0    Comments: .      amoxicillin-clavulanate 875-125mg (AUGMENTIN) 875-125 mg per tablet Take 1 tablet by mouth every 12 (twelve) hours. for 14 days  Qty: 28 tablet, Refills: 0           CONTINUE these medications which have CHANGED    Details   insulin detemir U-100 (LEVEMIR FLEXTOUCH) 100 unit/mL (3 mL) SubQ InPn pen Inject 30 Units into the skin once daily.  Qty: 15 mL, Refills: 11           CONTINUE these medications which have NOT CHANGED    Details   acetaminophen (TYLENOL) 500 MG tablet Take 1,000 mg by mouth 3 (three) times daily as needed for Pain.      dulaglutide (TRULICITY) 1.5 mg/0.5 mL pen injector Inject 1.5 mg into the skin every 7 days.  Qty: 4 pen, Refills: 11      blood sugar diagnostic Strp 1 strip by Misc.(Non-Drug; Combo Route) route 4 (four) times daily with meals and nightly.  Qty: 200 strip, Refills: 11      blood-glucose meter Misc Use as instructed  Qty: 1 each, Refills: 0      diphenhydramine HCl (UNISOM SLEEPGELS ORAL) Take 1 capsule by mouth every evening.      docusate sodium (COLACE) 100 MG capsule Take 1 capsule (100 mg total) by mouth 3 (three) times daily as needed for Constipation.  Qty: 20 capsule, Refills: 0      folic acid (FOLVITE) 1 MG tablet Take 1 tablet (1 mg total) by mouth once  "daily.  Qty: 30 tablet, Refills: 3      glucagon (GLUCAGEN HYPOKIT) 1 mg SolR Inject 1 mg into the muscle as needed (Hypoglycemia).  Qty: 1 each, Refills: 11      HYDROcodone-acetaminophen (NORCO) 5-325 mg per tablet Take 1 tablet by mouth every 6 (six) hours as needed for Pain.  Qty: 12 tablet, Refills: 0    Comments: Quantity prescribed more than 7 day supply? No      insulin aspart U-100 (NOVOLOG) 100 unit/mL (3 mL) InPn pen Inject 14 Units into the skin 3 (three) times daily.  Qty: 15 mL, Refills: 3      lancets 30 gauge Misc 1 lancet by Misc.(Non-Drug; Combo Route) route 4 (four) times daily with meals and nightly.  Qty: 200 each, Refills: 11      lancing device Misc Use as instructed  Qty: 1 each, Refills: 0      pen needle, diabetic 31 gauge x 5/16" Ndle 1 each by Misc.(Non-Drug; Combo Route) route 4 (four) times daily with meals and nightly.  Qty: 200 each, Refills: 11      pen needle, diabetic 32 gauge x 5/32" Ndle USE TO INJECT INSULIN FOUR TIMES DAILY  Qty: 100 each, Refills: 3      pulse oximeter (PULSE OXIMETER) device by Apply Externally route 2 (two) times a day. Use twice daily at 8 AM and 3 PM and record the value in MyChart as directed.  Qty: 1 each, Refills: 0    Comments: This is a NO CHARGE item.  Please override price to zero.  DO NOT PRINT.  NORMAL MODE e-PRESCRIBE ONLY.      thiamine 100 MG tablet Take 1 tablet (100 mg total) by mouth once daily.  Qty: 30 tablet, Refills: 3           STOP taking these medications       ibuprofen (ADVIL,MOTRIN) 600 MG tablet Comments:   Reason for Stopping:                 I have seen and examined this patient within the last 30 days. My clinical findings that support the need for the home health skilled services and home bound status are the following:no   Need wound care     Diet:   diabetic diet 2000 calorie          Activities:   activity as tolerated    Nursing:   Agency to admit patient within 24 hours of hospital discharge unless specified on physician " order or at patient request    SN to complete comprehensive assessment including routine vital signs. Instruct on disease process and s/s of complications to report to MD. Review/verify medication list sent home with the patient at time of discharge  and instruct patient/caregiver as needed. Frequency may be adjusted depending on start of care date.     Skilled nurse to perform up to 3 visits PRN for symptoms related to diagnosis    Notify MD if SBP > 160 or < 90; DBP > 90 or < 50; HR > 120 or < 50; Temp > 101; O2 < 88%; Other:       Ok to schedule additional visits based on staff availability and patient request on consecutive days within the home health episode.    When multiple disciplines ordered:    Start of Care occurs on Sunday - Wednesday schedule remaining discipline evaluations as ordered on separate consecutive days following the start of care.    Thursday SOC -schedule subsequent evaluations Friday and Monday the following week.     Friday - Saturday SOC - schedule subsequent discipline evaluations on consecutive days starting Monday of the following week.    For all post-discharge communication and subsequent orders please contact patient's primary care physician. If unable to reach primary care physician or do not receive response within 30 minutes, please contact PCP  for clinical staff order clarification    Miscellaneous   Routine Skin for Bedridden Patients: Instruct patient/caregiver to apply moisture barrier cream to all skin folds and wet areas in perineal area daily and after baths and all bowel movements.    Home Health Aide:  Nursing every 48 hours    Wound Care Orders  Change packing daily. Vashe dampened Kerlix. If Kerlix too large, change to iodoform gauze.    I certify that this patient is confined to his home and needs intermittent skilled nursing care.

## 2022-09-16 NOTE — PLAN OF CARE
[place] - Telemetry  Discharge Final Note    Primary Care Provider: [facility] - Family Medicine    Expected Discharge Date: 9/16/2022    All needs met. Appointment scheduled. Ambulatory referral sent to wound care clinic. Patient instructed to call if he has not heard anything by Monday. SW notified nurse Enedelia that patient is ready for discharge from case management standpoint.     Final Discharge Note (most recent)       Final Note - 09/16/22 1356          Final Note    Assessment Type Final Discharge Note     Anticipated Discharge Disposition Home or Self Care     What phone number can be called within the next 1-3 days to see how you are doing after discharge? [phone]     Hospital Resources/Appts/Education Provided Appointments scheduled and added to AVS;Appointments scheduled by Navigator/Coordinator        Post-Acute Status    Post-Acute Authorization Home Health     Home Health Status Discharge Plan Changed     Coverage Medicaid     Other Status No Post-Acute Service Needs     Discharge Delays None known at this time                     Important Message from Medicare             Contact Info       [facility]   Specialty: Family Medicine   Relationship: PCP - General    [address] [address]   Phone: [phone]       Next Steps: Follow up on 9/30/2022    Instructions: Appointment scheduled for 10:45am; Please call periodically for cancellations to get a sooner appointment    [facility]    [address] [place] [address]  [phone]       Next Steps: Follow up    Instructions: Please call Monday to schedule appointment if clinic has not called you to schedule appointment.

## 2022-09-16 NOTE — PLAN OF CARE
Problem: Adult Inpatient Plan of Care  Goal: Plan of Care Review  Outcome: Ongoing, Progressing  Goal: Patient-Specific Goal (Individualized)  Outcome: Ongoing, Progressing  Goal: Absence of Hospital-Acquired Illness or Injury  Outcome: Ongoing, Progressing  Intervention: Identify and Manage Fall Risk  Flowsheets (Taken 9/16/2022 0400)  Safety Promotion/Fall Prevention:   assistive device/personal item within reach   side rails raised x 2  Intervention: Prevent Skin Injury  Flowsheets (Taken 9/16/2022 0400)  Skin Protection: incontinence pads utilized  Intervention: Prevent and Manage VTE (Venous Thromboembolism) Risk  Flowsheets (Taken 9/16/2022 0400)  Activity Management: Rolling - L1  VTE Prevention/Management: ambulation promoted  Range of Motion: active ROM (range of motion) encouraged  Intervention: Prevent Infection  Flowsheets (Taken 9/16/2022 0400)  Infection Prevention:   single patient room provided   rest/sleep promoted  Goal: Optimal Comfort and Wellbeing  Outcome: Ongoing, Progressing  Intervention: Monitor Pain and Promote Comfort  Flowsheets (Taken 9/16/2022 0400)  Pain Management Interventions:   relaxation techniques promoted   quiet environment facilitated   medication offered  Intervention: Provide Person-Centered Care  Flowsheets (Taken 9/16/2022 0400)  Trust Relationship/Rapport:   care explained   choices provided   emotional support provided   empathic listening provided  Goal: Readiness for Transition of Care  Outcome: Ongoing, Progressing     Problem: Infection  Goal: Absence of Infection Signs and Symptoms  Outcome: Ongoing, Progressing  Intervention: Prevent or Manage Infection  Flowsheets (Taken 9/16/2022 0400)  Infection Management: aseptic technique maintained     Problem: Diabetes Comorbidity  Goal: Blood Glucose Level Within Targeted Range  Outcome: Ongoing, Progressing  Intervention: Monitor and Manage Glycemia  Flowsheets (Taken 9/16/2022 0400)  Glycemic Management: blood glucose  monitored     Problem: Pain Acute  Goal: Acceptable Pain Control and Functional Ability  Outcome: Ongoing, Progressing  Intervention: Develop Pain Management Plan  Flowsheets (Taken 9/16/2022 0400)  Pain Management Interventions:   relaxation techniques promoted   quiet environment facilitated   medication offered  Intervention: Prevent or Manage Pain  Flowsheets (Taken 9/16/2022 0400)  Sleep/Rest Enhancement: relaxation techniques promoted  Medication Review/Management: medications reviewed  Intervention: Optimize Psychosocial Wellbeing  Flowsheets (Taken 9/16/2022 0400)  Supportive Measures: active listening utilized     Problem: Skin Injury Risk Increased  Goal: Skin Health and Integrity  Outcome: Ongoing, Progressing  Intervention: Optimize Skin Protection  Flowsheets (Taken 9/16/2022 0400)  Pressure Reduction Techniques: frequent weight shift encouraged  Skin Protection: incontinence pads utilized  Head of Bed (HOB) Positioning: HOB elevated     Problem: Fall Injury Risk  Goal: Absence of Fall and Fall-Related Injury  Outcome: Ongoing, Progressing  Intervention: Identify and Manage Contributors  Flowsheets (Taken 9/16/2022 0400)  Self-Care Promotion:   independence encouraged   BADL personal objects within reach   BADL personal routines maintained  Medication Review/Management: medications reviewed  Intervention: Promote Injury-Free Environment  Flowsheets (Taken 9/16/2022 0400)  Safety Promotion/Fall Prevention:   assistive device/personal item within reach   side rails raised x 2

## 2022-09-16 NOTE — PROGRESS NOTES
[facility] [place]  Urology  Progress Note    Patient Name: Doe Woodall  MRN: [id number]  Admission Date: 9/1/2022  Hospital Length of Stay: 15 days  Code Status: Full Code   Attending Provider: Hollie Johnson MD   Primary Care Physician: [facility] - Family Medicine    Subjective:     HPI:  33 yo man presented to the hospital with progressive scrotal pain which started off as a boil on his right scrotum. Patient with hx of uncontrolled DM. Patient's imaging on arrival did not demonstrate presence of abscess. Urology consulted for further evaluation. Denies dysuria, fevers, chills, chest pain, shortness of breath. Had breakfast this morning.       Interval History: Packing fell out this morning.  Pain controlled.  He is needing [place] for packing changes    Review of Systems   Constitutional: Negative.    HENT: Negative.     Eyes: Negative.    Respiratory:  Negative for cough, chest tightness and shortness of breath.    Cardiovascular:  Negative for chest pain.   Gastrointestinal: Negative.  Negative for constipation, diarrhea and nausea.   Genitourinary:  Positive for testicular pain.   Musculoskeletal: Negative.    Neurological: Negative.    Psychiatric/Behavioral: Negative.     Objective:     Temp:  [97.8 °F (36.6 °C)-99.1 °F (37.3 °C)] 98.3 °F (36.8 °C)  Pulse:  [80-87] 81  Resp:  [16-20] 18  SpO2:  [95 %-97 %] 97 %  BP: (132-159)/(86-94) 159/94     Body mass index is 42.13 kg/m².    Date 09/16/22 0700 - 09/17/22 0659   Shift 0978-3785 3697-1450 1028-2676 24 Hour Total   INTAKE   P.O. 120   120   Shift Total(mL/kg) 120(1)   120(1)   OUTPUT   Shift Total(mL/kg)       Weight (kg) 118.4 118.4 118.4 118.4     Bladder Scan Volume (mL): 573 mL (09/09/22 2000)    Drains       None                   Physical Exam  Vitals and nursing note reviewed.   Constitutional:       Appearance: He is well-developed.   HENT:      Head: Normocephalic.   Eyes:      Conjunctiva/sclera: Conjunctivae normal.    Neck:      Thyroid: No thyromegaly.      Trachea: No tracheal deviation.   Cardiovascular:      Rate and Rhythm: Normal rate.      Heart sounds: Normal heart sounds.   Pulmonary:      Effort: Pulmonary effort is normal. No respiratory distress.      Breath sounds: Normal breath sounds. No wheezing.   Abdominal:      General: Bowel sounds are normal.      Palpations: Abdomen is soft.      Tenderness: There is no abdominal tenderness. There is no rebound.      Hernia: No hernia is present.   Genitourinary:         Comments: Skin edges clean  Musculoskeletal:         General: No tenderness. Normal range of motion.      Cervical back: Normal range of motion and neck supple.   Lymphadenopathy:      Cervical: No cervical adenopathy.   Skin:     General: Skin is warm and dry.      Findings: No erythema or rash.   Neurological:      Mental Status: He is alert and oriented to person, place, and time.   Psychiatric:         Behavior: Behavior normal.         Thought Content: Thought content normal.         Judgment: Judgment normal.       Significant Labs:    BMP:  Recent Labs   Lab 09/14/22  0423 09/15/22  0526 09/16/22  0413    141 138   K 3.6 3.1* 3.2*    103 104   CO2 23 28 29   BUN 13 11 11   CREATININE 2.1* 1.5* 1.2   CALCIUM 8.5* 8.4* 8.3*       CBC:   Recent Labs   Lab 09/14/22  0423 09/15/22  0526 09/16/22  0413   WBC 11.12 11.29 11.08   HGB 10.2* 10.0* 10.0*   HCT 29.9* 28.2* 29.0*    419 364       Blood Culture: No results for input(s): LABBLOO in the last 168 hours.  Urine Culture: No results for input(s): LABURIN in the last 168 hours.    Significant Imaging:                      Assessment/Plan:     Inguinal abscess  S/p I&D of R hemiscrotum  Will need home wound care instructions regarding dressing - HHRN vs pt directed wound care. Needs teaching.   Abx per ID    Once wound care needs for home addressed, he is okay for d/c home from  standpoint.   F/u in 1-2 weeks after discharge for  wound check    ATN (acute tubular necrosis)  -- Uncertain etiology  -- No evidence of urinary retention  -- Further management per primary  -- Nephrology following  -- Cr improving    Cellulitis of groin  S/p I&D of scrotal/inguinal abscess 9/9/2022 (Dr Aguilar)  RN packing changes daily.  I spoke to Cherise CASTILLO about repacking  [place] sent to pharmacy for dressing change    Abx per primary  Wound care teaching        VTE Risk Mitigation (From admission, onward)         Ordered     enoxaparin injection 30 mg  Daily         09/10/22 0823     IP VTE HIGH RISK PATIENT  Once         09/01/22 1506     Place sequential compression device  Until discontinued         09/01/22 1506                W Tuan Aguilar MD  Urology  [facility] - Telemetry

## 2022-09-16 NOTE — PLAN OF CARE
SW contacted Wound Care Clinic to schedule appointment for patient. There was no answer. SW left a message. Clinic will call patient to schedule appointment.

## 2022-09-16 NOTE — PLAN OF CARE
FELIZ unable to schedule appointment with Dr. Aguilar. FELIZ sent message to clinic to schedule appointment. Clinic will contact patient.

## 2022-09-16 NOTE — PLAN OF CARE
FELIZ sent out 16 referrals for home health services. Waiting for response.        09/16/22 4326   Post-Acute Status   Post-Acute Authorization Home Health   Home Health Status Pending post-acute provider review/more information requested   Coverage Medicaid   Other Status No Post-Acute Service Needs   Discharge Delays (!) Post-Acute Set-up   Discharge Plan   Discharge Plan A Home Health   Discharge Plan B Home with family

## 2022-09-21 NOTE — TELEPHONE ENCOUNTER
Reason for Disposition   New or worsening leg swelling   [1] Can't walk or can barely walk AND [2] new-onset    Additional Information   Negative: Sounds like a life-threatening emergency to the triager   Negative: Chest pain   Negative: Difficulty breathing   Negative: Acting confused (e.g., disoriented, slurred speech) or excessively sleepy   Negative: Surgical incision symptoms and questions   Negative: [1] Discomfort (pain, burning or stinging) when passing urine AND [2] male   Negative: [1] Discomfort (pain, burning or stinging) when passing urine AND [2] female   Negative: Constipation   Negative: New or worsening leg (calf, thigh) pain   Negative: SEVERE difficulty breathing (e.g., struggling for each breath, speaks in single words)   Negative: Looks like a broken bone or dislocated joint (e.g., crooked or deformed)   Negative: Sounds like a life-threatening emergency to the triager   Negative: Chest pain   Negative: Followed a leg injury   Negative: [1] Small area of swelling AND [2] followed an insect bite to the area   Negative: Swelling of one ankle joint   Negative: Swelling of knee is main symptom   Negative: Pregnant   Negative: Postpartum (from 0 to 6 weeks after delivery)   Negative: Difficulty breathing at rest   Negative: Entire foot is cool or blue in comparison to other side    Protocols used: Post-Op Symptoms and [place]-K-PH, Leg Swelling and Edema-A-AH  Pt stated he had surgery on his groin and was released from the hospital Saturday.    Pt stated he has new onset swelling in both legs making in difficult to walk. Per triage protocol advised to go to the ED for evaluation. Pt verbalized understanding.

## 2022-09-23 NOTE — TELEPHONE ENCOUNTER
Spoke to pt about seeing if he can come in the AM on 9/28 instead of pm. Pt agreed.    ----- Message from Terra Mckeon sent at 9/23/2022  8:43 AM CDT -----  Regarding: self [phone]  Type:  Patient Returning Call    Who Called: self    Who Left Message for Patient: Kimberli    Does the patient know what this is regarding?:Rescheduling appt     Would the patient rather a call back or a response via [facility]? Call back    Best Call Back Number: [phone]

## 2022-09-23 NOTE — TELEPHONE ENCOUNTER
----- Message from Kimberli Mcqueen MA sent at 9/23/2022  8:40 AM CDT -----  The pt called the office and stated that he fell the other day and states he's ok but sometimes when he breathes it hurts. States he may be going to the ER today. I am letting you know as it looks like he had a surgery with you on the 9th.

## 2022-09-23 NOTE — TELEPHONE ENCOUNTER
The ED is appropriate.    It is unlikely a fall or difficulty breathing are related to incision and drainage of a scrotal abscess.

## 2022-09-28 NOTE — PROGRESS NOTES
Subjective:       Patient ID: Doe Woodall is a 32 y.o. male who was last seen in this office Visit date not found    Chief Complaint:   Chief Complaint   Patient presents with    Groin Pain    Follow-up     Scrotal Abscess  He had an I/D of a right scrotal abscess on 9/9/2022.  He has run out of norco.  He denies fever.  He is still having pain in the groin.  He also fell a few days ago and is now having pain in the chest when he breathes.    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  Review of patient's allergies indicates:   Allergen Reactions    Metformin Diarrhea     Current Outpatient Medications   Medication Sig    acetaminophen (TYLENOL) 500 MG tablet Take 1,000 mg by mouth 3 (three) times daily as needed for Pain.    amLODIPine (NORVASC) 10 MG tablet Take 1 tablet (10 mg total) by mouth once daily.    amoxicillin-clavulanate 875-125mg (AUGMENTIN) 875-125 mg per tablet Take 1 tablet by mouth every 12 (twelve) hours. for 14 days    blood sugar diagnostic Strp 1 strip by Misc.(Non-Drug; Combo Route) route 4 (four) times daily with meals and nightly.    blood-glucose meter Misc Use as instructed    diphenhydramine HCl (UNISOM SLEEPGELS ORAL) Take 1 capsule by mouth every evening.     "docusate sodium (COLACE) 100 MG capsule Take 1 capsule (100 mg total) by mouth 3 (three) times daily as needed for Constipation.    folic acid (FOLVITE) 1 MG tablet Take 1 tablet (1 mg total) by mouth once daily.    glucagon (GLUCAGEN HYPOKIT) 1 mg SolR Inject 1 mg into the muscle as needed (Hypoglycemia).    insulin aspart U-100 (NOVOLOG) 100 unit/mL (3 mL) InPn pen Inject 7 Units into the skin 3 (three) times daily.    insulin detemir U-100 (LEVEMIR FLEXTOUCH) 100 unit/mL (3 mL) SubQ InPn pen Inject 30 Units into the skin once daily.    lancets 30 gauge Misc 1 lancet by Misc.(Non-Drug; Combo Route) route 4 (four) times daily with meals and nightly.    lancing device Misc Use as instructed    pen needle, diabetic (BD ULTRA-FINE TJ PEN NEEDLE) 32 gauge x 5/32" Ndle Use 4 times daily    pen needle, diabetic 31 gauge x 5/16" Ndle 1 each by Misc.(Non-Drug; Combo Route) route 4 (four) times daily with meals and nightly.    pen needle, diabetic 32 gauge x 5/32" Ndle USE TO INJECT INSULIN FOUR TIMES DAILY    pulse oximeter (PULSE OXIMETER) device by Apply Externally route 2 (two) times a day. Use twice daily at 8 AM and 3 PM and record the value in [facility] as directed.    thiamine 100 MG tablet Take 1 tablet (100 mg total) by mouth once daily.    HYDROcodone-acetaminophen (NORCO) 5-325 mg per tablet Take 1 tablet by mouth every 6 (six) hours as needed (dressing changes).    sodium chlor-hypochlorous acid 0.033 % IrSl Irrigate with 25 mLs as directed once daily.     No current facility-administered medications for this visit.       Review of Systems   Constitutional:  Negative for activity change, fatigue, fever and unexpected weight change.   HENT:  Negative for congestion.    Eyes:  Negative for redness.   Respiratory:  Negative for chest tightness and shortness of breath.    Cardiovascular:  Negative for chest pain and leg swelling.   Gastrointestinal:  Negative for abdominal pain, constipation, diarrhea, nausea and " "vomiting.   Genitourinary:  Negative for dysuria, flank pain, frequency, hematuria, penile pain, penile swelling, scrotal swelling, testicular pain and urgency.   Musculoskeletal:  Negative for arthralgias and back pain.   Neurological:  Negative for dizziness and light-headedness.   Psychiatric/Behavioral:  Negative for behavioral problems and confusion. The patient is not nervous/anxious.    All other systems reviewed and are negative.    Objective:      Vitals:    09/28/22 0959   Weight: 98.4 kg (217 lb)   Height: 5' 6" (1.676 m)     Physical Exam  Genitourinary:     Comments: Buried penis  Right hemiscrotum with two shallow incisions.  Granulation tissue noted. No fluctuant areas      Urine dipstick shows positive for WBC's, positive for RBC's, and positive for glucose.  Micro exam: negative for WBC's or RBC's.    Assessment:       1. Scrotal abscess          Plan:       1. Scrotal abscess  Continue daily packing  I encouraged him to go to the ED or Urgent care to evaluate his chest secondary to his fall.    - HYDROcodone-acetaminophen (NORCO) 5-325 mg per tablet; Take 1 tablet by mouth every 6 (six) hours as needed (dressing changes).  Dispense: 28 tablet; Refill: 0  - sodium chlor-hypochlorous acid 0.033 % IrSl; Irrigate with 25 mLs as directed once daily.  Dispense: 250 mL; Refill: 3          No follow-ups on file.        "

## 2022-09-28 NOTE — TELEPHONE ENCOUNTER
The Rx is for sodium chloride-hypochlorous acid 0.033 %    It is generic Vashe wound irrigation solution.

## 2022-09-28 NOTE — TELEPHONE ENCOUNTER
----- Message from Tracey Blanchard RN sent at 9/28/2022 12:53 PM CDT -----  Regarding: Sodium Chloride  [facility] sent a message asking if you can put the sodium chloride order in for 0.9%.  The 0.003% does not exist.

## 2022-09-28 NOTE — TELEPHONE ENCOUNTER
Spoke to Pharmacist at [facility] and notified the order is suppose to be for generic Vashe.  He changed it and the rx went through.

## 2022-09-29 NOTE — TELEPHONE ENCOUNTER
"I left the pt a voicemail to call the clinic back. The insurance denied his NORCO so per Dr. Aguilar, "He may need to pay cash if that is the case.   Have him take tylenol until he can get the norco filled.   He can also check with the insurance company to see if they will cover a different narcotic medication."  "

## 2022-09-30 NOTE — TELEPHONE ENCOUNTER
Pt notified Vashe is not available at any pharmacy in the area.  Dr. Aguilar notified.  Pt advised he can get it off of [employer] or [facility].  Pt verbalized understanding.          ----- Message from KAITY Aguilar MD sent at 9/29/2022  5:40 PM CDT -----  Have him get it off [employer] or maybe [facility].    ----- Message -----  From: Tracey Blanchard RN  Sent: 9/29/2022  12:42 PM CDT  To: MD [facility] called and they do not carry Vashe.  I called [facility] and they do not get it anymore either.  I called woundcare and they tell their pts to get it off of [employer].  Please advise.  ----- Message -----  From: Marita Blackburn  Sent: 9/29/2022  10:26 AM CDT  To: Jeff Vergara Staff    .Type:  Pharmacy Calling to Clarify an RX    Name of Caller: Justin     [facility]arm[facility] Name: [facility]    Prescription Name: sodium chlor-hypochlorous acid 0.033 % IrSl    What do they need to clarify? Was this supposed to be sent to be filled     Can you be contacted via [facility]?call     Best Call Back Number: [phone]

## 2022-10-02 PROBLEM — R07.9 CHEST PAIN: Status: ACTIVE | Noted: 2022-01-01

## 2022-10-02 PROBLEM — L03.314 CELLULITIS OF GROIN: Status: RESOLVED | Noted: 2022-01-01 | Resolved: 2022-01-01

## 2022-10-02 PROBLEM — K85.90 ACUTE PANCREATITIS: Status: RESOLVED | Noted: 2022-01-01 | Resolved: 2022-01-01

## 2022-10-02 PROBLEM — D64.9 NORMOCYTIC ANEMIA: Status: ACTIVE | Noted: 2022-01-01

## 2022-10-02 PROBLEM — E11.9 DIABETES MELLITUS: Status: RESOLVED | Noted: 2022-01-01 | Resolved: 2022-01-01

## 2022-10-02 PROBLEM — R79.89 HIGH SERUM LACTIC ACID: Status: RESOLVED | Noted: 2022-01-01 | Resolved: 2022-01-01

## 2022-10-02 PROBLEM — D64.9 ANEMIA: Status: ACTIVE | Noted: 2022-01-01

## 2022-10-02 PROBLEM — F10.929 ALCOHOLIC INTOXICATION WITH COMPLICATION: Status: ACTIVE | Noted: 2020-08-02

## 2022-10-02 PROBLEM — S20.212A CHEST WALL CONTUSION, LEFT, INITIAL ENCOUNTER: Status: ACTIVE | Noted: 2022-01-01

## 2022-10-02 PROBLEM — R07.9 CHEST PAIN: Status: RESOLVED | Noted: 2022-01-01 | Resolved: 2022-01-01

## 2022-10-02 PROBLEM — E87.6 HYPOKALEMIA: Status: RESOLVED | Noted: 2020-08-03 | Resolved: 2022-01-01

## 2022-10-02 PROBLEM — R06.02 SHORTNESS OF BREATH: Status: ACTIVE | Noted: 2022-01-01

## 2022-10-02 PROBLEM — E66.01 SEVERE OBESITY WITH BODY MASS INDEX (BMI) OF 36.0 TO 36.9 WITH SERIOUS COMORBIDITY: Status: ACTIVE | Noted: 2022-01-01

## 2022-10-02 PROBLEM — A41.9 SEPSIS: Status: ACTIVE | Noted: 2022-01-01

## 2022-10-02 PROBLEM — R06.02 SHORTNESS OF BREATH: Status: RESOLVED | Noted: 2022-01-01 | Resolved: 2022-01-01

## 2022-10-02 PROBLEM — A41.9 SEPSIS: Status: RESOLVED | Noted: 2022-01-01 | Resolved: 2022-01-01

## 2022-10-02 PROBLEM — E66.01 SEVERE OBESITY (BMI >= 40): Chronic | Status: RESOLVED | Noted: 2020-08-03 | Resolved: 2022-01-01

## 2022-10-02 PROBLEM — I10 PRIMARY HYPERTENSION: Status: ACTIVE | Noted: 2022-01-01

## 2022-10-02 PROBLEM — R79.89 HIGH SERUM LACTIC ACID: Status: ACTIVE | Noted: 2022-01-01

## 2022-10-02 PROBLEM — K85.90 ACUTE PANCREATITIS: Status: ACTIVE | Noted: 2022-01-01

## 2022-10-02 PROBLEM — E11.9 DIABETES MELLITUS: Status: ACTIVE | Noted: 2022-01-01

## 2022-10-02 PROBLEM — D64.9 ANEMIA: Status: RESOLVED | Noted: 2022-01-01 | Resolved: 2022-01-01

## 2022-10-02 NOTE — Clinical Note
Diagnosis: Cellulitis of groin [795552]   Admitting Provider:: KAITY SINGH [[id number]]   Future Attending Provider: BEVERLY STEVENS [[id number]]   Reason for IP Medical Treatment  (Clinical interventions that can only be accomplished in the IP setting? ) :: IV abx, IV fluids   Estimated Length of Stay:: 3-4 midnights   I certify that Inpatient services for greater than or equal to 2 midnights are medically necessary:: Yes   Plans for Post-Acute care--if anticipated (pick the single best option):: A. No post acute care anticipated at this time

## 2022-10-02 NOTE — TELEPHONE ENCOUNTER
Patient c/o chest pain rated 6-7/10 when lying on the left side and difficulty breathing. Triage RN notes audible difficulty talking during triage/assessment.    Care Advice given to Call EMS/911 Now. Patient states understanding of care advice.    Call back made to patient to confirm contact with EMS/911. Patient states he has contacted EMS/911 at this time and is awaiting arrival.    Reason for Disposition   SEVERE difficulty breathing (e.g., struggling for each breath, speaks in single words)    Protocols used: Chest Pain-A-AH

## 2022-10-02 NOTE — ED PROVIDER NOTES
Encounter Date: 10/2/2022       History     Chief Complaint   Patient presents with    Shortness of Breath     Pts primary complaint today is SOB x 2 weeks after fall due to dizziness. Pt recently admitted for abscess to groin and discharged with antibiotics 2 weeks ago. Pain still reports to abscess. BP 80/48 and glucose greater than 500       32-year-old male with history of diabetes, alcohol use disorder, substance abuse, diastolic heart failure presents to the emergency department with multiple complaints.  Patient states he had a scrotal abscess, this was I&D on September 9th.  He had been taking Norco for pain but states it is not improving his pain.  He states he was taking the Norco and drinking alcohol and fell 2 weeks ago, since that time he fell, he has been having pain to his left chest.  Pain is worse with deep inspiration and movement.  The patient also states that his groin has been hurting worse and has been having more redness.  He endorses drainage from the wound site.  He endorses drinking alcohol today, reports he drink fireball whiskey earlier.  He has not taken his insulin for several days.  He has had some nausea and vomiting but no abdominal pain.  Her reports some difficulty with urination and having some diarrhea.    Patient was seen by his urologist, Dr. Aguilar on September 28th.  At that time, recommended daily packing changes, daily irrigation with sodium chloride, hypochlorous acid.  [place] 5-325 was refilled.    The history is provided by the patient and medical records.   Review of patient's allergies indicates:   Allergen Reactions    Metformin Diarrhea     Past Medical History:   Diagnosis Date    Abscess of right groin 09/01/2022    Diabetes mellitus     Encounter for blood transfusion     Loud snoring     Obese     Other insomnia 08/03/2020    Perineal abscess 08/01/2014    Primary hypertension 10/2/2022     Past Surgical History:   Procedure Laterality Date    ABCESS DRAINAGE   "2014    PERIRECTAL    DECOMPRESSION OF LUMBAR SPINE USING MINIMALLY INVASIVE TECHNIQUE Right 2018    Procedure: DECOMPRESSION, SPINE, LUMBAR, MINIMALLY INVASIVE L3-4;  Surgeon: Cristian Cervantes MD;  Location: [facility] OR [address] [place] [address];  Service: Neurosurgery;  Laterality: Right;    INCISION AND DRAINAGE N/A 2022    Procedure: INCISION AND DRAINAGE GROIN;  Surgeon: KAITY Aguilar MD;  Location: [facility] OR;  Service: Urology;  Laterality: N/A;    ORTHOPEDIC SURGERY       Family History   Problem Relation Age of Onset    Diabetes Father     Diabetes Paternal Grandmother     Diabetes Paternal Grandfather      Social History     Tobacco Use    Smoking status: Former     Packs/day: 0.50     Years: 9.00     Pack years: 4.50     Types: Cigarettes     Quit date: 2013     Years since quittin.2    Smokeless tobacco: Former   Substance Use Topics    Alcohol use: Not Currently     Comment: DAILY PINT OF LIQUOR, HX OF 1 "TALL BOY" OF BEER DAILY    Drug use: Not Currently     Review of Systems   Constitutional:  Positive for chills. Negative for fever.   HENT:  Negative for congestion.    Respiratory:  Positive for shortness of breath. Negative for cough.    Cardiovascular:  Positive for chest pain (left sided).   Gastrointestinal:  Positive for diarrhea, nausea and vomiting. Negative for abdominal pain.   Genitourinary:  Positive for difficulty urinating.   Skin:  Positive for color change and wound.   Neurological:  Positive for light-headedness. Negative for weakness and numbness.     Physical Exam     Initial Vitals [10/02/22 1620]   BP Pulse Resp Temp SpO2   (!) 80/48 85 20 99.2 °F (37.3 °C) 98 %      MAP       --         Physical Exam    Constitutional: He appears well-developed and well-nourished. He is not diaphoretic. No distress.   Body mass index is 35.02 kg/m².     HENT:   Head: Normocephalic and atraumatic.   Mouth/Throat: Oropharynx is clear and moist.   Eyes:   Conjunctiva are injected. No eye drainage or " discharge   Neck: Neck supple.   Cardiovascular:  Normal rate and regular rhythm.           Pulmonary/Chest: Breath sounds normal. No respiratory distress. He exhibits tenderness.   Abdominal: Abdomen is soft. Bowel sounds are normal. He exhibits no distension. There is no abdominal tenderness.   Genitourinary:    Genitourinary Comments: Incision noted to right scrotum. No packing in place. No active drainage. Generalized eryhtema that extends from scrotum into groin. Groin and scrotum with generalized tenderness. No crepitus.      Musculoskeletal:      Cervical back: Neck supple.     Neurological: He is alert. He has normal strength.   Speech is slightly slurred. No facial droop   Psychiatric: He has a normal mood and affect.       ED Course   Critical Care    Date/Time: 10/2/2022 8:00 PM  Performed by: Tung Sepulveda MD  Authorized by: Tung Sepulveda MD   Direct patient critical care time: 20 minutes  Additional history critical care time: 10 minutes  Ordering / reviewing critical care time: 10 minutes  Documentation critical care time: 10 minutes  Consulting other physicians critical care time: 10 minutes  Total critical care time (exclusive of procedural time) : 60 minutes  Critical care time was exclusive of separately billable procedures and treating other patients and teaching time.  Critical care was necessary to treat or prevent imminent or life-threatening deterioration of the following conditions: sepsis, shock, metabolic crisis and dehydration.  Critical care was time spent personally by me on the following activities: development of treatment plan with patient or surrogate, discussions with consultants, evaluation of patient's response to treatment, interpretation of cardiac output measurements, examination of patient, obtaining history from patient or surrogate, ordering and performing treatments and interventions, ordering and review of laboratory studies, ordering and review of  radiographic studies, pulse oximetry, re-evaluation of patient's condition and review of old charts.      Labs Reviewed   CBC W/ AUTO DIFFERENTIAL - Abnormal; Notable for the following components:       Result Value    RBC 2.90 (*)     Hemoglobin 10.2 (*)     Hematocrit 27.6 (*)     MCH 35.2 (*)     MCHC 37.0 (*)     Mono # 1.3 (*)     Mono % 15.5 (*)     All other components within normal limits   COMPREHENSIVE METABOLIC PANEL - Abnormal; Notable for the following components:    Sodium 133 (*)     Potassium 2.2 (*)     Chloride 93 (*)     CO2 17 (*)     Glucose 554 (*)     Creatinine 1.9 (*)     Calcium 8.6 (*)     Albumin 2.8 (*)     Alkaline Phosphatase 411 (*)     Anion Gap 23 (*)     eGFR 47 (*)     All other components within normal limits    Narrative:     K & GLUCOSE   critical result(s) called and verbal readback obtained   from REUBEN WONG. by [facility] 10/02/2022 17:54   LACTIC ACID, PLASMA - Abnormal; Notable for the following components:    Lactate (Lactic Acid) 9.6 (*)     All other components within normal limits    Narrative:     LACTIC ACID   critical result(s) called and verbal readback obtained   from REUBEN WONG. by [facility] 10/02/2022 17:44   LACTIC ACID, PLASMA - Abnormal; Notable for the following components:    Lactate (Lactic Acid) 4.0 (*)     All other components within normal limits    Narrative:       Lactic acid critical result(s) called and verbal readback obtained   from Danielle Cervantes  by [facility] 10/02/2022 21:45   URINALYSIS, REFLEX TO URINE CULTURE - Abnormal; Notable for the following components:    Color, UA Colorless (*)     Protein, UA 1+ (*)     Glucose, UA 4+ (*)     All other components within normal limits    Narrative:     Specimen Source->Urine   PHOSPHORUS - Abnormal; Notable for the following components:    Phosphorus 1.7 (*)     All other components within normal limits   LIPASE - Abnormal; Notable for the following components:    Lipase 98 (*)     All other components within normal limits    DRUG SCREEN PANEL, URINE EMERGENCY - Abnormal; Notable for the following components:    Opiate Scrn, Ur Presumptive Positive (*)     Creatinine, Urine 19.2 (*)     All other components within normal limits    Narrative:     Specimen Source->Urine   ALCOHOL,MEDICAL (ETHANOL) - Abnormal; Notable for the following components:    Alcohol, Serum 198 (*)     All other components within normal limits   D DIMER, QUANTITATIVE - Abnormal; Notable for the following components:    D-Dimer 0.79 (*)     All other components within normal limits   BASIC METABOLIC PANEL - Abnormal; Notable for the following components:    Sodium 133 (*)     Potassium 2.6 (*)     Glucose 467 (*)     Creatinine 1.7 (*)     Calcium 8.0 (*)     eGFR 54 (*)     All other components within normal limits    Narrative:      [place] and glucose  critical result(s) called and verbal   readback obtained from Danielle Cervantes by [facility] 10/02/2022 21:47   PHOSPHORUS - Abnormal; Notable for the following components:    Phosphorus 2.4 (*)     All other components within normal limits   POCT GLUCOSE - Abnormal; Notable for the following components:    POCT Glucose >500 (*)     All other components within normal limits   ISTAT PROCEDURE - Abnormal; Notable for the following components:    POC PO2 38 (*)     POC HCO3 20.6 (*)     POC SATURATED O2 70 (*)     POC TCO2 22 (*)     All other components within normal limits   ISTAT LACTATE - Abnormal; Notable for the following components:    POC Lactate 9.11 (*)     All other components within normal limits   POCT GLUCOSE - Abnormal; Notable for the following components:    POCT Glucose 462 (*)     All other components within normal limits   MAGNESIUM   B-TYPE NATRIURETIC PEPTIDE   TROPONIN I   PROCALCITONIN   BETA - HYDROXYBUTYRATE, SERUM   URINALYSIS MICROSCOPIC    Narrative:     Specimen Source->Urine   MAGNESIUM   PHOSPHORUS   MAGNESIUM   POCT INFLUENZA A/B MOLECULAR   SARS-COV-2 RDRP GENE    Narrative:     This test  utilizes isothermal nucleic acid amplification   technology to detect the SARS-CoV-2 RdRp nucleic acid segment.   The analytical sensitivity (limit of detection) is 125 genome   equivalents/mL.   A POSITIVE result implies infection with the SARS-CoV-2 virus;   the patient is presumed to be contagious.     A NEGATIVE result means that SARS-CoV-2 nucleic acids are not   present above the limit of detection. A NEGATIVE result should be   treated as presumptive. It does not rule out the possibility of   COVID-19 and should not be the sole basis for treatment decisions.   If COVID-19 is strongly suspected based on clinical and exposure   history, re-testing using an alternate molecular assay should be   considered.   This test is only for use under the Food and Drug   Administration s Emergency Use Authorization (EUA).   Commercial kits are provided by [employer].   Performance characteristics of the EUA have been independently   verified by [facility] Department of   Pathology and Laboratory Medicine.   _________________________________________________________________   The authorized Fact Sheet for Healthcare Providers and the authorized Fact   Sheet for Patients of the ID NOW COVID-19 are available on the FDA   website:     https://www.fda.gov/media/817280/download  https://www.fda.gov/media/091065/download           POCT GLUCOSE MONITORING CONTINUOUS        ECG Results              EKG 12-lead (Preliminary result)  Result time 10/02/22 17:32:47      ED Interpretation by Elle Mathew MD (10/02/22 17:32:47, [facility], Emergency Medicine)    Normal sinus rhythm, rate 82 beats per minute, normal NE interval,  milliseconds.  Nonspecific ST changes in the inferior lateral leads.  No STEMI.                                  Imaging Results               CTA Chest Non-Coronary (PE Studies) (Final result)  Result time 10/02/22 20:34:41      Final result by Michael Amin MD  (10/02/22 20:34:41)                   Impression:      This report was flagged in Epic as abnormal.    1. Allowing for exam limitations above, no convincing pulmonary thromboembolism.  Correlation of these findings with D-dimer and/or lower extremity ultrasound as warranted.  2. Pulmonary nodules as described.  For multiple solid nodules all <6 mm, Fleischner Society 2017 guidelines recommend no routine follow up for a low risk patient, or follow up with non-contrast chest CT at 12 months after discovery in a high risk patient.  3. Scattered ground-glass attenuation throughout the pulmonary parenchyma, may reflect underlying edema or nonspecific pneumonitis, correlation is advised.  4. Possible hepatic steatosis, correlation with LFTs recommended.  5. Please see above for several additional findings.      Electronically signed by: Michael Amin MD  Date:    10/02/2022  Time:    20:34               Narrative:    EXAMINATION:  CTA CHEST NON CORONARY (PE STUDIES)    CLINICAL HISTORY:  Pulmonary embolism (PE) suspected, high prob;    TECHNIQUE:  Low dose axial images, sagittal and coronal reformations were obtained from the thoracic inlet to the lung bases following the IV administration of 100 mL of Omnipaque 350.  Contrast timing was optimized to evaluate the pulmonary arteries.  MIP images were performed.    COMPARISON:  CT abdomen and pelvis 09/01/2022    FINDINGS:  The structures at the base of the neck are unremarkable.  No significant mediastinal lymphadenopathy.  The heart is not enlarged.  No pericardial effusion.  The thoracic aorta tapers normally.  The visualized portions of the spleen, adrenal glands, pancreas and stomach are grossly unremarkable.  The liver is mildly hypoattenuating, may be on the basis of contrast phase however steatosis is a consideration, correlation is advised with LFTs.    Motion artifact limits evaluation of the airways and pulmonary parenchyma.  Allowing for the above, the airways  are patent.  There are a few scattered patchy regions of ground-glass attenuation suggesting edema or other nonspecific pneumonitis.  There is bilateral basilar dependent atelectasis.  No large focal consolidation.  No pneumothorax.  There is a pulmonary nodule within the right lower lobe measuring 3-4 mm, series 2, image 168.  There is a pulmonary nodule within the periphery of the left lower lobe, series 2, image 248.    Bolus timing is suboptimal for evaluation of pulmonary thromboembolism.  Additionally, examination is limited secondary to artifact from respiratory motion.  Allowing for the above, no convincing pulmonary arterial filling defect to the level of the proximal segmental branches bilaterally to suggest pulmonary thromboembolism.  Please note, there is particularly limited evaluation of the pulmonary arteries to the bilateral lower lobes on the basis of respiratory motion.    There are degenerative changes of the spine.  No significant axillary lymphadenopathy.  There is bilateral gynecomastia.                                       CT Pelvis Without Contrast (Final result)  Result time 10/02/22 19:20:05   Procedure changed from CT Pelvis With Contrast     Final result by Jd Lewis DO (10/02/22 19:20:05)                   Impression:      Fluid and air collection in the right perineum/scrotal wall, highly concerning for a residual or recurrent abscess.      Electronically signed by: Jd Lewis  Date:    10/02/2022  Time:    19:20               Narrative:    EXAMINATION:  CT PELVIS WITHOUT CONTRAST    CLINICAL HISTORY:  Soft tissue infection suspected, pelvis, xray done;    TECHNIQUE:  Multiplanar images were obtained of the pelvis from the iliac crests through the symphysis pubis without intravenous contrast.  Patient had an order for a CTA chest and pelvis. The patients IV had failed during the contrast bolus during the CTA chest. The provider said that she was ok if no contrast had reached  the pelvis and wanted scan to be read without contrast. The patient is being hydrated and another attempt will be made for the CTA chest.    COMPARISON:  CT of the abdomen and pelvis from 09/01/2022.    FINDINGS:  Bone: No aggressive osseous lesion.  No acute fracture or dislocation.  No evidence of acute osteomyelitis.    Articulations: The bilateral hips, pubic symphysis, and bilateral sacroiliac joints are unremarkable.  Partially imaged portions of the lower lumbar spine are unremarkable.    Soft tissues: There is a thick-walled collection of fluid and soft tissue gas in the right perineum/scrotal wall measuring 4.2 x 4.2 x 1.6 cm (AP by cc by TR), highly concerning for a residual/recurrent abscess.  Mild amount of surrounding soft tissue stranding which has significantly improved in comparison with the study from 09/01/2022.    Lymph nodes: Again seen are bilateral symmetric prominent inguinal lymph nodes, likely reactive and similar to prior.    Miscellaneous: Partially imaged portions of the internal pelvis are unremarkable.  The urinary bladder, prostate, and bowel are unremarkable.    Vasculature: There is atherosclerosis of the partially imaged bilateral femoral arteries.  Vasculature is otherwise unremarkable on this noncontrast CT.                                       X-Ray Chest AP Portable (Final result)  Result time 10/02/22 17:14:39      Final result by Santi Porter MD (10/02/22 17:14:39)                   Impression:      No acute process.      Electronically signed by: Santi Porter MD  Date:    10/02/2022  Time:    17:14               Narrative:    EXAMINATION:  XR CHEST AP PORTABLE    CLINICAL HISTORY:  Sepsis;    TECHNIQUE:  Single frontal view of the chest was performed.    COMPARISON:  09/01/2022.    FINDINGS:  Monitoring EKG leads are present.  The trachea is unremarkable.  The cardiomediastinal silhouette is within normal limits.  There is elevation of both hemidiaphragms.  There are no  pleural effusions.  There is no evidence of a pneumothorax.  There is no evidence of pneumomediastinum.  No airspace opacity is present.  The osseous structures are unremarkable.                                       Medications   dextrose 50% injection 25 g (has no administration in time range)   dextrose 50% injection 12.5 g (has no administration in time range)   insulin regular 1 Units/mL in sodium chloride 0.9% 100 mL infusion (1.6 Units/hr Intravenous Rate/Dose Change 10/3/22 1031)   vancomycin - pharmacy to dose (has no administration in time range)   cefTRIAXone (ROCEPHIN) 2 g/50 mL D5W IVPB (has no administration in time range)   multivitamin tablet (1 tablet Oral Given 10/2/22 2232)   thiamine tablet 100 mg (100 mg Oral Given 10/2/22 2232)   folic acid tablet 1 mg (1 mg Oral Given 10/2/22 2232)   HYDROmorphone injection 1 mg (1 mg Intravenous Given 10/2/22 2244)   HYDROmorphone injection 0.5 mg (has no administration in time range)   sodium chloride 0.9% flush 10 mL (has no administration in time range)   naloxone 0.4 mg/mL injection 0.02 mg (has no administration in time range)   heparin (porcine) injection 5,000 Units (has no administration in time range)   acetaminophen tablet 650 mg (has no administration in time range)   polyethylene glycol packet 17 g (has no administration in time range)   senna-docusate 8.6-50 mg per tablet 1 tablet (has no administration in time range)   ondansetron injection 4 mg (has no administration in time range)   prochlorperazine injection Soln 5 mg (has no administration in time range)   melatonin tablet 6 mg (6 mg Oral Given 10/2/22 2244)   aluminum-magnesium hydroxide-simethicone 200-200-20 mg/5 mL suspension 30 mL (has no administration in time range)   simethicone chewable tablet 80 mg (has no administration in time range)   vancomycin (VANCOCIN) 2,500 mg in dextrose 5 % 500 mL IVPB (2,500 mg Intravenous Trough Due As Scheduled Before Dose 10/4/22 1730)   lorazepam  injection 2 mg (has no administration in time range)   0.9 % NaCl with KCl 20 mEq infusion ( Intravenous New Bag 10/3/22 0216)   sodium chloride 0.9% bolus 1,914 mL (0 mLs Intravenous Stopped 10/2/22 1807)   vancomycin (VANCOCIN) 2,000 mg in dextrose 5 % 500 mL IVPB (0 mg Intravenous Stopped 10/2/22 2039)   piperacillin-tazobactam 4.5 g in dextrose 5 % 100 mL IVPB (ready to mix system) (0 g Intravenous Stopped 10/2/22 1836)   ondansetron injection 4 mg (4 mg Intravenous Given 10/2/22 1709)   potassium chloride 10 mEq in 100 mL IVPB (0 mEq Intravenous Stopped 10/2/22 2115)   potassium bicarbonate disintegrating tablet 20 mEq (20 mEq Oral Given 10/2/22 1840)   iohexoL (OMNIPAQUE 350) injection 100 mL (100 mLs Intravenous Given 10/2/22 1820)   morphine injection 4 mg (4 mg Intravenous Given 10/2/22 1917)   sodium chloride 0.9% bolus 1,000 mL (0 mLs Intravenous Stopped 10/2/22 2038)   potassium chloride 10 mEq in 100 mL IVPB (0 mEq Intravenous Stopped 10/3/22 0033)   potassium bicarbonate disintegrating tablet 20 mEq (20 mEq Oral Given 10/2/22 2232)   sodium chloride 0.9% bolus 500 mL (0 mLs Intravenous Stopped 10/2/22 2337)   insulin regular bolus from bag/infusion 9.84 Units (9.84 Units Intravenous Bolus from Bag 10/2/22 2325)   magnesium sulfate in dextrose IVPB (premix) 1 g (0 g Intravenous Stopped 10/2/22 2333)   potassium chloride 10 mEq in 100 mL IVPB (10 mEq Intravenous New Bag 10/3/22 0313)     Medical Decision Making:   Initial Assessment:   32-year-old male presenting to the emergency department with left-sided chest pain since falling, alcohol use, groin and scrotal pain status post I and D of scrotal abscess, hyperglycemic not taking insulin x4 days.  Exam notable for hypotension, on my exam, patient's blood pressure 100/53.  He has reproducible left anterior chest wall tenderness.  The patient has generalized erythema, tenderness in the groin and scrotum, incision noted to the right scrotum with no packing  in place, no active drainage.  The patient's differential is broad at this time and includes but not limited to rib fracture, PE, pneumonia, Maycol's gangrene, cellulitis, recurrent abscess of the scrotum, DKA, hyperglycemia, electrolyte disturbance, alcohol intoxication, toxidrome.  Workup initiated with labs, cultures, cardiac enzymes, D-dimer.  Will get CTA chest, CT abdomen pelvis.  Will treat with IV fluids, initiate vanc, Zosyn.  ED Management:  Care signed out to Dr. Sepulveda pending CTA, reassessment, and final disposition.   Elle Mathew MD   7:18 PM               ED Course as of 10/03/22 0323   Sun Oct 02, 2022   1814 Case reviewed with Dr. Gutierrez- states ok to do CT of pelvis without contrast.  She is very familiar with this patient- does not feel symptoms consistent with Maycol's gangrene as the patient does not have a leukocytosis.  Pro calor also is normal.  Discussed with the Radiology Department, still feel strongly about getting a CTA given elevated lactic acid, hypotension, chest pain, shortness of breath, elevated D-dimer.  Will proceed with CTA, will hydrate patient with IV fluids. [LH]   1842 Patient's IV infiltrated, did not receive full contrast load, will get IV hydration in reinsert shin of IV.  I have asked the CT tech to send off images of the CT pelvis. [LH]   1854 CT pelvis reviewed by Dr. Contreras- recommends wet to dry dressing. [LH]      ED Course User Index  [LH] Elle Mathew MD          Pt pain controlled. CT chest shows no PE or rib fracture. Does show Pneumonitis. Pt has erythema that could be consistent with cellulitis to perineum and bilateral groins. I did not palpate crepitus. CT pelvis done without contrast due to iv infiltration. Arm does not appear concern for compartment syndrome. DR Contreras with Urology reviewed the imaging and does not feel acute surgical interventions needed. Initially hypotensive. Responded to fluids Potassium 2.2. Pt has been drinking  heavy amounts of alcohol and not eating. No dka.  LActic acid 9. Will  admit to the ICU overnight.        Clinical Impression:   Final diagnoses:  [R06.02] Shortness of breath  [K85.90] Acute pancreatitis, unspecified complication status, unspecified pancreatitis type (Primary)  [F10.929] Alcoholic intoxication with complication  [R79.89] High serum lactic acid  [E13.65, Z79.4] Other specified diabetes mellitus with hyperglycemia, with long-term current use of insulin  [D64.9] Anemia, unspecified type  [E87.6] Hypokalemia  [L03.314] Cellulitis of groin     This dictation has been generated using M-Modal Fluency Direct dictation; some phonetic errors may occur.      ED Disposition Condition    Admit                 Tung Sepulveda MD  10/03/22 0327

## 2022-10-02 NOTE — ED TRIAGE NOTES
Pt reports to the ED with C/O SOB, right scrotal abscess, hyperglycemia, and a fall x 2 weeks ago. Pt also admits ETOH use today try and help with the pain. Pt was hypotensive on EMS arrival. Pt has to open wounds. Pt reports the pain is his right scrotum feels like a shooting pain. Pt reports a fall x 2 weeks ago. Pt reports scant amount of drainage from the site and that is green and brown in color. Pt reports some difficulty with urination. Pt also reports pain around the left axilla D/T fall. Pt reports for the past couple of days I have had N/V/D and difficult urinating. Pt AAOx4, RESP easy and unlabored. ED workup in progress.

## 2022-10-03 PROBLEM — E87.20 LACTIC ACIDOSIS: Status: ACTIVE | Noted: 2022-01-01

## 2022-10-03 NOTE — ASSESSMENT & PLAN NOTE
"NPO for Urology  Per ED staff, Dr. Sepulveda, Urology reviewed the CT.  He noted, "Dr Contreras reviewed the image and stated she did not see anything emergent."  Holding NPO for formal Urology evaluation in the am.    CT pelvis without contrast showed  that there is a fluid and air collection in the right perineum/scrotal wall, highly concerning for a residual or recurrent abscess.  This is a thick-walled collection of fluid and soft tissue gas in the right perineum/scrotal wall measuring 4.2 x 4.2 x 1.6 cm (AP by cc by TR), highly concerning for a residual/recurrent abscess.  Mild amount of surrounding soft tissue stranding which has significantly improved in comparison with the study from 09/01/2022.         "

## 2022-10-03 NOTE — ASSESSMENT & PLAN NOTE
  folic acid tablet 1 mg, 1 mg, Oral, Daily     thiamine tablet 100 mg, 100 mg, Oral, Daily     multivitamin tablet, 1 tablet, Oral, Daily     CIWA q 6 hours  Ativan PRN

## 2022-10-03 NOTE — CONSULTS
[facility] - Intensive Care  Urology  Consult Note    Patient Name: Doe Woodall  MRN: [id number]  Admission Date: 10/2/2022  Hospital Length of Stay: 1   Code Status: Full Code   Attending Provider: Armando So MD   Consulting Provider: ARIC Aguilar MD  Primary Care Physician: [facility]  Principal Problem:Abscess of scrotum    Inpatient consult to Urology  Consult performed by: KAITY Aguilar MD  Consult ordered by: KAITY Ruff MD          Subjective:     HPI:  Scrotal Abscess  He had an abscess drained on 9/9/2022.  He packed the wounds until he was unable to keep the packing in place.  He was unable to get his pain medication refilled.  He is back in the hospital after a fall.  He has some difficulty breathing.  He is having some pain in his scrotum.      Past Medical History:   Diagnosis Date    Abscess of right groin 09/01/2022    Diabetes mellitus     Encounter for blood transfusion     Loud snoring     Obese     Other insomnia 08/03/2020    Perineal abscess 08/01/2014    Primary hypertension 10/2/2022       Past Surgical History:   Procedure Laterality Date    ABCESS DRAINAGE  2014    PERIRECTAL    DECOMPRESSION OF LUMBAR SPINE USING MINIMALLY INVASIVE TECHNIQUE Right 07/06/2018    Procedure: DECOMPRESSION, SPINE, LUMBAR, MINIMALLY INVASIVE L3-4;  Surgeon: Cristian Cervantes MD;  Location: [facility] OR [address] [place] [address];  Service: Neurosurgery;  Laterality: Right;    INCISION AND DRAINAGE N/A 9/9/2022    Procedure: INCISION AND DRAINAGE GROIN;  Surgeon: KAITY Aguilar MD;  Location: [facility];  Service: Urology;  Laterality: N/A;    ORTHOPEDIC SURGERY         Review of patient's allergies indicates:   Allergen Reactions    Metformin Diarrhea       Family History       Problem Relation (Age of Onset)    Diabetes Father, Paternal Grandmother, Paternal Grandfather            Tobacco Use    Smoking status: Former     Packs/day: 0.50     Years: 9.00     Pack years: 4.50  "    Types: Cigarettes     Quit date: 2013     Years since quittin.2    Smokeless tobacco: Former   Substance and Sexual Activity    Alcohol use: Not Currently     Comment: DAILY PINT OF LIQUOR, HX OF 1 "TALL BOY" OF BEER DAILY    Drug use: Not Currently    Sexual activity: Yes     Partners: Female     Birth control/protection: None       Review of Systems   Constitutional: Negative.    HENT: Negative.     Eyes: Negative.    Respiratory:  Negative for cough, chest tightness and shortness of breath.    Cardiovascular:  Negative for chest pain.   Gastrointestinal: Negative.  Negative for constipation, diarrhea and nausea.   Genitourinary:  Positive for scrotal swelling.   Musculoskeletal: Negative.    Neurological: Negative.    Psychiatric/Behavioral: Negative.       Objective:     Temp:  [98 °F (36.7 °C)-99.2 °F (37.3 °C)] 98 °F (36.7 °C)  Pulse:  [78-97] 79  Resp:  [12-38] 20  SpO2:  [93 %-100 %] 98 %  BP: ()/(48-94) 132/78     Body mass index is 36.06 kg/m².           Drains       None                   Physical Exam  Vitals and nursing note reviewed.   Constitutional:       Appearance: He is well-developed.   HENT:      Head: Normocephalic.   Eyes:      Conjunctiva/sclera: Conjunctivae normal.   Neck:      Thyroid: No thyromegaly.      Trachea: No tracheal deviation.   Cardiovascular:      Rate and Rhythm: Normal rate.      Heart sounds: Normal heart sounds.   Pulmonary:      Effort: Pulmonary effort is normal. No respiratory distress.      Breath sounds: Normal breath sounds. No wheezing.   Abdominal:      General: Bowel sounds are normal.      Palpations: Abdomen is soft.      Tenderness: There is no abdominal tenderness. There is no rebound.      Hernia: No hernia is present.   Genitourinary:     Comments: Buried penis  Scrotum without edema  Wounds in right lateral scrotum open, no drainage.  Probed with slight tracking inferiorly.  No fluctuant areas  Musculoskeletal:         General: No " tenderness. Normal range of motion.      Cervical back: Normal range of motion and neck supple.   Lymphadenopathy:      Cervical: No cervical adenopathy.   Skin:     General: Skin is warm and dry.      Findings: No erythema or rash.   Neurological:      Mental Status: He is alert and oriented to person, place, and time.   Psychiatric:         Behavior: Behavior normal.         Thought Content: Thought content normal.         Judgment: Judgment normal.       Significant Labs:    BMP:  Recent Labs   Lab 10/02/22  2113 10/03/22  0045 10/03/22  0447   * 134* 135*   K 2.6* 2.9* 3.3*   CL 97 99 101   CO2 23 22* 24   BUN 9 8 8   CREATININE 1.7* 1.5* 1.4   CALCIUM 8.0* 8.2* 8.2*       CBC:  Recent Labs   Lab 10/02/22  1657 10/03/22  0447   WBC 8.09 9.09   HGB 10.2* 9.9*   HCT 27.6* 27.4*    209       Blood Culture:   Recent Labs   Lab 10/02/22  1658   LABBLOO No Growth to date  No Growth to date     Urine Culture: No results for input(s): LABURIN in the last 168 hours.    Significant Imaging:  CT: I have reviewed all results within the past 24 hours and my personal findings are:  right hemiscrotum with much improved sign of abscess.                        Assessment and Plan:     * Abscess of scrotum  Wound packed  He may need to go back to OR, but no fluctuant area on current exam  Okay to eat today.        VTE Risk Mitigation (From admission, onward)         Ordered     enoxaparin injection 40 mg  Daily         10/03/22 0824     Place ROBY hose  Until discontinued         10/02/22 2211     IP VTE HIGH RISK PATIENT  Once         10/02/22 2211     Place sequential compression device  Until discontinued         10/02/22 2211     Place sequential compression device  Until discontinued         10/02/22 2156     Place ROBY hose  Until discontinued         10/02/22 2156                Thank you for your consult. I will follow-up with patient. Please contact us if you have any additional questions.    ARIC Aguilar,  MD  Urology  [facility] - Intensive Care

## 2022-10-03 NOTE — ASSESSMENT & PLAN NOTE
This patient does have evidence of infective focus  My overall impression is septic shock.  Source: scrotal abscess  Antibiotics given-   Antibiotics (From admission, onward)    Start     Stop Route Frequency Ordered    10/03/22 1830  vancomycin (VANCOCIN) 2,500 mg in dextrose 5 % 500 mL IVPB         -- IV Every 24 hours (non-standard times) 10/02/22 2255    10/03/22 0600  cefTRIAXone (ROCEPHIN) 2 g/50 mL D5W IVPB         -- IV Every 24 hours (non-standard times) 10/02/22 2200    10/02/22 2259  vancomycin - pharmacy to dose  (vancomycin IVPB)        See [facility] for full Linked Orders Report.    -- IV pharmacy to manage frequency 10/02/22 2200        Latest lactate reviewed-  Recent Labs   Lab 10/02/22  1657 10/02/22  2113   LACTATE 9.6* 4.0*     Organ dysfunction indicated by Acute kidney injury    Shock with decreased perfusion noted, Fluid challenge  was given at 30cc/kg.    Post- resuscitation assessment- (Done after fluids given for shock)  He was initially hypotensive: In the ED his VS's were BP 80/48  Vital signs post fluid administrations were-  Temp Readings from Last 1 Encounters:   10/03/22 98 °F (36.7 °C) (Oral)     BP Readings from Last 1 Encounters:   10/03/22 132/78     Pulse Readings from Last 1 Encounters:   10/03/22 79       · Perfusion exam was performed within 6 hours of septic shock presentation after bolus shows Adequate tissue perfusion assessed by non-invasive monitoring  · Will Not start Pressors- Levophed for MAP of 65  · Source control achieved by: IV antibiotics and Urology consult for surgical drainage if needed

## 2022-10-03 NOTE — HPI
Scrotal Abscess  He had an abscess drained on 9/9/2022.  He packed the wounds until he was unable to keep the packing in place.  He was unable to get his pain medication refilled.  He is back in the hospital after a fall.  He has some difficulty breathing.  He is having some pain in his scrotum.

## 2022-10-03 NOTE — ASSESSMENT & PLAN NOTE
· Lactic acid of 9.6, has since improved with fluids in insulin drip, lactic acid 4.0  on repeat.  · Continuing fluids and abx, repeat LA

## 2022-10-03 NOTE — PROGRESS NOTES
Pharmacokinetic Initial Assessment: IV Vancomycin    Assessment/Plan:    Initiate intravenous vancomycin with loading dose of 2000 mg once followed by a maintenance dose of vancomycin 2500 mg IV every 24 hours  Desired empiric serum trough concentration is 10 to 20 mcg/mL  Draw vancomycin trough level 60 min prior to third dose on 10/4/22 at approximately 1730  Pharmacy will continue to follow and monitor vancomycin.      Please contact pharmacy at extension [phone] with any questions regarding this assessment.     Thank you for the consult,   Jae Whyte       Patient brief summary:  Doe Woodall is a 32 y.o. male initiated on antimicrobial therapy with IV Vancomycin for treatment of suspected skin & soft tissue infection    Drug Allergies:   Review of patient's allergies indicates:   Allergen Reactions    Metformin Diarrhea       Actual Body Weight:   98.3 kg    Renal Function:   Estimated Creatinine Clearance: 66.6 mL/min (A) (based on SCr of 1.7 mg/dL (H)).,     Dialysis Method (if applicable):  N/A    CBC (last 72 hours):  Recent Labs   Lab Result Units 10/02/22  1657   WBC K/uL 8.09   Hemoglobin g/dL 10.2*   Hematocrit % 27.6*   Platelets K/uL 227   Gran % % 45.4   Lymph % % 37.0   Mono % % 15.5*   Eosinophil % % 1.2   Basophil % % 0.5   Differential Method  Automated       Metabolic Panel (last 72 hours):  Recent Labs   Lab Result Units 10/02/22  1657 10/02/22  2039 10/02/22  2113   Sodium mmol/L 133*  --  133*   Potassium mmol/L 2.2*  --  2.6*   Chloride mmol/L 93*  --  97   CO2 mmol/L 17*  --  23   Glucose mg/dL 554*  --  467*   Glucose, UA   --  4+*  --    BUN mg/dL 11  --  9   Creatinine mg/dL 1.9*  --  1.7*   Creatinine, Urine mg/dL  --  19.2*  --    Albumin g/dL 2.8*  --   --    Total Bilirubin mg/dL 0.4  --   --    Alkaline Phosphatase U/L 411*  --   --    AST U/L 38  --   --    ALT U/L 32  --   --    Magnesium mg/dL 1.6  --  1.6   Phosphorus mg/dL 1.7*  --  2.4*       Drug levels (last 3  results):  No results for input(s): VANCOMYCINRA, VANCORANDOM, VANCOMYCINPE, VANCOPEAK, VANCOMYCINTR, VANCOTROUGH in the last 72 hours.    Microbiologic Results:  Microbiology Results (last 7 days)       Procedure Component Value Units Date/Time    Blood culture x two cultures. Draw prior to antibiotics. [[id number]] Collected: 10/02/22 1658    Order Status: Sent Specimen: Blood from Peripheral, Antecubital, Right Updated: 10/02/22 1710    Blood culture x two cultures. Draw prior to antibiotics. [[id number]] Collected: 10/02/22 1658    Order Status: Sent Specimen: Blood from Peripheral, Antecubital, Left Updated: 10/02/22 1710

## 2022-10-03 NOTE — ASSESSMENT & PLAN NOTE
Patient's FSGs are uncontrolled due to hyperglycemia on current medication regimen.  Last A1c reviewed-   Lab Results   Component Value Date    HGBA1C 12.6 (H) 01/04/2022     Most recent fingerstick glucose reviewed-   Recent Labs   Lab 10/02/22  1616 10/02/22  2028 10/02/22  2324   POCTGLUCOSE >500* 462* 463*     Current correctional scale  High  Increase anti-hyperglycemic dose as follows-   Antihyperglycemics (From admission, onward)    Start     Stop Route Frequency Ordered    10/02/22 2300  insulin regular 1 Units/mL in sodium chloride 0.9% 100 mL infusion        Question Answer Comment   Insulin Rate Adjustment (DO NOT MODIFY ANSWER) [email or web address]    Enter initial dose from Infusion Protocol Chart (Units/hr): 4        -- IV Continuous 10/02/22 2156        Hold Oral hypoglycemics while patient is in the hospital.

## 2022-10-03 NOTE — ASSESSMENT & PLAN NOTE
Though the CT pelvis was supposed to be with iv contrast it infiltrated  The NC CT pelvis did not make mention of any necrotizing fasciitis  Continue broad spectrum antibiotics as noted below

## 2022-10-03 NOTE — ASSESSMENT & PLAN NOTE
  folic acid tablet 1 mg, 1 mg, Oral, Daily     thiamine tablet 100 mg, 100 mg, Oral, Daily     multivitamin tablet, 1 tablet, Oral, Daily     · CIWA q 6 hours  · Ativan PRN  · Acute alcohol intoxication of 200, per patient he was drinking to numb the pain in his scrotum.

## 2022-10-03 NOTE — SUBJECTIVE & OBJECTIVE
Past Medical History:   Diagnosis Date    Abscess of right groin 09/01/2022    Diabetes mellitus     Encounter for blood transfusion     Loud snoring     Obese     Other insomnia 08/03/2020    Perineal abscess 08/01/2014       Past Surgical History:   Procedure Laterality Date    ABCESS DRAINAGE  2014    PERIRECTAL    DECOMPRESSION OF LUMBAR SPINE USING MINIMALLY INVASIVE TECHNIQUE Right 07/06/2018    Procedure: DECOMPRESSION, SPINE, LUMBAR, MINIMALLY INVASIVE L3-4;  Surgeon: Cristian Cervantes MD;  Location: [facility] OR [address] [place] [address];  Service: Neurosurgery;  Laterality: Right;    INCISION AND DRAINAGE N/A 9/9/2022    Procedure: INCISION AND DRAINAGE GROIN;  Surgeon: KAITY Aguilar MD;  Location: [facility] OR;  Service: Urology;  Laterality: N/A;    ORTHOPEDIC SURGERY         Review of patient's allergies indicates:   Allergen Reactions    Metformin Diarrhea       No current facility-administered medications on file prior to encounter.     Current Outpatient Medications on File Prior to Encounter   Medication Sig    acetaminophen (TYLENOL) 500 MG tablet Take 1,000 mg by mouth 3 (three) times daily as needed for Pain.    amLODIPine (NORVASC) 10 MG tablet Take 1 tablet (10 mg total) by mouth once daily.    blood sugar diagnostic Strp 1 strip by Misc.(Non-Drug; Combo Route) route 4 (four) times daily with meals and nightly.    blood-glucose meter Misc Use as instructed    diphenhydramine HCl (UNISOM SLEEPGELS ORAL) Take 1 capsule by mouth every evening.    docusate sodium (COLACE) 100 MG capsule Take 1 capsule (100 mg total) by mouth 3 (three) times daily as needed for Constipation.    folic acid (FOLVITE) 1 MG tablet Take 1 tablet (1 mg total) by mouth once daily.    glucagon (GLUCAGEN HYPOKIT) 1 mg SolR Inject 1 mg into the muscle as needed (Hypoglycemia).    HYDROcodone-acetaminophen (NORCO) 5-325 mg per tablet Take 1 tablet by mouth every 6 (six) hours as needed (dressing changes).    insulin aspart U-100 (NOVOLOG) 100 unit/mL (3  "mL) InPn pen Inject 7 Units into the skin 3 (three) times daily.    insulin detemir U-100 (LEVEMIR FLEXTOUCH) 100 unit/mL (3 mL) SubQ InPn pen Inject 30 Units into the skin once daily.    lancets 30 gauge Misc 1 lancet by Misc.(Non-Drug; Combo Route) route 4 (four) times daily with meals and nightly.    lancing device Misc Use as instructed    pen needle, diabetic (BD ULTRA-FINE TJ PEN NEEDLE) 32 gauge x 5/32" Ndle Use 4 times daily    pen needle, diabetic 31 gauge x 5/16" Ndle 1 each by Misc.(Non-Drug; Combo Route) route 4 (four) times daily with meals and nightly.    pen needle, diabetic 32 gauge x 5/32" Ndle USE TO INJECT INSULIN FOUR TIMES DAILY    pulse oximeter (PULSE OXIMETER) device by Apply Externally route 2 (two) times a day. Use twice daily at 8 AM and 3 PM and record the value in MyChart as directed.    sodium chlor-hypochlorous acid 0.033 % IrSl Irrigate with 25 mLs as directed once daily.    thiamine 100 MG tablet Take 1 tablet (100 mg total) by mouth once daily.     Family History       Problem Relation (Age of Onset)    Diabetes Father, Paternal Grandmother, Paternal Grandfather          Tobacco Use    Smoking status: Former     Packs/day: 0.50     Years: 9.00     Pack years: 4.50     Types: Cigarettes     Quit date: 2013     Years since quittin.2    Smokeless tobacco: Former   Substance and Sexual Activity    Alcohol use: Not Currently     Comment: DAILY PINT OF LIQUOR, HX OF 1 "TALL BOY" OF BEER DAILY    Drug use: Not Currently    Sexual activity: Yes     Partners: Female     Birth control/protection: None     Review of Systems   Constitutional:  Positive for activity change, appetite change and fatigue. Negative for fever.   Eyes:  Negative for visual disturbance.   Respiratory:  Negative for shortness of breath.    Cardiovascular:  Positive for chest pain.   Gastrointestinal:  Positive for nausea.   Endocrine: Negative for heat intolerance.   Genitourinary:  Positive for difficulty " urinating, scrotal swelling and testicular pain.   Musculoskeletal:  Positive for myalgias.   Skin:  Positive for color change and wound.   Allergic/Immunologic: Negative for immunocompromised state.   Neurological:  Positive for light-headedness.   Hematological:  Does not bruise/bleed easily.   Psychiatric/Behavioral:  Negative for decreased concentration and dysphoric mood.    Objective:     Vital Signs (Most Recent):  Temp: 98.3 °F (36.8 °C) (10/02/22 2247)  Pulse: 88 (10/02/22 2315)  Resp: 17 (10/02/22 2315)  BP: (!) 154/82 (10/02/22 2300)  SpO2: 100 % (10/02/22 2315)   Vital Signs (24h Range):  Temp:  [98.3 °F (36.8 °C)-99.2 °F (37.3 °C)] 98.3 °F (36.8 °C)  Pulse:  [81-97] 88  Resp:  [12-38] 17  SpO2:  [95 %-100 %] 100 %  BP: ()/(48-83) 154/82     Weight: 98.3 kg (216 lb 11.4 oz)  Body mass index is 36.06 kg/m².    Physical Exam  Constitutional:       General: He is not in acute distress.     Appearance: He is obese. He is ill-appearing. He is not toxic-appearing or diaphoretic.   HENT:      Head: Normocephalic and atraumatic.   Eyes:      Conjunctiva/sclera:      Right eye: Right conjunctiva is injected.      Left eye: Left conjunctiva is injected.   Neck:      Thyroid: No thyromegaly.   Cardiovascular:      Rate and Rhythm: Regular rhythm. Tachycardia present.      Heart sounds: Murmur heard.   Systolic murmur is present with a grade of 1/6.   Pulmonary:      Effort: Pulmonary effort is normal. No tachypnea or bradypnea.      Breath sounds: No decreased breath sounds, wheezing, rhonchi or rales.   Chest:      Chest wall: Tenderness present. No swelling.   Abdominal:      General: Abdomen is protuberant. Bowel sounds are normal. There is no distension.      Palpations: Abdomen is soft.      Tenderness: There is no abdominal tenderness.      Comments: Truncal obesity   Genitourinary:     Comments: No gabriel in place.  Left groin with an open area consistent with abscess s/p I&D in the past.  There is no  drainage.  The surrounding area is reddened.  See pic.  Musculoskeletal:      Cervical back: Neck supple.   Lymphadenopathy:      Comments: Trace edema to legs   Skin:     Coloration: Skin is sallow.      Findings: No rash.      Comments: Multiple tattoos   Neurological:      General: No focal deficit present.      Mental Status: He is alert and oriented to person, place, and time.      GCS: GCS eye subscore is 4. GCS verbal subscore is 5. GCS motor subscore is 6.   Psychiatric:         Attention and Perception: Attention and perception normal.         Mood and Affect: Mood is depressed.         Speech: Speech normal.         Behavior: Behavior normal. Behavior is cooperative.         Cognition and Memory: Cognition and memory normal.           Significant Labs: All pertinent labs within the past 24 hours have been reviewed.  Recent Results (from the past 24 hour(s))   POCT glucose    Collection Time: 10/02/22  4:16 PM   Result Value Ref Range    POCT Glucose >500 (H) 70 - 110 mg/dL   CBC auto differential    Collection Time: 10/02/22  4:57 PM   Result Value Ref Range    WBC 8.09 3.90 - 12.70 K/uL    RBC 2.90 (L) 4.60 - 6.20 M/uL    Hemoglobin 10.2 (L) 14.0 - 18.0 g/dL    Hematocrit 27.6 (L) 40.0 - 54.0 %    MCV 95 82 - 98 fL    MCH 35.2 (H) 27.0 - 31.0 pg    MCHC 37.0 (H) 32.0 - 36.0 g/dL    RDW 13.2 11.5 - 14.5 %    Platelets 227 150 - 450 K/uL    MPV 9.9 9.2 - 12.9 fL    Immature Granulocytes 0.4 0.0 - 0.5 %    Gran # (ANC) 3.7 1.8 - 7.7 K/uL    Immature Grans (Abs) 0.03 0.00 - 0.04 K/uL    Lymph # 3.0 1.0 - 4.8 K/uL    Mono # 1.3 (H) 0.3 - 1.0 K/uL    Eos # 0.1 0.0 - 0.5 K/uL    Baso # 0.04 0.00 - 0.20 K/uL    nRBC 0 0 /100 WBC    Gran % 45.4 38.0 - 73.0 %    Lymph % 37.0 18.0 - 48.0 %    Mono % 15.5 (H) 4.0 - 15.0 %    Eosinophil % 1.2 0.0 - 8.0 %    Basophil % 0.5 0.0 - 1.9 %    Differential Method Automated    Comprehensive metabolic panel    Collection Time: 10/02/22  4:57 PM   Result Value Ref Range     Sodium 133 (L) 136 - 145 mmol/L    Potassium 2.2 (LL) 3.5 - 5.1 mmol/L    Chloride 93 (L) 95 - 110 mmol/L    CO2 17 (L) 23 - 29 mmol/L    Glucose 554 (HH) 70 - 110 mg/dL    BUN 11 6 - 20 mg/dL    Creatinine 1.9 (H) 0.5 - 1.4 mg/dL    Calcium 8.6 (L) 8.7 - 10.5 mg/dL    Total Protein 6.8 6.0 - 8.4 g/dL    Albumin 2.8 (L) 3.5 - 5.2 g/dL    Total Bilirubin 0.4 0.1 - 1.0 mg/dL    Alkaline Phosphatase 411 (H) 55 - 135 U/L    AST 38 10 - 40 U/L    ALT 32 10 - 44 U/L    Anion Gap 23 (H) 8 - 16 mmol/L    eGFR 47 (A) >60 mL/min/1.73 m^2   Lactic acid, plasma #1    Collection Time: 10/02/22  4:57 PM   Result Value Ref Range    Lactate (Lactic Acid) 9.6 (HH) 0.5 - 2.2 mmol/L   Magnesium    Collection Time: 10/02/22  4:57 PM   Result Value Ref Range    Magnesium 1.6 1.6 - 2.6 mg/dL   Phosphorus    Collection Time: 10/02/22  4:57 PM   Result Value Ref Range    Phosphorus 1.7 (L) 2.7 - 4.5 mg/dL   Brain natriuretic peptide    Collection Time: 10/02/22  4:57 PM   Result Value Ref Range    BNP 41 0 - 99 pg/mL   Troponin I    Collection Time: 10/02/22  4:57 PM   Result Value Ref Range    Troponin I <0.006 0.000 - 0.026 ng/mL   Lipase    Collection Time: 10/02/22  4:57 PM   Result Value Ref Range    Lipase 98 (H) 4 - 60 U/L   Procalcitonin    Collection Time: 10/02/22  4:57 PM   Result Value Ref Range    Procalcitonin 0.21 <0.25 ng/mL   Ethanol    Collection Time: 10/02/22  4:57 PM   Result Value Ref Range    Alcohol, Serum 198 (H) <10 mg/dL   D dimer, quantitative    Collection Time: 10/02/22  4:57 PM   Result Value Ref Range    D-Dimer 0.79 (H) <0.50 mg/L FEU   Beta - Hydroxybutyrate, Serum    Collection Time: 10/02/22  4:57 PM   Result Value Ref Range    Beta-Hydroxybutyrate 0.2 0.0 - 0.5 mmol/L   ISTAT PROCEDURE    Collection Time: 10/02/22  5:08 PM   Result Value Ref Range    POC PH 7.351 7.35 - 7.45    POC PCO2 37.3 35 - 45 mmHg    POC PO2 38 (L) 40 - 60 mmHg    POC HCO3 20.6 (L) 24 - 28 mmol/L    POC BE -4 -2 to 2 mmol/L     POC SATURATED O2 70 (L) 95 - 100 %    POC TCO2 22 (L) 24 - 29 mmol/L    Sample VENOUS     Site Other     Allens Test N/A    ISTAT Lactate    Collection Time: 10/02/22  5:10 PM   Result Value Ref Range    POC Lactate 9.11 (HH) 0.5 - 2.2 mmol/L    Sample VENOUS     Site Other     Allens Test N/A    POCT COVID-19 Rapid Screening    Collection Time: 10/02/22  5:21 PM   Result Value Ref Range    POC Rapid COVID Negative Negative     Acceptable Yes    POCT Influenza A/B Molecular    Collection Time: 10/02/22  5:21 PM   Result Value Ref Range    POC Molecular Influenza A Ag Negative Negative, Not Reported    POC Molecular Influenza B Ag Negative Negative, Not Reported     Acceptable Yes    POCT glucose    Collection Time: 10/02/22  8:28 PM   Result Value Ref Range    POCT Glucose 462 (HH) 70 - 110 mg/dL   Urinalysis, Reflex to Urine Culture Urine, Clean Catch    Collection Time: 10/02/22  8:39 PM    Specimen: Urine   Result Value Ref Range    Specimen UA Urine, Clean Catch     Color, UA Colorless (A) Yellow, Straw, Leticia    Appearance, UA Clear Clear    pH, UA 7.0 5.0 - 8.0    Specific Gravity, UA 1.010 1.005 - 1.030    Protein, UA 1+ (A) Negative    Glucose, UA 4+ (A) Negative    Ketones, UA Negative Negative    Bilirubin (UA) Negative Negative    Occult Blood UA Negative Negative    Nitrite, UA Negative Negative    Urobilinogen, UA Negative <2.0 EU/dL    Leukocytes, UA Negative Negative   Drug screen panel, emergency    Collection Time: 10/02/22  8:39 PM   Result Value Ref Range    Benzodiazepines Negative Negative    Methadone metabolites Negative Negative    Cocaine (Metab.) Negative Negative    Opiate Scrn, Ur Presumptive Positive (A) Negative    Barbiturate Screen, Ur Negative Negative    Amphetamine Screen, Ur Negative Negative    THC Negative Negative    Phencyclidine Negative Negative    Creatinine, Urine 19.2 (L) 23.0 - 375.0 mg/dL    Toxicology Information SEE COMMENT    Urinalysis  Microscopic    Collection Time: 10/02/22  8:39 PM   Result Value Ref Range    RBC, UA 1 0 - 4 /hpf    WBC, UA 2 0 - 5 /hpf    Bacteria None None-Occ /hpf    Yeast, UA None None    Hyaline Casts, UA 0 0-1/lpf /lpf    Microscopic Comment SEE COMMENT    Lactic acid, plasma #2    Collection Time: 10/02/22  9:13 PM   Result Value Ref Range    Lactate (Lactic Acid) 4.0 (HH) 0.5 - 2.2 mmol/L   Basic metabolic panel    Collection Time: 10/02/22  9:13 PM   Result Value Ref Range    Sodium 133 (L) 136 - 145 mmol/L    Potassium 2.6 (LL) 3.5 - 5.1 mmol/L    Chloride 97 95 - 110 mmol/L    CO2 23 23 - 29 mmol/L    Glucose 467 (HH) 70 - 110 mg/dL    BUN 9 6 - 20 mg/dL    Creatinine 1.7 (H) 0.5 - 1.4 mg/dL    Calcium 8.0 (L) 8.7 - 10.5 mg/dL    Anion Gap 13 8 - 16 mmol/L    eGFR 54 (A) >60 mL/min/1.73 m^2   Phosphorus    Collection Time: 10/02/22  9:13 PM   Result Value Ref Range    Phosphorus 2.4 (L) 2.7 - 4.5 mg/dL   Magnesium    Collection Time: 10/02/22  9:13 PM   Result Value Ref Range    Magnesium 1.6 1.6 - 2.6 mg/dL   Iron and TIBC    Collection Time: 10/02/22 10:31 PM   Result Value Ref Range    Iron 66 45 - 160 ug/dL    Transferrin 208 200 - 375 mg/dL    TIBC 308 250 - 450 ug/dL    Saturated Iron 21 20 - 50 %   CK    Collection Time: 10/02/22 10:31 PM   Result Value Ref Range    CPK 48 20 - 200 U/L   POCT glucose    Collection Time: 10/02/22 11:24 PM   Result Value Ref Range    POCT Glucose 463 (HH) 70 - 110 mg/dL   Contains abnormal data Aerobic culture  Order: [id number]  Status: Final result     Visible to patient: Yes (seen)     Next appt: 10/18/2022 at 11:15 AM in Urology (ARIC Aguilar MD)     Specimen Information: Groin; Abscess   0 Result Notes  Component 3 wk ago    Aerobic Bacterial Culture  Abnormal   STREPTOCOCCUS AGALACTIAE (GROUP B)   Moderate   Beta-hemolytic streptococci are routinely susceptible to   penicillins,cephalosporins and carbapenems.     Resulting Agency [facility]           Narrative  Performed by:  [facility]  Right Groin Abscess      Specimen Collected: 09/09/22 12:22 Last Resulted: 09/11/22 07:38               Significant Imaging: I have reviewed all pertinent imaging results/findings within the past 24 hours.

## 2022-10-03 NOTE — H&P
"[facility] Medicine  History & Physical    Patient Name: Doe Woodall  MRN: [id number]  Patient Class: IP- Inpatient  Admission Date: 10/2/2022  Attending Physician: Armando So MD   Primary Care Provider: [facility]         Patient information was obtained from patient, past medical records and ER records.     Subjective:     Principal Problem:Abscess of scrotum    Chief Complaint:   Chief Complaint   Patient presents with    Shortness of Breath     Pts primary complaint today is SOB x 2 weeks after fall due to dizziness. Pt recently admitted for abscess to groin and discharged with antibiotics 2 weeks ago. Pain still reports to abscess. BP 80/48 and glucose greater than 500         HPI: Mr. Woodall is a 33yo man with a past medical history of ETOH abuse, floppy eyelid syndrome, DCHF, and substance induced mood disorder, DM2, and obesity.  He was recently diagnosed with a right groin abscess as well.    He was recently admitted on 9/1-9/16 here at  for sepsis due to right groin-perineal cellulitis and MARISSA.  Dr. Arreaga noted at D/C, "Started IVF and broad spectrum antibiotics. Blood cultures no growth. Urology consulted. Nephrology consulted for MARISSA. ID consulted for antibiotic management. Symptoms worsened. He developed abscess. Urology performed I&D on 9/9 and noted abscess cavity tracking up into the groin and down into the scrotum. Cultures were sent. He was transferred to ICU post operatively due to excessive venous oozing from the surgical site. He remained hemodynamically stable, no need for transfusion, and bleeding controlled. Stepped down to floor.  intraoperative cultures grew GBS. ID recommended CTX transitioned to augmentin.  MARISSA with IVF gradually improved.local wound care was done.  Patient was discharged home with  for wound care ,PO Augmentin and follow up with PCP and Urology as out patient."     He followed up with Dr. Aguilar in " "Urology on 9/28/22 after running out of his Norco and still complaining of pain to his right groin area.  He had also fallen a few days prior and was having CP with inspiration.  He was encouraged to go to the ED for the CP after the fall, and to continue packing changes with sodium chlor-hypochlorous acid 0.033 % IrSl; Irrigate with 25 mLs as directed once daily.      Since going being home, he has had continued pain and some green drainage from the right groin region.  He has been drinking a pint of "Fireball" whisky to quell the pain.  This led the the fall noted above.  Since the fall he has had continued pain to the left axillary chest area.  He denies fever or chills, but does feel his wound has become increased in size and redness over the past few days.    He called the on-call triage nurse today with, "Patient c/o chest pain rated 6-7/10 when lying on the left side and difficulty breathing. Triage RN notes audible difficulty talking during triage/assessment.  Care Advice given to Call EMS/911 Now."  He took their advice and called 911 and came to the ED.  As an aside, he has not been eating well due to pain with defecation, so he stopped taking his insulin 4 days ago.    In the ED his VS's were BP 80/48 -> 129/72 (BP Location: Right arm, Patient Position: Lying)   Pulse 90   Temp max 99.2 °F (36.9 °C) (Oral)   Resp (!) 21   Ht 5' 6" (1.676 m)   Wt 98.4 kg (217 lb)   SpO2 99%   BMI 35.02 kg/m².  Labs showed  WBC 8, Hg 10.2, Ddimer 0.79, Na 133, K 2.2, AG 23, Cr 1.9, Phos 1.7, Mg 1.6, normal LFT, LA 9.6, BHB 0.2, procal 0.21, ETOH 198.  UDS + opioids.  UA NEG.  VBG pH 7.35, PCO2 37, FLU NEG.    CXR showed no acute process.  CTA chest showed no convincing pulmonary thromboembolism.  There were pulmonary nodules and scattered ground-glass attenuation throughout the pulmonary parenchyma, may reflect underlying edema or nonspecific pneumonitis,, and possible hepatic steatosis.    CT pelvis without contrast " showed  that there is a fluid and air collection in the right perineum/scrotal wall, highly concerning for a residual or recurrent abscess.  This is a thick-walled collection of fluid and soft tissue gas in the right perineum/scrotal wall measuring 4.2 x 4.2 x 1.6 cm (AP by cc by TR), highly concerning for a residual/recurrent abscess.  Mild amount of surrounding soft tissue stranding which has significantly improved in comparison with the study from 09/01/2022.     In the ED he was treated with Morphine 4mg iv 1917, Zofran 4mg iv 1709, Zosyn 4.5g iv 1709, K-bicarb 20 meq po 1840, KCl 10 meq iv 1851, NS 1L bolus 1920, and NS 1.914L iv 1707, and Vanco 2g iv 1839.        Past Medical History:   Diagnosis Date    Abscess of right groin 09/01/2022    Diabetes mellitus     Encounter for blood transfusion     Loud snoring     Obese     Other insomnia 08/03/2020    Perineal abscess 08/01/2014       Past Surgical History:   Procedure Laterality Date    ABCESS DRAINAGE  2014    PERIRECTAL    DECOMPRESSION OF LUMBAR SPINE USING MINIMALLY INVASIVE TECHNIQUE Right 07/06/2018    Procedure: DECOMPRESSION, SPINE, LUMBAR, MINIMALLY INVASIVE L3-4;  Surgeon: Cristian Cervantes MD;  Location: [facility] OR [address] [place] [address];  Service: Neurosurgery;  Laterality: Right;    INCISION AND DRAINAGE N/A 9/9/2022    Procedure: INCISION AND DRAINAGE GROIN;  Surgeon: KAITY Aguilar MD;  Location: [facility];  Service: Urology;  Laterality: N/A;    ORTHOPEDIC SURGERY         Review of patient's allergies indicates:   Allergen Reactions    Metformin Diarrhea       No current facility-administered medications on file prior to encounter.     Current Outpatient Medications on File Prior to Encounter   Medication Sig    acetaminophen (TYLENOL) 500 MG tablet Take 1,000 mg by mouth 3 (three) times daily as needed for Pain.    amLODIPine (NORVASC) 10 MG tablet Take 1 tablet (10 mg total) by mouth once daily.    blood sugar diagnostic Strp 1 strip by Misc.(Non-Drug; Combo  "Route) route 4 (four) times daily with meals and nightly.    blood-glucose meter Misc Use as instructed    diphenhydramine HCl (UNISOM SLEEPGELS ORAL) Take 1 capsule by mouth every evening.    docusate sodium (COLACE) 100 MG capsule Take 1 capsule (100 mg total) by mouth 3 (three) times daily as needed for Constipation.    folic acid (FOLVITE) 1 MG tablet Take 1 tablet (1 mg total) by mouth once daily.    glucagon (GLUCAGEN HYPOKIT) 1 mg SolR Inject 1 mg into the muscle as needed (Hypoglycemia).    HYDROcodone-acetaminophen (NORCO) 5-325 mg per tablet Take 1 tablet by mouth every 6 (six) hours as needed (dressing changes).    insulin aspart U-100 (NOVOLOG) 100 unit/mL (3 mL) InPn pen Inject 7 Units into the skin 3 (three) times daily.    insulin detemir U-100 (LEVEMIR FLEXTOUCH) 100 unit/mL (3 mL) SubQ InPn pen Inject 30 Units into the skin once daily.    lancets 30 gauge Misc 1 lancet by Misc.(Non-Drug; Combo Route) route 4 (four) times daily with meals and nightly.    lancing device Misc Use as instructed    pen needle, diabetic (BD ULTRA-FINE TJ PEN NEEDLE) 32 gauge x 5/32" Ndle Use 4 times daily    pen needle, diabetic 31 gauge x 5/16" Ndle 1 each by Misc.(Non-Drug; Combo Route) route 4 (four) times daily with meals and nightly.    pen needle, diabetic 32 gauge x 5/32" Ndle USE TO INJECT INSULIN FOUR TIMES DAILY    pulse oximeter (PULSE OXIMETER) device by Apply Externally route 2 (two) times a day. Use twice daily at 8 AM and 3 PM and record the value in MyChart as directed.    sodium chlor-hypochlorous acid 0.033 % IrSl Irrigate with 25 mLs as directed once daily.    thiamine 100 MG tablet Take 1 tablet (100 mg total) by mouth once daily.     Family History       Problem Relation (Age of Onset)    Diabetes Father, Paternal Grandmother, Paternal Grandfather          Tobacco Use    Smoking status: Former     Packs/day: 0.50     Years: 9.00     Pack years: 4.50     Types: Cigarettes     Quit date: 7/1/2013     " "Years since quittin.2    Smokeless tobacco: Former   Substance and Sexual Activity    Alcohol use: Not Currently     Comment: DAILY PINT OF LIQUOR, HX OF 1 "TALL BOY" OF BEER DAILY    Drug use: Not Currently    Sexual activity: Yes     Partners: Female     Birth control/protection: None     Review of Systems   Constitutional:  Positive for activity change, appetite change and fatigue. Negative for fever.   Eyes:  Negative for visual disturbance.   Respiratory:  Negative for shortness of breath.    Cardiovascular:  Positive for chest pain.   Gastrointestinal:  Positive for nausea.   Endocrine: Negative for heat intolerance.   Genitourinary:  Positive for difficulty urinating, scrotal swelling and testicular pain.   Musculoskeletal:  Positive for myalgias.   Skin:  Positive for color change and wound.   Allergic/Immunologic: Negative for immunocompromised state.   Neurological:  Positive for light-headedness.   Hematological:  Does not bruise/bleed easily.   Psychiatric/Behavioral:  Negative for decreased concentration and dysphoric mood.    Objective:     Vital Signs (Most Recent):  Temp: 98.3 °F (36.8 °C) (10/02/22 2247)  Pulse: 88 (10/02/22 2315)  Resp: 17 (10/02/22 2315)  BP: (!) 154/82 (10/02/22 2300)  SpO2: 100 % (10/02/22 2315)   Vital Signs (24h Range):  Temp:  [98.3 °F (36.8 °C)-99.2 °F (37.3 °C)] 98.3 °F (36.8 °C)  Pulse:  [81-97] 88  Resp:  [12-38] 17  SpO2:  [95 %-100 %] 100 %  BP: ()/(48-83) 154/82     Weight: 98.3 kg (216 lb 11.4 oz)  Body mass index is 36.06 kg/m².    Physical Exam  Constitutional:       General: He is not in acute distress.     Appearance: He is obese. He is ill-appearing. He is not toxic-appearing or diaphoretic.   HENT:      Head: Normocephalic and atraumatic.   Eyes:      Conjunctiva/sclera:      Right eye: Right conjunctiva is injected.      Left eye: Left conjunctiva is injected.   Neck:      Thyroid: No thyromegaly.   Cardiovascular:      Rate and Rhythm: Regular " rhythm. Tachycardia present.      Heart sounds: Murmur heard.   Systolic murmur is present with a grade of 1/6.   Pulmonary:      Effort: Pulmonary effort is normal. No tachypnea or bradypnea.      Breath sounds: No decreased breath sounds, wheezing, rhonchi or rales.   Chest:      Chest wall: Tenderness present. No swelling.   Abdominal:      General: Abdomen is protuberant. Bowel sounds are normal. There is no distension.      Palpations: Abdomen is soft.      Tenderness: There is no abdominal tenderness.      Comments: Truncal obesity   Genitourinary:     Comments: No gabriel in place.  Left groin with an open area consistent with abscess s/p I&D in the past.  There is no drainage.  The surrounding area is reddened.  See pic.  Musculoskeletal:      Cervical back: Neck supple.   Lymphadenopathy:      Comments: Trace edema to legs   Skin:     Coloration: Skin is sallow.      Findings: No rash.      Comments: Multiple tattoos   Neurological:      General: No focal deficit present.      Mental Status: He is alert and oriented to person, place, and time.      GCS: GCS eye subscore is 4. GCS verbal subscore is 5. GCS motor subscore is 6.   Psychiatric:         Attention and Perception: Attention and perception normal.         Mood and Affect: Mood is depressed.         Speech: Speech normal.         Behavior: Behavior normal. Behavior is cooperative.         Cognition and Memory: Cognition and memory normal.           Significant Labs: All pertinent labs within the past 24 hours have been reviewed.  Recent Results (from the past 24 hour(s))   POCT glucose    Collection Time: 10/02/22  4:16 PM   Result Value Ref Range    POCT Glucose >500 (H) 70 - 110 mg/dL   CBC auto differential    Collection Time: 10/02/22  4:57 PM   Result Value Ref Range    WBC 8.09 3.90 - 12.70 K/uL    RBC 2.90 (L) 4.60 - 6.20 M/uL    Hemoglobin 10.2 (L) 14.0 - 18.0 g/dL    Hematocrit 27.6 (L) 40.0 - 54.0 %    MCV 95 82 - 98 fL    MCH 35.2 (H) 27.0  - 31.0 pg    MCHC 37.0 (H) 32.0 - 36.0 g/dL    RDW 13.2 11.5 - 14.5 %    Platelets 227 150 - 450 K/uL    MPV 9.9 9.2 - 12.9 fL    Immature Granulocytes 0.4 0.0 - 0.5 %    Gran # (ANC) 3.7 1.8 - 7.7 K/uL    Immature Grans (Abs) 0.03 0.00 - 0.04 K/uL    Lymph # 3.0 1.0 - 4.8 K/uL    Mono # 1.3 (H) 0.3 - 1.0 K/uL    Eos # 0.1 0.0 - 0.5 K/uL    Baso # 0.04 0.00 - 0.20 K/uL    nRBC 0 0 /100 WBC    Gran % 45.4 38.0 - 73.0 %    Lymph % 37.0 18.0 - 48.0 %    Mono % 15.5 (H) 4.0 - 15.0 %    Eosinophil % 1.2 0.0 - 8.0 %    Basophil % 0.5 0.0 - 1.9 %    Differential Method Automated    Comprehensive metabolic panel    Collection Time: 10/02/22  4:57 PM   Result Value Ref Range    Sodium 133 (L) 136 - 145 mmol/L    Potassium 2.2 (LL) 3.5 - 5.1 mmol/L    Chloride 93 (L) 95 - 110 mmol/L    CO2 17 (L) 23 - 29 mmol/L    Glucose 554 (HH) 70 - 110 mg/dL    BUN 11 6 - 20 mg/dL    Creatinine 1.9 (H) 0.5 - 1.4 mg/dL    Calcium 8.6 (L) 8.7 - 10.5 mg/dL    Total Protein 6.8 6.0 - 8.4 g/dL    Albumin 2.8 (L) 3.5 - 5.2 g/dL    Total Bilirubin 0.4 0.1 - 1.0 mg/dL    Alkaline Phosphatase 411 (H) 55 - 135 U/L    AST 38 10 - 40 U/L    ALT 32 10 - 44 U/L    Anion Gap 23 (H) 8 - 16 mmol/L    eGFR 47 (A) >60 mL/min/1.73 m^2   Lactic acid, plasma #1    Collection Time: 10/02/22  4:57 PM   Result Value Ref Range    Lactate (Lactic Acid) 9.6 (HH) 0.5 - 2.2 mmol/L   Magnesium    Collection Time: 10/02/22  4:57 PM   Result Value Ref Range    Magnesium 1.6 1.6 - 2.6 mg/dL   Phosphorus    Collection Time: 10/02/22  4:57 PM   Result Value Ref Range    Phosphorus 1.7 (L) 2.7 - 4.5 mg/dL   Brain natriuretic peptide    Collection Time: 10/02/22  4:57 PM   Result Value Ref Range    BNP 41 0 - 99 pg/mL   Troponin I    Collection Time: 10/02/22  4:57 PM   Result Value Ref Range    Troponin I <0.006 0.000 - 0.026 ng/mL   Lipase    Collection Time: 10/02/22  4:57 PM   Result Value Ref Range    Lipase 98 (H) 4 - 60 U/L   Procalcitonin    Collection Time: 10/02/22   4:57 PM   Result Value Ref Range    Procalcitonin 0.21 <0.25 ng/mL   Ethanol    Collection Time: 10/02/22  4:57 PM   Result Value Ref Range    Alcohol, Serum 198 (H) <10 mg/dL   D dimer, quantitative    Collection Time: 10/02/22  4:57 PM   Result Value Ref Range    D-Dimer 0.79 (H) <0.50 mg/L FEU   Beta - Hydroxybutyrate, Serum    Collection Time: 10/02/22  4:57 PM   Result Value Ref Range    Beta-Hydroxybutyrate 0.2 0.0 - 0.5 mmol/L   ISTAT PROCEDURE    Collection Time: 10/02/22  5:08 PM   Result Value Ref Range    POC PH 7.351 7.35 - 7.45    POC PCO2 37.3 35 - 45 mmHg    POC PO2 38 (L) 40 - 60 mmHg    POC HCO3 20.6 (L) 24 - 28 mmol/L    POC BE -4 -2 to 2 mmol/L    POC SATURATED O2 70 (L) 95 - 100 %    POC TCO2 22 (L) 24 - 29 mmol/L    Sample VENOUS     Site Other     Allens Test N/A    ISTAT Lactate    Collection Time: 10/02/22  5:10 PM   Result Value Ref Range    POC Lactate 9.11 (HH) 0.5 - 2.2 mmol/L    Sample VENOUS     Site Other     Allens Test N/A    POCT COVID-19 Rapid Screening    Collection Time: 10/02/22  5:21 PM   Result Value Ref Range    POC Rapid COVID Negative Negative     Acceptable Yes    POCT Influenza A/B Molecular    Collection Time: 10/02/22  5:21 PM   Result Value Ref Range    POC Molecular Influenza A Ag Negative Negative, Not Reported    POC Molecular Influenza B Ag Negative Negative, Not Reported     Acceptable Yes    POCT glucose    Collection Time: 10/02/22  8:28 PM   Result Value Ref Range    POCT Glucose 462 (HH) 70 - 110 mg/dL   Urinalysis, Reflex to Urine Culture Urine, Clean Catch    Collection Time: 10/02/22  8:39 PM    Specimen: Urine   Result Value Ref Range    Specimen UA Urine, Clean Catch     Color, UA Colorless (A) Yellow, Straw, Leticia    Appearance, UA Clear Clear    pH, UA 7.0 5.0 - 8.0    Specific Gravity, UA 1.010 1.005 - 1.030    Protein, UA 1+ (A) Negative    Glucose, UA 4+ (A) Negative    Ketones, UA Negative Negative    Bilirubin (UA)  Negative Negative    Occult Blood UA Negative Negative    Nitrite, UA Negative Negative    Urobilinogen, UA Negative <2.0 EU/dL    Leukocytes, UA Negative Negative   Drug screen panel, emergency    Collection Time: 10/02/22  8:39 PM   Result Value Ref Range    Benzodiazepines Negative Negative    Methadone metabolites Negative Negative    Cocaine (Metab.) Negative Negative    Opiate Scrn, Ur Presumptive Positive (A) Negative    Barbiturate Screen, Ur Negative Negative    Amphetamine Screen, Ur Negative Negative    THC Negative Negative    Phencyclidine Negative Negative    Creatinine, Urine 19.2 (L) 23.0 - 375.0 mg/dL    Toxicology Information SEE COMMENT    Urinalysis Microscopic    Collection Time: 10/02/22  8:39 PM   Result Value Ref Range    RBC, UA 1 0 - 4 /hpf    WBC, UA 2 0 - 5 /hpf    Bacteria None None-Occ /hpf    Yeast, UA None None    Hyaline Casts, UA 0 0-1/lpf /lpf    Microscopic Comment SEE COMMENT    Lactic acid, plasma #2    Collection Time: 10/02/22  9:13 PM   Result Value Ref Range    Lactate (Lactic Acid) 4.0 (HH) 0.5 - 2.2 mmol/L   Basic metabolic panel    Collection Time: 10/02/22  9:13 PM   Result Value Ref Range    Sodium 133 (L) 136 - 145 mmol/L    Potassium 2.6 (LL) 3.5 - 5.1 mmol/L    Chloride 97 95 - 110 mmol/L    CO2 23 23 - 29 mmol/L    Glucose 467 (HH) 70 - 110 mg/dL    BUN 9 6 - 20 mg/dL    Creatinine 1.7 (H) 0.5 - 1.4 mg/dL    Calcium 8.0 (L) 8.7 - 10.5 mg/dL    Anion Gap 13 8 - 16 mmol/L    eGFR 54 (A) >60 mL/min/1.73 m^2   Phosphorus    Collection Time: 10/02/22  9:13 PM   Result Value Ref Range    Phosphorus 2.4 (L) 2.7 - 4.5 mg/dL   Magnesium    Collection Time: 10/02/22  9:13 PM   Result Value Ref Range    Magnesium 1.6 1.6 - 2.6 mg/dL   Iron and TIBC    Collection Time: 10/02/22 10:31 PM   Result Value Ref Range    Iron 66 45 - 160 ug/dL    Transferrin 208 200 - 375 mg/dL    TIBC 308 250 - 450 ug/dL    Saturated Iron 21 20 - 50 %   CK    Collection Time: 10/02/22 10:31 PM  "  Result Value Ref Range    CPK 48 20 - 200 U/L   POCT glucose    Collection Time: 10/02/22 11:24 PM   Result Value Ref Range    POCT Glucose 463 (HH) 70 - 110 mg/dL   Contains abnormal data Aerobic culture  Order: [id number]  Status: Final result     Visible to patient: Yes (seen)     Next appt: 10/18/2022 at 11:15 AM in Urology (ARIC Aguilar MD)     Specimen Information: Groin; Abscess   0 Result Notes  Component 3 wk ago    Aerobic Bacterial Culture  Abnormal   STREPTOCOCCUS AGALACTIAE (GROUP B)   Moderate   Beta-hemolytic streptococci are routinely susceptible to   penicillins,cephalosporins and carbapenems.     Resulting Agency [facility]           Narrative  Performed by: [facility]  Right Groin Abscess      Specimen Collected: 09/09/22 12:22 Last Resulted: 09/11/22 07:38               Significant Imaging: I have reviewed all pertinent imaging results/findings within the past 24 hours.    Assessment/Plan:     * Abscess of scrotum  NPO for Urology  Per ED staff, Dr. Sepulveda, Urology reviewed the CT.  He noted, "Dr Contreras reviewed the image and stated she did not see anything emergent."  Holding NPO for formal Urology evaluation in the am.    CT pelvis without contrast showed  that there is a fluid and air collection in the right perineum/scrotal wall, highly concerning for a residual or recurrent abscess.  This is a thick-walled collection of fluid and soft tissue gas in the right perineum/scrotal wall measuring 4.2 x 4.2 x 1.6 cm (AP by cc by TR), highly concerning for a residual/recurrent abscess.  Mild amount of surrounding soft tissue stranding which has significantly improved in comparison with the study from 09/01/2022.           Cellulitis of groin  Though the CT pelvis was supposed to be with iv contrast it infiltrated  The NC CT pelvis did not make mention of any necrotizing fasciitis  Continue broad spectrum antibiotics as noted below      Severe sepsis  This patient does have evidence of infective " focus  My overall impression is septic shock.  Source: scrotal abscess  Antibiotics given-   Antibiotics (From admission, onward)      Start     Stop Route Frequency Ordered    10/03/22 1830  vancomycin (VANCOCIN) 2,500 mg in dextrose 5 % 500 mL IVPB         -- IV Every 24 hours (non-standard times) 10/02/22 2255    10/03/22 0600  cefTRIAXone (ROCEPHIN) 2 g/50 mL D5W IVPB         -- IV Every 24 hours (non-standard times) 10/02/22 2200    10/02/22 2259  vancomycin - pharmacy to dose  (vancomycin IVPB)        [facility].    -- IV pharmacy to manage frequency 10/02/22 2200          Latest lactate reviewed-  Recent Labs   Lab 10/02/22  1657 10/02/22  2113   LACTATE 9.6* 4.0*     Organ dysfunction indicated by Acute kidney injury    Shock with decreased perfusion noted, Fluid challenge  was given at 30cc/kg.    Post- resuscitation assessment- (Done after fluids given for shock)  He was initially hypotensive: In the ED his VS's were BP 80/48  Vital signs post fluid administrations were-  Temp Readings from Last 1 Encounters:   10/02/22 98.3 °F (36.8 °C)     BP Readings from Last 1 Encounters:   10/02/22 (!) 155/87     Pulse Readings from Last 1 Encounters:   10/02/22 86       Perfusion exam was performed within 6 hours of septic shock presentation after bolus shows Adequate tissue perfusion assessed by non-invasive monitoring  Will Not start Pressors- Levophed for MAP of 65  Source control achieved by: IV antibiotics and Urology consult for surgical drainage if needed          MARISSA (acute kidney injury)  Patient with acute kidney injury likely due to IVVD/dehydration MARISSA is currently worsening. Labs reviewed- Renal function/electrolytes with Estimated Creatinine Clearance: 66.6 mL/min (A) (based on SCr of 1.7 mg/dL (H)). according to latest data. Monitor urine output and serial BMP and adjust therapy as needed. Avoid nephrotoxins and renally dose meds for GFR listed above.     Renal dose  meds  Avoid IV dye  Gradually improving     Latest Reference Range & Units 09/16/22 04:13 10/02/22 16:57 10/02/22 21:13   Creatinine 0.5 - 1.4 mg/dL 1.2 1.9 (H) 1.7 (H)   (H): Data is abnormally high    Type 2 diabetes mellitus with hyperosmolar nonketotic hyperglycemia  Patient's FSGs are uncontrolled due to hyperglycemia on current medication regimen.  Last A1c reviewed-   Lab Results   Component Value Date    HGBA1C 12.6 (H) 01/04/2022     Most recent fingerstick glucose reviewed-   Recent Labs   Lab 10/02/22  1616 10/02/22  2028 10/02/22  2324   POCTGLUCOSE >500* 462* 463*     Current correctional scale  High  Increase anti-hyperglycemic dose as follows-   Antihyperglycemics (From admission, onward)      Start     Stop Route Frequency Ordered    10/02/22 2300  insulin regular 1 Units/mL in sodium chloride 0.9% 100 mL infusion        Question Answer Comment   Insulin Rate Adjustment (DO NOT MODIFY ANSWER) [email or web address]    Enter initial dose from Infusion Protocol Chart (Units/hr): 4        -- IV Continuous 10/02/22 2156          Hold Oral hypoglycemics while patient is in the hospital.    Alcoholic intoxication with complication    folic acid tablet 1 mg, 1 mg, Oral, Daily     thiamine tablet 100 mg, 100 mg, Oral, Daily     multivitamin tablet, 1 tablet, Oral, Daily     CIWA q 6 hours  Ativan PRN    Hypokalemia  Aggressively replaced:  In the ED he was treated with K-bicarb 20 meq po 1840, KCl 10 meq iv 1851     His repeat was still very low below, so ordered the following:  K-Bicarb 20 meq po x 1  KCl 10 meq iv x 2    Repeat BMP  Added Mg    Normocytic anemia  Ordered B12, folate, Fe      Chest wall contusion, left, initial encounter  No fracture on CT  Likely costochondral injury  Pain control      Severe obesity with body mass index (BMI) of 36.0 to 36.9 with serious comorbidity  Body mass index is 36.06 kg/m². Morbid obesity complicates all aspects  of disease management from diagnostic modalities to treatment. Weight loss encouraged and health benefits explained to patient.         Primary hypertension  Holding all home anti-HTN meds with transient hypotension and sepsis        VTE Risk Mitigation (From admission, onward)           Ordered     heparin (porcine) injection 5,000 Units  Every 8 hours         10/02/22 2211     Place ROBY hose  Until discontinued         10/02/22 2211     IP VTE HIGH RISK PATIENT  Once         10/02/22 2211     Place sequential compression device  Until discontinued         10/02/22 2211     Place sequential compression device  Until discontinued         10/02/22 2156     Place ROBY hose  Until discontinued         10/02/22 2156                  Critical care time spent on the evaluation and treatment of severe organ dysfunction, review of pertinent labs and imaging studies, discussions with consulting providers and discussions with patient/family: 70 minutes.     ARIC Ruff MD  Department of Hospital Medicine   [facility] - Intensive Care

## 2022-10-03 NOTE — ASSESSMENT & PLAN NOTE
Patient with acute kidney injury likely due to IVVD/dehydration MARISSA is currently worsening. Labs reviewed- Renal function/electrolytes with Estimated Creatinine Clearance: 80.9 mL/min (based on SCr of 1.4 mg/dL). according to latest data. Monitor urine output and serial BMP and adjust therapy as needed. Avoid nephrotoxins and renally dose meds for GFR listed above.     Renal dose meds  Avoid IV dye  Gradually improving     Latest Reference Range & Units 09/16/22 04:13 10/02/22 16:57 10/02/22 21:13   Creatinine 0.5 - 1.4 mg/dL 1.2 1.9 (H) 1.7 (H)   (H): Data is abnormally high  ·   MARISSA improving with fluid as well.

## 2022-10-03 NOTE — ASSESSMENT & PLAN NOTE
This patient does have evidence of infective focus  My overall impression is septic shock.  Source: scrotal abscess  Antibiotics given-   Antibiotics (From admission, onward)    Start     Stop Route Frequency Ordered    10/03/22 1830  vancomycin (VANCOCIN) 2,500 mg in dextrose 5 % 500 mL IVPB         -- IV Every 24 hours (non-standard times) 10/02/22 2255    10/03/22 0600  cefTRIAXone (ROCEPHIN) 2 g/50 mL D5W IVPB         -- IV Every 24 hours (non-standard times) 10/02/22 2200    10/02/22 2259  vancomycin - pharmacy to dose  (vancomycin IVPB)        [facility].    -- IV pharmacy to manage frequency 10/02/22 2200        Latest lactate reviewed-  Recent Labs   Lab 10/02/22  1657 10/02/22  2113   LACTATE 9.6* 4.0*     Organ dysfunction indicated by Acute kidney injury    Shock with decreased perfusion noted, Fluid challenge  was given at 30cc/kg.    Post- resuscitation assessment- (Done after fluids given for shock)  He was initially hypotensive: In the ED his VS's were BP 80/48  Vital signs post fluid administrations were-  Temp Readings from Last 1 Encounters:   10/02/22 98.3 °F (36.8 °C)     BP Readings from Last 1 Encounters:   10/02/22 (!) 155/87     Pulse Readings from Last 1 Encounters:   10/02/22 86       Perfusion exam was performed within 6 hours of septic shock presentation after bolus shows Adequate tissue perfusion assessed by non-invasive monitoring  Will Not start Pressors- Levophed for MAP of 65  Source control achieved by: IV antibiotics and Urology consult for surgical drainage if needed

## 2022-10-03 NOTE — PROVIDER TRANSFER
Patient admitted for recurrent scrotal abscess (thick-walled collection of fluid and soft tissue gas in the right perineum/scrotal wall measuring 4.2 x 4.2 x 1.6 cm), MARISSA, and ?DKA with a lactic acid of 9.6, has since improved with fluids in insulin drip, lactic acid 4.0  on repeat. pH maintained, therefore not technically DKA. MARISSA improving with fluid as well.  Calculated anion gap of 25 on admission, with bicarb of 17 and glucose >500.  Started on insulin drip, gap is now closed and bicarb up to 24, will transition to subcutaneous insulin after procedure with Urology.  Acute alcohol intoxication of 200, per patient he was drinking to numb the pain in his scrotum.  Electrolyte derangement, replacing.    Transitioned to subcut insulin. Patient did not know his dosages, some non-compliance suspected  Made NPO tonight in case urology proceeds w procedure   ID consulted for tomorrow   DWAIN So MD  Internal Medicine Staff

## 2022-10-03 NOTE — SUBJECTIVE & OBJECTIVE
"Past Medical History:   Diagnosis Date    Abscess of right groin 2022    Diabetes mellitus     Encounter for blood transfusion     Loud snoring     Obese     Other insomnia 2020    Perineal abscess 2014    Primary hypertension 10/2/2022       Past Surgical History:   Procedure Laterality Date    ABCESS DRAINAGE      PERIRECTAL    DECOMPRESSION OF LUMBAR SPINE USING MINIMALLY INVASIVE TECHNIQUE Right 2018    Procedure: DECOMPRESSION, SPINE, LUMBAR, MINIMALLY INVASIVE L3-4;  Surgeon: Cristian Cervantes MD;  Location: [facility] OR [address] [place] [address];  Service: Neurosurgery;  Laterality: Right;    INCISION AND DRAINAGE N/A 2022    Procedure: INCISION AND DRAINAGE GROIN;  Surgeon: KAITY Aguilar MD;  Location: [facility] OR;  Service: Urology;  Laterality: N/A;    ORTHOPEDIC SURGERY         Review of patient's allergies indicates:   Allergen Reactions    Metformin Diarrhea       Family History       Problem Relation (Age of Onset)    Diabetes Father, Paternal Grandmother, Paternal Grandfather            Tobacco Use    Smoking status: Former     Packs/day: 0.50     Years: 9.00     Pack years: 4.50     Types: Cigarettes     Quit date: 2013     Years since quittin.2    Smokeless tobacco: Former   Substance and Sexual Activity    Alcohol use: Not Currently     Comment: DAILY PINT OF LIQUOR, HX OF 1 "TALL BOY" OF BEER DAILY    Drug use: Not Currently    Sexual activity: Yes     Partners: Female     Birth control/protection: None       Review of Systems   Constitutional: Negative.    HENT: Negative.     Eyes: Negative.    Respiratory:  Negative for cough, chest tightness and shortness of breath.    Cardiovascular:  Negative for chest pain.   Gastrointestinal: Negative.  Negative for constipation, diarrhea and nausea.   Genitourinary:  Positive for scrotal swelling.   Musculoskeletal: Negative.    Neurological: Negative.    Psychiatric/Behavioral: Negative.       Objective:     Temp:  [98 °F (36.7 °C)-99.2 °F " (37.3 °C)] 98 °F (36.7 °C)  Pulse:  [78-97] 79  Resp:  [12-38] 20  SpO2:  [93 %-100 %] 98 %  BP: ()/(48-94) 132/78     Body mass index is 36.06 kg/m².           Drains       None                   Physical Exam  Vitals and nursing note reviewed.   Constitutional:       Appearance: He is well-developed.   HENT:      Head: Normocephalic.   Eyes:      Conjunctiva/sclera: Conjunctivae normal.   Neck:      Thyroid: No thyromegaly.      Trachea: No tracheal deviation.   Cardiovascular:      Rate and Rhythm: Normal rate.      Heart sounds: Normal heart sounds.   Pulmonary:      Effort: Pulmonary effort is normal. No respiratory distress.      Breath sounds: Normal breath sounds. No wheezing.   Abdominal:      General: Bowel sounds are normal.      Palpations: Abdomen is soft.      Tenderness: There is no abdominal tenderness. There is no rebound.      Hernia: No hernia is present.   Genitourinary:     Comments: Buried penis  Scrotum without edema  Wounds in right lateral scrotum open, no drainage.  Probed with slight tracking inferiorly.  No fluctuant areas  Musculoskeletal:         General: No tenderness. Normal range of motion.      Cervical back: Normal range of motion and neck supple.   Lymphadenopathy:      Cervical: No cervical adenopathy.   Skin:     General: Skin is warm and dry.      Findings: No erythema or rash.   Neurological:      Mental Status: He is alert and oriented to person, place, and time.   Psychiatric:         Behavior: Behavior normal.         Thought Content: Thought content normal.         Judgment: Judgment normal.       Significant Labs:    BMP:  Recent Labs   Lab 10/02/22  2113 10/03/22  0045 10/03/22  0447   * 134* 135*   K 2.6* 2.9* 3.3*   CL 97 99 101   CO2 23 22* 24   BUN 9 8 8   CREATININE 1.7* 1.5* 1.4   CALCIUM 8.0* 8.2* 8.2*       CBC:  Recent Labs   Lab 10/02/22  1657 10/03/22  0447   WBC 8.09 9.09   HGB 10.2* 9.9*   HCT 27.6* 27.4*    209       Blood Culture:    Recent Labs   Lab 10/02/22  1658   LABBLOO No Growth to date  No Growth to date     Urine Culture: No results for input(s): LABURIN in the last 168 hours.    Significant Imaging:  CT: I have reviewed all results within the past 24 hours and my personal findings are:  right hemiscrotum with much improved sign of abscess.

## 2022-10-03 NOTE — ASSESSMENT & PLAN NOTE
"· NPO for Urology  · Per ED staff, Dr. Sepulveda, Urology reviewed the CT.  · He noted, "Dr Contreras reviewed the image and stated she did not see anything emergent."  · Holding NPO for formal Urology evaluation in the am, and likely procedure    · CT pelvis without contrast showed  that there is a fluid and air collection in the right perineum/scrotal wall, highly concerning for a residual or recurrent abscess.  This is a thick-walled collection of fluid and soft tissue gas in the right perineum/scrotal wall measuring 4.2 x 4.2 x 1.6 cm (AP by cc by TR), highly concerning for a residual/recurrent abscess.  Mild amount of surrounding soft tissue stranding which has significantly improved in comparison with the study from 09/01/2022.         "

## 2022-10-03 NOTE — NURSING
[facility] [place]  Nurses Note -- 4 Eyes      10/3/2022       Skin assessed on: Admit      [x] No Pressure Injuries Present    [x]Prevention Measures Documented    [] Yes LDA  for Pressure Injury Previously documented     [] Yes New Pressure Injury Discovered   [] LDA for New Pressure Injury Added      Attending RN:  Miranda Aasen, RN

## 2022-10-03 NOTE — ASSESSMENT & PLAN NOTE
Body mass index is 36.06 kg/m². Morbid obesity complicates all aspects of disease management from diagnostic modalities to treatment. Weight loss encouraged and health benefits explained to patient.

## 2022-10-03 NOTE — ASSESSMENT & PLAN NOTE
Patient's FSGs are uncontrolled due to hyperglycemia on current medication regimen.  Last A1c reviewed-   Lab Results   Component Value Date    HGBA1C 12.6 (H) 01/04/2022     Most recent fingerstick glucose reviewed-   Recent Labs   Lab 10/03/22  0337 10/03/22  0433 10/03/22  0620 10/03/22  0740   POCTGLUCOSE 268* 270* 293* 270*     Current correctional scale  High  Increase anti-hyperglycemic dose as follows-   Antihyperglycemics (From admission, onward)    Start     Stop Route Frequency Ordered    10/02/22 2300  insulin regular 1 Units/mL in sodium chloride 0.9% 100 mL infusion        Question Answer Comment   Insulin Rate Adjustment (DO NOT MODIFY ANSWER) [email or web address]    Enter initial dose from Infusion Protocol Chart (Units/hr): 4        -- IV Continuous 10/02/22 2156      ·   · Hold Oral hypoglycemics while patient is in the hospital.  · His pH maintained, therefore not technically DKA.  · Calculated anion gap of 25 on admission, with bicarb of 17 and glucose >500.    · Started on insulin drip, gap is now closed and bicarb up to 24, will transition to subcutaneous insulin after procedure with Urology.

## 2022-10-03 NOTE — HPI
"Mr. Woodall is a 31yo man with a past medical history of ETOH abuse, floppy eyelid syndrome, DCHF, and substance induced mood disorder, DM2, and obesity.  He was recently diagnosed with a right groin abscess as well.    He was recently admitted on 9/1-9/16 here at  for sepsis due to right groin-perineal cellulitis and MARISSA.  Dr. Arreaga noted at D/C, "Started IVF and broad spectrum antibiotics. Blood cultures no growth. Urology consulted. Nephrology consulted for MARISSA. ID consulted for antibiotic management. Symptoms worsened. He developed abscess. Urology performed I&D on 9/9 and noted abscess cavity tracking up into the groin and down into the scrotum. Cultures were sent. He was transferred to ICU post operatively due to excessive venous oozing from the surgical site. He remained hemodynamically stable, no need for transfusion, and bleeding controlled. Stepped down to floor.  intraoperative cultures grew GBS. ID recommended CTX transitioned to augmentin.  MARISSA with IVF gradually improved.local wound care was done.  Patient was discharged home with  for wound care ,PO Augmentin and follow up with PCP and Urology as out patient."     He followed up with Dr. Aguilar in Urology on 9/28/22 after running out of his Norco and still complaining of pain to his right groin area.  He had also fallen a few days prior and was having CP with inspiration.  He was encouraged to go to the ED for the CP after the fall, and to continue packing changes with sodium chlor-hypochlorous acid 0.033 % IrSl; Irrigate with 25 mLs as directed once daily.      Since going being home, he has had continued pain and some green drainage from the right groin region.  He has been drinking a pint of "Fireball" whisky to quell the pain.  This led the the fall noted above.  Since the fall he has had continued pain to the left axillary chest area.  He denies fever or chills, but does feel his wound has become increased in size and redness over the past few " "days.    He called the on-call triage nurse today with, "Patient c/o chest pain rated 6-7/10 when lying on the left side and difficulty breathing. Triage RN notes audible difficulty talking during triage/assessment.  Care Advice given to Call EMS/911 Now."  He took their advice and called 911 and came to the ED.  As an aside, he has not been eating well due to pain with defecation, so he stopped taking his insulin 4 days ago.    In the ED his VS's were BP 80/48 -> 129/72 (BP Location: Right arm, Patient Position: Lying)   Pulse 90   Temp max 99.2 °F (36.9 °C) (Oral)   Resp (!) 21   Ht 5' 6" (1.676 m)   Wt 98.4 kg (217 lb)   SpO2 99%   BMI 35.02 kg/m².  Labs showed  WBC 8, Hg 10.2, Ddimer 0.79, Na 133, K 2.2, AG 23, Cr 1.9, Phos 1.7, Mg 1.6, normal LFT, LA 9.6, BHB 0.2, procal 0.21, ETOH 198.  UDS + opioids.  UA NEG.  VBG pH 7.35, PCO2 37, FLU NEG.    CXR showed no acute process.  CTA chest showed no convincing pulmonary thromboembolism.  There were pulmonary nodules and scattered ground-glass attenuation throughout the pulmonary parenchyma, may reflect underlying edema or nonspecific pneumonitis,, and possible hepatic steatosis.    CT pelvis without contrast showed  that there is a fluid and air collection in the right perineum/scrotal wall, highly concerning for a residual or recurrent abscess.  This is a thick-walled collection of fluid and soft tissue gas in the right perineum/scrotal wall measuring 4.2 x 4.2 x 1.6 cm (AP by cc by TR), highly concerning for a residual/recurrent abscess.  Mild amount of surrounding soft tissue stranding which has significantly improved in comparison with the study from 09/01/2022.     In the ED he was treated with Morphine 4mg iv 1917, Zofran 4mg iv 1709, Zosyn 4.5g iv 1709, K-bicarb 20 meq po 1840, KCl 10 meq iv 1851, NS 1L bolus 1920, and NS 1.914L iv 1707, and Vanco 2g iv 1839.    "

## 2022-10-03 NOTE — PROGRESS NOTES
"[facility]  Progress Note    Patient Name: Doe Woodall  MRN: [id number]  Patient Class: IP- Inpatient   Admission Date: 10/2/2022  Length of Stay: 1 days  Attending Physician: Armando So MD  Primary Care Provider: [facility]        Subjective:     Principal Problem:Abscess of scrotum        HPI:  Mr. Woodall is a 33yo man with a past medical history of ETOH abuse, floppy eyelid syndrome, DCHF, and substance induced mood disorder, DM2, and obesity.  He was recently diagnosed with a right groin abscess as well.    He was recently admitted on 9/1-9/16 here at  for sepsis due to right groin-perineal cellulitis and MARISSA.  Dr. Arreaga noted at D/C, "Started IVF and broad spectrum antibiotics. Blood cultures no growth. Urology consulted. Nephrology consulted for MARISSA. ID consulted for antibiotic management. Symptoms worsened. He developed abscess. Urology performed I&D on 9/9 and noted abscess cavity tracking up into the groin and down into the scrotum. Cultures were sent. He was transferred to ICU post operatively due to excessive venous oozing from the surgical site. He remained hemodynamically stable, no need for transfusion, and bleeding controlled. Stepped down to floor.  intraoperative cultures grew GBS. ID recommended CTX transitioned to augmentin.  MARISSA with IVF gradually improved.local wound care was done.  Patient was discharged home with  for wound care ,PO Augmentin and follow up with PCP and Urology as out patient."     He followed up with Dr. Aguilar in Urology on 9/28/22 after running out of his Norco and still complaining of pain to his right groin area.  He had also fallen a few days prior and was having CP with inspiration.  He was encouraged to go to the ED for the CP after the fall, and to continue packing changes with sodium chlor-hypochlorous acid 0.033 % IrSl; Irrigate with 25 mLs as directed once daily.      Since going being home, " "he has had continued pain and some green drainage from the right groin region.  He has been drinking a pint of "Fireball" whisky to quell the pain.  This led the the fall noted above.  Since the fall he has had continued pain to the left axillary chest area.  He denies fever or chills, but does feel his wound has become increased in size and redness over the past few days.    He called the on-call triage nurse today with, "Patient c/o chest pain rated 6-7/10 when lying on the left side and difficulty breathing. Triage RN notes audible difficulty talking during triage/assessment.  Care Advice given to Call EMS/911 Now."  He took their advice and called 911 and came to the ED.  As an aside, he has not been eating well due to pain with defecation, so he stopped taking his insulin 4 days ago.    In the ED his VS's were BP 80/48 -> 129/72 (BP Location: Right arm, Patient Position: Lying)   Pulse 90   Temp max 99.2 °F (36.9 °C) (Oral)   Resp (!) 21   Ht 5' 6" (1.676 m)   Wt 98.4 kg (217 lb)   SpO2 99%   BMI 35.02 kg/m².  Labs showed  WBC 8, Hg 10.2, Ddimer 0.79, Na 133, K 2.2, AG 23, Cr 1.9, Phos 1.7, Mg 1.6, normal LFT, LA 9.6, BHB 0.2, procal 0.21, ETOH 198.  UDS + opioids.  UA NEG.  VBG pH 7.35, PCO2 37, FLU NEG.    CXR showed no acute process.  CTA chest showed no convincing pulmonary thromboembolism.  There were pulmonary nodules and scattered ground-glass attenuation throughout the pulmonary parenchyma, may reflect underlying edema or nonspecific pneumonitis,, and possible hepatic steatosis.    CT pelvis without contrast showed  that there is a fluid and air collection in the right perineum/scrotal wall, highly concerning for a residual or recurrent abscess.  This is a thick-walled collection of fluid and soft tissue gas in the right perineum/scrotal wall measuring 4.2 x 4.2 x 1.6 cm (AP by cc by TR), highly concerning for a residual/recurrent abscess.  Mild amount of surrounding soft tissue stranding which " has significantly improved in comparison with the study from 09/01/2022.     In the ED he was treated with Morphine 4mg iv 1917, Zofran 4mg iv 1709, Zosyn 4.5g iv 1709, K-bicarb 20 meq po 1840, KCl 10 meq iv 1851, NS 1L bolus 1920, and NS 1.914L iv 1707, and Vanco 2g iv 1839.        Overview/Hospital Course:  Patient admitted for recurrent scrotal abscess (thick-walled collection of fluid and soft tissue gas in the right perineum/scrotal wall measuring 4.2 x 4.2 x 1.6 cm), MARISSA, and ?DKA with a lactic acid of 9.6, has since improved with fluids in insulin drip, lactic acid 4.0  on repeat. pH maintained, therefore not technically DKA. MARISSA improving with fluid as well.  Calculated anion gap of 25 on admission, with bicarb of 17 and glucose >500.  Started on insulin drip, gap is now closed and bicarb up to 24, will transition to subcutaneous insulin after procedure with Urology.  Acute alcohol intoxication of 200, per patient he was drinking to numb the pain in his scrotum.  Electrolyte derangement, replacing.      NAEON. Denies SOB or CP.   Improving overall, npo for procedure        Review of Systems   Constitutional:  Positive for activity change, appetite change and fatigue. Negative for fever.   Eyes:  Negative for visual disturbance.   Respiratory:  Negative for shortness of breath.    Cardiovascular:  Positive for chest pain.   Gastrointestinal:  Positive for nausea.   Endocrine: Negative for heat intolerance.   Genitourinary:  Positive for difficulty urinating, scrotal swelling and testicular pain.   Musculoskeletal:  Positive for myalgias.   Skin:  Positive for color change and wound.   Allergic/Immunologic: Negative for immunocompromised state.   Neurological:  Positive for light-headedness.   Hematological:  Does not bruise/bleed easily.   Psychiatric/Behavioral:  Negative for decreased concentration and dysphoric mood.        Objective:     Vital Signs (Most Recent):  Temp: 98 °F (36.7 °C) (10/03/22  0705)  Pulse: 79 (10/03/22 0600)  Resp: 13 (10/03/22 0600)  BP: 132/78 (10/03/22 0600)  SpO2: 98 % (10/03/22 0600)   Vital Signs (24h Range):  Temp:  [98 °F (36.7 °C)-99.2 °F (37.3 °C)] 98 °F (36.7 °C)  Pulse:  [78-97] 79  Resp:  [12-38] 13  SpO2:  [93 %-100 %] 98 %  BP: ()/(48-94) 132/78     Weight: 98.3 kg (216 lb 11.4 oz)  Body mass index is 36.06 kg/m².    Physical Exam  Constitutional:       General: He is not in acute distress.     Appearance: He is obese. He is ill-appearing. He is not toxic-appearing or diaphoretic.   HENT:      Head: Normocephalic and atraumatic.   Eyes:      Conjunctiva/sclera:      Right eye: Right conjunctiva is injected.      Left eye: Left conjunctiva is injected.   Neck:      Thyroid: No thyromegaly.   Cardiovascular:      Rate and Rhythm: Regular rhythm. Tachycardia present.      Heart sounds: Murmur heard.   Systolic murmur is present with a grade of 1/6.   Pulmonary:      Effort: Pulmonary effort is normal. No tachypnea or bradypnea.      Breath sounds: No decreased breath sounds, wheezing, rhonchi or rales.   Chest:      Chest wall: Tenderness present. No swelling.   Abdominal:      General: Abdomen is protuberant. Bowel sounds are normal. There is no distension.      Palpations: Abdomen is soft.      Tenderness: There is no abdominal tenderness.      Comments: Truncal obesity   Genitourinary:     Comments:   Left groin with an open area consistent with abscess s/p I&D in the past.    There is no drainage.    The surrounding area is reddened.    See pic.  Musculoskeletal:      Cervical back: Neck supple.   Lymphadenopathy:      Comments: Trace edema to legs   Skin:     Coloration: Skin is sallow. Skin is not jaundiced.      Findings: No bruising or rash.   Neurological:      General: No focal deficit present.      Mental Status: He is alert and oriented to person, place, and time.      GCS: GCS eye subscore is 4. GCS verbal subscore is 5. GCS motor subscore is 6.    Psychiatric:         Attention and Perception: Attention and perception normal.         Mood and Affect: Mood is depressed.         Speech: Speech normal.         Behavior: Behavior normal. Behavior is cooperative.         Cognition and Memory: Cognition and memory normal.           Significant Labs: All pertinent labs within the past 24 hours have been reviewed.  Recent Results (from the past 24 hour(s))   POCT glucose    Collection Time: 10/02/22  4:16 PM   Result Value Ref Range    POCT Glucose >500 (H) 70 - 110 mg/dL   CBC auto differential    Collection Time: 10/02/22  4:57 PM   Result Value Ref Range    WBC 8.09 3.90 - 12.70 K/uL    RBC 2.90 (L) 4.60 - 6.20 M/uL    Hemoglobin 10.2 (L) 14.0 - 18.0 g/dL    Hematocrit 27.6 (L) 40.0 - 54.0 %    MCV 95 82 - 98 fL    MCH 35.2 (H) 27.0 - 31.0 pg    MCHC 37.0 (H) 32.0 - 36.0 g/dL    RDW 13.2 11.5 - 14.5 %    Platelets 227 150 - 450 K/uL    MPV 9.9 9.2 - 12.9 fL    Immature Granulocytes 0.4 0.0 - 0.5 %    Gran # (ANC) 3.7 1.8 - 7.7 K/uL    Immature Grans (Abs) 0.03 0.00 - 0.04 K/uL    Lymph # 3.0 1.0 - 4.8 K/uL    Mono # 1.3 (H) 0.3 - 1.0 K/uL    Eos # 0.1 0.0 - 0.5 K/uL    Baso # 0.04 0.00 - 0.20 K/uL    nRBC 0 0 /100 WBC    Gran % 45.4 38.0 - 73.0 %    Lymph % 37.0 18.0 - 48.0 %    Mono % 15.5 (H) 4.0 - 15.0 %    Eosinophil % 1.2 0.0 - 8.0 %    Basophil % 0.5 0.0 - 1.9 %    Differential Method Automated    Comprehensive metabolic panel    Collection Time: 10/02/22  4:57 PM   Result Value Ref Range    Sodium 133 (L) 136 - 145 mmol/L    Potassium 2.2 (LL) 3.5 - 5.1 mmol/L    Chloride 93 (L) 95 - 110 mmol/L    CO2 17 (L) 23 - 29 mmol/L    Glucose 554 (HH) 70 - 110 mg/dL    BUN 11 6 - 20 mg/dL    Creatinine 1.9 (H) 0.5 - 1.4 mg/dL    Calcium 8.6 (L) 8.7 - 10.5 mg/dL    Total Protein 6.8 6.0 - 8.4 g/dL    Albumin 2.8 (L) 3.5 - 5.2 g/dL    Total Bilirubin 0.4 0.1 - 1.0 mg/dL    Alkaline Phosphatase 411 (H) 55 - 135 U/L    AST 38 10 - 40 U/L    ALT 32 10 - 44 U/L    Anion  Gap 23 (H) 8 - 16 mmol/L    eGFR 47 (A) >60 mL/min/1.73 m^2   Lactic acid, plasma #1    Collection Time: 10/02/22  4:57 PM   Result Value Ref Range    Lactate (Lactic Acid) 9.6 (HH) 0.5 - 2.2 mmol/L   Magnesium    Collection Time: 10/02/22  4:57 PM   Result Value Ref Range    Magnesium 1.6 1.6 - 2.6 mg/dL   Phosphorus    Collection Time: 10/02/22  4:57 PM   Result Value Ref Range    Phosphorus 1.7 (L) 2.7 - 4.5 mg/dL   Brain natriuretic peptide    Collection Time: 10/02/22  4:57 PM   Result Value Ref Range    BNP 41 0 - 99 pg/mL   Troponin I    Collection Time: 10/02/22  4:57 PM   Result Value Ref Range    Troponin I <0.006 0.000 - 0.026 ng/mL   Lipase    Collection Time: 10/02/22  4:57 PM   Result Value Ref Range    Lipase 98 (H) 4 - 60 U/L   Procalcitonin    Collection Time: 10/02/22  4:57 PM   Result Value Ref Range    Procalcitonin 0.21 <0.25 ng/mL   Ethanol    Collection Time: 10/02/22  4:57 PM   Result Value Ref Range    Alcohol, Serum 198 (H) <10 mg/dL   D dimer, quantitative    Collection Time: 10/02/22  4:57 PM   Result Value Ref Range    D-Dimer 0.79 (H) <0.50 mg/L FEU   Beta - Hydroxybutyrate, Serum    Collection Time: 10/02/22  4:57 PM   Result Value Ref Range    Beta-Hydroxybutyrate 0.2 0.0 - 0.5 mmol/L   Blood culture x two cultures. Draw prior to antibiotics.    Collection Time: 10/02/22  4:58 PM    Specimen: Peripheral, Antecubital, Right; Blood   Result Value Ref Range    Blood Culture, Routine No Growth to date    Blood culture x two cultures. Draw prior to antibiotics.    Collection Time: 10/02/22  4:58 PM    Specimen: Peripheral, Antecubital, Left; Blood   Result Value Ref Range    Blood Culture, Routine No Growth to date    ISTAT PROCEDURE    Collection Time: 10/02/22  5:08 PM   Result Value Ref Range    POC PH 7.351 7.35 - 7.45    POC PCO2 37.3 35 - 45 mmHg    POC PO2 38 (L) 40 - 60 mmHg    POC HCO3 20.6 (L) 24 - 28 mmol/L    POC BE -4 -2 to 2 mmol/L    POC SATURATED O2 70 (L) 95 - 100 %    POC  TCO2 22 (L) 24 - 29 mmol/L    Sample VENOUS     Site Other     Allens Test N/A    ISTAT Lactate    Collection Time: 10/02/22  5:10 PM   Result Value Ref Range    POC Lactate 9.11 (HH) 0.5 - 2.2 mmol/L    Sample VENOUS     Site Other     Allens Test N/A    POCT COVID-19 Rapid Screening    Collection Time: 10/02/22  5:21 PM   Result Value Ref Range    POC Rapid COVID Negative Negative     Acceptable Yes    POCT Influenza A/B Molecular    Collection Time: 10/02/22  5:21 PM   Result Value Ref Range    POC Molecular Influenza A Ag Negative Negative, Not Reported    POC Molecular Influenza B Ag Negative Negative, Not Reported     Acceptable Yes    POCT glucose    Collection Time: 10/02/22  8:28 PM   Result Value Ref Range    POCT Glucose 462 (HH) 70 - 110 mg/dL   Urinalysis, Reflex to Urine Culture Urine, Clean Catch    Collection Time: 10/02/22  8:39 PM    Specimen: Urine   Result Value Ref Range    Specimen UA Urine, Clean Catch     Color, UA Colorless (A) Yellow, Straw, Leticia    Appearance, UA Clear Clear    pH, UA 7.0 5.0 - 8.0    Specific Gravity, UA 1.010 1.005 - 1.030    Protein, UA 1+ (A) Negative    Glucose, UA 4+ (A) Negative    Ketones, UA Negative Negative    Bilirubin (UA) Negative Negative    Occult Blood UA Negative Negative    Nitrite, UA Negative Negative    Urobilinogen, UA Negative <2.0 EU/dL    Leukocytes, UA Negative Negative   Drug screen panel, emergency    Collection Time: 10/02/22  8:39 PM   Result Value Ref Range    Benzodiazepines Negative Negative    Methadone metabolites Negative Negative    Cocaine (Metab.) Negative Negative    Opiate Scrn, Ur Presumptive Positive (A) Negative    Barbiturate Screen, Ur Negative Negative    Amphetamine Screen, Ur Negative Negative    THC Negative Negative    Phencyclidine Negative Negative    Creatinine, Urine 19.2 (L) 23.0 - 375.0 mg/dL    Toxicology Information SEE COMMENT    Urinalysis Microscopic    Collection Time: 10/02/22   8:39 PM   Result Value Ref Range    RBC, UA 1 0 - 4 /hpf    WBC, UA 2 0 - 5 /hpf    Bacteria None None-Occ /hpf    Yeast, UA None None    Hyaline Casts, UA 0 0-1/lpf /lpf    Microscopic Comment SEE COMMENT    Lactic acid, plasma #2    Collection Time: 10/02/22  9:13 PM   Result Value Ref Range    Lactate (Lactic Acid) 4.0 (HH) 0.5 - 2.2 mmol/L   Basic metabolic panel    Collection Time: 10/02/22  9:13 PM   Result Value Ref Range    Sodium 133 (L) 136 - 145 mmol/L    Potassium 2.6 (LL) 3.5 - 5.1 mmol/L    Chloride 97 95 - 110 mmol/L    CO2 23 23 - 29 mmol/L    Glucose 467 (HH) 70 - 110 mg/dL    BUN 9 6 - 20 mg/dL    Creatinine 1.7 (H) 0.5 - 1.4 mg/dL    Calcium 8.0 (L) 8.7 - 10.5 mg/dL    Anion Gap 13 8 - 16 mmol/L    eGFR 54 (A) >60 mL/min/1.73 m^2   Phosphorus    Collection Time: 10/02/22  9:13 PM   Result Value Ref Range    Phosphorus 2.4 (L) 2.7 - 4.5 mg/dL   Magnesium    Collection Time: 10/02/22  9:13 PM   Result Value Ref Range    Magnesium 1.6 1.6 - 2.6 mg/dL   Iron and TIBC    Collection Time: 10/02/22 10:31 PM   Result Value Ref Range    Iron 66 45 - 160 ug/dL    Transferrin 208 200 - 375 mg/dL    TIBC 308 250 - 450 ug/dL    Saturated Iron 21 20 - 50 %   CK    Collection Time: 10/02/22 10:31 PM   Result Value Ref Range    CPK 48 20 - 200 U/L   POCT glucose    Collection Time: 10/02/22 11:24 PM   Result Value Ref Range    POCT Glucose 463 (HH) 70 - 110 mg/dL   Basic metabolic panel    Collection Time: 10/03/22 12:45 AM   Result Value Ref Range    Sodium 134 (L) 136 - 145 mmol/L    Potassium 2.9 (L) 3.5 - 5.1 mmol/L    Chloride 99 95 - 110 mmol/L    CO2 22 (L) 23 - 29 mmol/L    Glucose 385 (H) 70 - 110 mg/dL    BUN 8 6 - 20 mg/dL    Creatinine 1.5 (H) 0.5 - 1.4 mg/dL    Calcium 8.2 (L) 8.7 - 10.5 mg/dL    Anion Gap 13 8 - 16 mmol/L    eGFR >60 >60 mL/min/1.73 m^2   Magnesium    Collection Time: 10/03/22 12:45 AM   Result Value Ref Range    Magnesium 1.7 1.6 - 2.6 mg/dL   POCT glucose    Collection Time:  10/03/22 12:47 AM   Result Value Ref Range    POCT Glucose 376 (H) 70 - 110 mg/dL   POCT glucose    Collection Time: 10/03/22  1:43 AM   Result Value Ref Range    POCT Glucose 320 (H) 70 - 110 mg/dL   POCT glucose    Collection Time: 10/03/22  2:39 AM   Result Value Ref Range    POCT Glucose 312 (H) 70 - 110 mg/dL   POCT glucose    Collection Time: 10/03/22  3:37 AM   Result Value Ref Range    POCT Glucose 268 (H) 70 - 110 mg/dL   POCT glucose    Collection Time: 10/03/22  4:33 AM   Result Value Ref Range    POCT Glucose 270 (H) 70 - 110 mg/dL   Phosphorus    Collection Time: 10/03/22  4:47 AM   Result Value Ref Range    Phosphorus 3.1 2.7 - 4.5 mg/dL   Magnesium    Collection Time: 10/03/22  4:47 AM   Result Value Ref Range    Magnesium 1.6 1.6 - 2.6 mg/dL   Comprehensive Metabolic Panel (CMP)    Collection Time: 10/03/22  4:47 AM   Result Value Ref Range    Sodium 135 (L) 136 - 145 mmol/L    Potassium 3.3 (L) 3.5 - 5.1 mmol/L    Chloride 101 95 - 110 mmol/L    CO2 24 23 - 29 mmol/L    Glucose 289 (H) 70 - 110 mg/dL    BUN 8 6 - 20 mg/dL    Creatinine 1.4 0.5 - 1.4 mg/dL    Calcium 8.2 (L) 8.7 - 10.5 mg/dL    Total Protein 6.4 6.0 - 8.4 g/dL    Albumin 2.5 (L) 3.5 - 5.2 g/dL    Total Bilirubin 0.4 0.1 - 1.0 mg/dL    Alkaline Phosphatase 370 (H) 55 - 135 U/L    AST 31 10 - 40 U/L    ALT 27 10 - 44 U/L    Anion Gap 10 8 - 16 mmol/L    eGFR >60 >60 mL/min/1.73 m^2   CBC with Automated Differential    Collection Time: 10/03/22  4:47 AM   Result Value Ref Range    WBC 9.09 3.90 - 12.70 K/uL    RBC 2.83 (L) 4.60 - 6.20 M/uL    Hemoglobin 9.9 (L) 14.0 - 18.0 g/dL    Hematocrit 27.4 (L) 40.0 - 54.0 %    MCV 97 82 - 98 fL    MCH 35.0 (H) 27.0 - 31.0 pg    MCHC 36.1 (H) 32.0 - 36.0 g/dL    RDW 13.2 11.5 - 14.5 %    Platelets 209 150 - 450 K/uL    MPV 9.5 9.2 - 12.9 fL    Immature Granulocytes 0.4 0.0 - 0.5 %    Gran # (ANC) 4.7 1.8 - 7.7 K/uL    Immature Grans (Abs) 0.04 0.00 - 0.04 K/uL    Lymph # 2.7 1.0 - 4.8 K/uL    Mono  # 1.3 (H) 0.3 - 1.0 K/uL    Eos # 0.2 0.0 - 0.5 K/uL    Baso # 0.05 0.00 - 0.20 K/uL    nRBC 0 0 /100 WBC    Gran % 52.2 38.0 - 73.0 %    Lymph % 29.9 18.0 - 48.0 %    Mono % 14.3 4.0 - 15.0 %    Eosinophil % 2.6 0.0 - 8.0 %    Basophil % 0.6 0.0 - 1.9 %    Differential Method Automated    POCT glucose    Collection Time: 10/03/22  6:20 AM   Result Value Ref Range    POCT Glucose 293 (H) 70 - 110 mg/dL   POCT glucose    Collection Time: 10/03/22  7:40 AM   Result Value Ref Range    POCT Glucose 270 (H) 70 - 110 mg/dL   Contains abnormal data Aerobic culture  Order: [id number]   Status: Final result      Visible to patient: Yes (seen)      Next appt: 10/18/2022 at 11:15 AM in Urology (ARIC Aguilar MD)     Specimen Information: Groin; Abscess    0 Result Notes  Component 3 wk ago    Aerobic Bacterial Culture  Abnormal   STREPTOCOCCUS AGALACTIAE (GROUP B)   Moderate   Beta-hemolytic streptococci are routinely susceptible to   penicillins,cephalosporins and carbapenems.     Resulting Agency [facility]           Narrative  Performed by: [facility]  Right Groin Abscess      Specimen Collected: 09/09/22 12:22 Last Resulted: 09/11/22 07:38               Imaging Results               CTA Chest Non-Coronary (PE Studies) (Final result)  Result time 10/02/22 20:34:41      Final result by Michael Amin MD (10/02/22 20:34:41)                   Impression:      This report was flagged in Epic as abnormal.    1. Allowing for exam limitations above, no convincing pulmonary thromboembolism.  Correlation of these findings with D-dimer and/or lower extremity ultrasound as warranted.  2. Pulmonary nodules as described.  For multiple solid nodules all <6 mm, Fleischner Society 2017 guidelines recommend no routine follow up for a low risk patient, or follow up with non-contrast chest CT at 12 months after discovery in a high risk patient.  3. Scattered ground-glass attenuation throughout the pulmonary parenchyma, may reflect underlying edema  or nonspecific pneumonitis, correlation is advised.  4. Possible hepatic steatosis, correlation with LFTs recommended.  5. Please see above for several additional findings.      Electronically signed by: Michael Amin MD  Date:    10/02/2022  Time:    20:34               Narrative:    EXAMINATION:  CTA CHEST NON CORONARY (PE STUDIES)    CLINICAL HISTORY:  Pulmonary embolism (PE) suspected, high prob;    TECHNIQUE:  Low dose axial images, sagittal and coronal reformations were obtained from the thoracic inlet to the lung bases following the IV administration of 100 mL of Omnipaque 350.  Contrast timing was optimized to evaluate the pulmonary arteries.  MIP images were performed.    COMPARISON:  CT abdomen and pelvis 09/01/2022    FINDINGS:  The structures at the base of the neck are unremarkable.  No significant mediastinal lymphadenopathy.  The heart is not enlarged.  No pericardial effusion.  The thoracic aorta tapers normally.  The visualized portions of the spleen, adrenal glands, pancreas and stomach are grossly unremarkable.  The liver is mildly hypoattenuating, may be on the basis of contrast phase however steatosis is a consideration, correlation is advised with LFTs.    Motion artifact limits evaluation of the airways and pulmonary parenchyma.  Allowing for the above, the airways are patent.  There are a few scattered patchy regions of ground-glass attenuation suggesting edema or other nonspecific pneumonitis.  There is bilateral basilar dependent atelectasis.  No large focal consolidation.  No pneumothorax.  There is a pulmonary nodule within the right lower lobe measuring 3-4 mm, series 2, image 168.  There is a pulmonary nodule within the periphery of the left lower lobe, series 2, image 248.    Bolus timing is suboptimal for evaluation of pulmonary thromboembolism.  Additionally, examination is limited secondary to artifact from respiratory motion.  Allowing for the above, no convincing pulmonary  arterial filling defect to the level of the proximal segmental branches bilaterally to suggest pulmonary thromboembolism.  Please note, there is particularly limited evaluation of the pulmonary arteries to the bilateral lower lobes on the basis of respiratory motion.    There are degenerative changes of the spine.  No significant axillary lymphadenopathy.  There is bilateral gynecomastia.                                       CT Pelvis Without Contrast (Final result)  Result time 10/02/22 19:20:05   Procedure changed from CT Pelvis With Contrast     Final result by Jd Lewis DO (10/02/22 19:20:05)                   Impression:      Fluid and air collection in the right perineum/scrotal wall, highly concerning for a residual or recurrent abscess.      Electronically signed by: Jd Lewis  Date:    10/02/2022  Time:    19:20               Narrative:    EXAMINATION:  CT PELVIS WITHOUT CONTRAST    CLINICAL HISTORY:  Soft tissue infection suspected, pelvis, xray done;    TECHNIQUE:  Multiplanar images were obtained of the pelvis from the iliac crests through the symphysis pubis without intravenous contrast.  Patient had an order for a CTA chest and pelvis. The patients IV had failed during the contrast bolus during the CTA chest. The provider said that she was ok if no contrast had reached the pelvis and wanted scan to be read without contrast. The patient is being hydrated and another attempt will be made for the CTA chest.    COMPARISON:  CT of the abdomen and pelvis from 09/01/2022.    FINDINGS:  Bone: No aggressive osseous lesion.  No acute fracture or dislocation.  No evidence of acute osteomyelitis.    Articulations: The bilateral hips, pubic symphysis, and bilateral sacroiliac joints are unremarkable.  Partially imaged portions of the lower lumbar spine are unremarkable.    Soft tissues: There is a thick-walled collection of fluid and soft tissue gas in the right perineum/scrotal wall measuring 4.2 x  "4.2 x 1.6 cm (AP by cc by TR), highly concerning for a residual/recurrent abscess.  Mild amount of surrounding soft tissue stranding which has significantly improved in comparison with the study from 09/01/2022.    Lymph nodes: Again seen are bilateral symmetric prominent inguinal lymph nodes, likely reactive and similar to prior.    Miscellaneous: Partially imaged portions of the internal pelvis are unremarkable.  The urinary bladder, prostate, and bowel are unremarkable.    Vasculature: There is atherosclerosis of the partially imaged bilateral femoral arteries.  Vasculature is otherwise unremarkable on this noncontrast CT.                                       X-Ray Chest AP Portable (Final result)  Result time 10/02/22 17:14:39      Final result by Santi Porter MD (10/02/22 17:14:39)                   Impression:      No acute process.      Electronically signed by: Santi Porter MD  Date:    10/02/2022  Time:    17:14               Narrative:    EXAMINATION:  XR CHEST AP PORTABLE    CLINICAL HISTORY:  Sepsis;    TECHNIQUE:  Single frontal view of the chest was performed.    COMPARISON:  09/01/2022.    FINDINGS:  Monitoring EKG leads are present.  The trachea is unremarkable.  The cardiomediastinal silhouette is within normal limits.  There is elevation of both hemidiaphragms.  There are no pleural effusions.  There is no evidence of a pneumothorax.  There is no evidence of pneumomediastinum.  No airspace opacity is present.  The osseous structures are unremarkable.                                          Assessment/Plan:      * Abscess of scrotum  · NPO for Urology  · Per ED staff, Dr. Sepulveda, Urology reviewed the CT.  · He noted, "Dr Contreras reviewed the image and stated she did not see anything emergent."  · Holding NPO for formal Urology evaluation in the am, and likely procedure    · CT pelvis without contrast showed  that there is a fluid and air collection in the right perineum/scrotal wall, highly " concerning for a residual or recurrent abscess.  This is a thick-walled collection of fluid and soft tissue gas in the right perineum/scrotal wall measuring 4.2 x 4.2 x 1.6 cm (AP by cc by TR), highly concerning for a residual/recurrent abscess.  Mild amount of surrounding soft tissue stranding which has significantly improved in comparison with the study from 09/01/2022.           Type 2 diabetes mellitus with hyperosmolar nonketotic hyperglycemia  Patient's FSGs are uncontrolled due to hyperglycemia on current medication regimen.  Last A1c reviewed-   Lab Results   Component Value Date    HGBA1C 12.6 (H) 01/04/2022     Most recent fingerstick glucose reviewed-   Recent Labs   Lab 10/03/22  0337 10/03/22  0433 10/03/22  0620 10/03/22  0740   POCTGLUCOSE 268* 270* 293* 270*     Current correctional scale  High  Increase anti-hyperglycemic dose as follows-   Antihyperglycemics (From admission, onward)    Start     Stop Route Frequency Ordered    10/02/22 2300  insulin regular 1 Units/mL in sodium chloride 0.9% 100 mL infusion        Question Answer Comment   Insulin Rate Adjustment (DO NOT MODIFY ANSWER) [email or web address]    Enter initial dose from Infusion Protocol Chart (Units/hr): 4        -- IV Continuous 10/02/22 2156      ·   · Hold Oral hypoglycemics while patient is in the hospital.  · His pH maintained, therefore not technically DKA.  · Calculated anion gap of 25 on admission, with bicarb of 17 and glucose >500.    · Started on insulin drip, gap is now closed and bicarb up to 24, will transition to subcutaneous insulin after procedure with Urology.        Lactic acidosis  · Lactic acid of 9.6, has since improved with fluids in insulin drip, lactic acid 4.0  on repeat.  · Continuing fluids and abx, repeat LA      Primary hypertension  Holding all home anti-HTN meds with transient hypotension and sepsis      Chest wall contusion, left, initial  encounter  No fracture on CT  Likely costochondral injury  Pain control      Severe obesity with body mass index (BMI) of 36.0 to 36.9 with serious comorbidity  Body mass index is 36.06 kg/m². Morbid obesity complicates all aspects of disease management from diagnostic modalities to treatment. Weight loss encouraged and health benefits explained to patient.         MARISSA (acute kidney injury)  Patient with acute kidney injury likely due to IVVD/dehydration MARISSA is currently worsening. Labs reviewed- Renal function/electrolytes with Estimated Creatinine Clearance: 80.9 mL/min (based on SCr of 1.4 mg/dL). according to latest data. Monitor urine output and serial BMP and adjust therapy as needed. Avoid nephrotoxins and renally dose meds for GFR listed above.     Renal dose meds  Avoid IV dye  Gradually improving     Latest Reference Range & Units 09/16/22 04:13 10/02/22 16:57 10/02/22 21:13   Creatinine 0.5 - 1.4 mg/dL 1.2 1.9 (H) 1.7 (H)   (H): Data is abnormally high  ·   MARISSA improving with fluid as well.     Normocytic anemia  Ordered B12, folate, Fe      Severe sepsis  This patient does have evidence of infective focus  My overall impression is septic shock.  Source: scrotal abscess  Antibiotics given-   Antibiotics (From admission, onward)    Start     Stop Route Frequency Ordered    10/03/22 1830  vancomycin (VANCOCIN) 2,500 mg in dextrose 5 % 500 mL IVPB         -- IV Every 24 hours (non-standard times) 10/02/22 2255    10/03/22 0600  cefTRIAXone (ROCEPHIN) 2 g/50 mL D5W IVPB         -- IV Every 24 hours (non-standard times) 10/02/22 2200    10/02/22 2259  vancomycin - pharmacy to dose  (vancomycin IVPB)        See Hyperspace for full Linked Orders Report.    -- IV pharmacy to manage frequency 10/02/22 2200        Latest lactate reviewed-  Recent Labs   Lab 10/02/22  1657 10/02/22  2113   LACTATE 9.6* 4.0*     Organ dysfunction indicated by Acute kidney injury    Shock with decreased perfusion noted, Fluid  challenge  was given at 30cc/kg.    Post- resuscitation assessment- (Done after fluids given for shock)  He was initially hypotensive: In the ED his VS's were BP 80/48  Vital signs post fluid administrations were-  Temp Readings from Last 1 Encounters:   10/03/22 98 °F (36.7 °C) (Oral)     BP Readings from Last 1 Encounters:   10/03/22 132/78     Pulse Readings from Last 1 Encounters:   10/03/22 79       · Perfusion exam was performed within 6 hours of septic shock presentation after bolus shows Adequate tissue perfusion assessed by non-invasive monitoring  · Will Not start Pressors- Levophed for MAP of 65  · Source control achieved by: IV antibiotics and Urology consult for surgical drainage if needed          Cellulitis of groin  Though the CT pelvis was supposed to be with iv contrast it infiltrated  The NC CT pelvis did not make mention of any necrotizing fasciitis  Continue broad spectrum antibiotics as noted below      Hypokalemia  Aggressively replaced:  In the ED he was treated with K-bicarb 20 meq po 1840, KCl 10 meq iv 1851     His repeat was still very low below, so ordered the following:  K-Bicarb 20 meq po x 1  KCl 10 meq iv x 2    Repeat BMP  Added Mg    Alcoholic intoxication with complication    folic acid tablet 1 mg, 1 mg, Oral, Daily     thiamine tablet 100 mg, 100 mg, Oral, Daily     multivitamin tablet, 1 tablet, Oral, Daily     · CIWA q 6 hours  · Ativan PRN  · Acute alcohol intoxication of 200, per patient he was drinking to numb the pain in his scrotum.        VTE Risk Mitigation (From admission, onward)         Ordered     heparin (porcine) injection 5,000 Units  Every 8 hours         10/02/22 2211     Place ORBY hose  Until discontinued         10/02/22 2211     IP VTE HIGH RISK PATIENT  Once         10/02/22 2211     Place sequential compression device  Until discontinued         10/02/22 2211     Place sequential compression device  Until discontinued         10/02/22 2156     Place ROBY  hose  Until discontinued         10/02/22 2156                Discharge Planning   SUSAN:      Code Status: Full Code   Is the patient medically ready for discharge?:     Reason for patient still in hospital (select all that apply): Patient trending condition and Treatment               Critical care time spent on the evaluation and treatment of severe organ dysfunction, review of pertinent labs and imaging studies, discussions with consulting providers and discussions with patient/family: 35 minutes.      Armando So MD  Department of Hospital Medicine   [facility] - [place], [place] - Intensive Care

## 2022-10-03 NOTE — ASSESSMENT & PLAN NOTE
Wound packed  He may need to go back to OR, but no fluctuant area on current exam  Okay to eat today.

## 2022-10-03 NOTE — PLAN OF CARE
[facility] - [place] Intensive Care  ICU Shift Summary  Date: 10/3/2022      Prehospitalization: Home  Admit Date / LOS : 10/2/2022/ 1 days    Diagnosis: Abscess of scrotum    Consults:        Active: Urology       Needed: N/A     Code Status: Full Code   Advanced Directive: <no information>    LDA:  Lines/Drains/Airways       Peripheral Intravenous Line  Duration                  Peripheral IV - Single Lumen 10/02/22 1714 20 G Anterior;Distal;Left Antecubital <1 day         Peripheral IV - Single Lumen 10/02/22 1852 20 G Left;Posterior Hand <1 day         Peripheral IV - Single Lumen 10/02/22 2050 20 G Anterior;Distal;Right Upper Arm <1 day                  Central Lines/Site/Justification:Patient Does Not Have Central Line  Urinary Cath/Order/Justification:Patient Does Not Have Urinary Catheter    Vasopressors/Infusions:    0/9% NACL & POTASSIUM CHLORIDE 20 MEQ/L      insulin regular 1 units/mL infusion orderable ([facility]) 1.4 Units/hr (10/03/22 0149)          GOALS: Volume/ Hemodynamic: N/A                     RASS: 0  alert and calm    Pain Management: IV       Pain Controlled: yes     Rhythm: NSR    Respiratory Device: Nasal Cannula                  Most Recent SBT/ SAT: N/A       MOVE Screen: PASS  ICU Liberation: yes    VTE Prophylaxis: Pharm and Mechanical  Mobility: Bedrest  Stress Ulcer Prophylaxis: Yes    Isolation: No active isolations    Dietary:   Current Diet Order   Procedures    Diet clear liquid      Tolerance: not applicable  Advancement: no    I & O (24h):    Intake/Output Summary (Last 24 hours) at 10/3/2022 0206  Last data filed at 10/3/2022 0000  Gross per 24 hour   Intake 3228.34 ml   Output --   Net 3228.34 ml        Restraints: No    Significant Dates:  Post Op Date: N/A  Rescue Date: N/A  Imaging/ Diagnostics:  CT pelvis    Noteworthy Labs:  CMP    COVID Test: (--)  CBC/Anemia Labs: Coags:    Recent Labs   Lab 10/02/22  1657 10/02/22  2231   WBC 8.09  --    HGB 10.2*  --    HCT 27.6*  --      --     MCV 95  --    RDW 13.2  --    IRON  --  66    No results for input(s): PT, INR, APTT in the last 168 hours.     Chemistries:   Recent Labs   Lab 10/02/22  1657 10/02/22  2113 10/03/22  0045   * 133* 134*   K 2.2* 2.6* 2.9*   CL 93* 97 99   CO2 17* 23 22*   BUN 11 9 8   CREATININE 1.9* 1.7* 1.5*   CALCIUM 8.6* 8.0* 8.2*   PROT 6.8  --   --    BILITOT 0.4  --   --    ALKPHOS 411*  --   --    ALT 32  --   --    AST 38  --   --    MG 1.6 1.6 1.7   PHOS 1.7* 2.4*  --         Cardiac Enzymes: Ejection Fractions:    Recent Labs     10/02/22  1657 10/02/22  2231   CPK  --  48   TROPONINI <0.006  --     EF   Date Value Ref Range Status   01/05/2022 60 % Final        POCT Glucose: HbA1c:    Recent Labs   Lab 10/02/22  2324 10/03/22  0047 10/03/22  0143   POCTGLUCOSE 463* 376* 320*    Hemoglobin A1C   Date Value Ref Range Status   01/04/2022 12.6 (H) 4.0 - 5.6 % Final     Comment:     ADA Screening Guidelines:  5.7-6.4%  Consistent with prediabetes  >or=6.5%  Consistent with diabetes    High levels of fetal hemoglobin interfere with the HbA1C  assay. Heterozygous hemoglobin variants (HbS, HgC, etc)do  not significantly interfere with this assay.   However, presence of multiple variants may affect accuracy.             ICU LOS 3h  Level of Care: Critical Care    Chart Check: 12 HR Done  Shift Summary/Plan for the shift: Pt admitted to ICU overnight for insulin gtt. Scrotal abscess drained in ED. VSS. On 3L NC. Voids via urinal. Pt able to make needs know. Pt undergoing electrolyte replacement as well. Pt remained safe and free from injury during shift.

## 2022-10-03 NOTE — ASSESSMENT & PLAN NOTE
Aggressively replaced:  In the ED he was treated with K-bicarb 20 meq po 1840, KCl 10 meq iv 1851     His repeat was still very low below, so ordered the following:  K-Bicarb 20 meq po x 1  KCl 10 meq iv x 2    Repeat BMP  Added Mg

## 2022-10-03 NOTE — PROGRESS NOTES
Vancomycin consult follow-up:    Patient reviewed, renal function stable, no new levels, continue current therapy; Next levels due: trough due 10/4/2022 at 1730

## 2022-10-03 NOTE — ASSESSMENT & PLAN NOTE
Patient with acute kidney injury likely due to IVVD/dehydration MARISSA is currently worsening. Labs reviewed- Renal function/electrolytes with Estimated Creatinine Clearance: 66.6 mL/min (A) (based on SCr of 1.7 mg/dL (H)). according to latest data. Monitor urine output and serial BMP and adjust therapy as needed. Avoid nephrotoxins and renally dose meds for GFR listed above.     Renal dose meds  Avoid IV dye  Gradually improving     Latest Reference Range & Units 09/16/22 04:13 10/02/22 16:57 10/02/22 21:13   Creatinine 0.5 - 1.4 mg/dL 1.2 1.9 (H) 1.7 (H)   (H): Data is abnormally high

## 2022-10-03 NOTE — HOSPITAL COURSE
Patient admitted on 10/02/2022  to ICU for recurrent scrotal abscess (thick-walled collection of fluid and soft tissue gas in the right perineum/scrotal wall measuring 4.2 x 4.2 x 1.6 cm), MARISSA, and ?DKA with a lactic acid of 9.6, has since improved with fluids in insulin drip, lactic acid 4.0  on repeat. pH maintained, therefore not technically DKA. MARISSA improving with fluid as well.  Calculated anion gap of 25 on admission, with bicarb of 17 and glucose >500.  Started on insulin drip, gap is now closed and bicarb up to 24, and was transition to subcutaneous insulin after procedure with Urology.  Acute alcohol intoxication of 200, per patient he was drinking to numb the pain in his scrotum.  Electrolyte derangement, replacing as needed. Transfer to floor on 10/03/22. Abscess cx from 09/09 with GBS. ID consulted-Unclear if scrotal infection is from GBS vs new infection from fecal material contaminating wound. Continue Rocephin and Flagyl while inpatient but recommends Augmentin for one month on discharge. Blood culture with no growth to date. Urology with no plans for further washout.    Patient appeared clinically better.  Electrolytes have been replaced.  Patient still admits scrotal pain and swelling, but improved.  Still require oral pain medications. Pt denies any fever, headaches, vision changes, chest pain, shortness of breath, palpitations, abdominal pain, nausea, vomiting, or any new weaknesses.  Patient's exam on discharge was as follow: Patient is alert and oriented, appears in no acute distress but appears to be in some pain, heart with regular rate and rhythm, lungs clear to asculation with non-labored breathing, abdomen soft, and no new weaknesses or focal deficits seen. Bilateral lower extremities without any edema or calf tenderness. Scrotal/groin with area of induration superior to previous I/D site improving, still no fluctuance.    Patient was counseled regarding any abnormal labs, differential  diagnosis, treatment options, risk-benefit, lifestyle changes, prognosis, current condition, and medications. Patient was interactive and attentive.  Patient's questions were answered in a respectful and timely manner. Patient was instructed to follow-up with PCP within 1 week and to continue taking medications as prescribed.  Instructed to also follow up with urology and infectious disease outpatient. Also, extensively discussed the risks, benefits, and side effects of patient's medications. Discussed with patient about any medication changes. Patient verbalized understanding and agrees to treatment plan.  Patient is stable for discharge.  Patient has no other questions or concerns at this time.  ED precautions discussed with the patient.    Vital signs are stable. Ambulating without any difficulty. Tolerating p.o. intake without any nausea or vomiting. Afebrile for over 24 hours. Patient is in stable condition and has no questions or concerns. Patient will be discharge to home. Prescriptions sent to pharmacy.  CM/SW to assist with discharge planning.

## 2022-10-03 NOTE — SUBJECTIVE & OBJECTIVE
NAEON. Denies SOB or CP.   Improving overall, npo for procedure        Review of Systems   Constitutional:  Positive for activity change, appetite change and fatigue. Negative for fever.   Eyes:  Negative for visual disturbance.   Respiratory:  Negative for shortness of breath.    Cardiovascular:  Positive for chest pain.   Gastrointestinal:  Positive for nausea.   Endocrine: Negative for heat intolerance.   Genitourinary:  Positive for difficulty urinating, scrotal swelling and testicular pain.   Musculoskeletal:  Positive for myalgias.   Skin:  Positive for color change and wound.   Allergic/Immunologic: Negative for immunocompromised state.   Neurological:  Positive for light-headedness.   Hematological:  Does not bruise/bleed easily.   Psychiatric/Behavioral:  Negative for decreased concentration and dysphoric mood.        Objective:     Vital Signs (Most Recent):  Temp: 98 °F (36.7 °C) (10/03/22 0705)  Pulse: 79 (10/03/22 0600)  Resp: 13 (10/03/22 0600)  BP: 132/78 (10/03/22 0600)  SpO2: 98 % (10/03/22 0600)   Vital Signs (24h Range):  Temp:  [98 °F (36.7 °C)-99.2 °F (37.3 °C)] 98 °F (36.7 °C)  Pulse:  [78-97] 79  Resp:  [12-38] 13  SpO2:  [93 %-100 %] 98 %  BP: ()/(48-94) 132/78     Weight: 98.3 kg (216 lb 11.4 oz)  Body mass index is 36.06 kg/m².    Physical Exam  Constitutional:       General: He is not in acute distress.     Appearance: He is obese. He is ill-appearing. He is not toxic-appearing or diaphoretic.   HENT:      Head: Normocephalic and atraumatic.   Eyes:      Conjunctiva/sclera:      Right eye: Right conjunctiva is injected.      Left eye: Left conjunctiva is injected.   Neck:      Thyroid: No thyromegaly.   Cardiovascular:      Rate and Rhythm: Regular rhythm. Tachycardia present.      Heart sounds: Murmur heard.   Systolic murmur is present with a grade of 1/6.   Pulmonary:      Effort: Pulmonary effort is normal. No tachypnea or bradypnea.      Breath sounds: No decreased breath  sounds, wheezing, rhonchi or rales.   Chest:      Chest wall: Tenderness present. No swelling.   Abdominal:      General: Abdomen is protuberant. Bowel sounds are normal. There is no distension.      Palpations: Abdomen is soft.      Tenderness: There is no abdominal tenderness.      Comments: Truncal obesity   Genitourinary:     Comments:   Left groin with an open area consistent with abscess s/p I&D in the past.    There is no drainage.    The surrounding area is reddened.    See pic.  Musculoskeletal:      Cervical back: Neck supple.   Lymphadenopathy:      Comments: Trace edema to legs   Skin:     Coloration: Skin is sallow. Skin is not jaundiced.      Findings: No bruising or rash.   Neurological:      General: No focal deficit present.      Mental Status: He is alert and oriented to person, place, and time.      GCS: GCS eye subscore is 4. GCS verbal subscore is 5. GCS motor subscore is 6.   Psychiatric:         Attention and Perception: Attention and perception normal.         Mood and Affect: Mood is depressed.         Speech: Speech normal.         Behavior: Behavior normal. Behavior is cooperative.         Cognition and Memory: Cognition and memory normal.           Significant Labs: All pertinent labs within the past 24 hours have been reviewed.  Recent Results (from the past 24 hour(s))   POCT glucose    Collection Time: 10/02/22  4:16 PM   Result Value Ref Range    POCT Glucose >500 (H) 70 - 110 mg/dL   CBC auto differential    Collection Time: 10/02/22  4:57 PM   Result Value Ref Range    WBC 8.09 3.90 - 12.70 K/uL    RBC 2.90 (L) 4.60 - 6.20 M/uL    Hemoglobin 10.2 (L) 14.0 - 18.0 g/dL    Hematocrit 27.6 (L) 40.0 - 54.0 %    MCV 95 82 - 98 fL    MCH 35.2 (H) 27.0 - 31.0 pg    MCHC 37.0 (H) 32.0 - 36.0 g/dL    RDW 13.2 11.5 - 14.5 %    Platelets 227 150 - 450 K/uL    MPV 9.9 9.2 - 12.9 fL    Immature Granulocytes 0.4 0.0 - 0.5 %    Gran # (ANC) 3.7 1.8 - 7.7 K/uL    Immature Grans (Abs) 0.03 0.00 -  0.04 K/uL    Lymph # 3.0 1.0 - 4.8 K/uL    Mono # 1.3 (H) 0.3 - 1.0 K/uL    Eos # 0.1 0.0 - 0.5 K/uL    Baso # 0.04 0.00 - 0.20 K/uL    nRBC 0 0 /100 WBC    Gran % 45.4 38.0 - 73.0 %    Lymph % 37.0 18.0 - 48.0 %    Mono % 15.5 (H) 4.0 - 15.0 %    Eosinophil % 1.2 0.0 - 8.0 %    Basophil % 0.5 0.0 - 1.9 %    Differential Method Automated    Comprehensive metabolic panel    Collection Time: 10/02/22  4:57 PM   Result Value Ref Range    Sodium 133 (L) 136 - 145 mmol/L    Potassium 2.2 (LL) 3.5 - 5.1 mmol/L    Chloride 93 (L) 95 - 110 mmol/L    CO2 17 (L) 23 - 29 mmol/L    Glucose 554 (HH) 70 - 110 mg/dL    BUN 11 6 - 20 mg/dL    Creatinine 1.9 (H) 0.5 - 1.4 mg/dL    Calcium 8.6 (L) 8.7 - 10.5 mg/dL    Total Protein 6.8 6.0 - 8.4 g/dL    Albumin 2.8 (L) 3.5 - 5.2 g/dL    Total Bilirubin 0.4 0.1 - 1.0 mg/dL    Alkaline Phosphatase 411 (H) 55 - 135 U/L    AST 38 10 - 40 U/L    ALT 32 10 - 44 U/L    Anion Gap 23 (H) 8 - 16 mmol/L    eGFR 47 (A) >60 mL/min/1.73 m^2   Lactic acid, plasma #1    Collection Time: 10/02/22  4:57 PM   Result Value Ref Range    Lactate (Lactic Acid) 9.6 (HH) 0.5 - 2.2 mmol/L   Magnesium    Collection Time: 10/02/22  4:57 PM   Result Value Ref Range    Magnesium 1.6 1.6 - 2.6 mg/dL   Phosphorus    Collection Time: 10/02/22  4:57 PM   Result Value Ref Range    Phosphorus 1.7 (L) 2.7 - 4.5 mg/dL   Brain natriuretic peptide    Collection Time: 10/02/22  4:57 PM   Result Value Ref Range    BNP 41 0 - 99 pg/mL   Troponin I    Collection Time: 10/02/22  4:57 PM   Result Value Ref Range    Troponin I <0.006 0.000 - 0.026 ng/mL   Lipase    Collection Time: 10/02/22  4:57 PM   Result Value Ref Range    Lipase 98 (H) 4 - 60 U/L   Procalcitonin    Collection Time: 10/02/22  4:57 PM   Result Value Ref Range    Procalcitonin 0.21 <0.25 ng/mL   Ethanol    Collection Time: 10/02/22  4:57 PM   Result Value Ref Range    Alcohol, Serum 198 (H) <10 mg/dL   D dimer, quantitative    Collection Time: 10/02/22  4:57 PM    Result Value Ref Range    D-Dimer 0.79 (H) <0.50 mg/L FEU   Beta - Hydroxybutyrate, Serum    Collection Time: 10/02/22  4:57 PM   Result Value Ref Range    Beta-Hydroxybutyrate 0.2 0.0 - 0.5 mmol/L   Blood culture x two cultures. Draw prior to antibiotics.    Collection Time: 10/02/22  4:58 PM    Specimen: Peripheral, Antecubital, Right; Blood   Result Value Ref Range    Blood Culture, Routine No Growth to date    Blood culture x two cultures. Draw prior to antibiotics.    Collection Time: 10/02/22  4:58 PM    Specimen: Peripheral, Antecubital, Left; Blood   Result Value Ref Range    Blood Culture, Routine No Growth to date    ISTAT PROCEDURE    Collection Time: 10/02/22  5:08 PM   Result Value Ref Range    POC PH 7.351 7.35 - 7.45    POC PCO2 37.3 35 - 45 mmHg    POC PO2 38 (L) 40 - 60 mmHg    POC HCO3 20.6 (L) 24 - 28 mmol/L    POC BE -4 -2 to 2 mmol/L    POC SATURATED O2 70 (L) 95 - 100 %    POC TCO2 22 (L) 24 - 29 mmol/L    Sample VENOUS     Site Other     Allens Test N/A    ISTAT Lactate    Collection Time: 10/02/22  5:10 PM   Result Value Ref Range    POC Lactate 9.11 (HH) 0.5 - 2.2 mmol/L    Sample VENOUS     Site Other     Allens Test N/A    POCT COVID-19 Rapid Screening    Collection Time: 10/02/22  5:21 PM   Result Value Ref Range    POC Rapid COVID Negative Negative     Acceptable Yes    POCT Influenza A/B Molecular    Collection Time: 10/02/22  5:21 PM   Result Value Ref Range    POC Molecular Influenza A Ag Negative Negative, Not Reported    POC Molecular Influenza B Ag Negative Negative, Not Reported     Acceptable Yes    POCT glucose    Collection Time: 10/02/22  8:28 PM   Result Value Ref Range    POCT Glucose 462 (HH) 70 - 110 mg/dL   Urinalysis, Reflex to Urine Culture Urine, Clean Catch    Collection Time: 10/02/22  8:39 PM    Specimen: Urine   Result Value Ref Range    Specimen UA Urine, Clean Catch     Color, UA Colorless (A) Yellow, Straw, Leticia    Appearance, UA  Clear Clear    pH, UA 7.0 5.0 - 8.0    Specific Gravity, UA 1.010 1.005 - 1.030    Protein, UA 1+ (A) Negative    Glucose, UA 4+ (A) Negative    Ketones, UA Negative Negative    Bilirubin (UA) Negative Negative    Occult Blood UA Negative Negative    Nitrite, UA Negative Negative    Urobilinogen, UA Negative <2.0 EU/dL    Leukocytes, UA Negative Negative   Drug screen panel, emergency    Collection Time: 10/02/22  8:39 PM   Result Value Ref Range    Benzodiazepines Negative Negative    Methadone metabolites Negative Negative    Cocaine (Metab.) Negative Negative    Opiate Scrn, Ur Presumptive Positive (A) Negative    Barbiturate Screen, Ur Negative Negative    Amphetamine Screen, Ur Negative Negative    THC Negative Negative    Phencyclidine Negative Negative    Creatinine, Urine 19.2 (L) 23.0 - 375.0 mg/dL    Toxicology Information SEE COMMENT    Urinalysis Microscopic    Collection Time: 10/02/22  8:39 PM   Result Value Ref Range    RBC, UA 1 0 - 4 /hpf    WBC, UA 2 0 - 5 /hpf    Bacteria None None-Occ /hpf    Yeast, UA None None    Hyaline Casts, UA 0 0-1/lpf /lpf    Microscopic Comment SEE COMMENT    Lactic acid, plasma #2    Collection Time: 10/02/22  9:13 PM   Result Value Ref Range    Lactate (Lactic Acid) 4.0 (HH) 0.5 - 2.2 mmol/L   Basic metabolic panel    Collection Time: 10/02/22  9:13 PM   Result Value Ref Range    Sodium 133 (L) 136 - 145 mmol/L    Potassium 2.6 (LL) 3.5 - 5.1 mmol/L    Chloride 97 95 - 110 mmol/L    CO2 23 23 - 29 mmol/L    Glucose 467 (HH) 70 - 110 mg/dL    BUN 9 6 - 20 mg/dL    Creatinine 1.7 (H) 0.5 - 1.4 mg/dL    Calcium 8.0 (L) 8.7 - 10.5 mg/dL    Anion Gap 13 8 - 16 mmol/L    eGFR 54 (A) >60 mL/min/1.73 m^2   Phosphorus    Collection Time: 10/02/22  9:13 PM   Result Value Ref Range    Phosphorus 2.4 (L) 2.7 - 4.5 mg/dL   Magnesium    Collection Time: 10/02/22  9:13 PM   Result Value Ref Range    Magnesium 1.6 1.6 - 2.6 mg/dL   Iron and TIBC    Collection Time: 10/02/22 10:31 PM    Result Value Ref Range    Iron 66 45 - 160 ug/dL    Transferrin 208 200 - 375 mg/dL    TIBC 308 250 - 450 ug/dL    Saturated Iron 21 20 - 50 %   CK    Collection Time: 10/02/22 10:31 PM   Result Value Ref Range    CPK 48 20 - 200 U/L   POCT glucose    Collection Time: 10/02/22 11:24 PM   Result Value Ref Range    POCT Glucose 463 (HH) 70 - 110 mg/dL   Basic metabolic panel    Collection Time: 10/03/22 12:45 AM   Result Value Ref Range    Sodium 134 (L) 136 - 145 mmol/L    Potassium 2.9 (L) 3.5 - 5.1 mmol/L    Chloride 99 95 - 110 mmol/L    CO2 22 (L) 23 - 29 mmol/L    Glucose 385 (H) 70 - 110 mg/dL    BUN 8 6 - 20 mg/dL    Creatinine 1.5 (H) 0.5 - 1.4 mg/dL    Calcium 8.2 (L) 8.7 - 10.5 mg/dL    Anion Gap 13 8 - 16 mmol/L    eGFR >60 >60 mL/min/1.73 m^2   Magnesium    Collection Time: 10/03/22 12:45 AM   Result Value Ref Range    Magnesium 1.7 1.6 - 2.6 mg/dL   POCT glucose    Collection Time: 10/03/22 12:47 AM   Result Value Ref Range    POCT Glucose 376 (H) 70 - 110 mg/dL   POCT glucose    Collection Time: 10/03/22  1:43 AM   Result Value Ref Range    POCT Glucose 320 (H) 70 - 110 mg/dL   POCT glucose    Collection Time: 10/03/22  2:39 AM   Result Value Ref Range    POCT Glucose 312 (H) 70 - 110 mg/dL   POCT glucose    Collection Time: 10/03/22  3:37 AM   Result Value Ref Range    POCT Glucose 268 (H) 70 - 110 mg/dL   POCT glucose    Collection Time: 10/03/22  4:33 AM   Result Value Ref Range    POCT Glucose 270 (H) 70 - 110 mg/dL   Phosphorus    Collection Time: 10/03/22  4:47 AM   Result Value Ref Range    Phosphorus 3.1 2.7 - 4.5 mg/dL   Magnesium    Collection Time: 10/03/22  4:47 AM   Result Value Ref Range    Magnesium 1.6 1.6 - 2.6 mg/dL   Comprehensive Metabolic Panel (CMP)    Collection Time: 10/03/22  4:47 AM   Result Value Ref Range    Sodium 135 (L) 136 - 145 mmol/L    Potassium 3.3 (L) 3.5 - 5.1 mmol/L    Chloride 101 95 - 110 mmol/L    CO2 24 23 - 29 mmol/L    Glucose 289 (H) 70 - 110 mg/dL    BUN  8 6 - 20 mg/dL    Creatinine 1.4 0.5 - 1.4 mg/dL    Calcium 8.2 (L) 8.7 - 10.5 mg/dL    Total Protein 6.4 6.0 - 8.4 g/dL    Albumin 2.5 (L) 3.5 - 5.2 g/dL    Total Bilirubin 0.4 0.1 - 1.0 mg/dL    Alkaline Phosphatase 370 (H) 55 - 135 U/L    AST 31 10 - 40 U/L    ALT 27 10 - 44 U/L    Anion Gap 10 8 - 16 mmol/L    eGFR >60 >60 mL/min/1.73 m^2   CBC with Automated Differential    Collection Time: 10/03/22  4:47 AM   Result Value Ref Range    WBC 9.09 3.90 - 12.70 K/uL    RBC 2.83 (L) 4.60 - 6.20 M/uL    Hemoglobin 9.9 (L) 14.0 - 18.0 g/dL    Hematocrit 27.4 (L) 40.0 - 54.0 %    MCV 97 82 - 98 fL    MCH 35.0 (H) 27.0 - 31.0 pg    MCHC 36.1 (H) 32.0 - 36.0 g/dL    RDW 13.2 11.5 - 14.5 %    Platelets 209 150 - 450 K/uL    MPV 9.5 9.2 - 12.9 fL    Immature Granulocytes 0.4 0.0 - 0.5 %    Gran # (ANC) 4.7 1.8 - 7.7 K/uL    Immature Grans (Abs) 0.04 0.00 - 0.04 K/uL    Lymph # 2.7 1.0 - 4.8 K/uL    Mono # 1.3 (H) 0.3 - 1.0 K/uL    Eos # 0.2 0.0 - 0.5 K/uL    Baso # 0.05 0.00 - 0.20 K/uL    nRBC 0 0 /100 WBC    Gran % 52.2 38.0 - 73.0 %    Lymph % 29.9 18.0 - 48.0 %    Mono % 14.3 4.0 - 15.0 %    Eosinophil % 2.6 0.0 - 8.0 %    Basophil % 0.6 0.0 - 1.9 %    Differential Method Automated    POCT glucose    Collection Time: 10/03/22  6:20 AM   Result Value Ref Range    POCT Glucose 293 (H) 70 - 110 mg/dL   POCT glucose    Collection Time: 10/03/22  7:40 AM   Result Value Ref Range    POCT Glucose 270 (H) 70 - 110 mg/dL   Contains abnormal data Aerobic culture  Order: [id number]  Status: Final result     Visible to patient: Yes (seen)     Next appt: 10/18/2022 at 11:15 AM in Urology (ARIC Aguilar MD)     Specimen Information: Groin; Abscess   0 Result Notes  Component 3 wk ago    Aerobic Bacterial Culture  Abnormal   STREPTOCOCCUS AGALACTIAE (GROUP B)   Moderate   Beta-hemolytic streptococci are routinely susceptible to   penicillins,cephalosporins and carbapenems.     Resulting Agency [facility]           Narrative  Performed  by: [facility]  Right Groin Abscess      Specimen Collected: 09/09/22 12:22 Last Resulted: 09/11/22 07:38               Imaging Results               CTA Chest Non-Coronary (PE Studies) (Final result)  Result time 10/02/22 20:34:41      Final result by Michael Amin MD (10/02/22 20:34:41)                   Impression:      This report was flagged in Epic as abnormal.    1. Allowing for exam limitations above, no convincing pulmonary thromboembolism.  Correlation of these findings with D-dimer and/or lower extremity ultrasound as warranted.  2. Pulmonary nodules as described.  For multiple solid nodules all <6 mm, Fleischner Society 2017 guidelines recommend no routine follow up for a low risk patient, or follow up with non-contrast chest CT at 12 months after discovery in a high risk patient.  3. Scattered ground-glass attenuation throughout the pulmonary parenchyma, may reflect underlying edema or nonspecific pneumonitis, correlation is advised.  4. Possible hepatic steatosis, correlation with LFTs recommended.  5. Please see above for several additional findings.      Electronically signed by: Michael Amin MD  Date:    10/02/2022  Time:    20:34               Narrative:    EXAMINATION:  CTA CHEST NON CORONARY (PE STUDIES)    CLINICAL HISTORY:  Pulmonary embolism (PE) suspected, high prob;    TECHNIQUE:  Low dose axial images, sagittal and coronal reformations were obtained from the thoracic inlet to the lung bases following the IV administration of 100 mL of Omnipaque 350.  Contrast timing was optimized to evaluate the pulmonary arteries.  MIP images were performed.    COMPARISON:  CT abdomen and pelvis 09/01/2022    FINDINGS:  The structures at the base of the neck are unremarkable.  No significant mediastinal lymphadenopathy.  The heart is not enlarged.  No pericardial effusion.  The thoracic aorta tapers normally.  The visualized portions of the spleen, adrenal glands, pancreas and stomach are grossly  unremarkable.  The liver is mildly hypoattenuating, may be on the basis of contrast phase however steatosis is a consideration, correlation is advised with LFTs.    Motion artifact limits evaluation of the airways and pulmonary parenchyma.  Allowing for the above, the airways are patent.  There are a few scattered patchy regions of ground-glass attenuation suggesting edema or other nonspecific pneumonitis.  There is bilateral basilar dependent atelectasis.  No large focal consolidation.  No pneumothorax.  There is a pulmonary nodule within the right lower lobe measuring 3-4 mm, series 2, image 168.  There is a pulmonary nodule within the periphery of the left lower lobe, series 2, image 248.    Bolus timing is suboptimal for evaluation of pulmonary thromboembolism.  Additionally, examination is limited secondary to artifact from respiratory motion.  Allowing for the above, no convincing pulmonary arterial filling defect to the level of the proximal segmental branches bilaterally to suggest pulmonary thromboembolism.  Please note, there is particularly limited evaluation of the pulmonary arteries to the bilateral lower lobes on the basis of respiratory motion.    There are degenerative changes of the spine.  No significant axillary lymphadenopathy.  There is bilateral gynecomastia.                                       CT Pelvis Without Contrast (Final result)  Result time 10/02/22 19:20:05   Procedure changed from CT Pelvis With Contrast     Final result by Jd Lewis DO (10/02/22 19:20:05)                   Impression:      Fluid and air collection in the right perineum/scrotal wall, highly concerning for a residual or recurrent abscess.      Electronically signed by: Jd Lewis  Date:    10/02/2022  Time:    19:20               Narrative:    EXAMINATION:  CT PELVIS WITHOUT CONTRAST    CLINICAL HISTORY:  Soft tissue infection suspected, pelvis, xray done;    TECHNIQUE:  Multiplanar images were obtained of  the pelvis from the iliac crests through the symphysis pubis without intravenous contrast.  Patient had an order for a CTA chest and pelvis. The patients IV had failed during the contrast bolus during the CTA chest. The provider said that she was ok if no contrast had reached the pelvis and wanted scan to be read without contrast. The patient is being hydrated and another attempt will be made for the CTA chest.    COMPARISON:  CT of the abdomen and pelvis from 09/01/2022.    FINDINGS:  Bone: No aggressive osseous lesion.  No acute fracture or dislocation.  No evidence of acute osteomyelitis.    Articulations: The bilateral hips, pubic symphysis, and bilateral sacroiliac joints are unremarkable.  Partially imaged portions of the lower lumbar spine are unremarkable.    Soft tissues: There is a thick-walled collection of fluid and soft tissue gas in the right perineum/scrotal wall measuring 4.2 x 4.2 x 1.6 cm (AP by cc by TR), highly concerning for a residual/recurrent abscess.  Mild amount of surrounding soft tissue stranding which has significantly improved in comparison with the study from 09/01/2022.    Lymph nodes: Again seen are bilateral symmetric prominent inguinal lymph nodes, likely reactive and similar to prior.    Miscellaneous: Partially imaged portions of the internal pelvis are unremarkable.  The urinary bladder, prostate, and bowel are unremarkable.    Vasculature: There is atherosclerosis of the partially imaged bilateral femoral arteries.  Vasculature is otherwise unremarkable on this noncontrast CT.                                       X-Ray Chest AP Portable (Final result)  Result time 10/02/22 17:14:39      Final result by Santi Porter MD (10/02/22 17:14:39)                   Impression:      No acute process.      Electronically signed by: Santi Porter MD  Date:    10/02/2022  Time:    17:14               Narrative:    EXAMINATION:  XR CHEST AP PORTABLE    CLINICAL  HISTORY:  Sepsis;    TECHNIQUE:  Single frontal view of the chest was performed.    COMPARISON:  09/01/2022.    FINDINGS:  Monitoring EKG leads are present.  The trachea is unremarkable.  The cardiomediastinal silhouette is within normal limits.  There is elevation of both hemidiaphragms.  There are no pleural effusions.  There is no evidence of a pneumothorax.  There is no evidence of pneumomediastinum.  No airspace opacity is present.  The osseous structures are unremarkable.

## 2022-10-03 NOTE — PLAN OF CARE
Problem: Adult Inpatient Plan of Care  Goal: Readiness for Transition of Care  Outcome: Ongoing, Progressing     Problem: Diabetes Comorbidity  Goal: Blood Glucose Level Within Targeted Range  Outcome: Ongoing, Progressing  Intervention: Monitor and Manage Glycemia  Flowsheets (Taken 10/3/2022 1715)  Glycemic Management: blood glucose monitored     Problem: Adjustment to Illness (Sepsis/Septic Shock)  Goal: Optimal Coping  Outcome: Ongoing, Progressing  Intervention: Optimize Psychosocial Adjustment to Illness  Flowsheets (Taken 10/3/2022 1715)  Supportive Measures:   active listening utilized   goal-setting facilitated   positive reinforcement provided   relaxation techniques promoted   self-responsibility promoted   verbalization of feelings encouraged   self-care encouraged   problem-solving facilitated   guided imagery facilitated   counseling provided     Problem: Glycemic Control Impaired (Sepsis/Septic Shock)  Goal: Blood Glucose Level Within Desired Range  Outcome: Ongoing, Progressing  Intervention: Optimize Glycemic Control  Flowsheets (Taken 10/3/2022 1715)  Glycemic Management: blood glucose monitored     Problem: Infection Progression (Sepsis/Septic Shock)  Goal: Absence of Infection Signs and Symptoms  Outcome: Ongoing, Progressing  Intervention: Initiate Sepsis Management  Flowsheets (Taken 10/3/2022 1715)  Infection Prevention:   rest/sleep promoted   personal protective equipment utilized   equipment surfaces disinfected   hand hygiene promoted  Infection Management: aseptic technique maintained

## 2022-10-03 NOTE — EICU
New Patient Evaluation    HPI:  32 M obese (BMI 36) history of ETOH abuse, DM, HFpEF, admitted 9/1 to 9/16 for sepsis secondary to right scrotal abscess s/p I and D 9/9 and was discharged on Augmentin for GBS.    He returns with worsening groin pain with drainage form the site. He also reports of pleuritic chest pain which he attributes to a fall 2 weeks ago. He was hyperglycemic in the 400s on presentation, admitting to not taken insulin for several days. He was hypotensive with lactic acid of 9.6, improved with fluid bolus in the ED.    Camera Assessment:  /82  HR 85  O2 100%  Seen flat on bed not in distress  Dr Ruff at bedside    Data:  Chest CT no PE, low lung volumes, scattered ground glass attenuation, multiple sub cm pulmonary nodules    CT abdomen with thick-walled collection of fluid and soft tissue gas in the right perineum/scrotal wall measuring 4.2 x 4.2 x 1.6 cm (AP by cc by TR), highly concerning for a residual/recurrent abscess    K 2.6, Mg 1.6, creatinine 1.7, Na 133      Assessment and Plans:   Severe sepsis secondary to scrotal abscess, I and D done by ED MD, urology consulted who has reviewed CT and will re-evaluate in am. Continue piperacillin-tazobactam for now.  Hyperglycemia, stress induced as well as medication non-compliance. Aggressively correcting potassium while ongoing insulin drip. Fluids ongoing as well.  Multiple sub cm pulmonary nodules, will need to be followed. Blood cultures pending as well

## 2022-10-04 NOTE — ASSESSMENT & PLAN NOTE
"32M with h/o t2dm, obesity (BMI 37)  re-admitted 10/2 with recurrent scrotal abscess and pleuritic chest pain after a fall. Prior scrotal cultures with GBS and s/p washout urology with open wound.  Updated ct- fluid and air collection in the right perineum/scrotal wall, highly concerning for a residual or recurrent abscess. Bcx 10/2 NGTD. Afebrile. On ceftriaxone/metronidazole. ID consulted for "Recurrent scrotal abscess"    Unclear if scrotal infection is from GBS vs new infection from fecal material contaminating wound    Recommendations:   - continue current abx- ceftriaxone/metronidazole  - if urology feels that washout necessary, please send updated cultures      "

## 2022-10-04 NOTE — CONSULTS
"[facility] Surg  Infectious Disease  Consult Note    Patient Name: Doe Woodall  MRN: [id number]  Admission Date: 10/2/2022  Hospital Length of Stay: 2 days  Attending Physician: Marcell Elam DO  Primary Care Provider: [facility]     Isolation Status: No active isolations    Patient information was obtained from patient and ER records.      Inpatient consult to Infectious Diseases  Consult performed by: Comfort Hurtado MD  Consult ordered by: Armando So MD        Assessment/Plan:     * Abscess of scrotum  32M with h/o t2dm, obesity (BMI 37)  re-admitted 10/2 with recurrent scrotal abscess and pleuritic chest pain after a fall. Prior scrotal cultures with GBS and s/p washout urology with open wound.  Updated ct- fluid and air collection in the right perineum/scrotal wall, highly concerning for a residual or recurrent abscess. Bcx 10/2 NGTD. Afebrile. On ceftriaxone/metronidazole. ID consulted for "Recurrent scrotal abscess"    Unclear if scrotal infection is from GBS vs new infection from fecal material contaminating wound    Recommendations:   - continue current abx- ceftriaxone/metronidazole  - if urology feels that washout necessary, please send updated cultures            Thank you for your consult. I will follow-up with patient. Please contact us if you have any additional questions.    Comfort Hurtado MD  Infectious Disease  [facility] - Med Surg    Subjective:     Principal Problem: Abscess of scrotum    HPI:   32M with h/o t2dm, obesity (BMI 37)  re-admitted 10/2 with recurrent scrotal abscess and sob/cp. Had been admitted 9/1 with scrotal abscess. Says he completed augmentin last Tuesday. Says scrotal wound had been improving, but the drainage and pain never fully went away. Says he had been having a lot of pain with dressing changes- using a ABD pad with some dry blood on dressings. Says he had been following up with urology and was told wound getting more shallow " "and to keep it elevated with jock strap, which he hadn't started doing. Reports having a fall in his house (after reportedly drinking etoh) and says he passed out. When he woke up, reports some L sided rib pain, worse with taking deep breath and came back to hospital    Brief history:  admitted 9/1 with scrotal pain and shakes/chills. Initial scrotal ultrasound with cellulitis. bcx 9/1 NGTD.  Was on clindamycin. Scrotal us repeated several days later and noted to have large (11.7 x 4.4cm abscess).  s/p urology washout, cultures GBS. Was treated with clindamycin --> ceftriaxone --> Augmentin. Plan had been to continue po abx at least 10-14 days from washout and wound epithelizes  (est end date 9/22)     Aerobic culture [[id number]] (Abnormal) Collected: 09/09/22 1222   Order Status: Completed Specimen: Abscess from Groin Updated: 09/11/22 0738    Aerobic Bacterial Culture STREPTOCOCCUS AGALACTIAE (GROUP B)   Moderate   Beta-hemolytic streptococci are routinely susceptible to   penicillins,cephalosporins and carbapenems.        Reports ongoing groin drainage and redness/swelling. Denies f/c. Admits to drinking whisky for pain reports fall    CT C/a/P  Fluid and air collection in the right perineum/scrotal wall, highly concerning for a residual or recurrent abscess.    Bcx 10/2 NGTD    afebrile    On ceftriaxone/metronidazole    ID consulted for "Recurrent scrotal abscess"      Past Medical History:   Diagnosis Date    Abscess of right groin 09/01/2022    Diabetes mellitus     Encounter for blood transfusion     Loud snoring     Obese     Other insomnia 08/03/2020    Perineal abscess 08/01/2014    Primary hypertension 10/2/2022       Past Surgical History:   Procedure Laterality Date    ABCESS DRAINAGE  2014    PERIRECTAL    DECOMPRESSION OF LUMBAR SPINE USING MINIMALLY INVASIVE TECHNIQUE Right 07/06/2018    Procedure: DECOMPRESSION, SPINE, LUMBAR, MINIMALLY INVASIVE L3-4;  Surgeon: Cristian Cervantes MD;  " "[facility];  Service: Neurosurgery;  Laterality: Right;    INCISION AND DRAINAGE N/A 9/9/2022    Procedure: INCISION AND DRAINAGE GROIN;  Surgeon: KAITY Aguilar MD;  Location: [facility] OR;  Service: Urology;  Laterality: N/A;    ORTHOPEDIC SURGERY         Review of patient's allergies indicates:   Allergen Reactions    Metformin Diarrhea       No current facility-administered medications on file prior to encounter.     Current Outpatient Medications on File Prior to Encounter   Medication Sig    acetaminophen (TYLENOL) 500 MG tablet Take 1,000 mg by mouth 3 (three) times daily as needed for Pain.    amLODIPine (NORVASC) 10 MG tablet Take 1 tablet (10 mg total) by mouth once daily.    blood sugar diagnostic Strp 1 strip by Misc.(Non-Drug; Combo Route) route 4 (four) times daily with meals and nightly.    blood-glucose meter Misc Use as instructed    diphenhydramine HCl (UNISOM SLEEPGELS ORAL) Take 1 capsule by mouth every evening.    docusate sodium (COLACE) 100 MG capsule Take 1 capsule (100 mg total) by mouth 3 (three) times daily as needed for Constipation.    folic acid (FOLVITE) 1 MG tablet Take 1 tablet (1 mg total) by mouth once daily.    glucagon (GLUCAGEN HYPOKIT) 1 mg SolR Inject 1 mg into the muscle as needed (Hypoglycemia).    HYDROcodone-acetaminophen (NORCO) 5-325 mg per tablet Take 1 tablet by mouth every 6 (six) hours as needed (dressing changes).    insulin aspart U-100 (NOVOLOG) 100 unit/mL (3 mL) InPn pen Inject 7 Units into the skin 3 (three) times daily.    insulin detemir U-100 (LEVEMIR FLEXTOUCH) 100 unit/mL (3 mL) SubQ InPn pen Inject 30 Units into the skin once daily.    lancets 30 gauge Misc 1 lancet by Misc.(Non-Drug; Combo Route) route 4 (four) times daily with meals and nightly.    lancing device Misc Use as instructed    pen needle, diabetic (BD ULTRA-FINE TJ PEN NEEDLE) 32 gauge x 5/32" Ndle Use 4 times daily    pen needle, diabetic 31 gauge x 5/16" " "Ndle 1 each by Misc.(Non-Drug; Combo Route) route 4 (four) times daily with meals and nightly.    pen needle, diabetic 32 gauge x 5/32" Ndle USE TO INJECT INSULIN FOUR TIMES DAILY    pulse oximeter (PULSE OXIMETER) device by Apply Externally route 2 (two) times a day. Use twice daily at 8 AM and 3 PM and record the value in MyChart as directed.    sodium chlor-hypochlorous acid 0.033 % IrSl Irrigate with 25 mLs as directed once daily.    thiamine 100 MG tablet Take 1 tablet (100 mg total) by mouth once daily.     Family History       Problem Relation (Age of Onset)    Diabetes Father, Paternal Grandmother, Paternal Grandfather          Tobacco Use    Smoking status: Former     Packs/day: 0.50     Years: 9.00     Pack years: 4.50     Types: Cigarettes     Quit date: 2013     Years since quittin.2    Smokeless tobacco: Former   Substance and Sexual Activity    Alcohol use: Not Currently     Comment: DAILY PINT OF LIQUOR, HX OF 1 "TALL BOY" OF BEER DAILY    Drug use: Not Currently    Sexual activity: Yes     Partners: Female     Birth control/protection: None     Review of Systems   Constitutional:  Positive for activity change, appetite change and fatigue. Negative for fever.   Eyes:  Negative for visual disturbance.   Respiratory:  Negative for shortness of breath.    Cardiovascular:  Positive for chest pain.   Gastrointestinal:  Positive for nausea.   Endocrine: Negative for heat intolerance.   Genitourinary:  Positive for difficulty urinating, scrotal swelling and testicular pain.   Musculoskeletal:  Positive for myalgias.   Skin:  Positive for color change and wound.   Allergic/Immunologic: Negative for immunocompromised state.   Neurological:  Positive for light-headedness.   Hematological:  Does not bruise/bleed easily.   Psychiatric/Behavioral:  Negative for decreased concentration and dysphoric mood.    Objective:     Vital Signs (Most Recent):  Temp: 98.3 °F (36.8 °C) (10/04/22 1123)  Pulse: " 89 (10/04/22 1123)  Resp: 18 (10/04/22 1123)  BP: (!) 177/101 (10/04/22 1123)  SpO2: 100 % (10/04/22 1123)   Vital Signs (24h Range):  Temp:  [98.1 °F (36.7 °C)-98.3 °F (36.8 °C)] 98.3 °F (36.8 °C)  Pulse:  [] 89  Resp:  [12-27] 18  SpO2:  [92 %-100 %] 100 %  BP: (146-177)/() 177/101     Weight: 98.3 kg (216 lb 11.4 oz)  Body mass index is 36.06 kg/m².    Physical Exam  Constitutional:       General: He is not in acute distress.     Appearance: He is obese. He is ill-appearing. He is not toxic-appearing or diaphoretic.   HENT:      Head: Normocephalic and atraumatic.   Eyes:      Conjunctiva/sclera:      Right eye: Right conjunctiva is injected.      Left eye: Left conjunctiva is injected.   Neck:      Thyroid: No thyromegaly.   Cardiovascular:      Rate and Rhythm: Regular rhythm. Tachycardia present.      Heart sounds: Murmur heard.   Systolic murmur is present with a grade of 1/6.   Pulmonary:      Effort: Pulmonary effort is normal. No tachypnea or bradypnea.      Breath sounds: No decreased breath sounds, wheezing, rhonchi or rales.   Chest:      Chest wall: Tenderness present. No swelling.   Abdominal:      General: Abdomen is protuberant. Bowel sounds are normal. There is no distension.      Palpations: Abdomen is soft.      Tenderness: There is no abdominal tenderness.      Comments: Truncal obesity   Genitourinary:     Comments: No gabriel in place.  Left groin with an open area consistent with abscess s/p I&D in the past.  There is no drainage.  The surrounding area is reddened.  See pic.  Musculoskeletal:      Cervical back: Neck supple.   Lymphadenopathy:      Comments: Trace edema to legs   Skin:     Coloration: Skin is sallow.      Findings: No rash.      Comments: Multiple tattoos   Neurological:      General: No focal deficit present.      Mental Status: He is alert and oriented to person, place, and time.      GCS: GCS eye subscore is 4. GCS verbal subscore is 5. GCS motor subscore is 6.    Psychiatric:         Attention and Perception: Attention and perception normal.         Mood and Affect: Mood is depressed.         Speech: Speech normal.         Behavior: Behavior normal. Behavior is cooperative.         Cognition and Memory: Cognition and memory normal.           Significant Labs: All pertinent labs within the past 24 hours have been reviewed.  Recent Results (from the past 24 hour(s))   POCT glucose    Collection Time: 10/03/22  4:32 PM   Result Value Ref Range    POCT Glucose 226 (H) 70 - 110 mg/dL   POCT glucose    Collection Time: 10/03/22  7:28 PM   Result Value Ref Range    POCT Glucose 276 (H) 70 - 110 mg/dL   Phosphorus    Collection Time: 10/04/22  3:47 AM   Result Value Ref Range    Phosphorus 2.8 2.7 - 4.5 mg/dL   Magnesium    Collection Time: 10/04/22  3:47 AM   Result Value Ref Range    Magnesium 1.7 1.6 - 2.6 mg/dL   Comprehensive Metabolic Panel (CMP)    Collection Time: 10/04/22  3:47 AM   Result Value Ref Range    Sodium 139 136 - 145 mmol/L    Potassium 3.2 (L) 3.5 - 5.1 mmol/L    Chloride 105 95 - 110 mmol/L    CO2 25 23 - 29 mmol/L    Glucose 295 (H) 70 - 110 mg/dL    BUN 7 6 - 20 mg/dL    Creatinine 1.2 0.5 - 1.4 mg/dL    Calcium 8.6 (L) 8.7 - 10.5 mg/dL    Total Protein 6.5 6.0 - 8.4 g/dL    Albumin 2.6 (L) 3.5 - 5.2 g/dL    Total Bilirubin 0.6 0.1 - 1.0 mg/dL    Alkaline Phosphatase 370 (H) 55 - 135 U/L    AST 26 10 - 40 U/L    ALT 26 10 - 44 U/L    Anion Gap 9 8 - 16 mmol/L    eGFR >60 >60 mL/min/1.73 m^2   CBC with Automated Differential    Collection Time: 10/04/22  3:47 AM   Result Value Ref Range    WBC 6.26 3.90 - 12.70 K/uL    RBC 2.97 (L) 4.60 - 6.20 M/uL    Hemoglobin 10.3 (L) 14.0 - 18.0 g/dL    Hematocrit 29.5 (L) 40.0 - 54.0 %    MCV 99 (H) 82 - 98 fL    MCH 34.7 (H) 27.0 - 31.0 pg    MCHC 34.9 32.0 - 36.0 g/dL    RDW 13.2 11.5 - 14.5 %    Platelets 231 150 - 450 K/uL    MPV 9.9 9.2 - 12.9 fL    Immature Granulocytes 0.5 0.0 - 0.5 %    Gran # (ANC) 3.3 1.8 -  7.7 K/uL    Immature Grans (Abs) 0.03 0.00 - 0.04 K/uL    Lymph # 2.0 1.0 - 4.8 K/uL    Mono # 0.8 0.3 - 1.0 K/uL    Eos # 0.2 0.0 - 0.5 K/uL    Baso # 0.06 0.00 - 0.20 K/uL    nRBC 0 0 /100 WBC    Gran % 52.0 38.0 - 73.0 %    Lymph % 31.3 18.0 - 48.0 %    Mono % 12.3 4.0 - 15.0 %    Eosinophil % 2.9 0.0 - 8.0 %    Basophil % 1.0 0.0 - 1.9 %    Differential Method Automated    POCT glucose    Collection Time: 10/04/22  7:26 AM   Result Value Ref Range    POCT Glucose 324 (H) 70 - 110 mg/dL   POCT glucose    Collection Time: 10/04/22 11:21 AM   Result Value Ref Range    POCT Glucose 267 (H) 70 - 110 mg/dL   Contains abnormal data Aerobic culture  Order: [id number]   Status: Final result      Visible to patient: Yes (seen)      Next appt: 10/18/2022 at 11:15 AM in Urology (ARIC Aguilar MD)     Specimen Information: Groin; Abscess    0 Result Notes  Component 3 wk ago    Aerobic Bacterial Culture  Abnormal   STREPTOCOCCUS AGALACTIAE (GROUP B)   Moderate   Beta-hemolytic streptococci are routinely susceptible to   penicillins,cephalosporins and carbapenems.     Resulting Agency [facility]           Narrative  Performed by: [facility]  Right Groin Abscess      Specimen Collected: 09/09/22 12:22 Last Resulted: 09/11/22 07:38               Significant Imaging: I have reviewed all pertinent imaging results/findings within the past 24 hours.

## 2022-10-04 NOTE — PLAN OF CARE
SW completed the readmission assessment with patient. He stated that he was not eating and he was taking the medications that were prescribed to him, then he got weak he fell and was not able to breath. Patient called the nurse on call and was directed to go to the ED.   10/04/22 1055   Readmission   Why were you hospitalized in the last 30 days? Abcess  of Scrotum   Why were you readmitted? Alarmed about signs/symptoms   When you left the hospital how did you feel? Much better   When you left the hospital where did you go? Home with Family   Did patient/caregiver refused recommended DC plan? No   Tell me about what happened between when you left the hospital and the day you returned. Patient got home and he was scared to eat because he did not want to use the restroom due to the groin pain.   When did you start not feeling well? Next day after discharged from the hospital   Did you try to manage your symptoms your self? No   Did you call anyone? Yes   Who did you call? On Call Nurse   Did you have  a follow-up appointment on discharge? Yes   Did you go? Yes   Was this a planned readmission? No

## 2022-10-04 NOTE — ASSESSMENT & PLAN NOTE
· Hold Oral hypoglycemics while patient is in the hospital.  · His pH maintained, therefore not technically DKA.  · Calculated anion gap of 25 on admission, with bicarb of 17 and glucose >500.    · Started on insulin drip, gap is now closed and bicarb up to 24, will transition to subcutaneous insulin after procedure with Urology.

## 2022-10-04 NOTE — NURSING
Patient transferred to med surg floor from ICU via wheelchair by transporter/ICU nurse.  Patient transferred to bed via x1 person assist.  AAOX4.  Patient was oriented to room, information on whiteboard, and medication regimen.  Bed low, adequate lighting provided, side rails x2 up, call bell within reach.  IVF restarted. Tele box 1712. Patient denied having any acute distress at this time.  None observed.  NPO at midnight, patient verbalized understanding. Will continue to monitor and follow treatment plan.

## 2022-10-04 NOTE — ASSESSMENT & PLAN NOTE
· Lactic acid of 9.6 on admit, has since improved with fluids in insulin drip, lactic acid 4.0  on repeat.  · Continuing fluids and abx, repeat LA 2.2 on 10/03  · Resolved

## 2022-10-04 NOTE — SUBJECTIVE & OBJECTIVE
Interval History: no acute events.  He has some pain in his right scrotum    Review of Systems   Constitutional: Negative.  Negative for fever.   HENT: Negative.     Eyes: Negative.    Respiratory:  Negative for cough, chest tightness and shortness of breath.    Cardiovascular:  Negative for chest pain.   Gastrointestinal: Negative.  Negative for constipation, diarrhea and nausea.   Genitourinary:  Positive for scrotal swelling.   Musculoskeletal: Negative.    Neurological: Negative.    Psychiatric/Behavioral: Negative.     Objective:     Temp:  [97.4 °F (36.3 °C)-98.1 °F (36.7 °C)] 98.1 °F (36.7 °C)  Pulse:  [] 84  Resp:  [12-27] 18  SpO2:  [92 %-100 %] 97 %  BP: (131-176)/() 155/92     Body mass index is 36.06 kg/m².           Drains       None                   Physical Exam  Vitals and nursing note reviewed.   Constitutional:       Appearance: He is well-developed.   HENT:      Head: Normocephalic.   Eyes:      Conjunctiva/sclera: Conjunctivae normal.   Neck:      Thyroid: No thyromegaly.      Trachea: No tracheal deviation.   Cardiovascular:      Rate and Rhythm: Normal rate.      Heart sounds: Normal heart sounds.   Pulmonary:      Effort: Pulmonary effort is normal. No respiratory distress.      Breath sounds: Normal breath sounds. No wheezing.   Abdominal:      General: Bowel sounds are normal.      Palpations: Abdomen is soft.      Tenderness: There is no abdominal tenderness. There is no rebound.      Hernia: No hernia is present.   Genitourinary:     Comments: Wounds similar to prior exam  No areas of fluctuance  Musculoskeletal:         General: No tenderness. Normal range of motion.      Cervical back: Normal range of motion and neck supple.   Lymphadenopathy:      Cervical: No cervical adenopathy.   Skin:     General: Skin is warm and dry.      Findings: No erythema or rash.   Neurological:      Mental Status: He is alert and oriented to person, place, and time.   Psychiatric:          Behavior: Behavior normal.         Thought Content: Thought content normal.         Judgment: Judgment normal.       Significant Labs:    BMP:  Recent Labs   Lab 10/03/22  0045 10/03/22  0447 10/04/22  0347   * 135* 139   K 2.9* 3.3* 3.2*   CL 99 101 105   CO2 22* 24 25   BUN 8 8 7   CREATININE 1.5* 1.4 1.2   CALCIUM 8.2* 8.2* 8.6*       CBC:   Recent Labs   Lab 10/02/22  1657 10/03/22  0447 10/04/22  0347   WBC 8.09 9.09 6.26   HGB 10.2* 9.9* 10.3*   HCT 27.6* 27.4* 29.5*    209 231       Blood Culture:   Recent Labs   Lab 10/02/22  1658   LABBLOO No Growth to date  No Growth to date  No Growth to date  No Growth to date     Urine Culture: No results for input(s): LABURIN in the last 168 hours.    Significant Imaging:

## 2022-10-04 NOTE — ASSESSMENT & PLAN NOTE
-CT pelvis: Fluid and air collection in the right perineum/scrotal wall, highly concerning for a residual or recurrent abscess.   -Of note, he had an abscess drained on 9/9/2022  -Abscess cx with GBS  -ID consulted: Unclear if scrotal infection is from GBS vs new infection from fecal material contaminating wound. Continue Rocephin and Flagyl  -Urology consulted  -Continue day 3 Rocephin, day 2 Flagyl

## 2022-10-04 NOTE — PLAN OF CARE
[facility] Emergency Room Surg  Initial Discharge Assessment  SW completed the initial assessment with patient by bed side. Patient shared that lately he has been using a cane to get around. Patient stated that his mom and his wife are his support but his wife works in [place] she comes home every 2 weeks.       Primary Care Provider: [facility] - Family Medicine    Admission Diagnosis: Shortness of breath [R06.02]  Cellulitis of groin [L03.314]  Hypokalemia [E87.6]  Chest pain [R07.9]  Alcoholic intoxication with complication [F10.929]  High serum lactic acid [R79.89]  Anemia, unspecified type [D64.9]  Other specified diabetes mellitus with hyperglycemia, with long-term current use of insulin [E13.65, Z79.4]  Acute pancreatitis, unspecified complication status, unspecified pancreatitis type [K85.90]    Admission Date: 10/2/2022  Expected Discharge Date:     Discharge Barriers Identified: None    Payor: MEDICAID / Plan: [place]UE [place] / Product Type: Managed Medicaid /     Extended Emergency Contact Information  Primary Emergency Contact: Aminta Woodall   [place]  Mobile Phone: [phone]  Relation: Spouse  Secondary Emergency Contact: Ej Woodall   [facility]  Home Phone: [phone]  Mobile Phone: [phone]  Relation: Father  Preferred language: English    Discharge Plan A: Home  Discharge Plan B:  (tbd)      [id number] - [place] - [address] AT [facility] [address]  [address]  [place] [address]  Phone: [phone] Fax: [phone]      Initial Assessment (most recent)       Adult Discharge Assessment - 10/04/22 1045          Discharge Assessment    Assessment Type Discharge Planning Assessment     Confirmed/corrected address, phone number and insurance Yes     Confirmed Demographics Correct on Facesheet     Source of Information patient     Does patient/caregiver understand observation status Yes (P)       Communicated SUSAN with patient/caregiver Yes (P)      Reason For Admission Abscess of Scrotum (P)      Lives With alone (P)      Facility Arrived From: Home (P)      Do you expect to return to your current living situation? Yes (P)      Do you have help at home or someone to help you manage your care at home? Yes (P)      Who are your caregiver(s) and their phone number(s)? Aminta Woodall (Spouse)   [phone] (Mobile) (P)      Dressing/Bathing Difficulty none (P)      Equipment Currently Used at Home cane, quad (P)      Readmission within 30 days? Yes (P)      Do you currently have service(s) that help you manage your care at home? No (P)      Do you take prescription medications? Yes (P)      Do you have prescription coverage? Yes (P)      Coverage [employer] Blue (P)      Do you have any problems affording any of your prescribed medications? No (P)      Is the patient taking medications as prescribed? yes (P)      Who is going to help you get home at discharge? Aminta Woodall (Spouse)   [phone] (Mobile) (P)

## 2022-10-04 NOTE — ASSESSMENT & PLAN NOTE
Patient with acute kidney injury likely due to IVVD/dehydration MARISSA is currently worsening. Labs reviewed- Renal function/electrolytes with Estimated Creatinine Clearance: 94.4 mL/min (based on SCr of 1.2 mg/dL). according to latest data. Monitor urine output and serial BMP and adjust therapy as needed. Avoid nephrotoxins and renally dose meds for GFR listed above.     Renal dose meds  Avoid IV dye  Gradually improving

## 2022-10-04 NOTE — PROGRESS NOTES
[facility] Surg  Urology  Progress Note    Patient Name: Deo Woodall  MRN: [id number]  Admission Date: 10/2/2022  Hospital Length of Stay: 2 days  Code Status: Full Code   Attending Provider: Marcell Elam DO   Primary Care Physician: [facility]    Subjective:     HPI:  Scrotal Abscess  He had an abscess drained on 9/9/2022.  He packed the wounds until he was unable to keep the packing in place.  He was unable to get his pain medication refilled.  He is back in the hospital after a fall.  He has some difficulty breathing.  He is having some pain in his scrotum.      Interval History: no acute events.  He has some pain in his right scrotum    Review of Systems   Constitutional: Negative.  Negative for fever.   HENT: Negative.     Eyes: Negative.    Respiratory:  Negative for cough, chest tightness and shortness of breath.    Cardiovascular:  Negative for chest pain.   Gastrointestinal: Negative.  Negative for constipation, diarrhea and nausea.   Genitourinary:  Positive for scrotal swelling.   Musculoskeletal: Negative.    Neurological: Negative.    Psychiatric/Behavioral: Negative.     Objective:     Temp:  [97.4 °F (36.3 °C)-98.1 °F (36.7 °C)] 98.1 °F (36.7 °C)  Pulse:  [] 84  Resp:  [12-27] 18  SpO2:  [92 %-100 %] 97 %  BP: (131-176)/() 155/92     Body mass index is 36.06 kg/m².           Drains       None                   Physical Exam  Vitals and nursing note reviewed.   Constitutional:       Appearance: He is well-developed.   HENT:      Head: Normocephalic.   Eyes:      Conjunctiva/sclera: Conjunctivae normal.   Neck:      Thyroid: No thyromegaly.      Trachea: No tracheal deviation.   Cardiovascular:      Rate and Rhythm: Normal rate.      Heart sounds: Normal heart sounds.   Pulmonary:      Effort: Pulmonary effort is normal. No respiratory distress.      Breath sounds: Normal breath sounds. No wheezing.   Abdominal:      General: Bowel sounds are  normal.      Palpations: Abdomen is soft.      Tenderness: There is no abdominal tenderness. There is no rebound.      Hernia: No hernia is present.   Genitourinary:     Comments: Wounds similar to prior exam  No areas of fluctuance  Musculoskeletal:         General: No tenderness. Normal range of motion.      Cervical back: Normal range of motion and neck supple.   Lymphadenopathy:      Cervical: No cervical adenopathy.   Skin:     General: Skin is warm and dry.      Findings: No erythema or rash.   Neurological:      Mental Status: He is alert and oriented to person, place, and time.   Psychiatric:         Behavior: Behavior normal.         Thought Content: Thought content normal.         Judgment: Judgment normal.       Significant Labs:    BMP:  Recent Labs   Lab 10/03/22  0045 10/03/22  0447 10/04/22  0347   * 135* 139   K 2.9* 3.3* 3.2*   CL 99 101 105   CO2 22* 24 25   BUN 8 8 7   CREATININE 1.5* 1.4 1.2   CALCIUM 8.2* 8.2* 8.6*       CBC:   Recent Labs   Lab 10/02/22  1657 10/03/22  0447 10/04/22  0347   WBC 8.09 9.09 6.26   HGB 10.2* 9.9* 10.3*   HCT 27.6* 27.4* 29.5*    209 231       Blood Culture:   Recent Labs   Lab 10/02/22  1658   LABBLOO No Growth to date  No Growth to date  No Growth to date  No Growth to date     Urine Culture: No results for input(s): LABURIN in the last 168 hours.    Significant Imaging:                      Assessment/Plan:     * Abscess of scrotum  No areas of fluctuance   Pain hinders exam  May benefit from exam under anesthesia  No procedure planned today  Okay to eat        VTE Risk Mitigation (From admission, onward)         Ordered     enoxaparin injection 40 mg  Daily         10/03/22 0824     Place ROBY hose  Until discontinued         10/02/22 2211     IP VTE HIGH RISK PATIENT  Once         10/02/22 2211     Place sequential compression device  Until discontinued         10/02/22 2211     Place sequential compression device  Until discontinued          10/02/22 2156     Place ROBY grubere  Until discontinued         10/02/22 2156                ARIC Aguilar MD  Urology  [facility] Surg

## 2022-10-04 NOTE — SUBJECTIVE & OBJECTIVE
Past Medical History:   Diagnosis Date    Abscess of right groin 09/01/2022    Diabetes mellitus     Encounter for blood transfusion     Loud snoring     Obese     Other insomnia 08/03/2020    Perineal abscess 08/01/2014    Primary hypertension 10/2/2022       Past Surgical History:   Procedure Laterality Date    ABCESS DRAINAGE  2014    PERIRECTAL    DECOMPRESSION OF LUMBAR SPINE USING MINIMALLY INVASIVE TECHNIQUE Right 07/06/2018    Procedure: DECOMPRESSION, SPINE, LUMBAR, MINIMALLY INVASIVE L3-4;  Surgeon: Cristian Cervantes MD;  Location: [facility] OR [address] [place] [address];  Service: Neurosurgery;  Laterality: Right;    INCISION AND DRAINAGE N/A 9/9/2022    Procedure: INCISION AND DRAINAGE GROIN;  Surgeon: KAITY Aguilar MD;  Location: [facility] OR;  Service: Urology;  Laterality: N/A;    ORTHOPEDIC SURGERY         Review of patient's allergies indicates:   Allergen Reactions    Metformin Diarrhea       No current facility-administered medications on file prior to encounter.     Current Outpatient Medications on File Prior to Encounter   Medication Sig    acetaminophen (TYLENOL) 500 MG tablet Take 1,000 mg by mouth 3 (three) times daily as needed for Pain.    amLODIPine (NORVASC) 10 MG tablet Take 1 tablet (10 mg total) by mouth once daily.    blood sugar diagnostic Strp 1 strip by Misc.(Non-Drug; Combo Route) route 4 (four) times daily with meals and nightly.    blood-glucose meter Misc Use as instructed    diphenhydramine HCl (UNISOM SLEEPGELS ORAL) Take 1 capsule by mouth every evening.    docusate sodium (COLACE) 100 MG capsule Take 1 capsule (100 mg total) by mouth 3 (three) times daily as needed for Constipation.    folic acid (FOLVITE) 1 MG tablet Take 1 tablet (1 mg total) by mouth once daily.    glucagon (GLUCAGEN HYPOKIT) 1 mg SolR Inject 1 mg into the muscle as needed (Hypoglycemia).    HYDROcodone-acetaminophen (NORCO) 5-325 mg per tablet Take 1 tablet by mouth every 6 (six) hours as needed (dressing changes).    insulin  "aspart U-100 (NOVOLOG) 100 unit/mL (3 mL) InPn pen Inject 7 Units into the skin 3 (three) times daily.    insulin detemir U-100 (LEVEMIR FLEXTOUCH) 100 unit/mL (3 mL) SubQ InPn pen Inject 30 Units into the skin once daily.    lancets 30 gauge Misc 1 lancet by Misc.(Non-Drug; Combo Route) route 4 (four) times daily with meals and nightly.    lancing device Misc Use as instructed    pen needle, diabetic (BD ULTRA-FINE TJ PEN NEEDLE) 32 gauge x 5/32" Ndle Use 4 times daily    pen needle, diabetic 31 gauge x 5/16" Ndle 1 each by Misc.(Non-Drug; Combo Route) route 4 (four) times daily with meals and nightly.    pen needle, diabetic 32 gauge x 5/32" Ndle USE TO INJECT INSULIN FOUR TIMES DAILY    pulse oximeter (PULSE OXIMETER) device by Apply Externally route 2 (two) times a day. Use twice daily at 8 AM and 3 PM and record the value in MyChart as directed.    sodium chlor-hypochlorous acid 0.033 % IrSl Irrigate with 25 mLs as directed once daily.    thiamine 100 MG tablet Take 1 tablet (100 mg total) by mouth once daily.     Family History       Problem Relation (Age of Onset)    Diabetes Father, Paternal Grandmother, Paternal Grandfather          Tobacco Use    Smoking status: Former     Packs/day: 0.50     Years: 9.00     Pack years: 4.50     Types: Cigarettes     Quit date: 2013     Years since quittin.2    Smokeless tobacco: Former   Substance and Sexual Activity    Alcohol use: Not Currently     Comment: DAILY PINT OF LIQUOR, HX OF 1 "TALL BOY" OF BEER DAILY    Drug use: Not Currently    Sexual activity: Yes     Partners: Female     Birth control/protection: None     Review of Systems   Constitutional:  Positive for activity change, appetite change and fatigue. Negative for fever.   Eyes:  Negative for visual disturbance.   Respiratory:  Negative for shortness of breath.    Cardiovascular:  Positive for chest pain.   Gastrointestinal:  Positive for nausea.   Endocrine: Negative for heat intolerance. "   Genitourinary:  Positive for difficulty urinating, scrotal swelling and testicular pain.   Musculoskeletal:  Positive for myalgias.   Skin:  Positive for color change and wound.   Allergic/Immunologic: Negative for immunocompromised state.   Neurological:  Positive for light-headedness.   Hematological:  Does not bruise/bleed easily.   Psychiatric/Behavioral:  Negative for decreased concentration and dysphoric mood.    Objective:     Vital Signs (Most Recent):  Temp: 98.3 °F (36.8 °C) (10/04/22 1123)  Pulse: 89 (10/04/22 1123)  Resp: 18 (10/04/22 1123)  BP: (!) 177/101 (10/04/22 1123)  SpO2: 100 % (10/04/22 1123)   Vital Signs (24h Range):  Temp:  [98.1 °F (36.7 °C)-98.3 °F (36.8 °C)] 98.3 °F (36.8 °C)  Pulse:  [] 89  Resp:  [12-27] 18  SpO2:  [92 %-100 %] 100 %  BP: (146-177)/() 177/101     Weight: 98.3 kg (216 lb 11.4 oz)  Body mass index is 36.06 kg/m².    Physical Exam  Constitutional:       General: He is not in acute distress.     Appearance: He is obese. He is ill-appearing. He is not toxic-appearing or diaphoretic.   HENT:      Head: Normocephalic and atraumatic.   Eyes:      Conjunctiva/sclera:      Right eye: Right conjunctiva is injected.      Left eye: Left conjunctiva is injected.   Neck:      Thyroid: No thyromegaly.   Cardiovascular:      Rate and Rhythm: Regular rhythm. Tachycardia present.      Heart sounds: Murmur heard.   Systolic murmur is present with a grade of 1/6.   Pulmonary:      Effort: Pulmonary effort is normal. No tachypnea or bradypnea.      Breath sounds: No decreased breath sounds, wheezing, rhonchi or rales.   Chest:      Chest wall: Tenderness present. No swelling.   Abdominal:      General: Abdomen is protuberant. Bowel sounds are normal. There is no distension.      Palpations: Abdomen is soft.      Tenderness: There is no abdominal tenderness.      Comments: Truncal obesity   Genitourinary:     Comments: No gabriel in place.  Left groin with an open area consistent  with abscess s/p I&D in the past.  There is no drainage.  The surrounding area is reddened.  See pic.  Musculoskeletal:      Cervical back: Neck supple.   Lymphadenopathy:      Comments: Trace edema to legs   Skin:     Coloration: Skin is sallow.      Findings: No rash.      Comments: Multiple tattoos   Neurological:      General: No focal deficit present.      Mental Status: He is alert and oriented to person, place, and time.      GCS: GCS eye subscore is 4. GCS verbal subscore is 5. GCS motor subscore is 6.   Psychiatric:         Attention and Perception: Attention and perception normal.         Mood and Affect: Mood is depressed.         Speech: Speech normal.         Behavior: Behavior normal. Behavior is cooperative.         Cognition and Memory: Cognition and memory normal.           Significant Labs: All pertinent labs within the past 24 hours have been reviewed.  Recent Results (from the past 24 hour(s))   POCT glucose    Collection Time: 10/03/22  4:32 PM   Result Value Ref Range    POCT Glucose 226 (H) 70 - 110 mg/dL   POCT glucose    Collection Time: 10/03/22  7:28 PM   Result Value Ref Range    POCT Glucose 276 (H) 70 - 110 mg/dL   Phosphorus    Collection Time: 10/04/22  3:47 AM   Result Value Ref Range    Phosphorus 2.8 2.7 - 4.5 mg/dL   Magnesium    Collection Time: 10/04/22  3:47 AM   Result Value Ref Range    Magnesium 1.7 1.6 - 2.6 mg/dL   Comprehensive Metabolic Panel (CMP)    Collection Time: 10/04/22  3:47 AM   Result Value Ref Range    Sodium 139 136 - 145 mmol/L    Potassium 3.2 (L) 3.5 - 5.1 mmol/L    Chloride 105 95 - 110 mmol/L    CO2 25 23 - 29 mmol/L    Glucose 295 (H) 70 - 110 mg/dL    BUN 7 6 - 20 mg/dL    Creatinine 1.2 0.5 - 1.4 mg/dL    Calcium 8.6 (L) 8.7 - 10.5 mg/dL    Total Protein 6.5 6.0 - 8.4 g/dL    Albumin 2.6 (L) 3.5 - 5.2 g/dL    Total Bilirubin 0.6 0.1 - 1.0 mg/dL    Alkaline Phosphatase 370 (H) 55 - 135 U/L    AST 26 10 - 40 U/L    ALT 26 10 - 44 U/L    Anion Gap 9 8 -  16 mmol/L    eGFR >60 >60 mL/min/1.73 m^2   CBC with Automated Differential    Collection Time: 10/04/22  3:47 AM   Result Value Ref Range    WBC 6.26 3.90 - 12.70 K/uL    RBC 2.97 (L) 4.60 - 6.20 M/uL    Hemoglobin 10.3 (L) 14.0 - 18.0 g/dL    Hematocrit 29.5 (L) 40.0 - 54.0 %    MCV 99 (H) 82 - 98 fL    MCH 34.7 (H) 27.0 - 31.0 pg    MCHC 34.9 32.0 - 36.0 g/dL    RDW 13.2 11.5 - 14.5 %    Platelets 231 150 - 450 K/uL    MPV 9.9 9.2 - 12.9 fL    Immature Granulocytes 0.5 0.0 - 0.5 %    Gran # (ANC) 3.3 1.8 - 7.7 K/uL    Immature Grans (Abs) 0.03 0.00 - 0.04 K/uL    Lymph # 2.0 1.0 - 4.8 K/uL    Mono # 0.8 0.3 - 1.0 K/uL    Eos # 0.2 0.0 - 0.5 K/uL    Baso # 0.06 0.00 - 0.20 K/uL    nRBC 0 0 /100 WBC    Gran % 52.0 38.0 - 73.0 %    Lymph % 31.3 18.0 - 48.0 %    Mono % 12.3 4.0 - 15.0 %    Eosinophil % 2.9 0.0 - 8.0 %    Basophil % 1.0 0.0 - 1.9 %    Differential Method Automated    POCT glucose    Collection Time: 10/04/22  7:26 AM   Result Value Ref Range    POCT Glucose 324 (H) 70 - 110 mg/dL   POCT glucose    Collection Time: 10/04/22 11:21 AM   Result Value Ref Range    POCT Glucose 267 (H) 70 - 110 mg/dL   Contains abnormal data Aerobic culture  Order: [id number]  Status: Final result     Visible to patient: Yes (seen)     Next appt: 10/18/2022 at 11:15 AM in Urology (ARIC Aguilar MD)     Specimen Information: Groin; Abscess   0 Result Notes  Component 3 wk ago    Aerobic Bacterial Culture  Abnormal   STREPTOCOCCUS AGALACTIAE (GROUP B)   Moderate   Beta-hemolytic streptococci are routinely susceptible to   penicillins,cephalosporins and carbapenems.     Resulting Agency [facility]           Narrative  Performed by: [facility]  Right Groin Abscess      Specimen Collected: 09/09/22 12:22 Last Resulted: 09/11/22 07:38               Significant Imaging: I have reviewed all pertinent imaging results/findings within the past 24 hours.

## 2022-10-04 NOTE — HPI
"32M with h/o t2dm, obesity (BMI 37)  re-admitted 10/2 with recurrent scrotal abscess and sob/cp. Had been admitted 9/1 with scrotal abscess. Says he completed augmentin last Tuesday. Says scrotal wound had been improving, but the drainage and pain never fully went away. Says he had been having a lot of pain with dressing changes- using a ABD pad with some dry blood on dressings. Says he had been following up with urology and was told wound getting more shallow and to keep it elevated with jock strap, which he hadn't started doing. Reports having a fall in his house (after reportedly drinking etoh) and says he passed out. When he woke up, reports some L sided rib pain, worse with taking deep breath and came back to hospital    Brief history:  admitted 9/1 with scrotal pain and shakes/chills. Initial scrotal ultrasound with cellulitis. bcx 9/1 NGTD.  Was on clindamycin. Scrotal us repeated several days later and noted to have large (11.7 x 4.4cm abscess).  s/p urology washout, cultures GBS. Was treated with clindamycin --> ceftriaxone --> Augmentin. Plan had been to continue po abx at least 10-14 days from washout and wound epithelizes  (est end date 9/22)     Aerobic culture [[id number]] (Abnormal) Collected: 09/09/22 1222   Order Status: Completed Specimen: Abscess from Groin Updated: 09/11/22 0781    Aerobic Bacterial Culture STREPTOCOCCUS AGALACTIAE (GROUP B)   Moderate   Beta-hemolytic streptococci are routinely susceptible to   penicillins,cephalosporins and carbapenems.        Reports ongoing groin drainage and redness/swelling. Denies f/c. Admits to drinking whisky for pain reports fall    CT C/a/P  Fluid and air collection in the right perineum/scrotal wall, highly concerning for a residual or recurrent abscess.    Bcx 10/2 NGTD    afebrile    On ceftriaxone/metronidazole    ID consulted for "Recurrent scrotal abscess"  "

## 2022-10-04 NOTE — PROGRESS NOTES
"[facility] Medicine  Progress Note    Patient Name: oDe Woodall  MRN: [id number]  Patient Class: IP- Inpatient   Admission Date: 10/2/2022  Length of Stay: 2 days  Attending Physician: Marcell Elam DO  Primary Care Provider: [facility]        Subjective:     Principal Problem:Abscess of scrotum        HPI:  Mr. Woodall is a 33yo man with a past medical history of ETOH abuse, floppy eyelid syndrome, DCHF, and substance induced mood disorder, DM2, and obesity.  He was recently diagnosed with a right groin abscess as well.    He was recently admitted on 9/1-9/16 here at  for sepsis due to right groin-perineal cellulitis and MARISSA.  Dr. Arreaga noted at D/C, "Started IVF and broad spectrum antibiotics. Blood cultures no growth. Urology consulted. Nephrology consulted for MARISSA. ID consulted for antibiotic management. Symptoms worsened. He developed abscess. Urology performed I&D on 9/9 and noted abscess cavity tracking up into the groin and down into the scrotum. Cultures were sent. He was transferred to ICU post operatively due to excessive venous oozing from the surgical site. He remained hemodynamically stable, no need for transfusion, and bleeding controlled. Stepped down to floor.  intraoperative cultures grew GBS. ID recommended CTX transitioned to augmentin.  MARISSA with IVF gradually improved.local wound care was done.  Patient was discharged home with  for wound care ,PO Augmentin and follow up with PCP and Urology as out patient."     He followed up with Dr. Aguilar in Urology on 9/28/22 after running out of his Norco and still complaining of pain to his right groin area.  He had also fallen a few days prior and was having CP with inspiration.  He was encouraged to go to the ED for the CP after the fall, and to continue packing changes with sodium chlor-hypochlorous acid 0.033 % IrSl; Irrigate with 25 mLs as directed once daily.      Since going being home, " "he has had continued pain and some green drainage from the right groin region.  He has been drinking a pint of "Fireball" whisky to quell the pain.  This led the the fall noted above.  Since the fall he has had continued pain to the left axillary chest area.  He denies fever or chills, but does feel his wound has become increased in size and redness over the past few days.    He called the on-call triage nurse today with, "Patient c/o chest pain rated 6-7/10 when lying on the left side and difficulty breathing. Triage RN notes audible difficulty talking during triage/assessment.  Care Advice given to Call EMS/911 Now."  He took their advice and called 911 and came to the ED.  As an aside, he has not been eating well due to pain with defecation, so he stopped taking his insulin 4 days ago.    In the ED his VS's were BP 80/48 -> 129/72 (BP Location: Right arm, Patient Position: Lying)   Pulse 90   Temp max 99.2 °F (36.9 °C) (Oral)   Resp (!) 21   Ht 5' 6" (1.676 m)   Wt 98.4 kg (217 lb)   SpO2 99%   BMI 35.02 kg/m².  Labs showed  WBC 8, Hg 10.2, Ddimer 0.79, Na 133, K 2.2, AG 23, Cr 1.9, Phos 1.7, Mg 1.6, normal LFT, LA 9.6, BHB 0.2, procal 0.21, ETOH 198.  UDS + opioids.  UA NEG.  VBG pH 7.35, PCO2 37, FLU NEG.    CXR showed no acute process.  CTA chest showed no convincing pulmonary thromboembolism.  There were pulmonary nodules and scattered ground-glass attenuation throughout the pulmonary parenchyma, may reflect underlying edema or nonspecific pneumonitis,, and possible hepatic steatosis.    CT pelvis without contrast showed  that there is a fluid and air collection in the right perineum/scrotal wall, highly concerning for a residual or recurrent abscess.  This is a thick-walled collection of fluid and soft tissue gas in the right perineum/scrotal wall measuring 4.2 x 4.2 x 1.6 cm (AP by cc by TR), highly concerning for a residual/recurrent abscess.  Mild amount of surrounding soft tissue stranding which " has significantly improved in comparison with the study from 09/01/2022.     In the ED he was treated with Morphine 4mg iv 1917, Zofran 4mg iv 1709, Zosyn 4.5g iv 1709, K-bicarb 20 meq po 1840, KCl 10 meq iv 1851, NS 1L bolus 1920, and NS 1.914L iv 1707, and Vanco 2g iv 1839.        Overview/Hospital Course:  Patient admitted on 10/02/2022  to ICU for recurrent scrotal abscess (thick-walled collection of fluid and soft tissue gas in the right perineum/scrotal wall measuring 4.2 x 4.2 x 1.6 cm), MARISSA, and ?DKA with a lactic acid of 9.6, has since improved with fluids in insulin drip, lactic acid 4.0  on repeat. pH maintained, therefore not technically DKA. MARISSA improving with fluid as well.  Calculated anion gap of 25 on admission, with bicarb of 17 and glucose >500.  Started on insulin drip, gap is now closed and bicarb up to 24, and was transition to subcutaneous insulin after procedure with Urology.  Acute alcohol intoxication of 200, per patient he was drinking to numb the pain in his scrotum.  Electrolyte derangement, replacing as needed. Transfer to floor on 10/03/22. Abscess cx from 09/09 with GBS. ID consulted-Unclear if scrotal infection is from GBS vs new infection from fecal material contaminating wound. Continue Rocephin and Flagyl      Interval History:  No acute overnight events.  Patient remained afebrile.  Continues to have scrotal pain.  States it is worse when he moves around.  However, it has improved since I&D.  Urology with no plans for procedure today.    Review of Systems   Constitutional:  Positive for activity change, appetite change and fatigue. Negative for fever.   Eyes:  Negative for visual disturbance.   Respiratory:  Negative for cough and shortness of breath.    Cardiovascular:  Positive for chest pain (with deep breaths). Negative for palpitations.   Gastrointestinal:  Positive for nausea. Negative for abdominal pain, diarrhea and vomiting.   Genitourinary:  Positive for difficulty  urinating, scrotal swelling and testicular pain. Negative for dysuria.   Musculoskeletal:  Positive for myalgias.   Skin:  Positive for color change and wound.   Allergic/Immunologic: Negative for immunocompromised state.   Neurological:  Negative for dizziness and weakness.   Hematological:  Does not bruise/bleed easily.   Psychiatric/Behavioral:  Negative for decreased concentration and dysphoric mood.      Objective:     Vital Signs (Most Recent):  Temp: 98.3 °F (36.8 °C) (10/04/22 1123)  Pulse: 89 (10/04/22 1123)  Resp: 18 (10/04/22 1123)  BP: (!) 177/101 (10/04/22 1123)  SpO2: 100 % (10/04/22 1123)   Vital Signs (24h Range):  Temp:  [98.1 °F (36.7 °C)-98.3 °F (36.8 °C)] 98.3 °F (36.8 °C)  Pulse:  [] 89  Resp:  [12-27] 18  SpO2:  [92 %-100 %] 100 %  BP: (146-177)/() 177/101     Weight: 98.3 kg (216 lb 11.4 oz)  Body mass index is 36.06 kg/m².    Intake/Output Summary (Last 24 hours) at 10/4/2022 1617  Last data filed at 10/4/2022 1330  Gross per 24 hour   Intake 2341.67 ml   Output 500 ml   Net 1841.67 ml      Physical Exam  Vitals and nursing note reviewed.   Constitutional:       General: He is not in acute distress.     Appearance: He is obese. He is not ill-appearing.   HENT:      Head: Normocephalic and atraumatic.      Nose: Nose normal.      Mouth/Throat:      Mouth: Mucous membranes are moist.   Eyes:      Extraocular Movements: Extraocular movements intact.      Conjunctiva/sclera:      Left eye: Left conjunctiva is not injected.   Neck:      Thyroid: No thyromegaly.   Cardiovascular:      Rate and Rhythm: Normal rate and regular rhythm.   Pulmonary:      Effort: Pulmonary effort is normal. No tachypnea, bradypnea or respiratory distress.      Breath sounds: No decreased breath sounds, wheezing, rhonchi or rales.   Chest:      Chest wall: Tenderness present. No swelling.   Abdominal:      General: Abdomen is protuberant. Bowel sounds are normal. There is no distension.      Palpations:  Abdomen is soft.      Tenderness: There is no abdominal tenderness. There is no guarding.      Comments: Truncal obesity   Genitourinary:     Comments: No area of fluctuance. Exam limited due to pain   Musculoskeletal:      Cervical back: Normal range of motion.   Lymphadenopathy:      Comments: Trace edema to legs   Skin:     Coloration: Skin is sallow. Skin is not jaundiced.      Findings: No bruising or rash.   Neurological:      General: No focal deficit present.      Mental Status: He is alert and oriented to person, place, and time.   Psychiatric:         Attention and Perception: Attention and perception normal.         Mood and Affect: Mood is depressed.         Speech: Speech normal.         Behavior: Behavior normal. Behavior is cooperative.         Cognition and Memory: Cognition and memory normal.       Significant Labs: All pertinent labs within the past 24 hours have been reviewed.    Significant Imaging: I have reviewed all pertinent imaging results/findings within the past 24 hours.      Assessment/Plan:      * Abscess of scrotum  -CT pelvis: Fluid and air collection in the right perineum/scrotal wall, highly concerning for a residual or recurrent abscess.   -Of note, he had an abscess drained on 9/9/2022  -Abscess cx with GBS  -ID consulted: Unclear if scrotal infection is from GBS vs new infection from fecal material contaminating wound. Continue Rocephin and Flagyl  -Urology consulted  -Continue day 3 Rocephin, day 2 Flagyl    Cellulitis of groin  -ID consulted   -Urology following   -continue Rocephin and Flagyl      MARISSA (acute kidney injury)  Patient with acute kidney injury likely due to IVVD/dehydration MARISSA is currently worsening. Labs reviewed- Renal function/electrolytes with Estimated Creatinine Clearance: 94.4 mL/min (based on SCr of 1.2 mg/dL). according to latest data. Monitor urine output and serial BMP and adjust therapy as needed. Avoid nephrotoxins and renally dose meds for GFR  listed above.     Renal dose meds  Avoid IV dye  Gradually improving    Hypokalemia  -Replace as needed  -Check Mg, replace as needed     Chest wall contusion, left, initial encounter  No fracture on CT  Likely costochondral injury  Pain control      Normocytic anemia  B12, folate, Fe WNL  Monitor       Lactic acidosis  · Lactic acid of 9.6 on admit, has since improved with fluids in insulin drip, lactic acid 4.0  on repeat.  · Continuing fluids and abx, repeat LA 2.2 on 10/03  · Resolved      Primary hypertension  -BP elevated  -Will resume home norvasc 10mg with hold parameters   -Continue pain control     Severe sepsis  -Resolved    Alcoholic intoxication with complication    folic acid tablet 1 mg, 1 mg, Oral, Daily     thiamine tablet 100 mg, 100 mg, Oral, Daily     multivitamin tablet, 1 tablet, Oral, Daily     · CIWA q 6 hours  · Ativan PRN  · Acute alcohol intoxication of 200, per patient he was drinking to numb the pain in his scrotum.      Type 2 diabetes mellitus with hyperosmolar nonketotic hyperglycemia  · Hold Oral hypoglycemics while patient is in the hospital.  · His pH maintained, therefore not technically DKA.  · Calculated anion gap of 25 on admission, with bicarb of 17 and glucose >500.    · Started on insulin drip, gap is now closed and bicarb up to 24, will transition to subcutaneous insulin after procedure with Urology.        Severe obesity with body mass index (BMI) of 36.0 to 36.9 with serious comorbidity  Body mass index is 36.06 kg/m². Morbid obesity complicates all aspects of disease management from diagnostic modalities to treatment. Weight loss encouraged and health benefits explained to patient.           VTE Risk Mitigation (From admission, onward)         Ordered     enoxaparin injection 40 mg  Daily         10/03/22 0824     Place ROBY hose  Until discontinued         10/02/22 2211     IP VTE HIGH RISK PATIENT  Once         10/02/22 2211     Place sequential compression device   Until discontinued         10/02/22 2211     Place sequential compression device  Until discontinued         10/02/22 2156     Place ROBY hose  Until discontinued         10/02/22 2156                Discharge Planning   SUSAN:      Code Status: Full Code   Is the patient medically ready for discharge?:     Reason for patient still in hospital (select all that apply): Patient trending condition, Treatment and Consult recommendations  Discharge Plan A: Home                  Marcell Elam DO  Department of Hospital Medicine   [facility]

## 2022-10-04 NOTE — NURSING
[facility]  Nurses Note -- 4 Eyes      10/4/2022       Skin assessed on: Transfer    Swelling noted to scrotum with redness and moisture noted groin area    [x] No Pressure Injuries Present    []Prevention Measures Documented    [] Yes LDA  for Pressure Injury Previously documented     [] Yes New Pressure Injury Discovered   [] LDA for New Pressure Injury Added      Attending RN:  Daya Espinoza LPN     Second RN:  Otilia Hobbs RN

## 2022-10-04 NOTE — ASSESSMENT & PLAN NOTE
No areas of fluctuance   Pain hinders exam  May benefit from exam under anesthesia  No procedure planned today  Okay to eat

## 2022-10-04 NOTE — SUBJECTIVE & OBJECTIVE
Interval History:  No acute overnight events.  Patient remained afebrile.  Continues to have scrotal pain.  States it is worse when he moves around.  However, it has improved since I&D.  Urology with no plans for procedure today.    Review of Systems   Constitutional:  Positive for activity change, appetite change and fatigue. Negative for fever.   Eyes:  Negative for visual disturbance.   Respiratory:  Negative for cough and shortness of breath.    Cardiovascular:  Positive for chest pain (with deep breaths). Negative for palpitations.   Gastrointestinal:  Positive for nausea. Negative for abdominal pain, diarrhea and vomiting.   Genitourinary:  Positive for difficulty urinating, scrotal swelling and testicular pain. Negative for dysuria.   Musculoskeletal:  Positive for myalgias.   Skin:  Positive for color change and wound.   Allergic/Immunologic: Negative for immunocompromised state.   Neurological:  Negative for dizziness and weakness.   Hematological:  Does not bruise/bleed easily.   Psychiatric/Behavioral:  Negative for decreased concentration and dysphoric mood.      Objective:     Vital Signs (Most Recent):  Temp: 98.3 °F (36.8 °C) (10/04/22 1123)  Pulse: 89 (10/04/22 1123)  Resp: 18 (10/04/22 1123)  BP: (!) 177/101 (10/04/22 1123)  SpO2: 100 % (10/04/22 1123)   Vital Signs (24h Range):  Temp:  [98.1 °F (36.7 °C)-98.3 °F (36.8 °C)] 98.3 °F (36.8 °C)  Pulse:  [] 89  Resp:  [12-27] 18  SpO2:  [92 %-100 %] 100 %  BP: (146-177)/() 177/101     Weight: 98.3 kg (216 lb 11.4 oz)  Body mass index is 36.06 kg/m².    Intake/Output Summary (Last 24 hours) at 10/4/2022 1617  Last data filed at 10/4/2022 1330  Gross per 24 hour   Intake 2341.67 ml   Output 500 ml   Net 1841.67 ml      Physical Exam  Vitals and nursing note reviewed.   Constitutional:       General: He is not in acute distress.     Appearance: He is obese. He is not ill-appearing.   HENT:      Head: Normocephalic and atraumatic.      Nose:  Nose normal.      Mouth/Throat:      Mouth: Mucous membranes are moist.   Eyes:      Extraocular Movements: Extraocular movements intact.      Conjunctiva/sclera:      Left eye: Left conjunctiva is not injected.   Neck:      Thyroid: No thyromegaly.   Cardiovascular:      Rate and Rhythm: Normal rate and regular rhythm.   Pulmonary:      Effort: Pulmonary effort is normal. No tachypnea, bradypnea or respiratory distress.      Breath sounds: No decreased breath sounds, wheezing, rhonchi or rales.   Chest:      Chest wall: Tenderness present. No swelling.   Abdominal:      General: Abdomen is protuberant. Bowel sounds are normal. There is no distension.      Palpations: Abdomen is soft.      Tenderness: There is no abdominal tenderness. There is no guarding.      Comments: Truncal obesity   Genitourinary:     Comments: No area of fluctuance. Exam limited due to pain   Musculoskeletal:      Cervical back: Normal range of motion.   Lymphadenopathy:      Comments: Trace edema to legs   Skin:     Coloration: Skin is sallow. Skin is not jaundiced.      Findings: No bruising or rash.   Neurological:      General: No focal deficit present.      Mental Status: He is alert and oriented to person, place, and time.   Psychiatric:         Attention and Perception: Attention and perception normal.         Mood and Affect: Mood is depressed.         Speech: Speech normal.         Behavior: Behavior normal. Behavior is cooperative.         Cognition and Memory: Cognition and memory normal.       Significant Labs: All pertinent labs within the past 24 hours have been reviewed.    Significant Imaging: I have reviewed all pertinent imaging results/findings within the past 24 hours.

## 2022-10-04 NOTE — PLAN OF CARE
[facility]  Initial Discharge Assessment       Primary Care Provider: [facility]    Admission Diagnosis: Shortness of breath [R06.02]  Cellulitis of groin [L03.314]  Hypokalemia [E87.6]  Chest pain [R07.9]  Alcoholic intoxication with complication [F10.929]  High serum lactic acid [R79.89]  Anemia, unspecified type [D64.9]  Other specified diabetes mellitus with hyperglycemia, with long-term current use of insulin [E13.65, Z79.4]  Acute pancreatitis, unspecified complication status, unspecified pancreatitis type [K85.90]    Admission Date: 10/2/2022  Expected Discharge Date:     Discharge Barriers Identified: None    Payor: MEDICAID / Plan: [place]UE [place] / Product Type: Managed Medicaid /     Extended Emergency Contact Information  Primary Emergency Contact: Aminta Woodall   [place]  Mobile Phone: [phone]  Relation: Spouse  Secondary Emergency Contact: Ej Woodall   [facility]  Home Phone: [phone]  Mobile Phone: [phone]  Relation: Father  Preferred language: English    Discharge Plan A: Home  Discharge Plan B:  (tbd)      [id number] - [place] - [address] AT [facility] [address]  [address]  [place] [address]  Phone: [phone] Fax: [phone]      Initial Assessment (most recent)       Adult Discharge Assessment - 10/03/22 1200          Discharge Assessment    Assessment Type Discharge Planning Assessment     Confirmed/corrected address, phone number and insurance Yes     Confirmed Demographics Correct on Facesheet     Source of Information patient     When was your last doctors appointment? --   doesn't recall    Communicated SUSAN with patient/caregiver Yes     Reason For Admission Scrotum abscess     Lives With alone     Do you expect to return to your current living situation? Yes     Do you have help at home or someone to help you manage your care at  home? Yes     Prior to hospitilization cognitive status: Alert/Oriented     Current cognitive status: Alert/Oriented     Walking or Climbing Stairs Difficulty ambulation difficulty, requires equipment     Mobility Management borrowed a cane and crutches since abscess occured     Dressing/Bathing Difficulty none     Home Layout Able to live on 1st floor     Equipment Currently Used at Home none     Readmission within 30 days? No     Patient currently being followed by outpatient case management? No     Do you currently have service(s) that help you manage your care at home? No     Do you take prescription medications? Yes     Do you have prescription coverage? Yes     Do you have any problems affording any of your prescribed medications? No     Is the patient taking medications as prescribed? yes     Who is going to help you get home at discharge? Family     How do you get to doctors appointments? car, drives self     Are you on dialysis? No     Do you take coumadin? No     Discharge Plan A Home     Discharge Plan B --   tbd    DME Needed Upon Discharge  --   tbd    Discharge Plan discussed with: Patient     Discharge Barriers Identified None

## 2022-10-05 PROBLEM — E83.42 HYPOMAGNESEMIA: Status: ACTIVE | Noted: 2022-01-01

## 2022-10-05 NOTE — PROGRESS NOTES
[facility]  Urology  Progress Note    Patient Name: Doe Woodall  MRN: [id number]  Admission Date: 10/2/2022  Hospital Length of Stay: 3 days  Code Status: Full Code   Attending Provider: Marcell Elam DO   Primary Care Physician: [facility] - Family Medicine    Subjective:     HPI:  Scrotal Abscess  He had an abscess drained on 9/9/2022.  He packed the wounds until he was unable to keep the packing in place.  He was unable to get his pain medication refilled.  He is back in the hospital after a fall.  He has some difficulty breathing.  He is having some pain in his scrotum.      Interval History: he is feeling okay.      Review of Systems   Constitutional: Negative.    HENT: Negative.     Eyes: Negative.    Respiratory:  Negative for cough, chest tightness and shortness of breath.    Cardiovascular:  Negative for chest pain.   Gastrointestinal: Negative.  Negative for constipation, diarrhea and nausea.   Genitourinary:  Positive for scrotal swelling.   Musculoskeletal: Negative.    Neurological: Negative.    Psychiatric/Behavioral: Negative.     Objective:     Temp:  [98.1 °F (36.7 °C)-98.5 °F (36.9 °C)] 98.5 °F (36.9 °C)  Pulse:  [] 108  Resp:  [16-20] 18  SpO2:  [97 %-99 %] 99 %  BP: (140-162)/(87-97) 140/90     Body mass index is 36.06 kg/m².           Drains       None                   Physical Exam  Vitals and nursing note reviewed.   Constitutional:       Appearance: He is well-developed.   HENT:      Head: Normocephalic.   Eyes:      Conjunctiva/sclera: Conjunctivae normal.   Neck:      Thyroid: No thyromegaly.      Trachea: No tracheal deviation.   Cardiovascular:      Rate and Rhythm: Normal rate.      Heart sounds: Normal heart sounds.   Pulmonary:      Effort: Pulmonary effort is normal. No respiratory distress.      Breath sounds: Normal breath sounds. No wheezing.   Abdominal:      General: Bowel sounds are normal.      Palpations: Abdomen is soft.      Tenderness:  There is no abdominal tenderness. There is no rebound.      Hernia: No hernia is present.   Genitourinary:     Comments: Tolerated exam better today  Area of induration superior to previous I/D site improving, still no fluctuance.  Musculoskeletal:         General: No tenderness. Normal range of motion.      Cervical back: Normal range of motion and neck supple.   Lymphadenopathy:      Cervical: No cervical adenopathy.   Skin:     General: Skin is warm and dry.      Findings: No erythema or rash.   Neurological:      Mental Status: He is alert and oriented to person, place, and time.   Psychiatric:         Behavior: Behavior normal.         Thought Content: Thought content normal.         Judgment: Judgment normal.       Significant Labs:    BMP:  Recent Labs   Lab 10/03/22  0447 10/04/22  0347 10/05/22  0459   * 139 139   K 3.3* 3.2* 3.2*    105 106   CO2 24 25 25   BUN 8 7 7   CREATININE 1.4 1.2 1.2   CALCIUM 8.2* 8.6* 8.6*       CBC:   Recent Labs   Lab 10/03/22  0447 10/04/22  0347 10/05/22  0459   WBC 9.09 6.26 8.08   HGB 9.9* 10.3* 10.6*   HCT 27.4* 29.5* 30.7*    231 279       Blood Culture:   Recent Labs   Lab 10/02/22  1658   LABBLOO No Growth to date  No Growth to date  No Growth to date  No Growth to date  No Growth to date  No Growth to date     Urine Culture: No results for input(s): LABURIN in the last 168 hours.    Significant Imaging:                      Assessment/Plan:     * Abscess of scrotum  Overall improving  No plans for I/D currently  Okay to go home  Follow up in 1 week for wound check          VTE Risk Mitigation (From admission, onward)         Ordered     enoxaparin injection 40 mg  Daily         10/03/22 0824     Place ROBY hose  Until discontinued         10/02/22 2211     IP VTE HIGH RISK PATIENT  Once         10/02/22 2211     Place sequential compression device  Until discontinued         10/02/22 2211     Place sequential compression device  Until  discontinued         10/02/22 2156     Place ROBY grubere  Until discontinued         10/02/22 2156                ARIC Aguilar MD  Urology  [facility]

## 2022-10-05 NOTE — PROGRESS NOTES
"[facility]  Progress Note    Patient Name: Doe Woodall  MRN: [id number]  Patient Class: IP- Inpatient   Admission Date: 10/2/2022  Length of Stay: 3 days  Attending Physician: Marcell Elam DO  Primary Care Provider: [facility]        Subjective:     Principal Problem:Abscess of scrotum        HPI:  Mr. Woodall is a 33yo man with a past medical history of ETOH abuse, floppy eyelid syndrome, DCHF, and substance induced mood disorder, DM2, and obesity.  He was recently diagnosed with a right groin abscess as well.    He was recently admitted on 9/1-9/16 here at  for sepsis due to right groin-perineal cellulitis and MARISSA.  Dr. Arreaga noted at D/C, "Started IVF and broad spectrum antibiotics. Blood cultures no growth. Urology consulted. Nephrology consulted for MARISSA. ID consulted for antibiotic management. Symptoms worsened. He developed abscess. Urology performed I&D on 9/9 and noted abscess cavity tracking up into the groin and down into the scrotum. Cultures were sent. He was transferred to ICU post operatively due to excessive venous oozing from the surgical site. He remained hemodynamically stable, no need for transfusion, and bleeding controlled. Stepped down to floor.  intraoperative cultures grew GBS. ID recommended CTX transitioned to augmentin.  MARISSA with IVF gradually improved.local wound care was done.  Patient was discharged home with  for wound care ,PO Augmentin and follow up with PCP and Urology as out patient."     He followed up with Dr. Aguilar in Urology on 9/28/22 after running out of his Norco and still complaining of pain to his right groin area.  He had also fallen a few days prior and was having CP with inspiration.  He was encouraged to go to the ED for the CP after the fall, and to continue packing changes with sodium chlor-hypochlorous acid 0.033 % IrSl; Irrigate with 25 mLs as directed once daily.      Since going being home, " "he has had continued pain and some green drainage from the right groin region.  He has been drinking a pint of "Fireball" whisky to quell the pain.  This led the the fall noted above.  Since the fall he has had continued pain to the left axillary chest area.  He denies fever or chills, but does feel his wound has become increased in size and redness over the past few days.    He called the on-call triage nurse today with, "Patient c/o chest pain rated 6-7/10 when lying on the left side and difficulty breathing. Triage RN notes audible difficulty talking during triage/assessment.  Care Advice given to Call EMS/911 Now."  He took their advice and called 911 and came to the ED.  As an aside, he has not been eating well due to pain with defecation, so he stopped taking his insulin 4 days ago.    In the ED his VS's were BP 80/48 -> 129/72 (BP Location: Right arm, Patient Position: Lying)   Pulse 90   Temp max 99.2 °F (36.9 °C) (Oral)   Resp (!) 21   Ht 5' 6" (1.676 m)   Wt 98.4 kg (217 lb)   SpO2 99%   BMI 35.02 kg/m².  Labs showed  WBC 8, Hg 10.2, Ddimer 0.79, Na 133, K 2.2, AG 23, Cr 1.9, Phos 1.7, Mg 1.6, normal LFT, LA 9.6, BHB 0.2, procal 0.21, ETOH 198.  UDS + opioids.  UA NEG.  VBG pH 7.35, PCO2 37, FLU NEG.    CXR showed no acute process.  CTA chest showed no convincing pulmonary thromboembolism.  There were pulmonary nodules and scattered ground-glass attenuation throughout the pulmonary parenchyma, may reflect underlying edema or nonspecific pneumonitis,, and possible hepatic steatosis.    CT pelvis without contrast showed  that there is a fluid and air collection in the right perineum/scrotal wall, highly concerning for a residual or recurrent abscess.  This is a thick-walled collection of fluid and soft tissue gas in the right perineum/scrotal wall measuring 4.2 x 4.2 x 1.6 cm (AP by cc by TR), highly concerning for a residual/recurrent abscess.  Mild amount of surrounding soft tissue stranding which " has significantly improved in comparison with the study from 09/01/2022.     In the ED he was treated with Morphine 4mg iv 1917, Zofran 4mg iv 1709, Zosyn 4.5g iv 1709, K-bicarb 20 meq po 1840, KCl 10 meq iv 1851, NS 1L bolus 1920, and NS 1.914L iv 1707, and Vanco 2g iv 1839.        Overview/Hospital Course:  Patient admitted on 10/02/2022  to ICU for recurrent scrotal abscess (thick-walled collection of fluid and soft tissue gas in the right perineum/scrotal wall measuring 4.2 x 4.2 x 1.6 cm), MARISSA, and ?DKA with a lactic acid of 9.6, has since improved with fluids in insulin drip, lactic acid 4.0  on repeat. pH maintained, therefore not technically DKA. MARISSA improving with fluid as well.  Calculated anion gap of 25 on admission, with bicarb of 17 and glucose >500.  Started on insulin drip, gap is now closed and bicarb up to 24, and was transition to subcutaneous insulin after procedure with Urology.  Acute alcohol intoxication of 200, per patient he was drinking to numb the pain in his scrotum.  Electrolyte derangement, replacing as needed. Transfer to floor on 10/03/22. Abscess cx from 09/09 with GBS. ID consulted-Unclear if scrotal infection is from GBS vs new infection from fecal material contaminating wound. Continue Rocephin and Flagyl. Blood culture with no growth to date. Urology with no plans for further washout. Will likely discharge in the AM if pain controlled and ID give finals recs.       Interval History:  No acute overnight events.  Patient remained afebrile.  Continues to have scrotal pain but improving.  Urology following.     Review of Systems   Constitutional:  Positive for activity change and fatigue. Negative for fever.   Eyes:  Negative for visual disturbance.   Respiratory:  Negative for cough and shortness of breath.    Cardiovascular:  Positive for chest pain (with deep breaths). Negative for palpitations.   Gastrointestinal:  Positive for nausea. Negative for abdominal pain, diarrhea and  vomiting.   Genitourinary:  Positive for difficulty urinating, scrotal swelling and testicular pain. Negative for dysuria.   Musculoskeletal:  Positive for myalgias.   Skin:  Positive for color change and wound.   Allergic/Immunologic: Negative for immunocompromised state.   Neurological:  Negative for dizziness and weakness.   Hematological:  Does not bruise/bleed easily.   Psychiatric/Behavioral:  Negative for decreased concentration and dysphoric mood.      Objective:     Vital Signs (Most Recent):  Temp: 98.5 °F (36.9 °C) (10/05/22 1609)  Pulse: 91 (10/05/22 1609)  Resp: 18 (10/05/22 1609)  BP: 135/81 (10/05/22 1609)  SpO2: 98 % (10/05/22 1609)   Vital Signs (24h Range):  Temp:  [98.1 °F (36.7 °C)-98.5 °F (36.9 °C)] 98.5 °F (36.9 °C)  Pulse:  [] 91  Resp:  [12-20] 18  SpO2:  [97 %-99 %] 98 %  BP: (135-162)/(81-97) 135/81     Weight: 98.3 kg (216 lb 11.4 oz)  Body mass index is 36.06 kg/m².    Intake/Output Summary (Last 24 hours) at 10/5/2022 1622  Last data filed at 10/5/2022 1330  Gross per 24 hour   Intake 840 ml   Output --   Net 840 ml        Physical Exam  Vitals and nursing note reviewed.   Constitutional:       General: He is not in acute distress.     Appearance: He is obese. He is not ill-appearing.   HENT:      Head: Normocephalic and atraumatic.      Nose: Nose normal.      Mouth/Throat:      Mouth: Mucous membranes are moist.   Eyes:      Extraocular Movements: Extraocular movements intact.      Conjunctiva/sclera:      Left eye: Left conjunctiva is not injected.   Neck:      Thyroid: No thyromegaly.   Cardiovascular:      Rate and Rhythm: Normal rate and regular rhythm.   Pulmonary:      Effort: Pulmonary effort is normal. No tachypnea, bradypnea or respiratory distress.      Breath sounds: No decreased breath sounds, wheezing, rhonchi or rales.   Chest:      Chest wall: Tenderness present. No swelling.   Abdominal:      General: Abdomen is protuberant. Bowel sounds are normal. There is no  distension.      Palpations: Abdomen is soft.      Tenderness: There is no abdominal tenderness. There is no guarding.      Comments: Truncal obesity   Genitourinary:     Comments: No area of fluctuance. Exam limited due to pain   Musculoskeletal:      Cervical back: Normal range of motion.   Lymphadenopathy:      Comments: Trace edema to legs   Skin:     Coloration: Skin is sallow. Skin is not jaundiced.      Findings: No bruising or rash.   Neurological:      General: No focal deficit present.      Mental Status: He is alert and oriented to person, place, and time.   Psychiatric:         Attention and Perception: Attention and perception normal.         Mood and Affect: Mood is depressed.         Speech: Speech normal.         Behavior: Behavior normal. Behavior is cooperative.         Cognition and Memory: Cognition and memory normal.       Significant Labs: All pertinent labs within the past 24 hours have been reviewed.    Significant Imaging: I have reviewed all pertinent imaging results/findings within the past 24 hours.      Assessment/Plan:      * Abscess of scrotum  -CT pelvis: Fluid and air collection in the right perineum/scrotal wall, highly concerning for a residual or recurrent abscess.   -Of note, he had an abscess drained on 9/9/2022  -Abscess cx with GBS  -ID consulted: Unclear if scrotal infection is from GBS vs new infection from fecal material contaminating wound. Continue Rocephin and Flagyl  -Urology consulted  -Continue day 4 Rocephin, day 3 Flagyl    Cellulitis of groin  -ID consulted   -Urology following   -continue Rocephin and Flagyl      MARISSA (acute kidney injury)  Patient with acute kidney injury likely due to IVVD/dehydration MARISSA is currently worsening. Labs reviewed- Renal function/electrolytes with Estimated Creatinine Clearance: 94.4 mL/min (based on SCr of 1.2 mg/dL). according to latest data. Monitor urine output and serial BMP and adjust therapy as needed. Avoid nephrotoxins and  renally dose meds for GFR listed above.     Renal dose meds  Avoid IV dye  improving    Hypomagnesemia  Mag 1.5 on 10/05  Replace as needed       Hypokalemia  -Replace as needed  -Check Mg, replace as needed     Chest wall contusion, left, initial encounter  No fracture on CT  Likely costochondral injury  Pain control      Normocytic anemia  B12, folate, Fe WNL  Stable, no evidence of bleeding     Lactic acidosis  · Lactic acid of 9.6 on admit, has since improved with fluids in insulin drip, lactic acid 4.0  on repeat.  · Continuing fluids and abx, repeat LA 2.2 on 10/03  · Resolved      Primary hypertension  -BP elevated  -Continue home norvasc 10mg with hold parameters   -Continue pain control     Severe sepsis  -Resolved    Alcoholic intoxication with complication    folic acid tablet 1 mg, 1 mg, Oral, Daily     thiamine tablet 100 mg, 100 mg, Oral, Daily     multivitamin tablet, 1 tablet, Oral, Daily     · CIWA q 6 hours  · Ativan PRN  · Acute alcohol intoxication of 200, per patient he was drinking to numb the pain in his scrotum.      Type 2 diabetes mellitus with hyperosmolar nonketotic hyperglycemia  · Hold Oral hypoglycemics while patient is in the hospital.  · His pH maintained, therefore not technically DKA.  · Calculated anion gap of 25 on admission, with bicarb of 17 and glucose >500.    · Started on insulin drip, gap is now closed and bicarb up to 24, transition to subcutaneous insulin  A1c: 9.4  Meds: basal bolus insulin + SSI PRN to maintain goal 140-180  · ADA diet, accuchecks ACHS, hypoglycemic protocol    Severe obesity with body mass index (BMI) of 36.0 to 36.9 with serious comorbidity  Body mass index is 36.06 kg/m². Morbid obesity complicates all aspects of disease management from diagnostic modalities to treatment. Weight loss encouraged and health benefits explained to patient.           VTE Risk Mitigation (From admission, onward)         Ordered     enoxaparin injection 40 mg  Daily          10/03/22 0824     Place ROBY hose  Until discontinued         10/02/22 2211     IP VTE HIGH RISK PATIENT  Once         10/02/22 2211     Place sequential compression device  Until discontinued         10/02/22 2211     Place sequential compression device  Until discontinued         10/02/22 2156     Place ROBY hose  Until discontinued         10/02/22 2156                Discharge Planning   SUSAN:      Code Status: Full Code   Is the patient medically ready for discharge?:     Reason for patient still in hospital (select all that apply): Patient trending condition, Treatment and Consult recommendations  Discharge Plan A: Home                  Marcell Elam DO  Department of Hospital Medicine   [facility]

## 2022-10-05 NOTE — PLAN OF CARE
Problem: Adult Inpatient Plan of Care  Goal: Plan of Care Review  Outcome: Ongoing, Progressing  Goal: Optimal Comfort and Wellbeing  Outcome: Ongoing, Progressing  Goal: Readiness for Transition of Care  Outcome: Ongoing, Progressing     Problem: Diabetes Comorbidity  Goal: Blood Glucose Level Within Targeted Range  Outcome: Ongoing, Progressing     Problem: Infection  Goal: Absence of Infection Signs and Symptoms  Outcome: Ongoing, Progressing

## 2022-10-05 NOTE — PROGRESS NOTES
Pharmacokinetic Assessment Follow Up: IV Vancomycin    Vancomycin serum concentration assessment(s):    The trough level was drawn correctly and can be used to guide therapy at this time. The measurement is within the desired definitive target range of 10 to 20 mcg/mL.    Vancomycin Regimen Plan:    Change regimen to Vancomycin 2000 mg IV every 24 hours with next serum trough concentration measured at 1730 prior to 3rd dose on 10/7/22    Drug levels (last 3 results):  Recent Labs   Lab Result Units 10/04/22  1756   Vancomycin-Trough ug/mL 19.1       Pharmacy will continue to follow and monitor vancomycin.    Please contact pharmacy at extension [phone] for questions regarding this assessment.    Thank you for the consult,   Destiney Soria       Patient brief summary:  Doe Woodall is a 32 y.o. male initiated on antimicrobial therapy with IV Vancomycin for treatment of skin & soft tissue infection        Drug Allergies:   Review of patient's allergies indicates:   Allergen Reactions    Metformin Diarrhea       Actual Body Weight:   98.3 kg     Renal Function:   Estimated Creatinine Clearance: 94.4 mL/min (based on SCr of 1.2 mg/dL).,     Dialysis Method (if applicable):  N/A    CBC (last 72 hours):  Recent Labs   Lab Result Units 10/02/22  1657 10/03/22  0447 10/04/22  0347   WBC K/uL 8.09 9.09 6.26   Hemoglobin g/dL 10.2* 9.9* 10.3*   Hemoglobin A1C %  --  9.4*  --    Hematocrit % 27.6* 27.4* 29.5*   Platelets K/uL 227 209 231   Gran % % 45.4 52.2 52.0   Lymph % % 37.0 29.9 31.3   Mono % % 15.5* 14.3 12.3   Eosinophil % % 1.2 2.6 2.9   Basophil % % 0.5 0.6 1.0   Differential Method  Automated Automated Automated       Metabolic Panel (last 72 hours):  Recent Labs   Lab Result Units 10/02/22  1657 10/02/22  2039 10/02/22  2113 10/03/22  0045 10/03/22  0447 10/04/22  0347   Sodium mmol/L 133*  --  133* 134* 135* 139   Potassium mmol/L 2.2*  --  2.6* 2.9* 3.3* 3.2*   Chloride mmol/L 93*  --  97 99 101 105   CO2  mmol/L 17*  --  23 22* 24 25   Glucose mg/dL 554*  --  467* 385* 289* 295*   Glucose, UA   --  4+*  --   --   --   --    BUN mg/dL 11  --  9 8 8 7   Creatinine mg/dL 1.9*  --  1.7* 1.5* 1.4 1.2   Creatinine, Urine mg/dL  --  19.2*  --   --   --   --    Albumin g/dL 2.8*  --   --   --  2.5* 2.6*   Total Bilirubin mg/dL 0.4  --   --   --  0.4 0.6   Alkaline Phosphatase U/L 411*  --   --   --  370* 370*   AST U/L 38  --   --   --  31 26   ALT U/L 32  --   --   --  27 26   Magnesium mg/dL 1.6  --  1.6 1.7 1.6 1.7   Phosphorus mg/dL 1.7*  --  2.4*  --  3.1 2.8       Vancomycin Administrations:  vancomycin given in the last 96 hours                     vancomycin (VANCOCIN) 2,500 mg in dextrose 5 % 500 mL IVPB (mg) 2,500 mg New Bag 10/04/22 1821     2,500 mg New Bag 10/03/22 1821    vancomycin (VANCOCIN) 2,000 mg in dextrose 5 % 500 mL IVPB (mg) 2,000 mg New Bag 10/02/22 1839                    Microbiologic Results:  Microbiology Results (last 7 days)       Procedure Component Value Units Date/Time    Blood culture x two cultures. Draw prior to antibiotics. [[id number]] Collected: 10/02/22 1658    Order Status: Completed Specimen: Blood from Peripheral, Antecubital, Left Updated: 10/04/22 1903     Blood Culture, Routine No Growth to date      No Growth to date      No Growth to date    Narrative:      Aerobic and anaerobic    Blood culture x two cultures. Draw prior to antibiotics. [[id number]] Collected: 10/02/22 1658    Order Status: Completed Specimen: Blood from Peripheral, Antecubital, Right Updated: 10/04/22 1903     Blood Culture, Routine No Growth to date      No Growth to date      No Growth to date    Narrative:      Aerobic and anaerobic

## 2022-10-05 NOTE — SUBJECTIVE & OBJECTIVE
Interval History:  No acute overnight events.  Patient remained afebrile.  Continues to have scrotal pain but improving.  Urology following.     Review of Systems   Constitutional:  Positive for activity change and fatigue. Negative for fever.   Eyes:  Negative for visual disturbance.   Respiratory:  Negative for cough and shortness of breath.    Cardiovascular:  Positive for chest pain (with deep breaths). Negative for palpitations.   Gastrointestinal:  Positive for nausea. Negative for abdominal pain, diarrhea and vomiting.   Genitourinary:  Positive for difficulty urinating, scrotal swelling and testicular pain. Negative for dysuria.   Musculoskeletal:  Positive for myalgias.   Skin:  Positive for color change and wound.   Allergic/Immunologic: Negative for immunocompromised state.   Neurological:  Negative for dizziness and weakness.   Hematological:  Does not bruise/bleed easily.   Psychiatric/Behavioral:  Negative for decreased concentration and dysphoric mood.      Objective:     Vital Signs (Most Recent):  Temp: 98.5 °F (36.9 °C) (10/05/22 1609)  Pulse: 91 (10/05/22 1609)  Resp: 18 (10/05/22 1609)  BP: 135/81 (10/05/22 1609)  SpO2: 98 % (10/05/22 1609)   Vital Signs (24h Range):  Temp:  [98.1 °F (36.7 °C)-98.5 °F (36.9 °C)] 98.5 °F (36.9 °C)  Pulse:  [] 91  Resp:  [12-20] 18  SpO2:  [97 %-99 %] 98 %  BP: (135-162)/(81-97) 135/81     Weight: 98.3 kg (216 lb 11.4 oz)  Body mass index is 36.06 kg/m².    Intake/Output Summary (Last 24 hours) at 10/5/2022 1622  Last data filed at 10/5/2022 1330  Gross per 24 hour   Intake 840 ml   Output --   Net 840 ml        Physical Exam  Vitals and nursing note reviewed.   Constitutional:       General: He is not in acute distress.     Appearance: He is obese. He is not ill-appearing.   HENT:      Head: Normocephalic and atraumatic.      Nose: Nose normal.      Mouth/Throat:      Mouth: Mucous membranes are moist.   Eyes:      Extraocular Movements: Extraocular  movements intact.      Conjunctiva/sclera:      Left eye: Left conjunctiva is not injected.   Neck:      Thyroid: No thyromegaly.   Cardiovascular:      Rate and Rhythm: Normal rate and regular rhythm.   Pulmonary:      Effort: Pulmonary effort is normal. No tachypnea, bradypnea or respiratory distress.      Breath sounds: No decreased breath sounds, wheezing, rhonchi or rales.   Chest:      Chest wall: Tenderness present. No swelling.   Abdominal:      General: Abdomen is protuberant. Bowel sounds are normal. There is no distension.      Palpations: Abdomen is soft.      Tenderness: There is no abdominal tenderness. There is no guarding.      Comments: Truncal obesity   Genitourinary:     Comments: No area of fluctuance. Exam limited due to pain   Musculoskeletal:      Cervical back: Normal range of motion.   Lymphadenopathy:      Comments: Trace edema to legs   Skin:     Coloration: Skin is sallow. Skin is not jaundiced.      Findings: No bruising or rash.   Neurological:      General: No focal deficit present.      Mental Status: He is alert and oriented to person, place, and time.   Psychiatric:         Attention and Perception: Attention and perception normal.         Mood and Affect: Mood is depressed.         Speech: Speech normal.         Behavior: Behavior normal. Behavior is cooperative.         Cognition and Memory: Cognition and memory normal.       Significant Labs: All pertinent labs within the past 24 hours have been reviewed.    Significant Imaging: I have reviewed all pertinent imaging results/findings within the past 24 hours.

## 2022-10-05 NOTE — ASSESSMENT & PLAN NOTE
-CT pelvis: Fluid and air collection in the right perineum/scrotal wall, highly concerning for a residual or recurrent abscess.   -Of note, he had an abscess drained on 9/9/2022  -Abscess cx with GBS  -ID consulted: Unclear if scrotal infection is from GBS vs new infection from fecal material contaminating wound. Continue Rocephin and Flagyl  -Urology consulted  -Continue day 4 Rocephin, day 3 Flagyl

## 2022-10-05 NOTE — ASSESSMENT & PLAN NOTE
Overall improving  No plans for I/D currently  Okay to go home  Follow up in 1 week for wound check

## 2022-10-05 NOTE — ASSESSMENT & PLAN NOTE
· Hold Oral hypoglycemics while patient is in the hospital.  · His pH maintained, therefore not technically DKA.  · Calculated anion gap of 25 on admission, with bicarb of 17 and glucose >500.    · Started on insulin drip, gap is now closed and bicarb up to 24, transition to subcutaneous insulin  A1c: 9.4  Meds: basal bolus insulin + SSI PRN to maintain goal 140-180  · ADA diet, accuchecks ACHS, hypoglycemic protocol

## 2022-10-05 NOTE — SUBJECTIVE & OBJECTIVE
Interval History: he is feeling okay.      Review of Systems   Constitutional: Negative.    HENT: Negative.     Eyes: Negative.    Respiratory:  Negative for cough, chest tightness and shortness of breath.    Cardiovascular:  Negative for chest pain.   Gastrointestinal: Negative.  Negative for constipation, diarrhea and nausea.   Genitourinary:  Positive for scrotal swelling.   Musculoskeletal: Negative.    Neurological: Negative.    Psychiatric/Behavioral: Negative.     Objective:     Temp:  [98.1 °F (36.7 °C)-98.5 °F (36.9 °C)] 98.5 °F (36.9 °C)  Pulse:  [] 108  Resp:  [16-20] 18  SpO2:  [97 %-99 %] 99 %  BP: (140-162)/(87-97) 140/90     Body mass index is 36.06 kg/m².           Drains       None                   Physical Exam  Vitals and nursing note reviewed.   Constitutional:       Appearance: He is well-developed.   HENT:      Head: Normocephalic.   Eyes:      Conjunctiva/sclera: Conjunctivae normal.   Neck:      Thyroid: No thyromegaly.      Trachea: No tracheal deviation.   Cardiovascular:      Rate and Rhythm: Normal rate.      Heart sounds: Normal heart sounds.   Pulmonary:      Effort: Pulmonary effort is normal. No respiratory distress.      Breath sounds: Normal breath sounds. No wheezing.   Abdominal:      General: Bowel sounds are normal.      Palpations: Abdomen is soft.      Tenderness: There is no abdominal tenderness. There is no rebound.      Hernia: No hernia is present.   Genitourinary:     Comments: Tolerated exam better today  Area of induration superior to previous I/D site improving, still no fluctuance.  Musculoskeletal:         General: No tenderness. Normal range of motion.      Cervical back: Normal range of motion and neck supple.   Lymphadenopathy:      Cervical: No cervical adenopathy.   Skin:     General: Skin is warm and dry.      Findings: No erythema or rash.   Neurological:      Mental Status: He is alert and oriented to person, place, and time.   Psychiatric:          Behavior: Behavior normal.         Thought Content: Thought content normal.         Judgment: Judgment normal.       Significant Labs:    BMP:  Recent Labs   Lab 10/03/22  0447 10/04/22  0347 10/05/22  0459   * 139 139   K 3.3* 3.2* 3.2*    105 106   CO2 24 25 25   BUN 8 7 7   CREATININE 1.4 1.2 1.2   CALCIUM 8.2* 8.6* 8.6*       CBC:   Recent Labs   Lab 10/03/22  0447 10/04/22  0347 10/05/22  0459   WBC 9.09 6.26 8.08   HGB 9.9* 10.3* 10.6*   HCT 27.4* 29.5* 30.7*    231 279       Blood Culture:   Recent Labs   Lab 10/02/22  1658   LABBLOO No Growth to date  No Growth to date  No Growth to date  No Growth to date  No Growth to date  No Growth to date     Urine Culture: No results for input(s): LABURIN in the last 168 hours.    Significant Imaging:

## 2022-10-05 NOTE — ASSESSMENT & PLAN NOTE
"32M with h/o t2dm, obesity (BMI 37)  re-admitted 10/2 with recurrent scrotal abscess and pleuritic chest pain after a fall. Prior scrotal cultures with GBS and s/p washout urology with open wound.  Updated ct- fluid and air collection in the right perineum/scrotal wall, highly concerning for a residual or recurrent abscess. Bcx 10/2 NGTD. Afebrile. On ceftriaxone/metronidazole. ID consulted for "Recurrent scrotal abscess"    Unclear if scrotal infection is from GBS vs new infection from fecal material contaminating wound. Needs continued antibiotics for residual scrotal abscess on imaging. No signs of systemic infection. Needs anaerobe coverage outpatient, but metronidazole not ideal with continued daily etoh use.     Recommendations:   - continue current abx- ceftriaxone/metronidazole while inpatient ( can change metronidazole from iv to po)  - if urology feels that washout necessary, please send updated cultures- please notify ID if done  - on discharge, would transition back to po augmentin 875/125 with plan for prolonged outpatient treatment, could be for a month or more.   - would repeat CT scan prior to outpatient ID f/u appt to evaluate for radiographic resolution of abscess  - wound care as per urology  - counseled on need for etoh cessation     Follow-up appointment will be arranged by the ID clinic and will be found in the patient's appointments tab.     Prior to discharge, please ensure the patient's follow-up has been scheduled.     If there is still no follow-up scheduled prior to discharge, please send an EPIC message to Hayley Mantilla in Infectious Diseases.      Discussed with hospitalist                 "

## 2022-10-05 NOTE — ASSESSMENT & PLAN NOTE
Patient with acute kidney injury likely due to IVVD/dehydration MARISSA is currently worsening. Labs reviewed- Renal function/electrolytes with Estimated Creatinine Clearance: 94.4 mL/min (based on SCr of 1.2 mg/dL). according to latest data. Monitor urine output and serial BMP and adjust therapy as needed. Avoid nephrotoxins and renally dose meds for GFR listed above.     Renal dose meds  Avoid IV dye  improving

## 2022-10-05 NOTE — SUBJECTIVE & OBJECTIVE
Interval history: NAEO. Reports scrotal pain- says pain stable and different from last hospitalization. Says pain worse with dressing changes. Report history daily etoh use, but says he cut back since last hospitalization.       Past Surgical History:   Procedure Laterality Date    ABCESS DRAINAGE  2014    PERIRECTAL    DECOMPRESSION OF LUMBAR SPINE USING MINIMALLY INVASIVE TECHNIQUE Right 07/06/2018    Procedure: DECOMPRESSION, SPINE, LUMBAR, MINIMALLY INVASIVE L3-4;  Surgeon: Cristian Cervantes MD;  Location: [facility] OR [address] [place] [address];  Service: Neurosurgery;  Laterality: Right;    INCISION AND DRAINAGE N/A 9/9/2022    Procedure: INCISION AND DRAINAGE GROIN;  Surgeon: KAITY Aguilar MD;  Location: [facility];  Service: Urology;  Laterality: N/A;    ORTHOPEDIC SURGERY         Review of patient's allergies indicates:   Allergen Reactions    Metformin Diarrhea       No current facility-administered medications on file prior to encounter.     Current Outpatient Medications on File Prior to Encounter   Medication Sig    acetaminophen (TYLENOL) 500 MG tablet Take 1,000 mg by mouth 3 (three) times daily as needed for Pain.    amLODIPine (NORVASC) 10 MG tablet Take 1 tablet (10 mg total) by mouth once daily.    blood sugar diagnostic Strp 1 strip by Misc.(Non-Drug; Combo Route) route 4 (four) times daily with meals and nightly.    blood-glucose meter Misc Use as instructed    diphenhydramine HCl (UNISOM SLEEPGELS ORAL) Take 1 capsule by mouth every evening.    docusate sodium (COLACE) 100 MG capsule Take 1 capsule (100 mg total) by mouth 3 (three) times daily as needed for Constipation.    folic acid (FOLVITE) 1 MG tablet Take 1 tablet (1 mg total) by mouth once daily.    glucagon (GLUCAGEN HYPOKIT) 1 mg SolR Inject 1 mg into the muscle as needed (Hypoglycemia).    HYDROcodone-acetaminophen (NORCO) 5-325 mg per tablet Take 1 tablet by mouth every 6 (six) hours as needed (dressing changes).    insulin aspart U-100 (NOVOLOG) 100 unit/mL  "(3 mL) InPn pen Inject 7 Units into the skin 3 (three) times daily.    insulin detemir U-100 (LEVEMIR FLEXTOUCH) 100 unit/mL (3 mL) SubQ InPn pen Inject 30 Units into the skin once daily.    lancets 30 gauge Misc 1 lancet by Misc.(Non-Drug; Combo Route) route 4 (four) times daily with meals and nightly.    lancing device Misc Use as instructed    pen needle, diabetic (BD ULTRA-FINE TJ PEN NEEDLE) 32 gauge x 5/32" Ndle Use 4 times daily    pen needle, diabetic 31 gauge x 5/16" Ndle 1 each by Misc.(Non-Drug; Combo Route) route 4 (four) times daily with meals and nightly.    pen needle, diabetic 32 gauge x 5/32" Ndle USE TO INJECT INSULIN FOUR TIMES DAILY    pulse oximeter (PULSE OXIMETER) device by Apply Externally route 2 (two) times a day. Use twice daily at 8 AM and 3 PM and record the value in MyChart as directed.    sodium chlor-hypochlorous acid 0.033 % IrSl Irrigate with 25 mLs as directed once daily.    thiamine 100 MG tablet Take 1 tablet (100 mg total) by mouth once daily.     Family History       Problem Relation (Age of Onset)    Diabetes Father, Paternal Grandmother, Paternal Grandfather          Tobacco Use    Smoking status: Former     Packs/day: 0.50     Years: 9.00     Pack years: 4.50     Types: Cigarettes     Quit date: 2013     Years since quittin.2    Smokeless tobacco: Former   Substance and Sexual Activity    Alcohol use: Not Currently     Comment: DAILY PINT OF LIQUOR, HX OF 1 "TALL BOY" OF BEER DAILY    Drug use: Not Currently    Sexual activity: Yes     Partners: Female     Birth control/protection: None     Review of Systems   Constitutional:  Positive for activity change, appetite change and fatigue. Negative for fever.   Eyes:  Negative for visual disturbance.   Respiratory:  Negative for shortness of breath.    Cardiovascular:  Positive for chest pain.   Gastrointestinal:  Positive for nausea.   Endocrine: Negative for heat intolerance.   Genitourinary:  Positive for difficulty " urinating, scrotal swelling and testicular pain.   Musculoskeletal:  Positive for myalgias.   Skin:  Positive for color change and wound.   Allergic/Immunologic: Negative for immunocompromised state.   Neurological:  Positive for light-headedness.   Hematological:  Does not bruise/bleed easily.   Psychiatric/Behavioral:  Negative for decreased concentration and dysphoric mood.    Objective:     Vital Signs (Most Recent):  Temp: 98.5 °F (36.9 °C) (10/05/22 1609)  Pulse: 91 (10/05/22 1609)  Resp: 18 (10/05/22 1609)  BP: 135/81 (10/05/22 1609)  SpO2: 98 % (10/05/22 1609)   Vital Signs (24h Range):  Temp:  [98.1 °F (36.7 °C)-98.5 °F (36.9 °C)] 98.5 °F (36.9 °C)  Pulse:  [] 91  Resp:  [12-20] 18  SpO2:  [97 %-99 %] 98 %  BP: (135-157)/(81-97) 135/81     Weight: 98.3 kg (216 lb 11.4 oz)  Body mass index is 36.06 kg/m².    Physical Exam  Constitutional:       General: He is not in acute distress.     Appearance: He is obese. He is ill-appearing. He is not toxic-appearing or diaphoretic.   HENT:      Head: Normocephalic and atraumatic.   Eyes:      Conjunctiva/sclera:      Right eye: Right conjunctiva is injected.      Left eye: Left conjunctiva is injected.   Neck:      Thyroid: No thyromegaly.   Cardiovascular:      Rate and Rhythm: Regular rhythm. Tachycardia present.      Heart sounds: Murmur heard.   Systolic murmur is present with a grade of 1/6.   Pulmonary:      Effort: Pulmonary effort is normal. No tachypnea or bradypnea.      Breath sounds: No decreased breath sounds, wheezing, rhonchi or rales.   Chest:      Chest wall: Tenderness present. No swelling.   Abdominal:      General: Abdomen is protuberant. Bowel sounds are normal. There is no distension.      Palpations: Abdomen is soft.      Tenderness: There is no abdominal tenderness.      Comments: Truncal obesity   Genitourinary:     Comments: No gabriel in place.  Left groin with an open area consistent with abscess s/p I&D in the past.  There is no  drainage.  The surrounding area is reddened.  See pic.  Musculoskeletal:      Cervical back: Neck supple.   Lymphadenopathy:      Comments: Trace edema to legs   Skin:     Coloration: Skin is sallow.      Findings: No rash.      Comments: Multiple tattoos   Neurological:      General: No focal deficit present.      Mental Status: He is alert and oriented to person, place, and time.      GCS: GCS eye subscore is 4. GCS verbal subscore is 5. GCS motor subscore is 6.   Psychiatric:         Attention and Perception: Attention and perception normal.         Mood and Affect: Mood is depressed.         Speech: Speech normal.         Behavior: Behavior normal. Behavior is cooperative.         Cognition and Memory: Cognition and memory normal.           Significant Labs: All pertinent labs within the past 24 hours have been reviewed.  Recent Results (from the past 24 hour(s))   VANCOMYCIN, TROUGH    Collection Time: 10/04/22  5:56 PM   Result Value Ref Range    Vancomycin-Trough 19.1 10.0 - 22.0 ug/mL   POCT glucose    Collection Time: 10/04/22  8:11 PM   Result Value Ref Range    POCT Glucose 261 (H) 70 - 110 mg/dL   Phosphorus    Collection Time: 10/05/22  4:59 AM   Result Value Ref Range    Phosphorus 3.2 2.7 - 4.5 mg/dL   Magnesium    Collection Time: 10/05/22  4:59 AM   Result Value Ref Range    Magnesium 1.5 (L) 1.6 - 2.6 mg/dL   Comprehensive Metabolic Panel (CMP)    Collection Time: 10/05/22  4:59 AM   Result Value Ref Range    Sodium 139 136 - 145 mmol/L    Potassium 3.2 (L) 3.5 - 5.1 mmol/L    Chloride 106 95 - 110 mmol/L    CO2 25 23 - 29 mmol/L    Glucose 275 (H) 70 - 110 mg/dL    BUN 7 6 - 20 mg/dL    Creatinine 1.2 0.5 - 1.4 mg/dL    Calcium 8.6 (L) 8.7 - 10.5 mg/dL    Total Protein 6.5 6.0 - 8.4 g/dL    Albumin 2.5 (L) 3.5 - 5.2 g/dL    Total Bilirubin 0.3 0.1 - 1.0 mg/dL    Alkaline Phosphatase 407 (H) 55 - 135 U/L    AST 42 (H) 10 - 40 U/L    ALT 24 10 - 44 U/L    Anion Gap 8 8 - 16 mmol/L    eGFR >60 >60  mL/min/1.73 m^2   CBC with Automated Differential    Collection Time: 10/05/22  4:59 AM   Result Value Ref Range    WBC 8.08 3.90 - 12.70 K/uL    RBC 3.11 (L) 4.60 - 6.20 M/uL    Hemoglobin 10.6 (L) 14.0 - 18.0 g/dL    Hematocrit 30.7 (L) 40.0 - 54.0 %    MCV 99 (H) 82 - 98 fL    MCH 34.1 (H) 27.0 - 31.0 pg    MCHC 34.5 32.0 - 36.0 g/dL    RDW 12.8 11.5 - 14.5 %    Platelets 279 150 - 450 K/uL    MPV 9.6 9.2 - 12.9 fL    Immature Granulocytes 0.4 0.0 - 0.5 %    Gran # (ANC) 5.1 1.8 - 7.7 K/uL    Immature Grans (Abs) 0.03 0.00 - 0.04 K/uL    Lymph # 2.0 1.0 - 4.8 K/uL    Mono # 0.7 0.3 - 1.0 K/uL    Eos # 0.2 0.0 - 0.5 K/uL    Baso # 0.04 0.00 - 0.20 K/uL    nRBC 0 0 /100 WBC    Gran % 62.8 38.0 - 73.0 %    Lymph % 24.9 18.0 - 48.0 %    Mono % 9.0 4.0 - 15.0 %    Eosinophil % 2.4 0.0 - 8.0 %    Basophil % 0.5 0.0 - 1.9 %    Differential Method Automated    POCT glucose    Collection Time: 10/05/22  7:16 AM   Result Value Ref Range    POCT Glucose 264 (H) 70 - 110 mg/dL   POCT glucose    Collection Time: 10/05/22 11:19 AM   Result Value Ref Range    POCT Glucose 236 (H) 70 - 110 mg/dL   POCT glucose    Collection Time: 10/05/22  4:10 PM   Result Value Ref Range    POCT Glucose 207 (H) 70 - 110 mg/dL   Contains abnormal data Aerobic culture  Order: [id number]  Status: Final result     Visible to patient: Yes (seen)     Next appt: 10/18/2022 at 11:15 AM in Urology (ARIC Aguilar MD)     Specimen Information: Groin; Abscess   0 Result Notes  Component 3 wk ago    Aerobic Bacterial Culture  Abnormal   STREPTOCOCCUS AGALACTIAE (GROUP B)   Moderate   Beta-hemolytic streptococci are routinely susceptible to   penicillins,cephalosporins and carbapenems.     Resulting Agency [facility]           Narrative  Performed by: [facility]  Right Groin Abscess      Specimen Collected: 09/09/22 12:22 Last Resulted: 09/11/22 07:38               Significant Imaging: I have reviewed all pertinent imaging results/findings within the past 24 hours.

## 2022-10-05 NOTE — PROGRESS NOTES
Vancomycin consult follow-up:    Patient reviewed, renal function stable, no new levels, continue current therapy; Next levels due: trough due 10/7/2022 at 1730

## 2022-10-05 NOTE — CONSULTS
"[facility] Surg  Infectious Disease  Consult Note    Patient Name: Doe Woodall  MRN: [id number]  Admission Date: 10/2/2022  Hospital Length of Stay: 3 days  Attending Physician: Marcell Elam DO  Primary Care Provider: [facility]     Isolation Status: No active isolations    Patient information was obtained from patient and ER records.      Consults  Assessment/Plan:     * Abscess of scrotum  32M with h/o t2dm, obesity (BMI 37)  re-admitted 10/2 with recurrent scrotal abscess and pleuritic chest pain after a fall. Prior scrotal cultures with GBS and s/p washout urology with open wound.  Updated ct- fluid and air collection in the right perineum/scrotal wall, highly concerning for a residual or recurrent abscess. Bcx 10/2 NGTD. Afebrile. On ceftriaxone/metronidazole. ID consulted for "Recurrent scrotal abscess"    Unclear if scrotal infection is from GBS vs new infection from fecal material contaminating wound. Needs continued antibiotics for residual scrotal abscess on imaging. No signs of systemic infection. Needs anaerobe coverage outpatient, but metronidazole not ideal with continued daily etoh use.     Recommendations:   - continue current abx- ceftriaxone/metronidazole while inpatient ( can change metronidazole from iv to po)  - if urology feels that washout necessary, please send updated cultures- please notify ID if done  - on discharge, would transition back to po augmentin 875/125 with plan for prolonged outpatient treatment, could be for a month or more.   - would repeat CT scan prior to outpatient ID f/u appt to evaluate for radiographic resolution of abscess  - wound care as per urology  - counseled on need for etoh cessation     Follow-up appointment will be arranged by the ID clinic and will be found in the patient's appointments tab.     Prior to discharge, please ensure the patient's follow-up has been scheduled.     If there is still no follow-up " scheduled prior to discharge, please send an EPIC message to Hayley Mantilla in Infectious Diseases.      Discussed with hospitalist                       Thank you for your consult. I will sign off. Please contact us if you have any additional questions.    Comfort Hurtado MD  Infectious Disease  [facility] - Med Surg    Subjective:     Principal Problem: Abscess of scrotum    HPI:   32M with h/o t2dm, obesity (BMI 37)  re-admitted 10/2 with recurrent scrotal abscess and sob/cp. Had been admitted 9/1 with scrotal abscess. Says he completed augmentin last Tuesday. Says scrotal wound had been improving, but the drainage and pain never fully went away. Says he had been having a lot of pain with dressing changes- using a ABD pad with some dry blood on dressings. Says he had been following up with urology and was told wound getting more shallow and to keep it elevated with jock strap, which he hadn't started doing. Reports having a fall in his house (after reportedly drinking etoh) and says he passed out. When he woke up, reports some L sided rib pain, worse with taking deep breath and came back to hospital    Brief history:  admitted 9/1 with scrotal pain and shakes/chills. Initial scrotal ultrasound with cellulitis. bcx 9/1 NGTD.  Was on clindamycin. Scrotal us repeated several days later and noted to have large (11.7 x 4.4cm abscess).  s/p urology washout, cultures GBS. Was treated with clindamycin --> ceftriaxone --> Augmentin. Plan had been to continue po abx at least 10-14 days from washout and wound epithelizes  (est end date 9/22)     Aerobic culture [[id number]] (Abnormal) Collected: 09/09/22 1222   Order Status: Completed Specimen: Abscess from Groin Updated: 09/11/22 0797    Aerobic Bacterial Culture STREPTOCOCCUS AGALACTIAE (GROUP B)   Moderate   Beta-hemolytic streptococci are routinely susceptible to   penicillins,cephalosporins and carbapenems.        Reports ongoing groin drainage and redness/swelling.  "Denies f/c. Admits to drinking whisky for pain reports fall    CT C/a/P  Fluid and air collection in the right perineum/scrotal wall, highly concerning for a residual or recurrent abscess.    Bcx 10/2 NGTD    afebrile    On ceftriaxone/metronidazole    ID consulted for "Recurrent scrotal abscess"      Interval history: NAEO. Reports scrotal pain- says pain stable and different from last hospitalization. Says pain worse with dressing changes. Report history daily etoh use, but says he cut back since last hospitalization.       Past Surgical History:   Procedure Laterality Date    ABCESS DRAINAGE  2014    PERIRECTAL    DECOMPRESSION OF LUMBAR SPINE USING MINIMALLY INVASIVE TECHNIQUE Right 07/06/2018    Procedure: DECOMPRESSION, SPINE, LUMBAR, MINIMALLY INVASIVE L3-4;  Surgeon: Cristian Cervantes MD;  Location: [facility] OR [address] [place] [address];  Service: Neurosurgery;  Laterality: Right;    INCISION AND DRAINAGE N/A 9/9/2022    Procedure: INCISION AND DRAINAGE GROIN;  Surgeon: KAITY Aguilar MD;  Location: [facility] OR;  Service: Urology;  Laterality: N/A;    ORTHOPEDIC SURGERY         Review of patient's allergies indicates:   Allergen Reactions    Metformin Diarrhea       No current facility-administered medications on file prior to encounter.     Current Outpatient Medications on File Prior to Encounter   Medication Sig    acetaminophen (TYLENOL) 500 MG tablet Take 1,000 mg by mouth 3 (three) times daily as needed for Pain.    amLODIPine (NORVASC) 10 MG tablet Take 1 tablet (10 mg total) by mouth once daily.    blood sugar diagnostic Strp 1 strip by Misc.(Non-Drug; Combo Route) route 4 (four) times daily with meals and nightly.    blood-glucose meter Misc Use as instructed    diphenhydramine HCl (UNISOM SLEEPGELS ORAL) Take 1 capsule by mouth every evening.    docusate sodium (COLACE) 100 MG capsule Take 1 capsule (100 mg total) by mouth 3 (three) times daily as needed for Constipation.    folic acid (FOLVITE) 1 MG tablet Take " "1 tablet (1 mg total) by mouth once daily.    glucagon (GLUCAGEN HYPOKIT) 1 mg SolR Inject 1 mg into the muscle as needed (Hypoglycemia).    HYDROcodone-acetaminophen (NORCO) 5-325 mg per tablet Take 1 tablet by mouth every 6 (six) hours as needed (dressing changes).    insulin aspart U-100 (NOVOLOG) 100 unit/mL (3 mL) InPn pen Inject 7 Units into the skin 3 (three) times daily.    insulin detemir U-100 (LEVEMIR FLEXTOUCH) 100 unit/mL (3 mL) SubQ InPn pen Inject 30 Units into the skin once daily.    lancets 30 gauge Misc 1 lancet by Misc.(Non-Drug; Combo Route) route 4 (four) times daily with meals and nightly.    lancing device Misc Use as instructed    pen needle, diabetic (BD ULTRA-FINE TJ PEN NEEDLE) 32 gauge x 5/32" Ndle Use 4 times daily    pen needle, diabetic 31 gauge x 5/16" Ndle 1 each by Misc.(Non-Drug; Combo Route) route 4 (four) times daily with meals and nightly.    pen needle, diabetic 32 gauge x 5/32" Ndle USE TO INJECT INSULIN FOUR TIMES DAILY    pulse oximeter (PULSE OXIMETER) device by Apply Externally route 2 (two) times a day. Use twice daily at 8 AM and 3 PM and record the value in MyChart as directed.    sodium chlor-hypochlorous acid 0.033 % IrSl Irrigate with 25 mLs as directed once daily.    thiamine 100 MG tablet Take 1 tablet (100 mg total) by mouth once daily.     Family History       Problem Relation (Age of Onset)    Diabetes Father, Paternal Grandmother, Paternal Grandfather          Tobacco Use    Smoking status: Former     Packs/day: 0.50     Years: 9.00     Pack years: 4.50     Types: Cigarettes     Quit date: 2013     Years since quittin.2    Smokeless tobacco: Former   Substance and Sexual Activity    Alcohol use: Not Currently     Comment: DAILY PINT OF LIQUOR, HX OF 1 "TALL BOY" OF BEER DAILY    Drug use: Not Currently    Sexual activity: Yes     Partners: Female     Birth control/protection: None     Review of Systems   Constitutional:  Positive for " activity change, appetite change and fatigue. Negative for fever.   Eyes:  Negative for visual disturbance.   Respiratory:  Negative for shortness of breath.    Cardiovascular:  Positive for chest pain.   Gastrointestinal:  Positive for nausea.   Endocrine: Negative for heat intolerance.   Genitourinary:  Positive for difficulty urinating, scrotal swelling and testicular pain.   Musculoskeletal:  Positive for myalgias.   Skin:  Positive for color change and wound.   Allergic/Immunologic: Negative for immunocompromised state.   Neurological:  Positive for light-headedness.   Hematological:  Does not bruise/bleed easily.   Psychiatric/Behavioral:  Negative for decreased concentration and dysphoric mood.    Objective:     Vital Signs (Most Recent):  Temp: 98.5 °F (36.9 °C) (10/05/22 1609)  Pulse: 91 (10/05/22 1609)  Resp: 18 (10/05/22 1609)  BP: 135/81 (10/05/22 1609)  SpO2: 98 % (10/05/22 1609)   Vital Signs (24h Range):  Temp:  [98.1 °F (36.7 °C)-98.5 °F (36.9 °C)] 98.5 °F (36.9 °C)  Pulse:  [] 91  Resp:  [12-20] 18  SpO2:  [97 %-99 %] 98 %  BP: (135-157)/(81-97) 135/81     Weight: 98.3 kg (216 lb 11.4 oz)  Body mass index is 36.06 kg/m².    Physical Exam  Constitutional:       General: He is not in acute distress.     Appearance: He is obese. He is ill-appearing. He is not toxic-appearing or diaphoretic.   HENT:      Head: Normocephalic and atraumatic.   Eyes:      Conjunctiva/sclera:      Right eye: Right conjunctiva is injected.      Left eye: Left conjunctiva is injected.   Neck:      Thyroid: No thyromegaly.   Cardiovascular:      Rate and Rhythm: Regular rhythm. Tachycardia present.      Heart sounds: Murmur heard.   Systolic murmur is present with a grade of 1/6.   Pulmonary:      Effort: Pulmonary effort is normal. No tachypnea or bradypnea.      Breath sounds: No decreased breath sounds, wheezing, rhonchi or rales.   Chest:      Chest wall: Tenderness present. No swelling.   Abdominal:      General:  Abdomen is protuberant. Bowel sounds are normal. There is no distension.      Palpations: Abdomen is soft.      Tenderness: There is no abdominal tenderness.      Comments: Truncal obesity   Genitourinary:     Comments: No gabriel in place.  Left groin with an open area consistent with abscess s/p I&D in the past.  There is no drainage.  The surrounding area is reddened.  See pic.  Musculoskeletal:      Cervical back: Neck supple.   Lymphadenopathy:      Comments: Trace edema to legs   Skin:     Coloration: Skin is sallow.      Findings: No rash.      Comments: Multiple tattoos   Neurological:      General: No focal deficit present.      Mental Status: He is alert and oriented to person, place, and time.      GCS: GCS eye subscore is 4. GCS verbal subscore is 5. GCS motor subscore is 6.   Psychiatric:         Attention and Perception: Attention and perception normal.         Mood and Affect: Mood is depressed.         Speech: Speech normal.         Behavior: Behavior normal. Behavior is cooperative.         Cognition and Memory: Cognition and memory normal.           Significant Labs: All pertinent labs within the past 24 hours have been reviewed.  Recent Results (from the past 24 hour(s))   VANCOMYCIN, TROUGH    Collection Time: 10/04/22  5:56 PM   Result Value Ref Range    Vancomycin-Trough 19.1 10.0 - 22.0 ug/mL   POCT glucose    Collection Time: 10/04/22  8:11 PM   Result Value Ref Range    POCT Glucose 261 (H) 70 - 110 mg/dL   Phosphorus    Collection Time: 10/05/22  4:59 AM   Result Value Ref Range    Phosphorus 3.2 2.7 - 4.5 mg/dL   Magnesium    Collection Time: 10/05/22  4:59 AM   Result Value Ref Range    Magnesium 1.5 (L) 1.6 - 2.6 mg/dL   Comprehensive Metabolic Panel (CMP)    Collection Time: 10/05/22  4:59 AM   Result Value Ref Range    Sodium 139 136 - 145 mmol/L    Potassium 3.2 (L) 3.5 - 5.1 mmol/L    Chloride 106 95 - 110 mmol/L    CO2 25 23 - 29 mmol/L    Glucose 275 (H) 70 - 110 mg/dL    BUN 7 6 -  20 mg/dL    Creatinine 1.2 0.5 - 1.4 mg/dL    Calcium 8.6 (L) 8.7 - 10.5 mg/dL    Total Protein 6.5 6.0 - 8.4 g/dL    Albumin 2.5 (L) 3.5 - 5.2 g/dL    Total Bilirubin 0.3 0.1 - 1.0 mg/dL    Alkaline Phosphatase 407 (H) 55 - 135 U/L    AST 42 (H) 10 - 40 U/L    ALT 24 10 - 44 U/L    Anion Gap 8 8 - 16 mmol/L    eGFR >60 >60 mL/min/1.73 m^2   CBC with Automated Differential    Collection Time: 10/05/22  4:59 AM   Result Value Ref Range    WBC 8.08 3.90 - 12.70 K/uL    RBC 3.11 (L) 4.60 - 6.20 M/uL    Hemoglobin 10.6 (L) 14.0 - 18.0 g/dL    Hematocrit 30.7 (L) 40.0 - 54.0 %    MCV 99 (H) 82 - 98 fL    MCH 34.1 (H) 27.0 - 31.0 pg    MCHC 34.5 32.0 - 36.0 g/dL    RDW 12.8 11.5 - 14.5 %    Platelets 279 150 - 450 K/uL    MPV 9.6 9.2 - 12.9 fL    Immature Granulocytes 0.4 0.0 - 0.5 %    Gran # (ANC) 5.1 1.8 - 7.7 K/uL    Immature Grans (Abs) 0.03 0.00 - 0.04 K/uL    Lymph # 2.0 1.0 - 4.8 K/uL    Mono # 0.7 0.3 - 1.0 K/uL    Eos # 0.2 0.0 - 0.5 K/uL    Baso # 0.04 0.00 - 0.20 K/uL    nRBC 0 0 /100 WBC    Gran % 62.8 38.0 - 73.0 %    Lymph % 24.9 18.0 - 48.0 %    Mono % 9.0 4.0 - 15.0 %    Eosinophil % 2.4 0.0 - 8.0 %    Basophil % 0.5 0.0 - 1.9 %    Differential Method Automated    POCT glucose    Collection Time: 10/05/22  7:16 AM   Result Value Ref Range    POCT Glucose 264 (H) 70 - 110 mg/dL   POCT glucose    Collection Time: 10/05/22 11:19 AM   Result Value Ref Range    POCT Glucose 236 (H) 70 - 110 mg/dL   POCT glucose    Collection Time: 10/05/22  4:10 PM   Result Value Ref Range    POCT Glucose 207 (H) 70 - 110 mg/dL   Contains abnormal data Aerobic culture  Order: [id number]   Status: Final result      Visible to patient: Yes (seen)      Next appt: 10/18/2022 at 11:15 AM in Urology (ARIC Aguilar MD)     Specimen Information: Groin; Abscess    0 Result Notes  Component 3 wk ago    Aerobic Bacterial Culture  Abnormal   STREPTOCOCCUS AGALACTIAE (GROUP B)   Moderate   Beta-hemolytic streptococci are routinely  susceptible to   penicillins,cephalosporins and carbapenems.     Resulting Agency [facility]           Narrative  Performed by: [facility]  Right Groin Abscess      Specimen Collected: 09/09/22 12:22 Last Resulted: 09/11/22 07:38               Significant Imaging: I have reviewed all pertinent imaging results/findings within the past 24 hours.

## 2022-10-06 NOTE — NURSING
Discharge instructions reviewed with patient, verbalized understanding, no concerns voiced.   Was A Bandage Applied: Yes

## 2022-10-06 NOTE — PROGRESS NOTES
In basket message sent to Dr Hurtado Office to call patient with a hospital follow up appointment.

## 2022-10-06 NOTE — PLAN OF CARE
[place] - Med Surg  Discharge Final Note    Patient clear to discharge from case management stand point. Follow up pcp scheduled and listed on avs. Secure chat sent to Hayley with ID clinic to contact patient to schedule follow up, listed on avs. Referral placed for out patient case management.     Primary Care Provider: [facility] - Family Medicine    Expected Discharge Date: 10/6/2022    Final Discharge Note (most recent)       Final Note - 10/06/22 1150          Final Note    Assessment Type Final Discharge Note (P)      Anticipated Discharge Disposition Home or Self Care (P)      What phone number can be called within the next 1-3 days to see how you are doing after discharge? [phone] (P)      Hospital Resources/Appts/Education Provided Provided patient/caregiver with written discharge plan information;Appointments scheduled and added to AVS (P)         Post-Acute Status    Coverage DANIEL [employer] (P)      Discharge Delays None known at this time (P)                      Important Message from Medicare             Contact Info       [facility]   Specialty: Family Medicine   Relationship: PCP - General    [address] [facility] [address]   Phone: [phone]       Next Steps: Follow up on 10/10/2022    Instructions: @8:00am with Dr Debbie Jha.    Comfort Hurtado MD   Specialty: Infectious Diseases    [facility] [facility] [address]   Phone: [phone]       Next Steps: Schedule an appointment as soon as possible for a visit in 1 week(s)    Instructions: Dr. Hurtado office will call you to schedule an appointment.

## 2022-10-06 NOTE — DISCHARGE SUMMARY
"[facility] Medicine  Discharge Summary      Patient Name: Doe Woodall  MRN: [id number]  Patient Class: IP- Inpatient  Admission Date: 10/2/2022  Hospital Length of Stay: 4 days  Discharge Date and Time: 10/6/2022 12:35 PM  Attending Physician: No att. providers found   Discharging Provider: Marcell Elam DO  Primary Care Provider: [facility] - Family Medicine      HPI:   Mr. Woodall is a 31yo man with a past medical history of ETOH abuse, floppy eyelid syndrome, DCHF, and substance induced mood disorder, DM2, and obesity.  He was recently diagnosed with a right groin abscess as well.    He was recently admitted on 9/1-9/16 here at  for sepsis due to right groin-perineal cellulitis and MARISSA.  Dr. Arreaga noted at D/C, "Started IVF and broad spectrum antibiotics. Blood cultures no growth. Urology consulted. Nephrology consulted for MARISSA. ID consulted for antibiotic management. Symptoms worsened. He developed abscess. Urology performed I&D on 9/9 and noted abscess cavity tracking up into the groin and down into the scrotum. Cultures were sent. He was transferred to ICU post operatively due to excessive venous oozing from the surgical site. He remained hemodynamically stable, no need for transfusion, and bleeding controlled. Stepped down to floor.  intraoperative cultures grew GBS. ID recommended CTX transitioned to augmentin.  MARISSA with IVF gradually improved.local wound care was done.  Patient was discharged home with  for wound care ,PO Augmentin and follow up with PCP and Urology as out patient."     He followed up with Dr. Aguilar in Urology on 9/28/22 after running out of his Norco and still complaining of pain to his right groin area.  He had also fallen a few days prior and was having CP with inspiration.  He was encouraged to go to the ED for the CP after the fall, and to continue packing changes with sodium chlor-hypochlorous acid 0.033 % IrSl; Irrigate with 25 mLs as " "directed once daily.      Since going being home, he has had continued pain and some green drainage from the right groin region.  He has been drinking a pint of "Fireball" whisky to quell the pain.  This led the the fall noted above.  Since the fall he has had continued pain to the left axillary chest area.  He denies fever or chills, but does feel his wound has become increased in size and redness over the past few days.    He called the on-call triage nurse today with, "Patient c/o chest pain rated 6-7/10 when lying on the left side and difficulty breathing. Triage RN notes audible difficulty talking during triage/assessment.  Care Advice given to Call EMS/911 Now."  He took their advice and called 911 and came to the ED.  As an aside, he has not been eating well due to pain with defecation, so he stopped taking his insulin 4 days ago.    In the ED his VS's were BP 80/48 -> 129/72 (BP Location: Right arm, Patient Position: Lying)   Pulse 90   Temp max 99.2 °F (36.9 °C) (Oral)   Resp (!) 21   Ht 5' 6" (1.676 m)   Wt 98.4 kg (217 lb)   SpO2 99%   BMI 35.02 kg/m².  Labs showed  WBC 8, Hg 10.2, Ddimer 0.79, Na 133, K 2.2, AG 23, Cr 1.9, Phos 1.7, Mg 1.6, normal LFT, LA 9.6, BHB 0.2, procal 0.21, ETOH 198.  UDS + opioids.  UA NEG.  VBG pH 7.35, PCO2 37, FLU NEG.    CXR showed no acute process.  CTA chest showed no convincing pulmonary thromboembolism.  There were pulmonary nodules and scattered ground-glass attenuation throughout the pulmonary parenchyma, may reflect underlying edema or nonspecific pneumonitis,, and possible hepatic steatosis.    CT pelvis without contrast showed  that there is a fluid and air collection in the right perineum/scrotal wall, highly concerning for a residual or recurrent abscess.  This is a thick-walled collection of fluid and soft tissue gas in the right perineum/scrotal wall measuring 4.2 x 4.2 x 1.6 cm (AP by cc by TR), highly concerning for a residual/recurrent abscess.  Mild " amount of surrounding soft tissue stranding which has significantly improved in comparison with the study from 09/01/2022.     In the ED he was treated with Morphine 4mg iv 1917, Zofran 4mg iv 1709, Zosyn 4.5g iv 1709, K-bicarb 20 meq po 1840, KCl 10 meq iv 1851, NS 1L bolus 1920, and NS 1.914L iv 1707, and Vanco 2g iv 1839.        * No surgery found *      Hospital Course:   Patient admitted on 10/02/2022  to ICU for recurrent scrotal abscess (thick-walled collection of fluid and soft tissue gas in the right perineum/scrotal wall measuring 4.2 x 4.2 x 1.6 cm), MARISSA, and ?DKA with a lactic acid of 9.6, has since improved with fluids in insulin drip, lactic acid 4.0  on repeat. pH maintained, therefore not technically DKA. MARISSA improving with fluid as well.  Calculated anion gap of 25 on admission, with bicarb of 17 and glucose >500.  Started on insulin drip, gap is now closed and bicarb up to 24, and was transition to subcutaneous insulin after procedure with Urology.  Acute alcohol intoxication of 200, per patient he was drinking to numb the pain in his scrotum.  Electrolyte derangement, replacing as needed. Transfer to floor on 10/03/22. Abscess cx from 09/09 with GBS. ID consulted-Unclear if scrotal infection is from GBS vs new infection from fecal material contaminating wound. Continue Rocephin and Flagyl while inpatient but recommends Augmentin for one month on discharge. Blood culture with no growth to date. Urology with no plans for further washout.    Patient appeared clinically better.  Electrolytes have been replaced.  Patient still admits scrotal pain and swelling, but improved.  Still require oral pain medications. Pt denies any fever, headaches, vision changes, chest pain, shortness of breath, palpitations, abdominal pain, nausea, vomiting, or any new weaknesses.  Patient's exam on discharge was as follow: Patient is alert and oriented, appears in no acute distress but appears to be in some pain, heart  with regular rate and rhythm, lungs clear to asculation with non-labored breathing, abdomen soft, and no new weaknesses or focal deficits seen. Bilateral lower extremities without any edema or calf tenderness. Scrotal/groin with area of induration superior to previous I/D site improving, still no fluctuance.    Patient was counseled regarding any abnormal labs, differential diagnosis, treatment options, risk-benefit, lifestyle changes, prognosis, current condition, and medications. Patient was interactive and attentive.  Patient's questions were answered in a respectful and timely manner. Patient was instructed to follow-up with PCP within 1 week and to continue taking medications as prescribed.  Instructed to also follow up with urology and infectious disease outpatient. Also, extensively discussed the risks, benefits, and side effects of patient's medications. Discussed with patient about any medication changes. Patient verbalized understanding and agrees to treatment plan.  Patient is stable for discharge.  Patient has no other questions or concerns at this time.  ED precautions discussed with the patient.    Vital signs are stable. Ambulating without any difficulty. Tolerating p.o. intake without any nausea or vomiting. Afebrile for over 24 hours. Patient is in stable condition and has no questions or concerns. Patient will be discharge to home. Prescriptions sent to pharmacy.  CM/SW to assist with discharge planning.          Goals of Care Treatment Preferences:  Code Status: Full Code      Consults:   Consults (From admission, onward)        Status Ordering Provider     Inpatient consult to Infectious Diseases  Once        Provider:  Comfort Hurtado MD    Completed BEVERLY STEVENS     Inpatient consult to Urology  Once        Provider:  Amber Contreras MD    Completed KAITY SINGH          No new Assessment & Plan notes have been filed under this hospital service since the last note was generated.  Service:  Hospital Medicine    Final Active Diagnoses:    Diagnosis Date Noted POA    PRINCIPAL PROBLEM:  Abscess of scrotum [N49.2] 09/11/2022 Yes    Cellulitis of groin [L03.314] 09/01/2022 Yes    MARISSA (acute kidney injury) [N17.9] 09/14/2022 Yes    Hypomagnesemia [E83.42] 10/05/2022 No    Hypokalemia [E87.6] 08/03/2020 Yes    Chest wall contusion, left, initial encounter [S20.212A] 10/02/2022 Yes    Normocytic anemia [D64.9] 09/10/2022 Yes    Lactic acidosis [E87.20] 10/03/2022 Yes    Primary hypertension [I10] 10/02/2022 Yes    Severe sepsis [A41.9, R65.20] 09/01/2022 Yes    Alcoholic intoxication with complication [F10.929] 08/02/2020 Yes    Type 2 diabetes mellitus with hyperosmolar nonketotic hyperglycemia [E11.00] 08/01/2014 Yes    Severe obesity with body mass index (BMI) of 36.0 to 36.9 with serious comorbidity [E66.01, Z68.36] 10/02/2022 Not Applicable      Problems Resolved During this Admission:    Diagnosis Date Noted Date Resolved POA    Sepsis [A41.9] 10/02/2022 10/02/2022 Yes    Acute pancreatitis [K85.90] 10/02/2022 10/02/2022 Unknown    Chest pain [R07.9] 10/02/2022 10/02/2022 Unknown    Shortness of breath [R06.02] 10/02/2022 10/02/2022 Unknown    High serum lactic acid [R79.89] 10/02/2022 10/02/2022 Unknown    Anemia [D64.9] 10/02/2022 10/02/2022 Unknown    Diabetes mellitus [E11.9] 10/02/2022 10/02/2022 Unknown       Discharged Condition: stable    Disposition: Home or Self Care    Follow Up:   Follow-up Information     [facility] - Family Medicine Follow up on 10/10/2022.    Specialty: Family Medicine  Why: @8:00am with Dr Debbie Jha.  Contact information:  [address] [place] [id number]  [phone]             Comfort Hurtado MD. Schedule an appointment as soon as possible for a visit in 1 week(s).    Specialty: Infectious Diseases  Why: Dr. Hurtado office will call you to schedule an appointment.  Contact information:  [address]  [facility] [address]  [phone]                       Patient Instructions:      Ambulatory referral/consult to Outpatient Case Management   Referral Priority: Routine Referral Type: Consultation   Referral Reason: Specialty Services Required   Number of Visits Requested: 1     Diet Cardiac     Diet diabetic     Notify your health care provider if you experience any of the following:  temperature >100.4     Notify your health care provider if you experience any of the following:  persistent nausea and vomiting or diarrhea     Notify your health care provider if you experience any of the following:  severe uncontrolled pain     Notify your health care provider if you experience any of the following:  redness, tenderness, or signs of infection (pain, swelling, redness, odor or green/yellow discharge around incision site)     Notify your health care provider if you experience any of the following:  increased confusion or weakness     Activity as tolerated       Significant Diagnostic Studies:   Imaging Results           CTA Chest Non-Coronary (PE Studies) (Final result)  Result time 10/02/22 20:34:41    Final result by Michael Amin MD (10/02/22 20:34:41)                 Impression:      This report was flagged in Epic as abnormal.    1. Allowing for exam limitations above, no convincing pulmonary thromboembolism.  Correlation of these findings with D-dimer and/or lower extremity ultrasound as warranted.  2. Pulmonary nodules as described.  For multiple solid nodules all <6 mm, Fleischner Society 2017 guidelines recommend no routine follow up for a low risk patient, or follow up with non-contrast chest CT at 12 months after discovery in a high risk patient.  3. Scattered ground-glass attenuation throughout the pulmonary parenchyma, may reflect underlying edema or nonspecific pneumonitis, correlation is advised.  4. Possible hepatic steatosis, correlation with LFTs recommended.  5. Please see above for several  additional findings.      Electronically signed by: Michael Amin MD  Date:    10/02/2022  Time:    20:34             Narrative:    EXAMINATION:  CTA CHEST NON CORONARY (PE STUDIES)    CLINICAL HISTORY:  Pulmonary embolism (PE) suspected, high prob;    TECHNIQUE:  Low dose axial images, sagittal and coronal reformations were obtained from the thoracic inlet to the lung bases following the IV administration of 100 mL of Omnipaque 350.  Contrast timing was optimized to evaluate the pulmonary arteries.  MIP images were performed.    COMPARISON:  CT abdomen and pelvis 09/01/2022    FINDINGS:  The structures at the base of the neck are unremarkable.  No significant mediastinal lymphadenopathy.  The heart is not enlarged.  No pericardial effusion.  The thoracic aorta tapers normally.  The visualized portions of the spleen, adrenal glands, pancreas and stomach are grossly unremarkable.  The liver is mildly hypoattenuating, may be on the basis of contrast phase however steatosis is a consideration, correlation is advised with LFTs.    Motion artifact limits evaluation of the airways and pulmonary parenchyma.  Allowing for the above, the airways are patent.  There are a few scattered patchy regions of ground-glass attenuation suggesting edema or other nonspecific pneumonitis.  There is bilateral basilar dependent atelectasis.  No large focal consolidation.  No pneumothorax.  There is a pulmonary nodule within the right lower lobe measuring 3-4 mm, series 2, image 168.  There is a pulmonary nodule within the periphery of the left lower lobe, series 2, image 248.    Bolus timing is suboptimal for evaluation of pulmonary thromboembolism.  Additionally, examination is limited secondary to artifact from respiratory motion.  Allowing for the above, no convincing pulmonary arterial filling defect to the level of the proximal segmental branches bilaterally to suggest pulmonary thromboembolism.  Please note, there is particularly  limited evaluation of the pulmonary arteries to the bilateral lower lobes on the basis of respiratory motion.    There are degenerative changes of the spine.  No significant axillary lymphadenopathy.  There is bilateral gynecomastia.                               CT Pelvis Without Contrast (Final result)  Result time 10/02/22 19:20:05   Procedure changed from CT Pelvis With Contrast     Final result by Jd Lewis DO (10/02/22 19:20:05)                 Impression:      Fluid and air collection in the right perineum/scrotal wall, highly concerning for a residual or recurrent abscess.      Electronically signed by: Jd Lewis  Date:    10/02/2022  Time:    19:20             Narrative:    EXAMINATION:  CT PELVIS WITHOUT CONTRAST    CLINICAL HISTORY:  Soft tissue infection suspected, pelvis, xray done;    TECHNIQUE:  Multiplanar images were obtained of the pelvis from the iliac crests through the symphysis pubis without intravenous contrast.  Patient had an order for a CTA chest and pelvis. The patients IV had failed during the contrast bolus during the CTA chest. The provider said that she was ok if no contrast had reached the pelvis and wanted scan to be read without contrast. The patient is being hydrated and another attempt will be made for the CTA chest.    COMPARISON:  CT of the abdomen and pelvis from 09/01/2022.    FINDINGS:  Bone: No aggressive osseous lesion.  No acute fracture or dislocation.  No evidence of acute osteomyelitis.    Articulations: The bilateral hips, pubic symphysis, and bilateral sacroiliac joints are unremarkable.  Partially imaged portions of the lower lumbar spine are unremarkable.    Soft tissues: There is a thick-walled collection of fluid and soft tissue gas in the right perineum/scrotal wall measuring 4.2 x 4.2 x 1.6 cm (AP by cc by TR), highly concerning for a residual/recurrent abscess.  Mild amount of surrounding soft tissue stranding which has significantly improved in  comparison with the study from 09/01/2022.    Lymph nodes: Again seen are bilateral symmetric prominent inguinal lymph nodes, likely reactive and similar to prior.    Miscellaneous: Partially imaged portions of the internal pelvis are unremarkable.  The urinary bladder, prostate, and bowel are unremarkable.    Vasculature: There is atherosclerosis of the partially imaged bilateral femoral arteries.  Vasculature is otherwise unremarkable on this noncontrast CT.                               X-Ray Chest AP Portable (Final result)  Result time 10/02/22 17:14:39    Final result by Santi Porter MD (10/02/22 17:14:39)                 Impression:      No acute process.      Electronically signed by: Santi Porter MD  Date:    10/02/2022  Time:    17:14             Narrative:    EXAMINATION:  XR CHEST AP PORTABLE    CLINICAL HISTORY:  Sepsis;    TECHNIQUE:  Single frontal view of the chest was performed.    COMPARISON:  09/01/2022.    FINDINGS:  Monitoring EKG leads are present.  The trachea is unremarkable.  The cardiomediastinal silhouette is within normal limits.  There is elevation of both hemidiaphragms.  There are no pleural effusions.  There is no evidence of a pneumothorax.  There is no evidence of pneumomediastinum.  No airspace opacity is present.  The osseous structures are unremarkable.                                  Pending Diagnostic Studies:     None         Medications:  Reconciled Home Medications:      Medication List      START taking these medications    amoxicillin-clavulanate 875-125mg 875-125 mg per tablet  Commonly known as: AUGMENTIN  Take 1 tablet by mouth every 12 (twelve) hours.     oxyCODONE 10 mg Tab immediate release tablet  Commonly known as: ROXICODONE  Take 1 tablet (10 mg total) by mouth every 6 (six) hours as needed for Pain.        CONTINUE taking these medications    acetaminophen 500 MG tablet  Commonly known as: TYLENOL  Take 1,000 mg by mouth 3 (three) times daily as needed for  "Pain.     amLODIPine 10 MG tablet  Commonly known as: NORVASC  Take 1 tablet (10 mg total) by mouth once daily.     * BD ULTRA-FINE SHORT PEN NEEDLE 31 gauge x 5/16" Ndle  Generic drug: pen needle, diabetic  1 each by Misc.(Non-Drug; Combo Route) route 4 (four) times daily with meals and nightly.     * BD ULTRA-FINE TJ PEN NEEDLE 32 gauge x 5/32" Ndle  Generic drug: pen needle, diabetic  USE TO INJECT INSULIN FOUR TIMES DAILY     * BD ULTRA-FINE TJ PEN NEEDLE 32 gauge x 5/32" Ndle  Generic drug: pen needle, diabetic  Use 4 times daily     docusate sodium 100 MG capsule  Commonly known as: COLACE  Take 1 capsule (100 mg total) by mouth 3 (three) times daily as needed for Constipation.     folic acid 1 MG tablet  Commonly known as: FOLVITE  Take 1 tablet (1 mg total) by mouth once daily.     GLUCAGEN HYPOKIT 1 mg Solr  Generic drug: glucagon  Inject 1 mg into the muscle as needed (Hypoglycemia).     lancing device Misc  Use as instructed     LEVEMIR FLEXTOUCH U-100 INSULN 100 unit/mL (3 mL) Inpn pen  Generic drug: insulin detemir U-100  Inject 30 Units into the skin once daily.     NovoLOG Flexpen U-100 Insulin 100 unit/mL (3 mL) Inpn pen  Generic drug: insulin aspart U-100  Inject 7 Units into the skin 3 (three) times daily.     pulse oximeter device  Commonly known as: pulse oximeter  by Apply Externally route 2 (two) times a day. Use twice daily at 8 AM and 3 PM and record the value in [facility] as directed.     sodium chlor-hypochlorous acid 0.033 % Irsl  Irrigate with 25 mLs as directed once daily.     thiamine 100 MG tablet  Take 1 tablet (100 mg total) by mouth once daily.     TRUE METRIX GLUCOSE METER Misc  Generic drug: blood-glucose meter  Use as instructed     TRUE METRIX GLUCOSE TEST STRIP Strp  Generic drug: blood sugar diagnostic  1 strip by Misc.(Non-Drug; Combo Route) route 4 (four) times daily with meals and nightly.     TRUEPLUS LANCETS 30 gauge Misc  Generic drug: lancets  1 lancet by " Misc.(Non-Drug; Combo Route) route 4 (four) times daily with meals and nightly.     UNISOM SLEEPGELS ORAL  Take 1 capsule by mouth every evening.         * This list has 3 medication(s) that are the same as other medications prescribed for you. Read the directions carefully, and ask your doctor or other care provider to review them with you.            STOP taking these medications    HYDROcodone-acetaminophen 5-325 mg per tablet  Commonly known as: NORCO            Indwelling Lines/Drains at time of discharge:   Lines/Drains/Airways     None                 Time spent on the discharge of patient: Greater than 35 minutes         Marcell Elam DO  Department of Hospital Medicine  [facility]

## 2022-10-07 NOTE — PROGRESS NOTES
C3 nurse attempted to contact Doe Woodall for a TCC post hospital discharge follow up call. No answer. Left voicemail with callback information. The patient has a scheduled [facility] appointment with [facility] on 10/10/22 @ 0800.

## 2022-10-17 NOTE — PROGRESS NOTES
CHW - Outreach Attempt    Community Health Worker left a voicemail message for 1st attempt to contact patient regarding: Initial outreach and enrollment.

## 2022-10-24 NOTE — PROGRESS NOTES
CHW - Outreach Attempt    Community Health Worker left a voicemail message for 2nd attempt to contact patient regarding: Follow up visit today.

## 2022-11-17 NOTE — ASSESSMENT & PLAN NOTE
Palliative care consulted d/t patient's stage 4 adenocarcinoma of lung, bone metastasis, and brain mets  Appreciate palliative care recommendations.     See sepsis

## 2022-11-20 PROBLEM — F33.2 SEVERE EPISODE OF RECURRENT MAJOR DEPRESSIVE DISORDER, WITHOUT PSYCHOTIC FEATURES: Chronic | Status: ACTIVE | Noted: 2022-01-01

## 2022-11-20 NOTE — ED TRIAGE NOTES
PT BIB EMS - Pt family states pt is SI/HI-  Pt Denies plan, attempt but does admit to having thoughts in the past and let he states he opened up to his family about his historical thoughts. Pt CBG today is in the 400s. Pt has not had any insulin in 4 days. Pt has a long medical history and he is non compliant with medication regimen.  Pt also says he fell on his chest yesterday due to fatigue in his BLE. Pt states he is having non cardiac chest pain as a result of his fall.

## 2022-11-20 NOTE — ED NOTES
Called and faxed PEC to  office spoke with GUNNAR REEVES scanned into patient's chart by registration.

## 2022-11-20 NOTE — CONSULTS
[facility]  DEPARTMENT OF PSYCHIATRY    TELEPSYCHIATRY CONSULTATION SERVICE INITIAL EVALUATION     EXAMINING PRACTITIONER: Andrea Keller MD  BOARD CERTIFICATION: Psychiatry & Addiction Medicine    Patient agreeable to consultation via telepsychiatry.    This consultation was requested by Elle Mathew MD, the emergency department attending physician.  The location of the consulting psychiatrist is: [place], [place]  The patient location is:  [facility] EMERGENCY DEPARTMENT  Consultation Setting: emergency department    Inpatient consult to Telemedicine - Psychiatry  Consult performed by: Andrea Keller MD  Consult ordered by: Libra Bustos MD        Consult Start Time: 11/19/2022  12:05AM CDT  Consult End Time: 11/19/2022 12:59 AM CDT      KEY:     I[]I Y = II[x][]II = Yes / Present / Present Though Denies / Endorses  I[]I N = II[][x]II = No / Absent / Absent Though Endorses / Denies  I[]I U = II[][]II = Unknown / Unable to Assess/Enact / Unwilling to Participate/Provide  I[]I A = II[x][x]II = Ambiguity / Uncertainty of Accuracy Exists  I[]I D = Denial or Minimization is Suspected/Evident  I[]I N/A = Non-Applicable    CHIEF COMPLAINT:     Patient Name: Doe Woodall  YOB: 1990  MRN: [id number]    Doe Woodall is a 32 y.o. male who is being seen by me for an initial psychiatric evaluation.  Doe Woodall presents with the chief complaint of: depression      CHART REVIEW:     Available documentation has been reviewed, and pertinent elements of the chart have been incorporated into this evaluation where appropriate.    Per ED Provider:  I assumed care of this patient at shift change from Dr. Bustos pending glycemic control. Briefly, this is a 32 year old male with history of poorly controlled diabetes, recent hospitalizations for cellulitis and scrotal abscess who presented today with suicidal ideation.  Patient reports over the past 2 months he has had some leg weakness that  has prevented him from doing his normal daily activities.  States when he tries to get up and walk around he feels lightheaded and causes his legs do feel weak.  Patient does have a history of alcohol use, states he last drank alcohol yesterday.  He denies feeling tremulous, denies previous history of alcohol withdrawal.  He has been depressed over this and endorse suicidal ideation to his sister. Work up was initiated with pec, IV fluids, insulin. Labs and imaging significant for leukocytosis and hyperglycemia. On reassessment, patient resting in bed, wakes to voice.  Heart rate in the low 100s, blood pressure 134/85.  On my assessment, sensation to light touch is intact in his lower extremities.  He has 5/5 plantar and dorsiflexion bilaterally.  Right scrotum has some erythema and tenderness but no induration and he reports this has been improving.  Patient getting additional insulin for glycemic control, once blood glucose adequately controlled will medically clear for psychiatric evaluation.     Elle Mathew MD   10:58 PM     Update:  Patient with improved glycemic control, no evidence of DKA, troponin negative, chest x-ray negative for focal finding.  CT shows remote lumbar fractures.  At this time, the patient is medically cleared for psychiatric evaluation.     Elle Mathew MD   11:49 PM    Per Dr. Mathew:  PEC'd by previous ED physician  History of substance use and poorly controlled diabetes  In and out of hospital last months due to abscesses - developed deconditioning in his legs  Reported to sister he's been hopeless and having suicidal ideation and depressed  He minimized suicidal ideation to Dr. Mathew - but he endorsed hopelessness      The patient's last encounter with me was on: Visit date not found  The patient's last encounter in the psychiatry department was on: Visit date not found    PRESENTATION:     HISTORY OF PRESENT ILLNESS:     In Summary:  Patient presents after sister called 911  for concerns of suicidal ideation.  He acknowledges depression but states family took suicidal ideation out of context.  He states he had suicidal ideation last summer, but not currently.  However I spoke with both father and sister - both are concerned he is minimizing and not safe.  Per sister he has been refusing to eat or drink, and hasn't been taking his medication properly.      Per Patient:  Currently in pain.  Acknowledges depression but states sister felt he was currently suicidal but it was last summer he was suicidal.  He's had medical problems recently - lost function in legs due to multiple hospitalizations - can no longer due functions he use to be able to do.  This has gotten him down.  He was depressed prior but recently gotten worse due to medical illnesses and functional limitations.  He denies passive or active suicidal ideation   Last summer active suicidal ideation - overdose - but did not attempt          .  COLLATERAL:     Patient information was obtained from patient, parent(s), relative(s), and emergency department staff.  Also present with the patient at the time of the evaluation: patient evaluated alone.           Per Father:  Daughter told her about patient (his son).  He said he had suicidal ideation in summer and tried to kill himself three times.  Father concerned he may harm himself.    Per Sister:   Mother and father called because patient hysterically crying.  Hunched over bed, couldn't get up.  Refused to eat or drink anything the last couple days - also refusing to take his medication.  He expressed feelings of depressed when she spoke to him.  Functional limitations sadden him.  He told her he has had suicidal thoughts - stated he was going to use knife - because not nearly as good as his father at being a father.  First time she heard this but has expressed to parents multiple times.  She feels he is minimizing when he says no current suicidal ideation.  She is concerned he  wouldn't be safe if not in hospital.  She called 911 because she was concerned about him.  She is not aware of him ever being treated for depression - he has always refused.  She does not believe he is a current daily drinker.              .  REVIEW OF SYSTEMS:     A comprehensive review of systems (i.e. Constitutional, Eyes, ENT/Mouth, Cardiovascular, Respiratory, Gastrointestinal, Genitourinary, Musculoskeletal, Skin, Neurologic, Endocrine, Heme/Lymph, and Allergy/Immune) was negative with the exception of noted positive symptomatology.    Review of Systems  +pain  +leg weakness  +muscular chest pain status post fall    PSYCHIATRIC ROS:     I[]I Patient denies any pertinent Psychiatric ROS, and none is known.  I[]I Patient unable or unwilling to provide any Psychiatric ROS.    II[x][]II sleep disturbance: *insomnia - will try to take nyquil, Unisom, pain pills, or alcohol - it's been awhile since he's been able to sleep on his own - slept ok in [place] when visiting wife and daughters but trouble sleeping here in [place] even when they are here*    II[x][]II appetite/weight change: *lost 50lbs since surgery, loss of appetite*    II[x][]II fatigue/anergia: **    II[x][]II impairment in focus/concentration: **       II[x][]II depression: **  Positive for: depressed mood, anhedonia, amotivation, worthlessness, hopelessness Negative for: excessive or inappropriate guilt  II[][x]II anxiety/worry: **     II[x][]II dysregulated mood/behavior: **  Positive for: moodiness, irritability   II[][x]II manic symptomatology: **     II[][]II psychosis: **   Negative for: paranoia, hallucinations    Additional Relevant Psychiatric ROS, As Applicable: **        HISTORY:     I[]I Patient unable or unwilling to provide any history.  Information Obtained Via Collateral/Chart Review, Yet To Be Documented, If Available:     PAST PSYCHIATRIC:     I[]I Patient denies any past psychiatric history, and none is known.  I[]I Patient unable or  unwilling to provide any past psychiatric history.    I[x]I Y  I[]I N  I[]I U  Psychiatric Diagnoses: Depression  I[]I Y  I[x]I N  I[]I U  Current Psychiatric Provider (if Applicable):   I[]I Y  I[x]I N  I[]I U  Hx of Psychiatric Hospitalization:   I[]I Y  I[x]I N  I[]I U  Hx of Outpatient Psychiatric Treatment (psychiatry/psychotherapy):   I[]I Y  I[x]I N  I[]I U  Psychotropic Trials:   I[]I Y  I[x]I N  I[]I U  Prior Suicide Attempts:   I[x]I Y  I[]I N  I[]I U  Hx of Suicidal Ideation:   I[]I Y  I[]I N  I[]I U  Hx of Homicidal Ideation:   I[]I Y  I[x]I N  I[]I U  Hx of Self-Injurious Behavior (Non-Suicidal):   I[]I Y  I[x]I N  I[]I U  Hx of Violence:   I[]I Y  I[x]I N  I[]I U  Documented Hx of Malingering:     Additional Relevant Past Psychiatric History, As Applicable:       SUBSTANCE USE:     I[]I Patient denies any substance use history, and none is known.  I[]I Patient unable or unwilling to provide any substance use history.    I[]I Y  I[]I N  I[]I U  I[]I Current  I[x]I Former  Nicotine Use:   I[]I Y  I[]I N  I[]I U  I[x]I Current  I[]I Former  Alcohol Misuse/Abuse:   I[]I Y  I[]I N  I[]I U  I[]I Current  I[x]I Former  Illicit Drug Use/Misuse/Abuse: last used cocaine beginning of year  I[x]I Y  I[]I N  I[]I U  I[]I Current  I[]I Former  Misuse/Abuse of Rx Medications: over uses pain pills when has access    I[]I Y  I[x]I N  I[]I U  Hx of Detox:   I[]I Y  I[x]I N  I[]I U  Hx of Rehab:     Current Alcohol Use:   I[]I U    I[]I N    I[]I Rare    I[]I Social    I[x]I Exceeds Recommended Health Limits    I[x]I Binge Pattern    I[]I Daily or Near Daily    I[]I Physiologically Dependent    I[]I N/A  I[]I U  Average Consumption (e.g. type, quantity, frequency): recently cut back the past couple months 1-2x per week - tall boy and a pint  I[]I N/A  I[]I U  Last drink: couple days ago    Substances Used (Lifetime):   I[]I U    I[]I N    II[][]II Cannabis    II[x][]II Cocaine    II[][]II Heroin   II[][]II Opioids     II[][]II Methamphetamine    II[][]II Stimulants    II[][]II Benzodiazepines    I[]I Other:     I[]I N/A  I[x]I Y  I[]I N  I[]I U  Meets Criteria for Substance Use Disorder:   I[]I N/A  I[x]I Y  I[]I N  I[]I U  Advised to Quit/Cut Back:   I[]I N/A  I[x]I Y  I[]I N  I[]I U  Motivated to Do So:       Additional Relevant Substance Use History, As Applicable:       TRAUMA:     I[]I Patient denies any history of trauma, abuse or neglect, and none is known.  I[]I Patient unable or unwilling to provide any history of trauma, abuse, or neglect.    I[]I Y  I[x]I N  I[]I U  Hx of Trauma/Neglect:   I[]I Y  I[x]I N  I[]I U  Hx of Physical Abuse:   I[x]I Y  I[]I N  I[]I U  Hx of Sexual Abuse:     Additional Relevant Trauma History, As Applicable:       FAMILY:     I[]I Y  I[x]I N  I[]I U          SOCIAL:     I[]I Patient unable or unwilling to provide any social history.    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  I[]I Patient unable or unwilling to provide any legal history.    I[]I Y  I[x]I N  I[]I U  Current Legal Issues:   I[x]I  Y  I[]I N  I[]I U  Past Charges/Convictions:   I[x]I Y  I[]I N  I[]I U  Hx of Incarceration: 2 different times, 3 or 4 months total    Additional Relevant Past Psychiatric History, As Applicable:       MEDICAL:     The patient's past medical history has been reviewed and updated as appropriate within the electronic medical record system.    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; Floppy eyelid syndrome; Alcoholic intoxication with complication; Hypokalemia; Elevated transaminase level; Other insomnia; Substance induced mood disorder; Alcohol use disorder, severe, dependence; Anxiety disorder; Chronic diastolic heart failure; Type 2 diabetes mellitus with hypoglycemia without coma; Cellulitis of groin; Severe sepsis; ATN (acute tubular necrosis); Inguinal abscess; Normocytic anemia; Abscess of scrotum; MARISSA (acute kidney injury); Severe obesity with body mass index (BMI) of 36.0 to 36.9 with serious comorbidity; Chest wall contusion, left, initial encounter; Primary hypertension; Lactic acidosis; and Hypomagnesemia on their problem list.     NEUROLOGIC:     I[]I Y  I[x]I N  I[]I U  Hx of Seizure:   I[]I Y  I[x]I N  I[]I U  Hx of Significant Head Trauma (e.g., Loss of Consciousness, Concussion, Coma):      Additional Relevant Neurologic History, As Applicable:       MEDICATIONS:     Current Psychotropic Medications:     I[x]I N  I[]I U         Scheduled and PRN Medications:   The electronic chart was reviewed and updated as appropriate.  See Medcard for details.  No current facility-administered medications for this encounter.    Current Outpatient Medications:     insulin aspart U-100 (NOVOLOG) 100 unit/mL (3 mL) InPn pen, Inject 7 Units into the skin 3 (three) times daily., Disp: 15 mL, Rfl: 0    insulin  "detemir U-100 (LEVEMIR FLEXTOUCH) 100 unit/mL (3 mL) SubQ InPn pen, Inject 30 Units into the skin once daily., Disp: 15 mL, Rfl: 0    acetaminophen (TYLENOL) 500 MG tablet, Take 1,000 mg by mouth 3 (three) times daily as needed for Pain., Disp: , Rfl:     amLODIPine (NORVASC) 10 MG tablet, Take 1 tablet (10 mg total) by mouth once daily., Disp: 30 tablet, Rfl: 0    blood sugar diagnostic Strp, 1 strip by Misc.(Non-Drug; Combo Route) route 4 (four) times daily with meals and nightly., Disp: 200 strip, Rfl: 11    blood-glucose meter Misc, Use as instructed, Disp: 1 each, Rfl: 0    diphenhydramine HCl (UNISOM SLEEPGELS ORAL), Take 1 capsule by mouth every evening., Disp: , Rfl:     docusate sodium (COLACE) 100 MG capsule, Take 1 capsule (100 mg total) by mouth 3 (three) times daily as needed for Constipation., Disp: 20 capsule, Rfl: 0    folic acid (FOLVITE) 1 MG tablet, Take 1 tablet (1 mg total) by mouth once daily., Disp: 30 tablet, Rfl: 3    glucagon (GLUCAGEN HYPOKIT) 1 mg SolR, Inject 1 mg into the muscle as needed (Hypoglycemia)., Disp: 1 each, Rfl: 11    lancets 30 gauge Misc, 1 lancet by Misc.(Non-Drug; Combo Route) route 4 (four) times daily with meals and nightly., Disp: 200 each, Rfl: 11    lancing device Misc, Use as instructed, Disp: 1 each, Rfl: 0    oxyCODONE (ROXICODONE) 10 mg Tab immediate release tablet, Take 1 tablet (10 mg total) by mouth every 6 (six) hours as needed for Pain., Disp: 16 tablet, Rfl: 0    pen needle, diabetic (BD ULTRA-FINE TJ PEN NEEDLE) 32 gauge x 5/32" Ndle, Use 4 times daily, Disp: 100 each, Rfl: 0    pen needle, diabetic 31 gauge x 5/16" Ndle, 1 each by Misc.(Non-Drug; Combo Route) route 4 (four) times daily with meals and nightly., Disp: 200 each, Rfl: 11    pen needle, diabetic 32 gauge x 5/32" Ndle, USE TO INJECT INSULIN FOUR TIMES DAILY, Disp: 100 each, Rfl: 3    pulse oximeter (PULSE OXIMETER) device, by Apply Externally route 2 (two) times a day. Use twice daily at 8 " AM and 3 PM and record the value in MyChart as directed., Disp: 1 each, Rfl: 0    sodium chlor-hypochlorous acid 0.033 % IrSl, Irrigate with 25 mLs as directed once daily., Disp: 250 mL, Rfl: 3    thiamine 100 MG tablet, Take 1 tablet (100 mg total) by mouth once daily., Disp: 30 tablet, Rfl: 3    ALLERGIES:   Metformin     RISK:     RELIABILITY, ACCURACY & AGENCY:     The patient is deemed to be an unreliable historian, minimization of presenting symptoms is suspected.  Inconsistencies between patient reporting, collateral information, and/or chart review are present.    Legal Status: The patient is currently here on an involuntary legal status - a PEC has been executed.    MITIGATION:     Risk Mitigation Strategies, Harm Reduction Techniques, and Safety Netting are important interventions that can reduce acute and chronic risk, and as such opportunities were sought to incorporate them into the encounter, to the degree possible.  Written material has been provided to supplement, augment, and reinforce any discussions and interventions, via the AVS or other pre-printed handouts.    PRESCRIPTION DRUG MONITORING:     [place]  Site reviewed -    10/06/2022  10/06/2022   1  Oxycodone Hcl (Ir) 10 Mg Tab 16.00  4  Ha Tra  [id number]   [facility] ([id number])  0  60.00 MME  [place]     09/28/2022 09/28/2022   2  Hydrocodone-Acetamin 5-325 Mg 28.00  7  Wi Car  [id number]   Wal ([id number])  0  20.00 MME  [place]     09/16/2022 09/16/2022   1  Hydrocodone-Acetamin 5-325 Mg 20.00  5  Wi Car  [id number]   [facility] ([id number])  0  20.00 [place]  [place]     06/02/2022 06/01/2022   3  Hydrocodone-Acetamin 5-325 Mg 12.00  3   Lil  [id number]   Wal ([id number])  0  20.00 MME  [place]        FIREARMS:     Access to Firearms:   See above for screening on access to firearms.  NOTE: patient counseled on gun safety.  NOTE: patient counseled on increased risks associated with gun ownership.    III[x]III  RISK PARAMETERS:     The following risk  "parameters were assessed during this evaluation:    I[x]I Y  I[]I N  I[]I U  I[]I A  Suicidal Ideation/Behavior: **   I[]I Y  I[]I N  I[]I U  I[]I A  Homicidal Ideation/Behavior: **  I[]I Y  I[x]I N  I[]I U  I[]I A  Violence: **  I[]I Y  I[x]I N  I[]I U  I[]I A  Self-Injurious Behavior: **    I[x]I Y  I[]I N  I[]I U  I[]I A  I[]I N/A  Minimization of Symptoms Suspected/Evident: **  I[]I Y  I[x]I N  I[]I U  I[]I A  I[]I N/A  Exaggeration of Symptoms Suspected/Evident: **    III[x]III  CLINICAL RISK ASSESSMENT:     Currently meets or exceeds the threshold for psychiatric hospitalization, as the patient cannot be managed safely or successfully in a less restrictive level of care or the community, requiring hospitalization for safety and/or stabilization.    III[x]III  CLINICAL RISK DETERMINATION:     Current risk is judged to be:  I[]I Low    I[]I Moderate   I[x]I High    The following criteria were met for involuntary psychiatric admission:   I[]I None    I[x]I Dangerous to Self    I[]I Dangerous to Others    I[x]I Gravely Disabled        EXAMINATION:     VITALS:     /74   Pulse 109   Temp 98.7 °F (37.1 °C) (Oral)   Resp (!) 21   Wt 98 kg (216 lb)   SpO2 100%   BMI 35.94 kg/m²      MENTAL STATUS EXAMINATION:     General Appearance: appears in pain, lying on hospital bed  Behavior: calm and cooperative  Involuntary Movements and Motor Activity: no abnormal movements noted  Gait and Station: unable to assess - patient lying down  Speech: conversational, spontaneous  Language: fluent english  Mood: "depressed"  Affect: constricted  Thought Process: ruminative  Associations: intact, no loosening of associations  Thought Content and Perceptions: he denies suicidal ideation but family reports concerns.  No homicidal ideation/auditory hallucinations/visual hallucinations/paranoid ideation   Sensorium and Orientation: alert with clear sensorium  Recent and Remote Memory: grossly intact  Attention and " Concentration: not distractible  Fund of Knowledge: intact  Insight: impaired  Judgment: impaired       ASSESSMENT:     A diagnostic psychiatric evaluation was performed and responsiveness to treatment was assessed.  The patient demonstrates adequate ability/capacity to respond to treatment.    III[x]III  INITIAL DIAGNOSES AND PROBLEMS:             .  Major Depressive Disorder, recurrent severe  Alcohol Use Disorder      PLAN:     IMPRESSION AND RECOMMENDATIONS:     MANAGEMENT PLAN, TREATMENT GOALS, THERAPEUTIC TECHNIQUES/APPROACHES & CLINICAL REASONING:     Patient brought in by EMS after reporting depression and suicidal ideation to family.  He minimizes the latter but family quite concerned and report symptoms consistent with grave disability in addition to concern that he is minimizing suicidal ideation.  Given this, would recommend admit to inpt psych hosp for safety/stabilization.  Would recommend initiation of antidepressant - such as sertraline or escitalopram.  Would monitor for alcohol withdrawal though low risk unless he is minimizing this as well.  Disposition:    Once medically cleared;   - Patient meets criteria for involuntary psychiatric hospitalization - seek admission for safety/stabilization of acute psychiatric condition.     - Continue PEC.  - Provide 1:1 sitter.  - Notify emergency contacts, as well as other interested parties (i.e. family, friends, caregivers) as requested by the patient.    III[x]III  PRESCRIPTION DRUG MANAGEMENT:     Prescription Drug Management entails the review, recommendation, or consideration without recommendation of medications, and as such was employed during the encounter.    Discussed, to the extent possible, diagnosis, risks and benefits of proposed treatment vs alternative treatments vs no treatment, potential side effects of these treatments and the inherent unpredictability of treatment. The patient's ability to understand, participate and engage in a  conversation surrounding this was deemed to be: present.    Written material has additionally been provided, via the AVS or other pre-printed handouts.         - The consulting clinician was informed of the encounter documentation.  - The case was discussed and care was coordinated with the consulting clinician, including clinical impression, assessment, and treatment recommendations.      MEDICAL DECISION MAKING:     Shared medical decision making and informed consent are the hallmark and bedrock of good clinical care, and as such have been employed and obtained, respectively, to the degree possible.  Written material has been provided to supplement, augment, and reinforce any discussions and interventions, via the AVS or other pre-printed handouts.    Additional psychoeducation has been provided, as warranted.      LEVEL OF MEDICAL DECISION MAKING AND TIME:     Complexity (level) of medical decision making employed in the encounter: HIGH    Time with Patient: 25 minutes  Total Time of Evaluation: see above    Andrea Keller MD  Department of Psychiatry  [facility]      DATA:     DIAGNOSTIC TESTING:     The chart was reviewed for recent diagnostic procedures and investigations, and pertinent results are noted below.      PERTINENT LABORATORY RESULTS:     Blood Counts, Electrolytes & Glucose: (i.e. WBC, ANC, Hemoglobin, Hematocrit, MCV, Platelets)  Lab Results   Component Value Date    WBC 14.15 (H) 11/19/2022    GRAN 10.9 (H) 11/19/2022    GRAN 76.8 (H) 11/19/2022    HGB 13.1 (L) 11/19/2022    HCT 37.0 (L) 11/19/2022    MCV 89 11/19/2022     11/19/2022     11/19/2022    K 4.5 11/19/2022    CALCIUM 9.1 11/19/2022    PHOS 3.2 10/05/2022    MG 1.7 11/19/2022    CO2 19 (L) 11/19/2022    ANIONGAP 17 (H) 11/19/2022     (HH) 11/19/2022    HGBA1C 9.4 (H) 10/03/2022       Renal, Liver, Pancreas, Thyroid, Parathyroid, Prolactin, CPK, Lipids & Vitamin Levels: (i.e. Cr, BUN, Anion Gap, GFR, Urine  Specific Gravity, Urine Protein, Microalburnin, AST, ALT, GGT, Alk Phos,Total Bili, Total Protein, Albumin, Ammonia, INR, Amylase, Lipase, TSH, Total T3, Total T4, Free T4 PTH, Prolactin, CPK, Cholesterol, Triglycerides, LDH, HDL, Vitamin B12, Folate, Vitamin D)  Lab Results   Component Value Date    CREATININE 1.7 (H) 11/19/2022    BUN 12 11/19/2022    EGFRNORACEVR 54 (A) 11/19/2022    SPECGRAV 1.020 11/19/2022    PROTEINUA 2+ (A) 11/19/2022    AST 27 11/19/2022    ALT 32 11/19/2022    ALKPHOS 297 (H) 11/19/2022    BILITOT 0.5 11/19/2022    LABPROT 11.5 09/01/2022    ALBUMIN 3.1 (L) 11/19/2022    INR 1.1 09/01/2022    LIPASE 36 11/19/2022    TSH 0.758 11/19/2022    CPK 48 10/02/2022    CHOL 114 (L) 01/04/2022    TRIG 187 (H) 01/04/2022    LDLCALC 60.6 (L) 01/04/2022    HDL 16 (L) 01/04/2022    [id number] 806 10/02/2022    FOLATE 8.6 10/02/2022       Infection Diseases, Pregnancy Screenings & Drug Levels: (i.e. Hepatitis Panel, HIV, Syphilis, Urine & Blood Pregnancy Screens, beta hCG, Lithium, Valproic Acid, Carbamazepine, Lamotrigine, Phenytoin, Phenobarbital, Clozapine, Norclozapine, Clozapine + Norclozapine)   Lab Results   Component Value Date    HEPAIGM Negative 08/04/2020    HEPBIGM Negative 08/04/2020    HEPBCAB Negative 01/08/2022    HEPCAB Negative 05/26/2022    EII38TOSY Negative 05/26/2022    RPR Reactive (A) 03/12/2022       Addiction: (i.e. Urine Toxicology, Blood Alcohol, PETH, EtG, EtS, CDT, Buprenorphine, Norbuprenorphine)  Lab Results   Component Value Date    PCDSOBENZOD Negative 11/19/2022    BARBITURATES Negative 11/19/2022    PCDSCOMETHA Negative 11/19/2022    OPIATESCREEN Negative 11/19/2022    COCAINEMETAB Negative 11/19/2022    AMPHETAMINES Negative 11/19/2022    MARIJUANATHC Negative 11/19/2022    PCDSOPHENCYN Negative 11/19/2022    [id number] 01/04/2022    ALCOHOLMEDIC <10 11/19/2022       CARDIAC:     Results for orders placed or performed during the hospital encounter of 10/02/22   EKG  12-lead    Collection Time: 10/02/22  5:10 PM    Narrative    Test Reason : R06.02,    Vent. Rate : 082 BPM     Atrial Rate : 082 BPM     P-R Int : 156 ms          QRS Dur : 108 ms      QT Int : 422 ms       P-R-T Axes : 034 047 -89 degrees     QTc Int : 493 ms    Normal sinus rhythm  Cannot rule out Inferior infarct ,age undetermined  Abnormal ECG  When compared with ECG of 01-SEP-2022 10:58,  Significant changes have occurred  Confirmed by Tony Meyers MD ([id number]) on 10/3/2022 5:53:54 PM    Referred By: OUSMANE   SELF           Confirmed By:Tony Meyers MD       NEUROLOGIC:     Results for orders placed or performed during the hospital encounter of 11/19/22   CT Head Without Contrast    Narrative    EXAMINATION:  CT HEAD WITHOUT CONTRAST    CLINICAL HISTORY:  Headache, new or worsening, neuro deficit (Age 19-49y);    TECHNIQUE:  Low dose axial CT images obtained throughout the head without intravenous contrast. Sagittal and coronal reconstructions were performed.    COMPARISON:  None available.    FINDINGS:  Ventricles and sulci are normal in size for age without evidence of hydrocephalus. No extra-axial blood or fluid collections.  The brain parenchyma is normal. No parenchymal mass, hemorrhage, edema or major vascular distribution infarct.    No calvarial fracture.  The scalp is unremarkable.  Bilateral paranasal sinuses and mastoid air cells are clear.      Impression    No acute intracranial abnormality.      Electronically signed by: Jd Lewis  Date:    11/19/2022  Time:    19:36

## 2022-11-20 NOTE — ED PROVIDER NOTES
[facility] of Care Note:    I assumed care of this patient at shift change from Dr. Bustos pending glycemic control. Briefly, this is a 32 year old male with history of poorly controlled diabetes, recent hospitalizations for cellulitis and scrotal abscess who presented today with suicidal ideation.  Patient reports over the past 2 months he has had some leg weakness that has prevented him from doing his normal daily activities.  States when he tries to get up and walk around he feels lightheaded and causes his legs do feel weak.  Patient does have a history of alcohol use, states he last drank alcohol yesterday.  He denies feeling tremulous, denies previous history of alcohol withdrawal.  He has been depressed over this and endorse suicidal ideation to his sister. Work up was initiated with pec, IV fluids, insulin. Labs and imaging significant for leukocytosis and hyperglycemia. On reassessment, patient resting in bed, wakes to voice.  Heart rate in the low 100s, blood pressure 134/85.  On my assessment, sensation to light touch is intact in his lower extremities.  He has 5/5 plantar and dorsiflexion bilaterally.  Right scrotum has some erythema and tenderness but no induration and he reports this has been improving.  Patient getting additional insulin for glycemic control, once blood glucose adequately controlled will medically clear for psychiatric evaluation.    Elle Mathew MD   10:58 PM    Update:  Patient with improved glycemic control, no evidence of DKA, troponin negative, chest x-ray negative for focal finding.  CT shows remote lumbar fractures.  At this time, the patient is medically cleared for psychiatric evaluation.    Elle Mathew MD   11:49 PM       Elle Mathew MD  11/19/22 7460    Patient is seen by Dr. Cordova who agrees with PEC status.  RN came to me asking for pec, I was under the impression this was filled out by the previous physician.  I will fill out a pec form based on my  assessment of the patient.    Elle Mathew MD   1:14 AM         Elle Mathew MD  11/20/22 0114

## 2022-11-20 NOTE — ED PROVIDER NOTES
Encounter Date: 11/19/2022       History     Chief Complaint   Patient presents with    Suicidal     Denies plan, attempt. CBG is in the 400s. Pt has not had any insulin in 4 days. Pt also says he fell on his chest yesterday due to fatigue     33yo male with history of diabetes presents to the emergency department via EMS for evaluation of suicidal ideation after expressing some suicidal ideas to his sister who called EMS.  Patient reports feeling hopeless because his legs have been weak and he has been unable to perform his usual activities since a 3 week stay in the hospital last month.  He denies having a plan or attempting to harm himself today.  Denies homicidal ideation or hallucinations.  Patient reports having an episode of falling yesterday due to his legs being weak.  Patient reports he hit his chest and has had chest pain since.  Denies fever, headache, nausea, vomiting, diarrhea, dysuria. denies back pain, incontinence, shortness of breath or palpitations    Review of patient's allergies indicates:   Allergen Reactions    Metformin Diarrhea     Past Medical History:   Diagnosis Date    Abscess of right groin 09/01/2022    Diabetes mellitus     Encounter for blood transfusion     Loud snoring     Obese     Other insomnia 08/03/2020    Perineal abscess 08/01/2014    Primary hypertension 10/2/2022     Past Surgical History:   Procedure Laterality Date    ABCESS DRAINAGE  2014    PERIRECTAL    DECOMPRESSION OF LUMBAR SPINE USING MINIMALLY INVASIVE TECHNIQUE Right 07/06/2018    Procedure: DECOMPRESSION, SPINE, LUMBAR, MINIMALLY INVASIVE L3-4;  Surgeon: Cristian Cervantes MD;  Location: [facility] OR [address] [place] [address];  Service: Neurosurgery;  Laterality: Right;    INCISION AND DRAINAGE N/A 9/9/2022    Procedure: INCISION AND DRAINAGE GROIN;  Surgeon: KAITY Aguilar MD;  Location: [facility] OR;  Service: Urology;  Laterality: N/A;    ORTHOPEDIC SURGERY       Family History   Problem Relation Age of Onset    Diabetes Father      "Diabetes Paternal Grandmother     Diabetes Paternal Grandfather      Social History     Tobacco Use    Smoking status: Former     Packs/day: 0.50     Years: 9.00     Pack years: 4.50     Types: Cigarettes     Quit date: 2013     Years since quittin.3    Smokeless tobacco: Former   Substance Use Topics    Alcohol use: Not Currently     Comment: DAILY PINT OF LIQUOR, HX OF 1 "TALL BOY" OF BEER DAILY    Drug use: Not Currently     Review of Systems   Constitutional:  Positive for fatigue. Negative for fever.   HENT:  Negative for facial swelling and sore throat.    Eyes:  Negative for pain and discharge.   Respiratory:  Negative for choking and shortness of breath.    Cardiovascular:  Negative for chest pain.   Gastrointestinal:  Positive for diarrhea (resolved). Negative for abdominal pain, nausea and vomiting.   Genitourinary:  Negative for dysuria and frequency.   Musculoskeletal:  Positive for myalgias. Negative for back pain.   Skin:  Negative for rash.   Neurological:  Positive for weakness (bilateral legs). Negative for numbness.     Physical Exam     Initial Vitals [22 1837]   BP Pulse Resp Temp SpO2   130/82 (!) 117 18 98.7 °F (37.1 °C) 98 %      MAP       --         Physical Exam    Nursing note and vitals reviewed.  Constitutional: He is not diaphoretic. No distress.   HENT:   Head: Normocephalic and atraumatic.   Protecting airway   Eyes: Conjunctivae and EOM are normal. No scleral icterus.   Neck: Neck supple. No tracheal deviation present.   Normal range of motion.  Cardiovascular:  Regular rhythm and intact distal pulses.           Tachycardic   Pulmonary/Chest: No stridor. No respiratory distress. He has no wheezes. He has no rhonchi. He has no rales.   Speaking in full sentences   Abdominal: Abdomen is soft. He exhibits no distension. There is no abdominal tenderness.   Musculoskeletal:         General: No tenderness or edema.      Cervical back: Normal range of motion and neck supple.    "   Comments: No midline vertebral tenderness     Neurological: He is alert. No cranial nerve deficit or sensory deficit. GCS eye subscore is 4. GCS verbal subscore is 5. GCS motor subscore is 6.   Symmetrical decreased strength in bilateral lower extremities   Skin: Skin is warm and dry.   Psychiatric:   Tearful  depressed mood       ED Course   Procedures  Labs Reviewed   CBC W/ AUTO DIFFERENTIAL - Abnormal; Notable for the following components:       Result Value    WBC 14.15 (*)     RBC 4.14 (*)     Hemoglobin 13.1 (*)     Hematocrit 37.0 (*)     MCH 31.6 (*)     Gran # (ANC) 10.9 (*)     Gran % 76.8 (*)     Lymph % 16.7 (*)     All other components within normal limits   COMPREHENSIVE METABOLIC PANEL - Abnormal; Notable for the following components:    CO2 19 (*)     Glucose 492 (*)     Creatinine 1.7 (*)     Albumin 3.1 (*)     Alkaline Phosphatase 297 (*)     Anion Gap 17 (*)     eGFR 54 (*)     All other components within normal limits    Narrative:     Glu. Critical result called and verbal readback obtained from ANNA Gaxiola @ 2046 on 19Nov2022. BML by Harsha 11/19/2022 20:47   URINALYSIS, REFLEX TO URINE CULTURE - Abnormal; Notable for the following components:    Appearance, UA Hazy (*)     Protein, UA 2+ (*)     Glucose, UA 4+ (*)     Occult Blood UA Trace (*)     Leukocytes, UA 2+ (*)     All other components within normal limits    Narrative:     Specimen Source->Urine   URINALYSIS MICROSCOPIC - Abnormal; Notable for the following components:    RBC, UA 8 (*)     WBC, UA 10 (*)     Hyaline Casts, UA 4 (*)     All other components within normal limits    Narrative:     Specimen Source->Urine   POCT GLUCOSE - Abnormal; Notable for the following components:    POCT Glucose 438 (*)     All other components within normal limits   ISTAT PROCEDURE - Abnormal; Notable for the following components:    POC PO2 65 (*)     POC SATURATED O2 93 (*)     All other components within normal limits   TSH   DRUG SCREEN PANEL,  URINE EMERGENCY    Narrative:     Specimen Source->Urine   ALCOHOL,MEDICAL (ETHANOL)   BETA - HYDROXYBUTYRATE, SERUM   TROPONIN I   B-TYPE NATRIURETIC PEPTIDE   MAGNESIUM   LIPASE   ACETAMINOPHEN LEVEL     EKG Readings: (Independently Interpreted)   Initial Reading: No STEMI. Rhythm: Sinus Tachycardia. Heart Rate: 115. Ectopy: No Ectopy. Conduction: Normal. ST Segments: Normal ST Segments.   Nonspecific T-wave abnormality     Imaging Results              X-Ray Chest AP Portable (Final result)  Result time 11/19/22 19:47:41      Final result by hRett Mendenhall MD (11/19/22 19:47:41)                   Impression:      Hypoinflated lungs.  No acute cardiopulmonary process identified.      Electronically signed by: Rhett Mendenhall MD  Date:    11/19/2022  Time:    19:47               Narrative:    EXAMINATION:  XR CHEST AP PORTABLE    CLINICAL HISTORY:  hyperglycemia;    TECHNIQUE:  Single frontal view of the chest was performed.    COMPARISON:  10/02/2022.    FINDINGS:  Cardiac silhouette is normal in size.  Lungs are hypoinflated with elevation of the right hemidiaphragm.  No evidence of new focal consolidative process, pneumothorax, or significant pleural effusion.  No acute osseous abnormality identified.                                       CT Lumbar Spine Without Contrast (Final result)  Result time 11/19/22 19:52:59      Final result by Rhett Mendenhall MD (11/19/22 19:52:59)                   Impression:      No acute lumbar spine abnormalities identified.  Incompletely healed fractures of the right L2 and L3 transverse processes.      Electronically signed by: Rhett Mendenhall MD  Date:    11/19/2022  Time:    19:52               Narrative:    EXAMINATION:  CT LUMBAR SPINE WITHOUT CONTRAST    CLINICAL HISTORY:  Low back pain, progressive neurologic deficit;    TECHNIQUE:  Low-dose axial, sagittal and coronal reformations are obtained through the lumbar spine.  Contrast was not administered.    COMPARISON:  CT  and MRI lumbar spine from 06/01/2022.    FINDINGS:  There is straightening of the normal lumbar lordosis.  No evidence of acute lumbar spine fracture or subluxation.  Vertebral body heights are well maintained.  Incompletely healed fractures are seen of the right L2 and L3 transverse processes.  Disc bulges are visualized at the L3-4 and L4-5 levels with moderate spinal canal stenosis, similar to previous MRI.  Surrounding soft tissues show no significant abnormalities.                                       CT Head Without Contrast (Final result)  Result time 11/19/22 19:36:39      Final result by Jd Lewis DO (11/19/22 19:36:39)                   Impression:      No acute intracranial abnormality.      Electronically signed by: Jd Lewis  Date:    11/19/2022  Time:    19:36               Narrative:    EXAMINATION:  CT HEAD WITHOUT CONTRAST    CLINICAL HISTORY:  Headache, new or worsening, neuro deficit (Age 19-49y);    TECHNIQUE:  Low dose axial CT images obtained throughout the head without intravenous contrast. Sagittal and coronal reconstructions were performed.    COMPARISON:  None available.    FINDINGS:  Ventricles and sulci are normal in size for age without evidence of hydrocephalus. No extra-axial blood or fluid collections.  The brain parenchyma is normal. No parenchymal mass, hemorrhage, edema or major vascular distribution infarct.    No calvarial fracture.  The scalp is unremarkable.  Bilateral paranasal sinuses and mastoid air cells are clear.                                       Medications   sodium chloride 0.9% bolus 1,000 mL (1,000 mLs Intravenous New Bag 11/19/22 1947)   oxyCODONE-acetaminophen 5-325 mg per tablet 1 tablet (1 tablet Oral Given 11/19/22 2021)   ondansetron injection 4 mg (4 mg Intravenous Given 11/19/22 2059)   insulin regular injection 6 Units 0.06 mL (6 Units Intravenous Given 11/19/22 2100)   0.9%  NaCl infusion (1,000 mLs Intravenous New Bag 11/19/22 2153)      Medical Decision Making:   History:   Old Medical Records: I decided to obtain old medical records.  Differential Diagnosis:   Includes but not limited to suicidal ideation, depression, intoxication, medication noncompliance, diet noncompliance, ACS, CVA, electrolyte abnormality  Independently Interpreted Test(s):   I have ordered and independently interpreted EKG Reading(s) - see prior notes  Clinical Tests:   Lab Tests: Ordered and Reviewed  Radiological Study: Ordered and Reviewed  Medical Tests: Ordered and Reviewed  ED Management:  Patient has a GCS of 15 and no obvious focal neurological deficits.  He expresses suicidal ideation without a plan.  He is tearful.  He denies homicidal ideation or hallucinations.  He is significantly hyperglycemic.  He does not have acidosis or ketosis to suggest DKA.  Patient has mild leukocytosis.  Urinalysis does not suggest UTI.  Chest x-ray does not suggest pneumonia.  BNP, troponin within normal ranges.  CT brain without acute intracranial abnormality.  CT of the lumbar spine without acute traumatic injury or cord compression.  Patient clinically does not have cauda equina syndrome.  Patient given IV hydration and insulin.  He remains clinically stable.  He is pending further IV hydration and glycemic control prior to clearance for transfer to psychiatric facility.  Patient care transferred to Dr. Mathew who will monitor patient for glycemic control and clear patient for transfer for further psychiatric evaluation  This chart was completed using dictation software, as a result there may be some transcription errors.                           Clinical Impression:   Final diagnoses:  [Z00.8] Medical clearance for psychiatric admission  [R45.851] Suicidal ideation (Primary)  [F32.A, R45.851] Depression with suicidal ideation  [R73.9] Hyperglycemia               Libra Bustos MD  11/19/22 1370

## 2022-12-07 PROBLEM — E86.0 DEHYDRATION: Status: ACTIVE | Noted: 2022-01-01

## 2022-12-07 PROBLEM — R42 ORTHOSTATIC DIZZINESS: Status: ACTIVE | Noted: 2022-01-01

## 2022-12-07 PROBLEM — R73.9 HYPERGLYCEMIA: Status: ACTIVE | Noted: 2022-01-01

## 2022-12-07 NOTE — FIRST PROVIDER EVALUATION
"Medical screening examination initiated.  I have conducted a focused provider triage encounter, findings are as follows:    Brief history of present illness:  sent by [facility] for fall 2/2 lightheadedness    Was hypotensive and hyperglycemic 433 prior to arrival.  Did not take his diabetes medication today.  Is not taking antihypertensives.  He reports he has been having lightheadedness intermittently since recent discharge from this facility in September after incision drainage of a scrotal abscess.  He denies any persisting fevers.  He is not on antibiotics.  Reports he has been having diarrhea for the past 2 weeks.  He is had associated intermittent abdominal pain.    Vitals:    12/07/22 1636   BP: (!) 102/57   BP Location: Right arm   Patient Position: Sitting   Pulse: 98   Resp: 18   Temp: 98.6 °F (37 °C)   TempSrc: Oral   SpO2: 99%   Weight: 92.1 kg (203 lb)   Height: 5' 6" (1.676 m)       Pertinent physical exam:  awake alert answering questions without difficulty    Brief workup plan:  labs   Preliminary workup initiated; this workup will be continued and followed by the physician or advanced practice provider that is assigned to the patient when roomed.  "

## 2022-12-07 NOTE — ED PROVIDER NOTES
Encounter Date: 12/7/2022    SCRIBE #1 NOTE: I, Savana Montes De Oca, am scribing for, and in the presence of,  Ignacio Baldwin MD. I have scribed the following portions of the note - Other sections scribed: HPI, ROS, PE.   SCRIBE #2 NOTE: I, Hannah Cody, am scribing for, and in the presence of,  Ignacio Baldwin MD. I have scribed the following portions of the note - Other sections scribed: HPI, ROS, PE.   History     Chief Complaint   Patient presents with    Dizziness    falling     The patient reports dizziness and falling x 3 times since last night. Patient states that he went to [facility] and was hypotensive and hyperglycemic with a blood glucose of 433.Patient states that during one of the fall, he hit his parietal head on a door. Denies loc.      Doe Woodall is a 32 y.o. male, with a past medical history of DM, HTN, and Chronic Diastolic Heart Failure, who presents to the ED with multiple falls onset about one hour ago. Patient had 3 falls while at home. He states that the first fall happened while walking to the bathroom from his bed, second fall happened in the bathroom, and third fall happened while he was in the shower. He endorses hitting his head during one of these falls. Patient notes associated symptoms of dizziness and lightheadedness immediately prior to his falls. No exacerbating or alleviating factors. Patient endorses drinking 4 bottles of water today. He reports being seen at [facility] prior to ED arrival and notes that he was hypotensive with a CBG of 433 at that time. Per patient, he has intermittently had symptoms of dizziness and lightheadedness since being discharged from this hospital 3 months ago following a 15 day admission for cellulitis and sepsis. Patient denies any weakness, speech difficulties, or other associated symptoms.     The history is provided by the patient and medical records. No  was used.   Review of  "patient's allergies indicates:   Allergen Reactions    Metformin Diarrhea     Past Medical History:   Diagnosis Date    Abscess of right groin 2022    Diabetes mellitus     Encounter for blood transfusion     Loud snoring     Obese     Other insomnia 2020    Perineal abscess 2014    Primary hypertension 10/2/2022     Past Surgical History:   Procedure Laterality Date    ABCESS DRAINAGE  2014    PERIRECTAL    DECOMPRESSION OF LUMBAR SPINE USING MINIMALLY INVASIVE TECHNIQUE Right 2018    Procedure: DECOMPRESSION, SPINE, LUMBAR, MINIMALLY INVASIVE L3-4;  Surgeon: Cristian Cervantes MD;  Location: [facility] OR [address] [place] [address];  Service: Neurosurgery;  Laterality: Right;    INCISION AND DRAINAGE N/A 2022    Procedure: INCISION AND DRAINAGE GROIN;  Surgeon: KAITY Aguilar MD;  Location: [facility] OR;  Service: Urology;  Laterality: N/A;    ORTHOPEDIC SURGERY       Family History   Problem Relation Age of Onset    Diabetes Father     Diabetes Paternal Grandmother     Diabetes Paternal Grandfather      Social History     Tobacco Use    Smoking status: Former     Packs/day: 0.50     Years: 9.00     Pack years: 4.50     Types: Cigarettes     Quit date: 2013     Years since quittin.4    Smokeless tobacco: Former   Substance Use Topics    Alcohol use: Not Currently     Comment: DAILY PINT OF LIQUOR, HX OF 1 "TALL BOY" OF BEER DAILY    Drug use: Not Currently     Types: Cocaine     Comment: per collateral     Review of Systems   Constitutional:  Positive for activity change (fall x3). Negative for chills and fever.   HENT:  Negative for congestion, rhinorrhea and sore throat.    Eyes:  Negative for visual disturbance.   Respiratory:  Negative for cough and shortness of breath.    Cardiovascular:  Negative for chest pain.   Gastrointestinal:  Negative for abdominal pain, diarrhea, nausea and vomiting.   Genitourinary:  Negative for dysuria, frequency and hematuria.   Musculoskeletal:  Negative for back pain. "   Skin:  Negative for rash.   Neurological:  Positive for dizziness and light-headedness. Negative for speech difficulty, weakness and headaches.     Physical Exam     Initial Vitals [12/07/22 1636]   BP Pulse Resp Temp SpO2   (!) 102/57 98 18 98.6 °F (37 °C) 99 %      MAP       --         Physical Exam    Nursing note and vitals reviewed.  Constitutional: He appears well-developed and well-nourished.   HENT:   Head: Normocephalic and atraumatic.   Eyes: Conjunctivae are normal.   Neck: Neck supple.   Normal range of motion.  Cardiovascular:  Normal rate, regular rhythm and normal heart sounds.     Exam reveals no gallop and no friction rub.       No murmur heard.  Pulmonary/Chest: Breath sounds normal. No respiratory distress. He has no wheezes. He has no rhonchi. He has no rales.   Abdominal: Abdomen is soft. There is no abdominal tenderness.   Musculoskeletal:         General: No edema. Normal range of motion.      Cervical back: Normal range of motion and neck supple.     Neurological: He is alert and oriented to person, place, and time. GCS score is 15. GCS eye subscore is 4. GCS verbal subscore is 5. GCS motor subscore is 6.   Skin: Skin is warm and dry.   Psychiatric: He has a normal mood and affect.       ED Course   Procedures  Labs Reviewed   CBC W/ AUTO DIFFERENTIAL - Abnormal; Notable for the following components:       Result Value    RBC 3.92 (*)     Hemoglobin 12.5 (*)     Hematocrit 34.2 (*)     MCH 31.9 (*)     MCHC 36.5 (*)     MPV 8.9 (*)     Immature Grans (Abs) 0.05 (*)     All other components within normal limits   COMPREHENSIVE METABOLIC PANEL - Abnormal; Notable for the following components:    Sodium 131 (*)     Potassium 3.0 (*)     Glucose 358 (*)     Albumin 2.8 (*)     Alkaline Phosphatase 328 (*)     All other components within normal limits   POCT GLUCOSE - Abnormal; Notable for the following components:    POCT Glucose 393 (*)     All other components within normal limits   POCT  GLUCOSE - Abnormal; Notable for the following components:    POCT Glucose 371 (*)     All other components within normal limits   ISTAT PROCEDURE - Abnormal; Notable for the following components:    POC PH 7.455 (*)     POC HCO3 29.0 (*)     POC SATURATED O2 79 (*)     POC TCO2 30 (*)     All other components within normal limits   POCT GLUCOSE - Abnormal; Notable for the following components:    POCT Glucose 270 (*)     All other components within normal limits   BETA - HYDROXYBUTYRATE, SERUM   URINALYSIS, REFLEX TO URINE CULTURE   POCT GLUCOSE, HAND-HELD DEVICE   POCT GLUCOSE MONITORING CONTINUOUS   POCT GLUCOSE MONITORING CONTINUOUS          Imaging Results              X-Ray Chest AP Portable (Final result)  Result time 12/07/22 18:54:54      Final result by Michael Amin MD (12/07/22 18:54:54)                   Impression:      1. No acute cardiopulmonary process, shallow inspiratory effort.      Electronically signed by: Michael Amin MD  Date:    12/07/2022  Time:    18:54               Narrative:    EXAMINATION:  XR CHEST AP PORTABLE    CLINICAL HISTORY:  hyperglycemia;    TECHNIQUE:  Single frontal view of the chest was performed.    COMPARISON:  11/19/2022    FINDINGS:  The cardiomediastinal silhouette is not enlarged.  There is elevation of the right hemidiaphragm, stable..  There is no pleural effusion.  The trachea is midline.  The lungs are symmetrically expanded bilaterally with minimal right basilar subsegmental atelectasis..  No large focal consolidation seen.  There is no pneumothorax.  The osseous structures are unremarkable.                                       CT Head Without Contrast (Final result)  Result time 12/07/22 18:34:08      Final result by Dee Quinteros MD (12/07/22 18:34:08)                   Impression:      No acute intracranial abnormality detected.      Electronically signed by: Dee Quinteros  Date:    12/07/2022  Time:    18:34               Narrative:     EXAMINATION:  CT OF THE HEAD WITHOUT    CLINICAL HISTORY:  Dizziness and falling x3 times tonight.    TECHNIQUE:  5 mm unenhanced axial images were obtained from the skull base to the vertex.    COMPARISON:  11/19/2022    FINDINGS:  The ventricles, basal cisterns, and cortical sulci are within normal limits for patient's stated age. There is no acute intracranial hemorrhage, territorial infarct or mass effect, or midline shift. The visualized paranasal sinuses and mastoid air cells are clear.                                       Medications   sodium chloride 0.9% bolus 1,000 mL (1,000 mLs Intravenous New Bag 12/7/22 1842)   sodium chloride 0.9% bolus 2,000 mL (1,000 mLs Intravenous New Bag 12/7/22 1842)   potassium bicarbonate disintegrating tablet 40 mEq (40 mEq Oral Given 12/7/22 2004)     Medical Decision Making:   History:   Old Medical Records: I decided to obtain old medical records.  Initial Assessment:   Doe Woodall is a 32 y.o. male, with a past medical history of DM, HTN, and Chronic Diastolic Heart Failure, who presents to the ED with multiple falls onset about one hour ago. Patient had 3 falls while at home. He states that the first fall happened while walking to the bathroom from his bed, second fall happened in the bathroom, and third fall happened while he was in the shower. He endorses hitting his head during one of these falls. Patient notes associated symptoms of dizziness and lightheadedness immediately prior to his falls. No exacerbating or alleviating factors. Patient endorses drinking 4 bottles of water today. He reports being seen at [facility] prior to ED arrival and notes that he was hypotensive with a CBG of 433 at that time. Per patient, he has intermittently had symptoms of dizziness and lightheadedness since being discharged from this hospital 3 months ago following a 15 day admission for cellulitis and sepsis. Patient denies any weakness, speech  difficulties, or other associated symptoms.   Independently Interpreted Test(s):   I have ordered and independently interpreted EKG Reading(s) - see prior notes  Clinical Tests:   Lab Tests: Ordered and Reviewed  Radiological Study: Ordered and Reviewed  Medical Tests: Ordered and Reviewed  ED Management:  Course of ED stay:  1. Cardiovascular-patient has been hemodynamically stable throughout ED stay with no complaints of chest pain.  He did exhibit orthostatic hypotension during exam.  2. Pulmonary-patient has had normal O2 sats on room air with no signs and symptoms of respiratory distress.    3. Hematology/infectious disease-patient has been afebrile with no signs and symptoms of infection.    4. GI/nutrition/renal/endocrine- Accu-Chek was 371, beta hydroxybutyrate was normal and CMP was ordered.  Glucose/dizziness improved and patient states he feels back to baseline after IV fluids.  Instructions for dehydration and diabetes mellitus type 2 with hyperglycemia were given.  Patient was advised to follow-up with his primary care physician within the next 2 days for re-evaluation/return to the emergency department if condition worsens.        Scribe Attestation:   Scribe #1: I performed the above scribed service and the documentation accurately describes the services I performed. I attest to the accuracy of the note.  Scribe #2: I performed the above scribed service and the documentation accurately describes the services I performed. I attest to the accuracy of the note.                   Clinical Impression:   Final diagnoses:  [R42] Lightheaded  [R73.9] Hyperglycemia  [E86.0] Dehydration (Primary)  [R42] Orthostatic dizziness  [E87.6] Hypokalemia     This document was produced by a scribe under my direction and in my presence. I agree with the content of the note and have made any necessary edits.     Dr. Baldwin    12/07/2022 6:31 PM     ED Disposition Condition    Discharge Stable          ED Prescriptions     None       Follow-up Information       Follow up With Specialties Details Why Contact Info    [facility] Emergency Dept Emergency Medicine Call  If symptoms worsen [phone]  [facility] [address] [phone]             Ignacio Baldwin MD  12/07/22 0869

## 2022-12-12 PROBLEM — N17.0 ATN (ACUTE TUBULAR NECROSIS): Status: RESOLVED | Noted: 2022-01-01 | Resolved: 2022-01-01

## 2023-01-01 ENCOUNTER — PATIENT MESSAGE (OUTPATIENT)
Dept: RESEARCH | Facility: HOSPITAL | Age: 33
End: 2023-01-01
Payer: MEDICAID

## 2023-01-01 ENCOUNTER — HOSPITAL ENCOUNTER (OUTPATIENT)
Facility: HOSPITAL | Age: 33
Discharge: HOME | End: 2023-07-26
Attending: EMERGENCY MEDICINE | Admitting: STUDENT IN AN ORGANIZED HEALTH CARE EDUCATION/TRAINING PROGRAM
Payer: MEDICAID

## 2023-01-01 ENCOUNTER — TELEPHONE (OUTPATIENT)
Dept: EMERGENCY MEDICINE | Facility: HOSPITAL | Age: 33
End: 2023-01-01
Payer: MEDICAID

## 2023-01-01 ENCOUNTER — ANESTHESIA EVENT (OUTPATIENT)
Dept: SURGERY | Facility: HOSPITAL | Age: 33
DRG: 871 | End: 2023-01-01
Payer: MEDICAID

## 2023-01-01 ENCOUNTER — NURSE TRIAGE (OUTPATIENT)
Dept: ADMINISTRATIVE | Facility: CLINIC | Age: 33
End: 2023-01-01
Payer: MEDICAID

## 2023-01-01 ENCOUNTER — PATIENT OUTREACH (OUTPATIENT)
Dept: ADMINISTRATIVE | Facility: CLINIC | Age: 33
End: 2023-01-01
Payer: MEDICAID

## 2023-01-01 ENCOUNTER — HOSPITAL ENCOUNTER (EMERGENCY)
Facility: HOSPITAL | Age: 33
Discharge: HOME | End: 2023-04-13
Attending: STUDENT IN AN ORGANIZED HEALTH CARE EDUCATION/TRAINING PROGRAM
Payer: MEDICAID

## 2023-01-01 ENCOUNTER — HOSPITAL ENCOUNTER (INPATIENT)
Facility: HOSPITAL | Age: 33
LOS: 3 days | DRG: 871 | End: 2023-08-01
Attending: EMERGENCY MEDICINE | Admitting: HOSPITALIST
Payer: MEDICAID

## 2023-01-01 ENCOUNTER — HOSPITAL ENCOUNTER (OUTPATIENT)
Facility: HOSPITAL | Age: 33
Discharge: HOME | End: 2023-06-15
Attending: STUDENT IN AN ORGANIZED HEALTH CARE EDUCATION/TRAINING PROGRAM | Admitting: STUDENT IN AN ORGANIZED HEALTH CARE EDUCATION/TRAINING PROGRAM
Payer: MEDICAID

## 2023-01-01 ENCOUNTER — ANESTHESIA (OUTPATIENT)
Dept: SURGERY | Facility: HOSPITAL | Age: 33
DRG: 871 | End: 2023-01-01
Payer: MEDICAID

## 2023-01-01 ENCOUNTER — HOSPITAL ENCOUNTER (EMERGENCY)
Facility: HOSPITAL | Age: 33
Discharge: HOME | End: 2023-05-08
Attending: INTERNAL MEDICINE
Payer: MEDICAID

## 2023-01-01 ENCOUNTER — HOSPITAL ENCOUNTER (EMERGENCY)
Facility: HOSPITAL | Age: 33
Discharge: HOME | End: 2023-04-08
Attending: EMERGENCY MEDICINE
Payer: MEDICAID

## 2023-01-01 ENCOUNTER — HOSPITAL ENCOUNTER (INPATIENT)
Facility: HOSPITAL | Age: 33
LOS: 5 days | Discharge: HOME | DRG: 641 | End: 2023-02-08
Attending: EMERGENCY MEDICINE | Admitting: INTERNAL MEDICINE
Payer: MEDICAID

## 2023-01-01 ENCOUNTER — HOSPITAL ENCOUNTER (EMERGENCY)
Facility: HOSPITAL | Age: 33
Discharge: HOME | End: 2023-04-15
Attending: EMERGENCY MEDICINE
Payer: MEDICAID

## 2023-01-01 ENCOUNTER — HOSPITAL ENCOUNTER (INPATIENT)
Facility: HOSPITAL | Age: 33
LOS: 1 days | DRG: 951 | End: 2023-08-01
Payer: COMMERCIAL

## 2023-01-01 VITALS
BODY MASS INDEX: 36.32 KG/M2 | RESPIRATION RATE: 18 BRPM | HEART RATE: 84 BPM | SYSTOLIC BLOOD PRESSURE: 140 MMHG | TEMPERATURE: 99 F | WEIGHT: 218 LBS | HEIGHT: 65 IN | DIASTOLIC BLOOD PRESSURE: 84 MMHG | OXYGEN SATURATION: 97 %

## 2023-01-01 VITALS
RESPIRATION RATE: 18 BRPM | WEIGHT: 219.13 LBS | OXYGEN SATURATION: 95 % | DIASTOLIC BLOOD PRESSURE: 78 MMHG | BODY MASS INDEX: 35.22 KG/M2 | TEMPERATURE: 99 F | HEART RATE: 94 BPM | HEIGHT: 66 IN | SYSTOLIC BLOOD PRESSURE: 127 MMHG

## 2023-01-01 VITALS
HEART RATE: 94 BPM | DIASTOLIC BLOOD PRESSURE: 81 MMHG | TEMPERATURE: 98 F | BODY MASS INDEX: 33.82 KG/M2 | OXYGEN SATURATION: 100 % | HEIGHT: 65 IN | SYSTOLIC BLOOD PRESSURE: 122 MMHG | RESPIRATION RATE: 16 BRPM | WEIGHT: 203 LBS

## 2023-01-01 VITALS
OXYGEN SATURATION: 99 % | HEIGHT: 65 IN | DIASTOLIC BLOOD PRESSURE: 63 MMHG | WEIGHT: 220 LBS | HEART RATE: 87 BPM | SYSTOLIC BLOOD PRESSURE: 124 MMHG | RESPIRATION RATE: 18 BRPM | TEMPERATURE: 98 F | BODY MASS INDEX: 36.65 KG/M2

## 2023-01-01 VITALS
OXYGEN SATURATION: 100 % | TEMPERATURE: 98 F | SYSTOLIC BLOOD PRESSURE: 121 MMHG | HEIGHT: 65 IN | RESPIRATION RATE: 16 BRPM | DIASTOLIC BLOOD PRESSURE: 84 MMHG | WEIGHT: 220 LBS | BODY MASS INDEX: 36.65 KG/M2 | HEART RATE: 93 BPM

## 2023-01-01 VITALS
DIASTOLIC BLOOD PRESSURE: 89 MMHG | BODY MASS INDEX: 36.65 KG/M2 | HEART RATE: 88 BPM | HEIGHT: 65 IN | SYSTOLIC BLOOD PRESSURE: 139 MMHG | WEIGHT: 220 LBS | RESPIRATION RATE: 20 BRPM | TEMPERATURE: 99 F | OXYGEN SATURATION: 100 %

## 2023-01-01 VITALS
DIASTOLIC BLOOD PRESSURE: 89 MMHG | SYSTOLIC BLOOD PRESSURE: 158 MMHG | HEART RATE: 89 BPM | BODY MASS INDEX: 36.65 KG/M2 | WEIGHT: 220 LBS | TEMPERATURE: 99 F | HEIGHT: 65 IN | RESPIRATION RATE: 20 BRPM | OXYGEN SATURATION: 100 %

## 2023-01-01 VITALS
HEIGHT: 63 IN | TEMPERATURE: 93 F | RESPIRATION RATE: 9 BRPM | OXYGEN SATURATION: 100 % | DIASTOLIC BLOOD PRESSURE: 57 MMHG | WEIGHT: 228 LBS | HEART RATE: 95 BPM | SYSTOLIC BLOOD PRESSURE: 97 MMHG | BODY MASS INDEX: 40.4 KG/M2

## 2023-01-01 DIAGNOSIS — R10.84 GENERALIZED ABDOMINAL PAIN: Primary | ICD-10-CM

## 2023-01-01 DIAGNOSIS — R05.9 COUGH: ICD-10-CM

## 2023-01-01 DIAGNOSIS — N30.00 ACUTE CYSTITIS WITHOUT HEMATURIA: ICD-10-CM

## 2023-01-01 DIAGNOSIS — F33.2 SEVERE EPISODE OF RECURRENT MAJOR DEPRESSIVE DISORDER, WITHOUT PSYCHOTIC FEATURES: Chronic | ICD-10-CM

## 2023-01-01 DIAGNOSIS — S71.002S OPEN WOUND OF LEFT HIP, SEQUELA: ICD-10-CM

## 2023-01-01 DIAGNOSIS — R42 NONSPECIFIC DIZZINESS: Primary | ICD-10-CM

## 2023-01-01 DIAGNOSIS — I46.9 CARDIAC ARREST: ICD-10-CM

## 2023-01-01 DIAGNOSIS — R07.9 CHEST PAIN: ICD-10-CM

## 2023-01-01 DIAGNOSIS — R65.10 SIRS (SYSTEMIC INFLAMMATORY RESPONSE SYNDROME): ICD-10-CM

## 2023-01-01 DIAGNOSIS — R79.89 ELEVATED D-DIMER: ICD-10-CM

## 2023-01-01 DIAGNOSIS — A41.9 SEPSIS: ICD-10-CM

## 2023-01-01 DIAGNOSIS — R10.9 ABDOMINAL PAIN, UNSPECIFIED ABDOMINAL LOCATION: Primary | ICD-10-CM

## 2023-01-01 DIAGNOSIS — R10.9 ABDOMINAL PAIN: ICD-10-CM

## 2023-01-01 DIAGNOSIS — H53.8 BLURRY VISION: ICD-10-CM

## 2023-01-01 DIAGNOSIS — S81.802A WOUND OF LEFT LEG: ICD-10-CM

## 2023-01-01 DIAGNOSIS — E11.9 DIABETES MELLITUS TYPE 2, INSULIN DEPENDENT: Primary | ICD-10-CM

## 2023-01-01 DIAGNOSIS — I46.9 CARDIOPULMONARY ARREST: Primary | ICD-10-CM

## 2023-01-01 DIAGNOSIS — M25.552 LEFT HIP PAIN: ICD-10-CM

## 2023-01-01 DIAGNOSIS — R00.0 TACHYCARDIA: ICD-10-CM

## 2023-01-01 DIAGNOSIS — R42 LIGHTHEADEDNESS: ICD-10-CM

## 2023-01-01 DIAGNOSIS — Z45.2 ENCOUNTER FOR CENTRAL LINE PLACEMENT: ICD-10-CM

## 2023-01-01 DIAGNOSIS — R41.82 ALTERED MENTAL STATUS, UNSPECIFIED ALTERED MENTAL STATUS TYPE: ICD-10-CM

## 2023-01-01 DIAGNOSIS — R57.9 SHOCK: ICD-10-CM

## 2023-01-01 DIAGNOSIS — Z79.4 DIABETES MELLITUS TYPE 2, INSULIN DEPENDENT: Primary | ICD-10-CM

## 2023-01-01 DIAGNOSIS — N17.9 AKI (ACUTE KIDNEY INJURY): Primary | ICD-10-CM

## 2023-01-01 DIAGNOSIS — I95.9 HYPOTENSION: ICD-10-CM

## 2023-01-01 DIAGNOSIS — R55 SYNCOPE: Primary | ICD-10-CM

## 2023-01-01 LAB
A-TOCOPHEROL VIT E SERPL-MCNC: 582 UG/DL (ref 500–1800)
A-TOCOPHEROL VIT E SERPL-MCNC: 688 UG/DL (ref 500–1800)
A1 AG RBC QL: NORMAL
A1 AG RBC QL: NORMAL
ABO + RH BLD: NORMAL
ABO + RH BLD: NORMAL
ACE SERPL-CCNC: 5 U/L (ref 16–85)
ALBUMIN SERPL BCP-MCNC: 1.5 G/DL (ref 3.5–5.2)
ALBUMIN SERPL BCP-MCNC: 1.6 G/DL (ref 3.5–5.2)
ALBUMIN SERPL BCP-MCNC: 1.6 G/DL (ref 3.5–5.2)
ALBUMIN SERPL BCP-MCNC: 1.7 G/DL (ref 3.5–5.2)
ALBUMIN SERPL BCP-MCNC: 1.8 G/DL (ref 3.5–5.2)
ALBUMIN SERPL BCP-MCNC: 1.9 G/DL (ref 3.5–5.2)
ALBUMIN SERPL BCP-MCNC: 2.2 G/DL (ref 3.5–5.2)
ALBUMIN SERPL BCP-MCNC: 2.2 G/DL (ref 3.5–5.2)
ALBUMIN SERPL BCP-MCNC: 2.3 G/DL (ref 3.5–5.2)
ALBUMIN SERPL BCP-MCNC: 2.5 G/DL (ref 3.5–5.2)
ALBUMIN SERPL BCP-MCNC: 2.5 G/DL (ref 3.5–5.2)
ALBUMIN SERPL BCP-MCNC: 2.7 G/DL (ref 3.5–5.2)
ALBUMIN SERPL BCP-MCNC: 2.8 G/DL (ref 3.5–5.2)
ALBUMIN SERPL BCP-MCNC: 2.8 G/DL (ref 3.5–5.2)
ALBUMIN SERPL BCP-MCNC: 3 G/DL (ref 3.5–5.2)
ALLENS TEST: ABNORMAL
ALLENS TEST: NORMAL
ALP SERPL-CCNC: 137 U/L (ref 55–135)
ALP SERPL-CCNC: 142 U/L (ref 55–135)
ALP SERPL-CCNC: 158 U/L (ref 55–135)
ALP SERPL-CCNC: 163 U/L (ref 55–135)
ALP SERPL-CCNC: 201 U/L (ref 55–135)
ALP SERPL-CCNC: 208 U/L (ref 55–135)
ALP SERPL-CCNC: 219 U/L (ref 55–135)
ALP SERPL-CCNC: 230 U/L (ref 55–135)
ALP SERPL-CCNC: 237 U/L (ref 55–135)
ALP SERPL-CCNC: 241 U/L (ref 55–135)
ALP SERPL-CCNC: 246 U/L (ref 55–135)
ALP SERPL-CCNC: 266 U/L (ref 55–135)
ALP SERPL-CCNC: 266 U/L (ref 55–135)
ALP SERPL-CCNC: 280 U/L (ref 55–135)
ALP SERPL-CCNC: 284 U/L (ref 55–135)
ALP SERPL-CCNC: 295 U/L (ref 55–135)
ALP SERPL-CCNC: 311 U/L (ref 55–135)
ALP SERPL-CCNC: 350 U/L (ref 55–135)
ALP SERPL-CCNC: 354 U/L (ref 55–135)
ALP SERPL-CCNC: 516 U/L (ref 55–135)
ALT SERPL W/O P-5'-P-CCNC: 12 U/L (ref 10–44)
ALT SERPL W/O P-5'-P-CCNC: 15 U/L (ref 10–44)
ALT SERPL W/O P-5'-P-CCNC: 18 U/L (ref 10–44)
ALT SERPL W/O P-5'-P-CCNC: 23 U/L (ref 10–44)
ALT SERPL W/O P-5'-P-CCNC: 25 U/L (ref 10–44)
ALT SERPL W/O P-5'-P-CCNC: 25 U/L (ref 10–44)
ALT SERPL W/O P-5'-P-CCNC: 26 U/L (ref 10–44)
ALT SERPL W/O P-5'-P-CCNC: 29 U/L (ref 10–44)
ALT SERPL W/O P-5'-P-CCNC: 31 U/L (ref 10–44)
ALT SERPL W/O P-5'-P-CCNC: 338 U/L (ref 10–44)
ALT SERPL W/O P-5'-P-CCNC: 40 U/L (ref 10–44)
ALT SERPL W/O P-5'-P-CCNC: 440 U/L (ref 10–44)
ALT SERPL W/O P-5'-P-CCNC: 447 U/L (ref 10–44)
ALT SERPL W/O P-5'-P-CCNC: 46 U/L (ref 10–44)
ALT SERPL W/O P-5'-P-CCNC: 47 U/L (ref 10–44)
ALT SERPL W/O P-5'-P-CCNC: 495 U/L (ref 10–44)
ALT SERPL W/O P-5'-P-CCNC: 51 U/L (ref 10–44)
ALT SERPL W/O P-5'-P-CCNC: 55 U/L (ref 10–44)
ALT SERPL W/O P-5'-P-CCNC: 588 U/L (ref 10–44)
ALT SERPL W/O P-5'-P-CCNC: 94 U/L (ref 10–44)
AMMONIA PLAS-SCNC: 35 UMOL/L (ref 10–50)
AMMONIA PLAS-SCNC: 43 UMOL/L (ref 10–50)
AMMONIA PLAS-SCNC: 59 UMOL/L (ref 10–50)
AMPHET+METHAMPHET UR QL: NEGATIVE
AMYLASE SERPL-CCNC: 169 U/L (ref 20–110)
AMYLASE SERPL-CCNC: 199 U/L (ref 20–110)
AMYLASE SERPL-CCNC: 277 U/L (ref 20–110)
ANION GAP SERPL CALC-SCNC: 10 MMOL/L (ref 8–16)
ANION GAP SERPL CALC-SCNC: 11 MMOL/L (ref 8–16)
ANION GAP SERPL CALC-SCNC: 12 MMOL/L (ref 8–16)
ANION GAP SERPL CALC-SCNC: 14 MMOL/L (ref 8–16)
ANION GAP SERPL CALC-SCNC: 15 MMOL/L (ref 8–16)
ANION GAP SERPL CALC-SCNC: 17 MMOL/L (ref 8–16)
ANION GAP SERPL CALC-SCNC: 18 MMOL/L (ref 8–16)
ANION GAP SERPL CALC-SCNC: 19 MMOL/L (ref 8–16)
ANION GAP SERPL CALC-SCNC: 19 MMOL/L (ref 8–16)
ANION GAP SERPL CALC-SCNC: 5 MMOL/L (ref 8–16)
ANION GAP SERPL CALC-SCNC: 6 MMOL/L (ref 8–16)
ANION GAP SERPL CALC-SCNC: 6 MMOL/L (ref 8–16)
ANION GAP SERPL CALC-SCNC: 7 MMOL/L (ref 8–16)
ANION GAP SERPL CALC-SCNC: 8 MMOL/L (ref 8–16)
ANION GAP SERPL CALC-SCNC: 9 MMOL/L (ref 8–16)
AP3B2 IFA: NEGATIVE
AP3B2 IFA: NEGATIVE
APAP SERPL-MCNC: <3 UG/ML (ref 10–20)
APTT BLDCRRT: 32.5 SEC (ref 21–32)
APTT PPP: 26.6 SEC (ref 21–32)
APTT PPP: 27.6 SEC (ref 21–32)
APTT PPP: 28.4 SEC (ref 21–32)
APTT PPP: 29.8 SEC (ref 21–32)
ASCENDING AORTA: 2.75 CM
AST SERPL-CCNC: 203 U/L (ref 10–40)
AST SERPL-CCNC: 210 U/L (ref 10–40)
AST SERPL-CCNC: 251 U/L (ref 10–40)
AST SERPL-CCNC: 26 U/L (ref 10–40)
AST SERPL-CCNC: 26 U/L (ref 10–40)
AST SERPL-CCNC: 28 U/L (ref 10–40)
AST SERPL-CCNC: 29 U/L (ref 10–40)
AST SERPL-CCNC: 31 U/L (ref 10–40)
AST SERPL-CCNC: 33 U/L (ref 10–40)
AST SERPL-CCNC: 33 U/L (ref 10–40)
AST SERPL-CCNC: 35 U/L (ref 10–40)
AST SERPL-CCNC: 35 U/L (ref 10–40)
AST SERPL-CCNC: 353 U/L (ref 10–40)
AST SERPL-CCNC: 36 U/L (ref 10–40)
AST SERPL-CCNC: 37 U/L (ref 10–40)
AST SERPL-CCNC: 396 U/L (ref 10–40)
AST SERPL-CCNC: 57 U/L (ref 10–40)
AST SERPL-CCNC: 60 U/L (ref 10–40)
AST SERPL-CCNC: 67 U/L (ref 10–40)
AST SERPL-CCNC: 96 U/L (ref 10–40)
AV INDEX (PROSTH): 0.85
AV MEAN GRADIENT: 4 MMHG
AV PEAK GRADIENT: 6 MMHG
AV VALVE AREA BY VELOCITY RATIO: 3.5 CM²
AV VALVE AREA: 3.46 CM²
AV VELOCITY RATIO: 0.86
B-OH-BUTYR BLD STRIP-SCNC: 0 MMOL/L (ref 0–0.5)
B-OH-BUTYR BLD STRIP-SCNC: 0.2 MMOL/L (ref 0–0.5)
B-OH-BUTYR BLD STRIP-SCNC: 0.3 MMOL/L (ref 0–0.5)
BACTERIA #/AREA URNS HPF: ABNORMAL /HPF
BACTERIA #/AREA URNS HPF: NORMAL /HPF
BACTERIA BLD CULT: NORMAL
BACTERIA STL CULT: NORMAL
BACTERIA UR CULT: NORMAL
BARBITURATES UR QL SCN>200 NG/ML: NEGATIVE
BASOPHILS # BLD AUTO: 0.02 K/UL (ref 0–0.2)
BASOPHILS # BLD AUTO: 0.03 K/UL (ref 0–0.2)
BASOPHILS # BLD AUTO: 0.04 K/UL (ref 0–0.2)
BASOPHILS # BLD AUTO: 0.05 K/UL (ref 0–0.2)
BASOPHILS # BLD AUTO: 0.05 K/UL (ref 0–0.2)
BASOPHILS # BLD AUTO: 0.06 K/UL (ref 0–0.2)
BASOPHILS NFR BLD: 0 % (ref 0–1.9)
BASOPHILS NFR BLD: 0 % (ref 0–1.9)
BASOPHILS NFR BLD: 0.1 % (ref 0–1.9)
BASOPHILS NFR BLD: 0.2 % (ref 0–1.9)
BASOPHILS NFR BLD: 0.3 % (ref 0–1.9)
BASOPHILS NFR BLD: 0.4 % (ref 0–1.9)
BASOPHILS NFR BLD: 0.6 % (ref 0–1.9)
BENZODIAZ UR QL SCN>200 NG/ML: NEGATIVE
BILIRUB DIRECT SERPL-MCNC: 0.2 MG/DL (ref 0.1–0.3)
BILIRUB DIRECT SERPL-MCNC: 0.2 MG/DL (ref 0.1–0.3)
BILIRUB DIRECT SERPL-MCNC: 0.3 MG/DL (ref 0.1–0.3)
BILIRUB DIRECT SERPL-MCNC: 0.4 MG/DL (ref 0.1–0.3)
BILIRUB SERPL-MCNC: 0.2 MG/DL (ref 0.1–1)
BILIRUB SERPL-MCNC: 0.3 MG/DL (ref 0.1–1)
BILIRUB SERPL-MCNC: 0.4 MG/DL (ref 0.1–1)
BILIRUB SERPL-MCNC: 0.6 MG/DL (ref 0.1–1)
BILIRUB SERPL-MCNC: 1 MG/DL (ref 0.1–1)
BILIRUB SERPL-MCNC: 1.3 MG/DL (ref 0.1–1)
BILIRUB SERPL-MCNC: 1.5 MG/DL (ref 0.1–1)
BILIRUB SERPL-MCNC: 1.7 MG/DL (ref 0.1–1)
BILIRUB SERPL-MCNC: 1.7 MG/DL (ref 0.1–1)
BILIRUB UR QL STRIP: NEGATIVE
BLD GP AB SCN CELLS X3 SERPL QL: NORMAL
BNP SERPL-MCNC: 109 PG/ML (ref 0–99)
BNP SERPL-MCNC: 35 PG/ML (ref 0–99)
BNP SERPL-MCNC: 47 PG/ML (ref 0–99)
BSA FOR ECHO PROCEDURE: 2.14 M2
BUN SERPL-MCNC: 10 MG/DL (ref 6–20)
BUN SERPL-MCNC: 13 MG/DL (ref 6–20)
BUN SERPL-MCNC: 14 MG/DL (ref 6–20)
BUN SERPL-MCNC: 17 MG/DL (ref 6–20)
BUN SERPL-MCNC: 17 MG/DL (ref 6–20)
BUN SERPL-MCNC: 19 MG/DL (ref 6–20)
BUN SERPL-MCNC: 20 MG/DL (ref 6–20)
BUN SERPL-MCNC: 22 MG/DL (ref 6–20)
BUN SERPL-MCNC: 23 MG/DL (ref 6–20)
BUN SERPL-MCNC: 25 MG/DL (ref 6–20)
BUN SERPL-MCNC: 25 MG/DL (ref 6–20)
BUN SERPL-MCNC: 26 MG/DL (ref 6–20)
BUN SERPL-MCNC: 27 MG/DL (ref 6–20)
BUN SERPL-MCNC: 27 MG/DL (ref 6–20)
BUN SERPL-MCNC: 43 MG/DL (ref 6–20)
BUN SERPL-MCNC: 44 MG/DL (ref 6–20)
BUN SERPL-MCNC: 45 MG/DL (ref 6–20)
BUN SERPL-MCNC: 47 MG/DL (ref 6–20)
BUN SERPL-MCNC: 50 MG/DL (ref 6–20)
BUN SERPL-MCNC: 50 MG/DL (ref 6–20)
BUN SERPL-MCNC: 51 MG/DL (ref 6–20)
BUN SERPL-MCNC: 51 MG/DL (ref 6–20)
BUN SERPL-MCNC: 53 MG/DL (ref 6–20)
BUN SERPL-MCNC: 54 MG/DL (ref 6–30)
BZE UR QL SCN: NEGATIVE
CALCIUM SERPL-MCNC: 10.7 MG/DL (ref 8.7–10.5)
CALCIUM SERPL-MCNC: 7.4 MG/DL (ref 8.7–10.5)
CALCIUM SERPL-MCNC: 7.4 MG/DL (ref 8.7–10.5)
CALCIUM SERPL-MCNC: 7.6 MG/DL (ref 8.7–10.5)
CALCIUM SERPL-MCNC: 7.7 MG/DL (ref 8.7–10.5)
CALCIUM SERPL-MCNC: 7.8 MG/DL (ref 8.7–10.5)
CALCIUM SERPL-MCNC: 7.8 MG/DL (ref 8.7–10.5)
CALCIUM SERPL-MCNC: 7.9 MG/DL (ref 8.7–10.5)
CALCIUM SERPL-MCNC: 7.9 MG/DL (ref 8.7–10.5)
CALCIUM SERPL-MCNC: 8.3 MG/DL (ref 8.7–10.5)
CALCIUM SERPL-MCNC: 8.4 MG/DL (ref 8.7–10.5)
CALCIUM SERPL-MCNC: 8.4 MG/DL (ref 8.7–10.5)
CALCIUM SERPL-MCNC: 8.7 MG/DL (ref 8.7–10.5)
CALCIUM SERPL-MCNC: 8.8 MG/DL (ref 8.7–10.5)
CALCIUM SERPL-MCNC: 8.9 MG/DL (ref 8.7–10.5)
CALCIUM SERPL-MCNC: 9.1 MG/DL (ref 8.7–10.5)
CALCIUM SERPL-MCNC: 9.3 MG/DL (ref 8.7–10.5)
CALCIUM SERPL-MCNC: 9.3 MG/DL (ref 8.7–10.5)
CALCIUM SERPL-MCNC: 9.4 MG/DL (ref 8.7–10.5)
CALCIUM SERPL-MCNC: 9.5 MG/DL (ref 8.7–10.5)
CALCIUM SERPL-MCNC: 9.7 MG/DL (ref 8.7–10.5)
CANNABINOIDS UR QL SCN: NEGATIVE
CASPR2-IGG CBA: NEGATIVE
CASPR2-IGG CBA: NEGATIVE
CHLORIDE SERPL-SCNC: 100 MMOL/L (ref 95–110)
CHLORIDE SERPL-SCNC: 101 MMOL/L (ref 95–110)
CHLORIDE SERPL-SCNC: 102 MMOL/L (ref 95–110)
CHLORIDE SERPL-SCNC: 103 MMOL/L (ref 95–110)
CHLORIDE SERPL-SCNC: 105 MMOL/L (ref 95–110)
CHLORIDE SERPL-SCNC: 106 MMOL/L (ref 95–110)
CHLORIDE SERPL-SCNC: 107 MMOL/L (ref 95–110)
CHLORIDE SERPL-SCNC: 108 MMOL/L (ref 95–110)
CHLORIDE SERPL-SCNC: 109 MMOL/L (ref 95–110)
CHLORIDE SERPL-SCNC: 109 MMOL/L (ref 95–110)
CHLORIDE SERPL-SCNC: 110 MMOL/L (ref 95–110)
CHLORIDE SERPL-SCNC: 111 MMOL/L (ref 95–110)
CHLORIDE SERPL-SCNC: 111 MMOL/L (ref 95–110)
CHLORIDE SERPL-SCNC: 94 MMOL/L (ref 95–110)
CHLORIDE SERPL-SCNC: 97 MMOL/L (ref 95–110)
CHLORIDE SERPL-SCNC: 98 MMOL/L (ref 95–110)
CHLORIDE SERPL-SCNC: 99 MMOL/L (ref 95–110)
CK SERPL-CCNC: 1145 U/L (ref 20–200)
CK SERPL-CCNC: 174 U/L (ref 20–200)
CK SERPL-CCNC: 195 U/L (ref 20–200)
CK SERPL-CCNC: 255 U/L (ref 20–200)
CK SERPL-CCNC: 508 U/L (ref 20–200)
CK SERPL-CCNC: 917 U/L (ref 20–200)
CLARITY UR: ABNORMAL
CLARITY UR: CLEAR
CO2 SERPL-SCNC: 11 MMOL/L (ref 23–29)
CO2 SERPL-SCNC: 13 MMOL/L (ref 23–29)
CO2 SERPL-SCNC: 17 MMOL/L (ref 23–29)
CO2 SERPL-SCNC: 19 MMOL/L (ref 23–29)
CO2 SERPL-SCNC: 20 MMOL/L (ref 23–29)
CO2 SERPL-SCNC: 20 MMOL/L (ref 23–29)
CO2 SERPL-SCNC: 21 MMOL/L (ref 23–29)
CO2 SERPL-SCNC: 21 MMOL/L (ref 23–29)
CO2 SERPL-SCNC: 22 MMOL/L (ref 23–29)
CO2 SERPL-SCNC: 24 MMOL/L (ref 23–29)
CO2 SERPL-SCNC: 25 MMOL/L (ref 23–29)
CO2 SERPL-SCNC: 25 MMOL/L (ref 23–29)
CO2 SERPL-SCNC: 26 MMOL/L (ref 23–29)
CO2 SERPL-SCNC: 28 MMOL/L (ref 23–29)
CO2 SERPL-SCNC: 32 MMOL/L (ref 23–29)
CO2 SERPL-SCNC: 32 MMOL/L (ref 23–29)
CO2 SERPL-SCNC: 33 MMOL/L (ref 23–29)
CO2 SERPL-SCNC: 9 MMOL/L (ref 23–29)
COLOR UR: ABNORMAL
COLOR UR: YELLOW
COPPER SERPL-MCNC: 1072 UG/L (ref 665–1480)
CORTIS SERPL-MCNC: 11.1 UG/DL
CORTIS SERPL-MCNC: 2.3 UG/DL
CREAT SERPL-MCNC: 0.7 MG/DL (ref 0.5–1.4)
CREAT SERPL-MCNC: 0.7 MG/DL (ref 0.5–1.4)
CREAT SERPL-MCNC: 0.8 MG/DL (ref 0.5–1.4)
CREAT SERPL-MCNC: 0.9 MG/DL (ref 0.5–1.4)
CREAT SERPL-MCNC: 0.9 MG/DL (ref 0.5–1.4)
CREAT SERPL-MCNC: 1 MG/DL (ref 0.5–1.4)
CREAT SERPL-MCNC: 1.1 MG/DL (ref 0.5–1.4)
CREAT SERPL-MCNC: 1.2 MG/DL (ref 0.5–1.4)
CREAT SERPL-MCNC: 1.4 MG/DL (ref 0.5–1.4)
CREAT SERPL-MCNC: 1.6 MG/DL (ref 0.5–1.4)
CREAT SERPL-MCNC: 1.6 MG/DL (ref 0.5–1.4)
CREAT SERPL-MCNC: 1.7 MG/DL (ref 0.5–1.4)
CREAT SERPL-MCNC: 1.7 MG/DL (ref 0.5–1.4)
CREAT SERPL-MCNC: 2.9 MG/DL (ref 0.5–1.4)
CREAT SERPL-MCNC: 2.9 MG/DL (ref 0.5–1.4)
CREAT SERPL-MCNC: 3.1 MG/DL (ref 0.5–1.4)
CREAT SERPL-MCNC: 3.4 MG/DL (ref 0.5–1.4)
CREAT SERPL-MCNC: 3.8 MG/DL (ref 0.5–1.4)
CREAT SERPL-MCNC: 4 MG/DL (ref 0.5–1.4)
CREAT SERPL-MCNC: 4.1 MG/DL (ref 0.5–1.4)
CREAT SERPL-MCNC: 4.3 MG/DL (ref 0.5–1.4)
CREAT SERPL-MCNC: 4.4 MG/DL (ref 0.5–1.4)
CREAT UR-MCNC: 110.5 MG/DL (ref 23–375)
CREAT UR-MCNC: 128.1 MG/DL (ref 23–375)
CREAT UR-MCNC: 150.2 MG/DL (ref 23–375)
CREAT UR-MCNC: 91.3 MG/DL (ref 23–375)
CRP SERPL-MCNC: 255.1 MG/L (ref 0–8.2)
CTP QC/QA: YES
CV ECHO LV RWT: 0.31 CM
CV2 IGG TITR SER: NEGATIVE {TITER}
CV2 IGG TITR SER: NEGATIVE {TITER}
D DIMER PPP IA.FEU-MCNC: 0.79 MG/L FEU
D DIMER PPP IA.FEU-MCNC: 1.05 MG/L FEU
DELSYS: ABNORMAL
DELSYS: NORMAL
DIFFERENTIAL METHOD: ABNORMAL
DOP CALC AO PEAK VEL: 1.26 M/S
DOP CALC AO VTI: 19 CM
DOP CALC LVOT AREA: 4.1 CM2
DOP CALC LVOT DIAMETER: 2.28 CM
DOP CALC LVOT PEAK VEL: 1.08 M/S
DOP CALC LVOT STROKE VOLUME: 65.7 CM3
DOP CALC MV VTI: 16.1 CM
DOP CALCLVOT PEAK VEL VTI: 16.1 CM
DPPX IGG SERPL QL IF: NEGATIVE
DPPX IGG SERPL QL IF: NEGATIVE
DYSAUTONOMIA,INTERPRETATION,S: NORMAL
DYSAUTONOMIA,INTERPRETATION,S: NORMAL
E COLI SXT1 STL QL IA: NEGATIVE
E COLI SXT2 STL QL IA: NEGATIVE
E WAVE DECELERATION TIME: 144.04 MSEC
E/A RATIO: 0.88
E/E' RATIO: 10.63 M/S
ECHO LV POSTERIOR WALL: 0.84 CM (ref 0.6–1.1)
EOSINOPHIL # BLD AUTO: 0 K/UL (ref 0–0.5)
EOSINOPHIL # BLD AUTO: 0.1 K/UL (ref 0–0.5)
EOSINOPHIL # BLD AUTO: 0.2 K/UL (ref 0–0.5)
EOSINOPHIL NFR BLD: 0 % (ref 0–8)
EOSINOPHIL NFR BLD: 0.1 % (ref 0–8)
EOSINOPHIL NFR BLD: 0.3 % (ref 0–8)
EOSINOPHIL NFR BLD: 0.4 % (ref 0–8)
EOSINOPHIL NFR BLD: 0.8 % (ref 0–8)
EOSINOPHIL NFR BLD: 1 % (ref 0–8)
EOSINOPHIL NFR BLD: 1 % (ref 0–8)
EOSINOPHIL NFR BLD: 1.2 % (ref 0–8)
EOSINOPHIL NFR BLD: 1.8 % (ref 0–8)
EOSINOPHIL NFR BLD: 1.9 % (ref 0–8)
EOSINOPHIL NFR BLD: 2 % (ref 0–8)
EOSINOPHIL NFR BLD: 2 % (ref 0–8)
EOSINOPHIL NFR BLD: 2.1 % (ref 0–8)
EOSINOPHIL NFR BLD: 2.1 % (ref 0–8)
EOSINOPHIL NFR BLD: 2.4 % (ref 0–8)
EOSINOPHIL NFR BLD: 2.5 % (ref 0–8)
ERYTHROCYTE [DISTWIDTH] IN BLOOD BY AUTOMATED COUNT: 12.1 % (ref 11.5–14.5)
ERYTHROCYTE [DISTWIDTH] IN BLOOD BY AUTOMATED COUNT: 12.3 % (ref 11.5–14.5)
ERYTHROCYTE [DISTWIDTH] IN BLOOD BY AUTOMATED COUNT: 12.7 % (ref 11.5–14.5)
ERYTHROCYTE [DISTWIDTH] IN BLOOD BY AUTOMATED COUNT: 12.8 % (ref 11.5–14.5)
ERYTHROCYTE [DISTWIDTH] IN BLOOD BY AUTOMATED COUNT: 13.8 % (ref 11.5–14.5)
ERYTHROCYTE [DISTWIDTH] IN BLOOD BY AUTOMATED COUNT: 13.9 % (ref 11.5–14.5)
ERYTHROCYTE [DISTWIDTH] IN BLOOD BY AUTOMATED COUNT: 13.9 % (ref 11.5–14.5)
ERYTHROCYTE [DISTWIDTH] IN BLOOD BY AUTOMATED COUNT: 14.1 % (ref 11.5–14.5)
ERYTHROCYTE [DISTWIDTH] IN BLOOD BY AUTOMATED COUNT: 14.4 % (ref 11.5–14.5)
ERYTHROCYTE [DISTWIDTH] IN BLOOD BY AUTOMATED COUNT: 14.4 % (ref 11.5–14.5)
ERYTHROCYTE [DISTWIDTH] IN BLOOD BY AUTOMATED COUNT: 14.6 % (ref 11.5–14.5)
ERYTHROCYTE [DISTWIDTH] IN BLOOD BY AUTOMATED COUNT: 14.8 % (ref 11.5–14.5)
ERYTHROCYTE [DISTWIDTH] IN BLOOD BY AUTOMATED COUNT: 14.9 % (ref 11.5–14.5)
ERYTHROCYTE [DISTWIDTH] IN BLOOD BY AUTOMATED COUNT: 14.9 % (ref 11.5–14.5)
ERYTHROCYTE [SEDIMENTATION RATE] IN BLOOD BY WESTERGREN METHOD: 18 MM/H
ERYTHROCYTE [SEDIMENTATION RATE] IN BLOOD BY WESTERGREN METHOD: 20 MM/H
ERYTHROCYTE [SEDIMENTATION RATE] IN BLOOD BY WESTERGREN METHOD: 22 MM/H
ERYTHROCYTE [SEDIMENTATION RATE] IN BLOOD BY WESTERGREN METHOD: 22 MM/H
ERYTHROCYTE [SEDIMENTATION RATE] IN BLOOD BY WESTERGREN METHOD: 80 MM/HR (ref 0–10)
ERYTHROCYTE [SEDIMENTATION RATE] IN BLOOD BY WESTERGREN METHOD: 86 MM/HR (ref 0–10)
ERYTHROCYTE [SEDIMENTATION RATE] IN BLOOD BY WESTERGREN METHOD: 99 MM/HR (ref 0–10)
EST. GFR  (NO RACE VARIABLE): 17 ML/MIN/1.73 M^2
EST. GFR  (NO RACE VARIABLE): 19 ML/MIN/1.73 M^2
EST. GFR  (NO RACE VARIABLE): 19 ML/MIN/1.73 M^2
EST. GFR  (NO RACE VARIABLE): 20 ML/MIN/1.73 M^2
EST. GFR  (NO RACE VARIABLE): 23 ML/MIN/1.73 M^2
EST. GFR  (NO RACE VARIABLE): 26 ML/MIN/1.73 M^2
EST. GFR  (NO RACE VARIABLE): 28 ML/MIN/1.73 M^2
EST. GFR  (NO RACE VARIABLE): 28 ML/MIN/1.73 M^2
EST. GFR  (NO RACE VARIABLE): 54 ML/MIN/1.73 M^2
EST. GFR  (NO RACE VARIABLE): 54 ML/MIN/1.73 M^2
EST. GFR  (NO RACE VARIABLE): 58 ML/MIN/1.73 M^2
EST. GFR  (NO RACE VARIABLE): 58 ML/MIN/1.73 M^2
EST. GFR  (NO RACE VARIABLE): >60 ML/MIN/1.73 M^2
ESTIMATED AVG GLUCOSE: 140 MG/DL (ref 68–131)
ESTIMATED AVG GLUCOSE: 232 MG/DL (ref 68–131)
ETHANOL SERPL-MCNC: <10 MG/DL
FIO2: 100
FIO2: 100
FIO2: 21
FIO2: 21
FIO2: 50
FIO2: 50
FIO2: 60
FOLATE SERPL-MCNC: 11.4 NG/ML (ref 4–24)
FOLATE SERPL-MCNC: 9.2 NG/ML (ref 4–24)
FOLATE SERPL-MCNC: <2.2 NG/ML (ref 4–24)
FRACTIONAL SHORTENING: 29 % (ref 28–44)
GGT SERPL-CCNC: 233 U/L (ref 8–55)
GGT SERPL-CCNC: 237 U/L (ref 8–55)
GGT SERPL-CCNC: 251 U/L (ref 8–55)
GLUCOSE SERPL-MCNC: 107 MG/DL (ref 70–110)
GLUCOSE SERPL-MCNC: 122 MG/DL (ref 70–110)
GLUCOSE SERPL-MCNC: 131 MG/DL (ref 70–110)
GLUCOSE SERPL-MCNC: 155 MG/DL (ref 70–110)
GLUCOSE SERPL-MCNC: 156 MG/DL (ref 70–110)
GLUCOSE SERPL-MCNC: 169 MG/DL (ref 70–110)
GLUCOSE SERPL-MCNC: 187 MG/DL (ref 70–110)
GLUCOSE SERPL-MCNC: 191 MG/DL (ref 70–110)
GLUCOSE SERPL-MCNC: 195 MG/DL (ref 70–110)
GLUCOSE SERPL-MCNC: 196 MG/DL (ref 70–110)
GLUCOSE SERPL-MCNC: 203 MG/DL (ref 70–110)
GLUCOSE SERPL-MCNC: 206 MG/DL (ref 70–110)
GLUCOSE SERPL-MCNC: 208 MG/DL (ref 70–110)
GLUCOSE SERPL-MCNC: 217 MG/DL (ref 70–110)
GLUCOSE SERPL-MCNC: 226 MG/DL (ref 70–110)
GLUCOSE SERPL-MCNC: 234 MG/DL (ref 70–110)
GLUCOSE SERPL-MCNC: 235 MG/DL (ref 70–110)
GLUCOSE SERPL-MCNC: 239 MG/DL (ref 70–110)
GLUCOSE SERPL-MCNC: 264 MG/DL (ref 70–110)
GLUCOSE SERPL-MCNC: 271 MG/DL (ref 70–110)
GLUCOSE SERPL-MCNC: 289 MG/DL (ref 70–110)
GLUCOSE SERPL-MCNC: 43 MG/DL (ref 70–110)
GLUCOSE SERPL-MCNC: 431 MG/DL (ref 70–110)
GLUCOSE SERPL-MCNC: 464 MG/DL (ref 70–110)
GLUCOSE UR QL STRIP: ABNORMAL
HBA1C MFR BLD: 6.5 % (ref 4–5.6)
HBA1C MFR BLD: 9.7 % (ref 4–5.6)
HCO3 UR-SCNC: 18 MMOL/L (ref 24–28)
HCO3 UR-SCNC: 20.8 MMOL/L (ref 24–28)
HCO3 UR-SCNC: 24.2 MMOL/L (ref 24–28)
HCO3 UR-SCNC: 24.7 MMOL/L (ref 24–28)
HCO3 UR-SCNC: 24.9 MMOL/L (ref 24–28)
HCO3 UR-SCNC: 32.9 MMOL/L (ref 24–28)
HCO3 UR-SCNC: 34.2 MMOL/L (ref 24–28)
HCO3 UR-SCNC: 36 MMOL/L (ref 24–28)
HCT VFR BLD AUTO: 20.8 % (ref 40–54)
HCT VFR BLD AUTO: 21 % (ref 40–54)
HCT VFR BLD AUTO: 21.8 % (ref 40–54)
HCT VFR BLD AUTO: 23.1 % (ref 40–54)
HCT VFR BLD AUTO: 23.3 % (ref 40–54)
HCT VFR BLD AUTO: 24.7 % (ref 40–54)
HCT VFR BLD AUTO: 25.5 % (ref 40–54)
HCT VFR BLD AUTO: 27.8 % (ref 40–54)
HCT VFR BLD AUTO: 29.3 % (ref 40–54)
HCT VFR BLD AUTO: 29.6 % (ref 40–54)
HCT VFR BLD AUTO: 30.6 % (ref 40–54)
HCT VFR BLD AUTO: 30.7 % (ref 40–54)
HCT VFR BLD AUTO: 31.6 % (ref 40–54)
HCT VFR BLD AUTO: 31.7 % (ref 40–54)
HCT VFR BLD AUTO: 32.2 % (ref 40–54)
HCT VFR BLD AUTO: 32.7 % (ref 40–54)
HCT VFR BLD AUTO: 32.7 % (ref 40–54)
HCT VFR BLD AUTO: 33 % (ref 40–54)
HCT VFR BLD AUTO: 33.8 % (ref 40–54)
HCT VFR BLD AUTO: 34.2 % (ref 40–54)
HCT VFR BLD AUTO: 36.2 % (ref 40–54)
HCT VFR BLD CALC: 34 %PCV (ref 36–54)
HGB BLD-MCNC: 10.2 G/DL (ref 14–18)
HGB BLD-MCNC: 10.7 G/DL (ref 14–18)
HGB BLD-MCNC: 10.7 G/DL (ref 14–18)
HGB BLD-MCNC: 11 G/DL (ref 14–18)
HGB BLD-MCNC: 11.2 G/DL (ref 14–18)
HGB BLD-MCNC: 11.4 G/DL (ref 14–18)
HGB BLD-MCNC: 11.4 G/DL (ref 14–18)
HGB BLD-MCNC: 11.6 G/DL (ref 14–18)
HGB BLD-MCNC: 11.7 G/DL (ref 14–18)
HGB BLD-MCNC: 11.7 G/DL (ref 14–18)
HGB BLD-MCNC: 11.8 G/DL (ref 14–18)
HGB BLD-MCNC: 13.5 G/DL (ref 14–18)
HGB BLD-MCNC: 6.7 G/DL (ref 14–18)
HGB BLD-MCNC: 6.9 G/DL (ref 14–18)
HGB BLD-MCNC: 7.2 G/DL (ref 14–18)
HGB BLD-MCNC: 7.7 G/DL (ref 14–18)
HGB BLD-MCNC: 7.9 G/DL (ref 14–18)
HGB BLD-MCNC: 8.2 G/DL (ref 14–18)
HGB BLD-MCNC: 8.3 G/DL (ref 14–18)
HGB BLD-MCNC: 8.4 G/DL (ref 14–18)
HGB BLD-MCNC: 9.5 G/DL (ref 14–18)
HGB UR QL STRIP: ABNORMAL
HGB UR QL STRIP: NEGATIVE
HIV 1+2 AB+HIV1 P24 AG SERPL QL IA: NORMAL
HIV 1+2 AB+HIV1 P24 AG SERPL QL IA: NORMAL
HU1 AB TITR SER: NEGATIVE {TITER}
HU1 AB TITR SER: NEGATIVE {TITER}
HYALINE CASTS #/AREA URNS LPF: 0 /LPF
HYALINE CASTS #/AREA URNS LPF: 1 /LPF
HYALINE CASTS #/AREA URNS LPF: 3 /LPF
HYALINE CASTS #/AREA URNS LPF: 40 /LPF
HYALINE CASTS #/AREA URNS LPF: 9 /LPF
IMM GRANULOCYTES # BLD AUTO: 0.02 K/UL (ref 0–0.04)
IMM GRANULOCYTES # BLD AUTO: 0.02 K/UL (ref 0–0.04)
IMM GRANULOCYTES # BLD AUTO: 0.03 K/UL (ref 0–0.04)
IMM GRANULOCYTES # BLD AUTO: 0.04 K/UL (ref 0–0.04)
IMM GRANULOCYTES # BLD AUTO: 0.05 K/UL (ref 0–0.04)
IMM GRANULOCYTES # BLD AUTO: 0.07 K/UL (ref 0–0.04)
IMM GRANULOCYTES # BLD AUTO: 0.08 K/UL (ref 0–0.04)
IMM GRANULOCYTES # BLD AUTO: 0.11 K/UL (ref 0–0.04)
IMM GRANULOCYTES # BLD AUTO: 0.13 K/UL (ref 0–0.04)
IMM GRANULOCYTES # BLD AUTO: 0.18 K/UL (ref 0–0.04)
IMM GRANULOCYTES # BLD AUTO: 0.23 K/UL (ref 0–0.04)
IMM GRANULOCYTES # BLD AUTO: 0.4 K/UL (ref 0–0.04)
IMM GRANULOCYTES # BLD AUTO: 0.47 K/UL (ref 0–0.04)
IMM GRANULOCYTES # BLD AUTO: 1.06 K/UL (ref 0–0.04)
IMM GRANULOCYTES # BLD AUTO: ABNORMAL K/UL (ref 0–0.04)
IMM GRANULOCYTES # BLD AUTO: ABNORMAL K/UL (ref 0–0.04)
IMM GRANULOCYTES NFR BLD AUTO: 0.2 % (ref 0–0.5)
IMM GRANULOCYTES NFR BLD AUTO: 0.2 % (ref 0–0.5)
IMM GRANULOCYTES NFR BLD AUTO: 0.3 % (ref 0–0.5)
IMM GRANULOCYTES NFR BLD AUTO: 0.4 % (ref 0–0.5)
IMM GRANULOCYTES NFR BLD AUTO: 0.5 % (ref 0–0.5)
IMM GRANULOCYTES NFR BLD AUTO: 0.8 % (ref 0–0.5)
IMM GRANULOCYTES NFR BLD AUTO: 1 % (ref 0–0.5)
IMM GRANULOCYTES NFR BLD AUTO: 1.2 % (ref 0–0.5)
IMM GRANULOCYTES NFR BLD AUTO: 1.3 % (ref 0–0.5)
IMM GRANULOCYTES NFR BLD AUTO: 1.5 % (ref 0–0.5)
IMM GRANULOCYTES NFR BLD AUTO: 2.1 % (ref 0–0.5)
IMM GRANULOCYTES NFR BLD AUTO: 2.4 % (ref 0–0.5)
IMM GRANULOCYTES NFR BLD AUTO: 4.7 % (ref 0–0.5)
IMM GRANULOCYTES NFR BLD AUTO: ABNORMAL % (ref 0–0.5)
IMM GRANULOCYTES NFR BLD AUTO: ABNORMAL % (ref 0–0.5)
INR PPP: 1.1 (ref 0.8–1.2)
INR PPP: 1.2 (ref 0.8–1.2)
INTERVENTRICULAR SEPTUM: 0.56 CM (ref 0.6–1.1)
IVC DIAMETER: 2.17 CM
KETONES UR QL STRIP: NEGATIVE
LA MINOR: 3.88 CM
LACTATE SERPL-SCNC: 1.1 MMOL/L (ref 0.5–2.2)
LACTATE SERPL-SCNC: 1.1 MMOL/L (ref 0.5–2.2)
LACTATE SERPL-SCNC: 1.4 MMOL/L (ref 0.5–2.2)
LACTATE SERPL-SCNC: 1.7 MMOL/L (ref 0.5–2.2)
LACTATE SERPL-SCNC: 10.2 MMOL/L (ref 0.5–2.2)
LACTATE SERPL-SCNC: 2.3 MMOL/L (ref 0.5–2.2)
LACTATE SERPL-SCNC: 3.2 MMOL/L (ref 0.5–2.2)
LACTATE SERPL-SCNC: 5 MMOL/L (ref 0.5–2.2)
LACTATE SERPL-SCNC: 7.3 MMOL/L (ref 0.5–2.2)
LDH SERPL L TO P-CCNC: 1.7 MMOL/L (ref 0.5–2.2)
LDH SERPL L TO P-CCNC: 526 U/L (ref 110–260)
LDH SERPL L TO P-CCNC: 9.33 MMOL/L (ref 0.36–1.25)
LEFT ATRIUM SIZE: 3.76 CM
LEFT INTERNAL DIMENSION IN SYSTOLE: 3.79 CM (ref 2.1–4)
LEFT VENTRICLE DIASTOLIC VOLUME INDEX: 68.18 ML/M2
LEFT VENTRICLE DIASTOLIC VOLUME: 139.09 ML
LEFT VENTRICLE MASS INDEX: 63 G/M2
LEFT VENTRICLE SYSTOLIC VOLUME INDEX: 30.2 ML/M2
LEFT VENTRICLE SYSTOLIC VOLUME: 61.52 ML
LEFT VENTRICULAR INTERNAL DIMENSION IN DIASTOLE: 5.36 CM (ref 3.5–6)
LEFT VENTRICULAR MASS: 129.5 G
LEUKOCYTE ESTERASE UR QL STRIP: ABNORMAL
LEUKOCYTE ESTERASE UR QL STRIP: NEGATIVE
LGI1-IGG CBA: NEGATIVE
LGI1-IGG CBA: NEGATIVE
LIPASE SERPL-CCNC: 15 U/L (ref 4–60)
LIPASE SERPL-CCNC: 25 U/L (ref 4–60)
LIPASE SERPL-CCNC: 29 U/L (ref 4–60)
LIPASE SERPL-CCNC: 30 U/L (ref 4–60)
LIPASE SERPL-CCNC: 48 U/L (ref 4–60)
LIPASE SERPL-CCNC: 67 U/L (ref 4–60)
LIPASE SERPL-CCNC: 71 U/L (ref 4–60)
LIPASE SERPL-CCNC: 73 U/L (ref 4–60)
LV LATERAL E/E' RATIO: 9.44 M/S
LV SEPTAL E/E' RATIO: 12.14 M/S
LVOT MG: 2.87 MMHG
LVOT MV: 0.81 CM/S
LYMPHOCYTES # BLD AUTO: 1.5 K/UL (ref 1–4.8)
LYMPHOCYTES # BLD AUTO: 1.6 K/UL (ref 1–4.8)
LYMPHOCYTES # BLD AUTO: 1.9 K/UL (ref 1–4.8)
LYMPHOCYTES # BLD AUTO: 2 K/UL (ref 1–4.8)
LYMPHOCYTES # BLD AUTO: 2.1 K/UL (ref 1–4.8)
LYMPHOCYTES # BLD AUTO: 2.2 K/UL (ref 1–4.8)
LYMPHOCYTES # BLD AUTO: 2.2 K/UL (ref 1–4.8)
LYMPHOCYTES # BLD AUTO: 2.3 K/UL (ref 1–4.8)
LYMPHOCYTES # BLD AUTO: 2.6 K/UL (ref 1–4.8)
LYMPHOCYTES # BLD AUTO: 2.7 K/UL (ref 1–4.8)
LYMPHOCYTES # BLD AUTO: 2.8 K/UL (ref 1–4.8)
LYMPHOCYTES # BLD AUTO: 2.8 K/UL (ref 1–4.8)
LYMPHOCYTES # BLD AUTO: 3.1 K/UL (ref 1–4.8)
LYMPHOCYTES # BLD AUTO: 3.2 K/UL (ref 1–4.8)
LYMPHOCYTES # BLD AUTO: 3.2 K/UL (ref 1–4.8)
LYMPHOCYTES # BLD AUTO: 3.8 K/UL (ref 1–4.8)
LYMPHOCYTES # BLD AUTO: 4.5 K/UL (ref 1–4.8)
LYMPHOCYTES NFR BLD: 10 % (ref 18–48)
LYMPHOCYTES NFR BLD: 11 % (ref 18–48)
LYMPHOCYTES NFR BLD: 11.6 % (ref 18–48)
LYMPHOCYTES NFR BLD: 12.7 % (ref 18–48)
LYMPHOCYTES NFR BLD: 14.4 % (ref 18–48)
LYMPHOCYTES NFR BLD: 16.4 % (ref 18–48)
LYMPHOCYTES NFR BLD: 17.8 % (ref 18–48)
LYMPHOCYTES NFR BLD: 19.9 % (ref 18–48)
LYMPHOCYTES NFR BLD: 22.1 % (ref 18–48)
LYMPHOCYTES NFR BLD: 23.3 % (ref 18–48)
LYMPHOCYTES NFR BLD: 27.3 % (ref 18–48)
LYMPHOCYTES NFR BLD: 28 % (ref 18–48)
LYMPHOCYTES NFR BLD: 29.3 % (ref 18–48)
LYMPHOCYTES NFR BLD: 29.9 % (ref 18–48)
LYMPHOCYTES NFR BLD: 31.6 % (ref 18–48)
LYMPHOCYTES NFR BLD: 31.9 % (ref 18–48)
LYMPHOCYTES NFR BLD: 32.1 % (ref 18–48)
LYMPHOCYTES NFR BLD: 32.2 % (ref 18–48)
LYMPHOCYTES NFR BLD: 34 % (ref 18–48)
LYMPHOCYTES NFR BLD: 38.9 % (ref 18–48)
LYMPHOCYTES NFR BLD: 8.6 % (ref 18–48)
MAGNESIUM SERPL-MCNC: 1.4 MG/DL (ref 1.6–2.6)
MAGNESIUM SERPL-MCNC: 1.5 MG/DL (ref 1.6–2.6)
MAGNESIUM SERPL-MCNC: 1.6 MG/DL (ref 1.6–2.6)
MAGNESIUM SERPL-MCNC: 1.8 MG/DL (ref 1.6–2.6)
MAGNESIUM SERPL-MCNC: 1.9 MG/DL (ref 1.6–2.6)
MAGNESIUM SERPL-MCNC: 2 MG/DL (ref 1.6–2.6)
MAGNESIUM SERPL-MCNC: 2.1 MG/DL (ref 1.6–2.6)
MAGNESIUM SERPL-MCNC: 2.1 MG/DL (ref 1.6–2.6)
MAGNESIUM SERPL-MCNC: 2.9 MG/DL (ref 1.6–2.6)
MCH RBC QN AUTO: 28.3 PG (ref 27–31)
MCH RBC QN AUTO: 28.5 PG (ref 27–31)
MCH RBC QN AUTO: 28.9 PG (ref 27–31)
MCH RBC QN AUTO: 29 PG (ref 27–31)
MCH RBC QN AUTO: 29.1 PG (ref 27–31)
MCH RBC QN AUTO: 29.1 PG (ref 27–31)
MCH RBC QN AUTO: 29.2 PG (ref 27–31)
MCH RBC QN AUTO: 29.3 PG (ref 27–31)
MCH RBC QN AUTO: 29.5 PG (ref 27–31)
MCH RBC QN AUTO: 29.6 PG (ref 27–31)
MCH RBC QN AUTO: 29.7 PG (ref 27–31)
MCH RBC QN AUTO: 29.9 PG (ref 27–31)
MCH RBC QN AUTO: 30.6 PG (ref 27–31)
MCH RBC QN AUTO: 30.9 PG (ref 27–31)
MCH RBC QN AUTO: 31.1 PG (ref 27–31)
MCH RBC QN AUTO: 35.2 PG (ref 27–31)
MCH RBC QN AUTO: 36 PG (ref 27–31)
MCH RBC QN AUTO: 36.1 PG (ref 27–31)
MCH RBC QN AUTO: 36.2 PG (ref 27–31)
MCHC RBC AUTO-ENTMCNC: 30.2 G/DL (ref 32–36)
MCHC RBC AUTO-ENTMCNC: 31.9 G/DL (ref 32–36)
MCHC RBC AUTO-ENTMCNC: 32.2 G/DL (ref 32–36)
MCHC RBC AUTO-ENTMCNC: 32.4 G/DL (ref 32–36)
MCHC RBC AUTO-ENTMCNC: 33 G/DL (ref 32–36)
MCHC RBC AUTO-ENTMCNC: 33 G/DL (ref 32–36)
MCHC RBC AUTO-ENTMCNC: 33.2 G/DL (ref 32–36)
MCHC RBC AUTO-ENTMCNC: 33.6 G/DL (ref 32–36)
MCHC RBC AUTO-ENTMCNC: 33.6 G/DL (ref 32–36)
MCHC RBC AUTO-ENTMCNC: 33.7 G/DL (ref 32–36)
MCHC RBC AUTO-ENTMCNC: 33.9 G/DL (ref 32–36)
MCHC RBC AUTO-ENTMCNC: 34.2 G/DL (ref 32–36)
MCHC RBC AUTO-ENTMCNC: 34.2 G/DL (ref 32–36)
MCHC RBC AUTO-ENTMCNC: 34.5 G/DL (ref 32–36)
MCHC RBC AUTO-ENTMCNC: 34.9 G/DL (ref 32–36)
MCHC RBC AUTO-ENTMCNC: 35.2 G/DL (ref 32–36)
MCHC RBC AUTO-ENTMCNC: 35.4 G/DL (ref 32–36)
MCHC RBC AUTO-ENTMCNC: 35.8 G/DL (ref 32–36)
MCHC RBC AUTO-ENTMCNC: 36.6 G/DL (ref 32–36)
MCHC RBC AUTO-ENTMCNC: 37.2 G/DL (ref 32–36)
MCHC RBC AUTO-ENTMCNC: 37.3 G/DL (ref 32–36)
MCV RBC AUTO: 101 FL (ref 82–98)
MCV RBC AUTO: 83 FL (ref 82–98)
MCV RBC AUTO: 84 FL (ref 82–98)
MCV RBC AUTO: 84 FL (ref 82–98)
MCV RBC AUTO: 86 FL (ref 82–98)
MCV RBC AUTO: 86 FL (ref 82–98)
MCV RBC AUTO: 87 FL (ref 82–98)
MCV RBC AUTO: 88 FL (ref 82–98)
MCV RBC AUTO: 89 FL (ref 82–98)
MCV RBC AUTO: 90 FL (ref 82–98)
MCV RBC AUTO: 90 FL (ref 82–98)
MCV RBC AUTO: 91 FL (ref 82–98)
MCV RBC AUTO: 92 FL (ref 82–98)
MCV RBC AUTO: 95 FL (ref 82–98)
MCV RBC AUTO: 97 FL (ref 82–98)
MCV RBC AUTO: 97 FL (ref 82–98)
MCV RBC AUTO: 99 FL (ref 82–98)
METAMYELOCYTES NFR BLD MANUAL: 1 %
METHADONE UR QL SCN>300 NG/ML: NEGATIVE
METHYLMALONATE SERPL-SCNC: 0.22 UMOL/L
MICROSCOPIC COMMENT: ABNORMAL
MICROSCOPIC COMMENT: NORMAL
MIN VOL: 10
MIN VOL: 10.1
MIN VOL: 8
MIN VOL: 9.8
MODE: ABNORMAL
MONOCYTES # BLD AUTO: 0.6 K/UL (ref 0.3–1)
MONOCYTES # BLD AUTO: 0.6 K/UL (ref 0.3–1)
MONOCYTES # BLD AUTO: 0.7 K/UL (ref 0.3–1)
MONOCYTES # BLD AUTO: 0.8 K/UL (ref 0.3–1)
MONOCYTES # BLD AUTO: 0.9 K/UL (ref 0.3–1)
MONOCYTES # BLD AUTO: 0.9 K/UL (ref 0.3–1)
MONOCYTES # BLD AUTO: 1.3 K/UL (ref 0.3–1)
MONOCYTES # BLD AUTO: 1.4 K/UL (ref 0.3–1)
MONOCYTES # BLD AUTO: 1.4 K/UL (ref 0.3–1)
MONOCYTES # BLD AUTO: 1.5 K/UL (ref 0.3–1)
MONOCYTES # BLD AUTO: 1.5 K/UL (ref 0.3–1)
MONOCYTES NFR BLD: 1 % (ref 4–15)
MONOCYTES NFR BLD: 10.3 % (ref 4–15)
MONOCYTES NFR BLD: 10.5 % (ref 4–15)
MONOCYTES NFR BLD: 13.7 % (ref 4–15)
MONOCYTES NFR BLD: 5.7 % (ref 4–15)
MONOCYTES NFR BLD: 5.8 % (ref 4–15)
MONOCYTES NFR BLD: 5.8 % (ref 4–15)
MONOCYTES NFR BLD: 6 % (ref 4–15)
MONOCYTES NFR BLD: 6.4 % (ref 4–15)
MONOCYTES NFR BLD: 6.5 % (ref 4–15)
MONOCYTES NFR BLD: 6.5 % (ref 4–15)
MONOCYTES NFR BLD: 6.6 % (ref 4–15)
MONOCYTES NFR BLD: 6.8 % (ref 4–15)
MONOCYTES NFR BLD: 7.1 % (ref 4–15)
MONOCYTES NFR BLD: 7.3 % (ref 4–15)
MONOCYTES NFR BLD: 8 % (ref 4–15)
MONOCYTES NFR BLD: 8.7 % (ref 4–15)
MONOCYTES NFR BLD: 9 % (ref 4–15)
MONOCYTES NFR BLD: 9.4 % (ref 4–15)
MV MEAN GRADIENT: 2 MMHG
MV PEAK A VEL: 0.97 M/S
MV PEAK E VEL: 0.85 M/S
MV PEAK GRADIENT: 4 MMHG
MV STENOSIS PRESSURE HALF TIME: 66.68 MS
MV VALVE AREA BY CONTINUITY EQUATION: 4.08 CM2
MV VALVE AREA P 1/2 METHOD: 3.3 CM2
MYELOCYTES NFR BLD MANUAL: 2 %
MYELOCYTES NFR BLD MANUAL: 5 %
NACHR AB SER-SCNC: 0 NMOL/L
NACHR AB SER-SCNC: 0 NMOL/L
NEUTROPHILS # BLD AUTO: 11.5 K/UL (ref 1.8–7.7)
NEUTROPHILS # BLD AUTO: 12.4 K/UL (ref 1.8–7.7)
NEUTROPHILS # BLD AUTO: 15.5 K/UL (ref 1.8–7.7)
NEUTROPHILS # BLD AUTO: 15.7 K/UL (ref 1.8–7.7)
NEUTROPHILS # BLD AUTO: 18 K/UL (ref 1.8–7.7)
NEUTROPHILS # BLD AUTO: 4.7 K/UL (ref 1.8–7.7)
NEUTROPHILS # BLD AUTO: 5.6 K/UL (ref 1.8–7.7)
NEUTROPHILS # BLD AUTO: 6 K/UL (ref 1.8–7.7)
NEUTROPHILS # BLD AUTO: 6 K/UL (ref 1.8–7.7)
NEUTROPHILS # BLD AUTO: 6.1 K/UL (ref 1.8–7.7)
NEUTROPHILS # BLD AUTO: 6.1 K/UL (ref 1.8–7.7)
NEUTROPHILS # BLD AUTO: 6.3 K/UL (ref 1.8–7.7)
NEUTROPHILS # BLD AUTO: 6.5 K/UL (ref 1.8–7.7)
NEUTROPHILS # BLD AUTO: 6.5 K/UL (ref 1.8–7.7)
NEUTROPHILS # BLD AUTO: 6.9 K/UL (ref 1.8–7.7)
NEUTROPHILS # BLD AUTO: 7 K/UL (ref 1.8–7.7)
NEUTROPHILS # BLD AUTO: 7 K/UL (ref 1.8–7.7)
NEUTROPHILS # BLD AUTO: 8.5 K/UL (ref 1.8–7.7)
NEUTROPHILS # BLD AUTO: 9.1 K/UL (ref 1.8–7.7)
NEUTROPHILS NFR BLD: 52.7 % (ref 38–73)
NEUTROPHILS NFR BLD: 57 % (ref 38–73)
NEUTROPHILS NFR BLD: 57.4 % (ref 38–73)
NEUTROPHILS NFR BLD: 57.6 % (ref 38–73)
NEUTROPHILS NFR BLD: 58.7 % (ref 38–73)
NEUTROPHILS NFR BLD: 59.1 % (ref 38–73)
NEUTROPHILS NFR BLD: 59.9 % (ref 38–73)
NEUTROPHILS NFR BLD: 60.1 % (ref 38–73)
NEUTROPHILS NFR BLD: 62.6 % (ref 38–73)
NEUTROPHILS NFR BLD: 62.6 % (ref 38–73)
NEUTROPHILS NFR BLD: 66.3 % (ref 38–73)
NEUTROPHILS NFR BLD: 66.6 % (ref 38–73)
NEUTROPHILS NFR BLD: 68.1 % (ref 38–73)
NEUTROPHILS NFR BLD: 68.7 % (ref 38–73)
NEUTROPHILS NFR BLD: 73.2 % (ref 38–73)
NEUTROPHILS NFR BLD: 75 % (ref 38–73)
NEUTROPHILS NFR BLD: 77 % (ref 38–73)
NEUTROPHILS NFR BLD: 78.6 % (ref 38–73)
NEUTROPHILS NFR BLD: 79.6 % (ref 38–73)
NEUTROPHILS NFR BLD: 80 % (ref 38–73)
NEUTROPHILS NFR BLD: 80.7 % (ref 38–73)
NEUTS BAND NFR BLD MANUAL: 3 %
NEUTS BAND NFR BLD MANUAL: 3 %
NITRITE UR QL STRIP: NEGATIVE
NRBC BLD-RTO: 0 /100 WBC
NRBC BLD-RTO: 1 /100 WBC
NSE SERPL-MCNC: 41 NG/ML
OPIATES UR QL SCN: ABNORMAL
OPIATES UR QL SCN: ABNORMAL
OPIATES UR QL SCN: NEGATIVE
PCO2 BLDA: 33.9 MMHG (ref 35–45)
PCO2 BLDA: 41.4 MMHG (ref 35–45)
PCO2 BLDA: 41.6 MMHG (ref 35–45)
PCO2 BLDA: 42.4 MMHG (ref 35–45)
PCO2 BLDA: 43.7 MMHG (ref 35–45)
PCO2 BLDA: 44.6 MMHG (ref 35–45)
PCO2 BLDA: 46.8 MMHG (ref 35–45)
PCO2 BLDA: 49.9 MMHG (ref 35–45)
PCP UR QL SCN>25 NG/ML: NEGATIVE
PEEP: 5
PH SMN: 7.24 [PH] (ref 7.35–7.45)
PH SMN: 7.31 [PH] (ref 7.35–7.45)
PH SMN: 7.34 [PH] (ref 7.35–7.45)
PH SMN: 7.36 [PH] (ref 7.35–7.45)
PH SMN: 7.44 [PH] (ref 7.35–7.45)
PH SMN: 7.46 [PH] (ref 7.35–7.45)
PH SMN: 7.47 [PH] (ref 7.35–7.45)
PH SMN: 7.55 [PH] (ref 7.35–7.45)
PH UR STRIP: 6 [PH] (ref 5–8)
PH UR STRIP: 7 [PH] (ref 5–8)
PHOSPHATE SERPL-MCNC: 11.8 MG/DL (ref 2.7–4.5)
PHOSPHATE SERPL-MCNC: 2.2 MG/DL (ref 2.7–4.5)
PHOSPHATE SERPL-MCNC: 2.4 MG/DL (ref 2.7–4.5)
PHOSPHATE SERPL-MCNC: 2.6 MG/DL (ref 2.7–4.5)
PHOSPHATE SERPL-MCNC: 2.9 MG/DL (ref 2.7–4.5)
PHOSPHATE SERPL-MCNC: 3.2 MG/DL (ref 2.7–4.5)
PHOSPHATE SERPL-MCNC: 4.4 MG/DL (ref 2.7–4.5)
PHOSPHATE SERPL-MCNC: 4.5 MG/DL (ref 2.7–4.5)
PHOSPHATE SERPL-MCNC: 4.7 MG/DL (ref 2.7–4.5)
PHOSPHATE SERPL-MCNC: 4.8 MG/DL (ref 2.7–4.5)
PHOSPHATE SERPL-MCNC: 5.4 MG/DL (ref 2.7–4.5)
PHOSPHATE SERPL-MCNC: 6 MG/DL (ref 2.7–4.5)
PHOSPHATE SERPL-MCNC: 6.5 MG/DL (ref 2.7–4.5)
PHOSPHATE SERPL-MCNC: 8.7 MG/DL (ref 2.7–4.5)
PIP: 17
PIP: 23
PIP: 26
PIP: 28
PISA TR MAX VEL: 2.49 M/S
PLATELET # BLD AUTO: 100 K/UL (ref 150–450)
PLATELET # BLD AUTO: 135 K/UL (ref 150–450)
PLATELET # BLD AUTO: 136 K/UL (ref 150–450)
PLATELET # BLD AUTO: 147 K/UL (ref 150–450)
PLATELET # BLD AUTO: 174 K/UL (ref 150–450)
PLATELET # BLD AUTO: 182 K/UL (ref 150–450)
PLATELET # BLD AUTO: 182 K/UL (ref 150–450)
PLATELET # BLD AUTO: 194 K/UL (ref 150–450)
PLATELET # BLD AUTO: 214 K/UL (ref 150–450)
PLATELET # BLD AUTO: 229 K/UL (ref 150–450)
PLATELET # BLD AUTO: 229 K/UL (ref 150–450)
PLATELET # BLD AUTO: 232 K/UL (ref 150–450)
PLATELET # BLD AUTO: 234 K/UL (ref 150–450)
PLATELET # BLD AUTO: 330 K/UL (ref 150–450)
PLATELET # BLD AUTO: 334 K/UL (ref 150–450)
PLATELET # BLD AUTO: 356 K/UL (ref 150–450)
PLATELET # BLD AUTO: 358 K/UL (ref 150–450)
PLATELET # BLD AUTO: 71 K/UL (ref 150–450)
PLATELET # BLD AUTO: 75 K/UL (ref 150–450)
PLATELET # BLD AUTO: 78 K/UL (ref 150–450)
PLATELET # BLD AUTO: ABNORMAL K/UL (ref 150–450)
PMV BLD AUTO: 10 FL (ref 9.2–12.9)
PMV BLD AUTO: 10.1 FL (ref 9.2–12.9)
PMV BLD AUTO: 10.2 FL (ref 9.2–12.9)
PMV BLD AUTO: 10.4 FL (ref 9.2–12.9)
PMV BLD AUTO: 10.4 FL (ref 9.2–12.9)
PMV BLD AUTO: 8.9 FL (ref 9.2–12.9)
PMV BLD AUTO: 9.1 FL (ref 9.2–12.9)
PMV BLD AUTO: 9.3 FL (ref 9.2–12.9)
PMV BLD AUTO: 9.4 FL (ref 9.2–12.9)
PMV BLD AUTO: 9.4 FL (ref 9.2–12.9)
PMV BLD AUTO: 9.5 FL (ref 9.2–12.9)
PMV BLD AUTO: 9.5 FL (ref 9.2–12.9)
PMV BLD AUTO: 9.6 FL (ref 9.2–12.9)
PMV BLD AUTO: 9.7 FL (ref 9.2–12.9)
PMV BLD AUTO: 9.9 FL (ref 9.2–12.9)
PMV BLD AUTO: ABNORMAL FL (ref 9.2–12.9)
PO2 BLDA: 124 MMHG (ref 80–100)
PO2 BLDA: 167 MMHG (ref 80–100)
PO2 BLDA: 25 MMHG (ref 40–60)
PO2 BLDA: 26 MMHG (ref 40–60)
PO2 BLDA: 268 MMHG (ref 80–100)
PO2 BLDA: 391 MMHG (ref 80–100)
PO2 BLDA: 48 MMHG (ref 40–60)
PO2 BLDA: 59 MMHG (ref 80–100)
POC BE: -1 MMOL/L
POC BE: -2 MMOL/L
POC BE: -5 MMOL/L
POC BE: -9 MMOL/L
POC BE: 12 MMOL/L
POC BE: 2 MMOL/L
POC BE: 8 MMOL/L
POC BE: 9 MMOL/L
POC IONIZED CALCIUM: 1.08 MMOL/L (ref 1.06–1.42)
POC MOLECULAR INFLUENZA A AGN: NEGATIVE
POC MOLECULAR INFLUENZA B AGN: NEGATIVE
POC SATURATED O2: 100 % (ref 95–100)
POC SATURATED O2: 41 % (ref 95–100)
POC SATURATED O2: 46 % (ref 95–100)
POC SATURATED O2: 86 % (ref 95–100)
POC SATURATED O2: 91 % (ref 95–100)
POC SATURATED O2: 99 % (ref 95–100)
POC TCO2 (MEASURED): 19 MMOL/L (ref 23–27)
POC TCO2: 19 MMOL/L (ref 23–27)
POC TCO2: 22 MMOL/L (ref 23–27)
POC TCO2: 26 MMOL/L (ref 24–29)
POC TCO2: 34 MMOL/L (ref 23–27)
POC TCO2: 36 MMOL/L (ref 23–27)
POC TCO2: 37 MMOL/L (ref 23–27)
POCT GLUCOSE: 109 MG/DL (ref 70–110)
POCT GLUCOSE: 111 MG/DL (ref 70–110)
POCT GLUCOSE: 112 MG/DL (ref 70–110)
POCT GLUCOSE: 118 MG/DL (ref 70–110)
POCT GLUCOSE: 118 MG/DL (ref 70–110)
POCT GLUCOSE: 121 MG/DL (ref 70–110)
POCT GLUCOSE: 122 MG/DL (ref 70–110)
POCT GLUCOSE: 130 MG/DL (ref 70–110)
POCT GLUCOSE: 131 MG/DL (ref 70–110)
POCT GLUCOSE: 135 MG/DL (ref 70–110)
POCT GLUCOSE: 137 MG/DL (ref 70–110)
POCT GLUCOSE: 138 MG/DL (ref 70–110)
POCT GLUCOSE: 141 MG/DL (ref 70–110)
POCT GLUCOSE: 143 MG/DL (ref 70–110)
POCT GLUCOSE: 145 MG/DL (ref 70–110)
POCT GLUCOSE: 147 MG/DL (ref 70–110)
POCT GLUCOSE: 149 MG/DL (ref 70–110)
POCT GLUCOSE: 152 MG/DL (ref 70–110)
POCT GLUCOSE: 160 MG/DL (ref 70–110)
POCT GLUCOSE: 162 MG/DL (ref 70–110)
POCT GLUCOSE: 171 MG/DL (ref 70–110)
POCT GLUCOSE: 171 MG/DL (ref 70–110)
POCT GLUCOSE: 183 MG/DL (ref 70–110)
POCT GLUCOSE: 191 MG/DL (ref 70–110)
POCT GLUCOSE: 191 MG/DL (ref 70–110)
POCT GLUCOSE: 200 MG/DL (ref 70–110)
POCT GLUCOSE: 201 MG/DL (ref 70–110)
POCT GLUCOSE: 213 MG/DL (ref 70–110)
POCT GLUCOSE: 220 MG/DL (ref 70–110)
POCT GLUCOSE: 222 MG/DL (ref 70–110)
POCT GLUCOSE: 225 MG/DL (ref 70–110)
POCT GLUCOSE: 225 MG/DL (ref 70–110)
POCT GLUCOSE: 226 MG/DL (ref 70–110)
POCT GLUCOSE: 232 MG/DL (ref 70–110)
POCT GLUCOSE: 234 MG/DL (ref 70–110)
POCT GLUCOSE: 236 MG/DL (ref 70–110)
POCT GLUCOSE: 237 MG/DL (ref 70–110)
POCT GLUCOSE: 24 MG/DL (ref 70–110)
POCT GLUCOSE: 245 MG/DL (ref 70–110)
POCT GLUCOSE: 249 MG/DL (ref 70–110)
POCT GLUCOSE: 251 MG/DL (ref 70–110)
POCT GLUCOSE: 253 MG/DL (ref 70–110)
POCT GLUCOSE: 255 MG/DL (ref 70–110)
POCT GLUCOSE: 256 MG/DL (ref 70–110)
POCT GLUCOSE: 258 MG/DL (ref 70–110)
POCT GLUCOSE: 259 MG/DL (ref 70–110)
POCT GLUCOSE: 284 MG/DL (ref 70–110)
POCT GLUCOSE: 288 MG/DL (ref 70–110)
POCT GLUCOSE: 291 MG/DL (ref 70–110)
POCT GLUCOSE: 340 MG/DL (ref 70–110)
POCT GLUCOSE: 347 MG/DL (ref 70–110)
POCT GLUCOSE: 374 MG/DL (ref 70–110)
POCT GLUCOSE: 414 MG/DL (ref 70–110)
POCT GLUCOSE: 445 MG/DL (ref 70–110)
POCT GLUCOSE: 489 MG/DL (ref 70–110)
POCT GLUCOSE: 59 MG/DL (ref 70–110)
POCT GLUCOSE: 69 MG/DL (ref 70–110)
POCT GLUCOSE: 85 MG/DL (ref 70–110)
POCT GLUCOSE: 98 MG/DL (ref 70–110)
POTASSIUM BLD-SCNC: 3.9 MMOL/L (ref 3.5–5.1)
POTASSIUM SERPL-SCNC: 3.1 MMOL/L (ref 3.5–5.1)
POTASSIUM SERPL-SCNC: 3.2 MMOL/L (ref 3.5–5.1)
POTASSIUM SERPL-SCNC: 3.2 MMOL/L (ref 3.5–5.1)
POTASSIUM SERPL-SCNC: 3.4 MMOL/L (ref 3.5–5.1)
POTASSIUM SERPL-SCNC: 3.5 MMOL/L (ref 3.5–5.1)
POTASSIUM SERPL-SCNC: 3.6 MMOL/L (ref 3.5–5.1)
POTASSIUM SERPL-SCNC: 3.7 MMOL/L (ref 3.5–5.1)
POTASSIUM SERPL-SCNC: 3.8 MMOL/L (ref 3.5–5.1)
POTASSIUM SERPL-SCNC: 3.8 MMOL/L (ref 3.5–5.1)
POTASSIUM SERPL-SCNC: 3.9 MMOL/L (ref 3.5–5.1)
POTASSIUM SERPL-SCNC: 3.9 MMOL/L (ref 3.5–5.1)
POTASSIUM SERPL-SCNC: 4 MMOL/L (ref 3.5–5.1)
POTASSIUM SERPL-SCNC: 4.2 MMOL/L (ref 3.5–5.1)
POTASSIUM SERPL-SCNC: 4.3 MMOL/L (ref 3.5–5.1)
POTASSIUM SERPL-SCNC: 4.3 MMOL/L (ref 3.5–5.1)
POTASSIUM SERPL-SCNC: 4.5 MMOL/L (ref 3.5–5.1)
POTASSIUM SERPL-SCNC: 4.5 MMOL/L (ref 3.5–5.1)
POTASSIUM SERPL-SCNC: 4.6 MMOL/L (ref 3.5–5.1)
POTASSIUM SERPL-SCNC: 6 MMOL/L (ref 3.5–5.1)
PROCALCITONIN SERPL IA-MCNC: 2.04 NG/ML
PROCALCITONIN SERPL IA-MCNC: 7.1 NG/ML
PROT SERPL-MCNC: 4.6 G/DL (ref 6–8.4)
PROT SERPL-MCNC: 4.6 G/DL (ref 6–8.4)
PROT SERPL-MCNC: 4.8 G/DL (ref 6–8.4)
PROT SERPL-MCNC: 5.2 G/DL (ref 6–8.4)
PROT SERPL-MCNC: 5.5 G/DL (ref 6–8.4)
PROT SERPL-MCNC: 5.6 G/DL (ref 6–8.4)
PROT SERPL-MCNC: 6.1 G/DL (ref 6–8.4)
PROT SERPL-MCNC: 6.1 G/DL (ref 6–8.4)
PROT SERPL-MCNC: 6.6 G/DL (ref 6–8.4)
PROT SERPL-MCNC: 6.6 G/DL (ref 6–8.4)
PROT SERPL-MCNC: 6.7 G/DL (ref 6–8.4)
PROT SERPL-MCNC: 6.7 G/DL (ref 6–8.4)
PROT SERPL-MCNC: 6.8 G/DL (ref 6–8.4)
PROT SERPL-MCNC: 6.8 G/DL (ref 6–8.4)
PROT SERPL-MCNC: 7.1 G/DL (ref 6–8.4)
PROT SERPL-MCNC: 7.4 G/DL (ref 6–8.4)
PROT SERPL-MCNC: 7.5 G/DL (ref 6–8.4)
PROT UR QL STRIP: ABNORMAL
PROTHROMBIN TIME: 11.3 SEC (ref 9–12.5)
PROTHROMBIN TIME: 11.3 SEC (ref 9–12.5)
PROTHROMBIN TIME: 11.4 SEC (ref 9–12.5)
PROTHROMBIN TIME: 11.7 SEC (ref 9–12.5)
PROTHROMBIN TIME: 11.9 SEC (ref 9–12.5)
PROTHROMBIN TIME: 11.9 SEC (ref 9–12.5)
PROTHROMBIN TIME: 12.4 SEC (ref 9–12.5)
PURKINJE CELL CYTOPLASMIC AB TYPE2: NEGATIVE
PURKINJE CELL CYTOPLASMIC AB TYPE2: NEGATIVE
PV PEAK GRADIENT: 6 MMHG
PV PEAK VELOCITY: 1.18 M/S
RA MAJOR: 4.27 CM
RA WIDTH: 3.23 CM
RBC # BLD AUTO: 2.31 M/UL (ref 4.6–6.2)
RBC # BLD AUTO: 2.32 M/UL (ref 4.6–6.2)
RBC # BLD AUTO: 2.47 M/UL (ref 4.6–6.2)
RBC # BLD AUTO: 2.64 M/UL (ref 4.6–6.2)
RBC # BLD AUTO: 2.68 M/UL (ref 4.6–6.2)
RBC # BLD AUTO: 2.77 M/UL (ref 4.6–6.2)
RBC # BLD AUTO: 2.85 M/UL (ref 4.6–6.2)
RBC # BLD AUTO: 2.87 M/UL (ref 4.6–6.2)
RBC # BLD AUTO: 3.09 M/UL (ref 4.6–6.2)
RBC # BLD AUTO: 3.24 M/UL (ref 4.6–6.2)
RBC # BLD AUTO: 3.28 M/UL (ref 4.6–6.2)
RBC # BLD AUTO: 3.5 M/UL (ref 4.6–6.2)
RBC # BLD AUTO: 3.69 M/UL (ref 4.6–6.2)
RBC # BLD AUTO: 3.77 M/UL (ref 4.6–6.2)
RBC # BLD AUTO: 3.78 M/UL (ref 4.6–6.2)
RBC # BLD AUTO: 3.83 M/UL (ref 4.6–6.2)
RBC # BLD AUTO: 3.91 M/UL (ref 4.6–6.2)
RBC # BLD AUTO: 3.91 M/UL (ref 4.6–6.2)
RBC # BLD AUTO: 4.11 M/UL (ref 4.6–6.2)
RBC #/AREA URNS HPF: 0 /HPF (ref 0–4)
RBC #/AREA URNS HPF: 16 /HPF (ref 0–4)
RBC #/AREA URNS HPF: 17 /HPF (ref 0–4)
RBC #/AREA URNS HPF: 2 /HPF (ref 0–4)
RBC #/AREA URNS HPF: 2 /HPF (ref 0–4)
RBC #/AREA URNS HPF: 3 /HPF (ref 0–4)
RBC #/AREA URNS HPF: 3 /HPF (ref 0–4)
RBC #/AREA URNS HPF: 6 /HPF (ref 0–4)
RBC #/AREA URNS HPF: 6 /HPF (ref 0–4)
RIGHT VENTRICULAR END-DIASTOLIC DIMENSION: 3.28 CM
RPR SER QL: NORMAL
RPR SER QL: NORMAL
SAMPLE: ABNORMAL
SAMPLE: NORMAL
SARS-COV-2 RDRP RESP QL NAA+PROBE: NEGATIVE
SINUS: 2.89 CM
SITE: ABNORMAL
SITE: NORMAL
SODIUM BLD-SCNC: 139 MMOL/L (ref 136–145)
SODIUM SERPL-SCNC: 128 MMOL/L (ref 136–145)
SODIUM SERPL-SCNC: 130 MMOL/L (ref 136–145)
SODIUM SERPL-SCNC: 131 MMOL/L (ref 136–145)
SODIUM SERPL-SCNC: 131 MMOL/L (ref 136–145)
SODIUM SERPL-SCNC: 132 MMOL/L (ref 136–145)
SODIUM SERPL-SCNC: 135 MMOL/L (ref 136–145)
SODIUM SERPL-SCNC: 136 MMOL/L (ref 136–145)
SODIUM SERPL-SCNC: 137 MMOL/L (ref 136–145)
SODIUM SERPL-SCNC: 137 MMOL/L (ref 136–145)
SODIUM SERPL-SCNC: 138 MMOL/L (ref 136–145)
SODIUM SERPL-SCNC: 139 MMOL/L (ref 136–145)
SODIUM SERPL-SCNC: 141 MMOL/L (ref 136–145)
SODIUM SERPL-SCNC: 141 MMOL/L (ref 136–145)
SODIUM SERPL-SCNC: 142 MMOL/L (ref 136–145)
SODIUM SERPL-SCNC: 142 MMOL/L (ref 136–145)
SODIUM UR-SCNC: <20 MMOL/L (ref 20–250)
SOL IL2 RECEP SERPL-MCNC: 1279.7 PG/ML (ref 175.3–858.2)
SP GR UR STRIP: 1.01 (ref 1–1.03)
SP GR UR STRIP: 1.02 (ref 1–1.03)
SP GR UR STRIP: 1.03 (ref 1–1.03)
SP GR UR STRIP: 1.03 (ref 1–1.03)
SP02: 100
SPECIMEN OUTDATE: NORMAL
SQUAMOUS #/AREA URNS HPF: 0 /HPF
SQUAMOUS #/AREA URNS HPF: 2 /HPF
SQUAMOUS #/AREA URNS HPF: 4 /HPF
SQUAMOUS #/AREA URNS HPF: 8 /HPF
STJ: 2.79 CM
TDI LATERAL: 0.09 M/S
TDI SEPTAL: 0.07 M/S
TDI: 0.08 M/S
TOXICOLOGY INFORMATION: ABNORMAL
TOXICOLOGY INFORMATION: ABNORMAL
TOXICOLOGY INFORMATION: NORMAL
TR MAX PG: 25 MMHG
TRICUSPID ANNULAR PLANE SYSTOLIC EXCURSION: 1.99 CM
TROPONIN I SERPL DL<=0.01 NG/ML-MCNC: 0.01 NG/ML (ref 0–0.03)
TROPONIN I SERPL DL<=0.01 NG/ML-MCNC: 0.02 NG/ML (ref 0–0.03)
TROPONIN I SERPL DL<=0.01 NG/ML-MCNC: 0.06 NG/ML (ref 0–0.03)
TROPONIN I SERPL DL<=0.01 NG/ML-MCNC: 0.49 NG/ML (ref 0–0.03)
TROPONIN I SERPL DL<=0.01 NG/ML-MCNC: 0.52 NG/ML (ref 0–0.03)
TROPONIN I SERPL DL<=0.01 NG/ML-MCNC: 0.65 NG/ML (ref 0–0.03)
TROPONIN I SERPL DL<=0.01 NG/ML-MCNC: 0.7 NG/ML (ref 0–0.03)
TROPONIN I SERPL DL<=0.01 NG/ML-MCNC: 0.78 NG/ML (ref 0–0.03)
TROPONIN I SERPL DL<=0.01 NG/ML-MCNC: 1.03 NG/ML (ref 0–0.03)
TROPONIN I SERPL DL<=0.01 NG/ML-MCNC: 1.31 NG/ML (ref 0–0.03)
TROPONIN I SERPL DL<=0.01 NG/ML-MCNC: 1.57 NG/ML (ref 0–0.03)
TROPONIN I SERPL DL<=0.01 NG/ML-MCNC: 1.8 NG/ML (ref 0–0.03)
TROPONIN I SERPL DL<=0.01 NG/ML-MCNC: <0.006 NG/ML (ref 0–0.03)
TROPONIN I SERPL DL<=0.01 NG/ML-MCNC: <0.006 NG/ML (ref 0–0.03)
TSH SERPL DL<=0.005 MIU/L-ACNC: 0.97 UIU/ML (ref 0.4–4)
TSH SERPL DL<=0.005 MIU/L-ACNC: 1.88 UIU/ML (ref 0.4–4)
UNIDENT CRYS URNS QL MICRO: ABNORMAL
URATE CRY URNS QL MICRO: ABNORMAL
URN SPEC COLLECT METH UR: ABNORMAL
UROBILINOGEN UR STRIP-ACNC: NEGATIVE EU/DL
VANCOMYCIN SERPL-MCNC: 16.5 UG/ML
VANCOMYCIN SERPL-MCNC: 23.1 UG/ML
VANCOMYCIN SERPL-MCNC: 31.9 UG/ML
VIT B1 BLD-MCNC: 68 UG/L (ref 38–122)
VT: 450
WBC # BLD AUTO: 10.21 K/UL (ref 3.9–12.7)
WBC # BLD AUTO: 10.43 K/UL (ref 3.9–12.7)
WBC # BLD AUTO: 10.77 K/UL (ref 3.9–12.7)
WBC # BLD AUTO: 11.19 K/UL (ref 3.9–12.7)
WBC # BLD AUTO: 11.49 K/UL (ref 3.9–12.7)
WBC # BLD AUTO: 11.94 K/UL (ref 3.9–12.7)
WBC # BLD AUTO: 12.75 K/UL (ref 3.9–12.7)
WBC # BLD AUTO: 13.34 K/UL (ref 3.9–12.7)
WBC # BLD AUTO: 15.35 K/UL (ref 3.9–12.7)
WBC # BLD AUTO: 15.37 K/UL (ref 3.9–12.7)
WBC # BLD AUTO: 19.49 K/UL (ref 3.9–12.7)
WBC # BLD AUTO: 19.56 K/UL (ref 3.9–12.7)
WBC # BLD AUTO: 19.78 K/UL (ref 3.9–12.7)
WBC # BLD AUTO: 19.96 K/UL (ref 3.9–12.7)
WBC # BLD AUTO: 22.45 K/UL (ref 3.9–12.7)
WBC # BLD AUTO: 8.12 K/UL (ref 3.9–12.7)
WBC # BLD AUTO: 9.11 K/UL (ref 3.9–12.7)
WBC # BLD AUTO: 9.11 K/UL (ref 3.9–12.7)
WBC # BLD AUTO: 9.35 K/UL (ref 3.9–12.7)
WBC # BLD AUTO: 9.76 K/UL (ref 3.9–12.7)
WBC # BLD AUTO: 9.95 K/UL (ref 3.9–12.7)
WBC #/AREA STL HPF: NORMAL /[HPF]
WBC #/AREA URNS HPF: 0 /HPF (ref 0–5)
WBC #/AREA URNS HPF: 1 /HPF (ref 0–5)
WBC #/AREA URNS HPF: 1 /HPF (ref 0–5)
WBC #/AREA URNS HPF: 2 /HPF (ref 0–5)
WBC #/AREA URNS HPF: 3 /HPF (ref 0–5)
WBC #/AREA URNS HPF: 34 /HPF (ref 0–5)
WBC #/AREA URNS HPF: 4 /HPF (ref 0–5)
WBC #/AREA URNS HPF: 46 /HPF (ref 0–5)
WBC #/AREA URNS HPF: 49 /HPF (ref 0–5)
YEAST URNS QL MICRO: ABNORMAL
Z-SCORE OF LEFT VENTRICULAR DIMENSION IN END DIASTOLE: -1.26
Z-SCORE OF LEFT VENTRICULAR DIMENSION IN END SYSTOLE: 0.13
ZINC SERPL-MCNC: 53 UG/DL (ref 60–130)
ZINC SERPL-MCNC: 58 UG/DL (ref 60–130)

## 2023-01-01 PROCEDURE — 86140 C-REACTIVE PROTEIN: CPT | Performed by: INTERNAL MEDICINE

## 2023-01-01 PROCEDURE — 93010 ELECTROCARDIOGRAM REPORT: CPT | Mod: ,,, | Performed by: INTERNAL MEDICINE

## 2023-01-01 PROCEDURE — 99291 CRITICAL CARE FIRST HOUR: CPT | Mod: ,,, | Performed by: INTERNAL MEDICINE

## 2023-01-01 PROCEDURE — 99291 PR CRITICAL CARE, E/M 30-74 MINUTES: ICD-10-PCS | Mod: 25,,, | Performed by: INTERNAL MEDICINE

## 2023-01-01 PROCEDURE — C1751 CATH, INF, PER/CENT/MIDLINE: HCPCS

## 2023-01-01 PROCEDURE — 94760 N-INVAS EAR/PLS OXIMETRY 1: CPT

## 2023-01-01 PROCEDURE — 63600175 PHARM REV CODE 636 W HCPCS: Performed by: INTERNAL MEDICINE

## 2023-01-01 PROCEDURE — 83690 ASSAY OF LIPASE: CPT | Performed by: PHYSICIAN ASSISTANT

## 2023-01-01 PROCEDURE — 83605 ASSAY OF LACTIC ACID: CPT | Performed by: EMERGENCY MEDICINE

## 2023-01-01 PROCEDURE — 63600175 PHARM REV CODE 636 W HCPCS: Performed by: STUDENT IN AN ORGANIZED HEALTH CARE EDUCATION/TRAINING PROGRAM

## 2023-01-01 PROCEDURE — 80053 COMPREHEN METABOLIC PANEL: CPT | Performed by: PHYSICIAN ASSISTANT

## 2023-01-01 PROCEDURE — 85025 COMPLETE CBC W/AUTO DIFF WBC: CPT | Performed by: EMERGENCY MEDICINE

## 2023-01-01 PROCEDURE — 85007 BL SMEAR W/DIFF WBC COUNT: CPT | Performed by: STUDENT IN AN ORGANIZED HEALTH CARE EDUCATION/TRAINING PROGRAM

## 2023-01-01 PROCEDURE — 94003 VENT MGMT INPAT SUBQ DAY: CPT

## 2023-01-01 PROCEDURE — 99222 1ST HOSP IP/OBS MODERATE 55: CPT | Mod: ,,, | Performed by: PSYCHIATRY & NEUROLOGY

## 2023-01-01 PROCEDURE — 82077 ASSAY SPEC XCP UR&BREATH IA: CPT | Performed by: PHYSICIAN ASSISTANT

## 2023-01-01 PROCEDURE — 84100 ASSAY OF PHOSPHORUS: CPT | Performed by: INTERNAL MEDICINE

## 2023-01-01 PROCEDURE — 25000003 PHARM REV CODE 250: Performed by: INTERNAL MEDICINE

## 2023-01-01 PROCEDURE — G0378 HOSPITAL OBSERVATION PER HR: HCPCS

## 2023-01-01 PROCEDURE — 84484 ASSAY OF TROPONIN QUANT: CPT | Performed by: EMERGENCY MEDICINE

## 2023-01-01 PROCEDURE — 85025 COMPLETE CBC W/AUTO DIFF WBC: CPT | Performed by: NURSE PRACTITIONER

## 2023-01-01 PROCEDURE — 83735 ASSAY OF MAGNESIUM: CPT | Performed by: EMERGENCY MEDICINE

## 2023-01-01 PROCEDURE — 99214 PR OFFICE/OUTPT VISIT, EST, LEVL IV, 30-39 MIN: ICD-10-PCS | Mod: ,,, | Performed by: INTERNAL MEDICINE

## 2023-01-01 PROCEDURE — 87186 SC STD MICRODIL/AGAR DIL: CPT | Performed by: HOSPITALIST

## 2023-01-01 PROCEDURE — 83605 ASSAY OF LACTIC ACID: CPT | Mod: 91 | Performed by: EMERGENCY MEDICINE

## 2023-01-01 PROCEDURE — 20000000 HC ICU ROOM

## 2023-01-01 PROCEDURE — 85025 COMPLETE CBC W/AUTO DIFF WBC: CPT | Performed by: PHYSICIAN ASSISTANT

## 2023-01-01 PROCEDURE — 86905 BLOOD TYPING RBC ANTIGENS: CPT | Mod: 91 | Performed by: STUDENT IN AN ORGANIZED HEALTH CARE EDUCATION/TRAINING PROGRAM

## 2023-01-01 PROCEDURE — 86900 BLOOD TYPING SEROLOGIC ABO: CPT | Performed by: HOSPITALIST

## 2023-01-01 PROCEDURE — 84145 PROCALCITONIN (PCT): CPT | Performed by: INTERNAL MEDICINE

## 2023-01-01 PROCEDURE — 87635 SARS-COV-2 COVID-19 AMP PRB: CPT | Performed by: EMERGENCY MEDICINE

## 2023-01-01 PROCEDURE — 99285 EMERGENCY DEPT VISIT HI MDM: CPT | Mod: 25

## 2023-01-01 PROCEDURE — 36620 PR INSERT CATH,ART,PERCUT,SHORTTERM: ICD-10-PCS | Mod: ,,, | Performed by: NURSE PRACTITIONER

## 2023-01-01 PROCEDURE — 83735 ASSAY OF MAGNESIUM: CPT | Performed by: NURSE PRACTITIONER

## 2023-01-01 PROCEDURE — 81000 URINALYSIS NONAUTO W/SCOPE: CPT | Performed by: PHYSICIAN ASSISTANT

## 2023-01-01 PROCEDURE — 80053 COMPREHEN METABOLIC PANEL: CPT | Mod: 91 | Performed by: HOSPITALIST

## 2023-01-01 PROCEDURE — 25500020 PHARM REV CODE 255: Performed by: STUDENT IN AN ORGANIZED HEALTH CARE EDUCATION/TRAINING PROGRAM

## 2023-01-01 PROCEDURE — 99222 PR INITIAL HOSPITAL CARE,LEVL II: ICD-10-PCS | Mod: ,,, | Performed by: PSYCHIATRY & NEUROLOGY

## 2023-01-01 PROCEDURE — 97116 GAIT TRAINING THERAPY: CPT

## 2023-01-01 PROCEDURE — 82550 ASSAY OF CK (CPK): CPT | Performed by: STUDENT IN AN ORGANIZED HEALTH CARE EDUCATION/TRAINING PROGRAM

## 2023-01-01 PROCEDURE — 99497 PR ADVNCD CARE PLAN 30 MIN: ICD-10-PCS | Mod: 25,,, | Performed by: INTERNAL MEDICINE

## 2023-01-01 PROCEDURE — 87070 CULTURE OTHR SPECIMN AEROBIC: CPT | Performed by: HOSPITALIST

## 2023-01-01 PROCEDURE — 88305 TISSUE EXAM BY PATHOLOGIST: CPT | Mod: 26,,, | Performed by: PATHOLOGY

## 2023-01-01 PROCEDURE — 85025 COMPLETE CBC W/AUTO DIFF WBC: CPT | Performed by: STUDENT IN AN ORGANIZED HEALTH CARE EDUCATION/TRAINING PROGRAM

## 2023-01-01 PROCEDURE — 63600175 PHARM REV CODE 636 W HCPCS: Performed by: HOSPITALIST

## 2023-01-01 PROCEDURE — 82962 GLUCOSE BLOOD TEST: CPT

## 2023-01-01 PROCEDURE — 25000003 PHARM REV CODE 250: Performed by: HOSPITALIST

## 2023-01-01 PROCEDURE — 99223 PR INITIAL HOSPITAL CARE,LEVL III: ICD-10-PCS | Mod: 25,,, | Performed by: INTERNAL MEDICINE

## 2023-01-01 PROCEDURE — 83921 ORGANIC ACID SINGLE QUANT: CPT | Performed by: INTERNAL MEDICINE

## 2023-01-01 PROCEDURE — 83735 ASSAY OF MAGNESIUM: CPT | Performed by: PHYSICIAN ASSISTANT

## 2023-01-01 PROCEDURE — 99214 OFFICE O/P EST MOD 30 MIN: CPT | Mod: ,,, | Performed by: INTERNAL MEDICINE

## 2023-01-01 PROCEDURE — 85610 PROTHROMBIN TIME: CPT | Performed by: HOSPITALIST

## 2023-01-01 PROCEDURE — 84100 ASSAY OF PHOSPHORUS: CPT | Performed by: EMERGENCY MEDICINE

## 2023-01-01 PROCEDURE — 83519 RIA NONANTIBODY: CPT | Mod: 59 | Performed by: HOSPITALIST

## 2023-01-01 PROCEDURE — 99232 SBSQ HOSP IP/OBS MODERATE 35: CPT | Mod: ,,, | Performed by: PSYCHIATRY & NEUROLOGY

## 2023-01-01 PROCEDURE — 80143 DRUG ASSAY ACETAMINOPHEN: CPT | Performed by: HOSPITALIST

## 2023-01-01 PROCEDURE — 27000221 HC OXYGEN, UP TO 24 HOURS

## 2023-01-01 PROCEDURE — 36620 INSERTION CATHETER ARTERY: CPT

## 2023-01-01 PROCEDURE — 82746 ASSAY OF FOLIC ACID SERUM: CPT | Performed by: INTERNAL MEDICINE

## 2023-01-01 PROCEDURE — 93005 ELECTROCARDIOGRAM TRACING: CPT

## 2023-01-01 PROCEDURE — 82746 ASSAY OF FOLIC ACID SERUM: CPT | Performed by: HOSPITALIST

## 2023-01-01 PROCEDURE — 82803 BLOOD GASES ANY COMBINATION: CPT

## 2023-01-01 PROCEDURE — 63600175 PHARM REV CODE 636 W HCPCS: Performed by: NURSE ANESTHETIST, CERTIFIED REGISTERED

## 2023-01-01 PROCEDURE — 94761 N-INVAS EAR/PLS OXIMETRY MLT: CPT

## 2023-01-01 PROCEDURE — 96374 THER/PROPH/DIAG INJ IV PUSH: CPT

## 2023-01-01 PROCEDURE — 80053 COMPREHEN METABOLIC PANEL: CPT | Performed by: INTERNAL MEDICINE

## 2023-01-01 PROCEDURE — 88307 TISSUE EXAM BY PATHOLOGIST: CPT | Performed by: PATHOLOGY

## 2023-01-01 PROCEDURE — 87040 BLOOD CULTURE FOR BACTERIA: CPT | Mod: 59 | Performed by: HOSPITALIST

## 2023-01-01 PROCEDURE — 85025 COMPLETE CBC W/AUTO DIFF WBC: CPT | Performed by: INTERNAL MEDICINE

## 2023-01-01 PROCEDURE — 84100 ASSAY OF PHOSPHORUS: CPT | Performed by: STUDENT IN AN ORGANIZED HEALTH CARE EDUCATION/TRAINING PROGRAM

## 2023-01-01 PROCEDURE — 96368 THER/DIAG CONCURRENT INF: CPT | Mod: 59

## 2023-01-01 PROCEDURE — 96365 THER/PROPH/DIAG IV INF INIT: CPT | Mod: 59

## 2023-01-01 PROCEDURE — 83520 IMMUNOASSAY QUANT NOS NONAB: CPT | Performed by: STUDENT IN AN ORGANIZED HEALTH CARE EDUCATION/TRAINING PROGRAM

## 2023-01-01 PROCEDURE — 36620 INSERTION CATHETER ARTERY: CPT | Mod: ,,, | Performed by: NURSE PRACTITIONER

## 2023-01-01 PROCEDURE — 27201640 HC PAD, ARTICGEL

## 2023-01-01 PROCEDURE — 96361 HYDRATE IV INFUSION ADD-ON: CPT

## 2023-01-01 PROCEDURE — 82550 ASSAY OF CK (CPK): CPT | Performed by: INTERNAL MEDICINE

## 2023-01-01 PROCEDURE — 63600175 PHARM REV CODE 636 W HCPCS: Performed by: EMERGENCY MEDICINE

## 2023-01-01 PROCEDURE — 94640 AIRWAY INHALATION TREATMENT: CPT

## 2023-01-01 PROCEDURE — 84484 ASSAY OF TROPONIN QUANT: CPT | Mod: 91 | Performed by: HOSPITALIST

## 2023-01-01 PROCEDURE — 80048 BASIC METABOLIC PNL TOTAL CA: CPT | Performed by: INTERNAL MEDICINE

## 2023-01-01 PROCEDURE — 25000003 PHARM REV CODE 250: Performed by: STUDENT IN AN ORGANIZED HEALTH CARE EDUCATION/TRAINING PROGRAM

## 2023-01-01 PROCEDURE — 84132 ASSAY OF SERUM POTASSIUM: CPT

## 2023-01-01 PROCEDURE — 36415 COLL VENOUS BLD VENIPUNCTURE: CPT | Performed by: HOSPITALIST

## 2023-01-01 PROCEDURE — 36410 VNPNXR 3YR/> PHY/QHP DX/THER: CPT

## 2023-01-01 PROCEDURE — 84484 ASSAY OF TROPONIN QUANT: CPT | Performed by: PHYSICIAN ASSISTANT

## 2023-01-01 PROCEDURE — D9220A PRA ANESTHESIA: Mod: CRNA,,, | Performed by: NURSE ANESTHETIST, CERTIFIED REGISTERED

## 2023-01-01 PROCEDURE — 96365 THER/PROPH/DIAG IV INF INIT: CPT

## 2023-01-01 PROCEDURE — 86592 SYPHILIS TEST NON-TREP QUAL: CPT | Performed by: INTERNAL MEDICINE

## 2023-01-01 PROCEDURE — 86905 BLOOD TYPING RBC ANTIGENS: CPT | Performed by: HOSPITALIST

## 2023-01-01 PROCEDURE — 99291 PR CRITICAL CARE, E/M 30-74 MINUTES: ICD-10-PCS | Mod: ,,, | Performed by: INTERNAL MEDICINE

## 2023-01-01 PROCEDURE — 82533 TOTAL CORTISOL: CPT | Mod: 91 | Performed by: STUDENT IN AN ORGANIZED HEALTH CARE EDUCATION/TRAINING PROGRAM

## 2023-01-01 PROCEDURE — 84484 ASSAY OF TROPONIN QUANT: CPT | Performed by: NURSE PRACTITIONER

## 2023-01-01 PROCEDURE — 82977 ASSAY OF GGT: CPT | Mod: 91 | Performed by: HOSPITALIST

## 2023-01-01 PROCEDURE — 83735 ASSAY OF MAGNESIUM: CPT | Mod: 91 | Performed by: STUDENT IN AN ORGANIZED HEALTH CARE EDUCATION/TRAINING PROGRAM

## 2023-01-01 PROCEDURE — 82140 ASSAY OF AMMONIA: CPT | Performed by: HOSPITALIST

## 2023-01-01 PROCEDURE — 99215 OFFICE O/P EST HI 40 MIN: CPT | Mod: 95,,, | Performed by: STUDENT IN AN ORGANIZED HEALTH CARE EDUCATION/TRAINING PROGRAM

## 2023-01-01 PROCEDURE — 25000003 PHARM REV CODE 250: Performed by: NURSE PRACTITIONER

## 2023-01-01 PROCEDURE — 85007 BL SMEAR W/DIFF WBC COUNT: CPT | Mod: 91 | Performed by: EMERGENCY MEDICINE

## 2023-01-01 PROCEDURE — 88331 PATH CONSLTJ SURG 1 BLK 1SPC: CPT | Mod: 26,,, | Performed by: PATHOLOGY

## 2023-01-01 PROCEDURE — 82010 KETONE BODYS QUAN: CPT | Performed by: STUDENT IN AN ORGANIZED HEALTH CARE EDUCATION/TRAINING PROGRAM

## 2023-01-01 PROCEDURE — 97162 PT EVAL MOD COMPLEX 30 MIN: CPT | Performed by: PHYSICAL THERAPIST

## 2023-01-01 PROCEDURE — 88342 CHG IMMUNOCYTOCHEMISTRY: ICD-10-PCS | Mod: 26,,, | Performed by: PATHOLOGY

## 2023-01-01 PROCEDURE — 97535 SELF CARE MNGMENT TRAINING: CPT

## 2023-01-01 PROCEDURE — 88341 PR IHC OR ICC EACH ADD'L SINGLE ANTIBODY  STAINPR: ICD-10-PCS | Mod: 26,,, | Performed by: PATHOLOGY

## 2023-01-01 PROCEDURE — 88305 TISSUE EXAM BY PATHOLOGIST: ICD-10-PCS | Mod: 26,,, | Performed by: PATHOLOGY

## 2023-01-01 PROCEDURE — 87427 SHIGA-LIKE TOXIN AG IA: CPT | Performed by: HOSPITALIST

## 2023-01-01 PROCEDURE — 99900035 HC TECH TIME PER 15 MIN (STAT)

## 2023-01-01 PROCEDURE — 25000242 PHARM REV CODE 250 ALT 637 W/ HCPCS: Performed by: STUDENT IN AN ORGANIZED HEALTH CARE EDUCATION/TRAINING PROGRAM

## 2023-01-01 PROCEDURE — 93010 EKG 12-LEAD: ICD-10-PCS | Mod: ,,, | Performed by: INTERNAL MEDICINE

## 2023-01-01 PROCEDURE — 84443 ASSAY THYROID STIM HORMONE: CPT | Performed by: STUDENT IN AN ORGANIZED HEALTH CARE EDUCATION/TRAINING PROGRAM

## 2023-01-01 PROCEDURE — D9220A PRA ANESTHESIA: Mod: ANES,,, | Performed by: ANESTHESIOLOGY

## 2023-01-01 PROCEDURE — 97535 SELF CARE MNGMENT TRAINING: CPT | Mod: CO

## 2023-01-01 PROCEDURE — 63600175 PHARM REV CODE 636 W HCPCS

## 2023-01-01 PROCEDURE — 97110 THERAPEUTIC EXERCISES: CPT | Performed by: PHYSICAL THERAPIST

## 2023-01-01 PROCEDURE — 87389 HIV-1 AG W/HIV-1&-2 AB AG IA: CPT | Performed by: INTERNAL MEDICINE

## 2023-01-01 PROCEDURE — 87040 BLOOD CULTURE FOR BACTERIA: CPT | Mod: 59 | Performed by: EMERGENCY MEDICINE

## 2023-01-01 PROCEDURE — 82010 KETONE BODYS QUAN: CPT | Performed by: HOSPITALIST

## 2023-01-01 PROCEDURE — 25000242 PHARM REV CODE 250 ALT 637 W/ HCPCS: Performed by: EMERGENCY MEDICINE

## 2023-01-01 PROCEDURE — 87086 URINE CULTURE/COLONY COUNT: CPT | Performed by: PHYSICIAN ASSISTANT

## 2023-01-01 PROCEDURE — 96367 TX/PROPH/DG ADDL SEQ IV INF: CPT

## 2023-01-01 PROCEDURE — 81000 URINALYSIS NONAUTO W/SCOPE: CPT | Performed by: HOSPITALIST

## 2023-01-01 PROCEDURE — 80307 DRUG TEST PRSMV CHEM ANLYZR: CPT | Performed by: EMERGENCY MEDICINE

## 2023-01-01 PROCEDURE — 83605 ASSAY OF LACTIC ACID: CPT

## 2023-01-01 PROCEDURE — 85025 COMPLETE CBC W/AUTO DIFF WBC: CPT | Mod: 91 | Performed by: HOSPITALIST

## 2023-01-01 PROCEDURE — 99232 PR SUBSEQUENT HOSPITAL CARE,LEVL II: ICD-10-PCS | Mod: ,,, | Performed by: PSYCHIATRY & NEUROLOGY

## 2023-01-01 PROCEDURE — 36000706

## 2023-01-01 PROCEDURE — 84100 ASSAY OF PHOSPHORUS: CPT | Mod: 91 | Performed by: EMERGENCY MEDICINE

## 2023-01-01 PROCEDURE — 84484 ASSAY OF TROPONIN QUANT: CPT | Mod: 91 | Performed by: INTERNAL MEDICINE

## 2023-01-01 PROCEDURE — P9612 CATHETERIZE FOR URINE SPEC: HCPCS

## 2023-01-01 PROCEDURE — 85379 FIBRIN DEGRADATION QUANT: CPT | Performed by: PHYSICIAN ASSISTANT

## 2023-01-01 PROCEDURE — 36556 INSERT NON-TUNNEL CV CATH: CPT | Mod: LT

## 2023-01-01 PROCEDURE — 96366 THER/PROPH/DIAG IV INF ADDON: CPT | Mod: 59

## 2023-01-01 PROCEDURE — 80053 COMPREHEN METABOLIC PANEL: CPT | Performed by: NURSE PRACTITIONER

## 2023-01-01 PROCEDURE — 85610 PROTHROMBIN TIME: CPT | Performed by: STUDENT IN AN ORGANIZED HEALTH CARE EDUCATION/TRAINING PROGRAM

## 2023-01-01 PROCEDURE — 81000 URINALYSIS NONAUTO W/SCOPE: CPT | Performed by: EMERGENCY MEDICINE

## 2023-01-01 PROCEDURE — 88331 PR  PATH CONSULT IN SURG,W FRZ SEC: ICD-10-PCS | Mod: 26,,, | Performed by: PATHOLOGY

## 2023-01-01 PROCEDURE — 80048 BASIC METABOLIC PNL TOTAL CA: CPT | Mod: 91,XB | Performed by: EMERGENCY MEDICINE

## 2023-01-01 PROCEDURE — 83690 ASSAY OF LIPASE: CPT | Performed by: INTERNAL MEDICINE

## 2023-01-01 PROCEDURE — 99900026 HC AIRWAY MAINTENANCE (STAT)

## 2023-01-01 PROCEDURE — 80307 DRUG TEST PRSMV CHEM ANLYZR: CPT | Performed by: STUDENT IN AN ORGANIZED HEALTH CARE EDUCATION/TRAINING PROGRAM

## 2023-01-01 PROCEDURE — 99291 CRITICAL CARE FIRST HOUR: CPT

## 2023-01-01 PROCEDURE — 25000003 PHARM REV CODE 250: Performed by: EMERGENCY MEDICINE

## 2023-01-01 PROCEDURE — 83735 ASSAY OF MAGNESIUM: CPT | Performed by: STUDENT IN AN ORGANIZED HEALTH CARE EDUCATION/TRAINING PROGRAM

## 2023-01-01 PROCEDURE — 95816 EEG AWAKE AND DROWSY: CPT | Mod: 26,,, | Performed by: PSYCHIATRY & NEUROLOGY

## 2023-01-01 PROCEDURE — 80202 ASSAY OF VANCOMYCIN: CPT | Performed by: HOSPITALIST

## 2023-01-01 PROCEDURE — 97161 PT EVAL LOW COMPLEX 20 MIN: CPT

## 2023-01-01 PROCEDURE — 97530 THERAPEUTIC ACTIVITIES: CPT

## 2023-01-01 PROCEDURE — 51702 INSERT TEMP BLADDER CATH: CPT

## 2023-01-01 PROCEDURE — 80053 COMPREHEN METABOLIC PANEL: CPT | Performed by: EMERGENCY MEDICINE

## 2023-01-01 PROCEDURE — 88341 IMHCHEM/IMCYTCHM EA ADD ANTB: CPT | Mod: 59 | Performed by: PATHOLOGY

## 2023-01-01 PROCEDURE — 88307 PR  SURG PATH,LEVEL V: ICD-10-PCS | Mod: 26,,, | Performed by: PATHOLOGY

## 2023-01-01 PROCEDURE — 85027 COMPLETE CBC AUTOMATED: CPT | Performed by: STUDENT IN AN ORGANIZED HEALTH CARE EDUCATION/TRAINING PROGRAM

## 2023-01-01 PROCEDURE — 27100108

## 2023-01-01 PROCEDURE — 36415 COLL VENOUS BLD VENIPUNCTURE: CPT | Performed by: STUDENT IN AN ORGANIZED HEALTH CARE EDUCATION/TRAINING PROGRAM

## 2023-01-01 PROCEDURE — 80053 COMPREHEN METABOLIC PANEL: CPT | Performed by: STUDENT IN AN ORGANIZED HEALTH CARE EDUCATION/TRAINING PROGRAM

## 2023-01-01 PROCEDURE — 86255 FLUORESCENT ANTIBODY SCREEN: CPT | Mod: 59 | Performed by: INTERNAL MEDICINE

## 2023-01-01 PROCEDURE — 82570 ASSAY OF URINE CREATININE: CPT | Performed by: INTERNAL MEDICINE

## 2023-01-01 PROCEDURE — 87088 URINE BACTERIA CULTURE: CPT | Performed by: HOSPITALIST

## 2023-01-01 PROCEDURE — 11000001 HC ACUTE MED/SURG PRIVATE ROOM

## 2023-01-01 PROCEDURE — 85610 PROTHROMBIN TIME: CPT | Performed by: EMERGENCY MEDICINE

## 2023-01-01 PROCEDURE — U0002 COVID-19 LAB TEST NON-CDC: HCPCS | Performed by: HOSPITALIST

## 2023-01-01 PROCEDURE — 88342 IMHCHEM/IMCYTCHM 1ST ANTB: CPT | Mod: 26,,, | Performed by: PATHOLOGY

## 2023-01-01 PROCEDURE — 84100 ASSAY OF PHOSPHORUS: CPT | Performed by: HOSPITALIST

## 2023-01-01 PROCEDURE — 88341 IMHCHEM/IMCYTCHM EA ADD ANTB: CPT | Mod: 26,,, | Performed by: PATHOLOGY

## 2023-01-01 PROCEDURE — 83880 ASSAY OF NATRIURETIC PEPTIDE: CPT | Performed by: EMERGENCY MEDICINE

## 2023-01-01 PROCEDURE — 96372 THER/PROPH/DIAG INJ SC/IM: CPT | Performed by: INTERNAL MEDICINE

## 2023-01-01 PROCEDURE — 89055 LEUKOCYTE ASSESSMENT FECAL: CPT | Performed by: HOSPITALIST

## 2023-01-01 PROCEDURE — 87106 FUNGI IDENTIFICATION YEAST: CPT | Performed by: HOSPITALIST

## 2023-01-01 PROCEDURE — 27100171 HC OXYGEN HIGH FLOW UP TO 24 HOURS

## 2023-01-01 PROCEDURE — 87077 CULTURE AEROBIC IDENTIFY: CPT | Performed by: HOSPITALIST

## 2023-01-01 PROCEDURE — 63600175 PHARM REV CODE 636 W HCPCS: Performed by: PHYSICIAN ASSISTANT

## 2023-01-01 PROCEDURE — 36415 COLL VENOUS BLD VENIPUNCTURE: CPT | Performed by: INTERNAL MEDICINE

## 2023-01-01 PROCEDURE — 99284 EMERGENCY DEPT VISIT MOD MDM: CPT | Mod: 25

## 2023-01-01 PROCEDURE — 83735 ASSAY OF MAGNESIUM: CPT | Performed by: INTERNAL MEDICINE

## 2023-01-01 PROCEDURE — 88305 TISSUE EXAM BY PATHOLOGIST: CPT | Performed by: PATHOLOGY

## 2023-01-01 PROCEDURE — 83735 ASSAY OF MAGNESIUM: CPT | Mod: 91 | Performed by: EMERGENCY MEDICINE

## 2023-01-01 PROCEDURE — 82565 ASSAY OF CREATININE: CPT

## 2023-01-01 PROCEDURE — 84484 ASSAY OF TROPONIN QUANT: CPT | Performed by: HOSPITALIST

## 2023-01-01 PROCEDURE — 81000 URINALYSIS NONAUTO W/SCOPE: CPT | Mod: 59 | Performed by: STUDENT IN AN ORGANIZED HEALTH CARE EDUCATION/TRAINING PROGRAM

## 2023-01-01 PROCEDURE — 21400001 HC TELEMETRY ROOM

## 2023-01-01 PROCEDURE — 86900 BLOOD TYPING SEROLOGIC ABO: CPT | Mod: 91 | Performed by: STUDENT IN AN ORGANIZED HEALTH CARE EDUCATION/TRAINING PROGRAM

## 2023-01-01 PROCEDURE — 97165 OT EVAL LOW COMPLEX 30 MIN: CPT

## 2023-01-01 PROCEDURE — 85652 RBC SED RATE AUTOMATED: CPT | Performed by: INTERNAL MEDICINE

## 2023-01-01 PROCEDURE — 97110 THERAPEUTIC EXERCISES: CPT | Mod: CQ

## 2023-01-01 PROCEDURE — 81000 URINALYSIS NONAUTO W/SCOPE: CPT | Mod: 59 | Performed by: PHYSICIAN ASSISTANT

## 2023-01-01 PROCEDURE — 82248 BILIRUBIN DIRECT: CPT | Mod: 91 | Performed by: HOSPITALIST

## 2023-01-01 PROCEDURE — D9220A PRA ANESTHESIA: ICD-10-PCS | Mod: ANES,,, | Performed by: ANESTHESIOLOGY

## 2023-01-01 PROCEDURE — 87046 STOOL CULTR AEROBIC BACT EA: CPT | Mod: 59 | Performed by: HOSPITALIST

## 2023-01-01 PROCEDURE — 82140 ASSAY OF AMMONIA: CPT | Performed by: EMERGENCY MEDICINE

## 2023-01-01 PROCEDURE — 36415 COLL VENOUS BLD VENIPUNCTURE: CPT | Performed by: NURSE PRACTITIONER

## 2023-01-01 PROCEDURE — 96372 THER/PROPH/DIAG INJ SC/IM: CPT | Performed by: NURSE PRACTITIONER

## 2023-01-01 PROCEDURE — 83605 ASSAY OF LACTIC ACID: CPT | Performed by: INTERNAL MEDICINE

## 2023-01-01 PROCEDURE — 87086 URINE CULTURE/COLONY COUNT: CPT | Performed by: HOSPITALIST

## 2023-01-01 PROCEDURE — 84446 ASSAY OF VITAMIN E: CPT | Performed by: INTERNAL MEDICINE

## 2023-01-01 PROCEDURE — 99291 CRITICAL CARE FIRST HOUR: CPT | Mod: 25,,, | Performed by: INTERNAL MEDICINE

## 2023-01-01 PROCEDURE — 96360 HYDRATION IV INFUSION INIT: CPT

## 2023-01-01 PROCEDURE — 82077 ASSAY SPEC XCP UR&BREATH IA: CPT | Performed by: EMERGENCY MEDICINE

## 2023-01-01 PROCEDURE — 84100 ASSAY OF PHOSPHORUS: CPT | Mod: 91 | Performed by: HOSPITALIST

## 2023-01-01 PROCEDURE — D9220A PRA ANESTHESIA: ICD-10-PCS | Mod: CRNA,,, | Performed by: NURSE ANESTHETIST, CERTIFIED REGISTERED

## 2023-01-01 PROCEDURE — 82550 ASSAY OF CK (CPK): CPT | Mod: 91 | Performed by: HOSPITALIST

## 2023-01-01 PROCEDURE — 99223 1ST HOSP IP/OBS HIGH 75: CPT | Mod: 25,,, | Performed by: INTERNAL MEDICINE

## 2023-01-01 PROCEDURE — 85014 HEMATOCRIT: CPT

## 2023-01-01 PROCEDURE — 85027 COMPLETE CBC AUTOMATED: CPT | Mod: 91 | Performed by: EMERGENCY MEDICINE

## 2023-01-01 PROCEDURE — 85730 THROMBOPLASTIN TIME PARTIAL: CPT | Performed by: HOSPITALIST

## 2023-01-01 PROCEDURE — 95816 PR EEG,W/AWAKE & DROWSY RECORD: ICD-10-PCS | Mod: 26,,, | Performed by: PSYCHIATRY & NEUROLOGY

## 2023-01-01 PROCEDURE — 36600 WITHDRAWAL OF ARTERIAL BLOOD: CPT

## 2023-01-01 PROCEDURE — 81000 URINALYSIS NONAUTO W/SCOPE: CPT | Mod: 59 | Performed by: EMERGENCY MEDICINE

## 2023-01-01 PROCEDURE — 37000009 HC ANESTHESIA EA ADD 15 MINS

## 2023-01-01 PROCEDURE — A4217 STERILE WATER/SALINE, 500 ML: HCPCS | Performed by: INTERNAL MEDICINE

## 2023-01-01 PROCEDURE — 87040 BLOOD CULTURE FOR BACTERIA: CPT | Performed by: EMERGENCY MEDICINE

## 2023-01-01 PROCEDURE — 81000 URINALYSIS NONAUTO W/SCOPE: CPT | Performed by: STUDENT IN AN ORGANIZED HEALTH CARE EDUCATION/TRAINING PROGRAM

## 2023-01-01 PROCEDURE — 25500020 PHARM REV CODE 255: Performed by: EMERGENCY MEDICINE

## 2023-01-01 PROCEDURE — 82800 BLOOD PH: CPT

## 2023-01-01 PROCEDURE — 88342 IMHCHEM/IMCYTCHM 1ST ANTB: CPT | Performed by: PATHOLOGY

## 2023-01-01 PROCEDURE — 82140 ASSAY OF AMMONIA: CPT | Performed by: INTERNAL MEDICINE

## 2023-01-01 PROCEDURE — 82164 ANGIOTENSIN I ENZYME TEST: CPT | Performed by: HOSPITALIST

## 2023-01-01 PROCEDURE — 84484 ASSAY OF TROPONIN QUANT: CPT | Mod: 91 | Performed by: STUDENT IN AN ORGANIZED HEALTH CARE EDUCATION/TRAINING PROGRAM

## 2023-01-01 PROCEDURE — 82150 ASSAY OF AMYLASE: CPT | Mod: 91 | Performed by: HOSPITALIST

## 2023-01-01 PROCEDURE — 83690 ASSAY OF LIPASE: CPT | Performed by: STUDENT IN AN ORGANIZED HEALTH CARE EDUCATION/TRAINING PROGRAM

## 2023-01-01 PROCEDURE — 12000002 HC ACUTE/MED SURGE SEMI-PRIVATE ROOM

## 2023-01-01 PROCEDURE — 36000707

## 2023-01-01 PROCEDURE — 84425 ASSAY OF VITAMIN B-1: CPT | Performed by: INTERNAL MEDICINE

## 2023-01-01 PROCEDURE — 87045 FECES CULTURE AEROBIC BACT: CPT | Performed by: HOSPITALIST

## 2023-01-01 PROCEDURE — 84443 ASSAY THYROID STIM HORMONE: CPT | Performed by: INTERNAL MEDICINE

## 2023-01-01 PROCEDURE — 83735 ASSAY OF MAGNESIUM: CPT | Performed by: HOSPITALIST

## 2023-01-01 PROCEDURE — 86255 FLUORESCENT ANTIBODY SCREEN: CPT | Mod: 59 | Performed by: HOSPITALIST

## 2023-01-01 PROCEDURE — 87205 SMEAR GRAM STAIN: CPT | Performed by: HOSPITALIST

## 2023-01-01 PROCEDURE — 85730 THROMBOPLASTIN TIME PARTIAL: CPT | Performed by: EMERGENCY MEDICINE

## 2023-01-01 PROCEDURE — 85652 RBC SED RATE AUTOMATED: CPT | Performed by: HOSPITALIST

## 2023-01-01 PROCEDURE — 82077 ASSAY SPEC XCP UR&BREATH IA: CPT | Performed by: STUDENT IN AN ORGANIZED HEALTH CARE EDUCATION/TRAINING PROGRAM

## 2023-01-01 PROCEDURE — 83880 ASSAY OF NATRIURETIC PEPTIDE: CPT | Performed by: STUDENT IN AN ORGANIZED HEALTH CARE EDUCATION/TRAINING PROGRAM

## 2023-01-01 PROCEDURE — 83615 LACTATE (LD) (LDH) ENZYME: CPT | Performed by: HOSPITALIST

## 2023-01-01 PROCEDURE — 85379 FIBRIN DEGRADATION QUANT: CPT | Performed by: STUDENT IN AN ORGANIZED HEALTH CARE EDUCATION/TRAINING PROGRAM

## 2023-01-01 PROCEDURE — 96372 THER/PROPH/DIAG INJ SC/IM: CPT | Mod: 59 | Performed by: NURSE PRACTITIONER

## 2023-01-01 PROCEDURE — 84446 ASSAY OF VITAMIN E: CPT | Performed by: HOSPITALIST

## 2023-01-01 PROCEDURE — 82533 TOTAL CORTISOL: CPT | Performed by: INTERNAL MEDICINE

## 2023-01-01 PROCEDURE — 25000003 PHARM REV CODE 250: Performed by: PHYSICIAN ASSISTANT

## 2023-01-01 PROCEDURE — 84300 ASSAY OF URINE SODIUM: CPT | Performed by: INTERNAL MEDICINE

## 2023-01-01 PROCEDURE — 81000 URINALYSIS NONAUTO W/SCOPE: CPT | Performed by: INTERNAL MEDICINE

## 2023-01-01 PROCEDURE — 80053 COMPREHEN METABOLIC PANEL: CPT | Mod: 91 | Performed by: STUDENT IN AN ORGANIZED HEALTH CARE EDUCATION/TRAINING PROGRAM

## 2023-01-01 PROCEDURE — 83690 ASSAY OF LIPASE: CPT | Performed by: EMERGENCY MEDICINE

## 2023-01-01 PROCEDURE — 99215 PR OFFICE/OUTPT VISIT, EST, LEVL V, 40-54 MIN: ICD-10-PCS | Mod: 95,,, | Performed by: STUDENT IN AN ORGANIZED HEALTH CARE EDUCATION/TRAINING PROGRAM

## 2023-01-01 PROCEDURE — 84295 ASSAY OF SERUM SODIUM: CPT

## 2023-01-01 PROCEDURE — 88331 PATH CONSLTJ SURG 1 BLK 1SPC: CPT | Performed by: PATHOLOGY

## 2023-01-01 PROCEDURE — 80076 HEPATIC FUNCTION PANEL: CPT | Performed by: STUDENT IN AN ORGANIZED HEALTH CARE EDUCATION/TRAINING PROGRAM

## 2023-01-01 PROCEDURE — 85025 COMPLETE CBC W/AUTO DIFF WBC: CPT | Mod: 91 | Performed by: STUDENT IN AN ORGANIZED HEALTH CARE EDUCATION/TRAINING PROGRAM

## 2023-01-01 PROCEDURE — 84630 ASSAY OF ZINC: CPT | Performed by: INTERNAL MEDICINE

## 2023-01-01 PROCEDURE — 83735 ASSAY OF MAGNESIUM: CPT | Mod: 91 | Performed by: HOSPITALIST

## 2023-01-01 PROCEDURE — 37000008 HC ANESTHESIA 1ST 15 MINUTES

## 2023-01-01 PROCEDURE — 63600175 PHARM REV CODE 636 W HCPCS: Performed by: NURSE PRACTITIONER

## 2023-01-01 PROCEDURE — 84484 ASSAY OF TROPONIN QUANT: CPT | Performed by: INTERNAL MEDICINE

## 2023-01-01 PROCEDURE — 83036 HEMOGLOBIN GLYCOSYLATED A1C: CPT | Performed by: INTERNAL MEDICINE

## 2023-01-01 PROCEDURE — 82010 KETONE BODYS QUAN: CPT | Performed by: EMERGENCY MEDICINE

## 2023-01-01 PROCEDURE — 99497 ADVNCD CARE PLAN 30 MIN: CPT | Mod: 25,,, | Performed by: INTERNAL MEDICINE

## 2023-01-01 PROCEDURE — 85730 THROMBOPLASTIN TIME PARTIAL: CPT | Performed by: STUDENT IN AN ORGANIZED HEALTH CARE EDUCATION/TRAINING PROGRAM

## 2023-01-01 PROCEDURE — 84630 ASSAY OF ZINC: CPT | Performed by: HOSPITALIST

## 2023-01-01 PROCEDURE — 83036 HEMOGLOBIN GLYCOSYLATED A1C: CPT | Performed by: HOSPITALIST

## 2023-01-01 PROCEDURE — 92950 HEART/LUNG RESUSCITATION CPR: CPT

## 2023-01-01 PROCEDURE — 95816 EEG AWAKE AND DROWSY: CPT

## 2023-01-01 PROCEDURE — 83690 ASSAY OF LIPASE: CPT | Performed by: HOSPITALIST

## 2023-01-01 PROCEDURE — 87502 INFLUENZA DNA AMP PROBE: CPT

## 2023-01-01 PROCEDURE — A4216 STERILE WATER/SALINE, 10 ML: HCPCS | Performed by: INTERNAL MEDICINE

## 2023-01-01 PROCEDURE — 82330 ASSAY OF CALCIUM: CPT

## 2023-01-01 PROCEDURE — 83520 IMMUNOASSAY QUANT NOS NONAB: CPT | Performed by: HOSPITALIST

## 2023-01-01 PROCEDURE — 96376 TX/PRO/DX INJ SAME DRUG ADON: CPT

## 2023-01-01 PROCEDURE — 85610 PROTHROMBIN TIME: CPT | Mod: 91 | Performed by: EMERGENCY MEDICINE

## 2023-01-01 PROCEDURE — 83605 ASSAY OF LACTIC ACID: CPT | Performed by: STUDENT IN AN ORGANIZED HEALTH CARE EDUCATION/TRAINING PROGRAM

## 2023-01-01 PROCEDURE — 82077 ASSAY SPEC XCP UR&BREATH IA: CPT | Performed by: HOSPITALIST

## 2023-01-01 PROCEDURE — 97116 GAIT TRAINING THERAPY: CPT | Mod: CQ

## 2023-01-01 PROCEDURE — 80053 COMPREHEN METABOLIC PANEL: CPT | Performed by: HOSPITALIST

## 2023-01-01 PROCEDURE — 87086 URINE CULTURE/COLONY COUNT: CPT | Performed by: EMERGENCY MEDICINE

## 2023-01-01 PROCEDURE — 82525 ASSAY OF COPPER: CPT | Performed by: HOSPITALIST

## 2023-01-01 PROCEDURE — 87040 BLOOD CULTURE FOR BACTERIA: CPT | Performed by: STUDENT IN AN ORGANIZED HEALTH CARE EDUCATION/TRAINING PROGRAM

## 2023-01-01 PROCEDURE — 88307 TISSUE EXAM BY PATHOLOGIST: CPT | Mod: 26,,, | Performed by: PATHOLOGY

## 2023-01-01 PROCEDURE — 94002 VENT MGMT INPAT INIT DAY: CPT

## 2023-01-01 PROCEDURE — 80048 BASIC METABOLIC PNL TOTAL CA: CPT | Mod: XB

## 2023-01-01 PROCEDURE — 80048 BASIC METABOLIC PNL TOTAL CA: CPT | Performed by: STUDENT IN AN ORGANIZED HEALTH CARE EDUCATION/TRAINING PROGRAM

## 2023-01-01 RX ORDER — IPRATROPIUM BROMIDE AND ALBUTEROL SULFATE 2.5; .5 MG/3ML; MG/3ML
3 SOLUTION RESPIRATORY (INHALATION) EVERY 4 HOURS PRN
Status: DISCONTINUED | OUTPATIENT
Start: 2023-01-01 | End: 2023-01-01 | Stop reason: RX

## 2023-01-01 RX ORDER — TALC
6 POWDER (GRAM) TOPICAL NIGHTLY PRN
Status: DISCONTINUED | OUTPATIENT
Start: 2023-01-01 | End: 2023-01-01 | Stop reason: HOSPADM

## 2023-01-01 RX ORDER — ONDANSETRON 2 MG/ML
4 INJECTION INTRAMUSCULAR; INTRAVENOUS
Status: COMPLETED | OUTPATIENT
Start: 2023-01-01 | End: 2023-01-01

## 2023-01-01 RX ORDER — HYDROCODONE BITARTRATE AND ACETAMINOPHEN 5; 325 MG/1; MG/1
1 TABLET ORAL EVERY 6 HOURS PRN
Qty: 12 TABLET | Refills: 0 | Status: SHIPPED | OUTPATIENT
Start: 2023-01-01 | End: 2023-01-01

## 2023-01-01 RX ORDER — AMITRIPTYLINE HYDROCHLORIDE 75 MG/1
1 TABLET ORAL NIGHTLY
COMMUNITY
Start: 2023-01-01 | End: 2023-08-20

## 2023-01-01 RX ORDER — HYDROMORPHONE HYDROCHLORIDE 1 MG/ML
1 INJECTION, SOLUTION INTRAMUSCULAR; INTRAVENOUS; SUBCUTANEOUS
Status: COMPLETED | OUTPATIENT
Start: 2023-01-01 | End: 2023-01-01

## 2023-01-01 RX ORDER — PROPOFOL 10 MG/ML
0-50 INJECTION, EMULSION INTRAVENOUS CONTINUOUS
Status: DISCONTINUED | OUTPATIENT
Start: 2023-01-01 | End: 2023-01-01 | Stop reason: HOSPADM

## 2023-01-01 RX ORDER — POTASSIUM CHLORIDE 29.8 MG/ML
80 INJECTION INTRAVENOUS
Status: DISCONTINUED | OUTPATIENT
Start: 2023-01-01 | End: 2023-01-01 | Stop reason: HOSPADM

## 2023-01-01 RX ORDER — DOPAMINE HYDROCHLORIDE 160 MG/100ML
0-20 INJECTION, SOLUTION INTRAVENOUS CONTINUOUS
Status: DISCONTINUED | OUTPATIENT
Start: 2023-01-01 | End: 2023-01-01

## 2023-01-01 RX ORDER — MAG HYDROX/ALUMINUM HYD/SIMETH 200-200-20
30 SUSPENSION, ORAL (FINAL DOSE FORM) ORAL 4 TIMES DAILY PRN
Status: DISCONTINUED | OUTPATIENT
Start: 2023-01-01 | End: 2023-01-01 | Stop reason: HOSPADM

## 2023-01-01 RX ORDER — CALCIUM GLUCONATE 20 MG/ML
1 INJECTION, SOLUTION INTRAVENOUS
Status: DISCONTINUED | OUTPATIENT
Start: 2023-01-01 | End: 2023-01-01

## 2023-01-01 RX ORDER — AMOXICILLIN 250 MG
1 CAPSULE ORAL 2 TIMES DAILY
Status: DISCONTINUED | OUTPATIENT
Start: 2023-01-01 | End: 2023-01-01 | Stop reason: HOSPADM

## 2023-01-01 RX ORDER — MUPIROCIN 20 MG/G
OINTMENT TOPICAL 2 TIMES DAILY
Status: DISCONTINUED | OUTPATIENT
Start: 2023-01-01 | End: 2023-01-01 | Stop reason: HOSPADM

## 2023-01-01 RX ORDER — INSULIN ASPART 100 [IU]/ML
0-5 INJECTION, SOLUTION INTRAVENOUS; SUBCUTANEOUS EVERY 6 HOURS PRN
Status: DISCONTINUED | OUTPATIENT
Start: 2023-01-01 | End: 2023-01-01

## 2023-01-01 RX ORDER — SODIUM CHLORIDE 0.9 % (FLUSH) 0.9 %
10 SYRINGE (ML) INJECTION
Status: DISCONTINUED | OUTPATIENT
Start: 2023-01-01 | End: 2023-01-01 | Stop reason: HOSPADM

## 2023-01-01 RX ORDER — IBUPROFEN 200 MG
24 TABLET ORAL
Status: DISCONTINUED | OUTPATIENT
Start: 2023-01-01 | End: 2023-01-01 | Stop reason: HOSPADM

## 2023-01-01 RX ORDER — FOLIC ACID 1 MG/1
1 TABLET ORAL DAILY
Status: DISCONTINUED | OUTPATIENT
Start: 2023-01-01 | End: 2023-01-01 | Stop reason: HOSPADM

## 2023-01-01 RX ORDER — FUROSEMIDE 10 MG/ML
80 INJECTION INTRAMUSCULAR; INTRAVENOUS
Status: DISCONTINUED | OUTPATIENT
Start: 2023-01-01 | End: 2023-01-01

## 2023-01-01 RX ORDER — OXYCODONE HYDROCHLORIDE 5 MG/1
10 TABLET ORAL EVERY 8 HOURS PRN
Status: DISCONTINUED | OUTPATIENT
Start: 2023-01-01 | End: 2023-01-01 | Stop reason: HOSPADM

## 2023-01-01 RX ORDER — SODIUM CHLORIDE 0.9 % (FLUSH) 0.9 %
10 SYRINGE (ML) INJECTION EVERY 6 HOURS
Status: DISCONTINUED | OUTPATIENT
Start: 2023-01-01 | End: 2023-01-01

## 2023-01-01 RX ORDER — GLUCAGON 1 MG
1 KIT INJECTION
Status: DISCONTINUED | OUTPATIENT
Start: 2023-01-01 | End: 2023-01-01 | Stop reason: HOSPADM

## 2023-01-01 RX ORDER — NALOXONE HCL 0.4 MG/ML
0.02 VIAL (ML) INJECTION
Status: DISCONTINUED | OUTPATIENT
Start: 2023-01-01 | End: 2023-01-01 | Stop reason: HOSPADM

## 2023-01-01 RX ORDER — OXYCODONE HYDROCHLORIDE 10 MG/1
10 TABLET ORAL EVERY 8 HOURS PRN
COMMUNITY
Start: 2023-01-01

## 2023-01-01 RX ORDER — PANTOPRAZOLE SODIUM 40 MG/1
40 TABLET, DELAYED RELEASE ORAL DAILY
COMMUNITY
Start: 2023-01-01

## 2023-01-01 RX ORDER — ATORVASTATIN CALCIUM 40 MG/1
80 TABLET, FILM COATED ORAL NIGHTLY
Status: DISCONTINUED | OUTPATIENT
Start: 2023-01-01 | End: 2023-01-01 | Stop reason: HOSPADM

## 2023-01-01 RX ORDER — PANTOPRAZOLE SODIUM 40 MG/1
40 TABLET, DELAYED RELEASE ORAL DAILY
Status: DISCONTINUED | OUTPATIENT
Start: 2023-01-01 | End: 2023-01-01 | Stop reason: HOSPADM

## 2023-01-01 RX ORDER — DULAGLUTIDE 0.75 MG/.5ML
INJECTION, SOLUTION SUBCUTANEOUS
COMMUNITY
Start: 2022-03-30 | End: 2023-01-01 | Stop reason: CLARIF

## 2023-01-01 RX ORDER — SODIUM CHLORIDE 0.9 % (FLUSH) 0.9 %
10 SYRINGE (ML) INJECTION EVERY 12 HOURS PRN
Status: DISCONTINUED | OUTPATIENT
Start: 2023-01-01 | End: 2023-01-01 | Stop reason: HOSPADM

## 2023-01-01 RX ORDER — CEFDINIR 300 MG/1
300 CAPSULE ORAL 2 TIMES DAILY
Qty: 20 CAPSULE | Refills: 0 | Status: SHIPPED | OUTPATIENT
Start: 2023-01-01 | End: 2023-01-01

## 2023-01-01 RX ORDER — INSULIN ASPART 100 [IU]/ML
0-15 INJECTION, SOLUTION INTRAVENOUS; SUBCUTANEOUS EVERY 4 HOURS PRN
Status: DISCONTINUED | OUTPATIENT
Start: 2023-01-01 | End: 2023-01-01

## 2023-01-01 RX ORDER — EPINEPHRINE 0.1 MG/ML
INJECTION INTRAVENOUS CODE/TRAUMA/SEDATION MEDICATION
Status: COMPLETED | OUTPATIENT
Start: 2023-01-01 | End: 2023-01-01

## 2023-01-01 RX ORDER — OXYCODONE HYDROCHLORIDE 5 MG/1
5 TABLET ORAL EVERY 6 HOURS PRN
Status: DISCONTINUED | OUTPATIENT
Start: 2023-01-01 | End: 2023-01-01 | Stop reason: HOSPADM

## 2023-01-01 RX ORDER — POLYETHYLENE GLYCOL 3350 17 G/17G
17 POWDER, FOR SOLUTION ORAL DAILY
Status: DISCONTINUED | OUTPATIENT
Start: 2023-01-01 | End: 2023-01-01

## 2023-01-01 RX ORDER — IPRATROPIUM BROMIDE AND ALBUTEROL SULFATE 2.5; .5 MG/3ML; MG/3ML
3 SOLUTION RESPIRATORY (INHALATION) EVERY 4 HOURS PRN
Status: DISCONTINUED | OUTPATIENT
Start: 2023-01-01 | End: 2023-01-01 | Stop reason: HOSPADM

## 2023-01-01 RX ORDER — ACETAMINOPHEN 325 MG/1
650 TABLET ORAL EVERY 4 HOURS PRN
Status: DISCONTINUED | OUTPATIENT
Start: 2023-01-01 | End: 2023-01-01 | Stop reason: HOSPADM

## 2023-01-01 RX ORDER — ENOXAPARIN SODIUM 100 MG/ML
40 INJECTION SUBCUTANEOUS EVERY 24 HOURS
Status: DISCONTINUED | OUTPATIENT
Start: 2023-01-01 | End: 2023-01-01 | Stop reason: HOSPADM

## 2023-01-01 RX ORDER — POLYETHYLENE GLYCOL 3350 17 G/17G
17 POWDER, FOR SOLUTION ORAL 2 TIMES DAILY PRN
Status: DISCONTINUED | OUTPATIENT
Start: 2023-01-01 | End: 2023-01-01 | Stop reason: HOSPADM

## 2023-01-01 RX ORDER — ONDANSETRON 2 MG/ML
4 INJECTION INTRAMUSCULAR; INTRAVENOUS EVERY 6 HOURS PRN
Status: DISCONTINUED | OUTPATIENT
Start: 2023-01-01 | End: 2023-01-01 | Stop reason: HOSPADM

## 2023-01-01 RX ORDER — POLYETHYLENE GLYCOL 3350 17 G/17G
17 POWDER, FOR SOLUTION ORAL ONCE
Status: COMPLETED | OUTPATIENT
Start: 2023-01-01 | End: 2023-01-01

## 2023-01-01 RX ORDER — INSULIN GLARGINE 100 [IU]/ML
INJECTION, SOLUTION SUBCUTANEOUS
Status: ON HOLD | COMMUNITY
Start: 2022-01-01 | End: 2023-01-01 | Stop reason: HOSPADM

## 2023-01-01 RX ORDER — LANOLIN ALCOHOL/MO/W.PET/CERES
800 CREAM (GRAM) TOPICAL
Status: DISCONTINUED | OUTPATIENT
Start: 2023-01-01 | End: 2023-01-01 | Stop reason: HOSPADM

## 2023-01-01 RX ORDER — INSULIN ASPART 100 [IU]/ML
0-15 INJECTION, SOLUTION INTRAVENOUS; SUBCUTANEOUS EVERY 6 HOURS PRN
Status: DISCONTINUED | OUTPATIENT
Start: 2023-01-01 | End: 2023-01-01 | Stop reason: HOSPADM

## 2023-01-01 RX ORDER — INSULIN ASPART 100 [IU]/ML
0-5 INJECTION, SOLUTION INTRAVENOUS; SUBCUTANEOUS
Status: DISCONTINUED | OUTPATIENT
Start: 2023-01-01 | End: 2023-01-01 | Stop reason: HOSPADM

## 2023-01-01 RX ORDER — LISINOPRIL 20 MG/1
20 TABLET ORAL DAILY
Status: DISCONTINUED | OUTPATIENT
Start: 2023-01-01 | End: 2023-01-01

## 2023-01-01 RX ORDER — CALCIUM GLUCONATE 20 MG/ML
1 INJECTION, SOLUTION INTRAVENOUS
Status: DISCONTINUED | OUTPATIENT
Start: 2023-01-01 | End: 2023-01-01 | Stop reason: HOSPADM

## 2023-01-01 RX ORDER — SODIUM CHLORIDE 9 MG/ML
INJECTION, SOLUTION INTRAVENOUS CONTINUOUS
Status: DISCONTINUED | OUTPATIENT
Start: 2023-01-01 | End: 2023-01-01 | Stop reason: HOSPADM

## 2023-01-01 RX ORDER — MAGNESIUM SULFATE 1 G/100ML
1 INJECTION INTRAVENOUS
Status: COMPLETED | OUTPATIENT
Start: 2023-01-01 | End: 2023-01-01

## 2023-01-01 RX ORDER — INSULIN ASPART 100 [IU]/ML
0-5 INJECTION, SOLUTION INTRAVENOUS; SUBCUTANEOUS EVERY 4 HOURS PRN
Status: DISCONTINUED | OUTPATIENT
Start: 2023-01-01 | End: 2023-01-01 | Stop reason: SDUPTHER

## 2023-01-01 RX ORDER — INSULIN ASPART 100 [IU]/ML
12 INJECTION, SOLUTION INTRAVENOUS; SUBCUTANEOUS ONCE
Status: COMPLETED | OUTPATIENT
Start: 2023-01-01 | End: 2023-01-01

## 2023-01-01 RX ORDER — MORPHINE SULFATE 4 MG/ML
2 INJECTION, SOLUTION INTRAMUSCULAR; INTRAVENOUS ONCE
Status: COMPLETED | OUTPATIENT
Start: 2023-01-01 | End: 2023-01-01

## 2023-01-01 RX ORDER — POTASSIUM CHLORIDE 29.8 MG/ML
40 INJECTION INTRAVENOUS
Status: DISCONTINUED | OUTPATIENT
Start: 2023-01-01 | End: 2023-01-01 | Stop reason: HOSPADM

## 2023-01-01 RX ORDER — POTASSIUM CHLORIDE 7.45 MG/ML
60 INJECTION INTRAVENOUS
Status: DISCONTINUED | OUTPATIENT
Start: 2023-01-01 | End: 2023-01-01

## 2023-01-01 RX ORDER — MIDODRINE HYDROCHLORIDE 5 MG/1
15 TABLET ORAL 3 TIMES DAILY
Status: DISCONTINUED | OUTPATIENT
Start: 2023-01-01 | End: 2023-01-01 | Stop reason: HOSPADM

## 2023-01-01 RX ORDER — CALCIUM GLUCONATE 20 MG/ML
2 INJECTION, SOLUTION INTRAVENOUS
Status: DISCONTINUED | OUTPATIENT
Start: 2023-01-01 | End: 2023-01-01 | Stop reason: HOSPADM

## 2023-01-01 RX ORDER — AMOXICILLIN 250 MG
1 CAPSULE ORAL 2 TIMES DAILY PRN
Status: DISCONTINUED | OUTPATIENT
Start: 2023-01-01 | End: 2023-01-01 | Stop reason: HOSPADM

## 2023-01-01 RX ORDER — OXYCODONE AND ACETAMINOPHEN 5; 325 MG/1; MG/1
1 TABLET ORAL EVERY 8 HOURS PRN
Qty: 15 TABLET | Refills: 0 | Status: SHIPPED | OUTPATIENT
Start: 2023-01-01 | End: 2023-01-01 | Stop reason: SDUPTHER

## 2023-01-01 RX ORDER — MAG HYDROX/ALUMINUM HYD/SIMETH 200-200-20
30 SUSPENSION, ORAL (FINAL DOSE FORM) ORAL
Status: DISCONTINUED | OUTPATIENT
Start: 2023-01-01 | End: 2023-01-01 | Stop reason: HOSPADM

## 2023-01-01 RX ORDER — MORPHINE SULFATE 4 MG/ML
2 INJECTION, SOLUTION INTRAMUSCULAR; INTRAVENOUS EVERY 4 HOURS PRN
Status: DISCONTINUED | OUTPATIENT
Start: 2023-01-01 | End: 2023-01-01 | Stop reason: HOSPADM

## 2023-01-01 RX ORDER — GABAPENTIN 100 MG/1
100 CAPSULE ORAL 3 TIMES DAILY
Status: DISCONTINUED | OUTPATIENT
Start: 2023-01-01 | End: 2023-01-01 | Stop reason: HOSPADM

## 2023-01-01 RX ORDER — INSULIN DETEMIR 100 [IU]/ML
30 INJECTION, SOLUTION SUBCUTANEOUS DAILY
Qty: 15 ML | Refills: 0 | Status: SHIPPED | OUTPATIENT
Start: 2023-01-01 | End: 2023-09-14

## 2023-01-01 RX ORDER — FENTANYL CITRATE 50 UG/ML
50 INJECTION, SOLUTION INTRAMUSCULAR; INTRAVENOUS
Status: DISCONTINUED | OUTPATIENT
Start: 2023-01-01 | End: 2023-01-01 | Stop reason: HOSPADM

## 2023-01-01 RX ORDER — LOPERAMIDE HYDROCHLORIDE 2 MG/1
2 CAPSULE ORAL 4 TIMES DAILY PRN
Status: DISCONTINUED | OUTPATIENT
Start: 2023-01-01 | End: 2023-01-01 | Stop reason: HOSPADM

## 2023-01-01 RX ORDER — LEVALBUTEROL 1.25 MG/.5ML
1.25 SOLUTION, CONCENTRATE RESPIRATORY (INHALATION) EVERY 8 HOURS
Status: DISCONTINUED | OUTPATIENT
Start: 2023-01-01 | End: 2023-01-01 | Stop reason: HOSPADM

## 2023-01-01 RX ORDER — LISINOPRIL 20 MG/1
20 TABLET ORAL DAILY
Status: ON HOLD | COMMUNITY
Start: 2023-01-01 | End: 2023-01-01 | Stop reason: HOSPADM

## 2023-01-01 RX ORDER — IBUPROFEN 200 MG
16 TABLET ORAL
Status: DISCONTINUED | OUTPATIENT
Start: 2023-01-01 | End: 2023-01-01 | Stop reason: HOSPADM

## 2023-01-01 RX ORDER — GABAPENTIN 100 MG/1
100 CAPSULE ORAL 3 TIMES DAILY
COMMUNITY
Start: 2023-01-01

## 2023-01-01 RX ORDER — FAMOTIDINE 20 MG/1
20 TABLET, FILM COATED ORAL 2 TIMES DAILY
Qty: 28 TABLET | Refills: 0 | Status: SHIPPED | OUTPATIENT
Start: 2023-01-01 | End: 2023-01-01

## 2023-01-01 RX ORDER — FENTANYL CITRATE-0.9 % NACL/PF 10 MCG/ML
0-200 PLASTIC BAG, INJECTION (ML) INTRAVENOUS CONTINUOUS
Status: DISCONTINUED | OUTPATIENT
Start: 2023-01-01 | End: 2023-01-01 | Stop reason: HOSPADM

## 2023-01-01 RX ORDER — IPRATROPIUM BROMIDE 0.5 MG/2.5ML
0.5 SOLUTION RESPIRATORY (INHALATION) EVERY 4 HOURS
Status: DISCONTINUED | OUTPATIENT
Start: 2023-01-01 | End: 2023-01-01 | Stop reason: HOSPADM

## 2023-01-01 RX ORDER — MORPHINE SULFATE 4 MG/ML
2 INJECTION, SOLUTION INTRAMUSCULAR; INTRAVENOUS
Status: DISCONTINUED | OUTPATIENT
Start: 2023-01-01 | End: 2023-01-01 | Stop reason: HOSPADM

## 2023-01-01 RX ORDER — AMIODARONE HYDROCHLORIDE 150 MG/3ML
INJECTION, SOLUTION INTRAVENOUS CODE/TRAUMA/SEDATION MEDICATION
Status: COMPLETED | OUTPATIENT
Start: 2023-01-01 | End: 2023-01-01

## 2023-01-01 RX ORDER — MORPHINE SULFATE 4 MG/ML
4 INJECTION, SOLUTION INTRAMUSCULAR; INTRAVENOUS
Status: COMPLETED | OUTPATIENT
Start: 2023-01-01 | End: 2023-01-01

## 2023-01-01 RX ORDER — DEXTROSE 50 % IN WATER (D50W) INTRAVENOUS SYRINGE
25
Status: DISCONTINUED | OUTPATIENT
Start: 2023-01-01 | End: 2023-01-01

## 2023-01-01 RX ORDER — LOPERAMIDE HYDROCHLORIDE 2 MG/1
2 CAPSULE ORAL 4 TIMES DAILY PRN
Qty: 12 CAPSULE | Refills: 0 | Status: SHIPPED | OUTPATIENT
Start: 2023-01-01 | End: 2023-01-01

## 2023-01-01 RX ORDER — LANOLIN ALCOHOL/MO/W.PET/CERES
100 CREAM (GRAM) TOPICAL DAILY
Status: DISCONTINUED | OUTPATIENT
Start: 2023-01-01 | End: 2023-01-01 | Stop reason: HOSPADM

## 2023-01-01 RX ORDER — HYDRALAZINE HYDROCHLORIDE 20 MG/ML
10 INJECTION INTRAMUSCULAR; INTRAVENOUS EVERY 8 HOURS PRN
Status: DISCONTINUED | OUTPATIENT
Start: 2023-01-01 | End: 2023-01-01 | Stop reason: HOSPADM

## 2023-01-01 RX ORDER — MORPHINE SULFATE 4 MG/ML
3 INJECTION, SOLUTION INTRAMUSCULAR; INTRAVENOUS EVERY 4 HOURS PRN
Status: DISCONTINUED | OUTPATIENT
Start: 2023-01-01 | End: 2023-01-01 | Stop reason: HOSPADM

## 2023-01-01 RX ORDER — SODIUM CHLORIDE 9 MG/ML
INJECTION, SOLUTION INTRAVENOUS CONTINUOUS
Status: DISCONTINUED | OUTPATIENT
Start: 2023-01-01 | End: 2023-01-01

## 2023-01-01 RX ORDER — FAMOTIDINE 10 MG/ML
20 INJECTION INTRAVENOUS DAILY
Status: DISCONTINUED | OUTPATIENT
Start: 2023-01-01 | End: 2023-01-01 | Stop reason: HOSPADM

## 2023-01-01 RX ORDER — MAGNESIUM SULFATE HEPTAHYDRATE 40 MG/ML
2 INJECTION, SOLUTION INTRAVENOUS
Status: DISCONTINUED | OUTPATIENT
Start: 2023-01-01 | End: 2023-01-01 | Stop reason: HOSPADM

## 2023-01-01 RX ORDER — MANNITOL 250 MG/ML
25 INJECTION, SOLUTION INTRAVENOUS ONCE AS NEEDED
Status: COMPLETED | OUTPATIENT
Start: 2023-01-01 | End: 2023-01-01

## 2023-01-01 RX ORDER — CALCIUM GLUCONATE 20 MG/ML
3 INJECTION, SOLUTION INTRAVENOUS
Status: DISCONTINUED | OUTPATIENT
Start: 2023-01-01 | End: 2023-01-01 | Stop reason: HOSPADM

## 2023-01-01 RX ORDER — MAGNESIUM SULFATE HEPTAHYDRATE 40 MG/ML
4 INJECTION, SOLUTION INTRAVENOUS
Status: DISCONTINUED | OUTPATIENT
Start: 2023-01-01 | End: 2023-01-01 | Stop reason: HOSPADM

## 2023-01-01 RX ORDER — ATORVASTATIN CALCIUM 80 MG/1
80 TABLET, FILM COATED ORAL NIGHTLY
COMMUNITY
Start: 2023-01-01

## 2023-01-01 RX ORDER — SODIUM,POTASSIUM PHOSPHATES 280-250MG
2 POWDER IN PACKET (EA) ORAL 2 TIMES DAILY
Status: COMPLETED | OUTPATIENT
Start: 2023-01-01 | End: 2023-01-01

## 2023-01-01 RX ORDER — FENTANYL CITRATE 50 UG/ML
50 INJECTION, SOLUTION INTRAMUSCULAR; INTRAVENOUS
Status: ACTIVE | OUTPATIENT
Start: 2023-01-01 | End: 2023-01-01

## 2023-01-01 RX ORDER — ASPIRIN 300 MG/1
300 SUPPOSITORY RECTAL
Status: COMPLETED | OUTPATIENT
Start: 2023-01-01 | End: 2023-01-01

## 2023-01-01 RX ORDER — MAG HYDROX/ALUMINUM HYD/SIMETH 200-200-20
30 SUSPENSION, ORAL (FINAL DOSE FORM) ORAL ONCE
Status: COMPLETED | OUTPATIENT
Start: 2023-01-01 | End: 2023-01-01

## 2023-01-01 RX ORDER — MIDODRINE HYDROCHLORIDE 5 MG/1
5 TABLET ORAL 3 TIMES DAILY
Status: DISCONTINUED | OUTPATIENT
Start: 2023-01-01 | End: 2023-01-01 | Stop reason: HOSPADM

## 2023-01-01 RX ORDER — HYDROCODONE BITARTRATE AND ACETAMINOPHEN 500; 5 MG/1; MG/1
TABLET ORAL
Status: DISCONTINUED | OUTPATIENT
Start: 2023-01-01 | End: 2023-01-01 | Stop reason: HOSPADM

## 2023-01-01 RX ORDER — MORPHINE SULFATE 4 MG/ML
4 INJECTION, SOLUTION INTRAMUSCULAR; INTRAVENOUS
Status: DISCONTINUED | OUTPATIENT
Start: 2023-01-01 | End: 2023-01-01 | Stop reason: HOSPADM

## 2023-01-01 RX ORDER — NAPROXEN SODIUM 220 MG/1
1 TABLET, FILM COATED ORAL DAILY
COMMUNITY
Start: 2023-01-01 | End: 2023-08-21

## 2023-01-01 RX ORDER — LORAZEPAM 2 MG/ML
4 INJECTION INTRAMUSCULAR ONCE
Status: COMPLETED | OUTPATIENT
Start: 2023-01-01 | End: 2023-01-01

## 2023-01-01 RX ORDER — MIDODRINE HYDROCHLORIDE 5 MG/1
5 TABLET ORAL 3 TIMES DAILY
Qty: 90 TABLET | Refills: 5 | Status: ON HOLD | OUTPATIENT
Start: 2023-01-01 | End: 2023-01-01 | Stop reason: HOSPADM

## 2023-01-01 RX ORDER — HYDROMORPHONE HYDROCHLORIDE 2 MG/ML
0.5 INJECTION, SOLUTION INTRAMUSCULAR; INTRAVENOUS; SUBCUTANEOUS EVERY 6 HOURS PRN
Status: DISCONTINUED | OUTPATIENT
Start: 2023-01-01 | End: 2023-01-01 | Stop reason: HOSPADM

## 2023-01-01 RX ORDER — POLYETHYLENE GLYCOL 3350 17 G/17G
17 POWDER, FOR SOLUTION ORAL DAILY
Qty: 14 EACH | Refills: 0 | Status: SHIPPED | OUTPATIENT
Start: 2023-01-01 | End: 2023-01-01

## 2023-01-01 RX ORDER — LIDOCAINE HYDROCHLORIDE 20 MG/ML
15 SOLUTION OROPHARYNGEAL ONCE
Status: COMPLETED | OUTPATIENT
Start: 2023-01-01 | End: 2023-01-01

## 2023-01-01 RX ORDER — METRONIDAZOLE 500 MG/1
500 TABLET ORAL EVERY 8 HOURS
Status: DISCONTINUED | OUTPATIENT
Start: 2023-01-01 | End: 2023-01-01 | Stop reason: HOSPADM

## 2023-01-01 RX ORDER — POTASSIUM CHLORIDE 14.9 MG/ML
60 INJECTION INTRAVENOUS
Status: DISCONTINUED | OUTPATIENT
Start: 2023-01-01 | End: 2023-01-01 | Stop reason: HOSPADM

## 2023-01-01 RX ORDER — LORAZEPAM 2 MG/ML
1 INJECTION INTRAMUSCULAR
Status: DISCONTINUED | OUTPATIENT
Start: 2023-01-01 | End: 2023-01-01 | Stop reason: HOSPADM

## 2023-01-01 RX ORDER — SODIUM BICARBONATE 1 MEQ/ML
SYRINGE (ML) INTRAVENOUS CODE/TRAUMA/SEDATION MEDICATION
Status: COMPLETED | OUTPATIENT
Start: 2023-01-01 | End: 2023-01-01

## 2023-01-01 RX ORDER — CEFTRIAXONE 2 G/50ML
2 INJECTION, SOLUTION INTRAVENOUS ONCE
Status: COMPLETED | OUTPATIENT
Start: 2023-01-01 | End: 2023-01-01

## 2023-01-01 RX ORDER — POTASSIUM CHLORIDE 7.45 MG/ML
40 INJECTION INTRAVENOUS
Status: DISCONTINUED | OUTPATIENT
Start: 2023-01-01 | End: 2023-01-01

## 2023-01-01 RX ORDER — INSULIN ASPART 100 [IU]/ML
1-10 INJECTION, SOLUTION INTRAVENOUS; SUBCUTANEOUS
Status: DISCONTINUED | OUTPATIENT
Start: 2023-01-01 | End: 2023-01-01 | Stop reason: HOSPADM

## 2023-01-01 RX ORDER — IPRATROPIUM BROMIDE 0.5 MG/2.5ML
0.5 SOLUTION RESPIRATORY (INHALATION) EVERY 4 HOURS PRN
Status: DISCONTINUED | OUTPATIENT
Start: 2023-01-01 | End: 2023-01-01 | Stop reason: HOSPADM

## 2023-01-01 RX ORDER — NAPROXEN SODIUM 220 MG/1
81 TABLET, FILM COATED ORAL DAILY
Status: DISCONTINUED | OUTPATIENT
Start: 2023-01-01 | End: 2023-01-01 | Stop reason: HOSPADM

## 2023-01-01 RX ORDER — DEXTROSE MONOHYDRATE 50 MG/ML
INJECTION, SOLUTION INTRAVENOUS CONTINUOUS
Status: DISCONTINUED | OUTPATIENT
Start: 2023-01-01 | End: 2023-01-01

## 2023-01-01 RX ORDER — HEPARIN SODIUM 1000 [USP'U]/ML
INJECTION, SOLUTION INTRAVENOUS; SUBCUTANEOUS
Status: DISCONTINUED | OUTPATIENT
Start: 2023-01-01 | End: 2023-01-01

## 2023-01-01 RX ORDER — MIDODRINE HYDROCHLORIDE 5 MG/1
15 TABLET ORAL 3 TIMES DAILY
Qty: 810 TABLET | Refills: 3 | Status: SHIPPED | OUTPATIENT
Start: 2023-01-01 | End: 2024-07-25

## 2023-01-01 RX ORDER — HEPARIN SODIUM 5000 [USP'U]/ML
5000 INJECTION, SOLUTION INTRAVENOUS; SUBCUTANEOUS EVERY 8 HOURS
Status: DISCONTINUED | OUTPATIENT
Start: 2023-01-01 | End: 2023-01-01 | Stop reason: HOSPADM

## 2023-01-01 RX ORDER — CIPROFLOXACIN 500 MG/1
500 TABLET ORAL 2 TIMES DAILY
Qty: 14 TABLET | Refills: 0 | Status: SHIPPED | OUTPATIENT
Start: 2023-01-01 | End: 2023-01-01

## 2023-01-01 RX ORDER — ONDANSETRON 2 MG/ML
4 INJECTION INTRAMUSCULAR; INTRAVENOUS EVERY 8 HOURS PRN
Status: DISCONTINUED | OUTPATIENT
Start: 2023-01-01 | End: 2023-01-01 | Stop reason: HOSPADM

## 2023-01-01 RX ORDER — MIDODRINE HYDROCHLORIDE 5 MG/1
5 TABLET ORAL 3 TIMES DAILY
Status: DISCONTINUED | OUTPATIENT
Start: 2023-01-01 | End: 2023-01-01

## 2023-01-01 RX ORDER — AMITRIPTYLINE HYDROCHLORIDE 25 MG/1
75 TABLET, FILM COATED ORAL NIGHTLY
Status: DISCONTINUED | OUTPATIENT
Start: 2023-01-01 | End: 2023-01-01 | Stop reason: HOSPADM

## 2023-01-01 RX ORDER — OXYCODONE AND ACETAMINOPHEN 5; 325 MG/1; MG/1
1 TABLET ORAL EVERY 8 HOURS PRN
Qty: 15 TABLET | Refills: 0 | Status: SHIPPED | OUTPATIENT
Start: 2023-01-01 | End: 2023-01-01

## 2023-01-01 RX ORDER — GLUCAGON 1 MG
1 KIT INJECTION
Status: DISCONTINUED | OUTPATIENT
Start: 2023-01-01 | End: 2023-01-01

## 2023-01-01 RX ORDER — SIMETHICONE 80 MG
1 TABLET,CHEWABLE ORAL 4 TIMES DAILY PRN
Status: DISCONTINUED | OUTPATIENT
Start: 2023-01-01 | End: 2023-01-01 | Stop reason: HOSPADM

## 2023-01-01 RX ORDER — PROCHLORPERAZINE EDISYLATE 5 MG/ML
5 INJECTION INTRAMUSCULAR; INTRAVENOUS EVERY 6 HOURS PRN
Status: DISCONTINUED | OUTPATIENT
Start: 2023-01-01 | End: 2023-01-01 | Stop reason: HOSPADM

## 2023-01-01 RX ORDER — MIDODRINE HYDROCHLORIDE 5 MG/1
10 TABLET ORAL 3 TIMES DAILY
Status: DISCONTINUED | OUTPATIENT
Start: 2023-01-01 | End: 2023-01-01

## 2023-01-01 RX ORDER — TETRACAINE HYDROCHLORIDE 5 MG/ML
2 SOLUTION OPHTHALMIC
Status: COMPLETED | OUTPATIENT
Start: 2023-01-01 | End: 2023-01-01

## 2023-01-01 RX ORDER — SODIUM CHLORIDE AND POTASSIUM CHLORIDE 150; 900 MG/100ML; MG/100ML
INJECTION, SOLUTION INTRAVENOUS CONTINUOUS
Status: DISCONTINUED | OUTPATIENT
Start: 2023-01-01 | End: 2023-01-01 | Stop reason: HOSPADM

## 2023-01-01 RX ORDER — ACETAMINOPHEN 325 MG/1
650 TABLET ORAL EVERY 8 HOURS PRN
Status: DISCONTINUED | OUTPATIENT
Start: 2023-01-01 | End: 2023-01-01 | Stop reason: HOSPADM

## 2023-01-01 RX ORDER — CALCIUM CHLORIDE INJECTION 100 MG/ML
INJECTION, SOLUTION INTRAVENOUS CODE/TRAUMA/SEDATION MEDICATION
Status: COMPLETED | OUTPATIENT
Start: 2023-01-01 | End: 2023-01-01

## 2023-01-01 RX ORDER — ALBUTEROL SULFATE 2.5 MG/.5ML
20 SOLUTION RESPIRATORY (INHALATION) ONCE
Status: COMPLETED | OUTPATIENT
Start: 2023-01-01 | End: 2023-01-01

## 2023-01-01 RX ORDER — KETOROLAC TROMETHAMINE 30 MG/ML
30 INJECTION, SOLUTION INTRAMUSCULAR; INTRAVENOUS
Status: COMPLETED | OUTPATIENT
Start: 2023-01-01 | End: 2023-01-01

## 2023-01-01 RX ORDER — SODIUM CHLORIDE 450 MG/100ML
INJECTION, SOLUTION INTRAVENOUS CONTINUOUS
Status: DISCONTINUED | OUTPATIENT
Start: 2023-01-01 | End: 2023-01-01 | Stop reason: HOSPADM

## 2023-01-01 RX ORDER — POTASSIUM CHLORIDE 7.45 MG/ML
80 INJECTION INTRAVENOUS
Status: DISCONTINUED | OUTPATIENT
Start: 2023-01-01 | End: 2023-01-01

## 2023-01-01 RX ORDER — ALBUTEROL SULFATE 2.5 MG/.5ML
2.5 SOLUTION RESPIRATORY (INHALATION) EVERY 4 HOURS PRN
Status: DISCONTINUED | OUTPATIENT
Start: 2023-01-01 | End: 2023-01-01 | Stop reason: HOSPADM

## 2023-01-01 RX ADMIN — PIPERACILLIN SODIUM AND TAZOBACTAM SODIUM 4.5 G: 4; .5 INJECTION, POWDER, LYOPHILIZED, FOR SOLUTION INTRAVENOUS at 07:02

## 2023-01-01 RX ADMIN — HYDROMORPHONE HYDROCHLORIDE 0.5 MG: 2 INJECTION INTRAMUSCULAR; INTRAVENOUS; SUBCUTANEOUS at 08:02

## 2023-01-01 RX ADMIN — INSULIN ASPART 12 UNITS: 100 INJECTION, SOLUTION INTRAVENOUS; SUBCUTANEOUS at 02:07

## 2023-01-01 RX ADMIN — THERA TABS 1 TABLET: TAB at 08:08

## 2023-01-01 RX ADMIN — POTASSIUM BICARBONATE 20 MEQ: 391 TABLET, EFFERVESCENT ORAL at 11:02

## 2023-01-01 RX ADMIN — METRONIDAZOLE 500 MG: 500 TABLET ORAL at 09:02

## 2023-01-01 RX ADMIN — Medication 10 ML: at 10:07

## 2023-01-01 RX ADMIN — THERA TABS 1 TABLET: TAB at 09:02

## 2023-01-01 RX ADMIN — SODIUM CHLORIDE AND POTASSIUM CHLORIDE: 9; 1.49 INJECTION, SOLUTION INTRAVENOUS at 09:02

## 2023-01-01 RX ADMIN — IPRATROPIUM BROMIDE 0.5 MG: 0.5 SOLUTION RESPIRATORY (INHALATION) at 12:08

## 2023-01-01 RX ADMIN — HYDROMORPHONE HYDROCHLORIDE 1 MG: 1 INJECTION, SOLUTION INTRAMUSCULAR; INTRAVENOUS; SUBCUTANEOUS at 03:04

## 2023-01-01 RX ADMIN — ATORVASTATIN CALCIUM 80 MG: 40 TABLET, FILM COATED ORAL at 08:06

## 2023-01-01 RX ADMIN — SODIUM CHLORIDE, POTASSIUM CHLORIDE, SODIUM LACTATE AND CALCIUM CHLORIDE 3090 ML: 600; 310; 30; 20 INJECTION, SOLUTION INTRAVENOUS at 06:07

## 2023-01-01 RX ADMIN — PIPERACILLIN AND TAZOBACTAM 4.5 G: 4; .5 INJECTION, POWDER, LYOPHILIZED, FOR SOLUTION INTRAVENOUS; PARENTERAL at 06:07

## 2023-01-01 RX ADMIN — SODIUM CHLORIDE: 9 INJECTION, SOLUTION INTRAVENOUS at 12:06

## 2023-01-01 RX ADMIN — OXYCODONE HYDROCHLORIDE 5 MG: 5 TABLET ORAL at 11:02

## 2023-01-01 RX ADMIN — PIPERACILLIN SODIUM AND TAZOBACTAM SODIUM 4.5 G: 4; .5 INJECTION, POWDER, LYOPHILIZED, FOR SOLUTION INTRAVENOUS at 12:02

## 2023-01-01 RX ADMIN — SODIUM CHLORIDE AND POTASSIUM CHLORIDE: 9; 1.49 INJECTION, SOLUTION INTRAVENOUS at 11:02

## 2023-01-01 RX ADMIN — GABAPENTIN 100 MG: 100 CAPSULE ORAL at 03:06

## 2023-01-01 RX ADMIN — IOHEXOL 80 ML: 350 INJECTION, SOLUTION INTRAVENOUS at 01:06

## 2023-01-01 RX ADMIN — MAGNESIUM SULFATE HEPTAHYDRATE 1 G: 1 INJECTION, SOLUTION INTRAVENOUS at 06:02

## 2023-01-01 RX ADMIN — SODIUM BICARBONATE: 84 INJECTION, SOLUTION INTRAVENOUS at 02:07

## 2023-01-01 RX ADMIN — Medication 16 G: at 06:02

## 2023-01-01 RX ADMIN — CEFTRIAXONE 2 G: 2 INJECTION, SOLUTION INTRAVENOUS at 09:04

## 2023-01-01 RX ADMIN — HYPROMELLOSE 2910 1 DROP: 5 SOLUTION OPHTHALMIC at 03:08

## 2023-01-01 RX ADMIN — ENOXAPARIN SODIUM 40 MG: 40 INJECTION SUBCUTANEOUS at 10:07

## 2023-01-01 RX ADMIN — INSULIN DETEMIR 30 UNITS: 100 INJECTION, SOLUTION SUBCUTANEOUS at 11:02

## 2023-01-01 RX ADMIN — HYDROMORPHONE HYDROCHLORIDE 0.5 MG: 2 INJECTION INTRAMUSCULAR; INTRAVENOUS; SUBCUTANEOUS at 12:02

## 2023-01-01 RX ADMIN — ACETAMINOPHEN 650 MG: 325 TABLET ORAL at 12:02

## 2023-01-01 RX ADMIN — GABAPENTIN 100 MG: 100 CAPSULE ORAL at 08:06

## 2023-01-01 RX ADMIN — THIAMINE HCL TAB 100 MG 100 MG: 100 TAB at 09:02

## 2023-01-01 RX ADMIN — THIAMINE HCL TAB 100 MG 100 MG: 100 TAB at 08:02

## 2023-01-01 RX ADMIN — ATORVASTATIN CALCIUM 80 MG: 40 TABLET, FILM COATED ORAL at 09:06

## 2023-01-01 RX ADMIN — INSULIN ASPART 2 UNITS: 100 INJECTION, SOLUTION INTRAVENOUS; SUBCUTANEOUS at 11:06

## 2023-01-01 RX ADMIN — HEPARIN SODIUM 5000 UNITS: 5000 INJECTION INTRAVENOUS; SUBCUTANEOUS at 02:07

## 2023-01-01 RX ADMIN — SODIUM CHLORIDE: 9 INJECTION, SOLUTION INTRAVENOUS at 05:07

## 2023-01-01 RX ADMIN — SODIUM CHLORIDE, POTASSIUM CHLORIDE, SODIUM LACTATE AND CALCIUM CHLORIDE 500 ML: 600; 310; 30; 20 INJECTION, SOLUTION INTRAVENOUS at 11:04

## 2023-01-01 RX ADMIN — THIAMINE HYDROCHLORIDE 100 MG: 100 INJECTION, SOLUTION INTRAMUSCULAR; INTRAVENOUS at 09:07

## 2023-01-01 RX ADMIN — EPINEPHRINE 1 MG: 0.1 INJECTION, SOLUTION ENDOTRACHEAL; INTRACARDIAC; INTRAVENOUS at 06:07

## 2023-01-01 RX ADMIN — SODIUM CHLORIDE 2000 ML: 9 INJECTION, SOLUTION INTRAVENOUS at 11:04

## 2023-01-01 RX ADMIN — PIPERACILLIN AND TAZOBACTAM 4.5 G: 4; .5 INJECTION, POWDER, LYOPHILIZED, FOR SOLUTION INTRAVENOUS; PARENTERAL at 01:08

## 2023-01-01 RX ADMIN — MORPHINE SULFATE 4 MG: 4 INJECTION INTRAVENOUS at 04:02

## 2023-01-01 RX ADMIN — MIDODRINE HYDROCHLORIDE 10 MG: 5 TABLET ORAL at 08:06

## 2023-01-01 RX ADMIN — ENOXAPARIN SODIUM 40 MG: 40 INJECTION SUBCUTANEOUS at 04:02

## 2023-01-01 RX ADMIN — VANCOMYCIN HYDROCHLORIDE 500 MG: 500 INJECTION, POWDER, LYOPHILIZED, FOR SOLUTION INTRAVENOUS at 08:08

## 2023-01-01 RX ADMIN — SODIUM BICARBONATE: 84 INJECTION, SOLUTION INTRAVENOUS at 10:07

## 2023-01-01 RX ADMIN — FOLIC ACID 1 MG: 1 TABLET ORAL at 10:07

## 2023-01-01 RX ADMIN — AMIODARONE HYDROCHLORIDE 300 MG: 50 INJECTION, SOLUTION INTRAVENOUS at 06:07

## 2023-01-01 RX ADMIN — ENOXAPARIN SODIUM 40 MG: 40 INJECTION SUBCUTANEOUS at 05:02

## 2023-01-01 RX ADMIN — INSULIN ASPART 15 UNITS: 100 INJECTION, SOLUTION INTRAVENOUS; SUBCUTANEOUS at 11:07

## 2023-01-01 RX ADMIN — MANNITOL 25 G: 12.5 INJECTION, SOLUTION INTRAVENOUS at 04:08

## 2023-01-01 RX ADMIN — ASPIRIN 81 MG CHEWABLE TABLET 81 MG: 81 TABLET CHEWABLE at 10:07

## 2023-01-01 RX ADMIN — ACETAMINOPHEN 650 MG: 325 TABLET ORAL at 11:06

## 2023-01-01 RX ADMIN — Medication 6 MG: at 09:02

## 2023-01-01 RX ADMIN — FOLIC ACID 1 MG: 1 TABLET ORAL at 08:02

## 2023-01-01 RX ADMIN — HEPARIN SODIUM 5000 UNITS: 5000 INJECTION INTRAVENOUS; SUBCUTANEOUS at 05:08

## 2023-01-01 RX ADMIN — METRONIDAZOLE 500 MG: 500 TABLET ORAL at 06:02

## 2023-01-01 RX ADMIN — FAMOTIDINE 20 MG: 10 INJECTION, SOLUTION INTRAVENOUS at 08:08

## 2023-01-01 RX ADMIN — FAMOTIDINE 20 MG: 10 INJECTION, SOLUTION INTRAVENOUS at 08:07

## 2023-01-01 RX ADMIN — HYPROMELLOSE 2910 1 DROP: 5 SOLUTION OPHTHALMIC at 01:08

## 2023-01-01 RX ADMIN — INSULIN ASPART 6 UNITS: 100 INJECTION, SOLUTION INTRAVENOUS; SUBCUTANEOUS at 11:08

## 2023-01-01 RX ADMIN — ALUMINUM HYDROXIDE, MAGNESIUM HYDROXIDE, AND DIMETHICONE 30 ML: 200; 20; 200 SUSPENSION ORAL at 10:07

## 2023-01-01 RX ADMIN — HYDROMORPHONE HYDROCHLORIDE 0.5 MG: 2 INJECTION INTRAMUSCULAR; INTRAVENOUS; SUBCUTANEOUS at 03:02

## 2023-01-01 RX ADMIN — GABAPENTIN 100 MG: 100 CAPSULE ORAL at 02:06

## 2023-01-01 RX ADMIN — INSULIN DETEMIR 10 UNITS: 100 INJECTION, SOLUTION SUBCUTANEOUS at 08:06

## 2023-01-01 RX ADMIN — SODIUM BICARBONATE: 84 INJECTION, SOLUTION INTRAVENOUS at 06:07

## 2023-01-01 RX ADMIN — LORAZEPAM 1 MG: 2 INJECTION INTRAMUSCULAR; INTRAVENOUS at 04:08

## 2023-01-01 RX ADMIN — MIDODRINE HYDROCHLORIDE 15 MG: 5 TABLET ORAL at 10:07

## 2023-01-01 RX ADMIN — SODIUM CHLORIDE 1000 ML: 9 INJECTION, SOLUTION INTRAVENOUS at 08:02

## 2023-01-01 RX ADMIN — MORPHINE SULFATE 3 MG: 4 INJECTION INTRAVENOUS at 05:06

## 2023-01-01 RX ADMIN — INSULIN ASPART 2 UNITS: 100 INJECTION, SOLUTION INTRAVENOUS; SUBCUTANEOUS at 05:02

## 2023-01-01 RX ADMIN — METRONIDAZOLE 500 MG: 500 TABLET ORAL at 02:02

## 2023-01-01 RX ADMIN — INSULIN DETEMIR 10 UNITS: 100 INJECTION, SOLUTION SUBCUTANEOUS at 09:06

## 2023-01-01 RX ADMIN — ALUMINUM HYDROXIDE, MAGNESIUM HYDROXIDE, AND DIMETHICONE 30 ML: 200; 20; 200 SUSPENSION ORAL at 10:04

## 2023-01-01 RX ADMIN — DEXTROSE 250 ML: 10 SOLUTION INTRAVENOUS at 05:02

## 2023-01-01 RX ADMIN — AMITRIPTYLINE HYDROCHLORIDE 75 MG: 25 TABLET, FILM COATED ORAL at 10:07

## 2023-01-01 RX ADMIN — IOHEXOL 100 ML: 350 INJECTION, SOLUTION INTRAVENOUS at 06:02

## 2023-01-01 RX ADMIN — OXYCODONE HYDROCHLORIDE 10 MG: 5 TABLET ORAL at 03:06

## 2023-01-01 RX ADMIN — HEPARIN SODIUM 5000 UNITS: 5000 INJECTION INTRAVENOUS; SUBCUTANEOUS at 01:07

## 2023-01-01 RX ADMIN — HYDROMORPHONE HYDROCHLORIDE 0.5 MG: 2 INJECTION INTRAMUSCULAR; INTRAVENOUS; SUBCUTANEOUS at 01:02

## 2023-01-01 RX ADMIN — OXYCODONE HYDROCHLORIDE 5 MG: 5 TABLET ORAL at 08:02

## 2023-01-01 RX ADMIN — OXYCODONE HYDROCHLORIDE 5 MG: 5 TABLET ORAL at 12:02

## 2023-01-01 RX ADMIN — MIDODRINE HYDROCHLORIDE 5 MG: 5 TABLET ORAL at 10:07

## 2023-01-01 RX ADMIN — Medication 6 MG: at 01:02

## 2023-01-01 RX ADMIN — MIDODRINE HYDROCHLORIDE 10 MG: 5 TABLET ORAL at 02:06

## 2023-01-01 RX ADMIN — INSULIN ASPART 2 UNITS: 100 INJECTION, SOLUTION INTRAVENOUS; SUBCUTANEOUS at 12:02

## 2023-01-01 RX ADMIN — SODIUM CHLORIDE 1000 ML: 9 INJECTION, SOLUTION INTRAVENOUS at 11:05

## 2023-01-01 RX ADMIN — HEPARIN SODIUM 30000 UNITS: 1000 INJECTION, SOLUTION INTRAVENOUS; SUBCUTANEOUS at 05:08

## 2023-01-01 RX ADMIN — THERA TABS 1 TABLET: TAB at 08:02

## 2023-01-01 RX ADMIN — PIPERACILLIN AND TAZOBACTAM 4.5 G: 4; .5 INJECTION, POWDER, LYOPHILIZED, FOR SOLUTION INTRAVENOUS; PARENTERAL at 02:07

## 2023-01-01 RX ADMIN — LOPERAMIDE HYDROCHLORIDE 2 MG: 2 CAPSULE ORAL at 05:02

## 2023-01-01 RX ADMIN — VANCOMYCIN HYDROCHLORIDE 2000 MG: 500 INJECTION, POWDER, LYOPHILIZED, FOR SOLUTION INTRAVENOUS at 07:02

## 2023-01-01 RX ADMIN — OXYCODONE HYDROCHLORIDE 10 MG: 5 TABLET ORAL at 08:06

## 2023-01-01 RX ADMIN — LORAZEPAM 4 MG: 2 INJECTION INTRAMUSCULAR; INTRAVENOUS at 09:08

## 2023-01-01 RX ADMIN — INSULIN ASPART 2 UNITS: 100 INJECTION, SOLUTION INTRAVENOUS; SUBCUTANEOUS at 10:07

## 2023-01-01 RX ADMIN — FOLIC ACID 1 MG: 1 TABLET ORAL at 09:02

## 2023-01-01 RX ADMIN — THERA TABS 1 TABLET: TAB at 10:07

## 2023-01-01 RX ADMIN — INSULIN ASPART 9 UNITS: 100 INJECTION, SOLUTION INTRAVENOUS; SUBCUTANEOUS at 05:07

## 2023-01-01 RX ADMIN — THIAMINE HYDROCHLORIDE 100 MG: 100 INJECTION, SOLUTION INTRAMUSCULAR; INTRAVENOUS at 08:08

## 2023-01-01 RX ADMIN — SODIUM CHLORIDE AND POTASSIUM CHLORIDE: 9; 1.49 INJECTION, SOLUTION INTRAVENOUS at 12:02

## 2023-01-01 RX ADMIN — SODIUM CHLORIDE: 9 INJECTION, SOLUTION INTRAVENOUS at 08:07

## 2023-01-01 RX ADMIN — Medication 25 MCG/HR: at 10:07

## 2023-01-01 RX ADMIN — POTASSIUM BICARBONATE 20 MEQ: 391 TABLET, EFFERVESCENT ORAL at 01:04

## 2023-01-01 RX ADMIN — SODIUM CHLORIDE, POTASSIUM CHLORIDE, SODIUM LACTATE AND CALCIUM CHLORIDE 1000 ML: 600; 310; 30; 20 INJECTION, SOLUTION INTRAVENOUS at 08:04

## 2023-01-01 RX ADMIN — IOHEXOL 100 ML: 350 INJECTION, SOLUTION INTRAVENOUS at 01:04

## 2023-01-01 RX ADMIN — THIAMINE HYDROCHLORIDE 100 MG: 100 INJECTION, SOLUTION INTRAMUSCULAR; INTRAVENOUS at 08:07

## 2023-01-01 RX ADMIN — INSULIN ASPART 12 UNITS: 100 INJECTION, SOLUTION INTRAVENOUS; SUBCUTANEOUS at 04:07

## 2023-01-01 RX ADMIN — LEVALBUTEROL 1.25 MG: 1.25 SOLUTION, CONCENTRATE RESPIRATORY (INHALATION) at 08:08

## 2023-01-01 RX ADMIN — ASPIRIN 300 MG: 300 SUPPOSITORY RECTAL at 07:07

## 2023-01-01 RX ADMIN — MIDODRINE HYDROCHLORIDE 5 MG: 5 TABLET ORAL at 03:06

## 2023-01-01 RX ADMIN — OXYCODONE HYDROCHLORIDE 10 MG: 5 TABLET ORAL at 02:06

## 2023-01-01 RX ADMIN — HYDROMORPHONE HYDROCHLORIDE 1 MG: 1 INJECTION, SOLUTION INTRAMUSCULAR; INTRAVENOUS; SUBCUTANEOUS at 11:04

## 2023-01-01 RX ADMIN — POTASSIUM CHLORIDE 20 MEQ: 14.9 INJECTION, SOLUTION INTRAVENOUS at 08:08

## 2023-01-01 RX ADMIN — LOPERAMIDE HYDROCHLORIDE 2 MG: 2 CAPSULE ORAL at 10:02

## 2023-01-01 RX ADMIN — INSULIN ASPART 4 UNITS: 100 INJECTION, SOLUTION INTRAVENOUS; SUBCUTANEOUS at 12:08

## 2023-01-01 RX ADMIN — MIDODRINE HYDROCHLORIDE 15 MG: 5 TABLET ORAL at 04:07

## 2023-01-01 RX ADMIN — FOLIC ACID 1 MG: 1 TABLET ORAL at 08:08

## 2023-01-01 RX ADMIN — Medication 6 MG: at 12:07

## 2023-01-01 RX ADMIN — PIPERACILLIN AND TAZOBACTAM 4.5 G: 4; .5 INJECTION, POWDER, LYOPHILIZED, FOR SOLUTION INTRAVENOUS; PARENTERAL at 08:07

## 2023-01-01 RX ADMIN — GABAPENTIN 100 MG: 100 CAPSULE ORAL at 09:06

## 2023-01-01 RX ADMIN — MORPHINE SULFATE 4 MG: 4 INJECTION INTRAVENOUS at 08:02

## 2023-01-01 RX ADMIN — SODIUM BICARBONATE 50 MEQ: 84 INJECTION, SOLUTION INTRAVENOUS at 06:07

## 2023-01-01 RX ADMIN — TETRACAINE HYDROCHLORIDE 2 DROP: 5 SOLUTION OPHTHALMIC at 11:04

## 2023-01-01 RX ADMIN — PANTOPRAZOLE SODIUM 40 MG: 40 TABLET, DELAYED RELEASE ORAL at 08:06

## 2023-01-01 RX ADMIN — FOLIC ACID 1 MG: 5 INJECTION, SOLUTION INTRAMUSCULAR; INTRAVENOUS; SUBCUTANEOUS at 03:07

## 2023-01-01 RX ADMIN — SODIUM CHLORIDE, POTASSIUM CHLORIDE, SODIUM LACTATE AND CALCIUM CHLORIDE 1000 ML: 600; 310; 30; 20 INJECTION, SOLUTION INTRAVENOUS at 04:02

## 2023-01-01 RX ADMIN — PIPERACILLIN AND TAZOBACTAM 4.5 G: 4; .5 INJECTION, POWDER, LYOPHILIZED, FOR SOLUTION INTRAVENOUS; PARENTERAL at 09:07

## 2023-01-01 RX ADMIN — INSULIN ASPART 2 UNITS: 100 INJECTION, SOLUTION INTRAVENOUS; SUBCUTANEOUS at 08:06

## 2023-01-01 RX ADMIN — GLYCOPYRROLATE 0.2 MG: 0.2 INJECTION, SOLUTION INTRAMUSCULAR; INTRAVITREAL at 04:08

## 2023-01-01 RX ADMIN — INSULIN ASPART 12 UNITS: 100 INJECTION, SOLUTION INTRAVENOUS; SUBCUTANEOUS at 03:07

## 2023-01-01 RX ADMIN — FOLIC ACID 1 MG: 1 TABLET ORAL at 08:07

## 2023-01-01 RX ADMIN — INSULIN ASPART 2 UNITS: 100 INJECTION, SOLUTION INTRAVENOUS; SUBCUTANEOUS at 09:06

## 2023-01-01 RX ADMIN — AZITHROMYCIN MONOHYDRATE 500 MG: 500 INJECTION, POWDER, LYOPHILIZED, FOR SOLUTION INTRAVENOUS at 09:04

## 2023-01-01 RX ADMIN — POTASSIUM CHLORIDE 20 MEQ: 14.9 INJECTION, SOLUTION INTRAVENOUS at 09:08

## 2023-01-01 RX ADMIN — MORPHINE SULFATE 2 MG: 4 INJECTION INTRAVENOUS at 08:06

## 2023-01-01 RX ADMIN — IPRATROPIUM BROMIDE 0.5 MG: 0.5 SOLUTION RESPIRATORY (INHALATION) at 04:08

## 2023-01-01 RX ADMIN — SODIUM CHLORIDE: 4.5 INJECTION, SOLUTION INTRAVENOUS at 02:08

## 2023-01-01 RX ADMIN — SODIUM BICARBONATE: 84 INJECTION, SOLUTION INTRAVENOUS at 05:07

## 2023-01-01 RX ADMIN — SODIUM CHLORIDE, POTASSIUM CHLORIDE, SODIUM LACTATE AND CALCIUM CHLORIDE 2925 ML: 600; 310; 30; 20 INJECTION, SOLUTION INTRAVENOUS at 03:07

## 2023-01-01 RX ADMIN — PENICILLIN G BENZATHINE 2.4 MILLION UNITS: 2400000 INJECTION, SUSPENSION INTRAMUSCULAR at 11:08

## 2023-01-01 RX ADMIN — SODIUM BICARBONATE 50 MEQ: 84 INJECTION, SOLUTION INTRAVENOUS at 07:07

## 2023-01-01 RX ADMIN — HYDROMORPHONE HYDROCHLORIDE 0.5 MG: 2 INJECTION INTRAMUSCULAR; INTRAVENOUS; SUBCUTANEOUS at 02:02

## 2023-01-01 RX ADMIN — MORPHINE SULFATE 3 MG: 4 INJECTION INTRAVENOUS at 11:06

## 2023-01-01 RX ADMIN — VANCOMYCIN HYDROCHLORIDE 2000 MG: 10 INJECTION, POWDER, LYOPHILIZED, FOR SOLUTION INTRAVENOUS at 09:07

## 2023-01-01 RX ADMIN — OXYCODONE HYDROCHLORIDE 5 MG: 5 TABLET ORAL at 06:02

## 2023-01-01 RX ADMIN — VANCOMYCIN HYDROCHLORIDE 2000 MG: 10 INJECTION, POWDER, LYOPHILIZED, FOR SOLUTION INTRAVENOUS at 05:07

## 2023-01-01 RX ADMIN — DOPAMINE HYDROCHLORIDE 10 MCG/KG/MIN: 160 INJECTION, SOLUTION INTRAVENOUS at 07:07

## 2023-01-01 RX ADMIN — ALBUTEROL SULFATE 20 MG: 2.5 SOLUTION RESPIRATORY (INHALATION) at 07:07

## 2023-01-01 RX ADMIN — HEPARIN SODIUM 5000 UNITS: 5000 INJECTION INTRAVENOUS; SUBCUTANEOUS at 05:07

## 2023-01-01 RX ADMIN — OXYCODONE HYDROCHLORIDE 10 MG: 5 TABLET ORAL at 09:06

## 2023-01-01 RX ADMIN — MUPIROCIN: 20 OINTMENT TOPICAL at 09:08

## 2023-01-01 RX ADMIN — CEFTRIAXONE 1 G: 1 INJECTION, SOLUTION INTRAVENOUS at 10:04

## 2023-01-01 RX ADMIN — ACETAMINOPHEN 650 MG: 325 TABLET ORAL at 09:02

## 2023-01-01 RX ADMIN — DEXTROSE MONOHYDRATE: 50 INJECTION, SOLUTION INTRAVENOUS at 02:08

## 2023-01-01 RX ADMIN — PIPERACILLIN AND TAZOBACTAM 4.5 G: 4; .5 INJECTION, POWDER, LYOPHILIZED, FOR SOLUTION INTRAVENOUS; PARENTERAL at 05:07

## 2023-01-01 RX ADMIN — SODIUM BICARBONATE: 84 INJECTION, SOLUTION INTRAVENOUS at 03:07

## 2023-01-01 RX ADMIN — INSULIN ASPART 3 UNITS: 100 INJECTION, SOLUTION INTRAVENOUS; SUBCUTANEOUS at 12:02

## 2023-01-01 RX ADMIN — KETOROLAC TROMETHAMINE 30 MG: 30 INJECTION, SOLUTION INTRAMUSCULAR; INTRAVENOUS at 11:05

## 2023-01-01 RX ADMIN — HYDROMORPHONE HYDROCHLORIDE 0.5 MG: 2 INJECTION INTRAMUSCULAR; INTRAVENOUS; SUBCUTANEOUS at 05:02

## 2023-01-01 RX ADMIN — SODIUM CHLORIDE, POTASSIUM CHLORIDE, SODIUM LACTATE AND CALCIUM CHLORIDE 1845 ML: 600; 310; 30; 20 INJECTION, SOLUTION INTRAVENOUS at 09:04

## 2023-01-01 RX ADMIN — INSULIN ASPART 6 UNITS: 100 INJECTION, SOLUTION INTRAVENOUS; SUBCUTANEOUS at 11:07

## 2023-01-01 RX ADMIN — ATORVASTATIN CALCIUM 80 MG: 40 TABLET, FILM COATED ORAL at 10:07

## 2023-01-01 RX ADMIN — INSULIN ASPART 9 UNITS: 100 INJECTION, SOLUTION INTRAVENOUS; SUBCUTANEOUS at 05:08

## 2023-01-01 RX ADMIN — PANTOPRAZOLE SODIUM 40 MG: 40 TABLET, DELAYED RELEASE ORAL at 11:06

## 2023-01-01 RX ADMIN — HYDROMORPHONE HYDROCHLORIDE 0.5 MG: 2 INJECTION INTRAMUSCULAR; INTRAVENOUS; SUBCUTANEOUS at 11:02

## 2023-01-01 RX ADMIN — HYPROMELLOSE 2910 1 DROP: 5 SOLUTION OPHTHALMIC at 09:08

## 2023-01-01 RX ADMIN — OXYCODONE HYDROCHLORIDE 5 MG: 5 TABLET ORAL at 05:02

## 2023-01-01 RX ADMIN — POTASSIUM CHLORIDE 20 MEQ: 14.9 INJECTION, SOLUTION INTRAVENOUS at 12:08

## 2023-01-01 RX ADMIN — PIPERACILLIN SODIUM AND TAZOBACTAM SODIUM 4.5 G: 4; .5 INJECTION, POWDER, LYOPHILIZED, FOR SOLUTION INTRAVENOUS at 10:02

## 2023-01-01 RX ADMIN — INSULIN ASPART 3 UNITS: 100 INJECTION, SOLUTION INTRAVENOUS; SUBCUTANEOUS at 11:07

## 2023-01-01 RX ADMIN — LISINOPRIL 20 MG: 20 TABLET ORAL at 11:06

## 2023-01-01 RX ADMIN — LISINOPRIL 20 MG: 20 TABLET ORAL at 08:06

## 2023-01-01 RX ADMIN — ONDANSETRON 4 MG: 2 INJECTION INTRAMUSCULAR; INTRAVENOUS at 04:02

## 2023-01-01 RX ADMIN — PIPERACILLIN AND TAZOBACTAM 4.5 G: 4; .5 INJECTION, POWDER, LYOPHILIZED, FOR SOLUTION INTRAVENOUS; PARENTERAL at 01:07

## 2023-01-01 RX ADMIN — INSULIN ASPART 6 UNITS: 100 INJECTION, SOLUTION INTRAVENOUS; SUBCUTANEOUS at 08:08

## 2023-01-01 RX ADMIN — ENOXAPARIN SODIUM 40 MG: 40 INJECTION SUBCUTANEOUS at 04:07

## 2023-01-01 RX ADMIN — PROPOFOL 20 MCG/KG/MIN: 10 INJECTION, EMULSION INTRAVENOUS at 07:07

## 2023-01-01 RX ADMIN — METRONIDAZOLE 500 MG: 500 TABLET ORAL at 01:02

## 2023-01-01 RX ADMIN — IOHEXOL 80 ML: 350 INJECTION, SOLUTION INTRAVENOUS at 11:04

## 2023-01-01 RX ADMIN — SODIUM BICARBONATE: 84 INJECTION, SOLUTION INTRAVENOUS at 11:07

## 2023-01-01 RX ADMIN — HYPROMELLOSE 2910 1 DROP: 5 SOLUTION OPHTHALMIC at 05:08

## 2023-01-01 RX ADMIN — GLYCOPYRROLATE 0.2 MG: 0.2 INJECTION, SOLUTION INTRAMUSCULAR; INTRAVITREAL at 05:08

## 2023-01-01 RX ADMIN — Medication 2 PACKET: at 09:02

## 2023-01-01 RX ADMIN — LIDOCAINE HYDROCHLORIDE 15 ML: 20 SOLUTION ORAL; TOPICAL at 10:04

## 2023-01-01 RX ADMIN — CALCIUM CHLORIDE 1 G: 100 INJECTION, SOLUTION INTRAVENOUS; INTRAVENTRICULAR at 07:07

## 2023-01-01 RX ADMIN — INSULIN ASPART 15 UNITS: 100 INJECTION, SOLUTION INTRAVENOUS; SUBCUTANEOUS at 07:07

## 2023-01-01 RX ADMIN — IPRATROPIUM BROMIDE 0.5 MG: 0.5 SOLUTION RESPIRATORY (INHALATION) at 08:08

## 2023-01-01 RX ADMIN — SENNOSIDES AND DOCUSATE SODIUM 1 TABLET: 50; 8.6 TABLET ORAL at 08:06

## 2023-01-01 RX ADMIN — PANTOPRAZOLE SODIUM 40 MG: 40 TABLET, DELAYED RELEASE ORAL at 10:07

## 2023-01-01 RX ADMIN — VANCOMYCIN HYDROCHLORIDE 1750 MG: 500 INJECTION, POWDER, LYOPHILIZED, FOR SOLUTION INTRAVENOUS at 07:02

## 2023-01-01 RX ADMIN — HEPARIN SODIUM 5000 UNITS: 5000 INJECTION INTRAVENOUS; SUBCUTANEOUS at 09:07

## 2023-01-01 RX ADMIN — PROPOFOL 5 MCG/KG/MIN: 10 INJECTION, EMULSION INTRAVENOUS at 10:07

## 2023-01-01 RX ADMIN — Medication 2 PACKET: at 11:02

## 2023-01-01 RX ADMIN — ACETAMINOPHEN 650 MG: 325 TABLET ORAL at 12:06

## 2023-01-01 RX ADMIN — DOPAMINE HYDROCHLORIDE 2.5 MCG/KG/MIN: 160 INJECTION, SOLUTION INTRAVENOUS at 07:07

## 2023-01-01 RX ADMIN — ACETAMINOPHEN 650 MG: 325 TABLET ORAL at 04:06

## 2023-01-01 RX ADMIN — INSULIN ASPART 4 UNITS: 100 INJECTION, SOLUTION INTRAVENOUS; SUBCUTANEOUS at 12:06

## 2023-01-01 RX ADMIN — PIPERACILLIN AND TAZOBACTAM 4.5 G: 4; .5 INJECTION, POWDER, LYOPHILIZED, FOR SOLUTION INTRAVENOUS; PARENTERAL at 04:07

## 2023-01-01 RX ADMIN — SODIUM CHLORIDE 1000 ML: 9 INJECTION, SOLUTION INTRAVENOUS at 02:06

## 2023-01-01 RX ADMIN — INSULIN DETEMIR 30 UNITS: 100 INJECTION, SOLUTION SUBCUTANEOUS at 10:07

## 2023-01-01 RX ADMIN — SODIUM CHLORIDE AND POTASSIUM CHLORIDE: 9; 1.49 INJECTION, SOLUTION INTRAVENOUS at 08:02

## 2023-01-01 RX ADMIN — SODIUM CHLORIDE AND POTASSIUM CHLORIDE: 9; 1.49 INJECTION, SOLUTION INTRAVENOUS at 06:02

## 2023-01-01 RX ADMIN — THERA TABS 1 TABLET: TAB at 08:07

## 2023-01-01 RX ADMIN — INSULIN ASPART 6 UNITS: 100 INJECTION, SOLUTION INTRAVENOUS; SUBCUTANEOUS at 05:07

## 2023-01-01 RX ADMIN — MORPHINE SULFATE 4 MG: 4 INJECTION, SOLUTION INTRAMUSCULAR; INTRAVENOUS at 05:08

## 2023-01-01 RX ADMIN — OXYCODONE HYDROCHLORIDE 5 MG: 5 TABLET ORAL at 09:02

## 2023-01-01 RX ADMIN — SODIUM BICARBONATE: 84 INJECTION, SOLUTION INTRAVENOUS at 07:07

## 2023-01-01 RX ADMIN — MORPHINE SULFATE 4 MG: 4 INJECTION, SOLUTION INTRAMUSCULAR; INTRAVENOUS at 04:08

## 2023-01-01 RX ADMIN — SODIUM CHLORIDE: 9 INJECTION, SOLUTION INTRAVENOUS at 05:06

## 2023-01-01 RX ADMIN — SODIUM CHLORIDE AND POTASSIUM CHLORIDE: 9; 1.49 INJECTION, SOLUTION INTRAVENOUS at 10:02

## 2023-01-01 RX ADMIN — PIPERACILLIN AND TAZOBACTAM 4.5 G: 4; .5 INJECTION, POWDER, LYOPHILIZED, FOR SOLUTION INTRAVENOUS; PARENTERAL at 05:08

## 2023-01-01 RX ADMIN — POLYETHYLENE GLYCOL 3350 17 G: 17 POWDER, FOR SOLUTION ORAL at 10:04

## 2023-01-01 RX ADMIN — INSULIN ASPART 9 UNITS: 100 INJECTION, SOLUTION INTRAVENOUS; SUBCUTANEOUS at 08:07

## 2023-01-01 RX ADMIN — AMIODARONE HYDROCHLORIDE 0.5 MG/MIN: 1.8 INJECTION, SOLUTION INTRAVENOUS at 06:07

## 2023-01-09 PROBLEM — R65.20 SEVERE SEPSIS: Status: RESOLVED | Noted: 2022-01-01 | Resolved: 2023-01-01

## 2023-01-09 PROBLEM — A41.9 SEVERE SEPSIS: Status: RESOLVED | Noted: 2022-01-01 | Resolved: 2023-01-01

## 2023-01-09 PROBLEM — N17.9 AKI (ACUTE KIDNEY INJURY): Status: RESOLVED | Noted: 2022-01-01 | Resolved: 2023-01-01

## 2023-02-03 NOTE — ED PROVIDER NOTES
Encounter Date: 2/3/2023    SCRIBE #1 NOTE: INam, am scribing for, and in the presence of,  Gerardo Castillo MD. I have scribed the following portions of the note - Other sections scribed: HPI, ROS, PE.     History     Chief Complaint   Patient presents with    Fall     EMS called to 33yo male that fell on Tuesday and is having left hip pain. Reports that he has also been having episodes of dizziness since the fall and some N/V/D that has been ongoing for a year.      Doe Woodall is a 32 y.o. male, with a PMHx of Diabetes mellitus, Diastolic Heart Failure, and Hypertension, who presents to the ED BIB EMS for evaluation of a fall after he became dizzy while ambulating to the restroom. Patient reporting 6/10 left hip pain, nausea and reports being unable to ambulate d/t the pain. Patient reports infrequent diarrhea since September of 2022.  PTA. Patient reports taking Insulin lantus and levimir around 1530. Pt is AAOx4, NAD noted. Patient reports he has been experiencing multiple falls since January due to lightheadedness and dizziness. He notes increased light headedness and dizziness since Incision and Drainage of abscess on the groin in September. States symptoms are wore with movement. He measure sugar at 387.Denies DKA. Denies urinary changes or dysuria. States no pain of surgical site. Notes he sometime does not take Lantus or Levimir because of no appetite. Denies treatment antibiotics. Denies nausea, vomiting, diarrhea, abdominal pain, fever, chills, or other symptoms. Last DR visit in January at [facility] due to hyperglycemia. No other exacerbating or alleviating factors. Denies symptoms.      The history is provided by the patient and the EMS personnel.   Review of patient's allergies indicates:   Allergen Reactions    Metformin Diarrhea     Past Medical History:   Diagnosis Date    Abscess of right groin 09/01/2022    Diabetes mellitus     Encounter for blood transfusion      "Loud snoring     Obese     Other insomnia 2020    Perineal abscess 2014    Primary hypertension 10/2/2022     Past Surgical History:   Procedure Laterality Date    ABCESS DRAINAGE  2014    PERIRECTAL    DECOMPRESSION OF LUMBAR SPINE USING MINIMALLY INVASIVE TECHNIQUE Right 2018    Procedure: DECOMPRESSION, SPINE, LUMBAR, MINIMALLY INVASIVE L3-4;  Surgeon: Cristian Cervantes MD;  Location: [facility] OR [address] [place] [address];  Service: Neurosurgery;  Laterality: Right;    INCISION AND DRAINAGE N/A 2022    Procedure: INCISION AND DRAINAGE GROIN;  Surgeon: KATIY Aguilar MD;  Location: [facility] OR;  Service: Urology;  Laterality: N/A;    ORTHOPEDIC SURGERY       Family History   Problem Relation Age of Onset    Diabetes Father     Diabetes Paternal Grandmother     Diabetes Paternal Grandfather      Social History     Tobacco Use    Smoking status: Former     Packs/day: 0.50     Years: 9.00     Pack years: 4.50     Types: Cigarettes     Quit date: 2013     Years since quittin.6    Smokeless tobacco: Former   Substance Use Topics    Alcohol use: Not Currently     Comment: DAILY PINT OF LIQUOR, HX OF 1 "TALL BOY" OF BEER DAILY    Drug use: Not Currently     Types: Cocaine     Comment: per collateral     Review of Systems   Constitutional:  Positive for appetite change. Negative for chills and fever.   HENT: Negative.     Eyes: Negative.    Respiratory: Negative.     Cardiovascular: Negative.    Gastrointestinal: Negative.  Negative for abdominal pain, diarrhea, nausea and vomiting.   Genitourinary: Negative.  Negative for decreased urine volume, difficulty urinating, dysuria, frequency and urgency.   Musculoskeletal:  Positive for arthralgias.   Skin: Negative.    Neurological:  Positive for dizziness and light-headedness.     Physical Exam     Initial Vitals [23 1521]   BP Pulse Resp Temp SpO2   99/63 (!) 128 (!) 24 99 °F (37.2 °C) 98 %      MAP       --         Physical Exam    Nursing note and vitals " reviewed.  Constitutional: He appears well-developed. He is not diaphoretic. He appears distressed (ill appearing male, conversing with ease).   HENT:   Head: Normocephalic and atraumatic.   Nose: Nose normal.   Mouth/Throat: No oropharyngeal exudate.   Eyes: EOM are normal. Pupils are equal, round, and reactive to light.   Neck: Neck supple. No tracheal deviation present. No JVD present.   Normal range of motion.  Cardiovascular:  Regular rhythm, normal heart sounds and intact distal pulses.           Tachycardic   Pulmonary/Chest: Breath sounds normal. No respiratory distress. He has no wheezes. He has no rhonchi. He has no rales.   Abdominal: Abdomen is soft. Bowel sounds are normal. He exhibits no distension. There is no abdominal tenderness. There is no rebound and no guarding.   Genitourinary:    Genitourinary Comments: Noted erythema and areas of excoriation throughout right-sided groin, no obvious wound or area of fluctuance or induration noted, scrotum and testicular exam appear normal.     Musculoskeletal:         General: Tenderness (noted ttp over left anterior superior iliac crest, without overlying skin changes noted) present. No edema. Normal range of motion.      Cervical back: Normal range of motion and neck supple.     Neurological: He is alert and oriented to person, place, and time. He has normal strength.   Skin: Skin is warm and dry. Capillary refill takes less than 2 seconds. No rash noted. No erythema.       ED Course   Critical Care    Date/Time: 2/3/2023 3:35 PM  Performed by: Gerardo Castillo MD  Authorized by: Gerardo Castillo MD   Direct patient critical care time: 15 minutes  Additional history critical care time: 5 minutes  Ordering / reviewing critical care time: 5 minutes  Documentation critical care time: 5 minutes  Consulting other physicians critical care time: 5 minutes  Total critical care time (exclusive of procedural time) : 35 minutes  Critical care time was exclusive  of separately billable procedures and treating other patients and teaching time.  Critical care was necessary to treat or prevent imminent or life-threatening deterioration of the following conditions: circulatory failure and sepsis.  Critical care was time spent personally by me on the following activities: development of treatment plan with patient or surrogate, discussions with consultants, evaluation of patient's response to treatment, examination of patient, obtaining history from patient or surrogate, ordering and performing treatments and interventions, ordering and review of laboratory studies, ordering and review of radiographic studies, re-evaluation of patient's condition and review of old charts.      Labs Reviewed   CBC W/ AUTO DIFFERENTIAL - Abnormal; Notable for the following components:       Result Value    WBC 22.45 (*)     RBC 3.83 (*)     Hemoglobin 13.5 (*)     Hematocrit 36.2 (*)     MCH 35.2 (*)     MCHC 37.3 (*)     RDW 14.6 (*)     Platelets 100 (*)     Immature Granulocytes 4.7 (*)     Gran # (ANC) 18.0 (*)     Immature Grans (Abs) 1.06 (*)     Mono # 1.5 (*)     Gran % 80.0 (*)     Lymph % 8.6 (*)     All other components within normal limits   COMPREHENSIVE METABOLIC PANEL - Abnormal; Notable for the following components:    Sodium 128 (*)     Chloride 94 (*)     CO2 22 (*)     Glucose 235 (*)     Calcium 8.4 (*)     Albumin 2.2 (*)     Total Bilirubin 1.7 (*)     Alkaline Phosphatase 350 (*)     All other components within normal limits   URINALYSIS, REFLEX TO URINE CULTURE - Abnormal; Notable for the following components:    Color, UA Orange (*)     Protein, UA 2+ (*)     Glucose, UA 4+ (*)     Occult Blood UA 2+ (*)     Leukocytes, UA 3+ (*)     All other components within normal limits    Narrative:     Specimen Source->Urine   LACTIC ACID, PLASMA - Abnormal; Notable for the following components:    Lactate (Lactic Acid) 3.2 (*)     All other components within normal limits    MAGNESIUM - Abnormal; Notable for the following components:    Magnesium 1.4 (*)     All other components within normal limits   PHOSPHORUS - Abnormal; Notable for the following components:    Phosphorus 2.2 (*)     All other components within normal limits   DRUG SCREEN PANEL, URINE EMERGENCY - Abnormal; Notable for the following components:    Opiate Scrn, Ur Presumptive Positive (*)     All other components within normal limits    Narrative:     Specimen Source->Urine   AMMONIA - Abnormal; Notable for the following components:    Ammonia 59 (*)     All other components within normal limits   APTT - Abnormal; Notable for the following components:    aPTT 32.5 (*)     All other components within normal limits   URINALYSIS MICROSCOPIC - Abnormal; Notable for the following components:    RBC, UA 16 (*)     WBC, UA 46 (*)     All other components within normal limits    Narrative:     Specimen Source->Urine   ISTAT PROCEDURE - Abnormal; Notable for the following components:    POC PH 7.474 (*)     POC PCO2 33.9 (*)     POC SATURATED O2 86 (*)     All other components within normal limits   TROPONIN I   BETA - HYDROXYBUTYRATE, SERUM   ALCOHOL,MEDICAL (ETHANOL)   PROTIME-INR   LIPASE   LACTIC ACID, PLASMA   POCT INFLUENZA A/B MOLECULAR   SARS-COV-2 RDRP GENE          Imaging Results              US Abdomen Limited (Final result)  Result time 02/03/23 22:27:28      Final result by Dee Quinteros MD (02/03/23 22:27:28)                   Impression:      Limited exam.  Findings may suggest a small amount of gallbladder sludge.  No evidence of acute cholecystitis.      Electronically signed by: Dee Quinteros  Date:    02/03/2023  Time:    22:27               Narrative:    EXAMINATION:  ULTRASOUND ABDOMEN LIMITED (GALLBLADDER)    CLINICAL HISTORY:  Right upper quadrant abdominal pain.  Left hip injury.    TECHNIQUE:  Limited ultrasound of the right upper quadrant of the abdomen with attention to the gallbladder was  performed.    COMPARISON:  CT abdomen pelvis 02/03/2023    FINDINGS:  Gallbladder: Small dependent echogenic debris is seen within the gallbladder lumen.  No wall thickening, or pericholecystic fluid.  No sonographic Menezes's sign.    Biliary system: The common duct is not dilated, measuring 5.4 mm.  No intrahepatic ductal dilatation.  Scratch    Miscellaneous: Examination limited secondary to patient's inability to decubitus position.  No right hydronephrosis.  Pancreatic body and tail the not visualized.                                       CT Abdomen Pelvis With Contrast (Final result)  Result time 02/03/23 19:08:45      Final result by Dee Quinteros MD (02/03/23 19:08:45)                   Impression:      No definite evidence of acute trauma to the major parenchymal organs.  Multiple rib fractures in various states of healing.    Hepatic steatosis.  Hepatomegaly.    Mild gallbladder sludge or gravel-like gallstones.    Colonic diverticulosis.    Stable 3 4 mm left lower lobe subpleural pulmonary nodule.  Typically, the Fleischner criteria is reserved for patients over the age of 35.  However, recommend correlation with social history.  For a solid nodule <6 mm, Fleischner Society 2017 guidelines recommend no routine follow up for a low risk patient, or follow-up with non-contrast chest CT at 12 months in a high risk patient.      Electronically signed by: Dee Quinteros  Date:    02/03/2023  Time:    19:08               Narrative:    EXAMINATION:  CT OF ABDOMEN PELVIS WITH    CLINICAL HISTORY:  Fell on Tuesday and is having left hip pain.  Dizziness.    TECHNIQUE:  5 mm enhanced axial images were obtained from the lung bases through the greater trochanters.  One hundred mL of Omnipaque 350 was injected.    COMPARISON:  09/01/2022    FINDINGS:  There appears to be multiple bilateral healing rib fractures left greater than right.  The number rib fractures has significantly increased when compared to the  prior examination of 09/01/2022.    The fatty liver is mildly enlarged.    Too small to characterize low attenuation lesions are seen at the left renal cortex.  There is mild bilateral perinephric stranding.  Spleen, and adrenal glands are unremarkable.    There is a punctate pancreatic calcification.    The gallbladder contains a small amount of gravel-like gallstones or gallbladder sludge.    There is no definite evidence for abdominal adenopathy or ascites.    There are no pelvic masses or adenopathy.  There are calcification of the vas deferens, which may be associated with diabetes mellitus.  There is colonic diverticulosis.    There is no free fluid in the pelvis.    There is a stable 3-4 mm left lower lobe subpleural pulmonary nodule (series 2 axial image 33).  There is moderate bibasilar atelectasis.                                       X-Ray Hip 2 or 3 views Left (with Pelvis when performed) (Final result)  Result time 02/03/23 16:28:29      Final result by Michael Amin MD (02/03/23 16:28:29)                   Impression:      1. No acute displaced fracture or dislocation of the left hip.      Electronically signed by: Michael Amin MD  Date:    02/03/2023  Time:    16:28               Narrative:    EXAMINATION:  XR HIP WITH PELVIS WHEN PERFORMED, 2 OR 3 VIEWS LEFT    CLINICAL HISTORY:  Pain in left hip    TECHNIQUE:  AP view of the pelvis and frog leg lateral view of the left hip were performed.    COMPARISON:  None    FINDINGS:  Two views left hip.    The bilateral sacroiliac joints are intact.  The pubic symphysis is intact.  The bilateral femoroacetabular joints are intact.                                       X-Ray Chest 1 View (Final result)  Result time 02/03/23 16:32:53      Final result by Michael Amin MD (02/03/23 16:32:53)                   Impression:      1. Interstitial findings are accentuated by shallow inspiratory effort and habitus, no large focal  consolidation.      Electronically signed by: Michael Amin MD  Date:    02/03/2023  Time:    16:32               Narrative:    EXAMINATION:  XR CHEST 1 VIEW    CLINICAL HISTORY:  Tachycardia, unspecified    TECHNIQUE:  Single frontal view of the chest was performed.    COMPARISON:  12/07/2022    FINDINGS:  The cardiomediastinal silhouette is not enlarged.  There is elevation of the right hemidiaphragm..  There is no pleural effusion.  The trachea is midline.  The lungs are symmetrically expanded bilaterally with mildly coarse central hilar interstitial attenuation, accentuated by shallow inspiratory effort and habitus..  No large focal consolidation seen.  There is no pneumothorax.  The osseous structures are unremarkable.                                       Medications   0.9 % NaCl with KCl 20 mEq infusion ( Intravenous New Bag 2/7/23 0951)   sodium chloride 0.9% flush 10 mL (has no administration in time range)   naloxone 0.4 mg/mL injection 0.02 mg (has no administration in time range)   enoxaparin injection 40 mg (40 mg Subcutaneous Given 2/6/23 1643)   acetaminophen tablet 650 mg (650 mg Oral Given 2/6/23 2123)   oxyCODONE immediate release tablet 5 mg (5 mg Oral Given 2/7/23 1214)   senna-docusate 8.6-50 mg per tablet 1 tablet (has no administration in time range)   ondansetron injection 4 mg (has no administration in time range)   prochlorperazine injection Soln 5 mg (has no administration in time range)   albuterol-ipratropium 2.5 mg-0.5 mg/3 mL nebulizer solution 3 mL (has no administration in time range)   melatonin tablet 6 mg (6 mg Oral Given 2/6/23 2119)   aluminum-magnesium hydroxide-simethicone 200-200-20 mg/5 mL suspension 30 mL (has no administration in time range)   simethicone chewable tablet 80 mg (has no administration in time range)   multivitamin tablet (1 tablet Oral Given 2/7/23 0921)   thiamine tablet 100 mg (100 mg Oral Given 2/7/23 0921)   folic acid tablet 1 mg (1 mg Oral Given  2/7/23 0921)   insulin aspart U-100 pen 0-5 Units (2 Units Subcutaneous Given 2/6/23 1248)   glucose chewable tablet 16 g (16 g Oral Given 2/4/23 1800)   glucose chewable tablet 24 g (has no administration in time range)   glucagon (human recombinant) injection 1 mg (has no administration in time range)   dextrose 10% bolus 125 mL 125 mL (has no administration in time range)   dextrose 10% bolus 250 mL 250 mL (0 mLs Intravenous Stopped 2/4/23 0554)   insulin aspart U-100 pen 1-10 Units (has no administration in time range)   HYDROmorphone (PF) injection 0.5 mg (0.5 mg Intravenous Given 2/6/23 2304)   loperamide capsule 2 mg (2 mg Oral Given 2/5/23 1753)   hydrALAZINE injection 10 mg (has no administration in time range)   metroNIDAZOLE tablet 500 mg (500 mg Oral Given 2/7/23 0630)   insulin detemir U-100 pen 12 Units (12 Units Subcutaneous Given 2/6/23 2111)   lactated ringers bolus 1,000 mL (0 mLs Intravenous Stopped 2/3/23 1807)   morphine injection 4 mg (4 mg Intravenous Given 2/3/23 1620)   ondansetron injection 4 mg (4 mg Intravenous Given 2/3/23 1620)   piperacillin-tazobactam (ZOSYN) 4.5 g in dextrose 5 % in water (D5W) 5 % 100 mL IVPB (MB+) (0 g Intravenous Stopped 2/4/23 1116)   vancomycin (VANCOCIN) 2,000 mg in dextrose 5 % (D5W) 500 mL IVPB (0 mg Intravenous Stopped 2/3/23 2139)   sodium chloride 0.9% bolus 1,000 mL 1,000 mL (1,000 mLs Intravenous New Bag 2/3/23 2004)   magnesium sulfate in dextrose IVPB (premix) 1 g (0 g Intravenous Stopped 2/3/23 2003)   iohexoL (OMNIPAQUE 350) injection 100 mL (100 mLs Intravenous Given 2/3/23 1836)   morphine injection 4 mg (4 mg Intravenous Given 2/3/23 2028)   potassium, sodium phosphates 280-160-250 mg packet 2 packet (2 packets Oral Given 2/4/23 2151)   potassium bicarbonate disintegrating tablet 20 mEq (20 mEq Oral Given 2/3/23 6347)     Medical Decision Making:   History:   I obtained history from: EMS provider.  Old Medical Records: I decided to obtain old  medical records.  Clinical Tests:   Lab Tests: Ordered and Reviewed  Radiological Study: Ordered and Reviewed       MDM:    32-year-old male with past medical history as noted above presenting after fall, with left hip pain.  Physical exam as noted above.  ED workup notable for chest x-ray unremarkable, left hip x-ray unremarkable, CT abdomen and pelvis showing prior rib fractures in various states of healing with mild gallbladder sludge or gravel like gallstones, right upper quadrant ultrasound pending.  Ammonia 59, magnesium 1.4, beta hydroxybutyrate 0, troponin negative, CMP with sodium 128, glucose 235, T bili 1.7, alk-phos 350, white blood cell count 22.45, hemoglobin 13.5, pH 7.47, pCO2 33.9, INR 1.2, lactate 3.2, COVID negative, flu negative, urinalysis with 3+ leukocytes and 46 WBCs.  Patient presentation concerning for sepsis with ongoing tachycardia, significantly elevated white blood cell count, unclear etiology at this point.  Additional ultrasound of gallbladder pending.  Broad-spectrum antibiotics were given, IV fluids given.  Unclear etiology for patient's ongoing left hip pain over appears to not have any intra-abdominal extension, or any fracture or dislocation.  Discussed further with Hospital Medicine team who will continue evaluation and management. Patient transferred to floor in stable and improving condition.       Scribe Attestation:   Scribe #1: I performed the above scribed service and the documentation accurately describes the services I performed. I attest to the accuracy of the note.            I, Gerardo Castillo M.D., personally performed the services described in this documentation.  All medical record entries made by the scribe were at my direction and in my presence.  I have reviewed the chart and agree that the record reflects my personal performance and is accurate and complete.        Clinical Impression:   Final diagnoses:  [M25.552] Left hip pain  [R00.0]  Tachycardia  [A41.9] Sepsis        ED Disposition Condition    Admit                 Gerardo Castillo MD  02/07/23 [id number]

## 2023-02-03 NOTE — ED TRIAGE NOTES
Patient BIB EMS for evaluation of a fall after he became dizzy while ambulating to the restroom. Patient reporting 6/10 left hip pain, nausea and reports being unable to ambulate d/t the pain. Patient reports infrequent diarrhea since September of 2022. Pmhx DM, HTN.  PTA. Patient reports taking Insulin lantus and levimir around 1530. Pt is AAOx4, NAD noted.

## 2023-02-04 PROBLEM — R65.10 SIRS (SYSTEMIC INFLAMMATORY RESPONSE SYNDROME): Status: ACTIVE | Noted: 2022-01-01

## 2023-02-04 PROBLEM — R00.0 TACHYCARDIA: Status: ACTIVE | Noted: 2023-01-01

## 2023-02-04 PROBLEM — E83.39 HYPOPHOSPHATEMIA: Status: ACTIVE | Noted: 2023-01-01

## 2023-02-04 PROBLEM — M25.552 LEFT HIP PAIN: Status: ACTIVE | Noted: 2023-01-01

## 2023-02-04 PROBLEM — R29.6 FREQUENT FALLS: Status: ACTIVE | Noted: 2023-01-01

## 2023-02-04 PROBLEM — D69.6 THROMBOCYTOPENIA: Status: ACTIVE | Noted: 2023-01-01

## 2023-02-04 PROBLEM — L97.911: Status: ACTIVE | Noted: 2023-01-01

## 2023-02-04 PROBLEM — R00.0 TACHYCARDIA: Status: RESOLVED | Noted: 2023-01-01 | Resolved: 2023-01-01

## 2023-02-04 PROBLEM — I10 HTN (HYPERTENSION): Status: RESOLVED | Noted: 2022-01-01 | Resolved: 2023-01-01

## 2023-02-04 PROBLEM — E87.1 DEHYDRATION WITH HYPONATREMIA: Status: ACTIVE | Noted: 2022-01-01

## 2023-02-04 NOTE — NURSING
PER handoff given to      Pt resting in bed quietly. NAD noted.   Fall and safety precautions maintained. Bed alarm activated and audible.. Bed locked in lowest position, with side rails up x2. Call bell and personal items within reach

## 2023-02-04 NOTE — PT/OT/SLP EVAL
Physical Therapy Evaluation    Patient Name:  Doe Woodall   MRN:  [id number]    Recommendations:     Discharge Recommendations: outpatient PT (Dizziness Training)  Discharge Equipment Recommendations: walker, rolling, bath bench   Barriers to discharge: None    Assessment:     Doe Woodall is a 32 y.o. male admitted with a medical diagnosis of Nonspecific dizziness.  He presents with the following impairments/functional limitations: weakness, impaired endurance, gait instability, impaired functional mobility, decreased lower extremity function, decreased safety awareness, pain.    Rehab Prognosis: Good; patient would benefit from acute skilled PT services to address these deficits and reach maximum level of function.    Recent Surgery: * No surgery found *      Plan:     During this hospitalization, patient to be seen 5 x/week to address the identified rehab impairments via gait training, therapeutic activities, therapeutic exercises and progress toward the following goals:    Plan of Care Expires:  02/17/23    Subjective     Chief Complaint: L Hip Pain with Mvmts  Patient/Family Comments/goals: to return to PLOF  Pain/Comfort:  Pain Rating 1: 7/10  Location - Side 1: Left  Location 1: hip  Pain Addressed 1: Distraction    Patients cultural, spiritual, Roman Catholic conflicts given the current situation:      Living Environment:  Pt lives with 3 roommates in a [facility] with 1 LINSEY.   Prior to admission, patients level of function was Mod I.  Equipment used at home: cane, straight.  DME owned (not currently used): none.  Upon discharge, patient will have assistance from Roommates and Family (father and 3 sisters live around block).    Objective:     Communicated with Nurse prior to session.  Patient found supine with peripheral IV, telemetry, SCD, bed alarm  upon PT entry to room.    General Precautions: Standard, fall  Orthopedic Precautions:Full weight bearing   Braces: N/A  Respiratory Status: Room  air    Exams:  Cognitive Exam:  Patient is oriented to Person, Place, and Situation  Gross Motor Coordination:  WFL except for L Hip  Postural Exam:  Patient presented with the following abnormalities:    -       Rounded shoulders  -       Forward head  -       Abnormal trunk flexion  Skin Integrity/Edema:      -       Skin integrity: Visible skin intact  -       Edema: None noted Gamal Lower Extremenities  RLE ROM: WFL  RLE Strength: WFL  LLE ROM: Deficits: Hip Flexion limited by pain  LLE Strength: Deficits: Quad = 4/5    Functional Mobility:  Bed Mobility:     Supine to Sit: minimum assistance  Sit to Supine: minimum assistance  Transfers:     Sit to Stand:  minimum assistance with hand-held assist  Gait: 5 side steps along the EOB with HHA (Dee).        AM-PAC 6 CLICK MOBILITY  Total Score:11       Treatment & Education:  Lower Extremity Exercises.   Patient educated on the purpose of therapeutic exercise.    Patient verbalized acceptance/understanding of instructions, expectations, and limitations(for safety).  Patient performed: 2 sets of 10 reps (each) of B LE There Ex: AP, LAQ, Hip abd/add, Hip flexion while sitting up on EOB.       Patient required still requires verbal cues/tactile cues to ensure correct sequence, to maintain proper form, and to allow for self-correction.  Pt reported no complaints or problems with exercise activity.      Patient left supine with all lines intact and call button in reach.    GOALS:   Multidisciplinary Problems       Physical Therapy Goals          Problem: Physical Therapy    Goal Priority Disciplines Outcome Goal Variances Interventions   Physical Therapy Goal     PT, PT/OT Ongoing, Progressing     Description: Goals to be met by:  2023    Patient will increase functional independence with mobility by performin. Supine to sit with Modified [place]  2. Sit to supine with Modified [place]  3. Sit to stand transfer with Modified [place]  4. Gait  x  400 feet with Modified [place] using Rolling Walker.    Recommend: Outpatient PT, Rolling Walker, and TTB at time of discharge to home with family.                              History:     Past Medical History:   Diagnosis Date    Abscess of right groin 09/01/2022    Diabetes mellitus     Encounter for blood transfusion     Loud snoring     Obese     Other insomnia 08/03/2020    Perineal abscess 08/01/2014    Primary hypertension 10/2/2022       Past Surgical History:   Procedure Laterality Date    ABCESS DRAINAGE  2014    PERIRECTAL    DECOMPRESSION OF LUMBAR SPINE USING MINIMALLY INVASIVE TECHNIQUE Right 07/06/2018    Procedure: DECOMPRESSION, SPINE, LUMBAR, MINIMALLY INVASIVE L3-4;  Surgeon: Cristian Cervantes MD;  Location: [facility] OR [address] [place] [address];  Service: Neurosurgery;  Laterality: Right;    INCISION AND DRAINAGE N/A 9/9/2022    Procedure: INCISION AND DRAINAGE GROIN;  Surgeon: KAITY Aguilar MD;  Location: [facility];  Service: Urology;  Laterality: N/A;    ORTHOPEDIC SURGERY         Time Tracking:     PT Received On: 02/04/23  PT Start Time: 1030     PT Stop Time: 1100  PT Total Time (min): 30 min     Billable Minutes: Evaluation 15 min and Therapeutic Exercise 15 min      Ronaldo Alexandre, PT  02/04/2023

## 2023-02-04 NOTE — ASSESSMENT & PLAN NOTE
This is likely multifactorial in nature from dehydration, chronic ETOH use and DM2  He could likely have vitamin deficiency, vermal degeneration, neuropathy, and orthostatic hypotension all contributing  Check SMMA, ESR, CRP, HIV, RPR, Folate, NH3  MRI brain in am  Consider Neuro consult     03/12/22 17:35   RPR Quantitative 1:128 !

## 2023-02-04 NOTE — PLAN OF CARE
Problem: Occupational Therapy  Goal: Occupational Therapy Goal  Description: Goals to be met by: 2/18/23     Patient will increase functional independence with ADLs by performing:    LE Dressing with Modified [place].  Grooming while standing at sink with Modified [place].  Toileting from toilet with Modified [place] for hygiene and clothing management.   Step transfer with Modified [place]  Toilet transfer to toilet with Modified [place].  Upper extremity exercise program x15 reps per handout, with independence.    Outcome: Ongoing, Progressing

## 2023-02-04 NOTE — SUBJECTIVE & OBJECTIVE
"Past Medical History:   Diagnosis Date    Abscess of right groin 2022    Diabetes mellitus     Encounter for blood transfusion     Loud snoring     Obese     Other insomnia 2020    Perineal abscess 2014    Primary hypertension 10/2/2022       Past Surgical History:   Procedure Laterality Date    ABCESS DRAINAGE      PERIRECTAL    DECOMPRESSION OF LUMBAR SPINE USING MINIMALLY INVASIVE TECHNIQUE Right 2018    Procedure: DECOMPRESSION, SPINE, LUMBAR, MINIMALLY INVASIVE L3-4;  Surgeon: Cristian Cervantes MD;  Location: [facility] OR [address] [place] [address];  Service: Neurosurgery;  Laterality: Right;    INCISION AND DRAINAGE N/A 2022    Procedure: INCISION AND DRAINAGE GROIN;  Surgeon: KAITY Aguilra MD;  Location: [facility] OR;  Service: Urology;  Laterality: N/A;    ORTHOPEDIC SURGERY         Review of patient's allergies indicates:   Allergen Reactions    Metformin Diarrhea       No current facility-administered medications on file prior to encounter.     Current Outpatient Medications on File Prior to Encounter   Medication Sig    insulin detemir U-100 (LEVEMIR FLEXTOUCH) 100 unit/mL (3 mL) SubQ InPn pen Inject 30 Units into the skin once daily.    insulin glargine 100 units/mL SubQ pen Lantus SoloStar 100 UNIT/ML Subcutaneous Solution Pen-injector QTY: 1 mL Days: 30 Refills: 5  Written: 22 Patient Instructions: inject 25 units by subcutaneous route 1 time per day at bedtime     Family History       Problem Relation (Age of Onset)    Diabetes Father, Paternal Grandmother, Paternal Grandfather          Tobacco Use    Smoking status: Former     Packs/day: 0.50     Years: 9.00     Pack years: 4.50     Types: Cigarettes     Quit date: 2013     Years since quittin.6    Smokeless tobacco: Former   Substance and Sexual Activity    Alcohol use: Not Currently     Comment: DAILY PINT OF LIQUOR, HX OF 1 "TALL BOY" OF BEER DAILY    Drug use: Not Currently     Types: Cocaine     Comment: per " collateral    Sexual activity: Yes     Partners: Female     Birth control/protection: None     Review of Systems   Constitutional:  Positive for activity change and fatigue. Negative for chills and fever.   HENT:  Negative for congestion.    Eyes:  Negative for visual disturbance.   Respiratory:  Positive for cough and shortness of breath.    Cardiovascular:  Negative for chest pain.   Gastrointestinal:  Negative for abdominal pain, constipation, diarrhea, nausea and vomiting.   Endocrine: Negative for cold intolerance.   Genitourinary:  Negative for dysuria.   Musculoskeletal:  Positive for arthralgias and joint swelling.   Skin:  Positive for rash and wound.   Allergic/Immunologic: Negative for immunocompromised state.   Neurological:  Positive for dizziness and light-headedness. Negative for weakness.   Hematological:  Bruises/bleeds easily.   Psychiatric/Behavioral:  Positive for dysphoric mood. Negative for confusion. The patient is not nervous/anxious.    Objective:     Vital Signs (Most Recent):  Temp: 98.3 °F (36.8 °C) (02/03/23 2303)  Pulse: (!) 113 (02/03/23 2310)  Resp: 16 (02/03/23 2354)  BP: 134/85 (02/03/23 2303)  SpO2: 97 % (02/03/23 2303)   Vital Signs (24h Range):  Temp:  [98.3 °F (36.8 °C)-99 °F (37.2 °C)] 98.3 °F (36.8 °C)  Pulse:  [103-181] 113  Resp:  [13-42] 16  SpO2:  [94 %-100 %] 97 %  BP: ()/(54-85) 134/85     Weight: 95.5 kg (210 lb 8.6 oz)  Body mass index is 33.98 kg/m².    Physical Exam  Vitals and nursing note reviewed.   Constitutional:       General: He is not in acute distress.     Appearance: He is well-developed. He is obese. He is ill-appearing. He is not toxic-appearing or diaphoretic.   HENT:      Head: Normocephalic and atraumatic.      Nose: Nose normal.      Mouth/Throat:      Pharynx: No oropharyngeal exudate.   Eyes:      General: No scleral icterus.        Right eye: No discharge.         Left eye: No discharge.      Extraocular Movements: EOM normal.       Conjunctiva/sclera: Conjunctivae normal.      Pupils: Pupils are equal, round, and reactive to light.   Neck:      Thyroid: No thyromegaly.      Vascular: No JVD.      Trachea: No tracheal deviation.   Cardiovascular:      Rate and Rhythm: Regular rhythm. Tachycardia present.      Heart sounds: Normal heart sounds. No murmur heard.    No friction rub. No gallop.   Pulmonary:      Effort: Pulmonary effort is normal. No respiratory distress.      Breath sounds: No stridor. Rhonchi present. No decreased breath sounds, wheezing or rales.   Chest:      Chest wall: No swelling or tenderness.   Abdominal:      General: Abdomen is protuberant. Bowel sounds are normal. There is no distension.      Palpations: Abdomen is soft. There is no mass.      Tenderness: There is no abdominal tenderness. There is no guarding or rebound.   Genitourinary:     Comments: No gabriel in place  Musculoskeletal:         General: No tenderness. Normal range of motion.      Cervical back: Normal range of motion and neck supple.      Comments: Left lateral hip pain to palpation   Lymphadenopathy:      Cervical: No cervical adenopathy.      Comments: No peripheral edema to legs   Skin:     General: Skin is warm and dry.      Coloration: Skin is not pale.      Findings: Erythema and rash present.      Comments: Right groin excoriated area from the diaper rubbing him and likely yeast   Neurological:      Mental Status: He is alert and oriented to person, place, and time.      GCS: GCS eye subscore is 4. GCS verbal subscore is 5. GCS motor subscore is 6.      Cranial Nerves: No cranial nerve deficit.      Motor: No abnormal muscle tone.      Coordination: Coordination normal.   Psychiatric:         Attention and Perception: Attention and perception normal.         Mood and Affect: Mood is anxious.         Speech: Speech normal.         Behavior: Behavior normal.         Cognition and Memory: Cognition and memory normal.         CRANIAL NERVES     CN  III, IV, VI   Pupils are equal, round, and reactive to light.  Extraocular motions are normal.      Significant Labs: All pertinent labs within the past 24 hours have been reviewed.  Recent Results (from the past 24 hour(s))   Blood Culture #2 **CANNOT BE ORDERED STAT**    Collection Time: 02/03/23  4:14 PM    Specimen: Peripheral, Hand, Left; Blood   Result Value Ref Range    Blood Culture, Routine No Growth to date    CBC auto differential    Collection Time: 02/03/23  4:15 PM   Result Value Ref Range    WBC 22.45 (H) 3.90 - 12.70 K/uL    RBC 3.83 (L) 4.60 - 6.20 M/uL    Hemoglobin 13.5 (L) 14.0 - 18.0 g/dL    Hematocrit 36.2 (L) 40.0 - 54.0 %    MCV 95 82 - 98 fL    MCH 35.2 (H) 27.0 - 31.0 pg    MCHC 37.3 (H) 32.0 - 36.0 g/dL    RDW 14.6 (H) 11.5 - 14.5 %    Platelets 100 (L) 150 - 450 K/uL    MPV 10.4 9.2 - 12.9 fL    Immature Granulocytes 4.7 (H) 0.0 - 0.5 %    Gran # (ANC) 18.0 (H) 1.8 - 7.7 K/uL    Immature Grans (Abs) 1.06 (H) 0.00 - 0.04 K/uL    Lymph # 1.9 1.0 - 4.8 K/uL    Mono # 1.5 (H) 0.3 - 1.0 K/uL    Eos # 0.0 0.0 - 0.5 K/uL    Baso # 0.04 0.00 - 0.20 K/uL    nRBC 0 0 /100 WBC    Gran % 80.0 (H) 38.0 - 73.0 %    Lymph % 8.6 (L) 18.0 - 48.0 %    Mono % 6.5 4.0 - 15.0 %    Eosinophil % 0.0 0.0 - 8.0 %    Basophil % 0.2 0.0 - 1.9 %    Differential Method Automated    Comprehensive metabolic panel    Collection Time: 02/03/23  4:15 PM   Result Value Ref Range    Sodium 128 (L) 136 - 145 mmol/L    Potassium 3.8 3.5 - 5.1 mmol/L    Chloride 94 (L) 95 - 110 mmol/L    CO2 22 (L) 23 - 29 mmol/L    Glucose 235 (H) 70 - 110 mg/dL    BUN 17 6 - 20 mg/dL    Creatinine 0.9 0.5 - 1.4 mg/dL    Calcium 8.4 (L) 8.7 - 10.5 mg/dL    Total Protein 6.6 6.0 - 8.4 g/dL    Albumin 2.2 (L) 3.5 - 5.2 g/dL    Total Bilirubin 1.7 (H) 0.1 - 1.0 mg/dL    Alkaline Phosphatase 350 (H) 55 - 135 U/L    AST 26 10 - 40 U/L    ALT 15 10 - 44 U/L    Anion Gap 12 8 - 16 mmol/L    eGFR >60 >60 mL/min/1.73 m^2   Troponin I    Collection  Time: 02/03/23  4:15 PM   Result Value Ref Range    Troponin I <0.006 0.000 - 0.026 ng/mL   Beta - Hydroxybutyrate, Serum    Collection Time: 02/03/23  4:15 PM   Result Value Ref Range    Beta-Hydroxybutyrate 0.0 0.0 - 0.5 mmol/L   Magnesium    Collection Time: 02/03/23  4:15 PM   Result Value Ref Range    Magnesium 1.4 (L) 1.6 - 2.6 mg/dL   Phosphorus    Collection Time: 02/03/23  4:15 PM   Result Value Ref Range    Phosphorus 2.2 (L) 2.7 - 4.5 mg/dL   Ethanol    Collection Time: 02/03/23  4:15 PM   Result Value Ref Range    Alcohol, Serum <10 <10 mg/dL   Ammonia    Collection Time: 02/03/23  4:15 PM   Result Value Ref Range    Ammonia 59 (H) 10 - 50 umol/L   Lipase    Collection Time: 02/03/23  4:15 PM   Result Value Ref Range    Lipase 15 4 - 60 U/L   ISTAT PROCEDURE    Collection Time: 02/03/23  4:30 PM   Result Value Ref Range    POC PH 7.474 (H) 7.35 - 7.45    POC PCO2 33.9 (L) 35 - 45 mmHg    POC PO2 48 40 - 60 mmHg    POC HCO3 24.9 24 - 28 mmol/L    POC BE 2 -2 to 2 mmol/L    POC SATURATED O2 86 (L) 95 - 100 %    POC TCO2 26 24 - 29 mmol/L    Sample VENOUS     Site Other     Allens Test N/A     DelSys Room Air    Lactic acid, plasma    Collection Time: 02/03/23  4:42 PM   Result Value Ref Range    Lactate (Lactic Acid) 3.2 (H) 0.5 - 2.2 mmol/L   Protime-INR    Collection Time: 02/03/23  4:42 PM   Result Value Ref Range    Prothrombin Time 12.4 9.0 - 12.5 sec    INR 1.2 0.8 - 1.2   APTT    Collection Time: 02/03/23  4:42 PM   Result Value Ref Range    aPTT 32.5 (H) 21.0 - 32.0 sec   POCT COVID-19 Rapid Screening    Collection Time: 02/03/23  5:02 PM   Result Value Ref Range    POC Rapid COVID Negative Negative     Acceptable Yes    POCT Influenza A/B Molecular    Collection Time: 02/03/23  5:03 PM   Result Value Ref Range    POC Molecular Influenza A Ag Negative Negative, Not Reported    POC Molecular Influenza B Ag Negative Negative, Not Reported     Acceptable Yes     Urinalysis, Reflex to Urine Culture Urine, Clean Catch    Collection Time: 02/03/23  6:22 PM    Specimen: Urine   Result Value Ref Range    Specimen UA Urine, Clean Catch     Color, UA Orange (A) Yellow, Straw, Leticia    Appearance, UA Clear Clear    pH, UA 6.0 5.0 - 8.0    Specific Gravity, UA 1.025 1.005 - 1.030    Protein, UA 2+ (A) Negative    Glucose, UA 4+ (A) Negative    Ketones, UA Negative Negative    Bilirubin (UA) Negative Negative    Occult Blood UA 2+ (A) Negative    Nitrite, UA Negative Negative    Urobilinogen, UA Negative <2.0 EU/dL    Leukocytes, UA 3+ (A) Negative   Drug screen panel, emergency    Collection Time: 02/03/23  6:22 PM   Result Value Ref Range    Benzodiazepines Negative Negative    Methadone metabolites Negative Negative    Cocaine (Metab.) Negative Negative    Opiate Scrn, Ur Presumptive Positive (A) Negative    Barbiturate Screen, Ur Negative Negative    Amphetamine Screen, Ur Negative Negative    THC Negative Negative    Phencyclidine Negative Negative    Creatinine, Urine 150.2 23.0 - 375.0 mg/dL    Toxicology Information SEE COMMENT    Urinalysis Microscopic    Collection Time: 02/03/23  6:22 PM   Result Value Ref Range    RBC, UA 16 (H) 0 - 4 /hpf    WBC, UA 46 (H) 0 - 5 /hpf    Bacteria None None-Occ /hpf    Yeast, UA None None    Squam Epithel, UA 0 /hpf    Hyaline Casts, UA 0 0-1/lpf /lpf    Microscopic Comment SEE COMMENT    Lactic acid, plasma    Collection Time: 02/03/23  9:39 PM   Result Value Ref Range    Lactate (Lactic Acid) 1.7 0.5 - 2.2 mmol/L   POCT glucose    Collection Time: 02/03/23 11:36 PM   Result Value Ref Range    POCT Glucose 85 70 - 110 mg/dL   Procalcitonin    Collection Time: 02/03/23 11:48 PM   Result Value Ref Range    Procalcitonin 2.04 (H) <0.25 ng/mL         Significant Imaging: I have reviewed all pertinent imaging results/findings within the past 24 hours.

## 2023-02-04 NOTE — ASSESSMENT & PLAN NOTE
His last drink was last Tuesday after he fell  I counseled him for 10 minutes on ETOH dangers and need to quit  He is evasive and does not seem to buy into stopping just yet  MVI, thiamine, folate  Monitor for signs of withdrawal with DWAIN q8

## 2023-02-04 NOTE — ASSESSMENT & PLAN NOTE
I can find no mayo sours of infection  He has elevated procal, but no fever  He was given an initial dose of Vanco/Zosyn in the ED  WBC elevated to 22, and procal 2, Tm 99F.  Will continue Vanco and Zosyn for 24 hours until sure no positive blood cultures   -May all be from bony trauma and dehydration   -UA is mildly abnormal, and right groin is excoriated too

## 2023-02-04 NOTE — NURSING
Patient to scheduled procedure (MRI) as ordered. Tele monitoring in progress. Patient awake, alert, oriented. No apparent distress noted.

## 2023-02-04 NOTE — H&P
[facility] Medicine  History & Physical    Patient Name: Doe Woodall  MRN: [id number]  Patient Class: IP- Inpatient  Admission Date: 2/3/2023  Attending Physician: Dae Lowe MD   Primary Care Provider: [facility]         Patient information was obtained from patient, past medical records and ER records.     Subjective:     Principal Problem:Nonspecific dizziness    Chief Complaint:   Chief Complaint   Patient presents with    Fall     EMS called to 31yo male that fell on Tuesday and is having left hip pain. Reports that he has also been having episodes of dizziness since the fall and some N/V/D that has been ongoing for a year.         HPI:   Admission Date: 10/2/2022  Attending Physician: Armando So MD   Primary Care Provider: [facility]           Patient information was obtained from patient, past medical records and ER records.      Subjective:      HPI: Mr. Woodall is a 31yo man with a past medical history of ETOH abuse, floppy eyelid syndrome, DCHF, and substance induced mood disorder, DM2, and obesity.        He was admitted on 9/1-9/16 here at  for sepsis due to right groin-perineal cellulitis and MARISSA.   He followed up with Dr. Aguilar in Urology on 9/28/22 after running out of his Norco and still complaining of pain to his right groin area.  I admitted him on 10/2/22 here to ICU for recurrent scrotal abscess (thick-walled collection of fluid and soft tissue gas in the right perineum/scrotal wall measuring 4.2 x 4.2 x 1.6 cm), MARISSA, and DKA with a lactic acid of 9.6.      He was discharged on 10/6/22 with Abscess cx from 09/09 with GBS. ID consulted-Unclear if scrotal infection is from GBS vs new infection from fecal material contaminating wound. Continue Rocephin and Flagyl while inpatient but recommended Augmentin for one month on discharge.    He came to the ED on 12/7/22 for falls, weakness and  "hyperglycemia with ETOH abuse.  He now returns to the ED with recurrent dizziness, falls and nausea.  He has been having some left hip pain since his fall.  He fell in the bathroom last Tuesday (5 days ago), and has had severe pain to his left hip ever since landing on the edge of the tub.  He has had imbalance and dizziness coming and going for almost a half of a year.  He now wears an adult diaper and has to urinate on himself if he can't get to the bathroom quickly enough.     In the ED his VS were BP 99//63 arrival -> 94/59 -> 123/82   Pulse 128 -> 155 -> 103   Temp 99.0 °F (Oral)   Resp (!) 42 -> 21   Ht 5' 6" (1.676 m)   Wt 99.8 kg (220 lb)   SpO2 98%   BMI 35.51 kg/m².  Labs showed WBC 22, Hg 13.5, , Na 128, Cr 0.9, Gluc 235, Phos 2.2, Mg 1.4, ETOH < 10, UDS + opioid, UA 3+ LE 16 RBC, 46 WBC, BHB 0.0, Procal 2.    CXR showed interstitial findings are accentuated by shallow inspiratory effort and habitus, no large focal consolidation.  XRay of left hip showed no acute displaced fracture or dislocation  US RUQ showed  findings may suggest a small amount of gallbladder sludge, bu no evidence of acute cholecystitis.    CT abdomen and pelvis with contrast showed no definite evidence of acute trauma to the major parenchymal organs.  Multiple rib fractures in various states of healing.  Hepatic steatosis.  Hepatomegaly. Mild gallbladder sludge or gravel-like gallstones. Colonic diverticulosis.       In the ED he was treated with:  Medications   piperacillin-tazobactam (ZOSYN) 4.5 g in dextrose 5 % in water (D5W) 5 % 100 mL IVPB (MB+) (0 g Intravenous Stopped 2/3/23 1937)   lactated ringers bolus 1,000 mL (0 mLs Intravenous Stopped 2/3/23 1807)   morphine injection 4 mg (4 mg Intravenous Given 2/3/23 1620)   ondansetron injection 4 mg (4 mg Intravenous Given 2/3/23 1620)   vancomycin (VANCOCIN) 2,000 mg in dextrose 5 % (D5W) 500 mL IVPB (0 mg Intravenous Stopped 2/3/23 0198)   sodium chloride 0.9% bolus " 1,000 mL 1,000 mL (1,000 mLs Intravenous New Bag 2/3/23 2004)   magnesium sulfate in dextrose IVPB (premix) 1 g (0 g Intravenous Stopped 2/3/23 2003)   iohexoL (OMNIPAQUE 350) injection 100 mL (100 mLs Intravenous Given 2/3/23 1836)   morphine injection 4 mg (4 mg Intravenous Given 2/3/23 2028)           Past Medical History:   Diagnosis Date    Abscess of right groin 09/01/2022    Diabetes mellitus     Encounter for blood transfusion     Loud snoring     Obese     Other insomnia 08/03/2020    Perineal abscess 08/01/2014    Primary hypertension 10/2/2022       Past Surgical History:   Procedure Laterality Date    ABCESS DRAINAGE  2014    PERIRECTAL    DECOMPRESSION OF LUMBAR SPINE USING MINIMALLY INVASIVE TECHNIQUE Right 07/06/2018    Procedure: DECOMPRESSION, SPINE, LUMBAR, MINIMALLY INVASIVE L3-4;  Surgeon: Cristian Cervantes MD;  Location: [facility] OR [address] [place] [address];  Service: Neurosurgery;  Laterality: Right;    INCISION AND DRAINAGE N/A 9/9/2022    Procedure: INCISION AND DRAINAGE GROIN;  Surgeon: KAITY Aguilar MD;  Location: [facility];  Service: Urology;  Laterality: N/A;    ORTHOPEDIC SURGERY         Review of patient's allergies indicates:   Allergen Reactions    Metformin Diarrhea       No current facility-administered medications on file prior to encounter.     Current Outpatient Medications on File Prior to Encounter   Medication Sig    insulin detemir U-100 (LEVEMIR FLEXTOUCH) 100 unit/mL (3 mL) SubQ InPn pen Inject 30 Units into the skin once daily.    insulin glargine 100 units/mL SubQ pen Lantus SoloStar 100 UNIT/ML Subcutaneous Solution Pen-injector QTY: 1 mL Days: 30 Refills: 5  Written: 12/12/22 Patient Instructions: inject 25 units by subcutaneous route 1 time per day at bedtime     Family History       Problem Relation (Age of Onset)    Diabetes Father, Paternal Grandmother, Paternal Grandfather          Tobacco Use    Smoking status: Former     Packs/day: 0.50     Years: 9.00     Pack years:  "4.50     Types: Cigarettes     Quit date: 2013     Years since quittin.6    Smokeless tobacco: Former   Substance and Sexual Activity    Alcohol use: Not Currently     Comment: DAILY PINT OF LIQUOR, HX OF 1 "TALL BOY" OF BEER DAILY    Drug use: Not Currently     Types: Cocaine     Comment: per collateral    Sexual activity: Yes     Partners: Female     Birth control/protection: None     Review of Systems   Constitutional:  Positive for activity change and fatigue. Negative for chills and fever.   HENT:  Negative for congestion.    Eyes:  Negative for visual disturbance.   Respiratory:  Positive for cough and shortness of breath.    Cardiovascular:  Negative for chest pain.   Gastrointestinal:  Negative for abdominal pain, constipation, diarrhea, nausea and vomiting.   Endocrine: Negative for cold intolerance.   Genitourinary:  Negative for dysuria.   Musculoskeletal:  Positive for arthralgias and joint swelling.   Skin:  Positive for rash and wound.   Allergic/Immunologic: Negative for immunocompromised state.   Neurological:  Positive for dizziness and light-headedness. Negative for weakness.   Hematological:  Bruises/bleeds easily.   Psychiatric/Behavioral:  Positive for dysphoric mood. Negative for confusion. The patient is not nervous/anxious.    Objective:     Vital Signs (Most Recent):  Temp: 98.3 °F (36.8 °C) (23)  Pulse: (!) 113 (23 2310)  Resp: 16 (23 2354)  BP: 134/85 (23)  SpO2: 97 % (23)   Vital Signs (24h Range):  Temp:  [98.3 °F (36.8 °C)-99 °F (37.2 °C)] 98.3 °F (36.8 °C)  Pulse:  [103-181] 113  Resp:  [13-42] 16  SpO2:  [94 %-100 %] 97 %  BP: ()/(54-85) 134/85     Weight: 95.5 kg (210 lb 8.6 oz)  Body mass index is 33.98 kg/m².    Physical Exam  Vitals and nursing note reviewed.   Constitutional:       General: He is not in acute distress.     Appearance: He is well-developed. He is obese. He is ill-appearing. He is not toxic-appearing or " diaphoretic.   HENT:      Head: Normocephalic and atraumatic.      Nose: Nose normal.      Mouth/Throat:      Pharynx: No oropharyngeal exudate.   Eyes:      General: No scleral icterus.        Right eye: No discharge.         Left eye: No discharge.      Extraocular Movements: EOM normal.      Conjunctiva/sclera: Conjunctivae normal.      Pupils: Pupils are equal, round, and reactive to light.   Neck:      Thyroid: No thyromegaly.      Vascular: No JVD.      Trachea: No tracheal deviation.   Cardiovascular:      Rate and Rhythm: Regular rhythm. Tachycardia present.      Heart sounds: Normal heart sounds. No murmur heard.    No friction rub. No gallop.   Pulmonary:      Effort: Pulmonary effort is normal. No respiratory distress.      Breath sounds: No stridor. Rhonchi present. No decreased breath sounds, wheezing or rales.   Chest:      Chest wall: No swelling or tenderness.   Abdominal:      General: Abdomen is protuberant. Bowel sounds are normal. There is no distension.      Palpations: Abdomen is soft. There is no mass.      Tenderness: There is no abdominal tenderness. There is no guarding or rebound.   Genitourinary:     Comments: No gabriel in place  Musculoskeletal:         General: No tenderness. Normal range of motion.      Cervical back: Normal range of motion and neck supple.      Comments: Left lateral hip pain to palpation   Lymphadenopathy:      Cervical: No cervical adenopathy.      Comments: No peripheral edema to legs   Skin:     General: Skin is warm and dry.      Coloration: Skin is not pale.      Findings: Erythema and rash present.      Comments: Right groin excoriated area from the diaper rubbing him and likely yeast   Neurological:      Mental Status: He is alert and oriented to person, place, and time.      GCS: GCS eye subscore is 4. GCS verbal subscore is 5. GCS motor subscore is 6.      Cranial Nerves: No cranial nerve deficit.      Motor: No abnormal muscle tone.      Coordination:  Coordination normal.   Psychiatric:         Attention and Perception: Attention and perception normal.         Mood and Affect: Mood is anxious.         Speech: Speech normal.         Behavior: Behavior normal.         Cognition and Memory: Cognition and memory normal.         CRANIAL NERVES     CN III, IV, VI   Pupils are equal, round, and reactive to light.  Extraocular motions are normal.      Significant Labs: All pertinent labs within the past 24 hours have been reviewed.  Recent Results (from the past 24 hour(s))   Blood Culture #2 **CANNOT BE ORDERED STAT**    Collection Time: 02/03/23  4:14 PM    Specimen: Peripheral, Hand, Left; Blood   Result Value Ref Range    Blood Culture, Routine No Growth to date    CBC auto differential    Collection Time: 02/03/23  4:15 PM   Result Value Ref Range    WBC 22.45 (H) 3.90 - 12.70 K/uL    RBC 3.83 (L) 4.60 - 6.20 M/uL    Hemoglobin 13.5 (L) 14.0 - 18.0 g/dL    Hematocrit 36.2 (L) 40.0 - 54.0 %    MCV 95 82 - 98 fL    MCH 35.2 (H) 27.0 - 31.0 pg    MCHC 37.3 (H) 32.0 - 36.0 g/dL    RDW 14.6 (H) 11.5 - 14.5 %    Platelets 100 (L) 150 - 450 K/uL    MPV 10.4 9.2 - 12.9 fL    Immature Granulocytes 4.7 (H) 0.0 - 0.5 %    Gran # (ANC) 18.0 (H) 1.8 - 7.7 K/uL    Immature Grans (Abs) 1.06 (H) 0.00 - 0.04 K/uL    Lymph # 1.9 1.0 - 4.8 K/uL    Mono # 1.5 (H) 0.3 - 1.0 K/uL    Eos # 0.0 0.0 - 0.5 K/uL    Baso # 0.04 0.00 - 0.20 K/uL    nRBC 0 0 /100 WBC    Gran % 80.0 (H) 38.0 - 73.0 %    Lymph % 8.6 (L) 18.0 - 48.0 %    Mono % 6.5 4.0 - 15.0 %    Eosinophil % 0.0 0.0 - 8.0 %    Basophil % 0.2 0.0 - 1.9 %    Differential Method Automated    Comprehensive metabolic panel    Collection Time: 02/03/23  4:15 PM   Result Value Ref Range    Sodium 128 (L) 136 - 145 mmol/L    Potassium 3.8 3.5 - 5.1 mmol/L    Chloride 94 (L) 95 - 110 mmol/L    CO2 22 (L) 23 - 29 mmol/L    Glucose 235 (H) 70 - 110 mg/dL    BUN 17 6 - 20 mg/dL    Creatinine 0.9 0.5 - 1.4 mg/dL    Calcium 8.4 (L) 8.7 - 10.5  mg/dL    Total Protein 6.6 6.0 - 8.4 g/dL    Albumin 2.2 (L) 3.5 - 5.2 g/dL    Total Bilirubin 1.7 (H) 0.1 - 1.0 mg/dL    Alkaline Phosphatase 350 (H) 55 - 135 U/L    AST 26 10 - 40 U/L    ALT 15 10 - 44 U/L    Anion Gap 12 8 - 16 mmol/L    eGFR >60 >60 mL/min/1.73 m^2   Troponin I    Collection Time: 02/03/23  4:15 PM   Result Value Ref Range    Troponin I <0.006 0.000 - 0.026 ng/mL   Beta - Hydroxybutyrate, Serum    Collection Time: 02/03/23  4:15 PM   Result Value Ref Range    Beta-Hydroxybutyrate 0.0 0.0 - 0.5 mmol/L   Magnesium    Collection Time: 02/03/23  4:15 PM   Result Value Ref Range    Magnesium 1.4 (L) 1.6 - 2.6 mg/dL   Phosphorus    Collection Time: 02/03/23  4:15 PM   Result Value Ref Range    Phosphorus 2.2 (L) 2.7 - 4.5 mg/dL   Ethanol    Collection Time: 02/03/23  4:15 PM   Result Value Ref Range    Alcohol, Serum <10 <10 mg/dL   Ammonia    Collection Time: 02/03/23  4:15 PM   Result Value Ref Range    Ammonia 59 (H) 10 - 50 umol/L   Lipase    Collection Time: 02/03/23  4:15 PM   Result Value Ref Range    Lipase 15 4 - 60 U/L   ISTAT PROCEDURE    Collection Time: 02/03/23  4:30 PM   Result Value Ref Range    POC PH 7.474 (H) 7.35 - 7.45    POC PCO2 33.9 (L) 35 - 45 mmHg    POC PO2 48 40 - 60 mmHg    POC HCO3 24.9 24 - 28 mmol/L    POC BE 2 -2 to 2 mmol/L    POC SATURATED O2 86 (L) 95 - 100 %    POC TCO2 26 24 - 29 mmol/L    Sample VENOUS     Site Other     Allens Test N/A     DelSys Room Air    Lactic acid, plasma    Collection Time: 02/03/23  4:42 PM   Result Value Ref Range    Lactate (Lactic Acid) 3.2 (H) 0.5 - 2.2 mmol/L   Protime-INR    Collection Time: 02/03/23  4:42 PM   Result Value Ref Range    Prothrombin Time 12.4 9.0 - 12.5 sec    INR 1.2 0.8 - 1.2   APTT    Collection Time: 02/03/23  4:42 PM   Result Value Ref Range    aPTT 32.5 (H) 21.0 - 32.0 sec   POCT COVID-19 Rapid Screening    Collection Time: 02/03/23  5:02 PM   Result Value Ref Range    POC Rapid COVID Negative Negative      Acceptable Yes    POCT Influenza A/B Molecular    Collection Time: 02/03/23  5:03 PM   Result Value Ref Range    POC Molecular Influenza A Ag Negative Negative, Not Reported    POC Molecular Influenza B Ag Negative Negative, Not Reported     Acceptable Yes    Urinalysis, Reflex to Urine Culture Urine, Clean Catch    Collection Time: 02/03/23  6:22 PM    Specimen: Urine   Result Value Ref Range    Specimen UA Urine, Clean Catch     Color, UA Orange (A) Yellow, Straw, Leticia    Appearance, UA Clear Clear    pH, UA 6.0 5.0 - 8.0    Specific Gravity, UA 1.025 1.005 - 1.030    Protein, UA 2+ (A) Negative    Glucose, UA 4+ (A) Negative    Ketones, UA Negative Negative    Bilirubin (UA) Negative Negative    Occult Blood UA 2+ (A) Negative    Nitrite, UA Negative Negative    Urobilinogen, UA Negative <2.0 EU/dL    Leukocytes, UA 3+ (A) Negative   Drug screen panel, emergency    Collection Time: 02/03/23  6:22 PM   Result Value Ref Range    Benzodiazepines Negative Negative    Methadone metabolites Negative Negative    Cocaine (Metab.) Negative Negative    Opiate Scrn, Ur Presumptive Positive (A) Negative    Barbiturate Screen, Ur Negative Negative    Amphetamine Screen, Ur Negative Negative    THC Negative Negative    Phencyclidine Negative Negative    Creatinine, Urine 150.2 23.0 - 375.0 mg/dL    Toxicology Information SEE COMMENT    Urinalysis Microscopic    Collection Time: 02/03/23  6:22 PM   Result Value Ref Range    RBC, UA 16 (H) 0 - 4 /hpf    WBC, UA 46 (H) 0 - 5 /hpf    Bacteria None None-Occ /hpf    Yeast, UA None None    Squam Epithel, UA 0 /hpf    Hyaline Casts, UA 0 0-1/lpf /lpf    Microscopic Comment SEE COMMENT    Lactic acid, plasma    Collection Time: 02/03/23  9:39 PM   Result Value Ref Range    Lactate (Lactic Acid) 1.7 0.5 - 2.2 mmol/L   POCT glucose    Collection Time: 02/03/23 11:36 PM   Result Value Ref Range    POCT Glucose 85 70 - 110 mg/dL   Procalcitonin    Collection  Time: 02/03/23 11:48 PM   Result Value Ref Range    Procalcitonin 2.04 (H) <0.25 ng/mL         Significant Imaging: I have reviewed all pertinent imaging results/findings within the past 24 hours.        Assessment/Plan:     * Nonspecific dizziness  This is likely multifactorial in nature from dehydration, chronic ETOH use and DM2  He could likely have vitamin deficiency, vermal degeneration, neuropathy, and orthostatic hypotension all contributing  Check SMMA, ESR, CRP, HIV, RPR, Folate, NH3  MRI brain in am  Consider Neuro consult     03/12/22 17:35   RPR Quantitative 1:128 !          Frequent falls  PT and OT consults  Due to dizziness  Leading to injury at home      Left hip pain  He had significant trauma to the hip  Xray is negative  MRI pelvis to rule out occult fracture  PT and OT consult  Pain control      Alcohol use disorder, severe, dependence  His last drink was last Tuesday after he fell  I counseled him for 10 minutes on ETOH dangers and need to quit  He is evasive and does not seem to buy into stopping just yet  MVI, thiamine, folate  Monitor for signs of withdrawal with CIWA q8      Dehydration with hyponatremia    0.9 % NaCl with KCl 20 mEq infusion, , Intravenous, Continuous    -Last Rate: 100 mL/hr at 02/04/23 0004, New Bag at 02/04/23 0004     SIRS (systemic inflammatory response syndrome)  I can find no mayo sours of infection  He has elevated procal, but no fever  He was given an initial dose of Vanco/Zosyn in the ED  WBC elevated to 22, and procal 2, Tm 99F.  Will continue Vanco and Zosyn for 24 hours until sure no positive blood cultures   -May all be from bony trauma and dehydration   -UA is mildly abnormal, and right groin is excoriated too        Type 2 diabetes mellitus with hyperglycemia, with long-term current use of insulin  Patient's FSGs are uncontrolled due to hyperglycemia on current medication regimen.  Last A1c reviewed-   Lab Results   Component Value Date    HGBA1C 9.4 (H)  10/03/2022     Most recent fingerstick glucose reviewed-   Recent Labs   Lab 02/03/23  2336   POCTGLUCOSE 85     Current correctional scale  Medium  Maintain anti-hyperglycemic dose as follows-   Antihyperglycemics (From admission, onward)    Start     Stop Route Frequency Ordered    02/04/23 0012  insulin aspart U-100 pen 1-10 Units         -- SubQ Before meals & nightly PRN 02/03/23 2312 02/04/23 0012  insulin aspart U-100 pen 0-5 Units         -- SubQ Before meals & nightly PRN 02/03/23 2312 02/03/23 2315  insulin detemir U-100 pen 30 Units         -- SubQ Daily 02/03/23 2307        Hold Oral hypoglycemics while patient is in the hospital.    Normocytic anemia  Ordered B12, folate, Fe studies and SMMA      Hypomagnesemia  Replaced, follow daily      Thrombocytopenia  Likely BM toxicity effect from ETOH abuse  May be also be progressing to early cirrhosis        Hypophosphatemia  Replaced, follow daily      Lower extremity ulceration, right, limited to breakdown of skin  Consult wound care  Seems more maceration from yeast in groin and chronic moistness and irritation        VTE Risk Mitigation (From admission, onward)         Ordered     enoxaparin injection 40 mg  Daily         02/03/23 2310     Place ROBY hose  Until discontinued         02/03/23 2310     IP VTE HIGH RISK PATIENT  Once         02/03/23 2310     Place sequential compression device  Until discontinued         02/03/23 2310                   ARIC Ruff MD  Department of Hospital Medicine   [facility] - Telemetry

## 2023-02-04 NOTE — CONSULTS
[facility] - [place], [place] - Telemetry  Neurology  Consult Note    Patient Name: Doe Woodall  MRN: [id number]  Admission Date: 2/3/2023  Hospital Length of Stay: 1 days  Code Status: Full Code   Attending Provider: Dae Lowe MD   Consulting Provider: Umer Guerrier MD  Primary Care Physician: [facility] - Family Medicine  Principal Problem:Nonspecific dizziness    Inpatient consult to Neurology  Consult performed by: Umer Guerrier MD  Consult ordered by: Dae Lowe MD      Subjective:     Chief Complaint:  Dizziness     HPI: 33 y/o mal with medical Hx of  DM, diastolic HF, alcohol abuse, HTN presented to the ED after becoming dizzy while attempting to got to the bathroom. Pt states that dizziness and falls have been more frequent. A few days back he fell injuring left hip. Denies LOC, headaches, visual or speech disturbances, unilateral weakness/numbness.    Pt foud to have BG of upper 300's. BP 90/50's.    Past Medical History:   Diagnosis Date    Abscess of right groin 09/01/2022    Diabetes mellitus     Encounter for blood transfusion     Loud snoring     Obese     Other insomnia 08/03/2020    Perineal abscess 08/01/2014    Primary hypertension 10/2/2022       Past Surgical History:   Procedure Laterality Date    ABCESS DRAINAGE  2014    PERIRECTAL    DECOMPRESSION OF LUMBAR SPINE USING MINIMALLY INVASIVE TECHNIQUE Right 07/06/2018    Procedure: DECOMPRESSION, SPINE, LUMBAR, MINIMALLY INVASIVE L3-4;  Surgeon: Cristian Cervantes MD;  Location: [facility] OR [address] [place] [address];  Service: Neurosurgery;  Laterality: Right;    INCISION AND DRAINAGE N/A 9/9/2022    Procedure: INCISION AND DRAINAGE GROIN;  Surgeon: KAITY Aguilar MD;  Location: [facility];  Service: Urology;  Laterality: N/A;    ORTHOPEDIC SURGERY         Review of patient's allergies indicates:   Allergen Reactions    Metformin Diarrhea       Current Neurological Medications:     No current facility-administered medications on file prior to encounter.  "    Current Outpatient Medications on File Prior to Encounter   Medication Sig    insulin detemir U-100 (LEVEMIR FLEXTOUCH) 100 unit/mL (3 mL) SubQ InPn pen Inject 30 Units into the skin once daily.    insulin glargine 100 units/mL SubQ pen Lantus SoloStar 100 UNIT/ML Subcutaneous Solution Pen-injector QTY: 1 mL Days: 30 Refills: 5  Written: 22 Patient Instructions: inject 25 units by subcutaneous route 1 time per day at bedtime      Family History       Problem Relation (Age of Onset)    Diabetes Father, Paternal Grandmother, Paternal Grandfather          Tobacco Use    Smoking status: Former     Packs/day: 0.50     Years: 9.00     Pack years: 4.50     Types: Cigarettes     Quit date: 2013     Years since quittin.6    Smokeless tobacco: Former   Substance and Sexual Activity    Alcohol use: Not Currently     Comment: DAILY PINT OF LIQUOR, HX OF 1 "TALL BOY" OF BEER DAILY    Drug use: Not Currently     Types: Cocaine     Comment: per collateral    Sexual activity: Yes     Partners: Female     Birth control/protection: None     Review of Systems   Constitutional:  Negative for fever.   HENT:  Negative for trouble swallowing.    Eyes:  Negative for photophobia.   Respiratory:  Negative for chest tightness.    Cardiovascular:  Negative for chest pain.   Gastrointestinal:  Negative for abdominal pain.   Genitourinary:  Negative for flank pain.   Musculoskeletal:         Left hip pain   Neurological:  Negative for headaches.   Psychiatric/Behavioral:  Negative for confusion.    Objective:     Vital Signs (Most Recent):  Temp: 98.7 °F (37.1 °C) (23 1107)  Pulse: 102 (23 1107)  Resp: 18 (23 1213)  BP: 131/78 (23 1107)  SpO2: 96 % (23 1107) Vital Signs (24h Range):  Temp:  [97.4 °F (36.3 °C)-99 °F (37.2 °C)] 98.7 °F (37.1 °C)  Pulse:  [] 102  Resp:  [13-42] 18  SpO2:  [92 %-100 %] 96 %  BP: ()/(54-99) 131/78     Weight: 95.5 kg (210 lb 8.6 oz)  Body mass index is 33.98 " kg/m².    Physical Exam  Constitutional:       General: He is not in acute distress.  HENT:      Head: Normocephalic.   Eyes:      General:         Right eye: No discharge.         Left eye: No discharge.   Cardiovascular:      Rate and Rhythm: Normal rate.   Pulmonary:      Breath sounds: Normal breath sounds.   Abdominal:      Palpations: Abdomen is soft.   Musculoskeletal:      Cervical back: Neck supple.      Comments: Pain on AROM of left hip   Skin:     Findings: No rash.   Neurological:      Mental Status: He is oriented to person, place, and time.   Psychiatric:         Speech: Speech normal.       NEUROLOGICAL EXAMINATION:     MENTAL STATUS   Oriented to person, place, and time.   Speech: speech is normal   Level of consciousness: alert    CRANIAL NERVES     CN III, IV, VI   Right pupil: Size: 2 mm. Shape: regular.   Left pupil: Size: 2 mm. Shape: regular.   Nystagmus: none   Conjugate gaze: present    CN V   Right facial sensation deficit: none  Left facial sensation deficit: none    CN VII   Right facial weakness: none  Left facial weakness: none    CN XII   Tongue deviation: none    MOTOR EXAM     Strength   Strength 5/5 except as noted.        Limited exam on LLE due to pain     Significant Labs: CBC:   Recent Labs   Lab 02/03/23  1615 02/04/23  0413 02/05/23  0450   WBC 22.45* 15.35* 9.35   HGB 13.5* 11.8* 11.2*   HCT 36.2* 31.7* 30.6*   * 78* 71*     CMP:   Recent Labs   Lab 02/03/23  1615 02/04/23  0413 02/05/23  0450   * 43* 107   * 132* 131*   K 3.8 3.5 3.9   CL 94* 98 99   CO2 22* 26 25   BUN 17 17 14   CREATININE 0.9 0.8 0.7   CALCIUM 8.4* 7.9* 7.9*   MG 1.4* 1.6 1.6   PROT 6.6 5.6* 5.5*   ALBUMIN 2.2* 1.8* 1.6*   BILITOT 1.7* 1.7* 1.5*   ALKPHOS 350* 284* 311*   AST 26 31 57*   ALT 15 12 18   ANIONGAP 12 8 7*       Significant Imaging: I have reviewed all pertinent imaging results/findings within the past 24 hours.    MRI carlos  Impression:     No acute infarct, intracranial  hemorrhage or mass effect.     There is increased signal within the central thomas that is nonspecific but can be seen with chronic ischemic change, metabolic insult including central pontine myelinolysis, or prior inflammatory process.  This is thought more likely chronic unless otherwise suspected clinically.        Electronically signed by: Jone Kent  Date:                                            02/04/2023  Time:                                           17:22    Assessment and Plan:     31 y/o male consulted for dizziness    Dizziness/falls: possibly due to hypotension? Given medical Hx DM and alcohol-related neuropathy as potential explanation of his symptoms. On exam no coordination disturbances or nystagmus.   MRI brain.  PT.    Active Diagnoses:    Diagnosis Date Noted POA    PRINCIPAL PROBLEM:  Nonspecific dizziness [R42] 12/07/2022 Yes    Left hip pain [M25.552] 02/04/2023 Yes    Thrombocytopenia [D69.6] 02/04/2023 Yes    Frequent falls [R29.6] 02/04/2023 Not Applicable    Hypophosphatemia [E83.39] 02/04/2023 Yes    Lower extremity ulceration, right, limited to breakdown of skin [L97.911] 02/04/2023 Yes    Dehydration with hyponatremia [E86.0, E87.1] 12/07/2022 Yes    Hypomagnesemia [E83.42] 10/05/2022 Yes    SIRS (systemic inflammatory response syndrome) [R65.10] 10/02/2022 Yes    Normocytic anemia [D64.9] 09/10/2022 Yes    Type 2 diabetes mellitus with hyperglycemia, with long-term current use of insulin [E11.65, Z79.4]  Not Applicable    Alcohol use disorder, severe, dependence [F10.20] 08/04/2020 Yes     Chronic      Problems Resolved During this Admission:    Diagnosis Date Noted Date Resolved POA    Tachycardia [R00.0] 02/04/2023 02/04/2023 Unknown    HTN (hypertension) [I10] 10/02/2022 02/04/2023 Yes       VTE Risk Mitigation (From admission, onward)           Ordered     enoxaparin injection 40 mg  Daily         02/03/23 2310     Place ROBY hose  Until discontinued         02/03/23 2310      IP VTE HIGH RISK PATIENT  Once         02/03/23 2310     Place sequential compression device  Until discontinued         02/03/23 2310                    Thank you for your consult. I will follow-up with patient. Please contact us if you have any additional questions.    Umer Guerrier MD  Neurology  [facility] - [place] - Telemetry

## 2023-02-04 NOTE — PLAN OF CARE
Problem: Physical Therapy  Goal: Physical Therapy Goal  Description: Goals to be met by:  2023    Patient will increase functional independence with mobility by performin. Supine to sit with Modified [place]  2. Sit to supine with Modified [place]  3. Sit to stand transfer with Modified [place]  4. Gait  x 400 feet with Modified [place] using Rolling Walker.    Recommend: Outpatient PT (Dizziness Training), Rolling Walker, and TTB at time of discharge to home with family.       Ronaldo Alexandre, PT  Outcome: Ongoing, Progressing

## 2023-02-04 NOTE — CARE UPDATE
Patient seen and examined.  See H/P, treatment and plan per Dr. Ruff.  33 y/o male presents with dizziness and falls.  Unsure etiology of symptoms.  Possibly multifactorial secondary to dehydration, chronic ETOH use and DM.  Neurology, PT, OT consulted.  IVF hydration.  Hypoglycemic this morning.  Adjust insulin.  Leukocytosis on presentation and empirically started on IV ABX's.  BCx negative so far.  Stop Vanc.  Continue current management and reassess in Am.

## 2023-02-04 NOTE — PLAN OF CARE
Problem: Diabetes Comorbidity  Goal: Blood Glucose Level Within Targeted Range  Outcome: Ongoing, Progressing  Intervention: Monitor and Manage Glycemia  Flowsheets (Taken 2/4/2023 0825)  Glycemic Management:   blood glucose monitored   oral hydration promoted   carbohydrate replacement provided     Problem: Impaired Wound Healing  Goal: Optimal Wound Healing  Outcome: Ongoing, Progressing  Intervention: Promote Wound Healing  Flowsheets (Taken 2/4/2023 0825)  Oral Nutrition Promotion: rest periods promoted  Sleep/Rest Enhancement:   regular sleep/rest pattern promoted   awakenings minimized   relaxation techniques promoted  Pain Management Interventions: relaxation techniques promoted

## 2023-02-04 NOTE — ASSESSMENT & PLAN NOTE
  0.9 % NaCl with KCl 20 mEq infusion, , Intravenous, Continuous    -Last Rate: 100 mL/hr at 02/04/23 0004, New Bag at 02/04/23 0004

## 2023-02-04 NOTE — PROGRESS NOTES
Notified Dm of critical blood sugar of 43, ate sandwich, granola bar and juice. Re-took blood sugar was < 24. Infused 10% Dextrose and blood sugar now 118. Pt got disoriented to place when blood sugar was low and speech slurred. Now talking normal and fully oriented. Instructed to re-take blood sugar after infusion.  0609: Bolus of D 10% complete. Blood sugar now 149

## 2023-02-04 NOTE — PT/OT/SLP EVAL
Occupational Therapy   Evaluation    Name: Doe Woodall  MRN: [id number]  Admitting Diagnosis: Nonspecific dizziness  Recent Surgery: * No surgery found *      Recommendations:     Discharge Recommendations:  (Per PT, Outpatient PT)  Discharge Equipment Recommendations:  walker, rolling, shower chair  Barriers to discharge:  None    Assessment:     Doe Woodall is a 32 y.o. male with a medical diagnosis of Nonspecific dizziness.  Performance deficits affecting function: weakness, impaired endurance, gait instability, impaired balance, impaired functional mobility, impaired self care skills, decreased safety awareness (dizziness).      Pt pleasant and willing to participate in tx session this date; limited by L hip pain but was able to stand from EOB x 2 trials w/ min A for laterally stepping along bed. Pt w/ complaints of dizziness with transitional movements but resolves. Pt tolerated tx session well and will continue to benefit from skilled acute OT services to maximize functional capacity for safe performance w/ ADLs and functional mobility.     Rehab Prognosis: Good; patient would benefit from acute skilled OT services to address these deficits and reach maximum level of function.       Plan:     Patient to be seen 2 x/week to address the above listed problems via therapeutic activities, therapeutic exercises, self-care/home management  Plan of Care Expires: 02/18/23  Plan of Care Reviewed with: patient    Subjective     Chief Complaint: L hip pain   Patient/Family Comments/goals: Agreeable to eval     Occupational Profile:  Living Environment: Pt lives w/ x 3 roommates in a [facility] w/ 1 [address]; pt uses a t/s combo.   Previous level of function: Pt was (I) w/ ADLs and functional mobility but has had ~10-15 falls in the last 3 months 2* dizziness. Sometimes wears adult briefs 2* not being able ot make it to bathroom in enough time.   Roles and Routines: Works at family's [employer] as a .   Equipment Used at  Home: cane, straight  Assistance upon Discharge: Parents and 2 sisters     Pain/Comfort:  Pain Rating 1: 7/10  Location - Side 1: Left  Location 1: hip  Pain Addressed 1: Reposition, Distraction, Cessation of Activity, Nurse notified  Pain Rating Post-Intervention 2: 7/10    Patients cultural, spiritual, Jehovah's witness conflicts given the current situation: no    Objective:     Communicated with: Nurse prior to session.  Patient found HOB elevated with peripheral IV, telemetry, SCD, bed alarm upon OT entry to room.    General Precautions: Standard, fall  Orthopedic Precautions: N/A  Braces: N/A  Respiratory Status: Room air    Occupational Performance:    Bed Mobility:    Patient completed Rolling/Turning to Left with  stand by assistance and contact guard assistance  Patient completed Scooting/Bridging with stand by assistance and contact guard assistance  Patient completed Supine to Sit with stand by assistance and contact guard assistance  Patient completed Sit to Supine with stand by assistance and contact guard assistance    Functional Mobility/Transfers:  Patient completed Sit <> Stand Transfer with minimum assistance  with  hand-held assist   Functional Mobility: Pt was able to laterally step along bed w/ min A-HHA; instability noted 2* hip pain. Refer to PT bertal for assessment.     Activities of Daily Living:  Upper Body Dressing: minimum assistance for donning gown over back     Cognitive/Visual Perceptual:  Cognitive/Psychosocial Skills:     -       Oriented to: Person, Place, Time, and Situation   -       Follows Commands/attention:Follows multistep  commands  -       Communication: clear/fluent  -       Memory: No Deficits noted  -       Safety awareness/insight to disability: intact     Physical Exam:  Balance:    -       good sitting balance; fair + standing balance   Postural examination/scapula alignment:    -       Rounded shoulders  -       Forward head  Skin integrity: Visible skin intact  Edema:   None noted  Sensation:    -       Intact  Upper Extremity Range of Motion:     -       Right Upper Extremity: WFL  -       Left Upper Extremity: WFL  Upper Extremity Strength:    -       Right Upper Extremity: WFL  -       Left Upper Extremity: WFL   Strength:    -       Right Upper Extremity: WFL  -       Left Upper Extremity: WFL    AMPAC 6 Click ADL:  AMPAC Total Score: 21    Treatment & Education:  -Initial eval complete.   -Pt educated on role of OT and POC.   -Pt educated on importance of OOB activity w/ assist from staff to prevent weakness.   -Pt provided w/ DME handout as reference for obtaining needed DME that may not be covered (ie. SC).   -Pt educated on importance of performing ADLs while seated, especially when dizzy.     Patient left HOB elevated with all lines intact, call button in reach, and bed alarm on    GOALS:   Multidisciplinary Problems       Occupational Therapy Goals          Problem: Occupational Therapy    Goal Priority Disciplines Outcome Interventions   Occupational Therapy Goal     OT, PT/OT Ongoing, Progressing    Description: Goals to be met by: 2/18/23     Patient will increase functional independence with ADLs by performing:    LE Dressing with Modified [place].  Grooming while standing at sink with Modified [place].  Toileting from toilet with Modified [place] for hygiene and clothing management.   Step transfer with Modified [place]  Toilet transfer to toilet with Modified [place].  Upper extremity exercise program x15 reps per handout, with independence.                         History:     Past Medical History:   Diagnosis Date    Abscess of right groin 09/01/2022    Diabetes mellitus     Encounter for blood transfusion     Loud snoring     Obese     Other insomnia 08/03/2020    Perineal abscess 08/01/2014    Primary hypertension 10/2/2022         Past Surgical History:   Procedure Laterality Date    ABCESS DRAINAGE  2014    PERIRECTAL     DECOMPRESSION OF LUMBAR SPINE USING MINIMALLY INVASIVE TECHNIQUE Right 07/06/2018    Procedure: DECOMPRESSION, SPINE, LUMBAR, MINIMALLY INVASIVE L3-4;  Surgeon: Cristian Cervantes MD;  Location: [facility] OR [address] [place] [address];  Service: Neurosurgery;  Laterality: Right;    INCISION AND DRAINAGE N/A 9/9/2022    Procedure: INCISION AND DRAINAGE GROIN;  Surgeon: KAITY Aguilar MD;  [facility];  Service: Urology;  Laterality: N/A;    ORTHOPEDIC SURGERY         Time Tracking:     OT Date of Treatment: 02/04/23  OT Start Time: 1020  OT Stop Time: 1037  OT Total Time (min): 17 min    Billable Minutes:Evaluation 17  Total Time 17 (co-eval w/ PT)     2/4/2023

## 2023-02-04 NOTE — PROGRESS NOTES
Pharmacokinetic Initial Assessment: IV Vancomycin    Assessment/Plan:    Initiate intravenous vancomycin with loading dose of 2000 mg once followed by a maintenance dose of vancomycin 1750 mg IV every 12 hours  Desired empiric serum trough concentration is 10 to 20 mcg/mL  Draw vancomycin trough level 60 min prior to fourth dose on 2/5/23 at approximately 0700  Pharmacy will continue to follow and monitor vancomycin.      Please contact pharmacy at extension [phone] with any questions regarding this assessment.     Thank you for the consult,   Jae Whyte       Patient brief summary:  Doe Woodall is a 32 y.o. male initiated on antimicrobial therapy with IV Vancomycin for treatment of suspected sepsis    Drug Allergies:   Review of patient's allergies indicates:   Allergen Reactions    Metformin Diarrhea       Actual Body Weight:   95.5 kg    Renal Function:   Estimated Creatinine Clearance: 126.3 mL/min (based on SCr of 0.9 mg/dL).,     Dialysis Method (if applicable):  N/A    CBC (last 72 hours):  Recent Labs   Lab Result Units 02/03/23  1615   WBC K/uL 22.45*   Hemoglobin g/dL 13.5*   Hematocrit % 36.2*   Platelets K/uL 100*   Gran % % 80.0*   Lymph % % 8.6*   Mono % % 6.5   Eosinophil % % 0.0   Basophil % % 0.2   Differential Method  Automated       Metabolic Panel (last 72 hours):  Recent Labs   Lab Result Units 02/03/23  1615 02/03/23  1822   Sodium mmol/L 128*  --    Potassium mmol/L 3.8  --    Chloride mmol/L 94*  --    CO2 mmol/L 22*  --    Glucose mg/dL 235*  --    Glucose, UA   --  4+*   BUN mg/dL 17  --    Creatinine mg/dL 0.9  --    Creatinine, Urine mg/dL  --  150.2   Albumin g/dL 2.2*  --    Total Bilirubin mg/dL 1.7*  --    Alkaline Phosphatase U/L 350*  --    AST U/L 26  --    ALT U/L 15  --    Magnesium mg/dL 1.4*  --    Phosphorus mg/dL 2.2*  --        Drug levels (last 3 results):  No results for input(s): VANCOMYCINRA, VANCORANDOM, VANCOMYCINPE, VANCOPEAK, VANCOMYCINTR, VANCOTROUGH in  the last 72 hours.    Microbiologic Results:  Microbiology Results (last 7 days)       Procedure Component Value Units Date/Time    Blood Culture #2 **CANNOT BE ORDERED STAT** [[id number]] Collected: 02/03/23 1614    Order Status: Completed Specimen: Blood from Peripheral, Hand, Left Updated: 02/03/23 2312     Blood Culture, Routine No Growth to date    Urine culture [[id number]] Collected: 02/03/23 1822    Order Status: No result Specimen: Urine Updated: 02/03/23 1844    Blood Culture #1 **CANNOT BE ORDERED STAT** [[id number]] Collected: 02/03/23 1646    Order Status: Sent Specimen: Blood from Peripheral, Hand, Left Updated: 02/03/23 1709

## 2023-02-04 NOTE — NURSING
Patient CBG was 69 this am. Held long acting insulin. Patient ate breakfast and CBG increased to 131. Informed Dr Lowe and inquired about continuing to hold insulin since his CBG had dropped to 24 this am. Dr Lowe seen message but no further orders as of yet. Will await new orders and continue to monitor patient.

## 2023-02-04 NOTE — ASSESSMENT & PLAN NOTE
Patient's FSGs are uncontrolled due to hyperglycemia on current medication regimen.  Last A1c reviewed-   Lab Results   Component Value Date    HGBA1C 9.4 (H) 10/03/2022     Most recent fingerstick glucose reviewed-   Recent Labs   Lab 02/03/23 2336   POCTGLUCOSE 85     Current correctional scale  Medium  Maintain anti-hyperglycemic dose as follows-   Antihyperglycemics (From admission, onward)    Start     Stop Route Frequency Ordered    02/04/23 0012  insulin aspart U-100 pen 1-10 Units         -- SubQ Before meals & nightly PRN 02/03/23 2312    02/04/23 0012  insulin aspart U-100 pen 0-5 Units         -- SubQ Before meals & nightly PRN 02/03/23 2312 02/03/23 2315  insulin detemir U-100 pen 30 Units         -- SubQ Daily 02/03/23 2307        Hold Oral hypoglycemics while patient is in the hospital.

## 2023-02-04 NOTE — ASSESSMENT & PLAN NOTE
Consult wound care  Seems more maceration from yeast in groin and chronic moistness and irritation

## 2023-02-04 NOTE — ASSESSMENT & PLAN NOTE
He had significant trauma to the hip  Xray is negative  MRI pelvis to rule out occult fracture  PT and OT consult  Pain control

## 2023-02-04 NOTE — NURSING
Patient returned to unit from scheduled testing. Patient awake, alert, oriented. Patient without c/o discomfort. Tele monitoring in progress. No apparent distress noted.

## 2023-02-04 NOTE — HPI
"Admission Date: 10/2/2022  Attending Physician: Armando So MD   Primary Care Provider: [facility]           Patient information was obtained from patient, past medical records and ER records.      Subjective:      HPI: Mr. Woodall is a 31yo man with a past medical history of ETOH abuse, floppy eyelid syndrome, DCHF, and substance induced mood disorder, DM2, and obesity.        He was admitted on 9/1-9/16 here at  for sepsis due to right groin-perineal cellulitis and MARISSA.   He followed up with Dr. Aguilar in Urology on 9/28/22 after running out of his Norco and still complaining of pain to his right groin area.  I admitted him on 10/2/22 here to ICU for recurrent scrotal abscess (thick-walled collection of fluid and soft tissue gas in the right perineum/scrotal wall measuring 4.2 x 4.2 x 1.6 cm), MARISSA, and DKA with a lactic acid of 9.6.      He was discharged on 10/6/22 with Abscess cx from 09/09 with GBS. ID consulted-Unclear if scrotal infection is from GBS vs new infection from fecal material contaminating wound. Continue Rocephin and Flagyl while inpatient but recommended Augmentin for one month on discharge.    He came to the ED on 12/7/22 for falls, weakness and hyperglycemia with ETOH abuse.  He now returns to the ED with recurrent dizziness, falls and nausea.  He has been having some left hip pain since his fall.  He fell in the bathroom last Tuesday (5 days ago), and has had severe pain to his left hip ever since landing on the edge of the tub.  He has had imbalance and dizziness coming and going for almost a half of a year.  He now wears an adult diaper and has to urinate on himself if he can't get to the bathroom quickly enough.     In the ED his VS were BP 99//63 arrival -> 94/59 -> 123/82   Pulse 128 -> 155 -> 103   Temp 99.0 °F (Oral)   Resp (!) 42 -> 21   Ht 5' 6" (1.676 m)   Wt 99.8 kg (220 lb)   SpO2 98%   BMI 35.51 kg/m².  Labs showed WBC 22, Hg 13.5, PLT " 100, Na 128, Cr 0.9, Gluc 235, Phos 2.2, Mg 1.4, ETOH < 10, UDS + opioid, UA 3+ LE 16 RBC, 46 WBC, BHB 0.0, Procal 2.    CXR showed interstitial findings are accentuated by shallow inspiratory effort and habitus, no large focal consolidation.  XRay of left hip showed no acute displaced fracture or dislocation  US RUQ showed  findings may suggest a small amount of gallbladder sludge, bu no evidence of acute cholecystitis.    CT abdomen and pelvis with contrast showed no definite evidence of acute trauma to the major parenchymal organs.  Multiple rib fractures in various states of healing.  Hepatic steatosis.  Hepatomegaly. Mild gallbladder sludge or gravel-like gallstones. Colonic diverticulosis.       In the ED he was treated with:  Medications   piperacillin-tazobactam (ZOSYN) 4.5 g in dextrose 5 % in water (D5W) 5 % 100 mL IVPB (MB+) (0 g Intravenous Stopped 2/3/23 1937)   lactated ringers bolus 1,000 mL (0 mLs Intravenous Stopped 2/3/23 1807)   morphine injection 4 mg (4 mg Intravenous Given 2/3/23 1620)   ondansetron injection 4 mg (4 mg Intravenous Given 2/3/23 1620)   vancomycin (VANCOCIN) 2,000 mg in dextrose 5 % (D5W) 500 mL IVPB (0 mg Intravenous Stopped 2/3/23 2139)   sodium chloride 0.9% bolus 1,000 mL 1,000 mL (1,000 mLs Intravenous New Bag 2/3/23 2004)   magnesium sulfate in dextrose IVPB (premix) 1 g (0 g Intravenous Stopped 2/3/23 2003)   iohexoL (OMNIPAQUE 350) injection 100 mL (100 mLs Intravenous Given 2/3/23 1836)   morphine injection 4 mg (4 mg Intravenous Given 2/3/23 2028)

## 2023-02-05 NOTE — PLAN OF CARE
Problem: Violence Risk or Actual  Goal: Anger and Impulse Control  Outcome: Ongoing, Progressing     Problem: Infection  Goal: Absence of Infection Signs and Symptoms  Outcome: Ongoing, Progressing     Problem: Adult Inpatient Plan of Care  Goal: Plan of Care Review  Outcome: Ongoing, Progressing  Goal: Patient-Specific Goal (Individualized)  Outcome: Ongoing, Progressing  Goal: Absence of Hospital-Acquired Illness or Injury  Outcome: Ongoing, Progressing  Goal: Optimal Comfort and Wellbeing  Outcome: Ongoing, Progressing  Goal: Readiness for Transition of Care  Outcome: Ongoing, Progressing     Problem: Diabetes Comorbidity  Goal: Blood Glucose Level Within Targeted Range  Outcome: Ongoing, Progressing     Problem: Impaired Wound Healing  Goal: Optimal Wound Healing  Outcome: Ongoing, Progressing     Problem: Skin Injury Risk Increased  Goal: Skin Health and Integrity  Outcome: Ongoing, Progressing   Patient remained free from falls and injuries throughout shift. Complained of pain and medicated as ordered. Call light within reach. Safety precautions maintained. Will continue to monitor.

## 2023-02-05 NOTE — ASSESSMENT & PLAN NOTE
This is likely multifactorial in nature from dehydration, chronic ETOH use and DM2  He could likely have vitamin deficiency, vermal degeneration, neuropathy, and orthostatic hypotension all contributing  MRI brain with no evidence of acute ischemia.  Neuro consult     03/12/22 17:35   RPR Quantitative 1:128 !

## 2023-02-05 NOTE — ASSESSMENT & PLAN NOTE
Patient's FSGs are uncontrolled due to hyperglycemia on current medication regimen.  Last A1c reviewed-   Lab Results   Component Value Date    HGBA1C 9.7 (H) 02/03/2023     Most recent fingerstick glucose reviewed-   Recent Labs   Lab 02/04/23  2356 02/05/23  0515 02/05/23  0731 02/05/23  1139   POCTGLUCOSE 121* 111* 109 259*     Current correctional scale  Medium  Maintain anti-hyperglycemic dose as follows-   Antihyperglycemics (From admission, onward)    Start     Stop Route Frequency Ordered    02/05/23 2100  insulin detemir U-100 pen 5 Units         -- SubQ Nightly 02/04/23 1738    02/04/23 0012  insulin aspart U-100 pen 1-10 Units         -- SubQ Before meals & nightly PRN 02/03/23 2312    02/04/23 0012  insulin aspart U-100 pen 0-5 Units         -- SubQ Before meals & nightly PRN 02/03/23 2312      Hold Oral hypoglycemics while patient is in the hospital.  Hypoglycemic yesterday.  Insulin adjusted.

## 2023-02-05 NOTE — PROGRESS NOTES
[facility]  Progress Note    Patient Name: Doe Woodall  MRN: [id number]  Patient Class: IP- Inpatient   Admission Date: 2/3/2023  Length of Stay: 2 days  Attending Physician: Dae Lowe MD  Primary Care Provider: [facility]        Subjective:     Principal Problem:Nonspecific dizziness        HPI:  Admission Date: 10/2/2022  Attending Physician: Armando So MD   Primary Care Provider: [facility]           Patient information was obtained from patient, past medical records and ER records.      Subjective:      HPI: Mr. Woodall is a 33yo man with a past medical history of ETOH abuse, floppy eyelid syndrome, DCHF, and substance induced mood disorder, DM2, and obesity.        He was admitted on 9/1-9/16 here at  for sepsis due to right groin-perineal cellulitis and MARISSA.   He followed up with Dr. Aguilar in Urology on 9/28/22 after running out of his Norco and still complaining of pain to his right groin area.  I admitted him on 10/2/22 here to ICU for recurrent scrotal abscess (thick-walled collection of fluid and soft tissue gas in the right perineum/scrotal wall measuring 4.2 x 4.2 x 1.6 cm), MARISSA, and DKA with a lactic acid of 9.6.      He was discharged on 10/6/22 with Abscess cx from 09/09 with GBS. ID consulted-Unclear if scrotal infection is from GBS vs new infection from fecal material contaminating wound. Continue Rocephin and Flagyl while inpatient but recommended Augmentin for one month on discharge.    He came to the ED on 12/7/22 for falls, weakness and hyperglycemia with ETOH abuse.  He now returns to the ED with recurrent dizziness, falls and nausea.  He has been having some left hip pain since his fall.  He fell in the bathroom last Tuesday (5 days ago), and has had severe pain to his left hip ever since landing on the edge of the tub.  He has had imbalance and dizziness coming and going for almost a  "half of a year.  He now wears an adult diaper and has to urinate on himself if he can't get to the bathroom quickly enough.     In the ED his VS were BP 99//63 arrival -> 94/59 -> 123/82   Pulse 128 -> 155 -> 103   Temp 99.0 °F (Oral)   Resp (!) 42 -> 21   Ht 5' 6" (1.676 m)   Wt 99.8 kg (220 lb)   SpO2 98%   BMI 35.51 kg/m².  Labs showed WBC 22, Hg 13.5, , Na 128, Cr 0.9, Gluc 235, Phos 2.2, Mg 1.4, ETOH < 10, UDS + opioid, UA 3+ LE 16 RBC, 46 WBC, BHB 0.0, Procal 2.    CXR showed interstitial findings are accentuated by shallow inspiratory effort and habitus, no large focal consolidation.  XRay of left hip showed no acute displaced fracture or dislocation  US RUQ showed  findings may suggest a small amount of gallbladder sludge, bu no evidence of acute cholecystitis.    CT abdomen and pelvis with contrast showed no definite evidence of acute trauma to the major parenchymal organs.  Multiple rib fractures in various states of healing.  Hepatic steatosis.  Hepatomegaly. Mild gallbladder sludge or gravel-like gallstones. Colonic diverticulosis.       In the ED he was treated with:  Medications   piperacillin-tazobactam (ZOSYN) 4.5 g in dextrose 5 % in water (D5W) 5 % 100 mL IVPB (MB+) (0 g Intravenous Stopped 2/3/23 1937)   lactated ringers bolus 1,000 mL (0 mLs Intravenous Stopped 2/3/23 1807)   morphine injection 4 mg (4 mg Intravenous Given 2/3/23 1620)   ondansetron injection 4 mg (4 mg Intravenous Given 2/3/23 1620)   vancomycin (VANCOCIN) 2,000 mg in dextrose 5 % (D5W) 500 mL IVPB (0 mg Intravenous Stopped 2/3/23 2139)   sodium chloride 0.9% bolus 1,000 mL 1,000 mL (1,000 mLs Intravenous New Bag 2/3/23 2004)   magnesium sulfate in dextrose IVPB (premix) 1 g (0 g Intravenous Stopped 2/3/23 2003)   iohexoL (OMNIPAQUE 350) injection 100 mL (100 mLs Intravenous Given 2/3/23 1836)   morphine injection 4 mg (4 mg Intravenous Given 2/3/23 2028)           Overview/Hospital Course:  31 y/o male presents " with dizziness and falls.  Unsure etiology of symptoms.  Possibly multifactorial secondary to dehydration, chronic ETOH use and DM.  Neurology, PT, OT consulted.  MRI with no evidence of acute infarct.  IVF hydration.  Leukocytosis on presentation and empirically started on IV ABX's.  No obvious source of infection.      Interval History: feeling better.    Review of Systems   HENT:  Negative for ear discharge and ear pain.    Eyes:  Negative for discharge and itching.   Endocrine: Negative for cold intolerance and heat intolerance.   Neurological:  Negative for seizures and syncope.   Objective:     Vital Signs (Most Recent):  Temp: 98.2 °F (36.8 °C) (02/05/23 1141)  Pulse: 103 (02/05/23 1141)  Resp: 18 (02/05/23 1141)  BP: (!) 140/86 (02/05/23 1141)  SpO2: 96 % (02/05/23 1141)   Vital Signs (24h Range):  Temp:  [97.8 °F (36.6 °C)-99.2 °F (37.3 °C)] 98.2 °F (36.8 °C)  Pulse:  [103-115] 103  Resp:  [18-20] 18  SpO2:  [93 %-97 %] 96 %  BP: (125-140)/(75-92) 140/86     Weight: 99.4 kg (219 lb 2.2 oz)  Body mass index is 35.37 kg/m².    Intake/Output Summary (Last 24 hours) at 2/5/2023 1215  Last data filed at 2/4/2023 2151  Gross per 24 hour   Intake 360 ml   Output 600 ml   Net -240 ml      Physical Exam  Vitals and nursing note reviewed.   Constitutional:       General: He is not in acute distress.     Appearance: He is well-developed. He is obese.   HENT:      Head: Normocephalic and atraumatic.      Nose: Nose normal.      Mouth/Throat:      Pharynx: No oropharyngeal exudate.   Eyes:      General: No scleral icterus.        Right eye: No discharge.         Left eye: No discharge.      Conjunctiva/sclera: Conjunctivae normal.      Pupils: Pupils are equal, round, and reactive to light.   Neck:      Thyroid: No thyromegaly.      Vascular: No JVD.      Trachea: No tracheal deviation.   Cardiovascular:      Rate and Rhythm: Regular rhythm. Tachycardia present.      Heart sounds: Normal heart sounds. No murmur heard.     No friction rub. No gallop.   Pulmonary:      Effort: Pulmonary effort is normal. No respiratory distress.      Breath sounds: No stridor. Rhonchi present. No decreased breath sounds, wheezing or rales.   Chest:      Chest wall: No swelling or tenderness.   Abdominal:      General: Abdomen is protuberant. Bowel sounds are normal. There is no distension.      Palpations: Abdomen is soft. There is no mass.      Tenderness: There is no abdominal tenderness. There is no guarding or rebound.   Genitourinary:     Comments: No gabriel in place  Musculoskeletal:         General: No tenderness. Normal range of motion.      Cervical back: Normal range of motion and neck supple.      Comments: Left lateral hip pain to palpation   Lymphadenopathy:      Cervical: No cervical adenopathy.      Comments: No peripheral edema to legs   Skin:     General: Skin is warm and dry.      Coloration: Skin is not pale.      Comments: Right groin excoriated   Neurological:      Mental Status: He is alert and oriented to person, place, and time.      Cranial Nerves: No cranial nerve deficit.      Motor: No abnormal muscle tone.      Coordination: Coordination normal.   Psychiatric:         Attention and Perception: Attention and perception normal.         Speech: Speech normal.         Behavior: Behavior normal.         Cognition and Memory: Cognition and memory normal.       Significant Labs: All pertinent labs within the past 24 hours have been reviewed.  BMP:   Recent Labs   Lab 02/05/23  0450      *   K 3.9   CL 99   CO2 25   BUN 14   CREATININE 0.7   CALCIUM 7.9*   MG 1.6     CBC:   Recent Labs   Lab 02/03/23  1615 02/04/23  0413 02/05/23  0450   WBC 22.45* 15.35* 9.35   HGB 13.5* 11.8* 11.2*   HCT 36.2* 31.7* 30.6*   * 78* 71*       Significant Imaging: I have reviewed all pertinent imaging results/findings within the past 24 hours.      Assessment/Plan:      * Nonspecific dizziness  This is likely multifactorial in  nature from dehydration, chronic ETOH use and DM2  He could likely have vitamin deficiency, vermal degeneration, neuropathy, and orthostatic hypotension all contributing  MRI brain with no evidence of acute ischemia.  Neuro consult     03/12/22 17:35   RPR Quantitative 1:128 !          Lower extremity ulceration, right, limited to breakdown of skin  Consult wound care  Seems more maceration from yeast in groin and chronic moistness and irritation      Hypophosphatemia  Replace as needed.      Frequent falls  PT and OT consults  Due to dizziness  Leading to injury at home      Thrombocytopenia  Likely BM toxicity effect from ETOH abuse  May be also be progressing to early cirrhosis        Left hip pain  He had significant trauma to the hip  Xray is negative  MRI pelvis to rule out occult fracture negative.  PT and OT consult  Pain control      Dehydration with hyponatremia    0.9 % NaCl with KCl 20 mEq infusion, , Intravenous, Continuous     Hypomagnesemia  Replaced as needed.      SIRS (systemic inflammatory response syndrome)  I can find no mayo sours of infection  He has elevated procal, but no fever  He was given an initial dose of Vanco/Zosyn in the ED  WBC elevated to 22, and procal 2, Tm 99F.  Continued on Iv ABX's.  Leukocytosis now resolved.  Probably related to hemoconcentration.  Stop ABx's.        Normocytic anemia  Ordered B12, folate, Fe studies and SMMA      Type 2 diabetes mellitus with hyperglycemia, with long-term current use of insulin  Patient's FSGs are uncontrolled due to hyperglycemia on current medication regimen.  Last A1c reviewed-   Lab Results   Component Value Date    HGBA1C 9.7 (H) 02/03/2023     Most recent fingerstick glucose reviewed-   Recent Labs   Lab 02/04/23  2356 02/05/23  0515 02/05/23  0731 02/05/23  1139   POCTGLUCOSE 121* 111* 109 259*     Current correctional scale  Medium  Maintain anti-hyperglycemic dose as follows-   Antihyperglycemics (From admission, onward)    Start      Stop Route Frequency Ordered    02/05/23 2100  insulin detemir U-100 pen 5 Units         -- SubQ Nightly 02/04/23 1738    02/04/23 0012  insulin aspart U-100 pen 1-10 Units         -- SubQ Before meals & nightly PRN 02/03/23 2312    02/04/23 0012  insulin aspart U-100 pen 0-5 Units         -- SubQ Before meals & nightly PRN 02/03/23 2312      Hold Oral hypoglycemics while patient is in the hospital.  Hypoglycemic yesterday.  Insulin adjusted.    Alcohol use disorder, severe, dependence  His last drink was last Tuesday after he fell  Counseled on ETOH dangers and need to quit  He is evasive and does not seem to buy into stopping just yet  MVI, thiamine, folate  Monitor for signs of withdrawal with CIWA q8        VTE Risk Mitigation (From admission, onward)         Ordered     enoxaparin injection 40 mg  Daily         02/03/23 2310     Place ROBY hose  Until discontinued         02/03/23 2310     IP VTE HIGH RISK PATIENT  Once         02/03/23 2310     Place sequential compression device  Until discontinued         02/03/23 2310                Discharge Planning   SUSAN:      Code Status: Full Code   Is the patient medically ready for discharge?:     Reason for patient still in hospital (select all that apply): Patient trending condition  Discharge Plan A: Home                  Dae Lowe MD  Department of [facility]   [facility]

## 2023-02-05 NOTE — HOSPITAL COURSE
31 y/o male presents with dizziness and falls.MRI show no sign of CVA,  Unsure etiology of symptoms.  Possibly multifactorial secondary to dehydration, chronic ETOH use and DM.  Neurology, PT, OT consulted.  MRI with no evidence of acute infarct.  IVF hydration.  Leukocytosis on presentation and empirically started on IV ABX's.  No obvious source of infection.  PT/OT recommending outpatient therapy.  Starting having diarrhea,was on empiric flagyl,stool culture is pending.his diarrhea is resolved,his symptoms improved,patient was discharged home with PCP follow up.

## 2023-02-05 NOTE — NURSING
Notified Dr Lowe that patients HR is spiking up to the 103's. Highest recorded at 147. Patient asymptomatic,no new orders given. Will continue to monitor.

## 2023-02-05 NOTE — ASSESSMENT & PLAN NOTE
He had significant trauma to the hip  Xray is negative  MRI pelvis to rule out occult fracture negative.  PT and OT consult  Pain control

## 2023-02-05 NOTE — NURSING
PER handoff given to ANNA De La Graza     Pt resting in bed quietly. NAD noted.   Fall and safety precautions maintained. Bed alarm activated and audible.. Bed locked in lowest position, with side rails up x2. Call bell and personal items within reach

## 2023-02-05 NOTE — ASSESSMENT & PLAN NOTE
I can find no mayo sours of infection  He has elevated procal, but no fever  He was given an initial dose of Vanco/Zosyn in the ED  WBC elevated to 22, and procal 2, Tm 99F.  Continued on Iv ABX's.  Leukocytosis now resolved.  Probably related to hemoconcentration.  Stop ABx's.

## 2023-02-05 NOTE — PLAN OF CARE
Problem: Infection  Goal: Absence of Infection Signs and Symptoms  Outcome: Ongoing, Progressing  Intervention: Prevent or Manage Infection  Flowsheets (Taken 2/5/2023 4693)  Infection Management: aseptic technique maintained     Problem: Impaired Wound Healing  Goal: Optimal Wound Healing  Outcome: Ongoing, Progressing   Dressings to sacrum reapplied with barrier cream. Dressing to right groin remains in place.

## 2023-02-05 NOTE — NURSING
PER handoff received from ANNA De La Garza     Pt resting in bed quietly. NAD noted.   Fall and safety precautions maintained. Bed alarm activated and audible.. Bed locked in lowest position, with side rails up x2. Call bell and personal items within reach

## 2023-02-05 NOTE — SUBJECTIVE & OBJECTIVE
Interval History: feeling better.    Review of Systems   HENT:  Negative for ear discharge and ear pain.    Eyes:  Negative for discharge and itching.   Endocrine: Negative for cold intolerance and heat intolerance.   Neurological:  Negative for seizures and syncope.   Objective:     Vital Signs (Most Recent):  Temp: 98.2 °F (36.8 °C) (02/05/23 1141)  Pulse: 103 (02/05/23 1141)  Resp: 18 (02/05/23 1141)  BP: (!) 140/86 (02/05/23 1141)  SpO2: 96 % (02/05/23 1141)   Vital Signs (24h Range):  Temp:  [97.8 °F (36.6 °C)-99.2 °F (37.3 °C)] 98.2 °F (36.8 °C)  Pulse:  [103-115] 103  Resp:  [18-20] 18  SpO2:  [93 %-97 %] 96 %  BP: (125-140)/(75-92) 140/86     Weight: 99.4 kg (219 lb 2.2 oz)  Body mass index is 35.37 kg/m².    Intake/Output Summary (Last 24 hours) at 2/5/2023 1215  Last data filed at 2/4/2023 2151  Gross per 24 hour   Intake 360 ml   Output 600 ml   Net -240 ml      Physical Exam  Vitals and nursing note reviewed.   Constitutional:       General: He is not in acute distress.     Appearance: He is well-developed. He is obese.   HENT:      Head: Normocephalic and atraumatic.      Nose: Nose normal.      Mouth/Throat:      Pharynx: No oropharyngeal exudate.   Eyes:      General: No scleral icterus.        Right eye: No discharge.         Left eye: No discharge.      Conjunctiva/sclera: Conjunctivae normal.      Pupils: Pupils are equal, round, and reactive to light.   Neck:      Thyroid: No thyromegaly.      Vascular: No JVD.      Trachea: No tracheal deviation.   Cardiovascular:      Rate and Rhythm: Regular rhythm. Tachycardia present.      Heart sounds: Normal heart sounds. No murmur heard.    No friction rub. No gallop.   Pulmonary:      Effort: Pulmonary effort is normal. No respiratory distress.      Breath sounds: No stridor. Rhonchi present. No decreased breath sounds, wheezing or rales.   Chest:      Chest wall: No swelling or tenderness.   Abdominal:      General: Abdomen is protuberant. Bowel sounds  are normal. There is no distension.      Palpations: Abdomen is soft. There is no mass.      Tenderness: There is no abdominal tenderness. There is no guarding or rebound.   Genitourinary:     Comments: No gabriel in place  Musculoskeletal:         General: No tenderness. Normal range of motion.      Cervical back: Normal range of motion and neck supple.      Comments: Left lateral hip pain to palpation   Lymphadenopathy:      Cervical: No cervical adenopathy.      Comments: No peripheral edema to legs   Skin:     General: Skin is warm and dry.      Coloration: Skin is not pale.      Comments: Right groin excoriated   Neurological:      Mental Status: He is alert and oriented to person, place, and time.      Cranial Nerves: No cranial nerve deficit.      Motor: No abnormal muscle tone.      Coordination: Coordination normal.   Psychiatric:         Attention and Perception: Attention and perception normal.         Speech: Speech normal.         Behavior: Behavior normal.         Cognition and Memory: Cognition and memory normal.       Significant Labs: All pertinent labs within the past 24 hours have been reviewed.  BMP:   Recent Labs   Lab 02/05/23  0450      *   K 3.9   CL 99   CO2 25   BUN 14   CREATININE 0.7   CALCIUM 7.9*   MG 1.6     CBC:   Recent Labs   Lab 02/03/23  1615 02/04/23  0413 02/05/23  0450   WBC 22.45* 15.35* 9.35   HGB 13.5* 11.8* 11.2*   HCT 36.2* 31.7* 30.6*   * 78* 71*       Significant Imaging: I have reviewed all pertinent imaging results/findings within the past 24 hours.

## 2023-02-05 NOTE — ASSESSMENT & PLAN NOTE
His last drink was last Tuesday after he fell  Counseled on ETOH dangers and need to quit  He is evasive and does not seem to buy into stopping just yet  MVI, thiamine, folate  Monitor for signs of withdrawal with CIWA q8

## 2023-02-05 NOTE — PLAN OF CARE
Problem: Violence Risk or Actual  Goal: Anger and Impulse Control  Outcome: Ongoing, Progressing     Problem: Infection  Goal: Absence of Infection Signs and Symptoms  Outcome: Ongoing, Progressing     Problem: Adult Inpatient Plan of Care  Goal: Plan of Care Review  Outcome: Ongoing, Progressing  Goal: Patient-Specific Goal (Individualized)  Outcome: Ongoing, Progressing  Goal: Absence of Hospital-Acquired Illness or Injury  Outcome: Ongoing, Progressing  Goal: Optimal Comfort and Wellbeing  Outcome: Ongoing, Progressing  Goal: Readiness for Transition of Care  Outcome: Ongoing, Progressing     Problem: Diabetes Comorbidity  Goal: Blood Glucose Level Within Targeted Range  Outcome: Ongoing, Progressing     Problem: Impaired Wound Healing  Goal: Optimal Wound Healing  Outcome: Ongoing, Progressing

## 2023-02-06 NOTE — NURSING
Pt C/O burning with R IV flush prior to hanging new bag of KCl 20 mEq in 0.9% NaCl, 100 ml/hr. R IV removed R/T vein fatigue. New bag attached to L Hand 20 G without incidence. LDA Avatar updated.

## 2023-02-06 NOTE — SUBJECTIVE & OBJECTIVE
Interval History: low grade fever overnight.  Multiple episodes of diarrhea.    Review of Systems   HENT:  Negative for ear discharge and ear pain.    Eyes:  Negative for discharge and itching.   Endocrine: Negative for cold intolerance and heat intolerance.   Neurological:  Negative for seizures and syncope.   Objective:     Vital Signs (Most Recent):  Temp: 99.3 °F (37.4 °C) (02/06/23 1118)  Pulse: 107 (02/06/23 1118)  Resp: 18 (02/06/23 1253)  BP: (!) 145/86 (02/06/23 1118)  SpO2: 96 % (02/06/23 1118)   Vital Signs (24h Range):  Temp:  [97.4 °F (36.3 °C)-100.4 °F (38 °C)] 99.3 °F (37.4 °C)  Pulse:  [] 107  Resp:  [16-20] 18  SpO2:  [95 %-98 %] 96 %  BP: (130-169)/(84-99) 145/86     Weight: 99.4 kg (219 lb 2.2 oz)  Body mass index is 35.37 kg/m².  No intake or output data in the 24 hours ending 02/06/23 1451     Physical Exam  Vitals and nursing note reviewed.   Constitutional:       General: He is not in acute distress.     Appearance: He is well-developed. He is obese.   HENT:      Head: Normocephalic and atraumatic.      Nose: Nose normal.      Mouth/Throat:      Pharynx: No oropharyngeal exudate.   Eyes:      General: No scleral icterus.        Right eye: No discharge.         Left eye: No discharge.      Conjunctiva/sclera: Conjunctivae normal.      Pupils: Pupils are equal, round, and reactive to light.   Neck:      Thyroid: No thyromegaly.      Vascular: No JVD.      Trachea: No tracheal deviation.   Cardiovascular:      Rate and Rhythm: Regular rhythm. Tachycardia present.      Heart sounds: Normal heart sounds. No murmur heard.    No friction rub. No gallop.   Pulmonary:      Effort: Pulmonary effort is normal. No respiratory distress.      Breath sounds: No stridor. Rhonchi present. No decreased breath sounds, wheezing or rales.   Chest:      Chest wall: No swelling or tenderness.   Abdominal:      General: Abdomen is protuberant. Bowel sounds are normal. There is no distension.      Palpations:  Abdomen is soft. There is no mass.      Tenderness: There is no abdominal tenderness. There is no guarding or rebound.   Genitourinary:     Comments: No gabriel in place  Musculoskeletal:         General: No tenderness. Normal range of motion.      Cervical back: Normal range of motion and neck supple.      Comments: Left lateral hip pain to palpation   Lymphadenopathy:      Cervical: No cervical adenopathy.      Comments: No peripheral edema to legs   Skin:     General: Skin is warm and dry.      Coloration: Skin is not pale.      Comments: Right groin excoriated   Neurological:      Mental Status: He is alert and oriented to person, place, and time.      Cranial Nerves: No cranial nerve deficit.      Motor: No abnormal muscle tone.      Coordination: Coordination normal.   Psychiatric:         Attention and Perception: Attention and perception normal.         Speech: Speech normal.         Behavior: Behavior normal.         Cognition and Memory: Cognition and memory normal.       Significant Labs: All pertinent labs within the past 24 hours have been reviewed.  BMP:   Recent Labs   Lab 02/06/23  0421   *   *   K 3.8      CO2 22*   BUN 13   CREATININE 0.7   CALCIUM 7.8*   MG 1.5*       CBC:   Recent Labs   Lab 02/05/23  0450 02/06/23  0421   WBC 9.35 9.11   HGB 11.2* 11.7*   HCT 30.6* 32.7*   PLT 71* 75*         Significant Imaging: I have reviewed all pertinent imaging results/findings within the past 24 hours.

## 2023-02-06 NOTE — PROGRESS NOTES
[facility] Medicine  Progress Note    Patient Name: Doe Woodall  MRN: [id number]  Patient Class: IP- Inpatient   Admission Date: 2/3/2023  Length of Stay: 3 days  Attending Physician: Dae Lowe MD  Primary Care Provider: [facility]        Subjective:     Principal Problem:Nonspecific dizziness        HPI:  Admission Date: 10/2/2022  Attending Physician: Armando So MD   Primary Care Provider: [facility]           Patient information was obtained from patient, past medical records and ER records.      Subjective:      HPI: Mr. Woodall is a 33yo man with a past medical history of ETOH abuse, floppy eyelid syndrome, DCHF, and substance induced mood disorder, DM2, and obesity.        He was admitted on 9/1-9/16 here at  for sepsis due to right groin-perineal cellulitis and MARISSA.   He followed up with Dr. Aguilar in Urology on 9/28/22 after running out of his Norco and still complaining of pain to his right groin area.  I admitted him on 10/2/22 here to ICU for recurrent scrotal abscess (thick-walled collection of fluid and soft tissue gas in the right perineum/scrotal wall measuring 4.2 x 4.2 x 1.6 cm), MARISSA, and DKA with a lactic acid of 9.6.      He was discharged on 10/6/22 with Abscess cx from 09/09 with GBS. ID consulted-Unclear if scrotal infection is from GBS vs new infection from fecal material contaminating wound. Continue Rocephin and Flagyl while inpatient but recommended Augmentin for one month on discharge.    He came to the ED on 12/7/22 for falls, weakness and hyperglycemia with ETOH abuse.  He now returns to the ED with recurrent dizziness, falls and nausea.  He has been having some left hip pain since his fall.  He fell in the bathroom last Tuesday (5 days ago), and has had severe pain to his left hip ever since landing on the edge of the tub.  He has had imbalance and dizziness coming and going for almost a  "half of a year.  He now wears an adult diaper and has to urinate on himself if he can't get to the bathroom quickly enough.     In the ED his VS were BP 99//63 arrival -> 94/59 -> 123/82   Pulse 128 -> 155 -> 103   Temp 99.0 °F (Oral)   Resp (!) 42 -> 21   Ht 5' 6" (1.676 m)   Wt 99.8 kg (220 lb)   SpO2 98%   BMI 35.51 kg/m².  Labs showed WBC 22, Hg 13.5, , Na 128, Cr 0.9, Gluc 235, Phos 2.2, Mg 1.4, ETOH < 10, UDS + opioid, UA 3+ LE 16 RBC, 46 WBC, BHB 0.0, Procal 2.    CXR showed interstitial findings are accentuated by shallow inspiratory effort and habitus, no large focal consolidation.  XRay of left hip showed no acute displaced fracture or dislocation  US RUQ showed  findings may suggest a small amount of gallbladder sludge, bu no evidence of acute cholecystitis.    CT abdomen and pelvis with contrast showed no definite evidence of acute trauma to the major parenchymal organs.  Multiple rib fractures in various states of healing.  Hepatic steatosis.  Hepatomegaly. Mild gallbladder sludge or gravel-like gallstones. Colonic diverticulosis.       In the ED he was treated with:  Medications   piperacillin-tazobactam (ZOSYN) 4.5 g in dextrose 5 % in water (D5W) 5 % 100 mL IVPB (MB+) (0 g Intravenous Stopped 2/3/23 1937)   lactated ringers bolus 1,000 mL (0 mLs Intravenous Stopped 2/3/23 1807)   morphine injection 4 mg (4 mg Intravenous Given 2/3/23 1620)   ondansetron injection 4 mg (4 mg Intravenous Given 2/3/23 1620)   vancomycin (VANCOCIN) 2,000 mg in dextrose 5 % (D5W) 500 mL IVPB (0 mg Intravenous Stopped 2/3/23 2139)   sodium chloride 0.9% bolus 1,000 mL 1,000 mL (1,000 mLs Intravenous New Bag 2/3/23 2004)   magnesium sulfate in dextrose IVPB (premix) 1 g (0 g Intravenous Stopped 2/3/23 2003)   iohexoL (OMNIPAQUE 350) injection 100 mL (100 mLs Intravenous Given 2/3/23 1836)   morphine injection 4 mg (4 mg Intravenous Given 2/3/23 2028)           Overview/Hospital Course:  33 y/o male presents " with dizziness and falls.  Unsure etiology of symptoms.  Possibly multifactorial secondary to dehydration, chronic ETOH use and DM.  Neurology, PT, OT consulted.  MRI with no evidence of acute infarct.  IVF hydration.  Leukocytosis on presentation and empirically started on IV ABX's.  No obvious source of infection.  PT/OT recommending outpatient therapy.      Interval History: low grade fever overnight.  Multiple episodes of diarrhea.    Review of Systems   HENT:  Negative for ear discharge and ear pain.    Eyes:  Negative for discharge and itching.   Endocrine: Negative for cold intolerance and heat intolerance.   Neurological:  Negative for seizures and syncope.   Objective:     Vital Signs (Most Recent):  Temp: 99.3 °F (37.4 °C) (02/06/23 1118)  Pulse: 107 (02/06/23 1118)  Resp: 18 (02/06/23 1253)  BP: (!) 145/86 (02/06/23 1118)  SpO2: 96 % (02/06/23 1118)   Vital Signs (24h Range):  Temp:  [97.4 °F (36.3 °C)-100.4 °F (38 °C)] 99.3 °F (37.4 °C)  Pulse:  [] 107  Resp:  [16-20] 18  SpO2:  [95 %-98 %] 96 %  BP: (130-169)/(84-99) 145/86     Weight: 99.4 kg (219 lb 2.2 oz)  Body mass index is 35.37 kg/m².  No intake or output data in the 24 hours ending 02/06/23 1451     Physical Exam  Vitals and nursing note reviewed.   Constitutional:       General: He is not in acute distress.     Appearance: He is well-developed. He is obese.   HENT:      Head: Normocephalic and atraumatic.      Nose: Nose normal.      Mouth/Throat:      Pharynx: No oropharyngeal exudate.   Eyes:      General: No scleral icterus.        Right eye: No discharge.         Left eye: No discharge.      Conjunctiva/sclera: Conjunctivae normal.      Pupils: Pupils are equal, round, and reactive to light.   Neck:      Thyroid: No thyromegaly.      Vascular: No JVD.      Trachea: No tracheal deviation.   Cardiovascular:      Rate and Rhythm: Regular rhythm. Tachycardia present.      Heart sounds: Normal heart sounds. No murmur heard.    No friction  rub. No gallop.   Pulmonary:      Effort: Pulmonary effort is normal. No respiratory distress.      Breath sounds: No stridor. Rhonchi present. No decreased breath sounds, wheezing or rales.   Chest:      Chest wall: No swelling or tenderness.   Abdominal:      General: Abdomen is protuberant. Bowel sounds are normal. There is no distension.      Palpations: Abdomen is soft. There is no mass.      Tenderness: There is no abdominal tenderness. There is no guarding or rebound.   Genitourinary:     Comments: No gabriel in place  Musculoskeletal:         General: No tenderness. Normal range of motion.      Cervical back: Normal range of motion and neck supple.      Comments: Left lateral hip pain to palpation   Lymphadenopathy:      Cervical: No cervical adenopathy.      Comments: No peripheral edema to legs   Skin:     General: Skin is warm and dry.      Coloration: Skin is not pale.      Comments: Right groin excoriated   Neurological:      Mental Status: He is alert and oriented to person, place, and time.      Cranial Nerves: No cranial nerve deficit.      Motor: No abnormal muscle tone.      Coordination: Coordination normal.   Psychiatric:         Attention and Perception: Attention and perception normal.         Speech: Speech normal.         Behavior: Behavior normal.         Cognition and Memory: Cognition and memory normal.       Significant Labs: All pertinent labs within the past 24 hours have been reviewed.  BMP:   Recent Labs   Lab 02/06/23  0421   *   *   K 3.8      CO2 22*   BUN 13   CREATININE 0.7   CALCIUM 7.8*   MG 1.5*       CBC:   Recent Labs   Lab 02/05/23  0450 02/06/23  0421   WBC 9.35 9.11   HGB 11.2* 11.7*   HCT 30.6* 32.7*   PLT 71* 75*         Significant Imaging: I have reviewed all pertinent imaging results/findings within the past 24 hours.      Assessment/Plan:      * Nonspecific dizziness  This is likely multifactorial in nature from dehydration, chronic ETOH use and  DM2  He could likely have vitamin deficiency, vermal degeneration, neuropathy, and orthostatic hypotension all contributing  MRI brain with no evidence of acute ischemia.  Neuro consulted  Improving      03/12/22 17:35   RPR Quantitative 1:128 !          SIRS (systemic inflammatory response syndrome)  No obvious source of infection.  He has elevated procal, but no fever  He was given an initial dose of Vanco/Zosyn in the ED  WBC elevated to 22, and procal 2.  Leukocytosis now resolved.  Probably related to hemoconcentration.  Stopped ABx's.  Low grade fever overnight.  Patient with diarrhea.  Will start Flagyl for possible infectious diarrhea.      Lower extremity ulceration, right, limited to breakdown of skin  Consult wound care  Seems more maceration from yeast in groin and chronic moistness and irritation      Hypophosphatemia  Replace as needed.      Frequent falls  PT and OT consults  Due to dizziness  Leading to injury at home      Thrombocytopenia  Likely BM toxicity effect from ETOH abuse  May be also be progressing to early cirrhosis        Left hip pain  He had significant trauma to the hip  Xray is negative  MRI pelvis negative for fracture.  PT and OT consult  Pain control      Dehydration with hyponatremia  Treated with IVF's.    Hypomagnesemia  Replaced as needed.      Normocytic anemia  stable      Type 2 diabetes mellitus with hyperglycemia, with long-term current use of insulin  Patient's FSGs are uncontrolled due to hyperglycemia on current medication regimen.  Last A1c reviewed-   Lab Results   Component Value Date    HGBA1C 9.7 (H) 02/03/2023     Most recent fingerstick glucose reviewed-   Recent Labs   Lab 02/05/23  1611 02/05/23 2005 02/06/23  0735 02/06/23  1117   POCTGLUCOSE 201* 256* 118* 237*     Current correctional scale  Medium  Maintain anti-hyperglycemic dose as follows-   Antihyperglycemics (From admission, onward)    Start     Stop Route Frequency Ordered    02/05/23 2100  insulin  detemir U-100 pen 5 Units         -- SubQ Nightly 02/04/23 1738    02/04/23 0012  insulin aspart U-100 pen 1-10 Units         -- SubQ Before meals & nightly PRN 02/03/23 2312    02/04/23 0012  insulin aspart U-100 pen 0-5 Units         -- SubQ Before meals & nightly PRN 02/03/23 2312      Hold Oral hypoglycemics while patient is in the hospital.  Insulin adjusted secondary to hypoglycemia.  Continue adjusting as needed.    Alcohol use disorder, severe, dependence  His last drink was last Tuesday after he fell  Counseled on ETOH dangers and need to quit  He is evasive and does not seem to buy into stopping just yet  MVI, thiamine, folate  Monitor for signs of withdrawal with CIWA q8        VTE Risk Mitigation (From admission, onward)         Ordered     enoxaparin injection 40 mg  Daily         02/03/23 2310     Place ROBY hose  Until discontinued         02/03/23 2310     IP VTE HIGH RISK PATIENT  Once         02/03/23 2310     Place sequential compression device  Until discontinued         02/03/23 2310                Discharge Planning   SUSAN:      Code Status: Full Code   Is the patient medically ready for discharge?:     Reason for patient still in hospital (select all that apply): Patient trending condition  Discharge Plan A: Home                  Dae Lowe MD  Department of [facility] Medicine   [facility]

## 2023-02-06 NOTE — CONSULTS
[place] [place]    Patient Name:  Doe Woodall   MRN:  [id number]  Date: 2023  Diagnosis: Nonspecific dizziness    History:     Past Medical History:   Diagnosis Date    Abscess of right groin 2022    Diabetes mellitus     Encounter for blood transfusion     Loud snoring     Obese     Other insomnia 2020    Perineal abscess 2014    Primary hypertension 10/2/2022       Social History     Socioeconomic History    Marital status:    Tobacco Use    Smoking status: Former     Packs/day: 0.50     Years: 9.00     Pack years: 4.50     Types: Cigarettes     Quit date: 2013     Years since quittin.6    Smokeless tobacco: Former   Substance and Sexual Activity    Alcohol use: Not Currently     Comment: DAILY PINT OF LIQUOR, HX OF 1 "TALL BOY" OF BEER DAILY    Drug use: Not Currently     Types: Cocaine     Comment: per collateral    Sexual activity: Yes     Partners: Female     Birth control/protection: None   Other Topics Concern    Patient feels they ought to cut down on drinking/drug use No    Patient annoyed by others criticizing their drinking/drug use No    Patient has felt bad or guilty about drinking/drug use No    Patient has had a drink/used drugs as an eye opener in the AM No     Social Determinants of Health     Financial Resource Strain: Medium Risk    Difficulty of Paying Living Expenses: Somewhat hard   Food Insecurity: Unknown    Worried About Running Out of Food in the Last Year: Never true    Ran Out of Food in the Last Year: Patient refused   Transportation Needs: No Transportation Needs    Lack of Transportation (Medical): No    Lack of Transportation (Non-Medical): No   Physical Activity: Inactive    Days of Exercise per Week: 0 days    Minutes of Exercise per Session: 0 min   Stress: Stress Concern Present    Feeling of Stress : Very much   Social Connections: Unknown    Active Member of Clubs or Organizations: No    Attends Club or Organization " Meetings: Never   Housing Stability: Low Risk     Unable to Pay for Housing in the Last Year: No    Number of Places Lived in the Last Year: 1    Unstable Housing in the Last Year: No       Precautions:     Allergies as of 02/03/2023 - Reviewed 02/03/2023   Allergen Reaction Noted    Metformin Diarrhea 09/01/2022       [facility] Assessment Details/Treatment   Consulted for altered skin integrity right groin and buttocks  A 32 year old male admitted 2/3/23 from home with non specific dizziness; frequent falls; left hip pain; alcohol use disorder, severe dependence; dehydration with hyponatremia; systemic inflammatory response syndrome; DM II with hyperglycemia; normocytic anemia; hypomagnesemia; thrombocytopenia; hypophosphatemia; lower extremity ulceration, right- S/P I&D right groin abscess per Dr. Aguilar 9/9/22 2/6 WBC 9.11 Hgb 11.7 Hct 32.7 Platelets 75K Alb 1.6  2/3 A1C 9.7   On Isoflex mattress; Omari score 23; reports urge incontinence  Assessment:  Denies skin breakdown or need to address buttocks; wearing mesh pants  Photodocumentation    Right groin- Wound almost completely resurfaced with epithelium. Epithelium pink in color with scar surrounding dark brown in color. Denies pain. No drainage on Mepilex dressing covering wound. No surrounding induration.  Treatment:  Continue protecting wound with Mepilex foam dressing. Change prn.  Encouraged patient to continue protecting new epithelium for up to a year due to site friable and may reopen. Voiced understanding.  If patient experiences moisture associated dermatitis, use Medline Remedy products on buttocks- Remedy foaming cleanser and Remedy Clear Silicone Barrier.  Orders placed.     02/06/2023

## 2023-02-06 NOTE — ASSESSMENT & PLAN NOTE
He had significant trauma to the hip  Xray is negative  MRI pelvis negative for fracture.  PT and OT consult  Pain control

## 2023-02-06 NOTE — NURSING
AAOx4. Pt C/O L thigh pain at 2058 hours, 6/10 Pain Scale: pt given PRN pain med-pt verbalizes a reduction in pain afterwards 2/10 Pain Scale. Additional dose given 0348 hours, 8/10 Pain Scale with FACES score of 0 afterwards. No further actions required. L IV 20 G infusing 100 ml/hr 20 KCl mEq in NS. Pt average -113 bpm. Pt compliant with all treatment modalities without incidence and remains free from falls/injuries R/T adherence to all safety protocols.

## 2023-02-06 NOTE — ASSESSMENT & PLAN NOTE
No obvious source of infection.  He has elevated procal, but no fever  He was given an initial dose of Vanco/Zosyn in the ED  WBC elevated to 22, and procal 2.  Leukocytosis now resolved.  Probably related to hemoconcentration.  Stopped ABx's.  Low grade fever overnight.  Patient with diarrhea.  Will start Flagyl for possible infectious diarrhea.

## 2023-02-06 NOTE — NURSING
PER handoff given to ANNA Devlin     Pt resting in bed quietly. NAD noted.  Fall and safety precautions maintained. Bed alarm activated and audible.. Bed locked in lowest position, with side rails up x2. Call bell and personal items within reach

## 2023-02-06 NOTE — PLAN OF CARE
Problem: Violence Risk or Actual  Goal: Anger and Impulse Control  Outcome: Ongoing, Progressing     Problem: Infection  Goal: Absence of Infection Signs and Symptoms  Outcome: Ongoing, Progressing     Problem: Adult Inpatient Plan of Care  Goal: Plan of Care Review  Outcome: Ongoing, Progressing  Goal: Patient-Specific Goal (Individualized)  Outcome: Ongoing, Progressing  Goal: Absence of Hospital-Acquired Illness or Injury  Outcome: Ongoing, Progressing  Goal: Optimal Comfort and Wellbeing  Outcome: Ongoing, Progressing  Goal: Readiness for Transition of Care  Outcome: Ongoing, Progressing     Problem: Diabetes Comorbidity  Goal: Blood Glucose Level Within Targeted Range  Outcome: Ongoing, Progressing     Problem: Impaired Wound Healing  Goal: Optimal Wound Healing  Outcome: Ongoing, Progressing     Problem: Skin Injury Risk Increased  Goal: Skin Health and Integrity  Outcome: Ongoing, Progressing

## 2023-02-06 NOTE — PT/OT/SLP PROGRESS
Physical Therapy Treatment    Patient Name:  Doe Woodall   MRN:  [id number]    Recommendations:     Discharge Recommendations: outpatient PT  Discharge Equipment Recommendations: walker, rolling      Assessment:     Doe Woodall is a 32 y.o. male admitted with a medical diagnosis of Nonspecific dizziness.  He presents with the following impairments/functional limitations: weakness, decreased lower extremity function, pain .    Rehab Prognosis: Good; patient would benefit from acute skilled PT services to address these deficits and reach maximum level of function.    Recent Surgery: * No surgery found *      Plan:     During this hospitalization, patient to be seen 5 x/week to address the identified rehab impairments via gait training, therapeutic activities, therapeutic exercises and progress toward the following goals:    Plan of Care Expires:  02/17/23    Subjective     Chief Complaint: Pain (L) thigh  Patient/Family Comments/goals: Pt agreeable to PT treatment.  Pain/Comfort:  Pain Rating 1: 7/10  Location - Side 1: Left  Location 1: thigh  Pain Addressed 1: Reposition      Objective:     Communicated with nurseAlexsandra prior to session.  Patient found supine with telemetry upon PT entry to room.     General Precautions: Standard, fall  Orthopedic Precautions: N/A  Braces: N/A  Respiratory Status: Room air     Functional Mobility:  Bed Mobility:     Supine to Sit: supervision  Transfers:     Sit to Stand:  stand by assistance with straight cane  Gait: 250' w/ RW and (S), antalgic gait pattern  Balance: Fair+ static/Fair dynamic standing      AM-PAC 6 CLICK MOBILITY  Turning over in bed (including adjusting bedclothes, sheets and blankets)?: 4  Sitting down on and standing up from a chair with arms (e.g., wheelchair, bedside commode, etc.): 3  Moving from lying on back to sitting on the side of the bed?: 4  Moving to and from a bed to a chair (including a wheelchair)?: 3  Need to walk in hospital room?:  3  Climbing 3-5 steps with a railing?: 3  Basic Mobility Total Score: 20       Treatment & Education:  Pt educated on use of sc, reports he did not use any AD at home, reports preferring RW instead of sc.    Patient left up in chair with call button in reach and all needs in reach .    GOALS:   Multidisciplinary Problems       Physical Therapy Goals          Problem: Physical Therapy    Goal Priority Disciplines Outcome Goal Variances Interventions   Physical Therapy Goal     PT, PT/OT Ongoing, Progressing     Description: Goals to be met by:  2023    Patient will increase functional independence with mobility by performin. Supine to sit with Modified [place]  2. Sit to supine with Modified [place]  3. Sit to stand transfer with Modified [place]  4. Gait  x 400 feet with Modified [place] using Rolling Walker.    Recommend: Outpatient PT, Rolling Walker, and TTB at time of discharge to home with family.                              Time Tracking:     PT Received On: 23  PT Start Time: 938     PT Stop Time: 956  PT Total Time (min): 18 min     Billable Minutes: Gait Training 18    Treatment Type: Treatment  PT/PTA: PT     PTA Visit Number: 0     2023

## 2023-02-06 NOTE — ASSESSMENT & PLAN NOTE
Patient's FSGs are uncontrolled due to hyperglycemia on current medication regimen.  Last A1c reviewed-   Lab Results   Component Value Date    HGBA1C 9.7 (H) 02/03/2023     Most recent fingerstick glucose reviewed-   Recent Labs   Lab 02/05/23  1611 02/05/23 2005 02/06/23  0735 02/06/23  1117   POCTGLUCOSE 201* 256* 118* 237*     Current correctional scale  Medium  Maintain anti-hyperglycemic dose as follows-   Antihyperglycemics (From admission, onward)    Start     Stop Route Frequency Ordered    02/05/23 2100  insulin detemir U-100 pen 5 Units         -- SubQ Nightly 02/04/23 1738    02/04/23 0012  insulin aspart U-100 pen 1-10 Units         -- SubQ Before meals & nightly PRN 02/03/23 2312    02/04/23 0012  insulin aspart U-100 pen 0-5 Units         -- SubQ Before meals & nightly PRN 02/03/23 2312      Hold Oral hypoglycemics while patient is in the hospital.  Insulin adjusted secondary to hypoglycemia.  Continue adjusting as needed.

## 2023-02-06 NOTE — PLAN OF CARE
Problem: Physical Therapy  Goal: Physical Therapy Goal  Description: Goals to be met by:  2023    Patient will increase functional independence with mobility by performin. Supine to sit with Modified [place]  2. Sit to supine with Modified [place]  3. Sit to stand transfer with Modified [place]  4. Gait  x 400 feet with Modified [place] using Rolling Walker.    Recommend: Outpatient PT, Rolling Walker, and TTB at time of discharge to home with family.         Outcome: Ongoing, Progressing   Pt making steady progress however, reports pain (L) thigh.

## 2023-02-07 PROBLEM — R19.7 DIARRHEA: Status: ACTIVE | Noted: 2023-01-01

## 2023-02-07 NOTE — PLAN OF CARE
Problem: Occupational Therapy  Goal: Occupational Therapy Goal  Description: Goals to be met by: 2/18/23     Patient will increase functional independence with ADLs by performing:    LE Dressing with Modified [place].  Grooming while standing at sink with Modified [place].  Toileting from toilet with Modified [place] for hygiene and clothing management.   Step transfer with Modified [place]  Toilet transfer to toilet with Modified [place].  Upper extremity exercise program x15 reps per handout, with independence.    Outcome: Ongoing, Progressing

## 2023-02-07 NOTE — NURSING
PER handoff received from ANNA Devlin     Pt resting in bed quietly. NAD noted. No c/o pain.  Fall and safety precautions maintained. Bed alarm activated and audible.. Bed locked in lowest position, with side rails up x2. Call bell and personal items within reach

## 2023-02-07 NOTE — PLAN OF CARE
[place] - Telemetry  Discharge Reassessment    Primary Care Provider: [facility]    Expected Discharge Date:     Reassessment (most recent)       Discharge Reassessment - 02/07/23 1638          Discharge Reassessment    Assessment Type Discharge Planning Reassessment     Discharge Plan discussed with: Patient     Communicated SUSAN with patient/caregiver Date not available/Unable to determine     Discharge Plan A Home with family;Other   Outpatient Therapy    Discharge Plan B Home with family;Other   Outpatient Therapy    DME Needed Upon Discharge  walker, rolling;shower chair     Discharge Barriers Identified None     Why the patient remains in the hospital Requires continued medical care        Post-Acute Status    Coverage Medicaid     Discharge Delays None known at this time                   Patient stated he plans to return home at discharge. FELIZ explained outpatient therapy is being recommended by PT/OT. Patient stated he may have issue with getting to outpatient therapy due to vehicle being high off the ground. FELIZ explained Medicaid transportation will be on discharge instruction and patient would have to call 2 days before appointment for outpatient therapy. Patient voiced understanding. SW inquired about family being able to help at home? Patient stated he stays with 3 roommates and one of his roommates may help him at home if needed. FELIZ also explained rolling walker will be order at discharge and shower chair is not covered by insurance. Patient stated he will find a shower chair online.

## 2023-02-07 NOTE — PLAN OF CARE
Problem: Violence Risk or Actual  Goal: Anger and Impulse Control  Outcome: Ongoing, Progressing     Problem: Infection  Goal: Absence of Infection Signs and Symptoms  Outcome: Ongoing, Progressing     Problem: Adult Inpatient Plan of Care  Goal: Plan of Care Review  Outcome: Ongoing, Progressing  Goal: Patient-Specific Goal (Individualized)  Outcome: Ongoing, Progressing  Goal: Absence of Hospital-Acquired Illness or Injury  Outcome: Ongoing, Progressing  Goal: Optimal Comfort and Wellbeing  Outcome: Ongoing, Progressing  Goal: Readiness for Transition of Care  Outcome: Ongoing, Progressing     Problem: Diabetes Comorbidity  Goal: Blood Glucose Level Within Targeted Range  Outcome: Ongoing, Progressing     Problem: Impaired Wound Healing  Goal: Optimal Wound Healing  Outcome: Ongoing, Progressing     Problem: Skin Injury Risk Increased  Goal: Skin Health and Integrity  Outcome: Ongoing, Progressing   Patient remained free from falls and injuries throughout shift. Patient complained of pain and medicated as ordered. Midline placed to left upper arm. Call bell within reach, safety precautions maintained. Will continue to monitor.

## 2023-02-07 NOTE — PT/OT/SLP PROGRESS
Occupational Therapy   Treatment    Name: Doe Woodall  MRN: [id number]  Admitting Diagnosis:  Nonspecific dizziness       Recommendations:     Discharge Recommendations:  (Per PT, Outpatient PT)  Discharge Equipment Recommendations:  walker, rolling, shower chair  Barriers to discharge: none      Assessment:     Doe Woodall is a 32 y.o. male with a medical diagnosis of Nonspecific dizziness.  He presents with the following performance deficits affecting function: weakness, gait instability, decreased lower extremity function, pain, impaired self care skills.     Rehab Prognosis:  Good; patient would benefit from acute skilled OT services to address these deficits and reach maximum level of function.       Plan:     Patient to be seen 2 x/week to address the above listed problems via therapeutic activities, therapeutic exercises, self-care/home management  Plan of Care Expires: 02/18/23  Plan of Care Reviewed with: patient    Subjective   Pt agreeable to therapy.    Pain/Comfort:  Pain Rating 1: 8/10  Location - Side 1: Left  Location 1: hip  Pain Addressed 1: Reposition, Distraction, Cessation of Activity, Nurse notified    Objective:     Communicated with: Nurse  prior to session.  Patient found supine with telemetry upon OT entry to room.    General Precautions: Standard, fall    Orthopedic Precautions:N/A  Braces: N/A  Respiratory Status: Room air     Occupational Performance:     Bed Mobility:    Patient completed Supine to Sit with modified independence     Functional Mobility/Transfers:  Patient completed Sit <> Stand Transfer with stand by assistance  with  no assistive device   Functional Mobility:~ 15 ft within room SBA, no AD. Mild unsteadiness. ~15 ft S, RW within room with improved balance. C/o L hip pain.    Activities of Daily Living:  Grooming: modified independence and supervision standing at sink  Upper Body Dressing: set up to jerod [place]oun  EOB      [facility] 6 Click ADL: 21    Treatment &  Education:  Bed mobility, functional transfer/mobility , and ADL completed as noted above.   Pt educated on safety awareness with all OOB mobility and ADL .   Encouraged increased OOB activity throughout da with staff. Okay for OOB>chair and bathroom as needed.     Patient left reclined up in chair on cushion, with all lines intact, call button in reach, and nurse notified    GOALS:   Multidisciplinary Problems       Occupational Therapy Goals          Problem: Occupational Therapy    Goal Priority Disciplines Outcome Interventions   Occupational Therapy Goal     OT, PT/OT Ongoing, Progressing    Description: Goals to be met by: 2/18/23     Patient will increase functional independence with ADLs by performing:    LE Dressing with Modified [place].  Grooming while standing at sink with Modified [place].  Toileting from toilet with Modified [place] for hygiene and clothing management.   Step transfer with Modified [place]  Toilet transfer to toilet with Modified [place].  Upper extremity exercise program x15 reps per handout, with independence.                         Time Tracking:     OT Date of Treatment: 02/07/23  OT Start Time: 1103  OT Stop Time: 1116  OT Total Time (min): 13 min    Billable Minutes:Self Care/Home Management 13    OT/ANTONINO: ANTONINO     ANTONINO Visit Number: 1    2/7/2023

## 2023-02-07 NOTE — PROGRESS NOTES
[facility] Medicine  Progress Note    Patient Name: Doe Woodall  MRN: [id number]  Patient Class: IP- Inpatient   Admission Date: 2/3/2023  Length of Stay: 4 days  Attending Physician: Hollie Johnson MD  Primary Care Provider: [facility]        Subjective:     Principal Problem:Nonspecific dizziness        HPI:  Admission Date: 10/2/2022  Attending Physician: Armando So MD   Primary Care Provider: [facility]           Patient information was obtained from patient, past medical records and ER records.      Subjective:      HPI: Mr. Woodall is a 31yo man with a past medical history of ETOH abuse, floppy eyelid syndrome, DCHF, and substance induced mood disorder, DM2, and obesity.        He was admitted on 9/1-9/16 here at  for sepsis due to right groin-perineal cellulitis and MARISSA.   He followed up with Dr. Aguilar in Urology on 9/28/22 after running out of his Norco and still complaining of pain to his right groin area.  I admitted him on 10/2/22 here to ICU for recurrent scrotal abscess (thick-walled collection of fluid and soft tissue gas in the right perineum/scrotal wall measuring 4.2 x 4.2 x 1.6 cm), MARISSA, and DKA with a lactic acid of 9.6.      He was discharged on 10/6/22 with Abscess cx from 09/09 with GBS. ID consulted-Unclear if scrotal infection is from GBS vs new infection from fecal material contaminating wound. Continue Rocephin and Flagyl while inpatient but recommended Augmentin for one month on discharge.    He came to the ED on 12/7/22 for falls, weakness and hyperglycemia with ETOH abuse.  He now returns to the ED with recurrent dizziness, falls and nausea.  He has been having some left hip pain since his fall.  He fell in the bathroom last Tuesday (5 days ago), and has had severe pain to his left hip ever since landing on the edge of the tub.  He has had imbalance and dizziness coming and going for  "almost a half of a year.  He now wears an adult diaper and has to urinate on himself if he can't get to the bathroom quickly enough.     In the ED his VS were BP 99//63 arrival -> 94/59 -> 123/82   Pulse 128 -> 155 -> 103   Temp 99.0 °F (Oral)   Resp (!) 42 -> 21   Ht 5' 6" (1.676 m)   Wt 99.8 kg (220 lb)   SpO2 98%   BMI 35.51 kg/m².  Labs showed WBC 22, Hg 13.5, , Na 128, Cr 0.9, Gluc 235, Phos 2.2, Mg 1.4, ETOH < 10, UDS + opioid, UA 3+ LE 16 RBC, 46 WBC, BHB 0.0, Procal 2.    CXR showed interstitial findings are accentuated by shallow inspiratory effort and habitus, no large focal consolidation.  XRay of left hip showed no acute displaced fracture or dislocation  US RUQ showed  findings may suggest a small amount of gallbladder sludge, bu no evidence of acute cholecystitis.    CT abdomen and pelvis with contrast showed no definite evidence of acute trauma to the major parenchymal organs.  Multiple rib fractures in various states of healing.  Hepatic steatosis.  Hepatomegaly. Mild gallbladder sludge or gravel-like gallstones. Colonic diverticulosis.       In the ED he was treated with:  Medications   piperacillin-tazobactam (ZOSYN) 4.5 g in dextrose 5 % in water (D5W) 5 % 100 mL IVPB (MB+) (0 g Intravenous Stopped 2/3/23 1937)   lactated ringers bolus 1,000 mL (0 mLs Intravenous Stopped 2/3/23 1807)   morphine injection 4 mg (4 mg Intravenous Given 2/3/23 1620)   ondansetron injection 4 mg (4 mg Intravenous Given 2/3/23 1620)   vancomycin (VANCOCIN) 2,000 mg in dextrose 5 % (D5W) 500 mL IVPB (0 mg Intravenous Stopped 2/3/23 2139)   sodium chloride 0.9% bolus 1,000 mL 1,000 mL (1,000 mLs Intravenous New Bag 2/3/23 2004)   magnesium sulfate in dextrose IVPB (premix) 1 g (0 g Intravenous Stopped 2/3/23 2003)   iohexoL (OMNIPAQUE 350) injection 100 mL (100 mLs Intravenous Given 2/3/23 1836)   morphine injection 4 mg (4 mg Intravenous Given 2/3/23 2028)           Overview/Hospital Course:  31 y/o male " presents with dizziness and falls.  Unsure etiology of symptoms.  Possibly multifactorial secondary to dehydration, chronic ETOH use and DM.  Neurology, PT, OT consulted.  MRI with no evidence of acute infarct.  IVF hydration.  Leukocytosis on presentation and empirically started on IV ABX's.  No obvious source of infection.  PT/OT recommending outpatient therapy.  Still has diarrhea,on empiric flagyl,stool culture is pending.      Interval History: low grade fever overnight.  Multiple episodes of diarrhea.    Review of Systems   HENT:  Negative for ear discharge and ear pain.    Eyes:  Negative for discharge and itching.   Gastrointestinal:  Positive for diarrhea.   Endocrine: Negative for cold intolerance and heat intolerance.   Neurological:  Negative for seizures and syncope.   Objective:     Vital Signs (Most Recent):  Temp: 97.4 °F (36.3 °C) (02/07/23 0736)  Pulse: 90 (02/07/23 0736)  Resp: 18 (02/07/23 0736)  BP: (!) 140/79 (02/07/23 0736)  SpO2: 96 % (02/07/23 0736)   Vital Signs (24h Range):  Temp:  [97.4 °F (36.3 °C)-100.6 °F (38.1 °C)] 97.4 °F (36.3 °C)  Pulse:  [] 90  Resp:  [18-20] 18  SpO2:  [95 %-98 %] 96 %  BP: (129-154)/(74-88) 140/79     Weight: 99.4 kg (219 lb 2.2 oz)  Body mass index is 35.37 kg/m².    Intake/Output Summary (Last 24 hours) at 2/7/2023 1001  Last data filed at 2/6/2023 1842  Gross per 24 hour   Intake 480 ml   Output --   Net 480 ml        Physical Exam  Vitals and nursing note reviewed.   Constitutional:       General: He is not in acute distress.     Appearance: He is well-developed. He is obese.   HENT:      Head: Normocephalic and atraumatic.      Nose: Nose normal.      Mouth/Throat:      Pharynx: No oropharyngeal exudate.   Eyes:      General: No scleral icterus.        Right eye: No discharge.         Left eye: No discharge.      Conjunctiva/sclera: Conjunctivae normal.      Pupils: Pupils are equal, round, and reactive to light.   Neck:      Thyroid: No thyromegaly.       Vascular: No JVD.      Trachea: No tracheal deviation.   Cardiovascular:      Rate and Rhythm: Regular rhythm. Tachycardia present.      Heart sounds: Normal heart sounds. No murmur heard.    No friction rub. No gallop.   Pulmonary:      Effort: Pulmonary effort is normal. No respiratory distress.      Breath sounds: No stridor. Rhonchi present. No decreased breath sounds, wheezing or rales.   Chest:      Chest wall: No swelling or tenderness.   Abdominal:      General: Abdomen is protuberant. Bowel sounds are normal. There is no distension.      Palpations: Abdomen is soft. There is no mass.      Tenderness: There is no abdominal tenderness. There is no guarding or rebound.   Genitourinary:     Comments: No gabriel in place  Musculoskeletal:         General: No tenderness. Normal range of motion.      Cervical back: Normal range of motion and neck supple.      Comments: Left lateral hip pain to palpation   Lymphadenopathy:      Cervical: No cervical adenopathy.      Comments: No peripheral edema to legs   Skin:     General: Skin is warm and dry.      Coloration: Skin is not pale.      Comments: Right groin excoriated   Neurological:      Mental Status: He is alert and oriented to person, place, and time.      Cranial Nerves: No cranial nerve deficit.      Motor: No abnormal muscle tone.      Coordination: Coordination normal.   Psychiatric:         Attention and Perception: Attention and perception normal.         Speech: Speech normal.         Behavior: Behavior normal.         Cognition and Memory: Cognition and memory normal.       Significant Labs: All pertinent labs within the past 24 hours have been reviewed.  BMP:   Recent Labs   Lab 02/06/23 0421   *   *   K 3.8      CO2 22*   BUN 13   CREATININE 0.7   CALCIUM 7.8*   MG 1.5*       CBC:   Recent Labs   Lab 02/06/23 0421   WBC 9.11   HGB 11.7*   HCT 32.7*   PLT 75*         Significant Imaging: I have reviewed all pertinent imaging  results/findings within the past 24 hours.      Assessment/Plan:      * Nonspecific dizziness  This is likely multifactorial in nature from dehydration, chronic ETOH use and DM2  He could likely have vitamin deficiency, vermal degeneration, neuropathy, and orthostatic hypotension all contributing  MRI brain with no evidence of acute ischemia.  Neuro consulted  Improving      03/12/22 17:35   RPR Quantitative 1:128 !          Diarrhea  Still has diarrhea,on empiric flagyl,stool culture is pending.      Lower extremity ulceration, right, limited to breakdown of skin  Consult wound care  Seems more maceration from yeast in groin and chronic moistness and irritation      Hypophosphatemia  Replace as needed.      Frequent falls  PT and OT consults  Due to dizziness  Leading to injury at home      Thrombocytopenia  Likely BM toxicity effect from ETOH abuse  May be also be progressing to early cirrhosis        Left hip pain  He had significant trauma to the hip  Xray is negative  MRI pelvis negative for fracture.  PT and OT consult  Pain control      Dehydration with hyponatremia  Treated with IVF's.    Hypomagnesemia  Replaced as needed.      SIRS (systemic inflammatory response syndrome)  No obvious source of infection.  He has elevated procal, but no fever  He was given an initial dose of Vanco/Zosyn in the ED  WBC elevated to 22, and procal 2.  Leukocytosis now resolved.  Probably related to hemoconcentration.  Stopped ABx's.  Low grade fever overnight.  Patient with diarrhea.  Will start Flagyl for possible infectious diarrhea.      Normocytic anemia  stable      Type 2 diabetes mellitus with hyperglycemia, with long-term current use of insulin  Patient's FSGs are uncontrolled due to hyperglycemia on current medication regimen.  Last A1c reviewed-   Lab Results   Component Value Date    HGBA1C 9.7 (H) 02/03/2023     Most recent fingerstick glucose reviewed-   Recent Labs   Lab 02/05/23  1611 02/05/23 2005  02/06/23  0735 02/06/23  1117   POCTGLUCOSE 201* 256* 118* 237*     Current correctional scale  Medium  Maintain anti-hyperglycemic dose as follows-   Antihyperglycemics (From admission, onward)    Start     Stop Route Frequency Ordered    02/05/23 2100  insulin detemir U-100 pen 5 Units         -- SubQ Nightly 02/04/23 1738    02/04/23 0012  insulin aspart U-100 pen 1-10 Units         -- SubQ Before meals & nightly PRN 02/03/23 2312    02/04/23 0012  insulin aspart U-100 pen 0-5 Units         -- SubQ Before meals & nightly PRN 02/03/23 2312      Hold Oral hypoglycemics while patient is in the hospital.  Insulin adjusted secondary to hypoglycemia.  Continue adjusting as needed.    Alcohol use disorder, severe, dependence  His last drink was last Tuesday after he fell  Counseled on ETOH dangers and need to quit  He is evasive and does not seem to buy into stopping just yet  MVI, thiamine, folate  Monitor for signs of withdrawal with CIWA q8      Elevated transaminase level  likely alcohol related,will monitor.        VTE Risk Mitigation (From admission, onward)         Ordered     enoxaparin injection 40 mg  Daily         02/03/23 2310     Place ROBY hose  Until discontinued         02/03/23 2310     IP VTE HIGH RISK PATIENT  Once         02/03/23 2310     Place sequential compression device  Until discontinued         02/03/23 2310                Discharge Planning   SUSAN:      Code Status: Full Code   Is the patient medically ready for discharge?:     Reason for patient still in hospital (select all that apply): Patient trending condition  Discharge Plan A: Home                  Hollie Johnson MD  Department of Hospital Medicine   [facility]

## 2023-02-07 NOTE — SUBJECTIVE & OBJECTIVE
Interval History: low grade fever overnight.  Multiple episodes of diarrhea.    Review of Systems   HENT:  Negative for ear discharge and ear pain.    Eyes:  Negative for discharge and itching.   Gastrointestinal:  Positive for diarrhea.   Endocrine: Negative for cold intolerance and heat intolerance.   Neurological:  Negative for seizures and syncope.   Objective:     Vital Signs (Most Recent):  Temp: 97.4 °F (36.3 °C) (02/07/23 0736)  Pulse: 90 (02/07/23 0736)  Resp: 18 (02/07/23 0736)  BP: (!) 140/79 (02/07/23 0736)  SpO2: 96 % (02/07/23 0736)   Vital Signs (24h Range):  Temp:  [97.4 °F (36.3 °C)-100.6 °F (38.1 °C)] 97.4 °F (36.3 °C)  Pulse:  [] 90  Resp:  [18-20] 18  SpO2:  [95 %-98 %] 96 %  BP: (129-154)/(74-88) 140/79     Weight: 99.4 kg (219 lb 2.2 oz)  Body mass index is 35.37 kg/m².    Intake/Output Summary (Last 24 hours) at 2/7/2023 1001  Last data filed at 2/6/2023 1842  Gross per 24 hour   Intake 480 ml   Output --   Net 480 ml        Physical Exam  Vitals and nursing note reviewed.   Constitutional:       General: He is not in acute distress.     Appearance: He is well-developed. He is obese.   HENT:      Head: Normocephalic and atraumatic.      Nose: Nose normal.      Mouth/Throat:      Pharynx: No oropharyngeal exudate.   Eyes:      General: No scleral icterus.        Right eye: No discharge.         Left eye: No discharge.      Conjunctiva/sclera: Conjunctivae normal.      Pupils: Pupils are equal, round, and reactive to light.   Neck:      Thyroid: No thyromegaly.      Vascular: No JVD.      Trachea: No tracheal deviation.   Cardiovascular:      Rate and Rhythm: Regular rhythm. Tachycardia present.      Heart sounds: Normal heart sounds. No murmur heard.    No friction rub. No gallop.   Pulmonary:      Effort: Pulmonary effort is normal. No respiratory distress.      Breath sounds: No stridor. Rhonchi present. No decreased breath sounds, wheezing or rales.   Chest:      Chest wall: No  swelling or tenderness.   Abdominal:      General: Abdomen is protuberant. Bowel sounds are normal. There is no distension.      Palpations: Abdomen is soft. There is no mass.      Tenderness: There is no abdominal tenderness. There is no guarding or rebound.   Genitourinary:     Comments: No gabriel in place  Musculoskeletal:         General: No tenderness. Normal range of motion.      Cervical back: Normal range of motion and neck supple.      Comments: Left lateral hip pain to palpation   Lymphadenopathy:      Cervical: No cervical adenopathy.      Comments: No peripheral edema to legs   Skin:     General: Skin is warm and dry.      Coloration: Skin is not pale.      Comments: Right groin excoriated   Neurological:      Mental Status: He is alert and oriented to person, place, and time.      Cranial Nerves: No cranial nerve deficit.      Motor: No abnormal muscle tone.      Coordination: Coordination normal.   Psychiatric:         Attention and Perception: Attention and perception normal.         Speech: Speech normal.         Behavior: Behavior normal.         Cognition and Memory: Cognition and memory normal.       Significant Labs: All pertinent labs within the past 24 hours have been reviewed.  BMP:   Recent Labs   Lab 02/06/23  0421   *   *   K 3.8      CO2 22*   BUN 13   CREATININE 0.7   CALCIUM 7.8*   MG 1.5*       CBC:   Recent Labs   Lab 02/06/23  0421   WBC 9.11   HGB 11.7*   HCT 32.7*   PLT 75*         Significant Imaging: I have reviewed all pertinent imaging results/findings within the past 24 hours.

## 2023-02-08 NOTE — PLAN OF CARE
Problem: Violence Risk or Actual  Goal: Anger and Impulse Control  Outcome: Ongoing, Progressing     Problem: Infection  Goal: Absence of Infection Signs and Symptoms  Outcome: Ongoing, Progressing     Problem: Adult Inpatient Plan of Care  Goal: Plan of Care Review  Outcome: Ongoing, Progressing     Problem: Diabetes Comorbidity  Goal: Blood Glucose Level Within Targeted Range  Outcome: Ongoing, Progressing     Problem: Impaired Wound Healing  Goal: Optimal Wound Healing  Outcome: Ongoing, Progressing     Problem: Skin Injury Risk Increased  Goal: Skin Health and Integrity  Outcome: Ongoing, Progressing

## 2023-02-08 NOTE — PLAN OF CARE
Problem: Violence Risk or Actual  Goal: Anger and Impulse Control  Outcome: Met     Problem: Infection  Goal: Absence of Infection Signs and Symptoms  Outcome: Met     Problem: Adult Inpatient Plan of Care  Goal: Plan of Care Review  Outcome: Met  Goal: Patient-Specific Goal (Individualized)  Outcome: Met  Goal: Absence of Hospital-Acquired Illness or Injury  Outcome: Met  Goal: Optimal Comfort and Wellbeing  Outcome: Met  Goal: Readiness for Transition of Care  Outcome: Met     Problem: Diabetes Comorbidity  Goal: Blood Glucose Level Within Targeted Range  Outcome: Met     Problem: Impaired Wound Healing  Goal: Optimal Wound Healing  Outcome: Met     Problem: Skin Injury Risk Increased  Goal: Skin Health and Integrity  Outcome: Met

## 2023-02-08 NOTE — PT/OT/SLP DISCHARGE
Physical Therapy Discharge Summary    Name: Doe Woodall  MRN: [id number]   Principal Problem: Nonspecific dizziness     Patient Discharged from acute Physical Therapy on 23.  Please refer to prior PT noted date on 23 for functional status.     Assessment:     Patient appropriate for care in another setting.    Objective:     GOALS:   Multidisciplinary Problems       Physical Therapy Goals          Problem: Physical Therapy    Goal Priority Disciplines Outcome Goal Variances Interventions   Physical Therapy Goal     PT, PT/OT Ongoing, Progressing     Description: Goals to be met by:  2023    Patient will increase functional independence with mobility by performin. Supine to sit with Modified [place]  2. Sit to supine with Modified [place]  3. Sit to stand transfer with Modified [place]  4. Gait  x 400 feet with Modified [place] using Rolling Walker.    Recommend: Outpatient PT, Rolling Walker, and TTB at time of discharge to home with family.                              Reasons for Discontinuation of Therapy Services  Transfer to alternate level of care.      Plan:     Patient Discharged to: Outpatient Therapy Services.      2023

## 2023-02-08 NOTE — PT/OT/SLP PROGRESS
Physical Therapy Treatment    Patient Name:  Doe Woodall   MRN:  [id number]    Recommendations:     Discharge Recommendations: outpatient PT  Discharge Equipment Recommendations: walker, rolling  Barriers to discharge: None    Assessment:     Doe Woodall is a 32 y.o. male admitted with a medical diagnosis of Nonspecific dizziness.  He presents with the following impairments/functional limitations: weakness, impaired endurance, impaired functional mobility, gait instability, impaired balance, decreased lower extremity function, pain.    Rehab Prognosis: Good; patient would benefit from acute skilled PT services to address these deficits and reach maximum level of function.    Recent Surgery: * No surgery found *      Plan:     During this hospitalization, patient to be seen 5 x/week to address the identified rehab impairments via gait training, therapeutic activities, therapeutic exercises and progress toward the following goals:    Plan of Care Expires:  02/17/23    Subjective     Chief Complaint: pt c/o pain  Patient/Family Comments/goals: pt agreed to therapy  Pain/Comfort:  Pain Rating 1: 7/10  Location - Side 1: Left  Location - Orientation 1: upper  Location 1: thigh  Pain Addressed 1: Reposition, Pre-medicate for activity, Distraction, Cessation of Activity, Nurse notified  Pain Rating Post-Intervention 1: 7/10      Objective:     Communicated with nurse  prior to session.  Patient found HOB elevated with telemetry, peripheral IV upon PT entry to room.     General Precautions: Standard, fall  Orthopedic Precautions: N/A  Braces: N/A  Respiratory Status: Room air     Functional Mobility:  Bed Mobility:     Scooting: modified independence  Supine to Sit: modified independence  Transfers:     Sit to Stand:  supervision with rolling walker  Gait: 250 ft with rw with S      AM-PAC 6 CLICK MOBILITY  Turning over in bed (including adjusting bedclothes, sheets and blankets)?: 4  Sitting down on and standing up  from a chair with arms (e.g., wheelchair, bedside commode, etc.): 4  Moving from lying on back to sitting on the side of the bed?: 4  Moving to and from a bed to a chair (including a wheelchair)?: 4  Need to walk in hospital room?: 4  Climbing 3-5 steps with a railing?: 4  Basic Mobility Total Score: 24       Treatment & Education:  Lower Extremity Exercises.   Patient educated on the purpose of therapeutic exercise.    Patient verbalized acceptance/understanding of instructions, expectations, and limitations(for safety).  Patient performed: 2 sets of 10 reps (each) of B LE There Ex: AP, LAQ, Hip abd/add, Hip flexion while sitting up on EOB.       Patient required still requires verbal cues/tactile cues to ensure correct sequence, to maintain proper form, and to allow for self-correction.  Pt reported no complaints or problems with exercise activity.      Patient left up in chair with all lines intact, call button in reach, and nurse notified..    GOALS:   Multidisciplinary Problems       Physical Therapy Goals          Problem: Physical Therapy    Goal Priority Disciplines Outcome Goal Variances Interventions   Physical Therapy Goal     PT, PT/OT Ongoing, Progressing     Description: Goals to be met by:  2023    Patient will increase functional independence with mobility by performin. Supine to sit with Modified [place]  2. Sit to supine with Modified [place]  3. Sit to stand transfer with Modified [place]  4. Gait  x 400 feet with Modified [place] using Rolling Walker.    Recommend: Outpatient PT, Rolling Walker, and TTB at time of discharge to home with family.                              Time Tracking:     PT Received On: 23  PT Start Time: 1258     PT Stop Time: 1322  PT Total Time (min): 24 min     Billable Minutes: Gait Training 10 and Therapeutic Exercise 14    Treatment Type: Treatment  PT/PTA: PTA     PTA Visit Number: 1     2023

## 2023-02-08 NOTE — PLAN OF CARE
[employer] notified via secure message that patient is clear for DC from a CM standpoint.    [place] - Telemetry  Discharge Final Note    Primary Care Provider: [facility] - Family Medicine    Expected Discharge Date: 2/8/2023    Final Discharge Note (most recent)       Final Note - 02/08/23 1006          Final Note    Assessment Type Final Discharge Note     Anticipated Discharge Disposition Home or Self Care     Hospital Resources/Appts/Education Provided Appointments scheduled and added to AVS        Post-Acute Status    Post-Acute Authorization HME     HME Status Set-up Complete/Auth obtained   RW delivered to bedside    Discharge Delays None known at this time                     Important Message from Medicare             Contact Info       [facility]   Specialty: Family Medicine   Relationship: PCP - General    [address] [facility] [address]   Phone: [phone]       Next Steps: Follow up on 2/13/2023    Instructions: @9:45, for Hospital Follow up

## 2023-02-08 NOTE — NURSING
PER handoff received from ANNA Pena     Pt resting in bed quietly. NAD noted. C/o pain 8/10.  Fall and safety precautions maintained. Bed alarm activated and audible.. Bed locked in lowest position, with side rails up x2. Call bell and personal items within reach

## 2023-02-08 NOTE — DISCHARGE SUMMARY
[facility]  Discharge Summary      Patient Name: Doe Woodall  MRN: [id number]  MICHI: [id number]  Patient Class: IP- Inpatient  Admission Date: 2/3/2023  Hospital Length of Stay: 5 days  Discharge Date and Time:  02/08/2023 10:14 AM  Attending Physician: Hollie Johnson MD   Discharging Provider: Hollie Johnson MD  Primary Care Provider: [facility]    Primary Care Team: Networked reference to record PCT     HPI:   Admission Date: 10/2/2022  Attending Physician: Armando So MD   Primary Care Provider: [facility]           Patient information was obtained from patient, past medical records and ER records.      Subjective:      HPI: Mr. Woodall is a 31yo man with a past medical history of ETOH abuse, floppy eyelid syndrome, DCHF, and substance induced mood disorder, DM2, and obesity.        He was admitted on 9/1-9/16 here at  for sepsis due to right groin-perineal cellulitis and MARISSA.   He followed up with Dr. Aguilar in Urology on 9/28/22 after running out of his Norco and still complaining of pain to his right groin area.  I admitted him on 10/2/22 here to ICU for recurrent scrotal abscess (thick-walled collection of fluid and soft tissue gas in the right perineum/scrotal wall measuring 4.2 x 4.2 x 1.6 cm), MARISSA, and DKA with a lactic acid of 9.6.      He was discharged on 10/6/22 with Abscess cx from 09/09 with GBS. ID consulted-Unclear if scrotal infection is from GBS vs new infection from fecal material contaminating wound. Continue Rocephin and Flagyl while inpatient but recommended Augmentin for one month on discharge.    He came to the ED on 12/7/22 for falls, weakness and hyperglycemia with ETOH abuse.  He now returns to the ED with recurrent dizziness, falls and nausea.  He has been having some left hip pain since his fall.  He fell in the bathroom last Tuesday (5 days ago), and has had severe pain  "to his left hip ever since landing on the edge of the tub.  He has had imbalance and dizziness coming and going for almost a half of a year.  He now wears an adult diaper and has to urinate on himself if he can't get to the bathroom quickly enough.     In the ED his VS were BP 99//63 arrival -> 94/59 -> 123/82   Pulse 128 -> 155 -> 103   Temp 99.0 °F (Oral)   Resp (!) 42 -> 21   Ht 5' 6" (1.676 m)   Wt 99.8 kg (220 lb)   SpO2 98%   BMI 35.51 kg/m².  Labs showed WBC 22, Hg 13.5, , Na 128, Cr 0.9, Gluc 235, Phos 2.2, Mg 1.4, ETOH < 10, UDS + opioid, UA 3+ LE 16 RBC, 46 WBC, BHB 0.0, Procal 2.    CXR showed interstitial findings are accentuated by shallow inspiratory effort and habitus, no large focal consolidation.  XRay of left hip showed no acute displaced fracture or dislocation  US RUQ showed  findings may suggest a small amount of gallbladder sludge, bu no evidence of acute cholecystitis.    CT abdomen and pelvis with contrast showed no definite evidence of acute trauma to the major parenchymal organs.  Multiple rib fractures in various states of healing.  Hepatic steatosis.  Hepatomegaly. Mild gallbladder sludge or gravel-like gallstones. Colonic diverticulosis.       In the ED he was treated with:  Medications   piperacillin-tazobactam (ZOSYN) 4.5 g in dextrose 5 % in water (D5W) 5 % 100 mL IVPB (MB+) (0 g Intravenous Stopped 2/3/23 1937)   lactated ringers bolus 1,000 mL (0 mLs Intravenous Stopped 2/3/23 1807)   morphine injection 4 mg (4 mg Intravenous Given 2/3/23 1620)   ondansetron injection 4 mg (4 mg Intravenous Given 2/3/23 1620)   vancomycin (VANCOCIN) 2,000 mg in dextrose 5 % (D5W) 500 mL IVPB (0 mg Intravenous Stopped 2/3/23 2139)   sodium chloride 0.9% bolus 1,000 mL 1,000 mL (1,000 mLs Intravenous New Bag 2/3/23 2004)   magnesium sulfate in dextrose IVPB (premix) 1 g (0 g Intravenous Stopped 2/3/23 2003)   iohexoL (OMNIPAQUE 350) injection 100 mL (100 mLs Intravenous Given 2/3/23 " 1836)   morphine injection 4 mg (4 mg Intravenous Given 2/3/23 2028)           * No surgery found *      Hospital Course:   33 y/o male presents with dizziness and falls.MRI show no sign of CVA,  Unsure etiology of symptoms.  Possibly multifactorial secondary to dehydration, chronic ETOH use and DM.  Neurology, PT, OT consulted.  MRI with no evidence of acute infarct.  IVF hydration.  Leukocytosis on presentation and empirically started on IV ABX's.  No obvious source of infection.  PT/OT recommending outpatient therapy.  Starting having diarrhea,was on empiric flagyl,stool culture is pending.his diarrhea is resolved,his symptoms improved,patient was discharged home with PCP follow up.       Goals of Care Treatment Preferences:  Code Status: Full Code      Consults:   Consults (From admission, onward)        Status Ordering Provider     Inpatient consult to Midline team  Once        Provider:  (Not yet assigned)    Completed NATAN BUCHANAN     Inpatient consult to Neurology  Once        Provider:  Umer Guerrier MD    Completed FRANCISCA CLAROS     Inpatient consult to Social Work  Once        Provider:  (Not yet assigned)    Completed KAITY SINGH          No new Assessment & Plan notes have been filed under this hospital service since the last note was generated.  Service: Hospital Medicine    Final Active Diagnoses:    Diagnosis Date Noted POA    PRINCIPAL PROBLEM:  Nonspecific dizziness [R42] 12/07/2022 Yes    Diarrhea [R19.7] 02/07/2023 Yes    Left hip pain [M25.552] 02/04/2023 Yes    Thrombocytopenia [D69.6] 02/04/2023 Yes    Frequent falls [R29.6] 02/04/2023 Not Applicable    Hypophosphatemia [E83.39] 02/04/2023 Yes    Lower extremity ulceration, right, limited to breakdown of skin [L97.911] 02/04/2023 Yes    Dehydration with hyponatremia [E86.0, E87.1] 12/07/2022 Yes    Hypomagnesemia [E83.42] 10/05/2022 Yes    SIRS (systemic inflammatory response syndrome) [R65.10] 10/02/2022 Yes     "Normocytic anemia [D64.9] 09/10/2022 Yes    Type 2 diabetes mellitus with hyperglycemia, with long-term current use of insulin [E11.65, Z79.4]  Not Applicable    Alcohol use disorder, severe, dependence [F10.20] 08/04/2020 Yes     Chronic    Elevated transaminase level [R74.01] 08/03/2020 Yes     Chronic      Problems Resolved During this Admission:    Diagnosis Date Noted Date Resolved POA    Tachycardia [R00.0] 02/04/2023 02/04/2023 Unknown    HTN (hypertension) [I10] 10/02/2022 02/04/2023 Yes       Discharged Condition: stable    Disposition: Home or Self Care    Follow Up:   Follow-up Information     [facility] - Family Medicine Follow up on 2/13/2023.    Specialty: Family Medicine  Why: @9:45, for Hospital Follow up  Contact information:  [address] [place] [id number]  [phone]                       Patient Instructions:      WALKER FOR HOME USE     Order Specific Question Answer Comments   Type of Walker: Adult (5'4"-6'6")    With wheels? Yes    Height: 5' 6" (1.676 m)    Weight: 99.4 kg (219 lb 2.2 oz)    Length of need (1-99 months): 99    Does patient have medical equipment at home? none    Please check all that apply: Patient's condition impairs ambulation.    Please check all that apply: Patient is unable to safely ambulate without equipment.      Ambulatory referral/consult to Physical/Occupational Therapy   Standing Status: Future   Referral Priority: Routine Referral Type: Physical Medicine   Referral Reason: Specialty Services Required   Number of Visits Requested: 1     Activity as tolerated       Significant Diagnostic Studies: Labs:   BMP:   Recent Labs   Lab 02/08/23  0353   *   *   K 4.3      CO2 22*   BUN 10   CREATININE 0.8   CALCIUM 7.7*   MG 1.6   , CMP   Recent Labs   Lab 02/08/23  0353   *   K 4.3      CO2 22*   *   BUN 10   CREATININE 0.8   CALCIUM 7.7*   PROT 5.6*   ALBUMIN 1.5*   BILITOT 1.0   ALKPHOS 516*   AST 67* "   ALT 29   ANIONGAP 6*    and CBC No results for input(s): WBC, HGB, HCT, PLT in the last 48 hours.    Pending Diagnostic Studies:     Procedure Component Value Units Date/Time    Methylmalonic acid, serum [[id number]] Collected: 02/04/23 0413    Order Status: Sent Lab Status: In process Updated: 02/04/23 0419    Specimen: Blood     Vitamin B1 [[id number]] Collected: 02/03/23 2348    Order Status: Sent Lab Status: In process Updated: 02/03/23 2355    Specimen: Blood          Medications:  Reconciled Home Medications:      Medication List      START taking these medications    loperamide 2 mg capsule  Commonly known as: IMODIUM  [place] 1 cápsula (2 mg en total) por vía oral 4 (cuatro) veces al día según sea necesario para la diarrea.  (Take 1 capsule (2 mg total) by mouth 4 (four) times daily as needed for Diarrhea.)        CONTINUE taking these medications    insulin glargine 100 units/mL SubQ pen  Lantus SoloStar 100 UNIT/ML Subcutaneous Solution Pen-injector QTY: 1 mL Days: 30 Refills: 5  Written: 12/12/22 Patient Instructions: inject 25 units by subcutaneous route 1 time per day at bedtime     LEVEMIR FLEXTOUCH U-100 INSULN 100 unit/mL (3 mL) Inpn pen  Generic drug: insulin detemir U-100  Inject 30 Units into the skin once daily.            Indwelling Lines/Drains at time of discharge:   Lines/Drains/Airways     None                 Time spent on the discharge of patient: over 30  minutes         Hollie Johnson MD  Department of Hospital Medicine  [facility]

## 2023-02-08 NOTE — PLAN OF CARE
Problem: Physical Therapy  Goal: Physical Therapy Goal  Description: Goals to be met by:  2023    Patient will increase functional independence with mobility by performin. Supine to sit with Modified [place]  2. Sit to supine with Modified [place]  3. Sit to stand transfer with Modified [place]  4. Gait  x 400 feet with Modified [place] using Rolling Walker.    Recommend: Outpatient PT, Rolling Walker, and TTB at time of discharge to home with family.         Outcome: Ongoing, Progressing   250 ft with rw with S

## 2023-02-10 NOTE — PROGRESS NOTES
Spoke to patients wife who informed me patient in currently admitted at [facility].

## 2023-02-21 NOTE — ASSESSMENT & PLAN NOTE
This is likely multifactorial in nature from dehydration, chronic ETOH use and DM2  He could likely have vitamin deficiency, vermal degeneration, neuropathy, and orthostatic hypotension all contributing  MRI brain with no evidence of acute ischemia.  Neuro consulted  Improving      03/12/22 17:35   RPR Quantitative 1:128 !         Double Island Pedicle Flap Text: The defect edges were debeveled with a #15 scalpel blade.  Given the location of the defect, shape of the defect and the proximity to free margins a double island pedicle advancement flap was deemed most appropriate.  Using a sterile surgical marker, an appropriate advancement flap was drawn incorporating the defect, outlining the appropriate donor tissue and placing the expected incisions within the relaxed skin tension lines where possible.    The area thus outlined was incised deep to adipose tissue with a #15 scalpel blade.  The skin margins were undermined to an appropriate distance in all directions around the primary defect and laterally outward around the island pedicle utilizing iris scissors.  There was minimal undermining beneath the pedicle flap.

## 2023-04-03 NOTE — TELEPHONE ENCOUNTER
Patient c/o blurry vision to his left eye x 1 month. He was seen by opthalmology today. Patient was advised to f/u with a retina specialize ASAP and to go to the nearest ED for loss of vision. Patient states that his vision is blurry and has not improved. Advised per protocol to be seen within 2 weeks. Advised the patient to call back with any further questions or if symptoms worsen.         Reason for Disposition   [1] Blurred vision or visual changes AND [2] gradual onset (e.g., weeks, months)    Additional Information   Negative: Complete loss of vision in 1 or both eyes   Negative: SEVERE eye pain   Negative: SEVERE headache   Negative: Double vision   Negative: [1] Blurred vision or visual changes AND [2] present now AND [3] sudden onset or new (e.g., minutes, hours, days)  (Exception: seeing floaters / black specks OR previously diagnosed migraine headaches with same symptoms)   Negative: Patient sounds very sick or weak to the triager   Negative: Flashes of light  (Exception: brief from pressing on the eyeball)   Negative: [1] Eye pain AND [2] brief (now gone) blurred vision or visual changes   Negative: [1] Taking digoxin (e.g., Lanoxin, Digitek, Cardoxin, Lanoxicaps; Toloxin in [place]) AND [2] blurred vision, yellow vision, or yellow-green halos   Negative: Many floaters in the eye   Negative: [1] Jaw pain while eating AND [2] age > 50 years   Negative: [1] Headache AND [2] age > 50 years   Negative: Single floater (i.e., small speck seems to float across the eye) (Exception: Floater(s) are a chronic symptom and this is unchanged from patient's baseline pattern)   Negative: [1] Brief (now gone) blurred vision AND [2] unexplained   Negative: [1] Laser light exposure AND [2] blurred vision present > 15 minutes    Protocols used: Vision Loss or Change-A-AH

## 2023-04-08 NOTE — DISCHARGE INSTRUCTIONS
Recommend close re-evaluation with your primary care provider within the next 2-3 days.  Recommend staying well hydrated.  If symptoms worsen or any concerns recommend urgent re-evaluation.

## 2023-04-08 NOTE — ED PROVIDER NOTES
"Encounter Date: 4/7/2023    SCRIBE #1 NOTE: I, Breonna Pabon, am scribing for, and in the presence of,  Madi Koroma MD.. I have scribed the following portions of the note - Other sections scribed: HPI, ROS, PE.   SCRIBE #2 NOTE: ICl, am scribing for, and in the presence of,  Madi Koroma MD. I have scribed the remaining portions of the note not scribed by Scribe #1.   History     Chief Complaint   Patient presents with    Fatigue     Fatigue, dizziness and lightheaded X a few hours     Doe Woodall is a 32 y/o male with a PMHx of DM, and HTN, who presents to the ED for evaluation of dizziness beginning 2 hours ago. Patient reports complaints of dizziness and lightheadedness for 2 hours, endorsing he feels "sluggish." Patient also reports a headache, productive cough mostly present in the mornings, and rhinorrhea. Patient reports he was recently discharged from another hospital about three days ago for an infection, noting a healing wound to his left thigh. Patient also states he is "stressed due to an ill family member who could possibly be passing away anytime." Patient states he does not see wound care for his wound. No medications taken PTA. No alleviating or exacerbating factors noted. Denies neck pain, sore throat, chest pain, back pain, abdominal pain, diarrhea, dysuria, hematuria, or other associated symptoms. Denies past abdominal surgeries. Allergic to metformin.     The history is provided by the patient. No  was used.   Review of patient's allergies indicates:   Allergen Reactions    Metformin Diarrhea     Past Medical History:   Diagnosis Date    Abscess of right groin 09/01/2022    Diabetes mellitus     Encounter for blood transfusion     Loud snoring     Obese     Other insomnia 08/03/2020    Perineal abscess 08/01/2014    Primary hypertension 10/2/2022     Past Surgical History:   Procedure Laterality Date    ABCESS DRAINAGE  2014    PERIRECTAL    " "DECOMPRESSION OF LUMBAR SPINE USING MINIMALLY INVASIVE TECHNIQUE Right 2018    Procedure: DECOMPRESSION, SPINE, LUMBAR, MINIMALLY INVASIVE L3-4;  Surgeon: Cristian Cervantes MD;  Location: [facility] OR [address] [place] [address];  Service: Neurosurgery;  Laterality: Right;    INCISION AND DRAINAGE N/A 2022    Procedure: INCISION AND DRAINAGE GROIN;  Surgeon: KAITY Aguilar MD;  Location: [facility] OR;  Service: Urology;  Laterality: N/A;    ORTHOPEDIC SURGERY       Family History   Problem Relation Age of Onset    Diabetes Father     Diabetes Paternal Grandmother     Diabetes Paternal Grandfather      Social History     Tobacco Use    Smoking status: Former     Packs/day: 0.50     Years: 9.00     Pack years: 4.50     Types: Cigarettes     Quit date: 2013     Years since quittin.7    Smokeless tobacco: Former   Substance Use Topics    Alcohol use: Not Currently     Comment: DAILY PINT OF LIQUOR, HX OF 1 "TALL BOY" OF BEER DAILY    Drug use: Not Currently     Types: Cocaine     Comment: per collateral     Review of Systems   Constitutional:  Negative for chills and fever.   HENT:  Positive for rhinorrhea. Negative for congestion and sore throat.    Respiratory:  Positive for cough (productive). Negative for shortness of breath.    Gastrointestinal:  Negative for abdominal pain, diarrhea and nausea.   Genitourinary:  Negative for dysuria.   Musculoskeletal:  Negative for back pain.   Skin:  Positive for wound (healing, left thigh). Negative for rash.   Neurological:  Positive for dizziness, light-headedness and headaches.   Psychiatric/Behavioral:                Physical Exam     Initial Vitals [23]   BP Pulse Resp Temp SpO2   (!) 88/52 95 18 98.5 °F (36.9 °C) 100 %      MAP       --         Physical Exam    Nursing note and vitals reviewed.  Constitutional: He appears well-developed. He is not diaphoretic. No distress.   HENT:   Head: Normocephalic.   Mouth/Throat: Oropharynx is clear and moist. No oral lesions. No " oropharyngeal exudate, posterior oropharyngeal edema or posterior oropharyngeal erythema.   No tongue lesions noted.    Eyes: Conjunctivae and EOM are normal. Right conjunctiva is not injected. Left conjunctiva is not injected.   Neck: No thyroid mass present.   Cardiovascular:  Normal rate, regular rhythm and normal heart sounds.           No murmur heard.  Pulmonary/Chest: Effort normal and breath sounds normal. He has no wheezes.   Abdominal: Abdomen is soft. He exhibits no distension. There is abdominal tenderness in the right upper quadrant. There is negative Menezes's sign.   Musculoskeletal:         General: Normal range of motion.      Right lower leg: No edema.      Left lower leg: No edema.      Comments: No lesions on feet noted.     Neurological: He is alert.   Skin: Skin is warm. No rash noted. No erythema.   6 cm linear healing open wound to the lateral aspect of the left proximal femur with no surrounding erythema noted. No purulence serous drainage noted. Please refer to attached media.         ED Course   Procedures  Labs Reviewed   CBC W/ AUTO DIFFERENTIAL - Abnormal; Notable for the following components:       Result Value    RBC 3.28 (*)     Hemoglobin 10.2 (*)     Hematocrit 29.6 (*)     MCH 31.1 (*)     All other components within normal limits   COMPREHENSIVE METABOLIC PANEL - Abnormal; Notable for the following components:    CO2 22 (*)     Glucose 206 (*)     Creatinine 1.6 (*)     Albumin 2.7 (*)     Alkaline Phosphatase 230 (*)     eGFR 58 (*)     All other components within normal limits   URINALYSIS, REFLEX TO URINE CULTURE - Abnormal; Notable for the following components:    Appearance, UA Hazy (*)     Protein, UA 3+ (*)     Glucose, UA 2+ (*)     Occult Blood UA Trace (*)     Leukocytes, UA 2+ (*)     All other components within normal limits    Narrative:     Specimen Source->Urine   LIPASE - Abnormal; Notable for the following components:    Lipase 71 (*)     All other components  within normal limits   URINALYSIS MICROSCOPIC - Abnormal; Notable for the following components:    RBC, UA 6 (*)     WBC, UA 34 (*)     Bacteria Moderate (*)     Hyaline Casts, UA 9 (*)     All other components within normal limits    Narrative:     Specimen Source->Urine   POCT GLUCOSE - Abnormal; Notable for the following components:    POCT Glucose 226 (*)     All other components within normal limits   CULTURE, BLOOD   CULTURE, BLOOD   CULTURE, URINE   MAGNESIUM   ALCOHOL,MEDICAL (ETHANOL)   SARS-COV-2 RDRP GENE   ISTAT LACTATE   POCT GLUCOSE MONITORING CONTINUOUS     EKG Readings: (Independently Interpreted)   EKGs independently interpreted by Madi Koroma MD reads: see ED course.     Imaging Results              X-Ray Chest 1 View (Final result)  Result time 04/07/23 21:57:30      Final result by Rhett Mendenhall MD (04/07/23 21:57:30)                   Impression:      No acute cardiopulmonary process identified.      Electronically signed by: Rhett Mendenhall MD  Date:    04/07/2023  Time:    21:57               Narrative:    EXAMINATION:  XR CHEST 1 VIEW    CLINICAL HISTORY:  Cough, unspecified    TECHNIQUE:  Single frontal view of the chest was performed.    COMPARISON:  02/03/2023.    FINDINGS:  Cardiac silhouette is stable in size.  Lungs are hypoinflated with chronic elevation seen of the right hemidiaphragm.  No evidence of focal consolidative process, pneumothorax, or significant pleural effusion.  No acute osseous abnormality identified.                                       X-Ray Femur Ap/Lat Left (Final result)  Result time 04/07/23 22:07:31      Final result by Rhett Mendenhall MD (04/07/23 22:07:31)                   Impression:      No acute osseous abnormality identified.      Electronically signed by: Rhett Mendenhall MD  Date:    04/07/2023  Time:    22:07               Narrative:    EXAMINATION:  XR FEMUR 2 VIEW LEFT    CLINICAL HISTORY:  Unspecified open wound, left lower leg, initial  encounter    TECHNIQUE:  AP and lateral views of the left femur were performed.    COMPARISON:  None}    FINDINGS:  No evidence of acute displaced fracture, dislocation, or osseous destructive process.  Soft tissue injury or laceration is seen at the lateral aspect of the left thigh.  No definite radiopaque retained foreign body seen.                                       Medications   lactated ringers bolus 1,000 mL (0 mLs Intravenous Stopped 4/7/23 2108)   cefTRIAXone 2 gram/50 mL IVPB 2 g (0 g Intravenous Stopped 4/7/23 2143)   azithromycin (ZITHROMAX) 500 mg in dextrose 5 % (D5W) 250 mL IVPB (Vial-Mate) (0 mg Intravenous Stopped 4/7/23 2250)   lactated ringers bolus 1,845 mL (0 mLs Intravenous Stopped 4/8/23 0014)     Medical Decision Making:   History:   Old Medical Records: I decided to obtain old medical records.  Initial Assessment:     33-year-old male presenting with lightheadedness.  On arrival patient appeared hypotensive.  Patient denies any drug use or alcohol use.  Patient denies any abdominal pain chest pain or dyspnea.  Patient was recently discharged following episode cellulitis and peroneal infection.  Wound appears well healing at this time.  No induration, surrounding erythema, purulence or tenderness to palpation.  No x-ray signs of osteomyelitis.  Patient is able to range the left hip without difficulty.  Following IV fluids patient reports symptoms were resolved.  X-ray negative lower respiratory tract infection.  No signs of cellulitis on the patient.  UA shows signs of possible UTI however unclear patient has treat UTI as denies any symptoms.  Given the hypotension patient is started on Omnicef.  Patient offered admission for observation however perforation states he feels well at this time.  The patient will follow up with primary care.  Differential Diagnosis:   UTI, lower respiratory tract infection, cellulitis, osteomyelitis  Independently Interpreted Test(s):   I have ordered and  independently interpreted EKG Reading(s) - see summary below  Clinical Tests:   Lab Tests: Ordered and Reviewed  Radiological Study: Ordered and Reviewed  Medical Tests: Ordered and Reviewed  ED Management:    Problems considered includes hypotension/lightheadedness (Signs & symptoms, differentials)  Chronic problems impacting care includes diabetes mellitus, previous substance abuse and alcohol dependence.  Please see workup section for emergency physician independent ECG interpretation.  Imaging independently reviewed.  Agree with radiologist's interpretation. Imaging includes no signs of infiltrate noted on chest x-ray  All labs reviewed and considered in the patient's differential diagnosis. Discussion of labs includes slight elevation in creatinine.  Patient states he will be able to drink fluids at home..  Prior records were reviewed and considered in the differential diagnosis. This includes recent hospitalization for peroneal infection.      Plan was discussed with the patient.  This includes lab and imaging results, return precautions.    All questions or concerns have been addressed.         Scribe Attestation:   Scribe #1: I performed the above scribed service and the documentation accurately describes the services I performed. I attest to the accuracy of the note.  Scribe #2: I performed the above scribed service and the documentation accurately describes the services I performed. I attest to the accuracy of the note.      ED Course as of 04/08/23 0016   Fri Apr 07, 2023 2034 EKG 12-lead  Time 8:09 p.m.     Rate 92, sinus, regular rhythm, normal axis.   QRS 92 .  No ST elevation or depression no T-wave inversion.  Q-wave lead 3.    Normal sinus rhythm with inferior Q-wave.   [JM]   Sat Apr 08, 2023   0010 Bacteria, UA(!): Moderate [JM]   0011 WBC, UA(!): 34 [JM]   0015 The patient reports he feels overall better.  Regarding UA patient denies any urinary frequency, urinary pressure,  dysuria, abdominal pains.  Low suspicion for UTI.  Given patient did have hypotension we will sent home on antibiotics for possible UTI.  Patient reports he feels back to baseline normal.  Patient was offered observation however patient feels well to go home at this time. [JM]      ED Course User Index  [JM] Madi Koroma MD                 Clinical Impression:   Final diagnoses:  [R42] Lightheadedness  [I95.9] Hypotension  [N17.9] MARISSA (acute kidney injury) (Primary)  [R05.9] Cough  [S81.802A] Wound of left leg  [N30.00] Acute cystitis without hematuria       I, Madi Koroma, personally performed the services described in this documentation. All medical record entries made by the scribe were at my direction and in my presence. I have reviewed the chart and agree that the record reflects my personal performance and is accurate and complete.         Madi Koroma MD  04/08/23 1678

## 2023-04-13 NOTE — ED TRIAGE NOTES
Pt to ED with c/o aching abdominal pain 7/10 since week ago  last BM on 4/12 per pt. ..  Pt also c/o of blurred vision since having eyes dilated on yesterday with dizziness.  Pt denies any other symptoms   Pt AAO x 4

## 2023-04-13 NOTE — ED PROVIDER NOTES
"Encounter Date: 4/13/2023    SCRIBE #1 NOTE: I, Yesenia Zayas, am scribing for, and in the presence of,  Gisel Reese DO. I have scribed the following portions of the note - Other sections scribed: HPI, ROS, PE.     History     Chief Complaint   Patient presents with    Abdominal Pain     Epigastric pain x 1wk    Eye Problem     Pt c/o blurry vision and dizziness since having eyes dilated yesterday      This 33 y.o male, with a medical history of Diabetes mellitus, Obesity, and Primary hypertension, presents to the ED accompanied by his father c/o generalized abdominal pain for the last 1x week. Pt describes the pain as "aching," rating it 8/10. Pt reports that he initially attributed the symptoms to constipation, however, he notes that he has since had a bowel movement without any issues. Pt's last bowel movement was yesterday and was normal. He states that he took a previously prescribed medication for treatment. No previous episodes of similar symptoms. Of note, pt reports that he was recently admitted into the hospital (on 2/3/23) s/p a fall in which he sustained a laceration to the left lateral thigh. He notes that he was discharged to a rehabilitation facility on 3/8/23 and was later discharged from that facility on 3/28/23. Additionally, pt c/o blurry vision and dizziness that began yesterday.  He states that he has been under the care of a retinal specialist since he initially began having symptoms after his extended hospital admission.  Pt reports that he had a follow up appointment with his retinal specialist yesterday and had his eyes dilated at that time. He states that he subsequently began to experience blurry vision, dizziness and a headache. He notes that the symptoms have since persisted and have been accompanied by floaters in his right eye beginning this morning prompting him to come into the ED today. Of note, pt reports that he exclusively wears contacts only. Pt has another follow " "up appointment scheduled with his retinal specialist in 3 weeks. No alcohol, tobacco, or drug use. He denies eye pain, chest pain, shortness of breath, nausea, or emesis. No other associated symptoms. No alleviating factors.    The history is provided by the patient.   Review of patient's allergies indicates:   Allergen Reactions    Metformin Diarrhea     Past Medical History:   Diagnosis Date    Abscess of right groin 2022    Diabetes mellitus     Encounter for blood transfusion     Loud snoring     Obese     Other insomnia 2020    Perineal abscess 2014    Primary hypertension 10/2/2022     Past Surgical History:   Procedure Laterality Date    ABCESS DRAINAGE  2014    PERIRECTAL    DECOMPRESSION OF LUMBAR SPINE USING MINIMALLY INVASIVE TECHNIQUE Right 2018    Procedure: DECOMPRESSION, SPINE, LUMBAR, MINIMALLY INVASIVE L3-4;  Surgeon: Cristian Cervantes MD;  Location: [facility] OR [address] [place] [address];  Service: Neurosurgery;  Laterality: Right;    INCISION AND DRAINAGE N/A 2022    Procedure: INCISION AND DRAINAGE GROIN;  Surgeon: KAITY Aguilar MD;  Location: [facility] OR;  Service: Urology;  Laterality: N/A;    ORTHOPEDIC SURGERY       Family History   Problem Relation Age of Onset    Diabetes Father     Diabetes Paternal Grandmother     Diabetes Paternal Grandfather      Social History     Tobacco Use    Smoking status: Former     Packs/day: 0.50     Years: 9.00     Pack years: 4.50     Types: Cigarettes     Quit date: 2013     Years since quittin.7    Smokeless tobacco: Former   Substance Use Topics    Alcohol use: Not Currently     Comment: DAILY PINT OF LIQUOR, HX OF 1 "TALL BOY" OF BEER DAILY    Drug use: Not Currently     Types: Cocaine     Comment: per collateral     Review of Systems   Constitutional:  Negative for fever.   HENT:  Negative for sore throat.    Eyes:  Positive for visual disturbance (blurry).        (+) floaters in the right eye   Respiratory:  Negative for shortness of breath.  "   Cardiovascular:  Negative for chest pain.   Gastrointestinal:  Positive for abdominal pain. Negative for nausea.   Genitourinary:  Negative for dysuria.   Musculoskeletal:  Negative for back pain.   Skin:  Negative for rash.   Neurological:  Positive for dizziness and headaches. Negative for weakness.   Psychiatric/Behavioral:  Negative for confusion.      Physical Exam     Initial Vitals [04/13/23 1026]   BP Pulse Resp Temp SpO2   (!) 70/44 109 16 98.8 °F (37.1 °C) 98 %      MAP       --         Physical Exam    Nursing note and vitals reviewed.  Constitutional: He appears well-developed. He is not diaphoretic. He is Obese . He appears ill.   Chronically ill-appearing   HENT:   Head: Normocephalic and atraumatic.   Mouth/Throat: Oropharynx is clear and moist.   Eyes: Conjunctivae and EOM are normal. Pupils are equal, round, and reactive to light. No scleral icterus.   Bilateral cataracts present. Contacts are in place.  Patient able to see 2 finger.   Neck:   Normal range of motion.  Cardiovascular:  Normal rate, regular rhythm, normal heart sounds and intact distal pulses.           Pulmonary/Chest: Breath sounds normal. No respiratory distress.   Abdominal: Abdomen is soft. He exhibits no distension. There is abdominal tenderness.   There is diffuse abdominal tenderness to palpation. Decreased bowel sounds present. There is no rebound and no guarding.   Musculoskeletal:         General: No tenderness or edema. Normal range of motion.      Cervical back: Normal range of motion.      Comments: Negative logroll to the left lower extremity     Neurological: He is alert and oriented to person, place, and time. He has normal strength.   Moves all extremities, follows all commands, no focal neurologic deficits.     Skin: Skin is warm and dry. No rash noted. No erythema.   There is a well-healed laceration to the left lateral thigh.   Psychiatric: He has a normal mood and affect.       ED Course   Procedures  Labs  Reviewed   CBC W/ AUTO DIFFERENTIAL - Abnormal; Notable for the following components:       Result Value    RBC 3.50 (*)     Hemoglobin 10.7 (*)     Hematocrit 30.7 (*)     All other components within normal limits   COMPREHENSIVE METABOLIC PANEL - Abnormal; Notable for the following components:    Sodium 135 (*)     Potassium 3.2 (*)     CO2 21 (*)     Glucose 226 (*)     BUN 27 (*)     Albumin 2.7 (*)     Alkaline Phosphatase 201 (*)     Anion Gap 7 (*)     All other components within normal limits   URINALYSIS, REFLEX TO URINE CULTURE - Abnormal; Notable for the following components:    Protein, UA 3+ (*)     Glucose, UA 3+ (*)     All other components within normal limits    Narrative:     Specimen Source->Urine   PHOSPHORUS - Abnormal; Notable for the following components:    Phosphorus 4.8 (*)     All other components within normal limits   URINALYSIS MICROSCOPIC - Abnormal; Notable for the following components:    Yeast, UA Rare (*)     All other components within normal limits    Narrative:     Specimen Source->Urine   ISTAT PROCEDURE - Abnormal; Notable for the following components:    POC PO2 26 (*)     POC SATURATED O2 46 (*)     All other components within normal limits   POCT GLUCOSE - Abnormal; Notable for the following components:    POCT Glucose 200 (*)     All other components within normal limits   LIPASE   MAGNESIUM   BETA - HYDROXYBUTYRATE, SERUM          Imaging Results               CT Abdomen Pelvis With Contrast (Final result)  Result time 04/13/23 13:41:48      Final result by Michael Amin MD (04/13/23 13:41:48)                   Impression:      This report was flagged in Epic as abnormal.    1. Bilateral perinephric fat stranding noting questionable slight delayed enhancement of the kidneys symmetrically bilaterally.  Correlation with urinalysis is recommended, sequela of infection is not excluded.  The urinary bladder is distended, correlation with any history of urinary retention  or outlet obstruction.  2. Findings suggesting hepatic steatosis, correlation with LFTs recommended.  3. Cholelithiasis without secondary findings to suggest acute cholecystitis.  4. Pulmonary nodule within the right lower lobe versus atelectasis.  For a solid nodule <6 mm, Fleischner Society 2017 guidelines recommend no routine follow up for a low risk patient, or follow-up with non-contrast chest CT at 12 months in a high risk patient.  5. Focus of air and fluid adjacent to the left greater trochanter within the musculature.  Findings may reflect sequela of prior injury to the region or injection site however abscess not excluded.  Correlation is advised.  6. Please see above for additional findings.      Electronically signed by: Michael Amin MD  Date:    04/13/2023  Time:    13:41               Narrative:    EXAMINATION:  CT ABDOMEN PELVIS WITH CONTRAST    CLINICAL HISTORY:  Abdominal pain, acute, nonlocalized;    TECHNIQUE:  Low dose axial images, sagittal and coronal reformations were obtained from the lung bases to the pubic symphysis following the IV administration of 100 mL of Omnipaque 350 .  Oral contrast was not given.    COMPARISON:  CT 02/03/2023    FINDINGS:  Images of the lower thorax are remarkable for bilateral dependent atelectasis.  There is a 3 mm pulmonary nodule within the right lower lobe versus atelectasis.    The liver is hypoattenuating suggesting steatosis, correlation with LFTs recommended.  The spleen, pancreas, and adrenal glands are grossly unremarkable.  There is cholelithiasis without secondary findings to suggest acute cholecystitis.  The portal vein, splenic vein, SMV, celiac axis and SMA all are patent.  There is a small hiatal hernia.  There are a few scattered nonenlarged abdominal lymph nodes.  There are a few scattered upper limit of normal caliber pao hepatic lymph nodes.    There is bilateral perinephric fat stranding.  The kidneys enhance symmetrically without  hydronephrosis or nephrolithiasis.  The bilateral ureters are unremarkable without calculi seen.  The urinary bladder is distended without wall thickening.  The prostate is not enlarged.    There are several scattered colonic diverticula without inflammation.  The terminal ileum and appendix are unremarkable.  The small bowel is grossly unremarkable.  There are several scattered shotty periaortic, pericaval, and mesenteric lymph nodes.  No focal organized pelvic fluid collection.    There are degenerative changes of the spine.  There are several remote appearing bilateral rib injuries.  There are scattered numerous bilateral inguinal lymph nodes without significant enlargement.  There is an air and fluid collection within the musculature adjacent to the left greater trochanter, on coronal imaging this measures approximately 5.6 x 1.6 cm.                                       Medications   sodium chloride 0.9% bolus 2,000 mL 2,000 mL (0 mLs Intravenous Stopped 4/13/23 1300)   TETRAcaine HCl (PF) 0.5 % Drop 2 drop (2 drops Both Eyes Given by Other 4/13/23 1158)   HYDROmorphone injection 1 mg (1 mg Intravenous Given 4/13/23 1153)   potassium bicarbonate disintegrating tablet 20 mEq (20 mEq Oral Given 4/13/23 1349)   iohexoL (OMNIPAQUE 350) injection 100 mL (100 mLs Intravenous Given 4/13/23 1314)   HYDROmorphone injection 1 mg (1 mg Intravenous Given 4/13/23 1502)     Medical Decision Making:   Clinical Tests:   Lab Tests: Ordered and Reviewed  ED Management:   MDM  This is an emergent evaluation of a 33 y.o. male, with a medical history of Diabetes mellitus, Obesity, and Primary hypertension, presents to the ED accompanied by his father c/o generalized abdominal pain for the last 1x week. Initial vitals in the ED  [04/13/23 1026]    BP: (!) 70/44  Pulse: 109  Resp: 16  Temp: 98.8 °F (37.1 °C)  SpO2: 98 % .     Physical exam noted above. DDx includes but is not limited to gastroparesis, gastroenteritis, UTI,  constipation. Also considered but clinically less likely to be bowel obstruction, appendicitis, dissection, colitis, diverticular disease, intra-abdominal abscess. Will obtain labs and imaging including abdominal pain workup, point of care glucose. Will also provide pain medication as needed. Will continue to monitor and frequently reassess pending results of labs, treatments and final disposition.    Patient is aware of plan and is amenable.     Gisel Reese D.O  EMERGENCY MEDICINE  12:17 PM 04/13/2023      UPDATE:  Patient presents for emergent evaluation of acute abdominal pain that poses a threat to life and/or bodily function.    In the ED patient found to have acute hyperglycemia.    I ordered labs and personally reviewed them. Labs significant for stable normocytic anemia.  Mild hypokalemia with a potassium of 3.2, glucose elevated to 26 with a normal anion gap.  Alk-phos 201.  UA with 3+ protein, 3+ glucose.  Normal pH on VBG.  Remainder of labs unremarkable.    I ordered CT scan and personally reviewed it and reviewed the radiologist interpretation. CT significant for bilateral perinephric fat stranding, hepatic steatosis.  Cholelithiasis without evidence of acute cholecystitis.    Patient was managed in the ED with IV Dilaudid for pain as well as p.o. potassium replacement.    The response to treatment was improvement of his abdominal pain.     Attempted to obtain visual acuity however patient reported to nurse that he was unable to see anything at this time.  Also attempted to do intra-ocular pressures however both tonopens not working appropriately.  Upon further questioning, patient does reports that he has a known history of bleeding behind bilateral retinas for which he is following with a retina specialist.  Exam today not concerning for acute angle closure glaucoma or other acute etiology likely secondary to previously diagnosed retina issues.    In addition while in the ED, patient was  notified by family that his sister had  yesterday.    Patient was discharged in stable condition with Norco for pain, instructions to follow-up closely with gastroenterology and his retina specialist. Detailed return precautions discussed.  Patient aware and agreeable plan.    This chart was completed using dictation software, as a result there may be some transcription errors         Scribe Attestation:   Scribe #1: I performed the above scribed service and the documentation accurately describes the services I performed. I attest to the accuracy of the note.                 I, Gisel Reese DO, personally performed the services described in this documentation. All medical record entries made by the scribe were at my direction and in my presence. I have reviewed the chart and agree that the record reflects my personal performance and is accurate and complete.   Clinical Impression:   Final diagnoses:  [R10.84] Generalized abdominal pain (Primary)  [H53.8] Blurry vision        ED Disposition Condition    Discharge Stable          ED Prescriptions       Medication Sig Dispense Start Date End Date Auth. Provider    HYDROcodone-acetaminophen (NORCO) 5-325 mg per tablet Take 1 tablet by mouth every 6 (six) hours as needed for Pain. 12 tablet 2023 Gisel Reese DO          Follow-up Information       Follow up With Specialties Details Why Contact Info    Your retinal specialist  Schedule an appointment as soon as possible for a visit in 1 day Emergency Room Follow-up     [facility] Emergency Medicine Go to  If symptoms worsen [address] [place] [id number] [phone]    [facility] - Family Medicine Family Medicine Schedule an appointment as soon as possible for a visit in 1 day Emergency Room Follow-up  [facility] [id number]  [phone]      GI specialist  Schedule an appointment as soon as possible for a visit  on 4/20/2023 Emergency Room Follow-up              Gisel Reese,   04/14/23 1939

## 2023-04-13 NOTE — DISCHARGE INSTRUCTIONS
Thank you for coming to our Emergency Department today. It is important to remember that some problems or medical conditions are difficult to diagnose and may not be found during your Emergency Department visit.     Be sure to follow up with your primary care doctor and review all labs/imaging/tests that were performed during your ER visit with them. Some labs/tests may be outside of the normal range and require non-emergent follow-up and further investigation to help diagnose/exclude/prevent complications or other potentially serious medical conditions that were not addressed during your ER visit.    If you do not have a primary care doctor, you may contact the one listed on your discharge paperwork or you may also call the [facility] Appointment Desk at [phone] to schedule an appointment and establish care with one. It is important to your health that you have a primary care doctor.    Please take all medications as directed. All medications may potentially have side-effects and it is impossible to predict which medications may give you side-effects or what side-effects (if any) they will give you.. If you feel that you are having a negative effect or side-effect of any medication you should immediately stop taking them and seek medical attention. If you feel that you are having a life-threatening reaction call 911.    Return to the ER with any questions/concerns, new/concerning symptoms, worsening or failure to improve.     Do not drive, swim, climb to height, take a bath, operate heavy machinery, drink alcohol or take potentially sedating medications, sign any legal documents or make any important decisions for 24 hours if you have received any pain medications, sedatives or mood altering drugs during your ER visit or within 24 hours of taking them if they have been prescribed to you.     You can find additional resources for Dentists, hearing aids, durable medical equipment, low cost pharmacies and  other resources at https://[facility].org    BELOW THIS LINE ONLY APPLIES IF YOU HAVE A COVID TEST PENDING OR IF YOU HAVE BEEN DIAGNOSED WITH COVID:  Please access [facility] to review the results of your test. Until the results of your COVID test return, you should isolate yourself so as not to potentially spread illness to others.   If your COVID test returns positive, you should isolate yourself so as not to spread illness to others. After five full days, if you are feeling better and you have not had fever for 24 hours, you can return to your typical daily activities, but you must wear a mask around others for an additional 5 days.   If your COVID test returns negative and you are either unvaccinated or more than six months out from your two-dose vaccine and are not yet boosted, you should still quarantine for 5 full days followed by strict mask use for an additional 5 full days.   If your COVID test returns negative and you have received your 2-dose initial vaccine as well as a booster, you should continue strict mask use for 10 full days after the exposure.  For all those exposed, best practice includes a test at day 5 after the exposure. This can be a home test or a test through one of the many testing centers throughout our community.   Masking is always advised to limit the spread of COVID. Cdc.gov is an excellent site to obtain the latest up to date recommendations regarding COVID and COVID testing.     CDC Testing and Quarantine Guidelines for patients with exposure to a known-positive COVID-19 person:  A close exposure is defined as anyone who has had an exposure (masked or unmasked) to a known COVID -19 positive person within 6 feet of someone for a cumulative total of 15 minutes or more over a 24-hour period.   Vaccinated and/or if you recently had a positive covid test within 90 days do NOT need to quarantine after contact with someone who had COVID-19 unless you develop symptoms.   Fully vaccinated  people who have not had a positive test within 90 days, should get tested 3-5 days after their exposure, even if they don't have symptoms and wear a mask indoors in public for 14 days following exposure or until their test result is negative.      Unvaccinated and/or NOT had a positive test within 90 days and meet close exposure  You are required by CDC guidelines to quarantine for at least 5 days from time of exposure followed by 5 days of strict masking. It is recommended, but not required to test after 5 days, unless you develop symptoms, in which case you should test at that time.  If you get tested after 5 days and your test is positive, your 5 day period of isolation starts the day of the positive test.    If your exposure does not meet the above definition, you can return to your normal daily activities to include social distancing, wearing a mask and frequent handwashing.      Here is a link to guidance from the CDC:  https://www.cdc.gov/media/releases/2021/s1227-isolation-quarantine-guidance.html      [place] Dept Of Health Testing Sites:  https://ldh.la.gov/page/3934      [facility] website with testing locations and guidance:  [email or web address]

## 2023-04-15 NOTE — ED TRIAGE NOTES
"Pt present to ED via personal transp. For epigastric & around belly button abdominal pain that started a week and 1/2 ago. Pain comes for ten hours and leave for short time then come again.  Sharp and pressure pain and "feel full quickly".  Pain with deep breath, but pt denies SOB.  Pt denies N/V/D.  Decrease appetite since January.  LBM 4/13. Nothing makes it worse or better.  "Lightheadedness, dizziness, weakness and fell on left side again".  Pt AAOX4  "

## 2023-04-15 NOTE — FIRST PROVIDER EVALUATION
"Medical screening examination initiated.  I have conducted a focused provider triage encounter, findings are as follows:    Brief history of present illness:  Reports generalized abdominal pain rated 8/10. Was seen in this ED yesterday for same issue. Denies N/V/D.     Vitals:    04/14/23 1923   BP: 126/67   Pulse: 99   Resp: 14   Temp: 99 °F (37.2 °C)   TempSrc: Oral   SpO2: 100%   Weight: 99.8 kg (220 lb)   Height: 5' 5" (1.651 m)       Pertinent physical exam:  No acute distress, speaking in full sentences, apt in wheelchair, awake alert and oriented, no respiratory distress      Brief workup plan:  labs, urine    Preliminary workup initiated; this workup will be continued and followed by the physician or advanced practice provider that is assigned to the patient when roomed.  "

## 2023-04-15 NOTE — DISCHARGE INSTRUCTIONS
Dieta fauzia en fibra. Mari muchos líquidos. MiraLax diariamente para ayudar con el posible estreñimiento. Pepcid dos veces al día. Dieta estricta para la ERGE. [place] los antibióticos según lo recetado, intente tomarlos con las comidas para limitar las náuseas. Trate de evitar sukh analgésicos opiáceos, ya que esto puede empeorar el estreñimiento. Seguimiento con el proveedor de atención primaria para aretha reevaluación si reena síntomas persisten. Regrese a mariann servicio de urgencias si continúa empeorando el dolor, si comienza con vómitos, si comienza con fiebre, si experimenta dolor en el pecho que empeora, si comienza con dificultad para respirar, si presenta heces negras o con ilma, si tiene otros problemas ocurrir.    High-fiber diet.  Drink lots of fluids.  MiraLax daily to help with possible constipation.  Pepcid twice daily.  Strict GERD diet.  Take antibiotics as prescribed, try to take with meals to limit nausea.  Try to avoid taking opiate pain medication as this may worsen any constipation.  Follow-up with primary care provider for re-evaluation should your symptoms persist.  Return to this ED if you continue with worsening pain, if you begin with vomiting, if you begin with fever, if you experience worsening chest pain, if you begin with shortness of breath, if you develop black or bloody stools, if any other problems occur.

## 2023-04-15 NOTE — ED PROVIDER NOTES
Encounter Date: 4/14/2023       History     Chief Complaint   Patient presents with    Abdominal Pain     Was seen yesterday for same and still having abd pain all over, denies N/V/D, last BM yesterday     33-year-old male presents to ED complaining of persistent abdominal pain x approx 10d.    Pt initially with pain to suprapubic and periumbilical abdomen, intermittent.  Pain has become more constant, now generalized, worst to the periumbilical and epigastric region.  He was seen in the ED yesterday, had extensive workup, grossly unremarkable workup, discharged with pain medication.  He states he took the pain medication without any relief.  He states there is sharp, constant pain to the periumbilical and epigastric abdomen, entirety of his upper abdomen.  He denies associated nausea vomiting.  Denies diarrhea.  Denies constipation.  Denies firm, small stools.  Denies straining with BMs.  Denies melena or hematochezia.  He denies history of GERD.    Patient admits to poor appetite and intake since January, not acutely worsened.  He was able to eat today without any new issues.  He states he has felt early satiety since onset of current abdominal pain with the past 10 days.  No urinary complaints.  Denies decreased urinary frequency.  Denies flank pain.    He states there is associated lower substernal chest discomfort with deep inspiration.  She denies shortness of breath.  He denies associated palpitations, diaphoresis, nausea vomiting, syncope or near-syncope.  Denies personal history of ACS.  Denies any new leg swelling or calf pain.  Denies history of CHF.    No recent illness.  No cough.  No fever. States he does not take any daily prescription medications.  He denies any worsening pain to his chronic left hip wound.    States no ETOH since Dec '22.     PMH:  Hx abscess L thigh  Hx MSSA infection  NIDDM  Transaminitis  HTN  Anxiety   History of alcohol use disorder   Sleep apnea   Obesity   Right leg  "weakness  Severe nonproliferative diabetic retinopathy of both eyes    Review of patient's allergies indicates:   Allergen Reactions    Metformin Diarrhea     Past Medical History:   Diagnosis Date    Abscess of right groin 2022    Diabetes mellitus     Encounter for blood transfusion     Loud snoring     Obese     Other insomnia 2020    Perineal abscess 2014    Primary hypertension 10/2/2022     Past Surgical History:   Procedure Laterality Date    ABCESS DRAINAGE  2014    PERIRECTAL    DECOMPRESSION OF LUMBAR SPINE USING MINIMALLY INVASIVE TECHNIQUE Right 2018    Procedure: DECOMPRESSION, SPINE, LUMBAR, MINIMALLY INVASIVE L3-4;  Surgeon: Cristian Cervantes MD;  Location: [facility] OR [address] [place] [address];  Service: Neurosurgery;  Laterality: Right;    INCISION AND DRAINAGE N/A 2022    Procedure: INCISION AND DRAINAGE GROIN;  Surgeon: KAITY Aguilar MD;  Location: [facility] OR;  Service: Urology;  Laterality: N/A;    ORTHOPEDIC SURGERY       Family History   Problem Relation Age of Onset    Diabetes Father     Diabetes Paternal Grandmother     Diabetes Paternal Grandfather      Social History     Tobacco Use    Smoking status: Former     Packs/day: 0.50     Years: 9.00     Pack years: 4.50     Types: Cigarettes     Quit date: 2013     Years since quittin.7    Smokeless tobacco: Former   Substance Use Topics    Alcohol use: Not Currently     Comment: DAILY PINT OF LIQUOR, HX OF 1 "TALL BOY" OF BEER DAILY    Drug use: Not Currently     Types: Cocaine     Comment: per collateral     Review of Systems   Constitutional:  Positive for appetite change (chronic). Negative for diaphoresis and fever.   Respiratory:  Negative for cough.    Cardiovascular:  Positive for chest pain. Negative for palpitations and leg swelling.   Gastrointestinal:  Positive for abdominal pain. Negative for abdominal distention, blood in stool, constipation, diarrhea, nausea and vomiting.   Genitourinary:  Negative for decreased urine " volume, difficulty urinating and dysuria.   Musculoskeletal:  Negative for back pain, myalgias, neck pain and neck stiffness.   Neurological:  Negative for syncope.     Physical Exam     Initial Vitals [04/14/23 1923]   BP Pulse Resp Temp SpO2   126/67 99 14 99 °F (37.2 °C) 100 %      MAP       --         Physical Exam    Nursing note and vitals reviewed.  Constitutional: He appears well-developed and well-nourished. He is not diaphoretic. No distress.   Uncomfortable appearing, nontoxic.   HENT:   Head: Normocephalic and atraumatic.   Tacky mucous membranes   Neck: Neck supple.   Normal range of motion.  Cardiovascular:  Intact distal pulses.           1+ DP bilaterally.  No significant pretibial edema.  No unilateral leg swelling or calf tenderness.   Pulmonary/Chest: Breath sounds normal. No respiratory distress.   Abdominal:   Abdomen overall soft, normal bowel sounds ×4. Mild, generalized abdominal tenderness.  There is guarding to the suprapubic and periumbilical abdomen.  There is left flank, left CVA tenderness.   Genitourinary:    Genitourinary Comments: HAYLEE: somewhat firm mass of stool to distal rectum, tan/brown stool     Musculoskeletal:      Cervical back: Normal range of motion and neck supple.      Comments: L hip: ulcerated wound just distal to L greater trochanter, good granulation tissue, mild associated ttp, no erythema.      Neurological: He is alert and oriented to person, place, and time. GCS score is 15. GCS eye subscore is 4. GCS verbal subscore is 5. GCS motor subscore is 6.   Skin: Skin is warm. Capillary refill takes less than 2 seconds.   Psychiatric: Thought content normal.   anxious       ED Course   Procedures  Labs Reviewed   URINALYSIS, REFLEX TO URINE CULTURE - Abnormal; Notable for the following components:       Result Value    Appearance, UA Hazy (*)     Protein, UA 3+ (*)     Glucose, UA 4+ (*)     Occult Blood UA 1+ (*)     Leukocytes, UA 2+ (*)     All other components within  normal limits    Narrative:     Specimen Source->Urine   CBC W/ AUTO DIFFERENTIAL - Abnormal; Notable for the following components:    RBC 3.69 (*)     Hemoglobin 11.4 (*)     Hematocrit 32.2 (*)     MPV 9.1 (*)     All other components within normal limits   COMPREHENSIVE METABOLIC PANEL - Abnormal; Notable for the following components:    Glucose 203 (*)     BUN 26 (*)     Creatinine 1.6 (*)     Albumin 3.0 (*)     Alkaline Phosphatase 208 (*)     eGFR 58 (*)     All other components within normal limits   LIPASE - Abnormal; Notable for the following components:    Lipase 67 (*)     All other components within normal limits   URINALYSIS MICROSCOPIC - Abnormal; Notable for the following components:    RBC, UA 17 (*)     WBC, UA 49 (*)     Bacteria Few (*)     All other components within normal limits    Narrative:     Specimen Source->Urine   D DIMER, QUANTITATIVE - Abnormal; Notable for the following components:    D-Dimer 1.05 (*)     All other components within normal limits   CULTURE, URINE   TROPONIN I   MAGNESIUM     EKG Readings: (Independently Interpreted)   Sinus tachycardia, ventricular rate 93 beats per minute.  Normal DE, normal QT. There is right axis deviation.  No convincing ST elevation.  Questionable LVH.  Inverted T-wave aVL.  Now with right axis deviation compared to previous 04/07/2023.     Imaging Results              CTA Chest Non-Coronary (PE Studies) (Final result)  Result time 04/14/23 23:44:48      Final result by Cristobal Chester MD (04/14/23 23:44:48)                   Impression:      No pulmonary embolus.    Multiple subacute to chronic bilateral rib fractures, as above.    Cholelithiasis.      Electronically signed by: Cristobal Chester  Date:    04/14/2023  Time:    23:44               Narrative:    EXAMINATION:  CTA CHEST NON CORONARY (PE STUDIES)    CLINICAL HISTORY:  Pulmonary embolism (PE) suspected, positive D-dimer;    TECHNIQUE:  Low dose axial images, sagittal and  coronal reformations were obtained from the thoracic inlet to the lung bases following the IV administration of 80 mL of Omnipaque 350.  Contrast timing was optimized to evaluate the pulmonary arteries.  MIP images were performed.    COMPARISON:  None    FINDINGS:  Normal caliber aorta and pulmonary arteries.  No central or segmental pulmonary embolus.  No cardiomegaly or pericardial effusion.  No focal consolidation, pleural effusion, or pneumothorax.  Subacute to chronic nondisplaced fractures of the right posterolateral 8th through 10th, left posterolateral 4th through 8th, and left posterolateral 11th ribs.  Cholelithiasis.                                       X-Ray Chest 1 View (Final result)  Result time 04/14/23 22:16:22      Final result by Santi Porter MD (04/14/23 22:16:22)                   Impression:      No acute process.      Electronically signed by: Santi Porter MD  Date:    04/14/2023  Time:    22:16               Narrative:    EXAMINATION:  XR CHEST 1 VIEW    CLINICAL HISTORY:  Chest pain, unspecified    TECHNIQUE:  Single frontal view of the chest was performed.    COMPARISON:  04/07/2023.    FINDINGS:  The trachea is unremarkable.  The cardiomediastinal silhouette is within limits.  The hemidiaphragms are unremarkable.  There are no pleural effusions.  There is no evidence of a pneumothorax.  There is no evidence of pneumomediastinum.  No airspace opacity is present.  There is bibasilar subsegmental atelectasis.  The osseous structures are unremarkable.                                       Medications   polyethylene glycol packet 17 g (17 g Oral Given 4/14/23 2213)   aluminum-magnesium hydroxide-simethicone 200-200-20 mg/5 mL suspension 30 mL (30 mLs Oral Given 4/14/23 2201)     And   LIDOcaine HCl 2% oral solution 15 mL (15 mLs Oral Given 4/14/23 2201)   cefTRIAXone (ROCEPHIN) 1 g/50 mL D5W IVPB (0 g Intravenous Stopped 4/14/23 2240)   lactated ringers bolus 500 mL (0 mLs Intravenous Stopped  4/15/23 0048)   iohexoL (OMNIPAQUE 350) injection 80 mL (80 mLs Intravenous Given 4/14/23 0192)     Medical Decision Making:   Differential Diagnosis:   DKA, pancreatitis, constipation, GERD, ACS, PE  Clinical Tests:   Lab Tests: Ordered and Reviewed  Radiological Study: Ordered and Reviewed  Medical Tests: Ordered and Reviewed  ED Management:  A1c 9.4% 2/10/23.     Yesterday's UA without any infection.  Today's UA with appearance of infection.  There is left flank and left CVA tenderness.  CT renal yesterday with bilateral perinephric stranding, no evidence of nephrolithiasis at this time.  He states symptoms have persisted since discharge, mostly complaining of periumbilical abdominal pain.  He is had the symptoms over the past 10 days.  No associated nausea vomiting.  No evidence of small-bowel obstruction on yesterday's image.  Clinically do not think this represents small-bowel obstruction.            ED Course as of 04/15/23 0533   Fri Apr 14, 2023   2152 New right axis deviation, pleuritic substernal chest discomfort.  Will add D-dimer and cardiac labs to ensure no ACS, PE. [SM]   2244 Elevated D-dimer.  CTA pending given pleuritic chest pain.    No fever, chills, myalgias.  UA with evidence of infection since yesterday, given antibiotics in the ED. There is left flank tenderness.  Will treat as pyelo although unsure if this is the culprit of his current complaints.  Denies history of nephrolithiasis.  No stone on yesterday's CT.  States he has had these complaints x 10 days.  Think unlikely acute nephrolithiasis.  Possible constipation although no severe stool burden on yesterday's CT; firm stool to the distal rectum on exam, early satiety.  Will DC with Pepcid b.i.d. should there be any gastric/GERD component.    Creatinine slightly increased, will give a little hydration, he does admit to early satiety and poor p.o. intake although this is a chronic issue per patient.    Stable H&H, stable elevated  alk-phos, mild hyperglycemia.  Potassium within normal limits. [SM]   2247 At this time, I have low suspicion for emergent process or surgical abdomen. Do not feel need for repeat abdomen imaging. [SM]   2248 Normal anion gap, normal bicarb, mild hyperglycemia, think unlikely DKA. []   Sat Apr 15, 2023   0051 Multiple healing rib fractures. Rib fractures documented upon 2/3/22 imaging.  Fractures likely related to history of frequent falls.  He denies any recent re-injury or any falls since his hip injury. Cholelithiasis. Denies prandial or postprandial pain, pain is mostly constant. Think unlikely cholecystitis. Given abx, pepcid, miralax on d/c. Advised PCP f/u.  Patient comfortable with current plan.  Return precautions discussed.  []      ED Course User Index  [SM] Timoteo Hoang PA-C                   Clinical Impression:   Final diagnoses:  [R07.9] Chest pain  [R10.9] Abdominal pain, unspecified abdominal location (Primary)  [S71.002S] Open wound of left hip, sequela        ED Disposition Condition    Discharge Stable          ED Prescriptions       Medication Sig Dispense Start Date End Date Auth. Provider    polyethylene glycol (GLYCOLAX) 17 gram PwPk Take 17 g by mouth once daily. for 14 days 14 each 4/15/2023 4/29/2023 Timoteo Hoang PA-C    famotidine (PEPCID) 20 MG tablet Take 1 tablet (20 mg total) by mouth 2 (two) times daily. for 14 days 28 tablet 4/15/2023 4/29/2023 Timoteo Hoang PA-C    ciprofloxacin HCl (CIPRO) 500 MG tablet Take 1 tablet (500 mg total) by mouth 2 (two) times daily. for 7 days 14 tablet 4/15/2023 4/22/2023 Timoteo Hoang PA-C          Follow-up Information       Follow up With Specialties Details Why Contact Info Additional Information    [facility] Family Medicine Family Medicine Schedule an appointment as soon as possible for a visit  For reevaluation, If symptoms persist [address] [facility] [address]  [phone]       [facility]  Wound Care Wound Care Schedule an appointment as soon as possible for a visit  For wound re-check [address] [place] [place] [address] [phone] Please park in garage or rear surface lot and enter through Patient Registration entrance. Check in at first floor main registration.              Timoteo Hoang PA-C  04/15/23 0541

## 2023-05-07 PROBLEM — R10.9 ABDOMINAL PAIN: Status: ACTIVE | Noted: 2023-01-01

## 2023-05-08 NOTE — ED PROVIDER NOTES
Encounter Date: 5/7/2023    SCRIBE #1 NOTE: I, Juana Riggs, am scribing for, and in the presence of,  Dr. Ignacio Baldwin. I have scribed the following portions of the note - Other sections scribed: HPI/ROS/PE.     History     Chief Complaint   Patient presents with    Abdominal Pain     N/V with generalized abd pain x 2 days. Weakness and slightly hypotensive. PT has extensive hx of liver and renal failure.      Doe Woodall is a 33 y.o. male, with a PMHx of DM, HTN, who presents to the ED with abdominal pain onset 1 month. Pt describes pain as constant with intermittent exacerbation of pain in the morning and at bedtime. Pt also reports vomiting-2x's today. Pt seen in ED on 4/14/2023 for similar complaint. Pt has been taking stool softener and nerve pain medication. Pt notes normal bowel movements, 2 episodes today. No other exacerbating or alleviating factors. Patient denies shortness of breath, fever, chills, dysuria, or other associated symptoms. This is the extent of the patient's complaints today in the Emergency Department.          The history is provided by the patient.   Review of patient's allergies indicates:   Allergen Reactions    Metformin Diarrhea     Past Medical History:   Diagnosis Date    Abscess of right groin 09/01/2022    Diabetes mellitus     Encounter for blood transfusion     Loud snoring     Obese     Other insomnia 08/03/2020    Perineal abscess 08/01/2014    Primary hypertension 10/2/2022     Past Surgical History:   Procedure Laterality Date    ABCESS DRAINAGE  2014    PERIRECTAL    DECOMPRESSION OF LUMBAR SPINE USING MINIMALLY INVASIVE TECHNIQUE Right 07/06/2018    Procedure: DECOMPRESSION, SPINE, LUMBAR, MINIMALLY INVASIVE L3-4;  Surgeon: Cristian Cervantes MD;  Location: [facility] OR [address] [place] [address];  Service: Neurosurgery;  Laterality: Right;    INCISION AND DRAINAGE N/A 9/9/2022    Procedure: INCISION AND DRAINAGE GROIN;  Surgeon: KAITY Aguilar MD;  Location: [facility] OR;  Service: Urology;   "Laterality: N/A;    ORTHOPEDIC SURGERY       Family History   Problem Relation Age of Onset    Diabetes Father     Diabetes Paternal Grandmother     Diabetes Paternal Grandfather      Social History     Tobacco Use    Smoking status: Former     Packs/day: 0.50     Years: 9.00     Pack years: 4.50     Types: Cigarettes     Quit date: 2013     Years since quittin.8    Smokeless tobacco: Former   Substance Use Topics    Alcohol use: Not Currently     Comment: DAILY PINT OF LIQUOR, HX OF 1 "TALL BOY" OF BEER DAILY    Drug use: Not Currently     Types: Cocaine     Comment: per collateral     Review of Systems   Constitutional:  Negative for fever.   HENT:  Negative for sore throat.    Respiratory:  Negative for shortness of breath.    Cardiovascular:  Negative for chest pain.   Gastrointestinal:  Positive for abdominal pain and vomiting. Negative for diarrhea and nausea.   Genitourinary:  Negative for dysuria.   Musculoskeletal:  Negative for back pain.   Skin:  Negative for rash.   Neurological:  Negative for weakness and headaches.   Psychiatric/Behavioral:  Negative for behavioral problems.    All other systems reviewed and are negative.    Physical Exam     Initial Vitals [23]   BP Pulse Resp Temp SpO2   90/60 94 20 98.4 °F (36.9 °C) 99 %      MAP       --         Physical Exam    Nursing note and vitals reviewed.  Constitutional: He appears well-developed and well-nourished.   HENT:   Head: Normocephalic and atraumatic.   Eyes: Conjunctivae are normal.   Neck: Neck supple.   Normal range of motion.  Cardiovascular:  Normal rate, regular rhythm and normal heart sounds.     Exam reveals no gallop and no friction rub.       No murmur heard.  Pulmonary/Chest: Breath sounds normal. No respiratory distress. He has no wheezes. He has no rhonchi. He has no rales.   Abdominal: Abdomen is soft. Bowel sounds are normal. There is no abdominal tenderness.   Musculoskeletal:         General: No edema. Normal " range of motion.      Cervical back: Normal range of motion and neck supple.     Neurological: He is alert and oriented to person, place, and time. GCS score is 15. GCS eye subscore is 4. GCS verbal subscore is 5. GCS motor subscore is 6.   Skin: Skin is warm and dry.   Seborrheic rash to face.    Psychiatric: He has a normal mood and affect.       ED Course   Procedures  Labs Reviewed   CBC W/ AUTO DIFFERENTIAL - Abnormal; Notable for the following components:       Result Value    RBC 3.91 (*)     Hemoglobin 11.6 (*)     Hematocrit 33.0 (*)     All other components within normal limits   COMPREHENSIVE METABOLIC PANEL - Abnormal; Notable for the following components:    CO2 19 (*)     Glucose 196 (*)     BUN 43 (*)     Calcium 10.7 (*)     Albumin 2.8 (*)     Alkaline Phosphatase 266 (*)     ALT 47 (*)     All other components within normal limits   LIPASE - Abnormal; Notable for the following components:    Lipase 73 (*)     All other components within normal limits   URINALYSIS, REFLEX TO URINE CULTURE - Abnormal; Notable for the following components:    Protein, UA 3+ (*)     Glucose, UA Trace (*)     All other components within normal limits    Narrative:     Specimen Source->Urine   URINALYSIS MICROSCOPIC - Abnormal; Notable for the following components:    Hyaline Casts, UA 40 (*)     All other components within normal limits    Narrative:     Specimen Source->Urine   LACTIC ACID, PLASMA          Imaging Results    None          Medications   sodium chloride 0.9% bolus 1,000 mL 1,000 mL (0 mLs Intravenous Stopped 5/8/23 0006)   ketorolac injection 30 mg (30 mg Intravenous Given 5/7/23 0572)     Medical Decision Making:   History:   Old Medical Records: I decided to obtain old medical records.  Initial Assessment:   Doe Woodall is a 33 y.o. male, with a PMHx of DM, HTN, who presents to the ED with abdominal pain onset 1 month.  Clinical Tests:   Lab Tests: Ordered and Reviewed  Medical Tests: Ordered and  Reviewed  ED Management:  CBC and CMP are reassuring.  Patient had no episodes of vomiting during this ED stay.  Serum lactate was normal.  Urinalysis shows no evidence of infection.  Normal saline bolus and Toradol were given in the emergency department patient states he feels much better after medications and fluids.  Instructions for chronic abdominal pain were given and patient was advised to follow-up with his primary care physician within the next week for re-evaluation/return to the emergency department if condition worsens.        Scribe Attestation:   Scribe #1: I performed the above scribed service and the documentation accurately describes the services I performed. I attest to the accuracy of the note.                   Clinical Impression:   Final diagnoses:  [R10.9] Abdominal pain           This document was produced by a scribe under my direction and in my presence. I agree with the content of the note and have made any necessary edits.     Dr. Baldwin    05/08/2023 3:52 AM     ED Disposition Condition    Discharge Stable          ED Prescriptions    None       Follow-up Information       Follow up With Specialties Details Why Contact [facility] - Family Medicine Family Medicine Schedule an appointment as soon as possible for a visit in 3 days For reevaluation [address] [facility] [address]  [phone]               Ignacio Baldwin MD  05/08/23 0352

## 2023-05-08 NOTE — ED TRIAGE NOTES
Pt to ED with c/o abdominal pain x1 month. Pt reports tightness pain in umbilical area denies N/V/D, SOB, dysuria, fever, or chills. PHHx DM. Pt is hypotensive.

## 2023-06-11 NOTE — Clinical Note
Diagnosis: Syncope [206001]   Future Attending Provider: SILVANO PATIÑO [[id number]]   Admitting Provider:: ETIENNE LINARES [[id number]]

## 2023-06-12 PROBLEM — R53.81 DEBILITY: Status: ACTIVE | Noted: 2023-01-01

## 2023-06-12 PROBLEM — R41.82 ALTERED MENTAL STATUS: Status: ACTIVE | Noted: 2023-01-01

## 2023-06-12 PROBLEM — R65.10 SIRS (SYSTEMIC INFLAMMATORY RESPONSE SYNDROME): Status: RESOLVED | Noted: 2022-01-01 | Resolved: 2023-01-01

## 2023-06-12 PROBLEM — R55 SYNCOPE: Status: ACTIVE | Noted: 2023-01-01

## 2023-06-12 PROBLEM — E87.6 HYPOKALEMIA: Status: RESOLVED | Noted: 2020-08-03 | Resolved: 2023-01-01

## 2023-06-12 PROBLEM — F10.929 ALCOHOLIC INTOXICATION WITH COMPLICATION: Status: RESOLVED | Noted: 2020-08-02 | Resolved: 2023-01-01

## 2023-06-12 NOTE — ASSESSMENT & PLAN NOTE
Currently walking with walker and using WC PRN  Ordered outpt PT/OT but patient has not gone to this yet

## 2023-06-12 NOTE — ED TRIAGE NOTES
"Patient presents to the ED via EMS from home after having a witnessed syncopal episode. Per EMS, Pt's family reported he was sitting in his wheelchair eating and then suddenly passed out and fell out of his wheelchair but reportably did not hit his head. Upon arrival, Pt keeps having intermittent episodes of staring off into space and is not responsive to verbal or painful stimuli but comes to after a few minutes. While awake Pt reports chronic right sided headache since January that causes him to feel dizzy and "passes out."  Denies any drug or alcohol use since December.   "

## 2023-06-12 NOTE — ED PROVIDER NOTES
"Encounter Date: 6/11/2023       History     Chief Complaint   Patient presents with    Loss of Consciousness     Pt arrived via ems, pt chief complaint is Syncope. Per family pt was seated in wheel chair and had a syncopal episode. Per ems pt has been slow to respond but now beginning to answer questions.      33-year-old male past medical history of recurrent syncopal episodes, dizziness, lightheadedness, insulin-dependent diabetes, hypertension, treated ?  Neurosyphilis, left lateral hip abscess status post drainage who presents after a syncopal episode and fall.  Per patient's father symptoms have been ongoing greater than 8 months.  He was recently admitted to outside hospital and had extensive workup that were inconclusive as to why he is having these episodes.  Father seeing since the discharge he has been having approximately 3 episodes of syncope per week.  States on 5/31 had a similar episode where he was taken the emergency department and at the time workup was unremarkable.  Today patient had another episode after eating dinner where he gradually fell to the ground.  No she seizure-like activity during episode father described patient as being "limp and unresponsive" for approximately 5 minutes.  Patient's father denies any head injury.   Prior to the episode patient did not have any complaints.  He does have chronic abdominal pain which the etiology of cause unknown.  Patient had a endoscopy mid May and there was concern for possible gastroparesis as a cause of his chronic abdominal pain.                Hospital Course during his hospitalization mid May: Doe Woodall is a 33 y.o. male with PMH of IDDM, HTN, treated syphilis, and L lateral hip abscess drained in February 2023, who presented to the ED from GI clinic for symptomatic hypotension and presyncope. At GI clinic, systolic pressures were in the 60s, and patient reported feeling dizzy and lightheaded. Upon presentation to the ED, patient was " hemodynamically stable and complained of significant abdominal pain and L hip pain. CT Abdomen and Pelvis was concerning for recurrence of L lateral hip abscess. Was given zosyn and vancomycin, and IR and orthopedics were consulted. Orthopedics determined there wasn't indication for acute ortho intervention. IR attempted re-drainage on 5/10, but no infectious findings per IR or wound culture growth. Lesion likely phlegmon. Vancomycin and zosyn then d/c. Patient reported L hip pain improved following procedure.    During this admission, patient consistently complained of diffuse abdominal pain. Pain started 8 months prior, and was associated with nausea, early satiety, and 60lb weight loss due to decreased oral intake and loss of appetite. GI was consulted for concern for gastric outlet obstruction vs gastroparesis. EGD completed 5/12/23 showed significant food burden in the stomach, likely gastroparesis. Patient was trialed on reglan without relief. Similarly did not report relief with bentyl, meclizine, zofran, protonix. Started on amitriptyline with some relief.    Patient also complained of significant, dizziness, syncope, and falls for 8 months prior to presentation. Neurologic exam showed dysequilibrium and rotary nystagmus. RPR titer negative. MRI brain notable for pontine hyperintensity, possible pontine glioma. Neurosurgery low suspicion for glioma. ENT evaluated, did not find evidence for acute ENT intervention. On 5/15, was stroke activated for new onset LUE weakness and slurred speech. CTH negative for acute bleed, tPA given, stepped up to the ICU for monitoring post tPA administration. Stepped down again on 5/16 with resolution of LUE weakness and slurred speech. CTA head and neck and CTP negative for acute stroke. Vasculitis workup negative. LP done 5/18 negative for infective workup, but showed elevated protein and mildly elevated glucose.      The history is provided by the patient and a parent. The  "history is limited by a language barrier. A  was used ([id number]).   Review of patient's allergies indicates:   Allergen Reactions    Metformin Diarrhea     Past Medical History:   Diagnosis Date    Abscess of right groin 2022    Diabetes mellitus     Encounter for blood transfusion     Loud snoring     Obese     Other insomnia 2020    Perineal abscess 2014    Primary hypertension 10/2/2022     Past Surgical History:   Procedure Laterality Date    ABCESS DRAINAGE  2014    PERIRECTAL    DECOMPRESSION OF LUMBAR SPINE USING MINIMALLY INVASIVE TECHNIQUE Right 2018    Procedure: DECOMPRESSION, SPINE, LUMBAR, MINIMALLY INVASIVE L3-4;  Surgeon: Cristian Cervantes MD;  Location: [facility] OR [address] [place] [address];  Service: Neurosurgery;  Laterality: Right;    INCISION AND DRAINAGE N/A 2022    Procedure: INCISION AND DRAINAGE GROIN;  Surgeon: KAITY Aguilar MD;  Location: [facility] OR;  Service: Urology;  Laterality: N/A;    ORTHOPEDIC SURGERY       Family History   Problem Relation Age of Onset    Diabetes Father     Diabetes Paternal Grandmother     Diabetes Paternal Grandfather      Social History     Tobacco Use    Smoking status: Former     Packs/day: 0.50     Years: 9.00     Pack years: 4.50     Types: Cigarettes     Quit date: 2013     Years since quittin.9    Smokeless tobacco: Former   Substance Use Topics    Alcohol use: Not Currently     Comment: DAILY PINT OF LIQUOR, HX OF 1 "TALL BOY" OF BEER DAILY    Drug use: Not Currently     Types: Cocaine     Comment: per collateral     Review of Systems   Unable to perform ROS: Mental status change     Physical Exam     Initial Vitals [23 2345]   BP Pulse Resp Temp SpO2   122/85 101 16 98.7 °F (37.1 °C) 100 %      MAP       --         Physical Exam    Constitutional: He is not diaphoretic. No distress.   HENT:   Head: Normocephalic and atraumatic.   Eyes: Conjunctivae and EOM are normal. Right pupil is not reactive. Right pupil is round. " Left pupil is round and reactive.   Neck:   Normal range of motion.  Cardiovascular:  Regular rhythm.           Pulses:       Radial pulses are 2+ on the right side and 2+ on the left side.        Posterior tibial pulses are 2+ on the right side and 2+ on the left side.   Pulmonary/Chest: Breath sounds normal. No respiratory distress.   Abdominal: Abdomen is soft. Bowel sounds are normal. He exhibits no distension. There is no abdominal tenderness. There is no rebound and no guarding.   Musculoskeletal:         General: No tenderness.      Cervical back: Normal range of motion.     Lymphadenopathy:     He has no cervical adenopathy.   Neurological: He is alert. No cranial nerve deficit or sensory deficit. Coordination abnormal. GCS eye subscore is 4. GCS verbal subscore is 5. GCS motor subscore is 6.   Moves all extremities. CN- II: PERRL; III/IV/VI: EOMI although patient has rotary nystagmus; V: no deficits appreciated to light touch bilateral face; VII: no facial weakness, no facial asymmetry. Eyebrow raise symmetric. Smile symmetric; IX/X: palate midline, and raises symmetrically; XI: shoulder shrug 5/5 bilaterally; XII: tongue is midline w/out asymmetry. Sensation intact to light touch,   Skin: Skin is warm. Capillary refill takes less than 2 seconds.       ED Course   Procedures  Labs Reviewed   CBC W/ AUTO DIFFERENTIAL - Abnormal; Notable for the following components:       Result Value    RBC 4.11 (*)     Hemoglobin 11.7 (*)     Hematocrit 34.2 (*)     All other components within normal limits   COMPREHENSIVE METABOLIC PANEL - Abnormal; Notable for the following components:    CO2 20 (*)     Glucose 187 (*)     BUN 25 (*)     Albumin 2.8 (*)     Alkaline Phosphatase 295 (*)     ALT 55 (*)     All other components within normal limits   URINALYSIS, REFLEX TO URINE CULTURE - Abnormal; Notable for the following components:    Protein, UA 3+ (*)     Glucose, UA 3+ (*)     Occult Blood UA 1+ (*)     All other  components within normal limits    Narrative:     Specimen Source->Urine   D DIMER, QUANTITATIVE - Abnormal; Notable for the following components:    D-Dimer 0.79 (*)     All other components within normal limits   URINALYSIS MICROSCOPIC - Abnormal; Notable for the following components:    RBC, UA 6 (*)     All other components within normal limits    Narrative:     Specimen Source->Urine   POCT GLUCOSE - Abnormal; Notable for the following components:    POCT Glucose 191 (*)     All other components within normal limits   ISTAT PROCEDURE - Abnormal; Notable for the following components:    POC PH 7.343 (*)     POC PO2 25 (*)     POC SATURATED O2 41 (*)     All other components within normal limits   TROPONIN I   B-TYPE NATRIURETIC PEPTIDE   MAGNESIUM   TSH   ALCOHOL,MEDICAL (ETHANOL)   LACTIC ACID, PLASMA   DRUG SCREEN PANEL, URINE EMERGENCY   COMPREHENSIVE METABOLIC PANEL   MAGNESIUM   CBC W/ AUTO DIFFERENTIAL   TROPONIN I   POCT GLUCOSE MONITORING CONTINUOUS     EKG Readings: (Independently Interpreted)   Independent Interpretation of EKG:  Rhythm: Sinus tachycardia   Rate: 101  QTC: 459  No STEMI  No acute T-wave abnormalities     Imaging Results              CTA Chest Non-Coronary (PE Studies) (Final result)  Result time 06/12/23 01:23:11      Final result by Sal Nava MD (06/12/23 01:23:11)                   Impression:      1.  No evidence of pulmonary thromboembolism.    2.  Mild bibasilar ground-glass opacities.  This is favored to relate to atelectasis, although nonspecific infectious or inflammatory process not excluded.    3.  Scattered coronary artery calcification.    4.  Additional stable findings as above.      Electronically signed by: Sal Nava MD  Date:    06/12/2023  Time:    01:23               Narrative:    EXAMINATION:  CTA CHEST NON CORONARY (PE STUDIES)    CLINICAL HISTORY:  Pulmonary embolism (PE) suspected, positive D-dimer;    TECHNIQUE:  Low dose axial images, sagittal and  coronal reformations were obtained from the thoracic inlet to the lung bases following the IV administration of 80 mL of Omnipaque 350.  Contrast timing was optimized to evaluate the pulmonary arteries.  MIP images were performed.    COMPARISON:  CTA chest 04/14/2023    FINDINGS:  The visualized soft tissue structures at the base of the neck appear within normal limits allowing for streak artifact.  There is mild bilateral gynecomastia.    The thoracic aorta maintains normal caliber, contour, and course without significant atherosclerotic calcification within its course.  There is no evidence of aneurysmal dilation or dissection. The heart is not enlarged and there is trace pericardial fluid.  There are scattered coronary artery calcifications.The esophagus maintains a normal course and caliber.There is no bulky mediastinal or axillary lymph node enlargement.    The trachea is midline and proximal airways are patent. The lungs are symmetrically expanded. Detailed evaluation of the lung parenchyma is limited by respiratory motion.  There is no pneumothorax.  There is no large confluent airspace consolidation.  There are mild bilateral ground-glass opacities.  There is no significant volume of pleural fluid.    There is no evidence of pulmonary thromboembolism.    Limited images of the upper abdomen obtained during the course of this dedicated thoracic CT demonstrate no acute abnormalities.  There is cholelithiasis.    The osseous structures demonstrate multiple remote appearing bilateral rib deformities.  There is a stable mild superior endplate deformity of the T4 vertebral body.  There are mild degenerative changes of the visualized thoracic spine.                                       CT Head Without Contrast (Final result)  Result time 06/12/23 01:20:19      Final result by Dee Quinteros MD (06/12/23 01:20:19)                   Impression:      No acute intracranial abnormality detected.      Electronically  signed by: Dee Quinteros  Date:    06/12/2023  Time:    01:20               Narrative:    EXAMINATION:  CT OF THE HEAD WITHOUT    CLINICAL HISTORY:  Syncope, recurrent;    TECHNIQUE:  5 mm unenhanced axial images were obtained from the skull base to the vertex.    COMPARISON:  05/31/2023 and MRI brain 02/04/2023    FINDINGS:  The ventricles, basal cisterns, and cortical sulci are within normal limits for patient's stated age. There is no acute intracranial hemorrhage, territorial infarct or mass effect, or midline shift. In the visualized paranasal sinuses, there is mild bilateral maxillary sinus mucoperiosteal thickening.                                       X-Ray Chest AP Portable (Final result)  Result time 06/12/23 01:11:44      Final result by Sal Nava MD (06/12/23 01:11:44)                   Impression:      Hypoventilatory examination.  No convincing radiographic evidence of acute intrathoracic process on this single view.      Electronically signed by: Sal Nava MD  Date:    06/12/2023  Time:    01:11               Narrative:    EXAMINATION:  XR CHEST AP PORTABLE    CLINICAL HISTORY:  Syncope and collapse    TECHNIQUE:  Single frontal view of the chest was performed.    COMPARISON:  04/14/2023    FINDINGS:  Cardiac monitoring leads overlie the chest.  The cardiac silhouette is not significantly enlarged.  Lung volumes are mildly diminished with resultant bronchovascular crowding.  No large confluent airspace consolidation identified.  No significant volume of pleural fluid or pneumothorax identified.  Visualized osseous structures appear intact.                                       Medications   sodium chloride 0.9% bolus 1,000 mL 1,000 mL (1,000 mLs Intravenous New Bag 6/12/23 0217)   atorvastatin tablet 80 mg (has no administration in time range)   gabapentin capsule 100 mg (has no administration in time range)   insulin detemir U-100 (Levemir) pen 10 Units (has no administration in  time range)   lisinopriL tablet 20 mg (has no administration in time range)   oxyCODONE immediate release tablet 10 mg (has no administration in time range)   pantoprazole EC tablet 40 mg (has no administration in time range)   sodium chloride 0.9% flush 10 mL (has no administration in time range)   melatonin tablet 6 mg (has no administration in time range)   ondansetron injection 4 mg (has no administration in time range)   polyethylene glycol packet 17 g (has no administration in time range)   senna-docusate 8.6-50 mg per tablet 1 tablet (has no administration in time range)   acetaminophen tablet 650 mg (has no administration in time range)   aluminum-magnesium hydroxide-simethicone 200-200-20 mg/5 mL suspension 30 mL (has no administration in time range)   naloxone 0.4 mg/mL injection 0.02 mg (has no administration in time range)   potassium bicarbonate disintegrating tablet 50 mEq (has no administration in time range)   potassium bicarbonate disintegrating tablet 35 mEq (has no administration in time range)   potassium bicarbonate disintegrating tablet 60 mEq (has no administration in time range)   magnesium oxide tablet 800 mg (has no administration in time range)   magnesium oxide tablet 800 mg (has no administration in time range)   glucose chewable tablet 16 g (has no administration in time range)   glucose chewable tablet 24 g (has no administration in time range)   dextrose 50% injection 12.5 g (has no administration in time range)   dextrose 50% injection 25 g (has no administration in time range)   glucagon (human recombinant) injection 1 mg (has no administration in time range)   insulin aspart U-100 pen 1-10 Units (has no administration in time range)   iohexoL (OMNIPAQUE 350) injection 80 mL (80 mLs Intravenous Given 6/12/23 0107)     Medical Decision Making:   History:   Old Medical Records: I decided to obtain old medical records.  Old Records Summarized: records from another hospital.  Initial  Assessment:   33-year-old male past medical history of recurrent syncopal episodes, dizziness, lightheadedness, insulin-dependent diabetes, hypertension, treated ?  Neurosyphilis, left lateral hip abscess status post drainage who presents after a syncopal episode and fall.  On arrival patient in no acute distress no focal neurological deficits on exam.  During initial evaluation patient had recurrence of his syncopal episode.  Patient was staring into the distance unresponsive to painful stimuli episode lasted approximately 2 minutes and self-resolved.  Patient had brief episode of postictal state that is than 5 minutes and was alert and oriented.  During the syncopal episode patient did not have any seizure-like activity.  He subsequently had additional 2 episodes similar to his initial one.  Patient's vital signs within normal limits and initially GCS 15.  Unsure patient's symptoms are secondary to subclinical seizures as he has not had an EEG done during [place].  Workup at that time notable for elevated protein in his CSF otherwise unremarkable and no evidence of infection.  Other things on the differential psychogenic seizures.  Will obtain lab work and obtain CT head to assess for any acute intracranial abnormalities.  No focal neuro deficits to suggest CVA as the cause of his syncopal episodes symptoms have been recurring for the past 8 months.  If workup unremarkable consult Neurology for further recommendations.    Clinical Tests:   Lab Tests: Ordered and Reviewed  Radiological Study: Ordered and Reviewed  Medical Tests: Ordered and Reviewed           ED Course as of 06/12/23 0257   Mon Jun 12, 2023   0045 CBC without significant leukocytosis, anemia, or platelet abnormalities.   [AS]   0057 D-Dimer(!): 0.79  .Chem 14 negative for hypo-or hyper natremia, kalemia, chloridemia, or other electrolyte abnormalities; BUN and creatinine were within normal limits indicating normal kidney  function, ALT and AST were within normal limits indicating normal liver function.  LA  trop and BNP wnl. D-Dimmer elevated so will obtain CTPE  [AS]   0217 Spoke with neurology who recommends admission for possible EEG.  Recommended repeat MRI imaging given CTA head and neck at outside hospital concerning for possible vasculitis and patient's symptoms may be secondary to CNS vasculitis. [AS]   0227 After review of the patient's physical exam, ED testing, and history/symptoms, the patient requires additional care in the hospital. Discussed patients case with inpatient provider (MD/MEREDITH/PA)  who will accept the patient and any pending labs/imaging/interventions. The diagnosis, treatment and plan were discussed with the patient. All questions or concerns have been addressed.   [AS]      ED Course User Index  [AS] Tonia Renee MD     Please put in 60 minutes of critical care due to patient having a high risk of neurological failure.   Separate from teaching and exclusive of procedure and ekg time  Includes:  Time at bedside  Time reviewing test results  Time discussing case with staff  Time documenting the medical record  Time spent with family members  Time spent with consults  Management           DISCLAIMER: This note was prepared with [employer] voice recognition transcription software. Garbled syntax, mangled pronouns, and other bizarre constructions may be attributed to that software system.        Clinical Impression:   Final diagnoses:  [R55] Syncope (Primary)  [R79.89] Elevated d-dimer  [R41.82] Altered mental status, unspecified altered mental status type        ED Disposition Condition    Observation Stable                Tonia Renee MD  06/12/23 0250       Tonia Renee MD  06/12/23 0257

## 2023-06-12 NOTE — CONSULTS
[facility] - Med Surg  Neurology  Consult Note    Patient Name: Doe Woodall  MRN: [id number]  Admission Date: 6/11/2023  Hospital Length of Stay: 0 days  Code Status: Full Code   Attending Provider: Dae Lowe MD   Consulting Provider: Jamia Sweeney MD  Primary Care Physician: [facility] - Family Medicine  Principal Problem:Syncope    Inpatient consult to Telemedicine-General Neurology  Consult performed by: Jamia Sweeney MD  Consult ordered by: Dae Lowe MD  Reason for consult: recurrent syncope      Subjective:     Chief Complaint:  recurrent syncope     HPI:   Doe Woodall is a 33 y.o. male with multiple co morbidities including DM, obesity, HTN, gastroparesis,and recent groin abscess who is admitted with recurrent episodes of syncope. He has a had complicated course since last September, including persistent abdominal pain, poor appetite, up to 60 lb weight loss, and recent groin abscess. He reports episodes of generalized weakness and almost loss of muscle tone, however, he almost retains his consciousness and can lean onto things and occasionally help himself fall down more gently, he knows where he is, hears the family members when talking to him during these episodes and thinks that he is responding but words are not really coming out. He also states that everything becomes very bright and he feels dizzy during these episodes. He usually has headaches after these episodes.     Upon questioning, he reports numbness in both feet (L>R) and blurred vision in both eyes (R>L). During the most recent hospitalizations, MRI had shown T2/FLAIR hyperintensity focus which was thought to be either chronic microvascular changes vs osmotic demyelination vs los grade glioma.  LP showed normal cell count, protein 175, elevated IgG index and SR, and negative oligoclonal bands. Unclear if cytology was sent. During one of his hospitalizations for syncope his sBP was noted to be in  60s.    Past Medical History:   Diagnosis Date    Abscess of right groin 09/01/2022    Diabetes mellitus     Encounter for blood transfusion     Loud snoring     Obese     Other insomnia 08/03/2020    Perineal abscess 08/01/2014    Primary hypertension 10/2/2022     Past Surgical History:   Procedure Laterality Date    ABCESS DRAINAGE  2014    PERIRECTAL    DECOMPRESSION OF LUMBAR SPINE USING MINIMALLY INVASIVE TECHNIQUE Right 07/06/2018    Procedure: DECOMPRESSION, SPINE, LUMBAR, MINIMALLY INVASIVE L3-4;  Surgeon: Cristian Cervantes MD;  Location: [facility] OR [address] [place] [address];  Service: Neurosurgery;  Laterality: Right;    INCISION AND DRAINAGE N/A 9/9/2022    Procedure: INCISION AND DRAINAGE GROIN;  Surgeon: KAITY Aguilar MD;  Location: [facility] OR;  Service: Urology;  Laterality: N/A;    ORTHOPEDIC SURGERY       Review of patient's allergies indicates:   Allergen Reactions    Metformin Diarrhea       Current Neurological Medications:     No current facility-administered medications on file prior to encounter.     Current Outpatient Medications on File Prior to Encounter   Medication Sig    amitriptyline (ELAVIL) 75 MG tablet Take 1 tablet by mouth every evening.    aspirin 81 MG Chew Take 1 tablet by mouth once daily.    atorvastatin (LIPITOR) 80 MG tablet Take 80 mg by mouth every evening.    famotidine (PEPCID) 20 MG tablet TAKE 1 TABLET(20 MG) BY MOUTH TWICE DAILY FOR 14 DAYS    gabapentin (NEURONTIN) 100 MG capsule Take 100 mg by mouth 3 (three) times daily.    insulin detemir U-100 (LEVEMIR FLEXTOUCH) 100 unit/mL (3 mL) SubQ InPn pen Inject 30 Units into the skin once daily.    insulin glargine 100 units/mL SubQ pen Lantus SoloStar 100 UNIT/ML Subcutaneous Solution Pen-injector QTY: 1 mL Days: 30 Refills: 5  Written: 12/12/22 Patient Instructions: inject 25 units by subcutaneous route 1 time per day at bedtime    lisinopriL (PRINIVIL,ZESTRIL) 20 MG tablet Take 20 mg by mouth once daily.    oxyCODONE (ROXICODONE) 10 mg Tab  "immediate release tablet Take 10 mg by mouth every 8 (eight) hours as needed for Pain.    pantoprazole (PROTONIX) 40 MG tablet Take 40 mg by mouth once daily.      Family History       Problem Relation (Age of Onset)    Diabetes Father, Paternal Grandmother, Paternal Grandfather        Father has had one isolated seizure in the past    Tobacco Use    Smoking status: Former     Packs/day: 0.50     Years: 9.00     Pack years: 4.50     Types: Cigarettes     Quit date: 2013     Years since quittin.9    Smokeless tobacco: Former   Substance and Sexual Activity    Alcohol use: Not Currently     Comment: DAILY PINT OF LIQUOR, HX OF 1 "TALL BOY" OF BEER DAILY    Drug use: Not Currently     Types: Cocaine     Comment: per collateral    Sexual activity: Yes     Partners: Female     Birth control/protection: None     Review of Systems   Constitutional:  Positive for appetite change and unexpected weight change.   Eyes:  Positive for visual disturbance. Negative for photophobia.   Gastrointestinal:  Positive for abdominal pain.   Neurological:  Positive for syncope, weakness, numbness and headaches.   All other systems reviewed and are negative.    Objective:     Vital Signs (Most Recent):  Temp: 98.1 °F (36.7 °C) (23 1108)  Pulse: 89 (23 1108)  Resp: 18 (23 1108)  BP: 121/75 (23 1108)  SpO2: 96 % (23 1108) Vital Signs (24h Range):  Temp:  [97.6 °F (36.4 °C)-98.7 °F (37.1 °C)] 98.1 °F (36.7 °C)  Pulse:  [] 89  Resp:  [12-20] 18  SpO2:  [95 %-100 %] 96 %  BP: (121-153)/(75-93) 121/75     Weight: 99 kg (218 lb 4.1 oz)  Body mass index is 36.32 kg/m².    Physical Exam    *exam is limited due to the virtual nature of this visit    General:  Well-appearing, NAD, cooperative    Neurologic Exam:   Awake, alert and oriented x3  Speech spontaneous and fluent, intact comprehension, naming, repetition.   Adequate fund of knowledge, vocabulary.  EOM intact. No ophthalmoplegia.   Facial expression " is full and symmetric.   Hearing is intact.  Antigravity in BUE and BLE. No tremor.  Proprioception and finger to nose intact in BUE.    Significant Labs:   Lab Results   Component Value Date    FOLATE <2.2 (L) 02/04/2023    VMBUSDKK70 806 10/02/2022    THIAMINEBLOO 68 02/03/2023    LACTATE 1.1 06/12/2023     Lab Results   Component Value Date    METHLYMALONI 0.22 02/04/2023     Lab Results   Component Value Date    SEDRATE 99 (H) 02/05/2023     Lab Results   Component Value Date    [id number] Non-reactive 02/04/2023    RPR Non-reactive 02/04/2023    HEPBSAG Negative 01/08/2022    HEPBSAB Negative 01/08/2022    HEPBCAB Negative 01/08/2022     Lab Results   Component Value Date    TSH 0.974 06/12/2023    WBC 11.49 06/12/2023    LYMPH 4.5 06/12/2023    LYMPH 38.9 06/12/2023    RBC 3.91 (L) 06/12/2023    HGB 11.4 (L) 06/12/2023    HCT 33.8 (L) 06/12/2023    MCV 86 06/12/2023     06/12/2023     06/12/2023    K 4.0 06/12/2023    CO2 17 (L) 06/12/2023    BUN 25 (H) 06/12/2023    CREATININE 0.9 06/12/2023    CALCIUM 9.3 06/12/2023    AST 33 06/12/2023    ALT 51 (H) 06/12/2023     BRANDON profile -  ANCA -  Cryoglobulin -      CSF protein 174  CSF glucose 85    Significant Imaging: I have reviewed all pertinent imaging results/findings within the past 24 hours.    Results for orders placed or performed during the hospital encounter of 06/11/23 (from the past 2160 hour(s))   CTA Chest Non-Coronary (PE Studies)    Narrative    EXAMINATION:  CTA CHEST NON CORONARY (PE STUDIES)    CLINICAL HISTORY:  Pulmonary embolism (PE) suspected, positive D-dimer;    TECHNIQUE:  Low dose axial images, sagittal and coronal reformations were obtained from the thoracic inlet to the lung bases following the IV administration of 80 mL of Omnipaque 350.  Contrast timing was optimized to evaluate the pulmonary arteries.  MIP images were performed.    COMPARISON:  CTA chest 04/14/2023    FINDINGS:  The visualized soft tissue structures at  the base of the neck appear within normal limits allowing for streak artifact.  There is mild bilateral gynecomastia.    The thoracic aorta maintains normal caliber, contour, and course without significant atherosclerotic calcification within its course.  There is no evidence of aneurysmal dilation or dissection. The heart is not enlarged and there is trace pericardial fluid.  There are scattered coronary artery calcifications.The esophagus maintains a normal course and caliber.There is no bulky mediastinal or axillary lymph node enlargement.    The trachea is midline and proximal airways are patent. The lungs are symmetrically expanded. Detailed evaluation of the lung parenchyma is limited by respiratory motion.  There is no pneumothorax.  There is no large confluent airspace consolidation.  There are mild bilateral ground-glass opacities.  There is no significant volume of pleural fluid.    There is no evidence of pulmonary thromboembolism.    Limited images of the upper abdomen obtained during the course of this dedicated thoracic CT demonstrate no acute abnormalities.  There is cholelithiasis.    The osseous structures demonstrate multiple remote appearing bilateral rib deformities.  There is a stable mild superior endplate deformity of the T4 vertebral body.  There are mild degenerative changes of the visualized thoracic spine.      Impression    1.  No evidence of pulmonary thromboembolism.    2.  Mild bibasilar ground-glass opacities.  This is favored to relate to atelectasis, although nonspecific infectious or inflammatory process not excluded.    3.  Scattered coronary artery calcification.    4.  Additional stable findings as above.      Electronically signed by: Sal Nava MD  Date:    06/12/2023  Time:    01:23   CT Head Without Contrast    Narrative    EXAMINATION:  CT OF THE HEAD WITHOUT    CLINICAL HISTORY:  Syncope, recurrent;    TECHNIQUE:  5 mm unenhanced axial images were obtained from the  skull base to the vertex.    COMPARISON:  05/31/2023 and MRI brain 02/04/2023    FINDINGS:  The ventricles, basal cisterns, and cortical sulci are within normal limits for patient's stated age. There is no acute intracranial hemorrhage, territorial infarct or mass effect, or midline shift. In the visualized paranasal sinuses, there is mild bilateral maxillary sinus mucoperiosteal thickening.      Impression    No acute intracranial abnormality detected.      Electronically signed by: Dee Quinteros  Date:    06/12/2023  Time:    01:20   Results for orders placed or performed during the hospital encounter of 04/13/23 (from the past 2160 hour(s))   CT Abdomen Pelvis With Contrast    Narrative    EXAMINATION:  CT ABDOMEN PELVIS WITH CONTRAST    CLINICAL HISTORY:  Abdominal pain, acute, nonlocalized;    TECHNIQUE:  Low dose axial images, sagittal and coronal reformations were obtained from the lung bases to the pubic symphysis following the IV administration of 100 mL of Omnipaque 350 .  Oral contrast was not given.    COMPARISON:  CT 02/03/2023    FINDINGS:  Images of the lower thorax are remarkable for bilateral dependent atelectasis.  There is a 3 mm pulmonary nodule within the right lower lobe versus atelectasis.    The liver is hypoattenuating suggesting steatosis, correlation with LFTs recommended.  The spleen, pancreas, and adrenal glands are grossly unremarkable.  There is cholelithiasis without secondary findings to suggest acute cholecystitis.  The portal vein, splenic vein, SMV, celiac axis and SMA all are patent.  There is a small hiatal hernia.  There are a few scattered nonenlarged abdominal lymph nodes.  There are a few scattered upper limit of normal caliber pao hepatic lymph nodes.    There is bilateral perinephric fat stranding.  The kidneys enhance symmetrically without hydronephrosis or nephrolithiasis.  The bilateral ureters are unremarkable without calculi seen.  The urinary bladder is distended  without wall thickening.  The prostate is not enlarged.    There are several scattered colonic diverticula without inflammation.  The terminal ileum and appendix are unremarkable.  The small bowel is grossly unremarkable.  There are several scattered shotty periaortic, pericaval, and mesenteric lymph nodes.  No focal organized pelvic fluid collection.    There are degenerative changes of the spine.  There are several remote appearing bilateral rib injuries.  There are scattered numerous bilateral inguinal lymph nodes without significant enlargement.  There is an air and fluid collection within the musculature adjacent to the left greater trochanter, on coronal imaging this measures approximately 5.6 x 1.6 cm.      Impression    This report was flagged in Epic as abnormal.    1. Bilateral perinephric fat stranding noting questionable slight delayed enhancement of the kidneys symmetrically bilaterally.  Correlation with urinalysis is recommended, sequela of infection is not excluded.  The urinary bladder is distended, correlation with any history of urinary retention or outlet obstruction.  2. Findings suggesting hepatic steatosis, correlation with LFTs recommended.  3. Cholelithiasis without secondary findings to suggest acute cholecystitis.  4. Pulmonary nodule within the right lower lobe versus atelectasis.  For a solid nodule <6 mm, Fleischner Society 2017 guidelines recommend no routine follow up for a low risk patient, or follow-up with non-contrast chest CT at 12 months in a high risk patient.  5. Focus of air and fluid adjacent to the left greater trochanter within the musculature.  Findings may reflect sequela of prior injury to the region or injection site however abscess not excluded.  Correlation is advised.  6. Please see above for additional findings.      Electronically signed by: Michael Amin MD  Date:    04/13/2023  Time:    13:41     Results for orders placed or performed during the hospital  encounter of 06/11/23 (from the past 2160 hour(s))   MRI Brain Without Contrast    Impression    No acute process seen.    Stable gliosis within the central thomas.    Air within the right globe is likely iatrogenic.      Electronically signed by: Nabeel Fuller MD  Date:    06/12/2023  Time:    08:45     CT angio H&N:   1.   Nonspecific luminal irregularity of the external carotid artery branches overlying the scalp, for example the bilateral superficial temporal, middle meningeal, and occipital branches. This may suggest a nonspecific arteritis/arteriopathy.   2.   Otherwise within evidence of large vessel occlusion or hemodynamically significant stenosis.     Assessment and Plan:     He has undergone extensive rheumatologic and neurologic serum work up which was unrevealing. CT angio of head and neck in May showed some extracranial vascular irregularities. Elevated ESR (only if it wasn't at the time of groin abscess), and significant weight loss and poor appetite could be suggestive of an underlying systemic inflammatory vs neoplastic process. Additionally, elevated CSF protein, IgG index and synthetic rate are concerning for CNS involvement as well. I'd recommend MRI of brain with demyelinating protocol to evaluate pituitary and hypothalamus with thin cuts since pathologies within this area can present with recurrent syncope. I agree with obtaining an EEG even though the semiology is not very consistent with seizure. I'd recommend repeating the previously abnormal labs such as ESR and folate with the addition of few metabolic factors since he hasn't had appropriate PO intake as well as autoimmune dysautonomia panel to assess for autoantibodies. Patient expresses understanding and agrees with the plan.    - EEG pending  - MRI Brain demyelinating protocol w/wo contrast  - Autoimmune Dysautonomia panel (DYS2), Sendout test to [facility]  - Copper, zinc, vitamin E, IL-2 receptor, ACE, repeat ESR, and folate and  replace accordingly    Active Diagnoses:    Diagnosis Date Noted POA    PRINCIPAL PROBLEM:  Syncope [R55] 06/12/2023 Yes    Debility [R53.81] 06/12/2023 Yes    Normocytic anemia [D64.9] 09/10/2022 Yes    Type 2 diabetes mellitus with hyperglycemia, with long-term current use of insulin [E11.65, Z79.4]  Not Applicable      Problems Resolved During this Admission:       VTE Risk Mitigation (From admission, onward)           Ordered     IP VTE HIGH RISK PATIENT  Once         06/12/23 0244     Place sequential compression device  Until discontinued         06/12/23 0244                    Thank you for your consult. I will follow-up with patient. Please contact us if you have any additional questions.    Jamia Sweeney MD  Neurology  [facility] - Med Surg

## 2023-06-12 NOTE — SUBJECTIVE & OBJECTIVE
Past Medical History:   Diagnosis Date    Abscess of right groin 09/01/2022    Diabetes mellitus     Encounter for blood transfusion     Loud snoring     Obese     Other insomnia 08/03/2020    Perineal abscess 08/01/2014    Primary hypertension 10/2/2022       Past Surgical History:   Procedure Laterality Date    ABCESS DRAINAGE  2014    PERIRECTAL    DECOMPRESSION OF LUMBAR SPINE USING MINIMALLY INVASIVE TECHNIQUE Right 07/06/2018    Procedure: DECOMPRESSION, SPINE, LUMBAR, MINIMALLY INVASIVE L3-4;  Surgeon: Cristian Cervantes MD;  Location: [facility] OR [address] [place] [address];  Service: Neurosurgery;  Laterality: Right;    INCISION AND DRAINAGE N/A 9/9/2022    Procedure: INCISION AND DRAINAGE GROIN;  Surgeon: KAITY Aguilar MD;  Location: [facility] OR;  Service: Urology;  Laterality: N/A;    ORTHOPEDIC SURGERY         Review of patient's allergies indicates:   Allergen Reactions    Metformin Diarrhea       No current facility-administered medications on file prior to encounter.     Current Outpatient Medications on File Prior to Encounter   Medication Sig    amitriptyline (ELAVIL) 75 MG tablet Take 1 tablet by mouth every evening.    aspirin 81 MG Chew Take 1 tablet by mouth once daily.    atorvastatin (LIPITOR) 80 MG tablet Take 80 mg by mouth every evening.    famotidine (PEPCID) 20 MG tablet TAKE 1 TABLET(20 MG) BY MOUTH TWICE DAILY FOR 14 DAYS    gabapentin (NEURONTIN) 100 MG capsule Take 100 mg by mouth 3 (three) times daily.    insulin detemir U-100 (LEVEMIR FLEXTOUCH) 100 unit/mL (3 mL) SubQ InPn pen Inject 30 Units into the skin once daily.    insulin glargine 100 units/mL SubQ pen Lantus SoloStar 100 UNIT/ML Subcutaneous Solution Pen-injector QTY: 1 mL Days: 30 Refills: 5  Written: 12/12/22 Patient Instructions: inject 25 units by subcutaneous route 1 time per day at bedtime    lisinopriL (PRINIVIL,ZESTRIL) 20 MG tablet Take 20 mg by mouth once daily.    oxyCODONE (ROXICODONE) 10 mg Tab immediate release tablet Take 10 mg by mouth  "every 8 (eight) hours as needed for Pain.    pantoprazole (PROTONIX) 40 MG tablet Take 40 mg by mouth once daily.     Family History       Problem Relation (Age of Onset)    Diabetes Father, Paternal Grandmother, Paternal Grandfather          Tobacco Use    Smoking status: Former     Packs/day: 0.50     Years: 9.00     Pack years: 4.50     Types: Cigarettes     Quit date: 2013     Years since quittin.9    Smokeless tobacco: Former   Substance and Sexual Activity    Alcohol use: Not Currently     Comment: DAILY PINT OF LIQUOR, HX OF 1 "TALL BOY" OF BEER DAILY    Drug use: Not Currently     Types: Cocaine     Comment: per collateral    Sexual activity: Yes     Partners: Female     Birth control/protection: None     Review of Systems   Constitutional:  Positive for fatigue. Negative for chills, diaphoresis and fever.   HENT:  Negative for congestion, ear pain, sore throat and trouble swallowing.    Eyes:  Positive for pain. Negative for discharge and visual disturbance.   Respiratory:  Positive for shortness of breath. Negative for cough, chest tightness and wheezing.    Cardiovascular:  Negative for chest pain, palpitations and leg swelling.   Gastrointestinal:  Negative for abdominal distention, abdominal pain, blood in stool, constipation, diarrhea, nausea and vomiting.   Endocrine: Negative for polydipsia, polyphagia and polyuria.   Genitourinary:  Negative for dysuria, flank pain, frequency and urgency.   Musculoskeletal:  Negative for back pain, joint swelling, neck pain and neck stiffness.   Skin:  Negative for rash and wound.   Allergic/Immunologic: Negative for immunocompromised state.   Neurological:  Positive for dizziness, syncope, weakness and light-headedness. Negative for speech difficulty, numbness and headaches.   Hematological:  Negative for adenopathy.   Psychiatric/Behavioral:  Positive for confusion. Negative for suicidal ideas. The patient is not nervous/anxious.    All other systems " reviewed and are negative.  Objective:     Vital Signs (Most Recent):  Temp: 98.2 °F (36.8 °C) (06/12/23 0019)  Pulse: 97 (06/12/23 0302)  Resp: 20 (06/12/23 0310)  BP: (!) 129/91 (06/12/23 0302)  SpO2: 100 % (06/12/23 0302) Vital Signs (24h Range):  Temp:  [98.2 °F (36.8 °C)-98.7 °F (37.1 °C)] 98.2 °F (36.8 °C)  Pulse:  [] 97  Resp:  [12-20] 20  SpO2:  [95 %-100 %] 100 %  BP: (122-151)/(85-91) 129/91     Weight: 99.8 kg (220 lb)  Body mass index is 36.61 kg/m².     Physical Exam  Vitals and nursing note reviewed.   Constitutional:       Appearance: He is well-developed. He is obese.   HENT:      Head: Normocephalic and atraumatic.   Eyes:      Pupils: Pupils are equal, round, and reactive to light.      Comments: Right eye injected (states has been like this since eye procedure)   Cardiovascular:      Rate and Rhythm: Normal rate and regular rhythm.      Heart sounds: Normal heart sounds.   Pulmonary:      Effort: Pulmonary effort is normal.      Breath sounds: Normal breath sounds.   Abdominal:      General: Bowel sounds are normal.      Palpations: Abdomen is soft.   Musculoskeletal:         General: Normal range of motion.      Cervical back: Normal range of motion and neck supple.   Skin:     General: Skin is warm and dry.      Capillary Refill: Capillary refill takes less than 2 seconds.   Neurological:      Mental Status: He is alert and oriented to person, place, and time.      Comments: Bilat upper ext strength 4/5, bilat LE strength 4/5   Psychiatric:         Behavior: Behavior normal.         Thought Content: Thought content normal.         Judgment: Judgment normal.            CRANIAL NERVES     CN III, IV, VI   Pupils are equal, round, and reactive to light.     Significant Labs: All pertinent labs within the past 24 hours have been reviewed.  CBC:   Recent Labs   Lab 06/12/23 0014   WBC 11.19   HGB 11.7*   HCT 34.2*        CMP:   Recent Labs   Lab 06/12/23 0014      K 4.3       CO2 20*   *   BUN 25*   CREATININE 1.1   CALCIUM 9.7   PROT 7.1   ALBUMIN 2.8*   BILITOT 0.3   ALKPHOS 295*   AST 36   ALT 55*   ANIONGAP 9     Cardiac Markers:   Recent Labs   Lab 06/12/23  0014   BNP 47     Troponin:   Recent Labs   Lab 06/12/23 0014   TROPONINI <0.006     Urine Studies:   Recent Labs   Lab 06/12/23  0207   COLORU Yellow   APPEARANCEUA Clear   PHUR 7.0   SPECGRAV 1.030   PROTEINUA 3+*   GLUCUA 3+*   KETONESU Negative   BILIRUBINUA Negative   OCCULTUA 1+*   NITRITE Negative   UROBILINOGEN Negative   LEUKOCYTESUR Negative   RBCUA 6*   WBCUA 4   BACTERIA Occasional   HYALINECASTS 1       Significant Imaging: I have reviewed all pertinent imaging results/findings within the past 24 hours.  EKG: I have reviewed all pertinent results/findings within the past 24 hours and my personal findings are: sinus tachycardia with no acute ischemic changes.        CT Head Without Contrast    Result Date: 6/12/2023  EXAMINATION: CT OF THE HEAD WITHOUT CLINICAL HISTORY: Syncope, recurrent; TECHNIQUE: 5 mm unenhanced axial images were obtained from the skull base to the vertex. COMPARISON: 05/31/2023 and MRI brain 02/04/2023 FINDINGS: The ventricles, basal cisterns, and cortical sulci are within normal limits for patient's stated age. There is no acute intracranial hemorrhage, territorial infarct or mass effect, or midline shift. In the visualized paranasal sinuses, there is mild bilateral maxillary sinus mucoperiosteal thickening.     No acute intracranial abnormality detected. Electronically signed by: Dee Quinteros Date:    06/12/2023 Time:    01:20    CTA Chest Non-Coronary (PE Studies)    Result Date: 6/12/2023  EXAMINATION: CTA CHEST NON CORONARY (PE STUDIES) CLINICAL HISTORY: Pulmonary embolism (PE) suspected, positive D-dimer; TECHNIQUE: Low dose axial images, sagittal and coronal reformations were obtained from the thoracic inlet to the lung bases following the IV administration of 80 mL of  Omnipaque 350.  Contrast timing was optimized to evaluate the pulmonary arteries.  MIP images were performed. COMPARISON: CTA chest 04/14/2023 FINDINGS: The visualized soft tissue structures at the base of the neck appear within normal limits allowing for streak artifact.  There is mild bilateral gynecomastia. The thoracic aorta maintains normal caliber, contour, and course without significant atherosclerotic calcification within its course.  There is no evidence of aneurysmal dilation or dissection. The heart is not enlarged and there is trace pericardial fluid.  There are scattered coronary artery calcifications.The esophagus maintains a normal course and caliber.There is no bulky mediastinal or axillary lymph node enlargement. The trachea is midline and proximal airways are patent. The lungs are symmetrically expanded. Detailed evaluation of the lung parenchyma is limited by respiratory motion.  There is no pneumothorax.  There is no large confluent airspace consolidation.  There are mild bilateral ground-glass opacities.  There is no significant volume of pleural fluid. There is no evidence of pulmonary thromboembolism. Limited images of the upper abdomen obtained during the course of this dedicated thoracic CT demonstrate no acute abnormalities.  There is cholelithiasis. The osseous structures demonstrate multiple remote appearing bilateral rib deformities.  There is a stable mild superior endplate deformity of the T4 vertebral body.  There are mild degenerative changes of the visualized thoracic spine.     1.  No evidence of pulmonary thromboembolism. 2.  Mild bibasilar ground-glass opacities.  This is favored to relate to atelectasis, although nonspecific infectious or inflammatory process not excluded. 3.  Scattered coronary artery calcification. 4.  Additional stable findings as above. Electronically signed by: Sal Nava MD Date:    06/12/2023 Time:    01:23

## 2023-06-12 NOTE — NURSING
Patient arrived on the unit via stretcher accompanied by staff. Pt AAO X 3 Resp even and unlabored. Pt transferred from the stretcher to the bed with min assistance.  [facility]  Nurses Note -- 4 Eyes      6/12/2023       Skin assessed on: Admit      [] No Pressure Injuries Present    []Prevention Measures Documented    [] Yes LDA  for Pressure Injury Previously documented     [x] Yes New Pressure Injury Discovered   [x] LDA for New Pressure Injury Added  L heel wound    Attending RN:  Raoul Proctor RN     Second RN:  Beryl Calloway

## 2023-06-12 NOTE — ASSESSMENT & PLAN NOTE
Admit to telemetry  This is a recurrent problem  Consult neurology - appreciate recs  MRA head and neck ordered per ED MD  Hold asa in the event he requires a repeat LP  EEG as per neurology  Trend troponin - first is negative  Neuro checks q4h  Seizure prxn

## 2023-06-12 NOTE — HPI
"Mr. Woodall is a 33 y.o. male with history of IDDM2, HTN, treated syphilis, left femoral abcess (2/2023), and previous alcohol and cocaine use disorder  who presents today with complaints of syncope. It is moderate. It is associated with weakness, fatigue, SOB, dizziness, and lightheadedness. He denies fever, chills, N/V/D, or chest pain. He reports recurrent syncopal episodes over the last year increasing in frequency to daily. He was admitted at [facility] and had an extensive neurological work up after receiving tPA for lower ext weakness. In the ED today, CT head is negative for acute intracranial abnormality. VSS, he is afebrile and normotensive, orthostatics negative. D dimer 0.79 and CTA negative for PE. Troponin is WNL. EKG: sinus tachycardia with no acute ischemic changes. Utox negative. ED MD reports an episode while in the ED while in the stretcher where he started staring in the distance and would not respond. He was drowsy after this episode. Transfer to [facility] was initiated pending bed availability. Neurology was consulted who recommended admission for EEG in AM. Hospital medicine is consulted for observation admission.    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 Less likely to be low grade glioma due to lack of mass effect/enlargement of thomas per neurosurgery eval. B12, Folate, TSH, Lipid panel wnl. CTA notable for mild atherosclerotic ossifications of the bilateral carotid bifurcations and right vertebral artery origin. Nonspecific luminal irregularity of the external carotid artery branches, possibly suggestive of nonspecific arteritis/arteriopathy. No new recs per ENT. Patient s/p IR guided LP on 05/18 CSF notable for markedly elevated protein count, otherwise wnl. Normal ACTH stim test. Differential includes but is not limited to vasculitis vs MS vs glioma vs AI etiology. Neuro following.  -neuro following, appreciate " "recs  -ANCA and MPO Ab negative  -C3 and C4 wnl  -CSF Cx negative  -anti-C1q antibody WNL  - cryoglobulins - negative  -AQP4 Ab pending"  "

## 2023-06-12 NOTE — CARE UPDATE
Patient seen and examined.  See H/P, treatment and plan per Debra Ledesma NP.  34 y/o male presents with apparent syncope.  Chart reviewed.  Patient admitted at [facility] 2 months ago and treated for neurosyphilis.  Completed 2 week course of IV PCN.  Presents with recurrent syncope.  Family states that he loses consciousness all of a sudden.  He sometimes complains of headache prior to episode.  They have notes some shaking/tremors with episodes.  He has lost urinary and bowel incontinence with episodes.  Unremarkable MRI.  Concern about probably seizures as etiology of episodes.  Check EEG.  Consult Neurology and ID.

## 2023-06-12 NOTE — H&P
"[facility] Medicine  History & Physical    Patient Name: Doe Woodall  MRN: [id number]  Patient Class: OP- Observation  Admission Date: 6/11/2023  Attending Physician: Dr. Joe Baldwin  Primary Care Provider: [facility]         Patient information was obtained from patient, past medical records and ER records.     Subjective:     Principal Problem:Syncope    Chief Complaint:   Chief Complaint   Patient presents with    Loss of Consciousness     Pt arrived via ems, pt chief complaint is Syncope. Per family pt was seated in wheel chair and had a syncopal episode. Per ems pt has been slow to respond but now beginning to answer questions.         HPI: Mr. Woodall is a 33 y.o. male with history of IDDM2, HTN, treated syphilis, left femoral abcess (2/2023), and previous alcohol and cocaine use disorder  who presents today with complaints of syncope. It is moderate. It is associated with weakness, fatigue, SOB, dizziness, and lightheadedness. He denies fever, chills, N/V/D, or chest pain. He reports recurrent syncopal episodes over the last year increasing in frequency to daily. He was admitted at [facility] and had an extensive neurological work up after receiving tPA for lower ext weakness. In the ED today, CT head is negative for acute intracranial abnormality. VSS, he is afebrile and normotensive, orthostatics negative. D dimer 0.79 and CTA negative for PE. Troponin is WNL. EKG: sinus tachycardia with no acute ischemic changes. Utox negative. ED MD reports an episode while in the ED while in the stretcher where he started staring in the distance and would not respond. He was drowsy after this episode. Transfer to [facility] was initiated pending bed availability. Neurology was consulted who recommended admission for EEG in AM. Hospital medicine is consulted for observation admission.    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 Less likely to be low grade glioma due to lack of mass effect/enlargement of thomas per neurosurgery eval. B12, Folate, TSH, Lipid panel wnl. CTA notable for mild atherosclerotic ossifications of the bilateral carotid bifurcations and right vertebral artery origin. Nonspecific luminal irregularity of the external carotid artery branches, possibly suggestive of nonspecific arteritis/arteriopathy. No new recs per ENT. Patient s/p IR guided LP on 05/18 CSF notable for markedly elevated protein count, otherwise wnl. Normal ACTH stim test. Differential includes but is not limited to vasculitis vs MS vs glioma vs AI etiology. Neuro following.  -neuro following, appreciate recs  -ANCA and MPO Ab negative  -C3 and C4 wnl  -CSF Cx negative  -anti-C1q antibody WNL  - cryoglobulins - negative  -AQP4 Ab pending"      Past Medical History:   Diagnosis Date    Abscess of right groin 09/01/2022    Diabetes mellitus     Encounter for blood transfusion     Loud snoring     Obese     Other insomnia 08/03/2020    Perineal abscess 08/01/2014    Primary hypertension 10/2/2022       Past Surgical History:   Procedure Laterality Date    ABCESS DRAINAGE  2014    PERIRECTAL    DECOMPRESSION OF LUMBAR SPINE USING MINIMALLY INVASIVE TECHNIQUE Right 07/06/2018    Procedure: DECOMPRESSION, SPINE, LUMBAR, MINIMALLY INVASIVE L3-4;  Surgeon: Cristian Cervantes MD;  Location: [facility] OR [address] [place] [address];  Service: Neurosurgery;  Laterality: Right;    INCISION AND DRAINAGE N/A 9/9/2022    Procedure: INCISION AND DRAINAGE GROIN;  Surgeon: KAITY Aguilar MD;  Location: [facility] OR;  Service: Urology;  Laterality: N/A;    ORTHOPEDIC SURGERY         Review of patient's allergies indicates:   Allergen Reactions    Metformin Diarrhea       No current facility-administered medications on file prior to encounter.     Current Outpatient Medications on File Prior to Encounter   Medication Sig    amitriptyline (ELAVIL) 75 " "MG tablet Take 1 tablet by mouth every evening.    aspirin 81 MG Chew Take 1 tablet by mouth once daily.    atorvastatin (LIPITOR) 80 MG tablet Take 80 mg by mouth every evening.    famotidine (PEPCID) 20 MG tablet TAKE 1 TABLET(20 MG) BY MOUTH TWICE DAILY FOR 14 DAYS    gabapentin (NEURONTIN) 100 MG capsule Take 100 mg by mouth 3 (three) times daily.    insulin detemir U-100 (LEVEMIR FLEXTOUCH) 100 unit/mL (3 mL) SubQ InPn pen Inject 30 Units into the skin once daily.    insulin glargine 100 units/mL SubQ pen Lantus SoloStar 100 UNIT/ML Subcutaneous Solution Pen-injector QTY: 1 mL Days: 30 Refills: 5  Written: 22 Patient Instructions: inject 25 units by subcutaneous route 1 time per day at bedtime    lisinopriL (PRINIVIL,ZESTRIL) 20 MG tablet Take 20 mg by mouth once daily.    oxyCODONE (ROXICODONE) 10 mg Tab immediate release tablet Take 10 mg by mouth every 8 (eight) hours as needed for Pain.    pantoprazole (PROTONIX) 40 MG tablet Take 40 mg by mouth once daily.     Family History       Problem Relation (Age of Onset)    Diabetes Father, Paternal Grandmother, Paternal Grandfather          Tobacco Use    Smoking status: Former     Packs/day: 0.50     Years: 9.00     Pack years: 4.50     Types: Cigarettes     Quit date: 2013     Years since quittin.9    Smokeless tobacco: Former   Substance and Sexual Activity    Alcohol use: Not Currently     Comment: DAILY PINT OF LIQUOR, HX OF 1 "TALL BOY" OF BEER DAILY    Drug use: Not Currently     Types: Cocaine     Comment: per collateral    Sexual activity: Yes     Partners: Female     Birth control/protection: None     Review of Systems   Constitutional:  Positive for fatigue. Negative for chills, diaphoresis and fever.   HENT:  Negative for congestion, ear pain, sore throat and trouble swallowing.    Eyes:  Positive for pain. Negative for discharge and visual disturbance.   Respiratory:  Positive for shortness of breath. Negative for cough, " chest tightness and wheezing.    Cardiovascular:  Negative for chest pain, palpitations and leg swelling.   Gastrointestinal:  Negative for abdominal distention, abdominal pain, blood in stool, constipation, diarrhea, nausea and vomiting.   Endocrine: Negative for polydipsia, polyphagia and polyuria.   Genitourinary:  Negative for dysuria, flank pain, frequency and urgency.   Musculoskeletal:  Negative for back pain, joint swelling, neck pain and neck stiffness.   Skin:  Negative for rash and wound.   Allergic/Immunologic: Negative for immunocompromised state.   Neurological:  Positive for dizziness, syncope, weakness and light-headedness. Negative for speech difficulty, numbness and headaches.   Hematological:  Negative for adenopathy.   Psychiatric/Behavioral:  Positive for confusion. Negative for suicidal ideas. The patient is not nervous/anxious.    All other systems reviewed and are negative.  Objective:     Vital Signs (Most Recent):  Temp: 98.2 °F (36.8 °C) (06/12/23 0019)  Pulse: 97 (06/12/23 0302)  Resp: 20 (06/12/23 0310)  BP: (!) 129/91 (06/12/23 0302)  SpO2: 100 % (06/12/23 0302) Vital Signs (24h Range):  Temp:  [98.2 °F (36.8 °C)-98.7 °F (37.1 °C)] 98.2 °F (36.8 °C)  Pulse:  [] 97  Resp:  [12-20] 20  SpO2:  [95 %-100 %] 100 %  BP: (122-151)/(85-91) 129/91     Weight: 99.8 kg (220 lb)  Body mass index is 36.61 kg/m².     Physical Exam  Vitals and nursing note reviewed.   Constitutional:       Appearance: He is well-developed. He is obese.   HENT:      Head: Normocephalic and atraumatic.   Eyes:      Pupils: Pupils are equal, round, and reactive to light.      Comments: Right eye injected (states has been like this since eye procedure)   Cardiovascular:      Rate and Rhythm: Normal rate and regular rhythm.      Heart sounds: Normal heart sounds.   Pulmonary:      Effort: Pulmonary effort is normal.      Breath sounds: Normal breath sounds.   Abdominal:      General: Bowel sounds are normal.       Palpations: Abdomen is soft.   Musculoskeletal:         General: Normal range of motion.      Cervical back: Normal range of motion and neck supple.   Skin:     General: Skin is warm and dry.      Capillary Refill: Capillary refill takes less than 2 seconds.   Neurological:      Mental Status: He is alert and oriented to person, place, and time.      Comments: Bilat upper ext strength 4/5, bilat LE strength 4/5   Psychiatric:         Behavior: Behavior normal.         Thought Content: Thought content normal.         Judgment: Judgment normal.            CRANIAL NERVES     CN III, IV, VI   Pupils are equal, round, and reactive to light.     Significant Labs: All pertinent labs within the past 24 hours have been reviewed.  CBC:   Recent Labs   Lab 06/12/23  0014   WBC 11.19   HGB 11.7*   HCT 34.2*        CMP:   Recent Labs   Lab 06/12/23  0014      K 4.3      CO2 20*   *   BUN 25*   CREATININE 1.1   CALCIUM 9.7   PROT 7.1   ALBUMIN 2.8*   BILITOT 0.3   ALKPHOS 295*   AST 36   ALT 55*   ANIONGAP 9     Cardiac Markers:   Recent Labs   Lab 06/12/23  0014   BNP 47     Troponin:   Recent Labs   Lab 06/12/23  0014   TROPONINI <0.006     Urine Studies:   Recent Labs   Lab 06/12/23  0207   COLORU Yellow   APPEARANCEUA Clear   PHUR 7.0   SPECGRAV 1.030   PROTEINUA 3+*   GLUCUA 3+*   KETONESU Negative   BILIRUBINUA Negative   OCCULTUA 1+*   NITRITE Negative   UROBILINOGEN Negative   LEUKOCYTESUR Negative   RBCUA 6*   WBCUA 4   BACTERIA Occasional   HYALINECASTS 1       Significant Imaging: I have reviewed all pertinent imaging results/findings within the past 24 hours.  EKG: I have reviewed all pertinent results/findings within the past 24 hours and my personal findings are: sinus tachycardia with no acute ischemic changes.        CT Head Without Contrast    Result Date: 6/12/2023  EXAMINATION: CT OF THE HEAD WITHOUT CLINICAL HISTORY: Syncope, recurrent; TECHNIQUE: 5 mm unenhanced axial images were  obtained from the skull base to the vertex. COMPARISON: 05/31/2023 and MRI brain 02/04/2023 FINDINGS: The ventricles, basal cisterns, and cortical sulci are within normal limits for patient's stated age. There is no acute intracranial hemorrhage, territorial infarct or mass effect, or midline shift. In the visualized paranasal sinuses, there is mild bilateral maxillary sinus mucoperiosteal thickening.     No acute intracranial abnormality detected. Electronically signed by: Dee Quinteros Date:    06/12/2023 Time:    01:20    CTA Chest Non-Coronary (PE Studies)    Result Date: 6/12/2023  EXAMINATION: CTA CHEST NON CORONARY (PE STUDIES) CLINICAL HISTORY: Pulmonary embolism (PE) suspected, positive D-dimer; TECHNIQUE: Low dose axial images, sagittal and coronal reformations were obtained from the thoracic inlet to the lung bases following the IV administration of 80 mL of Omnipaque 350.  Contrast timing was optimized to evaluate the pulmonary arteries.  MIP images were performed. COMPARISON: CTA chest 04/14/2023 FINDINGS: The visualized soft tissue structures at the base of the neck appear within normal limits allowing for streak artifact.  There is mild bilateral gynecomastia. The thoracic aorta maintains normal caliber, contour, and course without significant atherosclerotic calcification within its course.  There is no evidence of aneurysmal dilation or dissection. The heart is not enlarged and there is trace pericardial fluid.  There are scattered coronary artery calcifications.The esophagus maintains a normal course and caliber.There is no bulky mediastinal or axillary lymph node enlargement. The trachea is midline and proximal airways are patent. The lungs are symmetrically expanded. Detailed evaluation of the lung parenchyma is limited by respiratory motion.  There is no pneumothorax.  There is no large confluent airspace consolidation.  There are mild bilateral ground-glass opacities.  There is no significant  volume of pleural fluid. There is no evidence of pulmonary thromboembolism. Limited images of the upper abdomen obtained during the course of this dedicated thoracic CT demonstrate no acute abnormalities.  There is cholelithiasis. The osseous structures demonstrate multiple remote appearing bilateral rib deformities.  There is a stable mild superior endplate deformity of the T4 vertebral body.  There are mild degenerative changes of the visualized thoracic spine.     1.  No evidence of pulmonary thromboembolism. 2.  Mild bibasilar ground-glass opacities.  This is favored to relate to atelectasis, although nonspecific infectious or inflammatory process not excluded. 3.  Scattered coronary artery calcification. 4.  Additional stable findings as above. Electronically signed by: Sal Nava MD Date:    06/12/2023 Time:    01:23      Assessment/Plan:     * Syncope  Admit to telemetry  This is a recurrent problem  Consult neurology - appreciate recs  MRA head and neck ordered per ED MD  Hold asa in the event he requires a repeat LP  EEG as per neurology  Trend troponin - first is negative  Neuro checks q4h  Seizure prxn      Debility  Currently walking with walker and using WC PRN  Ordered outpt PT/OT but patient has not gone to this yet      Normocytic anemia  Appears stable  Trend cbc    Type 2 diabetes mellitus with hyperglycemia, with long-term current use of insulin  Patient's FSGs are uncontrolled due to hyperglycemia on current medication regimen.  Last A1c reviewed-   Lab Results   Component Value Date    HGBA1C 6.9 (H) 05/10/2023     Most recent fingerstick glucose reviewed-   Recent Labs   Lab 06/12/23  0012   POCTGLUCOSE 191*     Current correctional scale  Medium  Maintain anti-hyperglycemic dose as follows-   Antihyperglycemics (From admission, onward)    Start     Stop Route Frequency Ordered    06/12/23 0345  insulin detemir U-100 (Levemir) pen 10 Units         -- SubQ Nightly 06/12/23 0244    06/12/23  0343  insulin aspart U-100 pen 1-10 Units         -- SubQ Before meals & nightly PRN 06/12/23 0244        Hold Oral hypoglycemics while patient is in the hospital.      VTE Risk Mitigation (From admission, onward)         Ordered     IP VTE HIGH RISK PATIENT  Once         06/12/23 0244     Place sequential compression device  Until discontinued         06/12/23 0244                     On 06/12/2023, patient should be placed in hospital observation services under my care in collaboration with Dr. Joe Baldwin.      Debra Ledesma NP  Department of Hospital Medicine  [facility] - Telemetry

## 2023-06-13 NOTE — NURSING
[facility]  Nurses Note -- 4 Eyes      6/12/2023       Skin assessed on: Q Shift      [] No Pressure Injuries Present    []Prevention Measures Documented    [x] Yes LDA  for Pressure Injury Previously documented     [] Yes New Pressure Injury Discovered   [] LDA for New Pressure Injury Added      Attending RN:  Hermelinda Johnson LPN     Second RN:  Leonor Velez LPN

## 2023-06-13 NOTE — PLAN OF CARE
Problem: Occupational Therapy  Goal: Occupational Therapy Goal  Description: Goals to be met by: 6/27/23    Patient will increase functional independence with ADLs by performing:    LE Dressing with Supervision and use of AE as needed.   Grooming while standing at sink with Supervision.  Toileting from bedside commode with Supervision for hygiene and clothing management.   Step transfer with Supervision  Toilet transfer to bedside commode with Supervision.  Upper extremity exercise program x15 reps per handout, with independence.    Outcome: Ongoing, Progressing

## 2023-06-13 NOTE — ASSESSMENT & PLAN NOTE
"33M with h/o t2dm, obesity (BMI 36) admitted 6/11 with increase frequency of fainting episodes. H/o treated neurosyphilis (with 2 weeks iv pcn); RPR decreased to non-reactive a month ago at [facility] and denies partners since then. ID consulted for "Syncope/Possible seizures/Recently treated for neurosyphilis    Suspect neurosyphilis has been adequately treated. hiv screening and updated RPR labs are pending.     Appreciate neurology assistance in work up of non-infectious causes of recurrent, chronic syncopal episodes  "

## 2023-06-13 NOTE — NURSING
[facility]  Nurses Note -- 4 Eyes      6/12/2023      Skin assessed on: Q Shift      [] No Pressure Injuries Present    []Prevention Measures Documented    [x] Yes LDA  for Pressure Injury Previously documented     [] Yes New Pressure Injury Discovered   [] LDA for New Pressure Injury Added      Attending RN:  Hermelinda Johnson LPN     Second RN:  Leonor Velez LPN

## 2023-06-13 NOTE — NURSING
[facility] [place]  Nurses Note -- 4 Eyes      6/12/2023       Skin assessed on: Q Shift      [] No Pressure Injuries Present    []Prevention Measures Documented    [x] Yes LDA  for Pressure Injury Previously documented     [] Yes New Pressure Injury Discovered   [] LDA for New Pressure Injury Added      Attending RN:  Leonor Velez LPN     Second RN:  ANNA Silveira

## 2023-06-13 NOTE — ASSESSMENT & PLAN NOTE
Admit to telemetry  This is a recurrent problem  Consult neurology - appreciate recs  MRA head and neck ordered per ED MD  Hold asa in the event he requires a repeat LP  EEG as per neurology  Trend troponin - first is negative  Neuro checks q4h  Seizure prxn    Neuro consult. Plan for EEG

## 2023-06-13 NOTE — PT/OT/SLP EVAL
Physical Therapy Evaluation     Patient Name: Doe Woodall   MRN: [id number]  Recent Surgery: * No surgery found *      Recommendations:     Discharge Recommendations:  (TBD)   Discharge Equipment Recommendations: none       Assessment:     Doe Woodall is a 33 y.o. male admitted with a medical diagnosis of Syncope. He presents with the following impairments/functional limitations: impaired endurance, impaired functional mobility, gait instability.     Rehab Prognosis: Good; patient would benefit from acute PT services to address these deficits and reach maximum level of function.    Plan:     During this hospitalization, patient to be seen 5 x/week to address the above listed problems via gait training, therapeutic activities, therapeutic exercises    Plan of Care Expires: 06/20/23    Subjective     Chief Complaint: HA  Patient Comments/Goals: Pt agreeable to therapy evaluations  Pain/Comfort:  Pain Rating 1: 7/10  Location 1: head  Pain Addressed 1: Reposition    Social History:  Living Environment: Patient lives with their parent(s) in a single story home with number of outside stair(s): 1  Prior Level of Function: Prior to admission, patient was modified independent  Equipment Used at Home: walker, rolling, wheelchair (built in shower chair)  DME owned (not currently used): none  Assistance Upon Discharge: family    Objective:     Communicated with Gloria HUMPHREY prior to session. Patient found sitting edge of bed with OT present with peripheral IV upon PT entry to room.    General Precautions: Standard, fall   Orthopedic Precautions: N/A   Braces: N/A    Respiratory Status: Room air    Exams:  Cognition: Patient is oriented to Person, Place, Time, Situation  RLE ROM: WFL  RLE Strength: WFL  LLE ROM: WFL  LLE Strength: WFL  Vitals: supine 125/78, hr 90; /73, hr 126; standing 87/52, hr 97; seated in chair 141/81, hr 92    Functional Mobility:  Gait belt applied - Yes  Bed Mobility  NT; pt seated EOB with  OT upon arrival  Transfers  Sit to Stand: contact guard assistance with rolling walker  Bed to Chair: contact guard assistance with rolling walker using Stand Pivot  Gait  Patient ambulated 2' with rolling walker and contact guard assistance. Balance  Sitting: supervision  Standing: contact guard assistance      Therapeutic Activities and Exercises:   Patient educated on role of acute care PT and PT POC      AM-PAC 6 CLICK MOBILITY  Total Score:20    Patient left  reclined in chair  with all lines intact, call button in reach, and family present.    GOALS:   Multidisciplinary Problems       Physical Therapy Goals          Problem: Physical Therapy    Goal Priority Disciplines Outcome Goal Variances Interventions   Physical Therapy Goal     PT, PT/OT Ongoing, Progressing     Description: Goals to be met by: 23     Patient will increase functional independence with mobility by performin. Pt to be mod I with bed mobility.  2. Pt to transfer with supervision.  3. Pt to ambulate 150' /c or /s AD and SBA.  4. Pt to be (I) with HEP.                         History:     Past Medical History:   Diagnosis Date    Abscess of right groin 2022    Diabetes mellitus     Encounter for blood transfusion     Loud snoring     Obese     Other insomnia 2020    Perineal abscess 2014    Primary hypertension 10/2/2022       Past Surgical History:   Procedure Laterality Date    ABCESS DRAINAGE      PERIRECTAL    DECOMPRESSION OF LUMBAR SPINE USING MINIMALLY INVASIVE TECHNIQUE Right 2018    Procedure: DECOMPRESSION, SPINE, LUMBAR, MINIMALLY INVASIVE L3-4;  Surgeon: Cristian Cervantes MD;  Location: [address];  Service: Neurosurgery;  Laterality: Right;    INCISION AND DRAINAGE N/A 2022    Procedure: INCISION AND DRAINAGE GROIN;  Surgeon: KAITY Aguilar MD;  Location: [facility];  Service: Urology;  Laterality: N/A;    ORTHOPEDIC SURGERY         Time Tracking:     PT Received On: 23  PT  Start Time: 1456  PT Stop Time: 1509  PT Total Time (min): 13 min     Billable Minutes: Evaluation 13    6/13/2023

## 2023-06-13 NOTE — PT/OT/SLP EVAL
Occupational Therapy   Evaluation    Name: Doe Woodall  MRN: [id number]  Admitting Diagnosis: Syncope  Recent Surgery: * No surgery found *      Recommendations:     Discharge Recommendations:  (TBD pending further assessment.)  Discharge Equipment Recommendations:   (TBD)  Barriers to discharge:   (Pt w/ orthostatic hypotension with minimal activity.)    Assessment:     Doe Woodall is a 33 y.o. male with a medical diagnosis of Syncope.  Performance deficits affecting function: weakness, impaired endurance, impaired self care skills, impaired functional mobility, gait instability, impaired balance, decreased upper extremity function, decreased lower extremity function, decreased coordination, pain, impaired cardiopulmonary response to activity.      Pt very pleasant and willing to participate in tx session this date; pt functionally limited by onset of orthostatic hypotension with BP 87/52. Pt also w/ headache that worsened w/ activity. Tx session was limited to bed>chair t/f, requiring close CGA and use of RW for safety due to general weakness and decreased endurance. Pt tolerated tx session well and will continue to benefit from skilled acute OT services to maximize functional capacity for safe performance w/ ADLs and functional mobility.     BP vitals are as follows:   -Supine: 125/78  -EOB: 116/73  -Standin/73  -Up in chair: 141/81    Rehab Prognosis: Good; patient would benefit from acute skilled OT services to address these deficits and reach maximum level of function.       Plan:     Patient to be seen 5 x/week to address the above listed problems via self-care/home management, therapeutic activities, therapeutic exercises  Plan of Care Expires: 23  Plan of Care Reviewed with: patient    Subjective     Chief Complaint: Headache   Patient/Family Comments/goals: Agreeable to sit up in chair.     Occupational Profile:  Living Environment: Pt lives w/ parents in [facility] w/ 0 [address]; pt uses a walk-in  shower with built in SC.   Previous level of function: Pt was mod I w/ use of RW; pt would occasionally use W/C for longer distances. Reports he has has several falls in the past 3 month; 5 of these falls were due to syncope..   Roles and Routines: No longer working/driving.   Equipment Used at Home: walker, rolling, wheelchair  Assistance upon Discharge: parents    Pain/Comfort:  Pain Rating 1: 7/10  Location 1:  (headache)    Patients cultural, spiritual, Orthodox conflicts given the current situation: no    Objective:     Communicated with: Nurse prior to session.  Patient found HOB elevated with telemetry, peripheral IV upon OT entry to room.    General Precautions: Standard, fall  Orthopedic Precautions: N/A  Braces: N/A  Respiratory Status: Room air    Occupational Performance:    Bed Mobility:    Patient completed Supine to Sit with stand by assistance and HOB flattened     Functional Mobility/Transfers:  Patient completed Sit <> Stand Transfer with contact guard assistance  with  rolling walker   Functional Mobility: Pt was able to step transfer to chair w/ close CGA and use of RW.     Activities of Daily Living:  Upper Body Dressing: minimum assistance for donning gown over back     Cognitive/Visual Perceptual:  Cognitive/Psychosocial Skills:     -       Oriented to: Person, Place, Time, and Situation   -       Follows Commands/attention:Follows multistep  commands  -       Communication: clear/fluent  -       Memory: No Deficits noted  -       Safety awareness/insight to disability: intact     Physical Exam:  Balance:    -       good sitting balance; fair + standing   Postural examination/scapula alignment:    -       No postural abnormalities identified  Skin integrity: Visible skin intact  Edema:  None noted  Sensation:    -       Intact  Upper Extremity Range of Motion:     -       Right Upper Extremity: WFL  -       Left Upper Extremity: WFL  Upper Extremity Strength:    -       Right Upper  Extremity: WFL  -       Left Upper Extremity: WFL   Strength:    -       Right Upper Extremity: WFL  -       Left Upper Extremity: WFL    AMPAC 6 Click ADL:  AMPAC Total Score: 21    Treatment & Education:  -Initial eval complete.   -Pt educated on role of OT and POC.   -Pt educated on safe functional mobility and ADL performance.   -Questions and concerns addressed within scope.     Patient left up in chair with all lines intact and call button in reach    GOALS:   Multidisciplinary Problems       Occupational Therapy Goals          Problem: Occupational Therapy    Goal Priority Disciplines Outcome Interventions   Occupational Therapy Goal     OT, PT/OT Ongoing, Progressing    Description: Goals to be met by: 6/27/23    Patient will increase functional independence with ADLs by performing:    LE Dressing with Supervision and use of AE as needed.   Grooming while standing at sink with Supervision.  Toileting from bedside commode with Supervision for hygiene and clothing management.   Step transfer with Supervision  Toilet transfer to bedside commode with Supervision.  Upper extremity exercise program x15 reps per handout, with independence.                         History:     Past Medical History:   Diagnosis Date    Abscess of right groin 09/01/2022    Diabetes mellitus     Encounter for blood transfusion     Loud snoring     Obese     Other insomnia 08/03/2020    Perineal abscess 08/01/2014    Primary hypertension 10/2/2022         Past Surgical History:   Procedure Laterality Date    ABCESS DRAINAGE  2014    PERIRECTAL    DECOMPRESSION OF LUMBAR SPINE USING MINIMALLY INVASIVE TECHNIQUE Right 07/06/2018    Procedure: DECOMPRESSION, SPINE, LUMBAR, MINIMALLY INVASIVE L3-4;  Surgeon: Cristian Cervantes MD;  Location: [facility] OR [address] [place] [address];  Service: Neurosurgery;  Laterality: Right;    INCISION AND DRAINAGE N/A 9/9/2022    Procedure: INCISION AND DRAINAGE GROIN;  Surgeon: KAITY Aguilar MD;  Location: [facility] OR;   Service: Urology;  Laterality: N/A;    ORTHOPEDIC SURGERY         Time Tracking:     OT Date of Treatment: 06/13/23  OT Start Time: 1452  OT Stop Time: 1509  OT Total Time (min): 17 min    Billable Minutes:Evaluation 17  Total Time 17    6/13/2023

## 2023-06-13 NOTE — PLAN OF CARE
Problem: Adult Inpatient Plan of Care  Goal: Plan of Care Review  Outcome: Ongoing, Progressing  Flowsheets (Taken 6/12/2023 2119)  Plan of Care Reviewed With: patient  Goal: Patient-Specific Goal (Individualized)  Outcome: Ongoing, Progressing  Goal: Absence of Hospital-Acquired Illness or Injury  Outcome: Ongoing, Progressing  Intervention: Identify and Manage Fall Risk  Flowsheets (Taken 6/12/2023 2119)  Safety Promotion/Fall Prevention:   Fall Risk reviewed with patient/family   assistive device/personal item within reach   high risk medications identified   nonskid shoes/socks when out of bed   side rails raised x 2   room near unit station   instructed to call staff for mobility   medications reviewed   bed alarm set  Intervention: Prevent Skin Injury  Flowsheets (Taken 6/12/2023 2119)  Body Position:   position changed independently   weight shifting  Skin Protection:   tubing/devices free from skin contact   adhesive use limited  Intervention: Prevent and Manage VTE (Venous Thromboembolism) Risk  Flowsheets (Taken 6/12/2023 2119)  Activity Management:   Ankle pumps - L1   Arm raise - L1   Rolling - L1   Straight leg raise - L1  VTE Prevention/Management:   ambulation promoted   bleeding precations maintained   bleeding risk assessed  Range of Motion: active ROM (range of motion) encouraged  Intervention: Prevent Infection  Flowsheets (Taken 6/12/2023 2119)  Infection Prevention: hand hygiene promoted  Goal: Optimal Comfort and Wellbeing  Outcome: Ongoing, Progressing  Goal: Readiness for Transition of Care  Outcome: Ongoing, Progressing     Problem: Diabetes Comorbidity  Goal: Blood Glucose Level Within Targeted Range  Outcome: Ongoing, Progressing  Intervention: Monitor and Manage Glycemia  Flowsheets (Taken 6/12/2023 2119)  Glycemic Management: blood glucose monitored     Problem: Impaired Wound Healing  Goal: Optimal Wound Healing  Outcome: Ongoing, Progressing  Intervention: Promote Wound  Healing  Flowsheets (Taken 6/12/2023 2119)  Sleep/Rest Enhancement: relaxation techniques promoted  Activity Management:   Ankle pumps - L1   Arm raise - L1   Rolling - L1   Straight leg raise - L1  Pain Management Interventions: relaxation techniques promoted   Pt alert able to make needs known,aristeo meds well,no s/s adverse reaction noted,reposition self q 2hrs,pain controlled by prn pain medication,POC explained,remains free from falls and pressure injuries,safety maintained,continue monitoring.

## 2023-06-13 NOTE — HOSPITAL COURSE
Patient placed in observation for syncopal episodes. MRI negative. Neurology consulted. EEG ordered and was negative. Several send out labs pending. PT/OT with recs outpatient PT/OT. Noted to be wildly orthostatic and started on Midodrine.  Patient was discharge to home on 6/15. Activity as tolerated. Diet- ADA 2000 marcelle diet. Follow up neuro in one week

## 2023-06-13 NOTE — NURSING
[facility]  Nurses Note -- 4 Eyes      6/13/2023       Skin assessed on: Q Shift      [] No Pressure Injuries Present    []Prevention Measures Documented    [] Yes LDA  for Pressure Injury Previously documented     [x] Yes New Pressure Injury Discovered   [] LDA for New Pressure Injury Added      Attending RN:  Mouna Mackey, RN     Second: EMILY Batres

## 2023-06-13 NOTE — HPI
"33M with h/o t2dm, obesity (BMI 36) admitted 6/11 with increase frequency of fainting episodes. Reports having episodes where he gets a headache and then feels dizzy and falls. Says he "lost consciousness" but says he was awake during episode and recalls eveything. Happens inside and outside. Happens at rest and with movement. Says episodes are witnessed. Denies chest pain. Says the episodes used to happen once a week, but worried that episodes now happening daily. Reports compliance with prior neurosyphilis treatment with 2 weeks iv penicillin. Denies any sex partners since completing treatment. Of note, I last saw patient in 10/22 for scrotal infection which has reportedly healed. He did have falls at that time too, which he had previously attributed to EtoH use.  Denies f/c.     CT head is negative for acute intracranial abnormality.   orthostatics negative.   EKG: sinus tachycardia   Utox negative.   Eeg normal    ED MD reports an episode while in the ED while in the stretcher where he started staring in the distance and would not respond. He was drowsy after this episode.    Neurology consulted  - MRI Brain demyelinating protocol w/wo contrast  - Autoimmune Dysautonomia panel (DYS2),   - Copper, zinc, vitamin E, IL-2 receptor, ACE, repeat ESR, and folate and replace accordingly     Ref Range & Units 1 mo ago   RPR Titer Non Reactive Non Reactive      Treponema Pallidum Antibody IgG Interpretation Nonreactive Reactive Abnormal         ID consulted for "Syncope/Possible seizures/Recently treated for neurosyphilis  "

## 2023-06-13 NOTE — NURSING
Patient complaining of headache 8/10. Oxycodone was given at 2028. Patient reported only mild relief. Patient had elevated blood pressure at 1941 152/89, 2305 165/99 and manual at 2340 164/96. Message sent to remote MEREDITH. Order to give PRN Tylenol and no new orders for blood pressure at the time. Will continue to monitor.

## 2023-06-13 NOTE — PLAN OF CARE
Recommendations    1. Consider Tre BID to promote wound healing.   2. Continue to monitor PO intake; if intake <50%, Recommend Boost Glucose control BID to meet EEN/EPN   3. Collaboration with medical providers    Goals: 1. Meet % EEN/EPN by RD follow up  Nutrition Goal Status: new  Communication of RD Recs:  (POC)    Assessment and Plan    Nutrition Problem  Increased nutrient needs;protein    Related to (etiology):   Wound healing    Signs and Symptoms (as evidenced by):   Ulcer to left heal    Interventions/Recommendations (treatment strategy):  Com[employer] Beverage  Collaboration with medical providers    Nutrition Diagnosis Status:   New

## 2023-06-13 NOTE — PLAN OF CARE
Problem: Adult Inpatient Plan of Care  Goal: Plan of Care Review  Outcome: Ongoing, Progressing  Flowsheets (Taken 6/13/2023 0815)  Plan of Care Reviewed With: patient  Goal: Patient-Specific Goal (Individualized)  Outcome: Ongoing, Progressing     Problem: Diabetes Comorbidity  Goal: Blood Glucose Level Within Targeted Range  Outcome: Ongoing, Progressing  Intervention: Monitor and Manage Glycemia  Flowsheets (Taken 6/13/2023 0815)  Glycemic Management:   blood glucose monitored   carbohydrate replacement provided   oral hydration promoted   supplemental insulin given     Problem: Adult Inpatient Plan of Care  Goal: Plan of Care Review  Outcome: Ongoing, Progressing  Flowsheets (Taken 6/13/2023 0815)  Plan of Care Reviewed With: patient     Problem: Adult Inpatient Plan of Care  Goal: Plan of Care Review  Outcome: Ongoing, Progressing  Flowsheets (Taken 6/13/2023 0815)  Plan of Care Reviewed With: patient     Problem: Adult Inpatient Plan of Care  Goal: Patient-Specific Goal (Individualized)  Outcome: Ongoing, Progressing     Problem: Adult Inpatient Plan of Care  Goal: Patient-Specific Goal (Individualized)  Outcome: Ongoing, Progressing     Problem: Diabetes Comorbidity  Goal: Blood Glucose Level Within Targeted Range  Outcome: Ongoing, Progressing  Intervention: Monitor and Manage Glycemia  Flowsheets (Taken 6/13/2023 0815)  Glycemic Management:   blood glucose monitored   carbohydrate replacement provided   oral hydration promoted   supplemental insulin given     Problem: Diabetes Comorbidity  Goal: Blood Glucose Level Within Targeted Range  Outcome: Ongoing, Progressing     Problem: Diabetes Comorbidity  Goal: Blood Glucose Level Within Targeted Range  Intervention: Monitor and Manage Glycemia  Flowsheets (Taken 6/13/2023 0815)  Glycemic Management:   blood glucose monitored   carbohydrate replacement provided   oral hydration promoted   supplemental insulin given     Problem: Diabetes Comorbidity  Goal:  Blood Glucose Level Within Targeted Range  Intervention: Monitor and Manage Glycemia  Flowsheets (Taken 6/13/2023 0815)  Glycemic Management:   blood glucose monitored   carbohydrate replacement provided   oral hydration promoted   supplemental insulin given

## 2023-06-13 NOTE — SUBJECTIVE & OBJECTIVE
Past Medical History:   Diagnosis Date    Abscess of right groin 09/01/2022    Diabetes mellitus     Encounter for blood transfusion     Loud snoring     Obese     Other insomnia 08/03/2020    Perineal abscess 08/01/2014    Primary hypertension 10/2/2022       Past Surgical History:   Procedure Laterality Date    ABCESS DRAINAGE  2014    PERIRECTAL    DECOMPRESSION OF LUMBAR SPINE USING MINIMALLY INVASIVE TECHNIQUE Right 07/06/2018    Procedure: DECOMPRESSION, SPINE, LUMBAR, MINIMALLY INVASIVE L3-4;  Surgeon: Cristian Cervantes MD;  Location: [facility] OR [address] [place] [address];  Service: Neurosurgery;  Laterality: Right;    INCISION AND DRAINAGE N/A 9/9/2022    Procedure: INCISION AND DRAINAGE GROIN;  Surgeon: KAITY Aguilar MD;  Location: [facility] OR;  Service: Urology;  Laterality: N/A;    ORTHOPEDIC SURGERY         Review of patient's allergies indicates:   Allergen Reactions    Metformin Diarrhea       No current facility-administered medications on file prior to encounter.     Current Outpatient Medications on File Prior to Encounter   Medication Sig    amitriptyline (ELAVIL) 75 MG tablet Take 1 tablet by mouth every evening.    aspirin 81 MG Chew Take 1 tablet by mouth once daily.    atorvastatin (LIPITOR) 80 MG tablet Take 80 mg by mouth every evening.    famotidine (PEPCID) 20 MG tablet TAKE 1 TABLET(20 MG) BY MOUTH TWICE DAILY FOR 14 DAYS    gabapentin (NEURONTIN) 100 MG capsule Take 100 mg by mouth 3 (three) times daily.    insulin detemir U-100 (LEVEMIR FLEXTOUCH) 100 unit/mL (3 mL) SubQ InPn pen Inject 30 Units into the skin once daily.    insulin glargine 100 units/mL SubQ pen Lantus SoloStar 100 UNIT/ML Subcutaneous Solution Pen-injector QTY: 1 mL Days: 30 Refills: 5  Written: 12/12/22 Patient Instructions: inject 25 units by subcutaneous route 1 time per day at bedtime    lisinopriL (PRINIVIL,ZESTRIL) 20 MG tablet Take 20 mg by mouth once daily.    oxyCODONE (ROXICODONE) 10 mg Tab immediate release tablet Take 10 mg by mouth  "every 8 (eight) hours as needed for Pain.    pantoprazole (PROTONIX) 40 MG tablet Take 40 mg by mouth once daily.     Family History       Problem Relation (Age of Onset)    Diabetes Father, Paternal Grandmother, Paternal Grandfather          Tobacco Use    Smoking status: Former     Packs/day: 0.50     Years: 9.00     Pack years: 4.50     Types: Cigarettes     Quit date: 2013     Years since quittin.9    Smokeless tobacco: Former   Substance and Sexual Activity    Alcohol use: Not Currently     Comment: DAILY PINT OF LIQUOR, HX OF 1 "TALL BOY" OF BEER DAILY    Drug use: Not Currently     Types: Cocaine     Comment: per collateral    Sexual activity: Yes     Partners: Female     Birth control/protection: None     Review of Systems   Constitutional:  Positive for fatigue. Negative for chills, diaphoresis and fever.   HENT:  Negative for congestion, ear pain, sore throat and trouble swallowing.    Eyes:  Positive for pain. Negative for discharge and visual disturbance.   Respiratory:  Positive for shortness of breath. Negative for cough, chest tightness and wheezing.    Cardiovascular:  Negative for chest pain, palpitations and leg swelling.   Gastrointestinal:  Negative for abdominal distention, abdominal pain, blood in stool, constipation, diarrhea, nausea and vomiting.   Endocrine: Negative for polydipsia, polyphagia and polyuria.   Genitourinary:  Negative for dysuria, flank pain, frequency and urgency.   Musculoskeletal:  Negative for back pain, joint swelling, neck pain and neck stiffness.   Skin:  Negative for rash and wound.   Allergic/Immunologic: Negative for immunocompromised state.   Neurological:  Positive for dizziness, syncope, weakness and light-headedness. Negative for speech difficulty, numbness and headaches.   Hematological:  Negative for adenopathy.   Psychiatric/Behavioral:  Positive for confusion. Negative for suicidal ideas. The patient is not nervous/anxious.    All other systems " reviewed and are negative.  Objective:     Vital Signs (Most Recent):  Temp: 98.1 °F (36.7 °C) (06/13/23 1127)  Pulse: 85 (06/13/23 1127)  Resp: 18 (06/13/23 1127)  BP: (!) 141/81 (06/13/23 1127)  SpO2: 96 % (06/13/23 1127) Vital Signs (24h Range):  Temp:  [97.8 °F (36.6 °C)-98.2 °F (36.8 °C)] 98.1 °F (36.7 °C)  Pulse:  [] 85  Resp:  [18] 18  SpO2:  [96 %-99 %] 96 %  BP: (125-165)/(80-99) 141/81     Weight: 99.2 kg (218 lb 11.1 oz)  Body mass index is 36.39 kg/m².     Physical Exam  Vitals and nursing note reviewed.   Constitutional:       Appearance: He is well-developed. He is obese.   HENT:      Head: Normocephalic and atraumatic.   Eyes:      Pupils: Pupils are equal, round, and reactive to light.      Comments: Right eye injected (states has been like this since eye procedure)   Cardiovascular:      Rate and Rhythm: Normal rate and regular rhythm.      Heart sounds: Normal heart sounds.   Pulmonary:      Effort: Pulmonary effort is normal.      Breath sounds: Normal breath sounds.   Abdominal:      General: Bowel sounds are normal.      Palpations: Abdomen is soft.   Musculoskeletal:         General: Normal range of motion.      Cervical back: Normal range of motion and neck supple.   Skin:     General: Skin is warm and dry.      Capillary Refill: Capillary refill takes less than 2 seconds.   Neurological:      Mental Status: He is alert and oriented to person, place, and time.      Comments: Bilat upper ext strength 4/5, bilat LE strength 4/5   Psychiatric:         Behavior: Behavior normal.         Thought Content: Thought content normal.         Judgment: Judgment normal.            CRANIAL NERVES     CN III, IV, VI   Pupils are equal, round, and reactive to light.     Significant Labs: All pertinent labs within the past 24 hours have been reviewed.  CBC:   Recent Labs   Lab 06/12/23  0014 06/12/23  0457 06/13/23  0407   WBC 11.19 11.49 10.21   HGB 11.7* 11.4* 11.0*   HCT 34.2* 33.8* 32.7*     232 214       CMP:   Recent Labs   Lab 06/12/23  0014 06/12/23 0457 06/13/23  0407    138 139   K 4.3 4.0 3.9    111* 110   CO2 20* 17* 20*   * 156* 191*   BUN 25* 25* 27*   CREATININE 1.1 0.9 1.0   CALCIUM 9.7 9.3 8.8   PROT 7.1 6.8 6.1   ALBUMIN 2.8* 2.7* 2.5*   BILITOT 0.3 0.3 0.2   ALKPHOS 295* 280* 241*   AST 36 33 35   ALT 55* 51* 46*   ANIONGAP 9 10 9       Cardiac Markers:   Recent Labs   Lab 06/12/23  0014   BNP 47       Troponin:   Recent Labs   Lab 06/12/23  0014 06/12/23  0457 06/12/23  0853   TROPONINI <0.006 0.010 0.009       Urine Studies:   Recent Labs   Lab 06/12/23  0207   COLORU Yellow   APPEARANCEUA Clear   PHUR 7.0   SPECGRAV 1.030   PROTEINUA 3+*   GLUCUA 3+*   KETONESU Negative   BILIRUBINUA Negative   OCCULTUA 1+*   NITRITE Negative   UROBILINOGEN Negative   LEUKOCYTESUR Negative   RBCUA 6*   WBCUA 4   BACTERIA Occasional   HYALINECASTS 1         Significant Imaging: I have reviewed all pertinent imaging results/findings within the past 24 hours.  EKG: I have reviewed all pertinent results/findings within the past 24 hours and my personal findings are: sinus tachycardia with no acute ischemic changes.        CT Head Without Contrast    Result Date: 6/12/2023  EXAMINATION: CT OF THE HEAD WITHOUT CLINICAL HISTORY: Syncope, recurrent; TECHNIQUE: 5 mm unenhanced axial images were obtained from the skull base to the vertex. COMPARISON: 05/31/2023 and MRI brain 02/04/2023 FINDINGS: The ventricles, basal cisterns, and cortical sulci are within normal limits for patient's stated age. There is no acute intracranial hemorrhage, territorial infarct or mass effect, or midline shift. In the visualized paranasal sinuses, there is mild bilateral maxillary sinus mucoperiosteal thickening.     No acute intracranial abnormality detected. Electronically signed by: Dee Quinteros Date:    06/12/2023 Time:    01:20    CTA Chest Non-Coronary (PE Studies)    Result Date: 6/12/2023  EXAMINATION:  CTA CHEST NON CORONARY (PE STUDIES) CLINICAL HISTORY: Pulmonary embolism (PE) suspected, positive D-dimer; TECHNIQUE: Low dose axial images, sagittal and coronal reformations were obtained from the thoracic inlet to the lung bases following the IV administration of 80 mL of Omnipaque 350.  Contrast timing was optimized to evaluate the pulmonary arteries.  MIP images were performed. COMPARISON: CTA chest 04/14/2023 FINDINGS: The visualized soft tissue structures at the base of the neck appear within normal limits allowing for streak artifact.  There is mild bilateral gynecomastia. The thoracic aorta maintains normal caliber, contour, and course without significant atherosclerotic calcification within its course.  There is no evidence of aneurysmal dilation or dissection. The heart is not enlarged and there is trace pericardial fluid.  There are scattered coronary artery calcifications.The esophagus maintains a normal course and caliber.There is no bulky mediastinal or axillary lymph node enlargement. The trachea is midline and proximal airways are patent. The lungs are symmetrically expanded. Detailed evaluation of the lung parenchyma is limited by respiratory motion.  There is no pneumothorax.  There is no large confluent airspace consolidation.  There are mild bilateral ground-glass opacities.  There is no significant volume of pleural fluid. There is no evidence of pulmonary thromboembolism. Limited images of the upper abdomen obtained during the course of this dedicated thoracic CT demonstrate no acute abnormalities.  There is cholelithiasis. The osseous structures demonstrate multiple remote appearing bilateral rib deformities.  There is a stable mild superior endplate deformity of the T4 vertebral body.  There are mild degenerative changes of the visualized thoracic spine.     1.  No evidence of pulmonary thromboembolism. 2.  Mild bibasilar ground-glass opacities.  This is favored to relate to atelectasis,  although nonspecific infectious or inflammatory process not excluded. 3.  Scattered coronary artery calcification. 4.  Additional stable findings as above. Electronically signed by: Sal Nava MD Date:    06/12/2023 Time:    01:23

## 2023-06-13 NOTE — PROGRESS NOTES
"[facility] Medicine  Progress Note    Patient Name: Doe Woodall  MRN: [id number]  Patient Class: OP- Observation   Admission Date: 6/11/2023  Length of Stay: 0 days  Attending Physician: Giovanny Lynch MD  Primary Care Provider: [facility]        Subjective:     Principal Problem:Syncope        HPI:  Mr. Woodall is a 33 y.o. male with history of IDDM2, HTN, treated syphilis, left femoral abcess (2/2023), and previous alcohol and cocaine use disorder  who presents today with complaints of syncope. It is moderate. It is associated with weakness, fatigue, SOB, dizziness, and lightheadedness. He denies fever, chills, N/V/D, or chest pain. He reports recurrent syncopal episodes over the last year increasing in frequency to daily. He was admitted at [facility] and had an extensive neurological work up after receiving tPA for lower ext weakness. In the ED today, CT head is negative for acute intracranial abnormality. VSS, he is afebrile and normotensive, orthostatics negative. D dimer 0.79 and CTA negative for PE. Troponin is WNL. EKG: sinus tachycardia with no acute ischemic changes. Utox negative. ED MD reports an episode while in the ED while in the stretcher where he started staring in the distance and would not respond. He was drowsy after this episode. Transfer to [facility] was initiated pending bed availability. Neurology was consulted who recommended admission for EEG in AM. Hospital medicine is consulted for observation admission.    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 Less likely to be low grade glioma due to lack of mass effect/enlargement of thomas per neurosurgery eval. B12, Folate, TSH, Lipid panel wnl. CTA notable for mild atherosclerotic ossifications of the bilateral carotid bifurcations and right vertebral artery origin. Nonspecific luminal irregularity of the " "external carotid artery branches, possibly suggestive of nonspecific arteritis/arteriopathy. No new recs per ENT. Patient s/p IR guided LP on 05/18 CSF notable for markedly elevated protein count, otherwise wnl. Normal ACTH stim test. Differential includes but is not limited to vasculitis vs MS vs glioma vs AI etiology. Neuro following.  -neuro following, appreciate recs  -ANCA and MPO Ab negative  -C3 and C4 wnl  -CSF Cx negative  -anti-C1q antibody WNL  - cryoglobulins - negative  -AQP4 Ab pending"      Overview/Hospital Course:  Patient placed in observation for syncopal episodes. MRI negative. Neurology consulted. EEG ordered       Interval History: complains of headache    Review of Systems   Constitutional:  Positive for activity change.   HENT:  Negative for congestion.    Respiratory:  Negative for chest tightness and shortness of breath.    Cardiovascular:  Negative for chest pain.   Gastrointestinal:  Negative for abdominal distention.   Neurological:  Positive for headaches.   Objective:     Vital Signs (Most Recent):  Temp: 97.8 °F (36.6 °C) (06/13/23 0707)  Pulse: 100 (06/13/23 0707)  Resp: 18 (06/13/23 0707)  BP: (!) 139/90 (06/13/23 0707)  SpO2: 96 % (06/13/23 0707) Vital Signs (24h Range):  Temp:  [97.8 °F (36.6 °C)-98.2 °F (36.8 °C)] 97.8 °F (36.6 °C)  Pulse:  [] 100  Resp:  [18] 18  SpO2:  [96 %-99 %] 96 %  BP: (121-165)/(75-99) 139/90     Weight: 99.2 kg (218 lb 11.1 oz)  Body mass index is 36.39 kg/m².    Intake/Output Summary (Last 24 hours) at 6/13/2023 0804  Last data filed at 6/12/2023 1229  Gross per 24 hour   Intake 360 ml   Output --   Net 360 ml         Physical Exam  Nursing note reviewed.   Constitutional:       Appearance: He is obese. He is not ill-appearing.   Pulmonary:      Effort: Pulmonary effort is normal. No respiratory distress.   Neurological:      Mental Status: He is alert and oriented to person, place, and time.           Significant Labs: All pertinent labs within " the past 24 hours have been reviewed.  BMP:   Recent Labs   Lab 06/13/23  0407   *      K 3.9      CO2 20*   BUN 27*   CREATININE 1.0   CALCIUM 8.8   MG 2.1     CBC:   Recent Labs   Lab 06/12/23  0014 06/12/23  0457 06/13/23  0407   WBC 11.19 11.49 10.21   HGB 11.7* 11.4* 11.0*   HCT 34.2* 33.8* 32.7*    232 214       Significant Imaging:       Assessment/Plan:      * Syncope  Admit to telemetry  This is a recurrent problem  Consult neurology - appreciate recs  MRA head and neck ordered per ED MD  Hold asa in the event he requires a repeat LP  EEG as per neurology  Trend troponin - first is negative  Neuro checks q4h  Seizure prxn    Neuro consult. Plan for EEG    Altered mental status  Patient now at normal baseline      Debility  Currently walking with walker and using WC PRN  Ordered outpt PT/OT but patient has not gone to this yet    PT/OT consulted.     Severe obesity with body mass index (BMI) of 36.0 to 36.9 with serious comorbidity  Body mass index is 36.39 kg/m². Morbid obesity complicates all aspects of disease management from diagnostic modalities to treatment. Weight loss encouraged and health benefits explained to patient.         Normocytic anemia  Appears stable  Trend cbc    Type 2 diabetes mellitus with hyperglycemia, with long-term current use of insulin  Patient's FSGs are uncontrolled due to hyperglycemia on current medication regimen.  Last A1c reviewed-   Lab Results   Component Value Date    HGBA1C 6.9 (H) 05/10/2023     Most recent fingerstick glucose reviewed-   Recent Labs   Lab 06/12/23  1107 06/12/23  1613 06/12/23  1943 06/13/23  0706   POCTGLUCOSE 171* 220* 236* 160*     Current correctional scale  Medium  Maintain anti-hyperglycemic dose as follows-   Antihyperglycemics (From admission, onward)      Start     Stop Route Frequency Ordered    06/12/23 0345  insulin detemir U-100 (Levemir) pen 10 Units         -- SubQ Nightly 06/12/23 0244    06/12/23 0343   insulin aspart U-100 pen 1-10 Units         -- SubQ Before meals & nightly PRN 06/12/23 0244          Hold Oral hypoglycemics while patient is in the hospital.      VTE Risk Mitigation (From admission, onward)           Ordered     IP VTE HIGH RISK PATIENT  Once         06/12/23 0244     Place sequential compression device  Until discontinued         06/12/23 0244                    Discharge Planning   SUSAN:      Code Status: Full Code   Is the patient medically ready for discharge?:     Reason for patient still in hospital (select all that apply): Patient unstable         Patient is an obs patient. Barrier to discharge: Neuro consult, EEG, PT/OT eval. Possible transfer to [facility]             Giovanny Mooney MD  Department of Hospital Medicine   [facility] Surg

## 2023-06-13 NOTE — SUBJECTIVE & OBJECTIVE
Interval History: complains of headache    Review of Systems   Constitutional:  Positive for activity change.   HENT:  Negative for congestion.    Respiratory:  Negative for chest tightness and shortness of breath.    Cardiovascular:  Negative for chest pain.   Gastrointestinal:  Negative for abdominal distention.   Neurological:  Positive for headaches.   Objective:     Vital Signs (Most Recent):  Temp: 97.8 °F (36.6 °C) (06/13/23 0707)  Pulse: 100 (06/13/23 0707)  Resp: 18 (06/13/23 0707)  BP: (!) 139/90 (06/13/23 0707)  SpO2: 96 % (06/13/23 0707) Vital Signs (24h Range):  Temp:  [97.8 °F (36.6 °C)-98.2 °F (36.8 °C)] 97.8 °F (36.6 °C)  Pulse:  [] 100  Resp:  [18] 18  SpO2:  [96 %-99 %] 96 %  BP: (121-165)/(75-99) 139/90     Weight: 99.2 kg (218 lb 11.1 oz)  Body mass index is 36.39 kg/m².    Intake/Output Summary (Last 24 hours) at 6/13/2023 0804  Last data filed at 6/12/2023 1229  Gross per 24 hour   Intake 360 ml   Output --   Net 360 ml         Physical Exam  Nursing note reviewed.   Constitutional:       Appearance: He is obese. He is not ill-appearing.   Pulmonary:      Effort: Pulmonary effort is normal. No respiratory distress.   Neurological:      Mental Status: He is alert and oriented to person, place, and time.           Significant Labs: All pertinent labs within the past 24 hours have been reviewed.  BMP:   Recent Labs   Lab 06/13/23  0407   *      K 3.9      CO2 20*   BUN 27*   CREATININE 1.0   CALCIUM 8.8   MG 2.1     CBC:   Recent Labs   Lab 06/12/23  0014 06/12/23  0457 06/13/23  0407   WBC 11.19 11.49 10.21   HGB 11.7* 11.4* 11.0*   HCT 34.2* 33.8* 32.7*    232 214       Significant Imaging:

## 2023-06-13 NOTE — ASSESSMENT & PLAN NOTE
Currently walking with walker and using WC PRN  Ordered outpt PT/OT but patient has not gone to this yet    PT/OT consulted.

## 2023-06-13 NOTE — PLAN OF CARE
Case Management Assessment     PCP: Shelbi Jimenez FNP  Pharmacy: [facility]     Patient Arrived From: Home   Existing Help at Home: Father     Barriers to Discharge: none    Discharge Plan:    A. Home    B. TBD    PT/OT consulted. Awaiting EEG.Transfer to [facility] was initiated [place] to follow for dc needs.      06/13/23 0916   Discharge Planning   Assessment Type Discharge Planning Brief Assessment   Resource/Environmental Concerns none   Support Systems Parent   Equipment Currently Used at Home walker, rolling   Current Living Arrangements home   Patient/Family Anticipates Transition to home with family   Patient/Family Anticipated Services at Transition none   DME Needed Upon Discharge  other (see comments)  (TBD)

## 2023-06-13 NOTE — ASSESSMENT & PLAN NOTE
Body mass index is 36.39 kg/m². Morbid obesity complicates all aspects of disease management from diagnostic modalities to treatment. Weight loss encouraged and health benefits explained to patient.

## 2023-06-13 NOTE — PROCEDURES
EEG,w/awake & drowsy record    Date/Time: 6/11/2023 11:46 PM  Performed by: Raimundo Rodrgiuez MD  Authorized by: Dae Lowe MD     ELECTROENCEPHALOGRAM REPORT    DATE OF SERVICE: 6/12/23  EEG NUMBER: OW   REQUESTED BY: Mynor  LOCATION OF SERVICE:      METHODOLOGY   Electroencephalographic (EEG) recording is with electrodes placed according to the International 10-20 placement system.  Thirty two (32) channels of digital signal (sampling rate of 512/sec) including T1 and T2 was simultaneously recorded from the scalp and may include  EKG, EMG, and/or eye monitors.  Recording band pass was 0.1 to 512 hz.  Digital video recording of the patient is simultaneously recorded with the EEG.  The patient is instructed report clinical symptoms which may occur during the recording session.  EEG and video recording is stored and archived in digital format. Activation procedures which include photic stimulation, hyperventilation and instructing patients to perform simple task are done in selected patients.    The EEG is displayed on a monitor screen and can be reviewed using different montages.  Computer assisted analysis is employed to detect spike and electrographic seizure activity.   The entire record is submitted for computer analysis.  The entire recording is visually reviewed and the times identified by computer analysis as being spikes or seizures are reviewed again.  Compresses spectral analysis (CSA) is also performed on the activity recorded from each individual channel.  This is displayed as a power display of frequencies from 0 to 30 Hz over time.   The CSA is reviewed looking for asymmetries in power between homologous areas of the scalp and then compared with the original EEG recording.     EQ works software was also utilized in the review of this study.  This software suite analyzes the EEG recording in multiple domains.  Coherence and rhythmicity is computed to identify EEG sections which may contain  organized seizures.  Each channel undergoes analysis to detect presence of spike and sharp waves which have special and morphological characteristic of epileptic activity.  The routine EEG recording is converted from spacial into frequency domain.  This is then displayed comparing homologous areas to identify areas of significant asymmetry.  Algorithm to identify non-cortically generated artifact is used to separate eye movement, EMG and other artifact from the EEG    EEG FINDINGS  The record shows a good  organization at rest, consisting of a 9 Hz posterior dominant rhythm with good  reactivity. There is mild bilateral beta activity.    Drowsiness is characterized by attenuation of the background, vertex waves, and bilateral theta slowing.     Provocative maneuvers including hyperventilation and photic stimulation were performed.     EKG recording shows a sinus rhythm.    There is no push button or clinical event.    IMPRESSION:  Normal study.    Raimundo Rodriguez MD

## 2023-06-13 NOTE — CONSULTS
"[facility] - [facility] Surg  Infectious Disease  Consult Note    Patient Name: Doe Woodall  MRN: [id number]  Admission Date: 6/11/2023  Hospital Length of Stay: 0 days  Attending Physician: Giovanny Lynch MD  Primary Care Provider: [facility]     Isolation Status: No active isolations    Patient information was obtained from patient and ER records.      Inpatient consult to Infectious Diseases  Consult performed by: Comfort Hurtado MD  Consult ordered by: Dae Lowe MD        Assessment/Plan:     Other  * Syncope  33M with h/o t2dm, obesity (BMI 36) admitted 6/11 with increase frequency of fainting episodes. H/o treated neurosyphilis (with 2 weeks iv pcn); RPR decreased to non-reactive a month ago at [facility] and denies partners since then. ID consulted for "Syncope/Possible seizures/Recently treated for neurosyphilis    Suspect neurosyphilis has been adequately treated. hiv screening and updated RPR labs are pending.     Appreciate neurology assistance in work up of non-infectious causes of recurrent, chronic syncopal episodes        Thank you for your consult. I will follow-up with patient. Please contact us if you have any additional questions.    Comfort Hurtado MD  Infectious Disease  [facility] - Med Surg    Subjective:     Principal Problem: Syncope    HPI:   33M with h/o t2dm, obesity (BMI 36) admitted 6/11 with increase frequency of fainting episodes. Reports having episodes where he gets a headache and then feels dizzy and falls. Says he "lost consciousness" but says he was awake during episode and recalls eveything. Happens inside and outside. Happens at rest and with movement. Says episodes are witnessed. Denies chest pain. Says the episodes used to happen once a week, but worried that episodes now happening daily. Reports compliance with prior neurosyphilis treatment with 2 weeks iv penicillin. Denies any sex partners since completing treatment. Of note, I last saw " "patient in 10/22 for scrotal infection which has reportedly healed. He did have falls at that time too, which he had previously attributed to EtoH use.  Denies f/c.     CT head is negative for acute intracranial abnormality.   orthostatics negative.   EKG: sinus tachycardia   Utox negative.   Eeg normal    ED MD reports an episode while in the ED while in the stretcher where he started staring in the distance and would not respond. He was drowsy after this episode.    Neurology consulted  - MRI Brain demyelinating protocol w/wo contrast  - Autoimmune Dysautonomia panel (DYS2),   - Copper, zinc, vitamin E, IL-2 receptor, ACE, repeat ESR, and folate and replace accordingly     Ref Range & Units 1 mo ago   RPR Titer Non Reactive Non Reactive      Treponema Pallidum Antibody IgG Interpretation Nonreactive Reactive Abnormal         ID consulted for "Syncope/Possible seizures/Recently treated for neurosyphilis      Past Medical History:   Diagnosis Date    Abscess of right groin 09/01/2022    Diabetes mellitus     Encounter for blood transfusion     Loud snoring     Obese     Other insomnia 08/03/2020    Perineal abscess 08/01/2014    Primary hypertension 10/2/2022       Past Surgical History:   Procedure Laterality Date    ABCESS DRAINAGE  2014    PERIRECTAL    DECOMPRESSION OF LUMBAR SPINE USING MINIMALLY INVASIVE TECHNIQUE Right 07/06/2018    Procedure: DECOMPRESSION, SPINE, LUMBAR, MINIMALLY INVASIVE L3-4;  Surgeon: Cristian Cervantes MD;  Location: [facility] OR [address] [place] [address];  Service: Neurosurgery;  Laterality: Right;    INCISION AND DRAINAGE N/A 9/9/2022    Procedure: INCISION AND DRAINAGE GROIN;  Surgeon: KAITY Aguilar MD;  Location: [facility] OR;  Service: Urology;  Laterality: N/A;    ORTHOPEDIC SURGERY         Review of patient's allergies indicates:   Allergen Reactions    Metformin Diarrhea       No current facility-administered medications on file prior to encounter.     Current Outpatient Medications on File " "Prior to Encounter   Medication Sig    amitriptyline (ELAVIL) 75 MG tablet Take 1 tablet by mouth every evening.    aspirin 81 MG Chew Take 1 tablet by mouth once daily.    atorvastatin (LIPITOR) 80 MG tablet Take 80 mg by mouth every evening.    famotidine (PEPCID) 20 MG tablet TAKE 1 TABLET(20 MG) BY MOUTH TWICE DAILY FOR 14 DAYS    gabapentin (NEURONTIN) 100 MG capsule Take 100 mg by mouth 3 (three) times daily.    insulin detemir U-100 (LEVEMIR FLEXTOUCH) 100 unit/mL (3 mL) SubQ InPn pen Inject 30 Units into the skin once daily.    insulin glargine 100 units/mL SubQ pen Lantus SoloStar 100 UNIT/ML Subcutaneous Solution Pen-injector QTY: 1 mL Days: 30 Refills: 5  Written: 22 Patient Instructions: inject 25 units by subcutaneous route 1 time per day at bedtime    lisinopriL (PRINIVIL,ZESTRIL) 20 MG tablet Take 20 mg by mouth once daily.    oxyCODONE (ROXICODONE) 10 mg Tab immediate release tablet Take 10 mg by mouth every 8 (eight) hours as needed for Pain.    pantoprazole (PROTONIX) 40 MG tablet Take 40 mg by mouth once daily.     Family History       Problem Relation (Age of Onset)    Diabetes Father, Paternal Grandmother, Paternal Grandfather          Tobacco Use    Smoking status: Former     Packs/day: 0.50     Years: 9.00     Pack years: 4.50     Types: Cigarettes     Quit date: 2013     Years since quittin.9    Smokeless tobacco: Former   Substance and Sexual Activity    Alcohol use: Not Currently     Comment: DAILY PINT OF LIQUOR, HX OF 1 "TALL BOY" OF BEER DAILY    Drug use: Not Currently     Types: Cocaine     Comment: per collateral    Sexual activity: Yes     Partners: Female     Birth control/protection: None     Review of Systems   Constitutional:  Positive for fatigue. Negative for chills, diaphoresis and fever.   HENT:  Negative for congestion, ear pain, sore throat and trouble swallowing.    Eyes:  Positive for pain. Negative for discharge and visual disturbance. "   Respiratory:  Positive for shortness of breath. Negative for cough, chest tightness and wheezing.    Cardiovascular:  Negative for chest pain, palpitations and leg swelling.   Gastrointestinal:  Negative for abdominal distention, abdominal pain, blood in stool, constipation, diarrhea, nausea and vomiting.   Endocrine: Negative for polydipsia, polyphagia and polyuria.   Genitourinary:  Negative for dysuria, flank pain, frequency and urgency.   Musculoskeletal:  Negative for back pain, joint swelling, neck pain and neck stiffness.   Skin:  Negative for rash and wound.   Allergic/Immunologic: Negative for immunocompromised state.   Neurological:  Positive for dizziness, syncope, weakness and light-headedness. Negative for speech difficulty, numbness and headaches.   Hematological:  Negative for adenopathy.   Psychiatric/Behavioral:  Positive for confusion. Negative for suicidal ideas. The patient is not nervous/anxious.    All other systems reviewed and are negative.  Objective:     Vital Signs (Most Recent):  Temp: 98.1 °F (36.7 °C) (06/13/23 1127)  Pulse: 85 (06/13/23 1127)  Resp: 18 (06/13/23 1127)  BP: (!) 141/81 (06/13/23 1127)  SpO2: 96 % (06/13/23 1127) Vital Signs (24h Range):  Temp:  [97.8 °F (36.6 °C)-98.2 °F (36.8 °C)] 98.1 °F (36.7 °C)  Pulse:  [] 85  Resp:  [18] 18  SpO2:  [96 %-99 %] 96 %  BP: (125-165)/(80-99) 141/81     Weight: 99.2 kg (218 lb 11.1 oz)  Body mass index is 36.39 kg/m².     Physical Exam  Vitals and nursing note reviewed.   Constitutional:       Appearance: He is well-developed. He is obese.   HENT:      Head: Normocephalic and atraumatic.   Eyes:      Pupils: Pupils are equal, round, and reactive to light.      Comments: Right eye injected (states has been like this since eye procedure)   Cardiovascular:      Rate and Rhythm: Normal rate and regular rhythm.      Heart sounds: Normal heart sounds.   Pulmonary:      Effort: Pulmonary effort is normal.      Breath sounds: Normal  breath sounds.   Abdominal:      General: Bowel sounds are normal.      Palpations: Abdomen is soft.   Musculoskeletal:         General: Normal range of motion.      Cervical back: Normal range of motion and neck supple.   Skin:     General: Skin is warm and dry.      Capillary Refill: Capillary refill takes less than 2 seconds.   Neurological:      Mental Status: He is alert and oriented to person, place, and time.      Comments: Bilat upper ext strength 4/5, bilat LE strength 4/5   Psychiatric:         Behavior: Behavior normal.         Thought Content: Thought content normal.         Judgment: Judgment normal.            CRANIAL NERVES     CN III, IV, VI   Pupils are equal, round, and reactive to light.     Significant Labs: All pertinent labs within the past 24 hours have been reviewed.  CBC:   Recent Labs   Lab 06/12/23 0014 06/12/23 0457 06/13/23  0407   WBC 11.19 11.49 10.21   HGB 11.7* 11.4* 11.0*   HCT 34.2* 33.8* 32.7*    232 214       CMP:   Recent Labs   Lab 06/12/23 0014 06/12/23 0457 06/13/23  0407    138 139   K 4.3 4.0 3.9    111* 110   CO2 20* 17* 20*   * 156* 191*   BUN 25* 25* 27*   CREATININE 1.1 0.9 1.0   CALCIUM 9.7 9.3 8.8   PROT 7.1 6.8 6.1   ALBUMIN 2.8* 2.7* 2.5*   BILITOT 0.3 0.3 0.2   ALKPHOS 295* 280* 241*   AST 36 33 35   ALT 55* 51* 46*   ANIONGAP 9 10 9       Cardiac Markers:   Recent Labs   Lab 06/12/23  0014   BNP 47       Troponin:   Recent Labs   Lab 06/12/23  0014 06/12/23 0457 06/12/23  0853   TROPONINI <0.006 0.010 0.009       Urine Studies:   Recent Labs   Lab 06/12/23  0207   COLORU Yellow   APPEARANCEUA Clear   PHUR 7.0   SPECGRAV 1.030   PROTEINUA 3+*   GLUCUA 3+*   KETONESU Negative   BILIRUBINUA Negative   OCCULTUA 1+*   NITRITE Negative   UROBILINOGEN Negative   LEUKOCYTESUR Negative   RBCUA 6*   WBCUA 4   BACTERIA Occasional   HYALINECASTS 1         Significant Imaging: I have reviewed all pertinent imaging results/findings within the past  24 hours.  EKG: I have reviewed all pertinent results/findings within the past 24 hours and my personal findings are: sinus tachycardia with no acute ischemic changes.        CT Head Without Contrast    Result Date: 6/12/2023  EXAMINATION: CT OF THE HEAD WITHOUT CLINICAL HISTORY: Syncope, recurrent; TECHNIQUE: 5 mm unenhanced axial images were obtained from the skull base to the vertex. COMPARISON: 05/31/2023 and MRI brain 02/04/2023 FINDINGS: The ventricles, basal cisterns, and cortical sulci are within normal limits for patient's stated age. There is no acute intracranial hemorrhage, territorial infarct or mass effect, or midline shift. In the visualized paranasal sinuses, there is mild bilateral maxillary sinus mucoperiosteal thickening.     No acute intracranial abnormality detected. Electronically signed by: Dee Quinteros Date:    06/12/2023 Time:    01:20    CTA Chest Non-Coronary (PE Studies)    Result Date: 6/12/2023  EXAMINATION: CTA CHEST NON CORONARY (PE STUDIES) CLINICAL HISTORY: Pulmonary embolism (PE) suspected, positive D-dimer; TECHNIQUE: Low dose axial images, sagittal and coronal reformations were obtained from the thoracic inlet to the lung bases following the IV administration of 80 mL of Omnipaque 350.  Contrast timing was optimized to evaluate the pulmonary arteries.  MIP images were performed. COMPARISON: CTA chest 04/14/2023 FINDINGS: The visualized soft tissue structures at the base of the neck appear within normal limits allowing for streak artifact.  There is mild bilateral gynecomastia. The thoracic aorta maintains normal caliber, contour, and course without significant atherosclerotic calcification within its course.  There is no evidence of aneurysmal dilation or dissection. The heart is not enlarged and there is trace pericardial fluid.  There are scattered coronary artery calcifications.The esophagus maintains a normal course and caliber.There is no bulky mediastinal or axillary  lymph node enlargement. The trachea is midline and proximal airways are patent. The lungs are symmetrically expanded. Detailed evaluation of the lung parenchyma is limited by respiratory motion.  There is no pneumothorax.  There is no large confluent airspace consolidation.  There are mild bilateral ground-glass opacities.  There is no significant volume of pleural fluid. There is no evidence of pulmonary thromboembolism. Limited images of the upper abdomen obtained during the course of this dedicated thoracic CT demonstrate no acute abnormalities.  There is cholelithiasis. The osseous structures demonstrate multiple remote appearing bilateral rib deformities.  There is a stable mild superior endplate deformity of the T4 vertebral body.  There are mild degenerative changes of the visualized thoracic spine.     1.  No evidence of pulmonary thromboembolism. 2.  Mild bibasilar ground-glass opacities.  This is favored to relate to atelectasis, although nonspecific infectious or inflammatory process not excluded. 3.  Scattered coronary artery calcification. 4.  Additional stable findings as above. Electronically signed by: Sal Nava MD Date:    06/12/2023 Time:    01:23

## 2023-06-13 NOTE — PLAN OF CARE
Problem: Physical Therapy  Goal: Physical Therapy Goal  Description: Goals to be met by: 23     Patient will increase functional independence with mobility by performin. Pt to be mod I with bed mobility.  2. Pt to transfer with supervision.  3. Pt to ambulate 150' /c or /s AD and SBA.  4. Pt to be (I) with HEP.    Outcome: Ongoing, Progressing   Initial eval completed, see in chart for details.  Problem: Physical Therapy  Goal: Physical Therapy Goal  Description: Goals to be met by: 23     Initial eval completed, see in chart for details.

## 2023-06-13 NOTE — ASSESSMENT & PLAN NOTE
Patient's FSGs are uncontrolled due to hyperglycemia on current medication regimen.  Last A1c reviewed-   Lab Results   Component Value Date    HGBA1C 6.9 (H) 05/10/2023     Most recent fingerstick glucose reviewed-   Recent Labs   Lab 06/12/23  1107 06/12/23  1613 06/12/23  1943 06/13/23  0706   POCTGLUCOSE 171* 220* 236* 160*     Current correctional scale  Medium  Maintain anti-hyperglycemic dose as follows-   Antihyperglycemics (From admission, onward)    Start     Stop Route Frequency Ordered    06/12/23 0345  insulin detemir U-100 (Levemir) pen 10 Units         -- SubQ Nightly 06/12/23 0244    06/12/23 0343  insulin aspart U-100 pen 1-10 Units         -- SubQ Before meals & nightly PRN 06/12/23 0244        Hold Oral hypoglycemics while patient is in the hospital.

## 2023-06-13 NOTE — PROGRESS NOTES
[facility] - [facility] Surg  Adult Nutrition  Consult Note    SUMMARY     Recommendations    1. Consider Tre BID to promote wound healing.   2. Continue to monitor PO intake; if intake <50%, Recommend Boost Glucose control BID to meet EEN/EPN   3. Collaboration with medical providers    Goals: 1. Meet % EEN/EPN by RD follow up  Nutrition Goal Status: new  Communication of RD Recs:  (POC)    Assessment and Plan    Nutrition Problem  Increased nutrient needs;protein    Related to (etiology):   Wound healing    Signs and Symptoms (as evidenced by):   Ulcer to left heal    Interventions/Recommendations (treatment strategy):  [employer]  Collaboration with medical providers    Nutrition Diagnosis Status:   New     Reason for Assessment    Reason For Assessment: consult (wounds)  Diagnosis:  (syncope)  Relevant Medical History:   Patient Active Problem List   Diagnosis    Bulge of lumbar disc without myelopathy    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    Floppy eyelid syndrome    Elevated transaminase level    Other insomnia    Substance induced mood disorder    Alcohol use disorder, severe, dependence    Anxiety disorder    Chronic diastolic heart failure    Type 2 diabetes mellitus with hyperglycemia, with long-term current use of insulin    Cellulitis of groin    Inguinal abscess    Normocytic anemia    Abscess of scrotum    Severe obesity with body mass index (BMI) of 36.0 to 36.9 with serious comorbidity    Chest wall contusion, left, initial encounter    Lactic acidosis    Hypomagnesemia    Severe episode of recurrent major depressive disorder, without psychotic features    Dehydration with hyponatremia    Hyperglycemia    Nonspecific dizziness    Left hip pain    Thrombocytopenia    Frequent falls    Hypophosphatemia    Lower extremity ulceration, right, limited to breakdown of skin    Diarrhea    Abdominal pain    Syncope    Debility  "   Altered mental status      Interdisciplinary Rounds: did not attend  General Information Comments: RD consult 2/2 wounds. Ulcer to left heel noted. Omari score 19. Pt consumes about 75% of meals since admit.  Consider Tre to promote wound healing. Pt with no weight changes x 1 yr. BMI 36.39. Pt is obese, grade ll. NFPE unable to be completed 6/13/23. (pt was sleeping at times of visit).  Nutrition Discharge Planning: Diabetic Diet    Nutrition Risk Screen    Nutrition Risk Screen: large or nonhealing wound, burn or pressure injury    Nutrition/Diet History    Patient Reported Diet/Restrictions/Preferences: general  Spiritual, Cultural Beliefs, Rastafari Practices, Values that Affect Care: no    Anthropometrics    Temp: 98.1 °F (36.7 °C)  Height Method: Stated  Height: 5' 5" (165.1 cm)  Height (inches): 65 in  Weight Method: Bed Scale  Weight: 98.9 kg (218 lb)  Weight (lb): 218 lb  Ideal Body Weight (IBW), Male: 136 lb  % Ideal Body Weight, Male (lb): 160.81 %  BMI (Calculated): 36.3  BMI Grade: 35 - 39.9 - obesity - grade II       Lab/Procedures/Meds    Pertinent Labs Reviewed: reviewed  BMP  Lab Results   Component Value Date     06/13/2023    K 3.9 06/13/2023     06/13/2023    CO2 20 (L) 06/13/2023    BUN 27 (H) 06/13/2023    CREATININE 1.0 06/13/2023    CALCIUM 8.8 06/13/2023    ANIONGAP 9 06/13/2023    EGFRNORACEVR >60 06/13/2023      Pertinent Medications Reviewed: reviewed  Current Outpatient Medications   Medication Instructions    amitriptyline (ELAVIL) 75 MG tablet 1 tablet, Oral, Nightly    aspirin 81 MG Chew 1 tablet, Oral, Daily    atorvastatin (LIPITOR) 80 mg, Oral, Nightly    famotidine (PEPCID) 20 MG tablet TAKE 1 TABLET(20 MG) BY MOUTH TWICE DAILY FOR 14 DAYS    gabapentin (NEURONTIN) 100 mg, Oral, 3 times daily    insulin glargine 100 units/mL SubQ pen Lantus SoloStar 100 UNIT/ML Subcutaneous Solution Pen-injector QTY: 1 mL Days: 30 Refills: 5  Written: 12/12/22 Patient " Instructions: inject 25 units by subcutaneous route 1 time per day at bedtime    LEVEMIR FLEXTOUCH U100 INSULIN 30 Units, Subcutaneous, Daily    lisinopriL (PRINIVIL,ZESTRIL) 20 mg, Oral, Daily    oxyCODONE (ROXICODONE) 10 mg, Oral, Every 8 hours PRN    pantoprazole (PROTONIX) 40 mg, Oral, Daily        Estimated/Assessed Needs    Weight Used For Calorie Calculations: 61.7 kg (136 lb)  Energy Calorie Requirements (kcal): 1779 kcal  Energy Need Method: Ceiba-St Jeor (1.2 x ibw)  Protein Requirements: 61 g  Weight Used For Protein Calculations: 61.7 kg (136 lb)        RDA Method (mL): 1779         Nutrition Prescription Ordered    Current Diet Order: Diabetic Diet    Evaluation of Received Nutrient/Fluid Intake    I/O: +1L  Energy Calories Required: not meeting needs  Protein Required: not meeting needs  Fluid Required: not meeting needs  Comments: LBM 6/11/23  % Intake of Estimated Energy Needs: 50 - 75 %  % Meal Intake: 50 - 75 %    Nutrition Risk    Level of Risk/Frequency of Follow-up: low       Monitor and Evaluation    Food and Nutrient Intake: food and beverage intake  Food and Nutrient Adminstration: diet order  Knowledge/Beliefs/Attitudes: food and nutrition knowledge/skill, beliefs and attitudes  Physical Activity and Function: nutrition-related ADLs and IADLs, factors affecting access to physical activity  Anthropometric Measurements: weight, weight change, body mass index  Biochemical Data, Medical Tests and Procedures: electrolyte and renal panel, gastrointestinal profile, glucose/endocrine profile, inflammatory profile, lipid profile  Nutrition-Focused Physical Findings: overall appearance       Nutrition Follow-Up    RD Follow-up?: Yes    Jennifer Baer, Registration Eligible, Provisional LDN

## 2023-06-13 NOTE — CONSULTS
"[facility] Surg  Wound Care    Patient Name:  Doe Woodall   MRN:  [id number]  Date: 2023  Diagnosis: Syncope    History:     Past Medical History:   Diagnosis Date    Abscess of right groin 2022    Diabetes mellitus     Encounter for blood transfusion     Loud snoring     Obese     Other insomnia 2020    Perineal abscess 2014    Primary hypertension 10/2/2022       Social History     Socioeconomic History    Marital status:    Tobacco Use    Smoking status: Former     Packs/day: 0.50     Years: 9.00     Pack years: 4.50     Types: Cigarettes     Quit date: 2013     Years since quittin.9    Smokeless tobacco: Former   Substance and Sexual Activity    Alcohol use: Not Currently     Comment: DAILY PINT OF LIQUOR, HX OF 1 "TALL BOY" OF BEER DAILY    Drug use: Not Currently     Types: Cocaine     Comment: per collateral    Sexual activity: Yes     Partners: Female     Birth control/protection: None   Other Topics Concern    Patient feels they ought to cut down on drinking/drug use No    Patient annoyed by others criticizing their drinking/drug use No    Patient has felt bad or guilty about drinking/drug use No    Patient has had a drink/used drugs as an eye opener in the AM No       Precautions:     Allergies as of 2023 - Reviewed 2023   Allergen Reaction Noted    Metformin Diarrhea 2022       WOC Assessment Details/Treatment   Consult per WOGs for altered skin integrity for Stage 2 pressure injury left plantar heel  A 33 year old male admitted to OBS 23 from home with syncope; debility; normocytic anemia; DM II with hyperglycemia   WBC 10.21 Hgb 11.0 Hct 32.7  Alb 2.5 Weight 218 lbs  5/10 A1C 6.9   On Isoflex mattress; Omari score 19; reports no longer smoking  Assessment:  Photodocumentation    Left plantar heel- Resurfacing Stage 2 pressure injury. Unknown etiology. 3/4 of wound 3 cm(L) 4 cm(W) resurfaced with epithelium. Scant serous drainage. " Strong palpable DP pulse. Reports in March, the staff at wound center in [place] discovered heel wound while going to outpatient wound care for left thigh.  Noted while in bed, flexing knee and pressure on site of healing wound. States has spent extended periods of time in bed with limited walking at home. Treatment at home with betadine + dressing.   Treatment:  Local wound care with Mepilex foam and offloading pressure from heel.  Discussed treatment plan with nursing.  Instructed patient to avoid pressure/friction to wound. Suspend off bed with EHOB boot when in bed.  Orders placed.     06/13/2023

## 2023-06-14 NOTE — PT/OT/SLP PROGRESS
Physical Therapy Treatment    Patient Name:  Doe Woodall   MRN:  [id number]    Recommendations:     Discharge Recommendations:  (TBD)  Discharge Equipment Recommendations: none  Barriers to discharge:  orthostatic hypotension    Assessment:     Doe Woodall is a 33 y.o. male admitted with a medical diagnosis of Syncope.  He presents with the following impairments/functional limitations: impaired endurance, impaired functional mobility, gait instability.  Pt unable to tolerate OOB today due to orthostatic hypotension.    Rehab Prognosis: Fair; patient would benefit from acute skilled PT services to address these deficits and reach maximum level of function.    Recent Surgery: * No surgery found *      Plan:     During this hospitalization, patient to be seen 5 x/week to address the identified rehab impairments via gait training, therapeutic activities, therapeutic exercises and progress toward the following goals:    Plan of Care Expires:  06/20/23    Subjective     Chief Complaint: Severe HA; Pt lying supine with hands on face.  Patient/Family Comments/goals: Pt agreeable to therapy treatment.  Pain/Comfort:  Pain Rating 1: 9/10  Location - Side 1: Right  Location 1: head  Pain Addressed 1: Pre-medicate for activity, Reposition      Objective:     Communicated with nurseMouna prior to session.  Patient found supine with  (nothing) upon PT entry to room.     General Precautions: Standard, fall  Orthopedic Precautions: N/A  Braces: N/A  Respiratory Status: Room air     Functional Mobility:  Bed Mobility:     Supine to Sit: stand by assistance  Sit to Supine: contact guard assistance  Transfers:     Sit to Stand:  contact guard assistance with no AD      AM-PAC 6 CLICK MOBILITY  Turning over in bed (including adjusting bedclothes, sheets and blankets)?: 4  Sitting down on and standing up from a chair with arms (e.g., wheelchair, bedside commode, etc.): 3  Moving from lying on back to sitting on the side of the  bed?: 4  Moving to and from a bed to a chair (including a wheelchair)?: 3  Need to walk in hospital room?: 3  Climbing 3-5 steps with a railing?: 3  Basic Mobility Total Score: 20       Treatment & Education:  Vitals taken: supine 144/93; sitting /57; standing 84/47; supine 136/81.  Pt educated on need to elevate HOB to be able to acclimate body to be able to tolerate sitting.    Patient left HOB elevated with call button in reach and nurse notified and Dr. Lynch by OTGloria.    GOALS:   Multidisciplinary Problems       Physical Therapy Goals          Problem: Physical Therapy    Goal Priority Disciplines Outcome Goal Variances Interventions   Physical Therapy Goal     PT, PT/OT Ongoing, Not Progressing     Description: Goals to be met by: 23     Patient will increase functional independence with mobility by performin. Pt to be mod I with bed mobility.  2. Pt to transfer with supervision.  3. Pt to ambulate 150' /c or /s AD and SBA.  4. Pt to be (I) with HEP.                         Time Tracking:     PT Received On: 23  PT Start Time: 1058     PT Stop Time: 1108  PT Total Time (min): 10 min     Billable Minutes: Therapeutic Activity 10    Treatment Type: Treatment  PT/PTA: PT           2023

## 2023-06-14 NOTE — SUBJECTIVE & OBJECTIVE
Interval History: No new issues    Review of Systems   Constitutional:  Positive for activity change.   HENT:  Negative for congestion.    Respiratory:  Negative for chest tightness and shortness of breath.    Cardiovascular:  Negative for chest pain.   Gastrointestinal:  Negative for abdominal distention.   Neurological:  Positive for headaches.   Objective:     Vital Signs (Most Recent):  Temp: 98.1 °F (36.7 °C) (06/14/23 0737)  Pulse: 90 (on telemetry monitor.) (06/14/23 0745)  Resp: 20 (06/14/23 0737)  BP: 95/60 (06/14/23 0737)  SpO2: 100 % (06/14/23 0737) Vital Signs (24h Range):  Temp:  [97.7 °F (36.5 °C)-98.2 °F (36.8 °C)] 98.1 °F (36.7 °C)  Pulse:  [] 90  Resp:  [18-20] 20  SpO2:  [96 %-100 %] 100 %  BP: ()/() 95/60     Weight: 98.9 kg (218 lb)  Body mass index is 36.28 kg/m².    Intake/Output Summary (Last 24 hours) at 6/14/2023 0914  Last data filed at 6/13/2023 1738  Gross per 24 hour   Intake 480 ml   Output --   Net 480 ml         Physical Exam  Nursing note reviewed.   Constitutional:       Appearance: He is obese. He is not ill-appearing.   Pulmonary:      Effort: Pulmonary effort is normal. No respiratory distress.   Neurological:      Mental Status: He is alert and oriented to person, place, and time.           Significant Labs: All pertinent labs within the past 24 hours have been reviewed.  BMP:   Recent Labs   Lab 06/14/23 0514   *      K 4.2   *   CO2 21*   BUN 20   CREATININE 0.8   CALCIUM 9.4   MG 1.9     CBC:   Recent Labs   Lab 06/13/23  0407 06/14/23 0514   WBC 10.21 9.76   HGB 11.0* 10.7*   HCT 32.7* 31.6*    182       Significant

## 2023-06-14 NOTE — NURSING
[facility]  Nurses Note -- 4 Eyes      6/14/2023       Skin assessed on: Q Shift      [] No Pressure Injuries Present    []Prevention Measures Documented    [x] Yes LDA  for Pressure Injury Previously documented     [] Yes New Pressure Injury Discovered   [] LDA for New Pressure Injury Added      Attending RN:  Mouna Mackey RN     Second: MARIOLA Shen

## 2023-06-14 NOTE — ASSESSMENT & PLAN NOTE
"33M with h/o t2dm, obesity (BMI 36) admitted 6/11 with increase frequency of fainting episodes. H/o treated neurosyphilis (with 2 weeks iv pcn); RPR decreased to non-reactive a month ago at [facility] and denies partners since then. hiv screening negative. ID consulted for "Syncope/Possible seizures/Recently treated for neurosyphilis    Suspect neurosyphilis has been adequately treated.  updated RPR labs are pending, but were negative recently    Appreciate neurology assistance in work up of non-infectious causes of recurrent, chronic syncopal episodes  "

## 2023-06-14 NOTE — PT/OT/SLP PROGRESS
"Occupational Therapy   Treatment    Name: Doe Woodall  MRN: [id number]  Admitting Diagnosis:  Syncope       Recommendations:     Discharge Recommendations:  (TBD pending further assessment.)  Discharge Equipment Recommendations:   (TBD)  Barriers to discharge:   (Pt significantly orthostatic w/ minimal activity.)    Assessment:     Doe Woodall is a 33 y.o. male with a medical diagnosis of Syncope.  Performance deficits affecting function are weakness, impaired endurance, impaired self care skills, impaired functional mobility, gait instability, decreased upper extremity function, decreased lower extremity function, decreased safety awareness, edema, impaired skin.     Pt unable to tolerate OOB activities this date 2* severe orthostatic hypotension. Pt w/ complaints of headache that worsened w/ upright positioning. Pt was assisted back to supine once he was noted w/ delayed response and closing of eyes once standing EOB; pt recovered in supine; nurse and MD made aware immediately post session.     Pt's vitals are as follows:   -Supine: 144/93  -EOB: 101/57  -Standin/47  -Supine at end of session: 136/81    Rehab Prognosis:  Good; patient would benefit from acute skilled OT services to address these deficits and reach maximum level of function.       Plan:     Patient to be seen 5 x/week to address the above listed problems via self-care/home management, therapeutic activities, therapeutic exercises  Plan of Care Expires: 23  Plan of Care Reviewed with: patient    Subjective     Chief Complaint: "my head hurts"  Patient/Family Comments/goals: None stated   Pain/Comfort:  Pain Rating 1: 9/10  Location 1: head  Pain Addressed 1: Distraction, Nurse notified, Pre-medicate for activity, Cessation of Activity    Objective:     Communicated with: Mouna Brandt, prior to session.  Patient found supine with peripheral IV upon OT entry to room.    General Precautions: Standard, fall    Orthopedic " "Precautions:N/A  Braces: N/A  Respiratory Status: Room air     Occupational Performance:     Bed Mobility:    Patient completed Scooting hips to EOB with stand by assistance  Patient completed Supine to Sit with stand by assistance and HOB elevated      Functional Mobility/Transfers: Pt w/ slurred, delayed response in standing, stating "dizzy" w/ eyes closed; appeared weak and as if he was going to have a syncopal episode.   Patient completed Sit <> Stand Transfer with close contact guard assistance  with  hand-held assist   Functional Mobility: Pt was able to side-step to HOB w/ close CGA and HHA.     Activities of Daily Living:  Upper Body Dressing: minimum assistance for donning gown over back.       [facility] 6 Click ADL: 21    Treatment & Education:  -Pt functionally limited as noted above.   -Pt w/ preference to have HOB flattened; educated on importance of having HOB mildly elevated to ensure he begins becoming acclimated to upright positioning.   -Pt re-educated on role of OT and POC.   -Questions and concerns addressed within scope.     Patient left HOB elevated with all lines intact and call button in reach    GOALS:   Multidisciplinary Problems       Occupational Therapy Goals          Problem: Occupational Therapy    Goal Priority Disciplines Outcome Interventions   Occupational Therapy Goal     OT, PT/OT Ongoing, Progressing    Description: Goals to be met by: 6/27/23    Patient will increase functional independence with ADLs by performing:    LE Dressing with Supervision and use of AE as needed.   Grooming while standing at sink with Supervision.  Toileting from bedside commode with Supervision for hygiene and clothing management.   Step transfer with Supervision  Toilet transfer to bedside commode with Supervision.  Upper extremity exercise program x15 reps per handout, with independence.                         Time Tracking:     OT Date of Treatment: 06/14/23  OT Start Time: 1058  OT Stop Time: 1107  OT " Total Time (min): 9 min    Billable Minutes:Therapeutic Activity 9  Total Time 9 (co-tx w/ PT)     OT/ANTONINO: OT          6/14/2023

## 2023-06-14 NOTE — SUBJECTIVE & OBJECTIVE
Past Medical History:   Diagnosis Date    Abscess of right groin 09/01/2022    Diabetes mellitus     Encounter for blood transfusion     Loud snoring     Obese     Other insomnia 08/03/2020    Perineal abscess 08/01/2014    Primary hypertension 10/2/2022       Past Surgical History:   Procedure Laterality Date    ABCESS DRAINAGE  2014    PERIRECTAL    DECOMPRESSION OF LUMBAR SPINE USING MINIMALLY INVASIVE TECHNIQUE Right 07/06/2018    Procedure: DECOMPRESSION, SPINE, LUMBAR, MINIMALLY INVASIVE L3-4;  Surgeon: Cristian Cervantes MD;  Location: [facility] OR [address] [place] [address];  Service: Neurosurgery;  Laterality: Right;    INCISION AND DRAINAGE N/A 9/9/2022    Procedure: INCISION AND DRAINAGE GROIN;  Surgeon: KAITY Aguilar MD;  Location: [facility] OR;  Service: Urology;  Laterality: N/A;    ORTHOPEDIC SURGERY         Review of patient's allergies indicates:   Allergen Reactions    Metformin Diarrhea       No current facility-administered medications on file prior to encounter.     Current Outpatient Medications on File Prior to Encounter   Medication Sig    amitriptyline (ELAVIL) 75 MG tablet Take 1 tablet by mouth every evening.    aspirin 81 MG Chew Take 1 tablet by mouth once daily.    atorvastatin (LIPITOR) 80 MG tablet Take 80 mg by mouth every evening.    famotidine (PEPCID) 20 MG tablet TAKE 1 TABLET(20 MG) BY MOUTH TWICE DAILY FOR 14 DAYS    gabapentin (NEURONTIN) 100 MG capsule Take 100 mg by mouth 3 (three) times daily.    insulin detemir U-100 (LEVEMIR FLEXTOUCH) 100 unit/mL (3 mL) SubQ InPn pen Inject 30 Units into the skin once daily.    insulin glargine 100 units/mL SubQ pen Lantus SoloStar 100 UNIT/ML Subcutaneous Solution Pen-injector QTY: 1 mL Days: 30 Refills: 5  Written: 12/12/22 Patient Instructions: inject 25 units by subcutaneous route 1 time per day at bedtime    lisinopriL (PRINIVIL,ZESTRIL) 20 MG tablet Take 20 mg by mouth once daily.    oxyCODONE (ROXICODONE) 10 mg Tab immediate release tablet Take 10 mg by mouth  "every 8 (eight) hours as needed for Pain.    pantoprazole (PROTONIX) 40 MG tablet Take 40 mg by mouth once daily.     Family History       Problem Relation (Age of Onset)    Diabetes Father, Paternal Grandmother, Paternal Grandfather          Tobacco Use    Smoking status: Former     Packs/day: 0.50     Years: 9.00     Pack years: 4.50     Types: Cigarettes     Quit date: 2013     Years since quittin.9    Smokeless tobacco: Former   Substance and Sexual Activity    Alcohol use: Not Currently     Comment: DAILY PINT OF LIQUOR, HX OF 1 "TALL BOY" OF BEER DAILY    Drug use: Not Currently     Types: Cocaine     Comment: per collateral    Sexual activity: Yes     Partners: Female     Birth control/protection: None     Review of Systems   Constitutional:  Positive for fatigue. Negative for chills, diaphoresis and fever.   HENT:  Negative for congestion, ear pain, sore throat and trouble swallowing.    Eyes:  Positive for pain. Negative for discharge and visual disturbance.   Respiratory:  Positive for shortness of breath. Negative for cough, chest tightness and wheezing.    Cardiovascular:  Negative for chest pain, palpitations and leg swelling.   Gastrointestinal:  Negative for abdominal distention, abdominal pain, blood in stool, constipation, diarrhea, nausea and vomiting.   Endocrine: Negative for polydipsia, polyphagia and polyuria.   Genitourinary:  Negative for dysuria, flank pain, frequency and urgency.   Musculoskeletal:  Negative for back pain, joint swelling, neck pain and neck stiffness.   Skin:  Negative for rash and wound.   Allergic/Immunologic: Negative for immunocompromised state.   Neurological:  Positive for dizziness, syncope, weakness and light-headedness. Negative for speech difficulty, numbness and headaches.   Hematological:  Negative for adenopathy.   Psychiatric/Behavioral:  Positive for confusion. Negative for suicidal ideas. The patient is not nervous/anxious.    All other systems " reviewed and are negative.  Objective:     Vital Signs (Most Recent):  Temp: 98.4 °F (36.9 °C) (06/14/23 1150)  Pulse: 85 (06/14/23 1150)  Resp: 20 (06/14/23 1150)  BP: 106/65 (06/14/23 1150)  SpO2: 95 % (06/14/23 1150) Vital Signs (24h Range):  Temp:  [97.7 °F (36.5 °C)-98.4 °F (36.9 °C)] 98.4 °F (36.9 °C)  Pulse:  [] 85  Resp:  [18-20] 20  SpO2:  [95 %-100 %] 95 %  BP: ()/() 106/65     Weight: 98.9 kg (218 lb)  Body mass index is 36.28 kg/m².     Physical Exam  Vitals and nursing note reviewed.   Constitutional:       Appearance: He is well-developed. He is obese.   HENT:      Head: Normocephalic and atraumatic.   Eyes:      Pupils: Pupils are equal, round, and reactive to light.   Cardiovascular:      Rate and Rhythm: Normal rate and regular rhythm.      Heart sounds: Normal heart sounds.   Pulmonary:      Effort: Pulmonary effort is normal.      Breath sounds: Normal breath sounds.   Abdominal:      General: Bowel sounds are normal.      Palpations: Abdomen is soft.   Genitourinary:     Comments: Scrotal wound resolved  Musculoskeletal:         General: No swelling. Normal range of motion.      Cervical back: Normal range of motion and neck supple.   Skin:     General: Skin is warm and dry.      Capillary Refill: Capillary refill takes less than 2 seconds.   Neurological:      General: No focal deficit present.      Mental Status: He is alert and oriented to person, place, and time.   Psychiatric:         Behavior: Behavior normal.         Thought Content: Thought content normal.         Judgment: Judgment normal.            CRANIAL NERVES     CN III, IV, VI   Pupils are equal, round, and reactive to light.     Significant Labs: All pertinent labs within the past 24 hours have been reviewed.  CBC:   Recent Labs   Lab 06/13/23 0407 06/14/23 0514   WBC 10.21 9.76   HGB 11.0* 10.7*   HCT 32.7* 31.6*    182       CMP:   Recent Labs   Lab 06/13/23 0407 06/14/23  0514    138   K 3.9  4.2    111*   CO2 20* 21*   * 131*   BUN 27* 20   CREATININE 1.0 0.8   CALCIUM 8.8 9.4   PROT 6.1 6.1   ALBUMIN 2.5* 2.5*   BILITOT 0.2 0.4   ALKPHOS 241* 237*   AST 35 26   ALT 46* 40   ANIONGAP 9 6*       Cardiac Markers:   No results for input(s): CKMB, MYOGLOBIN, BNP, TROPISTAT in the last 48 hours.    Troponin:   No results for input(s): TROPONINI, TROPONINIHS in the last 48 hours.    Urine Studies:   No results for input(s): COLORU, APPEARANCEUA, PHUR, SPECGRAV, PROTEINUA, GLUCUA, KETONESU, BILIRUBINUA, OCCULTUA, NITRITE, UROBILINOGEN, LEUKOCYTESUR, RBCUA, WBCUA, BACTERIA, SQUAMEPITHEL, HYALINECASTS in the last 48 hours.    Invalid input(s): WRIGHTSUR      Significant Imaging: I have reviewed all pertinent imaging results/findings within the past 24 hours.  EKG: I have reviewed all pertinent results/findings within the past 24 hours and my personal findings are: sinus tachycardia with no acute ischemic changes.        CT Head Without Contrast    Result Date: 6/12/2023  EXAMINATION: CT OF THE HEAD WITHOUT CLINICAL HISTORY: Syncope, recurrent; TECHNIQUE: 5 mm unenhanced axial images were obtained from the skull base to the vertex. COMPARISON: 05/31/2023 and MRI brain 02/04/2023 FINDINGS: The ventricles, basal cisterns, and cortical sulci are within normal limits for patient's stated age. There is no acute intracranial hemorrhage, territorial infarct or mass effect, or midline shift. In the visualized paranasal sinuses, there is mild bilateral maxillary sinus mucoperiosteal thickening.     No acute intracranial abnormality detected. Electronically signed by: Dee Quinteros Date:    06/12/2023 Time:    01:20    CTA Chest Non-Coronary (PE Studies)    Result Date: 6/12/2023  EXAMINATION: CTA CHEST NON CORONARY (PE STUDIES) CLINICAL HISTORY: Pulmonary embolism (PE) suspected, positive D-dimer; TECHNIQUE: Low dose axial images, sagittal and coronal reformations were obtained from the thoracic inlet to  the lung bases following the IV administration of 80 mL of Omnipaque 350.  Contrast timing was optimized to evaluate the pulmonary arteries.  MIP images were performed. COMPARISON: CTA chest 04/14/2023 FINDINGS: The visualized soft tissue structures at the base of the neck appear within normal limits allowing for streak artifact.  There is mild bilateral gynecomastia. The thoracic aorta maintains normal caliber, contour, and course without significant atherosclerotic calcification within its course.  There is no evidence of aneurysmal dilation or dissection. The heart is not enlarged and there is trace pericardial fluid.  There are scattered coronary artery calcifications.The esophagus maintains a normal course and caliber.There is no bulky mediastinal or axillary lymph node enlargement. The trachea is midline and proximal airways are patent. The lungs are symmetrically expanded. Detailed evaluation of the lung parenchyma is limited by respiratory motion.  There is no pneumothorax.  There is no large confluent airspace consolidation.  There are mild bilateral ground-glass opacities.  There is no significant volume of pleural fluid. There is no evidence of pulmonary thromboembolism. Limited images of the upper abdomen obtained during the course of this dedicated thoracic CT demonstrate no acute abnormalities.  There is cholelithiasis. The osseous structures demonstrate multiple remote appearing bilateral rib deformities.  There is a stable mild superior endplate deformity of the T4 vertebral body.  There are mild degenerative changes of the visualized thoracic spine.     1.  No evidence of pulmonary thromboembolism. 2.  Mild bibasilar ground-glass opacities.  This is favored to relate to atelectasis, although nonspecific infectious or inflammatory process not excluded. 3.  Scattered coronary artery calcification. 4.  Additional stable findings as above. Electronically signed by: Sal Nava MD Date:    06/12/2023  Time:    01:23

## 2023-06-14 NOTE — PLAN OF CARE
Problem: Physical Therapy  Goal: Physical Therapy Goal  Description: Goals to be met by: 23     Patient will increase functional independence with mobility by performin. Pt to be mod I with bed mobility.  2. Pt to transfer with supervision.  3. Pt to ambulate 150' /c or /s AD and SBA.  4. Pt to be (I) with HEP.    Outcome: Ongoing, Not Progressing   Pt with severe HA in supine, orthostatic hypotension with standing.

## 2023-06-14 NOTE — ASSESSMENT & PLAN NOTE
Admit to telemetry  This is a recurrent problem  Consult neurology - appreciate recs  MRA head and neck ordered per ED MD  Hold asa in the event he requires a repeat LP  EEG as per neurology  Trend troponin - first is negative  Neuro checks q4h  Seizure prxn    Neuro consult. Plan for EEG- negative.   No further episodes of syncope. Several send out labs pending.  Will have neuro follow up. Cannot drive. Patient understands

## 2023-06-14 NOTE — ASSESSMENT & PLAN NOTE
Currently walking with walker and using WC PRN  Ordered outpt PT/OT but patient has not gone to this yet    PT/OT consulted.- recs TBD

## 2023-06-14 NOTE — CONSULTS
"[facility] Surg  Infectious Disease  Consult Note    Patient Name: Doe Woodall  MRN: [id number]  Admission Date: 6/11/2023  Hospital Length of Stay: 0 days  Attending Physician: Giovanny Lynch MD  Primary Care Provider: [facility]     Isolation Status: No active isolations    Patient information was obtained from patient and ER records.      Consults  Assessment/Plan:     Other  * Syncope  33M with h/o t2dm, obesity (BMI 36) admitted 6/11 with increase frequency of fainting episodes. H/o treated neurosyphilis (with 2 weeks iv pcn); RPR decreased to non-reactive a month ago at [facility] and denies partners since then. hiv screening negative. ID consulted for "Syncope/Possible seizures/Recently treated for neurosyphilis    Suspect neurosyphilis has been adequately treated.  updated RPR labs are pending, but were negative recently    Appreciate neurology assistance in work up of non-infectious causes of recurrent, chronic syncopal episodes        Thank you for your consult. I will sign off. Please contact us if you have any additional questions.    Comfort Hurtado MD  Infectious Disease  [facility] - Med Surg    Subjective:     Principal Problem: Syncope    HPI:   33M with h/o t2dm, obesity (BMI 36) admitted 6/11 with increase frequency of fainting episodes. Reports having episodes where he gets a headache and then feels dizzy and falls. Says he "lost consciousness" but says he was awake during episode and recalls eveything. Happens inside and outside. Happens at rest and with movement. Says episodes are witnessed. Denies chest pain. Says the episodes used to happen once a week, but worried that episodes now happening daily. Reports compliance with prior neurosyphilis treatment with 2 weeks iv penicillin. Denies any sex partners since completing treatment. Of note, I last saw patient in 10/22 for scrotal infection which has reportedly healed. He did have falls at that time too, " "which he had previously attributed to EtoH use.  Denies f/c.     CT head is negative for acute intracranial abnormality.   orthostatics negative.   EKG: sinus tachycardia   Utox negative.   Eeg normal    ED MD reports an episode while in the ED while in the stretcher where he started staring in the distance and would not respond. He was drowsy after this episode.    Neurology consulted  - MRI Brain demyelinating protocol w/wo contrast  - Autoimmune Dysautonomia panel (DYS2),   - Copper, zinc, vitamin E, IL-2 receptor, ACE, repeat ESR, and folate and replace accordingly     Ref Range & Units 1 mo ago   RPR Titer Non Reactive Non Reactive      Treponema Pallidum Antibody IgG Interpretation Nonreactive Reactive Abnormal         ID consulted for "Syncope/Possible seizures/Recently treated for neurosyphilis      Past Medical History:   Diagnosis Date    Abscess of right groin 09/01/2022    Diabetes mellitus     Encounter for blood transfusion     Loud snoring     Obese     Other insomnia 08/03/2020    Perineal abscess 08/01/2014    Primary hypertension 10/2/2022       Past Surgical History:   Procedure Laterality Date    ABCESS DRAINAGE  2014    PERIRECTAL    DECOMPRESSION OF LUMBAR SPINE USING MINIMALLY INVASIVE TECHNIQUE Right 07/06/2018    Procedure: DECOMPRESSION, SPINE, LUMBAR, MINIMALLY INVASIVE L3-4;  Surgeon: Cristian Cervantes MD;  Location: [facility] OR [address] [place] [address];  Service: Neurosurgery;  Laterality: Right;    INCISION AND DRAINAGE N/A 9/9/2022    Procedure: INCISION AND DRAINAGE GROIN;  Surgeon: KAITY Aguilar MD;  Location: [facility] OR;  Service: Urology;  Laterality: N/A;    ORTHOPEDIC SURGERY         Review of patient's allergies indicates:   Allergen Reactions    Metformin Diarrhea       No current facility-administered medications on file prior to encounter.     Current Outpatient Medications on File Prior to Encounter   Medication Sig    amitriptyline (ELAVIL) 75 MG tablet Take 1 tablet by mouth every " "evening.    aspirin 81 MG Chew Take 1 tablet by mouth once daily.    atorvastatin (LIPITOR) 80 MG tablet Take 80 mg by mouth every evening.    famotidine (PEPCID) 20 MG tablet TAKE 1 TABLET(20 MG) BY MOUTH TWICE DAILY FOR 14 DAYS    gabapentin (NEURONTIN) 100 MG capsule Take 100 mg by mouth 3 (three) times daily.    insulin detemir U-100 (LEVEMIR FLEXTOUCH) 100 unit/mL (3 mL) SubQ InPn pen Inject 30 Units into the skin once daily.    insulin glargine 100 units/mL SubQ pen Lantus SoloStar 100 UNIT/ML Subcutaneous Solution Pen-injector QTY: 1 mL Days: 30 Refills: 5  Written: 22 Patient Instructions: inject 25 units by subcutaneous route 1 time per day at bedtime    lisinopriL (PRINIVIL,ZESTRIL) 20 MG tablet Take 20 mg by mouth once daily.    oxyCODONE (ROXICODONE) 10 mg Tab immediate release tablet Take 10 mg by mouth every 8 (eight) hours as needed for Pain.    pantoprazole (PROTONIX) 40 MG tablet Take 40 mg by mouth once daily.     Family History       Problem Relation (Age of Onset)    Diabetes Father, Paternal Grandmother, Paternal Grandfather          Tobacco Use    Smoking status: Former     Packs/day: 0.50     Years: 9.00     Pack years: 4.50     Types: Cigarettes     Quit date: 2013     Years since quittin.9    Smokeless tobacco: Former   Substance and Sexual Activity    Alcohol use: Not Currently     Comment: DAILY PINT OF LIQUOR, HX OF 1 "TALL BOY" OF BEER DAILY    Drug use: Not Currently     Types: Cocaine     Comment: per collateral    Sexual activity: Yes     Partners: Female     Birth control/protection: None     Review of Systems   Constitutional:  Positive for fatigue. Negative for chills, diaphoresis and fever.   HENT:  Negative for congestion, ear pain, sore throat and trouble swallowing.    Eyes:  Positive for pain. Negative for discharge and visual disturbance.   Respiratory:  Positive for shortness of breath. Negative for cough, chest tightness and wheezing.  "   Cardiovascular:  Negative for chest pain, palpitations and leg swelling.   Gastrointestinal:  Negative for abdominal distention, abdominal pain, blood in stool, constipation, diarrhea, nausea and vomiting.   Endocrine: Negative for polydipsia, polyphagia and polyuria.   Genitourinary:  Negative for dysuria, flank pain, frequency and urgency.   Musculoskeletal:  Negative for back pain, joint swelling, neck pain and neck stiffness.   Skin:  Negative for rash and wound.   Allergic/Immunologic: Negative for immunocompromised state.   Neurological:  Positive for dizziness, syncope, weakness and light-headedness. Negative for speech difficulty, numbness and headaches.   Hematological:  Negative for adenopathy.   Psychiatric/Behavioral:  Positive for confusion. Negative for suicidal ideas. The patient is not nervous/anxious.    All other systems reviewed and are negative.  Objective:     Vital Signs (Most Recent):  Temp: 98.4 °F (36.9 °C) (06/14/23 1150)  Pulse: 85 (06/14/23 1150)  Resp: 20 (06/14/23 1150)  BP: 106/65 (06/14/23 1150)  SpO2: 95 % (06/14/23 1150) Vital Signs (24h Range):  Temp:  [97.7 °F (36.5 °C)-98.4 °F (36.9 °C)] 98.4 °F (36.9 °C)  Pulse:  [] 85  Resp:  [18-20] 20  SpO2:  [95 %-100 %] 95 %  BP: ()/() 106/65     Weight: 98.9 kg (218 lb)  Body mass index is 36.28 kg/m².     Physical Exam  Vitals and nursing note reviewed.   Constitutional:       Appearance: He is well-developed. He is obese.   HENT:      Head: Normocephalic and atraumatic.   Eyes:      Pupils: Pupils are equal, round, and reactive to light.   Cardiovascular:      Rate and Rhythm: Normal rate and regular rhythm.      Heart sounds: Normal heart sounds.   Pulmonary:      Effort: Pulmonary effort is normal.      Breath sounds: Normal breath sounds.   Abdominal:      General: Bowel sounds are normal.      Palpations: Abdomen is soft.   Genitourinary:     Comments: Scrotal wound resolved  Musculoskeletal:         General: No  swelling. Normal range of motion.      Cervical back: Normal range of motion and neck supple.   Skin:     General: Skin is warm and dry.      Capillary Refill: Capillary refill takes less than 2 seconds.   Neurological:      General: No focal deficit present.      Mental Status: He is alert and oriented to person, place, and time.   Psychiatric:         Behavior: Behavior normal.         Thought Content: Thought content normal.         Judgment: Judgment normal.            CRANIAL NERVES     CN III, IV, VI   Pupils are equal, round, and reactive to light.     Significant Labs: All pertinent labs within the past 24 hours have been reviewed.  CBC:   Recent Labs   Lab 06/13/23 0407 06/14/23 0514   WBC 10.21 9.76   HGB 11.0* 10.7*   HCT 32.7* 31.6*    182       CMP:   Recent Labs   Lab 06/13/23 0407 06/14/23 0514    138   K 3.9 4.2    111*   CO2 20* 21*   * 131*   BUN 27* 20   CREATININE 1.0 0.8   CALCIUM 8.8 9.4   PROT 6.1 6.1   ALBUMIN 2.5* 2.5*   BILITOT 0.2 0.4   ALKPHOS 241* 237*   AST 35 26   ALT 46* 40   ANIONGAP 9 6*       Cardiac Markers:   No results for input(s): CKMB, MYOGLOBIN, BNP, TROPISTAT in the last 48 hours.    Troponin:   No results for input(s): TROPONINI, TROPONINIHS in the last 48 hours.    Urine Studies:   No results for input(s): COLORU, APPEARANCEUA, PHUR, SPECGRAV, PROTEINUA, GLUCUA, KETONESU, BILIRUBINUA, OCCULTUA, NITRITE, UROBILINOGEN, LEUKOCYTESUR, RBCUA, WBCUA, BACTERIA, SQUAMEPITHEL, HYALINECASTS in the last 48 hours.    Invalid input(s): WRIGHTSUR      Significant Imaging: I have reviewed all pertinent imaging results/findings within the past 24 hours.  EKG: I have reviewed all pertinent results/findings within the past 24 hours and my personal findings are: sinus tachycardia with no acute ischemic changes.        CT Head Without Contrast    Result Date: 6/12/2023  EXAMINATION: CT OF THE HEAD WITHOUT CLINICAL HISTORY: Syncope, recurrent; TECHNIQUE: 5 mm  unenhanced axial images were obtained from the skull base to the vertex. COMPARISON: 05/31/2023 and MRI brain 02/04/2023 FINDINGS: The ventricles, basal cisterns, and cortical sulci are within normal limits for patient's stated age. There is no acute intracranial hemorrhage, territorial infarct or mass effect, or midline shift. In the visualized paranasal sinuses, there is mild bilateral maxillary sinus mucoperiosteal thickening.     No acute intracranial abnormality detected. Electronically signed by: Dee Quinteros Date:    06/12/2023 Time:    01:20    CTA Chest Non-Coronary (PE Studies)    Result Date: 6/12/2023  EXAMINATION: CTA CHEST NON CORONARY (PE STUDIES) CLINICAL HISTORY: Pulmonary embolism (PE) suspected, positive D-dimer; TECHNIQUE: Low dose axial images, sagittal and coronal reformations were obtained from the thoracic inlet to the lung bases following the IV administration of 80 mL of Omnipaque 350.  Contrast timing was optimized to evaluate the pulmonary arteries.  MIP images were performed. COMPARISON: CTA chest 04/14/2023 FINDINGS: The visualized soft tissue structures at the base of the neck appear within normal limits allowing for streak artifact.  There is mild bilateral gynecomastia. The thoracic aorta maintains normal caliber, contour, and course without significant atherosclerotic calcification within its course.  There is no evidence of aneurysmal dilation or dissection. The heart is not enlarged and there is trace pericardial fluid.  There are scattered coronary artery calcifications.The esophagus maintains a normal course and caliber.There is no bulky mediastinal or axillary lymph node enlargement. The trachea is midline and proximal airways are patent. The lungs are symmetrically expanded. Detailed evaluation of the lung parenchyma is limited by respiratory motion.  There is no pneumothorax.  There is no large confluent airspace consolidation.  There are mild bilateral ground-glass  opacities.  There is no significant volume of pleural fluid. There is no evidence of pulmonary thromboembolism. Limited images of the upper abdomen obtained during the course of this dedicated thoracic CT demonstrate no acute abnormalities.  There is cholelithiasis. The osseous structures demonstrate multiple remote appearing bilateral rib deformities.  There is a stable mild superior endplate deformity of the T4 vertebral body.  There are mild degenerative changes of the visualized thoracic spine.     1.  No evidence of pulmonary thromboembolism. 2.  Mild bibasilar ground-glass opacities.  This is favored to relate to atelectasis, although nonspecific infectious or inflammatory process not excluded. 3.  Scattered coronary artery calcification. 4.  Additional stable findings as above. Electronically signed by: Sal Nava MD Date:    06/12/2023 Time:    01:23

## 2023-06-14 NOTE — PROGRESS NOTES
"[facility] Medicine  Progress Note    Patient Name: Doe Woodall  MRN: [id number]  Patient Class: OP- Observation   Admission Date: 6/11/2023  Length of Stay: 0 days  Attending Physician: Giovanny Lynch MD  Primary Care Provider: [facility] - Family Medicine        Subjective:     Principal Problem:Syncope        HPI:  Mr. Woodall is a 33 y.o. male with history of IDDM2, HTN, treated syphilis, left femoral abcess (2/2023), and previous alcohol and cocaine use disorder  who presents today with complaints of syncope. It is moderate. It is associated with weakness, fatigue, SOB, dizziness, and lightheadedness. He denies fever, chills, N/V/D, or chest pain. He reports recurrent syncopal episodes over the last year increasing in frequency to daily. He was admitted at [facility] and had an extensive neurological work up after receiving tPA for lower ext weakness. In the ED today, CT head is negative for acute intracranial abnormality. VSS, he is afebrile and normotensive, orthostatics negative. D dimer 0.79 and CTA negative for PE. Troponin is WNL. EKG: sinus tachycardia with no acute ischemic changes. Utox negative. ED MD reports an episode while in the ED while in the stretcher where he started staring in the distance and would not respond. He was drowsy after this episode. Transfer to [facility] was initiated pending bed availability. Neurology was consulted who recommended admission for EEG in AM. Hospital medicine is consulted for observation admission.    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 Less likely to be low grade glioma due to lack of mass effect/enlargement of thomas per neurosurgery eval. B12, Folate, TSH, Lipid panel wnl. CTA notable for mild atherosclerotic ossifications of the bilateral carotid bifurcations and right vertebral artery origin. Nonspecific luminal irregularity of the " "external carotid artery branches, possibly suggestive of nonspecific arteritis/arteriopathy. No new recs per ENT. Patient s/p IR guided LP on 05/18 CSF notable for markedly elevated protein count, otherwise wnl. Normal ACTH stim test. Differential includes but is not limited to vasculitis vs MS vs glioma vs AI etiology. Neuro following.  -neuro following, appreciate recs  -ANCA and MPO Ab negative  -C3 and C4 wnl  -CSF Cx negative  -anti-C1q antibody WNL  - cryoglobulins - negative  -AQP4 Ab pending"      Overview/Hospital Course:  Patient placed in observation for syncopal episodes. MRI negative. Neurology consulted. EEG ordered and was negative. Several send out labs pending. PT/OT with recs TBD       Interval History: No new issues    Review of Systems   Constitutional:  Positive for activity change.   HENT:  Negative for congestion.    Respiratory:  Negative for chest tightness and shortness of breath.    Cardiovascular:  Negative for chest pain.   Gastrointestinal:  Negative for abdominal distention.   Neurological:  Positive for headaches.   Objective:     Vital Signs (Most Recent):  Temp: 98.1 °F (36.7 °C) (06/14/23 0737)  Pulse: 90 (on telemetry monitor.) (06/14/23 0745)  Resp: 20 (06/14/23 0737)  BP: 95/60 (06/14/23 0737)  SpO2: 100 % (06/14/23 0737) Vital Signs (24h Range):  Temp:  [97.7 °F (36.5 °C)-98.2 °F (36.8 °C)] 98.1 °F (36.7 °C)  Pulse:  [] 90  Resp:  [18-20] 20  SpO2:  [96 %-100 %] 100 %  BP: ()/() 95/60     Weight: 98.9 kg (218 lb)  Body mass index is 36.28 kg/m².    Intake/Output Summary (Last 24 hours) at 6/14/2023 0914  Last data filed at 6/13/2023 1738  Gross per 24 hour   Intake 480 ml   Output --   Net 480 ml         Physical Exam  Nursing note reviewed.   Constitutional:       Appearance: He is obese. He is not ill-appearing.   Pulmonary:      Effort: Pulmonary effort is normal. No respiratory distress.   Neurological:      Mental Status: He is alert and oriented to " person, place, and time.           Significant Labs: All pertinent labs within the past 24 hours have been reviewed.  BMP:   Recent Labs   Lab 06/14/23  0514   *      K 4.2   *   CO2 21*   BUN 20   CREATININE 0.8   CALCIUM 9.4   MG 1.9     CBC:   Recent Labs   Lab 06/13/23  0407 06/14/23  0514   WBC 10.21 9.76   HGB 11.0* 10.7*   HCT 32.7* 31.6*    182       Significant       Assessment/Plan:      * Syncope  Admit to telemetry  This is a recurrent problem  Consult neurology - appreciate recs  MRA head and neck ordered per ED MD  Hold asa in the event he requires a repeat LP  EEG as per neurology  Trend troponin - first is negative  Neuro checks q4h  Seizure prxn    Neuro consult. Plan for EEG- negative.   No further episodes of syncope. Several send out labs pending.  Will have neuro follow up. Cannot drive. Patient understands     Altered mental status  Patient now at normal baseline      Debility  Currently walking with walker and using WC PRN  Ordered outpt PT/OT but patient has not gone to this yet    PT/OT consulted.- recs TBD     Severe obesity with body mass index (BMI) of 36.0 to 36.9 with serious comorbidity  Body mass index is 36.39 kg/m². Morbid obesity complicates all aspects of disease management from diagnostic modalities to treatment. Weight loss encouraged and health benefits explained to patient.         Normocytic anemia  Appears stable  Trend cbc    Type 2 diabetes mellitus with hyperglycemia, with long-term current use of insulin  Patient's FSGs are uncontrolled due to hyperglycemia on current medication regimen.  Last A1c reviewed-   Lab Results   Component Value Date    HGBA1C 6.9 (H) 05/10/2023     Most recent fingerstick glucose reviewed-   Recent Labs   Lab 06/12/23  1107 06/12/23  1613 06/12/23  1943 06/13/23  0706   POCTGLUCOSE 171* 220* 236* 160*     Current correctional scale  Medium  Maintain anti-hyperglycemic dose as follows-   Antihyperglycemics (From  admission, onward)      Start     Stop Route Frequency Ordered    06/12/23 0345  insulin detemir U-100 (Levemir) pen 10 Units         -- SubQ Nightly 06/12/23 0244 06/12/23 0343  insulin aspart U-100 pen 1-10 Units         -- SubQ Before meals & nightly PRN 06/12/23 0244          Hold Oral hypoglycemics while patient is in the hospital.      VTE Risk Mitigation (From admission, onward)           Ordered     IP VTE HIGH RISK PATIENT  Once         06/12/23 0244     Place sequential compression device  Until discontinued         06/12/23 0244                    Discharge Planning   SUSAN: 6/14/2023     Code Status: Full Code   Is the patient medically ready for discharge?:     Reason for patient still in hospital (select all that apply): Patient unstable           Patient is an obs patient. Barrier to discharge- severe headache, PT/OT recs TBD. Send out labs pending. Likely discharge with neuro follow up on 6/15.  No plans to transfer to [facility]. Also was found to be wildly orthostatic with PT/OT. IV fluids started and midodrene.  Likely cause of syncopal episodes.  Likely discharge on 6/15.          Giovanny Mooney MD  Department of Hospital Medicine   [facility] - Med Surg

## 2023-06-15 NOTE — PT/OT/SLP PROGRESS
Occupational Therapy   Treatment    Name: Doe Woodall  MRN: [id number]  Admitting Diagnosis:  Syncope       Recommendations:     Discharge Recommendations: outpatient OT, outpatient PT  Discharge Equipment Recommendations:  bedside commode  Barriers to discharge:   (Pt w/ mild dizziness with OOB activities.)    Assessment:     Doe Woodall is a 33 y.o. male with a medical diagnosis of Syncope. Performance deficits affecting function are weakness, impaired endurance, impaired functional mobility, impaired self care skills, gait instability, decreased lower extremity function, decreased upper extremity function, decreased coordination, decreased safety awareness.     Pt w/ progress despite mild dizziness due to orthostatic hypotension, ambulating functional distances and completing transfers w/ CGA-SBA and use of RW. Pt w/ complaints of a headache throughout; nurse aware. Pt will continue to benefit from skilled acute OT services to maximize functional capacity for safe performance w/ ADLs and functional mobility.     BP vitals are as follows:   -Supine: 152/90  -Post t/f to toilet: 118/72  -At end of session post t/f to chair: 122/67    Rehab Prognosis:  Good; patient would benefit from acute skilled OT services to address these deficits and reach maximum level of function.       Plan:     Patient to be seen 5 x/week to address the above listed problems via self-care/home management, therapeutic activities, therapeutic exercises  Plan of Care Expires: 06/27/23  Plan of Care Reviewed with: patient    Subjective     Chief Complaint: headache  Patient/Family Comments/goals: Agreeable to participate   Pain/Comfort:  Pain Rating 1: 9/10  Location 1: head  Pain Addressed 1: Reposition, Distraction, Cessation of Activity, Nurse notified    Objective:     Communicated with: Nurse prior to session.  Patient found HOB elevated with peripheral IV upon OT entry to room.    General Precautions: Standard, fall    Orthopedic  Precautions:N/A  Braces: N/A  Respiratory Status: Room air     Occupational Performance:     Bed Mobility:    Patient completed Scooting/Bridging with supervision  Patient completed Supine to Sit with supervision     Functional Mobility/Transfers:  Patient completed Sit <> Stand Transfer with stand by assistance and contact guard assistance  with  rolling walker   Patient completed Bed <> Chair Transfer using Step Transfer technique with stand by assistance and contact guard assistance with rolling walker  Patient completed Toilet Transfer Step Transfer technique with stand by assistance and contact guard assistance with  rolling walker  Functional Mobility: Pt was able to ambulate from bed>toilet>sink>chair w/ CGA-SBA and use of RW; pt w/ mild LOB due to dizziness when ambulating to chair, requiring CGA for recovery     Activities of Daily Living:  Grooming: stand by assistance for hand hygiene   Toileting: supervision for seated dillan-care      [facility] 6 Click ADL: 21    Treatment & Education:  -Pt functionally limited as noted above.   -Pt w/ preference to have HOB flattened; educated on importance of having HOB mildly elevated to ensure he begins becoming acclimated to upright positioning.   -Pt re-educated on role of OT and POC.   -Questions and concerns addressed within scope.     Patient left up in chair with all lines intact and call button in reach    GOALS:   Multidisciplinary Problems       Occupational Therapy Goals          Problem: Occupational Therapy    Goal Priority Disciplines Outcome Interventions   Occupational Therapy Goal     OT, PT/OT Ongoing, Progressing    Description: Goals to be met by: 6/27/23    Patient will increase functional independence with ADLs by performing:    LE Dressing with Supervision and use of AE as needed.   Grooming while standing at sink with Supervision.  Toileting from bedside commode with Supervision for hygiene and clothing management.   Step transfer with  Supervision  Toilet transfer to bedside commode with Supervision.  Upper extremity exercise program x15 reps per handout, with independence.                         Time Tracking:     OT Date of Treatment: 06/15/23  OT Start Time: 1233  OT Stop Time: 1247  OT Total Time (min): 14 min    Billable Minutes:Self Care/Home Management 14  Total Time 14 (co-tx w/ PT)     OT/ANTONINO: OT          6/15/2023

## 2023-06-15 NOTE — DISCHARGE SUMMARY
"[facility] Medicine  Discharge Summary      Patient Name: Doe Woodall  MRN: [id number]  MICHI: [id number]  Patient Class: OP- Observation  Admission Date: 6/11/2023  Hospital Length of Stay: 0 days  Discharge Date and Time:  06/15/2023 1:34 PM  Attending Physician: Giovanny Lynch MD   Discharging Provider: Giovanny Lynch MD  Primary Care Provider: [facility]    Primary Care Team: Networked reference to record PCT     HPI:   Mr. Woodall is a 33 y.o. male with history of IDDM2, HTN, treated syphilis, left femoral abcess (2/2023), and previous alcohol and cocaine use disorder  who presents today with complaints of syncope. It is moderate. It is associated with weakness, fatigue, SOB, dizziness, and lightheadedness. He denies fever, chills, N/V/D, or chest pain. He reports recurrent syncopal episodes over the last year increasing in frequency to daily. He was admitted at [facility] and had an extensive neurological work up after receiving tPA for lower ext weakness. In the ED today, CT head is negative for acute intracranial abnormality. VSS, he is afebrile and normotensive, orthostatics negative. D dimer 0.79 and CTA negative for PE. Troponin is WNL. EKG: sinus tachycardia with no acute ischemic changes. Utox negative. ED MD reports an episode while in the ED while in the stretcher where he started staring in the distance and would not respond. He was drowsy after this episode. Transfer to [facility] was initiated pending bed availability. Neurology was consulted who recommended admission for EEG in AM. Hospital medicine is consulted for observation admission.    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 Less likely to be low grade glioma due to lack of mass effect/enlargement of thoams per neurosurgery eval. B12, Folate, TSH, Lipid panel wnl. CTA notable for mild atherosclerotic " "ossifications of the bilateral carotid bifurcations and right vertebral artery origin. Nonspecific luminal irregularity of the external carotid artery branches, possibly suggestive of nonspecific arteritis/arteriopathy. No new recs per ENT. Patient s/p IR guided LP on 05/18 CSF notable for markedly elevated protein count, otherwise wnl. Normal ACTH stim test. Differential includes but is not limited to vasculitis vs MS vs glioma vs AI etiology. Neuro following.  -neuro following, appreciate recs  -ANCA and MPO Ab negative  -C3 and C4 wnl  -CSF Cx negative  -anti-C1q antibody WNL  - cryoglobulins - negative  -AQP4 Ab pending"      * No surgery found *      Hospital Course:   Patient placed in observation for syncopal episodes. MRI negative. Neurology consulted. EEG ordered and was negative. Several send out labs pending. PT/OT with recs outpatient PT/OT. Noted to be wildly orthostatic and started on Midodrine.  Patient was discharge to home on 6/15. Activity as tolerated. Diet- ADA 2000 marcelle diet. Follow up neuro in one week       Goals of Care Treatment Preferences:  Code Status: Full Code      Consults:   Consults (From admission, onward)        Status Ordering Provider     Inpatient consult to Registered Dietitian/Nutritionist  Once        Provider:  (Not yet assigned)    Completed ABDIRAHMAN XIE     Inpatient consult to Infectious Diseases  Once        Provider:  Comfort Hurtado MD    Completed FRANCISCA CLAROS     Inpatient consult to Telemedicine-General Neurology  Once        Provider:  Jamia Sweeney MD    Completed FRANCISCA CLAROS          No new Assessment & Plan notes have been filed under this hospital service since the last note was generated.  Service: Hospital Medicine    Final Active Diagnoses:    Diagnosis Date Noted POA    PRINCIPAL PROBLEM:  Syncope [R55] 06/12/2023 Yes    Debility [R53.81] 06/12/2023 Yes    Altered mental status [R41.82] 06/12/2023 Yes    Severe obesity with body " "mass index (BMI) of 36.0 to 36.9 with serious comorbidity [E66.01, Z68.36] 10/02/2022 Not Applicable    Normocytic anemia [D64.9] 09/10/2022 Yes    Type 2 diabetes mellitus with hyperglycemia, with long-term current use of insulin [E11.65, Z79.4]  Not Applicable      Problems Resolved During this Admission:       Discharged Condition: good    Disposition: Home or Self Care    Follow Up:   Follow-up Information     [facility]. Schedule an appointment as soon as possible for a visit in 1 week(s).    Why: Shelbi Jimenez FNP  Contact information:  [address] [place] [address]  [phone]             Elias Garza MD Follow up in 1 week(s).    Specialty: Neurology  Contact information:  [address] [facility] [address]  [address]  [facility] [address]  [phone]                       Patient Instructions:      COMMODE FOR HOME USE     Order Specific Question Answer Comments   Type: Standard    Height: 5' 5" (1.651 m)    Weight: 98.9 kg (218 lb)    Does patient have medical equipment at home? walker, rolling    Does patient have medical equipment at home? wheelchair    Length of need (1-99 months): 99      Ambulatory referral/consult to Physical/Occupational Therapy   Standing Status: Future   Referral Priority: Routine Referral Type: Physical Medicine   Referral Reason: Specialty Services Required   Number of Visits Requested: 1       Significant Diagnostic Studies: N/A    Pending Diagnostic Studies:     Procedure Component Value Units Date/Time    Autoimmune Dysautonomia Evaluation [[id number]] Collected: 06/13/23 1411    Order Status: Sent Lab Status: In process Updated: 06/13/23 1423    Specimen: Blood     Autoimmune Dysautonomia Evaluation [[id number]] Collected: 06/12/23 1623    Order Status: Sent Lab Status: In process Updated: 06/12/23 1629    Specimen: Blood     Copper, serum [[id number]] Collected: 06/12/23 1623    Order Status: Sent Lab Status: In process Updated: 06/12/23 1629    Specimen: Blood  "    Interleukin-2 Receptor [[id number]] Collected: 06/12/23 1622    Order Status: Sent Lab Status: In process Updated: 06/12/23 1629    Specimen: Blood     Vitamin E [[id number]] Collected: 06/13/23 1411    Order Status: Sent Lab Status: In process Updated: 06/13/23 1423    Specimen: Blood     Vitamin E [[id number]] Collected: 06/12/23 1622    Order Status: Sent Lab Status: In process Updated: 06/12/23 1629    Specimen: Blood     Zinc [[id number]] Collected: 06/13/23 1411    Order Status: Sent Lab Status: In process Updated: 06/13/23 1423    Specimen: Blood     Zinc [[id number]] Collected: 06/12/23 1623    Order Status: Sent Lab Status: In process Updated: 06/12/23 1629    Specimen: Blood          Medications:  Reconciled Home Medications:      Medication List      START taking these medications    midodrine 5 MG Tab  Commonly known as: PROAMATINE  Take 1 tablet (5 mg total) by mouth 3 (three) times daily.        CONTINUE taking these medications    amitriptyline 75 MG tablet  Commonly known as: ELAVIL  Take 1 tablet by mouth every evening.     aspirin 81 MG Chew  Take 1 tablet by mouth once daily.     atorvastatin 80 MG tablet  Commonly known as: LIPITOR  Take 80 mg by mouth every evening.     famotidine 20 MG tablet  Commonly known as: PEPCID  TAKE 1 TABLET(20 MG) BY MOUTH TWICE DAILY FOR 14 DAYS     gabapentin 100 MG capsule  Commonly known as: NEURONTIN  Take 100 mg by mouth 3 (three) times daily.     insulin glargine 100 units/mL SubQ pen  Lantus SoloStar 100 UNIT/ML Subcutaneous Solution Pen-injector QTY: 1 mL Days: 30 Refills: 5  Written: 12/12/22 Patient Instructions: inject 25 units by subcutaneous route 1 time per day at bedtime     LEVEMIR FLEXTOUCH U100 INSULIN 100 unit/mL (3 mL) Inpn pen  Generic drug: insulin detemir U-100 (Levemir)  Inject 30 Units into the skin once daily.     oxyCODONE 10 mg Tab immediate release tablet  Commonly known as: ROXICODONE  Take 10 mg by mouth every 8 (eight) hours as needed  for Pain.     pantoprazole 40 MG tablet  Commonly known as: PROTONIX  Take 40 mg by mouth once daily.        STOP taking these medications    lisinopriL 20 MG tablet  Commonly known as: PRINIVIL,ZESTRIL            Indwelling Lines/Drains at time of discharge:   Lines/Drains/Airways     None                 Time spent on the discharge of patient: > 35  minutes         Giovanny Mooney MD  Department of Hospital Medicine  [facility] Surg

## 2023-06-15 NOTE — ASSESSMENT & PLAN NOTE
Admit to telemetry  This is a recurrent problem  Consult neurology - appreciate recs  MRA head and neck ordered per ED MD  Hold asa in the event he requires a repeat LP  EEG as per neurology  Trend troponin - first is negative  Neuro checks q4h  Seizure prxn    Neuro consult. Plan for EEG- negative.   No further episodes of syncope. Several send out labs pending.  Will have neuro follow up. Cannot drive. Patient understands     Likely from orthostatic hypotension. Started on Midodrine

## 2023-06-15 NOTE — NURSING
[facility] [place]  Nurses Note -- 4 Eyes      6/15/2023       Skin assessed on: Q Shift      [] No Pressure Injuries Present    []Prevention Measures Documented    [x] Yes LDA  for Pressure Injury Previously documented     [] Yes New Pressure Injury Discovered   [] LDA for New Pressure Injury Added      Attending RN:  Eliz Lozoya RN     Second RN:  Suzie DIAZ

## 2023-06-15 NOTE — PLAN OF CARE
[place] - Med Surg  Discharge Final Note    Patient clear to discharge from case management stand point. Instructed patient to follow up with pcp within 1 week, verbalized understanding. Pt stated he has a scheduled appointment on tomorrow with [facility] Neurology clinic. Pt stated he has a bsc. Ambulatory referral for out patient PT/OT placed per physician. Pt's father will be taking him home.     Primary Care Provider: [facility] - Family Medicine    Expected Discharge Date: 6/15/2023    Final Discharge Note (most recent)       Final Note - 06/15/23 1349          Final Note    Assessment Type Final Discharge Note (P)      Anticipated Discharge Disposition Home or Self Care (P)      What phone number can be called within the next 1-3 days to see how you are doing after discharge? [phone] (P)      Hospital Resources/Appts/Education Provided Provided patient/caregiver with written discharge plan information;Community resources provided (P)         Post-Acute Status    Discharge Delays None known at this time (P)                      Important Message from Medicare             Contact [facility]    [address]  [facility] [address]   Phone: [phone]       Next Steps: Schedule an appointment as soon as possible for a visit in 1 week(s)    Instructions: Shelbi Jimenez, MAXIMILIANP    [facility] - Neurology/Ms/Stroke   Specialty: Neurology    [address] [facility] [place] [address]   Phone: [phone]       Next Steps: Go on 6/16/2023    Instructions: Please keep schedule appointment on Friday.

## 2023-06-15 NOTE — PLAN OF CARE
Problem: Adult Inpatient Plan of Care  Goal: Plan of Care Review  Outcome: Ongoing, Progressing  Goal: Patient-Specific Goal (Individualized)  Outcome: Ongoing, Progressing  Goal: Absence of Hospital-Acquired Illness or Injury  Outcome: Ongoing, Progressing  Goal: Optimal Comfort and Wellbeing  Outcome: Ongoing, Progressing  Goal: Readiness for Transition of Care  Outcome: Ongoing, Progressing     Problem: Diabetes Comorbidity  Goal: Blood Glucose Level Within Targeted Range  Outcome: Ongoing, Progressing       Patient remained free from falls/injury throughout shift.  No acute changes in status.  Bed locked in lowest position.  Side rails up x2.  Call bell within reach.  Purposeful rounding maintained throughout shift.

## 2023-06-15 NOTE — NURSING
Patient discharged per MD order.  IV removed.  Catheter tip intact.  No distress noted.  Discharge instructions explained.  AVS given to patient   Patient verbalized understanding.  VSS.  Afebrile.  No complaints of pain, N/V, diarrhea or SOB.  Rx provided .  Patient assisted to w/c without injury, pt now leaving unit with his dad at his side with pt transport.

## 2023-06-15 NOTE — PROGRESS NOTES
"[facility] Medicine  Progress Note    Patient Name: Doe Woodall  MRN: [id number]  Patient Class: OP- Observation   Admission Date: 6/11/2023  Length of Stay: 0 days  Attending Physician: Giovanny Lynch MD  Primary Care Provider: [facility] - Family Medicine        Subjective:     Principal Problem:Syncope        HPI:  Mr. Woodall is a 33 y.o. male with history of IDDM2, HTN, treated syphilis, left femoral abcess (2/2023), and previous alcohol and cocaine use disorder  who presents today with complaints of syncope. It is moderate. It is associated with weakness, fatigue, SOB, dizziness, and lightheadedness. He denies fever, chills, N/V/D, or chest pain. He reports recurrent syncopal episodes over the last year increasing in frequency to daily. He was admitted at [facility] and had an extensive neurological work up after receiving tPA for lower ext weakness. In the ED today, CT head is negative for acute intracranial abnormality. VSS, he is afebrile and normotensive, orthostatics negative. D dimer 0.79 and CTA negative for PE. Troponin is WNL. EKG: sinus tachycardia with no acute ischemic changes. Utox negative. ED MD reports an episode while in the ED while in the stretcher where he started staring in the distance and would not respond. He was drowsy after this episode. Transfer to [facility] was initiated pending bed availability. Neurology was consulted who recommended admission for EEG in AM. Hospital medicine is consulted for observation admission.    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 Less likely to be low grade glioma due to lack of mass effect/enlargement of thomas per neurosurgery eval. B12, Folate, TSH, Lipid panel wnl. CTA notable for mild atherosclerotic ossifications of the bilateral carotid bifurcations and right vertebral artery origin. Nonspecific luminal irregularity of the " "external carotid artery branches, possibly suggestive of nonspecific arteritis/arteriopathy. No new recs per ENT. Patient s/p IR guided LP on 05/18 CSF notable for markedly elevated protein count, otherwise wnl. Normal ACTH stim test. Differential includes but is not limited to vasculitis vs MS vs glioma vs AI etiology. Neuro following.  -neuro following, appreciate recs  -ANCA and MPO Ab negative  -C3 and C4 wnl  -CSF Cx negative  -anti-C1q antibody WNL  - cryoglobulins - negative  -AQP4 Ab pending"      Overview/Hospital Course:  Patient placed in observation for syncopal episodes. MRI negative. Neurology consulted. EEG ordered and was negative. Several send out labs pending. PT/OT with recs TBD. Noted to be wildly orthostatic and started on Midodrine.       Interval History: No new issues     Review of Systems   Constitutional:  Positive for activity change.   HENT:  Negative for congestion.    Respiratory:  Negative for chest tightness and shortness of breath.    Cardiovascular:  Negative for chest pain.   Gastrointestinal:  Negative for abdominal distention.   Neurological:  Positive for headaches.   Objective:     Vital Signs (Most Recent):  Temp: 98.3 °F (36.8 °C) (06/15/23 0402)  Pulse: 92 (06/15/23 0402)  Resp: 16 (06/15/23 0536)  BP: (!) 147/92 (06/15/23 0402)  SpO2: 98 % (06/15/23 0402) Vital Signs (24h Range):  Temp:  [98.1 °F (36.7 °C)-99.3 °F (37.4 °C)] 98.3 °F (36.8 °C)  Pulse:  [] 92  Resp:  [16-20] 16  SpO2:  [95 %-100 %] 98 %  BP: ()/(56-92) 147/92     Weight: 98.9 kg (218 lb)  Body mass index is 36.28 kg/m².    Intake/Output Summary (Last 24 hours) at 6/15/2023 0641  Last data filed at 6/15/2023 0606  Gross per 24 hour   Intake 2889.48 ml   Output --   Net 2889.48 ml         Physical Exam  Nursing note reviewed.   Constitutional:       Appearance: He is obese. He is not ill-appearing.   Pulmonary:      Effort: Pulmonary effort is normal. No respiratory distress.   Neurological:      " Mental Status: He is alert and oriented to person, place, and time.           Significant Labs: All pertinent labs within the past 24 hours have been reviewed.  BMP:   Recent Labs   Lab 06/14/23  0514   *      K 4.2   *   CO2 21*   BUN 20   CREATININE 0.8   CALCIUM 9.4   MG 1.9     CBC:   Recent Labs   Lab 06/14/23  0514   WBC 9.76   HGB 10.7*   HCT 31.6*          Significant Imaging:       Assessment/Plan:      * Syncope  Admit to telemetry  This is a recurrent problem  Consult neurology - appreciate recs  MRA head and neck ordered per ED MD  Hold asa in the event he requires a repeat LP  EEG as per neurology  Trend troponin - first is negative  Neuro checks q4h  Seizure prxn    Neuro consult. Plan for EEG- negative.   No further episodes of syncope. Several send out labs pending.  Will have neuro follow up. Cannot drive. Patient understands     Likely from orthostatic hypotension. Started on Midodrine    Altered mental status  Patient now at normal baseline      Debility  Currently walking with walker and using WC PRN  Ordered outpt PT/OT but patient has not gone to this yet    PT/OT consulted.- recs TBD     Severe obesity with body mass index (BMI) of 36.0 to 36.9 with serious comorbidity  Body mass index is 36.39 kg/m². Morbid obesity complicates all aspects of disease management from diagnostic modalities to treatment. Weight loss encouraged and health benefits explained to patient.         Normocytic anemia  Appears stable  Trend cbc    Type 2 diabetes mellitus with hyperglycemia, with long-term current use of insulin  Patient's FSGs are uncontrolled due to hyperglycemia on current medication regimen.  Last A1c reviewed-   Lab Results   Component Value Date    HGBA1C 6.9 (H) 05/10/2023     Most recent fingerstick glucose reviewed-   Recent Labs   Lab 06/12/23  1107 06/12/23  1613 06/12/23  1943 06/13/23  0706   POCTGLUCOSE 171* 220* 236* 160*     Current correctional scale   Medium  Maintain anti-hyperglycemic dose as follows-   Antihyperglycemics (From admission, onward)    Start     Stop Route Frequency Ordered    06/12/23 0345  insulin detemir U-100 (Levemir) pen 10 Units         -- SubQ Nightly 06/12/23 0244    06/12/23 0343  insulin aspart U-100 pen 1-10 Units         -- SubQ Before meals & nightly PRN 06/12/23 0244        Hold Oral hypoglycemics while patient is in the hospital.      VTE Risk Mitigation (From admission, onward)         Ordered     IP VTE HIGH RISK PATIENT  Once         06/12/23 0244     Place sequential compression device  Until discontinued         06/12/23 0244                Discharge Planning   SUSAN: 6/15/2023     Code Status: Full Code   Is the patient medically ready for discharge?:     Reason for patient still in hospital (select all that apply):            Patient is an obs patient. Awaiting PT/OT recs. Likely home later today. Has orthostatic hypotension likely cause of syncope. Started on Midodrine.           Giovanny Mooney MD  Department of Hospital Medicine   [facility]

## 2023-06-15 NOTE — PLAN OF CARE
Problem: Physical Therapy  Goal: Physical Therapy Goal  Description: Goals to be met by: 23     Patient will increase functional independence with mobility by performin. Pt to be mod I with bed mobility.  2. Pt to transfer with supervision.  3. Pt to ambulate 150' /c or /s AD and SBA.  4. Pt to be (I) with HEP.    Outcome: Ongoing, Progressing   Pt able to tolerate short gait distance without large drop in bp today.

## 2023-06-15 NOTE — PT/OT/SLP PROGRESS
Physical Therapy Treatment    Patient Name:  Doe Woodall   MRN:  [id number]    Recommendations:     Discharge Recommendations: outpatient PT  Discharge Equipment Recommendations:  (per OT)      Assessment:     Doe Woodall is a 33 y.o. male admitted with a medical diagnosis of Syncope.  He presents with the following impairments/functional limitations: impaired endurance, impaired functional mobility, impaired cardiopulmonary response to activity.    Rehab Prognosis: Fair; patient would benefit from acute skilled PT services to address these deficits and reach maximum level of function.    Recent Surgery: * No surgery found *      Plan:     During this hospitalization, patient to be seen 5 x/week to address the identified rehab impairments via gait training, therapeutic activities, therapeutic exercises and progress toward the following goals:    Plan of Care Expires:  06/20/23    Subjective     Chief Complaint: HA  Patient/Family Comments/goals: Pt requesting to be able to get to BR  Pain/Comfort:  Pain Rating 1: 9/10  Location 1: head      Objective:     Communicated with nurseLeonor after session.  Patient found supine with bed alarm, peripheral IV, telemetry upon PT entry to room.     General Precautions: Standard, fall  Orthopedic Precautions: N/A  Braces: N/A  Respiratory Status: Room air     Functional Mobility:  Bed Mobility:     Supine to Sit: supervision  Transfers:     Sit to Stand:  stand by assistance with rolling walker  Gait: 5' w/RW CGA to toilet  Balance: Fair+ static/dynamic standing      AM-PAC 6 CLICK MOBILITY  Turning over in bed (including adjusting bedclothes, sheets and blankets)?: 4  Sitting down on and standing up from a chair with arms (e.g., wheelchair, bedside commode, etc.): 4  Moving from lying on back to sitting on the side of the bed?: 4  Moving to and from a bed to a chair (including a wheelchair)?: 3  Need to walk in hospital room?: 3  Climbing 3-5 steps with a railing?:  3  Basic Mobility Total Score: 21       Treatment & Education:  Pt able to sit EOB, ambulate to toilet with RW, stood at sink to wash hands then ambulate to B/S chair. /90 supine; 118/72 /p gt to toilet; 122/67 /p standing and ambulating to B/S chair.    Patient left up in chair with call button in reach, nurse notified, and lunch tray in front of patient .    GOALS:   Multidisciplinary Problems       Physical Therapy Goals          Problem: Physical Therapy    Goal Priority Disciplines Outcome Goal Variances Interventions   Physical Therapy Goal     PT, PT/OT Ongoing, Progressing     Description: Goals to be met by: 23     Patient will increase functional independence with mobility by performin. Pt to be mod I with bed mobility.  2. Pt to transfer with supervision.  3. Pt to ambulate 150' /c or /s AD and SBA.  4. Pt to be (I) with HEP.                         Time Tracking:     PT Received On: 06/15/23  PT Start Time: 1233     PT Stop Time: 1247  PT Total Time (min): 14 min     Billable Minutes: Gait Training 14 (Co-tx with KAM Castro)     Treatment Type: Treatment  PT/PTA: PT           06/15/2023

## 2023-06-15 NOTE — SUBJECTIVE & OBJECTIVE
Interval History: No new issues     Review of Systems   Constitutional:  Positive for activity change.   HENT:  Negative for congestion.    Respiratory:  Negative for chest tightness and shortness of breath.    Cardiovascular:  Negative for chest pain.   Gastrointestinal:  Negative for abdominal distention.   Neurological:  Positive for headaches.   Objective:     Vital Signs (Most Recent):  Temp: 98.3 °F (36.8 °C) (06/15/23 0402)  Pulse: 92 (06/15/23 0402)  Resp: 16 (06/15/23 0536)  BP: (!) 147/92 (06/15/23 0402)  SpO2: 98 % (06/15/23 0402) Vital Signs (24h Range):  Temp:  [98.1 °F (36.7 °C)-99.3 °F (37.4 °C)] 98.3 °F (36.8 °C)  Pulse:  [] 92  Resp:  [16-20] 16  SpO2:  [95 %-100 %] 98 %  BP: ()/(56-92) 147/92     Weight: 98.9 kg (218 lb)  Body mass index is 36.28 kg/m².    Intake/Output Summary (Last 24 hours) at 6/15/2023 0641  Last data filed at 6/15/2023 0606  Gross per 24 hour   Intake 2889.48 ml   Output --   Net 2889.48 ml         Physical Exam  Nursing note reviewed.   Constitutional:       Appearance: He is obese. He is not ill-appearing.   Pulmonary:      Effort: Pulmonary effort is normal. No respiratory distress.   Neurological:      Mental Status: He is alert and oriented to person, place, and time.           Significant Labs: All pertinent labs within the past 24 hours have been reviewed.  BMP:   Recent Labs   Lab 06/14/23 0514   *      K 4.2   *   CO2 21*   BUN 20   CREATININE 0.8   CALCIUM 9.4   MG 1.9     CBC:   Recent Labs   Lab 06/14/23 0514   WBC 9.76   HGB 10.7*   HCT 31.6*          Significant Imaging:

## 2023-06-16 NOTE — NURSING
[facility] [place]  Nurses Note -- 4 Eyes      6/15/2023       Skin assessed on: Q Shift      [] No Pressure Injuries Present    []Prevention Measures Documented    [x] Yes LDA  for Pressure Injury Previously documented     [] Yes New Pressure Injury Discovered   [] LDA for New Pressure Injury Added      Attending RN:  Leonor Velez LPN     Second RN:  ANNA Wellington

## 2023-07-06 NOTE — PROGRESS NOTES
[facility] - [place] - Telemetry  Adult Nutrition  Progress Note    SUMMARY       Recommendations    1) Continue Diabetic Renal Diet as tolerated, encourage PO intake   - Recommend advancing to 2000 kcal to better meet pt needs  2) RD addition of [place] 1.8 BID to assist in meeting needs, promote wound healing.  3) Continue CIWA-Ar Supplementation protocol per ETOH abuse hx and recent reported cessation.   4) Monitor Nutrition related Labs    Goals:   1) Patient to consistently meet > 75% EEN/EPN via PO/ONS intake  2) Monitored labs to trend toward target ranges    Nutrition Goal Status: new  Communication of RD Recs:  (POC)    Assessment and Plan  Nutrition Problem  Inadequate Oral Intake    Related to (etiology):   Hypermetabolic status   Inadequate caloric diet Rx    Signs and Symptoms (as evidenced by):   Wound s/p I&D   1500 kcal diet, no oral supplements    Interventions/Recommendations (treatment strategy):  Carbohydrate Modified Diet  Mineral Modified Diet  Commercial Beverage    Nutrition Diagnosis Status:   New    Reason for Assessment    Reason For Assessment: length of stay  Diagnosis:  (Severe Sepsis)  Relevant Medical History:   Patient Active Problem List   Diagnosis    Type 2 diabetes mellitus with hyperglycemia    Bulge of lumbar disc without myelopathy    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    Floppy eyelid syndrome    Elevated transaminase level    Severe obesity (BMI >= 40)    Other insomnia    Substance induced mood disorder    Alcohol use disorder, severe, dependence    Anxiety disorder    Chronic diastolic heart failure    Type 2 diabetes mellitus with hypoglycemia without coma    Cellulitis of groin    Severe sepsis    ATN (acute tubular necrosis)    Inguinal abscess    Macrocytic anemia    Scrotal abscess    MARISSA (acute kidney injury)     General Information Comments:     9/14 - Pt presented with boil to L-Groin area that had  "worsened in pain and welling from rash initially to boil and began to involve scrotal area. S/p I&D of scrotal/groin abscess 9/9/22. Pt progressing - had developed MARISSA d/t ATN - still has though improved and stable. Will continue to monitor. Per 1500 kcal rx diet pt not receiving enough nutrition to meet healing needs - recommend advancing to 2000 kcal + RD addition of ONS.    Nutrition Discharge Planning: Pending Medical Course; Diabetic Cardiac - FR    Nutrition Risk Screen    Nutrition Risk Screen: large or nonhealing wound, burn or pressure injury    Nutrition/Diet History    Spiritual, Cultural Beliefs, Restoration Practices, Values that Affect Care: no  Food Allergies: NKFA  Factors Affecting Nutritional Intake: None identified at this time    Anthropometrics    Temp: 98.1 °F (36.7 °C)  Height Method: Stated  Height: 5' 6" (167.6 cm)  Height (inches): 66 in  Weight Method: Bed Scale  Weight: 118.4 kg (261 lb 0.4 oz)  Weight (lb): 261.03 lb  Ideal Body Weight (IBW), Male: 142 lb  % Ideal Body Weight, Male (lb): 163.33 %  BMI (Calculated): 42.2  BMI Grade: greater than 40 - morbid obesity  Weight Loss Since Admission: 7 lb       Lab/Procedures/Meds    Pertinent Labs Reviewed: reviewed  CBC:  Recent Labs   Lab 09/14/22  0423   WBC 11.12   HGB 10.2*   HCT 29.9*        CMP:  Recent Labs   Lab 09/14/22  0423   CALCIUM 8.5*   ALBUMIN 2.2*   PROT 6.9      K 3.6   CO2 23      BUN 13   CREATININE 2.1*   ALKPHOS 361*   ALT 38   AST 57*   BILITOT 0.5     Glucose   Date Value Ref Range Status   09/14/2022 175 (H) 70 - 110 mg/dL Final     Pertinent Medications Reviewed: reviewed  Scheduled Meds:   amLODIPine  10 mg Oral Daily    cefTRIAXone (ROCEPHIN) IVPB  2 g Intravenous Q24H    enoxaparin  30 mg Subcutaneous Daily    folic acid  1 mg Oral Daily    insulin detemir U-100  30 Units Subcutaneous QHS    multivitamin  1 tablet Oral Daily    mupirocin   Nasal BID    polyethylene glycol  17 g Oral Daily    " thiamine  100 mg Oral Daily     Continuous Infusions:   sodium chloride 0.9% 125 mL/hr at 09/14/22 1333     PRN Meds:.acetaminophen, albuterol-ipratropium, aluminum-magnesium hydroxide-simethicone, cloNIDine, sars-cov-2 (covid-19), dextrose 10%, dextrose 10%, diphenhydrAMINE, glucagon (human recombinant), glucose, glucose, HYDROcodone-acetaminophen, HYDROmorphone, insulin aspart U-100, melatonin, naloxone, ondansetron, prochlorperazine, simethicone, sodium chloride 0.9%, sodium chloride 0.9%    Estimated/Assessed Needs    Weight Used For Calorie Calculations: 118.4 kg (261 lb 0.4 oz)  Energy Calorie Requirements (kcal): 2368  Energy Need Method: Kcal/kg (20 per MARISSA)  Protein Requirements: 95 - 119g (0.8 - 1.0g/kg per MARISSA)  Weight Used For Protein Calculations: 118.4 kg (261 lb 0.4 oz)  Fluid Requirements (mL): 1 mL/kcal  Estimated Fluid Requirement Method:  (or per MD)  RDA Method (mL): 2368  CHO Requirement: 296g    Nutrition Prescription Ordered    Current Diet Order: Diabetic Renal  Nutrition Order Comments: 1500 kcal    Evaluation of Received Nutrient/Fluid Intake    I/O: + 19.6 L since admit  Energy Calories Required: not meeting needs  Protein Required: not meeting needs  Fluid Required: exceeds needs  Comments: LBM 9/14  % Intake of Estimated Energy Needs: 50 - 75 %  % Meal Intake: 75 - 100 %    Intake/Output - Last 3 Shifts         09/12 0700 09/13 0659 09/13 0700 09/14 0659 09/14 0700  09/15 0659    P.O. 600 720 480    Total Intake(mL/kg) 600 (5.1) 720 (6.1) 480 (4.1)    Net +600 +720 +480           Urine Occurrence 3 x 4 x 3 x    Stool Occurrence 0 x  1 x            Nutrition Risk    Level of Risk/Frequency of Follow-up:  (1-2 x/week)     Monitor and Evaluation    Food and Nutrient Intake: energy intake, food and beverage intake  Food and Nutrient Adminstration: diet order  Knowledge/Beliefs/Attitudes: beliefs and attitudes  Physical Activity and Function: nutrition-related ADLs and IADLs  Anthropometric  Measurements: weight, weight change  Biochemical Data, Medical Tests and Procedures: electrolyte and renal panel, gastrointestinal profile, glucose/endocrine profile, inflammatory profile, lipid profile  Nutrition-Focused Physical Findings: overall appearance     Nutrition Follow-Up    RD Follow-up?: Yes  ALEJANDRA Barraza, RDN, LDN       Attending Attestation (For Attendings USE Only)...

## 2023-07-25 PROBLEM — Z86.19 HISTORY OF NEUROSYPHILIS: Status: ACTIVE | Noted: 2023-01-01

## 2023-07-25 PROBLEM — Z79.4 DIABETES MELLITUS TYPE 2, INSULIN DEPENDENT: Status: ACTIVE | Noted: 2023-01-01

## 2023-07-25 PROBLEM — L02.416 ABSCESS OF LEFT THIGH: Status: ACTIVE | Noted: 2023-01-01

## 2023-07-25 PROBLEM — I95.9 HYPOTENSION: Status: ACTIVE | Noted: 2023-01-01

## 2023-07-25 PROBLEM — N17.9 AKI (ACUTE KIDNEY INJURY): Status: ACTIVE | Noted: 2023-01-01

## 2023-07-25 PROBLEM — K21.9 GERD (GASTROESOPHAGEAL REFLUX DISEASE): Status: ACTIVE | Noted: 2023-01-01

## 2023-07-25 PROBLEM — I10 HYPERTENSION, ESSENTIAL: Status: ACTIVE | Noted: 2023-01-01

## 2023-07-25 PROBLEM — E11.9 DIABETES MELLITUS TYPE 2, INSULIN DEPENDENT: Status: ACTIVE | Noted: 2023-01-01

## 2023-07-25 NOTE — PROGRESS NOTES
Pharmacokinetic Initial Assessment: IV Vancomycin    Assessment/Plan:    Initiate intravenous vancomycin with loading dose of 2000 mg once followed by a maintenance dose of vancomycin 1750mg IV every 24 hours  Desired empiric serum trough concentration is 10 to 20 mcg/mL  Draw vancomycin trough level 60 min prior to third dose on 7/27 at approximately 16:00  Pharmacy will continue to follow and monitor vancomycin.      Please contact pharmacy at extension [phone] with any questions regarding this assessment.     Thank you for the consult,   Marsha Howardzelalem       Patient brief summary:  Doe Woodall is a 33 y.o. male initiated on antimicrobial therapy with IV Vancomycin for treatment of suspected sepsis    Drug Allergies:   Review of patient's allergies indicates:   Allergen Reactions    Metformin Diarrhea       Actual Body Weight:   97.5 kg    Renal Function:   Estimated Creatinine Clearance: 65.7 mL/min (A) (based on SCr of 1.7 mg/dL (H)).,     Dialysis Method (if applicable):  N/A    CBC (last 72 hours):  Recent Labs   Lab Result Units 07/25/23  1534   WBC K/uL 10.43   Hemoglobin g/dL 9.5*   Hematocrit % 29.3*   Platelets K/uL 356   Gran % % 62.6   Lymph % % 29.3   Mono % % 5.8   Eosinophil % % 1.2   Basophil % % 0.6   Differential Method  Automated       Metabolic Panel (last 72 hours):  Recent Labs   Lab Result Units 07/25/23  1534   Sodium mmol/L 137   Potassium mmol/L 4.2   Chloride mmol/L 108   CO2 mmol/L 19*   Glucose mg/dL 234*   BUN mg/dL 22*   Creatinine mg/dL 1.7*   Albumin g/dL 2.3*   Total Bilirubin mg/dL 0.2   Alkaline Phosphatase U/L 266*   AST U/L 29   ALT U/L 25   Magnesium mg/dL 2.0   Phosphorus mg/dL 6.0*       Drug levels (last 3 results):  No results for input(s): VANCOMYCINRA, VANCORANDOM, VANCOMYCINPE, VANCOPEAK, VANCOMYCINTR, VANCOTROUGH in the last 72 hours.    Microbiologic Results:  Microbiology Results (last 7 days)       Procedure Component Value Units Date/Time    Blood culture x two  cultures. Draw prior to antibiotics. [[id number]] Collected: 07/25/23 1539    Order Status: Sent Specimen: Blood from Peripheral, Antecubital, Right Updated: 07/25/23 155    Blood culture x two cultures. Draw prior to antibiotics. [[id number]] Collected: 07/25/23 1539    Order Status: Sent Specimen: Blood from Peripheral, Hand, Left Updated: 07/25/23 1553

## 2023-07-25 NOTE — ED PROVIDER NOTES
"Encounter Date: 7/25/2023    SCRIBE #1 NOTE: I, Becky Cristiane, am scribing for, and in the presence of,  Shruthi Weathers MD.     History     Chief Complaint   Patient presents with    Hypotension     Pt reports physical therapy coming to house today and noted to be hypotensive. Pt denies dizziness, weakness. Pt has no complaints.      Mr. Woodall was at home completing PT earlier today, when his home health nurse noticed he was hypotensive. He has bilateral leg weakness; is doing PT at home d/t extensive hospital stay earlier this year for MRSA infection d/t a left thigh abscess. He states he otherwise feels well today, only feels a bit tired currently as he did not obtain a good night's sleep; he had to wake up early this morning for a GI office visit, then complete PT. His BP was 86/40 at the GI clinic today. He states his "BP has been getting low" since 9/2022. He currently denies any neck or back pain, dysuria, abdominal pain. He endorses nausea earlier that has resolved. He is currently on Doxycyline which he states is from a recent sinus infection. He admits to nonadherence with his medications. Stating he is not good about taking them. Has taken a couple of doses of his antibiotics, amitriptyline, folic acid, and aspirin in the last few days. He does require a walker or wheelchair to get around.     The history is provided by the patient.   Review of patient's allergies indicates:   Allergen Reactions    Metformin Diarrhea     Past Medical History:   Diagnosis Date    Abscess of right groin 09/01/2022    Diabetes mellitus     Encounter for blood transfusion     Loud snoring     Obese     Other insomnia 08/03/2020    Perineal abscess 08/01/2014    Primary hypertension 10/2/2022     Past Surgical History:   Procedure Laterality Date    ABCESS DRAINAGE  2014    PERIRECTAL    DECOMPRESSION OF LUMBAR SPINE USING MINIMALLY INVASIVE TECHNIQUE Right 07/06/2018    Procedure: DECOMPRESSION, SPINE, LUMBAR, MINIMALLY " "INVASIVE L3-4;  Surgeon: Cristian Cervantes MD;  Location: [facility] OR [address] [place] [address];  Service: Neurosurgery;  Laterality: Right;    INCISION AND DRAINAGE N/A 9/9/2022    Procedure: INCISION AND DRAINAGE GROIN;  Surgeon: KAITY Aguilar MD;  Location: [facility] OR;  Service: Urology;  Laterality: N/A;    ORTHOPEDIC SURGERY       Family History   Problem Relation Age of Onset    Diabetes Father     Diabetes Paternal Grandmother     Diabetes Paternal Grandfather      Social History     Tobacco Use    Smoking status: Former     Packs/day: 0.50     Years: 9.00     Pack years: 4.50     Types: Cigarettes     Quit date: 7/1/2013     Years since quitting: 10.0    Smokeless tobacco: Former   Substance Use Topics    Alcohol use: Not Currently     Comment: DAILY PINT OF LIQUOR, HX OF 1 "TALL BOY" OF BEER DAILY    Drug use: Not Currently     Types: Cocaine     Comment: per collateral     Review of Systems   Genitourinary:  Negative for dysuria.   Musculoskeletal:  Negative for arthralgias, back pain and neck pain.   Neurological:  Positive for weakness (BLE).   All other systems reviewed and are negative.    Physical Exam     Initial Vitals [07/25/23 1456]   BP Pulse Resp Temp SpO2   (!) 74/48 93 16 97.8 °F (36.6 °C) 100 %      MAP       --         Physical Exam    Nursing note and vitals reviewed.  Constitutional: He appears well-developed and well-nourished. He is not diaphoretic. No distress.   HENT:   Head: Normocephalic and atraumatic.   Eyes: EOM are normal. Pupils are equal, round, and reactive to light.   Neck:   FROM of neck w/ full flexion and extension. No meningeal signs.    Cardiovascular:  Normal rate, regular rhythm and normal heart sounds.           No murmur heard.  Pulmonary/Chest: Breath sounds normal. No respiratory distress. He has no wheezes.   Abdominal: Abdomen is soft. He exhibits no distension. There is no abdominal tenderness.   Musculoskeletal:         General: Edema present. No tenderness. Normal range of motion.    "   Comments: Good perfusion of extremities. Trace ankle edema.      Neurological: He is alert and oriented to person, place, and time.   Fully lucid and linear.    Skin: Skin is warm and dry.   Psychiatric: He has a normal mood and affect. His behavior is normal. Thought content normal.       ED Course   Critical Care    Date/Time: 7/27/2023 5:13 PM  Performed by: Shrutih Weathers MD  Authorized by: Armando So MD   Direct patient critical care time: 10 minutes  Additional history critical care time: 5 minutes  Ordering / reviewing critical care time: 5 minutes  Documentation critical care time: 5 minutes  Consulting other physicians critical care time: 5 minutes  Total critical care time (exclusive of procedural time) : 30 minutes      Labs Reviewed   CBC W/ AUTO DIFFERENTIAL - Abnormal; Notable for the following components:       Result Value    RBC 3.28 (*)     Hemoglobin 9.5 (*)     Hematocrit 29.3 (*)     RDW 14.9 (*)     Immature Grans (Abs) 0.05 (*)     All other components within normal limits   COMPREHENSIVE METABOLIC PANEL - Abnormal; Notable for the following components:    CO2 19 (*)     Glucose 234 (*)     BUN 22 (*)     Creatinine 1.7 (*)     Albumin 2.3 (*)     Alkaline Phosphatase 266 (*)     eGFR 54 (*)     All other components within normal limits   LACTIC ACID, PLASMA - Abnormal; Notable for the following components:    Lactate (Lactic Acid) 2.3 (*)     All other components within normal limits   URINALYSIS, REFLEX TO URINE CULTURE - Abnormal; Notable for the following components:    Protein, UA 3+ (*)     Glucose, UA 3+ (*)     Occult Blood UA Trace (*)     All other components within normal limits    Narrative:     Specimen Source->Urine   PHOSPHORUS - Abnormal; Notable for the following components:    Phosphorus 6.0 (*)     All other components within normal limits   URINALYSIS, REFLEX TO URINE CULTURE - Abnormal; Notable for the following components:    Protein, UA 3+ (*)     Glucose, UA 3+  (*)     Occult Blood UA Trace (*)     All other components within normal limits    Narrative:     Specimen Source->Urine   URINALYSIS MICROSCOPIC - Abnormal; Notable for the following components:    Hyaline Casts, UA 3 (*)     All other components within normal limits    Narrative:     Specimen Source->Urine   POCT GLUCOSE - Abnormal; Notable for the following components:    POCT Glucose 249 (*)     All other components within normal limits   B-TYPE NATRIURETIC PEPTIDE   TROPONIN I   MAGNESIUM   LACTIC ACID, PLASMA     EKG Readings: (Independently Interpreted)   Initial Reading: No STEMI.   EKG independently interpreted by me:   Normal sinus rhythm. No ectopy. Normal intervals.    ECG Results              EKG 12-LEAD (Final result)  Result time 07/27/23 09:36:21      Final result by Unknown User (07/27/23 09:36:21)                                      Imaging Results              X-Ray Chest AP Portable (Final result)  Result time 07/25/23 16:11:11      Final result by Pedro Robles MD (07/25/23 16:11:11)                   Impression:      As above      Electronically signed by: Pedro Robles MD  Date:    07/25/2023  Time:    16:11               Narrative:    EXAMINATION:  XR CHEST AP PORTABLE    CLINICAL HISTORY:  Hypotension, unspecified    TECHNIQUE:  Portable upright radiograph of the chest was performed.    COMPARISON:  06/16/2023    FINDINGS:  Multiple overlying cardiac monitoring leads. The cardiomediastinal silhouette is normal in size and midline. Pulmonary vascularity appears within normal limits.    Low lung volumes similar to prior exam.  No new confluent pulmonary parenchymal opacity. No pleural fluid or pneumothorax.    Mild distension of partially visualized stomach.                                       Medications   lactated ringers bolus 2,925 mL (0 mLs Intravenous Stopped 7/25/23 9425)   piperacillin-tazobactam (ZOSYN) 4.5 g in dextrose 5 % in water (D5W) 5 % 100 mL IVPB (MB+) (0 g  Intravenous Stopped 7/25/23 1734)   vancomycin (VANCOCIN) 2,000 mg in dextrose 5 % (D5W) 500 mL IVPB (0 mg Intravenous Stopped 7/25/23 1902)     Medical Decision Making:   History:   Old Medical Records: I decided to obtain old medical records.  Old Records Summarized: records from clinic visits and other records.       <> Summary of Records: External records reviewed.   Independently Interpreted Test(s):   I have ordered and independently interpreted EKG Reading(s) - see prior notes  Clinical Tests:   Lab Tests: Ordered and Reviewed  Radiological Study: Ordered and Reviewed  Medical Tests: Ordered and Reviewed  ED Management:  Mr. Woodall appears chronically ill but is non toxic. He does appear tired but reports was up all night w his child.   Has been placed on monitor. Hypotensive initially, improved w fluids.   Labs noted. Blood cultures obtained. One dose of abx given in case of bacteremia. No meningeal signs. Cxr, UA appropriate at this time.   We discussed option and feel more comfortable with monitoring in hospital with further hydration and care. Discussed w Dr. Cristian ascencio  and he will see pt soon.         Scribe Attestation:   Scribe #1: I performed the above scribed service and the documentation accurately describes the services I performed. I attest to the accuracy of the note.                 I, Shruthi Weathers MD, personally performed the services described in this documentation. All medical record entries made by the scribe were at my direction and in my presence. I have reviewed the chart and agree that the record reflects my personal performance and is accurate and complete.    Clinical Impression:   Final diagnoses:  [I95.9] Hypotension        ED Disposition Condition    Admit Stable                Shruthi Weathers MD  07/27/23 7934

## 2023-07-25 NOTE — Clinical Note
Diagnosis: Hypotension [123125]   Admitting Provider:: SAGAR HANSON [[id number]]   Future Attending Provider: BEVERLY STEVENS [[id number]]   Reason for IP Medical Treatment  (Clinical interventions that can only be accomplished in the IP setting? ) :: further close monitoring and specialty care   I certify that Inpatient services for greater than or equal to 2 midnights are medically necessary:: Yes   Plans for Post-Acute care--if anticipated (pick the single best option):: C. Discharge home with home health services   Special Needs:: No Special Needs [1]

## 2023-07-25 NOTE — ED TRIAGE NOTES
"Pt presents to the ED for CC of hypotension. Pt was at physical therepy this afternoon when they took his BP and told him to go to the ER for hypotension. Pt reported BP was 70s over 40s. Pt BP upon arrival is 74/48. Pt denies new weakness, dizziness, blood in stool, NVD, chest pain, SOB. Pt denies any symptoms other than being "sleepy" which he atributes to not getting much sleep last night. Pt is AAOX4 and in no apparent distress at this moment.  "

## 2023-07-26 NOTE — SUBJECTIVE & OBJECTIVE
Past Medical History:   Diagnosis Date    Abscess of right groin 09/01/2022    Diabetes mellitus     Encounter for blood transfusion     Loud snoring     Obese     Other insomnia 08/03/2020    Perineal abscess 08/01/2014    Primary hypertension 10/2/2022       Past Surgical History:   Procedure Laterality Date    ABCESS DRAINAGE  2014    PERIRECTAL    DECOMPRESSION OF LUMBAR SPINE USING MINIMALLY INVASIVE TECHNIQUE Right 07/06/2018    Procedure: DECOMPRESSION, SPINE, LUMBAR, MINIMALLY INVASIVE L3-4;  Surgeon: Cristian Cervantes MD;  Location: [facility] OR [address] [place] [address];  Service: Neurosurgery;  Laterality: Right;    INCISION AND DRAINAGE N/A 9/9/2022    Procedure: INCISION AND DRAINAGE GROIN;  Surgeon: KAITY Aguilar MD;  Location: [facility] OR;  Service: Urology;  Laterality: N/A;    ORTHOPEDIC SURGERY         Review of patient's allergies indicates:   Allergen Reactions    Metformin Diarrhea       No current facility-administered medications on file prior to encounter.     Current Outpatient Medications on File Prior to Encounter   Medication Sig    amitriptyline (ELAVIL) 75 MG tablet Take 1 tablet by mouth every evening.    aspirin 81 MG Chew Take 1 tablet by mouth once daily.    atorvastatin (LIPITOR) 80 MG tablet Take 80 mg by mouth every evening.    famotidine (PEPCID) 20 MG tablet TAKE 1 TABLET(20 MG) BY MOUTH TWICE DAILY FOR 14 DAYS    gabapentin (NEURONTIN) 100 MG capsule Take 100 mg by mouth 3 (three) times daily.    insulin detemir U-100 (LEVEMIR FLEXTOUCH) 100 unit/mL (3 mL) SubQ InPn pen Inject 30 Units into the skin once daily.    insulin glargine 100 units/mL SubQ pen Lantus SoloStar 100 UNIT/ML Subcutaneous Solution Pen-injector QTY: 1 mL Days: 30 Refills: 5  Written: 12/12/22 Patient Instructions: inject 25 units by subcutaneous route 1 time per day at bedtime    midodrine (PROAMATINE) 5 MG Tab Take 1 tablet (5 mg total) by mouth 3 (three) times daily.    oxyCODONE (ROXICODONE) 10 mg Tab immediate release tablet  "Take 10 mg by mouth every 8 (eight) hours as needed for Pain.    pantoprazole (PROTONIX) 40 MG tablet Take 40 mg by mouth once daily.     Family History       Problem Relation (Age of Onset)    Diabetes Father, Paternal Grandmother, Paternal Grandfather          Tobacco Use    Smoking status: Former     Packs/day: 0.50     Years: 9.00     Pack years: 4.50     Types: Cigarettes     Quit date: 7/1/2013     Years since quitting: 10.0    Smokeless tobacco: Former   Substance and Sexual Activity    Alcohol use: Not Currently     Comment: DAILY PINT OF LIQUOR, HX OF 1 "TALL BOY" OF BEER DAILY    Drug use: Not Currently     Types: Cocaine     Comment: per collateral    Sexual activity: Yes     Partners: Female     Birth control/protection: None     Review of Systems  12 point systems were reviewed and negative except as mentioned in HPI section.  Objective:     Vital Signs (Most Recent):  Temp: 98 °F (36.7 °C) (07/25/23 1831)  Pulse: 90 (07/25/23 1831)  Resp: 14 (07/25/23 1831)  BP: (!) 148/93 (07/25/23 1831)  SpO2: 98 % (07/25/23 1831) Vital Signs (24h Range):  Temp:  [97.7 °F (36.5 °C)-98 °F (36.7 °C)] 98 °F (36.7 °C)  Pulse:  [80-93] 90  Resp:  [11-16] 14  SpO2:  [96 %-100 %] 98 %  BP: ()/(48-93) 148/93     Weight: 97.5 kg (215 lb)  Body mass index is 35.78 kg/m².     Physical Exam   General: Alert ,no apparent cardiorespiratory distress, laying comfortably  Eye: PERRL, normal conjunctiva  HEENT: Normocephalic, atraumatic, dry  oral mucosa  Neck: Supple, non-tender, no JVD, no carotid bruit  Respiratory: Lungs CTA, equal breath sounds, symmetric expansion, no chest wall tenderness  Cardiovascular: Normal rate, regular rhythm, no appreciable murmurs rubs or gallops, no peripheral edema, good pulses and equal in all extremities.  Gastrointestinal: Soft, non-tender, non-distended, normal bowel sounds, no appreciable hepatosplenomegaly  Genitourinary: Deferred  Musculoskeletal and Neurologic: Mild baseline LLE " decreased strength.  Psychiatric: Appropriate mood and affect.         Significant Labs: All pertinent labs within the past 24 hours have been reviewed.  CBC:   Recent Labs   Lab 07/25/23  1534   WBC 10.43   HGB 9.5*   HCT 29.3*        CMP:   Recent Labs   Lab 07/25/23  1534      K 4.2      CO2 19*   *   BUN 22*   CREATININE 1.7*   CALCIUM 9.3   PROT 6.6   ALBUMIN 2.3*   BILITOT 0.2   ALKPHOS 266*   AST 29   ALT 25   ANIONGAP 10       Significant Imaging: I have reviewed all pertinent imaging results/findings within the past 24 hours.  I have reviewed and interpreted all pertinent imaging results/findings within the past 24 hours.

## 2023-07-26 NOTE — H&P
[facility]  [facility] Medicine  History & Physical    Patient Name: Doe Woodall  MRN: [id number]  Patient Class: IP- Inpatient  Admission Date: 7/25/2023  Attending Physician: Armando So MD   Primary Care Provider: [facility]         Patient information was obtained from patient and ER records.     Subjective:     Principal Problem:Hypotension    Chief Complaint:   Chief Complaint   Patient presents with    Hypotension     Pt reports physical therapy coming to house today and noted to be hypotensive. Pt denies dizziness, weakness. Pt has no complaints.         HPI: Mr Woodall is a pleasant 33-year-old male being admitted for evaluation of hypotension.  He has history of orthostatic hypotension that was noted to be quite significant and was started on midodrine on 6/15/2023 but he reports that after he went home he did not take it because he could not find it in the pile of his medications.  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 Prior to that was admitted here in February for frequent falls, dehydration and hyponatremia and before that from 9/1-9/16 for sepsis secondary to right groin/perineal cellulitis. Back in October 2022, he was noted to have thigh abscess with cultures notable for GBS.    He reports that he had a regular follow-up with his GI physician this morning and his systolic blood pressure was noted to be in 80s, and after he went home he repeated the blood pressure and was noted to be in 50s/40s although denies any significant lightheadedness or dizziness, and when EMS was called his systolic was  noted to be in 70s.    In the ER, his initial pressure was 74/48, afebrile, heart rate 93 saturating 100% on room air.  His CBC shows normal white count of 10.4 thousand, hemoglobin 9.5 with normal MCV and normal platelet count.  BMP remarkable for acute kidney injury creatinine 1.7 with normal baseline, uncontrolled blood glucose 234 hyperphosphatemia 6.0.  Lactic acidosis 2.3 with repeat pending.    His chest x-ray is negative for acute findings.    Blood cultures are drawn, he received 30 cc/kilo IV crystalloids along with broad-spectrum antibiotics with vancomycin and Zosyn.  Is being admitted for further evaluation and treatment.        Past Medical History:   Diagnosis Date    Abscess of right groin 09/01/2022    Diabetes mellitus     Encounter for blood transfusion     Loud snoring     Obese     Other insomnia 08/03/2020    Perineal abscess 08/01/2014    Primary hypertension 10/2/2022       Past Surgical History:   Procedure Laterality Date    ABCESS DRAINAGE  2014    PERIRECTAL    DECOMPRESSION OF LUMBAR SPINE USING MINIMALLY INVASIVE TECHNIQUE Right 07/06/2018    Procedure: DECOMPRESSION, SPINE, LUMBAR, MINIMALLY INVASIVE L3-4;  Surgeon: Cristian Cervantes MD;  Location: [address];  Service: Neurosurgery;  Laterality: Right;    INCISION AND DRAINAGE N/A 9/9/2022    Procedure: INCISION AND DRAINAGE GROIN;  Surgeon: KAITY Aguilar MD;  Location: [facility] OR;  Service: Urology;  Laterality: N/A;    ORTHOPEDIC SURGERY         Review of patient's allergies indicates:   Allergen Reactions    Metformin Diarrhea       No current facility-administered medications on file prior to encounter.     Current Outpatient Medications on File Prior to Encounter   Medication Sig    amitriptyline (ELAVIL) 75 MG tablet Take 1 tablet by mouth every evening.    aspirin 81 MG Chew Take 1 tablet by mouth once daily.    atorvastatin (LIPITOR) 80 MG tablet Take 80 mg by mouth every evening.    famotidine (PEPCID) 20  "MG tablet TAKE 1 TABLET(20 MG) BY MOUTH TWICE DAILY FOR 14 DAYS    gabapentin (NEURONTIN) 100 MG capsule Take 100 mg by mouth 3 (three) times daily.    insulin detemir U-100 (LEVEMIR FLEXTOUCH) 100 unit/mL (3 mL) SubQ InPn pen Inject 30 Units into the skin once daily.    insulin glargine 100 units/mL SubQ pen Lantus SoloStar 100 UNIT/ML Subcutaneous Solution Pen-injector QTY: 1 mL Days: 30 Refills: 5  Written: 12/12/22 Patient Instructions: inject 25 units by subcutaneous route 1 time per day at bedtime    midodrine (PROAMATINE) 5 MG Tab Take 1 tablet (5 mg total) by mouth 3 (three) times daily.    oxyCODONE (ROXICODONE) 10 mg Tab immediate release tablet Take 10 mg by mouth every 8 (eight) hours as needed for Pain.    pantoprazole (PROTONIX) 40 MG tablet Take 40 mg by mouth once daily.     Family History       Problem Relation (Age of Onset)    Diabetes Father, Paternal Grandmother, Paternal Grandfather          Tobacco Use    Smoking status: Former     Packs/day: 0.50     Years: 9.00     Pack years: 4.50     Types: Cigarettes     Quit date: 7/1/2013     Years since quitting: 10.0    Smokeless tobacco: Former   Substance and Sexual Activity    Alcohol use: Not Currently     Comment: DAILY PINT OF LIQUOR, HX OF 1 "TALL BOY" OF BEER DAILY    Drug use: Not Currently     Types: Cocaine     Comment: per collateral    Sexual activity: Yes     Partners: Female     Birth control/protection: None     Review of Systems  12 point systems were reviewed and negative except as mentioned in HPI section.  Objective:     Vital Signs (Most Recent):  Temp: 98 °F (36.7 °C) (07/25/23 1831)  Pulse: 90 (07/25/23 1831)  Resp: 14 (07/25/23 1831)  BP: (!) 148/93 (07/25/23 1831)  SpO2: 98 % (07/25/23 1831) Vital Signs (24h Range):  Temp:  [97.7 °F (36.5 °C)-98 °F (36.7 °C)] 98 °F (36.7 °C)  Pulse:  [80-93] 90  Resp:  [11-16] 14  SpO2:  [96 %-100 %] 98 %  BP: ()/(48-93) 148/93     Weight: 97.5 kg (215 lb)  Body mass index is " "35.78 kg/m².     Physical Exam   General: Alert ,no apparent cardiorespiratory distress, laying comfortably  Eye: PERRL, normal conjunctiva  HEENT: Normocephalic, atraumatic, dry  oral mucosa  Neck: Supple, non-tender, no JVD, no carotid bruit  Respiratory: Lungs CTA, equal breath sounds, symmetric expansion, no chest wall tenderness  Cardiovascular: Normal rate, regular rhythm, no appreciable murmurs rubs or gallops, no peripheral edema, good pulses and equal in all extremities.  Gastrointestinal: Soft, non-tender, non-distended, normal bowel sounds, no appreciable hepatosplenomegaly  Genitourinary: Deferred  Musculoskeletal and Neurologic: Mild baseline LLE decreased strength.  Psychiatric: Appropriate mood and affect.         Significant Labs: All pertinent labs within the past 24 hours have been reviewed.  CBC:   Recent Labs   Lab 07/25/23  1534   WBC 10.43   HGB 9.5*   HCT 29.3*        CMP:   Recent Labs   Lab 07/25/23  1534      K 4.2      CO2 19*   *   BUN 22*   CREATININE 1.7*   CALCIUM 9.3   PROT 6.6   ALBUMIN 2.3*   BILITOT 0.2   ALKPHOS 266*   AST 29   ALT 25   ANIONGAP 10       Significant Imaging: I have reviewed all pertinent imaging results/findings within the past 24 hours.  I have reviewed and interpreted all pertinent imaging results/findings within the past 24 hours.    Assessment/Plan:     * Hypotension  Rule out sepsis.  Received 30 cc/KG IV crystalloids and IV antibiotics given prior history.  Has prior documented significant orthostatic hypotension therefore will restart midodrine-he was prescribed midodrine on discharge on 6/15/2023 but states that he did not take it because he could not "find it", and is pile of his medications.  We will continue IV fluids overnight.  AM cortisol and TSH.  Orthostatic vitals- will see values, although may not be completely reliable after multiple liters of IVF.        MARISSA (acute kidney injury)  Most likely prerenal, and ATN from " hypotension.  Continue IVF and monitor.  Avoid nephrotoxic agents. Baseline creatinine, normal.    Diabetes mellitus type 2, insulin dependent  On 30 units Lantus with sliding scale insulin and Accu-Cheks.  Reports having associated diabetic gastropathy.  We will hold gabapentin overnight due to hypotension.    Patient's FSGs are uncontrolled due to hyperglycemia on current medication regimen.  Last A1c reviewed-   Lab Results   Component Value Date    HGBA1C 6.9 (H) 05/10/2023     Most recent fingerstick glucose reviewed-   Recent Labs   Lab 07/25/23  1551   POCTGLUCOSE 249*     Current correctional scale  Low  Maintain anti-hyperglycemic dose as follows-   Antihyperglycemics (From admission, onward)    Start     Stop Route Frequency Ordered    07/26/23 0900  insulin detemir U-100 (Levemir) pen 30 Units         -- SubQ Daily 07/25/23 1954        Hold Oral hypoglycemics while patient is in the hospital.    Hypertension, essential  We will hold antihypertensives due to hypotension.        Abscess of left thigh  February 2023, resolved, has had post episode lower extremity weakness and uses wheelchair as a walker to ambulate.  Fall precautions        GERD (gastroesophageal reflux disease)  Continue Protonix        History of neurosyphilis  Treated with 2 weeks of IV penicillin in the past.  Recommend outpatient ID follow-ups        Hyperphosphatemia  Monitor. Has had multiple high values chronically.      VTE Risk Mitigation (From admission, onward)         Ordered     enoxaparin injection 40 mg  Daily         07/25/23 2008     IP VTE HIGH RISK PATIENT  Once         07/25/23 2008     Place sequential compression device  Until discontinued         07/25/23 2008                           Napoleon Foster MD  Department of Hospital Medicine  [facility] [place] - Emergency Dept

## 2023-07-26 NOTE — HPI
Mr Woodall is a pleasant 33-year-old male being admitted for evaluation of hypotension.  He has history of orthostatic hypotension that was noted to be quite significant and was started on midodrine on 6/15/2023 but he reports that after he went home he did not take it because he could not find it in the pile of his medications.  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 Prior to that was admitted here in February for frequent falls, dehydration and hyponatremia and before that from 9/1-9/16 for sepsis secondary to right groin/perineal cellulitis. Back in October 2022, he was noted to have thigh abscess with cultures notable for GBS.    He reports that he had a regular follow-up with his GI physician this morning and his systolic blood pressure was noted to be in 80s, and after he went home he repeated the blood pressure and was noted to be in 50s/40s although denies any significant lightheadedness or dizziness, and when EMS was called his systolic was noted to be in 70s.    In the ER, his initial pressure was 74/48, afebrile, heart rate 93 saturating 100% on room air.  His CBC shows normal white count of 10.4 thousand, hemoglobin 9.5 with normal MCV and normal platelet count.  BMP remarkable for acute kidney injury creatinine 1.7 with normal baseline, uncontrolled blood glucose 234 hyperphosphatemia 6.0.  Lactic acidosis 2.3 with repeat pending.    His chest x-ray is negative for acute findings.    Blood cultures are drawn, he received 30 cc/kilo IV crystalloids along with broad-spectrum antibiotics with vancomycin and Zosyn.  Is being admitted for further evaluation  and treatment.

## 2023-07-26 NOTE — NURSING
[facility] [place], [place]  Nurses Note -- 4 Eyes      7/25/2023      Skin assessed on: Admit      [x] No Pressure Injuries Present    []Prevention Measures Documented    [] Yes LDA  for Pressure Injury Previously documented     [] Yes New Pressure Injury Discovered   [] LDA for New Pressure Injury Added      Attending RN:  Yesenia Valero RN     Second RN:  Raoul Proctor RN

## 2023-07-26 NOTE — PLAN OF CARE
Recommendations    Continue to monitor PO intake of meals and snacks  Continue to monitor weights and labs  Collaboration with medical providers  Add diabetic restrictions to diet order    Goals: 1. Meet % EEN/EPN  Nutrition Goal Status: new  Communication of RD Recs:  (POC)    Assessment and Plan    Nutrition Problem  No nutrition dx at this time.

## 2023-07-26 NOTE — ASSESSMENT & PLAN NOTE
"Rule out sepsis.  Received 30 cc/KG IV crystalloids and IV antibiotics given prior history.  Has prior documented significant orthostatic hypotension therefore will restart midodrine-he was prescribed midodrine on discharge on 6/15/2023 but states that he did not take it because he could not "find it", and is pile of his medications.  We will continue IV fluids overnight.  AM cortisol and TSH.  Orthostatic vitals- will see values, although may not be completely reliable after multiple liters of IVF.      "

## 2023-07-26 NOTE — HOSPITAL COURSE
Admitted with hypotension, BP 74/48 on arrival.  Patient was post add midodrine at home, however did not.  BP improved with fluids and midodrine.  Initial cortisone was low at 2.3, however was taken to early, repeat it 8:00 a.m. Timed cortisol in range. Patient denied inpat, backed down to OBS. Will discharge with midodrine.

## 2023-07-26 NOTE — ASSESSMENT & PLAN NOTE
On 30 units Lantus with sliding scale insulin and Accu-Cheks.  Reports having associated diabetic gastropathy.  We will hold gabapentin overnight due to hypotension.    Patient's FSGs are uncontrolled due to hyperglycemia on current medication regimen.  Last A1c reviewed-   Lab Results   Component Value Date    HGBA1C 6.9 (H) 05/10/2023     Most recent fingerstick glucose reviewed-   Recent Labs   Lab 07/25/23  1551   POCTGLUCOSE 249*     Current correctional scale  Low  Maintain anti-hyperglycemic dose as follows-   Antihyperglycemics (From admission, onward)    Start     Stop Route Frequency Ordered    07/26/23 0900  insulin detemir U-100 (Levemir) pen 30 Units         -- SubQ Daily 07/25/23 1954        Hold Oral hypoglycemics while patient is in the hospital.

## 2023-07-26 NOTE — PLAN OF CARE
[place] - Med Surg  Discharge Final Note    Primary Care Provider: [facility]    Expected Discharge Date: 7/26/2023    Final Discharge Note (most recent)       Final Note - 07/26/23 1752          Final Note    Assessment Type Final Discharge Note     Anticipated Discharge Disposition Home or Self Care     What phone number can be called within the next 1-3 days to see how you are doing after discharge? [phone]     Hospital Resources/Appts/Education Provided Post-Acute resouces added to AVS        Post-Acute Status    Discharge Delays None known at this time                     Important Message from Medicare             Contact Info       [facility]   Relationship: PCP - General    [address] [place] [address]   Phone: [phone]       Next Steps: Schedule an appointment as soon as possible for a visit in 7 day(s)    Instructions: Hospital followup appointment.

## 2023-07-26 NOTE — PROGRESS NOTES
[facility]  Progress Note    Patient Name: Doe Woodall  MRN: [id number]  Patient Class: IP- Inpatient   Admission Date: 7/25/2023  Length of Stay: 1 days  Attending Physician: Armando So MD  Primary Care Provider: [facility]        Subjective:     Principal Problem:Hypotension        HPI:  Mr Woodall is a pleasant 33-year-old male being admitted for evaluation of hypotension.  He has history of orthostatic hypotension that was noted to be quite significant and was started on midodrine on 6/15/2023 but he reports that after he went home he did not take it because he could not find it in the pile of his medications.  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 Prior to that was admitted here in February for frequent falls, dehydration and hyponatremia and before that from 9/1-9/16 for sepsis secondary to right groin/perineal cellulitis. Back in October 2022, he was noted to have thigh abscess with cultures notable for GBS.    He reports that he had a regular follow-up with his GI physician this morning and his systolic blood pressure was noted to be in 80s, and after he went home he repeated the blood pressure and was noted to be in 50s/40s although denies any significant lightheadedness or dizziness, and when EMS was called his systolic was noted to be in 70s.    In the ER, his initial pressure was 74/48, afebrile, heart rate 93 saturating 100% on room air.  His CBC shows normal white count of 10.4 thousand, hemoglobin 9.5 with normal MCV and normal platelet count.  BMP remarkable for acute kidney injury  creatinine 1.7 with normal baseline, uncontrolled blood glucose 234 hyperphosphatemia 6.0.  Lactic acidosis 2.3 with repeat pending.    His chest x-ray is negative for acute findings.    Blood cultures are drawn, he received 30 cc/kilo IV crystalloids along with broad-spectrum antibiotics with vancomycin and Zosyn.  Is being admitted for further evaluation and treatment.        Overview/Hospital Course:  Admitted with hypotension, BP 74/48 on arrival.  Patient was post add midodrine at home, however did not.  BP improved with fluids and midodrine.  Initial cortisone was low at 2.3, however was taken to early, repeat it 8:00 a.m.      Interval History:  NAEON.  No new issues.   Denies complaints.  All questions answered and updates on care given.       ROS:  General: Negative for fevers or chills.  Cardiac: Negative for chest pain or orthopnea   Pulmonary: Negative for dyspnea or wheezing.  GI: Negative for abdominal distention or pain     Vitals:    07/25/23 2303 07/26/23 0421 07/26/23 0750 07/26/23 0827   BP: 125/86 (!) 101/56 (!) 103/59    BP Location: Left arm Left arm Right arm    Patient Position: Sitting Lying Lying    Pulse: 97 83 90    Resp: 18 19 17    Temp: 97.5 °F (36.4 °C) 97.6 °F (36.4 °C) 97.3 °F (36.3 °C)    TempSrc: Oral Oral Oral    SpO2: 100% 98% 98% 98%   Weight:       Height:              Body mass index is 33.93 kg/m².      PHYSICAL EXAM:  GENERAL APPEARANCE: alert and cooperative, and appears to be in no acute distress.  HEENT:     HEAD: NC/AT     EYES: PERRL, EOMI.  Vision is grossly intact.  NECK: Neck supple, non-tender without LAD, masses or thyromegaly.  CARDIAC: There is no peripheral edema, cyanosis or pallor.   LUNGS: Clear to auscultation and percussion without rales or wheezing  ABDOMEN: Non-distended. No guarding.  MSK: No joint erythema or tenderness.   EXTREMITIES: No significant deformity or joint abnormality. No edema.   NEUROLOGICAL: CN II-XII grossly intact.   SKIN: Skin normal  color, texture and turgor with no lesions or eruptions.  PSYCHIATRIC: Oriented. No tangential speech. No Hyperactive features.        Recent Results (from the past 24 hour(s))   CBC auto differential    Collection Time: 07/25/23  3:34 PM   Result Value Ref Range    WBC 10.43 3.90 - 12.70 K/uL    RBC 3.28 (L) 4.60 - 6.20 M/uL    Hemoglobin 9.5 (L) 14.0 - 18.0 g/dL    Hematocrit 29.3 (L) 40.0 - 54.0 %    MCV 89 82 - 98 fL    MCH 29.0 27.0 - 31.0 pg    MCHC 32.4 32.0 - 36.0 g/dL    RDW 14.9 (H) 11.5 - 14.5 %    Platelets 356 150 - 450 K/uL    MPV 9.3 9.2 - 12.9 fL    Immature Granulocytes 0.5 0.0 - 0.5 %    Gran # (ANC) 6.5 1.8 - 7.7 K/uL    Immature Grans (Abs) 0.05 (H) 0.00 - 0.04 K/uL    Lymph # 3.1 1.0 - 4.8 K/uL    Mono # 0.6 0.3 - 1.0 K/uL    Eos # 0.1 0.0 - 0.5 K/uL    Baso # 0.06 0.00 - 0.20 K/uL    nRBC 0 0 /100 WBC    Gran % 62.6 38.0 - 73.0 %    Lymph % 29.3 18.0 - 48.0 %    Mono % 5.8 4.0 - 15.0 %    Eosinophil % 1.2 0.0 - 8.0 %    Basophil % 0.6 0.0 - 1.9 %    Differential Method Automated    Comprehensive metabolic panel    Collection Time: 07/25/23  3:34 PM   Result Value Ref Range    Sodium 137 136 - 145 mmol/L    Potassium 4.2 3.5 - 5.1 mmol/L    Chloride 108 95 - 110 mmol/L    CO2 19 (L) 23 - 29 mmol/L    Glucose 234 (H) 70 - 110 mg/dL    BUN 22 (H) 6 - 20 mg/dL    Creatinine 1.7 (H) 0.5 - 1.4 mg/dL    Calcium 9.3 8.7 - 10.5 mg/dL    Total Protein 6.6 6.0 - 8.4 g/dL    Albumin 2.3 (L) 3.5 - 5.2 g/dL    Total Bilirubin 0.2 0.1 - 1.0 mg/dL    Alkaline Phosphatase 266 (H) 55 - 135 U/L    AST 29 10 - 40 U/L    ALT 25 10 - 44 U/L    eGFR 54 (A) >60 mL/min/1.73 m^2    Anion Gap 10 8 - 16 mmol/L   Lactic acid, plasma #1    Collection Time: 07/25/23  3:34 PM   Result Value Ref Range    Lactate (Lactic Acid) 2.3 (H) 0.5 - 2.2 mmol/L   Brain natriuretic peptide    Collection Time: 07/25/23  3:34 PM   Result Value Ref Range    BNP 35 0 - 99 pg/mL   Troponin I    Collection Time: 07/25/23  3:34 PM   Result Value  Ref Range    Troponin I 0.010 0.000 - 0.026 ng/mL   Magnesium    Collection Time: 07/25/23  3:34 PM   Result Value Ref Range    Magnesium 2.0 1.6 - 2.6 mg/dL   Phosphorus    Collection Time: 07/25/23  3:34 PM   Result Value Ref Range    Phosphorus 6.0 (H) 2.7 - 4.5 mg/dL   Blood culture x two cultures. Draw prior to antibiotics.    Collection Time: 07/25/23  3:35 PM    Specimen: Peripheral, Hand, Left; Blood   Result Value Ref Range    Blood Culture, Routine No Growth to date    Blood culture x two cultures. Draw prior to antibiotics.    Collection Time: 07/25/23  3:39 PM    Specimen: Peripheral, Antecubital, Right; Blood   Result Value Ref Range    Blood Culture, Routine No Growth to date    POCT glucose    Collection Time: 07/25/23  3:51 PM   Result Value Ref Range    POCT Glucose 249 (H) 70 - 110 mg/dL   Urinalysis, Reflex to Urine Culture Urine, Clean Catch    Collection Time: 07/25/23  7:38 PM    Specimen: Urine   Result Value Ref Range    Specimen UA Urine, Clean Catch     Color, UA Yellow Yellow, Straw, Leticia    Appearance, UA Clear Clear    pH, UA 7.0 5.0 - 8.0    Specific Gravity, UA 1.010 1.005 - 1.030    Protein, UA 3+ (A) Negative    Glucose, UA 3+ (A) Negative    Ketones, UA Negative Negative    Bilirubin (UA) Negative Negative    Occult Blood UA Trace (A) Negative    Nitrite, UA Negative Negative    Urobilinogen, UA Negative <2.0 EU/dL    Leukocytes, UA Negative Negative   Urinalysis, Reflex to Urine Culture Urine, Clean Catch    Collection Time: 07/25/23  7:38 PM    Specimen: Urine   Result Value Ref Range    Specimen UA Urine, Clean Catch     Color, UA Yellow Yellow, Straw, Leticia    Appearance, UA Clear Clear    pH, UA 7.0 5.0 - 8.0    Specific Gravity, UA 1.010 1.005 - 1.030    Protein, UA 3+ (A) Negative    Glucose, UA 3+ (A) Negative    Ketones, UA Negative Negative    Bilirubin (UA) Negative Negative    Occult Blood UA Trace (A) Negative    Nitrite, UA Negative Negative    Urobilinogen, UA  Negative <2.0 EU/dL    Leukocytes, UA Negative Negative   Urinalysis Microscopic    Collection Time: 07/25/23  7:38 PM   Result Value Ref Range    RBC, UA 2 0 - 4 /hpf    WBC, UA 1 0 - 5 /hpf    Bacteria Rare None-Occ /hpf    Yeast, UA None None    Hyaline Casts, UA 3 (A) 0-1/lpf /lpf    Microscopic Comment SEE COMMENT    Lactic acid, plasma    Collection Time: 07/25/23  7:48 PM   Result Value Ref Range    Lactate (Lactic Acid) 1.4 0.5 - 2.2 mmol/L   POCT glucose    Collection Time: 07/25/23  8:42 PM   Result Value Ref Range    POCT Glucose 222 (H) 70 - 110 mg/dL   TSH    Collection Time: 07/25/23 10:14 PM   Result Value Ref Range    TSH 1.877 0.400 - 4.000 uIU/mL   Cortisol    Collection Time: 07/26/23  3:26 AM   Result Value Ref Range    Cortisol 2.30 ug/dL   Basic Metabolic Panel (BMP)    Collection Time: 07/26/23  3:26 AM   Result Value Ref Range    Sodium 137 136 - 145 mmol/L    Potassium 3.7 3.5 - 5.1 mmol/L    Chloride 107 95 - 110 mmol/L    CO2 25 23 - 29 mmol/L    Glucose 195 (H) 70 - 110 mg/dL    BUN 23 (H) 6 - 20 mg/dL    Creatinine 1.7 (H) 0.5 - 1.4 mg/dL    Calcium 8.7 8.7 - 10.5 mg/dL    Anion Gap 5 (L) 8 - 16 mmol/L    eGFR 54 (A) >60 mL/min/1.73 m^2   CBC with Automated Differential    Collection Time: 07/26/23  3:26 AM   Result Value Ref Range    WBC 8.12 3.90 - 12.70 K/uL    RBC 2.64 (L) 4.60 - 6.20 M/uL    Hemoglobin 7.7 (L) 14.0 - 18.0 g/dL    Hematocrit 23.3 (L) 40.0 - 54.0 %    MCV 88 82 - 98 fL    MCH 29.2 27.0 - 31.0 pg    MCHC 33.0 32.0 - 36.0 g/dL    RDW 14.8 (H) 11.5 - 14.5 %    Platelets 229 150 - 450 K/uL    MPV 8.9 (L) 9.2 - 12.9 fL    Immature Granulocytes 0.5 0.0 - 0.5 %    Gran # (ANC) 4.7 1.8 - 7.7 K/uL    Immature Grans (Abs) 0.04 0.00 - 0.04 K/uL    Lymph # 2.6 1.0 - 4.8 K/uL    Mono # 0.7 0.3 - 1.0 K/uL    Eos # 0.2 0.0 - 0.5 K/uL    Baso # 0.02 0.00 - 0.20 K/uL    nRBC 0 0 /100 WBC    Gran % 57.4 38.0 - 73.0 %    Lymph % 32.1 18.0 - 48.0 %    Mono % 8.0 4.0 - 15.0 %     Eosinophil % 1.8 0.0 - 8.0 %    Basophil % 0.2 0.0 - 1.9 %    Differential Method Automated        Microbiology Results (last 7 days)     Procedure Component Value Units Date/Time    Blood culture x two cultures. Draw prior to antibiotics. [[id number]] Collected: 07/25/23 1539    Order Status: Completed Specimen: Blood from Peripheral, Antecubital, Right Updated: 07/25/23 2312     Blood Culture, Routine No Growth to date    Narrative:      Aerobic and anaerobic    Blood culture x two cultures. Draw prior to antibiotics. [[id number]] Collected: 07/25/23 1535    Order Status: Completed Specimen: Blood from Peripheral, Hand, Left Updated: 07/25/23 2312     Blood Culture, Routine No Growth to date    Narrative:      Aerobic and anaerobic           Imaging Results          X-Ray Chest AP Portable (Final result)  Result time 07/25/23 16:11:11    Final result by Pedro Robles MD (07/25/23 16:11:11)                 Impression:      As above      Electronically signed by: Pedro Robles MD  Date:    07/25/2023  Time:    16:11             Narrative:    EXAMINATION:  XR CHEST AP PORTABLE    CLINICAL HISTORY:  Hypotension, unspecified    TECHNIQUE:  Portable upright radiograph of the chest was performed.    COMPARISON:  06/16/2023    FINDINGS:  Multiple overlying cardiac monitoring leads. The cardiomediastinal silhouette is normal in size and midline. Pulmonary vascularity appears within normal limits.    Low lung volumes similar to prior exam.  No new confluent pulmonary parenchymal opacity. No pleural fluid or pneumothorax.    Mild distension of partially visualized stomach.                                       Assessment/Plan:      * Hypotension  Rule out sepsis.  Received 30 cc/KG IV crystalloids and IV antibiotics given prior history.  Has prior documented significant orthostatic hypotension therefore will restart midodrine-he was prescribed midodrine on discharge on 6/15/2023 but states that he did not take it because  "he could not "find it", and is pile of his medications.  We will continue IV fluids overnight.  AM cortisol and TSH.  Orthostatic vitals- will see values, although may not be completely reliable after multiple liters of IVF.        MARISSA (acute kidney injury)  Most likely prerenal, and ATN from hypotension.  Continue IVF and monitor.  Avoid nephrotoxic agents. Baseline creatinine, normal.    History of neurosyphilis  Treated with 2 weeks of IV penicillin in the past.  Recommend outpatient ID follow-ups        GERD (gastroesophageal reflux disease)  Continue Protonix        Abscess of left thigh  February 2023, resolved, has had post episode lower extremity weakness and uses wheelchair as a walker to ambulate.  Fall precautions        Diabetes mellitus type 2, insulin dependent  On 30 units Lantus with sliding scale insulin and Accu-Cheks.  Reports having associated diabetic gastropathy.  We will hold gabapentin overnight due to hypotension.    Patient's FSGs are uncontrolled due to hyperglycemia on current medication regimen.  Last A1c reviewed-   Lab Results   Component Value Date    HGBA1C 6.9 (H) 05/10/2023     Most recent fingerstick glucose reviewed-   Recent Labs   Lab 07/25/23  1551   POCTGLUCOSE 249*     Current correctional scale  Low  Maintain anti-hyperglycemic dose as follows-   Antihyperglycemics (From admission, onward)    Start     Stop Route Frequency Ordered    07/26/23 0900  insulin detemir U-100 (Levemir) pen 30 Units         -- SubQ Daily 07/25/23 1954        Hold Oral hypoglycemics while patient is in the hospital.    Hypertension, essential  We will hold antihypertensives due to hypotension.        Hyperphosphatemia  Monitor. Has had multiple high values chronically.        VTE Risk Mitigation (From admission, onward)         Ordered     enoxaparin injection 40 mg  Daily         07/25/23 2008     IP VTE HIGH RISK PATIENT  Once         07/25/23 2008     Place sequential compression device  Until " discontinued         07/25/23 2008                Discharge Planning   SUSAN:      Code Status: Full Code   Is the patient medically ready for discharge?:     Reason for patient still in hospital (select all that apply): Patient trending condition and Treatment                     Armando So MD  Department of [facility]   [facility]

## 2023-07-26 NOTE — ASSESSMENT & PLAN NOTE
Most likely prerenal, and ATN from hypotension.  Continue IVF and monitor.  Avoid nephrotoxic agents. Baseline creatinine, normal.

## 2023-07-26 NOTE — CONSULTS
"  [facility] - Med Surg  Adult Nutrition  Consult Note    SUMMARY     Recommendations    Continue to monitor PO intake of meals and snacks  Continue to monitor weights and labs  Collaboration with medical providers  Add diabetic restrictions to diet order    Goals: 1. Meet % EEN/EPN  Nutrition Goal Status: new  Communication of RD Recs:  (POC)    Assessment and Plan    Nutrition Problem  No nutrition dx at this time.    Reason for Assessment    Reason For Assessment: consult (prompted at admit)  Diagnosis:  (Hypotension)  Relevant Medical History:   Past Medical History:   Diagnosis Date    Abscess of right groin 09/01/2022    Diabetes mellitus     Encounter for blood transfusion     Loud snoring     Obese     Other insomnia 08/03/2020    Perineal abscess 08/01/2014    Primary hypertension 10/2/2022    General Information Comments: RD consult for prompted at admit. Pt with weight fluctuations per chart review. BMI 33.93 obese grade l. Pt consumed all of lunch, continue to monitor PO intake of meals.  Per visual NFPE 7/26/23, pt has no signs of malnutrition at this time.   Interdisciplinary Rounds: did not attend  Nutrition Discharge Planning: diabetic diet    Nutrition Risk Screen    Nutrition Risk Screen: no indicators present    Nutrition/Diet History    Food Allergies: NKFA    Anthropometrics    Temp: 97.6 °F (36.4 °C)  Height Method: Stated  Height: 5' 5" (165.1 cm)  Height (inches): 65 in  Weight Method: Bed Scale  Weight: 92.1 kg (203 lb)  Weight (lb): 203 lb  Ideal Body Weight (IBW), Male: 136 lb  % Ideal Body Weight, Male (lb): 149.26 %  BMI (Calculated): 33.8  BMI Grade: 30 - 34.9- obesity - grade I       Lab/Procedures/Meds    Pertinent Labs Reviewed: reviewed  BMP  Lab Results   Component Value Date     07/26/2023    K 3.7 07/26/2023     07/26/2023    CO2 25 07/26/2023    BUN 23 (H) 07/26/2023    CREATININE 1.7 (H) 07/26/2023    CALCIUM 8.7 07/26/2023    ANIONGAP 5 (L) 07/26/2023    " EGFRNORACEVR 54 (A) 07/26/2023      Pertinent Medications Reviewed: reviewed  Current Outpatient Medications   Medication Instructions    amitriptyline (ELAVIL) 75 MG tablet 1 tablet, Oral, Nightly    aspirin 81 MG Chew 1 tablet, Oral, Daily    atorvastatin (LIPITOR) 80 mg, Oral, Nightly    gabapentin (NEURONTIN) 100 mg, Oral, 3 times daily    insulin detemir U-100 (Levemir) 30 Units, Subcutaneous, Daily    midodrine (PROAMATINE) 15 mg, Oral, 3 times daily    oxyCODONE (ROXICODONE) 10 mg, Oral, Every 8 hours PRN    pantoprazole (PROTONIX) 40 mg, Oral, Daily        Estimated/Assessed Needs    Weight Used For Calorie Calculations: 61.7 kg (136 lb)  Energy Calorie Requirements (kcal): 1927 kcal  Energy Need Method: [place]-St Jeor (1.3 x ibw)  Protein Requirements: 62 g (1.0 g/kg ibw)  Weight Used For Protein Calculations: 61.7 kg (136 lb)     Estimated Fluid Requirement Method: RDA Method  RDA Method (mL): 1927         Nutrition Prescription Ordered    Current Diet Order: cardiac diet    Evaluation of Received Nutrient/Fluid Intake    I/O: +3 L  Energy Calories Required: exceeds needs  Protein Required: exceeds needs  Fluid Required: exceeds needs  Comments: LBM 7/25/23  % Intake of Estimated Energy Needs: 75 - 100 %  % Meal Intake: 75 - 100 %    Nutrition Risk    Level of Risk/Frequency of Follow-up: moderate       Monitor and Evaluation    Food and Nutrient Intake: food and beverage intake  Food and Nutrient Adminstration: diet order  Knowledge/Beliefs/Attitudes: food and nutrition knowledge/skill, beliefs and attitudes  Physical Activity and Function: nutrition-related ADLs and IADLs, factors affecting access to physical activity  Anthropometric Measurements: weight, weight change, body mass index  Biochemical Data, Medical Tests and Procedures: lipid profile  Nutrition-Focused Physical Findings: overall appearance       Nutrition Follow-Up    RD Follow-up?: Yes    Jennifer Baer, Registration Eligible,  Provisional LDN

## 2023-07-26 NOTE — ED NOTES
Assume care; collect UA via in and out cath; pt presents with odd affect; becomes tearful; upon exit of room MD at the bedside; NS given @ 1800 ended; per MD transport after urine reviewed by ERP

## 2023-07-26 NOTE — PROGRESS NOTES
Vancomycin consult follow-up:    Patient reviewed, renal function stable, no new levels, continue current therapy; Next levels due: trough due 7/27/2023 at 1600

## 2023-07-26 NOTE — ASSESSMENT & PLAN NOTE
February 2023, resolved, has had post episode lower extremity weakness and uses wheelchair as a walker to ambulate.  Fall precautions

## 2023-07-26 NOTE — PLAN OF CARE
[facility] - Med Surg  Discharge Assessment    Primary Care Provider: [facility] Jasvir Michele   Case Management Assessment     PCP:  [facility]  Pharmacy: [facility]    Patient Arrived From: Home with parents  Existing Help at Home: Father helps with transportation to appointments    Barriers to Discharge: None    Discharge Plan:    A. Home with family   B. Home      Discharge planning assessment completed with patient's assistance.  Patient from home with parents and uses dme to assist with mobility. Patient receives care at the [facility].   Patient will discharge to home and followup with his primary care provider.     Discharge Assessment (most recent)       BRIEF DISCHARGE ASSESSMENT - 07/26/23 0656          Discharge Planning    Assessment Type Discharge Planning Brief Assessment     Resource/Environmental Concerns none     Support Systems Parent     Assistance Needed Needs assistance with mobility.     Equipment Currently Used at Home walker, rolling;wheelchair     Current Living Arrangements home     Care Facility Name n/a     Patient/Family Anticipates Transition to home with family     Patient/Family Anticipated Services at Transition outpatient care   Followup with PCP    DME Needed Upon Discharge  none     Discharge Plan A Home with family     Discharge Plan B Home

## 2023-07-26 NOTE — PLAN OF CARE
Problem: Adult Inpatient Plan of Care  Goal: Plan of Care Review  Outcome: Ongoing, Progressing  Flowsheets (Taken 7/26/2023 0332)  Plan of Care Reviewed With: patient    Patient remains free from injury and falls. Denies pain. Patient hypotensive when standing when performing orthostatic v/s. Patient resting comfortably. BP WNL. Plan of care continued.

## 2023-07-27 NOTE — NURSING
Discharge instructiosn given to patient by day shift nurse. Medications with patient. Patient transported out by wheelchair to father's car.

## 2023-07-28 NOTE — DISCHARGE SUMMARY
[facility] Medicine  Discharge Summary      Patient Name: Doe Woodall  MRN: [id number]  MICHI: [id number]  Patient Class: OP- Observation  Admission Date: 7/25/2023  Hospital Length of Stay: 1 days  Discharge Date and Time:  07/26/2023 9:36 AM  Attending Physician: No att. providers found   Discharging Provider: Armando So MD  Primary Care Provider: [facility]    Primary Care Team: Networked reference to record PCT     HPI:   Mr Woodall is a pleasant 33-year-old male being admitted for evaluation of hypotension.  He has history of orthostatic hypotension that was noted to be quite significant and was started on midodrine on 6/15/2023 but he reports that after he went home he did not take it because he could not find it in the pile of his medications.  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 Prior to that was admitted here in February for frequent falls, dehydration and hyponatremia and before that from 9/1-9/16 for sepsis secondary to right groin/perineal cellulitis. Back in October 2022, he was noted to have thigh abscess with cultures notable for GBS.    He reports that he had a regular follow-up with his GI physician this morning and his systolic blood pressure was noted to be in 80s, and after he went home he repeated the blood pressure and was noted to be in 50s/40s although denies any significant lightheadedness or dizziness, and when EMS was called his systolic was noted to be in 70s.    In the ER, his initial pressure was 74/48, afebrile, heart rate 93 saturating 100% on room air.  His CBC shows normal white count of  "10.4 thousand, hemoglobin 9.5 with normal MCV and normal platelet count.  BMP remarkable for acute kidney injury creatinine 1.7 with normal baseline, uncontrolled blood glucose 234 hyperphosphatemia 6.0.  Lactic acidosis 2.3 with repeat pending.    His chest x-ray is negative for acute findings.    Blood cultures are drawn, he received 30 cc/kilo IV crystalloids along with broad-spectrum antibiotics with vancomycin and Zosyn.  Is being admitted for further evaluation and treatment.        * No surgery found *      Hospital Course:   Admitted with hypotension, BP 74/48 on arrival.  Patient was post add midodrine at home, however did not.  BP improved with fluids and midodrine.  Initial cortisone was low at 2.3, however was taken to early, repeat it 8:00 a.m. Timed cortisol in range. Patient denied inpat, backed down to OBS. Will discharge with midodrine.        Goals of Care Treatment Preferences:  Code Status: Full Code    ROS:  General: Negative for fevers or chills.  Cardiac: Negative for chest pain or orthopnea   Pulmonary: Negative for dyspnea or wheezing.  GI: Negative for abdominal distention or pain     Vitals:    07/26/23 1137 07/26/23 1321 07/26/23 1639 07/26/23 1900   BP: 130/76  132/63 122/81   BP Location: Left arm  Left arm Left arm   Patient Position: Lying  Lying Sitting   Pulse: 92  94 94   Resp: 17  17 16   Temp: 97.6 °F (36.4 °C)  98 °F (36.7 °C) 97.9 °F (36.6 °C)   TempSrc: Oral  Oral Oral   SpO2: 97%  98% 100%   Weight:  92.1 kg (203 lb)     Height:  5' 5" (1.651 m)            Body mass index is 33.78 kg/m².      PHYSICAL EXAM:  GENERAL APPEARANCE: alert and cooperative, and appears to be in no acute distress.  HEENT:     HEAD: NC/AT     EYES: PERRL, EOMI.  Vision is grossly intact.  NECK: Neck supple, non-tender without LAD, masses or thyromegaly.  CARDIAC: There is no peripheral edema, cyanosis or pallor.   LUNGS: Clear to auscultation and percussion without rales or wheezing  ABDOMEN: " Non-distended. No guarding.  MSK: No joint erythema or tenderness.   EXTREMITIES: No significant deformity or joint abnormality. No edema.   NEUROLOGICAL: CN II-XII grossly intact.   SKIN: Skin normal color, texture and turgor with no lesions or eruptions.  PSYCHIATRIC: Oriented. No tangential speech. No Hyperactive features.    Consults:   Consults (From admission, onward)          Status Ordering Provider     Inpatient consult to Registered Dietitian/Nutritionist  Once        Provider:  (Not yet assigned)    Completed BEVERLY STEVENS     IP consult to case management  Once        Provider:  (Not yet assigned)    Completed BEVERLY STEVENS            No new Assessment & Plan notes have been filed under this hospital service since the last note was generated.  Service: Hospital Medicine    Final Active Diagnoses:    Diagnosis Date Noted POA    PRINCIPAL PROBLEM:  Hypotension [I95.9] 07/25/2023 Yes    Hypertension, essential [I10] 07/25/2023 Yes    Diabetes mellitus type 2, insulin dependent [E11.9, Z79.4] 07/25/2023 Not Applicable    Abscess of left thigh [L02.416] 07/25/2023 Yes    GERD (gastroesophageal reflux disease) [K21.9] 07/25/2023 Yes    History of neurosyphilis [Z86.19] 07/25/2023 Not Applicable    MARISSA (acute kidney injury) [N17.9] 07/25/2023 Yes    Hyperphosphatemia [E83.39] 02/04/2023 Yes      Problems Resolved During this Admission:       Discharged Condition: good    Disposition: Home or Self Care    Follow Up:   Follow-up Information       [facility]. Schedule an appointment as soon as possible for a visit in 7 day(s).    Why: Hospital followup appointment.  Contact information:  [address] [place] [address]  [phone]                           Patient Instructions:      Ambulatory referral/consult to Internal Medicine   Standing Status: Future   Referral Priority: Routine Referral Type: Consultation   Referral Reason: Specialty Services Required   Requested Specialty: Internal  Medicine   Number of Visits Requested: 1     Ambulatory referral/consult to Endocrinology   Standing Status: Future   Referral Priority: Urgent Referral Type: Consultation   Requested Specialty: Endocrinology   Number of Visits Requested: 1       Significant Diagnostic Studies:     Recent Results (from the past 100 hour(s))   CBC auto differential    Collection Time: 07/25/23  3:34 PM   Result Value Ref Range    WBC 10.43 3.90 - 12.70 K/uL    RBC 3.28 (L) 4.60 - 6.20 M/uL    Hemoglobin 9.5 (L) 14.0 - 18.0 g/dL    Hematocrit 29.3 (L) 40.0 - 54.0 %    MCV 89 82 - 98 fL    MCH 29.0 27.0 - 31.0 pg    MCHC 32.4 32.0 - 36.0 g/dL    RDW 14.9 (H) 11.5 - 14.5 %    Platelets 356 150 - 450 K/uL    MPV 9.3 9.2 - 12.9 fL    Immature Granulocytes 0.5 0.0 - 0.5 %    Gran # (ANC) 6.5 1.8 - 7.7 K/uL    Immature Grans (Abs) 0.05 (H) 0.00 - 0.04 K/uL    Lymph # 3.1 1.0 - 4.8 K/uL    Mono # 0.6 0.3 - 1.0 K/uL    Eos # 0.1 0.0 - 0.5 K/uL    Baso # 0.06 0.00 - 0.20 K/uL    nRBC 0 0 /100 WBC    Gran % 62.6 38.0 - 73.0 %    Lymph % 29.3 18.0 - 48.0 %    Mono % 5.8 4.0 - 15.0 %    Eosinophil % 1.2 0.0 - 8.0 %    Basophil % 0.6 0.0 - 1.9 %    Differential Method Automated    Comprehensive metabolic panel    Collection Time: 07/25/23  3:34 PM   Result Value Ref Range    Sodium 137 136 - 145 mmol/L    Potassium 4.2 3.5 - 5.1 mmol/L    Chloride 108 95 - 110 mmol/L    CO2 19 (L) 23 - 29 mmol/L    Glucose 234 (H) 70 - 110 mg/dL    BUN 22 (H) 6 - 20 mg/dL    Creatinine 1.7 (H) 0.5 - 1.4 mg/dL    Calcium 9.3 8.7 - 10.5 mg/dL    Total Protein 6.6 6.0 - 8.4 g/dL    Albumin 2.3 (L) 3.5 - 5.2 g/dL    Total Bilirubin 0.2 0.1 - 1.0 mg/dL    Alkaline Phosphatase 266 (H) 55 - 135 U/L    AST 29 10 - 40 U/L    ALT 25 10 - 44 U/L    eGFR 54 (A) >60 mL/min/1.73 m^2    Anion Gap 10 8 - 16 mmol/L   Lactic acid, plasma #1    Collection Time: 07/25/23  3:34 PM   Result Value Ref Range    Lactate (Lactic Acid) 2.3 (H) 0.5 - 2.2 mmol/L   Brain natriuretic peptide     Collection Time: 07/25/23  3:34 PM   Result Value Ref Range    BNP 35 0 - 99 pg/mL   Troponin I    Collection Time: 07/25/23  3:34 PM   Result Value Ref Range    Troponin I 0.010 0.000 - 0.026 ng/mL   Magnesium    Collection Time: 07/25/23  3:34 PM   Result Value Ref Range    Magnesium 2.0 1.6 - 2.6 mg/dL   Phosphorus    Collection Time: 07/25/23  3:34 PM   Result Value Ref Range    Phosphorus 6.0 (H) 2.7 - 4.5 mg/dL   Blood culture x two cultures. Draw prior to antibiotics.    Collection Time: 07/25/23  3:35 PM    Specimen: Peripheral, Hand, Left; Blood   Result Value Ref Range    Blood Culture, Routine No Growth to date     Blood Culture, Routine No Growth to date     Blood Culture, Routine No Growth to date    Blood culture x two cultures. Draw prior to antibiotics.    Collection Time: 07/25/23  3:39 PM    Specimen: Peripheral, Antecubital, Right; Blood   Result Value Ref Range    Blood Culture, Routine No Growth to date     Blood Culture, Routine No Growth to date     Blood Culture, Routine No Growth to date    POCT glucose    Collection Time: 07/25/23  3:51 PM   Result Value Ref Range    POCT Glucose 249 (H) 70 - 110 mg/dL   Urinalysis, Reflex to Urine Culture Urine, Clean Catch    Collection Time: 07/25/23  7:38 PM    Specimen: Urine   Result Value Ref Range    Specimen UA Urine, Clean Catch     Color, UA Yellow Yellow, Straw, Leticia    Appearance, UA Clear Clear    pH, UA 7.0 5.0 - 8.0    Specific Gravity, UA 1.010 1.005 - 1.030    Protein, UA 3+ (A) Negative    Glucose, UA 3+ (A) Negative    Ketones, UA Negative Negative    Bilirubin (UA) Negative Negative    Occult Blood UA Trace (A) Negative    Nitrite, UA Negative Negative    Urobilinogen, UA Negative <2.0 EU/dL    Leukocytes, UA Negative Negative   Urinalysis, Reflex to Urine Culture Urine, Clean Catch    Collection Time: 07/25/23  7:38 PM    Specimen: Urine   Result Value Ref Range    Specimen UA Urine, Clean Catch     Color, UA Yellow Yellow, Straw,  Leticia    Appearance, UA Clear Clear    pH, UA 7.0 5.0 - 8.0    Specific Gravity, UA 1.010 1.005 - 1.030    Protein, UA 3+ (A) Negative    Glucose, UA 3+ (A) Negative    Ketones, UA Negative Negative    Bilirubin (UA) Negative Negative    Occult Blood UA Trace (A) Negative    Nitrite, UA Negative Negative    Urobilinogen, UA Negative <2.0 EU/dL    Leukocytes, UA Negative Negative   Urinalysis Microscopic    Collection Time: 07/25/23  7:38 PM   Result Value Ref Range    RBC, UA 2 0 - 4 /hpf    WBC, UA 1 0 - 5 /hpf    Bacteria Rare None-Occ /hpf    Yeast, UA None None    Hyaline Casts, UA 3 (A) 0-1/lpf /lpf    Microscopic Comment SEE COMMENT    Lactic acid, plasma    Collection Time: 07/25/23  7:48 PM   Result Value Ref Range    Lactate (Lactic Acid) 1.4 0.5 - 2.2 mmol/L   POCT glucose    Collection Time: 07/25/23  8:42 PM   Result Value Ref Range    POCT Glucose 222 (H) 70 - 110 mg/dL   TSH    Collection Time: 07/25/23 10:14 PM   Result Value Ref Range    TSH 1.877 0.400 - 4.000 uIU/mL   Cortisol    Collection Time: 07/26/23  3:26 AM   Result Value Ref Range    Cortisol 2.30 ug/dL   Basic Metabolic Panel (BMP)    Collection Time: 07/26/23  3:26 AM   Result Value Ref Range    Sodium 137 136 - 145 mmol/L    Potassium 3.7 3.5 - 5.1 mmol/L    Chloride 107 95 - 110 mmol/L    CO2 25 23 - 29 mmol/L    Glucose 195 (H) 70 - 110 mg/dL    BUN 23 (H) 6 - 20 mg/dL    Creatinine 1.7 (H) 0.5 - 1.4 mg/dL    Calcium 8.7 8.7 - 10.5 mg/dL    Anion Gap 5 (L) 8 - 16 mmol/L    eGFR 54 (A) >60 mL/min/1.73 m^2   CBC with Automated Differential    Collection Time: 07/26/23  3:26 AM   Result Value Ref Range    WBC 8.12 3.90 - 12.70 K/uL    RBC 2.64 (L) 4.60 - 6.20 M/uL    Hemoglobin 7.7 (L) 14.0 - 18.0 g/dL    Hematocrit 23.3 (L) 40.0 - 54.0 %    MCV 88 82 - 98 fL    MCH 29.2 27.0 - 31.0 pg    MCHC 33.0 32.0 - 36.0 g/dL    RDW 14.8 (H) 11.5 - 14.5 %    Platelets 229 150 - 450 K/uL    MPV 8.9 (L) 9.2 - 12.9 fL    Immature Granulocytes 0.5 0.0 -  0.5 %    Gran # (ANC) 4.7 1.8 - 7.7 K/uL    Immature Grans (Abs) 0.04 0.00 - 0.04 K/uL    Lymph # 2.6 1.0 - 4.8 K/uL    Mono # 0.7 0.3 - 1.0 K/uL    Eos # 0.2 0.0 - 0.5 K/uL    Baso # 0.02 0.00 - 0.20 K/uL    nRBC 0 0 /100 WBC    Gran % 57.4 38.0 - 73.0 %    Lymph % 32.1 18.0 - 48.0 %    Mono % 8.0 4.0 - 15.0 %    Eosinophil % 1.8 0.0 - 8.0 %    Basophil % 0.2 0.0 - 1.9 %    Differential Method Automated    Cortisol    Collection Time: 07/26/23 10:37 AM   Result Value Ref Range    Cortisol 11.10 ug/dL       Microbiology Results (last 7 days)       ** No results found for the last 168 hours. **            Imaging Results              X-Ray Chest AP Portable (Final result)  Result time 07/25/23 16:11:11      Final result by Pedro Robles MD (07/25/23 16:11:11)                   Impression:      As above      Electronically signed by: Pedro Robles MD  Date:    07/25/2023  Time:    16:11               Narrative:    EXAMINATION:  XR CHEST AP PORTABLE    CLINICAL HISTORY:  Hypotension, unspecified    TECHNIQUE:  Portable upright radiograph of the chest was performed.    COMPARISON:  06/16/2023    FINDINGS:  Multiple overlying cardiac monitoring leads. The cardiomediastinal silhouette is normal in size and midline. Pulmonary vascularity appears within normal limits.    Low lung volumes similar to prior exam.  No new confluent pulmonary parenchymal opacity. No pleural fluid or pneumothorax.    Mild distension of partially visualized stomach.                                          Pending Diagnostic Studies:       None           Medications:  Reconciled Home Medications:      Medication List        CHANGE how you take these medications      midodrine 5 MG Tab  Commonly known as: PROAMATINE  Take 3 tablets (15 mg total) by mouth 3 (three) times daily.  What changed: how much to take            CONTINUE taking these medications      amitriptyline 75 MG tablet  Commonly known as: ELAVIL  Take 1 tablet by mouth every  "evening.     aspirin 81 MG Chew  Take 1 tablet by mouth once daily.     atorvastatin 80 MG tablet  Commonly known as: LIPITOR  Take 80 mg by mouth every evening.     gabapentin 100 MG capsule  Commonly known as: NEURONTIN  Take 100 mg by mouth 3 (three) times daily.     LEVEMIR FLEXPEN 100 unit/mL (3 mL) Inpn pen  Generic drug: insulin detemir U-100 (Levemir)  Inject 30 Units into the skin once daily.     oxyCODONE 10 mg Tab immediate release tablet  Commonly known as: ROXICODONE  Take 10 mg by mouth every 8 (eight) hours as needed for Pain.     pantoprazole 40 MG tablet  Commonly known as: PROTONIX  Take 40 mg by mouth once daily.            STOP taking these medications      famotidine 20 MG tablet  Commonly known as: PEPCID     insulin glargine 100 units/mL SubQ pen            ASK your doctor about these medications      BD ULTRA-FINE MICRO PEN NEEDLE 32 gauge x 1/4" Ndle  Generic drug: pen needle, diabetic  Use 1 each to inject insulin once daily              Indwelling Lines/Drains at time of discharge:   Lines/Drains/Airways       None                   Time spent on the discharge of patient: 35 minutes         Armando So MD  Department of Hospital Medicine  [facility] [place] [facility]  "

## 2023-07-29 PROBLEM — R79.89 ELEVATED LFTS: Status: ACTIVE | Noted: 2023-01-01

## 2023-07-29 PROBLEM — I46.9 CARDIAC ARREST: Status: ACTIVE | Noted: 2023-01-01

## 2023-07-29 PROBLEM — J96.01 ACUTE RESPIRATORY FAILURE WITH HYPOXIA: Status: ACTIVE | Noted: 2023-01-01

## 2023-07-29 NOTE — Clinical Note
Diagnosis: Cardiopulmonary arrest [525539]   Admitting Provider:: NATAN BUCHANAN [[id number]]   Future Attending Provider: NATAN BUCHANAN [[id number]]   Reason for IP Medical Treatment  (Clinical interventions that can only be accomplished in the IP setting? ) :: ivf, anti arrhythmic   I certify that Inpatient services for greater than or equal to 2 midnights are medically necessary:: Yes   Plans for Post-Acute care--if anticipated (pick the single best option):: [facility]

## 2023-07-29 NOTE — ED PROVIDER NOTES
------------------------------------------------------------------------------------------------------------------  I, Melisa Betts, have reviewed the SORT/triage note.    History taken by Ems and family who are independent and reliable historians.  History     Chief Complaint   Patient presents with    Cardiac Arrest     Pt to ED via EMS with complaints of cardiac arrest. Pt unknown down time. Pt last seen last night by family at approx 11 PM. Pt found today, unresponsive, at 1724. Family initiated CPR. Fire arrived at 1729 and continued until EMS arrived. EMS administered 2 Epi. States got ROSC at 1745 - pt did go into VFIVB, shocked once. Upon arrival to ED, pt pulseless. CPR initiated.        HPI: 33 y.o. male past medical history hypertension, diabetes who was recently discharged the hospital for hypotension presents to emergency department with cardiac arrest that was noted about 5:00 p.m. today.  Family states that patient had morphine and amitriptyline at bedside.  He was found by pre-hospital staff to be in asystole.  Intubation, CPR and epi resulted in patient being in VFib.  He was shocked once.  They started on Amiodarone.  Pt had IO to left lower extremity on arrival. Pt arrived with palpable femoral pulse.    Past Medical History:   Diagnosis Date    Abscess of right groin 09/01/2022    Diabetes mellitus     Encounter for blood transfusion     Loud snoring     Obese     Other insomnia 08/03/2020    Perineal abscess 08/01/2014    Primary hypertension 10/2/2022     Past Surgical History:   Procedure Laterality Date    ABCESS DRAINAGE  2014    PERIRECTAL    DECOMPRESSION OF LUMBAR SPINE USING MINIMALLY INVASIVE TECHNIQUE Right 07/06/2018    Procedure: DECOMPRESSION, SPINE, LUMBAR, MINIMALLY INVASIVE L3-4;  Surgeon: Cristian Cervantes MD;  Location: [facility] OR [address] [place] [address];  Service: Neurosurgery;  Laterality: Right;    INCISION AND DRAINAGE N/A 9/9/2022    Procedure: INCISION AND DRAINAGE GROIN;  Surgeon: KAITY  "Tuan Aguilar MD;  Location: [facility] OR;  Service: Urology;  Laterality: N/A;    ORTHOPEDIC SURGERY       Family History   Problem Relation Age of Onset    Diabetes Father     Diabetes Paternal Grandmother     Diabetes Paternal Grandfather      Social History     Tobacco Use    Smoking status: Former     Current packs/day: 0.00     Average packs/day: 0.5 packs/day for 9.0 years (4.5 ttl pk-yrs)     Types: Cigarettes     Start date: 7/1/2004     Quit date: 7/1/2013     Years since quitting: 10.0    Smokeless tobacco: Former   Substance Use Topics    Alcohol use: Not Currently     Comment: DAILY PINT OF LIQUOR, HX OF 1 "TALL BOY" OF BEER DAILY    Drug use: Not Currently     Types: Cocaine     Comment: per collateral       Review of patient's allergies indicates:   Allergen Reactions    Metformin Diarrhea      Current Outpatient Medications:     amitriptyline (ELAVIL) 75 MG tablet, Take 1 tablet by mouth every evening., Disp: , Rfl:     aspirin 81 MG Chew, Take 1 tablet by mouth once daily., Disp: , Rfl:     atorvastatin (LIPITOR) 80 MG tablet, Take 80 mg by mouth every evening., Disp: , Rfl:     gabapentin (NEURONTIN) 100 MG capsule, Take 100 mg by mouth 3 (three) times daily., Disp: , Rfl:     insulin detemir U-100, Levemir, (LEVEMIR FLEXPEN) 100 unit/mL (3 mL) InPn pen, Inject 30 Units into the skin once daily., Disp: 15 mL, Rfl: 0    midodrine (PROAMATINE) 5 MG Tab, Take 3 tablets (15 mg total) by mouth 3 (three) times daily., Disp: 810 tablet, Rfl: 3    oxyCODONE (ROXICODONE) 10 mg Tab immediate release tablet, Take 10 mg by mouth every 8 (eight) hours as needed for Pain., Disp: , Rfl:     pantoprazole (PROTONIX) 40 MG tablet, Take 40 mg by mouth once daily., Disp: , Rfl:     pen needle, diabetic (BD ULTRA-FINE MICRO PEN NEEDLE) 32 gauge x 1/4" Ndle, Use 1 each to inject insulin once daily, Disp: 100 each, Rfl: 0     Review of Systems as per HPI  Review of Systems   Unable to perform ROS: Acuity of condition "       Physical Exam     ED Triage Vitals   Enc Vitals Group      BP 07/29/23 1833 (!) 233/93      Pulse 07/29/23 1812 (!) 163      Resp 07/29/23 1812 13      Temp 07/29/23 1954 96.2 °F (35.7 °C)      Temp Source 07/29/23 2055 Rectal      SpO2 07/29/23 1903 97 %      Weight 07/29/23 1855 227 lb 1.2 oz      Height --       Head Circumference --       Peak Flow --       Pain Score --       Pain Loc --       Pain Edu? --       Excl. in GC? --        PHYSICAL EXAMINATION:  Vitals and nursing note reviewed.  Tachycardia  GENERAL:  3 T, ashen appearing, overweight   Physical Exam  Neurological:      Mental Status: He is unresponsive.      GCS: GCS eye subscore is 1. GCS verbal subscore is 1. GCS motor subscore is 1.         HEENT: Head is normocephalic and atraumatic.  Pupils fixed and dilated dry mucous membranes, ETT in place   LUNGS: equal and bilateral chest rise, clear to auscultations with bag   CV:  no obvious JVD, tachycardia  ABDOMEN:  nondistended.  No guarding  : Normal external genitalia for a male  BACK: FROM  EXTREMITIES: FROM, no obvious swelling, no pedal edema  SKIN: warm, dry, intact, no rash/lesions, no pallor  PSYCH:  Unable to assess       Tests     Labs Reviewed   CBC W/ AUTO DIFFERENTIAL - Abnormal; Notable for the following components:       Result Value    WBC 19.56 (*)     RBC 2.87 (*)     Hemoglobin 8.4 (*)     Hematocrit 27.8 (*)     MCHC 30.2 (*)     nRBC 1 (*)     Mono % 1.0 (*)     All other components within normal limits   COMPREHENSIVE METABOLIC PANEL - Abnormal; Notable for the following components:    Sodium 135 (*)     Potassium 6.0 (*)     CO2 9 (*)     Glucose 264 (*)     BUN 51 (*)     Creatinine 4.4 (*)     Albumin 2.2 (*)     Alkaline Phosphatase 246 (*)     AST 96 (*)     ALT 94 (*)     eGFR 17 (*)     Anion Gap 19 (*)     All other components within normal limits    Narrative:     CO2 & PHOS   critical result(s) called and verbal readback obtained   from SOFIA REID. by LM1  07/29/2023 19:01   LACTIC ACID, PLASMA - Abnormal; Notable for the following components:    Lactate (Lactic Acid) 10.2 (*)     All other components within normal limits    Narrative:     Lactic acid  critical result(s) called and verbal readback obtained   from Trang CASTILLO  by LN2 07/29/2023 19:16   URINALYSIS, REFLEX TO URINE CULTURE - Abnormal; Notable for the following components:    Protein, UA 2+ (*)     Glucose, UA Trace (*)     All other components within normal limits    Narrative:     Specimen Source->Urine   MAGNESIUM - Abnormal; Notable for the following components:    Magnesium 2.9 (*)     All other components within normal limits   PHOSPHORUS - Abnormal; Notable for the following components:    Phosphorus 11.8 (*)     All other components within normal limits    Narrative:     CO2 & PHOS   critical result(s) called and verbal readback obtained   from SOFIA REID. by LM1 07/29/2023 19:01   B-TYPE NATRIURETIC PEPTIDE - Abnormal; Notable for the following components:     (*)     All other components within normal limits   TROPONIN I - Abnormal; Notable for the following components:    Troponin I 0.063 (*)     All other components within normal limits   BASIC METABOLIC PANEL - Abnormal; Notable for the following components:    CO2 13 (*)     Glucose 289 (*)     BUN 51 (*)     Creatinine 4.1 (*)     Anion Gap 18 (*)     eGFR 19 (*)     All other components within normal limits   LACTIC ACID, PLASMA - Abnormal; Notable for the following components:    Lactate (Lactic Acid) 7.3 (*)     All other components within normal limits    Narrative:     Lactic acid critical result(s) called and verbal readback obtained   from April CASTILLO  by 2 07/29/2023 22:47   ISTAT LACTATE - Abnormal; Notable for the following components:    POC Lactate 9.33 (*)     All other components within normal limits   ISTAT PROCEDURE - Abnormal; Notable for the following components:    POC Glucose 271 (*)     POC BUN 54  (*)     POC Creatinine 4.3 (*)     POC TCO2 (MEASURED) 19 (*)     POC Anion Gap 17 (*)     POC Hematocrit 34 (*)     All other components within normal limits   CULTURE, BLOOD   CULTURE, BLOOD   CULTURE, RESPIRATORY   PROTIME-INR   URINALYSIS MICROSCOPIC    Narrative:     Specimen Source->Urine   COMPREHENSIVE METABOLIC PANEL   BASIC METABOLIC PANEL   MAGNESIUM   PHOSPHORUS   CK   CBC W/ AUTO DIFFERENTIAL   PROTIME-INR   APTT   URINALYSIS   NEURON SPECIFIC ENOLASE, SERUM   LACTIC ACID, PLASMA   HEPATIC FUNCTION PANEL   DRUG SCREEN PANEL, URINE EMERGENCY   ISTAT CHEM8   POCT GLUCOSE MONITORING CONTINUOUS   POCT GLUCOSE MONITORING CONTINUOUS     CT Head Without Contrast   Final Result      No acute large vascular territory infarct or intracranial hemorrhage identified.  If persistent neurologic deficit, MRI brain can be obtained.      Minimal paranasal sinus disease with frothy secretions seen in the posterior nasal passages and nasopharynx.         Electronically signed by: Britton Otero MD   Date:    07/29/2023   Time:    21:00      X-Ray Chest AP Portable   Final Result      See above comments.  Recommend clinical correlation and follow-up.         Electronically signed by: Ilir Maurer   Date:    07/29/2023   Time:    20:27      X-Ray Chest AP Portable   Final Result      As above.         Electronically signed by: Rhett Mendenhall MD   Date:    07/29/2023   Time:    18:50      US Lower Extremity Veins Bilateral    (Results Pending)       EKG Reviewed and independently interpreted:  No STEMI  NSR  Nl conduction, nl intervals  Nl ST and T wave   No ectopy, Nl axis      PROCEDURE(S):  Critical Care    Date/Time: 7/29/2023 7:16 PM    Performed by: Melisa Betts MD  Authorized by: Melisa Betts MD  Direct patient critical care time: 40 minutes  Additional history critical care time: 3 minutes  Ordering / reviewing critical care time: 5 minutes  Documentation critical care time: 15 minutes  Consulting other  physicians critical care time: 3 minutes  Consult with family critical care time: 5 minutes  Other critical care time: 5 minutes  Total critical care time (exclusive of procedural time) : 76 minutes  Critical care was necessary to treat or prevent imminent or life-threatening deterioration of the following conditions: cardiac failure.  Critical care was time spent personally by me on the following activities: discussions with consultants, interpretation of cardiac output measurements, evaluation of patient's response to treatment, ordering and performing treatments and interventions, ordering and review of radiographic studies, re-evaluation of patient's condition, review of old charts, ordering and review of laboratory studies and examination of patient.  Comments: CPR 3 x: 1st round of CPR at 18:14, 2nd round at 18:45, 3rd round at 18:59.      Central Line    Date/Time: 7/29/2023 8:04 PM    Performed by: Heike Shea  Authorized by: Melisa Betts MD    Location procedure was performed:  [facility] EMERGENCY DEPARTMENT  Consent Done ?:  Emergent Situation  Indications:  Vascular access  Preparation:  Skin prepped with ChloraPrep  Skin prep agent dried: Skin prep agent completely dried prior to procedure    Sterile barriers: All five maximal sterile barriers used - gloves, gown, cap, mask and large sterile sheet    Hand hygiene: Hand hygiene performed immediately prior to central venous catheter insertion    Location:  Left internal jugular  Catheter type:  Triple lumen  Catheter size:  7.5 Fr  Ultrasound guidance: Yes    Manometry: No    Complications: No    Estimated blood loss (mL):  0  Specimens: No    Implants: No  Termination Site: subclavian       MEDICAL DECISION MAKING     External documents/records reviewed records which were checked/reviewed in EMR:  Patient evaluated on July 25, 2023 for diabetes and hypotension.    This is an emergent evaluation for a 33 y.o. male with the following chronic medical  condition impacting care HTN, Type II DM, chronic diastolic heart failure, obesity, presents via EMS to the ED unresponsive s/p cardiac arrest.  Social determinant of health affecting care:  Substance abuse.  Body mass index is 37.79 kg/m².  With concerns for morbid obesity     Diagnosis or treatment significantly limited by social determinants of health.The following differential diagnoses were considered and/or addressed.  known history of cardiac dysrhythmia, anemia, metabolic disturbances, medication side effect, UTI, myocardial infarction, dehydration, anxiety, pain, thyroid dysfunction, substance abuse, caffeine ingestion.  Unlikely pulmonary embolism.    Treatment plan includes history, physical exam, cardiac monitoring, labs, imaging studies, and supportive care.      ED Course       MDM  Reviewed: previous chart, nursing note and vitals  Reviewed previous: labs, ECG, x-ray and CT scan  Interpretation: labs, ECG, x-ray and CT scan  Total time providing critical care:  minutes. This excludes time spent performing separately reportable procedures and services.  Consults: admitting MD and cardiology             ED Course as of 07/29/23 2256   Sat Jul 29, 2023   1822 The results of physical exam findings were reviewed with the patient's spouse.. This discussion included but not exclusive to the risk to the patient due to the potential underlying pathology, the testing that was required to make the diagnosis, and the treatment administered or prescribed.  She is in [place]. [NO]   1837 Cardiology ([place]) recommends dopamine if patient arrest again. [NO]   1854 Spouse updated. She is going on flight back to [place] now.Patient's father and sister are enroute by car. [NO]   1855 Patient has had bradycardic events x 2 then loses a pulse. Both episodes responsive to epi and brief CPR [NO]   1903 Potassium(!): 6.0  elevated [NO]   1903 CO2(!!): 9  low [NO]   1903 Creatinine(!): 4.4  elevated [NO]   1903 Alkaline  Phosphatase(!): 246  elevated [NO]   1903 AST(!): 96  elevated [NO]   1903 ALT(!): 94  elevated [NO]   1903 Phosphorus(!!): 11.8  elevated [NO]   1904 Patient had another cardiac arrest.  Spontaneous circulation obtained with EpiPen brief CPR.  Labs reviewed.  Treatment for hyperkalemia started.  Amnio discontinued.  Dopamine started. [NO]   1904 POCT ARTERIAL BLOOD GAS Blood Gas  Metabolic acidosis noted with a pH of 7 PaO2 of 80. [NO]   1905 Troponin I(!): 0.063  Elevation more likely from renal failure than coronary artery disease however will consider giving aspirin. [NO]   1913 POC Lactate(!!): 9.33 [NO]   1913 POC Glucose(!): 271 [NO]   1915 Family states that patient takes amitriptyline to go to sleep.  Bicarb drip started. [NO]   2019 WBC(!): 19.56  Elevated [NO]   2019 Hemoglobin(!): 8.4  Anemic [NO]   2019 Lactate, Cyrus(!!): 10.2  Elevated [NO]   2019 Protein, UA(!): 2+  Proteinuria otherwise relatively unremarkable urinalysis [NO]   2024 X-Ray Chest AP Portable  Central line and ETT in place. [NO]   2025 Hospital medicine has been consulted. [NO]   2025 INR: 1.1  unremarkable [NO]   2044 CT Head Without Contrast  No ICH [NO]   2054 Potassium: 4.5 [NO]   2054 Creatinine(!): 4.1  Elevated [NO]      ED Course User Index  [NO] Melisa Betts MD     SEPSIS RE-EVALUATION:  Focus exam performed:  Vital signs reviewed, equal bilateral chest rise, 2+ radial pulses, less than 3 sec cap refill, no pallor.  Intervention performed:  Oxygen saturation measured.  Fluid challenge given.      REASSESSMENT: Sx improved after treatment with:     Medications   DOPamine 400 mg in dextrose 5 % 250 mL infusion (premix) (5 mcg/kg/min × 103 kg Intravenous Rate/Dose Change 7/29/23 2244)   sodium bicarbonate 150 mEq in dextrose 5 % (D5W) 1,000 mL infusion ( Intravenous New Bag 7/29/23 1938)   vancomycin (VANCOCIN) 2,000 mg in dextrose 5 % (D5W) 500 mL IVPB (2,000 mg Intravenous New Bag 7/29/23 8934)   lactated ringers bolus  3,090 mL (0 mLs Intravenous Stopped 7/29/23 1915)   albuterol sulfate nebulizer solution 20 mg (20 mg Nebulization Given 7/29/23 1922)   aspirin suppository 300 mg (300 mg Rectal Given 7/29/23 1954)   EPINEPHrine 0.1 mg/mL injection (1 mg Intravenous Given 7/29/23 1859)   amiodarone injection (300 mg Intravenous Given 7/29/23 1807)   sodium bicarbonate 8.4 % (1 mEq/mL) injection (50 mEq Intravenous Given 7/29/23 1900)   calcium chloride 100 mg/mL (10 %) injection (1 g Intravenous Given 7/29/23 1903)   piperacillin-tazobactam (ZOSYN) 4.5 g in dextrose 5 % in water (D5W) 100 mL IVPB (MB+) (0 g Intravenous Stopped 7/29/23 2128)     Family at bedside. Poor prognosis discussed with patient's mother, father, sister and wife.  Clinical Impression     1. Cardiopulmonary arrest    2. Tachycardia    3. Encounter for central line placement    4. SIRS (systemic inflammatory response syndrome)    5. Cardiac arrest         ED Disposition Condition    Admit Critical            SCRIBE #1 NOTE: I, Heike Shea, am scribing for, and in the presence of,  Melisa Betts MD. I have scribed the following portions of the note - Other sections scribed: HPI, ROS, PE, MDM.       I, Dr. Melisa Betts, personally performed the services described in this documentation. All medical record entries made by the scribe were at my direction and in my presence.  I have reviewed the chart and agree that the record reflects my personal performance and is accurate and complete. Melisa Betts MD.  6:54 PM 07/29/2023           Melisa Betts MD  07/29/23 9940

## 2023-07-30 PROBLEM — M62.82 NON-TRAUMATIC RHABDOMYOLYSIS: Status: ACTIVE | Noted: 2023-01-01

## 2023-07-30 PROBLEM — I21.4 NSTEMI (NON-ST ELEVATED MYOCARDIAL INFARCTION): Status: ACTIVE | Noted: 2023-01-01

## 2023-07-30 NOTE — CLINICAL REVIEW
Sepsis Screen (most recent)       Sepsis Screen (IP) - 07/30/23 0850       Is the patient's history or complaint suggestive of a possible infection? Yes  -    Are there at least two of the following signs and symptoms present? Yes  -    Sepsis signs/symptoms - Hyper or Hypothermia Hyperthermia >100.4 or Hypothermia < 96.8  -    Sepsis signs/symptoms - Tachycardia Tachycardia     >90  -    Sepsis signs/symptoms - WBC WBC < 4,000 or WBC > 12,000  -    Sepsis signs/symptoms - Altered Mental Status Altered Mental Status  -    Are any of the following organ dysfunction criteria present and not considered to be due to a chronic condition? Yes  -    Organ Dysfunction Criteria SBP < 90 or MAP < 65  -    Organ Dysfunction Criteria Creatinine > 2.0  -    Organ Dysfunction Criteria Lactate > 2.0  -    Organ Dysfunction Criteria - Resp Comp Respiratory Compromise: Requiring > 5L NC  -    Initiate Sepsis Protocol No  -    Reason sepsis not considered Pt. receiving appropriate management  -              User Key  (r) = Recorded By, (t) = Taken By, (c) = Cosigned By      Initials Name     Danielle Wolf RN

## 2023-07-30 NOTE — ASSESSMENT & PLAN NOTE
Cr Elevated to 4.0 on admission, but has started to downtrend.  Patient making urine.  Suspect ATN from cardiac arrest.   - continue to monitor daily Cr.  - continue supportive care.

## 2023-07-30 NOTE — ASSESSMENT & PLAN NOTE
Secondary to cardiac arrest and subsequent aspiration vs pulmonary edema.  FiO2 requirements quickly down-trending, which is more suggestive of pulmonary edema.  - low tidal volume ventilation  - daily SAT/SBT  - target sats >90%  - HOB elevated, oral care.

## 2023-07-30 NOTE — SUBJECTIVE & OBJECTIVE
"Past Medical History:   Diagnosis Date    Abscess of right groin 09/01/2022    Diabetes mellitus     Encounter for blood transfusion     Loud snoring     Obese     Other insomnia 08/03/2020    Perineal abscess 08/01/2014    Primary hypertension 10/2/2022       Past Surgical History:   Procedure Laterality Date    ABCESS DRAINAGE  2014    PERIRECTAL    DECOMPRESSION OF LUMBAR SPINE USING MINIMALLY INVASIVE TECHNIQUE Right 07/06/2018    Procedure: DECOMPRESSION, SPINE, LUMBAR, MINIMALLY INVASIVE L3-4;  Surgeon: Cristian Cervantes MD;  Location: [facility] OR [address] [place] [address];  Service: Neurosurgery;  Laterality: Right;    INCISION AND DRAINAGE N/A 9/9/2022    Procedure: INCISION AND DRAINAGE GROIN;  Surgeon: KAITY Aguilar MD;  Location: [facility] OR;  Service: Urology;  Laterality: N/A;    ORTHOPEDIC SURGERY         Review of patient's allergies indicates:   Allergen Reactions    Metformin Diarrhea       No current facility-administered medications on file prior to encounter.     Current Outpatient Medications on File Prior to Encounter   Medication Sig    amitriptyline (ELAVIL) 75 MG tablet Take 1 tablet by mouth every evening.    aspirin 81 MG Chew Take 1 tablet by mouth once daily.    atorvastatin (LIPITOR) 80 MG tablet Take 80 mg by mouth every evening.    gabapentin (NEURONTIN) 100 MG capsule Take 100 mg by mouth 3 (three) times daily.    insulin detemir U-100, Levemir, (LEVEMIR FLEXPEN) 100 unit/mL (3 mL) InPn pen Inject 30 Units into the skin once daily.    midodrine (PROAMATINE) 5 MG Tab Take 3 tablets (15 mg total) by mouth 3 (three) times daily.    oxyCODONE (ROXICODONE) 10 mg Tab immediate release tablet Take 10 mg by mouth every 8 (eight) hours as needed for Pain.    pantoprazole (PROTONIX) 40 MG tablet Take 40 mg by mouth once daily.    pen needle, diabetic (BD ULTRA-FINE MICRO PEN NEEDLE) 32 gauge x 1/4" Ndle Use 1 each to inject insulin once daily     Family History       Problem Relation (Age of Onset)    Diabetes Father, " "Paternal Grandmother, Paternal Grandfather          Tobacco Use    Smoking status: Former     Current packs/day: 0.00     Average packs/day: 0.5 packs/day for 9.0 years (4.5 ttl pk-yrs)     Types: Cigarettes     Start date: 7/1/2004     Quit date: 7/1/2013     Years since quitting: 10.0    Smokeless tobacco: Former   Substance and Sexual Activity    Alcohol use: Not Currently     Comment: DAILY PINT OF LIQUOR, HX OF 1 "TALL BOY" OF BEER DAILY    Drug use: Not Currently     Types: Cocaine     Comment: per collateral    Sexual activity: Yes     Partners: Female     Birth control/protection: None     Review of Systems   Unable to perform ROS: Intubated     Objective:     Vital Signs (Most Recent):  Temp: 96.2 °F (35.7 °C) (07/29/23 1954)  Pulse: 110 (07/29/23 2152)  Resp: 17 (07/29/23 2152)  BP: (!) 161/86 (07/29/23 2152)  SpO2: 100 % (07/29/23 2152) Vital Signs (24h Range):  Temp:  [96.2 °F (35.7 °C)] 96.2 °F (35.7 °C)  Pulse:  [] 110  Resp:  [] 17  SpO2:  [90 %-100 %] 100 %  BP: ()/(35-93) 161/86     Weight: 103 kg (227 lb 1.2 oz)  Body mass index is 37.79 kg/m².     Physical Exam  Vitals and nursing note reviewed.   Constitutional:       Appearance: He is obese. He is ill-appearing.   HENT:      Head: Normocephalic and atraumatic.      Nose: Nose normal.      Mouth/Throat:      Mouth: Mucous membranes are dry.      Comments: ET tube in place.   Eyes:      Comments: Fixed dilated pupils.  Gag reflex +   Cardiovascular:      Rate and Rhythm: Tachycardia present.      Pulses: Normal pulses.      Heart sounds: No murmur heard.  Pulmonary:      Comments: Intubated.   Abdominal:      General: Abdomen is flat. There is no distension.      Palpations: Abdomen is soft.   Musculoskeletal:      Right lower leg: No edema.      Left lower leg: No edema.   Skin:     General: Skin is warm.   Neurological:      Mental Status: He is alert.      Comments: Intubated. Off sedation, does not follow commands.   Gag " reflex +                Significant Labs: All pertinent labs within the past 24 hours have been reviewed.    Significant Imaging: I have reviewed all pertinent imaging results/findings within the past 24 hours.

## 2023-07-30 NOTE — PLAN OF CARE
Pt remains on mechanical ventilation. Sedation/vasopressors turned off this AM. No response to noxious stimuli. No cough, gag. Pupils non reactive. CT head in AM. Accuchecks Q4H, HDSSI. Plan of care reviewed with wife Aminta and pt's parents via .     Problem: Infection  Goal: Absence of Infection Signs and Symptoms  Outcome: Ongoing, Progressing     Problem: Adult Inpatient Plan of Care  Goal: Absence of Hospital-Acquired Illness or Injury  Outcome: Ongoing, Progressing

## 2023-07-30 NOTE — CONSULTS
[facility] - Intensive Care  Cardiology  Consult Note    Patient Name: Doe Woodall  MRN: [id number]  Admission Date: 7/29/2023  Hospital Length of Stay: 1 days  Code Status: Full Code   Attending Provider: Hollie Johnson MD   Consulting Provider: Roberto Duffy MD  Primary Care Physician: [facility]  Principal Problem:Cardiac arrest    Patient information was obtained from patient and ER records.     Inpatient consult to Cardiology  Consult performed by: Roberto Duffy MD  Consult ordered by: Govind Vernon MD        Subjective:     Chief Complaint:  Cardiac arrest     HPI:   Patient came in with cardiac arrest.  Was found down by family.  Unknown downtime.  CPR given for 21 minutes with multiple rhythms and prolonged ACLS.  Finally got Rosc.  Currently in ICU, intubated.  EKG done today shows normal sinus rhythm, normal EKG without any acute ischemic changes.  Patient is intubated and hence history obtained from the chart and the family.        HPI:  Doe Woodall lalito 33 year old male with history of EtOH use, DMII, mood disorder, orthostatic hypotension, depression, recurrent abscesses, who presents with cardiac arrest to the ER on 7/29.  He was found unresponsive by family at 5PM on 7/29 and family began CPR at 5:24.  Per patient's sister, a morphine and amitriptyline bottle were found next to the patient at time of discovery.  EMS arrived and achieved ROSC at 5:45PM.  Rhythm was noted to be asystole by EMS.  He had a vFib was cardioverted x 1 and started on amiodarone.      In the ER, he again arrested 2/2 bradycardia followed by PEA arrest.  During this code he received epi x 6, bicarb x 3, CaCl x 1, and amiodarone with ROSC.  Started on dopamine per cards following ROSC due to bradycardia. He was admitted to the MICU.  He remains unconscious, with absent pupillary, absent gag, absent corneal, and absent oculocephalic reflexes.  He occasionally breaths over the ventilator.      The  "patient has had multiple hospitalizations lately, and per family, "has spent more time in the hospital that out of it lately".  He was most recently in the hospital for hypotension after missing home midodrine (7/25-26).  He was also hospitalized at [facility] after passing out and receiving CPR in a neurology clinic.  It is unclear if he really lost a pulse during this time. He has recurrent skin abscesses/cellulitis, and has had a recent workup for autonomic dysfunction and adrenal insufficiency.     Initial workup shows elevated WBC (19.5), MARISSA (4.1), elevated LFTs (96/94), NSTEMI (0.063), elevated BNP of 109, lactic acidosis (10.2), hyperphosphatemia.  CT head without acute process as of yet.  Chest x-ray compatible with pulmonary edema.   Started on vanc/zosyn, and sodium bicarb gtt.  Dopamine ordered, but off the AM of initial exam.          Past Medical History:   Diagnosis Date    Abscess of right groin 09/01/2022    Diabetes mellitus     Encounter for blood transfusion     Loud snoring     Obese     Other insomnia 08/03/2020    Perineal abscess 08/01/2014    Primary hypertension 10/2/2022       Past Surgical History:   Procedure Laterality Date    ABCESS DRAINAGE  2014    PERIRECTAL    DECOMPRESSION OF LUMBAR SPINE USING MINIMALLY INVASIVE TECHNIQUE Right 07/06/2018    Procedure: DECOMPRESSION, SPINE, LUMBAR, MINIMALLY INVASIVE L3-4;  Surgeon: Cristian Cervantes MD;  Location: [address];  Service: Neurosurgery;  Laterality: Right;    INCISION AND DRAINAGE N/A 9/9/2022    Procedure: INCISION AND DRAINAGE GROIN;  Surgeon: KAITY Aguilar MD;  Location: [facility] OR;  Service: Urology;  Laterality: N/A;    ORTHOPEDIC SURGERY         Review of patient's allergies indicates:   Allergen Reactions    Metformin Diarrhea       No current facility-administered medications on file prior to encounter.     Current Outpatient Medications on File Prior to Encounter   Medication Sig    amitriptyline (ELAVIL) 75 MG " "tablet Take 1 tablet by mouth every evening.    aspirin 81 MG Chew Take 1 tablet by mouth once daily.    atorvastatin (LIPITOR) 80 MG tablet Take 80 mg by mouth every evening.    gabapentin (NEURONTIN) 100 MG capsule Take 100 mg by mouth 3 (three) times daily.    insulin detemir U-100, Levemir, (LEVEMIR FLEXPEN) 100 unit/mL (3 mL) InPn pen Inject 30 Units into the skin once daily.    midodrine (PROAMATINE) 5 MG Tab Take 3 tablets (15 mg total) by mouth 3 (three) times daily.    oxyCODONE (ROXICODONE) 10 mg Tab immediate release tablet Take 10 mg by mouth every 8 (eight) hours as needed for Pain.    pantoprazole (PROTONIX) 40 MG tablet Take 40 mg by mouth once daily.    pen needle, diabetic (BD ULTRA-FINE MICRO PEN NEEDLE) 32 gauge x 1/4" Ndle Use 1 each to inject insulin once daily     Family History       Problem Relation (Age of Onset)    Diabetes Father, Paternal Grandmother, Paternal Grandfather          Tobacco Use    Smoking status: Former     Current packs/day: 0.00     Average packs/day: 0.5 packs/day for 9.0 years (4.5 ttl pk-yrs)     Types: Cigarettes     Start date: 7/1/2004     Quit date: 7/1/2013     Years since quitting: 10.0    Smokeless tobacco: Former   Substance and Sexual Activity    Alcohol use: Not Currently     Comment: DAILY PINT OF LIQUOR, HX OF 1 "TALL BOY" OF BEER DAILY    Drug use: Not Currently     Types: Cocaine     Comment: per collateral    Sexual activity: Yes     Partners: Female     Birth control/protection: None     Review of Systems   Unable to perform ROS: Intubated     Objective:     Vital Signs (Most Recent):  Temp: 96.3 °F (35.7 °C) (07/30/23 1045)  Pulse: 82 (07/30/23 1100)  Resp: (!) 23 (07/30/23 1100)  BP: (!) 106/59 (07/30/23 1100)  SpO2: 100 % (07/30/23 1100) Vital Signs (24h Range):  Temp:  [94.6 °F (34.8 °C)-96.4 °F (35.8 °C)] 96.3 °F (35.7 °C)  Pulse:  [] 82  Resp:  [0-101] 23  SpO2:  [90 %-100 %] 100 %  BP: ()/(35-93) 106/59     Weight: 103.4 " kg (228 lb)  Body mass index is 40.39 kg/m².    SpO2: 100 %         Intake/Output Summary (Last 24 hours) at 7/30/2023 1112  Last data filed at 7/30/2023 1100  Gross per 24 hour   Intake 6766.64 ml   Output 3000 ml   Net 3766.64 ml       Lines/Drains/Airways       Central Venous Catheter Line  Duration             Percutaneous Central Line Insertion/Assessment - Triple Lumen  07/29/23 1948 Internal Jugular Left <1 day              Drain  Duration                  NG/OG Tube 07/29/23 1824 16 Fr. Right mouth <1 day         Urethral Catheter 07/29/23 1820 16 Fr. <1 day              Airway  Duration                  Airway - Non-Surgical 07/29/23 1800 Endotracheal Tube <1 day              Peripheral Intravenous Line  Duration                  Peripheral IV - Single Lumen 07/29/23 1808 20 G Anterior;Right Hand <1 day         Peripheral IV - Single Lumen 07/29/23 1943 20 G Right Hand <1 day                     Physical Exam  Constitutional:       Appearance: He is obese. He is ill-appearing.      Interventions: He is intubated.   Eyes:      Conjunctiva/sclera: Conjunctivae normal.      Comments: Pupils fixed and dilated.   Cardiovascular:      Rate and Rhythm: Normal rate and regular rhythm.      Pulses: Normal pulses.   Pulmonary:      Effort: He is intubated.   Abdominal:      Palpations: Abdomen is soft.   Musculoskeletal:      Cervical back: Neck supple.   Skin:     General: Skin is warm.   Neurological:      Comments: No spontaneous movements.  Not responsive to pain.          Significant Labs: BMP:   Recent Labs   Lab 07/29/23 1830 07/29/23 2022 07/29/23 2312 07/30/23  0713   * 289* 464* 431*   * 139 135* 135*   K 6.0* 4.5 4.6 4.2    108 105 101   CO2 9* 13* 11* 19*   BUN 51* 51* 50* 50*   CREATININE 4.4* 4.1* 4.0* 3.8*   CALCIUM 8.9 8.8 8.4* 8.3*   MG 2.9*  --  2.1 1.9   , CMP   Recent Labs   Lab 07/29/23 1830 07/29/23 2022 07/29/23 2312 07/30/23  0713   * 139 135* 135*   K 6.0* 4.5  "4.6 4.2    108 105 101   CO2 9* 13* 11* 19*   * 289* 464* 431*   BUN 51* 51* 50* 50*   CREATININE 4.4* 4.1* 4.0* 3.8*   CALCIUM 8.9 8.8 8.4* 8.3*   PROT 6.7  --  5.6*  --    ALBUMIN 2.2*  --  1.9*  --    BILITOT 0.2  --  0.6  --    ALKPHOS 246*  --  219*  --    AST 96*  --  353*  --    ALT 94*  --  338*  --    ANIONGAP 19* 18* 19* 15   , CBC   Recent Labs   Lab 07/29/23 1830 07/29/23 1905 07/29/23 2312 07/30/23  0553   WBC 19.56*  --  19.78* 19.96*   HGB 8.4*  --  8.2* 8.3*   HCT 27.8*   < > 25.5* 24.7*     --  194 182    < > = values in this interval not displayed.   , INR   Recent Labs   Lab 07/29/23 1951 07/29/23 2312 07/30/23  0553   INR 1.1 1.1 1.1   , Lipid Panel No results for input(s): "CHOL", "HDL", "LDLCALC", "TRIG", "CHOLHDL" in the last 48 hours., Troponin   Recent Labs   Lab 07/29/23 1830 07/30/23  0840   TROPONINI 0.063* 1.803*   , and All pertinent lab results from the last 24 hours have been reviewed.    Significant Imaging: Echocardiogram: Transthoracic echo (TTE) complete (Cupid Only):   Results for orders placed or performed during the hospital encounter of 07/29/23   Echo   Result Value Ref Range    BSA 2.14 m2    LVOT stroke volume 65.70 cm3    LVIDd 5.36 3.5 - 6.0 cm    LV Systolic Volume 61.52 mL    LV Systolic Volume Index 30.2 mL/m2    LVIDs 3.79 2.1 - 4.0 cm    LV Diastolic Volume 139.09 mL    LV Diastolic Volume Index 68.18 mL/m2    IVS 0.56 (A) 0.6 - 1.1 cm    LVOT diameter 2.28 cm    LVOT area 4.1 cm2    FS 29 28 - 44 %    Left Ventricle Relative Wall Thickness 0.31 cm    Posterior Wall 0.84 0.6 - 1.1 cm    TDI LATERAL 0.09 m/s    TDI SEPTAL 0.07 m/s    LV mass 129.50 g    LV Mass Index 63 g/m2    MV Peak E Liam 0.85 m/s    LV LATERAL E/E' RATIO 9.44 m/s    LV SEPTAL E/E' RATIO 12.14 m/s    E/E' ratio 10.63 m/s    MV Peak A Liam 0.97 m/s    TR Max Liam 2.49 m/s    E/A ratio 0.88     Mean e' 0.08 m/s    E wave deceleration time 144.04 msec    LVOT peak liam 1.08 m/s "    Left Ventricular Outflow Tract Mean Velocity 0.81 cm/s    Left Ventricular Outflow Tract Mean Gradient 2.87 mmHg    LA size 3.76 cm    Left Atrium Minor Axis 3.88 cm    RVDD 3.28 cm    RA Major Axis 4.27 cm    RA Width 3.23 cm    AV mean gradient 4 mmHg    AV peak gradient 6 mmHg    Ao peak leonel 1.26 m/s    Ao VTI 19.00 cm    LVOT peak VTI 16.10 cm    AV valve area 3.46 cm²    AV Velocity Ratio 0.86     AV index (prosthetic) 0.85     YUN by Velocity Ratio 3.50 cm²    MV mean gradient 2 mmHg    MV peak gradient 4 mmHg    MV stenosis pressure 1/2 time 66.68 ms    MV valve area p 1/2 method 3.30 cm2    MV valve area by continuity eq 4.08 cm2    MV VTI 16.1 cm    TAPSE 1.99 cm    Triscuspid Valve Regurgitation Peak Gradient 25 mmHg    PV PEAK VELOCITY 1.18 m/s    PV peak gradient 6 mmHg    Sinus 2.89 cm    STJ 2.79 cm    Ascending aorta 2.75 cm    ZLVIDS 0.13     ZLVIDD -1.26     IVC diameter 2.17 cm    Narrative      Left Ventricle: The left ventricle is normal in size. Normal wall   thickness. Normal wall motion. There is normal systolic function with a   visually estimated ejection fraction of 60 - 65%. Grade I diastolic   dysfunction.    Left Atrium: Normal left atrial size.    Right Ventricle: Normal right ventricular cavity size.    IVC/SVC: Patient is ventilated, cannot use IVC diameter to estimate   right atrial pressure.    Pericardium: There is no pericardial effusion.       Assessment and Plan:     * Cardiac arrest  Patient had cardiac arrest.  Was found down.  Had amitriptyline and morphine on bedside.  EKG does not show evidence of ST elevation MI.  Has not had any arrhythmias on tele monitoring since he came to the ICU.  Troponin elevated, likely secondary to demand ischemia caused by prolonged CPR and hypoxemia.  Echocardiogram shows normal left ventricular systolic function.  Currently being cold.  No indication for cardiac catheterization in the current setting as the risk exceeds the benefit as  it is unlikely to be a primary cardiac event based on the above factors.  Also patient has multiorgan failure along with acute kidney injury.  Will reconsider if patient recovers neurologically.  Continue supportive care    NSTEMI (non-ST elevated myocardial infarction)  Troponin elevation likely type 2 caused by prolonged CPR and hypoxemia.  Echo shows normal left ventricle systolic function with no wall motion abnormalities and EKG done today is totally normal sinus rhythm with no acute ischemic changes noted    MARISSA (acute kidney injury)        Lactic acidosis        Type 2 diabetes mellitus with hyperglycemia, with long-term current use of insulin  Management per primary team    Alcohol use disorder, severe, dependence            VTE Risk Mitigation (From admission, onward)         Ordered     heparin (porcine) injection 5,000 Units  Every 8 hours         07/30/23 0819     IP VTE HIGH RISK PATIENT  Once         07/29/23 2235     Place sequential compression device  Until discontinued         07/29/23 2235                Thank you for your consult. I will follow-up with patient. Please contact us if you have any additional questions.    Roberto Duffy MD  Cardiology   [facility] [place] - Intensive Care    Critical Care Time:  45 minutes     Critical care was time spent personally by me on the following activities: development of treatment plan with patient or surrogate and bedside caregivers, discussions with consultants, evaluation of patient's response to treatment, examination of patient, ordering and performing treatments and interventions, ordering and review of laboratory studies, ordering and review of radiographic studies, pulse oximetry, re-evaluation of patient's condition. This critical care time did not overlap with that of any other provider or involve time for any procedures.

## 2023-07-30 NOTE — NURSING
Notified Dr. Borrero of 489 one time glucose check, requesting tc and update of insulin orders to match Q4 glucose checks, MD will enter

## 2023-07-30 NOTE — SUBJECTIVE & OBJECTIVE
"Past Medical History:   Diagnosis Date    Abscess of right groin 09/01/2022    Diabetes mellitus     Encounter for blood transfusion     Loud snoring     Obese     Other insomnia 08/03/2020    Perineal abscess 08/01/2014    Primary hypertension 10/2/2022       Past Surgical History:   Procedure Laterality Date    ABCESS DRAINAGE  2014    PERIRECTAL    DECOMPRESSION OF LUMBAR SPINE USING MINIMALLY INVASIVE TECHNIQUE Right 07/06/2018    Procedure: DECOMPRESSION, SPINE, LUMBAR, MINIMALLY INVASIVE L3-4;  Surgeon: Cristian Cervantes MD;  Location: [facility] OR [address] [place] [address];  Service: Neurosurgery;  Laterality: Right;    INCISION AND DRAINAGE N/A 9/9/2022    Procedure: INCISION AND DRAINAGE GROIN;  Surgeon: KAITY Aguilar MD;  Location: [facility] OR;  Service: Urology;  Laterality: N/A;    ORTHOPEDIC SURGERY         Review of patient's allergies indicates:   Allergen Reactions    Metformin Diarrhea       No current facility-administered medications on file prior to encounter.     Current Outpatient Medications on File Prior to Encounter   Medication Sig    amitriptyline (ELAVIL) 75 MG tablet Take 1 tablet by mouth every evening.    aspirin 81 MG Chew Take 1 tablet by mouth once daily.    atorvastatin (LIPITOR) 80 MG tablet Take 80 mg by mouth every evening.    gabapentin (NEURONTIN) 100 MG capsule Take 100 mg by mouth 3 (three) times daily.    insulin detemir U-100, Levemir, (LEVEMIR FLEXPEN) 100 unit/mL (3 mL) InPn pen Inject 30 Units into the skin once daily.    midodrine (PROAMATINE) 5 MG Tab Take 3 tablets (15 mg total) by mouth 3 (three) times daily.    oxyCODONE (ROXICODONE) 10 mg Tab immediate release tablet Take 10 mg by mouth every 8 (eight) hours as needed for Pain.    pantoprazole (PROTONIX) 40 MG tablet Take 40 mg by mouth once daily.    pen needle, diabetic (BD ULTRA-FINE MICRO PEN NEEDLE) 32 gauge x 1/4" Ndle Use 1 each to inject insulin once daily     Family History       Problem Relation (Age of Onset)    Diabetes Father, " "Paternal Grandmother, Paternal Grandfather          Tobacco Use    Smoking status: Former     Current packs/day: 0.00     Average packs/day: 0.5 packs/day for 9.0 years (4.5 ttl pk-yrs)     Types: Cigarettes     Start date: 7/1/2004     Quit date: 7/1/2013     Years since quitting: 10.0    Smokeless tobacco: Former   Substance and Sexual Activity    Alcohol use: Not Currently     Comment: DAILY PINT OF LIQUOR, HX OF 1 "TALL BOY" OF BEER DAILY    Drug use: Not Currently     Types: Cocaine     Comment: per collateral    Sexual activity: Yes     Partners: Female     Birth control/protection: None     Review of Systems   Unable to perform ROS: Intubated     Objective:     Vital Signs (Most Recent):  Temp: 96.3 °F (35.7 °C) (07/30/23 1045)  Pulse: 82 (07/30/23 1100)  Resp: (!) 23 (07/30/23 1100)  BP: (!) 106/59 (07/30/23 1100)  SpO2: 100 % (07/30/23 1100) Vital Signs (24h Range):  Temp:  [94.6 °F (34.8 °C)-96.4 °F (35.8 °C)] 96.3 °F (35.7 °C)  Pulse:  [] 82  Resp:  [0-101] 23  SpO2:  [90 %-100 %] 100 %  BP: ()/(35-93) 106/59     Weight: 103.4 kg (228 lb)  Body mass index is 40.39 kg/m².    SpO2: 100 %         Intake/Output Summary (Last 24 hours) at 7/30/2023 1112  Last data filed at 7/30/2023 1100  Gross per 24 hour   Intake 6766.64 ml   Output 3000 ml   Net 3766.64 ml       Lines/Drains/Airways       Central Venous Catheter Line  Duration             Percutaneous Central Line Insertion/Assessment - Triple Lumen  07/29/23 1948 Internal Jugular Left <1 day              Drain  Duration                  NG/OG Tube 07/29/23 1824 16 Fr. Right mouth <1 day         Urethral Catheter 07/29/23 1820 16 Fr. <1 day              Airway  Duration                  Airway - Non-Surgical 07/29/23 1800 Endotracheal Tube <1 day              Peripheral Intravenous Line  Duration                  Peripheral IV - Single Lumen 07/29/23 1808 20 G Anterior;Right Hand <1 day         Peripheral IV - Single Lumen 07/29/23 1943 20 G " "Right Hand <1 day                     Physical Exam  Constitutional:       Appearance: He is obese. He is ill-appearing.      Interventions: He is intubated.   Eyes:      Conjunctiva/sclera: Conjunctivae normal.      Comments: Pupils fixed and dilated.   Cardiovascular:      Rate and Rhythm: Normal rate and regular rhythm.      Pulses: Normal pulses.   Pulmonary:      Effort: He is intubated.   Abdominal:      Palpations: Abdomen is soft.   Musculoskeletal:      Cervical back: Neck supple.   Skin:     General: Skin is warm.   Neurological:      Comments: No spontaneous movements.  Not responsive to pain.          Significant Labs: BMP:   Recent Labs   Lab 07/29/23 1830 07/29/23 2022 07/29/23 2312 07/30/23  0713   * 289* 464* 431*   * 139 135* 135*   K 6.0* 4.5 4.6 4.2    108 105 101   CO2 9* 13* 11* 19*   BUN 51* 51* 50* 50*   CREATININE 4.4* 4.1* 4.0* 3.8*   CALCIUM 8.9 8.8 8.4* 8.3*   MG 2.9*  --  2.1 1.9   , CMP   Recent Labs   Lab 07/29/23 1830 07/29/23 2022 07/29/23 2312 07/30/23  0713   * 139 135* 135*   K 6.0* 4.5 4.6 4.2    108 105 101   CO2 9* 13* 11* 19*   * 289* 464* 431*   BUN 51* 51* 50* 50*   CREATININE 4.4* 4.1* 4.0* 3.8*   CALCIUM 8.9 8.8 8.4* 8.3*   PROT 6.7  --  5.6*  --    ALBUMIN 2.2*  --  1.9*  --    BILITOT 0.2  --  0.6  --    ALKPHOS 246*  --  219*  --    AST 96*  --  353*  --    ALT 94*  --  338*  --    ANIONGAP 19* 18* 19* 15   , CBC   Recent Labs   Lab 07/29/23 1830 07/29/23 1905 07/29/23 2312 07/30/23  0553   WBC 19.56*  --  19.78* 19.96*   HGB 8.4*  --  8.2* 8.3*   HCT 27.8*   < > 25.5* 24.7*     --  194 182    < > = values in this interval not displayed.   , INR   Recent Labs   Lab 07/29/23  1951 07/29/23  2312 07/30/23  0553   INR 1.1 1.1 1.1   , Lipid Panel No results for input(s): "CHOL", "HDL", "LDLCALC", "TRIG", "CHOLHDL" in the last 48 hours., Troponin   Recent Labs   Lab 07/29/23  1830 07/30/23  0840   TROPONINI 0.063* 1.803* "   , and All pertinent lab results from the last 24 hours have been reviewed.    Significant Imaging: Echocardiogram: Transthoracic echo (TTE) complete (Cupid Only):   Results for orders placed or performed during the hospital encounter of 07/29/23   Echo   Result Value Ref Range    BSA 2.14 m2    LVOT stroke volume 65.70 cm3    LVIDd 5.36 3.5 - 6.0 cm    LV Systolic Volume 61.52 mL    LV Systolic Volume Index 30.2 mL/m2    LVIDs 3.79 2.1 - 4.0 cm    LV Diastolic Volume 139.09 mL    LV Diastolic Volume Index 68.18 mL/m2    IVS 0.56 (A) 0.6 - 1.1 cm    LVOT diameter 2.28 cm    LVOT area 4.1 cm2    FS 29 28 - 44 %    Left Ventricle Relative Wall Thickness 0.31 cm    Posterior Wall 0.84 0.6 - 1.1 cm    TDI LATERAL 0.09 m/s    TDI SEPTAL 0.07 m/s    LV mass 129.50 g    LV Mass Index 63 g/m2    MV Peak E Liam 0.85 m/s    LV LATERAL E/E' RATIO 9.44 m/s    LV SEPTAL E/E' RATIO 12.14 m/s    E/E' ratio 10.63 m/s    MV Peak A Liam 0.97 m/s    TR Max Liam 2.49 m/s    E/A ratio 0.88     Mean e' 0.08 m/s    E wave deceleration time 144.04 msec    LVOT peak liam 1.08 m/s    Left Ventricular Outflow Tract Mean Velocity 0.81 cm/s    Left Ventricular Outflow Tract Mean Gradient 2.87 mmHg    LA size 3.76 cm    Left Atrium Minor Axis 3.88 cm    RVDD 3.28 cm    RA Major Axis 4.27 cm    RA Width 3.23 cm    AV mean gradient 4 mmHg    AV peak gradient 6 mmHg    Ao peak liam 1.26 m/s    Ao VTI 19.00 cm    LVOT peak VTI 16.10 cm    AV valve area 3.46 cm²    AV Velocity Ratio 0.86     AV index (prosthetic) 0.85     YUN by Velocity Ratio 3.50 cm²    MV mean gradient 2 mmHg    MV peak gradient 4 mmHg    MV stenosis pressure 1/2 time 66.68 ms    MV valve area p 1/2 method 3.30 cm2    MV valve area by continuity eq 4.08 cm2    MV VTI 16.1 cm    TAPSE 1.99 cm    Triscuspid Valve Regurgitation Peak Gradient 25 mmHg    PV PEAK VELOCITY 1.18 m/s    PV peak gradient 6 mmHg    Sinus 2.89 cm    STJ 2.79 cm    Ascending aorta 2.75 cm    ZLVIDS 0.13     ZLVIDD  -1.26     IVC diameter 2.17 cm    Narrative      Left Ventricle: The left ventricle is normal in size. Normal wall   thickness. Normal wall motion. There is normal systolic function with a   visually estimated ejection fraction of 60 - 65%. Grade I diastolic   dysfunction.    Left Atrium: Normal left atrial size.    Right Ventricle: Normal right ventricular cavity size.    IVC/SVC: Patient is ventilated, cannot use IVC diameter to estimate   right atrial pressure.    Pericardium: There is no pericardial effusion.

## 2023-07-30 NOTE — CONSULTS
[facility] - Intensive Care  Neurology  Consult Note    Patient Name: Doe Woodall  MRN: [id number]  Admission Date: 7/29/2023  Hospital Length of Stay: 1 days  Code Status: Full Code   Attending Provider: Hollie Johnson MD   Consulting Provider: Harvey Guerrier MD  Primary Care Physician: [facility]  Principal Problem:Cardiac arrest    Inpatient consult to Neurology  Consult performed by: Harvey Guerrier MD  Consult ordered by: Govind Vernon MD        Subjective:     Chief Complaint: S/P cardiac arrest    HPI: 32 y/o male with medical Hx of DM, HTN was found unresponsive at home at 5 pm. He had been known at his normal self the day before being found. Family started CPR as he had no pulse. Paramedics arrived and continued ACLS successfully achieving ROSC. In ED he was reported to have three episodes of PEA. Pt is now intubated; TTM protocol ongoing.    Past Medical History:   Diagnosis Date    Abscess of right groin 09/01/2022    Diabetes mellitus     Encounter for blood transfusion     Loud snoring     Obese     Other insomnia 08/03/2020    Perineal abscess 08/01/2014    Primary hypertension 10/2/2022       Past Surgical History:   Procedure Laterality Date    ABCESS DRAINAGE  2014    PERIRECTAL    DECOMPRESSION OF LUMBAR SPINE USING MINIMALLY INVASIVE TECHNIQUE Right 07/06/2018    Procedure: DECOMPRESSION, SPINE, LUMBAR, MINIMALLY INVASIVE L3-4;  Surgeon: Cristian Cervantes MD;  Location: [address];  Service: Neurosurgery;  Laterality: Right;    INCISION AND DRAINAGE N/A 9/9/2022    Procedure: INCISION AND DRAINAGE GROIN;  Surgeon: KAITY Aguilar MD;  Location: [facility];  Service: Urology;  Laterality: N/A;    ORTHOPEDIC SURGERY         Review of patient's allergies indicates:   Allergen Reactions    Metformin Diarrhea       Current Neurological Medications:     No current facility-administered medications on file prior to encounter.     Current Outpatient Medications on File Prior to  "Encounter   Medication Sig    amitriptyline (ELAVIL) 75 MG tablet Take 1 tablet by mouth every evening.    aspirin 81 MG Chew Take 1 tablet by mouth once daily.    atorvastatin (LIPITOR) 80 MG tablet Take 80 mg by mouth every evening.    gabapentin (NEURONTIN) 100 MG capsule Take 100 mg by mouth 3 (three) times daily.    insulin detemir U-100, Levemir, (LEVEMIR FLEXPEN) 100 unit/mL (3 mL) InPn pen Inject 30 Units into the skin once daily.    midodrine (PROAMATINE) 5 MG Tab Take 3 tablets (15 mg total) by mouth 3 (three) times daily.    oxyCODONE (ROXICODONE) 10 mg Tab immediate release tablet Take 10 mg by mouth every 8 (eight) hours as needed for Pain.    pantoprazole (PROTONIX) 40 MG tablet Take 40 mg by mouth once daily.    pen needle, diabetic (BD ULTRA-FINE MICRO PEN NEEDLE) 32 gauge x 1/4" Ndle Use 1 each to inject insulin once daily      Family History       Problem Relation (Age of Onset)    Diabetes Father, Paternal Grandmother, Paternal Grandfather          Tobacco Use    Smoking status: Former     Current packs/day: 0.00     Average packs/day: 0.5 packs/day for 9.0 years (4.5 ttl pk-yrs)     Types: Cigarettes     Start date: 7/1/2004     Quit date: 7/1/2013     Years since quitting: 10.0    Smokeless tobacco: Former   Substance and Sexual Activity    Alcohol use: Not Currently     Comment: DAILY PINT OF LIQUOR, HX OF 1 "TALL BOY" OF BEER DAILY    Drug use: Not Currently     Types: Cocaine     Comment: per collateral    Sexual activity: Yes     Partners: Female     Birth control/protection: None     Review of Systems   Unable to perform ROS: Patient unresponsive     Objective:     Vital Signs (Most Recent):  Temp: 96.3 °F (35.7 °C) (07/30/23 0930)  Pulse: 82 (07/30/23 0930)  Resp: (!) 22 (07/30/23 0930)  BP: (!) 107/57 (07/30/23 0915)  SpO2: 100 % (07/30/23 0930) Vital Signs (24h Range):  Temp:  [94.6 °F (34.8 °C)-96.4 °F (35.8 °C)] 96.3 °F (35.7 °C)  Pulse:  [] 82  Resp:  [0-101] 22  SpO2:  [90 " "%-100 %] 100 %  BP: ()/(35-93) 107/57     Weight: 103.6 kg (228 lb 6.3 oz)  Body mass index is 40.46 kg/m².    Physical Exam  Constitutional:       General: He is not in acute distress.  HENT:      Head: Normocephalic.   Eyes:      General:         Right eye: No discharge.         Left eye: No discharge.   Cardiovascular:      Rate and Rhythm: Normal rate.   Pulmonary:      Breath sounds: Normal breath sounds.   Abdominal:      Palpations: Abdomen is soft.   Musculoskeletal:         General: No deformity.      Cervical back: Neck supple.   Skin:     Findings: No rash.         NEUROLOGICAL EXAMINATION:     MENTAL STATUS        Unresponsive to verbal stimuli   Pupils are round a 5 mm; not reactive to light  No corneal reflex bilaterally  Absent oculocephalic reflex bilaterally  No response to noxious stimuli to limbs    Significant Labs: Blood Culture:   Recent Labs   Lab 07/29/23 1830 07/29/23 1949   LABBLOO No Growth to date No Growth to date     CBC:   Recent Labs   Lab 07/29/23 1830 07/29/23 1905 07/29/23 2312 07/30/23  0553   WBC 19.56*  --  19.78* 19.96*   HGB 8.4*  --  8.2* 8.3*   HCT 27.8* 34* 25.5* 24.7*     --  194 182     CMP:   Recent Labs   Lab 07/29/23 1830 07/29/23 2022 07/29/23 2312 07/30/23  0713   * 289* 464* 431*   * 139 135* 135*   K 6.0* 4.5 4.6 4.2    108 105 101   CO2 9* 13* 11* 19*   BUN 51* 51* 50* 50*   CREATININE 4.4* 4.1* 4.0* 3.8*   CALCIUM 8.9 8.8 8.4* 8.3*   MG 2.9*  --  2.1 1.9   PROT 6.7  --  5.6*  --    ALBUMIN 2.2*  --  1.9*  --    BILITOT 0.2  --  0.6  --    ALKPHOS 246*  --  219*  --    AST 96*  --  353*  --    ALT 94*  --  338*  --    ANIONGAP 19* 18* 19* 15     Inflammatory Markers:   Recent Labs   Lab 07/30/23  0318   PROCAL 7.10*     Urine Culture: No results for input(s): "LABURIN" in the last 48 hours.  Urine Studies:   Recent Labs   Lab 07/29/23 1944   COLORU Yellow   APPEARANCEUA Clear   PHUR 6.0   SPECGRAV 1.010   PROTEINUA 2+* "   GLUCUA Trace*   KETONESU Negative   BILIRUBINUA Negative   OCCULTUA Negative   NITRITE Negative   UROBILINOGEN Negative   LEUKOCYTESUR Negative   RBCUA 0   WBCUA 0   BACTERIA None   HYALINECASTS 0       Significant Imaging: I have reviewed all pertinent imaging results/findings within the past 24 hours.    Head CT  FINDINGS:  Intracranial compartment:     Ventricles and sulci are normal in size for age without evidence of hydrocephalus. No extra-axial blood or fluid collections.     The brain parenchyma appears unchanged and within normal limits.  Previous noted small area of suspected gliosis within the central thomas is not well demonstrated on noncontrast CT technique with associated streak artifact at the skull base.  No definite new focal areas of abnormal parenchymal attenuation.  No parenchymal mass, hemorrhage, edema or major vascular distribution infarct.     Skull/extracranial contents (limited evaluation): No fracture. Patchy minimal mucosal thickening of the paranasal sinuses.  No air-fluid levels.  Mastoid air cells are clear.  Frothy secretions seen within the posterior bilateral nasal passages and partially imaged nasopharynx.  Imaged portions of the orbits are within normal limits.     Impression:     No acute large vascular territory infarct or intracranial hemorrhage identified.  If persistent neurologic deficit, MRI brain can be obtained.     Minimal paranasal sinus disease with frothy secretions seen in the posterior nasal passages and nasopharynx.        Electronically signed by: Britton Otero MD  Date:                                            07/29/2023  Time:                                           21:00    Assessment and Plan:     34 y/o male S/P cardiac arrest    Anoxic encephalopathy:  pt is undergoing TTM. On head CT no sig of ICH.  Continue TMM protocol.  Will reassess in AM on no sedation.  Repeat head CT in AM.    Active Diagnoses:    Diagnosis Date Noted POA    PRINCIPAL PROBLEM:   Cardiac arrest [I46.9] 07/29/2023 Yes    Acute respiratory failure with hypoxia [J96.01] 07/29/2023 Yes    Elevated LFTs [R79.89] 07/29/2023 Yes    MARISSA (acute kidney injury) [N17.9] 07/25/2023 Yes    Severe episode of recurrent major depressive disorder, without psychotic features [F33.2] 11/20/2022 Yes     Chronic    Severe sepsis [A41.9, R65.20] 09/01/2022 Yes    Chronic diastolic heart failure [I50.32] 01/06/2022 Yes     Chronic    Type 2 diabetes mellitus with hyperglycemia, with long-term current use of insulin [E11.65, Z79.4]  Not Applicable    Alcohol use disorder, severe, dependence [F10.20] 08/04/2020 Yes     Chronic      Problems Resolved During this Admission:       VTE Risk Mitigation (From admission, onward)           Ordered     heparin (porcine) injection 5,000 Units  Every 8 hours         07/30/23 0819     IP VTE HIGH RISK PATIENT  Once         07/29/23 2235     Place sequential compression device  Until discontinued         07/29/23 2235                    Thank you for your consult. I will follow-up with patient. Please contact us if you have any additional questions.    Harvey Guerrier MD  Neurology  [facility] - Intensive Care

## 2023-07-30 NOTE — PLAN OF CARE
Pt intubated/sedated, on propofol and fentanyl. Dopamine @ 2.5 mcg/kg/min keep Map >65 with sedation and TTM. Unresponsive to stimuli at this time. EMILIE has been notified. Glucose remained >400, orders changed to Q4. Sodium bicarb @150 ml/hr. Wife at bedside

## 2023-07-30 NOTE — HOSPITAL COURSE
This is a 33-year-old male with a past medical history of alcohol use, type 2 diabetes, mood disorder, and orthostatic hypotension who presents with cardiac arrest.  Patient presents to the ED on 07/29 after being found unresponsive by his family around 5:00 p.m..  Family started CPR at 5:24 p.m. and EMS continued resuscitation with achievement of ROSC at 5:45 p.m..  Initial rhythm was noted to be asystole.  The patient later went into VFib, was cardioverted x1 and given amiodarone.  While in the ED, the patient became bradycardic and went into PA arrest x3, during which he received epinephrine x6, bicarbonate x3, calcium chloride x1, and amiodarone with achievement of ROSC. Given recurrent bradycardia, he was later started on dopamine, per cardiology. Per the patient's sister, a bottle of morphine and amitriptyline were found next to him.   In the ED, laboratory workup was remarkable for leukocytosis (19.5), elevated creatinine (4.1 - from a baseline of 1.7), elevated LFTs (AST:  96, ALT:  94, ALP:  246), elevated troponin (0.063), elevated BNP (109), elevated lactic acid (10.2), hyperphosphatemia (11.8).  UA was unremarkable.  CT head showed no acute large vascular territory infarct or intracranial hemorrhage.  Chest x-ray showed mild perihilar interstitial changes with possible edema.  Patient was given aspirin 300 mg, 3.0 L of LR, vancomycin, Zosyn albuterol, and was started on a sodium bicarb and dopamine gtt.  Has cocci in cluster on blood culture,alreday on vanc.may contaminated,repeted blood culture.CNS,contaminated  Was on IVF for ARF,nephrology is following.  Cardiology is consulted for elevated troponin,echo.show preserved EF.  Pulmonology was doing vent management,  Neurology was on board for possible anoxic injury.repeat head CT show worsening hypoxic brain injury,patient remains unresponsive,family agree with withdraw of care and organ donation,EMILIE was present.patient was extubated in OR and  pronounced death in OR.

## 2023-07-30 NOTE — ASSESSMENT & PLAN NOTE
This patient does have evidence of infective focus  My overall impression is sepsis.  Source: Unknown  Antibiotics given-   Antibiotics (72h ago, onward)    Start     Stop Route Frequency Ordered    07/30/23 0600  piperacillin-tazobactam (ZOSYN) 4.5 g in dextrose 5 % in water (D5W) 100 mL IVPB (MB+)         -- IV Every 8 hours 07/29/23 2212 07/29/23 2312  vancomycin - pharmacy to dose  (vancomycin IVPB (PEDS and ADULTS))        See Hyperspace for full Linked Orders Report.    -- IV pharmacy to manage frequency 07/29/23 2212 07/29/23 2100  vancomycin (VANCOCIN) 2,000 mg in dextrose 5 % (D5W) 500 mL IVPB         07/30/23 0859 IV ED 1 Time 07/29/23 2023        Latest lactate reviewed-  Recent Labs   Lab 07/29/23  1853   LACTATE 10.2*     Organ dysfunction indicated by Acute kidney injury, Acute respiratory failure and Encephalopathy    Fluid challenge Ideal Body Weight- The patient's ideal body weight is Ideal body weight: 61.5 kg (135 lb 9.3 oz) which will be used to calculate fluid bolus of 30 ml/kg for treatment of septic shock.      Post- resuscitation assessment No - Post resuscitation assessment not needed       Will Not start Pressors- Levophed for MAP of 65  Source control achieved by:   Antibiotics   Follow up cultures   Obtain further imaging once more stable/creatinine allows.

## 2023-07-30 NOTE — ED NOTES
Confirmed with lab, first set of blood cultures and BNP is in lab and being processed at this time.

## 2023-07-30 NOTE — HPI
This is a 33-year-old male with a past medical history of alcohol use, type 2 diabetes, mood disorder, and orthostatic hypotension who presents with cardiac arrest.    Patient presents to the ED on 07/29 after being found unresponsive by his family around 5:00 p.m..  Family started CPR at 5:24 p.m. and EMS continued resuscitation with achievement of ROSC at 5:45 p.m..  Initial rhythm was noted to be asystole.  The patient later went into VFib, was cardioverted x1 and given amiodarone.  While in the ED, the patient became bradycardic and went into PA arrest x3, during which he received epinephrine x6, bicarbonate x3, calcium chloride x1, and amiodarone with achievement of ROSC. Given recurrent bradycardia, he was later started on dopamine, per cardiology. Per the patient's sister, a bottle of morphine and amitriptyline were found next to him.     Of note, The patient was recently hospitalized (7/25-7/26) for hypotension after missing his midodrine at home.  He received fluids, antibiotics, and midodrine with improvement of his blood pressure and was discharged home.  The patient also had a recent hospitalization at [facility] (6/16-7/8) after presenting with unresponsiveness.  At that time, he was at the neurology clinic for evaluation of episodic loss of consciousness and suddenly became unconscious.  He received CPR of unknown duration after which he became responsive, but did not seem to actually lose pulse.  He was treated for preseptal cellulitis and worked up for autonomic dysfunction and adrenal insufficiency.    In the ED, laboratory workup was remarkable for leukocytosis (19.5), elevated creatinine (4.1 - from a baseline of 1.7), elevated LFTs (AST:  96, ALT:  94, ALP:  246), elevated troponin (0.063), elevated BNP (109), elevated lactic acid (10.2), hyperphosphatemia (11.8).  UA was unremarkable.  CT head showed no acute large vascular territory infarct or intracranial hemorrhage.  Chest x-ray showed mild  perihilar interstitial changes with possible edema.  Patient was given aspirin 300 mg, 3.0 L of LR, vancomycin, Zosyn albuterol, and was started on a sodium bicarb and dopamine gtt. Patient was admitted for further management.

## 2023-07-30 NOTE — SUBJECTIVE & OBJECTIVE
"Past Medical History:   Diagnosis Date    Abscess of right groin 09/01/2022    Diabetes mellitus     Encounter for blood transfusion     Loud snoring     Obese     Other insomnia 08/03/2020    Perineal abscess 08/01/2014    Primary hypertension 10/2/2022       Past Surgical History:   Procedure Laterality Date    ABCESS DRAINAGE  2014    PERIRECTAL    DECOMPRESSION OF LUMBAR SPINE USING MINIMALLY INVASIVE TECHNIQUE Right 07/06/2018    Procedure: DECOMPRESSION, SPINE, LUMBAR, MINIMALLY INVASIVE L3-4;  Surgeon: Cristian Cervantes MD;  Location: [facility] OR [address] [place] [address];  Service: Neurosurgery;  Laterality: Right;    INCISION AND DRAINAGE N/A 9/9/2022    Procedure: INCISION AND DRAINAGE GROIN;  Surgeon: KAITY Aguilar MD;  Location: [facility] OR;  Service: Urology;  Laterality: N/A;    ORTHOPEDIC SURGERY         Review of patient's allergies indicates:   Allergen Reactions    Metformin Diarrhea       No current facility-administered medications on file prior to encounter.     Current Outpatient Medications on File Prior to Encounter   Medication Sig    amitriptyline (ELAVIL) 75 MG tablet Take 1 tablet by mouth every evening.    aspirin 81 MG Chew Take 1 tablet by mouth once daily.    atorvastatin (LIPITOR) 80 MG tablet Take 80 mg by mouth every evening.    gabapentin (NEURONTIN) 100 MG capsule Take 100 mg by mouth 3 (three) times daily.    insulin detemir U-100, Levemir, (LEVEMIR FLEXPEN) 100 unit/mL (3 mL) InPn pen Inject 30 Units into the skin once daily.    midodrine (PROAMATINE) 5 MG Tab Take 3 tablets (15 mg total) by mouth 3 (three) times daily.    oxyCODONE (ROXICODONE) 10 mg Tab immediate release tablet Take 10 mg by mouth every 8 (eight) hours as needed for Pain.    pantoprazole (PROTONIX) 40 MG tablet Take 40 mg by mouth once daily.    pen needle, diabetic (BD ULTRA-FINE MICRO PEN NEEDLE) 32 gauge x 1/4" Ndle Use 1 each to inject insulin once daily     Family History       Problem Relation (Age of Onset)    Diabetes Father, " "Paternal Grandmother, Paternal Grandfather          Tobacco Use    Smoking status: Former     Current packs/day: 0.00     Average packs/day: 0.5 packs/day for 9.0 years (4.5 ttl pk-yrs)     Types: Cigarettes     Start date: 7/1/2004     Quit date: 7/1/2013     Years since quitting: 10.0    Smokeless tobacco: Former   Substance and Sexual Activity    Alcohol use: Not Currently     Comment: DAILY PINT OF LIQUOR, HX OF 1 "TALL BOY" OF BEER DAILY    Drug use: Not Currently     Types: Cocaine     Comment: per collateral    Sexual activity: Yes     Partners: Female     Birth control/protection: None     Review of Systems   Unable to perform ROS: Intubated     Objective:     Vital Signs (Most Recent):  Temp: 96.3 °F (35.7 °C) (07/30/23 0930)  Pulse: 82 (07/30/23 0930)  Resp: (!) 22 (07/30/23 0930)  BP: (!) 107/57 (07/30/23 0915)  SpO2: 100 % (07/30/23 0930) Vital Signs (24h Range):  Temp:  [94.6 °F (34.8 °C)-96.4 °F (35.8 °C)] 96.3 °F (35.7 °C)  Pulse:  [] 82  Resp:  [0-101] 22  SpO2:  [90 %-100 %] 100 %  BP: ()/(35-93) 107/57     Weight: 103.6 kg (228 lb 6.3 oz)  Body mass index is 40.46 kg/m².     Physical Exam  Vitals and nursing note reviewed.   Constitutional:       Appearance: He is obese. He is ill-appearing.   HENT:      Head: Normocephalic and atraumatic.      Nose: Nose normal.      Mouth/Throat:      Mouth: Mucous membranes are dry.      Comments: ET tube in place.   Eyes:      Comments: Fixed dilated pupils.  Gag reflex +   Cardiovascular:      Rate and Rhythm: Tachycardia present.      Pulses: Normal pulses.      Heart sounds: No murmur heard.  Pulmonary:      Comments: Intubated.   Abdominal:      General: Abdomen is flat. There is no distension.      Palpations: Abdomen is soft.   Musculoskeletal:      Right lower leg: No edema.      Left lower leg: No edema.   Skin:     General: Skin is warm.   Neurological:      Mental Status: He is alert.      Comments: Intubated. Off sedation, does not follow " commands.   Gag reflex +                Significant Labs: All pertinent labs within the past 24 hours have been reviewed.    Significant Imaging: I have reviewed all pertinent imaging results/findings within the past 24 hours.

## 2023-07-30 NOTE — ASSESSMENT & PLAN NOTE
Body mass index is 40.46 kg/m². Morbid obesity complicates all aspects of disease management from diagnostic modalities to treatment. Weight loss encouraged and health benefits explained to patient.

## 2023-07-30 NOTE — HPI
"Patient came in with cardiac arrest.  Was found down by family.  Unknown downtime.  CPR given for 21 minutes with multiple rhythms and prolonged ACLS.  Finally got Rosc.  Currently in ICU, intubated.  EKG done today shows normal sinus rhythm, normal EKG without any acute ischemic changes.  Patient is intubated and hence history obtained from the chart and the family.        HPI:  Doe Woodall lalito 33 year old male with history of EtOH use, DMII, mood disorder, orthostatic hypotension, depression, recurrent abscesses, who presents with cardiac arrest to the ER on 7/29.  He was found unresponsive by family at 5PM on 7/29 and family began CPR at 5:24.  Per patient's sister, a morphine and amitriptyline bottle were found next to the patient at time of discovery.  EMS arrived and achieved ROSC at 5:45PM.  Rhythm was noted to be asystole by EMS.  He had a vFib was cardioverted x 1 and started on amiodarone.      In the ER, he again arrested 2/2 bradycardia followed by PEA arrest.  During this code he received epi x 6, bicarb x 3, CaCl x 1, and amiodarone with ROSC.  Started on dopamine per cards following ROSC due to bradycardia. He was admitted to the MICU.  He remains unconscious, with absent pupillary, absent gag, absent corneal, and absent oculocephalic reflexes.  He occasionally breaths over the ventilator.      The patient has had multiple hospitalizations lately, and per family, "has spent more time in the hospital that out of it lately".  He was most recently in the hospital for hypotension after missing home midodrine (7/25-26).  He was also hospitalized at [facility] after passing out and receiving CPR in a neurology clinic.  It is unclear if he really lost a pulse during this time. He has recurrent skin abscesses/cellulitis, and has had a recent workup for autonomic dysfunction and adrenal insufficiency.     Initial workup shows elevated WBC (19.5), MARISSA (4.1), elevated LFTs (96/94), NSTEMI (0.063), elevated BNP of " 109, lactic acidosis (10.2), hyperphosphatemia.  CT head without acute process as of yet.  Chest x-ray compatible with pulmonary edema.   Started on vanc/zosyn, and sodium bicarb gtt.  Dopamine ordered, but off the AM of initial exam.

## 2023-07-30 NOTE — ASSESSMENT & PLAN NOTE
Unclear etiology at this time.  Concern for possible overdose on amitriptyline (QTc as high as 527 during this hospital stay), infection (pneumonia vs aspiration), worsening of chronic hypotension (possible missing dose of home midodrine), or possible opiate overdose.  Not requiring pressors at this time.  Significant neurologic impact suggested by physical exam.  Only reflex maintained is respiratory drive.    - maintain MAP >65mmHg  - continue broad spectrum abx and narrow as possible. Vanc/zosyn (day 1).  - continue bicarbonate infusion at this time.  - limit sedative medications to assess neurologic status.   - avoid all QT prolonging agents.  - TTM  - repeat CT head or MRI 72 hours from initial study (8/1/23 @ 20:39).  - daily SAT/SBT

## 2023-07-30 NOTE — H&P
[facility] - [place] Emergency Dept  [facility]  History & Physical    Patient Name: Doe Woodall  MRN: [id number]  Patient Class: IP- Inpatient  Admission Date: 7/29/2023  Attending Physician: Hollie Johnson MD   Primary Care Provider: [facility]         Patient information was obtained from relative(s) and ER records.     Subjective:     Principal Problem:Cardiac arrest    Chief Complaint:   Chief Complaint   Patient presents with    Cardiac Arrest     Pt to ED via EMS with complaints of cardiac arrest. Pt unknown down time. Pt last seen last night by family at approx 11 PM. Pt found today, unresponsive, at 1724. Family initiated CPR. Fire arrived at 1729 and continued until EMS arrived. EMS administered 2 Epi. States got ROSC at 1745 - pt did go into VFIVB, shocked once. Upon arrival to ED, pt pulseless. CPR initiated.         HPI: This is a 33-year-old male with a past medical history of alcohol use, type 2 diabetes, mood disorder, and orthostatic hypotension who presents with cardiac arrest.    Patient presents to the ED on 07/29 after being found unresponsive by his family around 5:00 p.m..  Family started CPR at 5:24 p.m. and EMS continued resuscitation with achievement of ROSC at 5:45 p.m..  Initial rhythm was noted to be asystole.  The patient later went into VFib, was cardioverted x1 and given amiodarone.  While in the ED, the patient became bradycardic and went into PA arrest x3, during which he received epinephrine x6, bicarbonate x3, calcium chloride x1, and amiodarone with achievement of ROSC. Given recurrent bradycardia, he was later started on dopamine, per cardiology. Per the patient's sister, a bottle of morphine and amitriptyline were found next to him.     Of note, The patient was recently hospitalized (7/25-7/26) for hypotension after missing his midodrine at home.  He received fluids, antibiotics, and midodrine with improvement of his blood pressure and was discharged  home.  The patient also had a recent hospitalization at [facility] (6/16-7/8) after presenting with unresponsiveness.  At that time, he was at the neurology clinic for evaluation of episodic loss of consciousness and suddenly became unconscious.  He received CPR of unknown duration after which he became responsive, but did not seem to actually lose pulse.  He was treated for preseptal cellulitis and worked up for autonomic dysfunction and adrenal insufficiency.    In the ED, laboratory workup was remarkable for leukocytosis (19.5), elevated creatinine (4.1 - from a baseline of 1.7), elevated LFTs (AST:  96, ALT:  94, ALP:  246), elevated troponin (0.063), elevated BNP (109), elevated lactic acid (10.2), hyperphosphatemia (11.8).  UA was unremarkable.  CT head showed no acute large vascular territory infarct or intracranial hemorrhage.  Chest x-ray showed mild perihilar interstitial changes with possible edema.  Patient was given aspirin 300 mg, 3.0 L of LR, vancomycin, Zosyn albuterol, and was started on a sodium bicarb and dopamine gtt. Patient was admitted for further management.       Past Medical History:   Diagnosis Date    Abscess of right groin 09/01/2022    Diabetes mellitus     Encounter for blood transfusion     Loud snoring     Obese     Other insomnia 08/03/2020    Perineal abscess 08/01/2014    Primary hypertension 10/2/2022       Past Surgical History:   Procedure Laterality Date    ABCESS DRAINAGE  2014    PERIRECTAL    DECOMPRESSION OF LUMBAR SPINE USING MINIMALLY INVASIVE TECHNIQUE Right 07/06/2018    Procedure: DECOMPRESSION, SPINE, LUMBAR, MINIMALLY INVASIVE L3-4;  Surgeon: Cristian Cervantes MD;  Location: [facility] OR [address] [place] [address];  Service: Neurosurgery;  Laterality: Right;    INCISION AND DRAINAGE N/A 9/9/2022    Procedure: INCISION AND DRAINAGE GROIN;  Surgeon: KAITY Aguilar MD;  Location: [facility] OR;  Service: Urology;  Laterality: N/A;    ORTHOPEDIC SURGERY         Review of patient's allergies  "indicates:   Allergen Reactions    Metformin Diarrhea       No current facility-administered medications on file prior to encounter.     Current Outpatient Medications on File Prior to Encounter   Medication Sig    amitriptyline (ELAVIL) 75 MG tablet Take 1 tablet by mouth every evening.    aspirin 81 MG Chew Take 1 tablet by mouth once daily.    atorvastatin (LIPITOR) 80 MG tablet Take 80 mg by mouth every evening.    gabapentin (NEURONTIN) 100 MG capsule Take 100 mg by mouth 3 (three) times daily.    insulin detemir U-100, Levemir, (LEVEMIR FLEXPEN) 100 unit/mL (3 mL) InPn pen Inject 30 Units into the skin once daily.    midodrine (PROAMATINE) 5 MG Tab Take 3 tablets (15 mg total) by mouth 3 (three) times daily.    oxyCODONE (ROXICODONE) 10 mg Tab immediate release tablet Take 10 mg by mouth every 8 (eight) hours as needed for Pain.    pantoprazole (PROTONIX) 40 MG tablet Take 40 mg by mouth once daily.    pen needle, diabetic (BD ULTRA-FINE MICRO PEN NEEDLE) 32 gauge x 1/4" Ndle Use 1 each to inject insulin once daily     Family History       Problem Relation (Age of Onset)    Diabetes Father, Paternal Grandmother, Paternal Grandfather          Tobacco Use    Smoking status: Former     Current packs/day: 0.00     Average packs/day: 0.5 packs/day for 9.0 years (4.5 ttl pk-yrs)     Types: Cigarettes     Start date: 7/1/2004     Quit date: 7/1/2013     Years since quitting: 10.0    Smokeless tobacco: Former   Substance and Sexual Activity    Alcohol use: Not Currently     Comment: DAILY PINT OF LIQUOR, HX OF 1 "TALL BOY" OF BEER DAILY    Drug use: Not Currently     Types: Cocaine     Comment: per collateral    Sexual activity: Yes     Partners: Female     Birth control/protection: None     Review of Systems   Unable to perform ROS: Intubated     Objective:     Vital Signs (Most Recent):  Temp: 96.2 °F (35.7 °C) (07/29/23 1954)  Pulse: 110 (07/29/23 2152)  Resp: 17 (07/29/23 2152)  BP: (!) 161/86 " (07/29/23 2152)  SpO2: 100 % (07/29/23 2152) Vital Signs (24h Range):  Temp:  [96.2 °F (35.7 °C)] 96.2 °F (35.7 °C)  Pulse:  [] 110  Resp:  [] 17  SpO2:  [90 %-100 %] 100 %  BP: ()/(35-93) 161/86     Weight: 103 kg (227 lb 1.2 oz)  Body mass index is 37.79 kg/m².     Physical Exam  Vitals and nursing note reviewed.   Constitutional:       Appearance: He is obese. He is ill-appearing.   HENT:      Head: Normocephalic and atraumatic.      Nose: Nose normal.      Mouth/Throat:      Mouth: Mucous membranes are dry.      Comments: ET tube in place.   Eyes:      Comments: Fixed dilated pupils.  Gag reflex +   Cardiovascular:      Rate and Rhythm: Tachycardia present.      Pulses: Normal pulses.      Heart sounds: No murmur heard.  Pulmonary:      Comments: Intubated.   Abdominal:      General: Abdomen is flat. There is no distension.      Palpations: Abdomen is soft.   Musculoskeletal:      Right lower leg: No edema.      Left lower leg: No edema.   Skin:     General: Skin is warm.   Neurological:      Mental Status: He is alert.      Comments: Intubated. Off sedation, does not follow commands.   Gag reflex +                Significant Labs: All pertinent labs within the past 24 hours have been reviewed.    Significant Imaging: I have reviewed all pertinent imaging results/findings within the past 24 hours.    Assessment/Plan:     * Cardiac arrest  Unclear etiology. Concern for drug toxicity/over dose given amitriptyline use with associated narcotics or prolonged hypotension causing arrest. Other differentials include sepsis, seizures. Less likely intra-cranial process, PE or ACS.   Currently, intubated with non-reactive pupils     Plan:   Continue dopamine given significant bradycardic episodes prior to arrest   Continue bicarbonate infusion for possible amitriptyline overdose/acidosis  Obtain EEG  Obtain echocardiogram, lower extremity dopplers.   Consult to cardiology   Consult to neurology   Consult  to palliative care for goals of care    Elevated LFTs  Likely in the setting of cardiac arrest. Continue to trend LFTs.       Acute respiratory failure with hypoxia  Patient with Hypoxic Respiratory failure which is Acute.  he is not on home oxygen. Supplemental oxygen was provided and noted- Vent Mode: VC  Oxygen Concentration (%):  [100] 100  Resp Rate Total:  [18 br/min-22 br/min] 22 br/min  Vt Set:  [450 mL] 450 mL  PEEP/CPAP:  [5 cmH20] 5 cmH20  Mean Airway Pressure:  [9.2 cmH20-10.5 cmH20] 10.5 cmH20    In the setting of cardiac arrest. Wean oxygen as tolerated   Consult to pulmonary     MARISSA (acute kidney injury)  In the setting of cardiac arrest, likely ATN  Monitor Cr & urine output     Severe episode of recurrent major depressive disorder, without psychotic features  Hold home medications       Severe sepsis  This patient does have evidence of infective focus  My overall impression is sepsis.  Source: Unknown  Antibiotics given-   Antibiotics (72h ago, onward)    Start     Stop Route Frequency Ordered    07/30/23 0600  piperacillin-tazobactam (ZOSYN) 4.5 g in dextrose 5 % in water (D5W) 100 mL IVPB (MB+)         -- IV Every 8 hours 07/29/23 2212 07/29/23 2312  vancomycin - pharmacy to dose  (vancomycin IVPB (PEDS and ADULTS))        See Hyperspace for full Linked Orders Report.    -- IV pharmacy to manage frequency 07/29/23 2212 07/29/23 2100  vancomycin (VANCOCIN) 2,000 mg in dextrose 5 % (D5W) 500 mL IVPB         07/30/23 0859 IV ED 1 Time 07/29/23 2023        Latest lactate reviewed-  Recent Labs   Lab 07/29/23  1853   LACTATE 10.2*     Organ dysfunction indicated by Acute kidney injury, Acute respiratory failure and Encephalopathy    Fluid challenge Ideal Body Weight- The patient's ideal body weight is Ideal body weight: 61.5 kg (135 lb 9.3 oz) which will be used to calculate fluid bolus of 30 ml/kg for treatment of septic shock.      Post- resuscitation assessment No - Post resuscitation  "assessment not needed       Will Not start Pressors- Levophed for MAP of 65  Source control achieved by:   Antibiotics   Follow up cultures   Obtain further imaging once more stable/creatinine allows.     Type 2 diabetes mellitus with hyperglycemia, with long-term current use of insulin  Patient's FSGs are controlled on current medication regimen.  Last A1c reviewed-   Lab Results   Component Value Date    HGBA1C 6.9 (H) 05/10/2023     Most recent fingerstick glucose reviewed- No results for input(s): "POCTGLUCOSE" in the last 24 hours.  Current correctional scale  Low  Maintain anti-hyperglycemic dose as follows-   Antihyperglycemics (From admission, onward)    Start     Stop Route Frequency Ordered    07/29/23 2340  insulin aspart U-100 pen 0-5 Units         -- SubQ Every 6 hours PRN 07/29/23 2240            VTE Risk Mitigation (From admission, onward)         Ordered     IP VTE HIGH RISK PATIENT  Once         07/29/23 2235     Place sequential compression device  Until discontinued         07/29/23 2235                 Critical care time spent on the evaluation and treatment of severe organ dysfunction, review of pertinent labs and imaging studies, discussions with consulting providers and discussions with patient/family: 60 minutes.            Govind Vernon MD  Department of Hospital Medicine  [facility] [place] - Emergency Dept  "

## 2023-07-30 NOTE — CONSULTS
"                                         Renal Consult    Date of Admission:  7/29/2023  6:11 PM        Chief Complaint:   Chief Complaint   Patient presents with    Cardiac Arrest     Pt to ED via EMS with complaints of cardiac arrest. Pt unknown down time. Pt last seen last night by family at approx 11 PM. Pt found today, unresponsive, at 1724. Family initiated CPR. Fire arrived at 1729 and continued until EMS arrived. EMS administered 2 Epi. States got ROSC at 1745 - pt did go into VFIVB, shocked once. Upon arrival to ED, pt pulseless. CPR initiated.        HPI: 34 y/o male with a PMHx. Of: MARISSA (seen by our group in Sept. 2022) DM2 w/ retinopathy, chronic hypotension on Midodrine, alcohol use disorder severe dependence, chronic diastolic heart failure, obesity (BMI > 40( , groin abscess w/ sepsis, insomnia, substance induced mood disorder who presented to [facility] ED after being found unresponsive by Family yesterday at around 5 pm.  As per H&P: " found unresponsive by his family around 5:00 p.m..  Family started CPR at 5:24 p.m. and EMS continued resuscitation with achievement of ROSC at 5:45 p.m..  Initial rhythm was noted to be asystole.  The patient later went into VFib, was cardioverted x1 and given amiodarone.  While in the ED, the patient became bradycardic and went into PA arrest x3, during which he received epinephrine x6, bicarbonate x3, calcium chloride x1, and amiodarone with achievement of ROSC. Given recurrent bradycardia, he was later started on dopamine, per cardiology. Per the patient's sister, a bottle of morphine and amitriptyline were found next to him".       In the ED, laboratory workup was remarkable for leukocytosis (19.5), elevated creatinine (4.1 - from a baseline of 1.7), elevated LFTs (AST:  96, ALT:  94, ALP:  246), elevated troponin (0.063), elevated BNP (109), elevated lactic acid (10.2), hyperphosphatemia (11.8).  UA was unremarkable.  CT head showed no acute large vascular territory " infarct or intracranial hemorrhage.  Chest x-ray showed mild perihilar interstitial changes with possible edema    PMH:  Past Medical History:   Diagnosis Date    Abscess of right groin 09/01/2022    Diabetes mellitus     Encounter for blood transfusion     Loud snoring     Obese     Other insomnia 08/03/2020    Perineal abscess 08/01/2014    Primary hypertension 10/2/2022       PSH:  Past Surgical History:   Procedure Laterality Date    ABCESS DRAINAGE  2014    PERIRECTAL    DECOMPRESSION OF LUMBAR SPINE USING MINIMALLY INVASIVE TECHNIQUE Right 07/06/2018    Procedure: DECOMPRESSION, SPINE, LUMBAR, MINIMALLY INVASIVE L3-4;  Surgeon: Cristian Cervantes MD;  Location: [facility] OR [address] [place] [address];  Service: Neurosurgery;  Laterality: Right;    INCISION AND DRAINAGE N/A 9/9/2022    Procedure: INCISION AND DRAINAGE GROIN;  Surgeon: KAITY Aguilar MD;  Location: [facility] OR;  Service: Urology;  Laterality: N/A;    ORTHOPEDIC SURGERY         Allergies:  Review of patient's allergies indicates:   Allergen Reactions    Metformin Diarrhea       No current facility-administered medications on file prior to encounter.     Current Outpatient Medications on File Prior to Encounter   Medication Sig Dispense Refill    amitriptyline (ELAVIL) 75 MG tablet Take 1 tablet by mouth every evening.      aspirin 81 MG Chew Take 1 tablet by mouth once daily.      atorvastatin (LIPITOR) 80 MG tablet Take 80 mg by mouth every evening.      gabapentin (NEURONTIN) 100 MG capsule Take 100 mg by mouth 3 (three) times daily.      insulin detemir U-100, Levemir, (LEVEMIR FLEXPEN) 100 unit/mL (3 mL) InPn pen Inject 30 Units into the skin once daily. 15 mL 0    midodrine (PROAMATINE) 5 MG Tab Take 3 tablets (15 mg total) by mouth 3 (three) times daily. 810 tablet 3    oxyCODONE (ROXICODONE) 10 mg Tab immediate release tablet Take 10 mg by mouth every 8 (eight) hours as needed for Pain.      pantoprazole (PROTONIX) 40 MG tablet Take 40 mg by mouth once daily.      pen  "needle, diabetic (BD ULTRA-FINE MICRO PEN NEEDLE) 32 gauge x 1/4" Ndle Use 1 each to inject insulin once daily 100 each 0       Medications:  Current Facility-Administered Medications   Medication Dose Route Frequency Provider Last Rate Last Admin    acetaminophen tablet 650 mg  650 mg Oral Q4H PRN Govind Vernon MD        albuterol-ipratropium 2.5 mg-0.5 mg/3 mL nebulizer solution 3 mL  3 mL Nebulization Q4H PRN Govind Vernon MD        calcium gluconate 1 g in NS IVPB (premixed)  1 g Intravenous PRN Govind Vernon MD        calcium gluconate 1 g in NS IVPB (premixed)  2 g Intravenous PRN Govind Vernon MD        calcium gluconate 1 g in NS IVPB (premixed)  3 g Intravenous PRN Govind Vernon MD        dextrose 50% injection 12.5 g  12.5 g Intravenous PRN Govind Vernon MD        DOPamine 400 mg in dextrose 5 % 250 mL infusion (premix)  0-20 mcg/kg/min Intravenous Continuous Stephani Borrero MD 9.7 mL/hr at 07/30/23 0700 2.5 mcg/kg/min at 07/30/23 0700    famotidine (PF) injection 20 mg  20 mg Intravenous Daily Govind Vernon MD        fentaNYL 2500 mcg in 0.9% sodium chloride 250 mL infusion premix (titrating)  0-200 mcg/hr Intravenous Continuous Govind Vernon MD 3.8 mL/hr at 07/30/23 0651 37.5 mcg/hr at 07/30/23 0651    fentaNYL 50 mcg/mL injection 50 mcg  50 mcg Intravenous Q1H PRN Govind Vernon MD        glucagon (human recombinant) injection 1 mg  1 mg Intramuscular PRN Govind Vernon MD        insulin aspart U-100 pen 0-5 Units  0-5 Units Subcutaneous Q4H PRN Stephani Borrero MD        magnesium sulfate 2g in water 50mL IVPB (premix)  2 g Intravenous PRN Govind Vernon MD        magnesium sulfate 2g in water 50mL IVPB (premix)  4 g Intravenous PRN Govind Vernon MD        melatonin tablet 6 mg  6 mg Oral Nightly PRN Govind Vernon MD        ondansetron injection 4 mg  4 mg Intravenous Q8H PRN Govind Vernon MD        piperacillin-tazobactam (ZOSYN) 4.5 g in dextrose 5 % in water (D5W) 100 mL IVPB (MB+)  4.5 g Intravenous Q8H " Govind Vernon MD 25 mL/hr at 07/30/23 0651 Rate Verify at 07/30/23 0651    potassium chloride 10 mEq in 100 mL IVPB  40 mEq Intravenous PRN Govind Vernon MD        And    potassium chloride 10 mEq in 100 mL IVPB  60 mEq Intravenous PRN Govind Vernon MD        And    potassium chloride 10 mEq in 100 mL IVPB  80 mEq Intravenous PRN Govind Vernon MD        prochlorperazine injection Soln 5 mg  5 mg Intravenous Q6H PRN Govind Vernon MD        propofol (DIPRIVAN) 10 mg/mL infusion  0-50 mcg/kg/min Intravenous Continuous Govind Vernon MD 12.4 mL/hr at 07/30/23 0651 20 mcg/kg/min at 07/30/23 0651    sodium bicarbonate 150 mEq in sterile water 1,000 mL infusion   Intravenous Continuous Stephani Borrero  mL/hr at 07/30/23 0651 Rate Verify at 07/30/23 0651    sodium chloride 0.9% flush 10 mL  10 mL Intravenous PRN Govind Vernon MD        sodium phosphate 15 mmol in dextrose 5 % (D5W) 250 mL IVPB  15 mmol Intravenous PRN Govind Vernon MD        sodium phosphate 20.01 mmol in dextrose 5 % (D5W) 250 mL IVPB  20.01 mmol Intravenous PRN Govind Vernon MD        sodium phosphate 30 mmol in dextrose 5 % (D5W) 250 mL IVPB  30 mmol Intravenous PRN Govind Vernon MD        thiamine (B-1) 100 mg in sodium chloride 0.9% 100 mL IVPB  100 mg Intravenous Daily Stephani Borrero MD        vancomycin - pharmacy to dose   Intravenous pharmacy to manage frequency Govind Vernon MD           FamHx:  Family History   Problem Relation Age of Onset    Diabetes Father     Diabetes Paternal Grandmother     Diabetes Paternal Grandfather        SocHx:  Social History     Socioeconomic History    Marital status:    Tobacco Use    Smoking status: Former     Current packs/day: 0.00     Average packs/day: 0.5 packs/day for 9.0 years (4.5 ttl pk-yrs)     Types: Cigarettes     Start date: 7/1/2004     Quit date: 7/1/2013     Years since quitting: 10.0    Smokeless tobacco: Former   Substance and Sexual Activity    Alcohol use: Not Currently      "Comment: DAILY PINT OF LIQUOR, HX OF 1 "TALL BOY" OF BEER DAILY    Drug use: Not Currently     Types: Cocaine     Comment: per collateral    Sexual activity: Yes     Partners: Female     Birth control/protection: None   Other Topics Concern    Patient feels they ought to cut down on drinking/drug use No    Patient annoyed by others criticizing their drinking/drug use No    Patient has felt bad or guilty about drinking/drug use No    Patient has had a drink/used drugs as an eye opener in the AM No     Social Determinants of Health     Financial Resource Strain: Medium Risk (3/23/2022)    Overall Financial Resource Strain (CARDIA)     Difficulty of Paying Living Expenses: Somewhat hard   Physical Activity: Inactive (3/23/2022)    Exercise Vital Sign     Days of Exercise per Week: 0 days     Minutes of Exercise per Session: 0 min   Stress: Stress Concern Present (3/23/2022)    [facility] - Occupational Stress Questionnaire     Feeling of Stress : Very much           Review of Systems: see H&P       Physical Exam:  Vitals:   Vitals:    07/30/23 0700   BP: (!) 141/72   Pulse: 93   Resp: (!) 22   Temp: 96.3 °F (35.7 °C)       I/O last 3 completed shifts:  In: 5929.7 [I.V.:2129.7; IV Piggyback:3800.1]  Out: 2700 [Urine:2000; Other:700]  No intake/output data recorded.    General: On the Vent.  Neck: supple   Lungs: Unlabored breathing  Heart: RRR  Abdomen: n/a  Ext: n/a  Neurologic: Unresponsive      Laboratories:    Recent Labs   Lab 07/30/23  0553   WBC 19.96*   RBC 2.85*   HGB 8.3*   HCT 24.7*      MCV 87   MCH 29.1   MCHC 33.6       Recent Labs   Lab 07/29/23  2312   CALCIUM 8.4*   ALBUMIN 1.9*   PROT 5.6*   *   K 4.6   CO2 11*      BUN 50*   CREATININE 4.0*   ALKPHOS 219*   *   *   BILITOT 0.6       Recent Labs   Lab 07/29/23  1944   COLORU Yellow   SPECGRAV 1.010   PHUR 6.0   PROTEINUA 2+*   BACTERIA None       Microbiology Results (last 7 days)       " Procedure Component Value Units Date/Time    Blood culture x two cultures. Draw prior to antibiotics. [[id number]] Collected: 07/29/23 1830    Order Status: Completed Specimen: Blood Updated: 07/30/23 0312     Blood Culture, Routine No Growth to date    Narrative:      Aerobic and anaerobic    Blood culture x two cultures. Draw prior to antibiotics. [[id number]] Collected: 07/29/23 1949    Order Status: Completed Specimen: Blood from Peripheral, Hand, Right Updated: 07/30/23 0312     Blood Culture, Routine No Growth to date    Narrative:      Aerobic and anaerobic    Culture, Respiratory with Gram Stain [[id number]]     Order Status: No result Specimen: Respiratory from Sputum               Diagnostic Tests:        CXR:    FINDINGS:   ET tube is visualized with distal tip approximately 3 cm above the cesar.  Support pads overlie the left lower chest wall.  Lungs are significantly hypoinflated.  Cardiac silhouette is stable in size.  No pneumothorax or large pleural effusion seen.    Impression:       As above.       Electronically signed by: Rhett Mendenhall MD   Date: 07/29/2023        Head CT:    No acute large vascular territory infarct or intracranial hemorrhage identified.  If persistent neurologic deficit, MRI brain can be obtained.     Minimal paranasal sinus disease with frothy secretions seen in the posterior nasal passages and nasopharynx.       Electronically signed by: Britton Otero MD   Date: 07/29/2023     Assessment:    32 y/o male admitted with:    - s/p Cardiac arrest at home   - Opiate OD  - Acute hypoxic resp. Failure  - MARISSA 2nd. Arrest  - HAGMA (lactic acidosis)  - Non-traumatic Rhabdo.  - Hyperphosphatemia  - IDDM-2 with end-organ damage  - Hyperglycemia  - r/o Sepsis  - Anemia et?  - Hypoalbuminemia  - Proteinuria  - Hx. Depression  - Prognosis guarded          Plan:    - Vent. Support  - Pressors  - IVFs: HCO3- ggt  - Donald  - Empiric antib.  - Neurology following TTM protocol in progress  - Will  follow [id number]-acid/base and I&O  - Other problems per admitting    Family updated regarding Renal issues.

## 2023-07-30 NOTE — NURSING
[facility] [place]  Nurses Note -- 4 Eyes      7/30/2023       Skin assessed on: Admit      [x] No Pressure Injuries Present    [x]Prevention Measures Documented    [] Yes LDA  for Pressure Injury Previously documented     [] Yes New Pressure Injury Discovered   [] LDA for New Pressure Injury Added      Attending RN:  Nereida Quintero RN     Second RN:  ANNA Chen

## 2023-07-30 NOTE — ASSESSMENT & PLAN NOTE
Unclear etiology at this time.  Concern for possible overdose on amitriptyline (QTc as high as 527 during this hospital stay), infection (pneumonia vs aspiration), worsening of chronic hypotension (possible missing dose of home midodrine), or possible opiate overdose.  Not requiring pressors at this time  - maintain MAP >65mmHg  - continue broad spectrum abx and narrow as possible. Vanc/zosyn (day 1).  - continue bicarbonate infusion at this time.  - limit sedative medications to assess neurologic status.   - avoid all QT prolonging agents.

## 2023-07-30 NOTE — ASSESSMENT & PLAN NOTE
Patient had cardiac arrest.  Was found down.  Had amitriptyline and morphine on bedside.  EKG does not show evidence of ST elevation MI.  Has not had any arrhythmias on tele monitoring since he came to the ICU.  Troponin elevated, likely secondary to demand ischemia caused by prolonged CPR and hypoxemia.  Echocardiogram shows normal left ventricular systolic function.  Currently being cold.  No indication for cardiac catheterization in the current setting as the risk exceeds the benefit as it is unlikely to be a primary cardiac event based on the above factors.  Also patient has multiorgan failure along with acute kidney injury.  Will reconsider if patient recovers neurologically.  Continue supportive care

## 2023-07-30 NOTE — PLAN OF CARE
Case Management Assessment     PCP: [facility]  Ph[facility]acy: [facility]  Patient Arrived From: Home  Existing Help at Home: Parents  Barriers to Discharge: None    Discharge Plan:    A. Home with family; follow-up; assess for services    B. TBD    Independent at home with parents; uses RW due to weakness, falling, fainting; no current services; unsure of needs       07/30/23 1333   Discharge Assessment   Assessment Type Discharge Planning Assessment   Confirmed/corrected address, phone number and insurance Yes   Confirmed Demographics Correct on Facesheet   Source of Information family;health record   If unable to respond/provide information was family/caregiver contacted? Yes   Contact Name/Number Aminta-spouse: [phone]   Communicated SUSAN with patient/caregiver Date not available/Unable to determine   Reason For Admission Cardiac arrest   People in Home parent(s)   Facility Arrived From: Home   Do you expect to return to your current living situation? Other (see comments)  (Depending recovery)   Do you have help at home or someone to help you manage your care at home? Yes   Who are your caregiver(s) and their phone number(s)? Parents-Ej and mother: [phone]   Prior to hospitilization cognitive status: Alert/Oriented   Current cognitive status: Coma/Sedated/Intubated   Walking or Climbing Stairs ambulation difficulty, requires equipment   Mobility Management RW   Do you have any problems with: Errands/Grocery;Needs other help   Specify other help Drives but does not have vehicle; family transports   Home Accessibility wheelchair accessible   Home Layout Able to live on 1st floor   Equipment Currently Used at Home walker, rolling   Readmission within 30 days? No   Patient currently being followed by outpatient case management? No   Do you currently have service(s) that help you manage your care at home? No   Do you take prescription medications? Yes   Do you have prescription coverage? Yes    Coverage Medicaid   Do you have any problems affording any of your prescribed medications? No   Is the patient taking medications as prescribed? yes   Who is going to help you get home at discharge? Parents   How do you get to doctors appointments? family or friend will provide   Are you on dialysis? No   Do you take coumadin? No   Discharge Plan A Home with family  (Follow-up; assess for services/needs)   Discharge Plan B Other  (TBD)   DME Needed Upon Discharge  other (see comments)  (TBD)   Discharge Plan discussed with: Spouse/sig other   Name(s) and Number(s) Aminta-spouse: [phone]   Transition of Care Barriers None     SW Role explained to patient; two patient identifiers recognized; SW contact information placed on Communication board. Discussed patient managing health care at home and discussed discharge plans A and B; determined who would be helping patient at home with recovery: Parents will help with recovery at home.

## 2023-07-30 NOTE — CONSULTS
"[facility] - Intensive Care  Pulmonology  Consult Note    Patient Name: Doe Woodall  MRN: [id number]  Admission Date: 7/29/2023  Hospital Length of Stay: 1 days  Code Status: Full Code  Attending Physician: Hollie Johnson MD  Primary Care Provider: [facility]   Principal Problem: Cardiac arrest    [unfilled]  Subjective:     HPI:  Doe Woodall lalito 33 year old male with history of EtOH use, DMII, mood disorder, orthostatic hypotension, depression, recurrent abscesses, who presents with cardiac arrest to the ER on 7/29.  He was found unresponsive by family at 5PM on 7/29 and family began CPR at 5:24.  Per patient's sister, a morphine and amitriptyline bottle were found next to the patient at time of discovery.  EMS arrived and achieved ROSC at 5:45PM.  Rhythm was noted to be asystole by EMS.  He had a vFib was cardioverted x 1 and started on amiodarone.     In the ER, he again arrested 2/2 bradycardia followed by PEA arrest.  During this code he received epi x 6, bicarb x 3, CaCl x 1, and amiodarone with ROSC.  Started on dopamine per cards following ROSC due to bradycardia. He was admitted to the MICU.  He remains unconscious, with absent pupillary, absent gag, absent corneal, and absent oculocephalic reflexes.  He occasionally breaths over the ventilator.     The patient has had multiple hospitalizations lately, and per family, "has spent more time in the hospital that out of it lately".  He was most recently in the hospital for hypotension after missing home midodrine (7/25-26).  He was also hospitalized at [facility] after passing out and receiving CPR in a neurology clinic.  It is unclear if he really lost a pulse during this time. He has recurrent skin abscesses/cellulitis, and has had a recent workup for autonomic dysfunction and adrenal insufficiency.    Initial workup shows elevated WBC (19.5), MARISSA (4.1), elevated LFTs (96/94), NSTEMI (0.063), elevated BNP of 109, lactic acidosis " "(10.2), hyperphosphatemia.  CT head without acute process as of yet.  Chest x-ray compatible with pulmonary edema.   Started on vanc/zosyn, and sodium bicarb gtt.  Dopamine ordered, but off the AM of initial exam.       Past Medical History:   Diagnosis Date    Abscess of right groin 09/01/2022    Diabetes mellitus     Encounter for blood transfusion     Loud snoring     Obese     Other insomnia 08/03/2020    Perineal abscess 08/01/2014    Primary hypertension 10/2/2022       Past Surgical History:   Procedure Laterality Date    ABCESS DRAINAGE  2014    PERIRECTAL    DECOMPRESSION OF LUMBAR SPINE USING MINIMALLY INVASIVE TECHNIQUE Right 07/06/2018    Procedure: DECOMPRESSION, SPINE, LUMBAR, MINIMALLY INVASIVE L3-4;  Surgeon: Cristian Cervantes MD;  Location: [facility] OR [address] [place] [address];  Service: Neurosurgery;  Laterality: Right;    INCISION AND DRAINAGE N/A 9/9/2022    Procedure: INCISION AND DRAINAGE GROIN;  Surgeon: KAITY Aguilar MD;  Location: [facility] OR;  Service: Urology;  Laterality: N/A;    ORTHOPEDIC SURGERY         Review of patient's allergies indicates:   Allergen Reactions    Metformin Diarrhea       Family History       Problem Relation (Age of Onset)    Diabetes Father, Paternal Grandmother, Paternal Grandfather          Tobacco Use    Smoking status: Former     Current packs/day: 0.00     Average packs/day: 0.5 packs/day for 9.0 years (4.5 ttl pk-yrs)     Types: Cigarettes     Start date: 7/1/2004     Quit date: 7/1/2013     Years since quitting: 10.0    Smokeless tobacco: Former   Substance and Sexual Activity    Alcohol use: Not Currently     Comment: DAILY PINT OF LIQUOR, HX OF 1 "TALL BOY" OF BEER DAILY    Drug use: Not Currently     Types: Cocaine     Comment: per collateral    Sexual activity: Yes     Partners: Female     Birth control/protection: None         Review of Systems   Unable to perform ROS: Acuity of condition     Objective:     Vital Signs (Most Recent):  Temp: 96.3 °F (35.7 °C) " (07/30/23 0930)  Pulse: 82 (07/30/23 0930)  Resp: (!) 22 (07/30/23 0930)  BP: (!) 107/57 (07/30/23 0915)  SpO2: 100 % (07/30/23 0930) Vital Signs (24h Range):  Temp:  [94.6 °F (34.8 °C)-96.4 °F (35.8 °C)] 96.3 °F (35.7 °C)  Pulse:  [] 82  Resp:  [0-101] 22  SpO2:  [90 %-100 %] 100 %  BP: ()/(35-93) 107/57     Weight: 103.6 kg (228 lb 6.3 oz)  Body mass index is 40.46 kg/m².      Intake/Output Summary (Last 24 hours) at 7/30/2023 1003  Last data filed at 7/30/2023 0935  Gross per 24 hour   Intake 6555.42 ml   Output 2800 ml   Net 3755.42 ml        Physical Exam  Constitutional:       General: He is not in acute distress.     Appearance: He is ill-appearing. He is not diaphoretic.   HENT:      Head: Atraumatic.      Nose:      Comments: Healing reported recent abscess drainage to bridge of nose.     Mouth/Throat:      Mouth: Mucous membranes are moist.   Eyes:      Comments: Pupils fixed at 5mm and non-reactive to light.  Absent oculocephalic reflex.  Corneal edema.    Cardiovascular:      Rate and Rhythm: Normal rate and regular rhythm.      Heart sounds: No murmur heard.     No friction rub. No gallop.   Pulmonary:      Breath sounds: No wheezing.      Comments: Mechanical breath sounds.  Clear air entry.  Abdominal:      General: Abdomen is flat. Bowel sounds are normal. There is no distension.      Palpations: Abdomen is soft.   Musculoskeletal:         General: Normal range of motion.      Cervical back: Normal range of motion.   Skin:     General: Skin is warm.   Neurological:      Mental Status: He is disoriented.      Comments: Absent cough, gag, corneal, pupillary, oculocephalic reflexes.  Present respiratory drive. No response to noxious stimuli.          Vents:  Vent Mode: VC (07/30/23 0829)  Ventilator Initiated: Yes (07/29/23 1908)  Set Rate: 20 BPM (07/30/23 0829)  Vt Set: 450 mL (07/30/23 0829)  PEEP/CPAP: 5 cmH20 (07/30/23 0829)  Oxygen Concentration (%): 30 (07/30/23 0930)  Peak Airway  Pressure: 14.8 cmH20 (07/30/23 0829)  Total Ve: 10.5 L/m (07/30/23 0829)  F/VT Ratio<105 (RSBI): (!) 47.09 (07/30/23 0214)    Lines/Drains/Airways       Central Venous Catheter Line  Duration             Percutaneous Central Line Insertion/Assessment - Triple Lumen  07/29/23 1948 Internal Jugular Left <1 day              Drain  Duration                  NG/OG Tube 07/29/23 1824 16 Fr. Right mouth <1 day         Urethral Catheter 07/29/23 1820 16 Fr. <1 day              Airway  Duration                  Airway - Non-Surgical 07/29/23 1800 Endotracheal Tube <1 day              Peripheral Intravenous Line  Duration                  Peripheral IV - Single Lumen 07/29/23 1808 20 G Anterior;Right Hand <1 day         Peripheral IV - Single Lumen 07/29/23 1943 20 G Right Hand <1 day                    Significant Labs:    CBC/Anemia Profile:  Recent Labs   Lab 07/29/23 1830 07/29/23 1905 07/29/23 2312 07/30/23  0553   WBC 19.56*  --  19.78* 19.96*   HGB 8.4*  --  8.2* 8.3*   HCT 27.8* 34* 25.5* 24.7*     --  194 182   MCV 97  --  92 87   RDW 13.9  --  13.8 13.9        Chemistries:  Recent Labs   Lab 07/29/23 1830 07/29/23 2022 07/29/23 2312 07/30/23  0713   * 139 135* 135*   K 6.0* 4.5 4.6 4.2    108 105 101   CO2 9* 13* 11* 19*   BUN 51* 51* 50* 50*   CREATININE 4.4* 4.1* 4.0* 3.8*   CALCIUM 8.9 8.8 8.4* 8.3*   ALBUMIN 2.2*  --  1.9*  --    PROT 6.7  --  5.6*  --    BILITOT 0.2  --  0.6  --    ALKPHOS 246*  --  219*  --    ALT 94*  --  338*  --    AST 96*  --  353*  --    MG 2.9*  --  2.1 1.9   PHOS 11.8*  --  8.7* 6.5*       All pertinent labs within the past 24 hours have been reviewed.    Significant Imaging:   I have reviewed all pertinent imaging results/findings within the past 24 hours.      AB  Recent Labs   Lab 07/30/23  0524   PH 7.307*   PO2 268*   PCO2 41.6   HCO3 20.8*   BE -5     Assessment/Plan:     Psychiatric  Severe episode of recurrent major depressive disorder, without  psychotic features  Taking amitriptyline at home with concern for possible excessive ingestion of unclear intent leading to this hospitalization.  - ensure follow up with psychiatry, if clinical condition improves.   - may need to discuss alternative agents moving forward.     Alcohol use disorder, severe, dependence  Continue thiamine, folic acid, multivitamin.  - monitor for symptoms consistent with withdrawal.  CIWA.  - propofol on board at this time for sedation.  Holding for SAT, but will likely resume.    Pulmonary  Acute respiratory failure with hypoxia  Secondary to cardiac arrest and subsequent aspiration vs pulmonary edema.  FiO2 requirements quickly down-trending, which is more suggestive of pulmonary edema.  - low tidal volume ventilation  - daily SAT/SBT  - target sats >90%  - HOB elevated, oral care.    Cardiac/Vascular  * Cardiac arrest  Unclear etiology at this time.  Concern for possible overdose on amitriptyline (QTc as high as 527 during this hospital stay), infection (pneumonia vs aspiration), worsening of chronic hypotension (possible missing dose of home midodrine), or possible opiate overdose.  Not requiring pressors at this time.  Significant neurologic impact suggested by physical exam.  Only reflex maintained is respiratory drive.    - maintain MAP >65mmHg  - continue broad spectrum abx and narrow as possible. Vanc/zosyn (day 1).  - continue bicarbonate infusion at this time.  - limit sedative medications to assess neurologic status.   - avoid all QT prolonging agents.  - TTM  - repeat CT head or MRI 72 hours from initial study (8/1/23 @ 20:39).  - daily SAT/SBT    NSTEMI (non-ST elevated myocardial infarction)  troponins elevated likely following cardiac arrest.  Cardiology on board.  - continue supportive care.    Chronic diastolic heart failure  Target euvolemic status and maintain normotension.  - diuresis PRN.    Renal/  MARISSA (acute kidney injury)  Cr Elevated to 4.0 on admission, but  has started to downtrend.  Patient making urine.  Suspect ATN from cardiac arrest.   - continue to monitor daily Cr.  - continue supportive care.     Lactic acidosis  Due to cardiac arrest and hypoperfusion.    - trend to ensure down-trend.  Metabolic acidosis is improving with supportive care.    ID  History of neurosyphilis  Reportedly treated at this time.  Will need to consider this if AMS persists.     Oncology  Normocytic anemia  Target HgB >7    Endocrine  Severe obesity with body mass index (BMI) of 36.0 to 36.9 with serious comorbidity  Complicates all aspects of care.    Type 2 diabetes mellitus with hyperglycemia, with long-term current use of insulin  Target glucose in ICU patient Is 140-180.  Currently hyperglycemic.  Will monitor and treat accordingly.  - ISS on board      GI  Elevated LFTs  Secondary to cardiac arrest, continue supportive care and close observation.     Orthopedic  Non-traumatic rhabdomyolysis  Due to prolonged cardiac arrest.  Monitor daily CPK.  Continue supportive care.        Fentanyl/propofol for sedation.  Currently paused for SAT.    pepcid  Heparin    Left IJ CVC, PIV, ogt, gabriel, ett    Critical care time 50 minutes      Thank you for your consult. I will follow-up with patient. Please contact us if you have any additional questions.     Thiago Carter MD  Pulmonology  [facility] - Intensive Care

## 2023-07-30 NOTE — NURSING
[facility]  Nurses Note -- 4 Eyes      7/30/2023       Skin assessed on: Q Shift      [x] No Pressure Injuries Present    [x]Prevention Measures Documented    [] Yes LDA  for Pressure Injury Previously documented     [] Yes New Pressure Injury Discovered   [] LDA for New Pressure Injury Added      Attending RN:  Stanton Reis RN     Second RN:  Nereida

## 2023-07-30 NOTE — HPI
"Doe Woodall lalito 33 year old male with history of EtOH use, DMII, mood disorder, orthostatic hypotension, depression, recurrent abscesses, who presents with cardiac arrest to the ER on 7/29.  He was found unresponsive by family at 5PM on 7/29 and family began CPR at 5:24.  Per patient's sister, a morphine and amitriptyline bottle were found next to the patient at time of discovery.  EMS arrived and achieved ROSC at 5:45PM.  Rhythm was noted to be asystole by EMS.  He had a vFib was cardioverted x 1 and started on amiodarone.     In the ER, he again arrested 2/2 bradycardia followed by PEA arrest.  During this code he received epi x 6, bicarb x 3, CaCl x 1, and amiodarone with ROSC.  Started on dopamine per cards following ROSC due to bradycardia. He was admitted to the MICU.  He remains unconscious, with absent pupillary, absent gag, absent corneal, and absent oculocephalic reflexes.  He occasionally breaths over the ventilator.     The patient has had multiple hospitalizations lately, and per family, "has spent more time in the hospital that out of it lately".  He was most recently in the hospital for hypotension after missing home midodrine (7/25-26).  He was also hospitalized at [facility] after passing out and receiving CPR in a neurology clinic.  It is unclear if he really lost a pulse during this time. He has recurrent skin abscesses/cellulitis, and has had a recent workup for autonomic dysfunction and adrenal insufficiency.    Initial workup shows elevated WBC (19.5), MARISSA (4.1), elevated LFTs (96/94), NSTEMI (0.063), elevated BNP of 109, lactic acidosis (10.2), hyperphosphatemia.  CT head without acute process as of yet.  Chest x-ray compatible with pulmonary edema.   Started on vanc/zosyn, and sodium bicarb gtt.  Dopamine ordered, but off the AM of initial exam.   "

## 2023-07-30 NOTE — PROGRESS NOTES
"Pharmacokinetic Initial Assessment: IV Vancomycin    Assessment/Plan:    Initiate intravenous vancomycin with loading dose of 2000 mg once with subsequent doses when random concentrations are less than 20 mcg/mL  Desired empiric serum trough concentration is 10 to 20 mcg/mL  Draw vancomycin random level on 7/30/23 at 09:00.  Pharmacy will continue to follow and monitor vancomycin.      Please contact pharmacy at extension [phone] with any questions regarding this assessment.     Thank you for the consult,   Beverley Soria       Patient brief summary:  Doe Woodall is a 33 y.o. male initiated on antimicrobial therapy with IV Vancomycin for treatment of suspected sepsis    Drug Allergies:   Review of patient's allergies indicates:   Allergen Reactions    Metformin Diarrhea       Actual Body Weight:   103 kg    Renal Function:   Estimated Creatinine Clearance: 28 mL/min (A) (based on SCr of 4.1 mg/dL (H)).,     Dialysis Method (if applicable):  N/A    CBC (last 72 hours):  Recent Labs   Lab Result Units 07/29/23  1830   WBC K/uL 19.56*   Hemoglobin g/dL 8.4*   Hematocrit % 27.8*   Platelets K/uL 229   Gran % % 57.0   Lymph % % 34.0   Mono % % 1.0*   Eosinophil % % 0.0   Basophil % % 0.0   Differential Method  Manual       Metabolic Panel (last 72 hours):  Recent Labs   Lab Result Units 07/29/23  1830 07/29/23  1944 07/29/23 2022   Sodium mmol/L 135*  --  139   Potassium mmol/L 6.0*  --  4.5   Chloride mmol/L 107  --  108   CO2 mmol/L 9*  --  13*   Glucose mg/dL 264*  --  289*   Glucose, UA   --  Trace*  --    BUN mg/dL 51*  --  51*   Creatinine mg/dL 4.4*  --  4.1*   Albumin g/dL 2.2*  --   --    Total Bilirubin mg/dL 0.2  --   --    Alkaline Phosphatase U/L 246*  --   --    AST U/L 96*  --   --    ALT U/L 94*  --   --    Magnesium mg/dL 2.9*  --   --    Phosphorus mg/dL 11.8*  --   --        Drug levels (last 3 results):  No results for input(s): "VANCOMYCINRA", "VANCORANDOM", "VANCOMYCINPE", "VANCOPEAK", " ""VANCOMYCINTR", "VANCOTROUGH" in the last 72 hours.    Microbiologic Results:  Microbiology Results (last 7 days)       Procedure Component Value Units Date/Time    Blood culture x two cultures. Draw prior to antibiotics. [[id number]] Collected: 07/29/23 1949    Order Status: Sent Specimen: Blood from Peripheral, Hand, Right Updated: 07/29/23 1956    Blood culture x two cultures. Draw prior to antibiotics. [[id number]] Collected: 07/29/23 1830    Order Status: Sent Specimen: Blood Updated: 07/29/23 1830            "

## 2023-07-30 NOTE — ED NOTES
Admitting provider at bedside speaking with family answering questions and discussing plan of care.

## 2023-07-30 NOTE — ASSESSMENT & PLAN NOTE
Target glucose in ICU patient Is 140-180.  Currently hyperglycemic.  Will monitor and treat accordingly.  - ISS on board

## 2023-07-30 NOTE — ASSESSMENT & PLAN NOTE
Troponin elevation likely type 2 caused by prolonged CPR and hypoxemia.  Echo shows normal left ventricle systolic function with no wall motion abnormalities and EKG done today is totally normal sinus rhythm with no acute ischemic changes noted

## 2023-07-30 NOTE — ED TRIAGE NOTES
Pt to ED via [facility] with complaints of cardiac arrest. Pt found down by family at approx 5 PM today. Per EMS, pt last seen normal last night around 10/11 PM. EMS states pt family started CPR at 1724 and continued until fire got there at 1729. EMS states pt was asystole upon their arrival. ROSC obtained at 1745. EMS states pt then went into VFIB and received one shock. Upon arrival to ED, pt pulseless. CPR initiated.   Pt recently discharged from this facility. Known medical hx of type 2 diabetes and hypertension. .

## 2023-07-30 NOTE — ASSESSMENT & PLAN NOTE
Continue thiamine, folic acid, multivitamin.  - monitor for symptoms consistent with withdrawal.  CIWA.  - propofol on board at this time for sedation.  Holding for SAT, but will likely resume.

## 2023-07-30 NOTE — ASSESSMENT & PLAN NOTE
Due to cardiac arrest and hypoperfusion.    - trend to ensure down-trend.  Metabolic acidosis is improving with supportive care.

## 2023-07-30 NOTE — ASSESSMENT & PLAN NOTE
troponins elevated likely following cardiac arrest.  Cardiology on board.  - continue supportive care.

## 2023-07-30 NOTE — PROGRESS NOTES
Pharmacokinetic Assessment Follow Up: IV Vancomycin    Vancomycin serum concentration assessment(s):    The random level was drawn incorrectly and cannot be used to guide therapy at this time. Random was drawn at ~10 hours post-dose.    Vancomycin Regimen Plan:    Hold Vancomycin dose today.  Re-dose when the random level is less than 20 mcg/mL, next level to be drawn at 0600 on 07/31    Drug levels (last 3 results):  Recent Labs   Lab Result Units 07/30/23  0713   Vancomycin, Random ug/mL 31.9       Pharmacy will continue to follow and monitor vancomycin.    Please contact pharmacy at extension [phone] for questions regarding this assessment.    Thank you for the consult,   Estefanía Soria       Patient brief summary:  Doe Woodall is a 33 y.o. male initiated on antimicrobial therapy with IV Vancomycin for treatment of sepsis      Drug Allergies:   Review of patient's allergies indicates:   Allergen Reactions    Metformin Diarrhea       Actual Body Weight:   103.6kg    Renal Function:   Estimated Creatinine Clearance: 29.3 mL/min (A) (based on SCr of 3.8 mg/dL (H)).,    Dialysis Method (if applicable):  N/A    CBC (last 72 hours):  Recent Labs   Lab Result Units 07/29/23 1830 07/29/23 2312 07/30/23  0553   WBC K/uL 19.56* 19.78* 19.96*   Hemoglobin g/dL 8.4* 8.2* 8.3*   Hematocrit % 27.8* 25.5* 24.7*   Platelets K/uL 229 194 182   Gran % % 57.0 77.0* 78.6*   Lymph % % 34.0 10.0* 11.0*   Mono % % 1.0* 6.0 7.3   Eosinophil % % 0.0 1.0 0.4   Basophil % % 0.0 0.0 0.3   Differential Method  Manual Manual Automated       Metabolic Panel (last 72 hours):  Recent Labs   Lab Result Units 07/29/23  1830 07/29/23 1944 07/29/23 2022 07/29/23 2312 07/30/23  0713   Sodium mmol/L 135*  --  139 135* 135*   Potassium mmol/L 6.0*  --  4.5 4.6 4.2   Chloride mmol/L 107  --  108 105 101   CO2 mmol/L 9*  --  13* 11* 19*   Glucose mg/dL 264*  --  289* 464* 431*   Glucose, UA   --  Trace*  --   --   --    BUN mg/dL 51*  --  51*  50* 50*   Creatinine mg/dL 4.4*  --  4.1* 4.0* 3.8*   Creatinine, Urine mg/dL  --  91.3  --   --   --    Albumin g/dL 2.2*  --   --  1.9*  --    Total Bilirubin mg/dL 0.2  --   --  0.6  --    Alkaline Phosphatase U/L 246*  --   --  219*  --    AST U/L 96*  --   --  353*  --    ALT U/L 94*  --   --  338*  --    Magnesium mg/dL 2.9*  --   --  2.1 1.9   Phosphorus mg/dL 11.8*  --   --  8.7* 6.5*       Vancomycin Administrations:  vancomycin given in the last 96 hours                     vancomycin (VANCOCIN) 2,000 mg in dextrose 5 % (D5W) 500 mL IVPB (mg) 2,000 mg New Bag 07/29/23 2132                    Microbiologic Results:  Microbiology Results (last 7 days)       Procedure Component Value Units Date/Time    Blood culture x two cultures. Draw prior to antibiotics. [[id number]] Collected: 07/29/23 1830    Order Status: Completed Specimen: Blood Updated: 07/30/23 0312     Blood Culture, Routine No Growth to date    Narrative:      Aerobic and anaerobic    Blood culture x two cultures. Draw prior to antibiotics. [[id number]] Collected: 07/29/23 1949    Order Status: Completed Specimen: Blood from Peripheral, Hand, Right Updated: 07/30/23 0312     Blood Culture, Routine No Growth to date    Narrative:      Aerobic and anaerobic    Culture, Respiratory with Gram Stain [[id number]]     Order Status: No result Specimen: Respiratory from Sputum

## 2023-07-30 NOTE — ASSESSMENT & PLAN NOTE
Patient with Hypoxic Respiratory failure which is Acute.  he is not on home oxygen. Supplemental oxygen was provided and noted- Vent Mode: VC  Oxygen Concentration (%):  [] 30  Resp Rate Total:  [18 br/min-26 br/min] 22 br/min  Vt Set:  [450 mL] 450 mL  PEEP/CPAP:  [5 cmH20] 5 cmH20  Mean Airway Pressure:  [8.5 cmH20-10.5 cmH20] 8.7 cmH20    In the setting of cardiac arrest. Wean oxygen as tolerated   Consult to pulmonary

## 2023-07-30 NOTE — EICU
Brief Eicu admission review note.  Reason for admission:S/p cardiac arrest    Full H&P,notes, labs images reviewed.    Brief history: 34 y/o with h/o HTN, DM, alcohol use, anxiety disorder, orthostatic hypotension , HFpEF 60 % on TTE 1/22,found unresponsive with morphine and  amitriptyline at the bedside. CPR  with ROSC in 21 min, original rhythm asystole with subsequent Vfib, cardioverted, given amio by EMS. On arrival to ER pulseless,  PEA arrest x 3 with ROSC, afterwards became bradycardic, amio stopped, dopamine, bicarb qtt started,  pt was intubated, vanc/zosyn given. GCS 3 after ROSC.  EKG wide complex tachycardia> sinus tach, no ST elevation.Labs significant for leukocytosis 19K,  Hgb 8.4-( bl 10) creatinine 4.1 from b/l 1.7, BUN 51, LA 10,  K 6.0,Phos 11.8, tox screen positive for opiates, UA benign. CXR hypovent, no obvious PNA or edema. CT head neg. UA LE negative.  He had work up for hypotension previous admission 1 week ago with nl TSH, am cortisol 7/26/23    Evaluation via interactive audio and video telecomminications: Intubated, sedated, HR 98, /75, ACVC 450/18/60%/+5 peak pressure 18, riding the vent    Problems:  S/p Cardiac arrest, possibly opiates/amitryptilline overdose  Wide complex tachycardia, prolonged QTC  Shock, presumed septic, no obvious source  MARISSA   HyperK  Lactic acidosis  AGMA  Transaminatis, likely from shock liver  Tox screen positive for opiates  DM, questionable DKA      Plan:   Continue mechanical ventilation, adjust settings per Abg  Continue Propofol, change fentanyl qtt to boluses if tolerates   TTM 36, fever avoidance  Continue Vanc/Zosyn, check procal, follow up cxs  Continue bicarb qtt@ 150 cc/h, change D5 to sterile water 2/2 hyperglycemia  Add BHB, ETOH, tylenol levels  K has improved, monitor  Donald, monitor lytes, strict Ins/outs  Serial EKGs, trop, continue dopamine for now, TTE  Serial H&H  EEG, RUQ US, possibly MRI head in am  Thiamine, folic acid  BG goal  140-180, SSI may need Insulin tt  Best practices:    SUP: Pepcid    VTE prevention: heparin sq     D/w RN

## 2023-07-30 NOTE — ASSESSMENT & PLAN NOTE
Unclear etiology. Concern for drug toxicity/over dose given amitriptyline use with associated narcotics or prolonged hypotension causing arrest. Other differentials include sepsis, seizures. Less likely intra-cranial process, PE or ACS.   Currently, intubated with non-reactive pupils     Plan:   Continue dopamine given significant bradycardic episodes prior to arrest   Continue bicarbonate infusion for possible amitriptyline overdose/acidosis  Obtain EEG  Obtain echocardiogram, lower extremity dopplers.   Consult to cardiology   Consult to neurology   Consult to palliative care for goals of care,  Echo. Is pending.

## 2023-07-30 NOTE — ASSESSMENT & PLAN NOTE
Patient with Hypoxic Respiratory failure which is Acute.  he is not on home oxygen. Supplemental oxygen was provided and noted- Vent Mode: VC  Oxygen Concentration (%):  [100] 100  Resp Rate Total:  [18 br/min-22 br/min] 22 br/min  Vt Set:  [450 mL] 450 mL  PEEP/CPAP:  [5 cmH20] 5 cmH20  Mean Airway Pressure:  [9.2 cmH20-10.5 cmH20] 10.5 cmH20    In the setting of cardiac arrest. Wean oxygen as tolerated   Consult to pulmonary

## 2023-07-30 NOTE — ASSESSMENT & PLAN NOTE
Taking amitriptyline at home with concern for possible excessive ingestion of unclear intent leading to this hospitalization.  - ensure follow up with psychiatry, if clinical condition improves.   - may need to discuss alternative agents moving forward.

## 2023-07-30 NOTE — PROGRESS NOTES
[facility] Medicine  Progress Note    Patient Name: Doe Woodall  MRN: [id number]  Patient Class: IP- Inpatient   Admission Date: 7/29/2023  Length of Stay: 1 days  Attending Physician: Hollie Johnson MD  Primary Care Provider: [facility]        Subjective:     Principal Problem:Cardiac arrest        HPI:  This is a 33-year-old male with a past medical history of alcohol use, type 2 diabetes, mood disorder, and orthostatic hypotension who presents with cardiac arrest.    Patient presents to the ED on 07/29 after being found unresponsive by his family around 5:00 p.m..  Family started CPR at 5:24 p.m. and EMS continued resuscitation with achievement of ROSC at 5:45 p.m..  Initial rhythm was noted to be asystole.  The patient later went into VFib, was cardioverted x1 and given amiodarone.  While in the ED, the patient became bradycardic and went into PA arrest x3, during which he received epinephrine x6, bicarbonate x3, calcium chloride x1, and amiodarone with achievement of ROSC. Given recurrent bradycardia, he was later started on dopamine, per cardiology. Per the patient's sister, a bottle of morphine and amitriptyline were found next to him.     Of note, The patient was recently hospitalized (7/25-7/26) for hypotension after missing his midodrine at home.  He received fluids, antibiotics, and midodrine with improvement of his blood pressure and was discharged home.  The patient also had a recent hospitalization at [facility] (6/16-7/8) after presenting with unresponsiveness.  At that time, he was at the neurology clinic for evaluation of episodic loss of consciousness and suddenly became unconscious.  He received CPR of unknown duration after which he became responsive, but did not seem to actually lose pulse.  He was treated for preseptal cellulitis and worked up for autonomic dysfunction and adrenal insufficiency.    In the ED, laboratory workup was remarkable for  leukocytosis (19.5), elevated creatinine (4.1 - from a baseline of 1.7), elevated LFTs (AST:  96, ALT:  94, ALP:  246), elevated troponin (0.063), elevated BNP (109), elevated lactic acid (10.2), hyperphosphatemia (11.8).  UA was unremarkable.  CT head showed no acute large vascular territory infarct or intracranial hemorrhage.  Chest x-ray showed mild perihilar interstitial changes with possible edema.  Patient was given aspirin 300 mg, 3.0 L of LR, vancomycin, Zosyn albuterol, and was started on a sodium bicarb and dopamine gtt. Patient was admitted for further management.       Overview/Hospital Course:  This is a 33-year-old male with a past medical history of alcohol use, type 2 diabetes, mood disorder, and orthostatic hypotension who presents with cardiac arrest.  Patient presents to the ED on 07/29 after being found unresponsive by his family around 5:00 p.m..  Family started CPR at 5:24 p.m. and EMS continued resuscitation with achievement of ROSC at 5:45 p.m..  Initial rhythm was noted to be asystole.  The patient later went into VFib, was cardioverted x1 and given amiodarone.  While in the ED, the patient became bradycardic and went into PA arrest x3, during which he received epinephrine x6, bicarbonate x3, calcium chloride x1, and amiodarone with achievement of ROSC. Given recurrent bradycardia, he was later started on dopamine, per cardiology. Per the patient's sister, a bottle of morphine and amitriptyline were found next to him.   In the ED, laboratory workup was remarkable for leukocytosis (19.5), elevated creatinine (4.1 - from a baseline of 1.7), elevated LFTs (AST:  96, ALT:  94, ALP:  246), elevated troponin (0.063), elevated BNP (109), elevated lactic acid (10.2), hyperphosphatemia (11.8).  UA was unremarkable.  CT head showed no acute large vascular territory infarct or intracranial hemorrhage.  Chest x-ray showed mild perihilar interstitial changes with possible edema.  Patient was given  aspirin 300 mg, 3.0 L of LR, vancomycin, Zosyn albuterol, and was started on a sodium bicarb and dopamine gtt.  On IVF for ARF,nephrology is consulted.  Cardiology is consulted for elevated troponin,echo. Is pending.  Pulmonology doing vent management,  Neurology is on board for possible anoxic injury.      Past Medical History:   Diagnosis Date    Abscess of right groin 09/01/2022    Diabetes mellitus     Encounter for blood transfusion     Loud snoring     Obese     Other insomnia 08/03/2020    Perineal abscess 08/01/2014    Primary hypertension 10/2/2022       Past Surgical History:   Procedure Laterality Date    ABCESS DRAINAGE  2014    PERIRECTAL    DECOMPRESSION OF LUMBAR SPINE USING MINIMALLY INVASIVE TECHNIQUE Right 07/06/2018    Procedure: DECOMPRESSION, SPINE, LUMBAR, MINIMALLY INVASIVE L3-4;  Surgeon: Cristian Cervantes MD;  Location: [facility] OR [address] [place] [address];  Service: Neurosurgery;  Laterality: Right;    INCISION AND DRAINAGE N/A 9/9/2022    Procedure: INCISION AND DRAINAGE GROIN;  Surgeon: KAITY Aguilar MD;  Location: [facility] OR;  Service: Urology;  Laterality: N/A;    ORTHOPEDIC SURGERY         Review of patient's allergies indicates:   Allergen Reactions    Metformin Diarrhea       No current facility-administered medications on file prior to encounter.     Current Outpatient Medications on File Prior to Encounter   Medication Sig    amitriptyline (ELAVIL) 75 MG tablet Take 1 tablet by mouth every evening.    aspirin 81 MG Chew Take 1 tablet by mouth once daily.    atorvastatin (LIPITOR) 80 MG tablet Take 80 mg by mouth every evening.    gabapentin (NEURONTIN) 100 MG capsule Take 100 mg by mouth 3 (three) times daily.    insulin detemir U-100, Levemir, (LEVEMIR FLEXPEN) 100 unit/mL (3 mL) InPn pen Inject 30 Units into the skin once daily.    midodrine (PROAMATINE) 5 MG Tab Take 3 tablets (15 mg total) by mouth 3 (three) times daily.    oxyCODONE (ROXICODONE) 10 mg Tab immediate release tablet  "Take 10 mg by mouth every 8 (eight) hours as needed for Pain.    pantoprazole (PROTONIX) 40 MG tablet Take 40 mg by mouth once daily.    pen needle, diabetic (BD ULTRA-FINE MICRO PEN NEEDLE) 32 gauge x 1/4" Ndle Use 1 each to inject insulin once daily     Family History       Problem Relation (Age of Onset)    Diabetes Father, Paternal Grandmother, Paternal Grandfather          Tobacco Use    Smoking status: Former     Current packs/day: 0.00     Average packs/day: 0.5 packs/day for 9.0 years (4.5 ttl pk-yrs)     Types: Cigarettes     Start date: 7/1/2004     Quit date: 7/1/2013     Years since quitting: 10.0    Smokeless tobacco: Former   Substance and Sexual Activity    Alcohol use: Not Currently     Comment: DAILY PINT OF LIQUOR, HX OF 1 "TALL BOY" OF BEER DAILY    Drug use: Not Currently     Types: Cocaine     Comment: per collateral    Sexual activity: Yes     Partners: Female     Birth control/protection: None     Review of Systems   Unable to perform ROS: Intubated     Objective:     Vital Signs (Most Recent):  Temp: 96.3 °F (35.7 °C) (07/30/23 0930)  Pulse: 82 (07/30/23 0930)  Resp: (!) 22 (07/30/23 0930)  BP: (!) 107/57 (07/30/23 0915)  SpO2: 100 % (07/30/23 0930) Vital Signs (24h Range):  Temp:  [94.6 °F (34.8 °C)-96.4 °F (35.8 °C)] 96.3 °F (35.7 °C)  Pulse:  [] 82  Resp:  [0-101] 22  SpO2:  [90 %-100 %] 100 %  BP: ()/(35-93) 107/57     Weight: 103.6 kg (228 lb 6.3 oz)  Body mass index is 40.46 kg/m².     Physical Exam  Vitals and nursing note reviewed.   Constitutional:       Appearance: He is obese. He is ill-appearing.   HENT:      Head: Normocephalic and atraumatic.      Nose: Nose normal.      Mouth/Throat:      Mouth: Mucous membranes are dry.      Comments: ET tube in place.   Eyes:      Comments: Fixed dilated pupils.  Gag reflex +   Cardiovascular:      Rate and Rhythm: Tachycardia present.      Pulses: Normal pulses.      Heart sounds: No murmur heard.  Pulmonary:      " Comments: Intubated.   Abdominal:      General: Abdomen is flat. There is no distension.      Palpations: Abdomen is soft.   Musculoskeletal:      Right lower leg: No edema.      Left lower leg: No edema.   Skin:     General: Skin is warm.   Neurological:      Mental Status: He is alert.      Comments: Intubated. Off sedation, does not follow commands.   Gag reflex +                Significant Labs: All pertinent labs within the past 24 hours have been reviewed.    Significant Imaging: I have reviewed all pertinent imaging results/findings within the past 24 hours.      Assessment/Plan:      * Cardiac arrest  Unclear etiology. Concern for drug toxicity/over dose given amitriptyline use with associated narcotics or prolonged hypotension causing arrest. Other differentials include sepsis, seizures. Less likely intra-cranial process, PE or ACS.   Currently, intubated with non-reactive pupils     Plan:   Continue dopamine given significant bradycardic episodes prior to arrest   Continue bicarbonate infusion for possible amitriptyline overdose/acidosis  Obtain EEG  Obtain echocardiogram, lower extremity dopplers.   Consult to cardiology   Consult to neurology   Consult to palliative care for goals of care,  Echo. Is pending.    Elevated LFTs  Likely in the setting of cardiac arrest. Continue to trend LFTs.       Acute respiratory failure with hypoxia  Patient with Hypoxic Respiratory failure which is Acute.  he is not on home oxygen. Supplemental oxygen was provided and noted- Vent Mode: VC  Oxygen Concentration (%):  [] 30  Resp Rate Total:  [18 br/min-26 br/min] 22 br/min  Vt Set:  [450 mL] 450 mL  PEEP/CPAP:  [5 cmH20] 5 cmH20  Mean Airway Pressure:  [8.5 cmH20-10.5 cmH20] 8.7 cmH20    In the setting of cardiac arrest. Wean oxygen as tolerated   Consult to pulmonary     MARISSA (acute kidney injury)  In the setting of cardiac arrest, likely ATN  Monitor Cr & urine output     Severe episode of recurrent major  depressive disorder, without psychotic features  Hold home medications       Severe obesity with body mass index (BMI) of 36.0 to 36.9 with serious comorbidity  Body mass index is 40.46 kg/m². Morbid obesity complicates all aspects of disease management from diagnostic modalities to treatment. Weight loss encouraged and health benefits explained to patient.         Severe sepsis  This patient does have evidence of infective focus  My overall impression is sepsis.  Source: Unknown  Antibiotics given-   Antibiotics (72h ago, onward)    Start     Stop Route Frequency Ordered    07/30/23 0600  piperacillin-tazobactam (ZOSYN) 4.5 g in dextrose 5 % in water (D5W) 100 mL IVPB (MB+)         -- IV Every 8 hours 07/29/23 2212 07/29/23 2312  vancomycin - pharmacy to dose  (vancomycin IVPB (PEDS and ADULTS))        See Hyperspace for full Linked Orders Report.    -- IV pharmacy to manage frequency 07/29/23 2212 07/29/23 2100  vancomycin (VANCOCIN) 2,000 mg in dextrose 5 % (D5W) 500 mL IVPB         07/30/23 0859 IV ED 1 Time 07/29/23 2023        Latest lactate reviewed-  Recent Labs   Lab 07/29/23  1853   LACTATE 10.2*     Organ dysfunction indicated by Acute kidney injury, Acute respiratory failure and Encephalopathy    Fluid challenge Ideal Body Weight- The patient's ideal body weight is Ideal body weight: 61.5 kg (135 lb 9.3 oz) which will be used to calculate fluid bolus of 30 ml/kg for treatment of septic shock.      Post- resuscitation assessment No - Post resuscitation assessment not needed       Will Not start Pressors- Levophed for MAP of 65  Source control achieved by:   Antibiotics   Follow up cultures   Obtain further imaging once more stable/creatinine allows.     Type 2 diabetes mellitus with hyperglycemia, with long-term current use of insulin  Patient's FSGs are controlled on current medication regimen.  Last A1c reviewed-   Lab Results   Component Value Date    HGBA1C 6.9 (H) 05/10/2023     Most recent  "fingerstick glucose reviewed- No results for input(s): "POCTGLUCOSE" in the last 24 hours.  Current correctional scale  Low  Maintain anti-hyperglycemic dose as follows-   Antihyperglycemics (From admission, onward)    Start     Stop Route Frequency Ordered    07/29/23 2340  insulin aspart U-100 pen 0-5 Units         -- SubQ Every 6 hours PRN 07/29/23 2240          Chronic diastolic heart failure  Will monitior for fluid overload.      Alcohol use disorder, severe, dependence  Per record.        VTE Risk Mitigation (From admission, onward)         Ordered     heparin (porcine) injection 5,000 Units  Every 8 hours         07/30/23 0819     IP VTE HIGH RISK PATIENT  Once         07/29/23 2235     Place sequential compression device  Until discontinued         07/29/23 2235                Discharge Planning   SUSAN:      Code Status: Full Code   Is the patient medically ready for discharge?:     Reason for patient still in hospital (select all that apply): Patient trending condition               Critical care time spent on the evaluation and treatment of severe organ dysfunction, review of pertinent labs and imaging studies, discussions with consulting providers and discussions with patient/family:  Over 45  minutes.      Hollie Johnson MD  Department of Hospital Medicine   [facility] - Intensive Care    "

## 2023-07-30 NOTE — ASSESSMENT & PLAN NOTE
"Patient's FSGs are controlled on current medication regimen.  Last A1c reviewed-   Lab Results   Component Value Date    HGBA1C 6.9 (H) 05/10/2023     Most recent fingerstick glucose reviewed- No results for input(s): "POCTGLUCOSE" in the last 24 hours.  Current correctional scale  Low  Maintain anti-hyperglycemic dose as follows-   Antihyperglycemics (From admission, onward)    Start     Stop Route Frequency Ordered    07/29/23 2340  insulin aspart U-100 pen 0-5 Units         -- SubQ Every 6 hours PRN 07/29/23 2240        "

## 2023-07-30 NOTE — RESPIRATORY THERAPY
Patient on servoi ventilator with 7.5 ETT secured at 25 cm at teeth with current ventilator settings charted VC/AC rate 18, tidal volume 450 ml, peep +5, FI02 100%.. Alarms on, set and functioning. AMBU BAG and mask at bedside. Suctioning done. ABG/LACTATE/CHEM 8 drawn and anaylzed. Lab results reported to Dr. Betts. 20 mg albuterol given inline with ventilator. Will continue with ventilator management.

## 2023-07-30 NOTE — RESPIRATORY THERAPY
Latest Reference Range & Units 07/29/23 18:57 07/29/23 19:05   Sodium, Blood Gas 136 - 145 mmol/L  139   Potassium, Blood Gas 3.5 - 5.1 mmol/L  3.9   POC Chloride 95 - 110 mmol/L  108   POC Anion Gap 8 - 16 mmol/L  17 (H)   POC BUN 6 - 30 mg/dL  54 (H)   Sample  ARTERIAL ARTERIAL   POC Ionized Calcium 1.06 - 1.42 mmol/L  1.08   POC Glucose 70 - 110 mg/dL  271 (H)   POC Hematocrit 36 - 54 %PCV  34 (L)   POC TCO2 (MEASURED) 23 - 27 mmol/L  19 (L)   FiO2  100    Vt  450    PiP  23    PEEP  5    DelSys  Adult Vent    Site  LB    Mode  AC/PRVC    POC Lactate 0.36 - 1.25 mmol/L 9.33 (HH)    Rate  18    POC Creatinine 0.5 - 1.4 mg/dL  4.3 (H)   (HH): Data is critically high  (H): Data is abnormally high  (L): Data is abnormally low

## 2023-07-30 NOTE — ASSESSMENT & PLAN NOTE
Unclear etiology. Concern for drug toxicity/over dose given amitriptyline use with associated narcotics or prolonged hypotension causing arrest. Other differentials include sepsis, seizures. Less likely intra-cranial process, PE or ACS.   Currently, intubated with non-reactive pupils     Plan:   Continue dopamine given significant bradycardic episodes prior to arrest   Continue bicarbonate infusion for possible amitriptyline overdose/acidosis  Obtain EEG  Obtain echocardiogram, lower extremity dopplers.   Consult to cardiology   Consult to neurology   Consult to palliative care for goals of care

## 2023-07-30 NOTE — SUBJECTIVE & OBJECTIVE
"Past Medical History:   Diagnosis Date    Abscess of right groin 09/01/2022    Diabetes mellitus     Encounter for blood transfusion     Loud snoring     Obese     Other insomnia 08/03/2020    Perineal abscess 08/01/2014    Primary hypertension 10/2/2022       Past Surgical History:   Procedure Laterality Date    ABCESS DRAINAGE  2014    PERIRECTAL    DECOMPRESSION OF LUMBAR SPINE USING MINIMALLY INVASIVE TECHNIQUE Right 07/06/2018    Procedure: DECOMPRESSION, SPINE, LUMBAR, MINIMALLY INVASIVE L3-4;  Surgeon: Cristian Cervantes MD;  Location: [facility] OR [address] [place] [address];  Service: Neurosurgery;  Laterality: Right;    INCISION AND DRAINAGE N/A 9/9/2022    Procedure: INCISION AND DRAINAGE GROIN;  Surgeon: KAITY Aguilar MD;  Location: [facility] OR;  Service: Urology;  Laterality: N/A;    ORTHOPEDIC SURGERY         Review of patient's allergies indicates:   Allergen Reactions    Metformin Diarrhea       Family History       Problem Relation (Age of Onset)    Diabetes Father, Paternal Grandmother, Paternal Grandfather          Tobacco Use    Smoking status: Former     Current packs/day: 0.00     Average packs/day: 0.5 packs/day for 9.0 years (4.5 ttl pk-yrs)     Types: Cigarettes     Start date: 7/1/2004     Quit date: 7/1/2013     Years since quitting: 10.0    Smokeless tobacco: Former   Substance and Sexual Activity    Alcohol use: Not Currently     Comment: DAILY PINT OF LIQUOR, HX OF 1 "TALL BOY" OF BEER DAILY    Drug use: Not Currently     Types: Cocaine     Comment: per collateral    Sexual activity: Yes     Partners: Female     Birth control/protection: None         Review of Systems   Unable to perform ROS: Acuity of condition     Objective:     Vital Signs (Most Recent):  Temp: 96.3 °F (35.7 °C) (07/30/23 0930)  Pulse: 82 (07/30/23 0930)  Resp: (!) 22 (07/30/23 0930)  BP: (!) 107/57 (07/30/23 0915)  SpO2: 100 % (07/30/23 0930) Vital Signs (24h Range):  Temp:  [94.6 °F (34.8 °C)-96.4 °F (35.8 °C)] 96.3 °F (35.7 °C)  Pulse:  " [] 82  Resp:  [0-101] 22  SpO2:  [90 %-100 %] 100 %  BP: ()/(35-93) 107/57     Weight: 103.6 kg (228 lb 6.3 oz)  Body mass index is 40.46 kg/m².      Intake/Output Summary (Last 24 hours) at 7/30/2023 1003  Last data filed at 7/30/2023 0935  Gross per 24 hour   Intake 6555.42 ml   Output 2800 ml   Net 3755.42 ml        Physical Exam  Constitutional:       General: He is not in acute distress.     Appearance: He is ill-appearing. He is not diaphoretic.   HENT:      Head: Atraumatic.      Nose:      Comments: Healing reported recent abscess drainage to bridge of nose.     Mouth/Throat:      Mouth: Mucous membranes are moist.   Eyes:      Comments: Pupils fixed at 5mm and non-reactive to light.  Absent oculocephalic reflex.  Corneal edema.    Cardiovascular:      Rate and Rhythm: Normal rate and regular rhythm.      Heart sounds: No murmur heard.     No friction rub. No gallop.   Pulmonary:      Breath sounds: No wheezing.      Comments: Mechanical breath sounds.  Clear air entry.  Abdominal:      General: Abdomen is flat. Bowel sounds are normal. There is no distension.      Palpations: Abdomen is soft.   Musculoskeletal:         General: Normal range of motion.      Cervical back: Normal range of motion.   Skin:     General: Skin is warm.   Neurological:      Mental Status: He is disoriented.      Comments: Absent cough, gag, corneal, pupillary, oculocephalic reflexes.  Present respiratory drive. No response to noxious stimuli.          Vents:  Vent Mode: VC (07/30/23 0829)  Ventilator Initiated: Yes (07/29/23 1908)  Set Rate: 20 BPM (07/30/23 0829)  Vt Set: 450 mL (07/30/23 0829)  PEEP/CPAP: 5 cmH20 (07/30/23 0829)  Oxygen Concentration (%): 30 (07/30/23 0930)  Peak Airway Pressure: 14.8 cmH20 (07/30/23 0829)  Total Ve: 10.5 L/m (07/30/23 0829)  F/VT Ratio<105 (RSBI): (!) 47.09 (07/30/23 0214)    Lines/Drains/Airways       Central Venous Catheter Line  Duration             Percutaneous Central Line  Insertion/Assessment - Triple Lumen  07/29/23 1948 Internal Jugular Left <1 day              Drain  Duration                  NG/OG Tube 07/29/23 1824 16 Fr. Right mouth <1 day         Urethral Catheter 07/29/23 1820 16 Fr. <1 day              Airway  Duration                  Airway - Non-Surgical 07/29/23 1800 Endotracheal Tube <1 day              Peripheral Intravenous Line  Duration                  Peripheral IV - Single Lumen 07/29/23 1808 20 G Anterior;Right Hand <1 day         Peripheral IV - Single Lumen 07/29/23 1943 20 G Right Hand <1 day                    Significant Labs:    CBC/Anemia Profile:  Recent Labs   Lab 07/29/23 1830 07/29/23 1905 07/29/23 2312 07/30/23  0553   WBC 19.56*  --  19.78* 19.96*   HGB 8.4*  --  8.2* 8.3*   HCT 27.8* 34* 25.5* 24.7*     --  194 182   MCV 97  --  92 87   RDW 13.9  --  13.8 13.9        Chemistries:  Recent Labs   Lab 07/29/23 1830 07/29/23 2022 07/29/23 2312 07/30/23  0713   * 139 135* 135*   K 6.0* 4.5 4.6 4.2    108 105 101   CO2 9* 13* 11* 19*   BUN 51* 51* 50* 50*   CREATININE 4.4* 4.1* 4.0* 3.8*   CALCIUM 8.9 8.8 8.4* 8.3*   ALBUMIN 2.2*  --  1.9*  --    PROT 6.7  --  5.6*  --    BILITOT 0.2  --  0.6  --    ALKPHOS 246*  --  219*  --    ALT 94*  --  338*  --    AST 96*  --  353*  --    MG 2.9*  --  2.1 1.9   PHOS 11.8*  --  8.7* 6.5*       All pertinent labs within the past 24 hours have been reviewed.    Significant Imaging:   I have reviewed all pertinent imaging results/findings within the past 24 hours.

## 2023-07-31 PROBLEM — G93.1 ANOXIC BRAIN INJURY: Status: ACTIVE | Noted: 2023-01-01

## 2023-07-31 PROBLEM — R78.81 POSITIVE BLOOD CULTURE: Status: ACTIVE | Noted: 2023-01-01

## 2023-07-31 PROBLEM — Z71.89 ADVANCE CARE PLANNING: Status: ACTIVE | Noted: 2023-01-01

## 2023-07-31 NOTE — CONSULTS
"[facility] - [place], [place] - Intensive Care  Wound Care    Patient Name:  Doe Woodall   MRN:  [id number]  Date: 7/31/2023  Diagnosis: Cardiac arrest    History:     Past Medical History:   Diagnosis Date    Abscess of right groin 09/01/2022    Diabetes mellitus     Encounter for blood transfusion     Loud snoring     Obese     Other insomnia 08/03/2020    Perineal abscess 08/01/2014    Primary hypertension 10/2/2022       Social History     Socioeconomic History    Marital status:    Tobacco Use    Smoking status: Former     Current packs/day: 0.00     Average packs/day: 0.5 packs/day for 9.0 years (4.5 ttl pk-yrs)     Types: Cigarettes     Start date: 7/1/2004     Quit date: 7/1/2013     Years since quitting: 10.0    Smokeless tobacco: Former   Substance and Sexual Activity    Alcohol use: Not Currently     Comment: DAILY PINT OF LIQUOR, HX OF 1 "TALL BOY" OF BEER DAILY    Drug use: Not Currently     Types: Cocaine     Comment: per collateral    Sexual activity: Yes     Partners: Female     Birth control/protection: None   Other Topics Concern    Patient feels they ought to cut down on drinking/drug use No    Patient annoyed by others criticizing their drinking/drug use No    Patient has felt bad or guilty about drinking/drug use No    Patient has had a drink/used drugs as an eye opener in the AM No     Social Determinants of Health     Financial Resource Strain: Medium Risk (3/23/2022)    Overall Financial Resource Strain (CARDIA)     Difficulty of Paying Living Expenses: Somewhat hard   Food Insecurity: Unknown (3/23/2022)    Hunger Vital Sign     Worried About Running Out of Food in the Last Year: Never true     Ran Out of Food in the Last Year: Patient refused   Transportation Needs: No Transportation Needs (3/23/2022)    PRAPARE - Transportation     Lack of Transportation (Medical): No     Lack of Transportation (Non-Medical): No   Physical Activity: Inactive (3/23/2022)    Exercise Vital Sign     Days of Exercise per " Week: 0 days     Minutes of Exercise per Session: 0 min   Stress: Stress Concern Present (3/23/2022)    [employer] - Occupational Stress Questionnaire     Feeling of Stress : Very much   Social Connections: Unknown (3/23/2022)    Social Connection and Isolation Panel [NHANES]     Active Member of Clubs or Organizations: No     Attends Club or Organization Meetings: Never   Housing Stability: Low Risk  (3/23/2022)    Housing Stability Vital Sign     Unable to Pay for Housing in the Last Year: No     Number of Places Lived in the Last Year: 1     Unstable Housing in the Last Year: No       Precautions:     Allergies as of 07/29/2023 - Reviewed 07/29/2023   Allergen Reaction Noted    Metformin Diarrhea 09/01/2022       WOC Assessment Details/Treatment   Consulted for altered skin integrity bridge of nose and right hip  A 33 year old male admitted 7/29/23 from home with cardiac arrest; elevated LFTs; acute respiratory failure with hypoxia; MARISSA; severe episode of recurrent major depressive disorder; severe sepsis; DM II with hyperglycemia  7/31 WBC 19.49 Hgb 7.9 Hct 23.1 Alb 1.7 Weight 228 lbs   5/10 A1C 6.9   On Isolibrium mattress; Omari score 10; WOC consult 6/13/23 for left heel pressure injury Stage 2- reported no longer smoking; reported left heel wound discovered in March by staff at [facility]- was receiving treatment for left thigh wound  Admission at [facility] 6/16/23- treated for preseptal cellulitis- MRSA + tissue culture- ENT recommended epsom salt soaks & clean with H2O2; Prednisone daily 7/1-7/3; discharge with doxy 100 bid x 7 days  Assessment:  Right nose- Site of cellulitis; no open wound; 2 cm scar with purple discoloration  Right hip- Abrasion involving area 3.5 cm with light erythema. Scant serous drainage.  Treatment:  Keep area on nose clean and dry.  Continue to keep right hip abrasion dressed with silicone foam dressing. Change twice a week and prn.    Pressure Injury Prevention Interventions.   Recommendations made to primary team. Orders placed.     07/31/2023

## 2023-07-31 NOTE — PROGRESS NOTES
Doe Woodall is a 33 y.o. male patient.    Follow for MARISSA    Remained intubated, unresponsive    Scheduled Meds:   famotidine (PF)  20 mg Intravenous Daily    folic acid  1 mg Oral Daily    heparin (porcine)  5,000 Units Subcutaneous Q8H    multivitamin  1 tablet Oral Daily    piperacillin-tazobactam (Zosyn) IV (PEDS and ADULTS) (extended infusion is not appropriate)  4.5 g Intravenous Q8H    thiamine (B-1) 100 mg in sodium chloride 0.9% 100 mL IVPB  100 mg Intravenous Daily       Review of patient's allergies indicates:   Allergen Reactions    Metformin Diarrhea         Vital Signs Range (Last 24H):  Temp:  [95.7 °F (35.4 °C)-97.2 °F (36.2 °C)]   Pulse:  [77-84]   Resp:  [0-24]   BP: (102-147)/(57-90)   SpO2:  [94 %-100 %]     I & O (Last 24H):  Intake/Output Summary (Last 24 hours) at 7/31/2023 0911  Last data filed at 7/31/2023 0700  Gross per 24 hour   Intake 3602.04 ml   Output 845 ml   Net 2757.04 ml           Physical Exam:  General appearance: well developed, well nourished, no distress, intubated on vent support  Lungs:   coarse breath sounds (B)  Heart: regular rate and rhythm  Abdomen:  distended, hypoactve bowel sounds  Extremities: edema (+)    Laboratory:  I have reviewed all pertinent lab results within the past 24 hours.  CBC:   Recent Labs   Lab 07/31/23  0504   WBC 19.49*   RBC 2.68*   HGB 7.9*   HCT 23.1*      MCV 86   MCH 29.5   MCHC 34.2     CMP:   Recent Labs   Lab 07/31/23  0504   *   CALCIUM 7.8*   ALBUMIN 1.7*   PROT 5.2*      K 3.4*   CO2 28   CL 97   BUN 53*   CREATININE 3.4*   ALKPHOS 163*   *   *   BILITOT 0.3     Cardiac markers:   Recent Labs   Lab 07/30/23  2336   TROPONINI 1.312*     ABGs:   Recent Labs   Lab 07/30/23  0524   PH 7.307*   PCO2 41.6   PO2 268*   HCO3 20.8*   POCSATURATED 100   BE -5     Recent Labs   Lab 07/29/23 1944   COLORU Yellow   SPECGRAV 1.010   PHUR 6.0   PROTEINUA 2+*   BACTERIA None   NITRITE Negative    LEUKOCYTESUR Negative   UROBILINOGEN Negative   HYALINECASTS 0       Assessment & Plan    MARISSA - creatinine improving  S/p cardiac arrest  Anoxic encephalopathy  Acute respiratory failure  DM type 2    Continue supportive care  Overall prognosis is very poor  Watch renal function closely  Discussed with wife at bedside regarding his conditions and prognosis      Dontae Tinajero  7/31/2023

## 2023-07-31 NOTE — NURSING
[facility] [place] [facility]  Nurses Note -- 4 Eyes      7/30/2023       Skin assessed on: Q Shift      [x] No Pressure Injuries Present    [x]Prevention Measures Documented    [] Yes LDA  for Pressure Injury Previously documented     [] Yes New Pressure Injury Discovered   [] LDA for New Pressure Injury Added      Attending RN:  Maribel Herrera RN     Second RN:  Stanton REVELES RN

## 2023-07-31 NOTE — CLINICAL REVIEW
IP Sepsis Screen (most recent)       Sepsis Screen (IP) - 07/31/23 1053       Is the patient's history or complaint suggestive of a possible infection? Yes   cardiac arrest -CB    Are there at least two of the following signs and symptoms present? Yes  -CB    Sepsis signs/symptoms - Hyper or Hypothermia Hyperthermia >100.4 or Hypothermia < 96.8  -CB    Sepsis signs/symptoms - WBC WBC < 4,000 or WBC > 12,000  -CB    Sepsis signs/symptoms - Altered Mental Status Altered Mental Status  -CB    Are any of the following organ dysfunction criteria present and not considered to be due to a chronic condition? Yes  -CB    Organ Dysfunction Criteria Creatinine > 2.0  -CB    Organ Dysfunction Criteria Lactate > 2.0  -CB    Organ Dysfunction Criteria - Resp Comp Respiratory Compromise: Requiring > 5L NC  -CB    Initiate Sepsis Protocol No  -CB    Reason sepsis not considered Pt. receiving appropriate management   on abx therapy -CB              User Key  (r) = Recorded By, (t) = Taken By, (c) = Cosigned By      Initials Name    CB Tiara Dumont RN

## 2023-07-31 NOTE — NURSING
[facility] - [place], [place] Intensive Care  ICU Shift Summary  Date: 7/31/2023      Prehospitalization: Home  Admit Date / LOS : 7/29/2023/ 2 days    Diagnosis: Cardiac arrest    Consults:        Active: Cardio, Nephro, Neuro, Palliative, and Pulm CC       Needed: N/A     Code Status: DNR   Advanced Directive: <no information>    LDA:  Lines/Drains/Airways       Central Venous Catheter Line  Duration             Percutaneous Central Line Insertion/Assessment - Triple Lumen  07/29/23 1948 Internal Jugular Left 1 day              Drain  Duration                  NG/OG Tube 07/29/23 1824 16 Fr. Right mouth 1 day         Urethral Catheter 07/29/23 1820 16 Fr. 1 day              Airway  Duration                  Airway - Non-Surgical 07/29/23 1800 Endotracheal Tube 1 day              Peripheral Intravenous Line  Duration                  Peripheral IV - Single Lumen 07/29/23 1808 20 G Anterior;Right Hand 1 day         Peripheral IV - Single Lumen 07/29/23 1943 20 G Right Hand 1 day                  Central Lines/Site/Justification:Multiple GTTS  Urinary Cath/Order/Justification:Critically Ill in the ICU and requiring intensive monitoring    Vasopressors/Infusions:    sodium chloride 0.9% 125 mL/hr at 07/31/23 1700    fentanyl Stopped (07/30/23 0917)    propofoL Stopped (07/30/23 0917)          GOALS: Volume/ Hemodynamic: N/A                     RASS: N/A    Pain Management: none       Pain Controlled: not applicable     Rhythm: NSR    Respiratory Device: Vent    Vent Mode: VC  Oxygen Concentration (%):  [21] 21  Resp Rate Total:  [20 br/min-25 br/min] 20 br/min  Vt Set:  [450 mL] 450 mL  PEEP/CPAP:  [5 cmH20] 5 cmH20  Mean Airway Pressure:  [8.6 cmH20-8.8 cmH20] 8.6 cmH20                 Most Recent SBT/ SAT: Did not perform       MOVE Screen: FAIL  ICU Liberation: no    VTE Prophylaxis: Pharm  Mobility: Bedrest  Stress Ulcer Prophylaxis: Yes    Isolation: No active isolations    Dietary:   Current Diet Order   Procedures    Diet NPO       Tolerance: not applicable  Advancement: no    I & O (24h):    Intake/Output Summary (Last 24 hours) at 7/31/2023 1736  Last data filed at 7/31/2023 1700  Gross per 24 hour   Intake 3720.19 ml   Output 970 ml   Net 2750.19 ml        Restraints: No    Significant Dates:  Post Op Date: N/A  Rescue Date: N/A  Imaging/ Diagnostics: N/A    Noteworthy Labs:  see labs    COVID Test: (--)  CBC/Anemia Labs: Coags:    Recent Labs   Lab 07/30/23  0553 07/31/23  0504   WBC 19.96* 19.49*   HGB 8.3* 7.9*   HCT 24.7* 23.1*    174   MCV 87 86   RDW 13.9 14.6*    Recent Labs   Lab 07/29/23 2312 07/30/23  0553   INR 1.1 1.1   APTT 29.8  --         Chemistries:   Recent Labs   Lab 07/29/23 2312 07/30/23  0713 07/31/23  0504   * 135* 139   K 4.6 4.2 3.4*    101 97   CO2 11* 19* 28   BUN 50* 50* 53*   CREATININE 4.0* 3.8* 3.4*   CALCIUM 8.4* 8.3* 7.8*   PROT 5.6*  --  5.2*   BILITOT 0.6  --  0.3   ALKPHOS 219*  --  163*   *  --  588*   *  --  396*   MG 2.1 1.9 1.8   PHOS 8.7* 6.5* 5.4*        Cardiac Enzymes: Ejection Fractions:    Recent Labs     07/30/23  0713 07/30/23  0840 07/31/23  0504 07/31/23  0918 07/31/23  1619   CPK 1145*  --  508*  --   --    TROPONINI  --    < >  --  1.029* 0.780*    < > = values in this interval not displayed.    EF   Date Value Ref Range Status   01/05/2022 60 % Final        POCT Glucose: HbA1c:    Recent Labs   Lab 07/31/23  0507 07/31/23  0852 07/31/23  1359   POCTGLUCOSE 258* 291* 340*    Hemoglobin A1C   Date Value Ref Range Status   05/10/2023 6.9 (H) 4.7 - 5.6 % Final   02/03/2023 9.7 (H) 4.0 - 5.6 % Final     Comment:     ADA Screening Guidelines:  5.7-6.4%  Consistent with prediabetes  >or=6.5%  Consistent with diabetes    High levels of fetal hemoglobin interfere with the HbA1C  assay. Heterozygous hemoglobin variants (HbS, HgC, etc)do  not significantly interfere with this assay.   However, presence of multiple variants may affect accuracy.             ICU LOS  1d 16h  Level of Care: Critical Care    Chart Check: 12 HR Done  Shift Summary/Plan for the shift: see care plan note

## 2023-07-31 NOTE — ASSESSMENT & PLAN NOTE
- Creatinine remains elevated though slightly improved; mgmt per primary team. Suspect due to rhabdo +/- ATN

## 2023-07-31 NOTE — PROGRESS NOTES
.Pharmacokinetic Assessment Follow Up: IV Vancomycin    Vancomycin serum concentration assessment(s):    The random level was drawn correctly and can be used to guide therapy at this time. The measurement is above the desired definitive target range of 10 to 20 mcg/mL.    Vancomycin Regimen Plan:  No vancomycin dose today  Re-dose when the random level is less than 20 mcg/mL, next level to be drawn at 06:00 on 8/1/23.    Drug levels (last 3 results):  Recent Labs   Lab Result Units 07/30/23  0713 07/31/23  0504   Vancomycin, Random ug/mL 31.9 23.1       Pharmacy will continue to follow and monitor vancomycin.    Please contact pharmacy at extension [phone] for questions regarding this assessment.    Thank you for the consult,   Beverley Soria       Patient brief summary:  Doe Woodall is a 33 y.o. male initiated on antimicrobial therapy with IV Vancomycin for treatment of sepsis        Drug Allergies:   Review of patient's allergies indicates:   Allergen Reactions    Metformin Diarrhea       Actual Body Weight:   103.4 kg    Renal Function:   Estimated Creatinine Clearance: 32.7 mL/min (A) (based on SCr of 3.4 mg/dL (H)).,     Dialysis Method (if applicable):  N/A    CBC (last 72 hours):  Recent Labs   Lab Result Units 07/29/23  1830 07/29/23 2312 07/30/23  0553 07/31/23  0504   WBC K/uL 19.56* 19.78* 19.96* 19.49*   Hemoglobin g/dL 8.4* 8.2* 8.3* 7.9*   Hematocrit % 27.8* 25.5* 24.7* 23.1*   Platelets K/uL 229 194 182 174   Gran % % 57.0 77.0* 78.6* 79.6*   Lymph % % 34.0 10.0* 11.0* 11.6*   Mono % % 1.0* 6.0 7.3 6.6   Eosinophil % % 0.0 1.0 0.4 0.0   Basophil % % 0.0 0.0 0.3 0.1   Differential Method  Manual Manual Automated Automated       Metabolic Panel (last 72 hours):  Recent Labs   Lab Result Units 07/29/23  1830 07/29/23  1944 07/29/23 2022 07/29/23 2312 07/30/23  0713 07/30/23  1326 07/31/23  0504   Sodium mmol/L 135*  --  139 135* 135*  --  139   Sodium, Urine mmol/L  --   --   --   --   --  <20*   --    Potassium mmol/L 6.0*  --  4.5 4.6 4.2  --  3.4*   Chloride mmol/L 107  --  108 105 101  --  97   CO2 mmol/L 9*  --  13* 11* 19*  --  28   Glucose mg/dL 264*  --  289* 464* 431*  --  239*   Glucose, UA   --  Trace*  --   --   --   --   --    BUN mg/dL 51*  --  51* 50* 50*  --  53*   Creatinine mg/dL 4.4*  --  4.1* 4.0* 3.8*  --  3.4*   Creatinine, Urine mg/dL  --  91.3  --   --   --  110.5  --    Albumin g/dL 2.2*  --   --  1.9*  --   --  1.7*   Total Bilirubin mg/dL 0.2  --   --  0.6  --   --  0.3   Alkaline Phosphatase U/L 246*  --   --  219*  --   --  163*   AST U/L 96*  --   --  353*  --   --  396*   ALT U/L 94*  --   --  338*  --   --  588*   Magnesium mg/dL 2.9*  --   --  2.1 1.9  --  1.8   Phosphorus mg/dL 11.8*  --   --  8.7* 6.5*  --  5.4*       Vancomycin Administrations:  vancomycin given in the last 96 hours                     vancomycin (VANCOCIN) 2,000 mg in dextrose 5 % (D5W) 500 mL IVPB (mg) 2,000 mg New Bag 07/29/23 2132                    Microbiologic Results:  Microbiology Results (last 7 days)       Procedure Component Value Units Date/Time    Blood culture x two cultures. Draw prior to antibiotics. [[id number]] Collected: 07/29/23 1830    Order Status: Completed Specimen: Blood Updated: 07/30/23 2334     Blood Culture, Routine Gram stain sneha bottle:  Gram positive cocci in clusters resembling Staph      07/30/2023  23:32      Results called to and read back by: MARIAN TESFAYE/[id number]    Narrative:      Aerobic and anaerobic    Blood culture x two cultures. Draw prior to antibiotics. [[id number]] Collected: 07/29/23 1949    Order Status: Completed Specimen: Blood from Peripheral, Hand, Right Updated: 07/30/23 2103     Blood Culture, Routine No Growth to date      No Growth to date    Narrative:      Aerobic and anaerobic    Culture, Respiratory with Gram Stain [[id number]]     Order Status: No result Specimen: Respiratory from Sputum

## 2023-07-31 NOTE — CONSULTS
"  [facility] - Intensive Care  Adult Nutrition  Consult Note    SUMMARY     Recommendations    1. Recommend TF Glucerna @ 40 mL/hr providing 1440 KCAL 79 g protein and 729 mL water meeting 100% EEN/EPN.   2. Continue to monitor weights and labs.   3. Collaboration with medical providers    Goals: 1. TF initiated by RD follow up.  Nutrition Goal Status: new  Communication of RD Recs:  (POC)    Assessment and Plan    Nutrition Problem  Inadequate oral intake    Related to (etiology):   Inadequate energy intake    Signs and Symptoms (as evidenced by):   NPO status    Interventions/Recommendations (treatment strategy):  Tube Feeding  Collaboration with medical providers    Nutrition Diagnosis Status:   New     Reason for Assessment    Reason For Assessment: consult  Diagnosis:  (cardiac arrest)  Relevant Medical History:   Past Medical History:   Diagnosis Date    Abscess of right groin 09/01/2022    Diabetes mellitus     Encounter for blood transfusion     Loud snoring     Obese     Other insomnia 08/03/2020    Perineal abscess 08/01/2014    Primary hypertension 10/2/2022      Interdisciplinary Rounds: did not attend  General Information Comments: RD consult for "cardiac arrest, intubated address nutritional needs". No weight changes x 1 yr per chart review. Recommend TF Glucerna @ 40 mL/hr providing 1440 KCAL 79 g protein and 729 mL water meeting 100% EEN/EPN. Continue to monitor glucose levels, weights and other lab values. Per visual NFPE 7/26/23, pt has no signs of malnutrition at this time.  Nutrition Discharge Planning: pending medical course    Nutrition Risk Screen    Nutrition Risk Screen: no indicators present    Nutrition/Diet History    Food Allergies: NKFA    Anthropometrics    Temp: 96.4 °F (35.8 °C)  Height Method: Stated  Height: 5' 3" (160 cm)  Height (inches): 63 in  Weight Method: Bed Scale  Weight: 103.4 kg (228 lb)  Weight (lb): 228 lb  Ideal Body Weight (IBW), Male: 124 lb  % Ideal Body Weight, " "Male (lb): 183.87 %  BMI (Calculated): 40.4  BMI Grade: greater than 40 - morbid obesity       Lab/Procedures/Meds    Pertinent Labs Reviewed: reviewed  BMP  Lab Results   Component Value Date     07/31/2023    K 3.4 (L) 07/31/2023    CL 97 07/31/2023    CO2 28 07/31/2023    BUN 53 (H) 07/31/2023    CREATININE 3.4 (H) 07/31/2023    CALCIUM 7.8 (L) 07/31/2023    ANIONGAP 14 07/31/2023    EGFRNORACEVR 23 (A) 07/31/2023      Pertinent Medications Reviewed: reviewed  Current Outpatient Medications   Medication Instructions    amitriptyline (ELAVIL) 75 MG tablet 1 tablet, Oral, Nightly    aspirin 81 MG Chew 1 tablet, Oral, Daily    atorvastatin (LIPITOR) 80 mg, Oral, Nightly    gabapentin (NEURONTIN) 100 mg, Oral, 3 times daily    LEVEMIR FLEXPEN 30 Units, Subcutaneous, Daily    midodrine (PROAMATINE) 15 mg, Oral, 3 times daily    oxyCODONE (ROXICODONE) 10 mg, Oral, Every 8 hours PRN    pantoprazole (PROTONIX) 40 mg, Oral, Daily    pen needle, diabetic (BD ULTRA-FINE MICRO PEN NEEDLE) 32 gauge x 1/4" Ndle Use 1 each to inject insulin once daily        Estimated/Assessed Needs    Weight Used For Calorie Calculations: 56.2 kg (124 lb)  Energy Calorie Requirements (kcal): 1306  Energy Need Method: [place] (modified)  Protein Requirements: 56 g  Weight Used For Protein Calculations: 56.2 kg (124 lb)     Estimated Fluid Requirement Method: RDA Method  RDA Method (mL): 1306         Nutrition Prescription Ordered    Current Diet Order: NPO    Evaluation of Received Nutrient/Fluid Intake    I/O: +6L  Energy Calories Required: exceeds needs  Protein Required: exceeds needs  Fluid Required: exceeds needs  Comments: LBM 7/25/23  % Intake of Estimated Energy Needs: 0 - 25 %  % Meal Intake: NPO    Nutrition Risk    Level of Risk/Frequency of Follow-up: moderate       Monitor and Evaluation    Food and Nutrient Intake: enteral nutrition intake, parenteral nutrition intake, energy intake  Food and Nutrient Adminstration: " enteral and parenteral nutrition administration  Knowledge/Beliefs/Attitudes: food and nutrition knowledge/skill, beliefs and attitudes  Physical Activity and Function: nutrition-related ADLs and IADLs, factors affecting access to physical activity  Anthropometric Measurements: weight, weight change, body mass index  Biochemical Data, Medical Tests and Procedures: glucose/endocrine profile  Nutrition-Focused Physical Findings: overall appearance       Nutrition Follow-Up    RD Follow-up?: Yes    Jennifer Baer, Registration Eligible, Provisional LDN

## 2023-07-31 NOTE — NURSING
[facility]  Nurses Note -- 4 Eyes      7/31/2023       Skin assessed on: Q Shift      [x] No Pressure Injuries Present    [x]Prevention Measures Documented    [] Yes LDA  for Pressure Injury Previously documented     [] Yes New Pressure Injury Discovered   [] LDA for New Pressure Injury Added      Attending RN:  Renita Langley RN     Second RN:  ANNA López

## 2023-07-31 NOTE — EICU
Blood cxs from 7/29  1 prelim anaerobic bottle + Gram positive cocci in clusters.  Pt is afebrile, on Zosyn and Vanc. Continue current ab regiment.  D/w RN

## 2023-07-31 NOTE — ASSESSMENT & PLAN NOTE
- Consult for advance care planning and support in young patient found unresponsive at home, potentially secondary to intentional overdose of opioids and TCAs. He is currently intubated and in in the ICU, having undergone TTM following arrest. Neurology consulted and following; repeat CT head ordered. Chart reviewed in depth; discussed pt during MDT rounds.   - Along with Dr Romero ([facility] staff), met with patient's very supportive wife Aminta at the bedside. Introduced role of palliative medicine, and learned more about pt outside of the hospital. They have been  for 5 years, and have a two year old daughter together as well as her 9 year old daughter from prior relationship. However, she told us that patient has raised that child like his own - told us that tells us a lot about him as a person.   - She was very forthcoming about his health struggles over the last year. 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 He has had hospital stay after hospital stay for infections, often requiring surgical intervention. This is on a background of diabetes, as well as challenges with alcohol and substance abuse. He had additional traumatic events that she shared private information about it - emotional support provided, as it is clear that they have been through a lot.   - Thorough medical update provided; while we are awaiting more diagnostic information, early physical exam signs are consistent with severe anoxic brain injury. While we cannot say this definitively, we are concerned that patient is unlikely to return to an independent lifestyle. She was understanding of this, and overall has very good insight into the situation.   - Aminta shared with us that they had recently had serious discussion about his care preferences in the event of a serious illness. Essentially, patient made it clear that if ever had a devastating  injury or illness, he would not want to be kept alive on machines. Told her we appreciate her telling us this, as it helps us provide care that is aligned with his beliefs.   - At this time, will continue with neuro-prognostication prior to discussing next steps in patient's care; however, based on Aminta's discussion with pt, he would not desire to be on prolonged life support if expected outcome is poor.    - Updated bedside RN and neurologist (Dr Guerrier) following visit; our team will continue to follow up with pt and family

## 2023-07-31 NOTE — SUBJECTIVE & OBJECTIVE
"Past Medical History:   Diagnosis Date    Abscess of right groin 09/01/2022    Diabetes mellitus     Encounter for blood transfusion     Loud snoring     Obese     Other insomnia 08/03/2020    Perineal abscess 08/01/2014    Primary hypertension 10/2/2022       Past Surgical History:   Procedure Laterality Date    ABCESS DRAINAGE  2014    PERIRECTAL    DECOMPRESSION OF LUMBAR SPINE USING MINIMALLY INVASIVE TECHNIQUE Right 07/06/2018    Procedure: DECOMPRESSION, SPINE, LUMBAR, MINIMALLY INVASIVE L3-4;  Surgeon: Cristian Cervantes MD;  Location: [facility] OR [address] [place] [address];  Service: Neurosurgery;  Laterality: Right;    INCISION AND DRAINAGE N/A 9/9/2022    Procedure: INCISION AND DRAINAGE GROIN;  Surgeon: KAITY Aguilar MD;  Location: [facility] OR;  Service: Urology;  Laterality: N/A;    ORTHOPEDIC SURGERY         Review of patient's allergies indicates:   Allergen Reactions    Metformin Diarrhea       No current facility-administered medications on file prior to encounter.     Current Outpatient Medications on File Prior to Encounter   Medication Sig    amitriptyline (ELAVIL) 75 MG tablet Take 1 tablet by mouth every evening.    aspirin 81 MG Chew Take 1 tablet by mouth once daily.    atorvastatin (LIPITOR) 80 MG tablet Take 80 mg by mouth every evening.    gabapentin (NEURONTIN) 100 MG capsule Take 100 mg by mouth 3 (three) times daily.    insulin detemir U-100, Levemir, (LEVEMIR FLEXPEN) 100 unit/mL (3 mL) InPn pen Inject 30 Units into the skin once daily.    midodrine (PROAMATINE) 5 MG Tab Take 3 tablets (15 mg total) by mouth 3 (three) times daily.    oxyCODONE (ROXICODONE) 10 mg Tab immediate release tablet Take 10 mg by mouth every 8 (eight) hours as needed for Pain.    pantoprazole (PROTONIX) 40 MG tablet Take 40 mg by mouth once daily.    pen needle, diabetic (BD ULTRA-FINE MICRO PEN NEEDLE) 32 gauge x 1/4" Ndle Use 1 each to inject insulin once daily     Family History       Problem Relation (Age of Onset)    Diabetes Father, " "Paternal Grandmother, Paternal Grandfather          Tobacco Use    Smoking status: Former     Current packs/day: 0.00     Average packs/day: 0.5 packs/day for 9.0 years (4.5 ttl pk-yrs)     Types: Cigarettes     Start date: 7/1/2004     Quit date: 7/1/2013     Years since quitting: 10.0    Smokeless tobacco: Former   Substance and Sexual Activity    Alcohol use: Not Currently     Comment: DAILY PINT OF LIQUOR, HX OF 1 "TALL BOY" OF BEER DAILY    Drug use: Not Currently     Types: Cocaine     Comment: per collateral    Sexual activity: Yes     Partners: Female     Birth control/protection: None     Review of Systems   Unable to perform ROS: Intubated     Objective:     Vital Signs (Most Recent):  Temp: 96.6 °F (35.9 °C) (07/31/23 1000)  Pulse: 85 (07/31/23 1000)  Resp: (!) 4 (07/31/23 1000)  BP: (!) 157/84 (07/31/23 1000)  SpO2: 98 % (07/31/23 1000) Vital Signs (24h Range):  Temp:  [95.7 °F (35.4 °C)-97.2 °F (36.2 °C)] 96.6 °F (35.9 °C)  Pulse:  [77-88] 85  Resp:  [0-24] 4  SpO2:  [94 %-100 %] 98 %  BP: (102-157)/(58-93) 157/84     Weight: 103.4 kg (228 lb)  Body mass index is 40.39 kg/m².     Physical Exam  Vitals and nursing note reviewed.   Constitutional:       Appearance: He is obese. He is ill-appearing.   HENT:      Head: Normocephalic and atraumatic.      Nose: Nose normal.      Mouth/Throat:      Mouth: Mucous membranes are dry.      Comments: ET tube in place.   Eyes:      Comments: Fixed dilated pupils.  Gag reflex +   Cardiovascular:      Rate and Rhythm: Tachycardia present.      Pulses: Normal pulses.      Heart sounds: No murmur heard.  Pulmonary:      Comments: Intubated.   Abdominal:      General: Abdomen is flat. There is no distension.      Palpations: Abdomen is soft.   Musculoskeletal:      Right lower leg: No edema.      Left lower leg: No edema.   Skin:     General: Skin is warm.   Neurological:      Mental Status: He is alert.      Comments: Intubated. Off sedation, does not follow commands. "   Gag reflex +                Significant Labs: All pertinent labs within the past 24 hours have been reviewed.    Significant Imaging: I have reviewed all pertinent imaging results/findings within the past 24 hours.

## 2023-07-31 NOTE — PROGRESS NOTES
"[facility] Intensive Care  Critical Care Medicine  Progress Note    Patient Name: Doe Woodall  MRN: [id number]  Admission Date: 7/29/2023  Hospital Length of Stay: 2 days  Code Status: Full Code  Attending Provider: Hollie Johnson MD  Primary Care Provider: [facility]   Principal Problem: Cardiac arrest    Subjective:     HPI:  Doe Woodall lalito 33 year old male with history of EtOH use, DMII, mood disorder, orthostatic hypotension, depression, recurrent abscesses, who presents with cardiac arrest to the ER on 7/29.  He was found unresponsive by family at 5PM on 7/29 and family began CPR at 5:24.  Per patient's sister, a morphine and amitriptyline bottle were found next to the patient at time of discovery.  EMS arrived and achieved ROSC at 5:45PM.  Rhythm was noted to be asystole by EMS.  He had a vFib was cardioverted x 1 and started on amiodarone.     In the ER, he again arrested 2/2 bradycardia followed by PEA arrest.  During this code he received epi x 6, bicarb x 3, CaCl x 1, and amiodarone with ROSC.  Started on dopamine per cards following ROSC due to bradycardia. He was admitted to the MICU.  He remains unconscious, with absent pupillary, absent gag, absent corneal, and absent oculocephalic reflexes.  He occasionally breaths over the ventilator.     The patient has had multiple hospitalizations lately, and per family, "has spent more time in the hospital that out of it lately".  He was most recently in the hospital for hypotension after missing home midodrine (7/25-26).  He was also hospitalized at [facility] after passing out and receiving CPR in a neurology clinic.  It is unclear if he really lost a pulse during this time. He has recurrent skin abscesses/cellulitis, and has had a recent workup for autonomic dysfunction and adrenal insufficiency.    Initial workup shows elevated WBC (19.5), MARISSA (4.1), elevated LFTs (96/94), NSTEMI (0.063), elevated BNP of 109, lactic acidosis (10.2), " hyperphosphatemia.  CT head without acute process as of yet.  Chest x-ray compatible with pulmonary edema.   Started on vanc/zosyn, and sodium bicarb gtt.  Dopamine ordered, but off the AM of initial exam.       Hospital/ICU Course:  Admitted to the MICU following cardiac arrest.      Interval History: No significant changes overnight       Objective:     Vital Signs (Most Recent):  Temp: 96.6 °F (35.9 °C) (07/31/23 1000)  Pulse: 85 (07/31/23 1000)  Resp: (!) 4 (07/31/23 1000)  BP: (!) 157/84 (07/31/23 1000)  SpO2: 98 % (07/31/23 1000) Vital Signs (24h Range):  Temp:  [95.7 °F (35.4 °C)-97.2 °F (36.2 °C)] 96.6 °F (35.9 °C)  Pulse:  [77-88] 85  Resp:  [0-24] 4  SpO2:  [94 %-100 %] 98 %  BP: (102-157)/(58-93) 157/84     Weight: 103.4 kg (228 lb)  Body mass index is 40.39 kg/m².      Intake/Output Summary (Last 24 hours) at 7/31/2023 1049  Last data filed at 7/31/2023 1000  Gross per 24 hour   Intake 3925.75 ml   Output 945 ml   Net 2980.75 ml        Physical Exam  Vitals reviewed.   Constitutional:       Appearance: He is ill-appearing.   HENT:      Head: Normocephalic and atraumatic.      Right Ear: There is no impacted cerumen.      Nose: No congestion.      Mouth/Throat:      Mouth: Mucous membranes are dry.      Pharynx: Oropharynx is clear. No oropharyngeal exudate.   Cardiovascular:      Rate and Rhythm: Normal rate and regular rhythm.      Pulses: Normal pulses.   Pulmonary:      Effort: No respiratory distress.      Breath sounds: No wheezing.      Comments: Mechanical ventilation   Abdominal:      General: Abdomen is flat. Bowel sounds are normal.      Palpations: Abdomen is soft.   Musculoskeletal:         General: No swelling or deformity.   Skin:     General: Skin is warm and dry.   Neurological:      Comments: No cough, gag, corneal or pupillary response. Pupils are fixed and dilated  He does breath spontaneously on PS mode            Review of Systems    Vents:  Vent Mode: VC (07/31/23 0850)  Ventilator  Initiated: Yes (07/29/23 1908)  Set Rate: 20 BPM (07/31/23 0436)  Vt Set: 450 mL (07/31/23 0436)  PEEP/CPAP: 5 cmH20 (07/31/23 0436)  Oxygen Concentration (%): 21 (07/31/23 1000)  Peak Airway Pressure: 20.3 cmH20 (07/31/23 0436)  Total Ve: 9 L/m (07/31/23 0436)  F/VT Ratio<105 (RSBI): (!) 44.54 (07/31/23 0436)    Lines/Drains/Airways       Central Venous Catheter Line  Duration             Percutaneous Central Line Insertion/Assessment - Triple Lumen  07/29/23 1948 Internal Jugular Left 1 day              Drain  Duration                  NG/OG Tube 07/29/23 1824 16 Fr. Right mouth 1 day         Urethral Catheter 07/29/23 1820 16 Fr. 1 day              Airway  Duration                  Airway - Non-Surgical 07/29/23 1800 Endotracheal Tube 1 day              Peripheral Intravenous Line  Duration                  Peripheral IV - Single Lumen 07/29/23 1808 20 G Anterior;Right Hand 1 day         Peripheral IV - Single Lumen 07/29/23 1943 20 G Right Hand 1 day                    Significant Labs:    CBC/Anemia Profile:  Recent Labs   Lab 07/29/23 2312 07/30/23  0553 07/31/23  0504   WBC 19.78* 19.96* 19.49*   HGB 8.2* 8.3* 7.9*   HCT 25.5* 24.7* 23.1*    182 174   MCV 92 87 86   RDW 13.8 13.9 14.6*        Chemistries:  Recent Labs   Lab 07/29/23  1830 07/29/23 2022 07/29/23 2312 07/30/23  0713 07/31/23  0504   *   < > 135* 135* 139   K 6.0*   < > 4.6 4.2 3.4*      < > 105 101 97   CO2 9*   < > 11* 19* 28   BUN 51*   < > 50* 50* 53*   CREATININE 4.4*   < > 4.0* 3.8* 3.4*   CALCIUM 8.9   < > 8.4* 8.3* 7.8*   ALBUMIN 2.2*  --  1.9*  --  1.7*   PROT 6.7  --  5.6*  --  5.2*   BILITOT 0.2  --  0.6  --  0.3   ALKPHOS 246*  --  219*  --  163*   ALT 94*  --  338*  --  588*   AST 96*  --  353*  --  396*   MG 2.9*  --  2.1 1.9 1.8   PHOS 11.8*  --  8.7* 6.5* 5.4*    < > = values in this interval not displayed.       All pertinent labs within the past 24 hours have been reviewed.    Significant Imaging:  I have  reviewed all pertinent imaging results/findings within the past 24 hours.      ABG  Recent Labs   Lab 07/30/23  0524   PH 7.307*   PO2 268*   PCO2 41.6   HCO3 20.8*   BE -5     Assessment/Plan:     Neuro  Anoxic brain injury  Limited reflexes   Repeat CT head today     Psychiatric  Severe episode of recurrent major depressive disorder, without psychotic features  Taking amitriptyline at home with concern for possible excessive ingestion of unclear intent leading to this hospitalization.  - ensure follow up with psychiatry, if clinical condition improves.   - may need to discuss alternative agents moving forward.     Alcohol use disorder, severe, dependence  Continue thiamine, folic acid, multivitamin.  - monitor for symptoms consistent with withdrawal.  CIWA.  - off of all sedation for >24 hours     Pulmonary  Acute respiratory failure with hypoxia  Secondary to cardiac arrest and subsequent aspiration vs pulmonary edema.  FiO2 requirements quickly down-trending, which is more suggestive of pulmonary edema.  - low tidal volume ventilation  - daily SAT/SBT  - target sats >90%  - HOB elevated, oral care.    Cardiac/Vascular  * Cardiac arrest  Unclear etiology at this time.  Concern for possible overdose on amitriptyline (QTc as high as 527 during this hospital stay), infection (pneumonia vs aspiration), worsening of chronic hypotension (possible missing dose of home midodrine), or possible opiate overdose.  Not requiring pressors at this time.  Significant neurologic impact suggested by physical exam.  Only reflex maintained is respiratory drive.    - maintain MAP >65mmHg  - continue broad spectrum abx and narrow as possible. Vanc/zosyn   - continue bicarbonate infusion at this time.  - limit sedative medications to assess neurologic status.   - avoid all QT prolonging agents.  - TTM  - repeat CT head today and will pursue MRI pending results      NSTEMI (non-ST elevated myocardial infarction)  troponins elevated  likely following cardiac arrest.  Cardiology on board.  - continue supportive care.    Chronic diastolic heart failure  Target euvolemic status and maintain normotension.  - diuresis PRN.    Renal/  MARISSA (acute kidney injury)  Cr Elevated to 4.0 on admission, but has started to downtrend.  Patient making urine.  Suspect ATN from cardiac arrest.   - continue to monitor daily Cr.  - continue supportive care.     Lactic acidosis  Due to cardiac arrest and hypoperfusion.    - trend to ensure down-trend.  Metabolic acidosis is improving with supportive care.    ID  History of neurosyphilis  Reportedly treated at this time.      Oncology  Normocytic anemia  Target HgB >7    Endocrine  Severe obesity with body mass index (BMI) of 36.0 to 36.9 with serious comorbidity  Complicates all aspects of care.    Type 2 diabetes mellitus with hyperglycemia, with long-term current use of insulin  Target glucose in ICU patient Is 140-180.  Currently hyperglycemic.  Will monitor and treat accordingly.  - ISS on board      GI  Elevated LFTs  Secondary to cardiac arrest, continue supportive care and close observation.     Orthopedic  Non-traumatic rhabdomyolysis  Due to prolonged cardiac arrest.  Monitor daily CPK.  Continue supportive care.        ACP:  Discussion with patients wife and Dr. Mcqueen at the bedside. She is very aware of where he is and what are concerns are. She and the patient had discussions and he made his wishes known to her in the event of an event like this. He would not want to continue life support in the event of a catastrophic injury.     Critical Care Time: 40 minutes  Critical secondary to Patient has a condition that poses threat to life and bodily function: Severe Respiratory Distress      Critical care was time spent personally by me on the following activities: development of treatment plan with patient or surrogate and bedside caregivers, discussions with consultants, evaluation of patient's response to  treatment, examination of patient, ordering and performing treatments and interventions, ordering and review of laboratory studies, ordering and review of radiographic studies, pulse oximetry, re-evaluation of patient's condition. This critical care time did not overlap with that of any other provider or involve time for any procedures.     Belkis Romero MD  Critical Care Medicine  [facility] - Intensive Care

## 2023-07-31 NOTE — ASSESSMENT & PLAN NOTE
- Possibly secondary to drug overdose (opioids, TCA)   - Has undergone TTM and will be getting formal neuroprognostication, as concern for anoxic brain injury

## 2023-07-31 NOTE — ASSESSMENT & PLAN NOTE
- Concern for this secondary to multiple episodes of cardiac arrest and unknown down time; getting repeat CT head today as well as formal neurological evaluation  - Illness trajectory education provided to patient's wife

## 2023-07-31 NOTE — SUBJECTIVE & OBJECTIVE
Past Medical History:   Diagnosis Date    Abscess of right groin 2022    Diabetes mellitus     Encounter for blood transfusion     Loud snoring     Obese     Other insomnia 2020    Perineal abscess 2014    Primary hypertension 10/2/2022       Past Surgical History:   Procedure Laterality Date    ABCESS DRAINAGE      PERIRECTAL    DECOMPRESSION OF LUMBAR SPINE USING MINIMALLY INVASIVE TECHNIQUE Right 2018    Procedure: DECOMPRESSION, SPINE, LUMBAR, MINIMALLY INVASIVE L3-4;  Surgeon: Cristian Cervantes MD;  Location: [facility] OR [address] [place] [address];  Service: Neurosurgery;  Laterality: Right;    INCISION AND DRAINAGE N/A 2022    Procedure: INCISION AND DRAINAGE GROIN;  Surgeon: KAITY Aguilar MD;  Location: [facility] OR;  Service: Urology;  Laterality: N/A;    ORTHOPEDIC SURGERY         Review of patient's allergies indicates:   Allergen Reactions    Metformin Diarrhea       Medications:  Continuous Infusions:   sodium chloride 0.9% 125 mL/hr at 23 0844    fentanyl Stopped (23)    propofoL Stopped (23)     Scheduled Meds:   famotidine (PF)  20 mg Intravenous Daily    folic acid  1 mg Oral Daily    heparin (porcine)  5,000 Units Subcutaneous Q8H    multivitamin  1 tablet Oral Daily    piperacillin-tazobactam (Zosyn) IV (PEDS and ADULTS) (extended infusion is not appropriate)  4.5 g Intravenous Q8H    thiamine (B-1) 100 mg in sodium chloride 0.9% 100 mL IVPB  100 mg Intravenous Daily     PRN Meds:acetaminophen, albuterol-ipratropium, calcium gluconate IVPB, calcium gluconate IVPB, calcium gluconate IVPB, dextrose 50%, [] fentaNYL **FOLLOWED BY** fentaNYL, glucagon (human recombinant), magnesium sulfate IVPB, magnesium sulfate IVPB, melatonin, ondansetron, potassium chloride **AND** potassium chloride **AND** potassium chloride, prochlorperazine, sodium chloride 0.9%, sodium phosphate 15 mmol in dextrose 5 % (D5W) 250 mL IVPB, sodium phosphate 20.01 mmol in dextrose 5 %  "(D5W) 250 mL IVPB, sodium phosphate 30 mmol in dextrose 5 % (D5W) 250 mL IVPB, Pharmacy to dose Vancomycin consult **AND** vancomycin - pharmacy to dose    Family History       Problem Relation (Age of Onset)    Diabetes Father, Paternal Grandmother, Paternal Grandfather          Tobacco Use    Smoking status: Former     Current packs/day: 0.00     Average packs/day: 0.5 packs/day for 9.0 years (4.5 ttl pk-yrs)     Types: Cigarettes     Start date: 7/1/2004     Quit date: 7/1/2013     Years since quitting: 10.0    Smokeless tobacco: Former   Substance and Sexual Activity    Alcohol use: Not Currently     Comment: DAILY PINT OF LIQUOR, HX OF 1 "TALL BOY" OF BEER DAILY    Drug use: Not Currently     Types: Cocaine     Comment: per collateral    Sexual activity: Yes     Partners: Female     Birth control/protection: None       Review of Systems   Unable to perform ROS: Intubated     Objective:     Vital Signs (Most Recent):  Temp: 96.4 °F (35.8 °C) (07/31/23 0915)  Pulse: 86 (07/31/23 0915)  Resp: (!) 5 (07/31/23 0915)  BP: (!) 152/88 (07/31/23 0830)  SpO2: 96 % (07/31/23 0915) Vital Signs (24h Range):  Temp:  [95.7 °F (35.4 °C)-97.2 °F (36.2 °C)] 96.6 °F (35.9 °C)  Pulse:  [77-92] 83  Resp:  [0-24] 0  SpO2:  [94 %-100 %] 98 %  BP: (102-155)/(57-90) 144/76     Weight: 103.4 kg (228 lb)  Body mass index is 40.39 kg/m².       Physical Exam  Constitutional:       Comments: Ill-appearing, lying in bed, intubated   HENT:      Head: Normocephalic.      Nose: Nose normal.      Mouth/Throat:      Mouth: Mucous membranes are moist.   Cardiovascular:      Rate and Rhythm: Normal rate.   Pulmonary:      Comments: ETT in place, mechanically ventilated   Neurological:      Comments: Unresponsive to voice       Significant Labs: All pertinent labs within the past 24 hours have been reviewed.  CBC:   Recent Labs   Lab 07/31/23  0504   WBC 19.49*   HGB 7.9*   HCT 23.1*   MCV 86        BMP:  Recent Labs   Lab 07/31/23  0504 "   *      K 3.4*   CL 97   CO2 28   BUN 53*   CREATININE 3.4*   CALCIUM 7.8*   MG 1.8     LFT:  Lab Results   Component Value Date     (H) 07/31/2023    ALKPHOS 163 (H) 07/31/2023    BILITOT 0.3 07/31/2023     Albumin:   Albumin   Date Value Ref Range Status   07/31/2023 1.7 (L) 3.5 - 5.2 g/dL Final     Protein:   Total Protein   Date Value Ref Range Status   07/31/2023 5.2 (L) 6.0 - 8.4 g/dL Final     Lactic acid:   Lab Results   Component Value Date    LACTATE 5.0 (HH) 07/30/2023    LACTATE 7.3 (HH) 07/29/2023       Significant Imaging: I have reviewed all pertinent imaging results/findings within the past 24 hours.

## 2023-07-31 NOTE — HPI
"(From H&P) "This is a 33-year-old male with a past medical history of alcohol use, type 2 diabetes, mood disorder, and orthostatic hypotension who presents with cardiac arrest.     Patient presents to the ED on 07/29 after being found unresponsive by his family around 5:00 p.m..  Family started CPR at 5:24 p.m. and EMS continued resuscitation with achievement of ROSC at 5:45 p.m..  Initial rhythm was noted to be asystole.  The patient later went into VFib, was cardioverted x1 and given amiodarone.  While in the ED, the patient became bradycardic and went into PA arrest x3, during which he received epinephrine x6, bicarbonate x3, calcium chloride x1, and amiodarone with achievement of ROSC. Given recurrent bradycardia, he was later started on dopamine, per cardiology. Per the patient's sister, a bottle of morphine and amitriptyline were found next to him.      Of note, The patient was recently hospitalized (7/25-7/26) for hypotension after missing his midodrine at home.  He received fluids, antibiotics, and midodrine with improvement of his blood pressure and was discharged home.  The patient also had a recent hospitalization at [facility] (6/16-7/8) after presenting with unresponsiveness.  At that time, he was at the neurology clinic for evaluation of episodic loss of consciousness and suddenly became unconscious.  He received CPR of unknown duration after which he became responsive, but did not seem to actually lose pulse.  He was treated for preseptal cellulitis and worked up for autonomic dysfunction and adrenal insufficiency.     In the ED, laboratory workup was remarkable for leukocytosis (19.5), elevated creatinine (4.1 - from a baseline of 1.7), elevated LFTs (AST:  96, ALT:  94, ALP:  246), elevated troponin (0.063), elevated BNP (109), elevated lactic acid (10.2), hyperphosphatemia (11.8).  UA was unremarkable.  CT head showed no acute large vascular territory infarct or intracranial hemorrhage.  Chest x-ray " "showed mild perihilar interstitial changes with possible edema.  Patient was given aspirin 300 mg, 3.0 L of LR, vancomycin, Zosyn albuterol, and was started on a sodium bicarb and dopamine gtt. Patient was admitted for further management."    Palliative medicine is consulted for advance care planning and family support; for details of visit with patient and family, see ACP section of plan.    "

## 2023-07-31 NOTE — ASSESSMENT & PLAN NOTE
Unclear etiology at this time.  Concern for possible overdose on amitriptyline (QTc as high as 527 during this hospital stay), infection (pneumonia vs aspiration), worsening of chronic hypotension (possible missing dose of home midodrine), or possible opiate overdose.  Not requiring pressors at this time.  Significant neurologic impact suggested by physical exam.  Only reflex maintained is respiratory drive.    - maintain MAP >65mmHg  - continue broad spectrum abx and narrow as possible. Vanc/zosyn   - continue bicarbonate infusion at this time.  - limit sedative medications to assess neurologic status.   - avoid all QT prolonging agents.  - TTM  - repeat CT head today and will pursue MRI pending results

## 2023-07-31 NOTE — ASSESSMENT & PLAN NOTE
Unclear etiology. Concern for drug toxicity/over dose given amitriptyline use with associated narcotics or prolonged hypotension causing arrest. Other differentials include sepsis, seizures. Less likely intra-cranial process, PE or ACS.   Currently, intubated with non-reactive pupils     Plan:   Continue dopamine given significant bradycardic episodes prior to arrest   Continue bicarbonate infusion for possible amitriptyline overdose/acidosis  Obtain EEG  Obtain echocardiogram, lower extremity dopplers.   Consult to cardiology   Consult to neurology   Consult to palliative care for goals of care,  Echo. Show preserved EF,.

## 2023-07-31 NOTE — ASSESSMENT & PLAN NOTE
In the setting of cardiac arrest, likely ATN  Monitor Cr & urine output   Improving with IVF,nephrology on case.

## 2023-07-31 NOTE — PROGRESS NOTES
[facility] Medicine  Progress Note    Patient Name: Doe Woodall  MRN: [id number]  Patient Class: IP- Inpatient   Admission Date: 7/29/2023  Length of Stay: 2 days  Attending Physician: Hollie Johnson MD  Primary Care Provider: [facility]        Subjective:     Principal Problem:Cardiac arrest        HPI:  This is a 33-year-old male with a past medical history of alcohol use, type 2 diabetes, mood disorder, and orthostatic hypotension who presents with cardiac arrest.    Patient presents to the ED on 07/29 after being found unresponsive by his family around 5:00 p.m..  Family started CPR at 5:24 p.m. and EMS continued resuscitation with achievement of ROSC at 5:45 p.m..  Initial rhythm was noted to be asystole.  The patient later went into VFib, was cardioverted x1 and given amiodarone.  While in the ED, the patient became bradycardic and went into PA arrest x3, during which he received epinephrine x6, bicarbonate x3, calcium chloride x1, and amiodarone with achievement of ROSC. Given recurrent bradycardia, he was later started on dopamine, per cardiology. Per the patient's sister, a bottle of morphine and amitriptyline were found next to him.     Of note, The patient was recently hospitalized (7/25-7/26) for hypotension after missing his midodrine at home.  He received fluids, antibiotics, and midodrine with improvement of his blood pressure and was discharged home.  The patient also had a recent hospitalization at [facility] (6/16-7/8) after presenting with unresponsiveness.  At that time, he was at the neurology clinic for evaluation of episodic loss of consciousness and suddenly became unconscious.  He received CPR of unknown duration after which he became responsive, but did not seem to actually lose pulse.  He was treated for preseptal cellulitis and worked up for autonomic dysfunction and adrenal insufficiency.    In the ED, laboratory workup was remarkable for  leukocytosis (19.5), elevated creatinine (4.1 - from a baseline of 1.7), elevated LFTs (AST:  96, ALT:  94, ALP:  246), elevated troponin (0.063), elevated BNP (109), elevated lactic acid (10.2), hyperphosphatemia (11.8).  UA was unremarkable.  CT head showed no acute large vascular territory infarct or intracranial hemorrhage.  Chest x-ray showed mild perihilar interstitial changes with possible edema.  Patient was given aspirin 300 mg, 3.0 L of LR, vancomycin, Zosyn albuterol, and was started on a sodium bicarb and dopamine gtt. Patient was admitted for further management.       Overview/Hospital Course:  This is a 33-year-old male with a past medical history of alcohol use, type 2 diabetes, mood disorder, and orthostatic hypotension who presents with cardiac arrest.  Patient presents to the ED on 07/29 after being found unresponsive by his family around 5:00 p.m..  Family started CPR at 5:24 p.m. and EMS continued resuscitation with achievement of ROSC at 5:45 p.m..  Initial rhythm was noted to be asystole.  The patient later went into VFib, was cardioverted x1 and given amiodarone.  While in the ED, the patient became bradycardic and went into PA arrest x3, during which he received epinephrine x6, bicarbonate x3, calcium chloride x1, and amiodarone with achievement of ROSC. Given recurrent bradycardia, he was later started on dopamine, per cardiology. Per the patient's sister, a bottle of morphine and amitriptyline were found next to him.   In the ED, laboratory workup was remarkable for leukocytosis (19.5), elevated creatinine (4.1 - from a baseline of 1.7), elevated LFTs (AST:  96, ALT:  94, ALP:  246), elevated troponin (0.063), elevated BNP (109), elevated lactic acid (10.2), hyperphosphatemia (11.8).  UA was unremarkable.  CT head showed no acute large vascular territory infarct or intracranial hemorrhage.  Chest x-ray showed mild perihilar interstitial changes with possible edema.  Patient was given  aspirin 300 mg, 3.0 L of LR, vancomycin, Zosyn albuterol, and was started on a sodium bicarb and dopamine gtt.  On IVF for ARF,nephrology is following.  Cardiology is consulted for elevated troponin,echo.show preserved EF.  Pulmonology doing vent management,  Neurology is on board for possible anoxic injury.will have repeat head CT and EEG today.  Has cocci in cluster on blood culture,alreday on vanc.may contaminated,repeted blood culture.      Past Medical History:   Diagnosis Date    Abscess of right groin 09/01/2022    Diabetes mellitus     Encounter for blood transfusion     Loud snoring     Obese     Other insomnia 08/03/2020    Perineal abscess 08/01/2014    Primary hypertension 10/2/2022       Past Surgical History:   Procedure Laterality Date    ABCESS DRAINAGE  2014    PERIRECTAL    DECOMPRESSION OF LUMBAR SPINE USING MINIMALLY INVASIVE TECHNIQUE Right 07/06/2018    Procedure: DECOMPRESSION, SPINE, LUMBAR, MINIMALLY INVASIVE L3-4;  Surgeon: Cristian Cervantes MD;  Location: [facility] OR [address] [place] [address];  Service: Neurosurgery;  Laterality: Right;    INCISION AND DRAINAGE N/A 9/9/2022    Procedure: INCISION AND DRAINAGE GROIN;  Surgeon: KAITY Aguilar MD;  Location: [facility] OR;  Service: Urology;  Laterality: N/A;    ORTHOPEDIC SURGERY         Review of patient's allergies indicates:   Allergen Reactions    Metformin Diarrhea       No current facility-administered medications on file prior to encounter.     Current Outpatient Medications on File Prior to Encounter   Medication Sig    amitriptyline (ELAVIL) 75 MG tablet Take 1 tablet by mouth every evening.    aspirin 81 MG Chew Take 1 tablet by mouth once daily.    atorvastatin (LIPITOR) 80 MG tablet Take 80 mg by mouth every evening.    gabapentin (NEURONTIN) 100 MG capsule Take 100 mg by mouth 3 (three) times daily.    insulin detemir U-100, Levemir, (LEVEMIR FLEXPEN) 100 unit/mL (3 mL) InPn pen Inject 30 Units into the skin once daily.    midodrine  "(PROAMATINE) 5 MG Tab Take 3 tablets (15 mg total) by mouth 3 (three) times daily.    oxyCODONE (ROXICODONE) 10 mg Tab immediate release tablet Take 10 mg by mouth every 8 (eight) hours as needed for Pain.    pantoprazole (PROTONIX) 40 MG tablet Take 40 mg by mouth once daily.    pen needle, diabetic (BD ULTRA-FINE MICRO PEN NEEDLE) 32 gauge x 1/4" Ndle Use 1 each to inject insulin once daily     Family History       Problem Relation (Age of Onset)    Diabetes Father, Paternal Grandmother, Paternal Grandfather          Tobacco Use    Smoking status: Former     Current packs/day: 0.00     Average packs/day: 0.5 packs/day for 9.0 years (4.5 ttl pk-yrs)     Types: Cigarettes     Start date: 7/1/2004     Quit date: 7/1/2013     Years since quitting: 10.0    Smokeless tobacco: Former   Substance and Sexual Activity    Alcohol use: Not Currently     Comment: DAILY PINT OF LIQUOR, HX OF 1 "TALL BOY" OF BEER DAILY    Drug use: Not Currently     Types: Cocaine     Comment: per collateral    Sexual activity: Yes     Partners: Female     Birth control/protection: None     Review of Systems   Unable to perform ROS: Intubated     Objective:     Vital Signs (Most Recent):  Temp: 96.6 °F (35.9 °C) (07/31/23 1000)  Pulse: 85 (07/31/23 1000)  Resp: (!) 4 (07/31/23 1000)  BP: (!) 157/84 (07/31/23 1000)  SpO2: 98 % (07/31/23 1000) Vital Signs (24h Range):  Temp:  [95.7 °F (35.4 °C)-97.2 °F (36.2 °C)] 96.6 °F (35.9 °C)  Pulse:  [77-88] 85  Resp:  [0-24] 4  SpO2:  [94 %-100 %] 98 %  BP: (102-157)/(58-93) 157/84     Weight: 103.4 kg (228 lb)  Body mass index is 40.39 kg/m².     Physical Exam  Vitals and nursing note reviewed.   Constitutional:       Appearance: He is obese. He is ill-appearing.   HENT:      Head: Normocephalic and atraumatic.      Nose: Nose normal.      Mouth/Throat:      Mouth: Mucous membranes are dry.      Comments: ET tube in place.   Eyes:      Comments: Fixed dilated pupils.  Gag reflex +   Cardiovascular: "      Rate and Rhythm: Tachycardia present.      Pulses: Normal pulses.      Heart sounds: No murmur heard.  Pulmonary:      Comments: Intubated.   Abdominal:      General: Abdomen is flat. There is no distension.      Palpations: Abdomen is soft.   Musculoskeletal:      Right lower leg: No edema.      Left lower leg: No edema.   Skin:     General: Skin is warm.   Neurological:      Mental Status: He is alert.      Comments: Intubated. Off sedation, does not follow commands.   Gag reflex +                Significant Labs: All pertinent labs within the past 24 hours have been reviewed.    Significant Imaging: I have reviewed all pertinent imaging results/findings within the past 24 hours.      Assessment/Plan:      * Cardiac arrest  Unclear etiology. Concern for drug toxicity/over dose given amitriptyline use with associated narcotics or prolonged hypotension causing arrest. Other differentials include sepsis, seizures. Less likely intra-cranial process, PE or ACS.   Currently, intubated with non-reactive pupils     Plan:   Continue dopamine given significant bradycardic episodes prior to arrest   Continue bicarbonate infusion for possible amitriptyline overdose/acidosis  Obtain EEG  Obtain echocardiogram, lower extremity dopplers.   Consult to cardiology   Consult to neurology   Consult to palliative care for goals of care,  Echo. Show preserved EF,.    Positive blood culture  Cocci in cluster,nicoleay on vanc.may contaminated,repeted blood culture.      Advance care planning  Remains full code at this time,palliative on case.      Anoxic brain injury  Will have repeat head CT today,also EEG,neurology is on case.      Non-traumatic rhabdomyolysis  improving with IVF.      NSTEMI (non-ST elevated myocardial infarction)  Demand ischemia,no plan for intervention,echo,. Show preserved EF.      Elevated LFTs  Likely in the setting of cardiac arrest. Continue to trend LFTs.       Acute respiratory failure with  hypoxia  Patient with Hypoxic Respiratory failure which is Acute.  he is not on home oxygen. Supplemental oxygen was provided and noted- Vent Mode: VC  Oxygen Concentration (%):  [] 30  Resp Rate Total:  [18 br/min-26 br/min] 22 br/min  Vt Set:  [450 mL] 450 mL  PEEP/CPAP:  [5 cmH20] 5 cmH20  Mean Airway Pressure:  [8.5 cmH20-10.5 cmH20] 8.7 cmH20    In the setting of cardiac arrest. Wean oxygen as tolerated   Consult to pulmonary     MARISSA (acute kidney injury)  In the setting of cardiac arrest, likely ATN  Monitor Cr & urine output   Improving with IVF,nephrology on case.    Severe episode of recurrent major depressive disorder, without psychotic features  Hold home medications       Lactic acidosis  Improved.      Severe obesity with body mass index (BMI) of 36.0 to 36.9 with serious comorbidity  Body mass index is 40.46 kg/m². Morbid obesity complicates all aspects of disease management from diagnostic modalities to treatment. Weight loss encouraged and health benefits explained to patient.         Severe sepsis  This patient does have evidence of infective focus  My overall impression is sepsis.  Source: Unknown  Antibiotics given-   Antibiotics (72h ago, onward)    Start     Stop Route Frequency Ordered    07/30/23 0600  piperacillin-tazobactam (ZOSYN) 4.5 g in dextrose 5 % in water (D5W) 100 mL IVPB (MB+)         -- IV Every 8 hours 07/29/23 2212 07/29/23 2312  vancomycin - pharmacy to dose  (vancomycin IVPB (PEDS and ADULTS))        See Hyperspace for full Linked Orders Report.    -- IV pharmacy to manage frequency 07/29/23 2212 07/29/23 2100  vancomycin (VANCOCIN) 2,000 mg in dextrose 5 % (D5W) 500 mL IVPB         07/30/23 0859 IV ED 1 Time 07/29/23 2023        Latest lactate reviewed-  Recent Labs   Lab 07/29/23  1853   LACTATE 10.2*     Organ dysfunction indicated by Acute kidney injury, Acute respiratory failure and Encephalopathy    Fluid challenge Ideal Body Weight- The patient's ideal body  "weight is Ideal body weight: 61.5 kg (135 lb 9.3 oz) which will be used to calculate fluid bolus of 30 ml/kg for treatment of septic shock.      Post- resuscitation assessment No - Post resuscitation assessment not needed       Will Not start Pressors- Levophed for MAP of 65  Source control achieved by:   Antibiotics   Follow up cultures   Obtain further imaging once more stable/creatinine allows.     Type 2 diabetes mellitus with hyperglycemia, with long-term current use of insulin  Patient's FSGs are controlled on current medication regimen.  Last A1c reviewed-   Lab Results   Component Value Date    HGBA1C 6.9 (H) 05/10/2023     Most recent fingerstick glucose reviewed- No results for input(s): "POCTGLUCOSE" in the last 24 hours.  Current correctional scale  Low  Maintain anti-hyperglycemic dose as follows-   Antihyperglycemics (From admission, onward)    Start     Stop Route Frequency Ordered    07/29/23 2340  insulin aspart U-100 pen 0-5 Units         -- SubQ Every 6 hours PRN 07/29/23 2240          Chronic diastolic heart failure  Will monitior for fluid overload.      Alcohol use disorder, severe, dependence  Per record.        VTE Risk Mitigation (From admission, onward)         Ordered     heparin (porcine) injection 5,000 Units  Every 8 hours         07/30/23 0819     IP VTE HIGH RISK PATIENT  Once         07/29/23 2235     Place sequential compression device  Until discontinued         07/29/23 2235                Discharge Planning   SUSAN:      Code Status: Full Code   Is the patient medically ready for discharge?:     Reason for patient still in hospital (select all that apply): Patient trending condition  Discharge Plan A: Home with family (Follow-up; assess for services/needs)            Critical care time spent on the evaluation and treatment of severe organ dysfunction, review of pertinent labs and imaging studies, discussions with consulting providers and discussions with patient/family:  Over 45  " minutes.      Hollie Johnson MD  Department of Hospital Medicine   [facility] - Intensive Care

## 2023-07-31 NOTE — PROGRESS NOTES
[facility] - Intensive Care  Neurology  Progress Note    Patient Name: Doe Woodall  MRN: [id number]  Admission Date: 7/29/2023  Hospital Length of Stay: 2 days  Code Status: DNR   Attending Provider: Hollie Johnson MD  Primary Care Physician: [facility] Michele   Principal Problem:Cardiac arrest    Subjective:     Interval History: 34 y/o male with medical Hx of DM, HTN was found unresponsive at home at 5 pm. He had been known at his normal self the day before being found. Family started CPR as he had no pulse. Paramedics arrived and continued ACLS successfully achieving ROSC. In ED he was reported to have three episodes of PEA. Pt is now intubated; TTM protocol ongoing.     -7/31/23: Pt remains unresponsive.     Current Neurological Medications:     Current Facility-Administered Medications   Medication Dose Route Frequency Provider Last Rate Last Admin    0.9%  NaCl infusion   Intravenous Continuous Hollie Johnson  mL/hr at 07/31/23 1300 Rate Verify at 07/31/23 1300    acetaminophen tablet 650 mg  650 mg Oral Q4H PRN Govind Vernon MD        albuterol-ipratropium 2.5 mg-0.5 mg/3 mL nebulizer solution 3 mL  3 mL Nebulization Q4H PRN Govind Vernon MD        calcium gluconate 1 g in NS IVPB (premixed)  1 g Intravenous PRN Govind Vernon MD        calcium gluconate 1 g in NS IVPB (premixed)  2 g Intravenous PRN Govind Vernon MD        calcium gluconate 1 g in NS IVPB (premixed)  3 g Intravenous PRN Govind Vernon MD        dextrose 50% injection 12.5 g  12.5 g Intravenous PRN Govind Vernon MD        dextrose 50% injection 12.5 g  12.5 g Intravenous PRN Hollie Johnson MD        famotidine (PF) injection 20 mg  20 mg Intravenous Daily Govind Vernon MD   20 mg at 07/31/23 0841    fentaNYL 2500 mcg in 0.9% sodium chloride 250 mL infusion premix (titrating)  0-200 mcg/hr Intravenous Continuous Govind Vernon MD   Stopped at 07/30/23 0917    fentaNYL 50 mcg/mL injection 50 mcg  50 mcg Intravenous  Q1H PRN Govind Vernon MD        folic acid tablet 1 mg  1 mg Oral Daily Thiago Carter MD   1 mg at 07/31/23 0841    glucagon (human recombinant) injection 1 mg  1 mg Intramuscular PRN Govind Vernon MD        glucagon (human recombinant) injection 1 mg  1 mg Intramuscular PRN Hollie Johnson MD        heparin (porcine) injection 5,000 Units  5,000 Units Subcutaneous Q8H Hollie Johnson MD   5,000 Units at 07/31/23 1400    insulin aspart U-100 pen 0-15 Units  0-15 Units Subcutaneous Q6H PRN Hollie Johnson MD   12 Units at 07/31/23 1402    magnesium sulfate 2g in water 50mL IVPB (premix)  2 g Intravenous PRN Govind Vernon MD        magnesium sulfate 2g in water 50mL IVPB (premix)  4 g Intravenous PRN Govind Vernon MD        melatonin tablet 6 mg  6 mg Oral Nightly PRN Govidn Vernon MD        multivitamin tablet  1 tablet Oral Daily Thiago Carter MD   1 tablet at 07/31/23 0841    ondansetron injection 4 mg  4 mg Intravenous Q8H PRN Govind Vernon MD        piperacillin-tazobactam (ZOSYN) 4.5 g in dextrose 5 % in water (D5W) 100 mL IVPB (MB+)  4.5 g Intravenous Q8H Govind Vernon MD 25 mL/hr at 07/31/23 1402 4.5 g at 07/31/23 1402    potassium chloride 10 mEq in 100 mL IVPB  40 mEq Intravenous PRN Govind Vernon MD        And    potassium chloride 10 mEq in 100 mL IVPB  60 mEq Intravenous PRN Govind Vernon MD        And    potassium chloride 10 mEq in 100 mL IVPB  80 mEq Intravenous PRN Govind Vernon MD        prochlorperazine injection Soln 5 mg  5 mg Intravenous Q6H PRN Govind Vernon MD        propofol (DIPRIVAN) 10 mg/mL infusion  0-50 mcg/kg/min Intravenous Continuous Govind Vernon MD   Stopped at 07/30/23 0917    sodium chloride 0.9% flush 10 mL  10 mL Intravenous PRN Govind Vernon MD        sodium phosphate 15 mmol in dextrose 5 % (D5W) 250 mL IVPB  15 mmol Intravenous PRN Govind Vernon MD        sodium phosphate 20.01 mmol in dextrose 5 % (D5W) 250 mL IVPB  20.01 mmol Intravenous PRN Govind Vernon MD         sodium phosphate 30 mmol in dextrose 5 % (D5W) 250 mL IVPB  30 mmol Intravenous PRN Govind Vernon MD        thiamine (B-1) 100 mg in sodium chloride 0.9% 100 mL IVPB  100 mg Intravenous Daily Stephani Borrero MD   Stopped at 07/31/23 0926    vancomycin - pharmacy to dose   Intravenous pharmacy to manage frequency Govind Vernon MD           Review of Systems  Unable to perform ROS: Patient unresponsive     Objective:     Vital Signs (Most Recent):  Temp: 96.4 °F (35.8 °C) (07/31/23 1030)  Pulse: 83 (07/31/23 1315)  Resp: 20 (07/31/23 1315)  BP: 139/88 (07/31/23 1300)  SpO2: (!) 94 % (07/31/23 1315) Vital Signs (24h Range):  Temp:  [96.1 °F (35.6 °C)-97.2 °F (36.2 °C)] 96.4 °F (35.8 °C)  Pulse:  [77-88] 83  Resp:  [0-22] 20  SpO2:  [92 %-100 %] 94 %  BP: (105-157)/(59-93) 139/88     Weight: 103.4 kg (228 lb)  Body mass index is 40.39 kg/m².    Physical Exam  Constitutional:       General: He is not in acute distress.  HENT:      Head: Normocephalic.   Eyes:      General:         Right eye: No discharge.         Left eye: No discharge.   Cardiovascular:      Rate and Rhythm: Normal rate.   Pulmonary:      Breath sounds: Normal breath sounds.   Abdominal:      Palpations: Abdomen is soft.   Musculoskeletal:         General: No deformity.      Cervical back: Neck supple.   Skin:     Findings: No rash.            NEUROLOGICAL EXAMINATION:      MENTAL STATUS        Unresponsive to verbal stimuli   Pupils are round a 5 mm; not reactive to light  No corneal reflex bilaterally  Absent oculocephalic reflex bilaterally  No gag reflex  No cough reflex       Significant Labs: CBC:   Recent Labs   Lab 07/29/23  2312 07/30/23  0553 07/31/23  0504   WBC 19.78* 19.96* 19.49*   HGB 8.2* 8.3* 7.9*   HCT 25.5* 24.7* 23.1*    182 174     CMP:   Recent Labs   Lab 07/29/23  1830 07/29/23 2022 07/29/23  2312 07/30/23  0713 07/31/23  0504   *   < > 464* 431* 239*   *   < > 135* 135* 139   K 6.0*   < > 4.6 4.2 3.4*   CL  107   < > 105 101 97   CO2 9*   < > 11* 19* 28   BUN 51*   < > 50* 50* 53*   CREATININE 4.4*   < > 4.0* 3.8* 3.4*   CALCIUM 8.9   < > 8.4* 8.3* 7.8*   MG 2.9*  --  2.1 1.9 1.8   PROT 6.7  --  5.6*  --  5.2*   ALBUMIN 2.2*  --  1.9*  --  1.7*   BILITOT 0.2  --  0.6  --  0.3   ALKPHOS 246*  --  219*  --  163*   AST 96*  --  353*  --  396*   ALT 94*  --  338*  --  588*   ANIONGAP 19*   < > 19* 15 14    < > = values in this interval not displayed.       Significant Imaging: I have reviewed all pertinent imaging results/findings within the past 24 hours.    Head CT  Impression:     Progressive diffuse loss of gray-white differentiation, concerning for global hypoxic ischemic encephalopathy.  No intracranial hemorrhage, mass effect, or extra-axial fluid collections.     Findings discussed with primary team (Dr. Guerrier) via Secure chat at time of dictation.        Electronically signed by: Varun Mcqueen MD  Date:                                            07/31/2023  Time:                                           11:58    Assessment and Plan:     34 y/o male S/P cardiac arrest     Anoxic encephalopathy:  no brainstem reflexes. Head CT shows signs of anoxic injury.  Prognosis is poor. Wife has decided for DNR. Planned for withdrawal of care tomorrow.       Active Diagnoses:    Diagnosis Date Noted POA    PRINCIPAL PROBLEM:  Cardiac arrest [I46.9] 07/29/2023 Yes    Anoxic brain injury [G93.1] 07/31/2023 Yes    Advance care planning [Z71.89] 07/31/2023 Not Applicable    Positive blood culture [R78.81] 07/31/2023 Yes    NSTEMI (non-ST elevated myocardial infarction) [I21.4] 07/30/2023 Yes    Non-traumatic rhabdomyolysis [M62.82] 07/30/2023 Yes    Acute respiratory failure with hypoxia [J96.01] 07/29/2023 Yes    Elevated LFTs [R79.89] 07/29/2023 Yes    MARISSA (acute kidney injury) [N17.9] 07/25/2023 Yes    History of neurosyphilis [Z86.19] 07/25/2023 Not Applicable    Severe episode of recurrent major depressive disorder, without  psychotic features [F33.2] 11/20/2022 Yes     Chronic    Lactic acidosis [E87.20] 10/03/2022 Yes    Severe obesity with body mass index (BMI) of 36.0 to 36.9 with serious comorbidity [E66.01, Z68.36] 10/02/2022 Not Applicable    Normocytic anemia [D64.9] 09/10/2022 Yes    Severe sepsis [A41.9, R65.20] 09/01/2022 Yes    Chronic diastolic heart failure [I50.32] 01/06/2022 Yes     Chronic    Type 2 diabetes mellitus with hyperglycemia, with long-term current use of insulin [E11.65, Z79.4]  Not Applicable    Alcohol use disorder, severe, dependence [F10.20] 08/04/2020 Yes     Chronic      Problems Resolved During this Admission:       VTE Risk Mitigation (From admission, onward)           Ordered     heparin (porcine) injection 5,000 Units  Every 8 hours         07/30/23 0819     IP VTE HIGH RISK PATIENT  Once         07/29/23 2235     Place sequential compression device  Until discontinued         07/29/23 2235                    Harvey Guerrier MD  Neurology  [facility]

## 2023-07-31 NOTE — CONSULTS
[facility] - Intensive Care  Palliative Medicine  Consult Note    Patient Name: Doe Woodall  MRN: [id number]  Admission Date: 7/29/2023  Hospital Length of Stay: 2 days  Code Status: Full Code   Attending Provider: Hollie Johnson MD  Consulting Provider: Meg Mcqueen MD  Primary Care Physician: [facility] Michele  Principal Problem:Cardiac arrest    Patient information was obtained from spouse/SO, past medical records, ER records and primary team.      Inpatient consult to Palliative Care  Consult performed by: Meg Mcqueen MD  Consult ordered by: Govind Vernon MD  Reason for consult: Advance Care Planning         Assessment/Plan:     Advance Care Planning       7/31/23  - Consult for advance care planning and support in young patient found unresponsive at home, potentially secondary to intentional overdose of opioids and TCAs. He is currently intubated and in in the ICU, having undergone TTM following arrest. Neurology consulted and following; repeat CT head ordered. Chart reviewed in depth; discussed pt during MDT rounds.   - [facility] met with patient's very supportive wife Aminta at the bedside. Introduced role of palliative medicine, and learned more about pt outside of the hospital. They have been  for 5 years, and have a two year old daughter together as well as her 9 year old daughter from prior relationship. However, she told us that patient has raised that child like his own - told us that tells us a lot about him as a person.   - She was very forthcoming about his health struggles over the last year. 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 He has had hospital stay after hospital stay for infections, often requiring surgical intervention. This is on a background of diabetes, as well as challenges with alcohol and substance abuse. He had additional  traumatic events that she shared private information about it - emotional support provided, as it is clear that they have been through a lot.   - Thorough medical update provided; while we are awaiting more diagnostic information, early physical exam signs are consistent with severe anoxic brain injury. While we cannot say this definitively, we are concerned that patient is unlikely to return to an independent lifestyle. She was understanding of this, and overall has very good insight into the situation.   - Aminta shared with us that they had recently had serious discussion about his care preferences in the event of a serious illness. Essentially, patient made it clear that if ever had a devastating injury or illness, he would not want to be kept alive on machines. Told her we appreciate her telling us this, as it helps us provide care that is aligned with his beliefs.   - At this time, will continue with neuro-prognostication prior to discussing next steps in patient's care; however, based on Aminta's discussion with pt, he would not desire to be on prolonged life support if expected outcome is poor.   - Updated bedside RN and neurologist (Dr Guerrier) following visit; our team will continue to follow up with pt and family     Neuro  Anoxic brain injury  - Concern for this secondary to multiple episodes of cardiac arrest and unknown down time; getting repeat CT head today as well as formal neurological evaluation  - Illness trajectory education provided to patient's wife    Psychiatric  Severe episode of recurrent major depressive disorder, without psychotic features  - Noted underlying history    Alcohol use disorder, severe, dependence  - Noted underlying history; mgmt per primary team     Pulmonary  Acute respiratory failure with hypoxia  - Intubated secondary to cardiac arrest; mental status barrier to extubation      Cardiac/Vascular  * Cardiac arrest  - Possibly secondary to drug overdose (opioids, TCA)   -  "Has undergone TTM and will be getting formal neuroprognostication, as concern for anoxic brain injury    NSTEMI (non-ST elevated myocardial infarction)  - Cardiology consulted; no plan for intervention at this time    Chronic diastolic heart failure  - Noted underlying history; volume management per primary team     Renal/  MARISSA (acute kidney injury)  - Creatinine remains elevated though slightly improved; mgmt per primary team. Suspect due to rhabdo +/- ATN     ID  Positive blood culture  - Noted; abx per primary team     Severe sepsis  - Source likely bacteremia though may have also aspirated; management per primary team and PCCM     GI  Elevated LFTs  - Worsening today; likely shock liver. Supportive care per primary team.     Orthopedic  Non-traumatic rhabdomyolysis  - Found down at home secondary to possible overdose     Thank you for your consult. I will follow-up with patient. Please contact us if you have any additional questions.    MIC Murray  Palliative Medicine Staff   [phone]    Total visit time: 86 minutes    > 50% of 70 min visit spent in chart review, face to face discussion of symptom assessment, coordination of care with other specialists, and discharge planning.    16 min ACP time spent: goals of care, emotional support, formulating and communicating prognosis, exploring burden/ benefit of various approaches of treatment.     Subjective:     HPI:   (From H&P) "This is a 33-year-old male with a past medical history of alcohol use, type 2 diabetes, mood disorder, and orthostatic hypotension who presents with cardiac arrest.     Patient presents to the ED on 07/29 after being found unresponsive by his family around 5:00 p.m..  Family started CPR at 5:24 p.m. and EMS continued resuscitation with achievement of ROSC at 5:45 p.m..  Initial rhythm was noted to be asystole.  The patient later went into VFib, was cardioverted x1 and given amiodarone.  While in the ED, the patient became " "bradycardic and went into PA arrest x3, during which he received epinephrine x6, bicarbonate x3, calcium chloride x1, and amiodarone with achievement of ROSC. Given recurrent bradycardia, he was later started on dopamine, per cardiology. Per the patient's sister, a bottle of morphine and amitriptyline were found next to him.      Of note, The patient was recently hospitalized (7/25-7/26) for hypotension after missing his midodrine at home.  He received fluids, antibiotics, and midodrine with improvement of his blood pressure and was discharged home.  The patient also had a recent hospitalization at [facility] (6/16-7/8) after presenting with unresponsiveness.  At that time, he was at the neurology clinic for evaluation of episodic loss of consciousness and suddenly became unconscious.  He received CPR of unknown duration after which he became responsive, but did not seem to actually lose pulse.  He was treated for preseptal cellulitis and worked up for autonomic dysfunction and adrenal insufficiency.     In the ED, laboratory workup was remarkable for leukocytosis (19.5), elevated creatinine (4.1 - from a baseline of 1.7), elevated LFTs (AST:  96, ALT:  94, ALP:  246), elevated troponin (0.063), elevated BNP (109), elevated lactic acid (10.2), hyperphosphatemia (11.8).  UA was unremarkable.  CT head showed no acute large vascular territory infarct or intracranial hemorrhage.  Chest x-ray showed mild perihilar interstitial changes with possible edema.  Patient was given aspirin 300 mg, 3.0 L of LR, vancomycin, Zosyn albuterol, and was started on a sodium bicarb and dopamine gtt. Patient was admitted for further management."    Palliative medicine is consulted for advance care planning and family support; for details of visit with patient and family, see ACP section of plan.        Past Medical History:   Diagnosis Date    Abscess of right groin 09/01/2022    Diabetes mellitus     Encounter for blood transfusion     Loud " snoring     Obese     Other insomnia 2020    Perineal abscess 2014    Primary hypertension 10/2/2022       Past Surgical History:   Procedure Laterality Date    ABCESS DRAINAGE      PERIRECTAL    DECOMPRESSION OF LUMBAR SPINE USING MINIMALLY INVASIVE TECHNIQUE Right 2018    Procedure: DECOMPRESSION, SPINE, LUMBAR, MINIMALLY INVASIVE L3-4;  Surgeon: Cristian Cervantes MD;  [facility];  Service: Neurosurgery;  Laterality: Right;    INCISION AND DRAINAGE N/A 2022    Procedure: INCISION AND DRAINAGE GROIN;  Surgeon: KAITY Aguilar MD;  Location: [facility] OR;  Service: Urology;  Laterality: N/A;    ORTHOPEDIC SURGERY         Review of patient's allergies indicates:   Allergen Reactions    Metformin Diarrhea       Medications:  Continuous Infusions:   sodium chloride 0.9% 125 mL/hr at 23 0844    fentanyl Stopped (23)    propofoL Stopped (23)     Scheduled Meds:   famotidine (PF)  20 mg Intravenous Daily    folic acid  1 mg Oral Daily    heparin (porcine)  5,000 Units Subcutaneous Q8H    multivitamin  1 tablet Oral Daily    piperacillin-tazobactam (Zosyn) IV (PEDS and ADULTS) (extended infusion is not appropriate)  4.5 g Intravenous Q8H    thiamine (B-1) 100 mg in sodium chloride 0.9% 100 mL IVPB  100 mg Intravenous Daily     PRN Meds:acetaminophen, albuterol-ipratropium, calcium gluconate IVPB, calcium gluconate IVPB, calcium gluconate IVPB, dextrose 50%, [] fentaNYL **FOLLOWED BY** fentaNYL, glucagon (human recombinant), magnesium sulfate IVPB, magnesium sulfate IVPB, melatonin, ondansetron, potassium chloride **AND** potassium chloride **AND** potassium chloride, prochlorperazine, sodium chloride 0.9%, sodium phosphate 15 mmol in dextrose 5 % (D5W) 250 mL IVPB, sodium phosphate 20.01 mmol in dextrose 5 % (D5W) 250 mL IVPB, sodium phosphate 30 mmol in dextrose 5 % (D5W) 250 mL IVPB, Pharmacy to dose Vancomycin consult **AND**  "vancomycin - pharmacy to dose    Family History       Problem Relation (Age of Onset)    Diabetes Father, Paternal Grandmother, Paternal Grandfather          Tobacco Use    Smoking status: Former     Current packs/day: 0.00     Average packs/day: 0.5 packs/day for 9.0 years (4.5 ttl pk-yrs)     Types: Cigarettes     Start date: 7/1/2004     Quit date: 7/1/2013     Years since quitting: 10.0    Smokeless tobacco: Former   Substance and Sexual Activity    Alcohol use: Not Currently     Comment: DAILY PINT OF LIQUOR, HX OF 1 "TALL BOY" OF BEER DAILY    Drug use: Not Currently     Types: Cocaine     Comment: per collateral    Sexual activity: Yes     Partners: Female     Birth control/protection: None       Review of Systems   Unable to perform ROS: Intubated     Objective:     Vital Signs (Most Recent):  Temp: 96.4 °F (35.8 °C) (07/31/23 0915)  Pulse: 86 (07/31/23 0915)  Resp: (!) 5 (07/31/23 0915)  BP: (!) 152/88 (07/31/23 0830)  SpO2: 96 % (07/31/23 0915) Vital Signs (24h Range):  Temp:  [95.7 °F (35.4 °C)-97.2 °F (36.2 °C)] 96.6 °F (35.9 °C)  Pulse:  [77-92] 83  Resp:  [0-24] 0  SpO2:  [94 %-100 %] 98 %  BP: (102-155)/(57-90) 144/76     Weight: 103.4 kg (228 lb)  Body mass index is 40.39 kg/m².       Physical Exam  Constitutional:       Comments: Ill-appearing, lying in bed, intubated   HENT:      Head: Normocephalic.      Nose: Nose normal.      Mouth/Throat:      Mouth: Mucous membranes are moist.   Cardiovascular:      Rate and Rhythm: Normal rate.   Pulmonary:      Comments: ETT in place, mechanically ventilated   Neurological:      Comments: Unresponsive to voice       Significant Labs: All pertinent labs within the past 24 hours have been reviewed.  CBC:   Recent Labs   Lab 07/31/23  0504   WBC 19.49*   HGB 7.9*   HCT 23.1*   MCV 86        BMP:  Recent Labs   Lab 07/31/23  0504   *      K 3.4*   CL 97   CO2 28   BUN 53*   CREATININE 3.4*   CALCIUM 7.8*   MG 1.8     LFT:  Lab Results "   Component Value Date     (H) 07/31/2023    ALKPHOS 163 (H) 07/31/2023    BILITOT 0.3 07/31/2023     Albumin:   Albumin   Date Value Ref Range Status   07/31/2023 1.7 (L) 3.5 - 5.2 g/dL Final     Protein:   Total Protein   Date Value Ref Range Status   07/31/2023 5.2 (L) 6.0 - 8.4 g/dL Final     Lactic acid:   Lab Results   Component Value Date    LACTATE 5.0 (HH) 07/30/2023    LACTATE 7.3 (HH) 07/29/2023       Significant Imaging: I have reviewed all pertinent imaging results/findings within the past 24 hours.

## 2023-07-31 NOTE — ASSESSMENT & PLAN NOTE
Continue thiamine, folic acid, multivitamin.  - monitor for symptoms consistent with withdrawal.  CIWA.  - off of all sedation for >24 hours

## 2023-07-31 NOTE — PLAN OF CARE
Patient remains in ICU on the ventilator with no sedation. His family has been at the bedside. Q 4 glucose, troponin trended down to 1.3,  blood cultures positive for gram + cocci in clusters. GCS remains 3. No injuries, falls or acute events this shift.

## 2023-07-31 NOTE — SUBJECTIVE & OBJECTIVE
Interval History: No significant changes overnight       Objective:     Vital Signs (Most Recent):  Temp: 96.6 °F (35.9 °C) (07/31/23 1000)  Pulse: 85 (07/31/23 1000)  Resp: (!) 4 (07/31/23 1000)  BP: (!) 157/84 (07/31/23 1000)  SpO2: 98 % (07/31/23 1000) Vital Signs (24h Range):  Temp:  [95.7 °F (35.4 °C)-97.2 °F (36.2 °C)] 96.6 °F (35.9 °C)  Pulse:  [77-88] 85  Resp:  [0-24] 4  SpO2:  [94 %-100 %] 98 %  BP: (102-157)/(58-93) 157/84     Weight: 103.4 kg (228 lb)  Body mass index is 40.39 kg/m².      Intake/Output Summary (Last 24 hours) at 7/31/2023 1049  Last data filed at 7/31/2023 1000  Gross per 24 hour   Intake 3925.75 ml   Output 945 ml   Net 2980.75 ml        Physical Exam  Vitals reviewed.   Constitutional:       Appearance: He is ill-appearing.   HENT:      Head: Normocephalic and atraumatic.      Right Ear: There is no impacted cerumen.      Nose: No congestion.      Mouth/Throat:      Mouth: Mucous membranes are dry.      Pharynx: Oropharynx is clear. No oropharyngeal exudate.   Cardiovascular:      Rate and Rhythm: Normal rate and regular rhythm.      Pulses: Normal pulses.   Pulmonary:      Effort: No respiratory distress.      Breath sounds: No wheezing.      Comments: Mechanical ventilation   Abdominal:      General: Abdomen is flat. Bowel sounds are normal.      Palpations: Abdomen is soft.   Musculoskeletal:         General: No swelling or deformity.   Skin:     General: Skin is warm and dry.   Neurological:      Comments: No cough, gag, corneal or pupillary response. Pupils are fixed and dilated  He does breath spontaneously on PS mode            Review of Systems    Vents:  Vent Mode: VC (07/31/23 0850)  Ventilator Initiated: Yes (07/29/23 1908)  Set Rate: 20 BPM (07/31/23 0436)  Vt Set: 450 mL (07/31/23 0436)  PEEP/CPAP: 5 cmH20 (07/31/23 0436)  Oxygen Concentration (%): 21 (07/31/23 1000)  Peak Airway Pressure: 20.3 cmH20 (07/31/23 0436)  Total Ve: 9 L/m (07/31/23 0436)  F/VT Ratio<105 (RSBI):  (!) 44.54 (07/31/23 0436)    Lines/Drains/Airways       Central Venous Catheter Line  Duration             Percutaneous Central Line Insertion/Assessment - Triple Lumen  07/29/23 1948 Internal Jugular Left 1 day              Drain  Duration                  NG/OG Tube 07/29/23 1824 16 Fr. Right mouth 1 day         Urethral Catheter 07/29/23 1820 16 Fr. 1 day              Airway  Duration                  Airway - Non-Surgical 07/29/23 1800 Endotracheal Tube 1 day              Peripheral Intravenous Line  Duration                  Peripheral IV - Single Lumen 07/29/23 1808 20 G Anterior;Right Hand 1 day         Peripheral IV - Single Lumen 07/29/23 1943 20 G Right Hand 1 day                    Significant Labs:    CBC/Anemia Profile:  Recent Labs   Lab 07/29/23 2312 07/30/23  0553 07/31/23  0504   WBC 19.78* 19.96* 19.49*   HGB 8.2* 8.3* 7.9*   HCT 25.5* 24.7* 23.1*    182 174   MCV 92 87 86   RDW 13.8 13.9 14.6*        Chemistries:  Recent Labs   Lab 07/29/23  1830 07/29/23 2022 07/29/23 2312 07/30/23  0713 07/31/23  0504   *   < > 135* 135* 139   K 6.0*   < > 4.6 4.2 3.4*      < > 105 101 97   CO2 9*   < > 11* 19* 28   BUN 51*   < > 50* 50* 53*   CREATININE 4.4*   < > 4.0* 3.8* 3.4*   CALCIUM 8.9   < > 8.4* 8.3* 7.8*   ALBUMIN 2.2*  --  1.9*  --  1.7*   PROT 6.7  --  5.6*  --  5.2*   BILITOT 0.2  --  0.6  --  0.3   ALKPHOS 246*  --  219*  --  163*   ALT 94*  --  338*  --  588*   AST 96*  --  353*  --  396*   MG 2.9*  --  2.1 1.9 1.8   PHOS 11.8*  --  8.7* 6.5* 5.4*    < > = values in this interval not displayed.       All pertinent labs within the past 24 hours have been reviewed.    Significant Imaging:  I have reviewed all pertinent imaging results/findings within the past 24 hours.

## 2023-07-31 NOTE — PLAN OF CARE
Recommendations    1. Recommend TF Glucerna @ 40 mL/hr providing 1440 KCAL 79 g protein and 729 mL water meeting 100% EEN/EPN.   2. Continue to monitor weights and labs.   3. Collaboration with medical providers    Goals: 1. TF initiated by RD follow up.  Nutrition Goal Status: new  Communication of RD Recs:  (POC)    Assessment and Plan    Nutrition Problem  Inadequate oral intake    Related to (etiology):   Inadequate energy intake    Signs and Symptoms (as evidenced by):   NPO status    Interventions/Recommendations (treatment strategy):  Tube Feeding  Collaboration with medical providers    Nutrition Diagnosis Status:   New

## 2023-08-01 NOTE — SIGNIFICANT EVENT
Death Note:     Supervised compassionate extubation in the operating room. Patient was monitored closely for signs of distress, and received medications for comfort. At 6:04 pm, absence of pulses and spontaneous respirations was noted. He was pronounced  at this time.     Time of Death: 2023 6:04 pm     Preliminary Cause of Death: Anoxic Brain Injury secondary to cardiac arrest     MIC Murray  Palliative Medicine Staff   [phone]

## 2023-08-01 NOTE — NURSING
1830 spoke with Codie Lucero with the [facility] 's office [phone]. Please call when case is completed in OR.

## 2023-08-01 NOTE — ASSESSMENT & PLAN NOTE
repeat head CT show worsening hypoxic brain injury,patient is unresponsive,family agree with withdraw of care and organ donation,EMILIE is present.

## 2023-08-01 NOTE — PROGRESS NOTES
Patient's neurological condition remains unchanged  Family decides comfort care, withdraw support, and organ donation  We'll sign off

## 2023-08-01 NOTE — ANESTHESIA PREPROCEDURE EVALUATION
08/01/2023  Doe Woodall is a 33 y.o., male.  Pre-operative evaluation for Procedure(s) (LRB):  SURGICAL PROCUREMENT, ORGAN (N/A)    Doe Woodall is a 33 y.o. male     Patient Active Problem List   Diagnosis    Bulge of lumbar disc without myelopathy    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    Floppy eyelid syndrome    Elevated transaminase level    Other insomnia    Substance induced mood disorder    Alcohol use disorder, severe, dependence    Anxiety disorder    Chronic diastolic heart failure    Type 2 diabetes mellitus with hyperglycemia, with long-term current use of insulin    Cellulitis of groin    Severe sepsis    Inguinal abscess    Normocytic anemia    Abscess of scrotum    Severe obesity with body mass index (BMI) of 36.0 to 36.9 with serious comorbidity    Chest wall contusion, left, initial encounter    Lactic acidosis    Hypomagnesemia    Severe episode of recurrent major depressive disorder, without psychotic features    Dehydration with hyponatremia    Hyperglycemia    Nonspecific dizziness    Left hip pain    Thrombocytopenia    Frequent falls    Hyperphosphatemia    Lower extremity ulceration, right, limited to breakdown of skin    Diarrhea    Abdominal pain    Syncope    Debility    Altered mental status    Hypotension    Hypertension, essential    Diabetes mellitus type 2, insulin dependent    Abscess of left thigh    GERD (gastroesophageal reflux disease)    History of neurosyphilis    MARISSA (acute kidney injury)    Cardiac arrest    Acute respiratory failure with hypoxia    Elevated LFTs    NSTEMI (non-ST elevated myocardial infarction)    Non-traumatic rhabdomyolysis    Anoxic brain injury    Advance care planning    Positive blood culture       Review of patient's  "allergies indicates:   Allergen Reactions    Metformin Diarrhea       No current facility-administered medications on file prior to encounter.     Current Outpatient Medications on File Prior to Encounter   Medication Sig Dispense Refill    amitriptyline (ELAVIL) 75 MG tablet Take 1 tablet by mouth every evening.      aspirin 81 MG Chew Take 1 tablet by mouth once daily.      atorvastatin (LIPITOR) 80 MG tablet Take 80 mg by mouth every evening.      gabapentin (NEURONTIN) 100 MG capsule Take 100 mg by mouth 3 (three) times daily.      insulin detemir U-100, Levemir, (LEVEMIR FLEXPEN) 100 unit/mL (3 mL) InPn pen Inject 30 Units into the skin once daily. 15 mL 0    midodrine (PROAMATINE) 5 MG Tab Take 3 tablets (15 mg total) by mouth 3 (three) times daily. 810 tablet 3    oxyCODONE (ROXICODONE) 10 mg Tab immediate release tablet Take 10 mg by mouth every 8 (eight) hours as needed for Pain.      pantoprazole (PROTONIX) 40 MG tablet Take 40 mg by mouth once daily.      pen needle, diabetic (BD ULTRA-FINE MICRO PEN NEEDLE) 32 gauge x 1/4" Ndle Use 1 each to inject insulin once daily 100 each 0       Past Surgical History:   Procedure Laterality Date    ABCESS DRAINAGE  2014    PERIRECTAL    DECOMPRESSION OF LUMBAR SPINE USING MINIMALLY INVASIVE TECHNIQUE Right 07/06/2018    Procedure: DECOMPRESSION, SPINE, LUMBAR, MINIMALLY INVASIVE L3-4;  Surgeon: Cristian Cervantes MD;  Location: [address];  Service: Neurosurgery;  Laterality: Right;    INCISION AND DRAINAGE N/A 9/9/2022    Procedure: INCISION AND DRAINAGE GROIN;  Surgeon: KAITY Aguilar MD;  Location: [facility] OR;  Service: Urology;  Laterality: N/A;    ORTHOPEDIC SURGERY         Social History     Socioeconomic History    Marital status:    Tobacco Use    Smoking status: Former     Current packs/day: 0.00     Average packs/day: 0.5 packs/day for 9.0 years (4.5 ttl pk-yrs)     Types: Cigarettes     Start date: 7/1/2004     Quit date: 7/1/2013 " "    Years since quitting: 10.0    Smokeless tobacco: Former   Substance and Sexual Activity    Alcohol use: Not Currently     Comment: DAILY PINT OF LIQUOR, HX OF 1 "TALL BOY" OF BEER DAILY    Drug use: Not Currently     Types: Cocaine     Comment: per collateral    Sexual activity: Yes     Partners: Female     Birth control/protection: None   Other Topics Concern    Patient feels they ought to cut down on drinking/drug use No    Patient annoyed by others criticizing their drinking/drug use No    Patient has felt bad or guilty about drinking/drug use No    Patient has had a drink/used drugs as an eye opener in the AM No     Social Determinants of Health     Financial Resource Strain: Medium Risk (3/23/2022)    Overall Financial Resource Strain (CARDIA)     Difficulty of Paying Living Expenses: Somewhat hard   Food Insecurity: Unknown (3/23/2022)    Hunger Vital Sign     Worried About Running Out of Food in the Last Year: Never true     Ran Out of Food in the Last Year: Patient refused   Transportation Needs: No Transportation Needs (3/23/2022)    PRAPARE - Transportation     Lack of Transportation (Medical): No     Lack of Transportation (Non-Medical): No   Physical Activity: Inactive (3/23/2022)    Exercise Vital Sign     Days of Exercise per Week: 0 days     Minutes of Exercise per Session: 0 min   Stress: Stress Concern Present (3/23/2022)    [employer] [place] [employer] - Occupational Stress Questionnaire     Feeling of Stress : Very much   Social Connections: Unknown (3/23/2022)    Social Connection and Isolation Panel [NHANES]     Active Member of Clubs or Organizations: No     Attends Club or Organization Meetings: Never   Housing Stability: Low Risk  (3/23/2022)    Housing Stability Vital Sign     Unable to Pay for Housing in the Last Year: No     Number of Places Lived in the Last Year: 1     Unstable Housing in the Last Year: No         CBC:   Recent Labs     08/01/23  1006 " 23  1405   WBC 13.34* 12.75*   RBC 2.31* 2.32*   HGB 6.9* 6.7*   HCT 20.8* 21.0*   * 135*   MCV 90 91   MCH 29.9 28.9   MCHC 33.2 31.9*       CMP:   Recent Labs     23  1006 23  1405    142   K 3.2* 3.6    102   CO2 32* 33*   BUN 44* 45*   CREATININE 2.9* 2.9*   * 169*   MG 2.0 2.0   PHOS 4.4 4.7*   CALCIUM 7.4* 7.4*   ALBUMIN 1.5* 1.5*   PROT 4.6* 4.6*   ALKPHOS 142* 137*   * 440*   * 203*   BILITOT 0.3 0.2       INR  Recent Labs     23  0318 23  1006 23  1405   INR 1.1 1.1 1.1   APTT 26.6 28.4 27.6           Diagnostic Studies:      EKD Echo:  No results found for this or any previous visit.      Pre-op Assessment    I have reviewed the Patient Summary Reports.    I have reviewed the NPO Status.   I have reviewed the Medications.     Review of Systems  Anesthesia Hx:  No problems with previous Anesthesia  History of prior surgery of interest to airway management or planning:  Denies Personal Hx of Anesthesia complications.   Social:  Smoker, Alcohol Use UDS + opiates, possible overdose   Hematology/Oncology:         -- Anemia:   Cardiovascular:   Hypertension Past MI  ECG has been reviewed.    Pulmonary:   Pneumonia    Renal/:   Chronic Renal Disease    Hepatic/GI:   GERD    Neurological:   Neuromuscular Disease,    Endocrine:   Diabetes, type 2  Morbid Obesity / BMI > 40  Psych:   Psychiatric History          Physical Exam  General: Unconscious    Airway:  Mallampati: unable to assess   Pre-Existing Airway: Oral Endotracheal tube    Dental:  Intact        Anesthesia Plan  Type of Anesthesia, risks & benefits discussed:    Anesthesia Type: Gen ETT  Intra-op Monitoring Plan: Standard ASA Monitors  Induction:  IV  Informed Consent: Informed consent signed with the Patient representative and all parties understand the risks and agree with anesthesia plan.  All questions answered.   ASA Score: 6  Anesthesia Plan Notes: Consents per  EMILIE for DCD organ procurement. Plan for terminal extubation in OR with pt prepped and draped. Family member to be present. Anesthesia to give medication per Dr. Mcqueen (palliative care) orders to facilitate death. At Time of death, anesthesia to pass off responsibility to surgical team and EIMLIE.    Ready For Surgery From Anesthesia Perspective.     .

## 2023-08-01 NOTE — PROGRESS NOTES
[facility] Medicine  Progress Note    Patient Name: Doe Woodall  MRN: [id number]  Patient Class: IP- Inpatient   Admission Date: 7/29/2023  Length of Stay: 3 days  Attending Physician: Hollie Johnson MD  Primary Care Provider: [facility]        Subjective:     Principal Problem:Cardiac arrest        HPI:  This is a 33-year-old male with a past medical history of alcohol use, type 2 diabetes, mood disorder, and orthostatic hypotension who presents with cardiac arrest.    Patient presents to the ED on 07/29 after being found unresponsive by his family around 5:00 p.m..  Family started CPR at 5:24 p.m. and EMS continued resuscitation with achievement of ROSC at 5:45 p.m..  Initial rhythm was noted to be asystole.  The patient later went into VFib, was cardioverted x1 and given amiodarone.  While in the ED, the patient became bradycardic and went into PA arrest x3, during which he received epinephrine x6, bicarbonate x3, calcium chloride x1, and amiodarone with achievement of ROSC. Given recurrent bradycardia, he was later started on dopamine, per cardiology. Per the patient's sister, a bottle of morphine and amitriptyline were found next to him.     Of note, The patient was recently hospitalized (7/25-7/26) for hypotension after missing his midodrine at home.  He received fluids, antibiotics, and midodrine with improvement of his blood pressure and was discharged home.  The patient also had a recent hospitalization at [facility] (6/16-7/8) after presenting with unresponsiveness.  At that time, he was at the neurology clinic for evaluation of episodic loss of consciousness and suddenly became unconscious.  He received CPR of unknown duration after which he became responsive, but did not seem to actually lose pulse.  He was treated for preseptal cellulitis and worked up for autonomic dysfunction and adrenal insufficiency.    In the ED, laboratory workup was remarkable for  leukocytosis (19.5), elevated creatinine (4.1 - from a baseline of 1.7), elevated LFTs (AST:  96, ALT:  94, ALP:  246), elevated troponin (0.063), elevated BNP (109), elevated lactic acid (10.2), hyperphosphatemia (11.8).  UA was unremarkable.  CT head showed no acute large vascular territory infarct or intracranial hemorrhage.  Chest x-ray showed mild perihilar interstitial changes with possible edema.  Patient was given aspirin 300 mg, 3.0 L of LR, vancomycin, Zosyn albuterol, and was started on a sodium bicarb and dopamine gtt. Patient was admitted for further management.       Overview/Hospital Course:  This is a 33-year-old male with a past medical history of alcohol use, type 2 diabetes, mood disorder, and orthostatic hypotension who presents with cardiac arrest.  Patient presents to the ED on 07/29 after being found unresponsive by his family around 5:00 p.m..  Family started CPR at 5:24 p.m. and EMS continued resuscitation with achievement of ROSC at 5:45 p.m..  Initial rhythm was noted to be asystole.  The patient later went into VFib, was cardioverted x1 and given amiodarone.  While in the ED, the patient became bradycardic and went into PA arrest x3, during which he received epinephrine x6, bicarbonate x3, calcium chloride x1, and amiodarone with achievement of ROSC. Given recurrent bradycardia, he was later started on dopamine, per cardiology. Per the patient's sister, a bottle of morphine and amitriptyline were found next to him.   In the ED, laboratory workup was remarkable for leukocytosis (19.5), elevated creatinine (4.1 - from a baseline of 1.7), elevated LFTs (AST:  96, ALT:  94, ALP:  246), elevated troponin (0.063), elevated BNP (109), elevated lactic acid (10.2), hyperphosphatemia (11.8).  UA was unremarkable.  CT head showed no acute large vascular territory infarct or intracranial hemorrhage.  Chest x-ray showed mild perihilar interstitial changes with possible edema.  Patient was given  aspirin 300 mg, 3.0 L of LR, vancomycin, Zosyn albuterol, and was started on a sodium bicarb and dopamine gtt.  On IVF for ARF,nephrology is following.  Cardiology is consulted for elevated troponin,echo.show preserved EF.  Pulmonology doing vent management,  Neurology is on board for possible anoxic injury.repeat head CT show worsening hypoxic brain injury,patient is unresponsive,family agree with withdraw of care and organ donation,EMILIE is present.  Has cocci in cluster on blood culture,alreday on vanc.may contaminated,repeted blood culture.CNS,contaminated.      Past Medical History:   Diagnosis Date    Abscess of right groin 09/01/2022    Diabetes mellitus     Encounter for blood transfusion     Loud snoring     Obese     Other insomnia 08/03/2020    Perineal abscess 08/01/2014    Primary hypertension 10/2/2022       Past Surgical History:   Procedure Laterality Date    ABCESS DRAINAGE  2014    PERIRECTAL    DECOMPRESSION OF LUMBAR SPINE USING MINIMALLY INVASIVE TECHNIQUE Right 07/06/2018    Procedure: DECOMPRESSION, SPINE, LUMBAR, MINIMALLY INVASIVE L3-4;  Surgeon: Cristian Cervantes MD;  Location: [facility] OR [address] [place] [address];  Service: Neurosurgery;  Laterality: Right;    INCISION AND DRAINAGE N/A 9/9/2022    Procedure: INCISION AND DRAINAGE GROIN;  Surgeon: KAITY Aguilar MD;  Location: [facility] OR;  Service: Urology;  Laterality: N/A;    ORTHOPEDIC SURGERY         Review of patient's allergies indicates:   Allergen Reactions    Metformin Diarrhea       No current facility-administered medications on file prior to encounter.     Current Outpatient Medications on File Prior to Encounter   Medication Sig    amitriptyline (ELAVIL) 75 MG tablet Take 1 tablet by mouth every evening.    aspirin 81 MG Chew Take 1 tablet by mouth once daily.    atorvastatin (LIPITOR) 80 MG tablet Take 80 mg by mouth every evening.    gabapentin (NEURONTIN) 100 MG capsule Take 100 mg by mouth 3 (three) times daily.    insulin detemir  "U-100, Levemir, (LEVEMIR FLEXPEN) 100 unit/mL (3 mL) InPn pen Inject 30 Units into the skin once daily.    midodrine (PROAMATINE) 5 MG Tab Take 3 tablets (15 mg total) by mouth 3 (three) times daily.    oxyCODONE (ROXICODONE) 10 mg Tab immediate release tablet Take 10 mg by mouth every 8 (eight) hours as needed for Pain.    pantoprazole (PROTONIX) 40 MG tablet Take 40 mg by mouth once daily.    pen needle, diabetic (BD ULTRA-FINE MICRO PEN NEEDLE) 32 gauge x 1/4" Ndle Use 1 each to inject insulin once daily     Family History       Problem Relation (Age of Onset)    Diabetes Father, Paternal Grandmother, Paternal Grandfather          Tobacco Use    Smoking status: Former     Current packs/day: 0.00     Average packs/day: 0.5 packs/day for 9.0 years (4.5 ttl pk-yrs)     Types: Cigarettes     Start date: 7/1/2004     Quit date: 7/1/2013     Years since quitting: 10.0    Smokeless tobacco: Former   Substance and Sexual Activity    Alcohol use: Not Currently     Comment: DAILY PINT OF LIQUOR, HX OF 1 "TALL BOY" OF BEER DAILY    Drug use: Not Currently     Types: Cocaine     Comment: per collateral    Sexual activity: Yes     Partners: Female     Birth control/protection: None     Review of Systems   Unable to perform ROS: Intubated     Objective:     Vital Signs (Most Recent):  Temp: 96.4 °F (35.8 °C) (07/31/23 1030)  Pulse: 86 (08/01/23 0900)  Resp: (!) 22 (08/01/23 0900)  BP: (!) 152/68 (08/01/23 0900)  SpO2: 100 % (08/01/23 0900) Vital Signs (24h Range):  Temp:  [96.4 °F (35.8 °C)-96.6 °F (35.9 °C)] 96.4 °F (35.8 °C)  Pulse:  [] 86  Resp:  [1-22] 22  SpO2:  [92 %-100 %] 100 %  BP: (114-157)/(57-93) 152/68     Weight: 103.4 kg (228 lb)  Body mass index is 40.39 kg/m².     Physical Exam  Vitals and nursing note reviewed.   Constitutional:       Appearance: He is obese. He is ill-appearing.   HENT:      Head: Normocephalic and atraumatic.      Nose: Nose normal.      Mouth/Throat:      Mouth: Mucous " membranes are dry.      Comments: ET tube in place.   Eyes:      Comments: Fixed dilated pupils.  Gag reflex +   Cardiovascular:      Rate and Rhythm: Tachycardia present.      Pulses: Normal pulses.      Heart sounds: No murmur heard.  Pulmonary:      Comments: Intubated.   Abdominal:      General: Abdomen is flat. There is no distension.      Palpations: Abdomen is soft.   Musculoskeletal:      Right lower leg: No edema.      Left lower leg: No edema.   Skin:     General: Skin is warm.   Neurological:      Mental Status: He is alert.      Comments: Intubated. Off sedation, does not follow commands.   Gag reflex +                Significant Labs: All pertinent labs within the past 24 hours have been reviewed.    Significant Imaging: I have reviewed all pertinent imaging results/findings within the past 24 hours.      Assessment/Plan:      * Cardiac arrest  Unclear etiology. Concern for drug toxicity/over dose given amitriptyline use with associated narcotics or prolonged hypotension causing arrest. Other differentials include sepsis, seizures. Less likely intra-cranial process, PE or ACS.   Currently, intubated with non-reactive pupils     Plan:   Continue dopamine given significant bradycardic episodes prior to arrest   Continue bicarbonate infusion for possible amitriptyline overdose/acidosis    Obtain echocardiogram, lower extremity dopplers.   Consult to cardiology   Consult to neurology   Consult to palliative care for goals of care,  Echo. Show preserved EF,.  repeat head CT show worsening hypoxic brain injury,patient is unresponsive,family agree with withdraw of care and organ donation,EMILIE is present.    Positive blood culture  Cocci in sanyt,nicoleay on vanc.may contaminated,repeted blood culture.CNS staph,contaminated.      Advance care planning  Remains full code at this time,palliative on case.      Anoxic brain injury    repeat head CT show worsening hypoxic brain injury,patient is unresponsive,family  agree with withdraw of care and organ donation,EMILIE is present.    Non-traumatic rhabdomyolysis  improving with IVF.      NSTEMI (non-ST elevated myocardial infarction)  Demand ischemia,no plan for intervention,echo,. Show preserved EF.      Elevated LFTs  Likely in the setting of cardiac arrest. Continue to trend LFTs.       Acute respiratory failure with hypoxia  Patient with Hypoxic Respiratory failure which is Acute.  he is not on home oxygen. Supplemental oxygen was provided and noted- Vent Mode: VC  Oxygen Concentration (%):  [] 30  Resp Rate Total:  [18 br/min-26 br/min] 22 br/min  Vt Set:  [450 mL] 450 mL  PEEP/CPAP:  [5 cmH20] 5 cmH20  Mean Airway Pressure:  [8.5 cmH20-10.5 cmH20] 8.7 cmH20    In the setting of cardiac arrest. Wean oxygen as tolerated   Consult to pulmonary     MARISSA (acute kidney injury)  In the setting of cardiac arrest, likely ATN  Monitor Cr & urine output   Improving with IVF,nephrology on case.    Severe episode of recurrent major depressive disorder, without psychotic features  Hold home medications       Lactic acidosis  Improved.      Severe obesity with body mass index (BMI) of 36.0 to 36.9 with serious comorbidity  Body mass index is 40.46 kg/m². Morbid obesity complicates all aspects of disease management from diagnostic modalities to treatment. Weight loss encouraged and health benefits explained to patient.         Severe sepsis  This patient does have evidence of infective focus  My overall impression is sepsis.  Source: Unknown  Antibiotics given-   Antibiotics (72h ago, onward)    Start     Stop Route Frequency Ordered    07/30/23 0600  piperacillin-tazobactam (ZOSYN) 4.5 g in dextrose 5 % in water (D5W) 100 mL IVPB (MB+)         -- IV Every 8 hours 07/29/23 2212 07/29/23 2312  vancomycin - pharmacy to dose  (vancomycin IVPB (PEDS and ADULTS))        See Hyperspace for full Linked Orders Report.    -- IV pharmacy to manage frequency 07/29/23 2212 07/29/23 2100   "vancomycin (VANCOCIN) 2,000 mg in dextrose 5 % (D5W) 500 mL IVPB         07/30/23 0859 IV ED 1 Time 07/29/23 2023        Latest lactate reviewed-  Recent Labs   Lab 07/29/23  1853   LACTATE 10.2*     Organ dysfunction indicated by Acute kidney injury, Acute respiratory failure and Encephalopathy    Fluid challenge Ideal Body Weight- The patient's ideal body weight is Ideal body weight: 61.5 kg (135 lb 9.3 oz) which will be used to calculate fluid bolus of 30 ml/kg for treatment of septic shock.      Post- resuscitation assessment No - Post resuscitation assessment not needed       Will Not start Pressors- Levophed for MAP of 65  Source control achieved by:   Antibiotics   Follow up cultures   Obtain further imaging once more stable/creatinine allows.     Type 2 diabetes mellitus with hyperglycemia, with long-term current use of insulin  Patient's FSGs are controlled on current medication regimen.  Last A1c reviewed-   Lab Results   Component Value Date    HGBA1C 6.9 (H) 05/10/2023     Most recent fingerstick glucose reviewed- No results for input(s): "POCTGLUCOSE" in the last 24 hours.  Current correctional scale  Low  Maintain anti-hyperglycemic dose as follows-   Antihyperglycemics (From admission, onward)    Start     Stop Route Frequency Ordered    07/29/23 2340  insulin aspart U-100 pen 0-5 Units         -- SubQ Every 6 hours PRN 07/29/23 2240          Chronic diastolic heart failure  Will monitior for fluid overload.      Alcohol use disorder, severe, dependence  Per record.        VTE Risk Mitigation (From admission, onward)         Ordered     heparin (porcine) injection 5,000 Units  Every 8 hours         07/30/23 0819     IP VTE HIGH RISK PATIENT  Once         07/29/23 2235     Place sequential compression device  Until discontinued         07/29/23 2235                Discharge Planning   SUSAN:      Code Status: DNR   Is the patient medically ready for discharge?:     Reason for patient still in hospital " (select all that apply): Patient trending condition  Discharge Plan A: Home with family (Follow-up; assess for services/needs)            Critical care time spent on the evaluation and treatment of severe organ dysfunction, review of pertinent labs and imaging studies, discussions with consulting providers and discussions with patient/family:  Over 45  minutes.      Hollie Johnson MD  Department of Hospital Medicine   [facility] [place] - Intensive Care

## 2023-08-01 NOTE — PROGRESS NOTES
Pharmacokinetic Assessment Follow Up: IV Vancomycin    Vancomycin serum concentration assessment(s):    The random level was drawn correctly and can be used to guide therapy at this time. The measurement is within the desired definitive target range of 10 to 20 mcg/mL.    Vancomycin Regimen Plan:    Give 500 mg today.  Re-dose when the random level is less than 20 mcg/mL, next level to be drawn at 0300 on 8/2/2023    Drug levels (last 3 results):  Recent Labs   Lab Result Units 07/30/23  0713 07/31/23  0504 08/01/23  0318   Vancomycin, Random ug/mL 31.9 23.1 16.5       Pharmacy will continue to follow and monitor vancomycin.    Please contact pharmacy at extension [phone] for questions regarding this assessment.    Thank you for the consult,   Hussain Connor Jr       Patient brief summary:  Doe Woodall is a 33 y.o. male initiated on antimicrobial therapy with IV Vancomycin for treatment of sepsis    Drug Allergies:   Review of patient's allergies indicates:   Allergen Reactions    Metformin Diarrhea       Actual Body Weight:   103.4 kg    Renal Function:   Estimated Creatinine Clearance: 35.9 mL/min (A) (based on SCr of 3.1 mg/dL (H)).,     Dialysis Method (if applicable):  N/A    CBC (last 72 hours):  Recent Labs   Lab Result Units 07/29/23  1830 07/29/23  2312 07/30/23  0553 07/31/23  0504 08/01/23  0318   WBC K/uL 19.56* 19.78* 19.96* 19.49* 15.37*   Hemoglobin g/dL 8.4* 8.2* 8.3* 7.9* 7.2*   Hematocrit % 27.8* 25.5* 24.7* 23.1* 21.8*   Platelets K/uL 229 194 182 174 147*   Gran % % 57.0 77.0* 78.6* 79.6* 75.0*   Lymph % % 34.0 10.0* 11.0* 11.6* 14.4*   Mono % % 1.0* 6.0 7.3 6.6 9.0   Eosinophil % % 0.0 1.0 0.4 0.0 0.0   Basophil % % 0.0 0.0 0.3 0.1 0.1   Differential Method  Manual Manual Automated Automated Automated       Metabolic Panel (last 72 hours):  Recent Labs   Lab Result Units 07/29/23  1830 07/29/23  1944 07/29/23  2022 07/29/23  2312 07/30/23  0713 07/30/23  1326 07/31/23  0504 08/01/23  0318  08/01/23  0324   Sodium mmol/L 135*  --  139 135* 135*  --  139 142  --    Sodium, Urine mmol/L  --   --   --   --   --  <20*  --   --   --    Potassium mmol/L 6.0*  --  4.5 4.6 4.2  --  3.4* 3.1*  --    Chloride mmol/L 107  --  108 105 101  --  97 101  --    CO2 mmol/L 9*  --  13* 11* 19*  --  28 32*  --    Glucose mg/dL 264*  --  289* 464* 431*  --  239* 217*  --    Glucose, UA   --  Trace*  --   --   --   --   --   --  2+*  2+*   BUN mg/dL 51*  --  51* 50* 50*  --  53* 47*  --    Creatinine mg/dL 4.4*  --  4.1* 4.0* 3.8*  --  3.4* 3.1*  --    Creatinine, Urine mg/dL  --  91.3  --   --   --  110.5  --   --   --    Albumin g/dL 2.2*  --   --  1.9*  --   --  1.7* 1.5*  --    Total Bilirubin mg/dL 0.2  --   --  0.6  --   --  0.3 0.3  --    Alkaline Phosphatase U/L 246*  --   --  219*  --   --  163* 158*  --    AST U/L 96*  --   --  353*  --   --  396* 251*  --    ALT U/L 94*  --   --  338*  --   --  588* 495*  --    Magnesium mg/dL 2.9*  --   --  2.1 1.9  --  1.8 1.9  --    Phosphorus mg/dL 11.8*  --   --  8.7* 6.5*  --  5.4* 4.5  --        Vancomycin Administrations:  vancomycin given in the last 96 hours                     vancomycin (VANCOCIN) 2,000 mg in dextrose 5 % (D5W) 500 mL IVPB (mg) 2,000 mg New Bag 07/29/23 8632                    Microbiologic Results:  Microbiology Results (last 7 days)       Procedure Component Value Units Date/Time    Blood culture [[id number]] Collected: 08/01/23 0243    Order Status: Sent Specimen: Blood Updated: 08/01/23 0243    Blood culture [[id number]] Collected: 08/01/23 0243    Order Status: Sent Specimen: Blood Updated: 08/01/23 0243    Culture, Respiratory with Gram Stain [[id number]]     Order Status: No result Specimen: Sputum     Blood culture x two cultures. Draw prior to antibiotics. [[id number]] Collected: 07/29/23 1949    Order Status: Completed Specimen: Blood from Peripheral, Hand, Right Updated: 07/31/23 2103     Blood Culture, Routine No Growth to date      No  Growth to date      No Growth to date    Narrative:      Aerobic and anaerobic    Blood culture [[id number]] Collected: 07/31/23 0918    Order Status: Completed Specimen: Blood from Peripheral, Left Hand Updated: 07/31/23 1712     Blood Culture, Routine No Growth to date    Blood culture [[id number]] Collected: 07/31/23 0918    Order Status: Completed Specimen: Blood from Antecubital, Left Arm Updated: 07/31/23 1712     Blood Culture, Routine No Growth to date    Blood culture x two cultures. Draw prior to antibiotics. [[id number]] Collected: 07/29/23 1830    Order Status: Completed Specimen: Blood Updated: 07/31/23 1204     Blood Culture, Routine Gram stain sneha bottle:  Gram positive cocci in clusters resembling Staph      07/30/2023  23:32      Results called to and read back by: MARIAN TESFAYE/[id number]      Gram stain aer bottle: Gram positive cocci in clusters resembling Staph      Positive results previously called 07/31/2023  12:03    Narrative:      Aerobic and anaerobic    Culture, Respiratory with Gram Stain [[id number]]     Order Status: No result Specimen: Respiratory from Sputum

## 2023-08-01 NOTE — PROGRESS NOTES
"[facility] Intensive Care  Critical Care Medicine  Progress Note    Patient Name: Doe Woodall  MRN: [id number]  Admission Date: 7/29/2023  Hospital Length of Stay: 3 days  Code Status: DNR  Attending Provider: Hollie Johnson MD  Primary Care Provider: [facility]   Principal Problem: Cardiac arrest    Subjective:     HPI:  Doe Woodall lalito 33 year old male with history of EtOH use, DMII, mood disorder, orthostatic hypotension, depression, recurrent abscesses, who presents with cardiac arrest to the ER on 7/29.  He was found unresponsive by family at 5PM on 7/29 and family began CPR at 5:24.  Per patient's sister, a morphine and amitriptyline bottle were found next to the patient at time of discovery.  EMS arrived and achieved ROSC at 5:45PM.  Rhythm was noted to be asystole by EMS.  He had a vFib was cardioverted x 1 and started on amiodarone.     In the ER, he again arrested 2/2 bradycardia followed by PEA arrest.  During this code he received epi x 6, bicarb x 3, CaCl x 1, and amiodarone with ROSC.  Started on dopamine per cards following ROSC due to bradycardia. He was admitted to the MICU.  He remains unconscious, with absent pupillary, absent gag, absent corneal, and absent oculocephalic reflexes.  He occasionally breaths over the ventilator.     The patient has had multiple hospitalizations lately, and per family, "has spent more time in the hospital that out of it lately".  He was most recently in the hospital for hypotension after missing home midodrine (7/25-26).  He was also hospitalized at [facility] after passing out and receiving CPR in a neurology clinic.  It is unclear if he really lost a pulse during this time. He has recurrent skin abscesses/cellulitis, and has had a recent workup for autonomic dysfunction and adrenal insufficiency.    Initial workup shows elevated WBC (19.5), MARISSA (4.1), elevated LFTs (96/94), NSTEMI (0.063), elevated BNP of 109, lactic acidosis (10.2), " hyperphosphatemia.  CT head without acute process as of yet.  Chest x-ray compatible with pulmonary edema.   Started on vanc/zosyn, and sodium bicarb gtt.  Dopamine ordered, but off the AM of initial exam.       Hospital/ICU Course:  Admitted to the MICU following cardiac arrest.      Interval History: some eye twitching this am but no other changes       Objective:     Vital Signs (Most Recent):  Temp: 96.4 °F (35.8 °C) (07/31/23 1030)  Pulse: 87 (08/01/23 1015)  Resp: (!) 22 (08/01/23 1015)  BP: (!) 89/49 (08/01/23 1015)  SpO2: 98 % (08/01/23 1015) Vital Signs (24h Range):  Temp:  [96.4 °F (35.8 °C)] 96.4 °F (35.8 °C)  Pulse:  [] 87  Resp:  [8-22] 22  SpO2:  [92 %-100 %] 98 %  BP: ()/(48-88) 89/49     Weight: 103.4 kg (228 lb)  Body mass index is 40.39 kg/m².      Intake/Output Summary (Last 24 hours) at 8/1/2023 1027  Last data filed at 8/1/2023 1000  Gross per 24 hour   Intake 3654.41 ml   Output 1525 ml   Net 2129.41 ml        Physical Exam  Vitals reviewed.   Constitutional:       Appearance: He is ill-appearing.   HENT:      Head: Normocephalic and atraumatic.      Right Ear: There is no impacted cerumen.      Nose: No congestion.      Mouth/Throat:      Mouth: Mucous membranes are dry.      Pharynx: Oropharynx is clear. No oropharyngeal exudate.   Cardiovascular:      Rate and Rhythm: Normal rate and regular rhythm.      Pulses: Normal pulses.   Pulmonary:      Effort: No respiratory distress.      Breath sounds: No wheezing.      Comments: Mechanical ventilation   Abdominal:      General: Abdomen is flat. Bowel sounds are normal.      Palpations: Abdomen is soft.   Musculoskeletal:         General: No swelling or deformity.   Skin:     General: Skin is warm and dry.   Neurological:      Comments: No cough, gag, corneal or pupillary response. Pupils are fixed and dilated  He does breath spontaneously on PS mode            Review of Systems    Vents:  Vent Mode: VC (08/01/23 0858)  Ventilator  Initiated: Yes (07/29/23 1908)  Set Rate: 22 BPM (08/01/23 0858)  Vt Set: 450 mL (08/01/23 0858)  PEEP/CPAP: 5 cmH20 (08/01/23 0858)  Oxygen Concentration (%): 50 (08/01/23 1015)  Peak Airway Pressure: 25.3 cmH20 (08/01/23 0858)  Total Ve: 10.2 L/m (08/01/23 0858)  F/VT Ratio<105 (RSBI): (!) 21.65 (08/01/23 0858)    Lines/Drains/Airways       Central Venous Catheter Line  Duration             Percutaneous Central Line Insertion/Assessment - Triple Lumen  07/29/23 1948 Internal Jugular Left 2 days              Drain  Duration                  NG/OG Tube 07/29/23 1824 16 Fr. Right mouth 2 days         Urethral Catheter 08/01/23 0900 Double-lumen 16 Fr. <1 day              Airway  Duration                  Airway - Non-Surgical 07/29/23 1800 Endotracheal Tube 2 days              Peripheral Intravenous Line  Duration                  Peripheral IV - Single Lumen 07/29/23 1808 20 G Anterior;Right Hand 2 days         Peripheral IV - Single Lumen 07/29/23 1943 20 G Right Hand 2 days                    Significant Labs:    CBC/Anemia Profile:  Recent Labs   Lab 07/31/23  0504 08/01/23  0318 08/01/23  1006   WBC 19.49* 15.37* 13.34*   HGB 7.9* 7.2* 6.9*   HCT 23.1* 21.8* 20.8*    147* 136*   MCV 86 88 90   RDW 14.6* 14.6* 14.6*        Chemistries:  Recent Labs   Lab 07/31/23  0504 08/01/23 0318    142   K 3.4* 3.1*   CL 97 101   CO2 28 32*   BUN 53* 47*   CREATININE 3.4* 3.1*   CALCIUM 7.8* 7.6*   ALBUMIN 1.7* 1.5*   PROT 5.2* 4.8*   BILITOT 0.3 0.3   ALKPHOS 163* 158*   * 495*   * 251*   MG 1.8 1.9   PHOS 5.4* 4.5       All pertinent labs within the past 24 hours have been reviewed.    Significant Imaging:  I have reviewed all pertinent imaging results/findings within the past 24 hours.      ABG  Recent Labs   Lab 08/01/23  0858   PH 7.547*   PO2 167*   PCO2 41.4   HCO3 36.0*   BE 12     Assessment/Plan:     Neuro  Anoxic brain injury  Anoxoic injury confirmed on CT head  Family wishes to  discontinue life support measures and has agreed to organ donation with [facility] per their conversations.  Planning for DCD today vs tomorrow after ongoing evaluations.      Psychiatric  Severe episode of recurrent major depressive disorder, without psychotic features  Taking amitriptyline at home with concern for possible excessive ingestion of unclear intent leading to this hospitalization.  - ensure follow up with psychiatry, if clinical condition improves.   - may need to discuss alternative agents moving forward.     Alcohol use disorder, severe, dependence  Continue thiamine, folic acid, multivitamin.  - monitor for symptoms consistent with withdrawal.  CIWA.  - off of all sedation for >48 hours     Pulmonary  Acute respiratory failure with hypoxia  Secondary to cardiac arrest and subsequent aspiration vs pulmonary edema.  FiO2 requirements quickly down-trending, which is more suggestive of pulmonary edema.  - low tidal volume ventilation  - daily SAT/SBT  - target sats >90%  - HOB elevated, oral care.    Cardiac/Vascular  * Cardiac arrest  Unclear etiology at this time.  Concern for possible overdose on amitriptyline (QTc as high as 527 during this hospital stay), infection (pneumonia vs aspiration), worsening of chronic hypotension (possible missing dose of home midodrine), or possible opiate overdose.  Not requiring pressors at this time.  Significant neurologic impact suggested by physical exam.  Only reflex maintained is respiratory drive.    - maintain MAP >65mmHg  - continue broad spectrum abx and narrow as possible. Vanc/zosyn   - continue bicarbonate infusion at this time.  - limit sedative medications to assess neurologic status.   - avoid all QT prolonging agents.        NSTEMI (non-ST elevated myocardial infarction)  troponins elevated likely following cardiac arrest.  Cardiology on board.  - continue supportive care.    Chronic diastolic heart failure  Target euvolemic status and maintain  normotension.  - diuresis PRN.    Renal/  MARISSA (acute kidney injury)  Cr Elevated to 4.0 on admission, but has started to downtrend.  Patient making urine.  Suspect ATN from cardiac arrest.   - continue to monitor daily Cr.  - continue supportive care.       ID  History of neurosyphilis  Reportedly treated at this time.      Oncology  Normocytic anemia  Target HgB >7    Endocrine  Severe obesity with body mass index (BMI) of 36.0 to 36.9 with serious comorbidity  Complicates all aspects of care.    Type 2 diabetes mellitus with hyperglycemia, with long-term current use of insulin  Target glucose in ICU patient Is 140-180.  Currently hyperglycemic.  Will monitor and treat accordingly.  - ISS on board    Critical care time: 35 mintues     Belkis Romero MD  Critical Care Medicine  [facility] - Intensive Care

## 2023-08-01 NOTE — CARE UPDATE
[facility]  Nurses Note -- 4 Eyes      8/1/2023       Skin assessed on: Q Shift      [x] No Pressure Injuries Present    [x]Prevention Measures Documented    [] Yes LDA  for Pressure Injury Previously documented     [] Yes New Pressure Injury Discovered   [] LDA for New Pressure Injury Added      Attending RN:  Kari Abel RN     Second RN:  Le

## 2023-08-01 NOTE — SUBJECTIVE & OBJECTIVE
Interval History: no improvement in clinical status. Pupils continue to be fixed and dilated. No movements or response to stimuli    Past Medical History:   Diagnosis Date    Abscess of right groin 09/01/2022    Diabetes mellitus     Encounter for blood transfusion     Loud snoring     Obese     Other insomnia 08/03/2020    Perineal abscess 08/01/2014    Primary hypertension 10/2/2022       Past Surgical History:   Procedure Laterality Date    ABCESS DRAINAGE  2014    PERIRECTAL    DECOMPRESSION OF LUMBAR SPINE USING MINIMALLY INVASIVE TECHNIQUE Right 07/06/2018    Procedure: DECOMPRESSION, SPINE, LUMBAR, MINIMALLY INVASIVE L3-4;  Surgeon: Cristian Cervantes MD;  Location: [facility] OR [address] [place] [address];  Service: Neurosurgery;  Laterality: Right;    INCISION AND DRAINAGE N/A 9/9/2022    Procedure: INCISION AND DRAINAGE GROIN;  Surgeon: KAITY Aguilar MD;  Location[facility] OR;  Service: Urology;  Laterality: N/A;    ORTHOPEDIC SURGERY         Review of patient's allergies indicates:   Allergen Reactions    Metformin Diarrhea       No current facility-administered medications on file prior to encounter.     Current Outpatient Medications on File Prior to Encounter   Medication Sig    amitriptyline (ELAVIL) 75 MG tablet Take 1 tablet by mouth every evening.    aspirin 81 MG Chew Take 1 tablet by mouth once daily.    atorvastatin (LIPITOR) 80 MG tablet Take 80 mg by mouth every evening.    gabapentin (NEURONTIN) 100 MG capsule Take 100 mg by mouth 3 (three) times daily.    insulin detemir U-100, Levemir, (LEVEMIR FLEXPEN) 100 unit/mL (3 mL) InPn pen Inject 30 Units into the skin once daily.    midodrine (PROAMATINE) 5 MG Tab Take 3 tablets (15 mg total) by mouth 3 (three) times daily.    oxyCODONE (ROXICODONE) 10 mg Tab immediate release tablet Take 10 mg by mouth every 8 (eight) hours as needed for Pain.    pantoprazole (PROTONIX) 40 MG tablet Take 40 mg by mouth once daily.    pen needle, diabetic (BD ULTRA-FINE MICRO PEN NEEDLE) 32  "gauge x 1/4" Ndle Use 1 each to inject insulin once daily     Family History       Problem Relation (Age of Onset)    Diabetes Father, Paternal Grandmother, Paternal Grandfather          Tobacco Use    Smoking status: Former     Current packs/day: 0.00     Average packs/day: 0.5 packs/day for 9.0 years (4.5 ttl pk-yrs)     Types: Cigarettes     Start date: 7/1/2004     Quit date: 7/1/2013     Years since quitting: 10.0    Smokeless tobacco: Former   Substance and Sexual Activity    Alcohol use: Not Currently     Comment: DAILY PINT OF LIQUOR, HX OF 1 "TALL BOY" OF BEER DAILY    Drug use: Not Currently     Types: Cocaine     Comment: per collateral    Sexual activity: Yes     Partners: Female     Birth control/protection: None     Review of Systems   Unable to perform ROS: Intubated     Objective:     Vital Signs (Most Recent):  Temp: 96.4 °F (35.8 °C) (07/31/23 1030)  Pulse: 88 (07/31/23 2200)  Resp: 16 (07/31/23 2200)  BP: 117/73 (07/31/23 2200)  SpO2: (!) 94 % (07/31/23 2200) Vital Signs (24h Range):  Temp:  [96.3 °F (35.7 °C)-97.2 °F (36.2 °C)] 96.4 °F (35.8 °C)  Pulse:  [79-88] 88  Resp:  [0-21] 16  SpO2:  [92 %-99 %] 94 %  BP: (114-157)/(57-93) 117/73     Weight: 103.4 kg (228 lb)  Body mass index is 40.39 kg/m².    SpO2: (!) 94 %         Intake/Output Summary (Last 24 hours) at 7/31/2023 2253  Last data filed at 7/31/2023 2156  Gross per 24 hour   Intake 3009.3 ml   Output 1205 ml   Net 1804.3 ml         Lines/Drains/Airways       Central Venous Catheter Line  Duration             Percutaneous Central Line Insertion/Assessment - Triple Lumen  07/29/23 1948 Internal Jugular Left 2 days              Drain  Duration                  NG/OG Tube 07/29/23 1824 16 Fr. Right mouth 2 days         Urethral Catheter 07/29/23 1820 16 Fr. 2 days              Airway  Duration                  Airway - Non-Surgical 07/29/23 1800 Endotracheal Tube 2 days              Peripheral Intravenous Line  Duration                  " "Peripheral IV - Single Lumen 07/29/23 1808 20 G Anterior;Right Hand 2 days         Peripheral IV - Single Lumen 07/29/23 1943 20 G Right Hand 2 days                     Physical Exam  Constitutional:       Appearance: He is obese. He is ill-appearing.      Interventions: He is intubated.   Eyes:      Conjunctiva/sclera: Conjunctivae normal.      Comments: Pupils fixed and dilated.   Cardiovascular:      Rate and Rhythm: Normal rate and regular rhythm.      Pulses: Normal pulses.   Pulmonary:      Effort: He is intubated.   Abdominal:      Palpations: Abdomen is soft.   Musculoskeletal:      Cervical back: Neck supple.   Skin:     General: Skin is warm.   Neurological:      Comments: No spontaneous movements.  Not responsive to pain.          Significant Labs: BMP:   Recent Labs   Lab 07/29/23 2312 07/30/23  0713 07/31/23  0504   * 431* 239*   * 135* 139   K 4.6 4.2 3.4*    101 97   CO2 11* 19* 28   BUN 50* 50* 53*   CREATININE 4.0* 3.8* 3.4*   CALCIUM 8.4* 8.3* 7.8*   MG 2.1 1.9 1.8     , CMP   Recent Labs   Lab 07/29/23 2312 07/30/23  0713 07/31/23  0504   * 135* 139   K 4.6 4.2 3.4*    101 97   CO2 11* 19* 28   * 431* 239*   BUN 50* 50* 53*   CREATININE 4.0* 3.8* 3.4*   CALCIUM 8.4* 8.3* 7.8*   PROT 5.6*  --  5.2*   ALBUMIN 1.9*  --  1.7*   BILITOT 0.6  --  0.3   ALKPHOS 219*  --  163*   *  --  396*   *  --  588*   ANIONGAP 19* 15 14     , CBC   Recent Labs   Lab 07/29/23 2312 07/30/23  0553 07/31/23  0504   WBC 19.78* 19.96* 19.49*   HGB 8.2* 8.3* 7.9*   HCT 25.5* 24.7* 23.1*    182 174     , INR   Recent Labs   Lab 07/29/23 2312 07/30/23  0553   INR 1.1 1.1     , Lipid Panel No results for input(s): "CHOL", "HDL", "LDLCALC", "TRIG", "CHOLHDL" in the last 48 hours., Troponin   Recent Labs   Lab 07/30/23  2336 07/31/23  0918 07/31/23  1619   TROPONINI 1.312* 1.029* 0.780*     , and All pertinent lab results from the last 24 hours have been " reviewed.    Significant Imaging: Echocardiogram: Transthoracic echo (TTE) complete (Cupid Only):   Results for orders placed or performed during the hospital encounter of 07/29/23   Echo   Result Value Ref Range    BSA 2.14 m2    LVOT stroke volume 65.70 cm3    LVIDd 5.36 3.5 - 6.0 cm    LV Systolic Volume 61.52 mL    LV Systolic Volume Index 30.2 mL/m2    LVIDs 3.79 2.1 - 4.0 cm    LV Diastolic Volume 139.09 mL    LV Diastolic Volume Index 68.18 mL/m2    IVS 0.56 (A) 0.6 - 1.1 cm    LVOT diameter 2.28 cm    LVOT area 4.1 cm2    FS 29 28 - 44 %    Left Ventricle Relative Wall Thickness 0.31 cm    Posterior Wall 0.84 0.6 - 1.1 cm    TDI LATERAL 0.09 m/s    TDI SEPTAL 0.07 m/s    LV mass 129.50 g    LV Mass Index 63 g/m2    MV Peak E Liam 0.85 m/s    LV LATERAL E/E' RATIO 9.44 m/s    LV SEPTAL E/E' RATIO 12.14 m/s    E/E' ratio 10.63 m/s    MV Peak A Liam 0.97 m/s    TR Max Liam 2.49 m/s    E/A ratio 0.88     Mean e' 0.08 m/s    E wave deceleration time 144.04 msec    LVOT peak liam 1.08 m/s    Left Ventricular Outflow Tract Mean Velocity 0.81 cm/s    Left Ventricular Outflow Tract Mean Gradient 2.87 mmHg    LA size 3.76 cm    Left Atrium Minor Axis 3.88 cm    RVDD 3.28 cm    RA Major Axis 4.27 cm    RA Width 3.23 cm    AV mean gradient 4 mmHg    AV peak gradient 6 mmHg    Ao peak liam 1.26 m/s    Ao VTI 19.00 cm    LVOT peak VTI 16.10 cm    AV valve area 3.46 cm²    AV Velocity Ratio 0.86     AV index (prosthetic) 0.85     YUN by Velocity Ratio 3.50 cm²    MV mean gradient 2 mmHg    MV peak gradient 4 mmHg    MV stenosis pressure 1/2 time 66.68 ms    MV valve area p 1/2 method 3.30 cm2    MV valve area by continuity eq 4.08 cm2    MV VTI 16.1 cm    TAPSE 1.99 cm    Triscuspid Valve Regurgitation Peak Gradient 25 mmHg    PV PEAK VELOCITY 1.18 m/s    PV peak gradient 6 mmHg    Sinus 2.89 cm    STJ 2.79 cm    Ascending aorta 2.75 cm    ZLVIDS 0.13     ZLVIDD -1.26     IVC diameter 2.17 cm    Narrative      Left Ventricle:  The left ventricle is normal in size. Normal wall   thickness. Normal wall motion. There is normal systolic function with a   visually estimated ejection fraction of 60 - 65%. Grade I diastolic   dysfunction.    Left Atrium: Normal left atrial size.    Right Ventricle: Normal right ventricular cavity size.    IVC/SVC: Patient is ventilated, cannot use IVC diameter to estimate   right atrial pressure.    Pericardium: There is no pericardial effusion.

## 2023-08-01 NOTE — PROCEDURES
"Doe Woodall is a 33 y.o. male patient.    Temp: 96.4 °F (35.8 °C) (07/31/23 1030)  Pulse: 91 (08/01/23 1400)  Resp: (!) 22 (08/01/23 1400)  BP: (!) 94/57 (08/01/23 1400)  SpO2: 100 % (08/01/23 1400)  Weight: 103.4 kg (228 lb) (07/31/23 1211)  Height: 5' 3" (160 cm) (07/31/23 1211)       Arterial Line    Date/Time: 8/1/2023 2:30 PM  Location procedure was performed: [facility] ICU    Performed by: Sylwia Prabhakar NP  Authorized by: Sylwia Prabhakar NP  Pre-op Diagnosis: cardiac arrest  Post-operative diagnosis: cardiac arrest  Consent Done: Not Needed  Preparation: Patient was prepped and draped in the usual sterile fashion.  Indications: hemodynamic monitoring  Location: right radial  Aditya's test normal: yes  Needle gauge: 20  Seldinger technique: Seldinger technique used  Number of attempts: 2  Technical procedures used: ultrasound guidance  Complications: No  Estimated blood loss (mL): 10  Post-procedure: line sutured and dressing applied  Post-procedure CMS: normal  Patient tolerance: Patient tolerated the procedure well with no immediate complications        Sylwia Prabhakar NP  Critical Care Medicine   8/1/2023    "

## 2023-08-01 NOTE — SUBJECTIVE & OBJECTIVE
"Past Medical History:   Diagnosis Date    Abscess of right groin 09/01/2022    Diabetes mellitus     Encounter for blood transfusion     Loud snoring     Obese     Other insomnia 08/03/2020    Perineal abscess 08/01/2014    Primary hypertension 10/2/2022       Past Surgical History:   Procedure Laterality Date    ABCESS DRAINAGE  2014    PERIRECTAL    DECOMPRESSION OF LUMBAR SPINE USING MINIMALLY INVASIVE TECHNIQUE Right 07/06/2018    Procedure: DECOMPRESSION, SPINE, LUMBAR, MINIMALLY INVASIVE L3-4;  Surgeon: Cristian Cervantes MD;  Location: [facility] OR [address] [place] [address];  Service: Neurosurgery;  Laterality: Right;    INCISION AND DRAINAGE N/A 9/9/2022    Procedure: INCISION AND DRAINAGE GROIN;  Surgeon: KAITY Aguilar MD;  Location: [facility] OR;  Service: Urology;  Laterality: N/A;    ORTHOPEDIC SURGERY         Review of patient's allergies indicates:   Allergen Reactions    Metformin Diarrhea       No current facility-administered medications on file prior to encounter.     Current Outpatient Medications on File Prior to Encounter   Medication Sig    amitriptyline (ELAVIL) 75 MG tablet Take 1 tablet by mouth every evening.    aspirin 81 MG Chew Take 1 tablet by mouth once daily.    atorvastatin (LIPITOR) 80 MG tablet Take 80 mg by mouth every evening.    gabapentin (NEURONTIN) 100 MG capsule Take 100 mg by mouth 3 (three) times daily.    insulin detemir U-100, Levemir, (LEVEMIR FLEXPEN) 100 unit/mL (3 mL) InPn pen Inject 30 Units into the skin once daily.    midodrine (PROAMATINE) 5 MG Tab Take 3 tablets (15 mg total) by mouth 3 (three) times daily.    oxyCODONE (ROXICODONE) 10 mg Tab immediate release tablet Take 10 mg by mouth every 8 (eight) hours as needed for Pain.    pantoprazole (PROTONIX) 40 MG tablet Take 40 mg by mouth once daily.    pen needle, diabetic (BD ULTRA-FINE MICRO PEN NEEDLE) 32 gauge x 1/4" Ndle Use 1 each to inject insulin once daily     Family History       Problem Relation (Age of Onset)    Diabetes Father, " "Paternal Grandmother, Paternal Grandfather          Tobacco Use    Smoking status: Former     Current packs/day: 0.00     Average packs/day: 0.5 packs/day for 9.0 years (4.5 ttl pk-yrs)     Types: Cigarettes     Start date: 7/1/2004     Quit date: 7/1/2013     Years since quitting: 10.0    Smokeless tobacco: Former   Substance and Sexual Activity    Alcohol use: Not Currently     Comment: DAILY PINT OF LIQUOR, HX OF 1 "TALL BOY" OF BEER DAILY    Drug use: Not Currently     Types: Cocaine     Comment: per collateral    Sexual activity: Yes     Partners: Female     Birth control/protection: None     Review of Systems   Unable to perform ROS: Intubated     Objective:     Vital Signs (Most Recent):  Temp: 96.4 °F (35.8 °C) (07/31/23 1030)  Pulse: 86 (08/01/23 0900)  Resp: (!) 22 (08/01/23 0900)  BP: (!) 152/68 (08/01/23 0900)  SpO2: 100 % (08/01/23 0900) Vital Signs (24h Range):  Temp:  [96.4 °F (35.8 °C)-96.6 °F (35.9 °C)] 96.4 °F (35.8 °C)  Pulse:  [] 86  Resp:  [1-22] 22  SpO2:  [92 %-100 %] 100 %  BP: (114-157)/(57-93) 152/68     Weight: 103.4 kg (228 lb)  Body mass index is 40.39 kg/m².     Physical Exam  Vitals and nursing note reviewed.   Constitutional:       Appearance: He is obese. He is ill-appearing.   HENT:      Head: Normocephalic and atraumatic.      Nose: Nose normal.      Mouth/Throat:      Mouth: Mucous membranes are dry.      Comments: ET tube in place.   Eyes:      Comments: Fixed dilated pupils.  Gag reflex +   Cardiovascular:      Rate and Rhythm: Tachycardia present.      Pulses: Normal pulses.      Heart sounds: No murmur heard.  Pulmonary:      Comments: Intubated.   Abdominal:      General: Abdomen is flat. There is no distension.      Palpations: Abdomen is soft.   Musculoskeletal:      Right lower leg: No edema.      Left lower leg: No edema.   Skin:     General: Skin is warm.   Neurological:      Mental Status: He is alert.      Comments: Intubated. Off sedation, does not follow " commands.   Gag reflex +                Significant Labs: All pertinent labs within the past 24 hours have been reviewed.    Significant Imaging: I have reviewed all pertinent imaging results/findings within the past 24 hours.

## 2023-08-01 NOTE — CLINICAL REVIEW
Sepsis Screen (most recent)       Sepsis Screen () - 08/01/23 1104       Is the patient's history or complaint suggestive of a possible infection? Yes  -    Are there at least two of the following signs and symptoms present? Yes  -    Sepsis signs/symptoms - Hyper or Hypothermia Hyperthermia >100.4 or Hypothermia < 96.8  -    Sepsis signs/symptoms - Tachypnea Tachypnea     >20  -    Sepsis signs/symptoms - WBC WBC < 4,000 or WBC > 12,000  -    Sepsis signs/symptoms - Altered Mental Status Altered Mental Status  -    Are any of the following organ dysfunction criteria present and not considered to be due to a chronic condition? Yes  -    Organ Dysfunction Criteria SBP < 90 or MAP < 65  -    Organ Dysfunction Criteria Creatinine > 2.0  -    Organ Dysfunction Criteria Lactate > 2.0  -    Organ Dysfunction Criteria - Resp Comp Respiratory Compromise: Requiring > 5L NC  -    Initiate Sepsis Protocol No  -JH    Reason sepsis not considered End of Life Care   Plan for withdrawal/EMILIE -              User Key  (r) = Recorded By, (t) = Taken By, (c) = Cosigned By      Initials Name     Danielle Wolf RN

## 2023-08-01 NOTE — PROGRESS NOTES
[facility] [place] Intensive Care  Cardiology  Progress Note    Patient Name: Doe Woodall  MRN: [id number]  Admission Date: 7/29/2023  Hospital Length of Stay: 3 days  Code Status: DNR   Attending Physician: Hollie Johnson MD   Primary Care Physician: [facility] - [place]  Expected Discharge Date:   Principal Problem:Cardiac arrest    Subjective:       Interval History: going for organ donation eval.       Past Medical History:   Diagnosis Date    Abscess of right groin 09/01/2022    Diabetes mellitus     Encounter for blood transfusion     Loud snoring     Obese     Other insomnia 08/03/2020    Perineal abscess 08/01/2014    Primary hypertension 10/2/2022       Past Surgical History:   Procedure Laterality Date    ABCESS DRAINAGE  2014    PERIRECTAL    DECOMPRESSION OF LUMBAR SPINE USING MINIMALLY INVASIVE TECHNIQUE Right 07/06/2018    Procedure: DECOMPRESSION, SPINE, LUMBAR, MINIMALLY INVASIVE L3-4;  Surgeon: Cristian Cervantes MD;  Location: [facility] OR [address] [place] [address];  Service: Neurosurgery;  Laterality: Right;    INCISION AND DRAINAGE N/A 9/9/2022    Procedure: INCISION AND DRAINAGE GROIN;  Surgeon: KAITY Aguilar MD;  Location: [facility];  Service: Urology;  Laterality: N/A;    ORTHOPEDIC SURGERY         Review of patient's allergies indicates:   Allergen Reactions    Metformin Diarrhea       No current facility-administered medications on file prior to encounter.     Current Outpatient Medications on File Prior to Encounter   Medication Sig    amitriptyline (ELAVIL) 75 MG tablet Take 1 tablet by mouth every evening.    aspirin 81 MG Chew Take 1 tablet by mouth once daily.    atorvastatin (LIPITOR) 80 MG tablet Take 80 mg by mouth every evening.    gabapentin (NEURONTIN) 100 MG capsule Take 100 mg by mouth 3 (three) times daily.    insulin detemir U-100, Levemir, (LEVEMIR FLEXPEN) 100 unit/mL (3 mL) InPn pen Inject 30 Units into the skin once daily.    midodrine (PROAMATINE) 5 MG Tab Take 3  "tablets (15 mg total) by mouth 3 (three) times daily.    oxyCODONE (ROXICODONE) 10 mg Tab immediate release tablet Take 10 mg by mouth every 8 (eight) hours as needed for Pain.    pantoprazole (PROTONIX) 40 MG tablet Take 40 mg by mouth once daily.    pen needle, diabetic (BD ULTRA-FINE MICRO PEN NEEDLE) 32 gauge x 1/4" Ndle Use 1 each to inject insulin once daily     Family History       Problem Relation (Age of Onset)    Diabetes Father, Paternal Grandmother, Paternal Grandfather          Tobacco Use    Smoking status: Former     Current packs/day: 0.00     Average packs/day: 0.5 packs/day for 9.0 years (4.5 ttl pk-yrs)     Types: Cigarettes     Start date: 7/1/2004     Quit date: 7/1/2013     Years since quitting: 10.0    Smokeless tobacco: Former   Substance and Sexual Activity    Alcohol use: Not Currently     Comment: DAILY PINT OF LIQUOR, HX OF 1 "TALL BOY" OF BEER DAILY    Drug use: Not Currently     Types: Cocaine     Comment: per collateral    Sexual activity: Yes     Partners: Female     Birth control/protection: None     Review of Systems   Unable to perform ROS: Intubated     Objective:     Vital Signs (Most Recent):  Temp: 96.4 °F (35.8 °C) (07/31/23 1030)  Pulse: 87 (08/01/23 1015)  Resp: (!) 22 (08/01/23 1015)  BP: (!) 89/49 (08/01/23 1015)  SpO2: 98 % (08/01/23 1015) Vital Signs (24h Range):  Temp:  [96.4 °F (35.8 °C)] 96.4 °F (35.8 °C)  Pulse:  [] 87  Resp:  [8-22] 22  SpO2:  [92 %-100 %] 98 %  BP: ()/(48-88) 89/49     Weight: 103.4 kg (228 lb)  Body mass index is 40.39 kg/m².    SpO2: 98 %         Intake/Output Summary (Last 24 hours) at 8/1/2023 1024  Last data filed at 8/1/2023 1000  Gross per 24 hour   Intake 3654.41 ml   Output 1525 ml   Net 2129.41 ml         Lines/Drains/Airways       Central Venous Catheter Line  Duration             Percutaneous Central Line Insertion/Assessment - Triple Lumen  07/29/23 1948 Internal Jugular Left 2 days              Drain  Duration      " "            NG/OG Tube 07/29/23 1824 16 Fr. Right mouth 2 days         Urethral Catheter 08/01/23 0900 Double-lumen 16 Fr. <1 day              Airway  Duration                  Airway - Non-Surgical 07/29/23 1800 Endotracheal Tube 2 days              Peripheral Intravenous Line  Duration                  Peripheral IV - Single Lumen 07/29/23 1808 20 G Anterior;Right Hand 2 days         Peripheral IV - Single Lumen 07/29/23 1943 20 G Right Hand 2 days                     Physical Exam  Constitutional:       Appearance: He is obese. He is ill-appearing.      Interventions: He is intubated.   Eyes:      Conjunctiva/sclera: Conjunctivae normal.      Comments: Pupils fixed and dilated.   Cardiovascular:      Rate and Rhythm: Normal rate and regular rhythm.      Pulses: Normal pulses.   Pulmonary:      Effort: He is intubated.   Abdominal:      Palpations: Abdomen is soft.   Musculoskeletal:      Cervical back: Neck supple.   Skin:     General: Skin is warm.   Neurological:      Comments: No spontaneous movements.  Not responsive to pain.          Significant Labs: BMP:   Recent Labs   Lab 07/31/23  0504 08/01/23 0318   * 217*    142   K 3.4* 3.1*   CL 97 101   CO2 28 32*   BUN 53* 47*   CREATININE 3.4* 3.1*   CALCIUM 7.8* 7.6*   MG 1.8 1.9     , CMP   Recent Labs   Lab 07/31/23  0504 08/01/23 0318    142   K 3.4* 3.1*   CL 97 101   CO2 28 32*   * 217*   BUN 53* 47*   CREATININE 3.4* 3.1*   CALCIUM 7.8* 7.6*   PROT 5.2* 4.8*   ALBUMIN 1.7* 1.5*   BILITOT 0.3 0.3   ALKPHOS 163* 158*   * 251*   * 495*   ANIONGAP 14 9     , CBC   Recent Labs   Lab 07/31/23  0504 08/01/23  0318 08/01/23  1006   WBC 19.49* 15.37* 13.34*   HGB 7.9* 7.2* 6.9*   HCT 23.1* 21.8* 20.8*    147* 136*     , INR   Recent Labs   Lab 08/01/23 0318   INR 1.1     , Lipid Panel No results for input(s): "CHOL", "HDL", "LDLCALC", "TRIG", "CHOLHDL" in the last 48 hours., Troponin   Recent Labs   Lab " 07/31/23  1619 08/01/23  0016 08/01/23  0318   TROPONINI 0.780* 0.697* 0.645*     , and All pertinent lab results from the last 24 hours have been reviewed.    Significant Imaging: Echocardiogram: Transthoracic echo (TTE) complete (Cupid Only):   Results for orders placed or performed during the hospital encounter of 07/29/23   Echo   Result Value Ref Range    BSA 2.14 m2    LVOT stroke volume 65.70 cm3    LVIDd 5.36 3.5 - 6.0 cm    LV Systolic Volume 61.52 mL    LV Systolic Volume Index 30.2 mL/m2    LVIDs 3.79 2.1 - 4.0 cm    LV Diastolic Volume 139.09 mL    LV Diastolic Volume Index 68.18 mL/m2    IVS 0.56 (A) 0.6 - 1.1 cm    LVOT diameter 2.28 cm    LVOT area 4.1 cm2    FS 29 28 - 44 %    Left Ventricle Relative Wall Thickness 0.31 cm    Posterior Wall 0.84 0.6 - 1.1 cm    TDI LATERAL 0.09 m/s    TDI SEPTAL 0.07 m/s    LV mass 129.50 g    LV Mass Index 63 g/m2    MV Peak E Liam 0.85 m/s    LV LATERAL E/E' RATIO 9.44 m/s    LV SEPTAL E/E' RATIO 12.14 m/s    E/E' ratio 10.63 m/s    MV Peak A Liam 0.97 m/s    TR Max Liam 2.49 m/s    E/A ratio 0.88     Mean e' 0.08 m/s    E wave deceleration time 144.04 msec    LVOT peak liam 1.08 m/s    Left Ventricular Outflow Tract Mean Velocity 0.81 cm/s    Left Ventricular Outflow Tract Mean Gradient 2.87 mmHg    LA size 3.76 cm    Left Atrium Minor Axis 3.88 cm    RVDD 3.28 cm    RA Major Axis 4.27 cm    RA Width 3.23 cm    AV mean gradient 4 mmHg    AV peak gradient 6 mmHg    Ao peak liam 1.26 m/s    Ao VTI 19.00 cm    LVOT peak VTI 16.10 cm    AV valve area 3.46 cm²    AV Velocity Ratio 0.86     AV index (prosthetic) 0.85     YUN by Velocity Ratio 3.50 cm²    MV mean gradient 2 mmHg    MV peak gradient 4 mmHg    MV stenosis pressure 1/2 time 66.68 ms    MV valve area p 1/2 method 3.30 cm2    MV valve area by continuity eq 4.08 cm2    MV VTI 16.1 cm    TAPSE 1.99 cm    Triscuspid Valve Regurgitation Peak Gradient 25 mmHg    PV PEAK VELOCITY 1.18 m/s    PV peak gradient 6 mmHg     Sinus 2.89 cm    STJ 2.79 cm    Ascending aorta 2.75 cm    ZLVIDS 0.13     ZLVIDD -1.26     IVC diameter 2.17 cm    Narrative      Left Ventricle: The left ventricle is normal in size. Normal wall   thickness. Normal wall motion. There is normal systolic function with a   visually estimated ejection fraction of 60 - 65%. Grade I diastolic   dysfunction.    Left Atrium: Normal left atrial size.    Right Ventricle: Normal right ventricular cavity size.    IVC/SVC: Patient is ventilated, cannot use IVC diameter to estimate   right atrial pressure.    Pericardium: There is no pericardial effusion.       Assessment and Plan:     Brief HPI:     * Cardiac arrest  Patient had cardiac arrest.  Was found down.  Had amitriptyline and morphine on bedside.  EKG does not show evidence of ST elevation MI.  Has not had any arrhythmias on tele monitoring since he came to the ICU.  Troponin elevated, likely secondary to demand ischemia caused by prolonged CPR and hypoxemia.  Echocardiogram shows normal left ventricular systolic function.  Currently being cold.  No indication for cardiac catheterization in the current setting as the risk exceeds the benefit as it is unlikely to be a primary cardiac event based on the above factors.  Also patient has multiorgan failure along with acute kidney injury.  Will reconsider if patient recovers neurologically.  Continue supportive care    NSTEMI (non-ST elevated myocardial infarction)  Troponin elevation likely type 2 caused by prolonged CPR and hypoxemia.  Echo shows normal left ventricle systolic function with no wall motion abnormalities and EKG done today is totally normal sinus rhythm with no acute ischemic changes noted    MARISSA (acute kidney injury)        Lactic acidosis        Type 2 diabetes mellitus with hyperglycemia, with long-term current use of insulin  Management per primary team    Alcohol use disorder, severe, dependence        going for organ donation consideration. Will  sign off. Please reconsult as needed.     VTE Risk Mitigation (From admission, onward)         Ordered     heparin (porcine) injection 5,000 Units  Every 8 hours         07/30/23 0819     IP VTE HIGH RISK PATIENT  Once         07/29/23 2235     Place sequential compression device  Until discontinued         07/29/23 2235                Roberto Duffy MD  Cardiology  [facility] [place] - Intensive Care      Critical Care Time:  35 minutes     Critical care was time spent personally by me on the following activities: development of treatment plan with patient or surrogate and bedside caregivers, discussions with consultants, evaluation of patient's response to treatment, examination of patient, ordering and performing treatments and interventions, ordering and review of laboratory studies, ordering and review of radiographic studies, pulse oximetry, re-evaluation of patient's condition. This critical care time did not overlap with that of any other provider or involve time for any procedures.

## 2023-08-01 NOTE — NURSING
Nurses Note -- 4 Eyes      7/31/2023   9:50 PM      Skin assessed during: Q Shift Change      [x] No Altered Skin Integrity Present    [x]Prevention Measures Documented      [] Yes- Altered Skin Integrity Present or Discovered   [] LDA Added if Not in Epic (Describe Wound)   [] New Altered Skin Integrity was Present on Admit and Documented in LDA   [] Wound Image Taken    Wound Care Consulted? No    Attending Nurse:  Sherie Salgado RN     Second RN/Staff Member:  ANNA Sheridan

## 2023-08-01 NOTE — PROGRESS NOTES
[facility] Intensive Care  Cardiology  Progress Note    Patient Name: Doe Woodall  MRN: [id number]  Admission Date: 7/29/2023  Hospital Length of Stay: 2 days  Code Status: DNR   Attending Physician: Hollie Johnson MD   Primary Care Physician: [facility] - [place]  Expected Discharge Date:   Principal Problem:Cardiac arrest    Subjective:       Interval History: no improvement in clinical status. Pupils continue to be fixed and dilated. No movements or response to stimuli    Past Medical History:   Diagnosis Date    Abscess of right groin 09/01/2022    Diabetes mellitus     Encounter for blood transfusion     Loud snoring     Obese     Other insomnia 08/03/2020    Perineal abscess 08/01/2014    Primary hypertension 10/2/2022       Past Surgical History:   Procedure Laterality Date    ABCESS DRAINAGE  2014    PERIRECTAL    DECOMPRESSION OF LUMBAR SPINE USING MINIMALLY INVASIVE TECHNIQUE Right 07/06/2018    Procedure: DECOMPRESSION, SPINE, LUMBAR, MINIMALLY INVASIVE L3-4;  Surgeon: Cristian Cervantes MD;  Location: [address];  Service: Neurosurgery;  Laterality: Right;    INCISION AND DRAINAGE N/A 9/9/2022    Procedure: INCISION AND DRAINAGE GROIN;  Surgeon: KAITY Aguilar MD;  Location: [facility];  Service: Urology;  Laterality: N/A;    ORTHOPEDIC SURGERY         Review of patient's allergies indicates:   Allergen Reactions    Metformin Diarrhea       No current facility-administered medications on file prior to encounter.     Current Outpatient Medications on File Prior to Encounter   Medication Sig    amitriptyline (ELAVIL) 75 MG tablet Take 1 tablet by mouth every evening.    aspirin 81 MG Chew Take 1 tablet by mouth once daily.    atorvastatin (LIPITOR) 80 MG tablet Take 80 mg by mouth every evening.    gabapentin (NEURONTIN) 100 MG capsule Take 100 mg by mouth 3 (three) times daily.    insulin detemir U-100, Levemir, (LEVEMIR FLEXPEN) 100 unit/mL (3 mL) InPn pen Inject 30  "Units into the skin once daily.    midodrine (PROAMATINE) 5 MG Tab Take 3 tablets (15 mg total) by mouth 3 (three) times daily.    oxyCODONE (ROXICODONE) 10 mg Tab immediate release tablet Take 10 mg by mouth every 8 (eight) hours as needed for Pain.    pantoprazole (PROTONIX) 40 MG tablet Take 40 mg by mouth once daily.    pen needle, diabetic (BD ULTRA-FINE MICRO PEN NEEDLE) 32 gauge x 1/4" Ndle Use 1 each to inject insulin once daily     Family History       Problem Relation (Age of Onset)    Diabetes Father, Paternal Grandmother, Paternal Grandfather          Tobacco Use    Smoking status: Former     Current packs/day: 0.00     Average packs/day: 0.5 packs/day for 9.0 years (4.5 ttl pk-yrs)     Types: Cigarettes     Start date: 7/1/2004     Quit date: 7/1/2013     Years since quitting: 10.0    Smokeless tobacco: Former   Substance and Sexual Activity    Alcohol use: Not Currently     Comment: DAILY PINT OF LIQUOR, HX OF 1 "TALL BOY" OF BEER DAILY    Drug use: Not Currently     Types: Cocaine     Comment: per collateral    Sexual activity: Yes     Partners: Female     Birth control/protection: None     Review of Systems   Unable to perform ROS: Intubated     Objective:     Vital Signs (Most Recent):  Temp: 96.4 °F (35.8 °C) (07/31/23 1030)  Pulse: 88 (07/31/23 2200)  Resp: 16 (07/31/23 2200)  BP: 117/73 (07/31/23 2200)  SpO2: (!) 94 % (07/31/23 2200) Vital Signs (24h Range):  Temp:  [96.3 °F (35.7 °C)-97.2 °F (36.2 °C)] 96.4 °F (35.8 °C)  Pulse:  [79-88] 88  Resp:  [0-21] 16  SpO2:  [92 %-99 %] 94 %  BP: (114-157)/(57-93) 117/73     Weight: 103.4 kg (228 lb)  Body mass index is 40.39 kg/m².    SpO2: (!) 94 %         Intake/Output Summary (Last 24 hours) at 7/31/2023 3375  Last data filed at 7/31/2023 2156  Gross per 24 hour   Intake 3009.3 ml   Output 1205 ml   Net 1804.3 ml         Lines/Drains/Airways       Central Venous Catheter Line  Duration             Percutaneous Central Line " Insertion/Assessment - Triple Lumen  07/29/23 1948 Internal Jugular Left 2 days              Drain  Duration                  NG/OG Tube 07/29/23 1824 16 Fr. Right mouth 2 days         Urethral Catheter 07/29/23 1820 16 Fr. 2 days              Airway  Duration                  Airway - Non-Surgical 07/29/23 1800 Endotracheal Tube 2 days              Peripheral Intravenous Line  Duration                  Peripheral IV - Single Lumen 07/29/23 1808 20 G Anterior;Right Hand 2 days         Peripheral IV - Single Lumen 07/29/23 1943 20 G Right Hand 2 days                     Physical Exam  Constitutional:       Appearance: He is obese. He is ill-appearing.      Interventions: He is intubated.   Eyes:      Conjunctiva/sclera: Conjunctivae normal.      Comments: Pupils fixed and dilated.   Cardiovascular:      Rate and Rhythm: Normal rate and regular rhythm.      Pulses: Normal pulses.   Pulmonary:      Effort: He is intubated.   Abdominal:      Palpations: Abdomen is soft.   Musculoskeletal:      Cervical back: Neck supple.   Skin:     General: Skin is warm.   Neurological:      Comments: No spontaneous movements.  Not responsive to pain.          Significant Labs: BMP:   Recent Labs   Lab 07/29/23 2312 07/30/23  0713 07/31/23  0504   * 431* 239*   * 135* 139   K 4.6 4.2 3.4*    101 97   CO2 11* 19* 28   BUN 50* 50* 53*   CREATININE 4.0* 3.8* 3.4*   CALCIUM 8.4* 8.3* 7.8*   MG 2.1 1.9 1.8     , CMP   Recent Labs   Lab 07/29/23 2312 07/30/23  0713 07/31/23  0504   * 135* 139   K 4.6 4.2 3.4*    101 97   CO2 11* 19* 28   * 431* 239*   BUN 50* 50* 53*   CREATININE 4.0* 3.8* 3.4*   CALCIUM 8.4* 8.3* 7.8*   PROT 5.6*  --  5.2*   ALBUMIN 1.9*  --  1.7*   BILITOT 0.6  --  0.3   ALKPHOS 219*  --  163*   *  --  396*   *  --  588*   ANIONGAP 19* 15 14     , CBC   Recent Labs   Lab 07/29/23 2312 07/30/23  0553 07/31/23  0504   WBC 19.78* 19.96* 19.49*   HGB 8.2* 8.3* 7.9*   HCT  "25.5* 24.7* 23.1*    182 174     , INR   Recent Labs   Lab 07/29/23  2312 07/30/23  0553   INR 1.1 1.1     , Lipid Panel No results for input(s): "CHOL", "HDL", "LDLCALC", "TRIG", "CHOLHDL" in the last 48 hours., Troponin   Recent Labs   Lab 07/30/23  2336 07/31/23  0918 07/31/23  1619   TROPONINI 1.312* 1.029* 0.780*     , and All pertinent lab results from the last 24 hours have been reviewed.    Significant Imaging: Echocardiogram: Transthoracic echo (TTE) complete (Cupid Only):   Results for orders placed or performed during the hospital encounter of 07/29/23   Echo   Result Value Ref Range    BSA 2.14 m2    LVOT stroke volume 65.70 cm3    LVIDd 5.36 3.5 - 6.0 cm    LV Systolic Volume 61.52 mL    LV Systolic Volume Index 30.2 mL/m2    LVIDs 3.79 2.1 - 4.0 cm    LV Diastolic Volume 139.09 mL    LV Diastolic Volume Index 68.18 mL/m2    IVS 0.56 (A) 0.6 - 1.1 cm    LVOT diameter 2.28 cm    LVOT area 4.1 cm2    FS 29 28 - 44 %    Left Ventricle Relative Wall Thickness 0.31 cm    Posterior Wall 0.84 0.6 - 1.1 cm    TDI LATERAL 0.09 m/s    TDI SEPTAL 0.07 m/s    LV mass 129.50 g    LV Mass Index 63 g/m2    MV Peak E Liam 0.85 m/s    LV LATERAL E/E' RATIO 9.44 m/s    LV SEPTAL E/E' RATIO 12.14 m/s    E/E' ratio 10.63 m/s    MV Peak A Liam 0.97 m/s    TR Max Liam 2.49 m/s    E/A ratio 0.88     Mean e' 0.08 m/s    E wave deceleration time 144.04 msec    LVOT peak liam 1.08 m/s    Left Ventricular Outflow Tract Mean Velocity 0.81 cm/s    Left Ventricular Outflow Tract Mean Gradient 2.87 mmHg    LA size 3.76 cm    Left Atrium Minor Axis 3.88 cm    RVDD 3.28 cm    RA Major Axis 4.27 cm    RA Width 3.23 cm    AV mean gradient 4 mmHg    AV peak gradient 6 mmHg    Ao peak liam 1.26 m/s    Ao VTI 19.00 cm    LVOT peak VTI 16.10 cm    AV valve area 3.46 cm²    AV Velocity Ratio 0.86     AV index (prosthetic) 0.85     YUN by Velocity Ratio 3.50 cm²    MV mean gradient 2 mmHg    MV peak gradient 4 mmHg    MV stenosis pressure " 1/2 time 66.68 ms    MV valve area p 1/2 method 3.30 cm2    MV valve area by continuity eq 4.08 cm2    MV VTI 16.1 cm    TAPSE 1.99 cm    Triscuspid Valve Regurgitation Peak Gradient 25 mmHg    PV PEAK VELOCITY 1.18 m/s    PV peak gradient 6 mmHg    Sinus 2.89 cm    STJ 2.79 cm    Ascending aorta 2.75 cm    ZLVIDS 0.13     ZLVIDD -1.26     IVC diameter 2.17 cm    Narrative      Left Ventricle: The left ventricle is normal in size. Normal wall   thickness. Normal wall motion. There is normal systolic function with a   visually estimated ejection fraction of 60 - 65%. Grade I diastolic   dysfunction.    Left Atrium: Normal left atrial size.    Right Ventricle: Normal right ventricular cavity size.    IVC/SVC: Patient is ventilated, cannot use IVC diameter to estimate   right atrial pressure.    Pericardium: There is no pericardial effusion.       Assessment and Plan:     Brief HPI:     * Cardiac arrest  Patient had cardiac arrest.  Was found down.  Had amitriptyline and morphine on bedside.  EKG does not show evidence of ST elevation MI.  Has not had any arrhythmias on tele monitoring since he came to the ICU.  Troponin elevated, likely secondary to demand ischemia caused by prolonged CPR and hypoxemia.  Echocardiogram shows normal left ventricular systolic function.  Currently being cold.  No indication for cardiac catheterization in the current setting as the risk exceeds the benefit as it is unlikely to be a primary cardiac event based on the above factors.  Also patient has multiorgan failure along with acute kidney injury.  Will reconsider if patient recovers neurologically.  Continue supportive care    NSTEMI (non-ST elevated myocardial infarction)  Troponin elevation likely type 2 caused by prolonged CPR and hypoxemia.  Echo shows normal left ventricle systolic function with no wall motion abnormalities and EKG done today is totally normal sinus rhythm with no acute ischemic changes noted    MARISSA (acute  kidney injury)        Lactic acidosis        Type 2 diabetes mellitus with hyperglycemia, with long-term current use of insulin  Management per primary team    Alcohol use disorder, severe, dependence            VTE Risk Mitigation (From admission, onward)         Ordered     heparin (porcine) injection 5,000 Units  Every 8 hours         07/30/23 0819     IP VTE HIGH RISK PATIENT  Once         07/29/23 2235     Place sequential compression device  Until discontinued         07/29/23 2235                Roberto Duffy MD  Cardiology  [facility] [place] - Intensive Care    Critical Care Time: 35  minutes     Critical care was time spent personally by me on the following activities: development of treatment plan with patient or surrogate and bedside caregivers, discussions with consultants, evaluation of patient's response to treatment, examination of patient, ordering and performing treatments and interventions, ordering and review of laboratory studies, ordering and review of radiographic studies, pulse oximetry, re-evaluation of patient's condition. This critical care time did not overlap with that of any other provider or involve time for any procedures.

## 2023-08-01 NOTE — SUBJECTIVE & OBJECTIVE
Interval History: some eye twitching this am but no other changes       Objective:     Vital Signs (Most Recent):  Temp: 96.4 °F (35.8 °C) (07/31/23 1030)  Pulse: 87 (08/01/23 1015)  Resp: (!) 22 (08/01/23 1015)  BP: (!) 89/49 (08/01/23 1015)  SpO2: 98 % (08/01/23 1015) Vital Signs (24h Range):  Temp:  [96.4 °F (35.8 °C)] 96.4 °F (35.8 °C)  Pulse:  [] 87  Resp:  [8-22] 22  SpO2:  [92 %-100 %] 98 %  BP: ()/(48-88) 89/49     Weight: 103.4 kg (228 lb)  Body mass index is 40.39 kg/m².      Intake/Output Summary (Last 24 hours) at 8/1/2023 1027  Last data filed at 8/1/2023 1000  Gross per 24 hour   Intake 3654.41 ml   Output 1525 ml   Net 2129.41 ml        Physical Exam  Vitals reviewed.   Constitutional:       Appearance: He is ill-appearing.   HENT:      Head: Normocephalic and atraumatic.      Right Ear: There is no impacted cerumen.      Nose: No congestion.      Mouth/Throat:      Mouth: Mucous membranes are dry.      Pharynx: Oropharynx is clear. No oropharyngeal exudate.   Cardiovascular:      Rate and Rhythm: Normal rate and regular rhythm.      Pulses: Normal pulses.   Pulmonary:      Effort: No respiratory distress.      Breath sounds: No wheezing.      Comments: Mechanical ventilation   Abdominal:      General: Abdomen is flat. Bowel sounds are normal.      Palpations: Abdomen is soft.   Musculoskeletal:         General: No swelling or deformity.   Skin:     General: Skin is warm and dry.   Neurological:      Comments: No cough, gag, corneal or pupillary response. Pupils are fixed and dilated  He does breath spontaneously on PS mode            Review of Systems    Vents:  Vent Mode: VC (08/01/23 0858)  Ventilator Initiated: Yes (07/29/23 1908)  Set Rate: 22 BPM (08/01/23 0858)  Vt Set: 450 mL (08/01/23 0858)  PEEP/CPAP: 5 cmH20 (08/01/23 0858)  Oxygen Concentration (%): 50 (08/01/23 1015)  Peak Airway Pressure: 25.3 cmH20 (08/01/23 0858)  Total Ve: 10.2 L/m (08/01/23 0858)  F/VT Ratio<105 (RSBI):  (!) 21.65 (08/01/23 0858)    Lines/Drains/Airways       Central Venous Catheter Line  Duration             Percutaneous Central Line Insertion/Assessment - Triple Lumen  07/29/23 1948 Internal Jugular Left 2 days              Drain  Duration                  NG/OG Tube 07/29/23 1824 16 Fr. Right mouth 2 days         Urethral Catheter 08/01/23 0900 Double-lumen 16 Fr. <1 day              Airway  Duration                  Airway - Non-Surgical 07/29/23 1800 Endotracheal Tube 2 days              Peripheral Intravenous Line  Duration                  Peripheral IV - Single Lumen 07/29/23 1808 20 G Anterior;Right Hand 2 days         Peripheral IV - Single Lumen 07/29/23 1943 20 G Right Hand 2 days                    Significant Labs:    CBC/Anemia Profile:  Recent Labs   Lab 07/31/23  0504 08/01/23  0318 08/01/23  1006   WBC 19.49* 15.37* 13.34*   HGB 7.9* 7.2* 6.9*   HCT 23.1* 21.8* 20.8*    147* 136*   MCV 86 88 90   RDW 14.6* 14.6* 14.6*        Chemistries:  Recent Labs   Lab 07/31/23  0504 08/01/23  0318    142   K 3.4* 3.1*   CL 97 101   CO2 28 32*   BUN 53* 47*   CREATININE 3.4* 3.1*   CALCIUM 7.8* 7.6*   ALBUMIN 1.7* 1.5*   PROT 5.2* 4.8*   BILITOT 0.3 0.3   ALKPHOS 163* 158*   * 495*   * 251*   MG 1.8 1.9   PHOS 5.4* 4.5       All pertinent labs within the past 24 hours have been reviewed.    Significant Imaging:  I have reviewed all pertinent imaging results/findings within the past 24 hours.

## 2023-08-01 NOTE — SUBJECTIVE & OBJECTIVE
"Interval History: going for organ donation eval.       Past Medical History:   Diagnosis Date    Abscess of right groin 09/01/2022    Diabetes mellitus     Encounter for blood transfusion     Loud snoring     Obese     Other insomnia 08/03/2020    Perineal abscess 08/01/2014    Primary hypertension 10/2/2022       Past Surgical History:   Procedure Laterality Date    ABCESS DRAINAGE  2014    PERIRECTAL    DECOMPRESSION OF LUMBAR SPINE USING MINIMALLY INVASIVE TECHNIQUE Right 07/06/2018    Procedure: DECOMPRESSION, SPINE, LUMBAR, MINIMALLY INVASIVE L3-4;  Surgeon: Cristian Cervantes MD;  Location: [facility] OR [address] [place] [address];  Service: Neurosurgery;  Laterality: Right;    INCISION AND DRAINAGE N/A 9/9/2022    Procedure: INCISION AND DRAINAGE GROIN;  Surgeon: KAITY Aguilar MD;  Location: [facility] OR;  Service: Urology;  Laterality: N/A;    ORTHOPEDIC SURGERY         Review of patient's allergies indicates:   Allergen Reactions    Metformin Diarrhea       No current facility-administered medications on file prior to encounter.     Current Outpatient Medications on File Prior to Encounter   Medication Sig    amitriptyline (ELAVIL) 75 MG tablet Take 1 tablet by mouth every evening.    aspirin 81 MG Chew Take 1 tablet by mouth once daily.    atorvastatin (LIPITOR) 80 MG tablet Take 80 mg by mouth every evening.    gabapentin (NEURONTIN) 100 MG capsule Take 100 mg by mouth 3 (three) times daily.    insulin detemir U-100, Levemir, (LEVEMIR FLEXPEN) 100 unit/mL (3 mL) InPn pen Inject 30 Units into the skin once daily.    midodrine (PROAMATINE) 5 MG Tab Take 3 tablets (15 mg total) by mouth 3 (three) times daily.    oxyCODONE (ROXICODONE) 10 mg Tab immediate release tablet Take 10 mg by mouth every 8 (eight) hours as needed for Pain.    pantoprazole (PROTONIX) 40 MG tablet Take 40 mg by mouth once daily.    pen needle, diabetic (BD ULTRA-FINE MICRO PEN NEEDLE) 32 gauge x 1/4" Ndle Use 1 each to inject insulin once daily     Family History  " "     Problem Relation (Age of Onset)    Diabetes Father, Paternal Grandmother, Paternal Grandfather          Tobacco Use    Smoking status: Former     Current packs/day: 0.00     Average packs/day: 0.5 packs/day for 9.0 years (4.5 ttl pk-yrs)     Types: Cigarettes     Start date: 7/1/2004     Quit date: 7/1/2013     Years since quitting: 10.0    Smokeless tobacco: Former   Substance and Sexual Activity    Alcohol use: Not Currently     Comment: DAILY PINT OF LIQUOR, HX OF 1 "TALL BOY" OF BEER DAILY    Drug use: Not Currently     Types: Cocaine     Comment: per collateral    Sexual activity: Yes     Partners: Female     Birth control/protection: None     Review of Systems   Unable to perform ROS: Intubated     Objective:     Vital Signs (Most Recent):  Temp: 96.4 °F (35.8 °C) (07/31/23 1030)  Pulse: 87 (08/01/23 1015)  Resp: (!) 22 (08/01/23 1015)  BP: (!) 89/49 (08/01/23 1015)  SpO2: 98 % (08/01/23 1015) Vital Signs (24h Range):  Temp:  [96.4 °F (35.8 °C)] 96.4 °F (35.8 °C)  Pulse:  [] 87  Resp:  [8-22] 22  SpO2:  [92 %-100 %] 98 %  BP: ()/(48-88) 89/49     Weight: 103.4 kg (228 lb)  Body mass index is 40.39 kg/m².    SpO2: 98 %         Intake/Output Summary (Last 24 hours) at 8/1/2023 1024  Last data filed at 8/1/2023 1000  Gross per 24 hour   Intake 3654.41 ml   Output 1525 ml   Net 2129.41 ml         Lines/Drains/Airways       Central Venous Catheter Line  Duration             Percutaneous Central Line Insertion/Assessment - Triple Lumen  07/29/23 1948 Internal Jugular Left 2 days              Drain  Duration                  NG/OG Tube 07/29/23 1824 16 Fr. Right mouth 2 days         Urethral Catheter 08/01/23 0900 Double-lumen 16 Fr. <1 day              Airway  Duration                  Airway - Non-Surgical 07/29/23 1800 Endotracheal Tube 2 days              Peripheral Intravenous Line  Duration                  Peripheral IV - Single Lumen 07/29/23 1808 20 G Anterior;Right Hand 2 days         " "Peripheral IV - Single Lumen 07/29/23 1943 20 G Right Hand 2 days                     Physical Exam  Constitutional:       Appearance: He is obese. He is ill-appearing.      Interventions: He is intubated.   Eyes:      Conjunctiva/sclera: Conjunctivae normal.      Comments: Pupils fixed and dilated.   Cardiovascular:      Rate and Rhythm: Normal rate and regular rhythm.      Pulses: Normal pulses.   Pulmonary:      Effort: He is intubated.   Abdominal:      Palpations: Abdomen is soft.   Musculoskeletal:      Cervical back: Neck supple.   Skin:     General: Skin is warm.   Neurological:      Comments: No spontaneous movements.  Not responsive to pain.          Significant Labs: BMP:   Recent Labs   Lab 07/31/23  0504 08/01/23 0318   * 217*    142   K 3.4* 3.1*   CL 97 101   CO2 28 32*   BUN 53* 47*   CREATININE 3.4* 3.1*   CALCIUM 7.8* 7.6*   MG 1.8 1.9     , CMP   Recent Labs   Lab 07/31/23  0504 08/01/23 0318    142   K 3.4* 3.1*   CL 97 101   CO2 28 32*   * 217*   BUN 53* 47*   CREATININE 3.4* 3.1*   CALCIUM 7.8* 7.6*   PROT 5.2* 4.8*   ALBUMIN 1.7* 1.5*   BILITOT 0.3 0.3   ALKPHOS 163* 158*   * 251*   * 495*   ANIONGAP 14 9     , CBC   Recent Labs   Lab 07/31/23  0504 08/01/23  0318 08/01/23  1006   WBC 19.49* 15.37* 13.34*   HGB 7.9* 7.2* 6.9*   HCT 23.1* 21.8* 20.8*    147* 136*     , INR   Recent Labs   Lab 08/01/23 0318   INR 1.1     , Lipid Panel No results for input(s): "CHOL", "HDL", "LDLCALC", "TRIG", "CHOLHDL" in the last 48 hours., Troponin   Recent Labs   Lab 07/31/23  1619 08/01/23  0016 08/01/23 0318   TROPONINI 0.780* 0.697* 0.645*     , and All pertinent lab results from the last 24 hours have been reviewed.    Significant Imaging: Echocardiogram: Transthoracic echo (TTE) complete (Cupid Only):   Results for orders placed or performed during the hospital encounter of 07/29/23   Echo   Result Value Ref Range    BSA 2.14 m2    LVOT stroke volume " 65.70 cm3    LVIDd 5.36 3.5 - 6.0 cm    LV Systolic Volume 61.52 mL    LV Systolic Volume Index 30.2 mL/m2    LVIDs 3.79 2.1 - 4.0 cm    LV Diastolic Volume 139.09 mL    LV Diastolic Volume Index 68.18 mL/m2    IVS 0.56 (A) 0.6 - 1.1 cm    LVOT diameter 2.28 cm    LVOT area 4.1 cm2    FS 29 28 - 44 %    Left Ventricle Relative Wall Thickness 0.31 cm    Posterior Wall 0.84 0.6 - 1.1 cm    TDI LATERAL 0.09 m/s    TDI SEPTAL 0.07 m/s    LV mass 129.50 g    LV Mass Index 63 g/m2    MV Peak E Liam 0.85 m/s    LV LATERAL E/E' RATIO 9.44 m/s    LV SEPTAL E/E' RATIO 12.14 m/s    E/E' ratio 10.63 m/s    MV Peak A Liam 0.97 m/s    TR Max Liam 2.49 m/s    E/A ratio 0.88     Mean e' 0.08 m/s    E wave deceleration time 144.04 msec    LVOT peak liam 1.08 m/s    Left Ventricular Outflow Tract Mean Velocity 0.81 cm/s    Left Ventricular Outflow Tract Mean Gradient 2.87 mmHg    LA size 3.76 cm    Left Atrium Minor Axis 3.88 cm    RVDD 3.28 cm    RA Major Axis 4.27 cm    RA Width 3.23 cm    AV mean gradient 4 mmHg    AV peak gradient 6 mmHg    Ao peak liam 1.26 m/s    Ao VTI 19.00 cm    LVOT peak VTI 16.10 cm    AV valve area 3.46 cm²    AV Velocity Ratio 0.86     AV index (prosthetic) 0.85     YUN by Velocity Ratio 3.50 cm²    MV mean gradient 2 mmHg    MV peak gradient 4 mmHg    MV stenosis pressure 1/2 time 66.68 ms    MV valve area p 1/2 method 3.30 cm2    MV valve area by continuity eq 4.08 cm2    MV VTI 16.1 cm    TAPSE 1.99 cm    Triscuspid Valve Regurgitation Peak Gradient 25 mmHg    PV PEAK VELOCITY 1.18 m/s    PV peak gradient 6 mmHg    Sinus 2.89 cm    STJ 2.79 cm    Ascending aorta 2.75 cm    ZLVIDS 0.13     ZLVIDD -1.26     IVC diameter 2.17 cm    Narrative      Left Ventricle: The left ventricle is normal in size. Normal wall   thickness. Normal wall motion. There is normal systolic function with a   visually estimated ejection fraction of 60 - 65%. Grade I diastolic   dysfunction.    Left Atrium: Normal left atrial size.     Right Ventricle: Normal right ventricular cavity size.    IVC/SVC: Patient is ventilated, cannot use IVC diameter to estimate   right atrial pressure.    Pericardium: There is no pericardial effusion.

## 2023-08-01 NOTE — ASSESSMENT & PLAN NOTE
Unclear etiology. Concern for drug toxicity/over dose given amitriptyline use with associated narcotics or prolonged hypotension causing arrest. Other differentials include sepsis, seizures. Less likely intra-cranial process, PE or ACS.   Currently, intubated with non-reactive pupils     Plan:   Continue dopamine given significant bradycardic episodes prior to arrest   Continue bicarbonate infusion for possible amitriptyline overdose/acidosis    Obtain echocardiogram, lower extremity dopplers.   Consult to cardiology   Consult to neurology   Consult to palliative care for goals of care,  Echo. Show preserved EF,.  repeat head CT show worsening hypoxic brain injury,patient is unresponsive,family agree with withdraw of care and organ donation,EMILIE is present.

## 2023-08-01 NOTE — ASSESSMENT & PLAN NOTE
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  Planning for DCD today vs tomorrow after ongoing evaluations.

## 2023-08-02 LAB
BACTERIA BLD CULT: ABNORMAL
BACTERIA BLD CULT: NORMAL

## 2023-08-02 NOTE — DISCHARGE SUMMARY
[facility] Medicine  Discharge Summary      Patient Name: Doe Woodall  MRN: [id number]  MICHI: [id number]  Patient Class: IP- Inpatient  Admission Date: 7/29/2023  Hospital Length of Stay: 3 days  Discharge Date and Time: 8.1.23  Attending Physician: No att. providers found   Discharging Provider: Hollie Johnson MD  Primary Care Provider: [facility]    Primary Care Team: Networked reference to record PCT     HPI:   This is a 33-year-old male with a past medical history of alcohol use, type 2 diabetes, mood disorder, and orthostatic hypotension who presents with cardiac arrest.    Patient presents to the ED on 07/29 after being found unresponsive by his family around 5:00 p.m..  Family started CPR at 5:24 p.m. and EMS continued resuscitation with achievement of ROSC at 5:45 p.m..  Initial rhythm was noted to be asystole.  The patient later went into VFib, was cardioverted x1 and given amiodarone.  While in the ED, the patient became bradycardic and went into PA arrest x3, during which he received epinephrine x6, bicarbonate x3, calcium chloride x1, and amiodarone with achievement of ROSC. Given recurrent bradycardia, he was later started on dopamine, per cardiology. Per the patient's sister, a bottle of morphine and amitriptyline were found next to him.     Of note, The patient was recently hospitalized (7/25-7/26) for hypotension after missing his midodrine at home.  He received fluids, antibiotics, and midodrine with improvement of his blood pressure and was discharged home.  The patient also had a recent hospitalization at [facility] (6/16-7/8) after presenting with unresponsiveness.  At that time, he was at the neurology clinic for evaluation of episodic loss of consciousness and suddenly became unconscious.  He received CPR of unknown duration after which he became responsive, but did not seem to actually lose pulse.  He was treated for preseptal cellulitis and worked  up for autonomic dysfunction and adrenal insufficiency.    In the ED, laboratory workup was remarkable for leukocytosis (19.5), elevated creatinine (4.1 - from a baseline of 1.7), elevated LFTs (AST:  96, ALT:  94, ALP:  246), elevated troponin (0.063), elevated BNP (109), elevated lactic acid (10.2), hyperphosphatemia (11.8).  UA was unremarkable.  CT head showed no acute large vascular territory infarct or intracranial hemorrhage.  Chest x-ray showed mild perihilar interstitial changes with possible edema.  Patient was given aspirin 300 mg, 3.0 L of LR, vancomycin, Zosyn albuterol, and was started on a sodium bicarb and dopamine gtt. Patient was admitted for further management.       Procedure(s) (LRB):  SURGICAL PROCUREMENT, ORGAN (N/A)      Hospital Course:   This is a 33-year-old male with a past medical history of alcohol use, type 2 diabetes, mood disorder, and orthostatic hypotension who presents with cardiac arrest.  Patient presents to the ED on 07/29 after being found unresponsive by his family around 5:00 p.m..  Family started CPR at 5:24 p.m. and EMS continued resuscitation with achievement of ROSC at 5:45 p.m..  Initial rhythm was noted to be asystole.  The patient later went into VFib, was cardioverted x1 and given amiodarone.  While in the ED, the patient became bradycardic and went into PA arrest x3, during which he received epinephrine x6, bicarbonate x3, calcium chloride x1, and amiodarone with achievement of ROSC. Given recurrent bradycardia, he was later started on dopamine, per cardiology. Per the patient's sister, a bottle of morphine and amitriptyline were found next to him.   In the ED, laboratory workup was remarkable for leukocytosis (19.5), elevated creatinine (4.1 - from a baseline of 1.7), elevated LFTs (AST:  96, ALT:  94, ALP:  246), elevated troponin (0.063), elevated BNP (109), elevated lactic acid (10.2), hyperphosphatemia (11.8).  UA was unremarkable.  CT head showed no acute  large vascular territory infarct or intracranial hemorrhage.  Chest x-ray showed mild perihilar interstitial changes with possible edema.  Patient was given aspirin 300 mg, 3.0 L of LR, vancomycin, Zosyn albuterol, and was started on a sodium bicarb and dopamine gtt.  Has cocci in cluster on blood culture,alreday on vanc.may contaminated,repeted blood culture.CNS,contaminated  Was on IVF for ARF,nephrology is following.  Cardiology is consulted for elevated troponin,echo.show preserved EF.  Pulmonology was doing vent management,  Neurology was on board for possible anoxic injury.repeat head CT show worsening hypoxic brain injury,patient remains unresponsive,family agree with withdraw of care and organ donation,EMILIE was present.patient was extubated in OR and pronounced death in OR.         Goals of Care Treatment Preferences:  Code Status: DNR      Consults:   Consults (From admission, onward)        Status Ordering Provider     Inpatient consult to Registered Dietitian/Nutritionist  Once        Provider:  (Not yet assigned)    Completed SPENCER GARCIA     Inpatient consult to Pulmonology  Once        Provider:  Spencer Garcia MD    Completed AKASH EVANS     Inpatient consult to Palliative Care  Once        Provider:  Analisa Rowan NP    Completed AKASH EVANS     Inpatient consult to Cardiology  Once        Provider:  Roberto Duffy MD    Completed AKASH EVANS     Inpatient consult to Neurology  Once        Provider:  Harvey Guerrier MD    Completed AKASH EVANS          No new Assessment & Plan notes have been filed under this hospital service since the last note was generated.  Service: Hospital Medicine    Final Active Diagnoses:    Diagnosis Date Noted POA    PRINCIPAL PROBLEM:  Cardiac arrest [I46.9] 07/29/2023 Yes    Anoxic brain injury [G93.1] 07/31/2023 Yes    Advance care planning [Z71.89] 07/31/2023 Not Applicable    Positive blood culture [R78.81] 07/31/2023 Yes    NSTEMI (non-ST elevated  myocardial infarction) [I21.4] 2023 Yes    Non-traumatic rhabdomyolysis [M62.82] 2023 Yes    Acute respiratory failure with hypoxia [J96.01] 2023 Yes    Elevated LFTs [R79.89] 2023 Yes    MARISSA (acute kidney injury) [N17.9] 2023 Yes    History of neurosyphilis [Z86.19] 2023 Not Applicable    Severe episode of recurrent major depressive disorder, without psychotic features [F33.2] 2022 Yes     Chronic    Lactic acidosis [E87.20] 10/03/2022 Yes    Severe obesity with body mass index (BMI) of 36.0 to 36.9 with serious comorbidity [E66.01, Z68.36] 10/02/2022 Not Applicable    Normocytic anemia [D64.9] 09/10/2022 Yes    Severe sepsis [A41.9, R65.20] 2022 Yes    Chronic diastolic heart failure [I50.32] 2022 Yes     Chronic    Type 2 diabetes mellitus with hyperglycemia, with long-term current use of insulin [E11.65, Z79.4]  Not Applicable    Alcohol use disorder, severe, dependence [F10.20] 2020 Yes     Chronic      Problems Resolved During this Admission:       Discharged Condition:     Disposition:     Follow Up:    Patient Instructions:   No discharge procedures on file.    Significant Diagnostic Studies: Labs:   BMP:   Recent Labs   Lab 23  0318 23  1006 23  1405   * 208* 169*    141 142   K 3.1* 3.2* 3.6    101 102   CO2 32* 32* 33*   BUN 47* 44* 45*   CREATININE 3.1* 2.9* 2.9*   CALCIUM 7.6* 7.4* 7.4*   MG 1.9 2.0 2.0   , CMP   Recent Labs   Lab 23  03123  1006 23  1405    141 142   K 3.1* 3.2* 3.6    101 102   CO2 32* 32* 33*   * 208* 169*   BUN 47* 44* 45*   CREATININE 3.1* 2.9* 2.9*   CALCIUM 7.6* 7.4* 7.4*   PROT 4.8* 4.6* 4.6*   ALBUMIN 1.5* 1.5* 1.5*   BILITOT 0.3 0.3 0.2   ALKPHOS 158* 142* 137*   * 210* 203*   * 447* 440*   ANIONGAP 9 8 7*    and CBC   Recent Labs   Lab 23  0318 23  1006 23  1405   WBC 15.37* 13.34*  12.75*   HGB 7.2* 6.9* 6.7*   HCT 21.8* 20.8* 21.0*   * 136* 135*     Microbiology:   Blood Culture   Lab Results   Component Value Date    LABBLOO No Growth to date 08/01/2023    LABBLOO No Growth to date 08/01/2023    LABBLOO No Growth to date 08/01/2023    LABBLOO No Growth to date 08/01/2023     Radiology: X-Ray: CXR: X-Ray Chest 1 View (CXR): No results found for this visit on 07/29/23. and X-Ray Chest PA and Lateral (CXR): No results found for this visit on 07/29/23.  CT scan: head   Cardiac Graphics: Echocardiogram:   2D echo with color flow doppler: No results found for this or any previous visit. and Transthoracic echo (TTE) complete (Cupid Only):   Results for orders placed or performed during the hospital encounter of 07/29/23   Echo   Result Value Ref Range    BSA 2.14 m2    LVOT stroke volume 65.70 cm3    LVIDd 5.36 3.5 - 6.0 cm    LV Systolic Volume 61.52 mL    LV Systolic Volume Index 30.2 mL/m2    LVIDs 3.79 2.1 - 4.0 cm    LV Diastolic Volume 139.09 mL    LV Diastolic Volume Index 68.18 mL/m2    IVS 0.56 (A) 0.6 - 1.1 cm    LVOT diameter 2.28 cm    LVOT area 4.1 cm2    FS 29 28 - 44 %    Left Ventricle Relative Wall Thickness 0.31 cm    Posterior Wall 0.84 0.6 - 1.1 cm    TDI LATERAL 0.09 m/s    TDI SEPTAL 0.07 m/s    LV mass 129.50 g    LV Mass Index 63 g/m2    MV Peak E Liam 0.85 m/s    LV LATERAL E/E' RATIO 9.44 m/s    LV SEPTAL E/E' RATIO 12.14 m/s    E/E' ratio 10.63 m/s    MV Peak A Liam 0.97 m/s    TR Max Liam 2.49 m/s    E/A ratio 0.88     Mean e' 0.08 m/s    E wave deceleration time 144.04 msec    LVOT peak liam 1.08 m/s    Left Ventricular Outflow Tract Mean Velocity 0.81 cm/s    Left Ventricular Outflow Tract Mean Gradient 2.87 mmHg    LA size 3.76 cm    Left Atrium Minor Axis 3.88 cm    RVDD 3.28 cm    RA Major Axis 4.27 cm    RA Width 3.23 cm    AV mean gradient 4 mmHg    AV peak gradient 6 mmHg    Ao peak liam 1.26 m/s    Ao VTI 19.00 cm    LVOT peak VTI 16.10 cm    AV valve area  3.46 cm²    AV Velocity Ratio 0.86     AV index (prosthetic) 0.85     YUN by Velocity Ratio 3.50 cm²    MV mean gradient 2 mmHg    MV peak gradient 4 mmHg    MV stenosis pressure 1/2 time 66.68 ms    MV valve area p 1/2 method 3.30 cm2    MV valve area by continuity eq 4.08 cm2    MV VTI 16.1 cm    TAPSE 1.99 cm    Triscuspid Valve Regurgitation Peak Gradient 25 mmHg    PV PEAK VELOCITY 1.18 m/s    PV peak gradient 6 mmHg    Sinus 2.89 cm    STJ 2.79 cm    Ascending aorta 2.75 cm    ZLVIDS 0.13     ZLVIDD -1.26     IVC diameter 2.17 cm    Narrative      Left Ventricle: The left ventricle is normal in size. Normal wall   thickness. Normal wall motion. There is normal systolic function with a   visually estimated ejection fraction of 60 - 65%. Grade I diastolic   dysfunction.    Left Atrium: Normal left atrial size.    Right Ventricle: Normal right ventricular cavity size.    IVC/SVC: Patient is ventilated, cannot use IVC diameter to estimate   right atrial pressure.    Pericardium: There is no pericardial effusion.         Pending Diagnostic Studies:     Procedure Component Value Units Date/Time    Specimen to Pathology, Surgery Other [[id number]] Collected: 08/01/23 1825    Order Status: Sent Lab Status: In process Updated: 08/02/23 0701    Specimen: Tissue     Specimen to Pathology, Surgery Other [[id number]] Collected: 08/01/23 1909    Order Status: Sent Lab Status: In process Updated: 08/01/23 1927    Specimen: Tissue     Specimen to Pathology, Surgery Other [[id number]] Collected: 08/01/23 1909    Order Status: Sent Lab Status: In process Updated: 08/01/23 1915    Specimen: Tissue          Medications:  Reconciled Home Medications:      Medication List      ASK your doctor about these medications    amitriptyline 75 MG tablet  Commonly known as: ELAVIL  Take 1 tablet by mouth every evening.     aspirin 81 MG Chew  Take 1 tablet by mouth once daily.     atorvastatin 80 MG tablet  Commonly known as:  "LIPITOR  Take 80 mg by mouth every evening.     BD ULTRA-FINE MICRO PEN NEEDLE 32 gauge x 1/4" Ndle  Generic drug: pen needle, diabetic  Use 1 each to inject insulin once daily     gabapentin 100 MG capsule  Commonly known as: NEURONTIN  Take 100 mg by mouth 3 (three) times daily.     LEVEMIR FLEXPEN 100 unit/mL (3 mL) Inpn pen  Generic drug: insulin detemir U-100 (Levemir)  Inject 30 Units into the skin once daily.     midodrine 5 MG Tab  Commonly known as: PROAMATINE  Take 3 tablets (15 mg total) by mouth 3 (three) times daily.     oxyCODONE 10 mg Tab immediate release tablet  Commonly known as: ROXICODONE  Take 10 mg by mouth every 8 (eight) hours as needed for Pain.     pantoprazole 40 MG tablet  Commonly known as: PROTONIX  Take 40 mg by mouth once daily.            Indwelling Lines/Drains at time of discharge:   Lines/Drains/Airways     Central Venous Catheter Line  Duration           Percutaneous Central Line Insertion/Assessment - Triple Lumen  07/29/23 1948 Internal Jugular Left 3 days          Drain  Duration                Urethral Catheter 08/01/23 0900 Double-lumen 16 Fr. 1 day          Arterial Line  Duration           Arterial Line 08/01/23 1400 Right Radial <1 day                Time spent on the discharge of patient: over 45  minutes    Critical care time spent on the evaluation and treatment of severe organ dysfunction, review of pertinent labs and imaging studies, discussions with consulting providers and discussions with patient/family: over 45  minutes.     Hollie Johnson MD  Department of Hospital Medicine  [facility] [place] [facility]  "

## 2023-08-03 LAB
BACTERIA SPEC AEROBE CULT: ABNORMAL
BACTERIA SPEC AEROBE CULT: ABNORMAL
GRAM STN SPEC: ABNORMAL

## 2023-08-04 LAB
BACTERIA BLD CULT: NORMAL
BACTERIA BLD CULT: NORMAL
BACTERIA UR CULT: ABNORMAL

## 2023-08-04 NOTE — ANESTHESIA POSTPROCEDURE EVALUATION
Anesthesia Post Evaluation    Patient: Doe Woodall    Procedure(s) Performed: Procedure(s) (LRB):  SURGICAL PROCUREMENT, ORGAN (N/A)    Final Anesthesia Type: other          Anesthetic complications: no            Vitals:    08/01/23 1636 08/01/23 1640 08/01/23 1645 08/01/23 1653   BP:       BP Location:       Patient Position:       Pulse:  92 92 95   Resp: (!) 22 (!) 23  (!) 9   Temp:       TempSrc:       SpO2:  100% 100% 100%   Weight:       Height:                   No case tracking events are documented in the log.      Pain/Joseph Score: No data recorded

## 2023-08-05 LAB
BACTERIA BLD CULT: NORMAL
BACTERIA BLD CULT: NORMAL

## 2023-08-08 LAB
FINAL PATHOLOGIC DIAGNOSIS: NORMAL
FROZEN SECTION DIAGNOSIS: NORMAL
FROZEN SECTION FOOTNOTE: NORMAL
GROSS: NORMAL
Lab: NORMAL

## 2023-09-01 NOTE — PLAN OF CARE
Patient alert through out shift. No acute distress noted, pt free from falls or injury this shift.  Bed in low position, wheels locked, bed alarm on, call light in reach for assistance, plan of care continue.        Problem: Diabetes Comorbidity  Goal: Blood Glucose Level Within Targeted Range  Outcome: Ongoing, Progressing  Intervention: Monitor and Manage Glycemia  Flowsheets (Taken 6/13/2023 0620)  Glycemic Management:   blood glucose monitored   insulin infusion initiated      Patient was here for a nurse visit blood pressure check. He stated he talked to a friend who is a nurse and he took an extra dose of his carvedilol. This morning he took his blood pressure before his medication and he stated his systolic was 169. He hasn't been feeling \"right\" when his blood pressure his high.

## 2024-07-11 NOTE — ED NOTES
Pt in restroom   Photo Preface (Leave Blank If You Do Not Want): Photographs were obtained today Detail Level: Zone

## 2025-02-11 NOTE — PLAN OF CARE
Angiogram 02/10/2025  LOV 01/16/2025   FELIZ unable to coordinate home health services due to insurance. FELIZ notified Dr. Arreaga and inquired if ambulatory referral to wound care clinic can be placed. Dr. Arreaga placed ambulatory referral. FELIZ called wound care clinic to schedule appointment. There was no answer. FELIZ left a message. Clinic will call patient to schedule appointment.        09/16/22 1350   Post-Acute Status   Post-Acute Authorization Home Health   Home Health Status Discharge Plan Changed   Coverage Medicaied   Other Status No Post-Acute Service Needs   Discharge Delays None known at this time   Discharge Plan   Discharge Plan A Home   Discharge Plan B Home with family
